# Patient Record
Sex: FEMALE | Race: WHITE | Employment: OTHER | URBAN - METROPOLITAN AREA
[De-identification: names, ages, dates, MRNs, and addresses within clinical notes are randomized per-mention and may not be internally consistent; named-entity substitution may affect disease eponyms.]

---

## 2017-01-05 ENCOUNTER — HOSPITAL ENCOUNTER (OUTPATIENT)
Dept: INFUSION THERAPY | Age: 47
Discharge: HOME | End: 2017-01-05
Payer: COMMERCIAL

## 2017-01-05 VITALS
BODY MASS INDEX: 30.22 KG/M2 | TEMPERATURE: 98.6 F | DIASTOLIC BLOOD PRESSURE: 79 MMHG | WEIGHT: 165.2 LBS | SYSTOLIC BLOOD PRESSURE: 129 MMHG | RESPIRATION RATE: 18 BRPM | HEART RATE: 112 BPM | OXYGEN SATURATION: 94 %

## 2017-01-05 LAB — MAGNESIUM SERPL-MCNC: 1.8 MG/DL (ref 1.8–2.4)

## 2017-01-05 PROCEDURE — 74011000250 HC RX REV CODE- 250: Performed by: INTERNAL MEDICINE

## 2017-01-05 PROCEDURE — 77030003560 HC NDL HUBR BARD -A

## 2017-01-05 PROCEDURE — 74011250636 HC RX REV CODE- 250/636: Performed by: INTERNAL MEDICINE

## 2017-01-05 PROCEDURE — 83735 ASSAY OF MAGNESIUM: CPT | Performed by: INTERNAL MEDICINE

## 2017-01-05 PROCEDURE — 96374 THER/PROPH/DIAG INJ IV PUSH: CPT

## 2017-01-05 RX ORDER — SODIUM CHLORIDE 0.9 % (FLUSH) 0.9 %
10-40 SYRINGE (ML) INJECTION AS NEEDED
Status: DISCONTINUED | OUTPATIENT
Start: 2017-01-05 | End: 2017-01-09 | Stop reason: HOSPADM

## 2017-01-05 RX ORDER — HEPARIN 100 UNIT/ML
300-500 SYRINGE INTRAVENOUS AS NEEDED
Status: DISPENSED | OUTPATIENT
Start: 2017-01-05 | End: 2017-01-05

## 2017-01-05 RX ADMIN — Medication 10 ML: at 08:21

## 2017-01-05 RX ADMIN — Medication 10 ML: at 08:25

## 2017-01-05 RX ADMIN — SODIUM CHLORIDE, PRESERVATIVE FREE 300 UNITS: 5 INJECTION INTRAVENOUS at 08:25

## 2017-01-05 RX ADMIN — SODIUM CHLORIDE 25 MG: 9 INJECTION INTRAMUSCULAR; INTRAVENOUS; SUBCUTANEOUS at 08:22

## 2017-01-05 NOTE — PROGRESS NOTES
Arrived to the [facility]. Phenergan IV completed. Patient tolerated well. Any issues or concerns during appointment: Magnesium level 1.8 today, no Mag replacements needed. Patient aware of next infusion appointment on 1/12/17 at [facility]. Discharged ambulatory.

## 2017-01-05 NOTE — PROGRESS NOTES
Patient arrived to the lab this morning to have her labs drawn and port needle changed. She states that she has just finished her antibiotic seymour the E.R. Had prescribed fo her port a little over ten days ago. She states that she went to the E.R. At this time due to her port site being red and tender. This morning, she states that the whole area \"looks so much better! \"  There is still some redness noted right at the port insertion site, with slight bruising noted there as well. She states that Dr. Linda Steven was the surgeon that had placed the port in 2013. Instructed her to call his office today to report what has been going on with the port. She verbalizes understanding. She states that she does have his phone number and that she will call. The port needle inserted with ease today and flushes easily. There is also excellent blood return from the port.

## 2017-01-12 ENCOUNTER — HOSPITAL ENCOUNTER (OUTPATIENT)
Dept: INFUSION THERAPY | Age: 47
Discharge: HOME | End: 2017-01-12
Payer: COMMERCIAL

## 2017-01-12 VITALS
DIASTOLIC BLOOD PRESSURE: 74 MMHG | OXYGEN SATURATION: 94 % | HEART RATE: 121 BPM | SYSTOLIC BLOOD PRESSURE: 124 MMHG | RESPIRATION RATE: 18 BRPM | TEMPERATURE: 98.7 F

## 2017-01-12 LAB — MAGNESIUM SERPL-MCNC: 2 MG/DL (ref 1.8–2.4)

## 2017-01-12 PROCEDURE — 74011250636 HC RX REV CODE- 250/636: Performed by: INTERNAL MEDICINE

## 2017-01-12 PROCEDURE — 77030003560 HC NDL HUBR BARD -A

## 2017-01-12 PROCEDURE — 83735 ASSAY OF MAGNESIUM: CPT | Performed by: INTERNAL MEDICINE

## 2017-01-12 PROCEDURE — 96374 THER/PROPH/DIAG INJ IV PUSH: CPT

## 2017-01-12 PROCEDURE — 74011000250 HC RX REV CODE- 250: Performed by: INTERNAL MEDICINE

## 2017-01-12 RX ORDER — HEPARIN 100 UNIT/ML
300-500 SYRINGE INTRAVENOUS AS NEEDED
Status: ACTIVE | OUTPATIENT
Start: 2017-01-12 | End: 2017-01-12

## 2017-01-12 RX ORDER — SODIUM CHLORIDE 0.9 % (FLUSH) 0.9 %
10-40 SYRINGE (ML) INJECTION AS NEEDED
Status: DISCONTINUED | OUTPATIENT
Start: 2017-01-12 | End: 2017-01-16 | Stop reason: HOSPADM

## 2017-01-12 RX ADMIN — Medication 10 ML: at 07:25

## 2017-01-12 RX ADMIN — Medication 10 ML: at 07:35

## 2017-01-12 RX ADMIN — SODIUM CHLORIDE 25 MG: 9 INJECTION INTRAMUSCULAR; INTRAVENOUS; SUBCUTANEOUS at 07:25

## 2017-01-12 NOTE — PROGRESS NOTES
Arrived to the [facility]. Phenergan completed. Patient tolerated well. Any issues or concerns during appointment: Magnesium resulted as 2.0 today; no mag replacement needed per orders. Patient aware of next infusion appointment on 1/19/17 at 7:15am.  Discharged ambulatory.

## 2017-01-19 ENCOUNTER — HOSPITAL ENCOUNTER (OUTPATIENT)
Dept: INFUSION THERAPY | Age: 47
Discharge: HOME | End: 2017-01-19
Payer: COMMERCIAL

## 2017-01-19 VITALS
RESPIRATION RATE: 18 BRPM | BODY MASS INDEX: 30.91 KG/M2 | WEIGHT: 169 LBS | DIASTOLIC BLOOD PRESSURE: 76 MMHG | HEART RATE: 116 BPM | SYSTOLIC BLOOD PRESSURE: 122 MMHG | TEMPERATURE: 98.2 F | OXYGEN SATURATION: 96 %

## 2017-01-19 LAB — MAGNESIUM SERPL-MCNC: 1.9 MG/DL (ref 1.8–2.4)

## 2017-01-19 PROCEDURE — 83735 ASSAY OF MAGNESIUM: CPT | Performed by: INTERNAL MEDICINE

## 2017-01-19 PROCEDURE — 74011000250 HC RX REV CODE- 250: Performed by: INTERNAL MEDICINE

## 2017-01-19 PROCEDURE — 77030003560 HC NDL HUBR BARD -A

## 2017-01-19 PROCEDURE — 96374 THER/PROPH/DIAG INJ IV PUSH: CPT

## 2017-01-19 PROCEDURE — 74011250636 HC RX REV CODE- 250/636: Performed by: INTERNAL MEDICINE

## 2017-01-19 RX ORDER — SODIUM CHLORIDE 0.9 % (FLUSH) 0.9 %
10 SYRINGE (ML) INJECTION AS NEEDED
Status: DISCONTINUED | OUTPATIENT
Start: 2017-01-19 | End: 2017-01-23 | Stop reason: HOSPADM

## 2017-01-19 RX ORDER — HEPARIN 100 UNIT/ML
500 SYRINGE INTRAVENOUS AS NEEDED
Status: DISPENSED | OUTPATIENT
Start: 2017-01-19 | End: 2017-01-19

## 2017-01-19 RX ADMIN — SODIUM CHLORIDE 25 MG: 9 INJECTION INTRAMUSCULAR; INTRAVENOUS; SUBCUTANEOUS at 07:55

## 2017-01-19 RX ADMIN — SODIUM CHLORIDE, PRESERVATIVE FREE 500 UNITS: 5 INJECTION INTRAVENOUS at 07:58

## 2017-01-19 RX ADMIN — Medication 10 ML: at 07:58

## 2017-01-19 NOTE — PROGRESS NOTES
Pt arrived ambulatory for labs and possible replacements. Pt's mag 1.9 today, she does not need replacement. Pt c/o nausea, phenergan given per orders. Pt dc'd stable to return to [facility] on 1/26 at 0715.

## 2017-01-26 ENCOUNTER — HOSPITAL ENCOUNTER (OUTPATIENT)
Dept: INFUSION THERAPY | Age: 47
Discharge: HOME | End: 2017-01-26
Payer: COMMERCIAL

## 2017-01-26 VITALS
WEIGHT: 167.2 LBS | DIASTOLIC BLOOD PRESSURE: 81 MMHG | RESPIRATION RATE: 18 BRPM | HEART RATE: 113 BPM | BODY MASS INDEX: 30.58 KG/M2 | OXYGEN SATURATION: 95 % | SYSTOLIC BLOOD PRESSURE: 125 MMHG | TEMPERATURE: 98.5 F

## 2017-01-26 LAB — MAGNESIUM SERPL-MCNC: 1.8 MG/DL (ref 1.8–2.4)

## 2017-01-26 PROCEDURE — 83735 ASSAY OF MAGNESIUM: CPT | Performed by: INTERNAL MEDICINE

## 2017-01-26 PROCEDURE — 77030003560 HC NDL HUBR BARD -A

## 2017-01-26 PROCEDURE — 96374 THER/PROPH/DIAG INJ IV PUSH: CPT

## 2017-01-26 PROCEDURE — 74011250636 HC RX REV CODE- 250/636: Performed by: INTERNAL MEDICINE

## 2017-01-26 PROCEDURE — 74011000250 HC RX REV CODE- 250: Performed by: INTERNAL MEDICINE

## 2017-01-26 RX ORDER — SODIUM CHLORIDE 0.9 % (FLUSH) 0.9 %
5-10 SYRINGE (ML) INJECTION AS NEEDED
Status: DISCONTINUED | OUTPATIENT
Start: 2017-01-26 | End: 2017-01-30 | Stop reason: HOSPADM

## 2017-01-26 RX ORDER — HEPARIN 100 UNIT/ML
500 SYRINGE INTRAVENOUS AS NEEDED
Status: DISPENSED | OUTPATIENT
Start: 2017-01-26 | End: 2017-01-26

## 2017-01-26 RX ADMIN — SODIUM CHLORIDE 25 MG: 9 INJECTION INTRAMUSCULAR; INTRAVENOUS; SUBCUTANEOUS at 07:54

## 2017-01-26 RX ADMIN — SODIUM CHLORIDE, PRESERVATIVE FREE 500 UNITS: 5 INJECTION INTRAVENOUS at 08:41

## 2017-01-26 RX ADMIN — SODIUM CHLORIDE 500 ML: 900 INJECTION, SOLUTION INTRAVENOUS at 07:46

## 2017-01-26 RX ADMIN — Medication 10 ML: at 07:45

## 2017-01-26 NOTE — PROGRESS NOTES
Pt arrived ambulatory to [facility] with port previously accessed. Mag 1.8. No replacements needed. NS infusing for phenergan. Phenergan given IV as ordered. Port packed with heparin. Pt aware of next appt on 2/2/17 at 0715. Pt discharged ambulatory.

## 2017-02-02 ENCOUNTER — HOSPITAL ENCOUNTER (OUTPATIENT)
Dept: INFUSION THERAPY | Age: 47
Discharge: HOME | End: 2017-02-02
Payer: COMMERCIAL

## 2017-02-02 VITALS
TEMPERATURE: 99.8 F | OXYGEN SATURATION: 96 % | HEART RATE: 124 BPM | SYSTOLIC BLOOD PRESSURE: 124 MMHG | WEIGHT: 165 LBS | DIASTOLIC BLOOD PRESSURE: 77 MMHG | BODY MASS INDEX: 30.18 KG/M2 | RESPIRATION RATE: 18 BRPM

## 2017-02-02 LAB — MAGNESIUM SERPL-MCNC: 2.2 MG/DL (ref 1.8–2.4)

## 2017-02-02 PROCEDURE — 74011250636 HC RX REV CODE- 250/636: Performed by: INTERNAL MEDICINE

## 2017-02-02 PROCEDURE — 77030003560 HC NDL HUBR BARD -A

## 2017-02-02 PROCEDURE — 83735 ASSAY OF MAGNESIUM: CPT | Performed by: INTERNAL MEDICINE

## 2017-02-02 PROCEDURE — 74011000250 HC RX REV CODE- 250: Performed by: INTERNAL MEDICINE

## 2017-02-02 PROCEDURE — 96374 THER/PROPH/DIAG INJ IV PUSH: CPT

## 2017-02-02 RX ORDER — HEPARIN 100 UNIT/ML
500 SYRINGE INTRAVENOUS AS NEEDED
Status: DISPENSED | OUTPATIENT
Start: 2017-02-02 | End: 2017-02-02

## 2017-02-02 RX ORDER — SODIUM CHLORIDE 0.9 % (FLUSH) 0.9 %
10 SYRINGE (ML) INJECTION AS NEEDED
Status: ACTIVE | OUTPATIENT
Start: 2017-02-02 | End: 2017-02-02

## 2017-02-02 RX ADMIN — Medication 10 ML: at 07:45

## 2017-02-02 RX ADMIN — SODIUM CHLORIDE, PRESERVATIVE FREE 500 UNITS: 5 INJECTION INTRAVENOUS at 07:45

## 2017-02-02 RX ADMIN — SODIUM CHLORIDE 25 MG: 9 INJECTION INTRAMUSCULAR; INTRAVENOUS; SUBCUTANEOUS at 07:41

## 2017-02-02 RX ADMIN — Medication 10 ML: at 07:40

## 2017-02-02 NOTE — PROGRESS NOTES
Problem: Knowledge Deficit  Goal: *Verbalizes understanding of procedures and medications  Outcome: Progressing Towards Goal  Pt verbalizes/demonstrates understanding of purpose/procedure/potential side effects of phenergan.

## 2017-02-02 NOTE — PROGRESS NOTES
Pt arrived ambulatory today at 0730, to receive phenergan for c/o nausea, and magnesium sulfate replacement not needed today. Pt tolerated without difficulty, reporting relief of nausea. Patient discharged via ambulatory accompanied by father. Instructed to notify physician of any problems, questions or concerns. Allowed opportunity for patient/family to ask questions. Verbalized understanding. Next appointment is Feb 9 at 3 [facility] with [address] [place] [address].

## 2017-02-09 ENCOUNTER — HOSPITAL ENCOUNTER (OUTPATIENT)
Dept: INFUSION THERAPY | Age: 47
Discharge: HOME | End: 2017-02-09
Payer: COMMERCIAL

## 2017-02-09 VITALS
SYSTOLIC BLOOD PRESSURE: 127 MMHG | HEART RATE: 110 BPM | OXYGEN SATURATION: 95 % | TEMPERATURE: 98.4 F | WEIGHT: 165 LBS | RESPIRATION RATE: 18 BRPM | BODY MASS INDEX: 30.18 KG/M2 | DIASTOLIC BLOOD PRESSURE: 66 MMHG

## 2017-02-09 LAB — MAGNESIUM SERPL-MCNC: 2 MG/DL (ref 1.8–2.4)

## 2017-02-09 PROCEDURE — 74011000250 HC RX REV CODE- 250: Performed by: INTERNAL MEDICINE

## 2017-02-09 PROCEDURE — 74011250636 HC RX REV CODE- 250/636: Performed by: INTERNAL MEDICINE

## 2017-02-09 PROCEDURE — 83735 ASSAY OF MAGNESIUM: CPT | Performed by: INTERNAL MEDICINE

## 2017-02-09 PROCEDURE — 96374 THER/PROPH/DIAG INJ IV PUSH: CPT

## 2017-02-09 PROCEDURE — 77030003560 HC NDL HUBR BARD -A

## 2017-02-09 RX ORDER — SODIUM CHLORIDE 0.9 % (FLUSH) 0.9 %
10-40 SYRINGE (ML) INJECTION AS NEEDED
Status: DISCONTINUED | OUTPATIENT
Start: 2017-02-09 | End: 2017-02-13 | Stop reason: HOSPADM

## 2017-02-09 RX ORDER — HEPARIN 100 UNIT/ML
300-500 SYRINGE INTRAVENOUS AS NEEDED
Status: DISPENSED | OUTPATIENT
Start: 2017-02-09 | End: 2017-02-09

## 2017-02-09 RX ADMIN — Medication 10 ML: at 07:49

## 2017-02-09 RX ADMIN — Medication 10 ML: at 07:53

## 2017-02-09 RX ADMIN — SODIUM CHLORIDE 25 MG: 9 INJECTION INTRAMUSCULAR; INTRAVENOUS; SUBCUTANEOUS at 07:50

## 2017-02-09 RX ADMIN — SODIUM CHLORIDE, PRESERVATIVE FREE 500 UNITS: 5 INJECTION INTRAVENOUS at 07:53

## 2017-02-09 NOTE — PROGRESS NOTES
Arrived to the [facility]. Patient's mag level 2.0 today, no mag replacements needed. Phenergan IV completed. Patient tolerated well. Any issues or concerns during appointment: none. Patient aware of next infusion appointment on 2/16/17 at [address] [place] [address]. Discharged ambulatory with father.

## 2017-02-16 ENCOUNTER — HOSPITAL ENCOUNTER (OUTPATIENT)
Dept: INFUSION THERAPY | Age: 47
Discharge: HOME | End: 2017-02-16
Payer: COMMERCIAL

## 2017-02-16 VITALS
TEMPERATURE: 98.2 F | WEIGHT: 165 LBS | DIASTOLIC BLOOD PRESSURE: 79 MMHG | HEART RATE: 119 BPM | SYSTOLIC BLOOD PRESSURE: 107 MMHG | BODY MASS INDEX: 30.18 KG/M2 | RESPIRATION RATE: 18 BRPM | OXYGEN SATURATION: 97 %

## 2017-02-16 LAB — MAGNESIUM SERPL-MCNC: 2.1 MG/DL (ref 1.8–2.4)

## 2017-02-16 PROCEDURE — 74011250636 HC RX REV CODE- 250/636: Performed by: INTERNAL MEDICINE

## 2017-02-16 PROCEDURE — 77030003560 HC NDL HUBR BARD -A

## 2017-02-16 PROCEDURE — 83735 ASSAY OF MAGNESIUM: CPT | Performed by: INTERNAL MEDICINE

## 2017-02-16 PROCEDURE — 96374 THER/PROPH/DIAG INJ IV PUSH: CPT

## 2017-02-16 PROCEDURE — 74011000250 HC RX REV CODE- 250: Performed by: INTERNAL MEDICINE

## 2017-02-16 RX ORDER — HEPARIN 100 UNIT/ML
300-500 SYRINGE INTRAVENOUS AS NEEDED
Status: DISPENSED | OUTPATIENT
Start: 2017-02-16 | End: 2017-02-16

## 2017-02-16 RX ORDER — SODIUM CHLORIDE 0.9 % (FLUSH) 0.9 %
10-40 SYRINGE (ML) INJECTION AS NEEDED
Status: DISCONTINUED | OUTPATIENT
Start: 2017-02-16 | End: 2017-02-20 | Stop reason: HOSPADM

## 2017-02-16 RX ADMIN — Medication 10 ML: at 07:45

## 2017-02-16 RX ADMIN — SODIUM CHLORIDE 25 MG: 9 INJECTION INTRAMUSCULAR; INTRAVENOUS; SUBCUTANEOUS at 07:46

## 2017-02-16 RX ADMIN — Medication 10 ML: at 07:49

## 2017-02-16 RX ADMIN — SODIUM CHLORIDE, PRESERVATIVE FREE 500 UNITS: 5 INJECTION INTRAVENOUS at 07:49

## 2017-02-16 NOTE — PROGRESS NOTES
Arrived to the [facility]. Labs reviewed, no mag replacements needed. IV phenergan completed. Patient tolerated well. Any issues or concerns during appointment: none. Patient aware of next infusion appointment on 2/23/17 at [facility]. Discharged ambulatory.

## 2017-02-23 ENCOUNTER — HOSPITAL ENCOUNTER (OUTPATIENT)
Dept: INFUSION THERAPY | Age: 47
Discharge: HOME | End: 2017-02-23
Payer: COMMERCIAL

## 2017-02-23 VITALS
TEMPERATURE: 98.9 F | HEART RATE: 122 BPM | SYSTOLIC BLOOD PRESSURE: 141 MMHG | WEIGHT: 168 LBS | OXYGEN SATURATION: 95 % | DIASTOLIC BLOOD PRESSURE: 71 MMHG | BODY MASS INDEX: 30.73 KG/M2 | RESPIRATION RATE: 18 BRPM

## 2017-02-23 LAB
EST. AVERAGE GLUCOSE BLD GHB EST-MCNC: 306 MG/DL
HBA1C MFR BLD: 12.3 % (ref 4.8–6)
MAGNESIUM SERPL-MCNC: 2 MG/DL (ref 1.8–2.4)

## 2017-02-23 PROCEDURE — 77030003560 HC NDL HUBR BARD -A

## 2017-02-23 PROCEDURE — 83735 ASSAY OF MAGNESIUM: CPT | Performed by: INTERNAL MEDICINE

## 2017-02-23 PROCEDURE — 96374 THER/PROPH/DIAG INJ IV PUSH: CPT

## 2017-02-23 PROCEDURE — 74011000250 HC RX REV CODE- 250: Performed by: INTERNAL MEDICINE

## 2017-02-23 PROCEDURE — 83036 HEMOGLOBIN GLYCOSYLATED A1C: CPT

## 2017-02-23 PROCEDURE — 74011250636 HC RX REV CODE- 250/636: Performed by: INTERNAL MEDICINE

## 2017-02-23 RX ORDER — SODIUM CHLORIDE 0.9 % (FLUSH) 0.9 %
10 SYRINGE (ML) INJECTION AS NEEDED
Status: COMPLETED | OUTPATIENT
Start: 2017-02-23 | End: 2017-02-23

## 2017-02-23 RX ORDER — HEPARIN 100 UNIT/ML
500 SYRINGE INTRAVENOUS AS NEEDED
Status: DISPENSED | OUTPATIENT
Start: 2017-02-23 | End: 2017-02-23

## 2017-02-23 RX ORDER — SODIUM CHLORIDE 9 MG/ML
25 INJECTION, SOLUTION INTRAVENOUS ONCE
Status: COMPLETED | OUTPATIENT
Start: 2017-02-23 | End: 2017-02-23

## 2017-02-23 RX ADMIN — Medication 10 ML: at 07:44

## 2017-02-23 RX ADMIN — Medication 10 ML: at 08:30

## 2017-02-23 RX ADMIN — SODIUM CHLORIDE 25 ML/HR: 900 INJECTION, SOLUTION INTRAVENOUS at 07:44

## 2017-02-23 RX ADMIN — SODIUM CHLORIDE 25 MG: 9 INJECTION INTRAMUSCULAR; INTRAVENOUS; SUBCUTANEOUS at 07:47

## 2017-02-23 RX ADMIN — SODIUM CHLORIDE, PRESERVATIVE FREE 500 UNITS: 5 INJECTION INTRAVENOUS at 08:30

## 2017-02-23 NOTE — PROGRESS NOTES
Arrived to the [facility]. IV phenergan completed. Patient tolerated well. Any issues or concerns during appointment: NO.  Patient aware of next infusion appointment on 03/02/17 (date) at [address] [place] [address] (time). Discharged ambulatory.

## 2017-02-28 ENCOUNTER — HOSPITAL ENCOUNTER (OUTPATIENT)
Dept: INFUSION THERAPY | Age: 47
Discharge: HOME | End: 2017-02-28
Payer: COMMERCIAL

## 2017-02-28 VITALS
RESPIRATION RATE: 16 BRPM | HEART RATE: 111 BPM | SYSTOLIC BLOOD PRESSURE: 103 MMHG | BODY MASS INDEX: 30.18 KG/M2 | OXYGEN SATURATION: 90 % | TEMPERATURE: 98.2 F | WEIGHT: 165 LBS | DIASTOLIC BLOOD PRESSURE: 56 MMHG

## 2017-02-28 LAB — MAGNESIUM SERPL-MCNC: 1.9 MG/DL (ref 1.8–2.4)

## 2017-02-28 PROCEDURE — 74011250636 HC RX REV CODE- 250/636: Performed by: INTERNAL MEDICINE

## 2017-02-28 PROCEDURE — 83735 ASSAY OF MAGNESIUM: CPT | Performed by: INTERNAL MEDICINE

## 2017-02-28 PROCEDURE — 77030003560 HC NDL HUBR BARD -A

## 2017-02-28 PROCEDURE — 74011000250 HC RX REV CODE- 250: Performed by: INTERNAL MEDICINE

## 2017-02-28 PROCEDURE — 96374 THER/PROPH/DIAG INJ IV PUSH: CPT

## 2017-02-28 RX ORDER — SODIUM CHLORIDE 0.9 % (FLUSH) 0.9 %
20 SYRINGE (ML) INJECTION EVERY 8 HOURS
Status: DISCONTINUED | OUTPATIENT
Start: 2017-02-28 | End: 2017-03-04 | Stop reason: HOSPADM

## 2017-02-28 RX ADMIN — SODIUM CHLORIDE 25 MG: 9 INJECTION INTRAMUSCULAR; INTRAVENOUS; SUBCUTANEOUS at 16:05

## 2017-02-28 RX ADMIN — Medication 20 ML: at 16:17

## 2017-03-09 ENCOUNTER — HOSPITAL ENCOUNTER (OUTPATIENT)
Dept: INFUSION THERAPY | Age: 47
End: 2017-03-09
Payer: COMMERCIAL

## 2017-03-09 ENCOUNTER — HOSPITAL ENCOUNTER (OUTPATIENT)
Dept: INFUSION THERAPY | Age: 47
Discharge: HOME | End: 2017-03-09
Payer: COMMERCIAL

## 2017-03-09 VITALS
OXYGEN SATURATION: 95 % | DIASTOLIC BLOOD PRESSURE: 65 MMHG | TEMPERATURE: 97.9 F | HEART RATE: 111 BPM | SYSTOLIC BLOOD PRESSURE: 122 MMHG | RESPIRATION RATE: 18 BRPM

## 2017-03-09 LAB
ALBUMIN SERPL BCP-MCNC: 3.5 G/DL (ref 3.5–5)
ALBUMIN/GLOB SERPL: 0.9 {RATIO} (ref 1.2–3.5)
ALP SERPL-CCNC: 127 U/L (ref 50–136)
ALT SERPL-CCNC: 24 U/L (ref 12–65)
ANION GAP BLD CALC-SCNC: 9 MMOL/L (ref 7–16)
AST SERPL W P-5'-P-CCNC: 18 U/L (ref 15–37)
BILIRUB DIRECT SERPL-MCNC: 0.1 MG/DL
BILIRUB SERPL-MCNC: 0.3 MG/DL (ref 0.2–1.1)
BUN SERPL-MCNC: 18 MG/DL (ref 6–23)
CALCIUM SERPL-MCNC: 8.5 MG/DL (ref 8.3–10.4)
CHLORIDE SERPL-SCNC: 98 MMOL/L (ref 98–107)
CHOLEST SERPL-MCNC: 163 MG/DL
CO2 SERPL-SCNC: 27 MMOL/L (ref 23–32)
CREAT SERPL-MCNC: 0.87 MG/DL (ref 0.6–1)
EST. AVERAGE GLUCOSE BLD GHB EST-MCNC: 309 MG/DL
GLOBULIN SER CALC-MCNC: 3.9 G/DL (ref 2.3–3.5)
GLUCOSE SERPL-MCNC: 423 MG/DL (ref 65–100)
HBA1C MFR BLD: 12.4 % (ref 4.8–6)
HDLC SERPL-MCNC: 42 MG/DL (ref 40–60)
HDLC SERPL: 3.9 {RATIO}
LDLC SERPL CALC-MCNC: 44.6 MG/DL
LIPID PROFILE,FLP: ABNORMAL
MAGNESIUM SERPL-MCNC: 1.9 MG/DL (ref 1.8–2.4)
POTASSIUM SERPL-SCNC: 4.3 MMOL/L (ref 3.5–5.1)
PROT SERPL-MCNC: 7.4 G/DL (ref 6.3–8.2)
SODIUM SERPL-SCNC: 134 MMOL/L (ref 136–145)
TRIGL SERPL-MCNC: 382 MG/DL (ref 35–150)
VLDLC SERPL CALC-MCNC: 76.4 MG/DL (ref 6–23)

## 2017-03-09 PROCEDURE — 83036 HEMOGLOBIN GLYCOSYLATED A1C: CPT | Performed by: INTERNAL MEDICINE

## 2017-03-09 PROCEDURE — 74011250636 HC RX REV CODE- 250/636: Performed by: INTERNAL MEDICINE

## 2017-03-09 PROCEDURE — 80048 BASIC METABOLIC PNL TOTAL CA: CPT | Performed by: INTERNAL MEDICINE

## 2017-03-09 PROCEDURE — 80061 LIPID PANEL: CPT | Performed by: INTERNAL MEDICINE

## 2017-03-09 PROCEDURE — 80076 HEPATIC FUNCTION PANEL: CPT | Performed by: INTERNAL MEDICINE

## 2017-03-09 PROCEDURE — 77030003560 HC NDL HUBR BARD -A

## 2017-03-09 PROCEDURE — 83735 ASSAY OF MAGNESIUM: CPT | Performed by: INTERNAL MEDICINE

## 2017-03-09 PROCEDURE — 74011000250 HC RX REV CODE- 250: Performed by: INTERNAL MEDICINE

## 2017-03-09 PROCEDURE — 96374 THER/PROPH/DIAG INJ IV PUSH: CPT

## 2017-03-09 RX ORDER — HEPARIN 100 UNIT/ML
300-500 SYRINGE INTRAVENOUS AS NEEDED
Status: DISPENSED | OUTPATIENT
Start: 2017-03-09 | End: 2017-03-09

## 2017-03-09 RX ORDER — SODIUM CHLORIDE 0.9 % (FLUSH) 0.9 %
10-40 SYRINGE (ML) INJECTION AS NEEDED
Status: DISCONTINUED | OUTPATIENT
Start: 2017-03-09 | End: 2017-03-13 | Stop reason: HOSPADM

## 2017-03-09 RX ADMIN — Medication 10 ML: at 07:56

## 2017-03-09 RX ADMIN — Medication 10 ML: at 07:58

## 2017-03-09 RX ADMIN — SODIUM CHLORIDE 25 MG: 9 INJECTION INTRAMUSCULAR; INTRAVENOUS; SUBCUTANEOUS at 07:56

## 2017-03-09 RX ADMIN — SODIUM CHLORIDE, PRESERVATIVE FREE 500 UNITS: 5 INJECTION INTRAVENOUS at 08:07

## 2017-03-09 NOTE — PROGRESS NOTES
Arrived to the [facility]. Mag level 1.9, no mag replacements needed. Phenergan IV given. Labs drawn from per Dr. Derrick Love orders. Patient's glucose level 423 this morning. She states she just had a gravy biscuit and chocolate milk and had not checked her blood sugar yet. She usually checks it at home and is able to control it with insulin. Instructed patient to make sure she takes her insulin as her doctor has instructed and to be monitoring her blood sugars regularly. Patient verbalized understanding. Any issues or concerns during appointment: none. Patient aware of next infusion appointment on 3/16/17 at [address] [place] [address]. Discharged ambulatory.

## 2017-03-16 ENCOUNTER — HOSPITAL ENCOUNTER (OUTPATIENT)
Dept: INFUSION THERAPY | Age: 47
Discharge: HOME | End: 2017-03-16
Payer: COMMERCIAL

## 2017-03-16 VITALS
HEART RATE: 96 BPM | RESPIRATION RATE: 18 BRPM | TEMPERATURE: 98.2 F | SYSTOLIC BLOOD PRESSURE: 117 MMHG | DIASTOLIC BLOOD PRESSURE: 68 MMHG | OXYGEN SATURATION: 95 %

## 2017-03-16 LAB — MAGNESIUM SERPL-MCNC: 1.9 MG/DL (ref 1.8–2.4)

## 2017-03-16 PROCEDURE — 74011250636 HC RX REV CODE- 250/636: Performed by: INTERNAL MEDICINE

## 2017-03-16 PROCEDURE — 96374 THER/PROPH/DIAG INJ IV PUSH: CPT

## 2017-03-16 PROCEDURE — 77030003560 HC NDL HUBR BARD -A

## 2017-03-16 PROCEDURE — 74011000250 HC RX REV CODE- 250: Performed by: INTERNAL MEDICINE

## 2017-03-16 PROCEDURE — 83735 ASSAY OF MAGNESIUM: CPT | Performed by: INTERNAL MEDICINE

## 2017-03-16 RX ORDER — SODIUM CHLORIDE 0.9 % (FLUSH) 0.9 %
5-10 SYRINGE (ML) INJECTION AS NEEDED
Status: DISCONTINUED | OUTPATIENT
Start: 2017-03-16 | End: 2017-03-20 | Stop reason: HOSPADM

## 2017-03-16 RX ORDER — HEPARIN 100 UNIT/ML
500 SYRINGE INTRAVENOUS AS NEEDED
Status: DISPENSED | OUTPATIENT
Start: 2017-03-16 | End: 2017-03-16

## 2017-03-16 RX ADMIN — SODIUM CHLORIDE 25 MG: 9 INJECTION INTRAMUSCULAR; INTRAVENOUS; SUBCUTANEOUS at 07:34

## 2017-03-16 RX ADMIN — Medication 10 ML: at 08:35

## 2017-03-16 RX ADMIN — SODIUM CHLORIDE, PRESERVATIVE FREE 500 UNITS: 5 INJECTION INTRAVENOUS at 08:35

## 2017-03-16 RX ADMIN — Medication 10 ML: at 07:20

## 2017-03-16 NOTE — PROGRESS NOTES
Pt arrived ambulatory to [facility]. Port accessed with good blood return. Blood drawn for Mag level and sent to lab. Phenergan given IV. Mag 1.9. No replacements needed at this time. Port flushed and packed with heparin and remains accessed for in home infusion. Pt aware of next appt on 3/23/17 at [address] [place] [address]. Pt discharged ambulatory.

## 2017-03-23 ENCOUNTER — HOSPITAL ENCOUNTER (OUTPATIENT)
Dept: INFUSION THERAPY | Age: 47
Discharge: HOME | End: 2017-03-23
Payer: COMMERCIAL

## 2017-03-23 VITALS
RESPIRATION RATE: 18 BRPM | OXYGEN SATURATION: 95 % | HEART RATE: 100 BPM | WEIGHT: 167.2 LBS | DIASTOLIC BLOOD PRESSURE: 81 MMHG | TEMPERATURE: 98.5 F | BODY MASS INDEX: 30.58 KG/M2 | SYSTOLIC BLOOD PRESSURE: 126 MMHG

## 2017-03-23 LAB — MAGNESIUM SERPL-MCNC: 1.9 MG/DL (ref 1.8–2.4)

## 2017-03-23 PROCEDURE — 83735 ASSAY OF MAGNESIUM: CPT | Performed by: INTERNAL MEDICINE

## 2017-03-23 PROCEDURE — 96374 THER/PROPH/DIAG INJ IV PUSH: CPT

## 2017-03-23 PROCEDURE — 74011250636 HC RX REV CODE- 250/636: Performed by: INTERNAL MEDICINE

## 2017-03-23 PROCEDURE — 77030003560 HC NDL HUBR BARD -A

## 2017-03-23 PROCEDURE — 74011000250 HC RX REV CODE- 250: Performed by: INTERNAL MEDICINE

## 2017-03-23 RX ORDER — HEPARIN 100 UNIT/ML
300-500 SYRINGE INTRAVENOUS AS NEEDED
Status: DISPENSED | OUTPATIENT
Start: 2017-03-23 | End: 2017-03-23

## 2017-03-23 RX ORDER — SODIUM CHLORIDE 0.9 % (FLUSH) 0.9 %
10-40 SYRINGE (ML) INJECTION AS NEEDED
Status: DISCONTINUED | OUTPATIENT
Start: 2017-03-23 | End: 2017-03-27 | Stop reason: HOSPADM

## 2017-03-23 RX ADMIN — SODIUM CHLORIDE 25 MG: 9 INJECTION INTRAMUSCULAR; INTRAVENOUS; SUBCUTANEOUS at 07:33

## 2017-03-23 RX ADMIN — SODIUM CHLORIDE, PRESERVATIVE FREE 500 UNITS: 5 INJECTION INTRAVENOUS at 08:36

## 2017-03-23 RX ADMIN — Medication 10 ML: at 07:33

## 2017-03-23 RX ADMIN — Medication 10 ML: at 08:36

## 2017-03-23 NOTE — PROGRESS NOTES
Arrived to the [facility]. Labs reviewed. Magnesium level 1.9 today, no replacements needed. IV phenergan completed. Patient tolerated well. Any issues or concerns during appointment: none. Patient aware of next infusion appointment on 3/30/17 at [address] [place] [address]. Discharged ambulatory.

## 2017-03-30 ENCOUNTER — HOSPITAL ENCOUNTER (OUTPATIENT)
Dept: INFUSION THERAPY | Age: 47
Discharge: HOME | End: 2017-03-30
Payer: COMMERCIAL

## 2017-03-30 VITALS
SYSTOLIC BLOOD PRESSURE: 101 MMHG | OXYGEN SATURATION: 95 % | HEART RATE: 91 BPM | TEMPERATURE: 99 F | RESPIRATION RATE: 18 BRPM | DIASTOLIC BLOOD PRESSURE: 50 MMHG

## 2017-03-30 LAB
ALBUMIN SERPL BCP-MCNC: 3.5 G/DL (ref 3.5–5)
ALBUMIN/GLOB SERPL: 0.9 {RATIO} (ref 1.2–3.5)
ALP SERPL-CCNC: 141 U/L (ref 50–136)
ALT SERPL-CCNC: 26 U/L (ref 12–65)
ANION GAP BLD CALC-SCNC: 9 MMOL/L (ref 7–16)
AST SERPL W P-5'-P-CCNC: 16 U/L (ref 15–37)
BILIRUB DIRECT SERPL-MCNC: <0.1 MG/DL
BILIRUB SERPL-MCNC: 0.3 MG/DL (ref 0.2–1.1)
BUN SERPL-MCNC: 13 MG/DL (ref 6–23)
CALCIUM SERPL-MCNC: 9.4 MG/DL (ref 8.3–10.4)
CHLORIDE SERPL-SCNC: 94 MMOL/L (ref 98–107)
CO2 SERPL-SCNC: 29 MMOL/L (ref 23–32)
CREAT SERPL-MCNC: 0.86 MG/DL (ref 0.6–1)
GLOBULIN SER CALC-MCNC: 3.9 G/DL (ref 2.3–3.5)
GLUCOSE SERPL-MCNC: 405 MG/DL (ref 65–100)
MAGNESIUM SERPL-MCNC: 1.9 MG/DL (ref 1.8–2.4)
POTASSIUM SERPL-SCNC: 4.4 MMOL/L (ref 3.5–5.1)
PROT SERPL-MCNC: 7.4 G/DL (ref 6.3–8.2)
SODIUM SERPL-SCNC: 132 MMOL/L (ref 136–145)

## 2017-03-30 PROCEDURE — 74011000250 HC RX REV CODE- 250: Performed by: INTERNAL MEDICINE

## 2017-03-30 PROCEDURE — 96374 THER/PROPH/DIAG INJ IV PUSH: CPT

## 2017-03-30 PROCEDURE — 83735 ASSAY OF MAGNESIUM: CPT | Performed by: INTERNAL MEDICINE

## 2017-03-30 PROCEDURE — 80048 BASIC METABOLIC PNL TOTAL CA: CPT | Performed by: INTERNAL MEDICINE

## 2017-03-30 PROCEDURE — 80076 HEPATIC FUNCTION PANEL: CPT | Performed by: INTERNAL MEDICINE

## 2017-03-30 PROCEDURE — 77030003560 HC NDL HUBR BARD -A

## 2017-03-30 PROCEDURE — 96361 HYDRATE IV INFUSION ADD-ON: CPT

## 2017-03-30 PROCEDURE — 74011250636 HC RX REV CODE- 250/636: Performed by: INTERNAL MEDICINE

## 2017-03-30 RX ORDER — SODIUM CHLORIDE 0.9 % (FLUSH) 0.9 %
10-40 SYRINGE (ML) INJECTION AS NEEDED
Status: DISCONTINUED | OUTPATIENT
Start: 2017-03-30 | End: 2017-04-03 | Stop reason: HOSPADM

## 2017-03-30 RX ORDER — HEPARIN 100 UNIT/ML
500 SYRINGE INTRAVENOUS AS NEEDED
Status: DISPENSED | OUTPATIENT
Start: 2017-03-30 | End: 2017-03-30

## 2017-03-30 RX ADMIN — SODIUM CHLORIDE 500 ML: 900 INJECTION, SOLUTION INTRAVENOUS at 07:41

## 2017-03-30 RX ADMIN — SODIUM CHLORIDE 25 MG: 9 INJECTION INTRAMUSCULAR; INTRAVENOUS; SUBCUTANEOUS at 07:30

## 2017-03-30 RX ADMIN — Medication 10 ML: at 07:29

## 2017-03-30 RX ADMIN — SODIUM CHLORIDE, PRESERVATIVE FREE 500 UNITS: 5 INJECTION INTRAVENOUS at 09:20

## 2017-03-30 RX ADMIN — Medication 10 ML: at 09:20

## 2017-03-30 NOTE — PROGRESS NOTES
Pt. Discharged ambulatory. Tolerated infusion well. No distress noted. To return to Infusions on 4/6/17.

## 2017-04-06 ENCOUNTER — HOSPITAL ENCOUNTER (OUTPATIENT)
Dept: INFUSION THERAPY | Age: 47
Discharge: HOME | End: 2017-04-06
Payer: COMMERCIAL

## 2017-04-06 VITALS
BODY MASS INDEX: 30.73 KG/M2 | SYSTOLIC BLOOD PRESSURE: 130 MMHG | HEART RATE: 113 BPM | OXYGEN SATURATION: 94 % | RESPIRATION RATE: 18 BRPM | TEMPERATURE: 98.4 F | WEIGHT: 168 LBS | DIASTOLIC BLOOD PRESSURE: 75 MMHG

## 2017-04-06 LAB — MAGNESIUM SERPL-MCNC: 1.8 MG/DL (ref 1.8–2.4)

## 2017-04-06 PROCEDURE — 74011000250 HC RX REV CODE- 250: Performed by: INTERNAL MEDICINE

## 2017-04-06 PROCEDURE — 96374 THER/PROPH/DIAG INJ IV PUSH: CPT

## 2017-04-06 PROCEDURE — 74011250636 HC RX REV CODE- 250/636: Performed by: INTERNAL MEDICINE

## 2017-04-06 PROCEDURE — 77030003560 HC NDL HUBR BARD -A

## 2017-04-06 PROCEDURE — 83735 ASSAY OF MAGNESIUM: CPT | Performed by: INTERNAL MEDICINE

## 2017-04-06 RX ORDER — OXYCODONE HYDROCHLORIDE 10 MG/1
10 TABLET ORAL
COMMUNITY
End: 2018-09-24

## 2017-04-06 RX ORDER — SODIUM CHLORIDE 0.9 % (FLUSH) 0.9 %
10-40 SYRINGE (ML) INJECTION AS NEEDED
Status: DISCONTINUED | OUTPATIENT
Start: 2017-04-06 | End: 2017-04-10 | Stop reason: HOSPADM

## 2017-04-06 RX ORDER — HEPARIN 100 UNIT/ML
500 SYRINGE INTRAVENOUS AS NEEDED
Status: DISPENSED | OUTPATIENT
Start: 2017-04-06 | End: 2017-04-06

## 2017-04-06 RX ADMIN — Medication 10 ML: at 07:48

## 2017-04-06 RX ADMIN — SODIUM CHLORIDE, PRESERVATIVE FREE 500 UNITS: 5 INJECTION INTRAVENOUS at 08:44

## 2017-04-06 RX ADMIN — SODIUM CHLORIDE 25 MG: 9 INJECTION INTRAMUSCULAR; INTRAVENOUS; SUBCUTANEOUS at 07:48

## 2017-04-06 RX ADMIN — Medication 10 ML: at 08:44

## 2017-04-06 NOTE — PROGRESS NOTES
Arrived to the [facility]. Labs reviewed. Magnesium level 1.8 today, no replacements needed. IV phenergan completed. Patient tolerated well. Any issues or concerns during appointment: none. Patient aware of next infusion appointment on 4/13/17 at [address] [place] [address]. Discharged ambulatory.

## 2017-04-13 ENCOUNTER — HOSPITAL ENCOUNTER (OUTPATIENT)
Dept: INFUSION THERAPY | Age: 47
Discharge: HOME | End: 2017-04-13
Payer: COMMERCIAL

## 2017-04-13 VITALS
BODY MASS INDEX: 30.18 KG/M2 | TEMPERATURE: 98.1 F | SYSTOLIC BLOOD PRESSURE: 97 MMHG | WEIGHT: 165 LBS | RESPIRATION RATE: 16 BRPM | HEART RATE: 89 BPM | OXYGEN SATURATION: 94 % | DIASTOLIC BLOOD PRESSURE: 69 MMHG

## 2017-04-13 LAB — MAGNESIUM SERPL-MCNC: 1.9 MG/DL (ref 1.8–2.4)

## 2017-04-13 PROCEDURE — 83735 ASSAY OF MAGNESIUM: CPT | Performed by: INTERNAL MEDICINE

## 2017-04-13 PROCEDURE — 77030003560 HC NDL HUBR BARD -A

## 2017-04-13 PROCEDURE — 74011250636 HC RX REV CODE- 250/636: Performed by: INTERNAL MEDICINE

## 2017-04-13 PROCEDURE — 74011000250 HC RX REV CODE- 250: Performed by: INTERNAL MEDICINE

## 2017-04-13 PROCEDURE — 96374 THER/PROPH/DIAG INJ IV PUSH: CPT

## 2017-04-13 RX ORDER — SODIUM CHLORIDE 0.9 % (FLUSH) 0.9 %
10-40 SYRINGE (ML) INJECTION AS NEEDED
Status: DISCONTINUED | OUTPATIENT
Start: 2017-04-13 | End: 2017-04-17 | Stop reason: HOSPADM

## 2017-04-13 RX ORDER — HEPARIN 100 UNIT/ML
500 SYRINGE INTRAVENOUS AS NEEDED
Status: DISPENSED | OUTPATIENT
Start: 2017-04-13 | End: 2017-04-13

## 2017-04-13 RX ADMIN — SODIUM CHLORIDE 25 MG: 9 INJECTION INTRAMUSCULAR; INTRAVENOUS; SUBCUTANEOUS at 07:54

## 2017-04-13 RX ADMIN — Medication 10 ML: at 07:27

## 2017-04-13 RX ADMIN — Medication 10 ML: at 07:58

## 2017-04-13 RX ADMIN — SODIUM CHLORIDE, PRESERVATIVE FREE 500 UNITS: 5 INJECTION INTRAVENOUS at 08:29

## 2017-04-13 NOTE — PROGRESS NOTES
Arrived to the Replaced by [facility]. Assessment completed. Patient received phenergan 25 mg IV today, as ordered. She had her port needle changed, using sterile technique. Magnesium level noted at 1.9 today. Any issues or concerns during appointment: none. Patient aware of next infusion appointment on 4/20'17 (date) at 21 [phone] (time) with lab and [id number] with IV infusion center. Discharged ambulatory, with father. Patient instructed to call her doctor's office immediately for any problems or concerns. She verbalizes understanding.

## 2017-04-20 ENCOUNTER — HOSPITAL ENCOUNTER (OUTPATIENT)
Dept: INFUSION THERAPY | Age: 47
Discharge: HOME | End: 2017-04-20
Payer: COMMERCIAL

## 2017-04-20 VITALS
OXYGEN SATURATION: 95 % | RESPIRATION RATE: 18 BRPM | DIASTOLIC BLOOD PRESSURE: 72 MMHG | TEMPERATURE: 98.1 F | HEART RATE: 90 BPM | SYSTOLIC BLOOD PRESSURE: 126 MMHG

## 2017-04-20 LAB — MAGNESIUM SERPL-MCNC: 1.8 MG/DL (ref 1.8–2.4)

## 2017-04-20 PROCEDURE — 77030003560 HC NDL HUBR BARD -A

## 2017-04-20 PROCEDURE — 74011250636 HC RX REV CODE- 250/636: Performed by: INTERNAL MEDICINE

## 2017-04-20 PROCEDURE — 83735 ASSAY OF MAGNESIUM: CPT | Performed by: INTERNAL MEDICINE

## 2017-04-20 PROCEDURE — 96374 THER/PROPH/DIAG INJ IV PUSH: CPT

## 2017-04-20 PROCEDURE — 74011000250 HC RX REV CODE- 250: Performed by: INTERNAL MEDICINE

## 2017-04-20 RX ORDER — HEPARIN 100 UNIT/ML
500 SYRINGE INTRAVENOUS AS NEEDED
Status: DISPENSED | OUTPATIENT
Start: 2017-04-20 | End: 2017-04-20

## 2017-04-20 RX ORDER — SODIUM CHLORIDE 0.9 % (FLUSH) 0.9 %
10 SYRINGE (ML) INJECTION AS NEEDED
Status: DISCONTINUED | OUTPATIENT
Start: 2017-04-20 | End: 2017-04-24 | Stop reason: HOSPADM

## 2017-04-20 RX ADMIN — SODIUM CHLORIDE 25 MG: 9 INJECTION INTRAMUSCULAR; INTRAVENOUS; SUBCUTANEOUS at 07:32

## 2017-04-20 RX ADMIN — Medication 10 ML: at 07:50

## 2017-04-20 RX ADMIN — SODIUM CHLORIDE, PRESERVATIVE FREE 500 UNITS: 5 INJECTION INTRAVENOUS at 07:50

## 2017-04-20 NOTE — PROGRESS NOTES
Arrived to the [facility]. Port care and labs  completed.  Patient tolerated without problems, did not require Magnesium  Any issues or concerns during appointment: no  Patient aware of next infusion appointment on 4/27/17 at [address] [place] [address]  Discharged ambulatory

## 2017-04-27 ENCOUNTER — HOSPITAL ENCOUNTER (OUTPATIENT)
Dept: INFUSION THERAPY | Age: 47
Discharge: HOME | End: 2017-04-27
Payer: COMMERCIAL

## 2017-04-27 VITALS
HEART RATE: 74 BPM | RESPIRATION RATE: 16 BRPM | OXYGEN SATURATION: 98 % | DIASTOLIC BLOOD PRESSURE: 55 MMHG | SYSTOLIC BLOOD PRESSURE: 101 MMHG | TEMPERATURE: 98.3 F

## 2017-04-27 LAB — MAGNESIUM SERPL-MCNC: 1.9 MG/DL (ref 1.8–2.4)

## 2017-04-27 PROCEDURE — 74011250636 HC RX REV CODE- 250/636: Performed by: INTERNAL MEDICINE

## 2017-04-27 PROCEDURE — 77030003560 HC NDL HUBR BARD -A

## 2017-04-27 PROCEDURE — 83735 ASSAY OF MAGNESIUM: CPT | Performed by: INTERNAL MEDICINE

## 2017-04-27 PROCEDURE — 96374 THER/PROPH/DIAG INJ IV PUSH: CPT

## 2017-04-27 PROCEDURE — 74011000250 HC RX REV CODE- 250: Performed by: INTERNAL MEDICINE

## 2017-04-27 RX ORDER — HEPARIN 100 UNIT/ML
500 SYRINGE INTRAVENOUS AS NEEDED
Status: DISPENSED | OUTPATIENT
Start: 2017-04-27 | End: 2017-04-27

## 2017-04-27 RX ORDER — SODIUM CHLORIDE 0.9 % (FLUSH) 0.9 %
10-40 SYRINGE (ML) INJECTION AS NEEDED
Status: DISCONTINUED | OUTPATIENT
Start: 2017-04-27 | End: 2017-05-01 | Stop reason: HOSPADM

## 2017-04-27 RX ORDER — MAGNESIUM SULFATE HEPTAHYDRATE 40 MG/ML
2 INJECTION, SOLUTION INTRAVENOUS ONCE
Status: DISCONTINUED | OUTPATIENT
Start: 2017-04-27 | End: 2017-04-27 | Stop reason: SDUPTHER

## 2017-04-27 RX ADMIN — Medication 10 ML: at 08:00

## 2017-04-27 RX ADMIN — SODIUM CHLORIDE, PRESERVATIVE FREE 500 UNITS: 5 INJECTION INTRAVENOUS at 08:01

## 2017-04-27 RX ADMIN — SODIUM CHLORIDE 25 MG: 9 INJECTION INTRAMUSCULAR; INTRAVENOUS; SUBCUTANEOUS at 07:56

## 2017-04-27 NOTE — PROGRESS NOTES
Arrived to the [facility]. Assessment completed. Labs drawn. Mag level normal  Port needle changed. Patient tolerated well. Any issues or concerns during appointment: none  Patient aware of next appointment on 5/4/17 (date) at 7:15 (time) with [facility]. Patient discharged ambulatory.

## 2017-05-04 ENCOUNTER — HOSPITAL ENCOUNTER (OUTPATIENT)
Dept: INFUSION THERAPY | Age: 47
Discharge: HOME | End: 2017-05-04
Payer: COMMERCIAL

## 2017-05-04 VITALS
HEART RATE: 111 BPM | OXYGEN SATURATION: 95 % | DIASTOLIC BLOOD PRESSURE: 71 MMHG | SYSTOLIC BLOOD PRESSURE: 137 MMHG | TEMPERATURE: 98.1 F | RESPIRATION RATE: 18 BRPM | WEIGHT: 168 LBS | BODY MASS INDEX: 30.73 KG/M2

## 2017-05-04 LAB — MAGNESIUM SERPL-MCNC: 1.9 MG/DL (ref 1.8–2.4)

## 2017-05-04 PROCEDURE — 36591 DRAW BLOOD OFF VENOUS DEVICE: CPT

## 2017-05-04 PROCEDURE — 77030003560 HC NDL HUBR BARD -A

## 2017-05-04 PROCEDURE — 83735 ASSAY OF MAGNESIUM: CPT | Performed by: INTERNAL MEDICINE

## 2017-05-04 PROCEDURE — 74011250636 HC RX REV CODE- 250/636: Performed by: INTERNAL MEDICINE

## 2017-05-04 RX ORDER — HEPARIN 100 UNIT/ML
500 SYRINGE INTRAVENOUS AS NEEDED
Status: DISPENSED | OUTPATIENT
Start: 2017-05-04 | End: 2017-05-04

## 2017-05-04 RX ORDER — SODIUM CHLORIDE 0.9 % (FLUSH) 0.9 %
10-40 SYRINGE (ML) INJECTION AS NEEDED
Status: DISCONTINUED | OUTPATIENT
Start: 2017-05-04 | End: 2017-05-08 | Stop reason: HOSPADM

## 2017-05-04 RX ADMIN — SODIUM CHLORIDE, PRESERVATIVE FREE 500 UNITS: 5 INJECTION INTRAVENOUS at 07:35

## 2017-05-04 NOTE — PROGRESS NOTES
Pt. Discharged ambulatory accompanied by self. Port flushed with Heparin. No distress noted. Next infusion appointment 5/11/17.

## 2017-05-11 ENCOUNTER — HOSPITAL ENCOUNTER (OUTPATIENT)
Dept: INFUSION THERAPY | Age: 47
End: 2017-05-11
Payer: COMMERCIAL

## 2017-05-18 ENCOUNTER — HOSPITAL ENCOUNTER (OUTPATIENT)
Dept: INFUSION THERAPY | Age: 47
Discharge: HOME | End: 2017-05-18
Payer: COMMERCIAL

## 2017-05-18 VITALS
RESPIRATION RATE: 18 BRPM | SYSTOLIC BLOOD PRESSURE: 138 MMHG | OXYGEN SATURATION: 96 % | HEART RATE: 116 BPM | DIASTOLIC BLOOD PRESSURE: 74 MMHG | TEMPERATURE: 99.3 F

## 2017-05-18 LAB — MAGNESIUM SERPL-MCNC: 1.8 MG/DL (ref 1.8–2.4)

## 2017-05-18 PROCEDURE — 74011250636 HC RX REV CODE- 250/636: Performed by: INTERNAL MEDICINE

## 2017-05-18 PROCEDURE — 83735 ASSAY OF MAGNESIUM: CPT | Performed by: INTERNAL MEDICINE

## 2017-05-18 PROCEDURE — 74011000250 HC RX REV CODE- 250: Performed by: INTERNAL MEDICINE

## 2017-05-18 PROCEDURE — 96374 THER/PROPH/DIAG INJ IV PUSH: CPT

## 2017-05-18 PROCEDURE — 77030003560 HC NDL HUBR BARD -A

## 2017-05-18 RX ORDER — HEPARIN 100 UNIT/ML
300-500 SYRINGE INTRAVENOUS AS NEEDED
Status: ACTIVE | OUTPATIENT
Start: 2017-05-18 | End: 2017-05-18

## 2017-05-18 RX ORDER — SODIUM CHLORIDE 0.9 % (FLUSH) 0.9 %
10-40 SYRINGE (ML) INJECTION AS NEEDED
Status: DISCONTINUED | OUTPATIENT
Start: 2017-05-18 | End: 2017-05-22 | Stop reason: HOSPADM

## 2017-05-18 RX ADMIN — Medication 10 ML: at 07:29

## 2017-05-18 RX ADMIN — SODIUM CHLORIDE 25 MG: 9 INJECTION INTRAMUSCULAR; INTRAVENOUS; SUBCUTANEOUS at 07:29

## 2017-05-18 RX ADMIN — Medication 10 ML: at 07:40

## 2017-05-18 NOTE — PROGRESS NOTES
Arrived to the [facility]. Phenergan completed. Patient tolerated well. Any issues or concerns during appointment: none. Patient aware of next infusion appointment on 5/25/17 at 7:15am.  Discharged ambulatory.

## 2017-05-25 ENCOUNTER — HOSPITAL ENCOUNTER (OUTPATIENT)
Dept: INFUSION THERAPY | Age: 47
Discharge: HOME | End: 2017-05-25
Payer: COMMERCIAL

## 2017-05-25 VITALS
HEART RATE: 107 BPM | TEMPERATURE: 98.5 F | SYSTOLIC BLOOD PRESSURE: 119 MMHG | DIASTOLIC BLOOD PRESSURE: 70 MMHG | RESPIRATION RATE: 18 BRPM | OXYGEN SATURATION: 95 %

## 2017-05-25 LAB — MAGNESIUM SERPL-MCNC: 2 MG/DL (ref 1.8–2.4)

## 2017-05-25 PROCEDURE — 83735 ASSAY OF MAGNESIUM: CPT | Performed by: INTERNAL MEDICINE

## 2017-05-25 PROCEDURE — 77030003560 HC NDL HUBR BARD -A

## 2017-05-25 PROCEDURE — 96374 THER/PROPH/DIAG INJ IV PUSH: CPT

## 2017-05-25 PROCEDURE — 74011000250 HC RX REV CODE- 250: Performed by: INTERNAL MEDICINE

## 2017-05-25 PROCEDURE — 74011250636 HC RX REV CODE- 250/636: Performed by: INTERNAL MEDICINE

## 2017-05-25 RX ORDER — HEPARIN 100 UNIT/ML
300 SYRINGE INTRAVENOUS AS NEEDED
Status: DISPENSED | OUTPATIENT
Start: 2017-05-25 | End: 2017-05-25

## 2017-05-25 RX ORDER — SODIUM CHLORIDE 0.9 % (FLUSH) 0.9 %
10 SYRINGE (ML) INJECTION AS NEEDED
Status: ACTIVE | OUTPATIENT
Start: 2017-05-25 | End: 2017-05-25

## 2017-05-25 RX ADMIN — SODIUM CHLORIDE 25 MG: 9 INJECTION INTRAMUSCULAR; INTRAVENOUS; SUBCUTANEOUS at 07:28

## 2017-05-25 RX ADMIN — Medication 10 ML: at 07:25

## 2017-05-25 RX ADMIN — SODIUM CHLORIDE, PRESERVATIVE FREE 300 UNITS: 5 INJECTION INTRAVENOUS at 09:10

## 2017-05-25 RX ADMIN — Medication 10 ML: at 09:10

## 2017-05-25 NOTE — PROGRESS NOTES
Arrived to [facility]. Blood drawn and sent to lab. No replacements needed. Tolerated IV Phenergan well. Issues or concerns: none. Aware of next appointment on 6/1 at 7:05 AM.  Discharged ambulatory.

## 2017-06-01 ENCOUNTER — HOSPITAL ENCOUNTER (OUTPATIENT)
Dept: INFUSION THERAPY | Age: 47
Discharge: HOME | End: 2017-06-01
Payer: COMMERCIAL

## 2017-06-01 VITALS
BODY MASS INDEX: 30.73 KG/M2 | RESPIRATION RATE: 16 BRPM | OXYGEN SATURATION: 95 % | HEART RATE: 100 BPM | DIASTOLIC BLOOD PRESSURE: 79 MMHG | WEIGHT: 168 LBS | TEMPERATURE: 99.1 F | SYSTOLIC BLOOD PRESSURE: 120 MMHG

## 2017-06-01 LAB — MAGNESIUM SERPL-MCNC: 1.8 MG/DL (ref 1.8–2.4)

## 2017-06-01 PROCEDURE — 74011250636 HC RX REV CODE- 250/636: Performed by: INTERNAL MEDICINE

## 2017-06-01 PROCEDURE — 74011000250 HC RX REV CODE- 250: Performed by: INTERNAL MEDICINE

## 2017-06-01 PROCEDURE — 77030003560 HC NDL HUBR BARD -A

## 2017-06-01 PROCEDURE — 96374 THER/PROPH/DIAG INJ IV PUSH: CPT

## 2017-06-01 PROCEDURE — 83735 ASSAY OF MAGNESIUM: CPT | Performed by: INTERNAL MEDICINE

## 2017-06-01 RX ORDER — HEPARIN 100 UNIT/ML
500 SYRINGE INTRAVENOUS AS NEEDED
Status: DISCONTINUED | OUTPATIENT
Start: 2017-06-01 | End: 2017-06-01 | Stop reason: SDUPTHER

## 2017-06-01 RX ORDER — SODIUM CHLORIDE 0.9 % (FLUSH) 0.9 %
5-10 SYRINGE (ML) INJECTION AS NEEDED
Status: DISCONTINUED | OUTPATIENT
Start: 2017-06-01 | End: 2017-06-05 | Stop reason: HOSPADM

## 2017-06-01 RX ORDER — HEPARIN 100 UNIT/ML
500 SYRINGE INTRAVENOUS AS NEEDED
Status: DISPENSED | OUTPATIENT
Start: 2017-06-01 | End: 2017-06-01

## 2017-06-01 RX ADMIN — SODIUM CHLORIDE, PRESERVATIVE FREE 500 UNITS: 5 INJECTION INTRAVENOUS at 08:54

## 2017-06-01 RX ADMIN — Medication 10 ML: at 07:41

## 2017-06-01 RX ADMIN — SODIUM CHLORIDE 25 MG: 9 INJECTION INTRAMUSCULAR; INTRAVENOUS; SUBCUTANEOUS at 07:55

## 2017-06-01 RX ADMIN — SODIUM CHLORIDE 500 ML: 900 INJECTION, SOLUTION INTRAVENOUS at 07:42

## 2017-06-01 NOTE — PROGRESS NOTES
Pt arrived ambulatory to C with port previously accessed with dressing dry and intact and with good blood return. NS infusing. Phenergan 25 mg given IV. Port packed with heparin and remains accessed for home infusions. Pt aware of next appt on 6/8/17 at [address] [place] [address]. Pt discharged ambulatory when fathers infusion is completed.

## 2017-06-08 ENCOUNTER — HOSPITAL ENCOUNTER (OUTPATIENT)
Dept: INFUSION THERAPY | Age: 47
Discharge: HOME | End: 2017-06-08
Payer: COMMERCIAL

## 2017-06-08 VITALS
HEART RATE: 92 BPM | OXYGEN SATURATION: 97 % | SYSTOLIC BLOOD PRESSURE: 129 MMHG | DIASTOLIC BLOOD PRESSURE: 79 MMHG | RESPIRATION RATE: 18 BRPM | TEMPERATURE: 98.5 F

## 2017-06-08 LAB — MAGNESIUM SERPL-MCNC: 1.9 MG/DL (ref 1.8–2.4)

## 2017-06-08 PROCEDURE — 74011250636 HC RX REV CODE- 250/636: Performed by: INTERNAL MEDICINE

## 2017-06-08 PROCEDURE — 83735 ASSAY OF MAGNESIUM: CPT | Performed by: INTERNAL MEDICINE

## 2017-06-08 PROCEDURE — 77030003560 HC NDL HUBR BARD -A

## 2017-06-08 PROCEDURE — 96374 THER/PROPH/DIAG INJ IV PUSH: CPT

## 2017-06-08 PROCEDURE — 74011000250 HC RX REV CODE- 250: Performed by: INTERNAL MEDICINE

## 2017-06-08 RX ORDER — SODIUM CHLORIDE 0.9 % (FLUSH) 0.9 %
10 SYRINGE (ML) INJECTION AS NEEDED
Status: ACTIVE | OUTPATIENT
Start: 2017-06-08 | End: 2017-06-08

## 2017-06-08 RX ORDER — HEPARIN 100 UNIT/ML
300 SYRINGE INTRAVENOUS AS NEEDED
Status: DISPENSED | OUTPATIENT
Start: 2017-06-08 | End: 2017-06-08

## 2017-06-08 RX ORDER — SODIUM CHLORIDE 9 MG/ML
250 INJECTION, SOLUTION INTRAVENOUS CONTINUOUS
Status: DISCONTINUED | OUTPATIENT
Start: 2017-06-08 | End: 2017-06-12 | Stop reason: HOSPADM

## 2017-06-08 RX ADMIN — SODIUM CHLORIDE, PRESERVATIVE FREE 300 UNITS: 5 INJECTION INTRAVENOUS at 08:13

## 2017-06-08 RX ADMIN — SODIUM CHLORIDE 250 ML: 900 INJECTION, SOLUTION INTRAVENOUS at 07:32

## 2017-06-08 RX ADMIN — SODIUM CHLORIDE 25 MG: 9 INJECTION INTRAMUSCULAR; INTRAVENOUS; SUBCUTANEOUS at 07:22

## 2017-06-08 RX ADMIN — Medication 10 ML: at 08:13

## 2017-06-08 RX ADMIN — Medication 10 ML: at 07:22

## 2017-06-08 NOTE — PROGRESS NOTES
Arrived to [facility] to receive Phenergan and possible replacements. Magnesium 1.9. No replacement needed. Tolerated Phenergan well. Issues or concerns during appointment: none. Aware of next appointment on 6/15 at 7:15 AM.  Discharged ambulatory.

## 2017-06-15 ENCOUNTER — HOSPITAL ENCOUNTER (OUTPATIENT)
Dept: INFUSION THERAPY | Age: 47
Discharge: HOME | End: 2017-06-15
Payer: COMMERCIAL

## 2017-06-15 VITALS
RESPIRATION RATE: 18 BRPM | BODY MASS INDEX: 31.64 KG/M2 | DIASTOLIC BLOOD PRESSURE: 83 MMHG | WEIGHT: 173 LBS | OXYGEN SATURATION: 98 % | TEMPERATURE: 99.6 F | HEART RATE: 105 BPM | SYSTOLIC BLOOD PRESSURE: 123 MMHG

## 2017-06-15 LAB — MAGNESIUM SERPL-MCNC: 1.9 MG/DL (ref 1.8–2.4)

## 2017-06-15 PROCEDURE — 96374 THER/PROPH/DIAG INJ IV PUSH: CPT

## 2017-06-15 PROCEDURE — 83735 ASSAY OF MAGNESIUM: CPT | Performed by: INTERNAL MEDICINE

## 2017-06-15 PROCEDURE — 74011250636 HC RX REV CODE- 250/636: Performed by: INTERNAL MEDICINE

## 2017-06-15 PROCEDURE — 77030003560 HC NDL HUBR BARD -A

## 2017-06-15 PROCEDURE — 74011000250 HC RX REV CODE- 250: Performed by: INTERNAL MEDICINE

## 2017-06-15 RX ORDER — SODIUM CHLORIDE 0.9 % (FLUSH) 0.9 %
10 SYRINGE (ML) INJECTION AS NEEDED
Status: DISCONTINUED | OUTPATIENT
Start: 2017-06-15 | End: 2017-06-19 | Stop reason: HOSPADM

## 2017-06-15 RX ORDER — HEPARIN 100 UNIT/ML
500 SYRINGE INTRAVENOUS AS NEEDED
Status: DISCONTINUED | OUTPATIENT
Start: 2017-06-15 | End: 2017-06-19 | Stop reason: HOSPADM

## 2017-06-15 RX ADMIN — SODIUM CHLORIDE, PRESERVATIVE FREE 500 UNITS: 5 INJECTION INTRAVENOUS at 08:00

## 2017-06-15 RX ADMIN — Medication 10 ML: at 07:54

## 2017-06-15 RX ADMIN — SODIUM CHLORIDE 25 MG: 9 INJECTION INTRAMUSCULAR; INTRAVENOUS; SUBCUTANEOUS at 07:33

## 2017-06-22 ENCOUNTER — HOSPITAL ENCOUNTER (OUTPATIENT)
Dept: INFUSION THERAPY | Age: 47
Discharge: HOME | End: 2017-06-22
Payer: COMMERCIAL

## 2017-06-22 VITALS
OXYGEN SATURATION: 96 % | SYSTOLIC BLOOD PRESSURE: 121 MMHG | WEIGHT: 172.84 LBS | BODY MASS INDEX: 31.61 KG/M2 | HEART RATE: 112 BPM | DIASTOLIC BLOOD PRESSURE: 72 MMHG | RESPIRATION RATE: 18 BRPM | TEMPERATURE: 98.9 F

## 2017-06-22 LAB — MAGNESIUM SERPL-MCNC: 2.1 MG/DL (ref 1.8–2.4)

## 2017-06-22 PROCEDURE — 74011250636 HC RX REV CODE- 250/636: Performed by: INTERNAL MEDICINE

## 2017-06-22 PROCEDURE — 96361 HYDRATE IV INFUSION ADD-ON: CPT

## 2017-06-22 PROCEDURE — 77030003560 HC NDL HUBR BARD -A

## 2017-06-22 PROCEDURE — 83735 ASSAY OF MAGNESIUM: CPT | Performed by: INTERNAL MEDICINE

## 2017-06-22 PROCEDURE — 74011000250 HC RX REV CODE- 250: Performed by: INTERNAL MEDICINE

## 2017-06-22 PROCEDURE — 96374 THER/PROPH/DIAG INJ IV PUSH: CPT

## 2017-06-22 RX ORDER — HEPARIN 100 UNIT/ML
500 SYRINGE INTRAVENOUS AS NEEDED
Status: COMPLETED | OUTPATIENT
Start: 2017-06-22 | End: 2017-06-22

## 2017-06-22 RX ORDER — SODIUM CHLORIDE 0.9 % (FLUSH) 0.9 %
10 SYRINGE (ML) INJECTION EVERY 8 HOURS
Status: DISCONTINUED | OUTPATIENT
Start: 2017-06-22 | End: 2017-06-26 | Stop reason: HOSPADM

## 2017-06-22 RX ADMIN — SODIUM CHLORIDE, PRESERVATIVE FREE 500 UNITS: 5 INJECTION INTRAVENOUS at 08:40

## 2017-06-22 RX ADMIN — SODIUM CHLORIDE 25 MG: 9 INJECTION INTRAMUSCULAR; INTRAVENOUS; SUBCUTANEOUS at 07:46

## 2017-06-22 RX ADMIN — SODIUM CHLORIDE 250 ML: 900 INJECTION, SOLUTION INTRAVENOUS at 07:36

## 2017-06-22 RX ADMIN — Medication 10 ML: at 08:40

## 2017-06-22 NOTE — PROGRESS NOTES
Arrived to the [facility]. IV phenergan completed. Patient tolerated well. Any issues or concerns during appointment: NO.  No magnesium replacement required today. Patient aware of next infusion appointment on 06/29/17 (date) at [address] [place] [address] (time). Discharged ambulatory.

## 2017-06-29 ENCOUNTER — HOSPITAL ENCOUNTER (OUTPATIENT)
Dept: INFUSION THERAPY | Age: 47
Discharge: HOME | End: 2017-06-29
Payer: COMMERCIAL

## 2017-06-29 VITALS
DIASTOLIC BLOOD PRESSURE: 80 MMHG | HEART RATE: 109 BPM | RESPIRATION RATE: 16 BRPM | TEMPERATURE: 98.6 F | SYSTOLIC BLOOD PRESSURE: 116 MMHG | OXYGEN SATURATION: 98 %

## 2017-06-29 LAB — MAGNESIUM SERPL-MCNC: 1.8 MG/DL (ref 1.8–2.4)

## 2017-06-29 PROCEDURE — 77030003560 HC NDL HUBR BARD -A

## 2017-06-29 PROCEDURE — 83735 ASSAY OF MAGNESIUM: CPT | Performed by: INTERNAL MEDICINE

## 2017-06-29 PROCEDURE — 74011250636 HC RX REV CODE- 250/636: Performed by: INTERNAL MEDICINE

## 2017-06-29 PROCEDURE — 74011000250 HC RX REV CODE- 250: Performed by: INTERNAL MEDICINE

## 2017-06-29 PROCEDURE — 36415 COLL VENOUS BLD VENIPUNCTURE: CPT | Performed by: INTERNAL MEDICINE

## 2017-06-29 PROCEDURE — 96374 THER/PROPH/DIAG INJ IV PUSH: CPT

## 2017-06-29 RX ORDER — HEPARIN 100 UNIT/ML
500 SYRINGE INTRAVENOUS AS NEEDED
Status: DISPENSED | OUTPATIENT
Start: 2017-06-29 | End: 2017-06-29

## 2017-06-29 RX ORDER — SODIUM CHLORIDE 0.9 % (FLUSH) 0.9 %
5-10 SYRINGE (ML) INJECTION AS NEEDED
Status: DISCONTINUED | OUTPATIENT
Start: 2017-06-29 | End: 2017-07-03 | Stop reason: HOSPADM

## 2017-06-29 RX ADMIN — SODIUM CHLORIDE 25 MG: 9 INJECTION INTRAMUSCULAR; INTRAVENOUS; SUBCUTANEOUS at 07:32

## 2017-06-29 RX ADMIN — Medication 500 UNITS: at 08:34

## 2017-06-29 RX ADMIN — SODIUM CHLORIDE 500 ML: 900 INJECTION, SOLUTION INTRAVENOUS at 07:25

## 2017-06-29 RX ADMIN — Medication 10 ML: at 07:25

## 2017-06-29 NOTE — PROGRESS NOTES
Pt arrived ambulatory to [facility] with port previously accessed with dressing dry and intact and with good blood return.  ml infusing. Phenergan 25 mg given IV. Port packed with heparin and remains accessed for home infusions. Pt aware of next appt on 7/13/17 at [address] [place] [address]. Pt discharged ambulatory.

## 2017-07-05 ENCOUNTER — ANESTHESIA EVENT (OUTPATIENT)
Dept: ENDOSCOPY | Age: 47
End: 2017-07-05
Payer: COMMERCIAL

## 2017-07-05 RX ORDER — SODIUM CHLORIDE, SODIUM LACTATE, POTASSIUM CHLORIDE, CALCIUM CHLORIDE 600; 310; 30; 20 MG/100ML; MG/100ML; MG/100ML; MG/100ML
100 INJECTION, SOLUTION INTRAVENOUS CONTINUOUS
Status: CANCELLED | OUTPATIENT
Start: 2017-07-05

## 2017-07-05 RX ORDER — SODIUM CHLORIDE 0.9 % (FLUSH) 0.9 %
5-10 SYRINGE (ML) INJECTION AS NEEDED
Status: CANCELLED | OUTPATIENT
Start: 2017-07-05

## 2017-07-06 ENCOUNTER — ANESTHESIA (OUTPATIENT)
Dept: ENDOSCOPY | Age: 47
End: 2017-07-06
Payer: COMMERCIAL

## 2017-07-06 ENCOUNTER — HOSPITAL ENCOUNTER (OUTPATIENT)
Age: 47
Setting detail: OUTPATIENT SURGERY
Discharge: HOME | End: 2017-07-06
Attending: INTERNAL MEDICINE | Admitting: INTERNAL MEDICINE
Payer: COMMERCIAL

## 2017-07-06 VITALS
DIASTOLIC BLOOD PRESSURE: 68 MMHG | WEIGHT: 173 LBS | TEMPERATURE: 98.6 F | OXYGEN SATURATION: 98 % | HEART RATE: 90 BPM | RESPIRATION RATE: 16 BRPM | HEIGHT: 62 IN | BODY MASS INDEX: 31.83 KG/M2 | SYSTOLIC BLOOD PRESSURE: 114 MMHG

## 2017-07-06 LAB — GLUCOSE BLD STRIP.AUTO-MCNC: 168 MG/DL (ref 65–100)

## 2017-07-06 PROCEDURE — 74011250636 HC RX REV CODE- 250/636

## 2017-07-06 PROCEDURE — 74011000250 HC RX REV CODE- 250

## 2017-07-06 PROCEDURE — 76040000025: Performed by: INTERNAL MEDICINE

## 2017-07-06 PROCEDURE — 76060000032 HC ANESTHESIA 0.5 TO 1 HR: Performed by: INTERNAL MEDICINE

## 2017-07-06 PROCEDURE — 82962 GLUCOSE BLOOD TEST: CPT

## 2017-07-06 PROCEDURE — 74011250636 HC RX REV CODE- 250/636: Performed by: ANESTHESIOLOGY

## 2017-07-06 PROCEDURE — C1726 CATH, BAL DIL, NON-VASCULAR: HCPCS | Performed by: INTERNAL MEDICINE

## 2017-07-06 RX ORDER — PROPOFOL 10 MG/ML
INJECTION, EMULSION INTRAVENOUS AS NEEDED
Status: DISCONTINUED | OUTPATIENT
Start: 2017-07-06 | End: 2017-07-06 | Stop reason: HOSPADM

## 2017-07-06 RX ORDER — PROPOFOL 10 MG/ML
INJECTION, EMULSION INTRAVENOUS
Status: DISCONTINUED | OUTPATIENT
Start: 2017-07-06 | End: 2017-07-06 | Stop reason: HOSPADM

## 2017-07-06 RX ORDER — SODIUM CHLORIDE, SODIUM LACTATE, POTASSIUM CHLORIDE, CALCIUM CHLORIDE 600; 310; 30; 20 MG/100ML; MG/100ML; MG/100ML; MG/100ML
100 INJECTION, SOLUTION INTRAVENOUS CONTINUOUS
Status: DISCONTINUED | OUTPATIENT
Start: 2017-07-06 | End: 2017-07-06 | Stop reason: HOSPADM

## 2017-07-06 RX ORDER — LIDOCAINE HYDROCHLORIDE 20 MG/ML
INJECTION, SOLUTION EPIDURAL; INFILTRATION; INTRACAUDAL; PERINEURAL AS NEEDED
Status: DISCONTINUED | OUTPATIENT
Start: 2017-07-06 | End: 2017-07-06 | Stop reason: HOSPADM

## 2017-07-06 RX ORDER — MIDAZOLAM HYDROCHLORIDE 1 MG/ML
INJECTION, SOLUTION INTRAMUSCULAR; INTRAVENOUS
Status: DISCONTINUED
Start: 2017-07-06 | End: 2017-07-06 | Stop reason: HOSPADM

## 2017-07-06 RX ORDER — HEPARIN SODIUM,PORCINE 10 UNIT/ML
30 VIAL (ML) INTRAVENOUS AS NEEDED
Status: DISCONTINUED | OUTPATIENT
Start: 2017-07-06 | End: 2017-07-06 | Stop reason: HOSPADM

## 2017-07-06 RX ORDER — SODIUM CHLORIDE, SODIUM LACTATE, POTASSIUM CHLORIDE, CALCIUM CHLORIDE 600; 310; 30; 20 MG/100ML; MG/100ML; MG/100ML; MG/100ML
1000 INJECTION, SOLUTION INTRAVENOUS CONTINUOUS
Status: DISCONTINUED | OUTPATIENT
Start: 2017-07-06 | End: 2017-07-06 | Stop reason: HOSPADM

## 2017-07-06 RX ORDER — MIDAZOLAM HYDROCHLORIDE 1 MG/ML
INJECTION, SOLUTION INTRAMUSCULAR; INTRAVENOUS AS NEEDED
Status: DISCONTINUED | OUTPATIENT
Start: 2017-07-06 | End: 2017-07-06 | Stop reason: HOSPADM

## 2017-07-06 RX ADMIN — PROPOFOL 50 MG: 10 INJECTION, EMULSION INTRAVENOUS at 08:13

## 2017-07-06 RX ADMIN — LIDOCAINE HYDROCHLORIDE 40 MG: 20 INJECTION, SOLUTION EPIDURAL; INFILTRATION; INTRACAUDAL; PERINEURAL at 08:13

## 2017-07-06 RX ADMIN — MIDAZOLAM HYDROCHLORIDE 1 MG: 1 INJECTION, SOLUTION INTRAMUSCULAR; INTRAVENOUS at 08:13

## 2017-07-06 RX ADMIN — PROPOFOL 180 MCG/KG/MIN: 10 INJECTION, EMULSION INTRAVENOUS at 08:13

## 2017-07-06 RX ADMIN — MIDAZOLAM HYDROCHLORIDE 2 MG: 1 INJECTION, SOLUTION INTRAMUSCULAR; INTRAVENOUS at 08:08

## 2017-07-06 RX ADMIN — SODIUM CHLORIDE, SODIUM LACTATE, POTASSIUM CHLORIDE, AND CALCIUM CHLORIDE 100 ML/HR: 600; 310; 30; 20 INJECTION, SOLUTION INTRAVENOUS at 07:30

## 2017-07-06 NOTE — ROUTINE PROCESS
Pt. Discharged to car by Tim Maloney with family . Vital signs stable. Able to tolerate PO fluids. Passing gas.  Seen by MD.

## 2017-07-06 NOTE — PROCEDURES
Esophagogastroduodenoscopy    DATE of PROCEDURE: 2017    INDICATION: known esophageal stricture    POSTPROCEDURE DIAGNOSIS: tight esophageal stricture    MEDICATIONS ADMINISTERED: MAC anesthesia (see anesthesia report)    INSTRUMENT:    PROCEDURE:  After obtaining informed consent, the patient was placed in the left lateral position and sedated. The endoscope was advanced under direct vision without difficulty. The esophagus, stomach (including retroflexed views) and duodenum were evaluated. The patient was taken to the recovery area in stable condition. FINDINGS:  ESOPHAGUS: the acute edema and ulceration at previous EGD have resolved but there is a tight benign stricture at GE junction that would not allow adult EGD scope through.  passed readily. Balloon dilation attempted with 8-9-10 mm but no tears seen. STOMACH: gastrojejunostomy changes. Estimated blood loss: 0-minimal   Specimens obtained during procedure: none    PLAN: Unclear whether stricture so fibrotic that a 10 mm balloon could not dilate. Therefore, plan on EGD under FLUROSCOPY, with intent of using larger balloon dilators (since GI lab didn't have  balloons) and injection of stricture with Kenalog-40.

## 2017-07-06 NOTE — IP AVS SNAPSHOT
[address] 
[phone] Patient: Alexandria Mccracken MRN: [id number] :1970 You are allergic to the following Allergen Reactions Tape (Adhesive) Rash Itching Contrast Dye (Iodine) Shortness of Breath Oral contrast-experienced flushing,shortness of breath, sweatiness; (patient has received oral contrast many times at [facility]) Flagyl (Metronidazole) Nausea and Vomiting Metformin Nausea Only Valtrex (Valacyclovir) Unknown (comments) PATIENT STATES \"NOT ALLERGIC\" Insulins Nausea and Vomiting Humalog only Recent Documentation Height Weight Breastfeeding? BMI OB Status Smoking Status 1.575 m 78.5 kg No 31.64 kg/m2 Hysterectomy Former Smoker Emergency Contacts Name Discharge Info Relation Home Work Mobile EbenJorge  Spouse [3] [phone] 702 [facility]  Parent [1] [phone]  Parent [1] [phone] About your hospitalization You were admitted on:  2017 You last received care in the:  [facility] You were discharged on:  2017 Unit phone number:  [phone] Why you were hospitalized Your primary diagnosis was:  Not on File Providers Seen During Your Hospitalizations Provider Role Specialty Primary office phone Rudy Stover MD Attending Provider Gastroenterology [phone] Your Primary Care Physician (PCP) Primary Care Physician Office Phone Office Fax Sara FRANK ** None ** ** None ** Follow-up Information None Your Appointments 2017  5:00 PM EDT Infusion with FLR5 INF2 [facility] ([address] [place] [address]) 5th Floor Infusion [address] Malachi Hinojosa [phone]  [place] [address]  
[phone] For [facility] AND SURGICAL [facility] Floor [phone] ext. [phone] [facility] Thursday July 13, 2017  7:10 AM EDT Infusion Lab with LAB CK  
[facility] [address] ) [address]   [facility] [phone] [facility] [address]  
[phone] [address] Thursday July 13, 2017  7:15 AM EDT Infusion with NUS10  
ST. [address] ([address] ) [address]   [facility] [phone] [facility] [address]  
[phone] [address] Monday July 17, 2017 10:15 AM EDT  
CARDIOLITE with GVLLE NUCLEAR STRESS [address] Cardiology ([facility]) [address]  
[address] [facility]  
[phone] 1. Please DO NOT take any medication the morning of your procedure unless instructed to do so by your physician. PLEASE BRING ALL OF YOUR MEDICINE WITH YOU IN THE ORIGINAL BOTTLES. 2. Please DO NOT EAT OR DRINK ANYTHING PAST MIDNIGHT the night before your test - NOT EVEN A SIP OF WATER!  3. AVOID ANY FOOD AND DRINKS WITH CAFFEINE FOR AT LEAST 24 HOURS BEFORE YOUR TEST, even if it states 'caffeine-free or decaffeinated' - THIS INCLUDES ALL CHOCOLATE! 4. Wear comfortable, warm, loose fitting clothes as well as comfortable shoes. No metal buttons or snaps on the chest area. Please bring a sweater as it is cool in the testing area. 5. You must be able to recline with your arms above your head for 15 minutes for two sets of pictures. If you are not able to do so, please inform our staff prior to your procedure. 6. If you need to cancel your appointment, please call our office at [phone] Robert) or  [phone] Jean-Claude Farnsworth) AT LEAST [address]. to your appointment. Should you fail to contact the office, you may be charged for material ordered for your procedure.    7. If you do not have insurance, you should immediately contact our patient  at [phone] in order to make arrangements for the payment of this service. If you are scheduled for a 2-Day Study, please be prepared to stay at least 2 1/2 hours each day. If your study is ordered for 1-Day, please be prepared to stay for up to 5 hours to complete the study. Bring reading material to help pass the time. Please do not bring any valuables. You must remain fasting until you are completely finished with your procedure. **PLEASE ARRIVE 15 MINUTES EARLY FOR YOUR APPOINTMENT.**  
  
    
 Thursday July 20, 2017  7:05 AM EDT Infusion Lab with LAB CK  
[facility]) [address] Abimael Kauffman [phone] [facility] [address]  
[phone] [address] Thursday July 20, 2017  7:15 AM EDT Infusion with NUS10  
ST. [address] ([facility]) [address] Abimael Kauffman [phone] [facility] [address]  
[phone] [address] Thursday July 27, 2017  7:05 AM EDT Infusion Lab with LAB CK  
. [place] [facility]) [address] Abimael Kauffman [phone] [facility] [address]  
[phone] [address] Thursday July 27, 2017  7:15 AM EDT Infusion with NUS10  
ST. [address] [facility] ([facility]) [address] Abimael Kauffman [phone] [facility] [address]  
[phone] [address] Current Discharge Medication List  
  
ASK your doctor about these medications Dose & Instructions Dispensing Information Comments Morning Noon Evening Bedtime  
 0.9% sodium chloride solution Your last dose was: Your next dose is:    
   
   
 by IntraVENous route daily. Gives to herself via port at home daily Refills:  0 AMITIZA 24 mcg capsule Generic drug:  lubiPROStone Your last dose was: Your next dose is:    
   
   
 Dose:  24 mcg Take 24 mcg by mouth daily. Refills:  0  
     
   
   
   
  
 citalopram 10 mg tablet Commonly known as:  Andrea Krishnan Your last dose was: Your next dose is: TAKE 1 TABLET BY MOUTH DAILY Quantity:  90 Tab Refills:  1 CREON PO Your last dose was: Your next dose is:    
   
   
 Dose:  17900 g Take 36,000 g by mouth Before breakfast, lunch, and dinner. Refills:  0  
     
   
   
   
  
 DILAUDID 2 mg tablet Generic drug:  HYDROmorphone Your last dose was: Your next dose is:    
   
   
 Dose:  1 mg  
1 mg by IntraVENous route as needed for Pain. Refills:  0  
     
   
   
   
  
 fentaNYL 100 mcg/hr PATCH Commonly known as:  Nikhil Langley Your last dose was: Your next dose is:    
   
   
 Dose:  1 Patch 1 Patch by TransDERmal route every seventy-two (72) hours. Refills:  0  
     
   
   
   
  
 hyoscyamine SL 0.125 mg SL tablet Commonly known as:  LEVSIN/SL Your last dose was: Your next dose is:    
   
   
 Dose:  0.125 mg  
0.125 mg by SubLINGual route as needed for Cramping. Refills:  0  
     
   
   
   
  
 insulin aspart 100 unit/mL injection Commonly known as:  Kirstie Madrigal Your last dose was: Your next dose is:    
   
   
 Use as directed Quantity:  10 mL Refills:  3 LORazepam 1 mg tablet Commonly known as:  ATIVAN Your last dose was: Your next dose is:    
   
   
 Dose:  1 mg Take 1 Tab by mouth three (3) times daily as needed for Anxiety. Quantity:  90 Tab Refills:  1 MIRALAX 17 gram packet Generic drug:  polyethylene glycol Your last dose was: Your next dose is:    
   
   
 Dose:  17 g Take 17 g by mouth as needed. Refills:  0 NexIUM 40 mg capsule Generic drug:  esomeprazole Your last dose was: Your next dose is:    
   
   
 Dose:  40 mg Take 40 mg by mouth two (2) times a day. Refills:  0  
     
   
   
   
  
 OTHER Your last dose was: Your next dose is:    
   
   
 as needed. Some type of inhaler, instructed pt to bring DOS for clarification Refills:  0  
     
   
   
   
  
 oxyCODONE IR 10 mg Tab immediate release tablet Commonly known as:  Gilmore Seats Your last dose was: Your next dose is:    
   
   
 Dose:  10 mg Take 10 mg by mouth as needed. Refills:  0  
     
   
   
   
  
 pantoprazole 40 mg tablet Commonly known as:  PROTONIX Your last dose was: Your next dose is:    
   
   
 Dose:  40 mg Take 1 Tab by mouth two (2) times a day. Quantity:  90 Tab Refills:  1  
     
   
   
   
  
 * promethazine 25 mg suppository Commonly known as:  PHENERGAN Your last dose was: Your next dose is:    
   
   
 Dose:  25 mg Insert 25 mg into rectum as needed for Nausea. Refills:  0  
     
   
   
   
  
 * promethazine 25 mg tablet Commonly known as:  PHENERGAN Your last dose was: Your next dose is:    
   
   
 Dose:  25 mg Take 1 Tab by mouth every six (6) hours as needed. Quantity:  90 Tab Refills:  1 TRESIBA FLEXTOUCH U-100 100 unit/mL (3 mL) Inpn Generic drug:  insulin degludec Your last dose was: Your next dose is:    
   
   
 Dose:  22 Units 22 Units by SubCUTAneous route nightly. Refills:  0 ZOFRAN (AS HYDROCHLORIDE) 8 mg tablet Generic drug:  ondansetron hcl Your last dose was: Your next dose is: Dose:  8 mg Take 8 mg by mouth every eight (8) hours as needed for Nausea. Refills:  0  
     
   
   
   
  
 zolpidem 10 mg tablet Commonly known as:  AMBIEN Your last dose was: Your next dose is: TAKE 1 TABLET BY MOUTH EVERY NIGHT AS NEEDED FOR SLEEP Quantity:  90 Tab Refills:  1 Not to exceed 3 additional fills before 09/28/2015 * Notice: This list has 2 medication(s) that are the same as other medications prescribed for you. Read the directions carefully, and ask your doctor or other care provider to review them with you. Discharge Instructions Gastrointestinal Esophagogastroduodenoscopy (EGD) - Upper Exam Discharge Instructions 1. Call Dr. Lucy Fisher at [phone] for any problems or questions. 2. Contact the doctor's office for follow up appointment as directed. 3. Medication may cause drowsiness for several hours, therefore, do not drive or                  operate machinery for remainder of the day. 4. No alcohol today. 5. Ordinarily, you may resume regular diet and activity after exam unless otherwise              specified by your physician. 6. For mild soreness in your throat you may use Cepacol throat lozenges or warm               salt-water gargles as needed. Any additional instructions: Follow up as needed Discharge Orders None Introducing [facility]! Deonte Gifford introduces [employer] patient portal. Now you can access parts of your medical record, email your doctor's office, and request medication refills online. 1. In your internet browser, go to [email or web address] 2. Click on the First Time User? Click Here link in the Sign In box. You will see the New Member Sign Up page. 3. Enter your [employer] Access Code exactly as it appears below. You will not need to use this code after youve completed the sign-up process.  If you do not sign up before the expiration date, you must request a new code. · [employer] Access Code: [email or web address] Expires: 7/31/2017  3:33 PM 
 
4. Enter the last four digits of your Social Security Number (xxxx) and Date of Birth (mm/dd/yyyy) as indicated and click Submit. You will be taken to the next sign-up page. 5. Create a [employer] ID. This will be your [employer] login ID and cannot be changed, so think of one that is secure and easy to remember. 6. Create a [employer] password. You can change your password at any time. 7. Enter your Password Reset Question and Answer. This can be used at a later time if you forget your password. 8. Enter your e-mail address. You will receive e-mail notification when new information is available in [address] 9. Click Sign Up. You can now view and download portions of your medical record. 10. Click the Download Summary menu link to download a portable copy of your medical information. If you have questions, please visit the Frequently Asked Questions section of the [employer] website. Remember, [employer] is NOT to be used for urgent needs. For medical emergencies, dial 911. Now available from your iPhone and Android! General Information Please provide this summary of care documentation to your next provider. Patient Signature:  ____________________________________________________________ Date:  ____________________________________________________________  
  
Susan Burks Provider Signature:  ____________________________________________________________ Date:  ____________________________________________________________

## 2017-07-06 NOTE — H&P
PreProcedure H&P Update    Today's Date:  7/6/2017    CC:  dysphagai    Subjective:   HPI: dysphagia    PMH:  Past Medical History:   Diagnosis Date    Abscess, abdomen ([facility] [address]     Anemia     Asthma     allergy induced, PRN inhaler    C. difficile diarrhea     in 3084 after complicated ERCP    Chronic insomnia     Chronic pancreatitis ([facility] [address]     Depression     Endometriosis     Fibromyalgia     SANA (generalized anxiety disorder)     GERD (gastroesophageal reflux disease)     daily meds    HLD (hyperlipidemia)     Hypertension     IBS (irritable bowel syndrome)     Migraine headache     Nonalcoholic fatty liver disease     Sphincter of Oddi dysfunction     Type 2 diabetes mellitus (HCC)     insulin: avg. 200: pt denies episodes of hypo: A1c 13.1       Medications:   Current Facility-Administered Medications   Medication Dose Route Frequency    lactated Ringers infusion  100 mL/hr IntraVENous CONTINUOUS    lactated Ringers infusion 1,000 mL  1,000 mL IntraVENous CONTINUOUS    heparin (porcine) 10 unit/mL injection 30 Units  30 Units InterCATHeter PRN    midazolam (VERSED) 1 mg/mL injection         Facility-Administered Medications Ordered in Other Encounters   Medication Dose Route Frequency    midazolam (VERSED) injection   IntraVENous PRN    lidocaine (PF) (XYLOCAINE) 20 mg/mL (2 %) injection   IntraVENous PRN    propofol (DIPRIVAN) 10 mg/mL injection    PRN    propofol (DIPRIVAN) 10 mg/mL injection    CONTINUOUS         Objective:   Vitals:  Visit Vitals    /69    Pulse 90    Temp 99.3 °F (37.4 °C)    Resp 18    Ht 5' 2\" (1.575 m)    Wt 78.5 kg (173 lb)    SpO2 96%    Breastfeeding No    BMI 31.64 kg/m2     Exam:  General appearance: alert, cooperative, no distress  Lungs: clear to auscultation bilaterally anteriorly  Heart: regular rate and rhythm  Abdomen: soft, non-tender.  Bowel sounds normal. No masses, no organomegaly      Data Review (Labs):    No results for input(s): WBC, HGB, HCT, PLT, MCV, NA, K, CL, CO2, BUN, CREA, CA, GLU, AP, SGOT, GPT, TBIL, CBIL, ALB, TP, AML, LPSE, PTP, INR, APTT, HGBEXT, HCTEXT, PLTEXT in the last 72 hours. No lab exists for component: DBIL, INREXT    Plan:     Dysphagia. EGD dilation. Risks of perforation explained.

## 2017-07-06 NOTE — DISCHARGE INSTRUCTIONS
Gastrointestinal Esophagogastroduodenoscopy (EGD) - Upper Exam Discharge Instructions    1. Call Dr. Katherine King at [phone] for any problems or questions. 2. Contact the doctor's office for follow up appointment as directed. 3. Medication may cause drowsiness for several hours, therefore, do not drive or                  operate machinery for remainder of the day. 4. No alcohol today. 5. Ordinarily, you may resume regular diet and activity after exam unless otherwise              specified by your physician. 6. For mild soreness in your throat you may use Cepacol throat lozenges or warm               salt-water gargles as needed. Any additional instructions:   Follow up as needed

## 2017-07-06 NOTE — ANESTHESIA POSTPROCEDURE EVALUATION
Post-Anesthesia Evaluation and Assessment    Patient: Andreia Gale MRN: [id number]  SSN: [id number]    YOB: 1970  Age: 48 y.o. Sex: female       Cardiovascular Function/Vital Signs  Visit Vitals    BP 96/52    Pulse 92    Temp 37 °C (98.6 °F)    Resp 16    Ht 5' 2\" (1.575 m)    Wt 78.5 kg (173 lb)    SpO2 90%    Breastfeeding No    BMI 31.64 kg/m2       Patient is status post total IV anesthesia anesthesia for Procedure(s):  ESOPHAGOGASTRODUODENOSCOPY (EGD) WITH SAVORY DILATION  BMI 31  ESOPHAGEAL DILATION. Nausea/Vomiting: None    Postoperative hydration reviewed and adequate. Pain:  Pain Scale 1: Numeric (0 - 10) (07/06/17 0920)  Pain Intensity 1: 0 (07/06/17 0920)   Managed    Neurological Status: At baseline    Mental Status and Level of Consciousness: Arousable    Pulmonary Status:   O2 Device: Room air (07/06/17 0916)   Adequate oxygenation and airway patent    Complications related to anesthesia: None    Post-anesthesia assessment completed.  No concerns    Signed By: Myrna Booker MD     July 6, 2017

## 2017-07-06 NOTE — ANESTHESIA PREPROCEDURE EVALUATION
Anesthetic History     PONV          Review of Systems / Medical History  Patient summary reviewed, nursing notes reviewed and pertinent labs reviewed    Pulmonary      Recent URI      Asthma : well controlled       Neuro/Psych         Psychiatric history (Severe anxiety; migraines)     Cardiovascular    Hypertension          Hyperlipidemia    Exercise tolerance: >4 METS     GI/Hepatic/Renal     GERD      Liver disease ([place])    Comments: IBS    Chronic pancreatitis.   Subclavian port in place X 4 yrs for hydration    Sphincter of Oddi dysfunction Endo/Other    Diabetes: type 2, using insulin    Obesity     Other Findings            Physical Exam    Airway  Mallampati: I  TM Distance: 4 - 6 cm  Neck ROM: normal range of motion   Mouth opening: Normal     Cardiovascular  Regular rate and rhythm,  S1 and S2 normal,  no murmur, click, rub, or gallop             Dental  No notable dental hx       Pulmonary  Breath sounds clear to auscultation               Abdominal  GI exam deferred       Other Findings            Anesthetic Plan    ASA: 3  Anesthesia type: total IV anesthesia            Anesthetic plan and risks discussed with: Patient

## 2017-07-06 NOTE — IP AVS SNAPSHOT
Current Discharge Medication List  
  
ASK your doctor about these medications Dose & Instructions Dispensing Information Comments Morning Noon Evening Bedtime  
 0.9% sodium chloride solution Your last dose was: Your next dose is:    
   
   
 by IntraVENous route daily. Gives to herself via port at home daily Refills:  0 AMITIZA 24 mcg capsule Generic drug:  lubiPROStone Your last dose was: Your next dose is:    
   
   
 Dose:  24 mcg Take 24 mcg by mouth daily. Refills:  0  
     
   
   
   
  
 citalopram 10 mg tablet Commonly known as:  Nick Mendieta Your last dose was: Your next dose is: TAKE 1 TABLET BY MOUTH DAILY Quantity:  90 Tab Refills:  1 CREON PO Your last dose was: Your next dose is:    
   
   
 Dose:  19262 g Take 36,000 g by mouth Before breakfast, lunch, and dinner. Refills:  0  
     
   
   
   
  
 DILAUDID 2 mg tablet Generic drug:  HYDROmorphone Your last dose was: Your next dose is:    
   
   
 Dose:  1 mg  
1 mg by IntraVENous route as needed for Pain. Refills:  0  
     
   
   
   
  
 fentaNYL 100 mcg/hr PATCH Commonly known as:  Pecolia Seton Your last dose was: Your next dose is:    
   
   
 Dose:  1 Patch 1 Patch by TransDERmal route every seventy-two (72) hours. Refills:  0  
     
   
   
   
  
 hyoscyamine SL 0.125 mg SL tablet Commonly known as:  LEVSIN/SL Your last dose was: Your next dose is:    
   
   
 Dose:  0.125 mg  
0.125 mg by SubLINGual route as needed for Cramping. Refills:  0  
     
   
   
   
  
 insulin aspart 100 unit/mL injection Commonly known as:  Shama Loyd Your last dose was: Your next dose is:    
   
   
 Use as directed Quantity:  10 mL Refills:  3 LORazepam 1 mg tablet Commonly known as:  ATIVAN  
   
 Your last dose was: Your next dose is:    
   
   
 Dose:  1 mg Take 1 Tab by mouth three (3) times daily as needed for Anxiety. Quantity:  90 Tab Refills:  1 MIRALAX 17 gram packet Generic drug:  polyethylene glycol Your last dose was: Your next dose is:    
   
   
 Dose:  17 g Take 17 g by mouth as needed. Refills:  0 NexIUM 40 mg capsule Generic drug:  esomeprazole Your last dose was: Your next dose is:    
   
   
 Dose:  40 mg Take 40 mg by mouth two (2) times a day. Refills:  0  
     
   
   
   
  
 OTHER Your last dose was: Your next dose is:    
   
   
 as needed. Some type of inhaler, instructed pt to bring DOS for clarification Refills:  0  
     
   
   
   
  
 oxyCODONE IR 10 mg Tab immediate release tablet Commonly known as:  Nghia Rameshkin Your last dose was: Your next dose is:    
   
   
 Dose:  10 mg Take 10 mg by mouth as needed. Refills:  0  
     
   
   
   
  
 pantoprazole 40 mg tablet Commonly known as:  PROTONIX Your last dose was: Your next dose is:    
   
   
 Dose:  40 mg Take 1 Tab by mouth two (2) times a day. Quantity:  90 Tab Refills:  1  
     
   
   
   
  
 * promethazine 25 mg suppository Commonly known as:  PHENERGAN Your last dose was: Your next dose is:    
   
   
 Dose:  25 mg Insert 25 mg into rectum as needed for Nausea. Refills:  0  
     
   
   
   
  
 * promethazine 25 mg tablet Commonly known as:  PHENERGAN Your last dose was: Your next dose is:    
   
   
 Dose:  25 mg Take 1 Tab by mouth every six (6) hours as needed. Quantity:  90 Tab Refills:  1 TRESIBA FLEXTOUCH U-100 100 unit/mL (3 mL) Inpn Generic drug:  insulin degludec Your last dose was: Your next dose is:    
   
   
 Dose:  22 Units 22 Units by SubCUTAneous route nightly. Refills:  0 ZOFRAN (AS HYDROCHLORIDE) 8 mg tablet Generic drug:  ondansetron hcl Your last dose was: Your next dose is:    
   
   
 Dose:  8 mg Take 8 mg by mouth every eight (8) hours as needed for Nausea. Refills:  0  
     
   
   
   
  
 zolpidem 10 mg tablet Commonly known as:  AMBIEN Your last dose was: Your next dose is: TAKE 1 TABLET BY MOUTH EVERY NIGHT AS NEEDED FOR SLEEP Quantity:  90 Tab Refills:  1 Not to exceed 3 additional fills before 09/28/2015 * Notice: This list has 2 medication(s) that are the same as other medications prescribed for you. Read the directions carefully, and ask your doctor or other care provider to review them with you.

## 2017-07-07 ENCOUNTER — HOSPITAL ENCOUNTER (OUTPATIENT)
Dept: INFUSION THERAPY | Age: 47
End: 2017-07-07

## 2017-07-07 ENCOUNTER — HOSPITAL ENCOUNTER (OUTPATIENT)
Dept: INFUSION THERAPY | Age: 47
Discharge: HOME | End: 2017-07-07
Payer: COMMERCIAL

## 2017-07-07 VITALS
RESPIRATION RATE: 16 BRPM | SYSTOLIC BLOOD PRESSURE: 123 MMHG | OXYGEN SATURATION: 94 % | DIASTOLIC BLOOD PRESSURE: 62 MMHG | HEART RATE: 113 BPM | TEMPERATURE: 99.5 F

## 2017-07-07 LAB — MAGNESIUM SERPL-MCNC: 1.8 MG/DL (ref 1.8–2.4)

## 2017-07-07 PROCEDURE — 36591 DRAW BLOOD OFF VENOUS DEVICE: CPT

## 2017-07-07 PROCEDURE — 77030003560 HC NDL HUBR BARD -A

## 2017-07-07 PROCEDURE — 83735 ASSAY OF MAGNESIUM: CPT | Performed by: INTERNAL MEDICINE

## 2017-07-07 RX ORDER — SODIUM CHLORIDE 0.9 % (FLUSH) 0.9 %
10-40 SYRINGE (ML) INJECTION AS NEEDED
Status: DISCONTINUED | OUTPATIENT
Start: 2017-07-07 | End: 2017-07-11 | Stop reason: HOSPADM

## 2017-07-07 RX ADMIN — Medication 10 ML: at 17:15

## 2017-07-07 NOTE — PROGRESS NOTES
Arrived to the [facility]. Accessed port. Patient tolerated well. Any issues or concerns during appointment: patient refused phenergan. Patient aware of next infusion appointment on 7/13/17 at 7:15am.  Discharged ambulatory.

## 2017-07-13 ENCOUNTER — HOSPITAL ENCOUNTER (OUTPATIENT)
Dept: LAB | Age: 47
Discharge: HOME | End: 2017-07-13
Payer: COMMERCIAL

## 2017-07-13 LAB
ALBUMIN SERPL BCP-MCNC: 3.5 G/DL (ref 3.5–5)
ALBUMIN/GLOB SERPL: 0.7 {RATIO} (ref 1.2–3.5)
ALP SERPL-CCNC: 108 U/L (ref 50–136)
ALT SERPL-CCNC: 23 U/L (ref 12–65)
AMYLASE SERPL-CCNC: 44 U/L (ref 25–115)
ANION GAP BLD CALC-SCNC: 6 MMOL/L (ref 7–16)
AST SERPL W P-5'-P-CCNC: 15 U/L (ref 15–37)
BASOPHILS # BLD AUTO: 0 K/UL (ref 0–0.2)
BASOPHILS # BLD: 0 % (ref 0–2)
BILIRUB SERPL-MCNC: 0.3 MG/DL (ref 0.2–1.1)
BUN SERPL-MCNC: 18 MG/DL (ref 6–23)
CALCIUM SERPL-MCNC: 9 MG/DL (ref 8.3–10.4)
CHLORIDE SERPL-SCNC: 100 MMOL/L (ref 98–107)
CO2 SERPL-SCNC: 30 MMOL/L (ref 21–32)
CREAT SERPL-MCNC: 0.83 MG/DL (ref 0.6–1)
DIFFERENTIAL METHOD BLD: ABNORMAL
EOSINOPHIL # BLD: 0.4 K/UL (ref 0–0.8)
EOSINOPHIL NFR BLD: 5 % (ref 0.5–7.8)
ERYTHROCYTE [DISTWIDTH] IN BLOOD BY AUTOMATED COUNT: 16.9 % (ref 11.9–14.6)
GLOBULIN SER CALC-MCNC: 4.7 G/DL (ref 2.3–3.5)
GLUCOSE SERPL-MCNC: 216 MG/DL (ref 65–100)
HCT VFR BLD AUTO: 33.7 % (ref 35.8–46.3)
HGB BLD-MCNC: 10.1 G/DL (ref 11.7–15.4)
IMM GRANULOCYTES # BLD: 0 K/UL (ref 0–0.5)
IMM GRANULOCYTES NFR BLD AUTO: 0 % (ref 0–5)
LIPASE SERPL-CCNC: 120 U/L (ref 73–393)
LYMPHOCYTES # BLD AUTO: 25 % (ref 13–44)
LYMPHOCYTES # BLD: 1.9 K/UL (ref 0.5–4.6)
MCH RBC QN AUTO: 19.5 PG (ref 26.1–32.9)
MCHC RBC AUTO-ENTMCNC: 30 G/DL (ref 31.4–35)
MCV RBC AUTO: 65.1 FL (ref 79.6–97.8)
MONOCYTES # BLD: 0.4 K/UL (ref 0.1–1.3)
MONOCYTES NFR BLD AUTO: 5 % (ref 4–12)
NEUTS SEG # BLD: 4.7 K/UL (ref 1.7–8.2)
NEUTS SEG NFR BLD AUTO: 65 % (ref 43–78)
PLATELET # BLD AUTO: 243 K/UL (ref 150–450)
PLATELET COMMENTS,PCOM: ADEQUATE
PMV BLD AUTO: 10.1 FL (ref 10.8–14.1)
POTASSIUM SERPL-SCNC: 4.5 MMOL/L (ref 3.5–5.1)
PROT SERPL-MCNC: 8.2 G/DL (ref 6.3–8.2)
RBC # BLD AUTO: 5.18 M/UL (ref 4.05–5.25)
RBC MORPH BLD: ABNORMAL
RBC MORPH BLD: ABNORMAL
SODIUM SERPL-SCNC: 136 MMOL/L (ref 136–145)
WBC # BLD AUTO: 7.4 K/UL (ref 4.3–11.1)
WBC MORPH BLD: ABNORMAL

## 2017-07-13 PROCEDURE — 82150 ASSAY OF AMYLASE: CPT | Performed by: INTERNAL MEDICINE

## 2017-07-13 PROCEDURE — 83690 ASSAY OF LIPASE: CPT | Performed by: INTERNAL MEDICINE

## 2017-07-13 PROCEDURE — 80053 COMPREHEN METABOLIC PANEL: CPT | Performed by: INTERNAL MEDICINE

## 2017-07-13 PROCEDURE — 85025 COMPLETE CBC W/AUTO DIFF WBC: CPT | Performed by: INTERNAL MEDICINE

## 2017-07-13 PROCEDURE — 36415 COLL VENOUS BLD VENIPUNCTURE: CPT | Performed by: INTERNAL MEDICINE

## 2017-07-14 ENCOUNTER — HOSPITAL ENCOUNTER (OUTPATIENT)
Dept: INFUSION THERAPY | Age: 47
Discharge: HOME | End: 2017-07-14
Payer: COMMERCIAL

## 2017-07-14 VITALS
WEIGHT: 170.8 LBS | BODY MASS INDEX: 31.24 KG/M2 | OXYGEN SATURATION: 95 % | RESPIRATION RATE: 18 BRPM | HEART RATE: 101 BPM | DIASTOLIC BLOOD PRESSURE: 70 MMHG | SYSTOLIC BLOOD PRESSURE: 112 MMHG | TEMPERATURE: 98.1 F

## 2017-07-14 LAB — MAGNESIUM SERPL-MCNC: 2 MG/DL (ref 1.8–2.4)

## 2017-07-14 PROCEDURE — 83735 ASSAY OF MAGNESIUM: CPT | Performed by: INTERNAL MEDICINE

## 2017-07-14 PROCEDURE — 74011250636 HC RX REV CODE- 250/636: Performed by: INTERNAL MEDICINE

## 2017-07-14 PROCEDURE — 96374 THER/PROPH/DIAG INJ IV PUSH: CPT

## 2017-07-14 PROCEDURE — 74011000250 HC RX REV CODE- 250: Performed by: INTERNAL MEDICINE

## 2017-07-14 RX ORDER — HEPARIN 100 UNIT/ML
500 SYRINGE INTRAVENOUS AS NEEDED
Status: DISPENSED | OUTPATIENT
Start: 2017-07-14 | End: 2017-07-14

## 2017-07-14 RX ORDER — SODIUM CHLORIDE 0.9 % (FLUSH) 0.9 %
10-40 SYRINGE (ML) INJECTION AS NEEDED
Status: DISCONTINUED | OUTPATIENT
Start: 2017-07-14 | End: 2017-07-18 | Stop reason: HOSPADM

## 2017-07-14 RX ADMIN — SODIUM CHLORIDE, PRESERVATIVE FREE 500 UNITS: 5 INJECTION INTRAVENOUS at 16:17

## 2017-07-14 RX ADMIN — SODIUM CHLORIDE 25 MG: 9 INJECTION INTRAMUSCULAR; INTRAVENOUS; SUBCUTANEOUS at 16:12

## 2017-07-14 RX ADMIN — Medication 10 ML: at 16:17

## 2017-07-20 ENCOUNTER — HOSPITAL ENCOUNTER (OUTPATIENT)
Dept: INFUSION THERAPY | Age: 47
Discharge: HOME | End: 2017-07-20
Payer: COMMERCIAL

## 2017-07-20 VITALS
OXYGEN SATURATION: 98 % | HEART RATE: 104 BPM | SYSTOLIC BLOOD PRESSURE: 119 MMHG | DIASTOLIC BLOOD PRESSURE: 72 MMHG | TEMPERATURE: 98.1 F | BODY MASS INDEX: 32.08 KG/M2 | WEIGHT: 175.4 LBS | RESPIRATION RATE: 18 BRPM

## 2017-07-20 LAB — MAGNESIUM SERPL-MCNC: 1.8 MG/DL (ref 1.8–2.4)

## 2017-07-20 PROCEDURE — 74011250636 HC RX REV CODE- 250/636: Performed by: INTERNAL MEDICINE

## 2017-07-20 PROCEDURE — 77030003560 HC NDL HUBR BARD -A

## 2017-07-20 PROCEDURE — 96374 THER/PROPH/DIAG INJ IV PUSH: CPT

## 2017-07-20 PROCEDURE — 74011000250 HC RX REV CODE- 250: Performed by: INTERNAL MEDICINE

## 2017-07-20 PROCEDURE — 83735 ASSAY OF MAGNESIUM: CPT | Performed by: INTERNAL MEDICINE

## 2017-07-20 RX ORDER — SODIUM CHLORIDE 0.9 % (FLUSH) 0.9 %
5-10 SYRINGE (ML) INJECTION AS NEEDED
Status: DISCONTINUED | OUTPATIENT
Start: 2017-07-20 | End: 2017-07-23 | Stop reason: HOSPADM

## 2017-07-20 RX ORDER — HEPARIN 100 UNIT/ML
500 SYRINGE INTRAVENOUS AS NEEDED
Status: DISPENSED | OUTPATIENT
Start: 2017-07-20 | End: 2017-07-20

## 2017-07-20 RX ADMIN — SODIUM CHLORIDE 500 ML: 900 INJECTION, SOLUTION INTRAVENOUS at 07:23

## 2017-07-20 RX ADMIN — SODIUM CHLORIDE 25 MG: 9 INJECTION INTRAMUSCULAR; INTRAVENOUS; SUBCUTANEOUS at 07:29

## 2017-07-20 RX ADMIN — Medication 10 ML: at 07:23

## 2017-07-20 NOTE — PROGRESS NOTES
Pt arrived ambulatory to C with port previously accessed with dressing dry and intact and with good blood return. NS infusing. Phenergan given IV as ordered. NS completed. Port not packed with heparin as pt will infuse today at home. Port remains accessed for home infusion. Pt aware of next appt on 7/27/17 at 0715. Pt awaiting father for discharge. Pt discharged ambulatory.

## 2017-07-27 ENCOUNTER — HOSPITAL ENCOUNTER (OUTPATIENT)
Dept: INFUSION THERAPY | Age: 47
Discharge: HOME | End: 2017-07-27
Payer: COMMERCIAL

## 2017-07-27 VITALS
SYSTOLIC BLOOD PRESSURE: 118 MMHG | HEART RATE: 96 BPM | TEMPERATURE: 99.4 F | OXYGEN SATURATION: 99 % | RESPIRATION RATE: 18 BRPM | DIASTOLIC BLOOD PRESSURE: 75 MMHG | BODY MASS INDEX: 31.24 KG/M2 | WEIGHT: 170.8 LBS

## 2017-07-27 LAB — MAGNESIUM SERPL-MCNC: 1.9 MG/DL (ref 1.8–2.4)

## 2017-07-27 PROCEDURE — 96374 THER/PROPH/DIAG INJ IV PUSH: CPT

## 2017-07-27 PROCEDURE — 77030003560 HC NDL HUBR BARD -A

## 2017-07-27 PROCEDURE — 83735 ASSAY OF MAGNESIUM: CPT | Performed by: INTERNAL MEDICINE

## 2017-07-27 PROCEDURE — 74011000250 HC RX REV CODE- 250: Performed by: INTERNAL MEDICINE

## 2017-07-27 PROCEDURE — 74011250636 HC RX REV CODE- 250/636: Performed by: INTERNAL MEDICINE

## 2017-07-27 RX ORDER — SODIUM CHLORIDE 0.9 % (FLUSH) 0.9 %
10-40 SYRINGE (ML) INJECTION AS NEEDED
Status: DISCONTINUED | OUTPATIENT
Start: 2017-07-27 | End: 2017-07-31 | Stop reason: HOSPADM

## 2017-07-27 RX ORDER — HEPARIN 100 UNIT/ML
500 SYRINGE INTRAVENOUS EVERY 6 HOURS
Status: DISCONTINUED | OUTPATIENT
Start: 2017-07-27 | End: 2017-07-31 | Stop reason: HOSPADM

## 2017-07-27 RX ADMIN — Medication 10 ML: at 07:46

## 2017-07-27 RX ADMIN — Medication 10 ML: at 07:50

## 2017-07-27 RX ADMIN — SODIUM CHLORIDE, PRESERVATIVE FREE 500 UNITS: 5 INJECTION INTRAVENOUS at 07:55

## 2017-07-27 RX ADMIN — SODIUM CHLORIDE 25 MG: 9 INJECTION INTRAMUSCULAR; INTRAVENOUS; SUBCUTANEOUS at 07:46

## 2017-07-27 NOTE — PROGRESS NOTES
Arrived to the [facility]. Assessment completed. Patient received phenergan per order, mag level 1.9 no treatment neccessary. Any issues or concerns during appointment: none. Patient aware of next infusion appointment on 8/3/17 (date) at [address] [place] [address] with iv [facility] (time). Discharged Ambulatory with father. Patient instructed to call with concerns.

## 2017-08-03 ENCOUNTER — HOSPITAL ENCOUNTER (OUTPATIENT)
Dept: INFUSION THERAPY | Age: 47
Discharge: HOME | End: 2017-08-03
Payer: COMMERCIAL

## 2017-08-03 VITALS
OXYGEN SATURATION: 97 % | TEMPERATURE: 98.6 F | HEART RATE: 102 BPM | WEIGHT: 177 LBS | SYSTOLIC BLOOD PRESSURE: 127 MMHG | RESPIRATION RATE: 18 BRPM | DIASTOLIC BLOOD PRESSURE: 72 MMHG | BODY MASS INDEX: 32.37 KG/M2

## 2017-08-03 LAB — MAGNESIUM SERPL-MCNC: 2 MG/DL (ref 1.8–2.4)

## 2017-08-03 PROCEDURE — 74011250636 HC RX REV CODE- 250/636: Performed by: INTERNAL MEDICINE

## 2017-08-03 PROCEDURE — 83735 ASSAY OF MAGNESIUM: CPT | Performed by: INTERNAL MEDICINE

## 2017-08-03 PROCEDURE — 74011000250 HC RX REV CODE- 250: Performed by: INTERNAL MEDICINE

## 2017-08-03 PROCEDURE — 96374 THER/PROPH/DIAG INJ IV PUSH: CPT

## 2017-08-03 PROCEDURE — 77030003560 HC NDL HUBR BARD -A

## 2017-08-03 RX ORDER — SODIUM CHLORIDE 9 MG/ML
25 INJECTION, SOLUTION INTRAVENOUS ONCE
Status: COMPLETED | OUTPATIENT
Start: 2017-08-03 | End: 2017-08-03

## 2017-08-03 RX ORDER — HEPARIN 100 UNIT/ML
500 SYRINGE INTRAVENOUS AS NEEDED
Status: DISPENSED | OUTPATIENT
Start: 2017-08-03 | End: 2017-08-03

## 2017-08-03 RX ORDER — SODIUM CHLORIDE 0.9 % (FLUSH) 0.9 %
10-40 SYRINGE (ML) INJECTION AS NEEDED
Status: DISCONTINUED | OUTPATIENT
Start: 2017-08-03 | End: 2017-08-07 | Stop reason: HOSPADM

## 2017-08-03 RX ADMIN — Medication 10 ML: at 08:15

## 2017-08-03 RX ADMIN — SODIUM CHLORIDE 25 ML/HR: 900 INJECTION, SOLUTION INTRAVENOUS at 07:30

## 2017-08-03 RX ADMIN — Medication 10 ML: at 07:30

## 2017-08-03 RX ADMIN — SODIUM CHLORIDE 25 MG: 9 INJECTION INTRAMUSCULAR; INTRAVENOUS; SUBCUTANEOUS at 07:42

## 2017-08-03 RX ADMIN — SODIUM CHLORIDE, PRESERVATIVE FREE 500 UNITS: 5 INJECTION INTRAVENOUS at 08:15

## 2017-08-03 NOTE — PROGRESS NOTES
Arrived to the [facility]. Phenergan and IVF completed. Patient tolerated well. Any issues or concerns during appointment: none. Patient aware of next infusion appointment on 08/10  at 0700 . Discharged ambulatory.

## 2017-08-10 ENCOUNTER — HOSPITAL ENCOUNTER (OUTPATIENT)
Dept: INFUSION THERAPY | Age: 47
Discharge: HOME | End: 2017-08-10
Payer: COMMERCIAL

## 2017-08-10 VITALS
TEMPERATURE: 98.6 F | WEIGHT: 170.2 LBS | HEART RATE: 116 BPM | RESPIRATION RATE: 18 BRPM | DIASTOLIC BLOOD PRESSURE: 74 MMHG | OXYGEN SATURATION: 97 % | SYSTOLIC BLOOD PRESSURE: 127 MMHG | BODY MASS INDEX: 31.13 KG/M2

## 2017-08-10 LAB — MAGNESIUM SERPL-MCNC: 1.8 MG/DL (ref 1.8–2.4)

## 2017-08-10 PROCEDURE — 96374 THER/PROPH/DIAG INJ IV PUSH: CPT

## 2017-08-10 PROCEDURE — 74011250636 HC RX REV CODE- 250/636: Performed by: INTERNAL MEDICINE

## 2017-08-10 PROCEDURE — 83735 ASSAY OF MAGNESIUM: CPT | Performed by: INTERNAL MEDICINE

## 2017-08-10 PROCEDURE — 77030003560 HC NDL HUBR BARD -A

## 2017-08-10 PROCEDURE — 74011000250 HC RX REV CODE- 250: Performed by: INTERNAL MEDICINE

## 2017-08-10 RX ORDER — HEPARIN 100 UNIT/ML
500 SYRINGE INTRAVENOUS AS NEEDED
Status: COMPLETED | OUTPATIENT
Start: 2017-08-10 | End: 2017-08-10

## 2017-08-10 RX ORDER — PANTOPRAZOLE SODIUM 40 MG/10ML
40 INJECTION, POWDER, FOR SOLUTION INTRAVENOUS
COMMUNITY
End: 2021-01-27 | Stop reason: CLARIF

## 2017-08-10 RX ORDER — SODIUM CHLORIDE 9 MG/ML
250 INJECTION, SOLUTION INTRAVENOUS ONCE
Status: COMPLETED | OUTPATIENT
Start: 2017-08-10 | End: 2017-08-10

## 2017-08-10 RX ORDER — SODIUM CHLORIDE 0.9 % (FLUSH) 0.9 %
10-40 SYRINGE (ML) INJECTION AS NEEDED
Status: DISCONTINUED | OUTPATIENT
Start: 2017-08-10 | End: 2017-08-14 | Stop reason: HOSPADM

## 2017-08-10 RX ADMIN — SODIUM CHLORIDE 25 MG: 9 INJECTION INTRAMUSCULAR; INTRAVENOUS; SUBCUTANEOUS at 07:45

## 2017-08-10 RX ADMIN — SODIUM CHLORIDE, PRESERVATIVE FREE 500 UNITS: 5 INJECTION INTRAVENOUS at 08:15

## 2017-08-10 RX ADMIN — SODIUM CHLORIDE 250 ML: 900 INJECTION, SOLUTION INTRAVENOUS at 07:41

## 2017-08-16 ENCOUNTER — ANESTHESIA EVENT (OUTPATIENT)
Dept: ENDOSCOPY | Age: 47
End: 2017-08-16
Payer: COMMERCIAL

## 2017-08-16 RX ORDER — ONDANSETRON 2 MG/ML
4 INJECTION INTRAMUSCULAR; INTRAVENOUS ONCE
Status: CANCELLED | OUTPATIENT
Start: 2017-08-16 | End: 2017-08-16

## 2017-08-16 RX ORDER — DIPHENHYDRAMINE HYDROCHLORIDE 50 MG/ML
12.5 INJECTION, SOLUTION INTRAMUSCULAR; INTRAVENOUS ONCE
Status: CANCELLED | OUTPATIENT
Start: 2017-08-16 | End: 2017-08-16

## 2017-08-16 RX ORDER — SODIUM CHLORIDE 0.9 % (FLUSH) 0.9 %
5-10 SYRINGE (ML) INJECTION AS NEEDED
Status: CANCELLED | OUTPATIENT
Start: 2017-08-16

## 2017-08-16 RX ORDER — OXYCODONE HYDROCHLORIDE 5 MG/1
10 TABLET ORAL
Status: CANCELLED | OUTPATIENT
Start: 2017-08-16

## 2017-08-16 RX ORDER — HYDROMORPHONE HYDROCHLORIDE 2 MG/ML
0.5 INJECTION, SOLUTION INTRAMUSCULAR; INTRAVENOUS; SUBCUTANEOUS
Status: CANCELLED | OUTPATIENT
Start: 2017-08-16

## 2017-08-16 RX ORDER — NALOXONE HYDROCHLORIDE 0.4 MG/ML
0.1 INJECTION, SOLUTION INTRAMUSCULAR; INTRAVENOUS; SUBCUTANEOUS AS NEEDED
Status: CANCELLED | OUTPATIENT
Start: 2017-08-16 | End: 2017-08-16

## 2017-08-16 RX ORDER — SODIUM CHLORIDE, SODIUM LACTATE, POTASSIUM CHLORIDE, CALCIUM CHLORIDE 600; 310; 30; 20 MG/100ML; MG/100ML; MG/100ML; MG/100ML
75 INJECTION, SOLUTION INTRAVENOUS CONTINUOUS
Status: CANCELLED | OUTPATIENT
Start: 2017-08-16

## 2017-08-16 RX ORDER — ACETAMINOPHEN 500 MG
500 TABLET ORAL ONCE
Status: CANCELLED | OUTPATIENT
Start: 2017-08-16 | End: 2017-08-16

## 2017-08-16 RX ORDER — OXYCODONE HYDROCHLORIDE 5 MG/1
5 TABLET ORAL
Status: CANCELLED | OUTPATIENT
Start: 2017-08-16

## 2017-08-16 NOTE — H&P
Patient:  Oscar Chaparro  YOB: 1970   Date:                     8/17/17      This 48year old  patient was referred by Oliver Moscoso MD.    Chief Complaint:  Patient seen at the request of Oliver Moscoso MD for evaluation of the following:  chest/epig pains. History of Present Illness:  1.  chest/epig pains. At her office visit 1-26-17 for chronic N/V/abd pains presumably from visceral hypersensitivity, she also reported chest pains for which she was on Protonix. However, she continued to have the upper abd gas/discomfort. She still requires IVF home therapy. She was  prescribed Aciphex 20 mg Qd to see how that compared to the Protonix. She is having more intense subxiphoid pains that is worse with eating. She is living on Smoothies. However, when she swallows solid food, she develops the subxiphoid pains. She's also having LUQP that radiate to her back. This particular flare started last week. She still has the same dry heaving. She has taken more meds she gets from Pain Management due to the pain. PROBLEM LIST:  Problem Description Onset Date Chronic Notes   GERD - Gastro-esophageal reflux disease 03/10/2011 Y GERD w/o esophagitis. Fundoplication 3389. Bile gastritis May 2014. IBS - Irritable bowel syndrome 03/10/2011 Y IBS constipation. Nonalcoholic fatty liver disease 05/27/2012 Y Fatty liver on CT scans [facility] May 2012). Dysfunction of sphincter of Oddi 03/10/2011 Y Dx [facility] (Dr Nilson Wright) Nov 2006 ERCP: papillary stenosis/spasm, manometry w/elevated basal sphincter press c/w biliary sphincter HTN, elev basal pancreatic sphincter pressures; Dual sphincterotomy. Interim pain free spell from 2006 to March 2009. ERCP June 2009: elev pancreatic/biliary pressure (residual sphincter rather than stenosis); both orifices cut. EGD/ERCP [facility]) June 2010 negative. Relapsed March 2011 (lipase >2000). Acute pancreatitis March 2011.   ERCP 4-29-11: Pancreatic stent ([facility]). Admission 5-26-11 for lipase >2200. Admit 2-16-12 for pancreatitis (lipase 5920). [facility] Dr Mishel Hampton consult 4-11-12: surgical open sphincteroplasty 5-3-12. Hospitalized 1-14 to 1-14-13. ERCP 3-5-13 w duodenal perforation. [facility] turned patient down for re-consult 9-3-13. Pain Management Aug 2013. On weekly to 2x weekly IVF hydration with iv Dilaudid 2 mg iv Phenergan 25 mg iv. Feb 18 to Feb 23, 2014: daily IVF + Dilaudid 4 mg + Phenergan. Recurrent acute pancreatitis 05/31/2011 Y Acute pancreatitis March 2011. ERCP 4-29-11: Pancreatic stent [facility]). Admission 5-26-11 for lipase >2200. Admit 2-16-12 for pancreatitis (lipase 5920). [facility] Dr Mishel Hampton consult 4-11-12: surgical open sphincteroplasty 5-3-12. Hospitalized 1-14 to 1-14-13. ERCP 3-5-13 w duodenal perforation. [facility] turned patient down for re-consult 9-3-13. Pain Management Aug 2013. On weekly to 2x weekly IVF hydration with iv Dilaudid 2 mg iv Phenergan 25 mg iv. Feb 18 to Feb 23, 2014: daily IVF + Dilaudid 4 mg + Phenergan. Chronic abdominal pain 06/28/2013 Y Nausea since early 2012. Zofran doesn't help with nausea any better than Phenergan. Lortab, Percocet and oxycodone causes nausea. Scopolamine patch didn't work (blistering rash). PAST MEDICAL/SURGICAL HISTORY  (Detailed)    Disease/disorder Onset Date Management Date Comments   Cholecystectomy laparoscopic (dyskinesia) 1997 1997 1997    Colonoscopy 12-4-00 (low pn, change BH): IH 2000 2000    Diabetes mellitus, type 1  PCP 2013 Patient doesn't know her HgbA1c. EGD 12-4-00 (CP, epig pn): normal 2000 2000    EGD 12-8-06: no pancreatic stent 12/08/2006 2006    EGD 2-5-13 (epig pain)  Dr Shawn Moreno 2013 Minimal gastritis. EGD 3-8-16 (odynophagia)  Dr Shawn Moreno  Normal esophagus; slight bleeding anastamosis of posterior wall (no ulcer seen); surgical changes distal stomach clear.    EGD 4-7-04 (epig pn): intact fundoplication 0102 1676    EGD 5-29-14 (N/V, epig pain)  Dr Girish Clarke  Ulcerative esophagitis, diffuse bile gastritis, wide anastamosis in posterior body of stomach with 2 limbs; bile extruding from ampulla (duodenal \"dead end\"). EGD 5-29-14 (nausea)  Dr Girish Clarke 2014 New surgical changes (gastrojejunostomy anastamosis on posterior body of stomach is widely open with 2 limbs?); diffuse severe bile gastritis; bile secreted from ampulla, duodenum \"dead end\". Ulcerative esophagitis. EGD 6-2-11 (to remove pancreatic stent)   2011    EGD 6-29-15 (severe DANE)  Dr Girish Clarke  Hemorrhagic gastritis with flecks of blood, actively bleeding anastamotic 6 mm HP polyp snared. Esophageal ulcers: Esophageal biopsies (Bx: inflamed squamous mucosa). ERCP ([facility]) 11-17-06: SOD biliary,pancreatic 11/17/2006 Dual sphincterotomy 2006    ERCP ([facility]) 6-26-09: residual biliary sphincter 2009 Dual sphincterotomy 2009    ERCP 3-5-13 (MRI 2-21-13: 2 mm CBD defect)  Dr Adeel Bray 2013 Balloon sweep of CBD sludge; surgical changes of surgical sphincteroplasty. ERP [facility]) 4-29-11   2011 SOD 11p; probable stenosis of major papilla orifice (elevated pressures but in sawtooth pattern not typical of sphincter HTN); dilation done and PD stent placed x 1 month. If not better, then surgical intervention. EUS 4-9-12 2012 Indeterminate (3 criteria) for chronic pancreatitis. No strictures. Minimal dilation of PD to 3 mm. EUS April 2016  Dr Maurizio Renee, [facility]  Indeterminate pancreatic parenchymal changes. EUS+EGD [facility]) 6-25-10: negative 2010 2010    Exp Lap 4-25-13  Dr Giovanna Hahn, Dr Pino Pagan 2013 Drainage of retroperitoneal abscess, peritonitis, drainage of intraabdominal abscess, duodenal resection, retrocolic gastrojejunostomy, abd washout. Exp Lap 5-13-13  Dr Pino Pagan 2013 Drainage and debridement of persistent R retroperitoneal abscess. (IR also had drainage tubes which they are monitoring along w Dr Pino Pagan).    Fibromyalgia worse w/stress + Ca supplements       Headache, migraine Hypotension, recurrent since April 2015    Probably from lopressor (was for tachycardia), muscle relaxant, lisinopril (for renal protection), dehydration (IVF help). Patient states not due to narcotics since she has had presyncope before taking any. Hysterectomy laparoscopic (intact ovaries) 1998 1998      MRCP 3-12-15    Normal pancreas. No CBC/PD dilation. MRCP 7-26-13 (N/V/pain)   2013 No acute findings. Nissen fundoplication 2273 for [facility]    Nutrition 5-18-15: mild malnutrtion: rec: protein meals       Nutrition Consult 5-12-16  Beverly Bear, RD, [address] [facility]   Rec: 4-6 sm meals; < 50 gram fat/day; liquid sources of protein (eg Glucerna)   Open transduodenal sphincteroplasty 5-3-12  Dr Allan Ventura [facility] surgery) 2012 Tight PD, firm pancreas w scar tissue c/w multiple bouts pancreatitis. Postop complication: Enterobacter arogenes of RUQP abscess, empyema (Rx 4 wks ertapenem). Oversew of duodenal perforation March 2013  Dr Luc Bronson 2013 Given location of oversew, monitor for potential outlet obstruction (verbal communication). R ureteral stent 4-25-13  Dr Jesu Peoples 2013 Obstruction of ureter due to pancreatic abscess. Stent REMOVED by cystoscopy 6-27-13. To be monitored by Dr Jesu Peoples. Tachycardia since duodenal surgeries  Dr Sharri Don of beta blockers due to recurrent hypotension. UGI series 6-2-14    Oral contrast emptied readily through patent gastrojejunostomy (no anastamotic stricture noted), limited small bowel exam.   UGI series 6-2-14 2014 Contrast passed readily through the gastrojejunomstomy anastomosis; no anastamosis stricture. Additional Medical History:  Dx [facility] Nov 2006 ERCP: papillary stenosis/spasm, manometry c/w biliary sphincter HTN, elev basal pancreatic sphincter pressures; Dual sphincterotomy. Interim pain free spell from 2006 to March 2009.   ERCP June 2009: elev pancreatic/biliary pressure (residual sphincter rather than stenosis); both orifices cut.  EGD/ERCP [facility]) June 2010 negative. Relapsed March 2011 (lipase >2000). Acute pancreatitis March 2011. ERCP 4-29-11: Pancreatic stent [facility]). Admission 5-26-11 for lipase >2200. Admit 2-16-12 for pancreatitis (lipase 5920). [facility] Dr Cheri Mcbride consult 4-11-12: surgical open sphincteroplasty 5-3-12. Hospitalized 1-14 to 1-14-13. ERCP 3-5-13 w duodenal perforation. [facility] turned patient down for re-consult 9-3-13. Pain Management Aug 2013. On weekly to 2x weekly IVF hydration with iv Dilaudid 2 mg iv Phenergan 25 mg iv. Recurrent hypotension (see PMH). [facility] consult Dr Reyes Daunt 4-6-16: visceral hypersensitivity. Also, patient with poorly controlled DM. Additional Surgical History:  Nausea since early 2012. Zofran not any better than Phenergan. Lortab, Percocet and oxycodone causes nausea. Scopolamine patch didn't work (blistering rash). Creon helps (causes constipation). Dr Cheri Mcbride visit 7-16-14: Blind Loop Syndrome. Open duodenojejunostomy 9-22-14. IBS constipation. Amitiza 24 mcg BID ? Veronia Gilbert Linzess 145 mcg x 1 diarrhea. GERD w/o esophagitis. Fundoplication 7658. Esophageal spasms Mar 2016: levsin helps. B12 deficiency Dec 2014. On injections. Microcytic Anemia 4-7-15 (bleeding gastric polyp). Labs 6-12-15: WBC 4.9, Hgb 9.8, MCV 70, plt 251, ferritin 4.7, iron sat 5%, B12 = 255. iv iron Sept 2015. Fatty liver on CT scans [facility] May 2012). Procedure History:  Test Date Results Interp   EGD 03/08/2016 Gastritis    EGD 06/29/2015 Gastric polyp, Gastritis, Pill esophagitis    EGD 05/29/2014 Bile reflux gastritis    EGD 02/05/2013 Gastritis        Duodenal perforation at 3-5-13 ERCP: Dr Deshawn Allred performed exploratory laparotomy with jejunal patch of perforation, drain placement, discharged 3-15-13.   Readmitted for fevers 3-24-13 w multiple abscesses on CT: IR placed drains in multiloculated abscesses: sent home on antibiotics 3-28-13 (also had C dif diarrhea). Readmitted 4-8-13 for reaccumulation of abscess and R hydroureter (in an area of abscess); IR placed drains, then transferred to [facility] rehab 4-10-13 for iv antibiotics and drainage. Exploratory surgery 4-25-13 (Dr Aylin Garcia) b/c abscesses did not resolve at [facility]: retroperitoneal and abdominal wall abscess drainage, duodenal resection (now a blind pouch), retrocolic gastrojejunostomy, cystoscopy ureteral stent R. Apparently, bile and acid reflux can result from this bypass surgery. Hospitalized July 2013 for acute pancreatitis (N/V/pain/lipase 1310), Dec 2013. Hospitalized 6/2014 for chronic pancreatitis with N&V, electrolyte dysfunction. Lipase normal.          PROVIDER INFORMATION AND PATIENT ADMISSION:    CURRENT MEDICATIONS  Medication Name Sig Desc   Anusol-HC 25 mg Suppository insert 1 suppository at bedtime as needed, may use twice a day if needed   Dilaudid 2 mg tablet take 1 tablet (2MG)  by oral route 2 times every day as needed   fentanyl 100 mcg/hr Transderm Patch apply 1 patch (100MCG/H)  by transdermal route  every 72 hours   Humulin N Pen 100 unit/mL (3 mL) subcutaneous as directed   Ambien 10 mg tablet take 1 tablet by oral route  every day prn   hydromorphone (PF) 2 mg/mL injection solution inject 1 milliliter by  IV  as needed no more than 5 times per week   Creon 36,000-114,000-180,000 unit capsule,delayed release take 1 capsule by oral route 3 times every day with meals and 1 capsule with each snack swallowing whole. Do not crush, chew and/or divide.    promethazine 25 mg rectal suppository insert 1 suppository (25MG)  by rectal route up to 4  times every day prn nausea   ondansetron 8 mg disintegrating tablet take 1 tablet (8MG)  by oral route  every 8 hours TID prn and place on top of the tongue where it will dissolve, then swallow   AMITIZA 24 MCG CAPSULES TAKE 1 CAPSULE BY ORAL ROUTE 2 TIMES EVERY DAY WITH FOOD AND WATER   PROTONIX DR 40 MG TABLET TAKE 1 TABLET BY MOUTH EVERY DAY BEFORE BREAKFAST - OK TO TAKE 2 TIMES DAILY IF HAVING A FLARE   promethazine 25 mg tablet take 1 tablet (25MG)  by oral route  every 4 - 6 hours as needed   Vitamin B-12 1,000 mcg/mL injection solution inject 1 (1000MCG)  by intramuscular weekly x 4 weeks then montly   Levsin/SL 0.125 mg sublingual tablet place 1 Tablet by Sublingual route every 6 hours as needed for abdominal pain/cramping   GI COCKTAIL take 10cc by mouth and swallow up to four times a day as needed for pain   Nitrostat 0.4 mg sublingual tablet place 1 tablet by SL route at the 1st sign of pain may repeat every 5 min until relief; do not take more than3 tablets in 15 min   Aciphex 20 mg tablet,delayed release take 1 tablet by oral route  every day   Ativan 1 mg tablet take 1 tablet (1MG)  by oral route 3 times every day as needed   citalopram 20 mg tablet take 1 tablet by oral route  every day   Medication Reconciliation  Medications reconciled today. Allergies:  Ingredient Reaction Medication Name Comment   METRONIDAZOLE nausea Flagyl flagyl   METRONIDAZOLE HCL nausea Flagyl flagyl   BARIUM SULFATE rapid heartbeat, sweating     ADHESIVE TAPE Hives / Skin Rash  PAPER TAPE   INSULIN LISPRO nausea Humalog humalog   Reviewed, no changes. Family History  (Detailed)  Relationship Family Member Name  Age at Death Condition Onset Age Cause of Death       Family history of Parents with DM  N   Father    MDS  N     FAMILY HISTORY COMMENTS  Father: Crohn's ileitis, MDS. 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 Quit smoking . Tobacco use reviewed. Preferred language is [place]. EDUCATION/EMPLOYMENT/OCCUPATION  Employment History Status Retired Restrictions            MARITAL STATUS/FAMILY/SOCIAL SUPPORT  Currently . Smoking status: Former smoker.     SMOKING STATUS  Use Status Type Smoking Status Usage Per Day Years Used Total Pack Years   yes  Former smoker      yes Former smoker      yes  Former smoker      yes  Former smoker      yes  Former smoker      yes  Former smoker      yes  Former smoker      yes  Former smoker      yes  Former smoker          VITAL SIGNS  BP mm/Hg Pulse/min Resp/min Temp F Height (Total in.) Weight (lbs.) Weight (oz.) BMI   128/88 100 20  62.00 167.40  30.62     PHYSICAL EXAM:  Abd soft with focal epig/LUQ tenderness. Pt is alert and oriented  Lungs are clear  Cor- rrr w/o m,g,r      Assessment/Plan  Refractory esophageal stricture      Plan- egd with further dilation and kenalog    PT SEEN AND EXAMINED AND PLAN DISCUSSED AND IMPLEMENTED.   Aryan Zavala MD

## 2017-08-17 ENCOUNTER — HOSPITAL ENCOUNTER (OUTPATIENT)
Dept: INFUSION THERAPY | Age: 47
Discharge: HOME | End: 2017-08-17
Payer: COMMERCIAL

## 2017-08-17 ENCOUNTER — ANESTHESIA (OUTPATIENT)
Dept: ENDOSCOPY | Age: 47
End: 2017-08-17
Payer: COMMERCIAL

## 2017-08-17 ENCOUNTER — APPOINTMENT (OUTPATIENT)
Dept: GENERAL RADIOLOGY | Age: 47
End: 2017-08-17
Attending: INTERNAL MEDICINE
Payer: COMMERCIAL

## 2017-08-17 ENCOUNTER — HOSPITAL ENCOUNTER (OUTPATIENT)
Age: 47
Setting detail: OUTPATIENT SURGERY
Discharge: HOME | End: 2017-08-17
Attending: INTERNAL MEDICINE | Admitting: INTERNAL MEDICINE
Payer: COMMERCIAL

## 2017-08-17 VITALS
WEIGHT: 175.4 LBS | DIASTOLIC BLOOD PRESSURE: 74 MMHG | SYSTOLIC BLOOD PRESSURE: 109 MMHG | BODY MASS INDEX: 32.08 KG/M2 | OXYGEN SATURATION: 98 % | TEMPERATURE: 98.7 F | RESPIRATION RATE: 18 BRPM | HEART RATE: 96 BPM

## 2017-08-17 VITALS
DIASTOLIC BLOOD PRESSURE: 93 MMHG | SYSTOLIC BLOOD PRESSURE: 142 MMHG | HEART RATE: 87 BPM | BODY MASS INDEX: 32.2 KG/M2 | OXYGEN SATURATION: 100 % | TEMPERATURE: 98.6 F | HEIGHT: 62 IN | WEIGHT: 175 LBS | RESPIRATION RATE: 16 BRPM

## 2017-08-17 LAB
GLUCOSE BLD STRIP.AUTO-MCNC: 145 MG/DL (ref 65–100)
MAGNESIUM SERPL-MCNC: 2 MG/DL (ref 1.8–2.4)

## 2017-08-17 PROCEDURE — 74011000250 HC RX REV CODE- 250: Performed by: ANESTHESIOLOGY

## 2017-08-17 PROCEDURE — 77030003560 HC NDL HUBR BARD -A

## 2017-08-17 PROCEDURE — 82962 GLUCOSE BLOOD TEST: CPT

## 2017-08-17 PROCEDURE — 74011000250 HC RX REV CODE- 250

## 2017-08-17 PROCEDURE — 83735 ASSAY OF MAGNESIUM: CPT | Performed by: INTERNAL MEDICINE

## 2017-08-17 PROCEDURE — 77030031722: Performed by: INTERNAL MEDICINE

## 2017-08-17 PROCEDURE — 76060000031 HC ANESTHESIA FIRST 0.5 HR: Performed by: INTERNAL MEDICINE

## 2017-08-17 PROCEDURE — 74011000250 HC RX REV CODE- 250: Performed by: INTERNAL MEDICINE

## 2017-08-17 PROCEDURE — 74011250636 HC RX REV CODE- 250/636

## 2017-08-17 PROCEDURE — 74011250636 HC RX REV CODE- 250/636: Performed by: INTERNAL MEDICINE

## 2017-08-17 PROCEDURE — 74011250636 HC RX REV CODE- 250/636: Performed by: ANESTHESIOLOGY

## 2017-08-17 PROCEDURE — 96374 THER/PROPH/DIAG INJ IV PUSH: CPT

## 2017-08-17 PROCEDURE — 76040000025: Performed by: INTERNAL MEDICINE

## 2017-08-17 RX ORDER — MIDAZOLAM HYDROCHLORIDE 1 MG/ML
2 INJECTION, SOLUTION INTRAMUSCULAR; INTRAVENOUS
Status: DISCONTINUED | OUTPATIENT
Start: 2017-08-17 | End: 2017-08-17 | Stop reason: HOSPADM

## 2017-08-17 RX ORDER — FENTANYL CITRATE 50 UG/ML
100 INJECTION, SOLUTION INTRAMUSCULAR; INTRAVENOUS AS NEEDED
Status: DISCONTINUED | OUTPATIENT
Start: 2017-08-17 | End: 2017-08-17 | Stop reason: HOSPADM

## 2017-08-17 RX ORDER — SODIUM CHLORIDE 0.9 % (FLUSH) 0.9 %
5-10 SYRINGE (ML) INJECTION AS NEEDED
Status: DISCONTINUED | OUTPATIENT
Start: 2017-08-17 | End: 2017-08-17 | Stop reason: HOSPADM

## 2017-08-17 RX ORDER — TRIAMCINOLONE ACETONIDE 40 MG/ML
40 INJECTION, SUSPENSION INTRA-ARTICULAR; INTRAMUSCULAR ONCE
Status: COMPLETED | OUTPATIENT
Start: 2017-08-17 | End: 2017-08-17

## 2017-08-17 RX ORDER — SODIUM CHLORIDE 0.9 % (FLUSH) 0.9 %
5-10 SYRINGE (ML) INJECTION EVERY 8 HOURS
Status: DISCONTINUED | OUTPATIENT
Start: 2017-08-17 | End: 2017-08-17 | Stop reason: HOSPADM

## 2017-08-17 RX ORDER — SODIUM CHLORIDE, SODIUM LACTATE, POTASSIUM CHLORIDE, CALCIUM CHLORIDE 600; 310; 30; 20 MG/100ML; MG/100ML; MG/100ML; MG/100ML
1000 INJECTION, SOLUTION INTRAVENOUS CONTINUOUS
Status: DISCONTINUED | OUTPATIENT
Start: 2017-08-17 | End: 2017-08-17 | Stop reason: HOSPADM

## 2017-08-17 RX ORDER — HEPARIN 100 UNIT/ML
300 SYRINGE INTRAVENOUS AS NEEDED
Status: ACTIVE | OUTPATIENT
Start: 2017-08-17 | End: 2017-08-17

## 2017-08-17 RX ORDER — HEPARIN 100 UNIT/ML
300 SYRINGE INTRAVENOUS ONCE
Status: COMPLETED | OUTPATIENT
Start: 2017-08-17 | End: 2017-08-17

## 2017-08-17 RX ORDER — LIDOCAINE HYDROCHLORIDE 10 MG/ML
0.1 INJECTION INFILTRATION; PERINEURAL AS NEEDED
Status: DISCONTINUED | OUTPATIENT
Start: 2017-08-17 | End: 2017-08-17 | Stop reason: HOSPADM

## 2017-08-17 RX ORDER — LIDOCAINE HYDROCHLORIDE 20 MG/ML
INJECTION, SOLUTION EPIDURAL; INFILTRATION; INTRACAUDAL; PERINEURAL AS NEEDED
Status: DISCONTINUED | OUTPATIENT
Start: 2017-08-17 | End: 2017-08-17 | Stop reason: HOSPADM

## 2017-08-17 RX ORDER — PROPOFOL 10 MG/ML
INJECTION, EMULSION INTRAVENOUS AS NEEDED
Status: DISCONTINUED | OUTPATIENT
Start: 2017-08-17 | End: 2017-08-17 | Stop reason: HOSPADM

## 2017-08-17 RX ORDER — PROPOFOL 10 MG/ML
INJECTION, EMULSION INTRAVENOUS
Status: DISCONTINUED | OUTPATIENT
Start: 2017-08-17 | End: 2017-08-17 | Stop reason: HOSPADM

## 2017-08-17 RX ORDER — SODIUM CHLORIDE 0.9 % (FLUSH) 0.9 %
10 SYRINGE (ML) INJECTION EVERY 8 HOURS
Status: DISCONTINUED | OUTPATIENT
Start: 2017-08-17 | End: 2017-08-21 | Stop reason: HOSPADM

## 2017-08-17 RX ADMIN — PROPOFOL 140 MCG/KG/MIN: 10 INJECTION, EMULSION INTRAVENOUS at 11:52

## 2017-08-17 RX ADMIN — SODIUM CHLORIDE, PRESERVATIVE FREE 300 UNITS: 5 INJECTION INTRAVENOUS at 12:56

## 2017-08-17 RX ADMIN — MIDAZOLAM 2 MG: 1 INJECTION INTRAMUSCULAR; INTRAVENOUS at 11:06

## 2017-08-17 RX ADMIN — LIDOCAINE HYDROCHLORIDE 40 MG: 20 INJECTION, SOLUTION EPIDURAL; INFILTRATION; INTRACAUDAL; PERINEURAL at 11:52

## 2017-08-17 RX ADMIN — SODIUM CHLORIDE 3.25 MG: 9 INJECTION INTRAMUSCULAR; INTRAVENOUS; SUBCUTANEOUS at 12:25

## 2017-08-17 RX ADMIN — PROPOFOL 80 MG: 10 INJECTION, EMULSION INTRAVENOUS at 11:52

## 2017-08-17 RX ADMIN — TRIAMCINOLONE ACETONIDE 40 MG: 40 INJECTION, SUSPENSION INTRA-ARTICULAR; INTRAMUSCULAR at 12:01

## 2017-08-17 RX ADMIN — SODIUM CHLORIDE, PRESERVATIVE FREE 300 UNITS: 5 INJECTION INTRAVENOUS at 09:46

## 2017-08-17 RX ADMIN — SODIUM CHLORIDE 25 MG: 9 INJECTION INTRAMUSCULAR; INTRAVENOUS; SUBCUTANEOUS at 07:30

## 2017-08-17 RX ADMIN — SODIUM CHLORIDE, SODIUM LACTATE, POTASSIUM CHLORIDE, AND CALCIUM CHLORIDE 1000 ML: 600; 310; 30; 20 INJECTION, SOLUTION INTRAVENOUS at 11:05

## 2017-08-17 RX ADMIN — Medication 10 ML: at 09:45

## 2017-08-17 NOTE — PROGRESS NOTES
Arrived to the [facility] ambulatory. Port needle changed, lab drawn and phenergan given completed. Patient tolerated well. Any issues or concerns during appointment: no.  Patient aware of next infusion appointment on  8/24 at [address] [place] [address]. Discharged to home ambulatory.

## 2017-08-17 NOTE — IP AVS SNAPSHOT
Kobi Monteiro 
 
 
 [address] 
[phone] Patient: Davie Laguna MRN: [id number] :1970 You are allergic to the following Allergen Reactions Insulin Lispro Not Reported This Time Tape (Adhesive) Rash Itching Barium Iodide Nausea and Vomiting Contrast Dye (Iodine) Shortness of Breath Oral contrast-experienced flushing,shortness of breath, sweatiness; (patient has received oral contrast many times at [address] [place] [address]) Flagyl (Metronidazole) Nausea and Vomiting Metformin Nausea Only Valtrex (Valacyclovir) Unknown (comments) PATIENT STATES \"NOT ALLERGIC\" Insulins Nausea and Vomiting Humalog only Recent Documentation Height Weight Breastfeeding? BMI OB Status Smoking Status 1.575 m 79.4 kg No 32.01 kg/m2 Hysterectomy Former Smoker Emergency Contacts Name Discharge Info Relation Home Work Mobile Jorge Treadwell DISCHARGE CAREGIVER [3] Spouse [3] [phone] Ivan Thomas  Father [15] [phone] Miller Christine  Mother [14] [phone] About your hospitalization You were admitted on:  2017 You last received care in the:  [facility] You were discharged on:  2017 Unit phone number:  [phone] Why you were hospitalized Your primary diagnosis was:  Not on File Providers Seen During Your Hospitalizations Provider Role Specialty Primary office phone Valerie Child MD Attending Provider Gastroenterology [phone] Your Primary Care Physician (PCP) Primary Care Physician Office Phone Office Fax 100 [facility], [address] [place] [address] 69 [phone] Follow-up Information None Your Appointments  ESOPHAGOGASTRODUODENOSCOPY (EGD), ESOPHAGEAL DILATION, INJECTION with C Karson Levy MD  
[facility] ([address] [place] [address]) [address] [facility]  
[phone] Thursday August 24, 2017  7:05 AM EDT Infusion Lab with LAB CK  
[facility]) [address] Radha Gomez [phone] [facility] [address]  
[phone] [address] Thursday August 24, 2017  7:15 AM EDT Infusion with SS ADD ON  
[facility]) [address] Radha Gomez [phone] [facility] [address]  
[phone] [address] Thursday August 31, 2017  7:00 AM EDT Infusion Lab with LAB CK  
[facility]) [address] Radha Gomez [phone] [facility] [address]  
[phone] [address] Thursday August 31, 2017  7:15 AM EDT Infusion with NUR2  
ST. [address] ([facility]) [address] Radha Gomez [phone] [facility] [address]  
[phone] [address] Tuesday September 05, 2017  2:45 PM EDT  
CONSULT with Anshul Rosen MD  
[facility] ([address] [place] [address]) [address] [place] [facility]  
[phone] Thursday September 07, 2017  7:05 AM EDT Infusion Lab with LAB CK  
[facility]) [address] Radha Gomez [phone] [facility] [address]  
[phone] [address] Thursday September 07, 2017  7:15 AM EDT Infusion with NUR2  
ST. [address] ([facility]) [address] Radha Gomez [phone] [facility] [address]  
[phone] [address] Thursday September 14, 2017  7:05 AM EDT Infusion Lab with LAB CK  
[facility] [address]) [address]   [facility] [phone] [facility] [address]  
[phone] [address] Thursday September 21, 2017  7:05 AM EDT Infusion Lab with LAB CK  
[facility] [address]) [address]   [facility] [phone] [facility] [address]  
[phone] [address] Thursday September 28, 2017  7:05 AM EDT Infusion Lab with LAB CK  
[facility] [address]) [address]   [facility] [phone] [facility] [address]  
[phone] [address] Current Discharge Medication List  
  
ASK your doctor about these medications Dose & Instructions Dispensing Information Comments Morning Noon Evening Bedtime  
 0.9% sodium chloride solution Your last dose was: Your next dose is:    
   
   
 by IntraVENous route daily. Gives to herself via port at home daily Refills:  0  
     
   
   
   
  
 BENADRYL 25 mg capsule Generic drug:  diphenhydrAMINE Your last dose was: Your next dose is:    
   
   
 Dose:  25 mg Take 25 mg by mouth every six (6) hours as needed. Refills:  0  
     
   
   
   
  
 citalopram 10 mg tablet Commonly known as:  Alla Bender Your last dose was: Your next dose is: TAKE 1 TABLET BY MOUTH DAILY Quantity:  90 Tab Refills:  1 CREON PO Your last dose was: Your next dose is:    
   
   
 Dose:  83363 g Take 36,000 g by mouth Before breakfast, lunch, and dinner. Refills:  0  
     
   
   
   
  
 DILAUDID 2 mg tablet Generic drug:  HYDROmorphone Your last dose was: Your next dose is:    
   
   
 Dose:  1 mg  
1 mg by IntraVENous route as needed for Pain. Take day of surgery per anesthesia protocol. Refills:  0  
     
   
   
   
  
 fentaNYL 100 mcg/hr PATCH Commonly known as:  Stana Jam Your last dose was: Your next dose is:    
   
   
 Dose:  1 Patch 1 Patch by TransDERmal route every seventy-two (72) hours. Refills:  0  
     
   
   
   
  
 hyoscyamine SL 0.125 mg SL tablet Commonly known as:  LEVSIN/SL Your last dose was: Your next dose is:    
   
   
 Dose:  0.125 mg  
0.125 mg by SubLINGual route as needed for Cramping. Refills:  0  
     
   
   
   
  
 insulin aspart 100 unit/mL injection Commonly known as:  Haley Howe Your last dose was: Your next dose is:    
   
   
 Use as directed Quantity:  10 mL Refills:  3 LORazepam 1 mg tablet Commonly known as:  ATIVAN Your last dose was: Your next dose is:    
   
   
 Dose:  1 mg Take 1 Tab by mouth three (3) times daily as needed for Anxiety. Quantity:  90 Tab Refills:  1 MIRALAX 17 gram packet Generic drug:  polyethylene glycol Your last dose was: Your next dose is:    
   
   
 Dose:  17 g Take 17 g by mouth daily. Refills:  0 NexIUM 40 mg capsule Generic drug:  esomeprazole Your last dose was: Your next dose is:    
   
   
 Dose:  40 mg Take 40 mg by mouth two (2) times a day. Take day of surgery per anesthesia protocol. Refills:  0  
     
   
   
   
  
 oxyCODONE IR 10 mg Tab immediate release tablet Commonly known as:  Edwin Acuna Your last dose was: Your next dose is:    
   
   
 Dose:  10 mg Take 10 mg by mouth as needed. Take day of surgery per anesthesia protocol. Refills:  0  
     
   
   
   
  
 * PROTONIX 40 mg injection Generic drug:  pantoprazole Your last dose was: Your next dose is:    
   
   
 Dose:  40 mg  
40 mg by IntraVENous route every morning. Take day of surgery per anesthesia protocol. Refills:  0  
     
   
   
   
  
 * pantoprazole 40 mg tablet Commonly known as:  PROTONIX Your last dose was: Your next dose is:    
   
   
 Dose:  40 mg Take 1 Tab by mouth two (2) times a day. Quantity:  90 Tab Refills:  1  
     
   
   
   
  
 * promethazine 25 mg suppository Commonly known as:  PHENERGAN Your last dose was: Your next dose is:    
   
   
 Dose:  25 mg Insert 25 mg into rectum as needed for Nausea. Refills:  0  
     
   
   
   
  
 * promethazine 25 mg tablet Commonly known as:  PHENERGAN Your last dose was: Your next dose is:    
   
   
 Dose:  25 mg Take 1 Tab by mouth every six (6) hours as needed. Quantity:  90 Tab Refills:  1 TRESIBA FLEXTOUCH U-100 100 unit/mL (3 mL) Inpn Generic drug:  insulin degludec Your last dose was: Your next dose is:    
   
   
 Dose:  22 Units 22 Units by SubCUTAneous route nightly. Refills:  0 ZOFRAN (AS HYDROCHLORIDE) 8 mg tablet Generic drug:  ondansetron hcl Your last dose was: Your next dose is:    
   
   
 Dose:  8 mg Take 8 mg by mouth every eight (8) hours as needed for Nausea. Refills:  0  
     
   
   
   
  
 zolpidem 10 mg tablet Commonly known as:  AMBIEN Your last dose was: Your next dose is: TAKE 1 TABLET BY MOUTH EVERY NIGHT AS NEEDED FOR SLEEP Quantity:  90 Tab Refills:  1 Not to exceed 3 additional fills before 09/28/2015 * Notice: This list has 4 medication(s) that are the same as other medications prescribed for you.  Read the directions carefully, and ask your doctor or other care provider to review them with you. Discharge Instructions Gastrointestinal Esophagogastroduodenoscopy (EGD) - Upper Exam Discharge Instructions 1. Call Dr. Chiquita Knox at [phone] for any problems or questions. 2. Contact the doctor's office for follow up appointment as directed. 3. Medication may cause drowsiness for several hours, therefore, do not drive or operate machinery for remainder of the day. 4. No alcohol today. 5. Ordinarily, you may resume regular diet and activity after exam unless otherwise specified by your physician. 6. For mild soreness in your throat you may use Cepacol throat lozenges or warm salt-water gargles as needed. Any additional instructions:  Repeat EGD with dilation in 6 days. Keep taking Carafate 4 times a day. Instructions given to Italian Pour and other family members. Instructions given by:  Alejandra Stone RN Discharge Orders None Introducing [address]! Juan Cole introduces [employer] patient portal. Now you can access parts of your medical record, email your doctor's office, and request medication refills online. 1. In your internet browser, go to [email or web address] 2. Click on the First Time User? Click Here link in the Sign In box. You will see the New Member Sign Up page. 3. Enter your [employer] Access Code exactly as it appears below. You will not need to use this code after youve completed the sign-up process. If you do not sign up before the expiration date, you must request a new code. · [employer] Access Code: [email or web address] Expires: 10/31/2017 10:45 AM 
 
4. Enter the last four digits of your Social Security Number (xxxx) and Date of Birth (mm/dd/yyyy) as indicated and click Submit. You will be taken to the next sign-up page. 5. Create a [employer] ID.  This will be your [employer] login ID and cannot be changed, so think of one that is secure and easy to remember. 6. Create a [employer] password. You can change your password at any time. 7. Enter your Password Reset Question and Answer. This can be used at a later time if you forget your password. 8. Enter your e-mail address. You will receive e-mail notification when new information is available in [address] 9. Click Sign Up. You can now view and download portions of your medical record. 10. Click the Download Summary menu link to download a portable copy of your medical information. If you have questions, please visit the Frequently Asked Questions section of the [employer] website. Remember, [employer] is NOT to be used for urgent needs. For medical emergencies, dial 911. Now available from your iPhone and Android! General Information Please provide this summary of care documentation to your next provider. Patient Signature:  ____________________________________________________________ Date:  ____________________________________________________________  
  
[facility] Provider Signature:  ____________________________________________________________ Date:  ____________________________________________________________

## 2017-08-17 NOTE — DISCHARGE INSTRUCTIONS
Gastrointestinal Esophagogastroduodenoscopy (EGD) - Upper Exam Discharge Instructions    1. Call Dr. Oriana Terry at [phone] for any problems or questions. 2. Contact the doctor's office for follow up appointment as directed. 3. Medication may cause drowsiness for several hours, therefore, do not drive or operate machinery for remainder of the day. 4. No alcohol today. 5. Ordinarily, you may resume regular diet and activity after exam unless otherwise specified by your physician. 6. For mild soreness in your throat you may use Cepacol throat lozenges or warm salt-water gargles as needed. Any additional instructions:  Repeat EGD with dilation in 6 days. Keep taking Carafate 4 times a day. Instructions given to Jose Mayer and other family members.   Instructions given by:  Phillip Grant RN

## 2017-08-17 NOTE — PROGRESS NOTES
Pt states that she came in this am with port accessed and it remains accessed for weekly outpatient infusions. Port flushed with 10 mls of normal saline and then 300 units of heparin after stopping IV fluids.

## 2017-08-17 NOTE — ROUTINE PROCESS
Patient discharged. Tolerating po fluids. No acute distress noted. Escorted to car, with family by Tato Sadler RN.

## 2017-08-17 NOTE — ANESTHESIA POSTPROCEDURE EVALUATION
Post-Anesthesia Evaluation and Assessment    Patient: Anuj Pulido MRN: [id number]  SSN: [id number]    YOB: 1970  Age: 48 y.o. Sex: female       Cardiovascular Function/Vital Signs  Visit Vitals    /72    Pulse 81    Temp 37 °C (98.6 °F)    Resp 16    Ht 5' 2\" (1.575 m)    Wt 79.4 kg (175 lb)    SpO2 95%    Breastfeeding No    BMI 32.01 kg/m2       Patient is status post total IV anesthesia anesthesia for Procedure(s):  ESOPHAGOGASTRODUODENOSCOPY (EGD) WITH BALLOON DILATION/KENALOG INJECTION  BMI 32  ESOPHAGEAL DILATION  INJECTION. Nausea/Vomiting: None    Postoperative hydration reviewed and adequate. Pain:  Pain Scale 1: Visual (08/17/17 1215)  Pain Intensity 1: 0 (08/17/17 1215)   Managed    Neurological Status: At baseline    Mental Status and Level of Consciousness: Arousable    Pulmonary Status:   O2 Device: Room air (08/17/17 1215)   Adequate oxygenation and airway patent    Complications related to anesthesia: None    Post-anesthesia assessment completed.  No concerns    Signed By: Viktoria Mora MD     August 17, 2017

## 2017-08-17 NOTE — PROGRESS NOTES
Pt c/o nausea. Notified Dr. Dilcia Alexis. New order received for Phenergan. Will continue to monitor.

## 2017-08-17 NOTE — PROCEDURES
ESOPHAGOGASTRODUODENOSCOPY    DATE of PROCEDURE: 8/17/2017    PT NAME: Shana Henderson     [id number]    MEDICATION:   MAC        INSTRUMENT: GIFH 190    SPECIAL PROCEDURE: 36 fr thomas; kenalog injection  BLOOD LOSS- 0 or min. SPEC- no  IMPLANT- none    PROCEDURE:  Standard EGD  With dilation and injection    ASSESSMENT:  1. Esophageal stricture pretreated with 4 cc kenalog then dilated- expected amt of trauma seen    PLAN:   1.  Add carafate  2. redilate in 1 week    Pino Shirley MD

## 2017-08-24 ENCOUNTER — HOSPITAL ENCOUNTER (OUTPATIENT)
Dept: INFUSION THERAPY | Age: 47
Discharge: HOME | End: 2017-08-24
Payer: COMMERCIAL

## 2017-08-24 VITALS
TEMPERATURE: 98.9 F | OXYGEN SATURATION: 96 % | DIASTOLIC BLOOD PRESSURE: 68 MMHG | HEART RATE: 101 BPM | SYSTOLIC BLOOD PRESSURE: 113 MMHG | RESPIRATION RATE: 18 BRPM

## 2017-08-24 LAB — MAGNESIUM SERPL-MCNC: 1.9 MG/DL (ref 1.8–2.4)

## 2017-08-24 PROCEDURE — 96374 THER/PROPH/DIAG INJ IV PUSH: CPT

## 2017-08-24 PROCEDURE — 74011000250 HC RX REV CODE- 250: Performed by: INTERNAL MEDICINE

## 2017-08-24 PROCEDURE — 77030003560 HC NDL HUBR BARD -A

## 2017-08-24 PROCEDURE — 83735 ASSAY OF MAGNESIUM: CPT | Performed by: INTERNAL MEDICINE

## 2017-08-24 PROCEDURE — 74011250636 HC RX REV CODE- 250/636: Performed by: INTERNAL MEDICINE

## 2017-08-24 RX ORDER — SODIUM CHLORIDE 0.9 % (FLUSH) 0.9 %
10 SYRINGE (ML) INJECTION AS NEEDED
Status: ACTIVE | OUTPATIENT
Start: 2017-08-24 | End: 2017-08-24

## 2017-08-24 RX ORDER — HEPARIN 100 UNIT/ML
500 SYRINGE INTRAVENOUS AS NEEDED
Status: DISPENSED | OUTPATIENT
Start: 2017-08-24 | End: 2017-08-24

## 2017-08-24 RX ADMIN — SODIUM CHLORIDE, PRESERVATIVE FREE 300 UNITS: 5 INJECTION INTRAVENOUS at 07:49

## 2017-08-24 RX ADMIN — Medication 10 ML: at 07:49

## 2017-08-24 RX ADMIN — SODIUM CHLORIDE 25 MG: 9 INJECTION INTRAMUSCULAR; INTRAVENOUS; SUBCUTANEOUS at 07:37

## 2017-08-24 RX ADMIN — Medication 10 ML: at 07:12

## 2017-08-24 NOTE — PROGRESS NOTES
Arrived to [facility]. Port needle changed. Labs drawn. Phenergan administered. Tolerated well. Issues or concerns during appointment: none. Aware of next appointment on 8/31 at 7:00 AM.   Discharged ambulatory.

## 2017-09-07 ENCOUNTER — HOSPITAL ENCOUNTER (OUTPATIENT)
Dept: INFUSION THERAPY | Age: 47
Discharge: HOME | End: 2017-09-07
Payer: COMMERCIAL

## 2017-09-07 VITALS
BODY MASS INDEX: 31.5 KG/M2 | HEART RATE: 120 BPM | RESPIRATION RATE: 18 BRPM | TEMPERATURE: 98.5 F | WEIGHT: 172.2 LBS | SYSTOLIC BLOOD PRESSURE: 127 MMHG | OXYGEN SATURATION: 99 % | DIASTOLIC BLOOD PRESSURE: 84 MMHG

## 2017-09-07 LAB — MAGNESIUM SERPL-MCNC: 1.6 MG/DL (ref 1.8–2.4)

## 2017-09-07 PROCEDURE — 96361 HYDRATE IV INFUSION ADD-ON: CPT

## 2017-09-07 PROCEDURE — 77030003560 HC NDL HUBR BARD -A

## 2017-09-07 PROCEDURE — 83735 ASSAY OF MAGNESIUM: CPT | Performed by: INTERNAL MEDICINE

## 2017-09-07 PROCEDURE — 74011250636 HC RX REV CODE- 250/636: Performed by: INTERNAL MEDICINE

## 2017-09-07 PROCEDURE — 96375 TX/PRO/DX INJ NEW DRUG ADDON: CPT

## 2017-09-07 PROCEDURE — 74011000250 HC RX REV CODE- 250: Performed by: INTERNAL MEDICINE

## 2017-09-07 PROCEDURE — 96365 THER/PROPH/DIAG IV INF INIT: CPT

## 2017-09-07 RX ORDER — SODIUM CHLORIDE 0.9 % (FLUSH) 0.9 %
10 SYRINGE (ML) INJECTION AS NEEDED
Status: ACTIVE | OUTPATIENT
Start: 2017-09-07 | End: 2017-09-07

## 2017-09-07 RX ORDER — MAGNESIUM SULFATE 1 G/100ML
1 INJECTION INTRAVENOUS ONCE
Status: COMPLETED | OUTPATIENT
Start: 2017-09-07 | End: 2017-09-07

## 2017-09-07 RX ORDER — HEPARIN 100 UNIT/ML
500 SYRINGE INTRAVENOUS AS NEEDED
Status: DISPENSED | OUTPATIENT
Start: 2017-09-07 | End: 2017-09-07

## 2017-09-07 RX ADMIN — SODIUM CHLORIDE 25 MG: 9 INJECTION INTRAMUSCULAR; INTRAVENOUS; SUBCUTANEOUS at 07:50

## 2017-09-07 RX ADMIN — SODIUM CHLORIDE, PRESERVATIVE FREE 500 UNITS: 5 INJECTION INTRAVENOUS at 09:15

## 2017-09-07 RX ADMIN — MAGNESIUM SULFATE HEPTAHYDRATE 1 G: 1 INJECTION, SOLUTION INTRAVENOUS at 08:30

## 2017-09-07 RX ADMIN — SODIUM CHLORIDE 500 ML: 900 INJECTION, SOLUTION INTRAVENOUS at 07:45

## 2017-09-07 RX ADMIN — Medication 10 ML: at 09:15

## 2017-09-07 NOTE — PROGRESS NOTES
Pt arrived ambulatory today at 0711, to receive phenergan for c/o nausea, and magnesium sulfate replacement. Pt tolerated without difficulty, reporting relief of nausea. Patient discharged via ambulatory accompanied by father. Instructed to notify physician of any problems, questions or concerns. Allowed opportunity for patient/family to ask questions. Verbalized understanding. Next appointment is Sept 14 at 3 [facility] with [address] [place] [address].

## 2017-09-14 ENCOUNTER — HOSPITAL ENCOUNTER (OUTPATIENT)
Dept: INFUSION THERAPY | Age: 47
Discharge: HOME | End: 2017-09-14
Payer: COMMERCIAL

## 2017-09-14 VITALS
BODY MASS INDEX: 31.86 KG/M2 | RESPIRATION RATE: 18 BRPM | HEART RATE: 99 BPM | TEMPERATURE: 98.2 F | WEIGHT: 174.2 LBS | OXYGEN SATURATION: 95 % | DIASTOLIC BLOOD PRESSURE: 67 MMHG | SYSTOLIC BLOOD PRESSURE: 113 MMHG

## 2017-09-14 LAB — MAGNESIUM SERPL-MCNC: 1.9 MG/DL (ref 1.8–2.4)

## 2017-09-14 PROCEDURE — 77030003560 HC NDL HUBR BARD -A

## 2017-09-14 PROCEDURE — 96361 HYDRATE IV INFUSION ADD-ON: CPT

## 2017-09-14 PROCEDURE — 96374 THER/PROPH/DIAG INJ IV PUSH: CPT

## 2017-09-14 PROCEDURE — 74011250636 HC RX REV CODE- 250/636: Performed by: INTERNAL MEDICINE

## 2017-09-14 PROCEDURE — 83735 ASSAY OF MAGNESIUM: CPT | Performed by: INTERNAL MEDICINE

## 2017-09-14 PROCEDURE — 74011000250 HC RX REV CODE- 250: Performed by: INTERNAL MEDICINE

## 2017-09-14 RX ORDER — HEPARIN 100 UNIT/ML
500 SYRINGE INTRAVENOUS AS NEEDED
Status: COMPLETED | OUTPATIENT
Start: 2017-09-14 | End: 2017-09-14

## 2017-09-14 RX ORDER — SODIUM CHLORIDE 0.9 % (FLUSH) 0.9 %
10 SYRINGE (ML) INJECTION EVERY 8 HOURS
Status: DISCONTINUED | OUTPATIENT
Start: 2017-09-14 | End: 2017-09-18 | Stop reason: HOSPADM

## 2017-09-14 RX ORDER — SODIUM CHLORIDE 9 MG/ML
500 INJECTION, SOLUTION INTRAVENOUS ONCE
Status: COMPLETED | OUTPATIENT
Start: 2017-09-14 | End: 2017-09-14

## 2017-09-14 RX ADMIN — SODIUM CHLORIDE, PRESERVATIVE FREE 500 UNITS: 5 INJECTION INTRAVENOUS at 09:14

## 2017-09-14 RX ADMIN — Medication 10 ML: at 07:45

## 2017-09-14 RX ADMIN — SODIUM CHLORIDE 25 MG: 9 INJECTION INTRAMUSCULAR; INTRAVENOUS; SUBCUTANEOUS at 07:47

## 2017-09-14 RX ADMIN — Medication 10 ML: at 09:14

## 2017-09-14 RX ADMIN — SODIUM CHLORIDE 500 ML: 900 INJECTION, SOLUTION INTRAVENOUS at 07:52

## 2017-09-14 NOTE — PROGRESS NOTES
Arrived to the [facility]. NS IV and phenergan completed. Magnesium replacement not needed for mg+1.9. Patient tolerated well. Any issues or concerns during appointment: NO.  Patient aware of next infusion appointment on 09/21/17 (date) at [facility] (time). Discharged ambulatory.

## 2017-09-21 ENCOUNTER — HOSPITAL ENCOUNTER (OUTPATIENT)
Dept: INFUSION THERAPY | Age: 47
Discharge: HOME | End: 2017-09-21
Payer: COMMERCIAL

## 2017-09-21 VITALS
WEIGHT: 176.4 LBS | RESPIRATION RATE: 18 BRPM | TEMPERATURE: 98.9 F | OXYGEN SATURATION: 95 % | DIASTOLIC BLOOD PRESSURE: 70 MMHG | BODY MASS INDEX: 32.26 KG/M2 | HEART RATE: 106 BPM | SYSTOLIC BLOOD PRESSURE: 130 MMHG

## 2017-09-21 LAB — MAGNESIUM SERPL-MCNC: 1.9 MG/DL (ref 1.8–2.4)

## 2017-09-21 PROCEDURE — 77030003560 HC NDL HUBR BARD -A

## 2017-09-21 PROCEDURE — 74011250636 HC RX REV CODE- 250/636: Performed by: INTERNAL MEDICINE

## 2017-09-21 PROCEDURE — 96374 THER/PROPH/DIAG INJ IV PUSH: CPT

## 2017-09-21 PROCEDURE — 83735 ASSAY OF MAGNESIUM: CPT | Performed by: INTERNAL MEDICINE

## 2017-09-21 PROCEDURE — 74011000250 HC RX REV CODE- 250: Performed by: INTERNAL MEDICINE

## 2017-09-21 RX ORDER — SODIUM CHLORIDE 9 MG/ML
500 INJECTION, SOLUTION INTRAVENOUS ONCE
Status: COMPLETED | OUTPATIENT
Start: 2017-09-21 | End: 2017-09-21

## 2017-09-21 RX ORDER — HEPARIN 100 UNIT/ML
500 SYRINGE INTRAVENOUS AS NEEDED
Status: COMPLETED | OUTPATIENT
Start: 2017-09-21 | End: 2017-09-21

## 2017-09-21 RX ORDER — SODIUM CHLORIDE 0.9 % (FLUSH) 0.9 %
10-40 SYRINGE (ML) INJECTION EVERY 8 HOURS
Status: COMPLETED | OUTPATIENT
Start: 2017-09-21 | End: 2017-09-21

## 2017-09-21 RX ADMIN — SODIUM CHLORIDE 500 ML: 900 INJECTION, SOLUTION INTRAVENOUS at 07:46

## 2017-09-21 RX ADMIN — SODIUM CHLORIDE, PRESERVATIVE FREE 500 UNITS: 5 INJECTION INTRAVENOUS at 08:57

## 2017-09-21 RX ADMIN — Medication 10 ML: at 07:46

## 2017-09-21 RX ADMIN — SODIUM CHLORIDE 25 MG: 9 INJECTION INTRAMUSCULAR; INTRAVENOUS; SUBCUTANEOUS at 07:47

## 2017-09-21 NOTE — PROGRESS NOTES
Arrived to the [facility]. Port needle changed and patient received IV phenergan for c/o nausea. Patient tolerated well. Magnesium 1.9 no replacement needed. Any issues or concerns during appointment: None. Patient aware of next infusion appointment on 7/28 (date) at [address] [place] [address] (time). Discharged ambulatory in stable condition.

## 2017-09-28 ENCOUNTER — HOSPITAL ENCOUNTER (OUTPATIENT)
Dept: INFUSION THERAPY | Age: 47
Discharge: HOME | End: 2017-09-28
Payer: COMMERCIAL

## 2017-09-28 VITALS
OXYGEN SATURATION: 94 % | RESPIRATION RATE: 18 BRPM | SYSTOLIC BLOOD PRESSURE: 115 MMHG | WEIGHT: 173.6 LBS | DIASTOLIC BLOOD PRESSURE: 63 MMHG | HEART RATE: 107 BPM | BODY MASS INDEX: 31.75 KG/M2 | TEMPERATURE: 98.4 F

## 2017-09-28 LAB — MAGNESIUM SERPL-MCNC: 1.9 MG/DL (ref 1.8–2.4)

## 2017-09-28 PROCEDURE — 83735 ASSAY OF MAGNESIUM: CPT | Performed by: INTERNAL MEDICINE

## 2017-09-28 PROCEDURE — 96361 HYDRATE IV INFUSION ADD-ON: CPT

## 2017-09-28 PROCEDURE — 74011000250 HC RX REV CODE- 250: Performed by: INTERNAL MEDICINE

## 2017-09-28 PROCEDURE — 96374 THER/PROPH/DIAG INJ IV PUSH: CPT

## 2017-09-28 PROCEDURE — 77030003560 HC NDL HUBR BARD -A

## 2017-09-28 PROCEDURE — 74011250636 HC RX REV CODE- 250/636: Performed by: INTERNAL MEDICINE

## 2017-09-28 RX ORDER — HEPARIN 100 UNIT/ML
500 SYRINGE INTRAVENOUS AS NEEDED
Status: COMPLETED | OUTPATIENT
Start: 2017-09-28 | End: 2017-09-28

## 2017-09-28 RX ORDER — SODIUM CHLORIDE 9 MG/ML
500 INJECTION, SOLUTION INTRAVENOUS ONCE
Status: COMPLETED | OUTPATIENT
Start: 2017-09-28 | End: 2017-09-28

## 2017-09-28 RX ORDER — SODIUM CHLORIDE 0.9 % (FLUSH) 0.9 %
10-40 SYRINGE (ML) INJECTION AS NEEDED
Status: ACTIVE | OUTPATIENT
Start: 2017-09-28 | End: 2017-09-28

## 2017-09-28 RX ADMIN — Medication 10 ML: at 09:10

## 2017-09-28 RX ADMIN — SODIUM CHLORIDE 500 ML: 900 INJECTION, SOLUTION INTRAVENOUS at 07:51

## 2017-09-28 RX ADMIN — Medication 10 ML: at 07:51

## 2017-09-28 RX ADMIN — SODIUM CHLORIDE 25 MG: 9 INJECTION INTRAMUSCULAR; INTRAVENOUS; SUBCUTANEOUS at 07:53

## 2017-09-28 RX ADMIN — SODIUM CHLORIDE, PRESERVATIVE FREE 500 UNITS: 5 INJECTION INTRAVENOUS at 09:10

## 2017-09-28 NOTE — PROGRESS NOTES
[facility] 500 ml normal saline and IV phergan completed. Patient tolerated well. Port flushed and left accessed for use at home. Any issues or concerns during appointment: None. Mg 1.9 no replacement needed. Patient aware of next infusion appointment on 10/5 (date) at [address] [place] [address] (time). Discharged ambulatory in stable condition.

## 2017-10-05 ENCOUNTER — HOSPITAL ENCOUNTER (OUTPATIENT)
Dept: INFUSION THERAPY | Age: 47
Discharge: HOME | End: 2017-10-05
Payer: COMMERCIAL

## 2017-10-05 VITALS
OXYGEN SATURATION: 98 % | WEIGHT: 173 LBS | SYSTOLIC BLOOD PRESSURE: 124 MMHG | HEART RATE: 109 BPM | TEMPERATURE: 98.1 F | RESPIRATION RATE: 18 BRPM | DIASTOLIC BLOOD PRESSURE: 83 MMHG | BODY MASS INDEX: 31.64 KG/M2

## 2017-10-05 LAB — MAGNESIUM SERPL-MCNC: 1.9 MG/DL (ref 1.8–2.4)

## 2017-10-05 PROCEDURE — 74011000250 HC RX REV CODE- 250: Performed by: INTERNAL MEDICINE

## 2017-10-05 PROCEDURE — 96374 THER/PROPH/DIAG INJ IV PUSH: CPT

## 2017-10-05 PROCEDURE — 74011250636 HC RX REV CODE- 250/636: Performed by: INTERNAL MEDICINE

## 2017-10-05 PROCEDURE — 83735 ASSAY OF MAGNESIUM: CPT | Performed by: INTERNAL MEDICINE

## 2017-10-05 PROCEDURE — 77030003560 HC NDL HUBR BARD -A

## 2017-10-05 RX ORDER — HEPARIN 100 UNIT/ML
500 SYRINGE INTRAVENOUS AS NEEDED
Status: DISPENSED | OUTPATIENT
Start: 2017-10-05 | End: 2017-10-05

## 2017-10-05 RX ORDER — SODIUM CHLORIDE 0.9 % (FLUSH) 0.9 %
10 SYRINGE (ML) INJECTION AS NEEDED
Status: DISCONTINUED | OUTPATIENT
Start: 2017-10-05 | End: 2017-10-09 | Stop reason: HOSPADM

## 2017-10-05 RX ADMIN — Medication 10 ML: at 08:20

## 2017-10-05 RX ADMIN — SODIUM CHLORIDE 25 MG: 9 INJECTION INTRAMUSCULAR; INTRAVENOUS; SUBCUTANEOUS at 07:29

## 2017-10-12 ENCOUNTER — HOSPITAL ENCOUNTER (OUTPATIENT)
Dept: INFUSION THERAPY | Age: 47
Discharge: HOME | End: 2017-10-12
Payer: COMMERCIAL

## 2017-10-12 VITALS
RESPIRATION RATE: 18 BRPM | SYSTOLIC BLOOD PRESSURE: 117 MMHG | OXYGEN SATURATION: 98 % | BODY MASS INDEX: 32.26 KG/M2 | DIASTOLIC BLOOD PRESSURE: 67 MMHG | WEIGHT: 176.4 LBS | HEART RATE: 104 BPM | TEMPERATURE: 98.1 F

## 2017-10-12 LAB — MAGNESIUM SERPL-MCNC: 1.9 MG/DL (ref 1.8–2.4)

## 2017-10-12 PROCEDURE — 74011000250 HC RX REV CODE- 250: Performed by: INTERNAL MEDICINE

## 2017-10-12 PROCEDURE — 83735 ASSAY OF MAGNESIUM: CPT | Performed by: INTERNAL MEDICINE

## 2017-10-12 PROCEDURE — 77030003560 HC NDL HUBR BARD -A

## 2017-10-12 PROCEDURE — 96374 THER/PROPH/DIAG INJ IV PUSH: CPT

## 2017-10-12 PROCEDURE — 74011250636 HC RX REV CODE- 250/636: Performed by: INTERNAL MEDICINE

## 2017-10-12 RX ORDER — SODIUM CHLORIDE 0.9 % (FLUSH) 0.9 %
10-40 SYRINGE (ML) INJECTION AS NEEDED
Status: DISCONTINUED | OUTPATIENT
Start: 2017-10-12 | End: 2017-10-16 | Stop reason: HOSPADM

## 2017-10-12 RX ORDER — HEPARIN 100 UNIT/ML
500 SYRINGE INTRAVENOUS AS NEEDED
Status: DISPENSED | OUTPATIENT
Start: 2017-10-12 | End: 2017-10-12

## 2017-10-12 RX ORDER — GABAPENTIN 250 MG/5ML
250 SOLUTION ORAL 3 TIMES DAILY
Status: ON HOLD | COMMUNITY
End: 2019-10-31

## 2017-10-12 RX ORDER — SODIUM CHLORIDE 9 MG/ML
999 INJECTION, SOLUTION INTRAVENOUS ONCE
Status: COMPLETED | OUTPATIENT
Start: 2017-10-12 | End: 2017-10-12

## 2017-10-12 RX ADMIN — Medication 10 ML: at 08:41

## 2017-10-12 RX ADMIN — SODIUM CHLORIDE 25 MG: 9 INJECTION INTRAMUSCULAR; INTRAVENOUS; SUBCUTANEOUS at 07:47

## 2017-10-12 RX ADMIN — SODIUM CHLORIDE, PRESERVATIVE FREE 500 UNITS: 5 INJECTION INTRAVENOUS at 08:41

## 2017-10-12 RX ADMIN — Medication 10 ML: at 07:44

## 2017-10-12 RX ADMIN — SODIUM CHLORIDE 999 ML/HR: 900 INJECTION, SOLUTION INTRAVENOUS at 07:44

## 2017-10-12 NOTE — PROGRESS NOTES
Arrived to the [facility]. Phenergan and fluids completed. Patient tolerated well. No Magnesium replacements needed  Any issues or concerns during appointment: none. Patient aware of next infusion appointment on 10/19  at 21 [phone] . Discharged ambulatory.

## 2017-10-19 ENCOUNTER — HOSPITAL ENCOUNTER (OUTPATIENT)
Dept: INFUSION THERAPY | Age: 47
Discharge: HOME | End: 2017-10-19
Payer: COMMERCIAL

## 2017-10-19 VITALS
OXYGEN SATURATION: 97 % | RESPIRATION RATE: 18 BRPM | HEART RATE: 121 BPM | SYSTOLIC BLOOD PRESSURE: 132 MMHG | DIASTOLIC BLOOD PRESSURE: 74 MMHG | TEMPERATURE: 98.1 F

## 2017-10-19 LAB — MAGNESIUM SERPL-MCNC: 1.8 MG/DL (ref 1.8–2.4)

## 2017-10-19 PROCEDURE — 96361 HYDRATE IV INFUSION ADD-ON: CPT

## 2017-10-19 PROCEDURE — 83735 ASSAY OF MAGNESIUM: CPT | Performed by: INTERNAL MEDICINE

## 2017-10-19 PROCEDURE — 74011000250 HC RX REV CODE- 250: Performed by: INTERNAL MEDICINE

## 2017-10-19 PROCEDURE — 77030003560 HC NDL HUBR BARD -A

## 2017-10-19 PROCEDURE — 74011250636 HC RX REV CODE- 250/636: Performed by: INTERNAL MEDICINE

## 2017-10-19 PROCEDURE — 96374 THER/PROPH/DIAG INJ IV PUSH: CPT

## 2017-10-19 RX ORDER — SODIUM CHLORIDE 0.9 % (FLUSH) 0.9 %
10 SYRINGE (ML) INJECTION EVERY 8 HOURS
Status: DISCONTINUED | OUTPATIENT
Start: 2017-10-19 | End: 2017-10-23 | Stop reason: HOSPADM

## 2017-10-19 RX ORDER — HEPARIN 100 UNIT/ML
300-500 SYRINGE INTRAVENOUS AS NEEDED
Status: DISPENSED | OUTPATIENT
Start: 2017-10-19 | End: 2017-10-19

## 2017-10-19 RX ORDER — SODIUM CHLORIDE 9 MG/ML
500 INJECTION, SOLUTION INTRAVENOUS ONCE
Status: COMPLETED | OUTPATIENT
Start: 2017-10-19 | End: 2017-10-19

## 2017-10-19 RX ADMIN — Medication 10 ML: at 08:41

## 2017-10-19 RX ADMIN — SODIUM CHLORIDE 25 MG: 9 INJECTION INTRAMUSCULAR; INTRAVENOUS; SUBCUTANEOUS at 07:38

## 2017-10-19 RX ADMIN — SODIUM CHLORIDE, PRESERVATIVE FREE 300 UNITS: 5 INJECTION INTRAVENOUS at 08:41

## 2017-10-19 RX ADMIN — SODIUM CHLORIDE 500 ML: 900 INJECTION, SOLUTION INTRAVENOUS at 07:42

## 2017-10-19 NOTE — PROGRESS NOTES
Mag bolus not indicated, pt given phenergan and 500ns, discharged home with port needle in place, next appt 10-26 at 0800

## 2017-10-24 ENCOUNTER — ANESTHESIA EVENT (OUTPATIENT)
Dept: ENDOSCOPY | Age: 47
End: 2017-10-24
Payer: COMMERCIAL

## 2017-10-24 RX ORDER — SODIUM CHLORIDE, SODIUM LACTATE, POTASSIUM CHLORIDE, CALCIUM CHLORIDE 600; 310; 30; 20 MG/100ML; MG/100ML; MG/100ML; MG/100ML
100 INJECTION, SOLUTION INTRAVENOUS CONTINUOUS
Status: CANCELLED | OUTPATIENT
Start: 2017-10-24

## 2017-10-24 RX ORDER — SODIUM CHLORIDE 0.9 % (FLUSH) 0.9 %
5-10 SYRINGE (ML) INJECTION AS NEEDED
Status: CANCELLED | OUTPATIENT
Start: 2017-10-24

## 2017-10-25 ENCOUNTER — ANESTHESIA (OUTPATIENT)
Dept: ENDOSCOPY | Age: 47
End: 2017-10-25
Payer: COMMERCIAL

## 2017-10-25 ENCOUNTER — HOSPITAL ENCOUNTER (OUTPATIENT)
Age: 47
Setting detail: OUTPATIENT SURGERY
Discharge: HOME | End: 2017-10-25
Attending: INTERNAL MEDICINE | Admitting: INTERNAL MEDICINE
Payer: COMMERCIAL

## 2017-10-25 VITALS
HEART RATE: 88 BPM | SYSTOLIC BLOOD PRESSURE: 132 MMHG | OXYGEN SATURATION: 94 % | WEIGHT: 173 LBS | HEIGHT: 62 IN | DIASTOLIC BLOOD PRESSURE: 80 MMHG | TEMPERATURE: 98.6 F | RESPIRATION RATE: 16 BRPM | BODY MASS INDEX: 31.83 KG/M2

## 2017-10-25 LAB — GLUCOSE BLD STRIP.AUTO-MCNC: 261 MG/DL (ref 65–100)

## 2017-10-25 PROCEDURE — 74011250636 HC RX REV CODE- 250/636

## 2017-10-25 PROCEDURE — 76040000025: Performed by: INTERNAL MEDICINE

## 2017-10-25 PROCEDURE — 82962 GLUCOSE BLOOD TEST: CPT

## 2017-10-25 PROCEDURE — 76060000032 HC ANESTHESIA 0.5 TO 1 HR: Performed by: INTERNAL MEDICINE

## 2017-10-25 PROCEDURE — 74011000250 HC RX REV CODE- 250

## 2017-10-25 PROCEDURE — C1726 CATH, BAL DIL, NON-VASCULAR: HCPCS | Performed by: INTERNAL MEDICINE

## 2017-10-25 PROCEDURE — 74011250636 HC RX REV CODE- 250/636: Performed by: ANESTHESIOLOGY

## 2017-10-25 RX ORDER — LIDOCAINE HYDROCHLORIDE 20 MG/ML
INJECTION, SOLUTION EPIDURAL; INFILTRATION; INTRACAUDAL; PERINEURAL AS NEEDED
Status: DISCONTINUED | OUTPATIENT
Start: 2017-10-25 | End: 2017-10-25 | Stop reason: HOSPADM

## 2017-10-25 RX ORDER — PROPOFOL 10 MG/ML
INJECTION, EMULSION INTRAVENOUS
Status: DISCONTINUED | OUTPATIENT
Start: 2017-10-25 | End: 2017-10-25 | Stop reason: HOSPADM

## 2017-10-25 RX ORDER — TRIAMCINOLONE ACETONIDE 40 MG/ML
40 INJECTION, SUSPENSION INTRA-ARTICULAR; INTRAMUSCULAR ONCE
Status: DISCONTINUED | OUTPATIENT
Start: 2017-10-25 | End: 2017-10-25 | Stop reason: HOSPADM

## 2017-10-25 RX ORDER — SODIUM CHLORIDE, SODIUM LACTATE, POTASSIUM CHLORIDE, CALCIUM CHLORIDE 600; 310; 30; 20 MG/100ML; MG/100ML; MG/100ML; MG/100ML
100 INJECTION, SOLUTION INTRAVENOUS CONTINUOUS
Status: DISCONTINUED | OUTPATIENT
Start: 2017-10-25 | End: 2017-10-25 | Stop reason: HOSPADM

## 2017-10-25 RX ORDER — PROPOFOL 10 MG/ML
INJECTION, EMULSION INTRAVENOUS AS NEEDED
Status: DISCONTINUED | OUTPATIENT
Start: 2017-10-25 | End: 2017-10-25 | Stop reason: HOSPADM

## 2017-10-25 RX ADMIN — PROPOFOL 50 MG: 10 INJECTION, EMULSION INTRAVENOUS at 08:42

## 2017-10-25 RX ADMIN — SODIUM CHLORIDE, SODIUM LACTATE, POTASSIUM CHLORIDE, AND CALCIUM CHLORIDE 100 ML/HR: 600; 310; 30; 20 INJECTION, SOLUTION INTRAVENOUS at 08:13

## 2017-10-25 RX ADMIN — PROPOFOL 160 MCG/KG/MIN: 10 INJECTION, EMULSION INTRAVENOUS at 08:42

## 2017-10-25 RX ADMIN — LIDOCAINE HYDROCHLORIDE 40 MG: 20 INJECTION, SOLUTION EPIDURAL; INFILTRATION; INTRACAUDAL; PERINEURAL at 08:42

## 2017-10-25 NOTE — ANESTHESIA POSTPROCEDURE EVALUATION
Post-Anesthesia Evaluation and Assessment    Patient: Natalie Seen MRN: [id number]  SSN: [id number]    YOB: 1970  Age: 48 y.o. Sex: female       Cardiovascular Function/Vital Signs  Visit Vitals    /64    Pulse 93    Temp 37 °C (98.6 °F)    Resp 14    Ht 5' 2\" (1.575 m)    Wt 78.5 kg (173 lb)    SpO2 95%    BMI 31.64 kg/m2       Patient is status post total IV anesthesia anesthesia for Procedure(s):  ESOPHAGOGASTRODUODENOSCOPY (EGD)  BMI 32  ESOPHAGEAL DILATION. Nausea/Vomiting: None    Postoperative hydration reviewed and adequate. Pain:  Pain Scale 1: Visual (10/25/17 0837)  Pain Intensity 1: 0 (10/25/17 0837)   Managed    Neurological Status: At baseline    Mental Status and Level of Consciousness: Arousable    Pulmonary Status:   Room air  Adequate oxygenation and airway patent    Complications related to anesthesia: None    Post-anesthesia assessment completed.  No concerns    Signed By: Mary Douglass MD     October 25, 2017

## 2017-10-25 NOTE — ROUTINE PROCESS
Pt. Discharged to car by Simone Sher with mother . Vital signs stable. Able to tolerate PO fluids. Passing gas.  Seen by MD.

## 2017-10-25 NOTE — DISCHARGE INSTRUCTIONS
Gastrointestinal Esophagogastroduodenoscopy (EGD) - Upper Exam Discharge Instructions    1. Call Dr. Ignacia Neal for any problems or questions. 2. Contact the doctor's office for follow up appointment as directed. 3. Medication may cause drowsiness for several hours, therefore, do not drive or operate machinery for remainder of the day. 4. No alcohol today. 5. Ordinarily, you may resume regular diet and activity after exam unless otherwise specified by your physician. 6. For mild soreness in your throat you may use Cepacol throat lozenges or warm salt-water gargles as needed. Any additional instructions: Follow up with Dr. Perea Justyna     Instructions given to Dorian Juarez and other family members.

## 2017-10-25 NOTE — H&P
Gastroenterology Outpatient History and Physical    Patient: Mat Spears    Physician: Marilee Kirby MD    Vital Signs: Blood pressure 118/70, pulse (!) 107, resp. rate 18, height 5' 2\" (1.575 m), weight 78.5 kg (173 lb), SpO2 97 %. Allergies:    Allergies   Allergen Reactions    Insulin Lispro Not Reported This Time     Pt states not allergic     Tape [Adhesive] Rash and Itching    Barium Iodide Nausea and Vomiting    Contrast Dye [Iodine] Shortness of Breath     Oral contrast-experienced flushing,shortness of breath, sweatiness; (patient has received oral contrast [facility] at [facility])    Flagyl [Metronidazole] Nausea and Vomiting    Metformin Nausea Only    Valtrex [Valacyclovir] Unknown (comments)     PATIENT STATES \"NOT ALLERGIC\"    Insulins Nausea and Vomiting     Humalog only       Chief Complaint: Esophageal stricture    History of Present Illness: Refractory peptic stricture    Justification for Procedure: Dilation    History:  Past Medical History:   Diagnosis Date    Abscess, abdomen (HCC)     Anemia     Asthma     allergy induced, PRN inhaler    C. difficile diarrhea     in 0525 after complicated ERCP    Cancer ([facility] [address]     uterus    Chronic insomnia     Chronic pain     all over     Chronic pancreatitis ([facility]     Depression     Endometriosis     Esophageal stricture 2017    Fibromyalgia     SANA (generalized anxiety disorder)     GERD (gastroesophageal reflux disease)     daily meds    HLD (hyperlipidemia)     Hypertension     IBS (irritable bowel syndrome)     Migraine headache     Nausea & vomiting     pt reports she does better with phenergan than zofran - causes HA    Nonalcoholic fatty liver disease     PUD (peptic ulcer disease)     Hx of     Sphincter of Oddi dysfunction     Type 2 diabetes mellitus (HCC)     insulin: avg. 200: pt denies episodes of hypo: A1c 13.1      Past Surgical History:   Procedure Laterality Date    ABDOMEN SURGERY PROC UNLISTED last one placed  2012    Hx of pancreatic stent, multiple    ABDOMEN SURGERY PROC UNLISTED  1997    lap nissen    ABDOMEN SURGERY PROC UNLISTED  4/13    explor lap    HX COLONOSCOPY      HX ENDOSCOPY  2017    36 fr thomas    HX ERCP  3/5/2013    resulted in perforated duodenum    HX HYSTERECTOMY  1998    HX LAP CHOLECYSTECTOMY  1997    HX LAPAROTOMY  3/5/2013    exploratory for duodenal perforation with ERCP    HX UROLOGICAL  4/25/13 at [facility]-- stent removed 6-27-13. Dr Quach Small      port in place      Social History     Social History    Marital status:      Spouse name: N/A    Number of children: N/A    Years of education: N/A     Social History Main Topics    Smoking status: Former Smoker     Packs/day: 1.00     Years: 3.00     Quit date: 4/24/1993    Smokeless tobacco: Never Used    Alcohol use No    Drug use: No    Sexual activity: Not Asked     Other Topics Concern    None     Social History Narrative      Family History   Problem Relation Age of Onset    Diabetes Mother     Heart Disease Mother     Hypertension Mother     Diabetes Father     Heart Disease Father     Hypertension Father        Medications:   Prior to Admission medications    Medication Sig Start Date End Date Taking? Authorizing Provider   glucagon (GLUCAGEN) 1 mg injection 1 mg by IntraMUSCular route. 5/8/17   Historical Provider   CARAFATE 100 mg/mL suspension TAKE 10 ML BY MOUTH 4 TIMES A DAY EVERY DAY ON A EMPTY STOMACH 1 HR BEFORE MEALS AND AT BEDTIME 9/7/17   Historical Provider   gabapentin (NEURONTIN) 250 mg/5 mL solution Take 6 mg by mouth two (2) times a day. Take day of surgery per anesthesia protocol. -since pt drinks pt stated she will hold until she gets home    Historical Provider   esomeprazole (NEXIUM) 40 mg capsule Take 40 mg by mouth two (2) times a day. Take day of surgery per anesthesia protocol.     Historical Provider   diphenhydrAMINE (BENADRYL) 25 mg capsule Take 25 mg by mouth every six (6) hours as needed. Historical Provider   pantoprazole (PROTONIX) 40 mg injection 40 mg by IntraVENous route every morning. Take day of surgery per anesthesia protocol. Historical Provider   insulin degludec (TRESIBA FLEXTOUCH U-100) 100 unit/mL (3 mL) inpn 30 Units by SubCUTAneous route nightly. Historical Provider   polyethylene glycol (MIRALAX) 17 gram packet Take 17 g by mouth daily. Historical Provider   hyoscyamine SL (LEVSIN/SL) 0.125 mg SL tablet 0.125 mg by SubLINGual route as needed for Cramping. Historical Provider   HYDROmorphone (DILAUDID) 2 mg tablet 1 mg by IntraVENous route as needed for Pain. Take day of surgery per anesthesia protocol. Historical Provider   0.9% sodium chloride solution by IntraVENous route daily. Gives to herself via port at home daily    Historical Provider   oxyCODONE IR (ROXICODONE) 10 mg tab immediate release tablet Take 10 mg by mouth as needed. Take day of surgery per anesthesia protocol. Historical Provider   insulin aspart (NOVOLOG) 100 unit/mL injection Use as directed  Patient taking differently: by SubCUTAneous route Before breakfast, lunch, and dinner. Use as directed: sliding scale 6/7/16   Cordell Leak, NP   zolpidem (AMBIEN) 10 mg tablet TAKE 1 TABLET BY MOUTH EVERY NIGHT AS NEEDED FOR SLEEP  Patient taking differently: Take 10 mg by mouth nightly. 6/7/16   Cordell Leak, NP   citalopram (CELEXA) 10 mg tablet TAKE 1 TABLET BY MOUTH DAILY  Patient taking differently: 20 mg take dos 5/24/16   Kaylene Montero MD   LIPASE/PROTEASE/AMYLASE (CREON PO) Take 36,000 g by mouth Before breakfast, lunch, and dinner. Historical Provider   LORazepam (ATIVAN) 1 mg tablet Take 1 Tab by mouth three (3) times daily as needed for Anxiety. Patient taking differently: Take 1 mg by mouth as needed for Anxiety. Take day of surgery per anesthesia protocol.  12/1/15   Kaylene Montero MD   fentaNYL ([address]) 100 mcg/hr PATCH 1 Patch by TransDERmal route every seventy-two (72) hours. Historical Provider   promethazine (PHENERGAN) 25 mg tablet Take 1 Tab by mouth every six (6) hours as needed. Patient taking differently: Take 25 mg by mouth as needed. 6/27/14   NIURKA Wood   promethazine (PHENERGAN) 25 mg suppository Insert 25 mg into rectum as needed for Nausea. Historical Provider   ondansetron hcl (ZOFRAN) 8 mg tablet Take 8 mg by mouth every eight (8) hours as needed for Nausea. Historical Provider       Physical Exam:   General: alert, cooperative, no distress   HEENT: Head: Normocephalic, no lesions, without obvious abnormality.    Heart: regular rate and rhythm, S1, S2 normal, no murmur, click, rub or gallop   Lungs: chest clear, no wheezing, rales, normal symmetric air entry   Abdominal: Bowel sounds are normal, liver is not enlarged, spleen is not enlarged   Neurological: Grossly normal   Extremities: extremities normal, atraumatic, no cyanosis or edema     Findings/Diagnosis: Refractory esophageal stricture    Plan of Care/Planned Procedure: EGD with dilation and Kenalog injection    Signed By: Kitty Hopper MD     October 25, 2017

## 2017-10-25 NOTE — ANESTHESIA PREPROCEDURE EVALUATION
Anesthetic History     PONV          Review of Systems / Medical History  Patient summary reviewed, nursing notes reviewed and pertinent labs reviewed    Pulmonary            Asthma : well controlled       Neuro/Psych         Psychiatric history (Severe anxiety; migraines)     Cardiovascular    Hypertension: well controlled          Hyperlipidemia    Exercise tolerance: >4 METS  Comments: Denies CP, SOB or changes in functional status  Nml EF on 10/2017 ECHO  Nml stress 7/2017   GI/Hepatic/Renal     GERD      Liver disease (Fatty)    Comments: IBS    Chronic pancreatitis.   Subclavian port in place X 4 yrs for hydration    Sphincter of Oddi dysfunction Endo/Other    Diabetes (A1C 13.1): poorly controlled, type 2, using insulin    Obesity     Other Findings              Physical Exam    Airway  Mallampati: III  TM Distance: 4 - 6 cm  Neck ROM: normal range of motion   Mouth opening: Normal     Cardiovascular  Regular rate and rhythm,  S1 and S2 normal,  no murmur, click, rub, or gallop  Rhythm: regular  Rate: normal         Dental    Dentition: Caps/crowns     Pulmonary  Breath sounds clear to auscultation               Abdominal  GI exam deferred       Other Findings            Anesthetic Plan    ASA: 3  Anesthesia type: total IV anesthesia          Induction: Intravenous  Anesthetic plan and risks discussed with: Patient

## 2017-10-25 NOTE — PROCEDURES
DATE OF PROCEDURE: 10/25/17      PROCEDURE: Esophagogastroduodenoscopy with balloon dilation    ENDOSCOPIST: Marilee Kirby M.D. PREOPERATIVE DIAGNOSIS: Esophageal stricture    POSTOPERATIVE DIAGNOSIS: same    INSTRUMENTS: [id number], CRE balloon    SEDATION: MAC    DESCRIPTION: After informed consent was obtained, the patient was taken to the endoscopy suite and placed in the left lateral decubitus position. Intravenous sedation was administered as above and posterior pharynx was anesthetized with local anesthetic spray. After adequate sedation had been achieved the endoscope was inserted over the tongue and through the posterior pharynx under direct visualization down the esophagus to the stomach and into the second portion of the duodenum. The endoscopic was withdrawn from that point, performing a careful survey of the mucosa. Retroflexion was performed in the gastric fundus. FINDINGS:  Esophagus: No mucosal abnormalities visible. Slight resistance to passage of scope through distal esophagus, but a discrete stricture could not be seen. Dilation performed with CRE balloon at 10 and 11 mm, held for 60 seconds each. No mucosal disruption after 10 mm. Mucosal disruption seen after 11 mm. Stomach: Post-surgical anatomy noted. The pylorus is intact. Through the pylorus, a small bowel anastomosis is visible, along with the major ampulla. The small bowel limbs were at severe angles and could not be deeply intubated. Additionally, along the great curve, a widely patent gastrojejunostomy is present with slight friability. The limbs could not be deeply intubated. A tight, intact fundoplication was seen on retroflexion in the gastric fundus. Small bowel: All visualized portions normal.    Estimated blood loss:  0 cc-minimal    IMPRESSION: Dilation of stricture of distal esophagus. However, no intrinsic stricture could be found. The stricture may be extrinsic due to the fundoplication.   Kenalog was not injected due to repeated malfunction of sclerotherapy needles. PLAN: F/U with Dr. Cira Cason.       Charlie Morales MD

## 2017-10-25 NOTE — IP AVS SNAPSHOT
[address] [place] [address] 
[phone] Patient: Taya Meehan MRN: [id number] :1970 About your hospitalization You were admitted on:  2017 You last received care in the:  [facility] ENDOSCOPY You were discharged on:  2017 Why you were hospitalized Your primary diagnosis was:  Not on File Things You Need To Do (next 8 weeks) Thursday Oct 26, 2017 Infusion Lab with LAB CK at  7:30 AM  
Where:  [address] ([address]) Infusion with NUR3 at  8:00 AM  
Where:  [address])  Infusion Lab with LAB CK at  7:05 AM  
Where:  [address]) Infusion with NUR8 at  7:15 AM  
Where:  [address])  Infusion with NUR2 at  7:15 AM  
Where:  [address]) Wednesday Nov 15, 2017 [facility] PHONE ASSESSMENT with [facility] PHONE APPOINTMENT at 11:30 AM  
Date/time displayed does not reflect when your phone assessment will be completed. Where:  [address] [facility] Pre Assessment [facility] OR PRE ASSESSMENT)  Infusion Lab with LAB CK at  7:05 AM  
Where:  [address]) Infusion with NUR2 at  7:15 AM  
Where:  [address])  Infusion Lab with LAB CK at  7:05 AM  
Where:  [address]) Infusion with NUR6 at  7:15 AM  
Where:  [address])  Infusion Lab with LAB CK at  7:05 AM  
Where:  [address]) Infusion with NUR6 at  7:15 AM  
Where:  [address] [facility]) Friday Dec 15, 2017 [facility] PHONE ASSESSMENT with [facility] PHONE APPOINTMENT at 10:30 AM  
Date/time displayed does not reflect when your phone assessment will be completed. Where:  [facility] Pre Assessment [facility] OR PRE ASSESSMENT) Discharge Orders None A check cindy indicates which time of day the medication should be taken. My Medications ASK your physician about these medications Instructions Each Dose to Equal  
 Morning Noon Evening Bedtime  
 0.9% sodium chloride solution Your last dose was: Your next dose is:    
   
   
 by IntraVENous route daily. Gives to herself via port at home daily BENADRYL 25 mg capsule Generic drug:  diphenhydrAMINE Your last dose was: Your next dose is: Take 25 mg by mouth every six (6) hours as needed. 25 mg  
    
   
   
   
  
 CARAFATE 100 mg/mL suspension Generic drug:  sucralfate Your last dose was: Your next dose is: TAKE 10 ML BY MOUTH 4 TIMES A DAY EVERY DAY ON A EMPTY STOMACH 1 HR BEFORE MEALS AND AT BEDTIME  
     
   
   
   
  
 citalopram 10 mg tablet Commonly known as:  Flavio Senior Your last dose was: Your next dose is: TAKE 1 TABLET BY MOUTH DAILY CREON PO Your last dose was: Your next dose is: Take 36,000 g by mouth Before breakfast, lunch, and dinner. [address] g DILAUDID 2 mg tablet Generic drug:  HYDROmorphone Your last dose was: Your next dose is:    
   
   
 1 mg by IntraVENous route as needed for Pain. Take day of surgery per anesthesia protocol. 1 mg  
    
   
   
   
  
 fentaNYL 100 mcg/hr PATCH Commonly known as:  Cristiano Osbornee Your last dose was: Your next dose is:    
   
   
 1 Patch by TransDERmal route every seventy-two (72) hours. 1 Patch  
    
   
   
   
  
 gabapentin 250 mg/5 mL solution Commonly known as:  NEURONTIN Your last dose was: Your next dose is: Take 6 mg by mouth two (2) times a day. Take day of surgery per anesthesia protocol. -since pt drinks pt stated she will hold until she gets home 6 mg  
    
   
   
   
  
 glucagon 1 mg injection Commonly known as:  Christine Jimenez Your last dose was: Your next dose is:    
   
   
 1 mg by IntraMUSCular route. 1 mg  
    
   
   
   
  
 hyoscyamine SL 0.125 mg SL tablet Commonly known as:  LEVSIN/SL Your last dose was: Your next dose is:    
   
   
 0.125 mg by SubLINGual route as needed for Cramping.  
 0.125 mg  
    
   
   
   
  
 insulin aspart 100 unit/mL injection Commonly known as:  Frederic Escoto Your last dose was: Your next dose is:    
   
   
 Use as directed LORazepam 1 mg tablet Commonly known as:  ATIVAN Your last dose was: Your next dose is: Take 1 Tab by mouth three (3) times daily as needed for Anxiety. 1 mg MIRALAX 17 gram packet Generic drug:  polyethylene glycol Your last dose was: Your next dose is: Take 17 g by mouth daily. 17 g NexIUM 40 mg capsule Generic drug:  esomeprazole Your last dose was: Your next dose is: Take 40 mg by mouth two (2) times a day. Take day of surgery per anesthesia protocol. 40 mg  
    
   
   
   
  
 oxyCODONE IR 10 mg Tab immediate release tablet Commonly known as:  Troy Marmolejo Your last dose was: Your next dose is: Take 10 mg by mouth as needed. Take day of surgery per anesthesia protocol. 10 mg  
    
   
   
   
  
 * promethazine 25 mg suppository Commonly known as:  PHENERGAN Your last dose was: Your next dose is: Insert 25 mg into rectum as needed for Nausea. 25 mg  
    
   
   
   
  
 * promethazine 25 mg tablet Commonly known as:  PHENERGAN Your last dose was: Your next dose is: Take 1 Tab by mouth every six (6) hours as needed. 25 mg PROTONIX 40 mg injection Generic drug:  pantoprazole Your last dose was: Your next dose is:    
   
   
 40 mg by IntraVENous route every morning. Take day of surgery per anesthesia protocol. 40 mg  
    
   
   
   
  
 TRESIBA FLEXTOUCH U-100 100 unit/mL (3 mL) Inpn Generic drug:  insulin degludec Your last dose was: Your next dose is:    
   
   
 30 Units by SubCUTAneous route nightly. 30 Units ZOFRAN (AS HYDROCHLORIDE) 8 mg tablet Generic drug:  ondansetron hcl Your last dose was: Your next dose is: Take 8 mg by mouth every eight (8) hours as needed for Nausea. 8 mg  
    
   
   
   
  
 zolpidem 10 mg tablet Commonly known as:  AMBIEN Your last dose was: Your next dose is: TAKE 1 TABLET BY MOUTH EVERY NIGHT AS NEEDED FOR SLEEP * Notice: This list has 2 medication(s) that are the same as other medications prescribed for you. Read the directions carefully, and ask your doctor or other care provider to review them with you. Discharge Instructions Gastrointestinal Esophagogastroduodenoscopy (EGD) - Upper Exam Discharge Instructions 1. Call Dr. Clarissa Iglesias for any problems or questions. 2. Contact the doctor's office for follow up appointment as directed. 3. Medication may cause drowsiness for several hours, therefore, do not drive or operate machinery for remainder of the day. 4. No alcohol today.  
5. Ordinarily, you may resume regular diet and activity after exam unless otherwise specified by your physician. 6. For mild soreness in your throat you may use Cepacol throat lozenges or warm salt-water gargles as needed. Any additional instructions: Follow up with Dr. Linda Sharma Instructions given to Miranda Rosario and other family members. Introducing [facility]! [facility] introduces [employer] patient portal. Now you can access parts of your medical record, email your doctor's office, and request medication refills online. 1. In your internet browser, go to [email or web address] 2. Click on the First Time User? Click Here link in the Sign In box. You will see the New Member Sign Up page. 3. Enter your [employer] Access Code exactly as it appears below. You will not need to use this code after youve completed the sign-up process. If you do not sign up before the expiration date, you must request a new code. · [employer] Access Code: [email or web address] Expires: 10/31/2017 10:45 AM 
 
4. Enter the last four digits of your Social Security Number (xxxx) and Date of Birth (mm/dd/yyyy) as indicated and click Submit. You will be taken to the next sign-up page. 5. Create a [employer] ID. This will be your [employer] login ID and cannot be changed, so think of one that is secure and easy to remember. 6. Create a [employer] password. You can change your password at any time. 7. Enter your Password Reset Question and Answer. This can be used at a later time if you forget your password. 8. Enter your e-mail address. You will receive e-mail notification when new information is available in [address] 9. Click Sign Up. You can now view and download portions of your medical record. 10. Click the Download Summary menu link to download a portable copy of your medical information. If you have questions, please visit the Frequently Asked Questions section of the [employer] website.  Remember, [employer] is NOT to be used for urgent needs. For medical emergencies, dial 911. Now available from your iPhone and Android! Providers Seen During Your Hospitalization Provider Specialty Primary office phone Maureen Levine MD Gastroenterology [phone] Your Primary Care Physician (PCP) Primary Care Physician Office Phone Office Fax [address], [address] [facility] [address] [phone] You are allergic to the following Allergen Reactions Insulin Lispro Not Reported This Time Pt states not allergic Tape (Adhesive) Rash Itching Barium Iodide Nausea and Vomiting Contrast Dye (Iodine) Shortness of Breath Oral contrast-experienced flushing,shortness of breath, sweatiness; (patient has received oral contrast many times at [facility]) Flagyl (Metronidazole) Nausea and Vomiting Metformin Nausea Only Valtrex (Valacyclovir) Unknown (comments) PATIENT STATES \"NOT ALLERGIC\" Insulins Nausea and Vomiting Humalog only Recent Documentation Height Weight BMI OB Status Smoking Status 1.575 m 78.5 kg 31.64 kg/m2 Hysterectomy Former Smoker Emergency Contacts Name Discharge Info Relation Home Work Mobile Jorge Treadwell DISCHARGE CAREGIVER [3] Spouse [3] [phone] [address]  Father [15] [phone] Gwendolyn [place]  Mother [14] [phone] Patient Belongings The following personal items are in your possession at time of discharge: 
  Dental Appliances: None Please provide this summary of care documentation to your next provider. Signatures-by signing, you are acknowledging that this After Visit Summary has been reviewed with you and you have received a copy. Patient Signature:  ____________________________________________________________ Date:  ____________________________________________________________ `    
    
 Provider Signature:  ____________________________________________________________ Date:  ____________________________________________________________

## 2017-10-26 ENCOUNTER — HOSPITAL ENCOUNTER (OUTPATIENT)
Dept: INFUSION THERAPY | Age: 47
Discharge: HOME | End: 2017-10-26
Payer: COMMERCIAL

## 2017-10-26 VITALS
DIASTOLIC BLOOD PRESSURE: 53 MMHG | SYSTOLIC BLOOD PRESSURE: 107 MMHG | HEART RATE: 113 BPM | WEIGHT: 174 LBS | OXYGEN SATURATION: 92 % | BODY MASS INDEX: 31.83 KG/M2 | RESPIRATION RATE: 18 BRPM | TEMPERATURE: 98.8 F

## 2017-10-26 LAB — MAGNESIUM SERPL-MCNC: 1.9 MG/DL (ref 1.8–2.4)

## 2017-10-26 PROCEDURE — 74011000250 HC RX REV CODE- 250: Performed by: INTERNAL MEDICINE

## 2017-10-26 PROCEDURE — 96361 HYDRATE IV INFUSION ADD-ON: CPT

## 2017-10-26 PROCEDURE — 74011250636 HC RX REV CODE- 250/636: Performed by: INTERNAL MEDICINE

## 2017-10-26 PROCEDURE — 77030003560 HC NDL HUBR BARD -A

## 2017-10-26 PROCEDURE — 83735 ASSAY OF MAGNESIUM: CPT | Performed by: INTERNAL MEDICINE

## 2017-10-26 PROCEDURE — 96374 THER/PROPH/DIAG INJ IV PUSH: CPT

## 2017-10-26 RX ORDER — SODIUM CHLORIDE 0.9 % (FLUSH) 0.9 %
10 SYRINGE (ML) INJECTION AS NEEDED
Status: ACTIVE | OUTPATIENT
Start: 2017-10-26 | End: 2017-10-26

## 2017-10-26 RX ORDER — SODIUM CHLORIDE 9 MG/ML
500 INJECTION, SOLUTION INTRAVENOUS ONCE
Status: COMPLETED | OUTPATIENT
Start: 2017-10-26 | End: 2017-10-26

## 2017-10-26 RX ORDER — HEPARIN 100 UNIT/ML
500 SYRINGE INTRAVENOUS AS NEEDED
Status: DISPENSED | OUTPATIENT
Start: 2017-10-26 | End: 2017-10-26

## 2017-10-26 RX ADMIN — SODIUM CHLORIDE, PRESERVATIVE FREE 500 UNITS: 5 INJECTION INTRAVENOUS at 08:35

## 2017-10-26 RX ADMIN — Medication 10 ML: at 08:35

## 2017-10-26 RX ADMIN — SODIUM CHLORIDE 25 MG: 9 INJECTION INTRAMUSCULAR; INTRAVENOUS; SUBCUTANEOUS at 08:01

## 2017-10-26 RX ADMIN — Medication 10 ML: at 08:00

## 2017-10-26 RX ADMIN — SODIUM CHLORIDE 500 ML: 900 INJECTION, SOLUTION INTRAVENOUS at 08:04

## 2017-10-26 NOTE — PROGRESS NOTES
Pt arrived ambulatory today at 21 [phone], to receive phenergan for c/o nausea, and magnesium not needed today. Pt tolerated without difficulty, reporting relief of nausea. Patient discharged via ambulatory accompanied by father. Instructed to notify physician of any problems, questions or concerns. Allowed opportunity for patient/family to ask questions. Verbalized understanding. Next appointment is Nov 2 at [facility] with [address] [place] [address].

## 2017-11-02 ENCOUNTER — HOSPITAL ENCOUNTER (OUTPATIENT)
Dept: INFUSION THERAPY | Age: 47
Discharge: HOME | End: 2017-11-02
Payer: COMMERCIAL

## 2017-11-02 VITALS
BODY MASS INDEX: 31.64 KG/M2 | HEART RATE: 105 BPM | DIASTOLIC BLOOD PRESSURE: 76 MMHG | WEIGHT: 173 LBS | SYSTOLIC BLOOD PRESSURE: 134 MMHG | RESPIRATION RATE: 18 BRPM | TEMPERATURE: 98.5 F | OXYGEN SATURATION: 96 %

## 2017-11-02 LAB — MAGNESIUM SERPL-MCNC: 1.7 MG/DL (ref 1.8–2.4)

## 2017-11-02 PROCEDURE — 74011000250 HC RX REV CODE- 250: Performed by: INTERNAL MEDICINE

## 2017-11-02 PROCEDURE — 77030003560 HC NDL HUBR BARD -A

## 2017-11-02 PROCEDURE — 96365 THER/PROPH/DIAG IV INF INIT: CPT

## 2017-11-02 PROCEDURE — 74011250636 HC RX REV CODE- 250/636: Performed by: INTERNAL MEDICINE

## 2017-11-02 PROCEDURE — 83735 ASSAY OF MAGNESIUM: CPT | Performed by: INTERNAL MEDICINE

## 2017-11-02 PROCEDURE — 96375 TX/PRO/DX INJ NEW DRUG ADDON: CPT

## 2017-11-02 RX ORDER — SODIUM CHLORIDE 9 MG/ML
999 INJECTION, SOLUTION INTRAVENOUS ONCE
Status: COMPLETED | OUTPATIENT
Start: 2017-11-02 | End: 2017-11-02

## 2017-11-02 RX ORDER — MAGNESIUM SULFATE 1 G/100ML
1 INJECTION INTRAVENOUS ONCE
Status: COMPLETED | OUTPATIENT
Start: 2017-11-02 | End: 2017-11-02

## 2017-11-02 RX ORDER — HEPARIN 100 UNIT/ML
500 SYRINGE INTRAVENOUS AS NEEDED
Status: DISPENSED | OUTPATIENT
Start: 2017-11-02 | End: 2017-11-02

## 2017-11-02 RX ORDER — SODIUM CHLORIDE 0.9 % (FLUSH) 0.9 %
10-40 SYRINGE (ML) INJECTION AS NEEDED
Status: DISCONTINUED | OUTPATIENT
Start: 2017-11-02 | End: 2017-11-06 | Stop reason: HOSPADM

## 2017-11-02 RX ADMIN — Medication 10 ML: at 09:45

## 2017-11-02 RX ADMIN — MAGNESIUM SULFATE HEPTAHYDRATE 1 G: 1 INJECTION, SOLUTION INTRAVENOUS at 08:42

## 2017-11-02 RX ADMIN — SODIUM CHLORIDE, PRESERVATIVE FREE 500 UNITS: 5 INJECTION INTRAVENOUS at 09:45

## 2017-11-02 RX ADMIN — Medication 10 ML: at 07:28

## 2017-11-02 RX ADMIN — SODIUM CHLORIDE 25 MG: 9 INJECTION INTRAMUSCULAR; INTRAVENOUS; SUBCUTANEOUS at 07:30

## 2017-11-02 RX ADMIN — SODIUM CHLORIDE 999 ML/HR: 900 INJECTION, SOLUTION INTRAVENOUS at 07:28

## 2017-11-02 NOTE — PROGRESS NOTES
Arrived to the [facility]. Fluids,phenergan and magnesium completed. Patient tolerated well. Any issues or concerns during appointment: none. Patient aware of next infusion appointment on 11/09  at [address] [place] [address] . Discharged ambulatory.

## 2017-11-09 ENCOUNTER — APPOINTMENT (OUTPATIENT)
Dept: INFUSION THERAPY | Age: 47
End: 2017-11-09
Payer: COMMERCIAL

## 2017-11-11 ENCOUNTER — HOSPITAL ENCOUNTER (OUTPATIENT)
Dept: INFUSION THERAPY | Age: 47
Discharge: HOME | End: 2017-11-11
Payer: COMMERCIAL

## 2017-11-11 VITALS
TEMPERATURE: 98.6 F | RESPIRATION RATE: 18 BRPM | SYSTOLIC BLOOD PRESSURE: 130 MMHG | OXYGEN SATURATION: 98 % | HEART RATE: 109 BPM | DIASTOLIC BLOOD PRESSURE: 78 MMHG

## 2017-11-11 LAB — MAGNESIUM SERPL-MCNC: 1.7 MG/DL (ref 1.8–2.4)

## 2017-11-11 PROCEDURE — 74011000250 HC RX REV CODE- 250: Performed by: INTERNAL MEDICINE

## 2017-11-11 PROCEDURE — 74011250636 HC RX REV CODE- 250/636: Performed by: INTERNAL MEDICINE

## 2017-11-11 PROCEDURE — 96374 THER/PROPH/DIAG INJ IV PUSH: CPT

## 2017-11-11 PROCEDURE — 83735 ASSAY OF MAGNESIUM: CPT | Performed by: INTERNAL MEDICINE

## 2017-11-11 PROCEDURE — 77030003560 HC NDL HUBR BARD -A

## 2017-11-11 RX ORDER — HEPARIN 100 UNIT/ML
300 SYRINGE INTRAVENOUS AS NEEDED
Status: DISCONTINUED | OUTPATIENT
Start: 2017-11-11 | End: 2017-11-15 | Stop reason: HOSPADM

## 2017-11-11 RX ORDER — SODIUM CHLORIDE 0.9 % (FLUSH) 0.9 %
10 SYRINGE (ML) INJECTION AS NEEDED
Status: DISCONTINUED | OUTPATIENT
Start: 2017-11-11 | End: 2017-11-15 | Stop reason: HOSPADM

## 2017-11-11 RX ADMIN — SODIUM CHLORIDE 25 MG: 9 INJECTION INTRAMUSCULAR; INTRAVENOUS; SUBCUTANEOUS at 07:31

## 2017-11-11 RX ADMIN — Medication 10 ML: at 07:31

## 2017-11-11 RX ADMIN — SODIUM CHLORIDE, PRESERVATIVE FREE 500 UNITS: 5 INJECTION INTRAVENOUS at 07:31

## 2017-11-11 NOTE — PROGRESS NOTES
Pt arrived ambulatory to [facility], requested IV phenergan and port needle change only, Mag level drawn, pt stated if she needed replacements she would come back but could not wait on results, discharged home ambulatory

## 2017-11-16 ENCOUNTER — HOSPITAL ENCOUNTER (OUTPATIENT)
Dept: INFUSION THERAPY | Age: 47
Discharge: HOME | End: 2017-11-16
Payer: COMMERCIAL

## 2017-11-16 VITALS
HEART RATE: 108 BPM | RESPIRATION RATE: 18 BRPM | WEIGHT: 170.2 LBS | SYSTOLIC BLOOD PRESSURE: 126 MMHG | BODY MASS INDEX: 31.13 KG/M2 | OXYGEN SATURATION: 98 % | TEMPERATURE: 98.7 F | DIASTOLIC BLOOD PRESSURE: 84 MMHG

## 2017-11-16 LAB — MAGNESIUM SERPL-MCNC: 1.7 MG/DL (ref 1.8–2.4)

## 2017-11-16 PROCEDURE — 77030003560 HC NDL HUBR BARD -A

## 2017-11-16 PROCEDURE — 74011000250 HC RX REV CODE- 250: Performed by: INTERNAL MEDICINE

## 2017-11-16 PROCEDURE — 83735 ASSAY OF MAGNESIUM: CPT | Performed by: INTERNAL MEDICINE

## 2017-11-16 PROCEDURE — 96365 THER/PROPH/DIAG IV INF INIT: CPT

## 2017-11-16 PROCEDURE — 74011250636 HC RX REV CODE- 250/636: Performed by: INTERNAL MEDICINE

## 2017-11-16 PROCEDURE — 96375 TX/PRO/DX INJ NEW DRUG ADDON: CPT

## 2017-11-16 RX ORDER — SODIUM CHLORIDE 0.9 % (FLUSH) 0.9 %
10-40 SYRINGE (ML) INJECTION AS NEEDED
Status: DISCONTINUED | OUTPATIENT
Start: 2017-11-16 | End: 2017-11-20 | Stop reason: HOSPADM

## 2017-11-16 RX ORDER — HEPARIN 100 UNIT/ML
500 SYRINGE INTRAVENOUS AS NEEDED
Status: DISPENSED | OUTPATIENT
Start: 2017-11-16 | End: 2017-11-16

## 2017-11-16 RX ORDER — MAGNESIUM SULFATE 1 G/100ML
1 INJECTION INTRAVENOUS ONCE
Status: COMPLETED | OUTPATIENT
Start: 2017-11-16 | End: 2017-11-16

## 2017-11-16 RX ADMIN — MAGNESIUM SULFATE HEPTAHYDRATE 1 G: 1 INJECTION, SOLUTION INTRAVENOUS at 08:25

## 2017-11-16 RX ADMIN — SODIUM CHLORIDE, PRESERVATIVE FREE 500 UNITS: 5 INJECTION INTRAVENOUS at 09:03

## 2017-11-16 RX ADMIN — Medication 10 ML: at 09:03

## 2017-11-16 RX ADMIN — Medication 10 ML: at 07:43

## 2017-11-16 RX ADMIN — SODIUM CHLORIDE 25 MG: 9 INJECTION INTRAMUSCULAR; INTRAVENOUS; SUBCUTANEOUS at 07:43

## 2017-11-16 NOTE — PROGRESS NOTES
Arrived to the [facility]. Magnesium and Phenergan completed. Patient tolerated well. Any issues or concerns during appointment: no.  Patient aware of next infusion appointment on 11/24 (date) at [facility] (time). Discharged ambulatory.

## 2017-11-22 ENCOUNTER — HOSPITAL ENCOUNTER (OUTPATIENT)
Age: 47
Setting detail: OUTPATIENT SURGERY
Discharge: HOME | End: 2017-11-22
Attending: INTERNAL MEDICINE | Admitting: INTERNAL MEDICINE
Payer: COMMERCIAL

## 2017-11-22 ENCOUNTER — ANESTHESIA EVENT (OUTPATIENT)
Dept: ENDOSCOPY | Age: 47
End: 2017-11-22
Payer: COMMERCIAL

## 2017-11-22 ENCOUNTER — ANESTHESIA (OUTPATIENT)
Dept: ENDOSCOPY | Age: 47
End: 2017-11-22
Payer: COMMERCIAL

## 2017-11-22 VITALS
RESPIRATION RATE: 16 BRPM | HEIGHT: 62 IN | DIASTOLIC BLOOD PRESSURE: 94 MMHG | TEMPERATURE: 96.8 F | SYSTOLIC BLOOD PRESSURE: 140 MMHG | BODY MASS INDEX: 31.28 KG/M2 | WEIGHT: 170 LBS | OXYGEN SATURATION: 98 % | HEART RATE: 86 BPM

## 2017-11-22 LAB — GLUCOSE BLD STRIP.AUTO-MCNC: 199 MG/DL (ref 65–100)

## 2017-11-22 PROCEDURE — 74011250636 HC RX REV CODE- 250/636

## 2017-11-22 PROCEDURE — 74011250636 HC RX REV CODE- 250/636: Performed by: INTERNAL MEDICINE

## 2017-11-22 PROCEDURE — 76040000025: Performed by: INTERNAL MEDICINE

## 2017-11-22 PROCEDURE — 76060000031 HC ANESTHESIA FIRST 0.5 HR: Performed by: INTERNAL MEDICINE

## 2017-11-22 PROCEDURE — 74011000250 HC RX REV CODE- 250

## 2017-11-22 PROCEDURE — 77030031722: Performed by: INTERNAL MEDICINE

## 2017-11-22 PROCEDURE — C1726 CATH, BAL DIL, NON-VASCULAR: HCPCS | Performed by: INTERNAL MEDICINE

## 2017-11-22 PROCEDURE — 82962 GLUCOSE BLOOD TEST: CPT

## 2017-11-22 RX ORDER — PROPOFOL 10 MG/ML
INJECTION, EMULSION INTRAVENOUS AS NEEDED
Status: DISCONTINUED | OUTPATIENT
Start: 2017-11-22 | End: 2017-11-22 | Stop reason: HOSPADM

## 2017-11-22 RX ORDER — TRIAMCINOLONE ACETONIDE 40 MG/ML
40 INJECTION, SUSPENSION INTRA-ARTICULAR; INTRAMUSCULAR ONCE
Status: COMPLETED | OUTPATIENT
Start: 2017-11-22 | End: 2017-11-22

## 2017-11-22 RX ORDER — PROPOFOL 10 MG/ML
INJECTION, EMULSION INTRAVENOUS
Status: DISCONTINUED | OUTPATIENT
Start: 2017-11-22 | End: 2017-11-22 | Stop reason: HOSPADM

## 2017-11-22 RX ORDER — HEPARIN 100 UNIT/ML
500 SYRINGE INTRAVENOUS AS NEEDED
Status: DISCONTINUED | OUTPATIENT
Start: 2017-11-22 | End: 2017-11-22 | Stop reason: HOSPADM

## 2017-11-22 RX ORDER — SODIUM CHLORIDE, SODIUM LACTATE, POTASSIUM CHLORIDE, CALCIUM CHLORIDE 600; 310; 30; 20 MG/100ML; MG/100ML; MG/100ML; MG/100ML
1000 INJECTION, SOLUTION INTRAVENOUS CONTINUOUS
Status: DISCONTINUED | OUTPATIENT
Start: 2017-11-22 | End: 2017-11-22 | Stop reason: HOSPADM

## 2017-11-22 RX ORDER — LIDOCAINE HYDROCHLORIDE 20 MG/ML
INJECTION, SOLUTION EPIDURAL; INFILTRATION; INTRACAUDAL; PERINEURAL AS NEEDED
Status: DISCONTINUED | OUTPATIENT
Start: 2017-11-22 | End: 2017-11-22 | Stop reason: HOSPADM

## 2017-11-22 RX ADMIN — Medication 500 UNITS: at 11:36

## 2017-11-22 RX ADMIN — TRIAMCINOLONE ACETONIDE 40 MG: 40 INJECTION, SUSPENSION INTRA-ARTICULAR; INTRAMUSCULAR at 10:54

## 2017-11-22 RX ADMIN — PROPOFOL 50 MG: 10 INJECTION, EMULSION INTRAVENOUS at 10:45

## 2017-11-22 RX ADMIN — LIDOCAINE HYDROCHLORIDE 60 MG: 20 INJECTION, SOLUTION EPIDURAL; INFILTRATION; INTRACAUDAL; PERINEURAL at 10:45

## 2017-11-22 RX ADMIN — SODIUM CHLORIDE, SODIUM LACTATE, POTASSIUM CHLORIDE, AND CALCIUM CHLORIDE 1000 ML: 600; 310; 30; 20 INJECTION, SOLUTION INTRAVENOUS at 10:07

## 2017-11-22 RX ADMIN — PROPOFOL 160 MCG/KG/MIN: 10 INJECTION, EMULSION INTRAVENOUS at 10:45

## 2017-11-22 NOTE — ANESTHESIA PREPROCEDURE EVALUATION
Anesthetic History     PONV          Review of Systems / Medical History  Patient summary reviewed    Pulmonary          Smoker (Former)  Asthma : well controlled       Neuro/Psych              Cardiovascular    Hypertension              Exercise tolerance: >4 METS     GI/Hepatic/Renal     GERD: well controlled          Comments: Esophageal stricture Endo/Other    Diabetes: type 1         Other Findings              Physical Exam    Airway  Mallampati: II  TM Distance: > 6 cm  Neck ROM: normal range of motion   Mouth opening: Normal     Cardiovascular  Regular rate and rhythm,  S1 and S2 normal,  no murmur, click, rub, or gallop             Dental  No notable dental hx       Pulmonary  Breath sounds clear to auscultation               Abdominal         Other Findings            Anesthetic Plan    ASA: 3  Anesthesia type: total IV anesthesia            Anesthetic plan and risks discussed with: Patient

## 2017-11-22 NOTE — PROGRESS NOTES
Pt had R subclav port accessed prior to admission to GI. Positive blood return and able to flush w/o issue. Chlor-disk present and transparent dressing intact. Blood sugar was 199 in pre-procedure.

## 2017-11-22 NOTE — H&P
[facility] Consult Note           Referring Physician:     Consult Date: 11/22/2017    Reason for Consult: Esophageal stricture    History of Present Illness:  Patient is a 50 y.o. female who is seen in consultation for Esophageal stricture. Past Medical History:   Diagnosis Date    Abscess, abdomen ([facility]     Anemia     denies hx of blood transfusions-- Fe infusions 2015    Anxiety     Asthma     allergy induced, denies any inhaler    C. difficile diarrhea     in 6346 after complicated ERCP    Cervical dysplasia 1990s    Chronic insomnia     Chronic pain     all over     Chronic pancreatitis ([facility]     Depression     Endometriosis     Esophageal stricture 2017    Fibromyalgia     SANA (generalized anxiety disorder)     GERD (gastroesophageal reflux disease)     daily meds--- nexium daily as needed- Protonix IV via port daily--     HLD (hyperlipidemia)     Hypertension     pt denies dx 11/20/17    IBS (irritable bowel syndrome)     Migraine headache     Nausea & vomiting     pt reports she does better with phenergan than zofran - causes HA    Nonalcoholic fatty liver disease     PUD (peptic ulcer disease) 06/2017    Hx of     Sphincter of Oddi dysfunction     Type 2 diabetes mellitus ([facility] [address] [facility]     insulin: fbs avg. 200: pt denies episodes of hypo: A1c 13.1 (7/2017)      Past Surgical History:   Procedure Laterality Date    ABDOMEN SURGERY PROC UNLISTED  last one placed  2012    Hx of pancreatic stent, multiple    ABDOMEN SURGERY PROC UNLISTED  1997    lap nissen    ABDOMEN SURGERY PROC UNLISTED  4/13    explor lap    HX COLONOSCOPY      HX ENDOSCOPY  2017    36 fr thomas    HX ERCP  3/5/2013    resulted in perforated duodenum    HX HYSTERECTOMY  1998    ovaries remain    HX LAP CHOLECYSTECTOMY  1997    HX LAPAROTOMY  3/5/2013    exploratory for duodenal perforation with ERCP    HX UROLOGICAL  4/25/13 at [facility]-- stent removed 6-27-13.   Dr Ariel Liao VASCULAR ACCESS      port in place      Family History   Problem Relation Age of Onset    Diabetes Mother     Heart Disease Mother     Hypertension Mother     Diabetes Father     Heart Disease Father     Hypertension Father      Social History     Occupational History    Not on file. Social History Main Topics    Smoking status: Former Smoker     Packs/day: 1.00     Years: 3.00     Quit date: 4/24/1993    Smokeless tobacco: Never Used    Alcohol use No    Drug use: No    Sexual activity: Not on file       Hospital Medications:  Current Facility-Administered Medications   Medication Dose Route Frequency    triamcinolone acetonide (KENALOG-40) 40 mg/mL injection 40 mg  40 mg Other ONCE    lactated Ringers infusion 1,000 mL  1,000 mL IntraVENous CONTINUOUS       Allergies: Allergies   Allergen Reactions    Insulin Lispro Not Reported This Time     Pt states not allergic     Tape [Adhesive] Rash and Itching    Barium Iodide Nausea and Vomiting    Contrast Dye [Iodine] Shortness of Breath     Oral contrast-experienced flushing,shortness of breath, sweatiness; (patient has received oral contrast [facility])    Flagyl [Metronidazole] Nausea and Vomiting    Metformin Nausea Only    Valtrex [Valacyclovir] Unknown (comments)     PATIENT STATES \"NOT ALLERGIC\"    Insulins Nausea and Vomiting     Humalog only       Review of Systems:  A comprehensive review of systems was negative except for that written in the History of Present Illness. Objective:     Physical Exam:  Vitals:  Visit Vitals    /66    Pulse (!) 108    Temp 99.2 °F (37.3 °C)    Resp 18    Ht 5' 2\" (1.575 m)    Wt 77.1 kg (170 lb)    SpO2 94%    BMI 31.09 kg/m2       General: No acute distress. Skin:  Extremities and face reveal no rashes. No espinosa erythema. No telangiectasias on the chest wall. HEENT: Sclerae anicteric. No oral ulcers. No abnormal pigmentation of the lips.   The neck is supple. Cardiovascular: Regular rate and rhythm. No murmurs, gallops, or rubs. Respiratory:  Comfortable breathing  With no accessory muscle use. Clear breath sounds with no wheezes, rales, or rhonchi. GI:  Abdomen nondistended, soft, and nontender. Normal active bowel sounds. No enlargement of the liver or spleen. No masses palpable. Musculoskeletal:  No pitting edema of the lower legs. Extremities have good range of motion. Neurological:  Gross memory appears intact. Patient is alert and oriented. Psychiatric:  Mood appears appropriate with judgement intact. Lymphatic:  No cervical or supraclavicular adenopathy. Laboratory:    No results for input(s): WBC, RBC, HGB, HCT, PLT, HGBEXT, HCTEXT, PLTEXT in the last 72 hours. No results for input(s): GLU, NA, K, CL, CO2, BUN, CREA, CA in the last 72 hours. No results for input(s): PTP, INR, APTT in the last 72 hours. No lab exists for component: INREXT  No results for input(s): TBIL, CBIL, SGOT, GPT, AP, ALB, TP, AML, LPSE in the last 72 hours.     Assessment:       A 50 y.o. female with Esophageal stricture      Plan:       EGD    Signed By: Brandie Hahn MD     November 22, 2017

## 2017-11-22 NOTE — PROGRESS NOTES
Pt to dc via wc. Pt stable at time of dc. Mother driving. Mother received dc instructions, verbalized understanding.  Mother agreed to correct dc instructions given

## 2017-11-22 NOTE — ANESTHESIA POSTPROCEDURE EVALUATION
Post-Anesthesia Evaluation and Assessment    Patient: Oumar Mata MRN: [id number]  SSN: [id number]    YOB: 1970  Age: 50 y.o. Sex: female       Cardiovascular Function/Vital Signs  Visit Vitals    BP (!) 140/94    Pulse 88    Temp 36 °C (96.8 °F)    Resp (!) 115    Ht 5' 2\" (1.575 m)    Wt 77.1 kg (170 lb)    SpO2 97%    BMI 31.09 kg/m2       Patient is status post total IV anesthesia anesthesia for Procedure(s):  ESOPHAGOGASTRODUODENOSCOPY (EGD)    BMI =32  ESOPHAGEAL DILATION  INJECTION. Nausea/Vomiting: None    Postoperative hydration reviewed and adequate. Pain:  Pain Scale 1: Visual (11/22/17 1101)  Pain Intensity 1: 0 (11/22/17 1101)   Managed    Neurological Status: At baseline    Mental Status and Level of Consciousness: Arousable    Pulmonary Status:   O2 Device: Nasal cannula (Simultaneous filing. User may not have seen previous data.) (11/22/17 1101)   Adequate oxygenation and airway patent    Complications related to anesthesia: None    Post-anesthesia assessment completed.  No concerns    Signed By: Bethany Muhammad MD     November 22, 2017

## 2017-11-22 NOTE — PROCEDURES
Endoscopic Gastroduodenoscopy Procedure Note    Indications: Esophageal stricture    Anesthesia/Sedation: MAC IV     Pre-Procedure Physical:    Current Facility-Administered Medications   Medication Dose Route Frequency    triamcinolone acetonide (KENALOG-40) 40 mg/mL injection 40 mg  40 mg Other ONCE    lactated Ringers infusion 1,000 mL  1,000 mL IntraVENous CONTINUOUS      Insulin lispro; Tape [adhesive]; Barium iodide; Contrast dye [iodine]; Flagyl [metronidazole]; Metformin; Valtrex [valacyclovir]; and Insulins    Patient Vitals for the past 8 hrs:   BP Temp Pulse Resp SpO2 Height Weight   11/22/17 0951 129/66 - (!) 108 18 94 % - -   11/22/17 0932 - 99.2 °F (37.3 °C) - - - 5' 2\" (1.575 m) 77.1 kg (170 lb)       Exam      Airway: clear   Heart: normal S1and S2    Lungs: clear bilateral  Abdomen: soft, nontender, bowel sounds present and normal in all quads   Mental Status: awake, alert and oriented to person, place and time          Procedure Details     Informed consent was obtained for the procedure, including conscious sedation. Risks of pancreatitis, infection, perforation, hemorrhage, adverse drug reaction and aspiration were discussed. The patient was placed in the left lateral decubitus position. Based on the pre-procedure assessment, including review of the patient's medical history, medications, allergies, and review of systems, she had been deemed to be an appropriate candidate for conscious sedation; she was therefore sedated with the medications listed below. She was monitored continuously with ECG tracing, pulse oximetry, blood pressure monitoring, and direct observation. The EGD gastroscope was inserted into the mouth and advanced under direct vision to the jejunum. A careful inspection was made as the gastroscope was withdrawn, including a retroflexed view of the proximal stomach; findings and interventions are described below. Appropriate photodocumentation was obtained.     Findings: Esophagus- Tight GEJ stricture. The scope could not traverse it. Dilated with 10-12 and then 12-15 mm balloon. Resulted in minimal tearing. Then scope could traverse. GEJ injected with 4 cc kenalog. Stomach- Prior gastrojejunostomy    Therapies: Dilation, injection    Specimens: None    Estimated Blood Loss: 0 cc           Complications:   None; patient tolerated the procedure well. Attending Attestation:  I performed the procedure. Impression:    Tight GEJ stricture. Dilated with 10-12 and then 12-15 mm balloon. Injected with 4 cc kenalog. Recommendations:  Repeat EGD for dilation in 1 month.

## 2017-11-22 NOTE — DISCHARGE INSTRUCTIONS
Gastrointestinal Esophagogastroduodenoscopy (EGD) - Upper Exam Discharge Instructions    1. Call Dr. Debora Roman at [phone] for any problems or questions. 2. Contact the doctor's office for follow up appointment as directed. 3. Medication may cause drowsiness for several hours, therefore, do not drive or  operate machinery for remainder of the day. 4. No alcohol today. 5. Ordinarily, you may resume regular diet and activity after exam unless otherwise specified by your physician. 6. For mild soreness in your throat you may use Cepacol throat lozenges or warm  salt-water gargles as needed. Any additional instructions:  egd with dilation in 1 month     Instructions given to Coreen Vann and other family members.   Instructions given by:

## 2017-11-24 ENCOUNTER — HOSPITAL ENCOUNTER (OUTPATIENT)
Dept: INFUSION THERAPY | Age: 47
Discharge: HOME | End: 2017-11-24
Payer: COMMERCIAL

## 2017-11-24 VITALS
SYSTOLIC BLOOD PRESSURE: 126 MMHG | WEIGHT: 175 LBS | BODY MASS INDEX: 32.01 KG/M2 | OXYGEN SATURATION: 99 % | HEART RATE: 96 BPM | TEMPERATURE: 98.1 F | DIASTOLIC BLOOD PRESSURE: 79 MMHG | RESPIRATION RATE: 18 BRPM

## 2017-11-24 LAB
ALBUMIN SERPL-MCNC: 3.5 G/DL (ref 3.5–5)
ALBUMIN/GLOB SERPL: 0.9 {RATIO} (ref 1.2–3.5)
ALP SERPL-CCNC: 67 U/L (ref 50–136)
ALT SERPL-CCNC: 24 U/L (ref 12–65)
ANION GAP SERPL CALC-SCNC: 8 MMOL/L (ref 7–16)
AST SERPL-CCNC: 28 U/L (ref 15–37)
BASOPHILS # BLD: 0 K/UL (ref 0–0.2)
BASOPHILS NFR BLD: 0 % (ref 0–2)
BILIRUB DIRECT SERPL-MCNC: 0.1 MG/DL
BILIRUB SERPL-MCNC: 0.4 MG/DL (ref 0.2–1.1)
BUN SERPL-MCNC: 13 MG/DL (ref 6–23)
CALCIUM SERPL-MCNC: 8.4 MG/DL (ref 8.3–10.4)
CHLORIDE SERPL-SCNC: 104 MMOL/L (ref 98–107)
CHOLEST SERPL-MCNC: 184 MG/DL
CO2 SERPL-SCNC: 27 MMOL/L (ref 21–32)
CREAT SERPL-MCNC: 0.73 MG/DL (ref 0.6–1)
DIFFERENTIAL METHOD BLD: ABNORMAL
EOSINOPHIL # BLD: 0.2 K/UL (ref 0–0.8)
EOSINOPHIL NFR BLD: 2 % (ref 0.5–7.8)
ERYTHROCYTE [DISTWIDTH] IN BLOOD BY AUTOMATED COUNT: 18.3 % (ref 11.9–14.6)
FERRITIN SERPL-MCNC: 3 NG/ML (ref 8–388)
GLOBULIN SER CALC-MCNC: 4.1 G/DL (ref 2.3–3.5)
GLUCOSE SERPL-MCNC: 105 MG/DL (ref 65–100)
HCT VFR BLD AUTO: 28.4 % (ref 35.8–46.3)
HDLC SERPL-MCNC: 53 MG/DL (ref 40–60)
HDLC SERPL: 3.5 {RATIO}
HGB BLD-MCNC: 8 G/DL (ref 11.7–15.4)
LDLC SERPL CALC-MCNC: 107.8 MG/DL
LIPID PROFILE,FLP: ABNORMAL
LYMPHOCYTES # BLD: 2.5 K/UL (ref 0.5–4.6)
LYMPHOCYTES NFR BLD: 36 % (ref 13–44)
MAGNESIUM SERPL-MCNC: 1.8 MG/DL (ref 1.8–2.4)
MCH RBC QN AUTO: 17.9 PG (ref 26.1–32.9)
MCHC RBC AUTO-ENTMCNC: 28.2 G/DL (ref 31.4–35)
MCV RBC AUTO: 63.7 FL (ref 79.6–97.8)
MONOCYTES # BLD: 0.6 K/UL (ref 0.1–1.3)
MONOCYTES NFR BLD: 9 % (ref 4–12)
NEUTS SEG # BLD: 3.8 K/UL (ref 1.7–8.2)
NEUTS SEG NFR BLD: 53 % (ref 43–78)
NRBC # BLD: 0 K/UL (ref 0–0.2)
PLATELET # BLD AUTO: 197 K/UL (ref 150–450)
PMV BLD AUTO: 9.7 FL (ref 10.8–14.1)
POTASSIUM SERPL-SCNC: 3.9 MMOL/L (ref 3.5–5.1)
PROT SERPL-MCNC: 7.6 G/DL (ref 6.3–8.2)
RBC # BLD AUTO: 4.46 M/UL (ref 4.05–5.25)
SODIUM SERPL-SCNC: 139 MMOL/L (ref 136–145)
TRIGL SERPL-MCNC: 116 MG/DL (ref 35–150)
VLDLC SERPL CALC-MCNC: 23.2 MG/DL (ref 6–23)
WBC # BLD AUTO: 7.1 K/UL (ref 4.3–11.1)

## 2017-11-24 PROCEDURE — 96374 THER/PROPH/DIAG INJ IV PUSH: CPT

## 2017-11-24 PROCEDURE — 82728 ASSAY OF FERRITIN: CPT | Performed by: INTERNAL MEDICINE

## 2017-11-24 PROCEDURE — 85025 COMPLETE CBC W/AUTO DIFF WBC: CPT

## 2017-11-24 PROCEDURE — 74011000250 HC RX REV CODE- 250: Performed by: INTERNAL MEDICINE

## 2017-11-24 PROCEDURE — 74011250636 HC RX REV CODE- 250/636: Performed by: INTERNAL MEDICINE

## 2017-11-24 PROCEDURE — 83735 ASSAY OF MAGNESIUM: CPT | Performed by: INTERNAL MEDICINE

## 2017-11-24 PROCEDURE — 80061 LIPID PANEL: CPT | Performed by: INTERNAL MEDICINE

## 2017-11-24 PROCEDURE — 80076 HEPATIC FUNCTION PANEL: CPT | Performed by: INTERNAL MEDICINE

## 2017-11-24 PROCEDURE — 80048 BASIC METABOLIC PNL TOTAL CA: CPT

## 2017-11-24 RX ORDER — SODIUM CHLORIDE 9 MG/ML
500 INJECTION, SOLUTION INTRAVENOUS ONCE
Status: COMPLETED | OUTPATIENT
Start: 2017-11-24 | End: 2017-11-24

## 2017-11-24 RX ORDER — HEPARIN 100 UNIT/ML
500 SYRINGE INTRAVENOUS AS NEEDED
Status: DISPENSED | OUTPATIENT
Start: 2017-11-24 | End: 2017-11-24

## 2017-11-24 RX ORDER — SODIUM CHLORIDE 0.9 % (FLUSH) 0.9 %
10-40 SYRINGE (ML) INJECTION AS NEEDED
Status: DISCONTINUED | OUTPATIENT
Start: 2017-11-24 | End: 2017-11-28 | Stop reason: HOSPADM

## 2017-11-24 RX ADMIN — SODIUM CHLORIDE 25 MG: 9 INJECTION INTRAMUSCULAR; INTRAVENOUS; SUBCUTANEOUS at 08:08

## 2017-11-24 RX ADMIN — Medication 10 ML: at 07:59

## 2017-11-24 RX ADMIN — SODIUM CHLORIDE, PRESERVATIVE FREE 500 UNITS: 5 INJECTION INTRAVENOUS at 08:46

## 2017-11-24 RX ADMIN — SODIUM CHLORIDE 500 ML: 900 INJECTION, SOLUTION INTRAVENOUS at 07:59

## 2017-11-24 NOTE — PROGRESS NOTES
Arrived to the [facility]. Patient received 500 ml NS and IV phenergan, no magnesium replacement needed. IV completed. Patient tolerated well. Port left accessed for use at home. Any issues or concerns during appointment: None. Patient aware of next infusion appointment on 12/1 (date) at [address] [place] [address] (time). Discharged ambulatory in stable condition.

## 2017-11-30 ENCOUNTER — HOSPITAL ENCOUNTER (OUTPATIENT)
Dept: INFUSION THERAPY | Age: 47
Discharge: HOME | End: 2017-11-30
Payer: COMMERCIAL

## 2017-11-30 VITALS
OXYGEN SATURATION: 97 % | SYSTOLIC BLOOD PRESSURE: 131 MMHG | RESPIRATION RATE: 18 BRPM | DIASTOLIC BLOOD PRESSURE: 74 MMHG | HEART RATE: 113 BPM | TEMPERATURE: 97.8 F

## 2017-11-30 LAB
BASOPHILS # BLD: 0 K/UL (ref 0–0.2)
BASOPHILS NFR BLD: 0 % (ref 0–2)
DIFFERENTIAL METHOD BLD: ABNORMAL
EOSINOPHIL # BLD: 0.1 K/UL (ref 0–0.8)
EOSINOPHIL NFR BLD: 1 % (ref 0.5–7.8)
ERYTHROCYTE [DISTWIDTH] IN BLOOD BY AUTOMATED COUNT: 18.1 % (ref 11.9–14.6)
FERRITIN SERPL-MCNC: 3 NG/ML (ref 8–388)
HCT VFR BLD AUTO: 31.1 % (ref 35.8–46.3)
HGB BLD-MCNC: 8.7 G/DL (ref 11.7–15.4)
LYMPHOCYTES # BLD: 1.5 K/UL (ref 0.5–4.6)
LYMPHOCYTES NFR BLD: 21 % (ref 13–44)
MAGNESIUM SERPL-MCNC: 1.9 MG/DL (ref 1.8–2.4)
MCH RBC QN AUTO: 17.8 PG (ref 26.1–32.9)
MCHC RBC AUTO-ENTMCNC: 28 G/DL (ref 31.4–35)
MCV RBC AUTO: 63.5 FL (ref 79.6–97.8)
MONOCYTES # BLD: 0.6 K/UL (ref 0.1–1.3)
MONOCYTES NFR BLD: 8 % (ref 4–12)
NEUTS SEG # BLD: 4.8 K/UL (ref 1.7–8.2)
NEUTS SEG NFR BLD: 70 % (ref 43–78)
NRBC # BLD: 0 K/UL (ref 0–0.2)
PLATELET # BLD AUTO: 216 K/UL (ref 150–450)
PLATELET COMMENTS,PCOM: ADEQUATE
RBC # BLD AUTO: 4.9 M/UL (ref 4.05–5.25)
RBC MORPH BLD: ABNORMAL
WBC # BLD AUTO: 7 K/UL (ref 4.3–11.1)
WBC MORPH BLD: ABNORMAL

## 2017-11-30 PROCEDURE — 83735 ASSAY OF MAGNESIUM: CPT | Performed by: OBSTETRICS & GYNECOLOGY

## 2017-11-30 PROCEDURE — 74011000250 HC RX REV CODE- 250: Performed by: INTERNAL MEDICINE

## 2017-11-30 PROCEDURE — 77030003560 HC NDL HUBR BARD -A

## 2017-11-30 PROCEDURE — 74011250636 HC RX REV CODE- 250/636: Performed by: INTERNAL MEDICINE

## 2017-11-30 PROCEDURE — 85025 COMPLETE CBC W/AUTO DIFF WBC: CPT | Performed by: OBSTETRICS & GYNECOLOGY

## 2017-11-30 PROCEDURE — 82728 ASSAY OF FERRITIN: CPT | Performed by: OBSTETRICS & GYNECOLOGY

## 2017-11-30 PROCEDURE — 96374 THER/PROPH/DIAG INJ IV PUSH: CPT

## 2017-11-30 RX ORDER — HEPARIN 100 UNIT/ML
300 SYRINGE INTRAVENOUS AS NEEDED
Status: DISPENSED | OUTPATIENT
Start: 2017-11-30 | End: 2017-11-30

## 2017-11-30 RX ORDER — SODIUM CHLORIDE 9 MG/ML
250 INJECTION, SOLUTION INTRAVENOUS CONTINUOUS
Status: DISCONTINUED | OUTPATIENT
Start: 2017-11-30 | End: 2017-12-04 | Stop reason: HOSPADM

## 2017-11-30 RX ADMIN — SODIUM CHLORIDE 250 ML: 900 INJECTION, SOLUTION INTRAVENOUS at 07:45

## 2017-11-30 RX ADMIN — SODIUM CHLORIDE 25 MG: 9 INJECTION INTRAMUSCULAR; INTRAVENOUS; SUBCUTANEOUS at 07:46

## 2017-11-30 RX ADMIN — SODIUM CHLORIDE, PRESERVATIVE FREE 300 UNITS: 5 INJECTION INTRAVENOUS at 08:31

## 2017-11-30 NOTE — PROGRESS NOTES
Arrived to the [facility] ambulatory. Labs and phenergan completed. Patient tolerated well. Any issues or concerns during appointment: no.  Patient aware of next infusion appointment on  12/7 at 12. Discharged to home ambulatory.

## 2017-12-07 ENCOUNTER — HOSPITAL ENCOUNTER (OUTPATIENT)
Dept: INFUSION THERAPY | Age: 47
Discharge: HOME | End: 2017-12-07
Payer: COMMERCIAL

## 2017-12-07 VITALS
BODY MASS INDEX: 32.56 KG/M2 | RESPIRATION RATE: 18 BRPM | OXYGEN SATURATION: 94 % | SYSTOLIC BLOOD PRESSURE: 116 MMHG | HEART RATE: 99 BPM | DIASTOLIC BLOOD PRESSURE: 65 MMHG | WEIGHT: 178 LBS | TEMPERATURE: 98.3 F

## 2017-12-07 LAB — MAGNESIUM SERPL-MCNC: 1.8 MG/DL (ref 1.8–2.4)

## 2017-12-07 PROCEDURE — 83735 ASSAY OF MAGNESIUM: CPT | Performed by: INTERNAL MEDICINE

## 2017-12-07 PROCEDURE — 74011000250 HC RX REV CODE- 250: Performed by: INTERNAL MEDICINE

## 2017-12-07 PROCEDURE — 96365 THER/PROPH/DIAG IV INF INIT: CPT

## 2017-12-07 PROCEDURE — 77030003560 HC NDL HUBR BARD -A

## 2017-12-07 PROCEDURE — 96375 TX/PRO/DX INJ NEW DRUG ADDON: CPT

## 2017-12-07 PROCEDURE — 74011250636 HC RX REV CODE- 250/636: Performed by: INTERNAL MEDICINE

## 2017-12-07 RX ORDER — SODIUM CHLORIDE 0.9 % (FLUSH) 0.9 %
10-40 SYRINGE (ML) INJECTION AS NEEDED
Status: ACTIVE | OUTPATIENT
Start: 2017-12-07 | End: 2017-12-07

## 2017-12-07 RX ORDER — HEPARIN 100 UNIT/ML
500 SYRINGE INTRAVENOUS AS NEEDED
Status: COMPLETED | OUTPATIENT
Start: 2017-12-07 | End: 2017-12-07

## 2017-12-07 RX ORDER — SODIUM CHLORIDE 9 MG/ML
500 INJECTION, SOLUTION INTRAVENOUS ONCE
Status: COMPLETED | OUTPATIENT
Start: 2017-12-07 | End: 2017-12-07

## 2017-12-07 RX ADMIN — SODIUM CHLORIDE 25 MG: 9 INJECTION INTRAMUSCULAR; INTRAVENOUS; SUBCUTANEOUS at 07:52

## 2017-12-07 RX ADMIN — SODIUM CHLORIDE 500 ML: 900 INJECTION, SOLUTION INTRAVENOUS at 07:49

## 2017-12-07 RX ADMIN — Medication 10 ML: at 07:49

## 2017-12-07 RX ADMIN — SODIUM CHLORIDE, PRESERVATIVE FREE 500 UNITS: 5 INJECTION INTRAVENOUS at 09:06

## 2017-12-07 RX ADMIN — FERRIC CARBOXYMALTOSE INJECTION 750 MG: 50 INJECTION, SOLUTION INTRAVENOUS at 08:06

## 2017-12-07 RX ADMIN — Medication 10 ML: at 09:06

## 2017-12-07 NOTE — PROGRESS NOTES
Arrived to the [facility]. 500 ml NS, IV Phenergan and IV iron completed. Patient tolerated well. Observed patient x 30 minutes, no reaction. Magnesium 1.8, no replacement needed. Port left accessed for use at home. Any issues or concerns during appointment: None. Patient aware of next infusion appointment on 12/14 (date) at [facility] (time). Discharged ambulatory in stable condition.

## 2017-12-07 NOTE — PROGRESS NOTES
[facility] 500 ml NS, IV Phenergan and IV iron completed. Patient tolerated well. Magnesium 1.8 no replacement needed. Observed patient x 30 minutes post iron infusion, no reaction. Any issues or concerns during appointment: None. Patient aware of next infusion appointment on 12/14 (date) at [address] [place] [address] (time). Discharged ambulatory in stable conditon.

## 2017-12-14 ENCOUNTER — HOSPITAL ENCOUNTER (OUTPATIENT)
Dept: INFUSION THERAPY | Age: 47
Discharge: HOME | End: 2017-12-14
Payer: COMMERCIAL

## 2017-12-14 VITALS
WEIGHT: 176.8 LBS | OXYGEN SATURATION: 96 % | BODY MASS INDEX: 32.34 KG/M2 | HEART RATE: 99 BPM | RESPIRATION RATE: 18 BRPM | DIASTOLIC BLOOD PRESSURE: 85 MMHG | TEMPERATURE: 99.9 F | SYSTOLIC BLOOD PRESSURE: 126 MMHG

## 2017-12-14 LAB — MAGNESIUM SERPL-MCNC: 1.9 MG/DL (ref 1.8–2.4)

## 2017-12-14 PROCEDURE — 77030003560 HC NDL HUBR BARD -A

## 2017-12-14 PROCEDURE — 74011250636 HC RX REV CODE- 250/636: Performed by: INTERNAL MEDICINE

## 2017-12-14 PROCEDURE — 74011000250 HC RX REV CODE- 250: Performed by: INTERNAL MEDICINE

## 2017-12-14 PROCEDURE — 96375 TX/PRO/DX INJ NEW DRUG ADDON: CPT

## 2017-12-14 PROCEDURE — 83735 ASSAY OF MAGNESIUM: CPT | Performed by: INTERNAL MEDICINE

## 2017-12-14 PROCEDURE — 96365 THER/PROPH/DIAG IV INF INIT: CPT

## 2017-12-14 RX ORDER — SODIUM CHLORIDE 0.9 % (FLUSH) 0.9 %
5-10 SYRINGE (ML) INJECTION AS NEEDED
Status: DISCONTINUED | OUTPATIENT
Start: 2017-12-14 | End: 2017-12-18 | Stop reason: HOSPADM

## 2017-12-14 RX ORDER — HEPARIN 100 UNIT/ML
500 SYRINGE INTRAVENOUS AS NEEDED
Status: DISPENSED | OUTPATIENT
Start: 2017-12-14 | End: 2017-12-14

## 2017-12-14 RX ADMIN — SODIUM CHLORIDE, PRESERVATIVE FREE 500 UNITS: 5 INJECTION INTRAVENOUS at 09:29

## 2017-12-14 RX ADMIN — FERRIC CARBOXYMALTOSE INJECTION 750 MG: 50 INJECTION, SOLUTION INTRAVENOUS at 07:42

## 2017-12-14 RX ADMIN — PROMETHAZINE HYDROCHLORIDE 25 MG: 25 INJECTION INTRAMUSCULAR; INTRAVENOUS at 08:14

## 2017-12-14 RX ADMIN — SODIUM CHLORIDE 500 ML: 900 INJECTION, SOLUTION INTRAVENOUS at 07:41

## 2017-12-14 RX ADMIN — Medication 10 ML: at 09:28

## 2017-12-14 NOTE — PROGRESS NOTES
Arrived to the [facility]. Injectafer completed. Patient tolerated well. Any issues or concerns during appointment: none. Patient aware of next infusion appointment on 12/21 (date) at [address] [place] [address] (time). Discharged home.

## 2017-12-21 ENCOUNTER — ANESTHESIA EVENT (OUTPATIENT)
Dept: ENDOSCOPY | Age: 47
End: 2017-12-21
Payer: COMMERCIAL

## 2017-12-21 ENCOUNTER — HOSPITAL ENCOUNTER (OUTPATIENT)
Dept: INFUSION THERAPY | Age: 47
Discharge: HOME | End: 2017-12-21
Payer: COMMERCIAL

## 2017-12-21 VITALS
SYSTOLIC BLOOD PRESSURE: 123 MMHG | BODY MASS INDEX: 32.63 KG/M2 | DIASTOLIC BLOOD PRESSURE: 69 MMHG | OXYGEN SATURATION: 98 % | TEMPERATURE: 98.6 F | WEIGHT: 178.4 LBS | RESPIRATION RATE: 18 BRPM | HEART RATE: 102 BPM

## 2017-12-21 LAB — MAGNESIUM SERPL-MCNC: 1.7 MG/DL (ref 1.8–2.4)

## 2017-12-21 PROCEDURE — 83735 ASSAY OF MAGNESIUM: CPT | Performed by: INTERNAL MEDICINE

## 2017-12-21 PROCEDURE — 74011250636 HC RX REV CODE- 250/636: Performed by: INTERNAL MEDICINE

## 2017-12-21 PROCEDURE — 77030003560 HC NDL HUBR BARD -A

## 2017-12-21 PROCEDURE — 96365 THER/PROPH/DIAG IV INF INIT: CPT

## 2017-12-21 PROCEDURE — 74011000250 HC RX REV CODE- 250: Performed by: INTERNAL MEDICINE

## 2017-12-21 PROCEDURE — 96375 TX/PRO/DX INJ NEW DRUG ADDON: CPT

## 2017-12-21 RX ORDER — SODIUM CHLORIDE 9 MG/ML
500 INJECTION, SOLUTION INTRAVENOUS ONCE
Status: COMPLETED | OUTPATIENT
Start: 2017-12-21 | End: 2017-12-21

## 2017-12-21 RX ORDER — SODIUM CHLORIDE 0.9 % (FLUSH) 0.9 %
10-40 SYRINGE (ML) INJECTION AS NEEDED
Status: DISCONTINUED | OUTPATIENT
Start: 2017-12-21 | End: 2017-12-25 | Stop reason: HOSPADM

## 2017-12-21 RX ORDER — SODIUM CHLORIDE, SODIUM LACTATE, POTASSIUM CHLORIDE, CALCIUM CHLORIDE 600; 310; 30; 20 MG/100ML; MG/100ML; MG/100ML; MG/100ML
100 INJECTION, SOLUTION INTRAVENOUS CONTINUOUS
Status: CANCELLED | OUTPATIENT
Start: 2017-12-21

## 2017-12-21 RX ORDER — HEPARIN 100 UNIT/ML
500 SYRINGE INTRAVENOUS AS NEEDED
Status: DISPENSED | OUTPATIENT
Start: 2017-12-21 | End: 2017-12-21

## 2017-12-21 RX ORDER — SODIUM CHLORIDE 0.9 % (FLUSH) 0.9 %
5-10 SYRINGE (ML) INJECTION AS NEEDED
Status: CANCELLED | OUTPATIENT
Start: 2017-12-21

## 2017-12-21 RX ORDER — FAMOTIDINE 20 MG/1
20 TABLET, FILM COATED ORAL AS NEEDED
Status: CANCELLED | OUTPATIENT
Start: 2017-12-21

## 2017-12-21 RX ORDER — MAGNESIUM SULFATE 1 G/100ML
1 INJECTION INTRAVENOUS ONCE
Status: COMPLETED | OUTPATIENT
Start: 2017-12-21 | End: 2017-12-21

## 2017-12-21 RX ADMIN — SODIUM CHLORIDE 25 MG: 9 INJECTION INTRAMUSCULAR; INTRAVENOUS; SUBCUTANEOUS at 07:37

## 2017-12-21 RX ADMIN — MAGNESIUM SULFATE HEPTAHYDRATE 1 G: 1 INJECTION, SOLUTION INTRAVENOUS at 08:07

## 2017-12-21 RX ADMIN — Medication 10 ML: at 09:16

## 2017-12-21 RX ADMIN — Medication 10 ML: at 07:30

## 2017-12-21 RX ADMIN — SODIUM CHLORIDE 500 ML: 900 INJECTION, SOLUTION INTRAVENOUS at 07:30

## 2017-12-21 RX ADMIN — SODIUM CHLORIDE, PRESERVATIVE FREE 500 UNITS: 5 INJECTION INTRAVENOUS at 09:16

## 2017-12-21 NOTE — PROGRESS NOTES
Arrived to the [facility]. IVF,magnesium replacements completed. Patient tolerated well. Any issues or concerns during appointment: none. Patient aware of next infusion appointment on 12/28  at 0705 . Dischargedambulatory.

## 2017-12-22 ENCOUNTER — HOSPITAL ENCOUNTER (OUTPATIENT)
Age: 47
Setting detail: OUTPATIENT SURGERY
Discharge: HOME | End: 2017-12-22
Attending: INTERNAL MEDICINE | Admitting: INTERNAL MEDICINE
Payer: COMMERCIAL

## 2017-12-22 ENCOUNTER — APPOINTMENT (OUTPATIENT)
Dept: GENERAL RADIOLOGY | Age: 47
End: 2017-12-22
Attending: INTERNAL MEDICINE
Payer: COMMERCIAL

## 2017-12-22 ENCOUNTER — ANESTHESIA (OUTPATIENT)
Dept: ENDOSCOPY | Age: 47
End: 2017-12-22
Payer: COMMERCIAL

## 2017-12-22 VITALS
HEIGHT: 62 IN | HEART RATE: 77 BPM | WEIGHT: 182 LBS | OXYGEN SATURATION: 97 % | TEMPERATURE: 98.6 F | BODY MASS INDEX: 33.49 KG/M2 | RESPIRATION RATE: 18 BRPM | SYSTOLIC BLOOD PRESSURE: 114 MMHG | DIASTOLIC BLOOD PRESSURE: 65 MMHG

## 2017-12-22 LAB
GLUCOSE BLD STRIP.AUTO-MCNC: 105 MG/DL (ref 65–100)
GLUCOSE BLD STRIP.AUTO-MCNC: 87 MG/DL (ref 65–100)

## 2017-12-22 PROCEDURE — C1726 CATH, BAL DIL, NON-VASCULAR: HCPCS | Performed by: INTERNAL MEDICINE

## 2017-12-22 PROCEDURE — 76060000031 HC ANESTHESIA FIRST 0.5 HR: Performed by: INTERNAL MEDICINE

## 2017-12-22 PROCEDURE — 74011250636 HC RX REV CODE- 250/636: Performed by: ANESTHESIOLOGY

## 2017-12-22 PROCEDURE — 74011000250 HC RX REV CODE- 250

## 2017-12-22 PROCEDURE — 76040000025: Performed by: INTERNAL MEDICINE

## 2017-12-22 PROCEDURE — 82962 GLUCOSE BLOOD TEST: CPT

## 2017-12-22 PROCEDURE — 77030031722: Performed by: INTERNAL MEDICINE

## 2017-12-22 PROCEDURE — 74011250636 HC RX REV CODE- 250/636: Performed by: INTERNAL MEDICINE

## 2017-12-22 PROCEDURE — 74011250636 HC RX REV CODE- 250/636

## 2017-12-22 RX ORDER — HEPARIN 100 UNIT/ML
500 SYRINGE INTRAVENOUS ONCE
Status: COMPLETED | OUTPATIENT
Start: 2017-12-22 | End: 2017-12-22

## 2017-12-22 RX ORDER — PROPOFOL 10 MG/ML
INJECTION, EMULSION INTRAVENOUS
Status: DISCONTINUED | OUTPATIENT
Start: 2017-12-22 | End: 2017-12-22 | Stop reason: HOSPADM

## 2017-12-22 RX ORDER — TRIAMCINOLONE ACETONIDE 40 MG/ML
40 INJECTION, SUSPENSION INTRA-ARTICULAR; INTRAMUSCULAR
Status: COMPLETED | OUTPATIENT
Start: 2017-12-22 | End: 2017-12-22

## 2017-12-22 RX ORDER — MIDAZOLAM HYDROCHLORIDE 1 MG/ML
1 INJECTION, SOLUTION INTRAMUSCULAR; INTRAVENOUS ONCE
Status: COMPLETED | OUTPATIENT
Start: 2017-12-22 | End: 2017-12-22

## 2017-12-22 RX ORDER — PROPOFOL 10 MG/ML
INJECTION, EMULSION INTRAVENOUS AS NEEDED
Status: DISCONTINUED | OUTPATIENT
Start: 2017-12-22 | End: 2017-12-22 | Stop reason: HOSPADM

## 2017-12-22 RX ORDER — LIDOCAINE HYDROCHLORIDE 20 MG/ML
INJECTION, SOLUTION EPIDURAL; INFILTRATION; INTRACAUDAL; PERINEURAL AS NEEDED
Status: DISCONTINUED | OUTPATIENT
Start: 2017-12-22 | End: 2017-12-22 | Stop reason: HOSPADM

## 2017-12-22 RX ORDER — SODIUM CHLORIDE, SODIUM LACTATE, POTASSIUM CHLORIDE, CALCIUM CHLORIDE 600; 310; 30; 20 MG/100ML; MG/100ML; MG/100ML; MG/100ML
INJECTION, SOLUTION INTRAVENOUS
Status: DISCONTINUED | OUTPATIENT
Start: 2017-12-22 | End: 2017-12-22 | Stop reason: HOSPADM

## 2017-12-22 RX ADMIN — SODIUM CHLORIDE, PRESERVATIVE FREE 500 UNITS: 5 INJECTION INTRAVENOUS at 11:15

## 2017-12-22 RX ADMIN — LIDOCAINE HYDROCHLORIDE 60 MG: 20 INJECTION, SOLUTION EPIDURAL; INFILTRATION; INTRACAUDAL; PERINEURAL at 09:29

## 2017-12-22 RX ADMIN — TRIAMCINOLONE ACETONIDE 40 MG: 40 INJECTION, SUSPENSION INTRA-ARTICULAR; INTRAMUSCULAR at 09:47

## 2017-12-22 RX ADMIN — SODIUM CHLORIDE, SODIUM LACTATE, POTASSIUM CHLORIDE, CALCIUM CHLORIDE: 600; 310; 30; 20 INJECTION, SOLUTION INTRAVENOUS at 09:25

## 2017-12-22 RX ADMIN — PROPOFOL 40 MG: 10 INJECTION, EMULSION INTRAVENOUS at 09:29

## 2017-12-22 RX ADMIN — MIDAZOLAM HYDROCHLORIDE 1 MG: 1 INJECTION, SOLUTION INTRAMUSCULAR; INTRAVENOUS at 08:49

## 2017-12-22 RX ADMIN — PROPOFOL 200 MCG/KG/MIN: 10 INJECTION, EMULSION INTRAVENOUS at 09:29

## 2017-12-22 NOTE — IP AVS SNAPSHOT
Daniela Hernandez 
 
 
 [address] [facility] [address] [facility] 
[phone] Patient: Dorian Juarez MRN: [id number] :1970 About your hospitalization You were admitted on:  2017 You last received care in the:  [facility] ENDOSCOPY You were discharged on:  2017 Why you were hospitalized Your primary diagnosis was:  Not on File Things You Need To Do (next 8 weeks) Thursday Dec 28, 2017 Infusion Lab with LAB CK at  7:05 AM  
Where:  [address] [facility]) Infusion with NUR2 at  7:15 AM  
Where:  [address] [facility])  Infusion Lab with LAB CK at  7:05 AM  
Where:  [address] [facility]) Infusion with SS at  7:15 AM  
Where:  [address] [facility])  Infusion Lab with LAB CK at  7:05 AM  
Where:  [address] [facility]) Infusion with SS at  7:15 AM  
Where:  [address] [facility]) Monday Aris 15, 2018 [facility] PHONE ASSESSMENT with [facility] PHONE APPOINTMENT at 11:00 AM  
Date/time displayed does not reflect when your phone assessment will be completed. Where:  [facility] Pre Assessment [facility] OR PRE ASSESSMENT)  Infusion Lab with LAB CK at  7:05 AM  
Where:  [address] [facility]) Infusion with SS at  7:15 AM  
Where:  [address] [facility])  Infusion Lab with LAB CK at  7:05 AM  
Where:  [address] [facility]) Infusion with SS at  7:15 AM  
Where:  [address] [facility]) Discharge Orders None A check cindy indicates which time of day the medication should be taken. My Medications ASK your physician about these medications Instructions Each Dose to Equal  
 Morning Noon Evening Bedtime  
 0.9% sodium chloride solution Your last dose was: Your next dose is:    
   
   
 by IntraVENous route daily. Gives to herself via port at home daily BENADRYL 25 mg capsule Generic drug:  diphenhydrAMINE Your last dose was: Your next dose is: Take 25 mg by mouth every six (6) hours as needed. 25 mg  
    
   
   
   
  
 CARAFATE 100 mg/mL suspension Generic drug:  sucralfate Your last dose was: Your next dose is: TAKE 10 ML BY MOUTH 4 TIMES A DAY EVERY DAY ON A EMPTY STOMACH 1 HR BEFORE MEALS AND AT BEDTIME  
     
   
   
   
  
 citalopram 10 mg tablet Commonly known as:  Paradise Hernandez Your last dose was: Your next dose is: TAKE 1 TABLET BY MOUTH DAILY CREON PO Your last dose was: Your next dose is: Take 36,000 g by mouth Before breakfast, lunch, and dinner. [address] g  
    
   
   
   
  
 fentaNYL 100 mcg/hr PATCH Commonly known as:  Aisha Gutierrez Your last dose was: Your next dose is:    
   
   
 1 Patch by TransDERmal route every seventy-two (72) hours. 1 Patch  
    
   
   
   
  
 gabapentin 250 mg/5 mL solution Commonly known as:  NEURONTIN Your last dose was: Your next dose is: Take 6 mg by mouth two (2) times a day. Take day of surgery per anesthesia protocol. -since pt drinks pt stated she will hold until she gets home 6 mg  
    
   
   
   
  
 glucagon 1 mg injection Commonly known as:  Lauren Locke Your last dose was: Your next dose is:    
   
   
 1 mg by IntraMUSCular route. 1 mg HYDROMORPHONE (BULK) Your last dose was: Your next dose is:    
   
   
 1 mg by IntraVENous route daily as needed. 1 mg  
    
   
   
   
  
 hyoscyamine SL 0.125 mg SL tablet Commonly known as:  LEVSIN/SL Your last dose was: Your next dose is:    
   
   
 0.125 mg by SubLINGual route every six (6) hours as needed for Cramping.  
 0.125 mg  
    
   
   
   
  
 insulin aspart 100 unit/mL injection Commonly known as:  Deitra Moose Your last dose was: Your next dose is:    
   
   
 Use as directed LORazepam 1 mg tablet Commonly known as:  ATIVAN Your last dose was: Your next dose is: Take 1 Tab by mouth three (3) times daily as needed for Anxiety. 1 mg MIRALAX 17 gram packet Generic drug:  polyethylene glycol Your last dose was: Your next dose is: Take 17 g by mouth daily. 17 g NexIUM 40 mg capsule Generic drug:  esomeprazole Your last dose was: Your next dose is: Take 40 mg by mouth two (2) times a day. Take day of surgery per anesthesia protocol. 40 mg  
    
   
   
   
  
 oxyCODONE IR 10 mg Tab immediate release tablet Commonly known as:  Alice Loredo Your last dose was: Your next dose is: Take 10 mg by mouth every eight (8) hours as needed. Take day of surgery per anesthesia protocol. 10 mg  
    
   
   
   
  
 * promethazine 25 mg suppository Commonly known as:  PHENERGAN Your last dose was: Your next dose is: Insert 25 mg into rectum as needed for Nausea. 25 mg  
    
   
   
   
  
 * promethazine 25 mg tablet Commonly known as:  PHENERGAN Your last dose was: Your next dose is: Take 1 Tab by mouth every six (6) hours as needed. 25 mg PROTONIX 40 mg injection Generic drug:  pantoprazole Your last dose was: Your next dose is:    
   
   
 40 mg by IntraVENous route every morning. Take day of surgery per anesthesia protocol. 40 mg  
    
   
   
   
  
 TRESIBA FLEXTOUCH U-100 100 unit/mL (3 mL) Inpn Generic drug:  insulin degludec Your last dose was: Your next dose is:    
   
   
 30 Units by SubCUTAneous route nightly. 30 Units ZOFRAN (AS HYDROCHLORIDE) 8 mg tablet Generic drug:  ondansetron hcl Your last dose was: Your next dose is: Take 8 mg by mouth every eight (8) hours as needed for Nausea. 8 mg  
    
   
   
   
  
 zolpidem 10 mg tablet Commonly known as:  AMBIEN Your last dose was: Your next dose is: TAKE 1 TABLET BY MOUTH EVERY NIGHT AS NEEDED FOR SLEEP * Notice: This list has 2 medication(s) that are the same as other medications prescribed for you. Read the directions carefully, and ask your doctor or other care provider to review them with you. Discharge Instructions Gastrointestinal Esophagogastroduodenoscopy (EGD) - Upper Exam Discharge Instructions 1. Call Dr. Amadeo Hall at [phone] for any problems or questions. 2. Contact the doctor's office for follow up appointment as directed. 3. Medication may cause drowsiness for several hours, therefore, do not drive or operate machinery for remainder of the day. 4. No alcohol today. 5. Ordinarily, you may resume regular diet and activity after exam unless otherwise specified by your physician. 6. For mild soreness in your throat you may use throat lozenges or warm salt-water gargles as needed. Any additional instructions: Follow up as needed-continue Carafate and Nexium as prescribed Introducing [facility]!    
 [facility] introduces RootsRated patient portal. Now you can access parts of your medical record, email your doctor's office, and request medication refills online. 1. In your internet browser, go to [email or web address] 2. Click on the First Time User? Click Here link in the Sign In box. You will see the New Member Sign Up page. 3. Enter your Freespee Access Code exactly as it appears below. You will not need to use this code after youve completed the sign-up process. If you do not sign up before the expiration date, you must request a new code. · Freespee Access Code: [email or web address] Expires: 1/30/2018  9:22 AM 
 
4. Enter the last four digits of your Social Security Number (xxxx) and Date of Birth (mm/dd/yyyy) as indicated and click Submit. You will be taken to the next sign-up page. 5. Create a Freespee ID. This will be your Freespee login ID and cannot be changed, so think of one that is secure and easy to remember. 6. Create a Freespee password. You can change your password at any time. 7. Enter your Password Reset Question and Answer. This can be used at a later time if you forget your password. 8. Enter your e-mail address. You will receive e-mail notification when new information is available in [address] 9. Click Sign Up. You can now view and download portions of your medical record. 10. Click the Download Summary menu link to download a portable copy of your medical information. If you have questions, please visit the Frequently Asked Questions section of the Freespee website. Remember, Freespee is NOT to be used for urgent needs. For medical emergencies, dial 911. Now available from your iPhone and Android! Providers Seen During Your Hospitalization Provider Specialty Primary office phone Danielle Cason MD Gastroenterology [phone] Your Primary Care Physician (PCP) Primary Care Physician Office Phone Office Fax [address] [facility], [address] [place] [address] [phone] You are allergic to the following Allergen Reactions Insulin Lispro Not Reported This Time Pt states not allergic Tape (Adhesive) Rash Itching Barium Iodide Nausea and Vomiting Contrast Dye (Iodine) Shortness of Breath Oral contrast-experienced flushing,shortness of breath, sweatiness; (patient has received oral contrast many times at [address] [place] [address]) Flagyl (Metronidazole) Nausea and Vomiting Metformin Nausea Only Valtrex (Valacyclovir) Unknown (comments) PATIENT STATES \"NOT ALLERGIC\" Insulins Nausea and Vomiting Humalog only Recent Documentation Height Weight BMI OB Status Smoking Status 1.575 m 82.6 kg 33.29 kg/m2 Hysterectomy Former Smoker Emergency Contacts Name Discharge Info Relation Home Work Mobile Jorge Treadwell DISCHARGE CAREGIVER [3] Spouse [3] [phone]  [address] [place] [address] CAREGIVER [3] Father [15] [phone] Sigmund Bis CAREGIVER [3] Mother [14] [phone] Patient Belongings The following personal items are in your possession at time of discharge: 
  Dental Appliances: None  Visual Aid: None Please provide this summary of care documentation to your next provider. Signatures-by signing, you are acknowledging that this After Visit Summary has been reviewed with you and you have received a copy. Patient Signature:  ____________________________________________________________ Date:  ____________________________________________________________  
  
[facility] Provider Signature:  ____________________________________________________________ Date:  ____________________________________________________________

## 2017-12-22 NOTE — H&P
Gastroenterology Outpatient History and Physical    Patient: Hiro Ariaso    Physician: Zahida Israel MD    Vital Signs: Blood pressure 125/75, pulse 84, temperature 98.9 °F (37.2 °C), resp. rate 18, height 5' 2\" (1.575 m), weight 82.6 kg (182 lb), SpO2 95 %. Allergies:    Allergies   Allergen Reactions    Insulin Lispro Not Reported This Time     Pt states not allergic     Tape [Adhesive] Rash and Itching    Barium Iodide Nausea and Vomiting    Contrast Dye [Iodine] Shortness of Breath     Oral contrast-experienced flushing,shortness of breath, sweatiness; (patient has received oral contrast many times at [address] [place] [address])    Flagyl [Metronidazole] Nausea and Vomiting    Metformin Nausea Only    Valtrex [Valacyclovir] Unknown (comments)     PATIENT STATES \"NOT ALLERGIC\"    Insulins Nausea and Vomiting     Humalog only       Chief Complaint: Esophageal stricture    History of Present Illness: As Above    Justification for Procedure: As Above    History:  Past Medical History:   Diagnosis Date    Abscess, abdomen ([address]     Anemia     denies hx of blood transfusions-- Fe infusions 2015    Anxiety     Asthma     allergy induced, denies any inhaler    C. difficile diarrhea     in 2856 after complicated ERCP    Cervical dysplasia 1990s    Chronic insomnia     Chronic pain     all over     Chronic pancreatitis ([address]     Depression     Endometriosis     Esophageal stricture 2017    Fibromyalgia     SANA (generalized anxiety disorder)     GERD (gastroesophageal reflux disease)     daily meds--- nexium daily as needed- Protonix IV via port daily--     HLD (hyperlipidemia)     Hypertension     pt denies dx 11/20/17    IBS (irritable bowel syndrome)     Migraine headache     Nausea & vomiting     pt reports she does better with phenergan than zofran - causes HA    Nonalcoholic fatty liver disease     PUD (peptic ulcer disease) 06/2017    Hx of     Sphincter of Oddi dysfunction     Type 2 diabetes mellitus ([facility]     insulin: fbs avg. 200: pt denies episodes of hypo: A1c 13.1 (7/2017)      Past Surgical History:   Procedure Laterality Date    ABDOMEN SURGERY PROC UNLISTED  last one placed  2012    Hx of pancreatic stent, multiple    ABDOMEN SURGERY PROC UNLISTED  1997    lap nissen    ABDOMEN SURGERY PROC UNLISTED  4/13    explor lap    HX COLONOSCOPY      HX ENDOSCOPY  2017    [address]    HX ERCP  3/5/2013    resulted in perforated duodenum    HX HYSTERECTOMY  1998    ovaries remain    HX LAP CHOLECYSTECTOMY  1997    HX LAPAROTOMY  3/5/2013    exploratory for duodenal perforation with ERCP    HX ORTHOPAEDIC      right finger surgery 11/2017    HX UROLOGICAL  4/25/13 at [facility]-- stent removed 6-27-13. Dr Mulugeta Bryant      port in place      Social History     Social History    Marital status:      Spouse name: N/A    Number of children: N/A    Years of education: N/A     Social History Main Topics    Smoking status: Former Smoker     Packs/day: 1.00     Years: 3.00     Quit date: 4/24/1993    Smokeless tobacco: Never Used    Alcohol use No    Drug use: No    Sexual activity: Not Asked     Other Topics Concern    None     Social History Narrative      Family History   Problem Relation Age of Onset    Diabetes Mother     Heart Disease Mother     Hypertension Mother     Diabetes Father     Heart Disease Father     Hypertension Father        Medications:   Prior to Admission medications    Medication Sig Start Date End Date Taking? Authorizing Provider   CARAFATE 100 mg/mL suspension TAKE 10 ML BY MOUTH 4 TIMES A DAY EVERY DAY ON A EMPTY STOMACH 1 HR BEFORE MEALS AND AT BEDTIME 9/7/17  Yes Historical Provider   gabapentin (NEURONTIN) 250 mg/5 mL solution Take 6 mg by mouth two (2) times a day.  Take day of surgery per anesthesia protocol. -since pt drinks pt stated she will hold until she gets home   Yes Historical Provider   esomeprazole (NEXIUM) 40 mg capsule Take 40 mg by mouth two (2) times a day. Take day of surgery per anesthesia protocol. Yes Historical Provider   diphenhydrAMINE (BENADRYL) 25 mg capsule Take 25 mg by mouth every six (6) hours as needed. Yes Historical Provider   pantoprazole (PROTONIX) 40 mg injection 40 mg by IntraVENous route every morning. Take day of surgery per anesthesia protocol. Yes Historical Provider   insulin degludec (TRESIBA FLEXTOUCH U-100) 100 unit/mL (3 mL) inpn 30 Units by SubCUTAneous route nightly. Yes Historical Provider   polyethylene glycol (MIRALAX) 17 gram packet Take 17 g by mouth daily. Yes Historical Provider   hyoscyamine SL (LEVSIN/SL) 0.125 mg SL tablet 0.125 mg by SubLINGual route every six (6) hours as needed for Cramping. Yes Historical Provider   oxyCODONE IR (ROXICODONE) 10 mg tab immediate release tablet Take 10 mg by mouth every eight (8) hours as needed. Take day of surgery per anesthesia protocol. Yes Historical Provider   zolpidem (AMBIEN) 10 mg tablet TAKE 1 TABLET BY MOUTH EVERY NIGHT AS NEEDED FOR SLEEP  Patient taking differently: Take 10 mg by mouth nightly. 6/7/16  Yes Usha Zavala NP   citalopram (CELEXA) 10 mg tablet TAKE 1 TABLET BY MOUTH DAILY  Patient taking differently: 20 mg 5/24/16  Yes Leslee Wise MD   LORazepam (ATIVAN) 1 mg tablet Take 1 Tab by mouth three (3) times daily as needed for Anxiety. Patient taking differently: Take 1 mg by mouth as needed for Anxiety. Take day of surgery per anesthesia protocol. 12/1/15  Yes Leslee Wise MD   fentaNYL (DURAGESIC) 100 mcg/hr PATCH 1 Patch by TransDERmal route every seventy-two (72) hours. Yes Historical Provider   promethazine (PHENERGAN) 25 mg tablet Take 1 Tab by mouth every six (6) hours as needed. Patient taking differently: Take 25 mg by mouth as needed. 6/27/14  Yes NIURKA Sal   promethazine (PHENERGAN) 25 mg suppository Insert 25 mg into rectum as needed for Nausea.    Yes Historical Provider   HYDROMORPHONE HCL (HYDROMORPHONE, BULK,) 1 mg by IntraVENous route daily as needed. Historical Provider   glucagon (GLUCAGEN) 1 mg injection 1 mg by IntraMUSCular route. 5/8/17   Historical Provider   0.9% sodium chloride solution by IntraVENous route daily. Gives to herself via port at home daily    Historical Provider   insulin aspart (NOVOLOG) 100 unit/mL injection Use as directed  Patient taking differently: by SubCUTAneous route Before breakfast, lunch, and dinner. Use as directed: sliding scale 6/7/16   Harley Kolb, ABIEL   LIPASE/PROTEASE/AMYLASE (CREON PO) Take 36,000 g by mouth Before breakfast, lunch, and dinner. Historical Provider   ondansetron hcl (ZOFRAN) 8 mg tablet Take 8 mg by mouth every eight (8) hours as needed for Nausea.     Historical Provider       Physical Exam:         Heart: regular rate and rhythm, S1, S2 normal, no murmur, click, rub or gallop   Lungs: chest clear, no wheezing, rales, normal symmetric air entry, Heart exam - S1, S2 normal, no murmur, no gallop, rate regular   Abdominal: Bowel sounds are normal, liver is not enlarged, spleen is not enlarged           Findings/Diagnosis: Esophageal stricture/dysphagia        Signed:  Claudean Prose, MD 12/22/2017

## 2017-12-22 NOTE — ROUTINE PROCESS
Pt. Discharged to car by Kortney Osorio with family . Vital signs stable. Able to tolerate PO fluids. Passing gas.  Seen by MD.

## 2017-12-22 NOTE — ANESTHESIA POSTPROCEDURE EVALUATION
Post-Anesthesia Evaluation and Assessment    Patient: Chica Mace MRN: [id number]  SSN: [id number]    YOB: 1970  Age: 50 y.o. Sex: female       Cardiovascular Function/Vital Signs  Visit Vitals    /65    Pulse 77    Temp 37 °C (98.6 °F)    Resp 18    Ht 5' 2\" (1.575 m)    Wt 82.6 kg (182 lb)    SpO2 97%    BMI 33.29 kg/m2       Patient is status post total IV anesthesia anesthesia for Procedure(s):  ESOPHAGOGASTRODUODENOSCOPY (EGD)  /   BMI =32  ESOPHAGEAL DILATION  INJECTION. Nausea/Vomiting: None    Postoperative hydration reviewed and adequate. Pain:  Pain Scale 1: Numeric (0 - 10) (12/22/17 1102)  Pain Intensity 1: 0 (12/22/17 0841)   Managed    Neurological Status: At baseline    Mental Status and Level of Consciousness: Arousable    Pulmonary Status:   O2 Device: Room air (12/22/17 1102)   Adequate oxygenation and airway patent    Complications related to anesthesia: None    Post-anesthesia assessment completed.  No concerns    Signed By: Thomas Brownlee MD     December 22, 2017

## 2017-12-22 NOTE — PROCEDURES
Endoscopic Gastroduodenoscopy Procedure Note    Indications:  Dysphagia/esophageal stricture    Anesthesia/Sedation: TIVA    Pre-Procedure Physical:    Current Facility-Administered Medications   Medication Dose Route Frequency    famotidine (PF) (PEPCID) 20 mg in sodium chloride 0.9 % 10 mL injection  20 mg IntraVENous ONCE PRN    triamcinolone acetonide (KENALOG-40) 40 mg/mL injection 40 mg  40 mg Other NOW     Facility-Administered Medications Ordered in Other Encounters   Medication Dose Route Frequency    sodium chloride (NS) flush 10-40 mL  10-40 mL IntraVENous PRN      Insulin lispro; Tape [adhesive]; Barium iodide; Contrast dye [iodine]; Flagyl [metronidazole]; Metformin; Valtrex [valacyclovir]; and Insulins    Patient Vitals for the past 8 hrs:   BP Temp Pulse Resp SpO2 Height Weight   12/22/17 0818 125/75 - - - - - -   12/22/17 0816 - - 84 18 95 % - -   12/22/17 0737 - 98.9 °F (37.2 °C) - - - 5' 2\" (1.575 m) 82.6 kg (182 lb)       Exam      Airway: clear   Heart: normal S1and S2    Lungs: clear bilateral  Abdomen: soft, nontender, bowel sounds present and normal in all quads   Mental Status: awake, alert and oriented to person, place and time          Procedure Details     Informed consent was obtained for the procedure, including conscious sedation. Risks of pancreatitis, infection, perforation, hemorrhage, adverse drug reaction and aspiration were discussed. The patient was placed in the left lateral decubitus position. Based on the pre-procedure assessment, including review of the patient's medical history, medications, allergies, and review of systems, she had been deemed to be an appropriate candidate for conscious sedation; she was therefore sedated with the medications listed below. She was monitored continuously with ECG tracing, pulse oximetry, blood pressure monitoring, and direct observation.       The EGD gastroscope was inserted into the mouth and advanced under direct vision to the second portion of the duodenum. A careful inspection was made as the gastroscope was withdrawn, including a retroflexed view of the proximal stomach; findings and interventions are described below. Appropriate photodocumentation was obtained. Findings:   Esophagus- Normal.  Stomach- Normal.  Duodenum- Normal.    Therapies: None    Specimens: None    Estimated Blood Loss: 0 cc           Complications:   None; patient tolerated the procedure well. Attending Attestation:  I performed the procedure. Impression:        Recommendations:  Endoscopic Gastroduodenoscopy Procedure Note    Indications:    Anesthesia/Sedation: MAC IV     Pre-Procedure Physical:    Current Facility-Administered Medications   Medication Dose Route Frequency    famotidine (PF) (PEPCID) 20 mg in sodium chloride 0.9 % 10 mL injection  20 mg IntraVENous ONCE PRN    triamcinolone acetonide (KENALOG-40) 40 mg/mL injection 40 mg  40 mg Other NOW     Facility-Administered Medications Ordered in Other Encounters   Medication Dose Route Frequency    sodium chloride (NS) flush 10-40 mL  10-40 mL IntraVENous PRN      Insulin lispro; Tape [adhesive]; Barium iodide; Contrast dye [iodine]; Flagyl [metronidazole]; Metformin; Valtrex [valacyclovir]; and Insulins    Patient Vitals for the past 8 hrs:   BP Temp Pulse Resp SpO2 Height Weight   12/22/17 0818 125/75 - - - - - -   12/22/17 0816 - - 84 18 95 % - -   12/22/17 0737 - 98.9 °F (37.2 °C) - - - 5' 2\" (1.575 m) 82.6 kg (182 lb)       Exam      Airway: clear   Heart: normal S1and S2    Lungs: clear bilateral  Abdomen: soft, nontender, bowel sounds present and normal in all quads   Mental Status: awake, alert and oriented to person, place and time          Procedure Details     Informed consent was obtained for the procedure, including conscious sedation. Risks of pancreatitis, infection, perforation, hemorrhage, adverse drug reaction and aspiration were discussed.  The patient was placed in the left lateral decubitus position. Based on the pre-procedure assessment, including review of the patient's medical history, medications, allergies, and review of systems, she had been deemed to be an appropriate candidate for conscious sedation; she was therefore sedated with the medications listed below. She was monitored continuously with ECG tracing, pulse oximetry, blood pressure monitoring, and direct observation. The EGD gastroscope was inserted into the mouth and advanced under direct vision to the second portion of the duodenum. A careful inspection was made as the gastroscope was withdrawn, including a retroflexed view of the proximal stomach; findings and interventions are described below. Appropriate photodocumentation was obtained. Findings:   Esophagus- EG junction stricture noted. Adult scope passed without difficulty. Dilation to 18 mm balloon without complication. Then 4 cc Kenalog injected in 4 quadrants. Patient tolerated procedure well. Stomach/SB--S/P gastojejunostomy without abnormalities    Therapies: Kenalog injection into esophageal stricture    Specimens: None    Estimated Blood Loss: 0 cc           Complications:   None; patient tolerated the procedure well. Attending Attestation:  I performed the procedure.      Impression:  Esophageal stricture      Recommendations:                                    Repeat EGD in 6 to 8 weeks

## 2017-12-22 NOTE — ANESTHESIA PREPROCEDURE EVALUATION
Anesthetic History     PONV          Review of Systems / Medical History  Patient summary reviewed, nursing notes reviewed and pertinent labs reviewed    Pulmonary          Smoker (Former)  Asthma : well controlled       Neuro/Psych         Psychiatric history     Cardiovascular    Hypertension: well controlled              Exercise tolerance: >4 METS     GI/Hepatic/Renal     GERD: well controlled          Comments: Esophageal stricture,  S/p multiple dilations, has had a duodenal rupture and multiple abcesses which have been drained in the past. Endo/Other    Diabetes: poorly controlled, type 1, using insulin    Obesity     Other Findings              Physical Exam    Airway  Mallampati: II  TM Distance: > 6 cm  Neck ROM: normal range of motion   Mouth opening: Normal     Cardiovascular  Regular rate and rhythm,  S1 and S2 normal,  no murmur, click, rub, or gallop  Rhythm: regular  Rate: normal         Dental  No notable dental hx       Pulmonary  Breath sounds clear to auscultation               Abdominal         Other Findings            Anesthetic Plan    ASA: 3  Anesthesia type: total IV anesthesia          Induction: Intravenous  Anesthetic plan and risks discussed with: Patient

## 2017-12-22 NOTE — DISCHARGE INSTRUCTIONS
Gastrointestinal Esophagogastroduodenoscopy (EGD) - Upper Exam Discharge Instructions    1. Call Dr. Kate Gamboa at [phone] for any problems or questions. 2. Contact the doctor's office for follow up appointment as directed. 3. Medication may cause drowsiness for several hours, therefore, do not drive or operate machinery for remainder of the day. 4. No alcohol today. 5. Ordinarily, you may resume regular diet and activity after exam unless otherwise specified by your physician. 6. For mild soreness in your throat you may use throat lozenges or warm salt-water gargles as needed. Any additional instructions:   Follow up as needed-continue Carafate and Nexium as prescribed

## 2017-12-28 ENCOUNTER — HOSPITAL ENCOUNTER (OUTPATIENT)
Dept: INFUSION THERAPY | Age: 47
Discharge: HOME | End: 2017-12-28
Payer: COMMERCIAL

## 2017-12-28 VITALS
OXYGEN SATURATION: 95 % | BODY MASS INDEX: 32.15 KG/M2 | TEMPERATURE: 98.5 F | WEIGHT: 175.8 LBS | SYSTOLIC BLOOD PRESSURE: 133 MMHG | DIASTOLIC BLOOD PRESSURE: 85 MMHG | HEART RATE: 101 BPM | RESPIRATION RATE: 18 BRPM

## 2017-12-28 LAB — MAGNESIUM SERPL-MCNC: 1.8 MG/DL (ref 1.8–2.4)

## 2017-12-28 PROCEDURE — 74011000250 HC RX REV CODE- 250: Performed by: INTERNAL MEDICINE

## 2017-12-28 PROCEDURE — 83735 ASSAY OF MAGNESIUM: CPT | Performed by: INTERNAL MEDICINE

## 2017-12-28 PROCEDURE — 74011250636 HC RX REV CODE- 250/636: Performed by: INTERNAL MEDICINE

## 2017-12-28 PROCEDURE — 96361 HYDRATE IV INFUSION ADD-ON: CPT

## 2017-12-28 PROCEDURE — 77030003560 HC NDL HUBR BARD -A

## 2017-12-28 PROCEDURE — 96374 THER/PROPH/DIAG INJ IV PUSH: CPT

## 2017-12-28 RX ORDER — HEPARIN 100 UNIT/ML
300-500 SYRINGE INTRAVENOUS AS NEEDED
Status: DISPENSED | OUTPATIENT
Start: 2017-12-28 | End: 2017-12-28

## 2017-12-28 RX ORDER — SODIUM CHLORIDE 0.9 % (FLUSH) 0.9 %
10-40 SYRINGE (ML) INJECTION AS NEEDED
Status: DISCONTINUED | OUTPATIENT
Start: 2017-12-28 | End: 2018-01-01 | Stop reason: HOSPADM

## 2017-12-28 RX ADMIN — SODIUM CHLORIDE, PRESERVATIVE FREE 500 UNITS: 5 INJECTION INTRAVENOUS at 08:32

## 2017-12-28 RX ADMIN — SODIUM CHLORIDE 25 MG: 9 INJECTION INTRAMUSCULAR; INTRAVENOUS; SUBCUTANEOUS at 07:40

## 2017-12-28 RX ADMIN — Medication 10 ML: at 08:31

## 2017-12-28 RX ADMIN — SODIUM CHLORIDE 500 ML: 900 INJECTION, SOLUTION INTRAVENOUS at 07:50

## 2017-12-28 NOTE — PROGRESS NOTES
Arrived to the [facility] ambulatory. Labs and phenergan completed. Patient tolerated well. Any issues or concerns during appointment: no.  Patient aware of next infusion appointment on  1/4 at [address] [place] [address]. Discharged to home ambulatory.

## 2018-01-04 ENCOUNTER — HOSPITAL ENCOUNTER (OUTPATIENT)
Dept: INFUSION THERAPY | Age: 48
Discharge: HOME | End: 2018-01-04
Payer: COMMERCIAL

## 2018-01-04 VITALS
RESPIRATION RATE: 18 BRPM | TEMPERATURE: 98.8 F | DIASTOLIC BLOOD PRESSURE: 74 MMHG | OXYGEN SATURATION: 95 % | SYSTOLIC BLOOD PRESSURE: 136 MMHG | HEART RATE: 110 BPM

## 2018-01-04 LAB — MAGNESIUM SERPL-MCNC: 1.9 MG/DL (ref 1.8–2.4)

## 2018-01-04 PROCEDURE — 83735 ASSAY OF MAGNESIUM: CPT | Performed by: INTERNAL MEDICINE

## 2018-01-04 PROCEDURE — 96374 THER/PROPH/DIAG INJ IV PUSH: CPT

## 2018-01-04 PROCEDURE — 74011000250 HC RX REV CODE- 250: Performed by: INTERNAL MEDICINE

## 2018-01-04 PROCEDURE — 77030003560 HC NDL HUBR BARD -A

## 2018-01-04 PROCEDURE — 74011250636 HC RX REV CODE- 250/636: Performed by: INTERNAL MEDICINE

## 2018-01-04 RX ORDER — HEPARIN 100 UNIT/ML
300 SYRINGE INTRAVENOUS AS NEEDED
Status: DISPENSED | OUTPATIENT
Start: 2018-01-04 | End: 2018-01-04

## 2018-01-04 RX ORDER — SODIUM CHLORIDE 0.9 % (FLUSH) 0.9 %
10 SYRINGE (ML) INJECTION AS NEEDED
Status: ACTIVE | OUTPATIENT
Start: 2018-01-04 | End: 2018-01-04

## 2018-01-04 RX ADMIN — SODIUM CHLORIDE, PRESERVATIVE FREE 300 UNITS: 5 INJECTION INTRAVENOUS at 08:11

## 2018-01-04 RX ADMIN — Medication 10 ML: at 07:37

## 2018-01-04 RX ADMIN — SODIUM CHLORIDE 25 MG: 9 INJECTION INTRAMUSCULAR; INTRAVENOUS; SUBCUTANEOUS at 07:27

## 2018-01-04 NOTE — PROGRESS NOTES
Arrived to the [facility]. Labs and IV Phenergan completed. Patient tolerated well. Any issues or concerns during appointment: none. Patient aware of next infusion appointment on 1/11 (date) at 7:05 AM (time). Discharged ambulatory.

## 2018-01-11 ENCOUNTER — HOSPITAL ENCOUNTER (OUTPATIENT)
Dept: INFUSION THERAPY | Age: 48
Discharge: HOME | End: 2018-01-11
Payer: COMMERCIAL

## 2018-01-11 VITALS
OXYGEN SATURATION: 96 % | TEMPERATURE: 97.8 F | DIASTOLIC BLOOD PRESSURE: 79 MMHG | RESPIRATION RATE: 18 BRPM | SYSTOLIC BLOOD PRESSURE: 123 MMHG | HEART RATE: 96 BPM

## 2018-01-11 LAB — MAGNESIUM SERPL-MCNC: 1.8 MG/DL (ref 1.8–2.4)

## 2018-01-11 PROCEDURE — 96374 THER/PROPH/DIAG INJ IV PUSH: CPT

## 2018-01-11 PROCEDURE — 77030003560 HC NDL HUBR BARD -A

## 2018-01-11 PROCEDURE — 74011250636 HC RX REV CODE- 250/636: Performed by: INTERNAL MEDICINE

## 2018-01-11 PROCEDURE — 83735 ASSAY OF MAGNESIUM: CPT | Performed by: INTERNAL MEDICINE

## 2018-01-11 PROCEDURE — 74011000250 HC RX REV CODE- 250: Performed by: INTERNAL MEDICINE

## 2018-01-11 RX ORDER — SODIUM CHLORIDE 0.9 % (FLUSH) 0.9 %
10-40 SYRINGE (ML) INJECTION AS NEEDED
Status: DISCONTINUED | OUTPATIENT
Start: 2018-01-11 | End: 2018-01-15 | Stop reason: HOSPADM

## 2018-01-11 RX ORDER — HEPARIN 100 UNIT/ML
500 SYRINGE INTRAVENOUS AS NEEDED
Status: DISPENSED | OUTPATIENT
Start: 2018-01-11 | End: 2018-01-11

## 2018-01-11 RX ORDER — SODIUM CHLORIDE 9 MG/ML
500 INJECTION, SOLUTION INTRAVENOUS ONCE
Status: COMPLETED | OUTPATIENT
Start: 2018-01-11 | End: 2018-01-11

## 2018-01-11 RX ADMIN — Medication 10 ML: at 07:36

## 2018-01-11 RX ADMIN — SODIUM CHLORIDE 500 ML/HR: 900 INJECTION, SOLUTION INTRAVENOUS at 07:36

## 2018-01-11 RX ADMIN — Medication 10 ML: at 08:43

## 2018-01-11 RX ADMIN — SODIUM CHLORIDE 25 MG: 9 INJECTION INTRAMUSCULAR; INTRAVENOUS; SUBCUTANEOUS at 07:39

## 2018-01-11 RX ADMIN — SODIUM CHLORIDE, PRESERVATIVE FREE 500 UNITS: 5 INJECTION INTRAVENOUS at 08:43

## 2018-01-18 ENCOUNTER — HOSPITAL ENCOUNTER (OUTPATIENT)
Dept: INFUSION THERAPY | Age: 48
End: 2018-01-18
Payer: COMMERCIAL

## 2018-01-18 ENCOUNTER — HOSPITAL ENCOUNTER (OUTPATIENT)
Dept: INFUSION THERAPY | Age: 48
Discharge: HOME | End: 2018-01-18

## 2018-01-18 RX ORDER — HEPARIN 100 UNIT/ML
500 SYRINGE INTRAVENOUS AS NEEDED
Status: CANCELLED | OUTPATIENT
Start: 2018-01-18 | End: 2018-01-18

## 2018-01-18 RX ORDER — SODIUM CHLORIDE 0.9 % (FLUSH) 0.9 %
10 SYRINGE (ML) INJECTION EVERY 8 HOURS
Status: CANCELLED | OUTPATIENT
Start: 2018-01-18

## 2018-01-21 ENCOUNTER — HOSPITAL ENCOUNTER (OUTPATIENT)
Dept: INFUSION THERAPY | Age: 48
Discharge: HOME | End: 2018-01-21
Payer: COMMERCIAL

## 2018-01-21 VITALS
HEART RATE: 88 BPM | RESPIRATION RATE: 18 BRPM | TEMPERATURE: 98 F | OXYGEN SATURATION: 98 % | DIASTOLIC BLOOD PRESSURE: 77 MMHG | SYSTOLIC BLOOD PRESSURE: 137 MMHG

## 2018-01-21 LAB — MAGNESIUM SERPL-MCNC: 1.8 MG/DL (ref 1.8–2.4)

## 2018-01-21 PROCEDURE — 83735 ASSAY OF MAGNESIUM: CPT | Performed by: INTERNAL MEDICINE

## 2018-01-21 PROCEDURE — 77030003560 HC NDL HUBR BARD -A

## 2018-01-21 PROCEDURE — 74011000250 HC RX REV CODE- 250: Performed by: INTERNAL MEDICINE

## 2018-01-21 PROCEDURE — 96374 THER/PROPH/DIAG INJ IV PUSH: CPT

## 2018-01-21 PROCEDURE — 74011250636 HC RX REV CODE- 250/636: Performed by: INTERNAL MEDICINE

## 2018-01-21 RX ORDER — HEPARIN 100 UNIT/ML
500 SYRINGE INTRAVENOUS AS NEEDED
Status: DISCONTINUED | OUTPATIENT
Start: 2018-01-21 | End: 2018-01-25 | Stop reason: HOSPADM

## 2018-01-21 RX ORDER — SODIUM CHLORIDE 0.9 % (FLUSH) 0.9 %
10 SYRINGE (ML) INJECTION AS NEEDED
Status: DISCONTINUED | OUTPATIENT
Start: 2018-01-21 | End: 2018-01-25 | Stop reason: HOSPADM

## 2018-01-21 RX ADMIN — PROMETHAZINE HYDROCHLORIDE 25 MG: 25 INJECTION INTRAMUSCULAR; INTRAVENOUS at 11:08

## 2018-01-21 NOTE — PROGRESS NOTES
Patient presents to OP infusion with port accessed. Needle and dressing changed. Labs drawn and reviewed; no replacements needed per order. IV Phenergan administered. Patient tolerated well. Any issues or concerns during appointment: none. Patient aware of next infusion appointment on 1/25/18 at [address] [place] [address]. Discharged ambulatory.     Azra Hernandez RN

## 2018-01-22 ENCOUNTER — ANESTHESIA EVENT (OUTPATIENT)
Dept: ENDOSCOPY | Age: 48
End: 2018-01-22
Payer: COMMERCIAL

## 2018-01-22 ENCOUNTER — APPOINTMENT (OUTPATIENT)
Dept: INFUSION THERAPY | Age: 48
End: 2018-01-22
Payer: COMMERCIAL

## 2018-01-22 ENCOUNTER — HOSPITAL ENCOUNTER (OUTPATIENT)
Age: 48
Setting detail: OUTPATIENT SURGERY
Discharge: HOME | End: 2018-01-22
Attending: INTERNAL MEDICINE | Admitting: INTERNAL MEDICINE
Payer: COMMERCIAL

## 2018-01-22 ENCOUNTER — ANESTHESIA (OUTPATIENT)
Dept: ENDOSCOPY | Age: 48
End: 2018-01-22
Payer: COMMERCIAL

## 2018-01-22 VITALS
HEART RATE: 80 BPM | DIASTOLIC BLOOD PRESSURE: 74 MMHG | BODY MASS INDEX: 33.49 KG/M2 | SYSTOLIC BLOOD PRESSURE: 128 MMHG | OXYGEN SATURATION: 94 % | RESPIRATION RATE: 18 BRPM | TEMPERATURE: 96.8 F | HEIGHT: 62 IN | WEIGHT: 182 LBS

## 2018-01-22 LAB — GLUCOSE BLD STRIP.AUTO-MCNC: 115 MG/DL (ref 65–100)

## 2018-01-22 PROCEDURE — 74011250636 HC RX REV CODE- 250/636: Performed by: ANESTHESIOLOGY

## 2018-01-22 PROCEDURE — 76040000025: Performed by: INTERNAL MEDICINE

## 2018-01-22 PROCEDURE — 74011250636 HC RX REV CODE- 250/636

## 2018-01-22 PROCEDURE — 74011250636 HC RX REV CODE- 250/636: Performed by: INTERNAL MEDICINE

## 2018-01-22 PROCEDURE — 74011000250 HC RX REV CODE- 250: Performed by: ANESTHESIOLOGY

## 2018-01-22 PROCEDURE — 74011000250 HC RX REV CODE- 250

## 2018-01-22 PROCEDURE — 76060000031 HC ANESTHESIA FIRST 0.5 HR: Performed by: INTERNAL MEDICINE

## 2018-01-22 PROCEDURE — 82962 GLUCOSE BLOOD TEST: CPT

## 2018-01-22 PROCEDURE — C1726 CATH, BAL DIL, NON-VASCULAR: HCPCS | Performed by: INTERNAL MEDICINE

## 2018-01-22 RX ORDER — PROPOFOL 10 MG/ML
INJECTION, EMULSION INTRAVENOUS
Status: DISCONTINUED | OUTPATIENT
Start: 2018-01-22 | End: 2018-01-22 | Stop reason: HOSPADM

## 2018-01-22 RX ORDER — TRIAMCINOLONE ACETONIDE 40 MG/ML
40 INJECTION, SUSPENSION INTRA-ARTICULAR; INTRAMUSCULAR
Status: DISCONTINUED | OUTPATIENT
Start: 2018-01-22 | End: 2018-01-22 | Stop reason: HOSPADM

## 2018-01-22 RX ORDER — HEPARIN 100 UNIT/ML
500 SYRINGE INTRAVENOUS AS NEEDED
Status: DISCONTINUED | OUTPATIENT
Start: 2018-01-22 | End: 2018-01-22 | Stop reason: HOSPADM

## 2018-01-22 RX ORDER — SODIUM CHLORIDE, SODIUM LACTATE, POTASSIUM CHLORIDE, CALCIUM CHLORIDE 600; 310; 30; 20 MG/100ML; MG/100ML; MG/100ML; MG/100ML
100 INJECTION, SOLUTION INTRAVENOUS CONTINUOUS
Status: DISCONTINUED | OUTPATIENT
Start: 2018-01-22 | End: 2018-01-22 | Stop reason: HOSPADM

## 2018-01-22 RX ORDER — LIDOCAINE HYDROCHLORIDE 20 MG/ML
INJECTION, SOLUTION EPIDURAL; INFILTRATION; INTRACAUDAL; PERINEURAL AS NEEDED
Status: DISCONTINUED | OUTPATIENT
Start: 2018-01-22 | End: 2018-01-22 | Stop reason: HOSPADM

## 2018-01-22 RX ORDER — SODIUM CHLORIDE 0.9 % (FLUSH) 0.9 %
5-10 SYRINGE (ML) INJECTION AS NEEDED
Status: DISCONTINUED | OUTPATIENT
Start: 2018-01-22 | End: 2018-01-22 | Stop reason: HOSPADM

## 2018-01-22 RX ADMIN — SODIUM CHLORIDE, SODIUM LACTATE, POTASSIUM CHLORIDE, AND CALCIUM CHLORIDE: 600; 310; 30; 20 INJECTION, SOLUTION INTRAVENOUS at 08:50

## 2018-01-22 RX ADMIN — PROMETHAZINE HYDROCHLORIDE 6.25 MG: 25 INJECTION INTRAMUSCULAR; INTRAVENOUS at 09:22

## 2018-01-22 RX ADMIN — LIDOCAINE HYDROCHLORIDE 100 MG: 20 INJECTION, SOLUTION EPIDURAL; INFILTRATION; INTRACAUDAL; PERINEURAL at 08:55

## 2018-01-22 RX ADMIN — PROPOFOL 200 MCG/KG/MIN: 10 INJECTION, EMULSION INTRAVENOUS at 08:55

## 2018-01-22 RX ADMIN — SODIUM CHLORIDE, SODIUM LACTATE, POTASSIUM CHLORIDE, AND CALCIUM CHLORIDE 100 ML/HR: 600; 310; 30; 20 INJECTION, SOLUTION INTRAVENOUS at 08:33

## 2018-01-22 RX ADMIN — SODIUM CHLORIDE, PRESERVATIVE FREE 500 UNITS: 5 INJECTION INTRAVENOUS at 09:49

## 2018-01-22 RX ADMIN — Medication 10 ML: at 09:49

## 2018-01-22 NOTE — OP NOTES
Gastroenterology Procedure Note           Procedure:  Esophagogastroduodenoscopy    Date of Procedure:  1/22/2018    Patient:  Jadon Sheikh     1970    Indication:  History of esophageal stricture    Sedation:  MAC    Pre-Procedure Physical Exam:    Mental status:  alert and oriented  Airway:  normal oropharyngeal airway and neck mobility  CV:  regular rate and rhythm  Respiratory:  clear to auscultation    Procedure:  A History and Physical has been performed, and patient medication allergies have been  reviewed. Risks of perforation, hemorrhage, adverse drug reaction, and aspiration were discussed. Informed consent was obtained for the procedure, including sedation. The patient was placed in the left lateral decubitus position. The heart rate, oxygen saturations, blood pressure, and response to care were monitored throughout the procedure. The gastroscope was passed through the mouth and advanced under direct vision to the second portion of the duodenum. A careful inspection was made as the gastroscope was withdrawn, including a retroflexed view of the proximal stomach. The patient tolerated the procedure well. Findings:     OROPHARYNX: Cords and pyriform recesses appear normal.   ESOPHAGUS: The mid esophagus is tortuous. A benign-appearing intrinsic stenosis was seen in the distal esophagus. Dilation was performed using a TTS balloon dilator to a lumen diameter of 15 mm. Successful dilation was confirmed endoscopically by the presence of mucosal disruption. STOMACH: The fundus on antegrade and retroflexed views is normal. The body, antrum, and pylorus are normal. The gastrojejunal anastomosis appears normal.  JEJUNUM:  The examined portion of the jejunum is normal.    Specimen:  No    Estimated Blood Loss:  3 ml    Implant:  None           Impression:    Tortuous esophagus. Distal esophageal stricture. Plan:  1. Soft diet today. 2. Continue current medications.   3. Avoid NSAIDs for 48 hours. 4. Repeat EGD in 4 weeks for further dilation.     Signed:  Beba Boucher MD  1/22/2018  9:07 AM

## 2018-01-22 NOTE — IP AVS SNAPSHOT
[address] 
[phone] Patient: Paula Jaramillo MRN: [id number] :1970 About your hospitalization You were admitted on:  2018 You last received care in the:  [facility] You were discharged on:  2018 Why you were hospitalized Your primary diagnosis was:  Not on File Follow-up Information None Your Scheduled Appointments   7:05 AM EST Infusion Lab with LAB CK  
[facility] INFUSION SERVICES [facility] [address] Abraham Pan [phone] [facility] [address]  
[phone] [address]   7:15 AM EST Infusion with SS  
[facility]) [address] Abraham Pan [phone] [facility] [address]  
[phone] [address]  Discharge Orders None A check cindy indicates which time of day the medication should be taken. My Medications ASK your doctor about these medications Instructions Each Dose to Equal  
 Morning Noon Evening Bedtime  
 0.9% sodium chloride solution Your last dose was: Your next dose is:    
   
   
 by IntraVENous route daily. Gives to herself via port at home daily BENADRYL 25 mg capsule Generic drug:  diphenhydrAMINE Your last dose was: Your next dose is: Take 25 mg by mouth every six (6) hours as needed. 25 mg  
    
   
   
   
  
 CARAFATE 100 mg/mL suspension Generic drug:  sucralfate Your last dose was: Your next dose is: TAKE 10 ML BY MOUTH 4 TIMES A DAY EVERY DAY ON A EMPTY STOMACH 1 HR BEFORE MEALS AND AT BEDTIME  
     
   
   
   
  
 citalopram 10 mg tablet Commonly known as:  [facility] Your last dose was: Your next dose is: TAKE 1 TABLET BY MOUTH DAILY  
     
   
   
   
  
 cyanocobalamin 1,000 mcg/mL injection Commonly known as:  VITAMIN B12 Your last dose was: Your next dose is: INJECT 1 VIAL INTRAMUSCULARLY FOR 4 WEEKS THEN MONTHLY  
     
   
   
   
  
 fentaNYL 100 mcg/hr PATCH Commonly known as:  Isela Horn Your last dose was: Your next dose is:    
   
   
 1 Patch by TransDERmal route every seventy-two (72) hours. 1 Patch  
    
   
   
   
  
 gabapentin 250 mg/5 mL solution Commonly known as:  NEURONTIN Your last dose was: Your next dose is: Take 6 mg by mouth two (2) times a day. Take day of surgery per anesthesia protocol. -since pt drinks pt stated she will hold until she gets home 6 mg  
    
   
   
   
  
 glucagon 1 mg injection Commonly known as:  Tyler Dimas Your last dose was: Your next dose is:    
   
   
 1 mg by IntraMUSCular route. 1 mg HYDROcodone-acetaminophen 5-325 mg per tablet Commonly known as:  [address] Your last dose was: Your next dose is: Take day of surgery per anesthesia protocol. HYDROMORPHONE (BULK) Your last dose was: Your next dose is:    
   
   
 1 mg by IntraVENous route daily as needed. Take day of surgery per anesthesia protocol. 1 mg  
    
   
   
   
  
 hyoscyamine SL 0.125 mg SL tablet Commonly known as:  LEVSIN/SL Your last dose was: Your next dose is:    
   
   
 0.125 mg by SubLINGual route every six (6) hours as needed for Cramping.  
 0.125 mg  
    
   
   
   
  
 insulin aspart 100 unit/mL injection Commonly known as:  Ashish Reamer Your last dose was: Your next dose is:    
   
   
 Use as directed LORazepam 1 mg tablet Commonly known as:  ATIVAN  
 Your last dose was: Your next dose is: Take 1 Tab by mouth three (3) times daily as needed for Anxiety. 1 mg MIRALAX 17 gram packet Generic drug:  polyethylene glycol Your last dose was: Your next dose is: Take 17 g by mouth daily. 17 g NexIUM 40 mg capsule Generic drug:  esomeprazole Your last dose was: Your next dose is: Take 40 mg by mouth two (2) times a day. Take day of surgery per anesthesia protocol. 40 mg  
    
   
   
   
  
 oxyCODONE IR 10 mg Tab immediate release tablet Commonly known as:  Yolanda Fajardo Your last dose was: Your next dose is: Take 10 mg by mouth every eight (8) hours as needed. Take day of surgery per anesthesia protocol. 10 mg  
    
   
   
   
  
 * promethazine 25 mg suppository Commonly known as:  PHENERGAN Your last dose was: Your next dose is: Insert 25 mg into rectum as needed for Nausea. 25 mg  
    
   
   
   
  
 * promethazine 25 mg tablet Commonly known as:  PHENERGAN Your last dose was: Your next dose is: Take 1 Tab by mouth every six (6) hours as needed. 25 mg PROTONIX 40 mg injection Generic drug:  pantoprazole Your last dose was: Your next dose is:    
   
   
 40 mg by IntraVENous route every morning. Take day of surgery per anesthesia protocol. 40 mg  
    
   
   
   
  
 TRESIBA FLEXTOUCH U-100 100 unit/mL (3 mL) Inpn Generic drug:  insulin degludec Your last dose was: Your next dose is:    
   
   
 35 Units by SubCUTAneous route nightly. 28 units night prior to procedure 35 Units ZOFRAN (AS HYDROCHLORIDE) 8 mg tablet Generic drug:  ondansetron hcl Your last dose was: Your next dose is: Take 8 mg by mouth every eight (8) hours as needed for Nausea. 8 mg  
    
   
   
   
  
 zolpidem 10 mg tablet Commonly known as:  AMBIEN Your last dose was: Your next dose is: TAKE 1 TABLET BY MOUTH EVERY NIGHT AS NEEDED FOR SLEEP * Notice: This list has 2 medication(s) that are the same as other medications prescribed for you. Read the directions carefully, and ask your doctor or other care provider to review them with you. Discharge Instructions Gastrointestinal Esophagogastroduodenoscopy (EGD)/ Endoscopic Ultrasound(EUS)- Upper Exam Discharge Instructions 1. Call Dr. Dilan Grant  for any problems or questions. 2. Contact the doctor's office for follow up appointment as directed. 3. Medication may cause drowsiness for several hours, therefore, do not drive or operate machinery for remainder of the day. 4. No alcohol today. 5. Ordinarily, you may resume soft diet today and activity after exam unless otherwise specified by your physician. 6. For mild soreness in your throat you may use Cepacol throat lozenges or warm  salt-water gargles as needed. Any additional instructions:  
 
Dr. Dilan Grant stated that if you are having difficultly swallowing your Nexium capsule, you can open the capsule and swallow medication. 1. Soft diet today. 2. Continue current medications. 3. Avoid NSAIDs such as aspirin, naproxen, advil, aleve, goodie powders due to increase risk of bleeding for 48 hours. 4. Repeat EGD in 4 weeks for further dilation. Instructions given to Roby Morgan and other family members. Introducing \[facility] [place] [facility]\"" & HEALTH SERVICES! Richa Mott introduces [facility] patient portal. Now you can access parts of your medical record, email your doctor's office, and request medication refills online. 1. In your internet browser, go to [email or web address]. [email or web address] Here 2. Click on the First Time User? Click Here link in the Sign In box. You will see the New Member Sign Up page. 3. Enter your [employer] Access Code exactly as it appears below. You will not need to use this code after youve completed the sign-up process. If you do not sign up before the expiration date, you must request a new code. · [employer] Access Code: [email or web address] Expires: 1/30/2018  9:22 AM 
 
4. Enter the last four digits of your Social Security Number (xxxx) and Date of Birth (mm/dd/yyyy) as indicated and click Submit. You will be taken to the next sign-up page. 5. Create a [employer] ID. This will be your [employer] login ID and cannot be changed, so think of one that is secure and easy to remember. 6. Create a [employer] password. You can change your password at any time. 7. Enter your Password Reset Question and Answer. This can be used at a later time if you forget your password. 8. Enter your e-mail address. You will receive e-mail notification when new information is available in [address] 9. Click Sign Up. You can now view and download portions of your medical record. 10. Click the Download Summary menu link to download a portable copy of your medical information. If you have questions, please visit the Frequently Asked Questions section of the [employer] website. Remember, [employer] is NOT to be used for urgent needs. For medical emergencies, dial 911. Now available from your iPhone and Android! Providers Seen During Your Hospitalization Provider Specialty Primary office phone Ronal Nelson MD Gastroenterology [phone] Your Primary Care Physician (PCP) Primary Care Physician Office Phone Office Fax 100 [facility], [address] [place] [address] 69 [phone] You are allergic to the following Allergen Reactions Insulin Lispro Not Reported This Time Pt states not allergic Tape (Adhesive) Rash Itching Barium Iodide Nausea and Vomiting Contrast Dye (Iodine) Shortness of Breath Oral contrast-experienced flushing,shortness of breath, sweatiness; (patient has received oral contrast many times at [facility]) Flagyl (Metronidazole) Nausea and Vomiting Metformin Nausea Only Nausea and Vomiting Valtrex (Valacyclovir) Unknown (comments) PATIENT STATES \"NOT ALLERGIC\" Insulins Nausea and Vomiting Humalog only Recent Documentation Height Weight Breastfeeding? BMI OB Status Smoking Status 1.575 m 82.6 kg No 33.29 kg/m2 Hysterectomy Former Smoker Emergency Contacts Name Discharge Info Relation Home Work Mobile Jorge Treadwell DISCHARGE CAREGIVER [3] Spouse [3] [phone]  [address] [place] [address] CAREGIVER [3] Father [15] [phone] [employer]  CAREGIVER [3] Mother [14] [phone] Patient Belongings The following personal items are in your possession at time of discharge: 
  Dental Appliances: None  Visual Aid: None Please provide this summary of care documentation to your next provider. Signatures-by signing, you are acknowledging that this After Visit Summary has been reviewed with you and you have received a copy. Patient Signature:  ____________________________________________________________ Date:  ____________________________________________________________  
  
Marlyse Leaks Provider Signature:  ____________________________________________________________ Date:  ____________________________________________________________

## 2018-01-22 NOTE — H&P
History and Physical for Outpatient Procedure             Date: 1/22/2018     History of Present Illness:  Patient has history of dysphagia and presents for EGD with possible esophageal dilation. Past Medical History:   Diagnosis Date    [facility]cess[facility] abdomen ([facility]     Anemia     denies hx of blood transfusions-- Fe infusions 2015    Anxiety     Asthma     allergy induced, denies any inhaler    C. difficile diarrhea     in [id number] after complicated ERCP    Cervical dysplasia 1990s    Chronic insomnia     Chronic pain     all over     Chronic pancreatitis ([facility]     Depression     Endometriosis     Esophageal stricture 2017    Fibromyalgia     SANA (generalized anxiety disorder)     GERD (gastroesophageal reflux disease)     daily meds--- nexium daily as needed- Protonix IV via port daily--     HLD (hyperlipidemia)     pt denies     Hypertension     pt denies dx 11/20/17    IBS (irritable bowel syndrome)     Migraine headache     Nausea & vomiting     pt reports she does better with phenergan than zofran - causes HA    Nonalcoholic fatty liver disease     PUD (peptic ulcer disease) 06/2017    Hx of     Sphincter of Oddi dysfunction     Type 2 diabetes mellitus ([facility]     insulin: fbs avg. 130: pt denies episodes of hypo: A1c 6.9     Past Surgical History:   Procedure Laterality Date    ABDOMEN SURGERY PROC UNLISTED  last one placed  2012    Hx of pancreatic stent, multiple    ABDOMEN SURGERY PROC UNLISTED  1997    lap nissen    ABDOMEN SURGERY PROC UNLISTED  4/13    explor lap    HX COLONOSCOPY      HX ENDOSCOPY  2017    36 fr thomas    HX ERCP  3/5/2013    resulted in perforated duodenum    HX HYSTERECTOMY  1998    ovaries remain    HX LAP CHOLECYSTECTOMY  1997    HX LAPAROTOMY  3/5/2013    exploratory for duodenal perforation with ERCP    HX ORTHOPAEDIC      right finger surgery 11/2017    HX UROLOGICAL  4/25/13 at [facility]-- stent removed 6-27-13.   Dr Andrea Delgadillo ACCESS      port in place     In and  Family History   Problem Relation Age of Onset    Diabetes Mother     Heart Disease Mother     Hypertension Mother     Diabetes Father     Heart Disease Father     Hypertension Father      Social History   Substance Use Topics    Smoking status: Former Smoker     Packs/day: 1.00     Years: 3.00     Quit date: 4/24/1993    Smokeless tobacco: Never Used    Alcohol use No        Allergies   Allergen Reactions    Insulin Lispro Not Reported This Time     Pt states not allergic     Tape [Adhesive] Rash and Itching    Barium Iodide Nausea and Vomiting    Contrast Dye [Iodine] Shortness of Breath     Oral contrast-experienced flushing,shortness of breath, sweatiness; (patient has received oral contrast [facility] times at [facility])    Flagyl [Metronidazole] Nausea and Vomiting    Metformin Nausea Only and Nausea and Vomiting    Valtrex [Valacyclovir] Unknown (comments)     PATIENT STATES \"NOT ALLERGIC\"    Insulins Nausea and Vomiting     Humalog only     No current facility-administered medications for this encounter. Current Outpatient Prescriptions   Medication Sig    cyanocobalamin (VITAMIN B12) 1,000 mcg/mL injection INJECT 1 VIAL INTRAMUSCULARLY FOR 4 WEEKS THEN MONTHLY    HYDROcodone-acetaminophen (NORCO) 5-325 mg per tablet Take day of surgery per anesthesia protocol.  HYDROMORPHONE HCL (HYDROMORPHONE, BULK,) 1 mg by IntraVENous route daily as needed. Take day of surgery per anesthesia protocol.  glucagon (GLUCAGEN) 1 mg injection 1 mg by IntraMUSCular route.  CARAFATE 100 mg/mL suspension TAKE 10 ML BY MOUTH 4 TIMES A DAY EVERY DAY ON A EMPTY STOMACH 1 HR BEFORE MEALS AND AT BEDTIME    gabapentin (NEURONTIN) 250 mg/5 mL solution Take 6 mg by mouth two (2) times a day.  Take day of surgery per anesthesia protocol. -since pt drinks pt stated she will hold until she gets home    esomeprazole (NEXIUM) 40 mg capsule Take 40 mg by mouth two (2) times a day. Take day of surgery per anesthesia protocol.  diphenhydrAMINE (BENADRYL) 25 mg capsule Take 25 mg by mouth every six (6) hours as needed.  pantoprazole (PROTONIX) 40 mg injection 40 mg by IntraVENous route every morning. Take day of surgery per anesthesia protocol.  insulin degludec (TRESIBA FLEXTOUCH U-100) 100 unit/mL (3 mL) inpn 35 Units by SubCUTAneous route nightly. 28 units night prior to procedure    polyethylene glycol (MIRALAX) 17 gram packet Take 17 g by mouth daily.  hyoscyamine SL (LEVSIN/SL) 0.125 mg SL tablet 0.125 mg by SubLINGual route every six (6) hours as needed for Cramping.  0.9% sodium chloride solution by IntraVENous route daily. Gives to herself via port at home daily    oxyCODONE IR (ROXICODONE) 10 mg tab immediate release tablet Take 10 mg by mouth every eight (8) hours as needed. Take day of surgery per anesthesia protocol.  insulin aspart (NOVOLOG) 100 unit/mL injection Use as directed (Patient taking differently: 7 Units by SubCUTAneous route Before breakfast, lunch, and dinner. Over 300 use sliding scale)    zolpidem (AMBIEN) 10 mg tablet TAKE 1 TABLET BY MOUTH EVERY NIGHT AS NEEDED FOR SLEEP (Patient taking differently: Take 10 mg by mouth nightly.)    citalopram (CELEXA) 10 mg tablet TAKE 1 TABLET BY MOUTH DAILY (Patient taking differently: 20 mg  take DOS)    LORazepam (ATIVAN) 1 mg tablet Take 1 Tab by mouth three (3) times daily as needed for Anxiety. (Patient taking differently: Take 1 mg by mouth as needed for Anxiety. Take day of surgery per anesthesia protocol.)    fentaNYL (DURAGESIC) 100 mcg/hr PATCH 1 Patch by TransDERmal route every seventy-two (72) hours.  promethazine (PHENERGAN) 25 mg tablet Take 1 Tab by mouth every six (6) hours as needed. (Patient taking differently: Take 25 mg by mouth as needed.)    promethazine (PHENERGAN) 25 mg suppository Insert 25 mg into rectum as needed for Nausea.     ondansetron hcl (ZOFRAN) 8 mg tablet Take 8 mg by mouth every eight (8) hours as needed for Nausea. Facility-Administered Medications Ordered in Other Encounters   Medication Dose Route Frequency    central line flush (saline) syringe 10 mL  10 mL InterCATHeter PRN    heparin (porcine) pf 500 Units  500 Units InterCATHeter PRN        Review of Systems:  A detailed 10 organ review of systems is obtained with pertinent positives as listed in the History of Present Illness. All others are negative. Objective:     Physical Exam:  There were no vitals taken for this visit. General:  Alert and oriented. Heart: Regular rate and rhythm  Lungs:  Clear to auscultation bilaterally  Abdomen: Soft, nontender, nondistended    Impression/Plan:     Proceed with EGD with possible dilation as planned. I have discussed with the patient the technique, benefits, alternatives, and risks of these procedures, including medication reaction, immediate or delayed bleeding, or perforation of the gastrointestinal tract.      Signed By: Christine Cutler MD     January 22, 2018

## 2018-01-22 NOTE — ROUTINE PROCESS
VSS. Discharge instructions reviewed with patient and mother and copy of instructions sent home with patient. Dr. Chinedu Fuentes and Dr. Brianda Son spoke with patient prior to discharge. Questions answered. Discharged via car, wheeled out by [employer]. IV discontinued prior to discharge. Personal items with patient at discharge: purse, clothing    6.25mg of Phenergan given once for nausea, pt stated relief. Pt also c/o abdominal pain 9/10 able to pass gas, abdomen soft, verbalized relief. 10 ml NS flush and Heparin 500 units flushed into R subclavian port. Did not deaccess port because patient using this at home.

## 2018-01-22 NOTE — DISCHARGE INSTRUCTIONS
Gastrointestinal Esophagogastroduodenoscopy (EGD)/ Endoscopic Ultrasound(EUS)- Upper Exam Discharge Instructions    1. Call Dr. Viet Orlando  for any problems or questions. 2. Contact the doctor's office for follow up appointment as directed. 3. Medication may cause drowsiness for several hours, therefore, do not drive or operate machinery for remainder of the day. 4. No alcohol today. 5. Ordinarily, you may resume soft diet today and activity after exam unless otherwise specified by your physician. 6. For mild soreness in your throat you may use Cepacol throat lozenges or warm  salt-water gargles as needed. Any additional instructions:     Dr. Viet Orlando stated that if you are having difficultly swallowing your Nexium capsule, you can open the capsule and swallow medication. 1. Soft diet today. 2. Continue current medications. 3. Avoid NSAIDs such as aspirin, naproxen, advil, aleve, goodie powders due to increase risk of bleeding for 48 hours. 4. Repeat EGD in 4 weeks for further dilation. Instructions given to Romana Yevgeniy and other family members.

## 2018-01-22 NOTE — ANESTHESIA POSTPROCEDURE EVALUATION
Post-Anesthesia Evaluation and Assessment    Patient: Dawson Lott MRN: [id number]  SSN: [id number]    YOB: 1970  Age: 50 y.o. Sex: female       Cardiovascular Function/Vital Signs  Visit Vitals    /74    Pulse 80    Temp 36 °C (96.8 °F)    Resp 18    Ht 5' 2\" (1.575 m)    Wt 82.6 kg (182 lb)    SpO2 94%    Breastfeeding No    BMI 33.29 kg/m2       Patient is status post total IV anesthesia anesthesia for Procedure(s):  ESOPHAGOGASTRODUODENOSCOPY (EGD)   BMI =32  ESOPHAGEAL DILATION. Nausea/Vomiting: None    Postoperative hydration reviewed and adequate. Pain:  Pain Scale 1: Numeric (0 - 10) (01/22/18 0949)  Pain Intensity 1: 4 (01/22/18 0949)   Managed    Neurological Status: At baseline    Mental Status and Level of Consciousness: Arousable    Pulmonary Status:   O2 Device: Room air (01/22/18 0949)   Adequate oxygenation and airway patent    Complications related to anesthesia: None    Post-anesthesia assessment completed.  No concerns    Signed By: Ramandeep Miranda MD     January 22, 2018

## 2018-01-22 NOTE — ANESTHESIA PREPROCEDURE EVALUATION
Anesthetic History     PONV          Review of Systems / Medical History  Patient summary reviewed, nursing notes reviewed and pertinent labs reviewed    Pulmonary            Asthma : well controlled       Neuro/Psych         Headaches (migraines) and psychiatric history (SANA)     Cardiovascular    Hypertension          Hyperlipidemia    Exercise tolerance: >4 METS     GI/Hepatic/Renal     GERD      PUD and liver disease (fatty)    Comments: 5 yrs s/p ERCP w complications    C difficile diahrrea    Esophageal stricture -- severe Endo/Other    Diabetes: well controlled, type 2, using insulin         Other Findings   Comments: Chronic pancreatitis -- requires fentanyl patch and daily hydromorphone IV for pain control    Fibromyalgia         Physical Exam    Airway  Mallampati: I  TM Distance: 4 - 6 cm  Neck ROM: normal range of motion   Mouth opening: Normal     Cardiovascular  Regular rate and rhythm,  S1 and S2 normal,  no murmur, click, rub, or gallop             Dental  No notable dental hx       Pulmonary  Breath sounds clear to auscultation               Abdominal  GI exam deferred       Other Findings            Anesthetic Plan    ASA: 3  Anesthesia type: total IV anesthesia            Anesthetic plan and risks discussed with: Patient

## 2018-01-25 ENCOUNTER — HOSPITAL ENCOUNTER (OUTPATIENT)
Dept: INFUSION THERAPY | Age: 48
Discharge: HOME | End: 2018-01-25
Payer: COMMERCIAL

## 2018-01-25 VITALS
BODY MASS INDEX: 33.38 KG/M2 | TEMPERATURE: 98.1 F | OXYGEN SATURATION: 96 % | RESPIRATION RATE: 18 BRPM | HEART RATE: 101 BPM | WEIGHT: 182.5 LBS | SYSTOLIC BLOOD PRESSURE: 108 MMHG | DIASTOLIC BLOOD PRESSURE: 76 MMHG

## 2018-01-25 LAB — MAGNESIUM SERPL-MCNC: 1.5 MG/DL (ref 1.8–2.4)

## 2018-01-25 PROCEDURE — 96375 TX/PRO/DX INJ NEW DRUG ADDON: CPT

## 2018-01-25 PROCEDURE — 77030003560 HC NDL HUBR BARD -A

## 2018-01-25 PROCEDURE — 74011250636 HC RX REV CODE- 250/636: Performed by: INTERNAL MEDICINE

## 2018-01-25 PROCEDURE — 83735 ASSAY OF MAGNESIUM: CPT | Performed by: INTERNAL MEDICINE

## 2018-01-25 PROCEDURE — 74011000250 HC RX REV CODE- 250: Performed by: INTERNAL MEDICINE

## 2018-01-25 PROCEDURE — 96365 THER/PROPH/DIAG IV INF INIT: CPT

## 2018-01-25 RX ORDER — MAGNESIUM SULFATE 1 G/100ML
1 INJECTION INTRAVENOUS ONCE
Status: COMPLETED | OUTPATIENT
Start: 2018-01-25 | End: 2018-01-25

## 2018-01-25 RX ORDER — HEPARIN 100 UNIT/ML
300 SYRINGE INTRAVENOUS AS NEEDED
Status: DISPENSED | OUTPATIENT
Start: 2018-01-25 | End: 2018-01-25

## 2018-01-25 RX ORDER — SODIUM CHLORIDE 0.9 % (FLUSH) 0.9 %
10 SYRINGE (ML) INJECTION EVERY 8 HOURS
Status: DISCONTINUED | OUTPATIENT
Start: 2018-01-25 | End: 2018-01-29 | Stop reason: HOSPADM

## 2018-01-25 RX ADMIN — SODIUM CHLORIDE, PRESERVATIVE FREE 300 UNITS: 5 INJECTION INTRAVENOUS at 09:16

## 2018-01-25 RX ADMIN — MAGNESIUM SULFATE HEPTAHYDRATE 1 G: 1 INJECTION, SOLUTION INTRAVENOUS at 08:10

## 2018-01-25 RX ADMIN — Medication 10 ML: at 09:15

## 2018-01-25 RX ADMIN — SODIUM CHLORIDE 25 MG: 9 INJECTION INTRAMUSCULAR; INTRAVENOUS; SUBCUTANEOUS at 07:34

## 2018-01-25 NOTE — PROGRESS NOTES
Arrived to the [facility] ambulatory. Promethazine and magnesium bolus completed. Patient tolerated well. Any issues or concerns during appointment: Magnesium today is 1.5. Patient aware of next infusion appointment on 2/1 at [address] [place] [address]. Discharged to home ambulatory.

## 2018-02-01 ENCOUNTER — HOSPITAL ENCOUNTER (OUTPATIENT)
Dept: INFUSION THERAPY | Age: 48
Discharge: HOME | End: 2018-02-01
Payer: COMMERCIAL

## 2018-02-01 VITALS
HEART RATE: 98 BPM | TEMPERATURE: 99 F | DIASTOLIC BLOOD PRESSURE: 81 MMHG | RESPIRATION RATE: 18 BRPM | OXYGEN SATURATION: 100 % | WEIGHT: 187.2 LBS | SYSTOLIC BLOOD PRESSURE: 117 MMHG | BODY MASS INDEX: 34.24 KG/M2

## 2018-02-01 LAB — MAGNESIUM SERPL-MCNC: 1.6 MG/DL (ref 1.8–2.4)

## 2018-02-01 PROCEDURE — 96361 HYDRATE IV INFUSION ADD-ON: CPT

## 2018-02-01 PROCEDURE — 74011000250 HC RX REV CODE- 250: Performed by: INTERNAL MEDICINE

## 2018-02-01 PROCEDURE — 96365 THER/PROPH/DIAG IV INF INIT: CPT

## 2018-02-01 PROCEDURE — 96375 TX/PRO/DX INJ NEW DRUG ADDON: CPT

## 2018-02-01 PROCEDURE — 77030003560 HC NDL HUBR BARD -A

## 2018-02-01 PROCEDURE — 74011250636 HC RX REV CODE- 250/636: Performed by: INTERNAL MEDICINE

## 2018-02-01 PROCEDURE — 83735 ASSAY OF MAGNESIUM: CPT | Performed by: INTERNAL MEDICINE

## 2018-02-01 RX ORDER — SODIUM CHLORIDE 0.9 % (FLUSH) 0.9 %
10-40 SYRINGE (ML) INJECTION AS NEEDED
Status: DISCONTINUED | OUTPATIENT
Start: 2018-02-01 | End: 2018-02-05 | Stop reason: HOSPADM

## 2018-02-01 RX ORDER — MAGNESIUM SULFATE 1 G/100ML
1 INJECTION INTRAVENOUS ONCE
Status: COMPLETED | OUTPATIENT
Start: 2018-02-01 | End: 2018-02-01

## 2018-02-01 RX ORDER — HEPARIN 100 UNIT/ML
500 SYRINGE INTRAVENOUS AS NEEDED
Status: COMPLETED | OUTPATIENT
Start: 2018-02-01 | End: 2018-02-01

## 2018-02-01 RX ORDER — SODIUM CHLORIDE 9 MG/ML
500 INJECTION, SOLUTION INTRAVENOUS ONCE
Status: COMPLETED | OUTPATIENT
Start: 2018-02-01 | End: 2018-02-01

## 2018-02-01 RX ADMIN — Medication 10 ML: at 07:48

## 2018-02-01 RX ADMIN — SODIUM CHLORIDE 500 ML: 900 INJECTION, SOLUTION INTRAVENOUS at 07:48

## 2018-02-01 RX ADMIN — MAGNESIUM SULFATE HEPTAHYDRATE 1 G: 1 INJECTION, SOLUTION INTRAVENOUS at 08:31

## 2018-02-01 RX ADMIN — Medication 10 ML: at 09:16

## 2018-02-01 RX ADMIN — SODIUM CHLORIDE, PRESERVATIVE FREE 500 UNITS: 5 INJECTION INTRAVENOUS at 09:16

## 2018-02-01 RX ADMIN — SODIUM CHLORIDE 25 MG: 9 INJECTION INTRAMUSCULAR; INTRAVENOUS; SUBCUTANEOUS at 07:49

## 2018-02-01 NOTE — PROGRESS NOTES
Arrived to the [facility]. Labs results reviewed. Mg 1.6 patient received 1 gram magnesium IV, and 25 mg Phenergan for c/o nausea. Port flushed and left accessed for use at home. Patient tolerated well. Any issues or concerns during appointment: None. Patient aware of next infusion appointment on 2/8 (date) at [address] [place] [address] (time). Discharged ambulatory in stable condition.

## 2018-02-08 ENCOUNTER — HOSPITAL ENCOUNTER (OUTPATIENT)
Dept: INFUSION THERAPY | Age: 48
Discharge: HOME | End: 2018-02-08
Payer: COMMERCIAL

## 2018-02-08 VITALS
OXYGEN SATURATION: 99 % | HEART RATE: 108 BPM | WEIGHT: 178.4 LBS | DIASTOLIC BLOOD PRESSURE: 68 MMHG | RESPIRATION RATE: 18 BRPM | TEMPERATURE: 97.8 F | BODY MASS INDEX: 32.63 KG/M2 | SYSTOLIC BLOOD PRESSURE: 111 MMHG

## 2018-02-08 LAB
BASOPHILS # BLD: 0.1 K/UL (ref 0–0.2)
BASOPHILS NFR BLD: 1 % (ref 0–2)
DIFFERENTIAL METHOD BLD: ABNORMAL
EOSINOPHIL # BLD: 0.1 K/UL (ref 0–0.8)
EOSINOPHIL NFR BLD: 2 % (ref 0.5–7.8)
FERRITIN SERPL-MCNC: 186 NG/ML (ref 8–388)
HCT VFR BLD AUTO: 43.4 % (ref 35.8–46.3)
HGB BLD-MCNC: 14.3 G/DL (ref 11.7–15.4)
IRON SATN MFR SERPL: 47 %
IRON SERPL-MCNC: 121 UG/DL (ref 35–150)
LYMPHOCYTES # BLD: 1.9 K/UL (ref 0.5–4.6)
LYMPHOCYTES NFR BLD: 32 % (ref 13–44)
MAGNESIUM SERPL-MCNC: 1.7 MG/DL (ref 1.8–2.4)
MCH RBC QN AUTO: 26.6 PG (ref 26.1–32.9)
MCHC RBC AUTO-ENTMCNC: 32.9 G/DL (ref 31.4–35)
MCV RBC AUTO: 80.7 FL (ref 79.6–97.8)
MONOCYTES # BLD: 0.6 K/UL (ref 0.1–1.3)
MONOCYTES NFR BLD: 10 % (ref 4–12)
NEUTS SEG # BLD: 3.1 K/UL (ref 1.7–8.2)
NEUTS SEG NFR BLD: 55 % (ref 43–78)
NRBC # BLD: 0.01 K/UL (ref 0–0.2)
PLATELET # BLD AUTO: 190 K/UL (ref 150–450)
PLATELET COMMENTS,PCOM: ADEQUATE
RBC # BLD AUTO: 5.38 M/UL (ref 4.05–5.25)
RBC MORPH BLD: ABNORMAL
TIBC SERPL-MCNC: 260 UG/DL (ref 250–450)
WBC # BLD AUTO: 5.8 K/UL (ref 4.3–11.1)
WBC MORPH BLD: ABNORMAL

## 2018-02-08 PROCEDURE — 74011000250 HC RX REV CODE- 250: Performed by: INTERNAL MEDICINE

## 2018-02-08 PROCEDURE — 83735 ASSAY OF MAGNESIUM: CPT

## 2018-02-08 PROCEDURE — 83540 ASSAY OF IRON: CPT

## 2018-02-08 PROCEDURE — 96365 THER/PROPH/DIAG IV INF INIT: CPT

## 2018-02-08 PROCEDURE — 82728 ASSAY OF FERRITIN: CPT

## 2018-02-08 PROCEDURE — 96375 TX/PRO/DX INJ NEW DRUG ADDON: CPT

## 2018-02-08 PROCEDURE — 74011250636 HC RX REV CODE- 250/636: Performed by: INTERNAL MEDICINE

## 2018-02-08 PROCEDURE — 77030003560 HC NDL HUBR BARD -A

## 2018-02-08 PROCEDURE — 85025 COMPLETE CBC W/AUTO DIFF WBC: CPT

## 2018-02-08 RX ORDER — DICLOFENAC SODIUM 10 MG/G
2 GEL TOPICAL 4 TIMES DAILY
COMMUNITY
End: 2018-02-20

## 2018-02-08 RX ORDER — MAGNESIUM SULFATE 1 G/100ML
1 INJECTION INTRAVENOUS ONCE
Status: COMPLETED | OUTPATIENT
Start: 2018-02-08 | End: 2018-02-08

## 2018-02-08 RX ORDER — HEPARIN 100 UNIT/ML
500 SYRINGE INTRAVENOUS AS NEEDED
Status: DISPENSED | OUTPATIENT
Start: 2018-02-08 | End: 2018-02-08

## 2018-02-08 RX ORDER — SODIUM CHLORIDE 0.9 % (FLUSH) 0.9 %
10-40 SYRINGE (ML) INJECTION AS NEEDED
Status: DISCONTINUED | OUTPATIENT
Start: 2018-02-08 | End: 2018-02-12 | Stop reason: HOSPADM

## 2018-02-08 RX ADMIN — Medication 10 ML: at 09:00

## 2018-02-08 RX ADMIN — MAGNESIUM SULFATE HEPTAHYDRATE 1 G: 1 INJECTION, SOLUTION INTRAVENOUS at 08:25

## 2018-02-08 RX ADMIN — SODIUM CHLORIDE, PRESERVATIVE FREE 500 UNITS: 5 INJECTION INTRAVENOUS at 09:00

## 2018-02-08 RX ADMIN — Medication 10 ML: at 07:42

## 2018-02-08 RX ADMIN — Medication 10 ML: at 07:52

## 2018-02-08 RX ADMIN — SODIUM CHLORIDE 25 MG: 9 INJECTION INTRAMUSCULAR; INTRAVENOUS; SUBCUTANEOUS at 07:43

## 2018-02-08 NOTE — PROGRESS NOTES
Arrived to the [facility]. Assessment completed. Patient tolerated magnesium infusion well today. She was also given phenergan 25 mg IV, as ordered. Lab results from today, were faxed to Dr. Eloisa Irvin office at fax # [phone]. Any issues or concerns during appointment: none. Patient aware of next infusion appointment on 2/15/18 (date) at [address] [place] [address] (time) with IV infusion center. Discharged ambulatory, per self. Patient instructed to call her doctor's office immediately for any problems or concerns. She verbalizes understanding.

## 2018-02-15 ENCOUNTER — HOSPITAL ENCOUNTER (OUTPATIENT)
Dept: INFUSION THERAPY | Age: 48
Discharge: HOME | End: 2018-02-15
Payer: COMMERCIAL

## 2018-02-15 VITALS
TEMPERATURE: 98.2 F | RESPIRATION RATE: 18 BRPM | OXYGEN SATURATION: 97 % | WEIGHT: 180.2 LBS | BODY MASS INDEX: 32.96 KG/M2 | SYSTOLIC BLOOD PRESSURE: 141 MMHG | DIASTOLIC BLOOD PRESSURE: 56 MMHG | HEART RATE: 120 BPM

## 2018-02-15 LAB — MAGNESIUM SERPL-MCNC: 1.9 MG/DL (ref 1.8–2.4)

## 2018-02-15 PROCEDURE — 77030003560 HC NDL HUBR BARD -A

## 2018-02-15 PROCEDURE — 96374 THER/PROPH/DIAG INJ IV PUSH: CPT

## 2018-02-15 PROCEDURE — 83735 ASSAY OF MAGNESIUM: CPT | Performed by: INTERNAL MEDICINE

## 2018-02-15 PROCEDURE — 74011000250 HC RX REV CODE- 250: Performed by: INTERNAL MEDICINE

## 2018-02-15 PROCEDURE — 74011250636 HC RX REV CODE- 250/636: Performed by: INTERNAL MEDICINE

## 2018-02-15 RX ORDER — HEPARIN 100 UNIT/ML
500 SYRINGE INTRAVENOUS AS NEEDED
Status: DISPENSED | OUTPATIENT
Start: 2018-02-15 | End: 2018-02-15

## 2018-02-15 RX ORDER — SODIUM CHLORIDE 0.9 % (FLUSH) 0.9 %
10-40 SYRINGE (ML) INJECTION AS NEEDED
Status: DISCONTINUED | OUTPATIENT
Start: 2018-02-15 | End: 2018-02-19 | Stop reason: HOSPADM

## 2018-02-15 RX ADMIN — SODIUM CHLORIDE 25 MG: 9 INJECTION INTRAMUSCULAR; INTRAVENOUS; SUBCUTANEOUS at 07:45

## 2018-02-15 RX ADMIN — SODIUM CHLORIDE, PRESERVATIVE FREE 500 UNITS: 5 INJECTION INTRAVENOUS at 08:22

## 2018-02-15 RX ADMIN — Medication 10 ML: at 07:54

## 2018-02-15 RX ADMIN — Medication 10 ML: at 07:44

## 2018-02-15 NOTE — PROGRESS NOTES
Arrived to the [facility]. Assessment completed. Patient received phenergan 25 mg IV, as ordered, today. .   Noted magnesium level of 1.9 today. Any issues or concerns during appointment: none. Patient aware of next infusion appointment on 2/22/18 (date) at [address] [place] [address] (time) with IV infusion center. Discharged ambulatory, with father. Patient instructed to call her doctor's office immediately for any problems or concerns. She verbalizes understanding.

## 2018-02-21 ENCOUNTER — HOSPITAL ENCOUNTER (OUTPATIENT)
Age: 48
Setting detail: OUTPATIENT SURGERY
Discharge: HOME | End: 2018-02-21
Attending: INTERNAL MEDICINE | Admitting: INTERNAL MEDICINE
Payer: COMMERCIAL

## 2018-02-21 ENCOUNTER — ANESTHESIA EVENT (OUTPATIENT)
Dept: ENDOSCOPY | Age: 48
End: 2018-02-21
Payer: COMMERCIAL

## 2018-02-21 ENCOUNTER — ANESTHESIA (OUTPATIENT)
Dept: ENDOSCOPY | Age: 48
End: 2018-02-21
Payer: COMMERCIAL

## 2018-02-21 VITALS
BODY MASS INDEX: 33.49 KG/M2 | RESPIRATION RATE: 16 BRPM | OXYGEN SATURATION: 95 % | WEIGHT: 182 LBS | HEART RATE: 83 BPM | TEMPERATURE: 98 F | SYSTOLIC BLOOD PRESSURE: 127 MMHG | HEIGHT: 62 IN | DIASTOLIC BLOOD PRESSURE: 65 MMHG

## 2018-02-21 LAB — GLUCOSE BLD STRIP.AUTO-MCNC: 168 MG/DL (ref 65–100)

## 2018-02-21 PROCEDURE — 74011000250 HC RX REV CODE- 250: Performed by: INTERNAL MEDICINE

## 2018-02-21 PROCEDURE — 76060000031 HC ANESTHESIA FIRST 0.5 HR: Performed by: INTERNAL MEDICINE

## 2018-02-21 PROCEDURE — 76040000025: Performed by: INTERNAL MEDICINE

## 2018-02-21 PROCEDURE — 74011250636 HC RX REV CODE- 250/636: Performed by: ANESTHESIOLOGY

## 2018-02-21 PROCEDURE — 77030031722: Performed by: INTERNAL MEDICINE

## 2018-02-21 PROCEDURE — 74011250636 HC RX REV CODE- 250/636: Performed by: INTERNAL MEDICINE

## 2018-02-21 PROCEDURE — 82962 GLUCOSE BLOOD TEST: CPT

## 2018-02-21 PROCEDURE — 74011250636 HC RX REV CODE- 250/636

## 2018-02-21 RX ORDER — SODIUM CHLORIDE, SODIUM LACTATE, POTASSIUM CHLORIDE, CALCIUM CHLORIDE 600; 310; 30; 20 MG/100ML; MG/100ML; MG/100ML; MG/100ML
75 INJECTION, SOLUTION INTRAVENOUS CONTINUOUS
Status: DISCONTINUED | OUTPATIENT
Start: 2018-02-21 | End: 2018-02-21 | Stop reason: HOSPADM

## 2018-02-21 RX ORDER — PROPOFOL 10 MG/ML
INJECTION, EMULSION INTRAVENOUS
Status: DISCONTINUED | OUTPATIENT
Start: 2018-02-21 | End: 2018-02-21 | Stop reason: HOSPADM

## 2018-02-21 RX ORDER — TRIAMCINOLONE ACETONIDE 40 MG/ML
40 INJECTION, SUSPENSION INTRA-ARTICULAR; INTRAMUSCULAR ONCE
Status: COMPLETED | OUTPATIENT
Start: 2018-02-21 | End: 2018-02-21

## 2018-02-21 RX ORDER — SODIUM CHLORIDE 9 MG/ML
25 INJECTION, SOLUTION INTRAVENOUS CONTINUOUS
Status: DISCONTINUED | OUTPATIENT
Start: 2018-02-21 | End: 2018-02-21 | Stop reason: HOSPADM

## 2018-02-21 RX ORDER — PROPOFOL 10 MG/ML
INJECTION, EMULSION INTRAVENOUS AS NEEDED
Status: DISCONTINUED | OUTPATIENT
Start: 2018-02-21 | End: 2018-02-21 | Stop reason: HOSPADM

## 2018-02-21 RX ADMIN — PROPOFOL 160 MCG/KG/MIN: 10 INJECTION, EMULSION INTRAVENOUS at 08:17

## 2018-02-21 RX ADMIN — TRIAMCINOLONE ACETONIDE 40 MG: 40 INJECTION, SUSPENSION INTRA-ARTICULAR; INTRAMUSCULAR at 08:31

## 2018-02-21 RX ADMIN — SODIUM CHLORIDE 25 MG: 9 INJECTION INTRAMUSCULAR; INTRAVENOUS; SUBCUTANEOUS at 08:44

## 2018-02-21 RX ADMIN — PROPOFOL 30 MG: 10 INJECTION, EMULSION INTRAVENOUS at 08:19

## 2018-02-21 RX ADMIN — PROPOFOL 40 MG: 10 INJECTION, EMULSION INTRAVENOUS at 08:17

## 2018-02-21 RX ADMIN — SODIUM CHLORIDE, SODIUM LACTATE, POTASSIUM CHLORIDE, AND CALCIUM CHLORIDE 75 ML/HR: 600; 310; 30; 20 INJECTION, SOLUTION INTRAVENOUS at 07:35

## 2018-02-21 NOTE — PROGRESS NOTES
Port to right upper chest accessed prior to arrival of hospital. Pt uses port daily at home. Port flushed with positive blood return in endo lab. Fluids connected to port at this time.

## 2018-02-21 NOTE — ANESTHESIA POSTPROCEDURE EVALUATION
Post-Anesthesia Evaluation and Assessment    Patient: Kim Watson MRN: [id number]  SSN: [id number]    YOB: 1970  Age: 50 y.o. Sex: female       Cardiovascular Function/Vital Signs  Visit Vitals    BP (!) 88/53    Pulse 85    Temp 36.7 °C (98 °F)    Resp 14    Ht 5' 2\" (1.575 m)    Wt 82.6 kg (182 lb)    SpO2 96%    Breastfeeding No    BMI 33.29 kg/m2       Patient is status post total IV anesthesia anesthesia for Procedure(s):  ESOPHAGOGASTRODUODENOSCOPY/KENALOG (EGD)/ 32  ESOPHAGEAL DILATION  INJECTION. Nausea/Vomiting: None    Postoperative hydration reviewed and adequate. Pain:  Pain Scale 1: Numeric (0 - 10) (02/21/18 0733)  Pain Intensity 1: 0 (02/21/18 0733)   Managed    Neurological Status: At baseline    Mental Status and Level of Consciousness: Arousable    Pulmonary Status:   O2 Device: Room air (02/21/18 0733)   Adequate oxygenation and airway patent    Complications related to anesthesia: None    Post-anesthesia assessment completed.  No concerns    Signed By: Maegan Rubio MD     February 21, 2018

## 2018-02-21 NOTE — ANESTHESIA PREPROCEDURE EVALUATION
Anesthetic History     PONV          Review of Systems / Medical History  Patient summary reviewed and pertinent labs reviewed    Pulmonary          Smoker (Former)  Asthma        Neuro/Psych              Cardiovascular    Hypertension              Exercise tolerance: >4 METS     GI/Hepatic/Renal     GERD          Comments: Esophageal stricture Endo/Other    Diabetes: using insulin         Other Findings              Physical Exam    Airway  Mallampati: II    Neck ROM: decreased range of motion, short neck   Mouth opening: Normal     Cardiovascular  Regular rate and rhythm,  S1 and S2 normal,  no murmur, click, rub, or gallop             Dental  No notable dental hx       Pulmonary  Breath sounds clear to auscultation               Abdominal         Other Findings            Anesthetic Plan    ASA: 3  Anesthesia type: total IV anesthesia            Anesthetic plan and risks discussed with: Patient

## 2018-02-21 NOTE — H&P
PreProcedure H&P Update    Today's Date:  2/21/2018    CC:  dysphagia    Subjective:   HPI: dysphagia    PMH:  Past Medical History:   Diagnosis Date    Abscess, abdomen ([address]     Anemia     denies hx of blood transfusions-- Fe infusions 2015    Anxiety     Asthma     allergy induced, denies any inhaler    C. difficile diarrhea     in 4042 after complicated ERCP    Cervical dysplasia 1990s    Chronic insomnia     Chronic pain     all over     Chronic pancreatitis ([address]     Depression     Endometriosis     Esophageal stricture 2017    Fibromyalgia     SANA (generalized anxiety disorder)     GERD (gastroesophageal reflux disease)     daily meds--- nexium daily as needed- Protonix IV via port daily--     HLD (hyperlipidemia)     pt denies     IBS (irritable bowel syndrome)     Migraine headache     Nausea & vomiting     pt reports she does better with phenergan than zofran - causes HA    Nonalcoholic fatty liver disease     PUD (peptic ulcer disease) 06/2017    Hx of     Sphincter of Oddi dysfunction     Type 2 diabetes mellitus (HCC)     insulin: fbs avg. 130: pt denies episodes of hypo: A1c 6.9       Medications:   Current Facility-Administered Medications   Medication Dose Route Frequency    lactated Ringers infusion  75 mL/hr IntraVENous CONTINUOUS    0.9% sodium chloride infusion  25 mL/hr IntraVENous CONTINUOUS    triamcinolone acetonide (KENALOG-40) 40 mg/mL injection 40 mg  40 mg IntraMUSCular ONCE         Objective:   Vitals:  Visit Vitals    /81    Pulse (!) 105    Temp 98.7 °F (37.1 °C)    Resp 16    Ht 5' 2\" (1.575 m)    Wt 82.6 kg (182 lb)    SpO2 97%    Breastfeeding No    BMI 33.29 kg/m2     Exam:  General appearance: alert, cooperative, no distress  Lungs: clear to auscultation bilaterally anteriorly  Heart: regular rate and rhythm  Abdomen: soft, non-tender.  Bowel sounds normal. No masses, no organomegaly      Data Review (Labs):    No results for input(s): WBC, HGB, HCT, PLT, MCV, NA, K, CL, CO2, BUN, CREA, CA, GLU, AP, SGOT, GPT, TBIL, CBIL, ALB, TP, AML, LPSE, PTP, INR, APTT, HGBEXT, HCTEXT, PLTEXT in the last 72 hours. No lab exists for component: DBIL, INREXT    Plan:     Known esophageal stricture here for EGD dilation Kenalog injection. Endoscopy and risks explained to the patient. Risks including reaction to sedation, cardiopulmonary events, aspiration, infection, bleeding, perforation, requirement for surgery if complications should occur, death were explained to patient who expressed understanding and agreed to proceed with endoscopy.

## 2018-02-21 NOTE — ANESTHESIA POSTPROCEDURE EVALUATION
Post-Anesthesia Evaluation and Assessment    Patient: Roby Morgan MRN: [id number]  SSN: [id number]    YOB: 1970  Age: 50 y.o. Sex: female       Cardiovascular Function/Vital Signs  Visit Vitals    BP (!) 88/53    Pulse 85    Temp 36.7 °C (98 °F)    Resp 14    Ht 5' 2\" (1.575 m)    Wt 82.6 kg (182 lb)    SpO2 96%    Breastfeeding No    BMI 33.29 kg/m2       Patient is status post total IV anesthesia anesthesia for Procedure(s):  ESOPHAGOGASTRODUODENOSCOPY/KENALOG (EGD)/ 32  ESOPHAGEAL DILATION  INJECTION. Nausea/Vomiting: None    Postoperative hydration reviewed and adequate. Pain:  Pain Scale 1: Numeric (0 - 10) (02/21/18 0733)  Pain Intensity 1: 0 (02/21/18 0733)   Managed    Neurological Status: At baseline    Mental Status and Level of Consciousness: Arousable    Pulmonary Status:   O2 Device: Room air (02/21/18 0733)   Adequate oxygenation and airway patent    Complications related to anesthesia: None    Post-anesthesia assessment completed.  No concerns    Signed By: Sapna Phelps MD     February 21, 2018

## 2018-02-21 NOTE — PROCEDURES
Esophagogastroduodenoscopy    DATE of PROCEDURE: 2/21/2018    INDICATION: esophageal stricture    POSTPROCEDURE DIAGNOSIS: esophageal stricture; gastrojejunostomy changes    MEDICATIONS ADMINISTERED: MAC anesthesia (see anesthesia report)    INSTRUMENT:    PROCEDURE:  After obtaining informed consent, the patient was placed in the left lateral position and sedated. The endoscope was advanced under direct vision without difficulty. The esophagus, stomach (including retroflexed views) and jejunum were evaluated. The patient was taken to the recovery area in stable condition. FINDINGS:  ESOPHAGUS: benign stenosis at GE junction, no ulcers and no Melanie. Dilation with Michael Bullion #44 passed with mild resistance with 1 cm moderate tear at GE junction. Injected Kenalog-40 in 0.5 cc aliquots in surrounding mucosa.   STOMACH: surgical anastomosis of gastrojejunostomy but the normal pylorus also seen  JEJUNUM:  normal    Estimated blood loss: 0-minimal   Specimens obtained during procedure: none    PLAN:  Monthly EGD dilations with Kenalog

## 2018-02-21 NOTE — DISCHARGE INSTRUCTIONS
Gastrointestinal Esophagogastroduodenoscopy (EGD) - Upper Exam Discharge Instructions    1. Call Dr. Fly Acevedo at [phone] for any problems or questions. 2. Contact the doctor's office for follow up appointment as directed. 3. Medication may cause drowsiness for several hours, therefore, do not drive or operate machinery for remainder of the day. 4. No alcohol today. 5. Ordinarily, you may resume regular diet and activity after exam unless otherwise specified by your physician. 6. For mild soreness in your throat you may use Cepacol throat lozenges or warm salt-water gargles as needed. Any additional instructions:  Continue monthly EGD/Dilations. Instructions given to Jaziel Garcia and other family members.

## 2018-02-21 NOTE — ROUTINE PROCESS
Pt. Discharged to car by Maggy Montano with mother. Port left accessed per patient who manages port at home. Vital signs stable. Able to tolerate PO fluids. Passing gas.  Seen by MD.

## 2018-02-22 ENCOUNTER — HOSPITAL ENCOUNTER (OUTPATIENT)
Dept: INFUSION THERAPY | Age: 48
Discharge: HOME | End: 2018-02-22
Payer: COMMERCIAL

## 2018-02-22 VITALS
RESPIRATION RATE: 18 BRPM | TEMPERATURE: 98.5 F | SYSTOLIC BLOOD PRESSURE: 150 MMHG | DIASTOLIC BLOOD PRESSURE: 88 MMHG | OXYGEN SATURATION: 94 % | HEART RATE: 103 BPM

## 2018-02-22 LAB
ALBUMIN SERPL-MCNC: 3.9 G/DL (ref 3.5–5)
ALBUMIN/GLOB SERPL: 0.9 {RATIO} (ref 1.2–3.5)
ALP SERPL-CCNC: 158 U/L (ref 50–136)
ALT SERPL-CCNC: 54 U/L (ref 12–65)
ANION GAP SERPL CALC-SCNC: 5 MMOL/L (ref 7–16)
AST SERPL-CCNC: 38 U/L (ref 15–37)
BILIRUB DIRECT SERPL-MCNC: 0.1 MG/DL
BILIRUB SERPL-MCNC: 0.3 MG/DL (ref 0.2–1.1)
BUN SERPL-MCNC: 8 MG/DL (ref 6–23)
CALCIUM SERPL-MCNC: 8.6 MG/DL (ref 8.3–10.4)
CHLORIDE SERPL-SCNC: 101 MMOL/L (ref 98–107)
CO2 SERPL-SCNC: 29 MMOL/L (ref 21–32)
CREAT SERPL-MCNC: 0.9 MG/DL (ref 0.6–1)
EST. AVERAGE GLUCOSE BLD GHB EST-MCNC: 186 MG/DL
GLOBULIN SER CALC-MCNC: 4.2 G/DL (ref 2.3–3.5)
GLUCOSE SERPL-MCNC: 368 MG/DL (ref 65–100)
HBA1C MFR BLD: 8.1 % (ref 4.8–6)
MAGNESIUM SERPL-MCNC: 2 MG/DL (ref 1.8–2.4)
POTASSIUM SERPL-SCNC: 4.2 MMOL/L (ref 3.5–5.1)
PROT SERPL-MCNC: 8.1 G/DL (ref 6.3–8.2)
SODIUM SERPL-SCNC: 135 MMOL/L (ref 136–145)

## 2018-02-22 PROCEDURE — 80048 BASIC METABOLIC PNL TOTAL CA: CPT | Performed by: INTERNAL MEDICINE

## 2018-02-22 PROCEDURE — 83735 ASSAY OF MAGNESIUM: CPT | Performed by: INTERNAL MEDICINE

## 2018-02-22 PROCEDURE — 74011000250 HC RX REV CODE- 250: Performed by: INTERNAL MEDICINE

## 2018-02-22 PROCEDURE — 74011250636 HC RX REV CODE- 250/636: Performed by: INTERNAL MEDICINE

## 2018-02-22 PROCEDURE — 77030003560 HC NDL HUBR BARD -A

## 2018-02-22 PROCEDURE — 80076 HEPATIC FUNCTION PANEL: CPT | Performed by: INTERNAL MEDICINE

## 2018-02-22 PROCEDURE — 83036 HEMOGLOBIN GLYCOSYLATED A1C: CPT

## 2018-02-22 PROCEDURE — 96374 THER/PROPH/DIAG INJ IV PUSH: CPT

## 2018-02-22 RX ORDER — SODIUM CHLORIDE 0.9 % (FLUSH) 0.9 %
10 SYRINGE (ML) INJECTION AS NEEDED
Status: ACTIVE | OUTPATIENT
Start: 2018-02-22 | End: 2018-02-22

## 2018-02-22 RX ORDER — HEPARIN 100 UNIT/ML
300-500 SYRINGE INTRAVENOUS ONCE
Status: COMPLETED | OUTPATIENT
Start: 2018-02-22 | End: 2018-02-22

## 2018-02-22 RX ADMIN — Medication 10 ML: at 07:30

## 2018-02-22 RX ADMIN — SODIUM CHLORIDE, PRESERVATIVE FREE 300 UNITS: 5 INJECTION INTRAVENOUS at 08:00

## 2018-02-22 RX ADMIN — SODIUM CHLORIDE 25 MG: 9 INJECTION INTRAMUSCULAR; INTRAVENOUS; SUBCUTANEOUS at 07:30

## 2018-02-22 NOTE — PROGRESS NOTES
Arrived to the [facility]. Labs reviewed/no replacements needed. Phenergan completed. Patient tolerated well. Any issues or concerns during appointment: Pt glucose level 368. Per pt, she just ate breakfast and did not get a chance to take her medication this morning. Pt instructed to take her medication when she gets home. Patient aware of next infusion appointment on March 1st at [address] [place] [address]. Discharged ambulatory.

## 2018-03-01 ENCOUNTER — HOSPITAL ENCOUNTER (OUTPATIENT)
Dept: INFUSION THERAPY | Age: 48
Discharge: HOME | End: 2018-03-01
Payer: COMMERCIAL

## 2018-03-01 VITALS
DIASTOLIC BLOOD PRESSURE: 88 MMHG | HEART RATE: 109 BPM | TEMPERATURE: 98.8 F | RESPIRATION RATE: 18 BRPM | OXYGEN SATURATION: 92 % | SYSTOLIC BLOOD PRESSURE: 119 MMHG

## 2018-03-01 LAB — MAGNESIUM SERPL-MCNC: 1.8 MG/DL (ref 1.8–2.4)

## 2018-03-01 PROCEDURE — 74011250636 HC RX REV CODE- 250/636: Performed by: INTERNAL MEDICINE

## 2018-03-01 PROCEDURE — 83735 ASSAY OF MAGNESIUM: CPT | Performed by: INTERNAL MEDICINE

## 2018-03-01 PROCEDURE — 96361 HYDRATE IV INFUSION ADD-ON: CPT

## 2018-03-01 PROCEDURE — 96374 THER/PROPH/DIAG INJ IV PUSH: CPT

## 2018-03-01 PROCEDURE — 74011000250 HC RX REV CODE- 250: Performed by: INTERNAL MEDICINE

## 2018-03-01 PROCEDURE — 77030003560 HC NDL HUBR BARD -A

## 2018-03-01 RX ORDER — SODIUM CHLORIDE 9 MG/ML
1000 INJECTION, SOLUTION INTRAVENOUS ONCE
Status: COMPLETED | OUTPATIENT
Start: 2018-03-01 | End: 2018-03-01

## 2018-03-01 RX ORDER — SODIUM CHLORIDE 0.9 % (FLUSH) 0.9 %
10 SYRINGE (ML) INJECTION AS NEEDED
Status: DISCONTINUED | OUTPATIENT
Start: 2018-03-01 | End: 2018-03-05 | Stop reason: HOSPADM

## 2018-03-01 RX ORDER — HEPARIN 100 UNIT/ML
300 SYRINGE INTRAVENOUS AS NEEDED
Status: DISPENSED | OUTPATIENT
Start: 2018-03-01 | End: 2018-03-01

## 2018-03-01 RX ADMIN — SODIUM CHLORIDE 1000 ML: 900 INJECTION, SOLUTION INTRAVENOUS at 07:42

## 2018-03-01 RX ADMIN — SODIUM CHLORIDE, PRESERVATIVE FREE 300 UNITS: 5 INJECTION INTRAVENOUS at 08:58

## 2018-03-01 RX ADMIN — Medication 10 ML: at 08:58

## 2018-03-01 RX ADMIN — SODIUM CHLORIDE 25 MG: 9 INJECTION INTRAMUSCULAR; INTRAVENOUS; SUBCUTANEOUS at 07:39

## 2018-03-01 NOTE — PROGRESS NOTES
Arrived to the [facility]. Phenergan, IVF, and lab completed. Patient tolerated well. Any issues or concerns during appointment: no, Mag 1.8, replacement not needed.   Discharged ambulatory

## 2018-03-08 ENCOUNTER — HOSPITAL ENCOUNTER (OUTPATIENT)
Dept: INFUSION THERAPY | Age: 48
Discharge: HOME | End: 2018-03-08
Payer: COMMERCIAL

## 2018-03-08 VITALS
TEMPERATURE: 98.2 F | DIASTOLIC BLOOD PRESSURE: 78 MMHG | OXYGEN SATURATION: 98 % | SYSTOLIC BLOOD PRESSURE: 122 MMHG | BODY MASS INDEX: 33.29 KG/M2 | WEIGHT: 182 LBS | RESPIRATION RATE: 18 BRPM | HEART RATE: 111 BPM

## 2018-03-08 LAB — MAGNESIUM SERPL-MCNC: 1.8 MG/DL (ref 1.8–2.4)

## 2018-03-08 PROCEDURE — 96374 THER/PROPH/DIAG INJ IV PUSH: CPT

## 2018-03-08 PROCEDURE — 74011000250 HC RX REV CODE- 250: Performed by: INTERNAL MEDICINE

## 2018-03-08 PROCEDURE — 74011250636 HC RX REV CODE- 250/636: Performed by: INTERNAL MEDICINE

## 2018-03-08 PROCEDURE — 83735 ASSAY OF MAGNESIUM: CPT | Performed by: INTERNAL MEDICINE

## 2018-03-08 PROCEDURE — 77030003560 HC NDL HUBR BARD -A

## 2018-03-08 RX ORDER — SODIUM CHLORIDE 0.9 % (FLUSH) 0.9 %
5-10 SYRINGE (ML) INJECTION AS NEEDED
Status: CANCELLED | OUTPATIENT
Start: 2018-03-08

## 2018-03-08 RX ORDER — HEPARIN 100 UNIT/ML
500 SYRINGE INTRAVENOUS AS NEEDED
Status: DISPENSED | OUTPATIENT
Start: 2018-03-08 | End: 2018-03-08

## 2018-03-08 RX ORDER — SODIUM CHLORIDE 0.9 % (FLUSH) 0.9 %
5-10 SYRINGE (ML) INJECTION AS NEEDED
Status: DISCONTINUED | OUTPATIENT
Start: 2018-03-08 | End: 2018-03-12 | Stop reason: HOSPADM

## 2018-03-08 RX ORDER — HEPARIN 100 UNIT/ML
500 SYRINGE INTRAVENOUS AS NEEDED
Status: CANCELLED | OUTPATIENT
Start: 2018-03-08

## 2018-03-08 RX ADMIN — SODIUM CHLORIDE 1000 ML: 900 INJECTION, SOLUTION INTRAVENOUS at 07:25

## 2018-03-08 RX ADMIN — SODIUM CHLORIDE 25 MG: 9 INJECTION INTRAMUSCULAR; INTRAVENOUS; SUBCUTANEOUS at 07:28

## 2018-03-08 RX ADMIN — Medication 10 ML: at 07:25

## 2018-03-08 RX ADMIN — SODIUM CHLORIDE, PRESERVATIVE FREE 500 UNITS: 5 INJECTION INTRAVENOUS at 08:46

## 2018-03-08 NOTE — PROGRESS NOTES
Pt arrived ambulatory to [facility] with port previously accessed with dressing dry and intact and with good blood return. NS 1 L infusing. Phenergan given IV over 10 minutes as ordered. Port packed with heparin and remains accessed for home use. Pt aware of next appt on 3/15/18 at [address] [place] [address]. Pt discharged ambulatory. ;

## 2018-03-12 ENCOUNTER — HOSPITAL ENCOUNTER (OUTPATIENT)
Dept: INFUSION THERAPY | Age: 48
Discharge: HOME | End: 2018-03-12
Payer: COMMERCIAL

## 2018-03-12 VITALS
HEART RATE: 107 BPM | OXYGEN SATURATION: 94 % | TEMPERATURE: 98.5 F | SYSTOLIC BLOOD PRESSURE: 110 MMHG | RESPIRATION RATE: 18 BRPM | WEIGHT: 180 LBS | BODY MASS INDEX: 32.92 KG/M2 | DIASTOLIC BLOOD PRESSURE: 66 MMHG

## 2018-03-12 PROCEDURE — 74011250636 HC RX REV CODE- 250/636: Performed by: INTERNAL MEDICINE

## 2018-03-12 PROCEDURE — 96523 IRRIG DRUG DELIVERY DEVICE: CPT

## 2018-03-12 PROCEDURE — 77030003560 HC NDL HUBR BARD -A

## 2018-03-12 RX ORDER — SODIUM CHLORIDE 0.9 % (FLUSH) 0.9 %
10-30 SYRINGE (ML) INJECTION AS NEEDED
Status: DISCONTINUED | OUTPATIENT
Start: 2018-03-12 | End: 2018-03-16 | Stop reason: HOSPADM

## 2018-03-12 RX ORDER — HEPARIN 100 UNIT/ML
300 SYRINGE INTRAVENOUS AS NEEDED
Status: DISPENSED | OUTPATIENT
Start: 2018-03-12 | End: 2018-03-13

## 2018-03-12 RX ADMIN — Medication 30 ML: at 15:50

## 2018-03-12 RX ADMIN — SODIUM CHLORIDE, PRESERVATIVE FREE 300 UNITS: 5 INJECTION INTRAVENOUS at 15:50

## 2018-03-15 ENCOUNTER — HOSPITAL ENCOUNTER (OUTPATIENT)
Dept: INFUSION THERAPY | Age: 48
Discharge: HOME | End: 2018-03-15
Payer: COMMERCIAL

## 2018-03-15 VITALS
TEMPERATURE: 98.2 F | BODY MASS INDEX: 33.6 KG/M2 | SYSTOLIC BLOOD PRESSURE: 125 MMHG | DIASTOLIC BLOOD PRESSURE: 79 MMHG | RESPIRATION RATE: 18 BRPM | WEIGHT: 183.7 LBS | HEART RATE: 109 BPM | OXYGEN SATURATION: 96 %

## 2018-03-15 LAB — MAGNESIUM SERPL-MCNC: 1.9 MG/DL (ref 1.8–2.4)

## 2018-03-15 PROCEDURE — 74011000250 HC RX REV CODE- 250: Performed by: INTERNAL MEDICINE

## 2018-03-15 PROCEDURE — 74011250636 HC RX REV CODE- 250/636: Performed by: INTERNAL MEDICINE

## 2018-03-15 PROCEDURE — 96374 THER/PROPH/DIAG INJ IV PUSH: CPT

## 2018-03-15 PROCEDURE — 83735 ASSAY OF MAGNESIUM: CPT | Performed by: INTERNAL MEDICINE

## 2018-03-15 PROCEDURE — 77030003560 HC NDL HUBR BARD -A

## 2018-03-15 RX ORDER — HEPARIN 100 UNIT/ML
300 SYRINGE INTRAVENOUS AS NEEDED
Status: DISPENSED | OUTPATIENT
Start: 2018-03-15 | End: 2018-03-15

## 2018-03-15 RX ORDER — SODIUM CHLORIDE 0.9 % (FLUSH) 0.9 %
5-10 SYRINGE (ML) INJECTION EVERY 8 HOURS
Status: DISCONTINUED | OUTPATIENT
Start: 2018-03-15 | End: 2018-03-19 | Stop reason: HOSPADM

## 2018-03-15 RX ADMIN — SODIUM CHLORIDE, PRESERVATIVE FREE 300 UNITS: 5 INJECTION INTRAVENOUS at 08:36

## 2018-03-15 RX ADMIN — SODIUM CHLORIDE 1000 ML: 900 INJECTION, SOLUTION INTRAVENOUS at 07:21

## 2018-03-15 RX ADMIN — SODIUM CHLORIDE 25 MG: 9 INJECTION INTRAMUSCULAR; INTRAVENOUS; SUBCUTANEOUS at 07:34

## 2018-03-15 RX ADMIN — Medication 10 ML: at 07:20

## 2018-03-15 NOTE — PROGRESS NOTES
Pt arrived ambulatory to [facility] with port previously accessed with dressing dry and intact and with good blood return. NS 1 L infusing. Phenergan given IV over 10 minutes as ordered. Port packed with heparin and remains accessed for home use. Pt aware of next appt on 3/122/18 at [address] [place] [address]. Pt discharged ambulatory. ;

## 2018-03-22 ENCOUNTER — HOSPITAL ENCOUNTER (OUTPATIENT)
Dept: INFUSION THERAPY | Age: 48
Discharge: HOME | End: 2018-03-22
Payer: COMMERCIAL

## 2018-03-22 VITALS
BODY MASS INDEX: 33.84 KG/M2 | WEIGHT: 185 LBS | TEMPERATURE: 98.4 F | RESPIRATION RATE: 18 BRPM | DIASTOLIC BLOOD PRESSURE: 75 MMHG | OXYGEN SATURATION: 95 % | HEART RATE: 109 BPM | SYSTOLIC BLOOD PRESSURE: 122 MMHG

## 2018-03-22 LAB — MAGNESIUM SERPL-MCNC: 1.9 MG/DL (ref 1.8–2.4)

## 2018-03-22 PROCEDURE — 77030003560 HC NDL HUBR BARD -A

## 2018-03-22 PROCEDURE — 74011000250 HC RX REV CODE- 250: Performed by: INTERNAL MEDICINE

## 2018-03-22 PROCEDURE — 74011250636 HC RX REV CODE- 250/636: Performed by: INTERNAL MEDICINE

## 2018-03-22 PROCEDURE — 96374 THER/PROPH/DIAG INJ IV PUSH: CPT

## 2018-03-22 PROCEDURE — 83735 ASSAY OF MAGNESIUM: CPT | Performed by: INTERNAL MEDICINE

## 2018-03-22 RX ORDER — SODIUM CHLORIDE 0.9 % (FLUSH) 0.9 %
10 SYRINGE (ML) INJECTION AS NEEDED
Status: DISCONTINUED | OUTPATIENT
Start: 2018-03-22 | End: 2018-03-26 | Stop reason: HOSPADM

## 2018-03-22 RX ORDER — HEPARIN 100 UNIT/ML
500 SYRINGE INTRAVENOUS AS NEEDED
Status: DISPENSED | OUTPATIENT
Start: 2018-03-22 | End: 2018-03-22

## 2018-03-22 RX ADMIN — Medication 500 UNITS: at 08:25

## 2018-03-22 RX ADMIN — SODIUM CHLORIDE 25 MG: 9 INJECTION INTRAMUSCULAR; INTRAVENOUS; SUBCUTANEOUS at 08:00

## 2018-03-22 RX ADMIN — Medication 10 ML: at 08:10

## 2018-03-22 RX ADMIN — Medication 10 ML: at 07:56

## 2018-03-29 ENCOUNTER — HOSPITAL ENCOUNTER (OUTPATIENT)
Dept: INFUSION THERAPY | Age: 48
Discharge: HOME | End: 2018-03-29
Payer: COMMERCIAL

## 2018-03-29 VITALS
SYSTOLIC BLOOD PRESSURE: 119 MMHG | HEART RATE: 106 BPM | BODY MASS INDEX: 33.69 KG/M2 | RESPIRATION RATE: 18 BRPM | DIASTOLIC BLOOD PRESSURE: 80 MMHG | WEIGHT: 184.2 LBS | OXYGEN SATURATION: 97 % | TEMPERATURE: 98.6 F

## 2018-03-29 LAB — MAGNESIUM SERPL-MCNC: 2.1 MG/DL (ref 1.8–2.4)

## 2018-03-29 PROCEDURE — 74011000250 HC RX REV CODE- 250: Performed by: INTERNAL MEDICINE

## 2018-03-29 PROCEDURE — 77030003560 HC NDL HUBR BARD -A

## 2018-03-29 PROCEDURE — 83735 ASSAY OF MAGNESIUM: CPT | Performed by: INTERNAL MEDICINE

## 2018-03-29 PROCEDURE — 96374 THER/PROPH/DIAG INJ IV PUSH: CPT

## 2018-03-29 PROCEDURE — 74011250636 HC RX REV CODE- 250/636: Performed by: INTERNAL MEDICINE

## 2018-03-29 RX ORDER — HEPARIN 100 UNIT/ML
300-500 SYRINGE INTRAVENOUS ONCE
Status: DISPENSED | OUTPATIENT
Start: 2018-03-29 | End: 2018-03-29

## 2018-03-29 RX ORDER — SODIUM CHLORIDE 0.9 % (FLUSH) 0.9 %
10-20 SYRINGE (ML) INJECTION AS NEEDED
Status: ACTIVE | OUTPATIENT
Start: 2018-03-29 | End: 2018-03-29

## 2018-03-29 RX ADMIN — Medication 10 ML: at 07:35

## 2018-03-29 RX ADMIN — SODIUM CHLORIDE 25 MG: 9 INJECTION INTRAMUSCULAR; INTRAVENOUS; SUBCUTANEOUS at 07:35

## 2018-03-29 RX ADMIN — Medication 10 ML: at 07:45

## 2018-04-04 ENCOUNTER — ANESTHESIA (OUTPATIENT)
Dept: ENDOSCOPY | Age: 48
End: 2018-04-04
Payer: COMMERCIAL

## 2018-04-04 ENCOUNTER — HOSPITAL ENCOUNTER (OUTPATIENT)
Age: 48
Setting detail: OUTPATIENT SURGERY
Discharge: HOME | End: 2018-04-04
Attending: INTERNAL MEDICINE | Admitting: INTERNAL MEDICINE
Payer: COMMERCIAL

## 2018-04-04 ENCOUNTER — ANESTHESIA EVENT (OUTPATIENT)
Dept: ENDOSCOPY | Age: 48
End: 2018-04-04
Payer: COMMERCIAL

## 2018-04-04 VITALS
DIASTOLIC BLOOD PRESSURE: 67 MMHG | OXYGEN SATURATION: 95 % | HEIGHT: 62 IN | SYSTOLIC BLOOD PRESSURE: 105 MMHG | BODY MASS INDEX: 34.04 KG/M2 | RESPIRATION RATE: 14 BRPM | TEMPERATURE: 98 F | HEART RATE: 65 BPM | WEIGHT: 185 LBS

## 2018-04-04 LAB — GLUCOSE BLD STRIP.AUTO-MCNC: 257 MG/DL (ref 65–100)

## 2018-04-04 PROCEDURE — 74011000250 HC RX REV CODE- 250

## 2018-04-04 PROCEDURE — 76040000026: Performed by: INTERNAL MEDICINE

## 2018-04-04 PROCEDURE — 88305 TISSUE EXAM BY PATHOLOGIST: CPT | Performed by: INTERNAL MEDICINE

## 2018-04-04 PROCEDURE — 76060000032 HC ANESTHESIA 0.5 TO 1 HR: Performed by: INTERNAL MEDICINE

## 2018-04-04 PROCEDURE — 74011250636 HC RX REV CODE- 250/636

## 2018-04-04 PROCEDURE — 82962 GLUCOSE BLOOD TEST: CPT

## 2018-04-04 PROCEDURE — 74011250636 HC RX REV CODE- 250/636: Performed by: INTERNAL MEDICINE

## 2018-04-04 PROCEDURE — 74011250636 HC RX REV CODE- 250/636: Performed by: ANESTHESIOLOGY

## 2018-04-04 PROCEDURE — 77030031722: Performed by: INTERNAL MEDICINE

## 2018-04-04 PROCEDURE — 77030013991 HC SNR POLYP ENDOSC BSC -A: Performed by: INTERNAL MEDICINE

## 2018-04-04 PROCEDURE — 77030011640 HC PAD GRND REM COVD -A: Performed by: INTERNAL MEDICINE

## 2018-04-04 RX ORDER — HEPARIN 100 UNIT/ML
500 SYRINGE INTRAVENOUS AS NEEDED
Status: DISCONTINUED | OUTPATIENT
Start: 2018-04-04 | End: 2018-04-04 | Stop reason: HOSPADM

## 2018-04-04 RX ORDER — SODIUM CHLORIDE 0.9 % (FLUSH) 0.9 %
5-10 SYRINGE (ML) INJECTION AS NEEDED
Status: CANCELLED | OUTPATIENT
Start: 2018-04-04

## 2018-04-04 RX ORDER — SODIUM CHLORIDE, SODIUM LACTATE, POTASSIUM CHLORIDE, CALCIUM CHLORIDE 600; 310; 30; 20 MG/100ML; MG/100ML; MG/100ML; MG/100ML
25 INJECTION, SOLUTION INTRAVENOUS CONTINUOUS
Status: CANCELLED | OUTPATIENT
Start: 2018-04-04 | End: 2018-04-05

## 2018-04-04 RX ORDER — PROPOFOL 10 MG/ML
INJECTION, EMULSION INTRAVENOUS AS NEEDED
Status: DISCONTINUED | OUTPATIENT
Start: 2018-04-04 | End: 2018-04-04 | Stop reason: HOSPADM

## 2018-04-04 RX ORDER — PROPOFOL 10 MG/ML
INJECTION, EMULSION INTRAVENOUS
Status: DISCONTINUED | OUTPATIENT
Start: 2018-04-04 | End: 2018-04-04 | Stop reason: HOSPADM

## 2018-04-04 RX ORDER — TRIAMCINOLONE ACETONIDE 40 MG/ML
40 INJECTION, SUSPENSION INTRA-ARTICULAR; INTRAMUSCULAR ONCE
Status: COMPLETED | OUTPATIENT
Start: 2018-04-04 | End: 2018-04-04

## 2018-04-04 RX ORDER — SODIUM CHLORIDE, SODIUM LACTATE, POTASSIUM CHLORIDE, CALCIUM CHLORIDE 600; 310; 30; 20 MG/100ML; MG/100ML; MG/100ML; MG/100ML
100 INJECTION, SOLUTION INTRAVENOUS CONTINUOUS
Status: DISCONTINUED | OUTPATIENT
Start: 2018-04-04 | End: 2018-04-04 | Stop reason: HOSPADM

## 2018-04-04 RX ADMIN — SODIUM CHLORIDE, PRESERVATIVE FREE 500 UNITS: 5 INJECTION INTRAVENOUS at 10:29

## 2018-04-04 RX ADMIN — PROPOFOL 50 MG: 10 INJECTION, EMULSION INTRAVENOUS at 08:54

## 2018-04-04 RX ADMIN — PROPOFOL 300 MCG/KG/MIN: 10 INJECTION, EMULSION INTRAVENOUS at 08:56

## 2018-04-04 RX ADMIN — PROMETHAZINE HYDROCHLORIDE 3.25 MG: 25 INJECTION INTRAMUSCULAR; INTRAVENOUS at 10:00

## 2018-04-04 RX ADMIN — PROPOFOL 50 MG: 10 INJECTION, EMULSION INTRAVENOUS at 08:58

## 2018-04-04 RX ADMIN — TRIAMCINOLONE ACETONIDE 10 MG: 40 INJECTION, SUSPENSION INTRA-ARTICULAR; INTRAMUSCULAR at 09:16

## 2018-04-04 RX ADMIN — PROPOFOL 50 MG: 10 INJECTION, EMULSION INTRAVENOUS at 08:56

## 2018-04-04 RX ADMIN — PROPOFOL 50 MG: 10 INJECTION, EMULSION INTRAVENOUS at 09:00

## 2018-04-04 RX ADMIN — SODIUM CHLORIDE, SODIUM LACTATE, POTASSIUM CHLORIDE, AND CALCIUM CHLORIDE 100 ML/HR: 600; 310; 30; 20 INJECTION, SOLUTION INTRAVENOUS at 08:25

## 2018-04-04 NOTE — ROUTINE PROCESS
VSS. Discharge instructions reviewed with patient and mother and copy of instructions sent home with patient. Dr. Donna Brandt and Dr. Camila Hinds spoke with patient and mother prior to discharge. Questions answered. Discharged via car, wheeled out by Mrs. Sandy Lema, volunteer. IV discontinued prior to discharge.      Personal items with patient at discharge: clothing

## 2018-04-04 NOTE — IP AVS SNAPSHOT
[address] 
[phone] Patient: Justine Ramírez MRN: [id number] :1970 About your hospitalization You were admitted on:  2018 You last received care in the:  [facility] You were discharged on:  2018 Why you were hospitalized Your primary diagnosis was:  Not on File Follow-up Information None Your Scheduled Appointments   7:05 AM EDT Infusion Lab with LAB CK  
[facility]) [address] Jocelynn Chavez [phone] [facility] [address]  
[phone] [address]   7:15 AM EDT Infusion with NUR7  
[address] ([facility]) [address] Jocelynn Chavez [phone] [facility] [address]  
[phone] [address]   7:05 AM EDT Infusion Lab with LAB CK  
[facility]) [address] Jocelynn Chavez [phone] [facility] [address]  
[phone] [address]   7:15 AM EDT Infusion with NUR7  
[facility] [address] ([facility]) [address] Jocelynn Chavez [phone] [facility] [address]  
[phone] [address]   7:05 AM EDT Infusion Lab with LAB CK  
[facility]) [address] Jocelynn Chavez [phone] [facility] [address]  
[phone] [address]   7:15 AM EDT Infusion with SFP9 [address] ([address]) [address]   [address] [phone] [facility] [address]  
[phone] [address] Thursday April 26, 2018  7:05 AM EDT Infusion Lab with LAB CK  
[facility] [address]) [address]   [address] [phone] [facility] [address]  
[phone] [address] Thursday April 26, 2018  7:15 AM EDT Infusion with NUR7  
[address] ([address]) [address] [place]   [address] [phone] [facility] [address]  
[phone] [address] Friday April 27, 2018 10:30 AM EDT  
[facility] PHONE ASSESSMENT with [facility] PHONE APPOINTMENT [facility] Pre Assessment [facility] OR PRE ASSESSMENT) [address] [place] [address]  
[phone] Date/time displayed does not reflect when your phone assessment will be completed. Thursday May 03, 2018 ESOPHAGOGASTRODUODENOSCOPY (EGD) with Leah Brooks MD  
[facility] ([address] [place] [address]) [address] [place] [address]  
[phone] Discharge Orders None A check cindy indicates which time of day the medication should be taken. My Medications ASK your doctor about these medications Instructions Each Dose to Equal  
 Morning Noon Evening Bedtime  
 0.9% sodium chloride solution Your last dose was: Your next dose is:    
   
   
 by IntraVENous route five (5) days a week. Gives to herself via port at home daily -1000cc ATIVAN 1 mg tablet Generic drug:  LORazepam  
   
Your last dose was: Your next dose is: Take 1 mg by mouth three (3) times daily as needed for Anxiety. 1 mg BENADRYL 25 mg capsule Generic drug:  diphenhydrAMINE Your last dose was: Your next dose is: Take 25 mg by mouth every six (6) hours as needed. 25 mg  
    
   
   
   
  
 CARAFATE 100 mg/mL suspension Generic drug:  sucralfate Your last dose was: Your next dose is: TAKE 10 ML BY MOUTH 4 TIMES A DAY EVERY DAY ON A EMPTY STOMACH 1 HR BEFORE MEALS AND AT BEDTIME CeleXA 20 mg tablet Generic drug:  citalopram  
   
Your last dose was: Your next dose is: Take 20 mg by mouth daily. 20 mg  
    
   
   
   
  
 cyanocobalamin 1,000 mcg/mL injection Commonly known as:  VITAMIN B12 Your last dose was: Your next dose is: INJECT 1 VIAL INTRAMUSCULARLY FOR 4 WEEKS THEN MONTHLY  
     
   
   
   
  
 fentaNYL 100 mcg/hr PATCH Commonly known as:  Fransisco Jerome Your last dose was: Your next dose is:    
   
   
 1 Patch by TransDERmal route every seventy-two (72) hours. 1 Patch  
    
   
   
   
  
 gabapentin 250 mg/5 mL solution Commonly known as:  NEURONTIN Your last dose was: Your next dose is: Take 6 mg by mouth two (2) times a day. Take day of surgery per anesthesia protocol. -since pt drinks pt stated she will hold until she gets home 6 mg  
    
   
   
   
  
 glucagon 1 mg injection Commonly known as:  Livingston Wheeler Barron Your last dose was: Your next dose is:    
   
   
 1 mg by IntraMUSCular route. 1 mg  
    
   
   
   
  
 hyoscyamine SL 0.125 mg SL tablet Commonly known as:  LEVSIN/SL Your last dose was: Your next dose is:    
   
   
 0.125 mg by SubLINGual route every six (6) hours as needed for Cramping.  
 0.125 mg  
    
   
   
   
  
 insulin aspart U-100 100 unit/mL injection Commonly known as:  NovoLOG U-100 Insulin aspart Your last dose was: Your next dose is: Use as directed MIRALAX 17 gram packet Generic drug:  polyethylene glycol Your last dose was: Your next dose is: Take 17 g by mouth daily. 17 g NexIUM 40 mg capsule Generic drug:  esomeprazole Your last dose was: Your next dose is: Take 40 mg by mouth two (2) times a day. Take day of surgery per anesthesia protocol. 40 mg  
    
   
   
   
  
 oxyCODONE IR 10 mg Tab immediate release tablet Commonly known as:  Claressa Dills Your last dose was: Your next dose is: Take 10 mg by mouth every eight (8) hours as needed. Take day of surgery per anesthesia protocol. 10 mg  
    
   
   
   
  
 * promethazine 25 mg suppository Commonly known as:  PHENERGAN Your last dose was: Your next dose is: Insert 25 mg into rectum as needed for Nausea. 25 mg  
    
   
   
   
  
 * promethazine 25 mg tablet Commonly known as:  PHENERGAN Your last dose was: Your next dose is: Take 1 Tab by mouth every six (6) hours as needed. 25 mg PROTONIX 40 mg injection Generic drug:  pantoprazole Your last dose was: Your next dose is:    
   
   
 40 mg by IntraVENous route every morning. Take day of surgery per anesthesia protocol. 40 mg  
    
   
   
   
  
 TRESIBA FLEXTOUCH U-100 100 unit/mL (3 mL) Inpn Generic drug:  insulin degludec Your last dose was: Your next dose is:    
   
   
 40 Units by SubCUTAneous route nightly. 40 Units TYLENOL 325 mg tablet Generic drug:  acetaminophen Your last dose was: Your next dose is: Take 325 mg by mouth every four (4) hours as needed for Pain. 325 mg  
    
   
   
   
  
 ZOFRAN (AS HYDROCHLORIDE) 8 mg tablet Generic drug:  ondansetron hcl Your last dose was: Your next dose is: Take 8 mg by mouth every eight (8) hours as needed for Nausea. 8 mg  
    
   
   
   
  
 zolpidem 10 mg tablet Commonly known as:  AMBIEN Your last dose was: Your next dose is: TAKE 1 TABLET BY MOUTH EVERY NIGHT AS NEEDED FOR SLEEP * Notice: This list has 2 medication(s) that are the same as other medications prescribed for you. Read the directions carefully, and ask your doctor or other care provider to review them with you. Opioid Education Prescription Opioids: What You Need to Know: 
 
Prescription opioids can be used to help relieve moderate-to-severe pain and are often prescribed following a surgery or injury, or for certain health conditions. These medications can be an important part of treatment but also come with serious risks. Opioids are strong pain medicines. Examples include hydrocodone, oxycodone, fentanyl, and morphine. Heroin is an example of an illegal opioid. It is important to work with your health care provider to make sure you are getting the safest, most effective care. WHAT ARE THE RISKS AND SIDE EFFECTS OF OPIOID USE? Prescription opioids carry serious risks of addiction and overdose, especially with prolonged use. An opioid overdose, often marked by slow breathing, can cause sudden death. The use of prescription opioids can have a number of side effects as well, even when taken as directed. · Tolerance-meaning you might need to take more of a medication for the same pain relief · Physical dependence-meaning you have symptoms of withdrawal when the medication is stopped. Withdrawal symptoms can include nausea, sweating, chills, diarrhea, stomach cramps, and muscle aches. Withdrawal can last up to several weeks, depending on which drug you took and how long you took it. · Increased sensitivity to pain · Constipation · Nausea, vomiting, and dry mouth · Sleepiness and dizziness · Confusion · Depression · Low levels of testosterone that can result in lower sex drive, energy, and strength · Itching and sweating RISKS ARE GREATER WITH:      
· History of drug misuse, substance use disorder, or overdose · Mental health conditions (such as depression or anxiety) · Sleep apnea · Older age (72 years or older) · Pregnancy Avoid alcohol while taking prescription opioids. Also, unless specifically advised by your health care provider, medications to avoid include: · Benzodiazepines (such as Xanax or Valium) · Muscle relaxants (such as Soma or Flexeril) · Hypnotics (such as Ambien or Lunesta) · Other prescription opioids KNOW YOUR OPTIONS Talk to your health care provider about ways to manage your pain that don't involve prescription opioids. Some of these options may actually work better and have fewer risks and side effects. Options may include: 
· Pain relievers such as acetaminophen, ibuprofen, and naproxen · Some medications that are also used for depression or seizures · Physical therapy and exercise · Counseling to help patients learn how to cope better with triggers of pain and stress. · Application of heat or cold compress · Massage therapy · Relaxation techniques Be Informed Make sure you know the name of your medication, how much and how often to take it, and its potential risks & side effects. IF YOU ARE PRESCRIBED OPIOIDS FOR PAIN: 
· Never take opioids in greater amounts or more often than prescribed. Remember the goal is not to be pain-free but to manage your pain at a tolerable level. · Follow up with your primary care provider to: · Work together to create a plan on how to manage your pain. · Talk about ways to help manage your pain that don't involve prescription opioids. · Talk about any and all concerns and side effects. · Help prevent misuse and abuse. · Never sell or share prescription opioids · Help prevent misuse and abuse. · Store prescription opioids in a secure place and out of reach of others (this may include visitors, children, friends, and family). · Safely dispose of unused/unwanted prescription opioids: Find your community drug take-back program or your pharmacy mail-back program, or flush them down the toilet, following guidance from the Food and Drug Administration (www.fda.gov/Drugs/ResourcesForYou). · Visit www.cdc.gov/drugoverdose to learn about the risks of opioid abuse and overdose. · If you believe you may be struggling with addiction, tell your health care provider and ask for guidance or call jaja.tv at 3-454-688-MFIQ. Discharge Instructions Gastrointestinal Esophagogastroduodenoscopy (EGD)/ Endoscopic Ultrasound(EUS)- Upper Exam Discharge Instructions 1. Call Dr. Jet Healy  for any problems or questions. 2. Contact the doctor's office for follow up appointment as directed. 3. Medication may cause drowsiness for several hours, therefore, do not drive or operate machinery for remainder of the day. 4. No alcohol today. 5. Ordinarily, you may resume soft diet today and activity after exam unless otherwise specified by your physician. 6. For mild soreness in your throat you may use Cepacol throat lozenges or warm  salt-water gargles as needed. Any additional instructions: EGD dilation with Kenalog in a month. Instructions given to Talat Okeefe and other family members. Gastrointestinal Colonoscopy/Flexible Sigmoidoscopy - Lower Exam Discharge Instructions 1. Call Dr. Jet Healy for any problems or questions. 2. Contact the doctors office for follow up appointment as directed 3. Medication may cause drowsiness for several hours, therefore, do not drive or operate machinery for remainder of the day. 4. No alcohol today. 5. Ordinarily, you may resume soft diet today and activity after exam unless otherwise specified by your physician. 6. Because of air put into your colon during exam, you may experience some abdominal distension, relieved by the passage of gas, for several hours. 7. Contact your physician if you have any of the following: 
a. Excessive amount of bleeding  large amount when having a bowel movement. Occasional specks of blood with bowel movement would not be unusual. 
b. Severe abdominal pain 
c. Fever or Chills 8. Polyp Removal  follow these additional instructions 
a. Take Metamucil  1 tablespoon in juice every morning for 3 days, if needed. b. No Aspirin, Advil, Aleve, Nuprin, Ibuprofen, or medications that contain these drugs for 2 weeks. Any additional instructions:  
Dr. Nicol Fortune office will call you with the results of the pathology within the week. If you do not hear from the office within a week, call the office and ask about your results. Instructions given to Fly Lynn and other family members. Introducing [facility]! Shruti Gonzalez introduces [employer] patient portal. Now you can access parts of your medical record, email your doctor's office, and request medication refills online. 1. In your internet browser, go to [email or web address] 2. Click on the First Time User? Click Here link in the Sign In box. You will see the New Member Sign Up page. 3. Enter your [employer] Access Code exactly as it appears below. You will not need to use this code after youve completed the sign-up process. If you do not sign up before the expiration date, you must request a new code. · [employer] Access Code: [email or web address] Expires: 5/1/2018 10:32 AM 
 
4. Enter the last four digits of your Social Security Number (xxxx) and Date of Birth (mm/dd/yyyy) as indicated and click Submit. You will be taken to the next sign-up page. 5. Create a Paktor ID. This will be your [employer] login ID and cannot be changed, so think of one that is secure and easy to remember. 6. Create a [employer] password. You can change your password at any time. 7. Enter your Password Reset Question and Answer. This can be used at a later time if you forget your password. 8. Enter your e-mail address. You will receive e-mail notification when new information is available in [address] 9. Click Sign Up. You can now view and download portions of your medical record. 10. Click the Download Summary menu link to download a portable copy of your medical information. If you have questions, please visit the Frequently Asked Questions section of the [employer] website. Remember, [employer] is NOT to be used for urgent needs. For medical emergencies, dial 911. Now available from your iPhone and Android! Introducing Eleazar Gunn As a [facility] patient, I wanted to make you aware of our electronic visit tool called Eleazar Afshinkimberly. [facility] 24/7 allows you to connect within minutes with a medical provider 24 hours a day, seven days a week via a mobile device or tablet or logging into a secure website from your computer. You can access Eleazar Gunn from anywhere in the [place]. A virtual visit might be right for you when you have a simple condition and feel like you just dont want to get out of bed, or cant get away from work for an appointment, when your regular [facility] provider is not available (evenings, weekends or holidays), or when youre out of town and need minor care. Electronic visits cost only $49 and if the [facility] 24/7 provider determines a prescription is needed to treat your condition, one can be electronically transmitted to a nearby pharmacy*. Please take a moment to enroll today if you have not already done so. The enrollment process is free and takes just a few minutes.   To enroll, please download the [employer] 24/7 chandler to your tablet or phone, or visit [email or web address] to enroll on your computer. And, as an [facility] patient with a [employer] account, the results of your visits will be scanned into your electronic medical record and your primary care provider will be able to view the scanned results. We urge you to continue to see your regular [employer] provider for your ongoing medical care. And while your primary care provider may not be the one available when you seek a [employer] virtual visit, the peace of mind you get from getting a real diagnosis real time can be priceless. For more information on [employer], view our Frequently Asked Questions (FAQs) at [email or web address]. Sincerely, 
 
Margaret Ingram MD 
Chief Medical Officer [address] *:  certain medications cannot be prescribed via [employer] Providers Seen During Your Hospitalization Provider Specialty Primary office phone Nikolas Soto MD Gastroenterology [phone] Your Primary Care Physician (PCP) Primary Care Physician Office Phone Office Fax 100 [facility], [address] [place] [address] [phone] You are allergic to the following Allergen Reactions Insulin Lispro Not Reported This Time Pt states not allergic Tape (Adhesive) Rash Itching Barium Iodide Nausea and Vomiting Contrast Dye (Iodine) Shortness of Breath Oral contrast-experienced flushing,shortness of breath, sweatiness; (patient has received oral contrast many times at [facility]) Flagyl (Metronidazole) Nausea and Vomiting Metformin Nausea and Vomiting Stomach pain Valtrex (Valacyclovir) Unknown (comments) PATIENT STATES \"NOT ALLERGIC\" Insulins Nausea and Vomiting Humalog only Recent Documentation Height Weight BMI OB Status Smoking Status 1.575 m 83.9 kg 33.84 kg/m2 Hysterectomy Former Smoker Emergency Contacts Name Discharge Info Relation Home Work Mobile Jorge Treadwell DISCHARGE CAREGIVER [3] Spouse [3] [phone] CAREGIVER [3] Father [15] [phone] Dolph Krista CAREGIVER [3] Mother [14] [phone] Patient Belongings The following personal items are in your possession at time of discharge: 
  Dental Appliances: None Please provide this summary of care documentation to your next provider. Signatures-by signing, you are acknowledging that this After Visit Summary has been reviewed with you and you have received a copy. Patient Signature:  ____________________________________________________________ Date:  ____________________________________________________________  
  
[facility] Provider Signature:  ____________________________________________________________ Date:  ____________________________________________________________

## 2018-04-04 NOTE — PROGRESS NOTES
Pt c/o nausea and abdomen pain. Pt passing gas, able to ambulate to restroom and states abdominal pain is becoming less painful. Abdomen semi-soft. Dr. Maureen Herrera informed of c/o nausea. Orders received to give IV Phenergan. Dr. Maureen Herrera in Recovery to assess pt.

## 2018-04-04 NOTE — PROCEDURES
COLONOSCOPY    DATE of PROCEDURE: 4/4/2018    INDICATION: Left abd pains, constipation. POSTPROCEDURE DIAGNOSIS: polyp    MEDICATION:   MAC anesthesia (see anesthesia report)    INSTRUMENT: CF    PROCEDURE: After obtaining informed consent, the patient was placed in the left lateral position and sedated. The endoscope was advanced to the cecum where the appendiceal orifice and ileocecal valve were identified. On withdrawal, the colon was carefully visualized in a 360 degree fashion, looking between folds and proximal and distal aspect of the folds. The patient was taken to the recovery area in stable condition. FINDINGS:  Rectum: normal  Sigmoid: normal  Descending Colon: 5 mm polyp removed with snare using cautery  Transverse Colon: normal  Ascending Colon: normal  Cecum: normal  Terminal ileum: entered    Bowel Prep: good  Estimated blood loss: 0-minimal   Specimens obtained during procedure: yes    ASSESSMENT/PLAN: Next colon pending path.

## 2018-04-04 NOTE — PROGRESS NOTES
R subclav port flushed with10 ml NS and 500 units of Heparin. Port not deaccessed because pt uses this for at home tx.

## 2018-04-04 NOTE — PROCEDURES
Esophagogastroduodenoscopy    DATE of PROCEDURE: 4/4/2018    INDICATION: dysphagia    POSTPROCEDURE DIAGNOSIS: esophageal stricture    MEDICATIONS ADMINISTERED: MAC anesthesia (see anesthesia report)    INSTRUMENT:    PROCEDURE:  After obtaining informed consent, the patient was placed in the left lateral position and sedated. The endoscope was advanced under direct vision without difficulty. The esophagus, stomach (including retroflexed views) and duodenum were evaluated. The patient was taken to the recovery area in stable condition. FINDINGS:  ESOPHAGUS: benign distal stenosis; Matthew Johns #42 dilation led to moderate 1 cm longitudinal distal tear. Injected 0.5 cc kenalog-40 but then the solution would not inject (sticky? Needle catheter malfucntion?). Due to her intermittent agitation, did not prolong the procedure which I'd have to wait for another kenalog bottle to be tubed. STOMACH:  Gastrojejunostomy anastomosis; the rest of the stomach appeared without ulcers.   DUODENUM: normal    Estimated blood loss: 0-minimal   Specimens obtained during procedure: none    PLAN: EGD dilation with Kenalog in a month

## 2018-04-04 NOTE — DISCHARGE INSTRUCTIONS
Gastrointestinal Esophagogastroduodenoscopy (EGD)/ Endoscopic Ultrasound(EUS)- Upper Exam Discharge Instructions    1. Call Dr. Clary Delgado  for any problems or questions. 2. Contact the doctor's office for follow up appointment as directed. 3. Medication may cause drowsiness for several hours, therefore, do not drive or operate machinery for remainder of the day. 4. No alcohol today. 5. Ordinarily, you may resume soft diet today and activity after exam unless otherwise specified by your physician. 6. For mild soreness in your throat you may use Cepacol throat lozenges or warm  salt-water gargles as needed. Any additional instructions:   EGD dilation with Kenalog in a month. Instructions given to Anuj Pulido and other family members. Gastrointestinal Colonoscopy/Flexible Sigmoidoscopy - Lower Exam Discharge Instructions  1. Call Dr. Clary Delgado for any problems or questions. 2. Contact the doctors office for follow up appointment as directed  3. Medication may cause drowsiness for several hours, therefore, do not drive or operate machinery for remainder of the day. 4. No alcohol today. 5. Ordinarily, you may resume soft diet today and activity after exam unless otherwise specified by your physician. 6. Because of air put into your colon during exam, you may experience some abdominal distension, relieved by the passage of gas, for several hours. 7. Contact your physician if you have any of the following:  a. Excessive amount of bleeding - large amount when having a bowel movement. Occasional specks of blood with bowel movement would not be unusual.  b. Severe abdominal pain  c. Fever or Chills  8. Polyp Removal - follow these additional instructions  a. Take Metamucil - 1 tablespoon in juice every morning for 3 days, if needed. b. No Aspirin, Advil, Aleve, Nuprin, Ibuprofen, or medications that contain these drugs for 2 weeks.   Any additional instructions:   Dr. Salas [place] office will call you with the results of the pathology within the week. If you do not hear from the office within a week, call the office and ask about your results. Instructions given to Andrew Mooney and other family members.

## 2018-04-04 NOTE — ANESTHESIA POSTPROCEDURE EVALUATION
Post-Anesthesia Evaluation and Assessment    Patient: Jessie Mojica MRN: [id number]  SSN: [id number]    YOB: 1970  Age: 50 y.o. Sex: female       Cardiovascular Function/Vital Signs  Visit Vitals    /53    Pulse 68    Temp 36.7 °C (98 °F)    Resp 16    Ht 5' 2\" (1.575 m)    Wt 83.9 kg (185 lb)    SpO2 96%    BMI 33.84 kg/m2       Patient is status post total IV anesthesia anesthesia for Procedure(s):  ESOPHAGOGASTRODUODENOSCOPY WITH DILATION/KENALOG (EGD)/ 32  COLONOSCOPY  ESOPHAGEAL DILATION  INJECTION  ENDOSCOPIC POLYPECTOMY. Nausea/Vomiting: None    Postoperative hydration reviewed and adequate. Pain:  Pain Scale 1: Visual (04/04/18 0945)  Pain Intensity 1: 0 (04/04/18 0945)   Managed    Neurological Status: At baseline    Mental Status and Level of Consciousness: Arousable    Pulmonary Status:   O2 Device: Room air (04/04/18 0945)   Adequate oxygenation and airway patent    Complications related to anesthesia: None    Post-anesthesia assessment completed.  No concerns    Signed By: Tony Erazo MD     April 4, 2018

## 2018-04-04 NOTE — H&P
PreProcedure H&P Update    Today's Date:  2018    CC: Dysphagia, abd pain, constipation    Subjective:   HPI:Dysphagia, abd pain, constipation    PMH:  Past Medical History:   Diagnosis Date    Abscess, abdomen     pt not aware of    Anemia     denies hx of blood transfusions-- Fe infusions 2017    Anxiety     Asthma     allergy induced, denies any inhaler    C. difficile diarrhea     in [id number] after complicated ERCP    Cervical dysplasia     Chronic insomnia     Chronic pain     all over     Chronic pancreatitis ([facility] [address]     Depression     Endometriosis     Esophageal stricture     Fibromyalgia     Former cigarette smoker     SANA (generalized anxiety disorder)     GERD (gastroesophageal reflux disease)     daily meds--- nexium daily as needed- Protonix IV via port daily--     HLD (hyperlipidemia)     pt denies     IBS (irritable bowel syndrome)     Migraine headache     Nausea & vomiting     pt reports she does better with phenergan than zofran - causes HA    Nonalcoholic fatty liver disease     PUD (peptic ulcer disease) 2017    Hx of     Sphincter of Oddi dysfunction     Stricture of esophagus     Type 2 diabetes mellitus (HCC)     insulin reliant: fbs av-200/ s.s of hypoglyemia @80-90/Last : A1c 6.9       Medications:   Current Facility-Administered Medications   Medication Dose Route Frequency    lactated Ringers infusion  100 mL/hr IntraVENous CONTINUOUS         Objective:   Vitals:  Visit Vitals    /71    Pulse 89    Temp 98.3 °F (36.8 °C)    Resp 18    Ht 5' 2\" (1.575 m)    Wt 83.9 kg (185 lb)    SpO2 96%    BMI 33.84 kg/m2     Exam:  General appearance: alert, cooperative, no distress  Lungs: clear to auscultation bilaterally anteriorly  Heart: regular rate and rhythm  Abdomen: soft, mild L sided abd tenderness.         Data Review (Labs):    No results for input(s): WBC, HGB, HCT, PLT, MCV, NA, K, CL, CO2, BUN, CREA, CA, GLU, AP, SGOT, GPT, TBIL, CBIL, ALB, TP, AML, LPSE, PTP, INR, APTT, HGBEXT, HCTEXT, PLTEXT in the last 72 hours.     No lab exists for component: DBIL, INREXT    Plan:     Dysphagia, abd pain, constipation  EGD dilation and colonoscopy

## 2018-04-05 ENCOUNTER — HOSPITAL ENCOUNTER (OUTPATIENT)
Dept: INFUSION THERAPY | Age: 48
Discharge: HOME | End: 2018-04-05
Payer: COMMERCIAL

## 2018-04-05 VITALS
OXYGEN SATURATION: 98 % | SYSTOLIC BLOOD PRESSURE: 135 MMHG | HEART RATE: 97 BPM | DIASTOLIC BLOOD PRESSURE: 66 MMHG | RESPIRATION RATE: 18 BRPM | BODY MASS INDEX: 33.76 KG/M2 | WEIGHT: 184.6 LBS | TEMPERATURE: 98.2 F

## 2018-04-05 LAB — MAGNESIUM SERPL-MCNC: 1.8 MG/DL (ref 1.8–2.4)

## 2018-04-05 PROCEDURE — 74011000250 HC RX REV CODE- 250: Performed by: INTERNAL MEDICINE

## 2018-04-05 PROCEDURE — 77030003560 HC NDL HUBR BARD -A

## 2018-04-05 PROCEDURE — 96374 THER/PROPH/DIAG INJ IV PUSH: CPT

## 2018-04-05 PROCEDURE — 74011250636 HC RX REV CODE- 250/636: Performed by: INTERNAL MEDICINE

## 2018-04-05 PROCEDURE — 83735 ASSAY OF MAGNESIUM: CPT | Performed by: INTERNAL MEDICINE

## 2018-04-05 RX ORDER — HEPARIN 100 UNIT/ML
500 SYRINGE INTRAVENOUS AS NEEDED
Status: DISPENSED | OUTPATIENT
Start: 2018-04-05 | End: 2018-04-05

## 2018-04-05 RX ORDER — SODIUM CHLORIDE 0.9 % (FLUSH) 0.9 %
5-10 SYRINGE (ML) INJECTION AS NEEDED
Status: DISCONTINUED | OUTPATIENT
Start: 2018-04-05 | End: 2018-04-09 | Stop reason: HOSPADM

## 2018-04-05 RX ADMIN — Medication 10 ML: at 07:30

## 2018-04-05 RX ADMIN — SODIUM CHLORIDE, PRESERVATIVE FREE 500 UNITS: 5 INJECTION INTRAVENOUS at 09:30

## 2018-04-05 RX ADMIN — SODIUM CHLORIDE 25 MG: 9 INJECTION INTRAMUSCULAR; INTRAVENOUS; SUBCUTANEOUS at 07:37

## 2018-04-05 RX ADMIN — SODIUM CHLORIDE 500 ML: 900 INJECTION, SOLUTION INTRAVENOUS at 07:30

## 2018-04-05 NOTE — PROGRESS NOTES
arrived ambulatory to [facility] with port previously accessed with dressing dry and intact and with good blood return.  ml infusing. Phenergan given IV over 10 minutes as ordered. Port packed with heparin and remains accessed for home use. Pt aware of next appt on 4/12/18 at [address] [place] [address]. Pt discharged ambulatory.

## 2018-04-12 ENCOUNTER — HOSPITAL ENCOUNTER (OUTPATIENT)
Dept: INFUSION THERAPY | Age: 48
Discharge: HOME | End: 2018-04-12
Payer: COMMERCIAL

## 2018-04-12 VITALS
RESPIRATION RATE: 18 BRPM | SYSTOLIC BLOOD PRESSURE: 131 MMHG | TEMPERATURE: 98.4 F | OXYGEN SATURATION: 95 % | DIASTOLIC BLOOD PRESSURE: 79 MMHG | HEART RATE: 79 BPM

## 2018-04-12 LAB — MAGNESIUM SERPL-MCNC: 1.9 MG/DL (ref 1.8–2.4)

## 2018-04-12 PROCEDURE — 74011250636 HC RX REV CODE- 250/636: Performed by: INTERNAL MEDICINE

## 2018-04-12 PROCEDURE — 74011250636 HC RX REV CODE- 250/636

## 2018-04-12 PROCEDURE — 74011000250 HC RX REV CODE- 250

## 2018-04-12 PROCEDURE — 77030003560 HC NDL HUBR BARD -A

## 2018-04-12 PROCEDURE — 83735 ASSAY OF MAGNESIUM: CPT

## 2018-04-12 PROCEDURE — 96374 THER/PROPH/DIAG INJ IV PUSH: CPT

## 2018-04-12 RX ORDER — HEPARIN 100 UNIT/ML
500 SYRINGE INTRAVENOUS AS NEEDED
Status: DISPENSED | OUTPATIENT
Start: 2018-04-12 | End: 2018-04-12

## 2018-04-12 RX ADMIN — Medication 500 UNITS: at 09:11

## 2018-04-12 RX ADMIN — SODIUM CHLORIDE 25 MG: 9 INJECTION INTRAMUSCULAR; INTRAVENOUS; SUBCUTANEOUS at 07:40

## 2018-04-12 NOTE — PROGRESS NOTES
Arrived to the [facility]. Hydration and needle change completed. Patient tolerated well. Any issues or concerns during appointment: no.  Patient aware of next infusion appointment on 4/19 (date) at 36 (time). Discharged home.

## 2018-04-19 ENCOUNTER — HOSPITAL ENCOUNTER (OUTPATIENT)
Dept: INFUSION THERAPY | Age: 48
Discharge: HOME | End: 2018-04-19
Payer: COMMERCIAL

## 2018-04-19 VITALS
HEART RATE: 113 BPM | TEMPERATURE: 98.4 F | SYSTOLIC BLOOD PRESSURE: 113 MMHG | OXYGEN SATURATION: 94 % | RESPIRATION RATE: 18 BRPM | DIASTOLIC BLOOD PRESSURE: 78 MMHG

## 2018-04-19 LAB — MAGNESIUM SERPL-MCNC: 1.7 MG/DL (ref 1.8–2.4)

## 2018-04-19 PROCEDURE — 74011250636 HC RX REV CODE- 250/636: Performed by: INTERNAL MEDICINE

## 2018-04-19 PROCEDURE — 96375 TX/PRO/DX INJ NEW DRUG ADDON: CPT

## 2018-04-19 PROCEDURE — 77030003560 HC NDL HUBR BARD -A

## 2018-04-19 PROCEDURE — 83735 ASSAY OF MAGNESIUM: CPT | Performed by: INTERNAL MEDICINE

## 2018-04-19 PROCEDURE — 96365 THER/PROPH/DIAG IV INF INIT: CPT

## 2018-04-19 PROCEDURE — 74011000250 HC RX REV CODE- 250: Performed by: INTERNAL MEDICINE

## 2018-04-19 RX ORDER — SODIUM CHLORIDE 9 MG/ML
1000 INJECTION, SOLUTION INTRAVENOUS ONCE
Status: COMPLETED | OUTPATIENT
Start: 2018-04-19 | End: 2018-04-19

## 2018-04-19 RX ORDER — HEPARIN 100 UNIT/ML
300-500 SYRINGE INTRAVENOUS AS NEEDED
Status: DISPENSED | OUTPATIENT
Start: 2018-04-19 | End: 2018-04-19

## 2018-04-19 RX ORDER — SODIUM CHLORIDE 0.9 % (FLUSH) 0.9 %
10 SYRINGE (ML) INJECTION AS NEEDED
Status: DISCONTINUED | OUTPATIENT
Start: 2018-04-19 | End: 2018-04-23 | Stop reason: HOSPADM

## 2018-04-19 RX ORDER — MAGNESIUM SULFATE 1 G/100ML
1 INJECTION INTRAVENOUS ONCE
Status: COMPLETED | OUTPATIENT
Start: 2018-04-19 | End: 2018-04-19

## 2018-04-19 RX ADMIN — Medication 10 ML: at 10:07

## 2018-04-19 RX ADMIN — SODIUM CHLORIDE, PRESERVATIVE FREE 300 UNITS: 5 INJECTION INTRAVENOUS at 10:07

## 2018-04-19 RX ADMIN — PROMETHAZINE HYDROCHLORIDE 25 MG: 25 INJECTION INTRAMUSCULAR; INTRAVENOUS at 07:48

## 2018-04-19 RX ADMIN — MAGNESIUM SULFATE HEPTAHYDRATE 1 G: 1 INJECTION, SOLUTION INTRAVENOUS at 09:02

## 2018-04-19 RX ADMIN — SODIUM CHLORIDE 1000 ML: 900 INJECTION, SOLUTION INTRAVENOUS at 07:46

## 2018-04-19 NOTE — PROGRESS NOTES
Arrived to the [facility]. Fluids, labs, phenergan and mag bolus completed. Patient tolerated well. Any issues or concerns during appointment: no.  Patient aware of next infusion appointment on 4/26/18 (date) at [facility] (time). Discharged ambulatory.

## 2018-04-23 ENCOUNTER — HOSPITAL ENCOUNTER (OUTPATIENT)
Dept: INTERVENTIONAL RADIOLOGY/VASCULAR | Age: 48
Discharge: HOME | End: 2018-04-23
Attending: INTERNAL MEDICINE
Payer: COMMERCIAL

## 2018-04-23 VITALS
DIASTOLIC BLOOD PRESSURE: 70 MMHG | OXYGEN SATURATION: 93 % | BODY MASS INDEX: 33.49 KG/M2 | WEIGHT: 182 LBS | HEIGHT: 62 IN | SYSTOLIC BLOOD PRESSURE: 129 MMHG | RESPIRATION RATE: 16 BRPM | HEART RATE: 87 BPM | TEMPERATURE: 98.7 F

## 2018-04-23 DIAGNOSIS — Z45.2 ADJUSTMENT AND MANAGEMENT OF VASCULAR ACCESS DEVICE: ICD-10-CM

## 2018-04-23 PROCEDURE — 74011636320 HC RX REV CODE- 636/320: Performed by: PHYSICIAN ASSISTANT

## 2018-04-23 PROCEDURE — 36593 DECLOT VASCULAR DEVICE: CPT

## 2018-04-23 PROCEDURE — 74011250636 HC RX REV CODE- 250/636: Performed by: PHYSICIAN ASSISTANT

## 2018-04-23 PROCEDURE — 74011000258 HC RX REV CODE- 258: Performed by: PHYSICIAN ASSISTANT

## 2018-04-23 RX ORDER — HEPARIN 100 UNIT/ML
500 SYRINGE INTRAVENOUS AS NEEDED
Status: DISCONTINUED | OUTPATIENT
Start: 2018-04-23 | End: 2018-04-27 | Stop reason: HOSPADM

## 2018-04-23 RX ADMIN — IOPAMIDOL 5 ML: 612 INJECTION, SOLUTION INTRAVENOUS at 08:15

## 2018-04-23 RX ADMIN — SODIUM CHLORIDE, PRESERVATIVE FREE 500 UNITS: 5 INJECTION INTRAVENOUS at 09:37

## 2018-04-23 RX ADMIN — ALTEPLASE: 2.2 INJECTION, POWDER, LYOPHILIZED, FOR SOLUTION INTRAVENOUS at 08:49

## 2018-04-25 ENCOUNTER — HOSPITAL ENCOUNTER (OUTPATIENT)
Dept: INFUSION THERAPY | Age: 48
Discharge: HOME | End: 2018-04-25
Payer: COMMERCIAL

## 2018-04-25 VITALS
OXYGEN SATURATION: 91 % | HEART RATE: 111 BPM | SYSTOLIC BLOOD PRESSURE: 133 MMHG | WEIGHT: 184.2 LBS | TEMPERATURE: 97.9 F | BODY MASS INDEX: 33.69 KG/M2 | RESPIRATION RATE: 20 BRPM | DIASTOLIC BLOOD PRESSURE: 76 MMHG

## 2018-04-25 LAB — MAGNESIUM SERPL-MCNC: 1.7 MG/DL (ref 1.8–2.4)

## 2018-04-25 PROCEDURE — 74011250636 HC RX REV CODE- 250/636: Performed by: INTERNAL MEDICINE

## 2018-04-25 PROCEDURE — 83735 ASSAY OF MAGNESIUM: CPT | Performed by: INTERNAL MEDICINE

## 2018-04-25 PROCEDURE — 96361 HYDRATE IV INFUSION ADD-ON: CPT

## 2018-04-25 PROCEDURE — 96374 THER/PROPH/DIAG INJ IV PUSH: CPT

## 2018-04-25 PROCEDURE — 77030003560 HC NDL HUBR BARD -A

## 2018-04-25 PROCEDURE — 74011000250 HC RX REV CODE- 250: Performed by: INTERNAL MEDICINE

## 2018-04-25 RX ORDER — HEPARIN 100 UNIT/ML
300-500 SYRINGE INTRAVENOUS AS NEEDED
Status: DISPENSED | OUTPATIENT
Start: 2018-04-25 | End: 2018-04-25

## 2018-04-25 RX ORDER — SODIUM CHLORIDE 9 MG/ML
1000 INJECTION, SOLUTION INTRAVENOUS ONCE
Status: COMPLETED | OUTPATIENT
Start: 2018-04-25 | End: 2018-04-25

## 2018-04-25 RX ORDER — SODIUM CHLORIDE 0.9 % (FLUSH) 0.9 %
10 SYRINGE (ML) INJECTION AS NEEDED
Status: DISCONTINUED | OUTPATIENT
Start: 2018-04-25 | End: 2018-04-29 | Stop reason: HOSPADM

## 2018-04-25 RX ADMIN — SODIUM CHLORIDE 1000 ML: 900 INJECTION, SOLUTION INTRAVENOUS at 07:50

## 2018-04-25 RX ADMIN — SODIUM CHLORIDE, PRESERVATIVE FREE 300 UNITS: 5 INJECTION INTRAVENOUS at 08:48

## 2018-04-25 RX ADMIN — Medication 10 ML: at 08:48

## 2018-04-25 RX ADMIN — PROMETHAZINE HYDROCHLORIDE 25 MG: 25 INJECTION INTRAMUSCULAR; INTRAVENOUS at 07:48

## 2018-04-26 ENCOUNTER — APPOINTMENT (OUTPATIENT)
Dept: INFUSION THERAPY | Age: 48
End: 2018-04-26
Payer: COMMERCIAL

## 2018-05-02 ENCOUNTER — ANESTHESIA EVENT (OUTPATIENT)
Dept: ENDOSCOPY | Age: 48
End: 2018-05-02
Payer: COMMERCIAL

## 2018-05-02 ENCOUNTER — APPOINTMENT (OUTPATIENT)
Dept: INFUSION THERAPY | Age: 48
End: 2018-05-02
Payer: COMMERCIAL

## 2018-05-02 RX ORDER — SODIUM CHLORIDE 0.9 % (FLUSH) 0.9 %
5-10 SYRINGE (ML) INJECTION AS NEEDED
Status: CANCELLED | OUTPATIENT
Start: 2018-05-02

## 2018-05-02 RX ORDER — SODIUM CHLORIDE, SODIUM LACTATE, POTASSIUM CHLORIDE, CALCIUM CHLORIDE 600; 310; 30; 20 MG/100ML; MG/100ML; MG/100ML; MG/100ML
100 INJECTION, SOLUTION INTRAVENOUS CONTINUOUS
Status: CANCELLED | OUTPATIENT
Start: 2018-05-02

## 2018-05-03 ENCOUNTER — HOSPITAL ENCOUNTER (OUTPATIENT)
Age: 48
Setting detail: OUTPATIENT SURGERY
Discharge: HOME | End: 2018-05-03
Attending: INTERNAL MEDICINE | Admitting: INTERNAL MEDICINE
Payer: COMMERCIAL

## 2018-05-03 ENCOUNTER — ANESTHESIA (OUTPATIENT)
Dept: ENDOSCOPY | Age: 48
End: 2018-05-03
Payer: COMMERCIAL

## 2018-05-03 VITALS
OXYGEN SATURATION: 92 % | HEIGHT: 62 IN | RESPIRATION RATE: 18 BRPM | SYSTOLIC BLOOD PRESSURE: 116 MMHG | HEART RATE: 74 BPM | TEMPERATURE: 98.6 F | WEIGHT: 185 LBS | BODY MASS INDEX: 34.04 KG/M2 | DIASTOLIC BLOOD PRESSURE: 76 MMHG

## 2018-05-03 LAB
GLUCOSE BLD STRIP.AUTO-MCNC: 171 MG/DL (ref 65–100)
GLUCOSE BLD STRIP.AUTO-MCNC: 231 MG/DL (ref 65–100)

## 2018-05-03 PROCEDURE — C1726 CATH, BAL DIL, NON-VASCULAR: HCPCS | Performed by: INTERNAL MEDICINE

## 2018-05-03 PROCEDURE — 74011250636 HC RX REV CODE- 250/636: Performed by: INTERNAL MEDICINE

## 2018-05-03 PROCEDURE — 74011636637 HC RX REV CODE- 636/637: Performed by: ANESTHESIOLOGY

## 2018-05-03 PROCEDURE — 74011000250 HC RX REV CODE- 250

## 2018-05-03 PROCEDURE — 76060000032 HC ANESTHESIA 0.5 TO 1 HR: Performed by: INTERNAL MEDICINE

## 2018-05-03 PROCEDURE — 82962 GLUCOSE BLOOD TEST: CPT

## 2018-05-03 PROCEDURE — 76040000025: Performed by: INTERNAL MEDICINE

## 2018-05-03 PROCEDURE — 74011250636 HC RX REV CODE- 250/636: Performed by: ANESTHESIOLOGY

## 2018-05-03 PROCEDURE — 74011250636 HC RX REV CODE- 250/636

## 2018-05-03 RX ORDER — HEPARIN 100 UNIT/ML
500 SYRINGE INTRAVENOUS
Status: COMPLETED | OUTPATIENT
Start: 2018-05-03 | End: 2018-05-03

## 2018-05-03 RX ORDER — PROPOFOL 10 MG/ML
INJECTION, EMULSION INTRAVENOUS AS NEEDED
Status: DISCONTINUED | OUTPATIENT
Start: 2018-05-03 | End: 2018-05-03 | Stop reason: HOSPADM

## 2018-05-03 RX ORDER — SODIUM CHLORIDE, SODIUM LACTATE, POTASSIUM CHLORIDE, CALCIUM CHLORIDE 600; 310; 30; 20 MG/100ML; MG/100ML; MG/100ML; MG/100ML
100 INJECTION, SOLUTION INTRAVENOUS CONTINUOUS
Status: DISCONTINUED | OUTPATIENT
Start: 2018-05-03 | End: 2018-05-03 | Stop reason: HOSPADM

## 2018-05-03 RX ORDER — LIDOCAINE HYDROCHLORIDE 20 MG/ML
INJECTION, SOLUTION EPIDURAL; INFILTRATION; INTRACAUDAL; PERINEURAL AS NEEDED
Status: DISCONTINUED | OUTPATIENT
Start: 2018-05-03 | End: 2018-05-03 | Stop reason: HOSPADM

## 2018-05-03 RX ORDER — SODIUM CHLORIDE 0.9 % (FLUSH) 0.9 %
10 SYRINGE (ML) INJECTION
Status: COMPLETED | OUTPATIENT
Start: 2018-05-03 | End: 2018-05-03

## 2018-05-03 RX ORDER — TRIAMCINOLONE ACETONIDE 40 MG/ML
40 INJECTION, SUSPENSION INTRA-ARTICULAR; INTRAMUSCULAR ONCE
Status: DISCONTINUED | OUTPATIENT
Start: 2018-05-03 | End: 2018-05-03 | Stop reason: HOSPADM

## 2018-05-03 RX ORDER — PROPOFOL 10 MG/ML
INJECTION, EMULSION INTRAVENOUS
Status: DISCONTINUED | OUTPATIENT
Start: 2018-05-03 | End: 2018-05-03 | Stop reason: HOSPADM

## 2018-05-03 RX ADMIN — Medication 10 ML: at 10:47

## 2018-05-03 RX ADMIN — PROPOFOL 50 MG: 10 INJECTION, EMULSION INTRAVENOUS at 09:34

## 2018-05-03 RX ADMIN — HEPARIN 500 UNITS: 100 SYRINGE at 10:47

## 2018-05-03 RX ADMIN — PROPOFOL 200 MCG/KG/MIN: 10 INJECTION, EMULSION INTRAVENOUS at 09:34

## 2018-05-03 RX ADMIN — INSULIN HUMAN 4 UNITS: 100 INJECTION, SOLUTION PARENTERAL at 09:13

## 2018-05-03 RX ADMIN — LIDOCAINE HYDROCHLORIDE 60 MG: 20 INJECTION, SOLUTION EPIDURAL; INFILTRATION; INTRACAUDAL; PERINEURAL at 09:34

## 2018-05-03 RX ADMIN — SODIUM CHLORIDE, SODIUM LACTATE, POTASSIUM CHLORIDE, AND CALCIUM CHLORIDE 100 ML/HR: 600; 310; 30; 20 INJECTION, SOLUTION INTRAVENOUS at 09:03

## 2018-05-03 NOTE — PROGRESS NOTES
Port flushed with sterile NS 10ml, Heparin 500units per protocol. Pt's arrived with port accessed, drsg remains c/d/i.

## 2018-05-03 NOTE — DISCHARGE INSTRUCTIONS
Gastrointestinal Esophagogastroduodenoscopy (EGD) - Upper Exam Discharge Instructions    1. Call Dr. Rodríguez Fly at [phone] for any problems or questions. 2. Contact the doctor's office for follow up appointment as directed. 3. Medication may cause drowsiness for several hours, therefore, do not drive or operate machinery for remainder of the day. 4. No alcohol today. 5. Ordinarily, you may resume regular diet and activity after exam unless otherwise specified by your physician. 6. For mild soreness in your throat you may use Cepacol throat lozenges or warm salt-water gargles as needed. Any additional instructions:      1. EGD with dilation in 2 weeks. If symptoms not relieved, suggest motility testing     Instructions given to Anuj Pulido and other family members.   Instructions given by:  Ana Maria Kirby RN

## 2018-05-03 NOTE — PROCEDURES
Esophagogastroduodenoscopy    DATE of PROCEDURE: 5/3/2018    MEDICATIONS ADMINISTERED: MAC    INSTRUMENT: GIF    PROCEDURE:  After obtaining informed consent, the patient was placed in the left lateral position and sedated. The endoscope was advanced under direct vision without difficulty. The esophagus, stomach (including retroflexed views) and duodenum were evaluated. The patient was taken to the recovery area in stable condition. FINDINGS:  ESOPHAGUS: No inflammation or easily visible stenosis at the GE junction. 12-15 mm TTS balloon inserted through the stricture under direct visualization with the endoscope. No resistance at 12 or 13.5 mm. No mucosal break after 13.5 mm inflation. Balloon inflated to 15 mm for one minute. Mild resistance at 15 mm. Very small mucosal break after one minute inflation at 15 mm.    15-18 mm balloon then inserted. Balloon inflated to 15 mm and left in the stricture for one minute. The balloon then was increased to 16.5 mm and left inflated in the stricture for one minute. The balloon was deflated. Moderate mucosal trauma at the stricture on inspection. STOMACH: Status post what appears to be Billroth tube surgery. Anastomosis healthy. Residual stomach normal.  DUODENUM & JEJUNUM: Both limbs of the small intestine inspected and appear normal.    Estimated blood loss: 0-minimal     PLAN:  1. EGD with dilation in 2 weeks.  If symptoms not relieved, suggest motility testing    Brandy Burt MD  [facility], [place]

## 2018-05-03 NOTE — ANESTHESIA POSTPROCEDURE EVALUATION
Post-Anesthesia Evaluation and Assessment    Patient: Missy Kelley MRN: [id number]  SSN: [id number]    YOB: 1970  Age: 50 y.o. Sex: female       Cardiovascular Function/Vital Signs  Visit Vitals    /59    Pulse 77    Temp 37 °C (98.6 °F)    Resp 18    Ht 5' 2\" (1.575 m)    Wt 83.9 kg (185 lb)    SpO2 91%    BMI 33.84 kg/m2       Patient is status post total IV anesthesia anesthesia for Procedure(s):  ESOPHAGEAL DILATION  ESOPHAGOGASTRODUODENOSCOPY (EGD). Nausea/Vomiting: None    Postoperative hydration reviewed and adequate. Pain:  Pain Scale 1: Numeric (0 - 10) (05/03/18 1024)  Pain Intensity 1: 0 (05/03/18 1024)   Managed    Neurological Status: At baseline    Mental Status and Level of Consciousness: Arousable    Pulmonary Status:   O2 Device: Room air (05/03/18 1024)   Adequate oxygenation and airway patent    Complications related to anesthesia: None    Post-anesthesia assessment completed.  No concerns    Signed By: Ana Ledesma MD     May 3, 2018

## 2018-05-03 NOTE — ANESTHESIA PREPROCEDURE EVALUATION
Anesthetic History     PONV          Review of Systems / Medical History  Patient summary reviewed and pertinent labs reviewed    Pulmonary          Smoker (Former)  Asthma        Neuro/Psych         Headaches     Cardiovascular    Hypertension          Hyperlipidemia    Exercise tolerance: >4 METS     GI/Hepatic/Renal     GERD      Liver disease (Nonalcoholic fatty liver disease)    Comments: Esophageal stricture Endo/Other    Diabetes: type 2, using insulin         Other Findings              Physical Exam    Airway  Mallampati: II    Neck ROM: decreased range of motion, short neck   Mouth opening: Normal     Cardiovascular  Regular rate and rhythm,  S1 and S2 normal,  no murmur, click, rub, or gallop             Dental  No notable dental hx       Pulmonary  Breath sounds clear to auscultation               Abdominal         Other Findings            Anesthetic Plan    ASA: 3  Anesthesia type: total IV anesthesia          Induction: Intravenous  Anesthetic plan and risks discussed with: Patient

## 2018-05-03 NOTE — H&P
[facility] H&P (Admission)        Date:  5/3/2018    Chief Complaint:  dysphagia    Subjective:     History of Present Illness:  Patient is a 50 y.o. being admitted for chronic stricture dilation.     PMH:  Past Medical History:   Diagnosis Date    Abscess, abdomen     pt not aware of    Anemia     denies hx of blood transfusions-- Fe infusions 2017    Anxiety     Asthma     allergy induced, denies any inhaler    C. difficile diarrhea     in 3972 after complicated ERCP    Cervical dysplasia     Chronic insomnia     Chronic pain     all over     Chronic pancreatitis ([facility] [address]     Depression     Endometriosis     Esophageal stricture     Fibromyalgia     Former cigarette smoker     SANA (generalized anxiety disorder)     GERD (gastroesophageal reflux disease)     daily meds--- nexium daily as needed- Protonix IV via port daily--     HLD (hyperlipidemia)     pt denies     IBS (irritable bowel syndrome)     Migraine headache     Nausea & vomiting     pt reports she does better with phenergan than zofran - causes HA    Nonalcoholic fatty liver disease     PUD (peptic ulcer disease) 2017    Hx of     Sphincter of Oddi dysfunction     Stricture of esophagus     Type 2 diabetes mellitus ([facility] [address]     insulin reliant: fbs av-200/ s.s of hypoglyemia @80-90/Last : A1c 6.9       PSH:  Past Surgical History:   Procedure Laterality Date    ABDOMEN SURGERY PROC UNLISTED  last one placed      Hx of pancreatic stent, multiple    ABDOMEN SURGERY PROC UNLISTED      lap nissen    ABDOMEN SURGERY PROC UNLISTED      explor lap    COLONOSCOPY N/A 2018    COLON[facility] ENDOSCOPY    HX COLONOSCOPY      HX ENDOSCOPY      36 fr thomas    HX ERCP  3/5/2013    resulted in perforated duodenum    HX HYSTERECTOMY      ovaries remain    HX LAP CHOLECYSTECTOMY      HX LAPAROTOMY  3/5/2013    exploratory for duodenal perforation with ERCP    HX ORTHOPAEDIC      right finger surgery 11/2017    HX UROLOGICAL  4/25/13 at [facility]-- stent removed 6-27-13. Dr Christian Lombard      port in place       Allergies: Allergies   Allergen Reactions    Tape [Adhesive] Rash and Itching    Barium Iodide Nausea and Vomiting    Contrast Dye [Iodine] Shortness of Breath     Oral contrast-experienced flushing,shortness of breath, sweatiness; (patient has received oral contrast [facility])    Flagyl [Metronidazole] Nausea and Vomiting    Metformin Nausea and Vomiting     Stomach pain    Valtrex [Valacyclovir] Unknown (comments)     PATIENT STATES \"NOT ALLERGIC\"    Insulins Nausea and Vomiting     Humalog only       Home Medications:  Prior to Admission medications    Medication Sig Start Date End Date Taking? Authorizing Provider   fentaNYL (DURAGESIC) 75 mcg/hr 1 Patch by TransDERmal route every seventy-two (72) hours. Yes Historical Provider   citalopram (CELEXA) 20 mg tablet Take 20 mg by mouth daily. Yes Historical Provider   acetaminophen (TYLENOL) 325 mg tablet Take 325 mg by mouth every four (4) hours as needed for Pain. Yes Historical Provider   LORazepam (ATIVAN) 1 mg tablet Take 1 mg by mouth three (3) times daily as needed for Anxiety. Yes Historical Provider   cyanocobalamin (VITAMIN B12) 1,000 mcg/mL injection INJECT 1 VIAL INTRAMUSCULARLY FOR 4 WEEKS THEN MONTHLY 11/25/17  Yes Historical Provider   CARAFATE 100 mg/mL suspension TAKE 10 ML BY MOUTH 4 TIMES A DAY EVERY DAY ON A EMPTY STOMACH 1 HR BEFORE MEALS AND AT BEDTIME 9/7/17  Yes Historical Provider   gabapentin (NEURONTIN) 250 mg/5 mL solution Take 300 mg by mouth three (3) times daily. Take day of surgery per anesthesia protocol. -since pt drinks pt stated she will hold until she gets home   Yes Historical Provider   diphenhydrAMINE (BENADRYL) 25 mg capsule Take 25 mg by mouth every six (6) hours as needed.    Yes Historical Provider pantoprazole (PROTONIX) 40 mg injection 40 mg by IntraVENous route every morning. Take day of surgery per anesthesia protocol. Yes Historical Provider   insulin degludec (TRESIBA FLEXTOUCH U-100) 100 unit/mL (3 mL) inpn 40 Units by SubCUTAneous route nightly. Yes Historical Provider   polyethylene glycol (MIRALAX) 17 gram packet Take 17 g by mouth daily. Yes Historical Provider   hyoscyamine SL (LEVSIN/SL) 0.125 mg SL tablet 0.125 mg by SubLINGual route every six (6) hours as needed for Cramping. Yes Historical Provider   0.9% sodium chloride solution by IntraVENous route five (5) days a week. Gives to herself via port at home daily -1000cc   Yes Historical Provider   oxyCODONE IR (ROXICODONE) 10 mg tab immediate release tablet Take 10 mg by mouth every eight (8) hours as needed. Take day of surgery per anesthesia protocol. Yes Historical Provider   insulin aspart (NOVOLOG) 100 unit/mL injection Use as directed  Patient taking differently: 7 Units by SubCUTAneous route Before breakfast, lunch, and dinner. Over 300 use sliding scale 6/7/16  Yes Tiburcio Saldivar NP   zolpidem (AMBIEN) 10 mg tablet TAKE 1 TABLET BY MOUTH EVERY NIGHT AS NEEDED FOR SLEEP  Patient taking differently: Take 10 mg by mouth nightly. 6/7/16  Yes Tiburcio Saldivar NP   promethazine (PHENERGAN) 25 mg tablet Take 1 Tab by mouth every six (6) hours as needed. Patient taking differently: Take 25 mg by mouth as needed. 6/27/14  Yes Charolet Claude Hunt, PA   promethazine (PHENERGAN) 25 mg suppository Insert 25 mg into rectum as needed for Nausea. Yes Historical Provider   ondansetron hcl (ZOFRAN) 8 mg tablet Take 8 mg by mouth every eight (8) hours as needed for Nausea. Yes Historical Provider   glucagon (GLUCAGEN) 1 mg injection 1 mg by IntraMUSCular route.  5/8/17   Historical Provider       Hospital Medications:  Current Facility-Administered Medications   Medication Dose Route Frequency    lactated Ringers infusion  100 mL/hr IntraVENous CONTINUOUS       Social History:  Social History   Substance Use Topics    Smoking status: Former Smoker     Packs/day: 1.00     Years: 3.00     Quit date: 4/24/1993    Smokeless tobacco: Never Used    Alcohol use No         Family History:  Family History   Problem Relation Age of Onset    Diabetes Mother     Heart Disease Mother     Hypertension Mother     Diabetes Father     Heart Disease Father     Hypertension Father     Other Father      Hypomagnesium    No Known Problems Sister        Review of Systems:  A detailed 10 system ROS is obtained, with pertinent positives as listed above. All others are negative. Objective:     Physical Exam:  Vitals:  Visit Vitals    /78    Pulse 87    Temp 98.6 °F (37 °C)    Resp 16    Ht 5' 2\" (1.575 m)    Wt 83.9 kg (185 lb)    SpO2 93%    BMI 33.84 kg/m2     Gen:  Pt is alert, cooperative, no acute distress  Skin: no large lesions  HEENT: MMM  Cardiovascular: Regular rate and rhythm. No murmurs, gallops, or rubs. Respiratory:  Comfortable breathing with no accessory muscle use. Clear breath sounds with no wheezes, rales, or rhonchi. GI:  Abdomen nondistended, soft, and nontender. Normal active bowel sounds. Neurological:  Gross memory appears intact. Patient is alert and oriented. Psychiatric:  Mood appears appropriate with judgement intact. Laboratory:    No results for input(s): WBC, HGB, HCT, PLT, MCV, NA, K, CL, CO2, BUN, CREA, CA, MG, GLU, AP, SGOT, GPT, TBIL, CBIL, ALB, TP, AML, LPSE, PTP, INR, APTT, HGBEXT, HCTEXT, PLTEXT in the last 72 hours. No lab exists for component: DBIL, INREXT    Assessment:       Active Problems:    * No active hospital problems. *      Plan:     Endoscopy and risks explained to the patient.   Risks including reaction to sedation, cardiopulmonary events, infection, bleeding, perforation, requirement for surgery if complications should occur, death were explained to patient who expressed understanding and agreed to proceed with endoscopy.         Mimi Diallo MD  [facility], [address]

## 2018-05-03 NOTE — IP AVS SNAPSHOT
[facility] 
 
 
 [address] 
[phone] Patient: Benoit Ya MRN: [id number] :1970 About your hospitalization You were admitted on:  May 3, 2018 You last received care in the:  [facility] You were discharged on:  May 3, 2018 Why you were hospitalized Your primary diagnosis was:  Not on File Follow-up Information None Your Scheduled Appointments Friday May 04, 2018  3:30 PM EDT Infusion Lab with LAB CK  
[facility]) [address] Kasey Rivera [phone] [facility] [address]  
[phone] [address] Friday May 04, 2018  4:00 PM EDT Infusion with NUR8  
[address] ([facility]) [address] Kasey Rivera [phone] [facility] [address]  
[phone] [address] Thursday May 10, 2018  7:05 AM EDT Infusion Lab with LAB CK  
[facility]) [address] Kasey Rivera [phone] [facility] [address]  
[phone] [address] Thursday May 10, 2018  7:15 AM EDT Infusion with NUR4  
ST. [address] ([facility]) [address] Kasey Rivera [phone] [facility] [address]  
[phone] [address] Thursday May 17, 2018  7:05 AM EDT Infusion Lab with LAB CK  
[facility]) [address] Kasey Rivera [phone] [facility] [address]  
[phone] [address] Thursday May 17, 2018  7:15 AM EDT Infusion with [email or web address] [address]  ([facility]) [address] Kendra Son [phone] Romario Saldivar [place] [address]  
[phone] [address] Thursday May 24, 2018  7:05 AM EDT Infusion Lab with LAB CK  
[facility]) [address] Kendra Son [phone] Romario Saldivar [place] [address]  
[phone] [address] Thursday May 24, 2018  7:15 AM EDT Infusion with NUR8  
ST. [address] ([facility]) [address] Kendra Son [phone] Romario Saldivar [place] [address]  
[phone] [address] Wednesday May 30, 2018 11:30 AM EDT  
[facility] PHONE ASSESSMENT with [facility] PHONE APPOINTMENT [facility] Pre Assessment [facility] OR PRE ASSESSMENT) [address] [place] [address]  
[phone] Date/time displayed does not reflect when your phone assessment will be completed. Thursday May 31, 2018  7:05 AM EDT Infusion Lab with LAB CK  
[facility]) [address] Kendra Son [phone] Romario Saldivar [place] [address]  
[phone] [address] Thursday May 31, 2018  7:15 AM EDT Infusion with NUR7  
ST. [address] [facility] ([facility]) [address] Kendra Son [phone] Romario Saldivar [place] [address]  
[phone] [address] Tuesday June 05, 2018 ESOPHAGOGASTRODUODENOSCOPY (EGD) with Joaquim Jackson MD  
[facility] ([address] [place] [address]) [address] [place] [address]  
[phone] Thursday June 07, 2018  7:05 AM EDT Infusion Lab with LAB CK  
[facility]) [address] Rani Quinn [phone] [facility] [address]  
[phone] [address] Thursday June 07, 2018  7:15 AM EDT Infusion with NUR7  
ST. [address] ([facility] [address] Rani Quinn [phone] [facility] [address]  
[phone] [address] Discharge Orders None A check cindy indicates which time of day the medication should be taken. My Medications ASK your doctor about these medications Instructions Each Dose to Equal  
 Morning Noon Evening Bedtime  
 0.9% sodium chloride solution Your last dose was: Your next dose is:    
   
   
 by IntraVENous route five (5) days a week. Gives to herself via port at home daily -1000cc ATIVAN 1 mg tablet Generic drug:  LORazepam  
   
Your last dose was: Your next dose is: Take 1 mg by mouth three (3) times daily as needed for Anxiety. 1 mg BENADRYL 25 mg capsule Generic drug:  diphenhydrAMINE Your last dose was: Your next dose is: Take 25 mg by mouth every six (6) hours as needed. 25 mg  
    
   
   
   
  
 CARAFATE 100 mg/mL suspension Generic drug:  sucralfate Your last dose was: Your next dose is: TAKE 10 ML BY MOUTH 4 TIMES A DAY EVERY DAY ON A EMPTY STOMACH 1 HR BEFORE MEALS AND AT BEDTIME CeleXA 20 mg tablet Generic drug:  citalopram  
   
Your last dose was: Your next dose is: Take 20 mg by mouth daily. 20 mg  
    
   
   
   
  
 cyanocobalamin 1,000 mcg/mL injection Commonly known as:  VITAMIN B12 Your last dose was: Your next dose is: INJECT 1 VIAL INTRAMUSCULARLY FOR 4 WEEKS THEN MONTHLY fentaNYL 75 mcg/hr Commonly known as:  Yamileth March Your last dose was: Your next dose is:    
   
   
 1 Patch by TransDERmal route every seventy-two (72) hours. 1 Patch  
    
   
   
   
  
 gabapentin 250 mg/5 mL solution Commonly known as:  NEURONTIN Your last dose was: Your next dose is: Take 300 mg by mouth three (3) times daily. Take day of surgery per anesthesia protocol. -since pt drinks pt stated she will hold until she gets home 300 mg  
    
   
   
   
  
 glucagon 1 mg injection Commonly known as:  Aaron Jose Your last dose was: Your next dose is:    
   
   
 1 mg by IntraMUSCular route. 1 mg  
    
   
   
   
  
 hyoscyamine SL 0.125 mg SL tablet Commonly known as:  LEVSIN/SL Your last dose was: Your next dose is:    
   
   
 0.125 mg by SubLINGual route every six (6) hours as needed for Cramping.  
 0.125 mg  
    
   
   
   
  
 insulin aspart U-100 100 unit/mL injection Commonly known as:  NovoLOG U-100 Insulin aspart Your last dose was: Your next dose is:    
   
   
 Use as directed MIRALAX 17 gram packet Generic drug:  polyethylene glycol Your last dose was: Your next dose is: Take 17 g by mouth daily. 17 g  
    
   
   
   
  
 oxyCODONE IR 10 mg Tab immediate release tablet Commonly known as:  Jignesh Radha Your last dose was: Your next dose is: Take 10 mg by mouth every eight (8) hours as needed. Take day of surgery per anesthesia protocol. 10 mg  
    
   
   
   
  
 * promethazine 25 mg suppository Commonly known as:  PHENERGAN Your last dose was: Your next dose is: Insert 25 mg into rectum as needed for Nausea. 25 mg  
    
   
   
   
  
 * promethazine 25 mg tablet Commonly known as:  PHENERGAN Your last dose was: Your next dose is: Take 1 Tab by mouth every six (6) hours as needed. 25 mg PROTONIX 40 mg injection Generic drug:  pantoprazole Your last dose was: Your next dose is:    
   
   
 40 mg by IntraVENous route every morning. Take day of surgery per anesthesia protocol. 40 mg  
    
   
   
   
  
 TRESIBA FLEXTOUCH U-100 100 unit/mL (3 mL) Inpn Generic drug:  insulin degludec Your last dose was: Your next dose is:    
   
   
 40 Units by SubCUTAneous route nightly. 40 Units TYLENOL 325 mg tablet Generic drug:  acetaminophen Your last dose was: Your next dose is: Take 325 mg by mouth every four (4) hours as needed for Pain. 325 mg  
    
   
   
   
  
 ZOFRAN 8 mg tablet Generic drug:  ondansetron hcl Your last dose was: Your next dose is: Take 8 mg by mouth every eight (8) hours as needed for Nausea. 8 mg  
    
   
   
   
  
 zolpidem 10 mg tablet Commonly known as:  AMBIEN Your last dose was: Your next dose is: TAKE 1 TABLET BY MOUTH EVERY NIGHT AS NEEDED FOR SLEEP * Notice: This list has 2 medication(s) that are the same as other medications prescribed for you. Read the directions carefully, and ask your doctor or other care provider to review them with you. Opioid Education Prescription Opioids: What You Need to Know: 
 
Prescription opioids can be used to help relieve moderate-to-severe pain and are often prescribed following a surgery or injury, or for certain health conditions. These medications can be an important part of treatment but also come with serious risks. Opioids are strong pain medicines. Examples include hydrocodone, oxycodone, fentanyl, and morphine. Heroin is an example of an illegal opioid.   It is important to work with your health care provider to make sure you are getting the safest, most effective care. WHAT ARE THE RISKS AND SIDE EFFECTS OF OPIOID USE? Prescription opioids carry serious risks of addiction and overdose, especially with prolonged use. An opioid overdose, often marked by slow breathing, can cause sudden death. The use of prescription opioids can have a number of side effects as well, even when taken as directed. · Tolerance-meaning you might need to take more of a medication for the same pain relief · Physical dependence-meaning you have symptoms of withdrawal when the medication is stopped. Withdrawal symptoms can include nausea, sweating, chills, diarrhea, stomach cramps, and muscle aches. Withdrawal can last up to several weeks, depending on which drug you took and how long you took it. · Increased sensitivity to pain · Constipation · Nausea, vomiting, and dry mouth · Sleepiness and dizziness · Confusion · Depression · Low levels of testosterone that can result in lower sex drive, energy, and strength · Itching and sweating RISKS ARE GREATER WITH:      
· History of drug misuse, substance use disorder, or overdose · Mental health conditions (such as depression or anxiety) · Sleep apnea · Older age (72 years or older) · Pregnancy Avoid alcohol while taking prescription opioids. Also, unless specifically advised by your health care provider, medications to avoid include: · Benzodiazepines (such as Xanax or Valium) · Muscle relaxants (such as Soma or Flexeril) · Hypnotics (such as Ambien or Lunesta) · Other prescription opioids KNOW YOUR OPTIONS Talk to your health care provider about ways to manage your pain that don't involve prescription opioids. Some of these options may actually work better and have fewer risks and side effects. Options may include: 
· Pain relievers such as acetaminophen, ibuprofen, and naproxen · Some medications that are also used for depression or seizures · Physical therapy and exercise · Counseling to help patients learn how to cope better with triggers of pain and stress. · Application of heat or cold compress · Massage therapy · Relaxation techniques Be Informed Make sure you know the name of your medication, how much and how often to take it, and its potential risks & side effects. IF YOU ARE PRESCRIBED OPIOIDS FOR PAIN: 
· Never take opioids in greater amounts or more often than prescribed. Remember the goal is not to be pain-free but to manage your pain at a tolerable level. · Follow up with your primary care provider to: · Work together to create a plan on how to manage your pain. · Talk about ways to help manage your pain that don't involve prescription opioids. · Talk about any and all concerns and side effects. · Help prevent misuse and abuse. · Never sell or share prescription opioids · Help prevent misuse and abuse. · Store prescription opioids in a secure place and out of reach of others (this may include visitors, children, friends, and family). · Safely dispose of unused/unwanted prescription opioids: Find your community drug take-back program or your pharmacy mail-back program, or flush them down the toilet, following guidance from the Food and Drug Administration (www.fda.gov/Drugs/ResourcesForYou). · Visit www.cdc.gov/drugoverdose to learn about the risks of opioid abuse and overdose. · If you believe you may be struggling with addiction, tell your health care provider and ask for guidance or call [facility] at 1-018-886-HELP. Discharge Instructions Gastrointestinal Esophagogastroduodenoscopy (EGD) - Upper Exam Discharge Instructions 1. Call Dr. Marion Ng at [phone] for any problems or questions. 2. Contact the doctor's office for follow up appointment as directed.  
3. Medication may cause drowsiness for several hours, therefore, do not drive or operate machinery for remainder of the day. 4. No alcohol today. 5. Ordinarily, you may resume regular diet and activity after exam unless otherwise specified by your physician. 6. For mild soreness in your throat you may use Cepacol throat lozenges or warm salt-water gargles as needed. Any additional instructions: 1. EGD with dilation in 2 weeks. If symptoms not relieved, suggest motility testing Instructions given to Salvador Means and other family members. Instructions given by:  Raina Canales RN Introducing [facility]! Cari Laguna introduces [employer] patient portal. Now you can access parts of your medical record, email your doctor's office, and request medication refills online. 1. In your internet browser, go to [email or web address] 2. Click on the First Time User? Click Here link in the Sign In box. You will see the New Member Sign Up page. 3. Enter your Frontleaf Access Code exactly as it appears below. You will not need to use this code after youve completed the sign-up process. If you do not sign up before the expiration date, you must request a new code. · Frontleaf Access Code: [email or web address] Expires: 8/1/2018  7:57 AM 
 
4. Enter the last four digits of your Social Security Number (xxxx) and Date of Birth (mm/dd/yyyy) as indicated and click Submit. You will be taken to the next sign-up page. 5. Create a Frontleaf ID. This will be your Frontleaf login ID and cannot be changed, so think of one that is secure and easy to remember. 6. Create a Frontleaf password. You can change your password at any time. 7. Enter your Password Reset Question and Answer. This can be used at a later time if you forget your password. 8. Enter your e-mail address. You will receive e-mail notification when new information is available in [address] 9. Click Sign Up. You can now view and download portions of your medical record. 10. Click the Download Summary menu link to download a portable copy of your medical information. If you have questions, please visit the Frequently Asked Questions section of the [employer] website. Remember, [employer] is NOT to be used for urgent needs. For medical emergencies, dial 911. Now available from your iPhone and Android! Introducing Eleazar Gunn As a [employer] patient, I wanted to make you aware of our electronic visit tool called Eleazar Gunn. [employer] 24/7 allows you to connect within minutes with a medical provider 24 hours a day, seven days a week via a mobile device or tablet or logging into a secure website from your computer. You can access Eleazar Gunn from anywhere in the [place]. A virtual visit might be right for you when you have a simple condition and feel like you just dont want to get out of bed, or cant get away from work for an appointment, when your regular [employer] provider is not available (evenings, weekends or holidays), or when youre out of town and need minor care. Electronic visits cost only $49 and if the [employer] 24/7 provider determines a prescription is needed to treat your condition, one can be electronically transmitted to a nearby pharmacy*. Please take a moment to enroll today if you have not already done so. The enrollment process is free and takes just a few minutes. To enroll, please download the [employer] 24/7 chandler to your tablet or phone, or visit [email or web address] to enroll on your computer. And, as an [address] [place] [address] patient with a [employer] account, the results of your visits will be scanned into your electronic medical record and your primary care provider will be able to view the scanned results. We urge you to continue to see your regular [employer] provider for your ongoing medical care.   And while your primary care provider may not be the one available when you seek a Eleazar Afshin[employer]aily[employer] virtual visit, the peace of mind you get from getting a real diagnosis real time can be priceless. For more information on [employer], view our Frequently Asked Questions (FAQs) at [email or web address]. Sincerely, 
 
Esmer Chen MD 
Chief Medical Officer 50Steve Ramirez *:  certain medications cannot be prescribed via [employer] Providers Seen During Your Hospitalization Provider Specialty Primary office phone Moira Greene MD Gastroenterology [phone] Your Primary Care Physician (PCP) Primary Care Physician Office Phone Office Fax 100 [facility], [address] [place] [address] 69 [phone] You are allergic to the following Allergen Reactions Tape (Adhesive) Rash Itching Barium Iodide Nausea and Vomiting Contrast Dye (Iodine) Shortness of Breath Oral contrast-experienced flushing,shortness of breath, sweatiness; (patient has received oral contrast many times at [facility]) Flagyl (Metronidazole) Nausea and Vomiting Metformin Nausea and Vomiting Stomach pain Valtrex (Valacyclovir) Unknown (comments) PATIENT STATES \"NOT ALLERGIC\" Insulins Nausea and Vomiting Humalog only Recent Documentation Height Weight BMI OB Status Smoking Status 1.575 m 83.9 kg 33.84 kg/m2 Hysterectomy Former Smoker Emergency Contacts Name Discharge Info Relation Home Work Mobile Jorge Treadwell DISCHARGE CAREGIVER [3] Spouse [3] [phone]  [address] [place] [address] CAREGIVER [3] Father [15] [phone] Daphne Ramos CAREGIVER [3] Mother [14] [phone] Patient Belongings The following personal items are in your possession at time of discharge: 
  Dental Appliances: None  Visual Aid: None Please provide this summary of care documentation to your next provider. Signatures-by signing, you are acknowledging that this After Visit Summary has been reviewed with you and you have received a copy. Patient Signature:  ____________________________________________________________ Date:  ____________________________________________________________  
  
[facility] Provider Signature:  ____________________________________________________________ Date:  ____________________________________________________________

## 2018-05-04 ENCOUNTER — HOSPITAL ENCOUNTER (OUTPATIENT)
Dept: INFUSION THERAPY | Age: 48
Discharge: HOME | End: 2018-05-04
Payer: COMMERCIAL

## 2018-05-04 VITALS
SYSTOLIC BLOOD PRESSURE: 128 MMHG | RESPIRATION RATE: 16 BRPM | TEMPERATURE: 98.2 F | HEART RATE: 107 BPM | BODY MASS INDEX: 33.32 KG/M2 | DIASTOLIC BLOOD PRESSURE: 79 MMHG | WEIGHT: 182.2 LBS | OXYGEN SATURATION: 93 %

## 2018-05-04 LAB — MAGNESIUM SERPL-MCNC: 1.9 MG/DL (ref 1.8–2.4)

## 2018-05-04 PROCEDURE — 96374 THER/PROPH/DIAG INJ IV PUSH: CPT

## 2018-05-04 PROCEDURE — 83735 ASSAY OF MAGNESIUM: CPT | Performed by: INTERNAL MEDICINE

## 2018-05-04 PROCEDURE — 74011250636 HC RX REV CODE- 250/636: Performed by: INTERNAL MEDICINE

## 2018-05-04 PROCEDURE — 77030003560 HC NDL HUBR BARD -A

## 2018-05-04 PROCEDURE — 74011000250 HC RX REV CODE- 250: Performed by: INTERNAL MEDICINE

## 2018-05-04 RX ORDER — HEPARIN 100 UNIT/ML
500 SYRINGE INTRAVENOUS AS NEEDED
Status: DISPENSED | OUTPATIENT
Start: 2018-05-04 | End: 2018-05-05

## 2018-05-04 RX ORDER — SODIUM CHLORIDE 0.9 % (FLUSH) 0.9 %
10 SYRINGE (ML) INJECTION AS NEEDED
Status: DISCONTINUED | OUTPATIENT
Start: 2018-05-04 | End: 2018-05-08 | Stop reason: HOSPADM

## 2018-05-04 RX ADMIN — Medication 10 ML: at 16:50

## 2018-05-04 RX ADMIN — PROMETHAZINE HYDROCHLORIDE 25 MG: 25 INJECTION INTRAMUSCULAR; INTRAVENOUS at 16:26

## 2018-05-04 RX ADMIN — Medication 500 UNITS: at 16:51

## 2018-05-04 RX ADMIN — Medication 10 ML: at 16:10

## 2018-05-04 NOTE — PROGRESS NOTES
Arrived to the [facility]. Phenergan injection completed. Pt did not require magnesium infusion, magnesium level noted to be 1.9. Patient tolerated without problems. Any issues or concerns during appointment: None. Patient aware of next infusion appointment on 5- (date) at [facility] (time). Discharged ambulatory.

## 2018-05-10 ENCOUNTER — HOSPITAL ENCOUNTER (OUTPATIENT)
Dept: INFUSION THERAPY | Age: 48
Discharge: HOME | End: 2018-05-10
Payer: COMMERCIAL

## 2018-05-10 VITALS
OXYGEN SATURATION: 96 % | TEMPERATURE: 98.2 F | HEART RATE: 96 BPM | SYSTOLIC BLOOD PRESSURE: 132 MMHG | RESPIRATION RATE: 16 BRPM | BODY MASS INDEX: 33.32 KG/M2 | DIASTOLIC BLOOD PRESSURE: 82 MMHG | WEIGHT: 182.2 LBS

## 2018-05-10 LAB — MAGNESIUM SERPL-MCNC: 1.8 MG/DL (ref 1.8–2.4)

## 2018-05-10 PROCEDURE — 77030003560 HC NDL HUBR BARD -A

## 2018-05-10 PROCEDURE — 96374 THER/PROPH/DIAG INJ IV PUSH: CPT

## 2018-05-10 PROCEDURE — 83735 ASSAY OF MAGNESIUM: CPT | Performed by: INTERNAL MEDICINE

## 2018-05-10 PROCEDURE — 74011250636 HC RX REV CODE- 250/636: Performed by: INTERNAL MEDICINE

## 2018-05-10 PROCEDURE — 74011000250 HC RX REV CODE- 250: Performed by: INTERNAL MEDICINE

## 2018-05-10 RX ORDER — HEPARIN 100 UNIT/ML
500 SYRINGE INTRAVENOUS AS NEEDED
Status: DISPENSED | OUTPATIENT
Start: 2018-05-10 | End: 2018-05-10

## 2018-05-10 RX ORDER — SODIUM CHLORIDE 0.9 % (FLUSH) 0.9 %
10-40 SYRINGE (ML) INJECTION AS NEEDED
Status: DISCONTINUED | OUTPATIENT
Start: 2018-05-10 | End: 2018-05-14 | Stop reason: HOSPADM

## 2018-05-10 RX ADMIN — PROMETHAZINE HYDROCHLORIDE 25 MG: 25 INJECTION INTRAMUSCULAR; INTRAVENOUS at 07:39

## 2018-05-10 RX ADMIN — Medication 10 ML: at 07:37

## 2018-05-10 RX ADMIN — Medication 10 ML: at 07:49

## 2018-05-10 RX ADMIN — SODIUM CHLORIDE, PRESERVATIVE FREE 500 UNITS: 5 INJECTION INTRAVENOUS at 08:30

## 2018-05-10 NOTE — PROGRESS NOTES
Arrived to the [facility]. Assessment completed. Patient received phenergan 25 mg IV today, as ordered. She required no further replacements today, as her magnesium was noted at 1.8. Any issues or concerns during appointment:  Complaining of mid, upper abdominal pain that has \"gotten worse\" over the course of the week. She states that she did call her doctor's office yesterday and made then aware. She states that she spoke to [place], Dr. Samanta Tsang nurse. Call placed to [facility]. Malathi's office and spoke to Deisy Lynch. .  Informed her of the pain that patient is having today. I gave Deisy Lynch the patient's cell phone number and Deisy Lynch states that she will have Manohar Simeon, Dr. Samanta Tsang nurse, call the patient. Patient aware of next infusion appointment on 5/24/18 (date) at [address] [place] [address] (time) with IV infusion center. Discharged ambulatory, with father. Patient instructed to call her doctor;s office immediately for any problems or concerns. She verbalizes understanding.

## 2018-05-17 ENCOUNTER — APPOINTMENT (OUTPATIENT)
Dept: INFUSION THERAPY | Age: 48
End: 2018-05-17
Payer: COMMERCIAL

## 2018-05-24 ENCOUNTER — HOSPITAL ENCOUNTER (OUTPATIENT)
Dept: INFUSION THERAPY | Age: 48
Discharge: HOME | End: 2018-05-24
Payer: COMMERCIAL

## 2018-05-24 VITALS
RESPIRATION RATE: 16 BRPM | DIASTOLIC BLOOD PRESSURE: 74 MMHG | WEIGHT: 181.6 LBS | SYSTOLIC BLOOD PRESSURE: 120 MMHG | HEART RATE: 105 BPM | BODY MASS INDEX: 33.22 KG/M2 | TEMPERATURE: 98.2 F | OXYGEN SATURATION: 98 %

## 2018-05-24 LAB — MAGNESIUM SERPL-MCNC: 1.9 MG/DL (ref 1.8–2.4)

## 2018-05-24 PROCEDURE — 77030003560 HC NDL HUBR BARD -A

## 2018-05-24 PROCEDURE — 83735 ASSAY OF MAGNESIUM: CPT | Performed by: INTERNAL MEDICINE

## 2018-05-24 PROCEDURE — 74011000250 HC RX REV CODE- 250: Performed by: INTERNAL MEDICINE

## 2018-05-24 PROCEDURE — 74011250636 HC RX REV CODE- 250/636: Performed by: INTERNAL MEDICINE

## 2018-05-24 PROCEDURE — 96374 THER/PROPH/DIAG INJ IV PUSH: CPT

## 2018-05-24 RX ORDER — SODIUM CHLORIDE 0.9 % (FLUSH) 0.9 %
10 SYRINGE (ML) INJECTION AS NEEDED
Status: DISCONTINUED | OUTPATIENT
Start: 2018-05-24 | End: 2018-05-28 | Stop reason: HOSPADM

## 2018-05-24 RX ORDER — HEPARIN 100 UNIT/ML
300 SYRINGE INTRAVENOUS AS NEEDED
Status: DISPENSED | OUTPATIENT
Start: 2018-05-24 | End: 2018-05-24

## 2018-05-24 RX ADMIN — SODIUM CHLORIDE, PRESERVATIVE FREE 300 UNITS: 5 INJECTION INTRAVENOUS at 09:07

## 2018-05-24 RX ADMIN — PROMETHAZINE HYDROCHLORIDE 25 MG: 25 INJECTION INTRAMUSCULAR; INTRAVENOUS at 07:20

## 2018-05-24 RX ADMIN — Medication 10 ML: at 07:26

## 2018-05-24 RX ADMIN — Medication 10 ML: at 09:07

## 2018-05-24 NOTE — PROGRESS NOTES
Phenergan given as ordered, pt tolerated well, Mag replacement not needed, pt discharged home ambulatory

## 2018-05-31 ENCOUNTER — HOSPITAL ENCOUNTER (OUTPATIENT)
Dept: INFUSION THERAPY | Age: 48
Discharge: HOME | End: 2018-05-31
Payer: COMMERCIAL

## 2018-05-31 VITALS
HEART RATE: 116 BPM | DIASTOLIC BLOOD PRESSURE: 73 MMHG | OXYGEN SATURATION: 96 % | RESPIRATION RATE: 16 BRPM | BODY MASS INDEX: 33.31 KG/M2 | TEMPERATURE: 98.3 F | SYSTOLIC BLOOD PRESSURE: 109 MMHG | WEIGHT: 182.1 LBS

## 2018-05-31 LAB — MAGNESIUM SERPL-MCNC: 1.8 MG/DL (ref 1.8–2.4)

## 2018-05-31 PROCEDURE — 83735 ASSAY OF MAGNESIUM: CPT | Performed by: INTERNAL MEDICINE

## 2018-05-31 PROCEDURE — 96374 THER/PROPH/DIAG INJ IV PUSH: CPT

## 2018-05-31 PROCEDURE — 74011250636 HC RX REV CODE- 250/636: Performed by: INTERNAL MEDICINE

## 2018-05-31 PROCEDURE — 74011000250 HC RX REV CODE- 250: Performed by: INTERNAL MEDICINE

## 2018-05-31 RX ORDER — SODIUM CHLORIDE 0.9 % (FLUSH) 0.9 %
10 SYRINGE (ML) INJECTION AS NEEDED
Status: DISCONTINUED | OUTPATIENT
Start: 2018-05-31 | End: 2018-06-04 | Stop reason: HOSPADM

## 2018-05-31 RX ORDER — HEPARIN 100 UNIT/ML
500 SYRINGE INTRAVENOUS AS NEEDED
Status: DISPENSED | OUTPATIENT
Start: 2018-05-31 | End: 2018-05-31

## 2018-05-31 RX ADMIN — SODIUM CHLORIDE, PRESERVATIVE FREE 500 UNITS: 5 INJECTION INTRAVENOUS at 08:29

## 2018-05-31 RX ADMIN — PROMETHAZINE HYDROCHLORIDE 25 MG: 25 INJECTION INTRAMUSCULAR; INTRAVENOUS at 08:05

## 2018-05-31 RX ADMIN — Medication 10 ML: at 07:51

## 2018-05-31 RX ADMIN — Medication 10 ML: at 08:29

## 2018-05-31 NOTE — PROGRESS NOTES
Arrived to the [facility]. Assessment completed. Patient received phenergan 25 mg IV today, as ordered. She required no further replacements today, as her magnesium was noted at 1.8. Any issues or concerns during appointment:  NonePatient aware of next infusion appointment on 06/7/2018 (date) at [facility] (time) with IV infusion center. Patient awaiting her fathers arrival for ride home. Addendum 0358: Patient discharged ambulatory, with father. Patient instructed to call her doctors office immediately for any problems or concerns.   She verbalizes understanding

## 2018-06-04 ENCOUNTER — ANESTHESIA EVENT (OUTPATIENT)
Dept: ENDOSCOPY | Age: 48
End: 2018-06-04
Payer: COMMERCIAL

## 2018-06-05 ENCOUNTER — HOSPITAL ENCOUNTER (OUTPATIENT)
Age: 48
Setting detail: OUTPATIENT SURGERY
Discharge: HOME | End: 2018-06-05
Attending: INTERNAL MEDICINE | Admitting: INTERNAL MEDICINE
Payer: COMMERCIAL

## 2018-06-05 ENCOUNTER — ANESTHESIA (OUTPATIENT)
Dept: ENDOSCOPY | Age: 48
End: 2018-06-05
Payer: COMMERCIAL

## 2018-06-05 VITALS
OXYGEN SATURATION: 94 % | DIASTOLIC BLOOD PRESSURE: 86 MMHG | RESPIRATION RATE: 18 BRPM | SYSTOLIC BLOOD PRESSURE: 144 MMHG | HEIGHT: 62 IN | BODY MASS INDEX: 33.49 KG/M2 | WEIGHT: 182 LBS | HEART RATE: 77 BPM | TEMPERATURE: 98.6 F

## 2018-06-05 LAB — GLUCOSE BLD STRIP.AUTO-MCNC: 151 MG/DL (ref 65–100)

## 2018-06-05 PROCEDURE — 82962 GLUCOSE BLOOD TEST: CPT

## 2018-06-05 PROCEDURE — 74011000250 HC RX REV CODE- 250

## 2018-06-05 PROCEDURE — 74011250636 HC RX REV CODE- 250/636: Performed by: INTERNAL MEDICINE

## 2018-06-05 PROCEDURE — C1726 CATH, BAL DIL, NON-VASCULAR: HCPCS | Performed by: INTERNAL MEDICINE

## 2018-06-05 PROCEDURE — 76060000032 HC ANESTHESIA 0.5 TO 1 HR: Performed by: INTERNAL MEDICINE

## 2018-06-05 PROCEDURE — 76040000025: Performed by: INTERNAL MEDICINE

## 2018-06-05 PROCEDURE — 77030031722: Performed by: INTERNAL MEDICINE

## 2018-06-05 PROCEDURE — 74011250636 HC RX REV CODE- 250/636

## 2018-06-05 PROCEDURE — 74011250636 HC RX REV CODE- 250/636: Performed by: ANESTHESIOLOGY

## 2018-06-05 RX ORDER — HEPARIN 100 UNIT/ML
500 SYRINGE INTRAVENOUS
Status: COMPLETED | OUTPATIENT
Start: 2018-06-05 | End: 2018-06-05

## 2018-06-05 RX ORDER — SODIUM CHLORIDE 0.9 % (FLUSH) 0.9 %
5-10 SYRINGE (ML) INJECTION AS NEEDED
Status: DISCONTINUED | OUTPATIENT
Start: 2018-06-05 | End: 2018-06-05 | Stop reason: HOSPADM

## 2018-06-05 RX ORDER — TRIAMCINOLONE ACETONIDE 40 MG/ML
40 INJECTION, SUSPENSION INTRA-ARTICULAR; INTRAMUSCULAR ONCE
Status: COMPLETED | OUTPATIENT
Start: 2018-06-05 | End: 2018-06-05

## 2018-06-05 RX ORDER — PROPOFOL 10 MG/ML
INJECTION, EMULSION INTRAVENOUS AS NEEDED
Status: DISCONTINUED | OUTPATIENT
Start: 2018-06-05 | End: 2018-06-05 | Stop reason: HOSPADM

## 2018-06-05 RX ORDER — SODIUM CHLORIDE, SODIUM LACTATE, POTASSIUM CHLORIDE, CALCIUM CHLORIDE 600; 310; 30; 20 MG/100ML; MG/100ML; MG/100ML; MG/100ML
25 INJECTION, SOLUTION INTRAVENOUS CONTINUOUS
Status: DISCONTINUED | OUTPATIENT
Start: 2018-06-05 | End: 2018-06-05 | Stop reason: HOSPADM

## 2018-06-05 RX ORDER — PROPOFOL 10 MG/ML
INJECTION, EMULSION INTRAVENOUS
Status: DISCONTINUED | OUTPATIENT
Start: 2018-06-05 | End: 2018-06-05 | Stop reason: HOSPADM

## 2018-06-05 RX ORDER — HEPARIN 100 UNIT/ML
500 SYRINGE INTRAVENOUS
Status: DISCONTINUED | OUTPATIENT
Start: 2018-06-05 | End: 2018-06-05 | Stop reason: SDUPTHER

## 2018-06-05 RX ORDER — SODIUM CHLORIDE 0.9 % (FLUSH) 0.9 %
10 SYRINGE (ML) INJECTION
Status: DISCONTINUED | OUTPATIENT
Start: 2018-06-05 | End: 2018-06-05 | Stop reason: HOSPADM

## 2018-06-05 RX ORDER — LIDOCAINE HYDROCHLORIDE 20 MG/ML
INJECTION, SOLUTION EPIDURAL; INFILTRATION; INTRACAUDAL; PERINEURAL AS NEEDED
Status: DISCONTINUED | OUTPATIENT
Start: 2018-06-05 | End: 2018-06-05 | Stop reason: HOSPADM

## 2018-06-05 RX ORDER — SODIUM CHLORIDE, SODIUM LACTATE, POTASSIUM CHLORIDE, CALCIUM CHLORIDE 600; 310; 30; 20 MG/100ML; MG/100ML; MG/100ML; MG/100ML
100 INJECTION, SOLUTION INTRAVENOUS CONTINUOUS
Status: DISCONTINUED | OUTPATIENT
Start: 2018-06-05 | End: 2018-06-05 | Stop reason: HOSPADM

## 2018-06-05 RX ADMIN — PROPOFOL 250 MCG/KG/MIN: 10 INJECTION, EMULSION INTRAVENOUS at 13:05

## 2018-06-05 RX ADMIN — SODIUM CHLORIDE, SODIUM LACTATE, POTASSIUM CHLORIDE, AND CALCIUM CHLORIDE 100 ML/HR: 600; 310; 30; 20 INJECTION, SOLUTION INTRAVENOUS at 11:09

## 2018-06-05 RX ADMIN — PROPOFOL 50 MG: 10 INJECTION, EMULSION INTRAVENOUS at 13:04

## 2018-06-05 RX ADMIN — TRIAMCINOLONE ACETONIDE 40 MG: 40 INJECTION, SUSPENSION INTRA-ARTICULAR; INTRAMUSCULAR at 13:29

## 2018-06-05 RX ADMIN — Medication 500 UNITS: at 14:29

## 2018-06-05 RX ADMIN — LIDOCAINE HYDROCHLORIDE 80 MG: 20 INJECTION, SOLUTION EPIDURAL; INFILTRATION; INTRACAUDAL; PERINEURAL at 13:04

## 2018-06-05 RX ADMIN — PROPOFOL 50 MG: 10 INJECTION, EMULSION INTRAVENOUS at 13:05

## 2018-06-05 NOTE — H&P
PreProcedure H&P Update    Today's Date:  2018    CC:  Esophageal stricture; here for EGD/dilation with kenalog    Subjective:   HPI: Esophageal stricture; here for EGD/dilation with kenalog      PMH:  Past Medical History:   Diagnosis Date    Abscess, abdomen     pt not aware of    Anemia     denies hx of blood transfusions-- Fe infusions 2017    Anxiety     Asthma     allergy induced, denies any inhaler    C. difficile diarrhea     in 1206 after complicated ERCP    Cervical dysplasia     Chronic insomnia     Chronic pain     all over     Chronic pancreatitis ([facility]     Depression     Endometriosis     Esophageal stricture     Fibromyalgia     Former cigarette smoker     SANA (generalized anxiety disorder)     GERD (gastroesophageal reflux disease)     daily meds--- nexium daily as needed- Protonix IV via port daily--     HLD (hyperlipidemia)     pt denies     IBS (irritable bowel syndrome)     Migraine headache     Nausea & vomiting     pt reports she does better with phenergan than zofran - causes HA    Nonalcoholic fatty liver disease     PUD (peptic ulcer disease) 2017    Hx of     Sphincter of Oddi dysfunction     Stricture of esophagus     Type 2 diabetes mellitus ([facility]     insulin reliant: fbs av-200/ s.s of hypoglyemia @80-90/Last : A1c 6.9       Medications:   No current facility-administered medications for this encounter.           Objective:   Vitals:  Visit Vitals    /71    Pulse 81    Temp 99.2 °F (37.3 °C)    Resp 18    Ht 5' 2\" (1.575 m)    Wt 82.6 kg (182 lb)    SpO2 93%    BMI 33.29 kg/m2     Exam:  General appearance: alert, cooperative, no distress  Lungs: clear to auscultation bilaterally anteriorly  Heart: regular rate and rhythm      Data Review (Labs):    No results for input(s): WBC, HGB, HCT, PLT, MCV, NA, K, CL, CO2, BUN, CREA, CA, GLU, AP, SGOT, GPT, TBIL, CBIL, ALB, TP, AML, LPSE, PTP, INR, APTT, HGBEXT, HCTEXT, PLTEXT in the last 72 hours.     No lab exists for component: DBIL, INREXT    Plan:      Esophageal stricture; here for EGD/dilation with kenalog

## 2018-06-05 NOTE — ANESTHESIA POSTPROCEDURE EVALUATION
Post-Anesthesia Evaluation and Assessment    Patient: Jessie Mojica MRN: [id number]  SSN: [id number]    YOB: 1970  Age: 50 y.o. Sex: female       Cardiovascular Function/Vital Signs  Visit Vitals    /74    Pulse 78    Temp 37 °C (98.6 °F)    Resp 16    Ht 5' 2\" (1.575 m)    Wt 82.6 kg (182 lb)    SpO2 97%    BMI 33.29 kg/m2       Patient is status post total IV anesthesia anesthesia for Procedure(s):  ESOPHAGOGASTRODUODENOSCOPY WITH DILATION/KENALOG (EGD)/ 32  INJECTION  ESOPHAGEAL DILATION. Nausea/Vomiting: None    Postoperative hydration reviewed and adequate. Pain:  Pain Scale 1: Numeric (0 - 10) (06/05/18 1052)  Pain Intensity 1: 4 (06/05/18 1052)   Managed    Neurological Status: At baseline    Mental Status and Level of Consciousness: Arousable    Pulmonary Status:   O2 Device: Nasal cannula (06/05/18 1336)   Adequate oxygenation and airway patent    Complications related to anesthesia: None    Post-anesthesia assessment completed.  No concerns    Signed By: Tony Erazo MD     June 5, 2018

## 2018-06-05 NOTE — ROUTINE PROCESS
Discharge instructions given to mother. Port was flushed with heparin 500 units and sent home with port per pt. Pt ready for discharge.

## 2018-06-05 NOTE — PROCEDURES
Esophagogastroduodenoscopy    DATE of PROCEDURE: 6/5/2018    INDICATION: dysphagia    POSTPROCEDURE DIAGNOSIS: esophageal stricture    MEDICATIONS ADMINISTERED: MAC anesthesia (see anesthesia report)    INSTRUMENT:    PROCEDURE:  After obtaining informed consent, the patient was placed in the left lateral position and sedated. The endoscope was advanced under direct vision without difficulty. The esophagus, stomach (including retroflexed views) and duodenum were evaluated. The patient was taken to the recovery area in stable condition. FINDINGS:  ESOPHAGUS: Tight narrowing of distal esophagus. Dilated with CRE balloon 8-10 mm but no tears. Then dilated to 10-12 mm with resultant 1 cm distal esophageal tear and injected with total of 1 1/2 cc kenalog. However, there was still the appearance of significant esophageal spasms distally that persisted even after the dilations.   STOMACH:  Surgical changes of gastrojejunostomy bypass; intact antrum    Estimated blood loss: 0-minimal   Specimens obtained during procedure: none    PLAN:  Continue with serial EGD/dilations

## 2018-06-05 NOTE — IP AVS SNAPSHOT
[facility] 
 
 
 [address] [place] [address] 
[phone] Patient: Bianca Maloney MRN: [id number] :1970 About your hospitalization You were admitted on:  2018 You last received care in the:  [facility] You were discharged on:  2018 Why you were hospitalized Your primary diagnosis was:  Not on File Follow-up Information None Your Scheduled Appointments   7:15 AM EDT Infusion with NUR7  
ST. [address] ([address]) [address] Robert Hernández [phone] [facility] [address]  
[phone] [address]   7:15 AM EDT Infusion with NUR4  
ST. [address] ([address]) [address] Robert Hernández [phone] [facility] [address]  
[phone] [address]   7:15 AM EDT Infusion with NUR4  
ST. [address] ([address]) [address] Robert Hernández [phone] [facility] [address]  
[phone] [address] 14  7:15 AM EDT Infusion with NUR8  
ST. [address] ([address]) [address] Robert Hernández [phone] [facility] [address]  
[phone] [address] 14  7:15 AM EDT Infusion with [place] [address]) [address] Robert Hernández [phone] [facility] [address]  
[phone] [address]  11:30 AM EDT  
[facility] PHONE ASSESSMENT with [facility] PHONE APPOINTMENT [facility] Pre Assessment [facility] OR PRE ASSESSMENT) [address] [facility]  
[phone] Date/time displayed does not reflect when your phone assessment will be completed. Thursday July 12, 2018  7:15 AM EDT Infusion with [address] [facility]) [address] Josesito Jacobo [phone] [facility] [address]  
[phone] [address] Tuesday July 17, 2018 ESOPHAGOGASTRODUODENOSCOPY (EGD) with Lisa Dickerson MD  
[facility] ([address] [place] [address]) [address] [place] [address]  
[phone] Thursday July 19, 2018  7:15 AM EDT Infusion with [address] [facility]) [address] Josesito Jacobo [phone] [facility] [address]  
[phone] [address] Thursday July 26, 2018  7:15 AM EDT Infusion with [address] [facility]) [address] Josesito Jacobo [phone] [facility] [address]  
[phone] [address] Discharge Orders None A check cindy indicates which time of day the medication should be taken. My Medications ASK your doctor about these medications Instructions Each Dose to Equal  
 Morning Noon Evening Bedtime  
 0.9% sodium chloride solution Your last dose was: Your next dose is:    
   
   
 by IntraVENous route five (5) days a week. Gives to herself via port at home daily -1000cc ATIVAN 1 mg tablet Generic drug:  LORazepam  
   
Your last dose was: Your next dose is: Take 1 mg by mouth three (3) times daily as needed for Anxiety. 1 mg BENADRYL 25 mg capsule Generic drug:  diphenhydrAMINE Your last dose was: Your next dose is: Take 25 mg by mouth every six (6) hours as needed. 25 mg  
    
   
   
   
  
 CARAFATE 100 mg/mL suspension Generic drug:  sucralfate Your last dose was: Your next dose is: TAKE 10 ML BY MOUTH 4 TIMES A DAY EVERY DAY ON A EMPTY STOMACH 1 HR BEFORE MEALS AND AT BEDTIME CeleXA 20 mg tablet Generic drug:  citalopram  
   
Your last dose was: Your next dose is: Take 20 mg by mouth daily. 20 mg  
    
   
   
   
  
 cyanocobalamin 1,000 mcg/mL injection Commonly known as:  VITAMIN B12 Your last dose was: Your next dose is: INJECT 1 VIAL INTRAMUSCULARLY FOR 4 WEEKS THEN MONTHLY  
     
   
   
   
  
 fentaNYL 75 mcg/hr Commonly known as:  Abimbola Cornelius Your last dose was: Your next dose is:    
   
   
 1 Patch by TransDERmal route every seventy-two (72) hours. 1 Patch  
    
   
   
   
  
 gabapentin 250 mg/5 mL solution Commonly known as:  NEURONTIN Your last dose was: Your next dose is: Take 300 mg by mouth three (3) times daily. Take day of surgery per anesthesia protocol. -since pt drinks pt stated she will hold until she gets home 300 mg  
    
   
   
   
  
 glucagon 1 mg injection Commonly known as:  Daniel Doyle Your last dose was: Your next dose is:    
   
   
 1 mg by IntraMUSCular route. 1 mg  
    
   
   
   
  
 hyoscyamine SL 0.125 mg SL tablet Commonly known as:  LEVSIN/SL Your last dose was: Your next dose is:    
   
   
 0.125 mg by SubLINGual route every six (6) hours as needed for Cramping.  
 0.125 mg  
    
   
   
   
  
 insulin aspart U-100 100 unit/mL injection Commonly known as:  NovoLOG U-100 Insulin aspart Your last dose was: Your next dose is:    
   
   
 Use as directed MIRALAX 17 gram packet Generic drug:  polyethylene glycol Your last dose was: Your next dose is: Take 17 g by mouth daily. 17 g  
    
   
   
   
  
 oxyCODONE IR 10 mg Tab immediate release tablet Commonly known as:  Gisela Gallegos Your last dose was: Your next dose is: Take 10 mg by mouth every eight (8) hours as needed. Take day of surgery per anesthesia protocol. 10 mg  
    
   
   
   
  
 * promethazine 25 mg suppository Commonly known as:  PHENERGAN Your last dose was: Your next dose is: Insert 25 mg into rectum as needed for Nausea. 25 mg  
    
   
   
   
  
 * promethazine 25 mg tablet Commonly known as:  PHENERGAN Your last dose was: Your next dose is: Take 1 Tab by mouth every six (6) hours as needed. 25 mg  
    
   
   
   
  
 * PROTONIX 40 mg injection Generic drug:  pantoprazole Your last dose was: Your next dose is:    
   
   
 40 mg by IntraVENous route every morning. Take day of surgery per anesthesia protocol. 40 mg  
    
   
   
   
  
 * PROTONIX 40 mg tablet Generic drug:  pantoprazole Your last dose was: Your next dose is: Take 40 mg by mouth nightly. 40 mg  
    
   
   
   
  
 TRESIBA FLEXTOUCH U-100 100 unit/mL (3 mL) Inpn Generic drug:  insulin degludec Your last dose was: Your next dose is:    
   
   
 40 Units by SubCUTAneous route nightly. 40 Units TYLENOL 325 mg tablet Generic drug:  acetaminophen Your last dose was: Your next dose is: Take 325 mg by mouth every four (4) hours as needed for Pain. 325 mg  
    
   
   
   
  
 ZOFRAN 8 mg tablet Generic drug:  ondansetron hcl Your last dose was: Your next dose is: Take 8 mg by mouth every eight (8) hours as needed for Nausea. 8 mg zolpidem 10 mg tablet Commonly known as:  AMBIEN Your last dose was: Your next dose is: TAKE 1 TABLET BY MOUTH EVERY NIGHT AS NEEDED FOR SLEEP * Notice: This list has 4 medication(s) that are the same as other medications prescribed for you. Read the directions carefully, and ask your doctor or other care provider to review them with you. Opioid Education Prescription Opioids: What You Need to Know: 
 
Prescription opioids can be used to help relieve moderate-to-severe pain and are often prescribed following a surgery or injury, or for certain health conditions. These medications can be an important part of treatment but also come with serious risks. Opioids are strong pain medicines. Examples include hydrocodone, oxycodone, fentanyl, and morphine. Heroin is an example of an illegal opioid. It is important to work with your health care provider to make sure you are getting the safest, most effective care. WHAT ARE THE RISKS AND SIDE EFFECTS OF OPIOID USE? Prescription opioids carry serious risks of addiction and overdose, especially with prolonged use. An opioid overdose, often marked by slow breathing, can cause sudden death. The use of prescription opioids can have a number of side effects as well, even when taken as directed. · Tolerance-meaning you might need to take more of a medication for the same pain relief · Physical dependence-meaning you have symptoms of withdrawal when the medication is stopped. Withdrawal symptoms can include nausea, sweating, chills, diarrhea, stomach cramps, and muscle aches. Withdrawal can last up to several weeks, depending on which drug you took and how long you took it. · Increased sensitivity to pain · Constipation · Nausea, vomiting, and dry mouth · Sleepiness and dizziness · Confusion · Depression · Low levels of testosterone that can result in lower sex drive, energy, and strength · Itching and sweating RISKS ARE GREATER WITH:      
· History of drug misuse, substance use disorder, or overdose · Mental health conditions (such as depression or anxiety) · Sleep apnea · Older age (72 years or older) · Pregnancy Avoid alcohol while taking prescription opioids. Also, unless specifically advised by your health care provider, medications to avoid include: · Benzodiazepines (such as Xanax or Valium) · Muscle relaxants (such as Soma or Flexeril) · Hypnotics (such as Ambien or Lunesta) · Other prescription opioids KNOW YOUR OPTIONS Talk to your health care provider about ways to manage your pain that don't involve prescription opioids. Some of these options may actually work better and have fewer risks and side effects. Options may include: 
· Pain relievers such as acetaminophen, ibuprofen, and naproxen · Some medications that are also used for depression or seizures · Physical therapy and exercise · Counseling to help patients learn how to cope better with triggers of pain and stress. · Application of heat or cold compress · Massage therapy · Relaxation techniques Be Informed Make sure you know the name of your medication, how much and how often to take it, and its potential risks & side effects. IF YOU ARE PRESCRIBED OPIOIDS FOR PAIN: 
· Never take opioids in greater amounts or more often than prescribed. Remember the goal is not to be pain-free but to manage your pain at a tolerable level. · Follow up with your primary care provider to: · Work together to create a plan on how to manage your pain. · Talk about ways to help manage your pain that don't involve prescription opioids. · Talk about any and all concerns and side effects. · Help prevent misuse and abuse. · Never sell or share prescription opioids · Help prevent misuse and abuse.  
· Store prescription opioids in a secure place and out of reach of others (this may include visitors, children, friends, and family). · Safely dispose of unused/unwanted prescription opioids: Find your community drug take-back program or your pharmacy mail-back program, or flush them down the toilet, following guidance from the Food and Drug Administration (www.fda.gov/Drugs/ResourcesForYou). · Visit www.cdc.gov/drugoverdose to learn about the risks of opioid abuse and overdose. · If you believe you may be struggling with addiction, tell your health care provider and ask for guidance or call [employer] at 9-610-472-GJRA. Discharge Instructions Gastrointestinal Esophagogastroduodenoscopy (EGD) - Upper Exam Discharge Instructions 1. Call Dr. Bella Brody at [phone] for any problems or questions. 2. Contact the doctor's office for follow up appointment as directed. 3. Medication may cause drowsiness for several hours, therefore, do not drive or operate machinery for remainder of the day. 4. No alcohol today. 5. Ordinarily, you may resume regular diet and activity after exam unless otherwise specified by your physician. 6. For mild soreness in your throat you may use Cepacol throat lozenges or warm salt-water gargles as needed. Any additional instructions:  Continue EGD with dilation as scheduled. Instructions given to Leonardo Saravia and other family members. Instructions given by:  Dr. Bella Brody Introducing [facility]! Catrachita Maharaj introduces [employer] patient portal. Now you can access parts of your medical record, email your doctor's office, and request medication refills online. 1. In your internet browser, go to [email or web address] 2. Click on the First Time User? Click Here link in the Sign In box. You will see the New Member Sign Up page. 3. Enter your [employer] Access Code exactly as it appears below.  You will not need to use this code after youve completed the sign-up process. If you do not sign up before the expiration date, you must request a new code. · [employer] Access Code: [email or web address] Expires: 8/1/2018  7:57 AM 
 
4. Enter the last four digits of your Social Security Number (xxxx) and Date of Birth (mm/dd/yyyy) as indicated and click Submit. You will be taken to the next sign-up page. 5. Create a [employer] ID. This will be your [employer] login ID and cannot be changed, so think of one that is secure and easy to remember. 6. Create a [employer] password. You can change your password at any time. 7. Enter your Password Reset Question and Answer. This can be used at a later time if you forget your password. 8. Enter your e-mail address. You will receive e-mail notification when new information is available in [address] 9. Click Sign Up. You can now view and download portions of your medical record. 10. Click the Download Summary menu link to download a portable copy of your medical information. If you have questions, please visit the Frequently Asked Questions section of the [employer] website. Remember, [employer] is NOT to be used for urgent needs. For medical emergencies, dial 911. Now available from your iPhone and Android! Introducing Eleazar Gunn As a [facility] patient, I wanted to make you aware of our electronic visit tool called Eleazar Gunn. [facility] 24/7 allows you to connect within minutes with a medical provider 24 hours a day, seven days a week via a mobile device or tablet or logging into a secure website from your computer. You can access Eleazar Gunn from anywhere in the [place].  
 
A virtual visit might be right for you when you have a simple condition and feel like you just dont want to get out of bed, or cant get away from work for an appointment, when your regular [facility] provider is not available (evenings, weekends or holidays), or when youre out of town and need minor care. Electronic visits cost only $49 and if the [facility] 24/7 provider determines a prescription is needed to treat your condition, one can be electronically transmitted to a nearby pharmacy*. Please take a moment to enroll today if you have not already done so. The enrollment process is free and takes just a few minutes. To enroll, please download the [facility] 24/7 chandler to your tablet or phone, or visit [email or web address] to enroll on your computer. And, as an [address] [place] [address] patient with a [employer] account, the results of your visits will be scanned into your electronic medical record and your primary care provider will be able to view the scanned results. We urge you to continue to see your regular Natasha Zarate provider for your ongoing medical care. And while your primary care provider may not be the one available when you seek a [facility] [employer] virtual visit, the peace of mind you get from getting a real diagnosis real time can be priceless. For more information on [employer], view our Frequently Asked Questions (FAQs) at [email or web address]. Sincerely, 
 
Anjali Russell MD 
Chief Medical Officer [facility] Erica Ramirez *:  certain medications cannot be prescribed via [employer] Providers Seen During Your Hospitalization Provider Specialty Primary office phone Kali Moura MD Gastroenterology [phone] Your Primary Care Physician (PCP) Primary Care Physician Office Phone Office Fax 100 [facility], [address] [place] [address] [phone] You are allergic to the following Allergen Reactions Tape (Adhesive) Rash Itching Barium Iodide Nausea and Vomiting Contrast Dye (Iodine) Shortness of Breath  Oral contrast-experienced flushing,shortness of breath, sweatiness; (patient has received oral contrast many times at [facility]) Flagyl (Metronidazole) Nausea and Vomiting Metformin Nausea and Vomiting Stomach pain Valtrex (Valacyclovir) Unknown (comments) PATIENT STATES \"NOT ALLERGIC\" Insulins Nausea and Vomiting Humalog only Recent Documentation Height Weight BMI OB Status Smoking Status 1.575 m 82.6 kg 33.29 kg/m2 Hysterectomy Former Smoker Emergency Contacts Name Discharge Info Relation Home Work Mobile Jorge Treadwell DISCHARGE CAREGIVER [3] Spouse [3] [phone]  [address] [place] [address] CAREGIVER [3] Father [15] [phone] Spencer De Jesus CAREGIVER [3] Mother [14] [phone] Patient Belongings The following personal items are in your possession at time of discharge: 
  Dental Appliances: None  Visual Aid: None Please provide this summary of care documentation to your next provider. Signatures-by signing, you are acknowledging that this After Visit Summary has been reviewed with you and you have received a copy. Patient Signature:  ____________________________________________________________ Date:  ____________________________________________________________  
  
Gwendolyn Foxer Provider Signature:  ____________________________________________________________ Date:  ____________________________________________________________

## 2018-06-05 NOTE — DISCHARGE INSTRUCTIONS
Gastrointestinal Esophagogastroduodenoscopy (EGD) - Upper Exam Discharge Instructions    1. Call Dr. Ty Cornelius at [phone] for any problems or questions. 2. Contact the doctor's office for follow up appointment as directed. 3. Medication may cause drowsiness for several hours, therefore, do not drive or operate machinery for remainder of the day. 4. No alcohol today. 5. Ordinarily, you may resume regular diet and activity after exam unless otherwise specified by your physician. 6. For mild soreness in your throat you may use Cepacol throat lozenges or warm salt-water gargles as needed. Any additional instructions:  Continue EGD with dilation as scheduled. Instructions given to Nelson Cornell and other family members.   Instructions given by:  Dr. Ty Cornelius

## 2018-06-07 ENCOUNTER — HOSPITAL ENCOUNTER (OUTPATIENT)
Dept: INFUSION THERAPY | Age: 48
Discharge: HOME | End: 2018-06-07
Payer: COMMERCIAL

## 2018-06-07 VITALS
OXYGEN SATURATION: 97 % | TEMPERATURE: 98.5 F | SYSTOLIC BLOOD PRESSURE: 143 MMHG | RESPIRATION RATE: 16 BRPM | DIASTOLIC BLOOD PRESSURE: 98 MMHG | HEART RATE: 103 BPM

## 2018-06-07 LAB — MAGNESIUM SERPL-MCNC: 1.9 MG/DL (ref 1.8–2.4)

## 2018-06-07 PROCEDURE — 96374 THER/PROPH/DIAG INJ IV PUSH: CPT

## 2018-06-07 PROCEDURE — 77030003560 HC NDL HUBR BARD -A

## 2018-06-07 PROCEDURE — 74011000250 HC RX REV CODE- 250: Performed by: INTERNAL MEDICINE

## 2018-06-07 PROCEDURE — 74011250636 HC RX REV CODE- 250/636: Performed by: INTERNAL MEDICINE

## 2018-06-07 PROCEDURE — 83735 ASSAY OF MAGNESIUM: CPT | Performed by: INTERNAL MEDICINE

## 2018-06-07 RX ORDER — HEPARIN 100 UNIT/ML
300-500 SYRINGE INTRAVENOUS AS NEEDED
Status: DISCONTINUED | OUTPATIENT
Start: 2018-06-07 | End: 2018-06-11 | Stop reason: HOSPADM

## 2018-06-07 RX ORDER — SODIUM CHLORIDE 0.9 % (FLUSH) 0.9 %
10 SYRINGE (ML) INJECTION AS NEEDED
Status: DISCONTINUED | OUTPATIENT
Start: 2018-06-07 | End: 2018-06-11 | Stop reason: HOSPADM

## 2018-06-07 RX ADMIN — SODIUM CHLORIDE, PRESERVATIVE FREE 300 UNITS: 5 INJECTION INTRAVENOUS at 07:45

## 2018-06-07 RX ADMIN — PROMETHAZINE HYDROCHLORIDE 25 MG: 25 INJECTION INTRAMUSCULAR; INTRAVENOUS at 07:38

## 2018-06-07 RX ADMIN — Medication 10 ML: at 07:45

## 2018-06-14 ENCOUNTER — HOSPITAL ENCOUNTER (OUTPATIENT)
Dept: INFUSION THERAPY | Age: 48
Discharge: HOME | End: 2018-06-14
Payer: COMMERCIAL

## 2018-06-14 VITALS
DIASTOLIC BLOOD PRESSURE: 78 MMHG | RESPIRATION RATE: 16 BRPM | SYSTOLIC BLOOD PRESSURE: 143 MMHG | TEMPERATURE: 97.6 F | BODY MASS INDEX: 33.29 KG/M2 | OXYGEN SATURATION: 98 % | HEART RATE: 113 BPM | WEIGHT: 182 LBS

## 2018-06-14 LAB — MAGNESIUM SERPL-MCNC: 1.9 MG/DL (ref 1.8–2.4)

## 2018-06-14 PROCEDURE — 74011250636 HC RX REV CODE- 250/636: Performed by: INTERNAL MEDICINE

## 2018-06-14 PROCEDURE — 83735 ASSAY OF MAGNESIUM: CPT | Performed by: INTERNAL MEDICINE

## 2018-06-14 PROCEDURE — 74011000250 HC RX REV CODE- 250: Performed by: INTERNAL MEDICINE

## 2018-06-14 PROCEDURE — 77030003560 HC NDL HUBR BARD -A

## 2018-06-14 PROCEDURE — 96374 THER/PROPH/DIAG INJ IV PUSH: CPT

## 2018-06-14 RX ORDER — SODIUM CHLORIDE 0.9 % (FLUSH) 0.9 %
10-40 SYRINGE (ML) INJECTION AS NEEDED
Status: DISCONTINUED | OUTPATIENT
Start: 2018-06-14 | End: 2018-06-18 | Stop reason: HOSPADM

## 2018-06-14 RX ORDER — HEPARIN 100 UNIT/ML
500 SYRINGE INTRAVENOUS AS NEEDED
Status: DISCONTINUED | OUTPATIENT
Start: 2018-06-14 | End: 2018-06-18 | Stop reason: HOSPADM

## 2018-06-14 RX ADMIN — Medication 10 ML: at 07:25

## 2018-06-14 RX ADMIN — PROMETHAZINE HYDROCHLORIDE 25 MG: 25 INJECTION INTRAMUSCULAR; INTRAVENOUS at 07:26

## 2018-06-14 NOTE — PROGRESS NOTES
Tolerated IV Phenergan without difficulty. No c/o n/v presently. Magnesium level 1.9 today. No replacement required. Patient discharged via ambulation accompanied by dad. Instructed to notify physician of any problems, questions or concerns. Allowed opportunity for patient/family to ask questions. Verbalized understanding. Next appointment is 06/21/18 at [address] [place] [address] with Jayson. Patient requests that port be left without heparin flush as she will flush it with her supplies at home today.

## 2018-06-14 NOTE — PROGRESS NOTES
Problem: Patient Education:  Go to Education Activity  Goal: Patient/Family Education  Outcome: Progressing Towards Goal  Reviewed Phenergan administration for nausea and possible Magnesium replacement. Verbalizes understanding.

## 2018-06-21 ENCOUNTER — HOSPITAL ENCOUNTER (OUTPATIENT)
Dept: INFUSION THERAPY | Age: 48
Discharge: HOME | End: 2018-06-21
Payer: COMMERCIAL

## 2018-06-21 VITALS
DIASTOLIC BLOOD PRESSURE: 94 MMHG | RESPIRATION RATE: 18 BRPM | SYSTOLIC BLOOD PRESSURE: 140 MMHG | TEMPERATURE: 98.4 F | HEART RATE: 112 BPM | OXYGEN SATURATION: 96 %

## 2018-06-21 LAB — MAGNESIUM SERPL-MCNC: 1.8 MG/DL (ref 1.8–2.4)

## 2018-06-21 PROCEDURE — 96374 THER/PROPH/DIAG INJ IV PUSH: CPT

## 2018-06-21 PROCEDURE — 83735 ASSAY OF MAGNESIUM: CPT | Performed by: INTERNAL MEDICINE

## 2018-06-21 PROCEDURE — 74011250636 HC RX REV CODE- 250/636: Performed by: INTERNAL MEDICINE

## 2018-06-21 PROCEDURE — 77030003560 HC NDL HUBR BARD -A

## 2018-06-21 PROCEDURE — 74011000250 HC RX REV CODE- 250: Performed by: INTERNAL MEDICINE

## 2018-06-21 RX ORDER — SODIUM CHLORIDE 0.9 % (FLUSH) 0.9 %
10 SYRINGE (ML) INJECTION AS NEEDED
Status: DISCONTINUED | OUTPATIENT
Start: 2018-06-21 | End: 2018-06-25 | Stop reason: HOSPADM

## 2018-06-21 RX ORDER — HEPARIN 100 UNIT/ML
500 SYRINGE INTRAVENOUS AS NEEDED
Status: DISCONTINUED | OUTPATIENT
Start: 2018-06-21 | End: 2018-06-21

## 2018-06-21 RX ADMIN — Medication 10 ML: at 07:35

## 2018-06-21 RX ADMIN — PROMETHAZINE HYDROCHLORIDE 25 MG: 25 INJECTION INTRAMUSCULAR; INTRAVENOUS at 07:35

## 2018-06-21 NOTE — PROGRESS NOTES
Pt ambulatory to area c/o nausea. No replacements needed, pt received phenergan per order, tolerated well. Aware of next appt on [email or web address]. Advised to call dr with any issues/concerns. Discharged home without complaints.

## 2018-06-28 ENCOUNTER — HOSPITAL ENCOUNTER (OUTPATIENT)
Dept: INFUSION THERAPY | Age: 48
Discharge: HOME | End: 2018-06-28
Payer: COMMERCIAL

## 2018-06-28 VITALS
RESPIRATION RATE: 18 BRPM | SYSTOLIC BLOOD PRESSURE: 134 MMHG | WEIGHT: 182 LBS | OXYGEN SATURATION: 96 % | BODY MASS INDEX: 33.29 KG/M2 | DIASTOLIC BLOOD PRESSURE: 80 MMHG | HEART RATE: 112 BPM | TEMPERATURE: 98 F

## 2018-06-28 LAB — MAGNESIUM SERPL-MCNC: 1.5 MG/DL (ref 1.8–2.4)

## 2018-06-28 PROCEDURE — 77030003560 HC NDL HUBR BARD -A

## 2018-06-28 PROCEDURE — 96374 THER/PROPH/DIAG INJ IV PUSH: CPT

## 2018-06-28 PROCEDURE — 74011000250 HC RX REV CODE- 250: Performed by: INTERNAL MEDICINE

## 2018-06-28 PROCEDURE — 74011250636 HC RX REV CODE- 250/636: Performed by: INTERNAL MEDICINE

## 2018-06-28 PROCEDURE — 83735 ASSAY OF MAGNESIUM: CPT | Performed by: INTERNAL MEDICINE

## 2018-06-28 RX ORDER — HEPARIN 100 UNIT/ML
500 SYRINGE INTRAVENOUS AS NEEDED
Status: DISPENSED | OUTPATIENT
Start: 2018-06-28 | End: 2018-06-28

## 2018-06-28 RX ORDER — SODIUM CHLORIDE 9 MG/ML
250 INJECTION, SOLUTION INTRAVENOUS ONCE
Status: COMPLETED | OUTPATIENT
Start: 2018-06-28 | End: 2018-06-28

## 2018-06-28 RX ORDER — SODIUM CHLORIDE 0.9 % (FLUSH) 0.9 %
10-40 SYRINGE (ML) INJECTION AS NEEDED
Status: DISCONTINUED | OUTPATIENT
Start: 2018-06-28 | End: 2018-07-02 | Stop reason: HOSPADM

## 2018-06-28 RX ADMIN — Medication 10 ML: at 09:40

## 2018-06-28 RX ADMIN — PROMETHAZINE HYDROCHLORIDE 25 MG: 25 INJECTION INTRAMUSCULAR; INTRAVENOUS at 07:46

## 2018-06-28 RX ADMIN — SODIUM CHLORIDE 250 ML: 900 INJECTION, SOLUTION INTRAVENOUS at 07:46

## 2018-06-28 RX ADMIN — Medication 500 UNITS: at 09:40

## 2018-06-28 NOTE — PROGRESS NOTES
Arrived to the [facility]. Phenergan completed. Patient tolerated well. Any issues or concerns during appointment: Vernon Velez specimen had to be sent to the [facility], pt declined to stay for magnesium replacement will get next time. .  Patient aware of next infusion appointment on 7/5 (date) at [address] [place] [address] (time). Discharged ambulatory.

## 2018-06-29 ENCOUNTER — HOSPITAL ENCOUNTER (OUTPATIENT)
Dept: INFUSION THERAPY | Age: 48
Discharge: HOME | End: 2018-06-29
Payer: COMMERCIAL

## 2018-06-29 VITALS
DIASTOLIC BLOOD PRESSURE: 63 MMHG | TEMPERATURE: 99.1 F | HEART RATE: 94 BPM | RESPIRATION RATE: 18 BRPM | BODY MASS INDEX: 32.92 KG/M2 | OXYGEN SATURATION: 95 % | SYSTOLIC BLOOD PRESSURE: 131 MMHG | WEIGHT: 180 LBS

## 2018-06-29 PROCEDURE — 96365 THER/PROPH/DIAG IV INF INIT: CPT

## 2018-06-29 PROCEDURE — 96375 TX/PRO/DX INJ NEW DRUG ADDON: CPT

## 2018-06-29 PROCEDURE — 74011000250 HC RX REV CODE- 250: Performed by: INTERNAL MEDICINE

## 2018-06-29 PROCEDURE — 74011250636 HC RX REV CODE- 250/636: Performed by: INTERNAL MEDICINE

## 2018-06-29 RX ORDER — SODIUM CHLORIDE 0.9 % (FLUSH) 0.9 %
10 SYRINGE (ML) INJECTION AS NEEDED
Status: ACTIVE | OUTPATIENT
Start: 2018-06-29 | End: 2018-06-30

## 2018-06-29 RX ORDER — HEPARIN 100 UNIT/ML
300-500 SYRINGE INTRAVENOUS AS NEEDED
Status: ACTIVE | OUTPATIENT
Start: 2018-06-29 | End: 2018-06-30

## 2018-06-29 RX ORDER — MAGNESIUM SULFATE 1 G/100ML
1 INJECTION INTRAVENOUS ONCE
Status: COMPLETED | OUTPATIENT
Start: 2018-06-29 | End: 2018-06-29

## 2018-06-29 RX ADMIN — Medication 10 ML: at 17:04

## 2018-06-29 RX ADMIN — SODIUM CHLORIDE, PRESERVATIVE FREE 300 UNITS: 5 INJECTION INTRAVENOUS at 17:05

## 2018-06-29 RX ADMIN — Medication 10 ML: at 16:32

## 2018-06-29 RX ADMIN — MAGNESIUM SULFATE HEPTAHYDRATE 1 G: 1 INJECTION, SOLUTION INTRAVENOUS at 16:33

## 2018-06-29 RX ADMIN — SODIUM CHLORIDE 25 MG: 9 INJECTION INTRAMUSCULAR; INTRAVENOUS; SUBCUTANEOUS at 16:33

## 2018-06-29 NOTE — PROGRESS NOTES
Arrived to the [facility]. Mag 1g IV and IV Phenergan completed. Patient tolerated well. Any issues or concerns during appointment: none. Patient to call scheduling for next appointment. Discharged ambulatory.

## 2018-07-04 ENCOUNTER — HOSPITAL ENCOUNTER (OUTPATIENT)
Dept: INFUSION THERAPY | Age: 48
Discharge: HOME | End: 2018-07-04
Payer: COMMERCIAL

## 2018-07-04 ENCOUNTER — ANESTHESIA EVENT (OUTPATIENT)
Dept: ENDOSCOPY | Age: 48
End: 2018-07-04
Payer: COMMERCIAL

## 2018-07-04 VITALS
RESPIRATION RATE: 18 BRPM | HEART RATE: 119 BPM | SYSTOLIC BLOOD PRESSURE: 146 MMHG | TEMPERATURE: 97.9 F | OXYGEN SATURATION: 96 % | DIASTOLIC BLOOD PRESSURE: 54 MMHG

## 2018-07-04 LAB — MAGNESIUM SERPL-MCNC: 1.9 MG/DL (ref 1.8–2.4)

## 2018-07-04 PROCEDURE — 74011250636 HC RX REV CODE- 250/636: Performed by: INTERNAL MEDICINE

## 2018-07-04 PROCEDURE — 83735 ASSAY OF MAGNESIUM: CPT | Performed by: INTERNAL MEDICINE

## 2018-07-04 PROCEDURE — 77030003560 HC NDL HUBR BARD -A

## 2018-07-04 PROCEDURE — 96374 THER/PROPH/DIAG INJ IV PUSH: CPT

## 2018-07-04 PROCEDURE — 74011000250 HC RX REV CODE- 250: Performed by: INTERNAL MEDICINE

## 2018-07-04 RX ORDER — SODIUM CHLORIDE 0.9 % (FLUSH) 0.9 %
5-10 SYRINGE (ML) INJECTION AS NEEDED
Status: CANCELLED | OUTPATIENT
Start: 2018-07-04

## 2018-07-04 RX ORDER — HEPARIN 100 UNIT/ML
500 SYRINGE INTRAVENOUS AS NEEDED
Status: DISPENSED | OUTPATIENT
Start: 2018-07-04 | End: 2018-07-04

## 2018-07-04 RX ORDER — SODIUM CHLORIDE, SODIUM LACTATE, POTASSIUM CHLORIDE, CALCIUM CHLORIDE 600; 310; 30; 20 MG/100ML; MG/100ML; MG/100ML; MG/100ML
100 INJECTION, SOLUTION INTRAVENOUS CONTINUOUS
Status: CANCELLED | OUTPATIENT
Start: 2018-07-04

## 2018-07-04 RX ORDER — SODIUM CHLORIDE 0.9 % (FLUSH) 0.9 %
10 SYRINGE (ML) INJECTION AS NEEDED
Status: DISCONTINUED | OUTPATIENT
Start: 2018-07-04 | End: 2018-07-08 | Stop reason: HOSPADM

## 2018-07-04 RX ADMIN — Medication 10 ML: at 10:49

## 2018-07-04 RX ADMIN — SODIUM CHLORIDE 25 MG: 9 INJECTION INTRAMUSCULAR; INTRAVENOUS; SUBCUTANEOUS at 10:32

## 2018-07-04 RX ADMIN — Medication 10 ML: at 10:29

## 2018-07-04 RX ADMIN — SODIUM CHLORIDE, PRESERVATIVE FREE 300 UNITS: 5 INJECTION INTRAVENOUS at 10:50

## 2018-07-04 NOTE — PROGRESS NOTES
Arrived to the [facility]. Phenergan completed. Patient tolerated well. Mg 1.9 - no replacement needed. Any issues or concerns during appointment: none  Patient aware of next infusion appointment on 7/12/18 at 7:15am. Discharged ambulatory.

## 2018-07-05 ENCOUNTER — APPOINTMENT (OUTPATIENT)
Dept: INFUSION THERAPY | Age: 48
End: 2018-07-05
Payer: COMMERCIAL

## 2018-07-05 ENCOUNTER — HOSPITAL ENCOUNTER (OUTPATIENT)
Age: 48
Setting detail: OUTPATIENT SURGERY
Discharge: HOME | End: 2018-07-05
Attending: INTERNAL MEDICINE | Admitting: INTERNAL MEDICINE
Payer: COMMERCIAL

## 2018-07-05 ENCOUNTER — ANESTHESIA (OUTPATIENT)
Dept: ENDOSCOPY | Age: 48
End: 2018-07-05
Payer: COMMERCIAL

## 2018-07-05 VITALS
RESPIRATION RATE: 18 BRPM | OXYGEN SATURATION: 94 % | TEMPERATURE: 96.8 F | BODY MASS INDEX: 34.23 KG/M2 | HEIGHT: 62 IN | HEART RATE: 75 BPM | DIASTOLIC BLOOD PRESSURE: 77 MMHG | WEIGHT: 186 LBS | SYSTOLIC BLOOD PRESSURE: 118 MMHG

## 2018-07-05 LAB — GLUCOSE BLD STRIP.AUTO-MCNC: 156 MG/DL (ref 65–100)

## 2018-07-05 PROCEDURE — 76040000025: Performed by: INTERNAL MEDICINE

## 2018-07-05 PROCEDURE — 74011250636 HC RX REV CODE- 250/636

## 2018-07-05 PROCEDURE — 74011000250 HC RX REV CODE- 250

## 2018-07-05 PROCEDURE — 74011250636 HC RX REV CODE- 250/636: Performed by: INTERNAL MEDICINE

## 2018-07-05 PROCEDURE — 76060000032 HC ANESTHESIA 0.5 TO 1 HR: Performed by: INTERNAL MEDICINE

## 2018-07-05 PROCEDURE — 82962 GLUCOSE BLOOD TEST: CPT

## 2018-07-05 PROCEDURE — 74011250636 HC RX REV CODE- 250/636: Performed by: ANESTHESIOLOGY

## 2018-07-05 RX ORDER — HEPARIN 100 UNIT/ML
500 SYRINGE INTRAVENOUS
Status: COMPLETED | OUTPATIENT
Start: 2018-07-05 | End: 2018-07-05

## 2018-07-05 RX ORDER — TRIAMCINOLONE ACETONIDE 40 MG/ML
40 INJECTION, SUSPENSION INTRA-ARTICULAR; INTRAMUSCULAR ONCE
Status: DISCONTINUED | OUTPATIENT
Start: 2018-07-05 | End: 2018-07-05 | Stop reason: HOSPADM

## 2018-07-05 RX ORDER — SODIUM CHLORIDE 0.9 % (FLUSH) 0.9 %
10 SYRINGE (ML) INJECTION
Status: DISCONTINUED | OUTPATIENT
Start: 2018-07-05 | End: 2018-07-05 | Stop reason: HOSPADM

## 2018-07-05 RX ORDER — PROPOFOL 10 MG/ML
INJECTION, EMULSION INTRAVENOUS AS NEEDED
Status: DISCONTINUED | OUTPATIENT
Start: 2018-07-05 | End: 2018-07-05 | Stop reason: HOSPADM

## 2018-07-05 RX ORDER — SODIUM CHLORIDE, SODIUM LACTATE, POTASSIUM CHLORIDE, CALCIUM CHLORIDE 600; 310; 30; 20 MG/100ML; MG/100ML; MG/100ML; MG/100ML
100 INJECTION, SOLUTION INTRAVENOUS CONTINUOUS
Status: DISCONTINUED | OUTPATIENT
Start: 2018-07-05 | End: 2018-07-05 | Stop reason: HOSPADM

## 2018-07-05 RX ORDER — DEXTROMETHORPHAN/PSEUDOEPHED 2.5-7.5/.8
40 DROPS ORAL
Status: DISCONTINUED | OUTPATIENT
Start: 2018-07-05 | End: 2018-07-05 | Stop reason: HOSPADM

## 2018-07-05 RX ORDER — PROPOFOL 10 MG/ML
INJECTION, EMULSION INTRAVENOUS
Status: DISCONTINUED | OUTPATIENT
Start: 2018-07-05 | End: 2018-07-05 | Stop reason: HOSPADM

## 2018-07-05 RX ORDER — LIDOCAINE HYDROCHLORIDE 20 MG/ML
INJECTION, SOLUTION EPIDURAL; INFILTRATION; INTRACAUDAL; PERINEURAL AS NEEDED
Status: DISCONTINUED | OUTPATIENT
Start: 2018-07-05 | End: 2018-07-05 | Stop reason: HOSPADM

## 2018-07-05 RX ADMIN — LIDOCAINE HYDROCHLORIDE 40 MG: 20 INJECTION, SOLUTION EPIDURAL; INFILTRATION; INTRACAUDAL; PERINEURAL at 08:08

## 2018-07-05 RX ADMIN — SODIUM CHLORIDE, PRESERVATIVE FREE 500 UNITS: 5 INJECTION INTRAVENOUS at 09:00

## 2018-07-05 RX ADMIN — PROPOFOL 10 MG: 10 INJECTION, EMULSION INTRAVENOUS at 08:29

## 2018-07-05 RX ADMIN — PROPOFOL 60 MG: 10 INJECTION, EMULSION INTRAVENOUS at 08:08

## 2018-07-05 RX ADMIN — SODIUM CHLORIDE, SODIUM LACTATE, POTASSIUM CHLORIDE, AND CALCIUM CHLORIDE: 600; 310; 30; 20 INJECTION, SOLUTION INTRAVENOUS at 08:00

## 2018-07-05 RX ADMIN — PROPOFOL 250 MCG/KG/MIN: 10 INJECTION, EMULSION INTRAVENOUS at 08:08

## 2018-07-05 NOTE — H&P
[facility] Pre Op H and P          Chief Complaint:  dysphagia    Subjective:     History of Present Illness:  Patient is a 50 y.o. Woman with hx esophageal stricture presents for outpatient EGD dilation.      PMH:  Past Medical History:   Diagnosis Date    Abscess, abdomen     pt not aware of    Anemia     denies hx of blood transfusions-- Fe infusions 2017    Anxiety     Anxiety and depression     Asthma     allergy induced, denies any inhaler    C. difficile diarrhea     in 6885 after complicated ERCP    Cervical dysplasia     Chronic insomnia     Chronic pain     all over     Chronic pancreatitis ([facility] [address]     Depression     Endometriosis     Esophageal stricture     Fibromyalgia     Former cigarette smoker     SANA (generalized anxiety disorder)     GERD (gastroesophageal reflux disease)     daily meds--- nexium daily as needed- Protonix IV via port daily--     HLD (hyperlipidemia)     pt denies     IBS (irritable bowel syndrome)     Migraine headache     Morbid obesity (HCC)     BMI 34    Nausea & vomiting     pt reports she does better with phenergan than zofran - causes HA    Nonalcoholic fatty liver disease     PUD (peptic ulcer disease) 2017    Hx of     Sphincter of Oddi dysfunction     Stricture of esophagus     Type 2 diabetes mellitus ([facility] [address]     insulin reliant: fbs av-200/ s.s of hypoglyemia @80-90/Last : A1c 6.9       PSH:  Past Surgical History:   Procedure Laterality Date    ABDOMEN SURGERY PROC UNLISTED  last one placed      Hx of pancreatic stent, multiple    ABDOMEN SURGERY PROC UNLISTED      lap nissen    ABDOMEN SURGERY PROC UNLISTED      explor lap    COLONOSCOPY N/A 2018    COLONOSCOPY performed by Conchis Quezada [facility] at [facility] ENDOSCOPY    HX COLONOSCOPY      HX ENDOSCOPY  2017    36 fr thomas    HX ERCP  3/5/2013    resulted in perforated duodenum    HX HYSTERECTOMY      ovaries remain    HX LAP CHOLECYSTECTOMY 1997    HX LAPAROTOMY  3/5/2013    exploratory for duodenal perforation with ERCP    HX ORTHOPAEDIC      right finger surgery 11/2017    HX UROLOGICAL  4/25/13 at [facility]-- stent removed 6-27-13. Dr Radha Yuan  2014    port insertion       Allergies: Allergies   Allergen Reactions    Tape [Adhesive] Rash and Itching    Barium Iodide Nausea and Vomiting    Contrast Dye [Iodine] Shortness of Breath     Oral contrast-experienced flushing,shortness of breath, sweatiness; (patient has received oral contrast [facility] at [facility])    Flagyl [Metronidazole] Nausea and Vomiting    Metformin Nausea and Vomiting     Stomach pain    Valtrex [Valacyclovir] Unknown (comments)     PATIENT STATES \"NOT ALLERGIC\"    Insulins Nausea and Vomiting     Humalog only       Home Medications:  Prior to Admission medications    Medication Sig Start Date End Date Taking? Authorizing Provider   pantoprazole (PROTONIX) 40 mg tablet Take 40 mg by mouth nightly. Yes Historical Provider   fentaNYL (DURAGESIC) 75 mcg/hr 1 Patch by TransDERmal route every seventy-two (72) hours. Yes Historical Provider   citalopram (CELEXA) 20 mg tablet Take 20 mg by mouth daily. Indications: am   Yes Historical Provider   LORazepam (ATIVAN) 1 mg tablet Take 1 mg by mouth three (3) times daily as needed for Anxiety. Yes Historical Provider   cyanocobalamin (VITAMIN B12) 1,000 mcg/mL injection INJECT 1 VIAL INTRAMUSCULARLY FOR 4 WEEKS THEN MONTHLY 11/25/17  Yes Historical Provider   glucagon (GLUCAGEN) 1 mg injection 1 mg by IntraMUSCular route. 5/8/17  Yes Historical Provider   CARAFATE 100 mg/mL suspension TAKE 10 ML BY MOUTH 4 TIMES A DAY EVERY DAY ON A EMPTY STOMACH 1 HR BEFORE MEALS AND AT BEDTIME 9/7/17  Yes Historical Provider   gabapentin (NEURONTIN) 250 mg/5 mL solution Take 300 mg by mouth three (3) times daily.  Take day of surgery per anesthesia protocol. -since pt drinks pt stated she will hold until she gets home   Yes Historical Provider   diphenhydrAMINE (BENADRYL) 25 mg capsule Take 25 mg by mouth every six (6) hours as needed. Yes Historical Provider   pantoprazole (PROTONIX) 40 mg injection 40 mg by IntraVENous route every morning. Take day of surgery per anesthesia protocol. Yes Historical Provider   insulin degludec (TRESIBA FLEXTOUCH U-100) 100 unit/mL (3 mL) inpn 40 Units by SubCUTAneous route nightly. Yes Historical Provider   polyethylene glycol (MIRALAX) 17 gram packet Take 17 g by mouth daily. Yes Historical Provider   hyoscyamine SL (LEVSIN/SL) 0.125 mg SL tablet 0.125 mg by SubLINGual route every six (6) hours as needed for Cramping. Yes Historical Provider   oxyCODONE IR (ROXICODONE) 10 mg tab immediate release tablet Take 10 mg by mouth every eight (8) hours as needed. Take day of surgery per anesthesia protocol. Yes Historical Provider   insulin aspart (NOVOLOG) 100 unit/mL injection Use as directed  Patient taking differently: 7 Units by SubCUTAneous route Before breakfast, lunch, and dinner. Over 300 use sliding scale 6/7/16  Yes Kenny Pepe NP   zolpidem (AMBIEN) 10 mg tablet TAKE 1 TABLET BY MOUTH EVERY NIGHT AS NEEDED FOR SLEEP  Patient taking differently: Take 10 mg by mouth nightly. 6/7/16  Yes Kenny Pepe NP   promethazine (PHENERGAN) 25 mg tablet Take 1 Tab by mouth every six (6) hours as needed. Patient taking differently: Take 25 mg by mouth as needed. 6/27/14  Yes NIURKA Maher   promethazine (PHENERGAN) 25 mg suppository Insert 25 mg into rectum as needed for Nausea. Yes Historical Provider   ondansetron hcl (ZOFRAN) 8 mg tablet Take 8 mg by mouth every eight (8) hours as needed for Nausea. Yes Historical Provider   acetaminophen (TYLENOL) 325 mg tablet Take 325 mg by mouth every four (4) hours as needed for Pain. Historical Provider   0.9% sodium chloride solution by IntraVENous route five (5) days a week.  Gives to herself via port at home daily -1000cc    Historical Provider       Hospital Medications:  Current Facility-Administered Medications   Medication Dose Route Frequency    lactated Ringers infusion  100 mL/hr IntraVENous CONTINUOUS    triamcinolone acetonide (KENALOG-40) 40 mg/mL injection 40 mg  40 mg IntraMUSCular ONCE    heparin (porcine) pf 500 Units  500 Units InterCATHeter ENDO ONCE    saline peripheral flush soln 10 mL  10 mL InterCATHeter ENDO ONCE    simethicone (MYLICON) 27ZD/5.0MG oral drops 40 mg  40 mg Oral QID PRN     Facility-Administered Medications Ordered in Other Encounters   Medication Dose Route Frequency    central line flush (saline) syringe 10 mL  10 mL InterCATHeter PRN       Social History:  Social History   Substance Use Topics    Smoking status: Former Smoker     Packs/day: 1.00     Years: 3.00     Quit date: 4/24/1993    Smokeless tobacco: Never Used    Alcohol use No     Pt denies any history of IV drug use, blood transfusions, tattoos     Family History:  Family History   Problem Relation Age of Onset    Diabetes Mother     Hypertension Mother     Heart Disease Mother      cabg    Diabetes Father     Heart Disease Father     Hypertension Father     Other Father      Hypomagnesium    No Known Problems Sister        Review of Systems:  A detailed 10 system ROS is obtained, with pertinent positives as listed above. All others are negative. Diet:      Objective:     Physical Exam:  Vitals:  Visit Vitals    /75    Pulse 88    Temp 98.5 °F (36.9 °C)    Resp 16    Ht 5' 2\" (1.575 m)    Wt 84.4 kg (186 lb)    SpO2 94%    BMI 34.02 kg/m2     Gen:  Pt is alert, cooperative, no acute distress  Skin:  Extremities and face reveal no rashes. HEENT: Sclerae anicteric. Extra-occular muscles are intact. No oral ulcers. The neck is supple. Cardiovascular: Regular rate and rhythm. No murmurs, gallops, or rubs. Respiratory:  Comfortable breathing with no accessory muscle use.  Clear breath sounds anteriorly with no wheezes, rales, or rhonchi. GI:  Abdomen nondistended, soft, and nontender. Normal active bowel sounds. No masses palpable. Musculoskeletal:  Extremities have good range of motion. No costovertebral tenderness. Neurological:  Gross memory appears intact. Patient is alert and oriented. Psychiatric:  Mood appears appropriate with judgement intact. Lymphatic:  No cervical or supraclavicular adenopathy. Assessment:       Active Problems:    * No active hospital problems. *      Plan:       EGD as planned.  ASA III

## 2018-07-05 NOTE — ANESTHESIA PREPROCEDURE EVALUATION
Anesthetic History     PONV          Review of Systems / Medical History  Patient summary reviewed and pertinent labs reviewed    Pulmonary          Smoker (Former)  Asthma        Neuro/Psych              Cardiovascular    Hypertension              Exercise tolerance: >4 METS     GI/Hepatic/Renal     GERD          Comments: Esophageal stricture Endo/Other    Diabetes: poorly controlled, using insulin         Other Findings              Physical Exam    Airway  Mallampati: II    Neck ROM: decreased range of motion, short neck   Mouth opening: Normal     Cardiovascular  Regular rate and rhythm,  S1 and S2 normal,  no murmur, click, rub, or gallop             Dental         Pulmonary  Breath sounds clear to auscultation               Abdominal         Other Findings            Anesthetic Plan    ASA: 3  Anesthesia type: total IV anesthesia          Induction: Intravenous  Anesthetic plan and risks discussed with: Patient and Family

## 2018-07-05 NOTE — ANESTHESIA POSTPROCEDURE EVALUATION
Post-Anesthesia Evaluation and Assessment    Patient: Hiro Baptiste MRN: [id number]  SSN: [id number]    YOB: 1970  Age: 50 y.o. Sex: female       Cardiovascular Function/Vital Signs  Visit Vitals    /71    Pulse 75    Temp 36 °C (96.8 °F)    Resp 16    Ht 5' 2\" (1.575 m)    Wt 84.4 kg (186 lb)    SpO2 95%    BMI 34.02 kg/m2       Patient is status post total IV anesthesia anesthesia for Procedure(s):  ESOPHAGOGASTRODUODENOSCOPY (EGD)/ 32  ESOPHAGEAL DILATION. Nausea/Vomiting: None    Postoperative hydration reviewed and adequate. Pain:  Pain Scale 1: Numeric (0 - 10) (07/05/18 0847)  Pain Intensity 1: 0 (07/05/18 0847)   Managed    Neurological Status: At baseline    Mental Status and Level of Consciousness: Alert and oriented     Pulmonary Status:   O2 Device: Room air (07/05/18 0847)   Adequate oxygenation and airway patent    Complications related to anesthesia: None    Post-anesthesia assessment completed.  No concerns    Signed By: Weldon Lundborg, MD     July 5, 2018

## 2018-07-05 NOTE — DISCHARGE INSTRUCTIONS
Gastrointestinal Esophagogastroduodenoscopy (EGD) - Upper Exam Discharge Instructions    1. Call Dr. Mahesh Randall at [phone] for any problems or questions. 2. Contact the doctor's office for follow up appointment as directed. 3. Medication may cause drowsiness for several hours, therefore, do not drive or operate machinery for remainder of the day. 4. No alcohol today. 5. Ordinarily, you may resume regular diet and activity after exam unless otherwise specified by your physician. 6. For mild soreness in your throat you may use Cepacol throat lozenges or warm salt-water gargles as needed. Any additional instructions: Follow up with Dr. Merline Heredia. Instructions given to Luciana Tyson and other family members.

## 2018-07-05 NOTE — PROGRESS NOTES
Pt arrived with R port accessed pt states changed yesterday. Port flushed w/o issue and positive blood return.

## 2018-07-05 NOTE — IP AVS SNAPSHOT
[address] [facility] 
 
 
 [address] [address] [facility] 
[phone] Patient: Mireille Hinds MRN: [id number] :1970 About your hospitalization You were admitted on:  2018 You last received care in the:  [facility] ENDOSCOPY You were discharged on:  2018 Why you were hospitalized Your primary diagnosis was:  Not on File Follow-up Information None Your Scheduled Appointments 2018 11:30 AM EDT  
[facility] PHONE ASSESSMENT with [facility] PHONE APPOINTMENT [address] [facility] Pre Assessment [facility] OR PRE ASSESSMENT) [address] [facility]  
[phone] Date/time displayed does not reflect when your phone assessment will be completed.   7:05 AM EDT Infusion Lab with LAB CK  
[facility] INFUSION SERVICES [facility]) [address] Robson Guzman [phone] [facility] [address]  
[phone] [address]   7:15 AM EDT Infusion with [address] [facility]) [address] Robson Guzman [phone] [facility] [address]  
[phone] [address]  ESOPHAGOGASTRODUODENOSCOPY (EGD) with Wild Mena MD  
D ENDOSCOPY ([address] [place] [address]  
[phone]   7:05 AM EDT Infusion Lab with LAB CK  
[facility] INFUSION [facility]) [address] Robson Guzman [phone] [facility] [address]  
[phone] [address]   7:15 AM EDT Infusion with [address] [facility]) [address] [phone] [facility] [address]  
[phone] [address] Thursday July 26, 2018  7:05 AM EDT Infusion Lab with LAB CK  
[facility] INFUSION SERVICES [facility]) [address] [phone] [facility] [address]  
[phone] [address] Thursday July 26, 2018  7:15 AM EDT Infusion with [address] [facility] [address] [facility]) [address] [phone] [facility] [address]  
[phone] [address] Thursday July 26, 2018 11:00 AM EDT  
[facility] PHONE ASSESSMENT with [facility] PHONE APPOINTMENT [facility] Pre Assessment [facility] OR PRE ASSESSMENT) [address]  
[phone] Date/time displayed does not reflect when your phone assessment will be completed. Thursday August 02, 2018 ESOPHAGOGASTRODUODENOSCOPY (EGD) with Evens Hdz MD  
[facility] ENDOSCOPY ([address] [place] [address]) 304 [facility]  
[phone] Tuesday August 07, 2018  1:00 PM EDT  
PAT UPDATE (PHONE) with [facility] PHONE APPOINTMENT [facility] Pre Assessment [facility] OR PRE ASSESSMENT) [address]  
[phone] Tuesday August 14, 2018 ESOPHAGOGASTRODUODENOSCOPY (EGD) with Cynthia Carmichael MD  
[facility] ENDOSCOPY ([address] [place] [address]) 304 [facility]  
[phone] Friday August 24, 2018  9:30 AM EDT  
PAT UPDATE (PHONE) with [facility] PHONE APPOINTMENT [facility] Pre Assessment [facility] OR PRE ASSESSMENT) [facility]  
[phone] Discharge Orders None A check cindy indicates which time of day the medication should be taken. My Medications ASK your doctor about these medications Instructions Each Dose to Equal  
 Morning Noon Evening Bedtime  
 0.9% sodium chloride solution Your last dose was: Your next dose is:    
   
   
 by IntraVENous route five (5) days a week. Gives to herself via port at home daily -1000cc ATIVAN 1 mg tablet Generic drug:  LORazepam  
   
Your last dose was: Your next dose is: Take 1 mg by mouth three (3) times daily as needed for Anxiety. 1 mg BENADRYL 25 mg capsule Generic drug:  diphenhydrAMINE Your last dose was: Your next dose is: Take 25 mg by mouth every six (6) hours as needed. 25 mg  
    
   
   
   
  
 CARAFATE 100 mg/mL suspension Generic drug:  sucralfate Your last dose was: Your next dose is: TAKE 10 ML BY MOUTH 4 TIMES A DAY EVERY DAY ON A EMPTY STOMACH 1 HR BEFORE MEALS AND AT BEDTIME CeleXA 20 mg tablet Generic drug:  citalopram  
   
Your last dose was: Your next dose is: Take 20 mg by mouth daily. Indications: am  
 20 mg  
    
   
   
   
  
 cyanocobalamin 1,000 mcg/mL injection Commonly known as:  VITAMIN B12 Your last dose was: Your next dose is: INJECT 1 VIAL INTRAMUSCULARLY FOR 4 WEEKS THEN MONTHLY  
     
   
   
   
  
 fentaNYL 75 mcg/hr Commonly known as:  Aisha [place] Your last dose was: Your next dose is:    
   
   
 1 Patch by TransDERmal route every seventy-two (72) hours. 1 Patch  
    
   
   
   
  
 gabapentin 250 mg/5 mL solution Commonly known as:  NEURONTIN Your last dose was: Your next dose is: Take 300 mg by mouth three (3) times daily.  Take day of surgery per anesthesia protocol. -since pt drinks pt stated she will hold until she gets home 300 mg  
    
   
   
   
  
 glucagon 1 mg injection Commonly known as:  Augusto Coulter Your last dose was: Your next dose is:    
   
   
 1 mg by IntraMUSCular route. 1 mg  
    
   
   
   
  
 hyoscyamine SL 0.125 mg SL tablet Commonly known as:  LEVSIN/SL Your last dose was: Your next dose is:    
   
   
 0.125 mg by SubLINGual route every six (6) hours as needed for Cramping.  
 0.125 mg  
    
   
   
   
  
 insulin aspart U-100 100 unit/mL injection Commonly known as:  NovoLOG U-100 Insulin aspart Your last dose was: Your next dose is:    
   
   
 Use as directed MIRALAX 17 gram packet Generic drug:  polyethylene glycol Your last dose was: Your next dose is: Take 17 g by mouth daily. 17 g  
    
   
   
   
  
 oxyCODONE IR 10 mg Tab immediate release tablet Commonly known as:  Shelia Ardon Your last dose was: Your next dose is: Take 10 mg by mouth every eight (8) hours as needed. Take day of surgery per anesthesia protocol. 10 mg  
    
   
   
   
  
 * promethazine 25 mg suppository Commonly known as:  PHENERGAN Your last dose was: Your next dose is: Insert 25 mg into rectum as needed for Nausea. 25 mg  
    
   
   
   
  
 * promethazine 25 mg tablet Commonly known as:  PHENERGAN Your last dose was: Your next dose is: Take 1 Tab by mouth every six (6) hours as needed. 25 mg  
    
   
   
   
  
 * PROTONIX 40 mg injection Generic drug:  pantoprazole Your last dose was: Your next dose is:    
   
   
 40 mg by IntraVENous route every morning. Take day of surgery per anesthesia protocol. 40 mg  
    
   
   
   
  
 * PROTONIX 40 mg tablet Generic drug:  pantoprazole Your last dose was: Your next dose is: Take 40 mg by mouth nightly. 40 mg  
    
   
   
   
  
 TRESIBA FLEXTOUCH U-100 100 unit/mL (3 mL) Inpn Generic drug:  insulin degludec Your last dose was: Your next dose is:    
   
   
 40 Units by SubCUTAneous route nightly. 40 Units TYLENOL 325 mg tablet Generic drug:  acetaminophen Your last dose was: Your next dose is: Take 325 mg by mouth every four (4) hours as needed for Pain. 325 mg  
    
   
   
   
  
 ZOFRAN 8 mg tablet Generic drug:  ondansetron hcl Your last dose was: Your next dose is: Take 8 mg by mouth every eight (8) hours as needed for Nausea. 8 mg  
    
   
   
   
  
 zolpidem 10 mg tablet Commonly known as:  AMBIEN Your last dose was: Your next dose is: TAKE 1 TABLET BY MOUTH EVERY NIGHT AS NEEDED FOR SLEEP * Notice: This list has 4 medication(s) that are the same as other medications prescribed for you. Read the directions carefully, and ask your doctor or other care provider to review them with you. Opioid Education Prescription Opioids: What You Need to Know: 
 
Prescription opioids can be used to help relieve moderate-to-severe pain and are often prescribed following a surgery or injury, or for certain health conditions. These medications can be an important part of treatment but also come with serious risks. Opioids are strong pain medicines. Examples include hydrocodone, oxycodone, fentanyl, and morphine. Heroin is an example of an illegal opioid. It is important to work with your health care provider to make sure you are getting the safest, most effective care. WHAT ARE THE RISKS AND SIDE EFFECTS OF OPIOID USE? Prescription opioids carry serious risks of addiction and overdose, especially with prolonged use.   An opioid overdose, often marked by slow breathing, can cause sudden death. The use of prescription opioids can have a number of side effects as well, even when taken as directed. · Tolerance-meaning you might need to take more of a medication for the same pain relief · Physical dependence-meaning you have symptoms of withdrawal when the medication is stopped. Withdrawal symptoms can include nausea, sweating, chills, diarrhea, stomach cramps, and muscle aches. Withdrawal can last up to several weeks, depending on which drug you took and how long you took it. · Increased sensitivity to pain · Constipation · Nausea, vomiting, and dry mouth · Sleepiness and dizziness · Confusion · Depression · Low levels of testosterone that can result in lower sex drive, energy, and strength · Itching and sweating RISKS ARE GREATER WITH:      
· History of drug misuse, substance use disorder, or overdose · Mental health conditions (such as depression or anxiety) · Sleep apnea · Older age (72 years or older) · Pregnancy Avoid alcohol while taking prescription opioids. Also, unless specifically advised by your health care provider, medications to avoid include: · Benzodiazepines (such as Xanax or Valium) · Muscle relaxants (such as Soma or Flexeril) · Hypnotics (such as Ambien or Lunesta) · Other prescription opioids KNOW YOUR OPTIONS Talk to your health care provider about ways to manage your pain that don't involve prescription opioids. Some of these options may actually work better and have fewer risks and side effects. Options may include: 
· Pain relievers such as acetaminophen, ibuprofen, and naproxen · Some medications that are also used for depression or seizures · Physical therapy and exercise · Counseling to help patients learn how to cope better with triggers of pain and stress. · Application of heat or cold compress · Massage therapy · Relaxation techniques Be Informed Make sure you know the name of your medication, how much and how often to take it, and its potential risks & side effects. IF YOU ARE PRESCRIBED OPIOIDS FOR PAIN: 
· Never take opioids in greater amounts or more often than prescribed. Remember the goal is not to be pain-free but to manage your pain at a tolerable level. · Follow up with your primary care provider to: · Work together to create a plan on how to manage your pain. · Talk about ways to help manage your pain that don't involve prescription opioids. · Talk about any and all concerns and side effects. · Help prevent misuse and abuse. · Never sell or share prescription opioids · Help prevent misuse and abuse. · Store prescription opioids in a secure place and out of reach of others (this may include visitors, children, friends, and family). · Safely dispose of unused/unwanted prescription opioids: Find your community drug take-back program or your pharmacy mail-back program, or flush them down the toilet, following guidance from the Food and Drug Administration (www.fda.gov/Drugs/ResourcesForYou). · Visit www.cdc.gov/drugoverdose to learn about the risks of opioid abuse and overdose. · If you believe you may be struggling with addiction, tell your health care provider and ask for guidance or call [employer] at 3-349-448-APTH. Discharge Instructions Gastrointestinal Esophagogastroduodenoscopy (EGD) - Upper Exam Discharge Instructions 1. Call Dr. Mitch Estrada at [phone] for any problems or questions. 2. Contact the doctor's office for follow up appointment as directed. 3. Medication may cause drowsiness for several hours, therefore, do not drive or operate machinery for remainder of the day. 4. No alcohol today. 5. Ordinarily, you may resume regular diet and activity after exam unless otherwise specified by your physician. 6. For mild soreness in your throat you may use Cepacol throat lozenges or warm salt-water gargles as needed. Any additional instructions: Follow up with Dr. Sera Mares. Instructions given to Josefina Sandoval and other family members. Introducing [facility]! [employer] introduces [employer] patient portal. Now you can access parts of your medical record, email your doctor's office, and request medication refills online. 1. In your internet browser, go to [email or web address] 2. Click on the First Time User? Click Here link in the Sign In box. You will see the New Member Sign Up page. 3. Enter your [employer] Access Code exactly as it appears below. You will not need to use this code after youve completed the sign-up process. If you do not sign up before the expiration date, you must request a new code. · [employer] Access Code: [email or web address] Expires: 8/1/2018  7:57 AM 
 
4. Enter the last four digits of your Social Security Number (xxxx) and Date of Birth (mm/dd/yyyy) as indicated and click Submit. You will be taken to the next sign-up page. 5. Create a [employer] ID. This will be your [employer] login ID and cannot be changed, so think of one that is secure and easy to remember. 6. Create a [employer] password. You can change your password at any time. 7. Enter your Password Reset Question and Answer. This can be used at a later time if you forget your password. 8. Enter your e-mail address. You will receive e-mail notification when new information is available in [address] 9. Click Sign Up. You can now view and download portions of your medical record. 10. Click the Download Summary menu link to download a portable copy of your medical information. If you have questions, please visit the Frequently Asked Questions section of the [employer] website. Remember, [employer] is NOT to be used for urgent needs. For medical emergencies, dial 911. Now available from your iPhone and Android! Introducing Eleazar Gunn As a [employer] patient, I wanted to make you aware of our electronic visit tool called Eleazar Gunn. Haley In[employer]alator/7 allows you to connect within minutes with a medical provider 24 hours a day, seven days a week via a mobile device or tablet or logging into a secure website from your computer. You can access Eleazar Gunn from anywhere in the [place]. A virtual visit might be right for you when you have a simple condition and feel like you just dont want to get out of bed, or cant get away from work for an appointment, when your regular Haley Bolster provider is not available (evenings, weekends or holidays), or when youre out of town and need minor care. Electronic visits cost only $49 and if the [employer] provider determines a prescription is needed to treat your condition, one can be electronically transmitted to a nearby pharmacy*. Please take a moment to enroll today if you have not already done so. The enrollment process is free and takes just a few minutes. To enroll, please download the [employer] chandler to your tablet or phone, or visit [email or web address] to enroll on your computer. And, as an [address] [place] [address] patient with a [employer] account, the results of your visits will be scanned into your electronic medical record and your primary care provider will be able to view the scanned results. We urge you to continue to see your regular [facility] provider for your ongoing medical care. And while your primary care provider may not be the one available when you seek a Eleazar Gunn virtual visit, the peace of mind you get from getting a real diagnosis real time can be priceless. For more information on Eleazar Gunn, view our Frequently Asked Questions (FAQs) at [email or web address]. Sincerely, 
 
Valentino Milks, MD 
Chief Medical Officer [employer] *:  certain medications cannot be prescribed via Eleazar Gunn Providers Seen During Your Hospitalization Provider Specialty Primary office phone Aury Decker MD Gastroenterology [phone] Your Primary Care Physician (PCP) Primary Care Physician Office Phone Office Fax 100 [facility], [address] [place] [address] [phone] You are allergic to the following Allergen Reactions Tape (Adhesive) Rash Itching Barium Iodide Nausea and Vomiting Contrast Dye (Iodine) Shortness of Breath Oral contrast-experienced flushing,shortness of breath, sweatiness; (patient has received oral contrast many [facility] at [facility]) Flagyl (Metronidazole) Nausea and Vomiting Metformin Nausea and Vomiting Stomach pain Valtrex (Valacyclovir) Unknown (comments) PATIENT STATES \"NOT ALLERGIC\" Insulins Nausea and Vomiting Humalog only Recent Documentation Height Weight BMI OB Status Smoking Status 1.575 m 84.4 kg 34.02 kg/m2 Hysterectomy Former Smoker Emergency Contacts Name Discharge Info Relation Home Work Mobile Jorge Treadwell DISCHARGE CAREGIVER [3] Spouse [3] [phone]  [address] [place] [address] CAREGIVER [3] Father [15] [phone] Elige Cooks CAREGIVER [3] Mother [14] [phone] Patient Belongings The following personal items are in your possession at time of discharge: 
  Dental Appliances: None  Visual Aid: None Please provide this summary of care documentation to your next provider. Signatures-by signing, you are acknowledging that this After Visit Summary has been reviewed with you and you have received a copy. Patient Signature:  ____________________________________________________________ Date:  ____________________________________________________________  
  
Rowena Hero Provider Signature:  ____________________________________________________________ Date:  ____________________________________________________________

## 2018-07-05 NOTE — OP NOTES
Endoscopic Gastroduodenoscopy Procedure Note    Indications: dysphagia, patient of Dr. Saverio Mcburney on monthly dilation program for esophageal stricture, last dilated to 12 mm on June 5 2018    Anesthesia/Sedation: MAC IV          Procedure Details     Informed consent was obtained for the procedure, including conscious sedation. Risks of infection, perforation, hemorrhage, adverse drug reaction and aspiration were discussed. The patient was placed in the left lateral decubitus position. She was monitored continuously with ECG tracing, pulse oximetry, blood pressure monitoring, and direct observation. The ITKK160 gastroscope was inserted into the mouth and advanced under direct vision to the jejunum. A careful inspection was made as the gastroscope was withdrawn, including a retroflexed view of the proximal stomach; findings and interventions are described below. Appropriate photodocumentation was obtained. Findings:     Esophagus: There was a stenosis present in the distal esophagus. It appeared benign. A white/yellow substance was present under the epithelial layer. No rings present. After initial EGD, the esophagus was dilated using a Savary guidewire starting at 11 mm sequentially at 1 mm increments up to 15 mm. Re-inspection revealed a small mucosal tear after the 15 mm dilator. Stomach: evidence of fundoplication. Evidence of gastrojejunostomy. Anastomosis appeared normal. Copious bile present in stomach. Jejunum: appeared normal.        Specimens: none    Estimated Blood Loss: Less than 01PE           Complications:   None; patient tolerated the procedure well. Attending Attestation:  I performed the procedure. Impression:    1. Esophageal stenosis, dilated to 15 mm    Recommendations:  1. Follow up with Dr. Jamee Li  2.  Liquid diet, advance as tolerated

## 2018-07-05 NOTE — ROUTINE PROCESS
Discharge instructions given to mother. Port was flushed with heparin and pt tolerated well. Pt's port stays accessed for infusions. Pt's port was patent and c/d/i. Pt tolerated liquids well. Pt ready for discharge.

## 2018-07-12 ENCOUNTER — HOSPITAL ENCOUNTER (OUTPATIENT)
Dept: INFUSION THERAPY | Age: 48
Discharge: HOME | End: 2018-07-12
Payer: COMMERCIAL

## 2018-07-12 VITALS
RESPIRATION RATE: 18 BRPM | WEIGHT: 179 LBS | BODY MASS INDEX: 32.74 KG/M2 | TEMPERATURE: 98.2 F | OXYGEN SATURATION: 95 % | DIASTOLIC BLOOD PRESSURE: 89 MMHG | HEART RATE: 125 BPM | SYSTOLIC BLOOD PRESSURE: 132 MMHG

## 2018-07-12 LAB — MAGNESIUM SERPL-MCNC: 1.9 MG/DL (ref 1.8–2.4)

## 2018-07-12 PROCEDURE — 77030003560 HC NDL HUBR BARD -A

## 2018-07-12 PROCEDURE — 74011000250 HC RX REV CODE- 250: Performed by: INTERNAL MEDICINE

## 2018-07-12 PROCEDURE — 96374 THER/PROPH/DIAG INJ IV PUSH: CPT

## 2018-07-12 PROCEDURE — 96361 HYDRATE IV INFUSION ADD-ON: CPT

## 2018-07-12 PROCEDURE — 83735 ASSAY OF MAGNESIUM: CPT | Performed by: INTERNAL MEDICINE

## 2018-07-12 PROCEDURE — 74011250636 HC RX REV CODE- 250/636: Performed by: INTERNAL MEDICINE

## 2018-07-12 RX ORDER — SODIUM CHLORIDE 0.9 % (FLUSH) 0.9 %
10-40 SYRINGE (ML) INJECTION AS NEEDED
Status: ACTIVE | OUTPATIENT
Start: 2018-07-12 | End: 2018-07-12

## 2018-07-12 RX ORDER — HEPARIN 100 UNIT/ML
300 SYRINGE INTRAVENOUS AS NEEDED
Status: DISPENSED | OUTPATIENT
Start: 2018-07-12 | End: 2018-07-12

## 2018-07-12 RX ADMIN — Medication 10 ML: at 07:28

## 2018-07-12 RX ADMIN — SODIUM CHLORIDE, PRESERVATIVE FREE 300 UNITS: 5 INJECTION INTRAVENOUS at 08:52

## 2018-07-12 RX ADMIN — SODIUM CHLORIDE 25 MG: 9 INJECTION INTRAMUSCULAR; INTRAVENOUS; SUBCUTANEOUS at 07:31

## 2018-07-12 RX ADMIN — SODIUM CHLORIDE 500 ML: 900 INJECTION, SOLUTION INTRAVENOUS at 07:28

## 2018-07-12 NOTE — PROGRESS NOTES
Arrived to the [facility]. IV Phenergan completed. No replacement needed magnesium 1.9 Patient tolerated well. Port flushed and left accessed for use at home. Any issues or concerns during appointment: None. Patient aware of next infusion appointment on 7/19 (date) at [address] [place] [address] (time). Discharged ambulatory in stable condition. Sree Ryder

## 2018-07-16 ENCOUNTER — ANESTHESIA EVENT (OUTPATIENT)
Dept: ENDOSCOPY | Age: 48
End: 2018-07-16
Payer: COMMERCIAL

## 2018-07-17 ENCOUNTER — ANESTHESIA (OUTPATIENT)
Dept: ENDOSCOPY | Age: 48
End: 2018-07-17
Payer: COMMERCIAL

## 2018-07-17 ENCOUNTER — HOSPITAL ENCOUNTER (OUTPATIENT)
Age: 48
Setting detail: OUTPATIENT SURGERY
Discharge: HOME | End: 2018-07-17
Attending: INTERNAL MEDICINE | Admitting: INTERNAL MEDICINE
Payer: COMMERCIAL

## 2018-07-17 VITALS
BODY MASS INDEX: 33.49 KG/M2 | DIASTOLIC BLOOD PRESSURE: 60 MMHG | TEMPERATURE: 98.1 F | OXYGEN SATURATION: 92 % | HEART RATE: 81 BPM | HEIGHT: 62 IN | WEIGHT: 182 LBS | SYSTOLIC BLOOD PRESSURE: 121 MMHG | RESPIRATION RATE: 14 BRPM

## 2018-07-17 LAB
GLUCOSE BLD STRIP.AUTO-MCNC: 237 MG/DL (ref 65–100)
GLUCOSE BLD STRIP.AUTO-MCNC: 262 MG/DL (ref 65–100)

## 2018-07-17 PROCEDURE — 76040000025: Performed by: INTERNAL MEDICINE

## 2018-07-17 PROCEDURE — 74011250636 HC RX REV CODE- 250/636: Performed by: ANESTHESIOLOGY

## 2018-07-17 PROCEDURE — 74011250636 HC RX REV CODE- 250/636

## 2018-07-17 PROCEDURE — 74011636637 HC RX REV CODE- 636/637: Performed by: ANESTHESIOLOGY

## 2018-07-17 PROCEDURE — 74011000250 HC RX REV CODE- 250

## 2018-07-17 PROCEDURE — 82962 GLUCOSE BLOOD TEST: CPT

## 2018-07-17 PROCEDURE — 74011250636 HC RX REV CODE- 250/636: Performed by: INTERNAL MEDICINE

## 2018-07-17 PROCEDURE — C1726 CATH, BAL DIL, NON-VASCULAR: HCPCS | Performed by: INTERNAL MEDICINE

## 2018-07-17 PROCEDURE — 77030031722: Performed by: INTERNAL MEDICINE

## 2018-07-17 PROCEDURE — 76060000032 HC ANESTHESIA 0.5 TO 1 HR: Performed by: INTERNAL MEDICINE

## 2018-07-17 RX ORDER — TRIAMCINOLONE ACETONIDE 40 MG/ML
40 INJECTION, SUSPENSION INTRA-ARTICULAR; INTRAMUSCULAR ONCE
Status: COMPLETED | OUTPATIENT
Start: 2018-07-17 | End: 2018-07-17

## 2018-07-17 RX ORDER — LIDOCAINE HYDROCHLORIDE 20 MG/ML
INJECTION, SOLUTION EPIDURAL; INFILTRATION; INTRACAUDAL; PERINEURAL AS NEEDED
Status: DISCONTINUED | OUTPATIENT
Start: 2018-07-17 | End: 2018-07-17 | Stop reason: HOSPADM

## 2018-07-17 RX ORDER — PROPOFOL 10 MG/ML
INJECTION, EMULSION INTRAVENOUS AS NEEDED
Status: DISCONTINUED | OUTPATIENT
Start: 2018-07-17 | End: 2018-07-17 | Stop reason: HOSPADM

## 2018-07-17 RX ORDER — ONDANSETRON 2 MG/ML
4 INJECTION INTRAMUSCULAR; INTRAVENOUS
Status: COMPLETED | OUTPATIENT
Start: 2018-07-17 | End: 2018-07-17

## 2018-07-17 RX ORDER — SODIUM CHLORIDE, SODIUM LACTATE, POTASSIUM CHLORIDE, CALCIUM CHLORIDE 600; 310; 30; 20 MG/100ML; MG/100ML; MG/100ML; MG/100ML
100 INJECTION, SOLUTION INTRAVENOUS CONTINUOUS
Status: DISCONTINUED | OUTPATIENT
Start: 2018-07-17 | End: 2018-07-17 | Stop reason: HOSPADM

## 2018-07-17 RX ORDER — PROPOFOL 10 MG/ML
INJECTION, EMULSION INTRAVENOUS
Status: DISCONTINUED | OUTPATIENT
Start: 2018-07-17 | End: 2018-07-17 | Stop reason: HOSPADM

## 2018-07-17 RX ORDER — HEPARIN 100 UNIT/ML
500 SYRINGE INTRAVENOUS AS NEEDED
Status: DISCONTINUED | OUTPATIENT
Start: 2018-07-17 | End: 2018-07-17 | Stop reason: HOSPADM

## 2018-07-17 RX ORDER — ONDANSETRON 2 MG/ML
INJECTION INTRAMUSCULAR; INTRAVENOUS
Status: COMPLETED
Start: 2018-07-17 | End: 2018-07-17

## 2018-07-17 RX ADMIN — INSULIN HUMAN 7 UNITS: 100 INJECTION, SOLUTION PARENTERAL at 08:09

## 2018-07-17 RX ADMIN — LIDOCAINE HYDROCHLORIDE 100 MG: 20 INJECTION, SOLUTION EPIDURAL; INFILTRATION; INTRACAUDAL; PERINEURAL at 08:52

## 2018-07-17 RX ADMIN — PROPOFOL 50 MG: 10 INJECTION, EMULSION INTRAVENOUS at 08:59

## 2018-07-17 RX ADMIN — PROPOFOL 250 MCG/KG/MIN: 10 INJECTION, EMULSION INTRAVENOUS at 08:47

## 2018-07-17 RX ADMIN — ONDANSETRON 4 MG: 2 INJECTION INTRAMUSCULAR; INTRAVENOUS at 08:07

## 2018-07-17 RX ADMIN — PROPOFOL 50 MG: 10 INJECTION, EMULSION INTRAVENOUS at 09:05

## 2018-07-17 RX ADMIN — PROPOFOL 50 MG: 10 INJECTION, EMULSION INTRAVENOUS at 09:01

## 2018-07-17 RX ADMIN — PROPOFOL 20 MG: 10 INJECTION, EMULSION INTRAVENOUS at 09:02

## 2018-07-17 RX ADMIN — SODIUM CHLORIDE, SODIUM LACTATE, POTASSIUM CHLORIDE, AND CALCIUM CHLORIDE 100 ML/HR: 600; 310; 30; 20 INJECTION, SOLUTION INTRAVENOUS at 08:07

## 2018-07-17 RX ADMIN — PROPOFOL 20 MG: 10 INJECTION, EMULSION INTRAVENOUS at 08:56

## 2018-07-17 RX ADMIN — PROPOFOL 50 MG: 10 INJECTION, EMULSION INTRAVENOUS at 08:52

## 2018-07-17 RX ADMIN — TRIAMCINOLONE ACETONIDE 20 MG: 40 INJECTION, SUSPENSION INTRA-ARTICULAR; INTRAMUSCULAR at 09:08

## 2018-07-17 NOTE — PROCEDURES
PROCEDURE: Esophagogastroduodenoscopy with submucosal injection and esophageal stricture dilation with balloon    ENDOSCOPIST: Buffy Vega M.D. PREOPERATIVE DIAGNOSIS: Esophageal stricture    POSTOPERATIVE DIAGNOSIS: Esophageal stricture    INSTRUMENTS: [id number]    SEDATION: MAC    DESCRIPTION: After informed consent was obtained, the patient was taken to the endoscopy suite and placed in the left lateral decubitus position. Intravenous sedation was administered as above and posterior pharynx was anesthetized with local anesthetic spray. After adequate sedation had been achieved the endoscope was inserted over the tongue and through the posterior pharynx under direct visualization down the esophagus to the stomach and into the second portion of the duodenum. The endoscopic was withdrawn from that point, performing a careful survey of the mucosa. Retroflexion was performed in the gastric fundus. FINDINGS:  Esophagus:  Mild stricture at GEJ. Injected with total 20 mg Kenalog in 4 quadrants. Dilation with 12-15 mm CRE balloon held for 60 seconds at each increment. No new mucosal trauma after dilation. Corkscrew contractions noted. Stomach:  A healthy gastrojejunostomy is present. The jejunum is normal.  The antrum and pylorus are intact. Duodenum:  Small bowel anastomosis noted in bulb. The duodenum is normal.    Estimated blood loss:  0 cc-minimal    IMPRESSION: Dilation of refractory peptic stricture after Kenalog injection    PLAN:  F/U as planned by Dr. Umesh Hernandez. Rosa Chance MD

## 2018-07-17 NOTE — PROGRESS NOTES
Dr. Rivers Postin ordered 7 units of Regular insulin. 7 units of Regular insulin given back of right arm in prep.

## 2018-07-17 NOTE — ANESTHESIA POSTPROCEDURE EVALUATION
Post-Anesthesia Evaluation and Assessment    Patient: Kevin Sanchez MRN: [id number]  SSN: [id number]    YOB: 1970  Age: 50 y.o. Sex: female       Cardiovascular Function/Vital Signs  Visit Vitals    /85    Pulse 81    Temp 36.7 °C (98.1 °F)    Resp 11    Ht 5' 2\" (1.575 m)    Wt 82.6 kg (182 lb)    SpO2 95%    Breastfeeding No    BMI 33.29 kg/m2       Patient is status post total IV anesthesia anesthesia for Procedure(s):  ESOPHAGOGASTRODUODENOSCOPY (EGD)/ 32  INJECTION  ESOPHAGEAL DILATION. Nausea/Vomiting: None    Postoperative hydration reviewed and adequate. Pain:  Pain Scale 1: Visual (07/17/18 0919)  Pain Intensity 1: 0 (07/17/18 0919)   Managed    Neurological Status: At baseline    Mental Status and Level of Consciousness: Arousable    Pulmonary Status:   O2 Device: Nasal cannula (07/17/18 0919)   Adequate oxygenation and airway patent    Complications related to anesthesia: None    Post-anesthesia assessment completed.  No concerns    Signed By: Radha Jensen MD     July 17, 2018

## 2018-07-17 NOTE — PROGRESS NOTES
Port to right upper chest accessed prior to pt arriving in GI lab today. Port flushed with 10 ml sterile normal saline syringe.

## 2018-07-17 NOTE — DISCHARGE INSTRUCTIONS
Gastrointestinal Esophagogastroduodenoscopy (EGD) - Upper Exam Discharge Instructions    1. Call Dr. Torie Gray at [phone] for any problems or questions. 2. Contact the doctor's office for follow up appointment as directed. 3. Medication may cause drowsiness for several hours, therefore, do not drive or operate machinery for remainder of the day. 4. No alcohol today. 5. Ordinarily, you may resume regular diet and activity after exam unless otherwise specified by your physician. 6. For mild soreness in your throat you may use Cepacol throat lozenges or warm salt-water gargles as needed. Any additional instructions: Follow up as planned by Dr. Cira Cason     Instructions given to Harshil Lee and other family members.

## 2018-07-17 NOTE — H&P
Gastroenterology Outpatient History and Physical    Patient: Miranda Rosario    Physician: Jessi Weaver MD    Vital Signs: Blood pressure 121/82, pulse (!) 102, temperature 98.2 °F (36.8 °C), resp. rate 18, height 5' 2\" (1.575 m), weight 82.6 kg (182 lb), SpO2 95 %, not currently breastfeeding. Allergies:    Allergies   Allergen Reactions    Tape [Adhesive] Rash and Itching    Barium Iodide Nausea and Vomiting    Contrast Dye [Iodine] Shortness of Breath     Oral contrast-experienced flushing,shortness of breath, sweatiness; (patient has received oral contrast many times at [facility])    Flagyl [Metronidazole] Nausea and Vomiting    Metformin Nausea and Vomiting     Stomach pain    Valtrex [Valacyclovir] Unknown (comments)     PATIENT STATES \"NOT ALLERGIC\"    Insulins Nausea and Vomiting     Humalog only       Chief Complaint: dysphagia    History of Present Illness: refractory peptic stricture    Justification for Procedure: Dilation    History:  Past Medical History:   Diagnosis Date    Anemia     denies hx of blood transfusions-- Fe infusions 12/2017    Anxiety and depression     Asthma     allergy induced, denies any inhaler    C. difficile diarrhea     in 6996 after complicated ERCP    Cervical dysplasia 1990s    Chronic insomnia     Chronic pain     all over     Chronic pancreatitis ([facility] [address]     Endometriosis     Esophageal stricture 2017    Fibromyalgia     Former cigarette smoker     GERD (gastroesophageal reflux disease)     daily meds---po and IV protonix- uses per port- alternates    HLD (hyperlipidemia)     pt denies     IBS (irritable bowel syndrome)     Migraine headache     Morbid obesity (HCC)     BMI 34    Nausea & vomiting     pt reports she does better with phenergan than zofran - causes HA    Nonalcoholic fatty liver disease     PUD (peptic ulcer disease) 06/2017    Hx of     Sphincter of Oddi dysfunction     Type 2 diabetes mellitus ([facility] [address]     insulin reliant: fbs av-200/ s.s of hypoglyemia @80-90/Last : A1c 6.9      Past Surgical History:   Procedure Laterality Date    ABDOMEN SURGERY PROC UNLISTED  last one placed      Hx of pancreatic stent, multiple    ABDOMEN SURGERY PROC UNLISTED      lap nissen    ABDOMEN SURGERY PROC UNLISTED      explor lap    COLONOSCOPY N/A 2018    COLONOSCOPY performed by Niall [facility]ila [facility] at [facility] ENDOSCOPY    HX COLONOSCOPY      HX ENDOSCOPY      36 fr thomas    HX ERCP  3/5/2013    resulted in perforated duodenum    HX HYSTERECTOMY      ovaries remain    HX LAP CHOLECYSTECTOMY      HX LAPAROTOMY  3/5/2013    exploratory for duodenal perforation with ERCP    HX ORTHOPAEDIC      right finger surgery 2017    HX UROLOGICAL  [facility] at [facility]-- stent removed 13. Dr Jessie Kat  2014    port insertion      Social History     Social History    Marital status:      Spouse name: N/A    Number of children: N/A    Years of education: N/A     Social History Main Topics    Smoking status: Former Smoker     Packs/day: 1.00     Years: 3.00     Quit date: 1993    Smokeless tobacco: Never Used    Alcohol use No    Drug use: No    Sexual activity: Not Asked     Other Topics Concern    None     Social History Narrative      Family History   Problem Relation Age of Onset    Diabetes Mother     Hypertension Mother     Heart Disease Mother      cabg    Diabetes Father     Heart Disease Father     Hypertension Father     Other Father      Hypomagnesium    No Known Problems Sister        Medications:   Prior to Admission medications    Medication Sig Start Date End Date Taking? Authorizing Provider   pantoprazole (PROTONIX) 40 mg tablet Take 40 mg by mouth nightly. Yes Historical Provider   citalopram (CELEXA) 20 mg tablet Take 20 mg by mouth daily.  Indications: am   Yes Historical Provider   acetaminophen (TYLENOL) 325 mg tablet Take 325 mg by mouth every four (4) hours as needed for Pain. Yes Historical Provider   LORazepam (ATIVAN) 1 mg tablet Take 1 mg by mouth three (3) times daily as needed for Anxiety. Yes Historical Provider   cyanocobalamin (VITAMIN B12) 1,000 mcg/mL injection INJECT 1 VIAL INTRAMUSCULARLY FOR 4 WEEKS THEN MONTHLY 11/25/17  Yes Historical Provider   CARAFATE 100 mg/mL suspension TAKE 10 ML BY MOUTH 4 TIMES A DAY EVERY DAY ON A EMPTY STOMACH 1 HR BEFORE MEALS AND AT BEDTIME 9/7/17  Yes Historical Provider   gabapentin (NEURONTIN) 250 mg/5 mL solution Take 300 mg by mouth three (3) times daily. Take day of surgery per anesthesia protocol. -since pt drinks pt stated she will hold until she gets home   Yes Historical Provider   diphenhydrAMINE (BENADRYL) 25 mg capsule Take 25 mg by mouth every six (6) hours as needed. Yes Historical Provider   pantoprazole (PROTONIX) 40 mg injection 40 mg by IntraVENous route every morning. Take day of surgery per anesthesia protocol. Yes Historical Provider   insulin degludec (TRESIBA FLEXTOUCH U-100) 100 unit/mL (3 mL) inpn 40 Units by SubCUTAneous route nightly. Yes Historical Provider   polyethylene glycol (MIRALAX) 17 gram packet Take 17 g by mouth daily. Yes Historical Provider   hyoscyamine SL (LEVSIN/SL) 0.125 mg SL tablet 0.125 mg by SubLINGual route every six (6) hours as needed for Cramping. Yes Historical Provider   oxyCODONE IR (ROXICODONE) 10 mg tab immediate release tablet Take 10 mg by mouth every eight (8) hours as needed. Take day of surgery per anesthesia protocol. Yes Historical Provider   insulin aspart (NOVOLOG) 100 unit/mL injection Use as directed  Patient taking differently: 7 Units by SubCUTAneous route Before breakfast, lunch, and dinner.  Over 300 use sliding scale-  Always gets 7 units then if >300 gets extra - does ot know amount- has never taken more than 10 units regular  Indications: type 2 diabetes mellitus 6/7/16  Yes Steve Boyd NP   zolpidem (AMBIEN) 10 mg tablet TAKE 1 TABLET BY MOUTH EVERY NIGHT AS NEEDED FOR SLEEP  Patient taking differently: Take 10 mg by mouth nightly. 6/7/16  Yes Lollarmando Grandchild, NP   promethazine (PHENERGAN) 25 mg tablet Take 1 Tab by mouth every six (6) hours as needed. Patient taking differently: Take 25 mg by mouth as needed. 6/27/14  Yes NIURKA Brown   ondansetron hcl (ZOFRAN) 8 mg tablet Take 8 mg by mouth every eight (8) hours as needed for Nausea. Yes Historical Provider   fentaNYL (DURAGESIC) 75 mcg/hr 1 Patch by TransDERmal route every seventy-two (72) hours. Historical Provider   glucagon (GLUCAGEN) 1 mg injection 1 mg by IntraMUSCular route. 5/8/17   Historical Provider   0.9% sodium chloride solution by IntraVENous route five (5) days a week. Gives to herself via port at home daily -1000cc    Historical Provider   promethazine (PHENERGAN) 25 mg suppository Insert 25 mg into rectum as needed for Nausea. Historical Provider       Physical Exam:   General: alert, cooperative, no distress   HEENT: Head: Normocephalic, no lesions, without obvious abnormality.    Heart: regular rate and rhythm, S1, S2 normal, no murmur, click, rub or gallop   Lungs: chest clear, no wheezing, rales, normal symmetric air entry   Abdominal: Bowel sounds are normal, liver is not enlarged, spleen is not enlarged   Neurological: Grossly normal   Extremities: extremities normal, atraumatic, no cyanosis or edema     Findings/Diagnosis: Esophageal stricture    Plan of Care/Planned Procedure: EGD with Kenalog injection and dilation    Signed By: Kitty Hopper MD     July 17, 2018

## 2018-07-17 NOTE — ANESTHESIA PREPROCEDURE EVALUATION
Anesthetic History     PONV          Review of Systems / Medical History  Patient summary reviewed and pertinent labs reviewed    Pulmonary          Smoker (Former)  Asthma : well controlled       Neuro/Psych              Cardiovascular    Hypertension: well controlled              Exercise tolerance: >4 METS     GI/Hepatic/Renal     GERD: well controlled          Comments: Esophageal stricture Endo/Other    Diabetes: well controlled, type 2, using insulin    Obesity     Other Findings              Physical Exam    Airway  Mallampati: II    Neck ROM: decreased range of motion, short neck   Mouth opening: Normal     Cardiovascular  Regular rate and rhythm,  S1 and S2 normal,  no murmur, click, rub, or gallop             Dental         Pulmonary  Breath sounds clear to auscultation               Abdominal         Other Findings            Anesthetic Plan    ASA: 3  Anesthesia type: total IV anesthesia          Induction: Intravenous  Anesthetic plan and risks discussed with: Patient and Family

## 2018-07-17 NOTE — ROUTINE PROCESS
VSS. No complaints noted. Education given and reviewed with mother who voiced understanding. Pt wheeled out via wheelchair by Domnick Means.

## 2018-07-19 ENCOUNTER — HOSPITAL ENCOUNTER (OUTPATIENT)
Dept: INFUSION THERAPY | Age: 48
Discharge: HOME | End: 2018-07-19
Payer: COMMERCIAL

## 2018-07-19 VITALS
DIASTOLIC BLOOD PRESSURE: 76 MMHG | HEART RATE: 100 BPM | RESPIRATION RATE: 18 BRPM | OXYGEN SATURATION: 97 % | BODY MASS INDEX: 32.04 KG/M2 | WEIGHT: 175.2 LBS | TEMPERATURE: 98.2 F | SYSTOLIC BLOOD PRESSURE: 128 MMHG

## 2018-07-19 LAB — MAGNESIUM SERPL-MCNC: 1.8 MG/DL (ref 1.8–2.4)

## 2018-07-19 PROCEDURE — 74011000250 HC RX REV CODE- 250: Performed by: INTERNAL MEDICINE

## 2018-07-19 PROCEDURE — 83735 ASSAY OF MAGNESIUM: CPT | Performed by: INTERNAL MEDICINE

## 2018-07-19 PROCEDURE — 74011250636 HC RX REV CODE- 250/636: Performed by: INTERNAL MEDICINE

## 2018-07-19 PROCEDURE — 96374 THER/PROPH/DIAG INJ IV PUSH: CPT

## 2018-07-19 PROCEDURE — 77030003560 HC NDL HUBR BARD -A

## 2018-07-19 RX ORDER — SODIUM CHLORIDE 9 MG/ML
500 INJECTION, SOLUTION INTRAVENOUS ONCE
Status: COMPLETED | OUTPATIENT
Start: 2018-07-19 | End: 2018-07-19

## 2018-07-19 RX ORDER — HEPARIN 100 UNIT/ML
300 SYRINGE INTRAVENOUS AS NEEDED
Status: COMPLETED | OUTPATIENT
Start: 2018-07-19 | End: 2018-07-19

## 2018-07-19 RX ORDER — SODIUM CHLORIDE 0.9 % (FLUSH) 0.9 %
10-40 SYRINGE (ML) INJECTION AS NEEDED
Status: ACTIVE | OUTPATIENT
Start: 2018-07-19 | End: 2018-07-19

## 2018-07-19 RX ADMIN — SODIUM CHLORIDE 500 ML: 900 INJECTION, SOLUTION INTRAVENOUS at 07:32

## 2018-07-19 RX ADMIN — Medication 10 ML: at 07:32

## 2018-07-19 RX ADMIN — SODIUM CHLORIDE 25 MG: 9 INJECTION INTRAMUSCULAR; INTRAVENOUS; SUBCUTANEOUS at 07:38

## 2018-07-19 RX ADMIN — SODIUM CHLORIDE, PRESERVATIVE FREE 300 UNITS: 5 INJECTION INTRAVENOUS at 09:30

## 2018-07-19 NOTE — PROGRESS NOTES
Arrived to the [facility]. Patient c/o nausea on arrival.  IVF and IV phenergan completed. Patient tolerated well. No magnesium needed magnesium level 1.8 Port flushed and left accessed for use at home. Any issues or concerns during appointment: None. Patient aware of next infusion appointment on 7/26 (date) at [address] [place] [address] (time). Discharged ambulatory in stable condition.

## 2018-07-26 ENCOUNTER — HOSPITAL ENCOUNTER (OUTPATIENT)
Dept: INFUSION THERAPY | Age: 48
Discharge: HOME | End: 2018-07-26
Payer: COMMERCIAL

## 2018-07-26 VITALS
OXYGEN SATURATION: 96 % | RESPIRATION RATE: 18 BRPM | TEMPERATURE: 98.2 F | WEIGHT: 178 LBS | BODY MASS INDEX: 32.56 KG/M2 | SYSTOLIC BLOOD PRESSURE: 127 MMHG | HEART RATE: 105 BPM | DIASTOLIC BLOOD PRESSURE: 75 MMHG

## 2018-07-26 LAB — MAGNESIUM SERPL-MCNC: 2 MG/DL (ref 1.8–2.4)

## 2018-07-26 PROCEDURE — 96361 HYDRATE IV INFUSION ADD-ON: CPT

## 2018-07-26 PROCEDURE — 96374 THER/PROPH/DIAG INJ IV PUSH: CPT

## 2018-07-26 PROCEDURE — 74011250636 HC RX REV CODE- 250/636: Performed by: INTERNAL MEDICINE

## 2018-07-26 PROCEDURE — 77030003560 HC NDL HUBR BARD -A

## 2018-07-26 PROCEDURE — 74011000250 HC RX REV CODE- 250: Performed by: INTERNAL MEDICINE

## 2018-07-26 PROCEDURE — 83735 ASSAY OF MAGNESIUM: CPT | Performed by: INTERNAL MEDICINE

## 2018-07-26 RX ORDER — SODIUM CHLORIDE 0.9 % (FLUSH) 0.9 %
10 SYRINGE (ML) INJECTION EVERY 8 HOURS
Status: DISCONTINUED | OUTPATIENT
Start: 2018-07-26 | End: 2018-07-30 | Stop reason: HOSPADM

## 2018-07-26 RX ORDER — HEPARIN 100 UNIT/ML
500 SYRINGE INTRAVENOUS AS NEEDED
Status: DISPENSED | OUTPATIENT
Start: 2018-07-26 | End: 2018-07-26

## 2018-07-26 RX ADMIN — Medication 10 ML: at 07:25

## 2018-07-26 RX ADMIN — Medication 10 ML: at 08:33

## 2018-07-26 RX ADMIN — SODIUM CHLORIDE 500 ML: 900 INJECTION, SOLUTION INTRAVENOUS at 07:38

## 2018-07-26 RX ADMIN — SODIUM CHLORIDE 25 MG: 9 INJECTION INTRAMUSCULAR; INTRAVENOUS; SUBCUTANEOUS at 07:27

## 2018-07-26 RX ADMIN — SODIUM CHLORIDE, PRESERVATIVE FREE 500 UNITS: 5 INJECTION INTRAVENOUS at 08:33

## 2018-07-26 NOTE — PROGRESS NOTES
[facility] IV Phenergan completed. No replacement needed Magnesium 2.0. Patient tolerated well. Port flushed and left accessed for use at home. Any issues or concerns during appointment: None. Patient aware of next infusion appointment on 8/9/2018 (date) at [address] [place] [address] (time). Discharged ambulatory in stable condition. Sree Ryder

## 2018-08-01 ENCOUNTER — ANESTHESIA EVENT (OUTPATIENT)
Dept: ENDOSCOPY | Age: 48
End: 2018-08-01
Payer: COMMERCIAL

## 2018-08-01 ENCOUNTER — HOSPITAL ENCOUNTER (OUTPATIENT)
Dept: INFUSION THERAPY | Age: 48
Discharge: HOME | End: 2018-08-01
Payer: COMMERCIAL

## 2018-08-01 VITALS
OXYGEN SATURATION: 97 % | SYSTOLIC BLOOD PRESSURE: 143 MMHG | TEMPERATURE: 97.9 F | BODY MASS INDEX: 32.48 KG/M2 | RESPIRATION RATE: 18 BRPM | DIASTOLIC BLOOD PRESSURE: 78 MMHG | HEART RATE: 122 BPM | WEIGHT: 177.6 LBS

## 2018-08-01 LAB — MAGNESIUM SERPL-MCNC: 1.9 MG/DL (ref 1.8–2.4)

## 2018-08-01 PROCEDURE — 77030003560 HC NDL HUBR BARD -A

## 2018-08-01 PROCEDURE — 74011250636 HC RX REV CODE- 250/636: Performed by: INTERNAL MEDICINE

## 2018-08-01 PROCEDURE — 74011000250 HC RX REV CODE- 250: Performed by: INTERNAL MEDICINE

## 2018-08-01 PROCEDURE — 83735 ASSAY OF MAGNESIUM: CPT | Performed by: INTERNAL MEDICINE

## 2018-08-01 PROCEDURE — 96374 THER/PROPH/DIAG INJ IV PUSH: CPT

## 2018-08-01 RX ORDER — SODIUM CHLORIDE, SODIUM LACTATE, POTASSIUM CHLORIDE, CALCIUM CHLORIDE 600; 310; 30; 20 MG/100ML; MG/100ML; MG/100ML; MG/100ML
100 INJECTION, SOLUTION INTRAVENOUS CONTINUOUS
Status: CANCELLED | OUTPATIENT
Start: 2018-08-01

## 2018-08-01 RX ORDER — SODIUM CHLORIDE 0.9 % (FLUSH) 0.9 %
5-10 SYRINGE (ML) INJECTION AS NEEDED
Status: CANCELLED | OUTPATIENT
Start: 2018-08-01

## 2018-08-01 RX ORDER — SODIUM CHLORIDE 0.9 % (FLUSH) 0.9 %
5-10 SYRINGE (ML) INJECTION AS NEEDED
Status: DISCONTINUED | OUTPATIENT
Start: 2018-08-01 | End: 2018-08-05 | Stop reason: HOSPADM

## 2018-08-01 RX ORDER — HEPARIN 100 UNIT/ML
500 SYRINGE INTRAVENOUS AS NEEDED
Status: DISPENSED | OUTPATIENT
Start: 2018-08-01 | End: 2018-08-02

## 2018-08-01 RX ADMIN — Medication 500 UNITS: at 17:25

## 2018-08-01 RX ADMIN — SODIUM CHLORIDE 500 ML: 900 INJECTION, SOLUTION INTRAVENOUS at 16:33

## 2018-08-01 RX ADMIN — Medication 10 ML: at 16:32

## 2018-08-01 RX ADMIN — PROMETHAZINE HYDROCHLORIDE 25 MG: 25 INJECTION INTRAMUSCULAR; INTRAVENOUS at 16:43

## 2018-08-01 NOTE — PROGRESS NOTES
Pt arrived ambulatory to [facility] with port previously accessed and with dressing dry and intact and with good blood return. NS infusing. Phenergan given IV. Hydration completed. Port packed with heparin and remains accessed. Pt aware of next appt on 8/9/18 0715. Pt discharged ambulatory.

## 2018-08-02 ENCOUNTER — ANESTHESIA (OUTPATIENT)
Dept: ENDOSCOPY | Age: 48
End: 2018-08-02
Payer: COMMERCIAL

## 2018-08-02 ENCOUNTER — HOSPITAL ENCOUNTER (OUTPATIENT)
Age: 48
Setting detail: OUTPATIENT SURGERY
Discharge: HOME | End: 2018-08-02
Attending: INTERNAL MEDICINE | Admitting: INTERNAL MEDICINE
Payer: COMMERCIAL

## 2018-08-02 VITALS
WEIGHT: 177 LBS | HEIGHT: 62 IN | HEART RATE: 85 BPM | TEMPERATURE: 98.4 F | SYSTOLIC BLOOD PRESSURE: 119 MMHG | RESPIRATION RATE: 18 BRPM | OXYGEN SATURATION: 93 % | BODY MASS INDEX: 32.57 KG/M2 | DIASTOLIC BLOOD PRESSURE: 72 MMHG

## 2018-08-02 LAB — GLUCOSE BLD STRIP.AUTO-MCNC: 197 MG/DL (ref 65–100)

## 2018-08-02 PROCEDURE — 82962 GLUCOSE BLOOD TEST: CPT

## 2018-08-02 PROCEDURE — 74011000250 HC RX REV CODE- 250: Performed by: ANESTHESIOLOGY

## 2018-08-02 PROCEDURE — 76060000031 HC ANESTHESIA FIRST 0.5 HR: Performed by: INTERNAL MEDICINE

## 2018-08-02 PROCEDURE — 76040000025: Performed by: INTERNAL MEDICINE

## 2018-08-02 PROCEDURE — 74011250636 HC RX REV CODE- 250/636: Performed by: INTERNAL MEDICINE

## 2018-08-02 PROCEDURE — 74011250636 HC RX REV CODE- 250/636

## 2018-08-02 PROCEDURE — 74011250636 HC RX REV CODE- 250/636: Performed by: ANESTHESIOLOGY

## 2018-08-02 PROCEDURE — C1726 CATH, BAL DIL, NON-VASCULAR: HCPCS | Performed by: INTERNAL MEDICINE

## 2018-08-02 RX ORDER — SODIUM CHLORIDE, SODIUM LACTATE, POTASSIUM CHLORIDE, CALCIUM CHLORIDE 600; 310; 30; 20 MG/100ML; MG/100ML; MG/100ML; MG/100ML
100 INJECTION, SOLUTION INTRAVENOUS CONTINUOUS
Status: DISCONTINUED | OUTPATIENT
Start: 2018-08-02 | End: 2018-08-02 | Stop reason: HOSPADM

## 2018-08-02 RX ORDER — SODIUM CHLORIDE 0.9 % (FLUSH) 0.9 %
10 SYRINGE (ML) INJECTION
Status: DISCONTINUED | OUTPATIENT
Start: 2018-08-02 | End: 2018-08-02 | Stop reason: HOSPADM

## 2018-08-02 RX ORDER — TRIAMCINOLONE ACETONIDE 40 MG/ML
40 INJECTION, SUSPENSION INTRA-ARTICULAR; INTRAMUSCULAR
Status: DISCONTINUED | OUTPATIENT
Start: 2018-08-02 | End: 2018-08-02 | Stop reason: HOSPADM

## 2018-08-02 RX ORDER — HEPARIN 100 UNIT/ML
500 SYRINGE INTRAVENOUS AS NEEDED
Status: DISCONTINUED | OUTPATIENT
Start: 2018-08-02 | End: 2018-08-02 | Stop reason: HOSPADM

## 2018-08-02 RX ORDER — LIDOCAINE HYDROCHLORIDE 20 MG/ML
INJECTION, SOLUTION EPIDURAL; INFILTRATION; INTRACAUDAL; PERINEURAL AS NEEDED
Status: DISCONTINUED | OUTPATIENT
Start: 2018-08-02 | End: 2018-08-02 | Stop reason: HOSPADM

## 2018-08-02 RX ADMIN — SODIUM CHLORIDE, PRESERVATIVE FREE 500 UNITS: 5 INJECTION INTRAVENOUS at 12:21

## 2018-08-02 RX ADMIN — LIDOCAINE HYDROCHLORIDE 60 MG: 20 INJECTION, SOLUTION EPIDURAL; INFILTRATION; INTRACAUDAL; PERINEURAL at 11:13

## 2018-08-02 RX ADMIN — LIDOCAINE HYDROCHLORIDE 20 MG: 20 INJECTION, SOLUTION EPIDURAL; INFILTRATION; INTRACAUDAL; PERINEURAL at 11:16

## 2018-08-02 RX ADMIN — LIDOCAINE HYDROCHLORIDE 20 MG: 20 INJECTION, SOLUTION EPIDURAL; INFILTRATION; INTRACAUDAL; PERINEURAL at 11:18

## 2018-08-02 RX ADMIN — LIDOCAINE HYDROCHLORIDE 20 MG: 20 INJECTION, SOLUTION EPIDURAL; INFILTRATION; INTRACAUDAL; PERINEURAL at 11:22

## 2018-08-02 RX ADMIN — FAMOTIDINE 20 MG: 10 INJECTION, SOLUTION INTRAVENOUS at 09:59

## 2018-08-02 RX ADMIN — LIDOCAINE HYDROCHLORIDE 20 MG: 20 INJECTION, SOLUTION EPIDURAL; INFILTRATION; INTRACAUDAL; PERINEURAL at 11:25

## 2018-08-02 RX ADMIN — LIDOCAINE HYDROCHLORIDE 20 MG: 20 INJECTION, SOLUTION EPIDURAL; INFILTRATION; INTRACAUDAL; PERINEURAL at 11:20

## 2018-08-02 RX ADMIN — SODIUM CHLORIDE, SODIUM LACTATE, POTASSIUM CHLORIDE, AND CALCIUM CHLORIDE 100 ML/HR: 600; 310; 30; 20 INJECTION, SOLUTION INTRAVENOUS at 10:01

## 2018-08-02 NOTE — IP AVS SNAPSHOT
[facility] 
 
 
 [address] 
[phone] Patient: Irma Sierra MRN: [id number] :1970 About your hospitalization You were admitted on:  2018 You last received care in the:  [facility] You were discharged on:  2018 Why you were hospitalized Your primary diagnosis was:  Not on File Follow-up Information Follow up With Details Comments Contact Info Niecy Wilson MD   [address] [facility] [address] 
[phone] Your Scheduled Appointments 2018  1:00 PM EDT  
PAT UPDATE (PHONE) with [place] [facility] PHONE APPOINTMENT [facility] Pre Assessment [facility] OR PRE ASSESSMENT) [address]  
[phone]   7:05 AM EDT Infusion Lab with LAB CK  
[facility]) [address]   [facility] [phone] [facility] [address]  
[phone] [address]   7:15 AM EDT Infusion with [address] [facility]) [address]   [facility] [phone] [facility] [address] [phone] [address]  ESOPHAGOGASTRODUODENOSCOPY (EGD) with Jacque Bumpers, MD  
[facility] ([address] [place] [address]  
[phone]   7:05 AM EDT Infusion Lab with LAB CK  
[facility]) [address]   [facility] [phone] [facility] [address]  
[phone] [address]   7:15 AM EDT Infusion with NUR6 [address] ([facility]) [address] Nina Ho [phone] [facility] [address]  
[phone] [address] Thursday August 23, 2018  7:05 AM EDT Infusion Lab with LAB CK  
[facility] INFUSION SERVICES [facility]) [address] Nina Ho [phone] [facility] [address]  
[phone] [address] Thursday August 23, 2018  7:15 AM EDT Infusion with [address] [facility]) [address] Nina Ho [phone] [facility] [address]  
[phone] [address] Friday August 24, 2018  9:30 AM EDT  
PAT UPDATE (PHONE) with [facility] PHONE APPOINTMENT [facility] Pre Assessment [facility] OR PRE ASSESSMENT) [address] [place] [address]  
[phone] Thursday August 30, 2018  7:05 AM EDT Infusion Lab with LAB CK  
[facility] INFUSION SERVICES [facility]) [address] Nina Ho [phone] [facility] [address]  
[phone] [address] Thursday August 30, 2018  7:15 AM EDT Infusion with [address] [facility]) [address] Nnia Ho [phone] [facility] [address]  
[phone] [address] Friday August 31, 2018 ESOPHAGOGASTRODUODENOSCOPY (EGD) with Andre Zayas MD  
[facility] ([address] [place] [address]) [address] [place] [address]  
[phone] Discharge Orders None A check cindy indicates which time of day the medication should be taken. My Medications CHANGE how you take these medications Instructions Each Dose to Equal  
 Morning Noon Evening Bedtime  
 insulin aspart U-100 100 unit/mL injection Commonly known as:  NovoLOG U-100 Insulin aspart What changed:   
- how much to take 
- how to take this - when to take this 
- additional instructions Your last dose was: Your next dose is:    
   
   
 Use as directed * promethazine 25 mg suppository Commonly known as:  PHENERGAN What changed:  Another medication with the same name was changed. Make sure you understand how and when to take each. Your last dose was: Your next dose is: Insert 25 mg into rectum as needed for Nausea. 25 mg  
    
   
   
   
  
 * promethazine 25 mg tablet Commonly known as:  PHENERGAN What changed:  when to take this Your last dose was: Your next dose is: Take 1 Tab by mouth every six (6) hours as needed. 25 mg  
    
   
   
   
  
 zolpidem 10 mg tablet Commonly known as:  AMBIEN What changed:   
- how much to take 
- how to take this - when to take this 
- additional instructions Your last dose was: Your next dose is: TAKE 1 TABLET BY MOUTH EVERY NIGHT AS NEEDED FOR SLEEP * Notice: This list has 2 medication(s) that are the same as other medications prescribed for you. Read the directions carefully, and ask your doctor or other care provider to review them with you. CONTINUE taking these medications Instructions Each Dose to Equal  
 Morning Noon Evening Bedtime  
 0.9% sodium chloride solution Your last dose was: Your next dose is:    
   
   
 by IntraVENous route five (5) days a week. Gives to herself via port at home daily -1000cc ATIVAN 1 mg tablet Generic drug:  LORazepam  
   
Your last dose was: Your next dose is: Take 1 mg by mouth three (3) times daily as needed for Anxiety. 1 mg BENADRYL 25 mg capsule Generic drug:  diphenhydrAMINE Your last dose was: Your next dose is: Take 25 mg by mouth every six (6) hours as needed. 25 mg  
    
   
   
   
  
 CARAFATE 100 mg/mL suspension Generic drug:  sucralfate Your last dose was: Your next dose is: TAKE 10 ML BY MOUTH 4 TIMES A DAY EVERY DAY ON A EMPTY STOMACH 1 HR BEFORE MEALS AND AT BEDTIME CeleXA 20 mg tablet Generic drug:  citalopram  
   
Your last dose was: Your next dose is: Take 20 mg by mouth daily. Indications: am  
 20 mg  
    
   
   
   
  
 cyanocobalamin 1,000 mcg/mL injection Commonly known as:  VITAMIN B12 Your last dose was: Your next dose is: INJECT 1 VIAL INTRAMUSCULARLY FOR 4 WEEKS THEN MONTHLY  
     
   
   
   
  
 fentaNYL 75 mcg/hr Commonly known as:  Stewart Gals Your last dose was: Your next dose is:    
   
   
 1 Patch by TransDERmal route every seventy-two (72) hours. 1 Patch  
    
   
   
   
  
 gabapentin 250 mg/5 mL solution Commonly known as:  NEURONTIN Your last dose was: Your next dose is: Take 300 mg by mouth three (3) times daily. 300 mg  
    
   
   
   
  
 glucagon 1 mg injection Commonly known as:  Maria Tricia Your last dose was: Your next dose is:    
   
   
 1 mg by IntraMUSCular route. 1 mg  
    
   
   
   
  
 hyoscyamine SL 0.125 mg SL tablet Commonly known as:  LEVSIN/SL Your last dose was: Your next dose is:    
   
   
 0.125 mg by SubLINGual route every six (6) hours as needed for Cramping.  
 0.125 mg MIRALAX 17 gram packet Generic drug:  polyethylene glycol Your last dose was: Your next dose is: Take 17 g by mouth daily. 17 g  
    
   
   
   
  
 oxyCODONE IR 10 mg Tab immediate release tablet Commonly known as:  Alice Loredo Your last dose was: Your next dose is: Take 10 mg by mouth every eight (8) hours as needed. Take day of surgery per anesthesia protocol. 10 mg  
    
   
   
   
  
 * PROTONIX 40 mg injection Generic drug:  pantoprazole Your last dose was: Your next dose is:    
   
   
 40 mg by IntraVENous route every morning. Take day of surgery per anesthesia protocol. 40 mg  
    
   
   
   
  
 * PROTONIX 40 mg tablet Generic drug:  pantoprazole Your last dose was: Your next dose is:    
   
   
 40 mg by IntraVENous route nightly. 40 mg  
    
   
   
   
  
 TRESIBA FLEXTOUCH U-100 100 unit/mL (3 mL) Inpn Generic drug:  insulin degludec Your last dose was: Your next dose is:    
   
   
 40 Units by SubCUTAneous route nightly. 40 Units TYLENOL 325 mg tablet Generic drug:  acetaminophen Your last dose was: Your next dose is: Take 325 mg by mouth every four (4) hours as needed for Pain. 325 mg  
    
   
   
   
  
 ZOFRAN 8 mg tablet Generic drug:  ondansetron hcl Your last dose was: Your next dose is: Take 8 mg by mouth every eight (8) hours as needed for Nausea. 8 mg * Notice: This list has 2 medication(s) that are the same as other medications prescribed for you. Read the directions carefully, and ask your doctor or other care provider to review them with you. Opioid Education Prescription Opioids: What You Need to Know: 
 
Prescription opioids can be used to help relieve moderate-to-severe pain and are often prescribed following a surgery or injury, or for certain health conditions. These medications can be an important part of treatment but also come with serious risks.   Opioids are strong pain medicines. Examples include hydrocodone, oxycodone, fentanyl, and morphine. Heroin is an example of an illegal opioid. It is important to work with your health care provider to make sure you are getting the safest, most effective care. WHAT ARE THE RISKS AND SIDE EFFECTS OF OPIOID USE? Prescription opioids carry serious risks of addiction and overdose, especially with prolonged use. An opioid overdose, often marked by slow breathing, can cause sudden death. The use of prescription opioids can have a number of side effects as well, even when taken as directed. · Tolerance-meaning you might need to take more of a medication for the same pain relief · Physical dependence-meaning you have symptoms of withdrawal when the medication is stopped. Withdrawal symptoms can include nausea, sweating, chills, diarrhea, stomach cramps, and muscle aches. Withdrawal can last up to several weeks, depending on which drug you took and how long you took it. · Increased sensitivity to pain · Constipation · Nausea, vomiting, and dry mouth · Sleepiness and dizziness · Confusion · Depression · Low levels of testosterone that can result in lower sex drive, energy, and strength · Itching and sweating RISKS ARE GREATER WITH:      
· History of drug misuse, substance use disorder, or overdose · Mental health conditions (such as depression or anxiety) · Sleep apnea · Older age (72 years or older) · Pregnancy Avoid alcohol while taking prescription opioids. Also, unless specifically advised by your health care provider, medications to avoid include: · Benzodiazepines (such as Xanax or Valium) · Muscle relaxants (such as Soma or Flexeril) · Hypnotics (such as Ambien or Lunesta) · Other prescription opioids KNOW YOUR OPTIONS Talk to your health care provider about ways to manage your pain that don't involve prescription opioids.   Some of these options may actually work better and have fewer risks and side effects. Options may include: 
· Pain relievers such as acetaminophen, ibuprofen, and naproxen · Some medications that are also used for depression or seizures · Physical therapy and exercise · Counseling to help patients learn how to cope better with triggers of pain and stress. · Application of heat or cold compress · Massage therapy · Relaxation techniques Be Informed Make sure you know the name of your medication, how much and how often to take it, and its potential risks & side effects. IF YOU ARE PRESCRIBED OPIOIDS FOR PAIN: 
· Never take opioids in greater amounts or more often than prescribed. Remember the goal is not to be pain-free but to manage your pain at a tolerable level. · Follow up with your primary care provider to: · Work together to create a plan on how to manage your pain. · Talk about ways to help manage your pain that don't involve prescription opioids. · Talk about any and all concerns and side effects. · Help prevent misuse and abuse. · Never sell or share prescription opioids · Help prevent misuse and abuse. · Store prescription opioids in a secure place and out of reach of others (this may include visitors, children, friends, and family). · Safely dispose of unused/unwanted prescription opioids: Find your community drug take-back program or your pharmacy mail-back program, or flush them down the toilet, following guidance from the Food and Drug Administration (www.fda.gov/Drugs/ResourcesForYou). · Visit www.cdc.gov/drugoverdose to learn about the risks of opioid abuse and overdose. · If you believe you may be struggling with addiction, tell your health care provider and ask for guidance or call [facility] "[employer] at 9-677-086-HELP. Discharge Instructions Gastrointestinal Esophagogastroduodenoscopy (EGD) - Upper Exam Discharge Instructions 1. Call Dr. Marian Bernard at [phone] for any problems or questions. 2. Contact the doctor's office for follow up appointment as directed. 3. Medication may cause drowsiness for several hours, therefore, do not drive or operate machinery for remainder of the day. 4. No alcohol today. 5. Ordinarily, you may resume regular diet and activity after exam unless otherwise specified by your physician. 6. For mild soreness in your throat you may use throat lozenges or warm  salt-water gargles as needed. Any additional instructions: Follow up as needed Introducing [facility]! Ginna Murray introduces [employer] patient portal. Now you can access parts of your medical record, email your doctor's office, and request medication refills online. 1. In your internet browser, go to [email or web address] 2. Click on the First Time User? Click Here link in the Sign In box. You will see the New Member Sign Up page. 3. Enter your [employer] Access Code exactly as it appears below. You will not need to use this code after youve completed the sign-up process. If you do not sign up before the expiration date, you must request a new code. · [employer] Access Code: [email or web address] Expires: 10/30/2018  4:09 PM 
 
4. Enter the last four digits of your Social Security Number (xxxx) and Date of Birth (mm/dd/yyyy) as indicated and click Submit. You will be taken to the next sign-up page. 5. Create a [employer] ID. This will be your [employer] login ID and cannot be changed, so think of one that is secure and easy to remember. 6. Create a [employer] password. You can change your password at any time. 7. Enter your Password Reset Question and Answer. This can be used at a later time if you forget your password. 8. Enter your e-mail address. You will receive e-mail notification when new information is available in [address] 9. Click Sign Up. You can now view and download portions of your medical record. 10. Click the Download Summary menu link to download a portable copy of your medical information. If you have questions, please visit the Frequently Asked Questions section of the [email or web address] website. Remember, [employer] is NOT to be used for urgent needs. For medical emergencies, dial 911. Now available from your iPhone and Android! Introducing Eleazar Gunn As a [facility] patient, I wanted to make you aware of our electronic visit tool called Eleazar Gunn. [employer] 24/7 allows you to connect within minutes with a medical provider 24 hours a day, seven days a week via a mobile device or tablet or logging into a secure website from your computer. You can access Eleazar Gunn from anywhere in the [place]. A virtual visit might be right for you when you have a simple condition and feel like you just dont want to get out of bed, or cant get away from work for an appointment, when your regular [facility] provider is not available (evenings, weekends or holidays), or when youre out of town and need minor care. Electronic visits cost only $49 and if the [employer] [facility] 24/7 provider determines a prescription is needed to treat your condition, one can be electronically transmitted to a nearby pharmacy*. Please take a moment to enroll today if you have not already done so. The enrollment process is free and takes just a few minutes. To enroll, please download the [employer] 24/7 chandler to your tablet or phone, or visit [email or web address] to enroll on your computer. And, as an [address] [place] [address] patient with a [employer] account, the results of your visits will be scanned into your electronic medical record and your primary care provider will be able to view the scanned results.    
We urge you to continue to see your regular [facility] provider for your ongoing medical care. And while your primary care provider may not be the one available when you seek a [employer] virtual visit, the peace of mind you get from getting a real diagnosis real time can be priceless. For more information on Eleazar [employer], view our Frequently Asked Questions (FAQs) at [email or web address]. Sincerely, 
 
Vincent Espinosa MD 
Chief Medical Officer Brandon Ramirez *:  certain medications cannot be prescribed via [employer] Providers Seen During Your Hospitalization Provider Specialty Primary office phone Dilip Jaimes MD Gastroenterology [phone] Your Primary Care Physician (PCP) Primary Care Physician Office Phone Office Fax 100 [facility], [address] [place] [address] 69 [phone] You are allergic to the following Allergen Reactions Tape (Adhesive) Rash Itching Barium Iodide Nausea and Vomiting Contrast Dye (Iodine) Shortness of Breath Oral contrast-experienced flushing,shortness of breath, sweatiness; (patient has received oral contrast many times at [facility]) Flagyl (Metronidazole) Nausea and Vomiting Metformin Nausea and Vomiting Stomach pain Insulins Nausea and Vomiting Humalog only Recent Documentation Height Weight BMI OB Status Smoking Status 1.575 m 80.3 kg 32.37 kg/m2 Hysterectomy Former Smoker Emergency Contacts Name Discharge Info Relation Home Work Mobile Jorge Treadwell DISCHARGE CAREGIVER [3] Spouse [3] [phone]  [address] [place] [address] CAREGIVER [3] Father [15] [phone] Electa Beverage CAREGIVER [3] Mother [14] [phone] Patient Belongings The following personal items are in your possession at time of discharge: 
  Dental Appliances: None  Visual Aid: None Please provide this summary of care documentation to your next provider. Signatures-by signing, you are acknowledging that this After Visit Summary has been reviewed with you and you have received a copy. Patient Signature:  ____________________________________________________________ Date:  ____________________________________________________________  
  
[facility] Provider Signature:  ____________________________________________________________ Date:  ____________________________________________________________

## 2018-08-02 NOTE — DISCHARGE INSTRUCTIONS
Gastrointestinal Esophagogastroduodenoscopy (EGD) - Upper Exam Discharge Instructions    1. Call Dr. Tami Valerio at [phone] for any problems or questions. 2. Contact the doctor's office for follow up appointment as directed. 3. Medication may cause drowsiness for several hours, therefore, do not drive or operate machinery for remainder of the day. 4. No alcohol today. 5. Ordinarily, you may resume regular diet and activity after exam unless otherwise specified by your physician. 6. For mild soreness in your throat you may use throat lozenges or warm  salt-water gargles as needed. Any additional instructions:   Follow up as needed

## 2018-08-02 NOTE — PROCEDURES
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 The patient has had numerous TTS balloon dilatations in the past.  Upper endoscopy was done to document presence or absence of any stricture and proceed with balloon dilatation. Risks (bleeding, perforation, infection, untoward cardiovascular risk, mortality), benefits and alternatives discussed in detail with the patient who agrees to proceed. ANESTHESIA:  As per MAC anesthesia. INSTRUMENT: GIF Q190 videoendoscope as well as CRE balloon dilators. The videoscope was passed through hypopharynx and esophagus with minimal difficulty. Proximal and mid esophagus were normal except for subtle stricture at the EG junction through which the endoscope passed with minimal difficulty. Once inside the stomach, the postoperative changes were noted with the patient's previously noted gastrojejunostomy to be present. The patient apparently appeared to have 2 limbs from the gastric pouch, both of which were entered shortly and revealed no abnormalities. A retroflexion view of the EG junction  and the cardia revealed no other abnormalities. Endoscope back into stomach, back in the esophagus, where we proceeded with a TTS CRE balloon dilatation. A #10, 11 and 12 dilator was inserted in the gastric lumen, the endoscope was pulled back into the esophagus and all sizes were inflated to max pressure, holding approximately 30 seconds and deflating. Then, a 12, 13.5 and 15 dilator was inserted into the gastric lumen after the  13.5 dilator was inflated to max pressure, deflated, the dilator was removed from the patient. There was minimal heme at the area. After documentation of hemostasis the endoscope was removed from the patient.   The vocal cords appeared normal.  The patient tolerated the procedure well and went to the recovery area in stable condition. IMPRESSION:  1. Stricture at esophagogastric junction dilated described above. 2.  Postoperative changes. PLAN THERAPEUTIC:  Follow up dilation in 2-4 weeks per Dr. Jay Barnett.       Flynn Au MD       DAKOTA / VN  D: 08/02/2018 11:40     T: 08/02/2018 15:11  JOB #: [id number]  CC: Rosana Barnard MD  CC: Viki Orellana MD  CC: Nahun Srivastava MD

## 2018-08-02 NOTE — H&P
Gastrointestinal Progress Note 8/2/2018 Admit Date: 8/2/2018 Subjective:  
 
Patient is NPO for an EGD/dilatation Pain: Patient complains of no abdominal pain. Bowel Movements: None Bleeding:  None Current Facility-Administered Medications Medication Dose Route Frequency  lactated Ringers infusion  100 mL/hr IntraVENous CONTINUOUS  
 triamcinolone acetonide (KENALOG-40) 40 mg/mL injection 40 mg  40 mg IntraMUSCular ENDO ONCE  
 heparin (porcine) pf 500 Units  500 Units InterCATHeter PRN  
 saline peripheral flush soln 10 mL  10 mL InterCATHeter ENDO ONCE Facility-Administered Medications Ordered in Other Encounters Medication Dose Route Frequency  sodium chloride (NS) flush 5-10 mL  5-10 mL IntraVENous PRN Objective:  
 
Blood pressure 124/71, pulse 94, temperature 98.4 °F (36.9 °C), resp. rate 16, height 5' 2\" (1.575 m), weight 80.3 kg (177 lb), SpO2 93 %. EXAM:  HEART: regular rate and rhythm, S1, S2 normal, no murmur, click, rub or gallop, LUNGS: chest clear, no wheezing, rales, normal symmetric air entry, Heart exam - S1, S2 normal, no murmur, no gallop, rate regular and ABDOMEN:  Bowel sounds are normal, liver is not enlarged, spleen is not enlarged Data Review Recent Results (from the past 24 hour(s)) MAGNESIUM Collection Time: 08/01/18  4:28 PM  
Result Value Ref Range Magnesium 1.9 1.8 - 2.4 mg/dL GLUCOSE, POC Collection Time: 08/02/18  9:55 AM  
Result Value Ref Range Glucose (POC) 197 (H) 65 - 100 mg/dL Assessment:  
 
Active Problems: Dysphagia Esophageal stricture DM 
S/P gastrojejunostomy Plan:  
 
EGD/dilatation--risks (bleed, perforation, infection, untoward CV or resp. Event, mortality, etc.), benefits and alternatives were discussed with the patient who agrees to proceed.   Yordan Quintero MD

## 2018-08-02 NOTE — PROGRESS NOTES
Dc instructions reviewed with mother, verbalized understanding. Mother agreed to correct dc instructions given. appt in 2 weeks

## 2018-08-02 NOTE — ANESTHESIA POSTPROCEDURE EVALUATION
Post-Anesthesia Evaluation and Assessment Patient: Velasquez Betancur MRN: [id number]  SSN: [id number] YOB: 1970  Age: 50 y.o. Sex: female Cardiovascular Function/Vital Signs Visit Vitals  /76  Pulse 78  Temp 36.9 °C (98.4 °F)  Resp 16  
 Ht 5' 2\" (1.575 m)  Wt 80.3 kg (177 lb)  SpO2 96%  BMI 32.37 kg/m2 Patient is status post total IV anesthesia anesthesia for Procedure(s): ESOPHAGOGASTRODUODENOSCOPY WITH DILATION/KENALOG (EGD)/ 32 ESOPHAGEAL DILATION. Nausea/Vomiting: None Postoperative hydration reviewed and adequate. Pain: 
Pain Scale 1: Numeric (0 - 10) (08/02/18 1136) Pain Intensity 1: 0 (08/02/18 1136) Managed Neurological Status: At baseline Mental Status and Level of Consciousness: Arousable Pulmonary Status:  
O2 Device: Nasal cannula (08/02/18 1136) Adequate oxygenation and airway patent Complications related to anesthesia: None Post-anesthesia assessment completed. No concerns Signed By: Tori Jennings MD   
 August 2, 2018

## 2018-08-09 ENCOUNTER — HOSPITAL ENCOUNTER (OUTPATIENT)
Dept: INFUSION THERAPY | Age: 48
Discharge: HOME | End: 2018-08-09
Payer: COMMERCIAL

## 2018-08-09 VITALS
OXYGEN SATURATION: 96 % | RESPIRATION RATE: 18 BRPM | HEART RATE: 96 BPM | DIASTOLIC BLOOD PRESSURE: 72 MMHG | SYSTOLIC BLOOD PRESSURE: 123 MMHG | TEMPERATURE: 98.5 F

## 2018-08-09 LAB — MAGNESIUM SERPL-MCNC: 2 MG/DL (ref 1.8–2.4)

## 2018-08-09 PROCEDURE — 74011000250 HC RX REV CODE- 250: Performed by: INTERNAL MEDICINE

## 2018-08-09 PROCEDURE — 74011250636 HC RX REV CODE- 250/636: Performed by: INTERNAL MEDICINE

## 2018-08-09 PROCEDURE — 96374 THER/PROPH/DIAG INJ IV PUSH: CPT

## 2018-08-09 PROCEDURE — 83735 ASSAY OF MAGNESIUM: CPT

## 2018-08-09 PROCEDURE — 77030003560 HC NDL HUBR BARD -A

## 2018-08-09 RX ORDER — HEPARIN 100 UNIT/ML
300 SYRINGE INTRAVENOUS AS NEEDED
Status: DISPENSED | OUTPATIENT
Start: 2018-08-09 | End: 2018-08-09

## 2018-08-09 RX ORDER — SODIUM CHLORIDE 0.9 % (FLUSH) 0.9 %
10 SYRINGE (ML) INJECTION AS NEEDED
Status: ACTIVE | OUTPATIENT
Start: 2018-08-09 | End: 2018-08-09

## 2018-08-09 RX ADMIN — Medication 10 ML: at 07:45

## 2018-08-09 RX ADMIN — Medication 300 UNITS: at 09:01

## 2018-08-09 RX ADMIN — Medication 10 ML: at 09:01

## 2018-08-09 RX ADMIN — SODIUM CHLORIDE 25 MG: 9 INJECTION INTRAMUSCULAR; INTRAVENOUS; SUBCUTANEOUS at 07:47

## 2018-08-09 NOTE — PROGRESS NOTES
Arrived to the [facility]. Port needle change, lab draw, and IV Phenergan completed. Patient tolerated well. Any issues or concerns during appointment: none. Patient aware of next infusion appointment on 8/27 (date) at 8:20 AM (time). Discharged ambulatory.

## 2018-08-13 ENCOUNTER — ANESTHESIA EVENT (OUTPATIENT)
Dept: ENDOSCOPY | Age: 48
End: 2018-08-13
Payer: COMMERCIAL

## 2018-08-13 RX ORDER — SODIUM CHLORIDE 9 MG/ML
25 INJECTION, SOLUTION INTRAVENOUS CONTINUOUS
Status: CANCELLED | OUTPATIENT
Start: 2018-08-13

## 2018-08-13 RX ORDER — SODIUM CHLORIDE, SODIUM LACTATE, POTASSIUM CHLORIDE, CALCIUM CHLORIDE 600; 310; 30; 20 MG/100ML; MG/100ML; MG/100ML; MG/100ML
75 INJECTION, SOLUTION INTRAVENOUS CONTINUOUS
Status: CANCELLED | OUTPATIENT
Start: 2018-08-13

## 2018-08-14 ENCOUNTER — HOSPITAL ENCOUNTER (OUTPATIENT)
Age: 48
Setting detail: OUTPATIENT SURGERY
Discharge: HOME | End: 2018-08-14
Attending: INTERNAL MEDICINE | Admitting: INTERNAL MEDICINE
Payer: COMMERCIAL

## 2018-08-14 ENCOUNTER — ANESTHESIA (OUTPATIENT)
Dept: ENDOSCOPY | Age: 48
End: 2018-08-14
Payer: COMMERCIAL

## 2018-08-14 VITALS
HEART RATE: 82 BPM | OXYGEN SATURATION: 97 % | WEIGHT: 177 LBS | RESPIRATION RATE: 14 BRPM | TEMPERATURE: 97.8 F | SYSTOLIC BLOOD PRESSURE: 131 MMHG | HEIGHT: 62 IN | DIASTOLIC BLOOD PRESSURE: 69 MMHG | BODY MASS INDEX: 32.57 KG/M2

## 2018-08-14 LAB — GLUCOSE BLD STRIP.AUTO-MCNC: 146 MG/DL (ref 65–100)

## 2018-08-14 PROCEDURE — 82962 GLUCOSE BLOOD TEST: CPT

## 2018-08-14 PROCEDURE — 74011250636 HC RX REV CODE- 250/636: Performed by: INTERNAL MEDICINE

## 2018-08-14 PROCEDURE — 74011250636 HC RX REV CODE- 250/636

## 2018-08-14 PROCEDURE — 76040000025: Performed by: INTERNAL MEDICINE

## 2018-08-14 PROCEDURE — 76060000031 HC ANESTHESIA FIRST 0.5 HR: Performed by: INTERNAL MEDICINE

## 2018-08-14 RX ORDER — SODIUM CHLORIDE, SODIUM LACTATE, POTASSIUM CHLORIDE, CALCIUM CHLORIDE 600; 310; 30; 20 MG/100ML; MG/100ML; MG/100ML; MG/100ML
1000 INJECTION, SOLUTION INTRAVENOUS CONTINUOUS
Status: DISCONTINUED | OUTPATIENT
Start: 2018-08-14 | End: 2018-08-14 | Stop reason: HOSPADM

## 2018-08-14 RX ORDER — LIDOCAINE HYDROCHLORIDE 20 MG/ML
INJECTION, SOLUTION EPIDURAL; INFILTRATION; INTRACAUDAL; PERINEURAL AS NEEDED
Status: DISCONTINUED | OUTPATIENT
Start: 2018-08-14 | End: 2018-08-14 | Stop reason: HOSPADM

## 2018-08-14 RX ORDER — TRIAMCINOLONE ACETONIDE 40 MG/ML
40 INJECTION, SUSPENSION INTRA-ARTICULAR; INTRAMUSCULAR
Status: DISCONTINUED | OUTPATIENT
Start: 2018-08-14 | End: 2018-08-14 | Stop reason: HOSPADM

## 2018-08-14 RX ORDER — PROPOFOL 10 MG/ML
INJECTION, EMULSION INTRAVENOUS AS NEEDED
Status: DISCONTINUED | OUTPATIENT
Start: 2018-08-14 | End: 2018-08-14 | Stop reason: HOSPADM

## 2018-08-14 RX ORDER — PROPOFOL 10 MG/ML
INJECTION, EMULSION INTRAVENOUS
Status: DISCONTINUED | OUTPATIENT
Start: 2018-08-14 | End: 2018-08-14 | Stop reason: HOSPADM

## 2018-08-14 RX ADMIN — PROPOFOL 60 MG: 10 INJECTION, EMULSION INTRAVENOUS at 08:12

## 2018-08-14 RX ADMIN — PROPOFOL 75 MCG/KG/MIN: 10 INJECTION, EMULSION INTRAVENOUS at 08:12

## 2018-08-14 RX ADMIN — LIDOCAINE HYDROCHLORIDE 80 MG: 20 INJECTION, SOLUTION EPIDURAL; INFILTRATION; INTRACAUDAL; PERINEURAL at 08:12

## 2018-08-14 RX ADMIN — PROPOFOL 50 MG: 10 INJECTION, EMULSION INTRAVENOUS at 08:14

## 2018-08-14 RX ADMIN — PROPOFOL 50 MG: 10 INJECTION, EMULSION INTRAVENOUS at 08:16

## 2018-08-14 RX ADMIN — PROPOFOL 40 MG: 10 INJECTION, EMULSION INTRAVENOUS at 08:17

## 2018-08-14 RX ADMIN — SODIUM CHLORIDE, SODIUM LACTATE, POTASSIUM CHLORIDE, AND CALCIUM CHLORIDE 1000 ML: 600; 310; 30; 20 INJECTION, SOLUTION INTRAVENOUS at 07:43

## 2018-08-14 NOTE — IP AVS SNAPSHOT
[facility] 
 
 
 [address] [place] [address] 
[phone] Patient: Natalie Seen MRN: [id number] :1970 About your hospitalization You were admitted on:  2018 You last received care in the:  [facility] You were discharged on:  2018 Why you were hospitalized Your primary diagnosis was:  Not on File Follow-up Information None Your Scheduled Appointments   7:05 AM EDT Infusion Lab with LAB CK  
[facility] SHEA) [address] Barrera Moore [phone] [facility] [address]  
[phone] [address]   7:15 AM EDT Infusion with [address] [facility]A) [address] Barrera Moore [phone] [facility] [address]  
[phone] [address]   7:05 AM EDT Infusion Lab with LAB CK  
[facility] SHEA) [address] Barrera Moore [phone] [facility] [address]  
[phone] [address]   7:15 AM EDT Infusion with [address] [facility]A) [address] Barrera Moore [phone] [facility] [address]  
[phone] [address]   9:30 AM EDT  
PAT UPDATE (PHONE) with [facility] PHONE APPOINTMENT [facility] Pre Assessment [facility] OR PRE ASSESSMENT) [address] [place] [address]  
[phone]   7:05 AM EDT Infusion Lab with LAB CK  
[facility] [address] Abraham Flores [phone] [facility] [address]  
[phone] [address] Thursday August 30, 2018  7:15 AM EDT Infusion with [facility]) [address] Abraham Flores [phone] [facility] [address]  
[phone] [address] Friday August 31, 2018 ESOPHAGOGASTRODUODENOSCOPY (EGD) with Kostas Samano MD  
[facility] ([address] [place] [address]) [address] [place] [address]  
[phone] Discharge Orders None A check cindy indicates which time of day the medication should be taken. My Medications ASK your doctor about these medications Instructions Each Dose to Equal  
 Morning Noon Evening Bedtime  
 0.9% sodium chloride solution Your last dose was: Your next dose is:    
   
   
 by IntraVENous route five (5) days a week. Gives to herself via port at home daily -1000cc ATIVAN 1 mg tablet Generic drug:  LORazepam  
   
Your last dose was: Your next dose is: Take 1 mg by mouth three (3) times daily as needed for Anxiety. 1 mg BENADRYL 25 mg capsule Generic drug:  diphenhydrAMINE Your last dose was: Your next dose is: Take 25 mg by mouth every six (6) hours as needed. 25 mg  
    
   
   
   
  
 CARAFATE 100 mg/mL suspension Generic drug:  sucralfate Your last dose was: Your next dose is: TAKE 10 ML BY MOUTH 4 TIMES A DAY EVERY DAY ON A EMPTY STOMACH 1 HR BEFORE MEALS AND AT BEDTIME CeleXA 20 mg tablet Generic drug:  citalopram  
   
Your last dose was: Your next dose is: Take 20 mg by mouth daily.  Indications: am  
 20 mg  
    
   
   
   
  
 cyanocobalamin 1,000 mcg/mL injection Commonly known as:  VITAMIN B12 Your last dose was: Your next dose is: INJECT 1 VIAL INTRAMUSCULARLY FOR 4 WEEKS THEN MONTHLY  
     
   
   
   
  
 fentaNYL 75 mcg/hr Commonly known as:  Ulises Carroll Your last dose was: Your next dose is:    
   
   
 1 Patch by TransDERmal route every seventy-two (72) hours. 1 Patch  
    
   
   
   
  
 gabapentin 250 mg/5 mL solution Commonly known as:  NEURONTIN Your last dose was: Your next dose is: Take 300 mg by mouth three (3) times daily. 300 mg  
    
   
   
   
  
 glucagon 1 mg injection Commonly known as:  Aidee Closs Your last dose was: Your next dose is:    
   
   
 1 mg by IntraMUSCular route. 1 mg  
    
   
   
   
  
 hyoscyamine SL 0.125 mg SL tablet Commonly known as:  LEVSIN/SL Your last dose was: Your next dose is:    
   
   
 0.125 mg by SubLINGual route every six (6) hours as needed for Cramping.  
 0.125 mg  
    
   
   
   
  
 insulin aspart U-100 100 unit/mL injection Commonly known as:  NovoLOG U-100 Insulin aspart Your last dose was: Your next dose is:    
   
   
 Use as directed MIRALAX 17 gram packet Generic drug:  polyethylene glycol Your last dose was: Your next dose is: Take 17 g by mouth daily. 17 g  
    
   
   
   
  
 oxyCODONE IR 10 mg Tab immediate release tablet Commonly known as:  Ever Ivans Your last dose was: Your next dose is: Take 10 mg by mouth every eight (8) hours as needed. Take day of surgery per anesthesia protocol. 10 mg  
    
   
   
   
  
 * promethazine 25 mg suppository Commonly known as:  PHENERGAN Your last dose was: Your next dose is: Insert 25 mg into rectum as needed for Nausea.   
 25 mg  
    
   
   
   
  
 * promethazine 25 mg tablet Commonly known as:  PHENERGAN Your last dose was: Your next dose is: Take 1 Tab by mouth every six (6) hours as needed. 25 mg  
    
   
   
   
  
 * PROTONIX 40 mg injection Generic drug:  pantoprazole Your last dose was: Your next dose is:    
   
   
 40 mg by IntraVENous route every morning. Take day of surgery per anesthesia protocol. 40 mg  
    
   
   
   
  
 * PROTONIX 40 mg tablet Generic drug:  pantoprazole Your last dose was: Your next dose is:    
   
   
 40 mg by IntraVENous route nightly. 40 mg  
    
   
   
   
  
 TRESIBA FLEXTOUCH U-100 100 unit/mL (3 mL) Inpn Generic drug:  insulin degludec Your last dose was: Your next dose is:    
   
   
 40 Units by SubCUTAneous route nightly. 40 Units TYLENOL 325 mg tablet Generic drug:  acetaminophen Your last dose was: Your next dose is: Take 325 mg by mouth every four (4) hours as needed for Pain. 325 mg  
    
   
   
   
  
 ZOFRAN 8 mg tablet Generic drug:  ondansetron hcl Your last dose was: Your next dose is: Take 8 mg by mouth every eight (8) hours as needed for Nausea. 8 mg  
    
   
   
   
  
 zolpidem 10 mg tablet Commonly known as:  AMBIEN Your last dose was: Your next dose is: TAKE 1 TABLET BY MOUTH EVERY NIGHT AS NEEDED FOR SLEEP * Notice: This list has 4 medication(s) that are the same as other medications prescribed for you. Read the directions carefully, and ask your doctor or other care provider to review them with you. Opioid Education  Prescription Opioids: What You Need to Know: 
 
Prescription opioids can be used to help relieve moderate-to-severe pain and are often prescribed following a surgery or injury, or for certain health conditions. These medications can be an important part of treatment but also come with serious risks. Opioids are strong pain medicines. Examples include hydrocodone, oxycodone, fentanyl, and morphine. Heroin is an example of an illegal opioid. It is important to work with your health care provider to make sure you are getting the safest, most effective care. WHAT ARE THE RISKS AND SIDE EFFECTS OF OPIOID USE? Prescription opioids carry serious risks of addiction and overdose, especially with prolonged use. An opioid overdose, often marked by slow breathing, can cause sudden death. The use of prescription opioids can have a number of side effects as well, even when taken as directed. · Tolerance-meaning you might need to take more of a medication for the same pain relief · Physical dependence-meaning you have symptoms of withdrawal when the medication is stopped. Withdrawal symptoms can include nausea, sweating, chills, diarrhea, stomach cramps, and muscle aches. Withdrawal can last up to several weeks, depending on which drug you took and how long you took it. · Increased sensitivity to pain · Constipation · Nausea, vomiting, and dry mouth · Sleepiness and dizziness · Confusion · Depression · Low levels of testosterone that can result in lower sex drive, energy, and strength · Itching and sweating RISKS ARE GREATER WITH:      
· History of drug misuse, substance use disorder, or overdose · Mental health conditions (such as depression or anxiety) · Sleep apnea · Older age (72 years or older) · Pregnancy Avoid alcohol while taking prescription opioids. Also, unless specifically advised by your health care provider, medications to avoid include: · Benzodiazepines (such as Xanax or Valium) · Muscle relaxants (such as Soma or Flexeril) · Hypnotics (such as Ambien or Lunesta) · Other prescription opioids KNOW YOUR OPTIONS Talk to your health care provider about ways to manage your pain that don't involve prescription opioids. Some of these options may actually work better and have fewer risks and side effects. Options may include: 
· Pain relievers such as acetaminophen, ibuprofen, and naproxen · Some medications that are also used for depression or seizures · Physical therapy and exercise · Counseling to help patients learn how to cope better with triggers of pain and stress. · Application of heat or cold compress · Massage therapy · Relaxation techniques Be Informed Make sure you know the name of your medication, how much and how often to take it, and its potential risks & side effects. IF YOU ARE PRESCRIBED OPIOIDS FOR PAIN: 
· Never take opioids in greater amounts or more often than prescribed. Remember the goal is not to be pain-free but to manage your pain at a tolerable level. · Follow up with your primary care provider to: · Work together to create a plan on how to manage your pain. · Talk about ways to help manage your pain that don't involve prescription opioids. · Talk about any and all concerns and side effects. · Help prevent misuse and abuse. · Never sell or share prescription opioids · Help prevent misuse and abuse. · Store prescription opioids in a secure place and out of reach of others (this may include visitors, children, friends, and family). · Safely dispose of unused/unwanted prescription opioids: Find your community drug take-back program or your pharmacy mail-back program, or flush them down the toilet, following guidance from the Food and Drug Administration (www.fda.gov/Drugs/ResourcesForYou). · Visit www.cdc.gov/drugoverdose to learn about the risks of opioid abuse and overdose. · If you believe you may be struggling with addiction, tell your health care provider and ask for guidance or call [facility] [employer] at 1-308-943-LBNL. Discharge Instructions Gastrointestinal Esophagogastroduodenoscopy (EGD) - Upper Exam Discharge Instructions 1. Call Dr. Kassie Lugo at [phone] for any problems or questions. 2. Contact the doctor's office for follow up appointment as directed. 3. Medication may cause drowsiness for several hours, therefore, do not drive or operate machinery for remainder of the day. 4. No alcohol today. 5. Ordinarily, you may resume regular diet and activity after exam unless otherwise specified by your physician. 6. For mild soreness in your throat you may use Cepacol throat lozenges or warm salt-water gargles as needed. Any additional instructions:  Repeat EGD in 2 weeks. Instructions given to Taya Meehan and other family members. Introducing [facility]! [employer] introduces [employer] patient portal. Now you can access parts of your medical record, email your doctor's office, and request medication refills online. 1. In your internet browser, go to [email or web address] 2. Click on the First Time User? Click Here link in the Sign In box. You will see the New Member Sign Up page. 3. Enter your [employer] Access Code exactly as it appears below. You will not need to use this code after youve completed the sign-up process. If you do not sign up before the expiration date, you must request a new code. · [employer] Access Code: [email or web address] Expires: 10/30/2018  4:09 PM 
 
4. Enter the last four digits of your Social Security Number (xxxx) and Date of Birth (mm/dd/yyyy) as indicated and click Submit. You will be taken to the next sign-up page. 5. Create a StepOnet ID. This will be your [employer] login ID and cannot be changed, so think of one that is secure and easy to remember. 6. Create a StepOnet password. You can change your password at any time. 7. Enter your Password Reset Question and Answer. This can be used at a later time if you forget your password. 8. Enter your e-mail address. You will receive e-mail notification when new information is available in [address] 9. Click Sign Up. You can now view and download portions of your medical record. 10. Click the Download Summary menu link to download a portable copy of your medical information. If you have questions, please visit the Frequently Asked Questions section of the [employer] website. Remember, [employer] is NOT to be used for urgent needs. For medical emergencies, dial 911. Now available from your iPhone and Android! Introducing Eleazar Gunn As a [facility] patient, I wanted to make you aware of our electronic visit tool called Eleazar Afshinhailymehreen. [employer] [facility]il 24/7 allows you to connect within minutes with a medical provider 24 hours a day, seven days a week via a mobile device or tablet or logging into a secure website from your computer. You can access Eleazar Gunn from anywhere in the [place]. A virtual visit might be right for you when you have a simple condition and feel like you just dont want to get out of bed, or cant get away from work for an appointment, when your regular [facility] provider is not available (evenings, weekends or holidays), or when youre out of town and need minor care. Electronic visits cost only $49 and if the [facility] 24/7 provider determines a prescription is needed to treat your condition, one can be electronically transmitted to a nearby pharmacy*. Please take a moment to enroll today if you have not already done so. The enrollment process is free and takes just a few minutes. To enroll, please download the [email or web address] 24/7 chandler to your tablet or phone, or visit [email or web address] to enroll on your computer.    
And, as an [address] [place] [address] patient with a [employer] account, the results of your visits will be scanned into your electronic medical record and your primary care provider will be able to view the scanned results. We urge you to continue to see your regular [facility] provider for your ongoing medical care. And while your primary care provider may not be the one available when you seek a Eleazar Gunn virtual visit, the peace of mind you get from getting a real diagnosis real time can be priceless. For more information on Eleazar Joaquinfin, view our Frequently Asked Questions (FAQs) at [email or web address]. Sincerely, 
 
Diann Dillard MD 
Chief Medical Officer [employer] *:  certain medications cannot be prescribed via [employer] Providers Seen During Your Hospitalization Provider Specialty Primary office phone Janny Edmonds MD Gastroenterology [phone] Your Primary Care Physician (PCP) Primary Care Physician Office Phone Office Fax 100 [facility], [address] [place] [address] [phone] You are allergic to the following Allergen Reactions Tape (Adhesive) Rash Itching Barium Iodide Nausea and Vomiting Contrast Dye (Iodine) Shortness of Breath Oral contrast-experienced flushing,shortness of breath, sweatiness; (patient has received oral contrast many times at [address] [place] [address]) Flagyl (Metronidazole) Nausea and Vomiting Metformin Nausea and Vomiting Stomach pain Insulins Nausea and Vomiting Humalog only Recent Documentation Height Weight BMI OB Status Smoking Status 1.575 m 80.3 kg 32.37 kg/m2 Hysterectomy Former Smoker Emergency Contacts Name Discharge Info Relation Home Work Mobile Jorge Treadwell DISCHARGE CAREGIVER [3] Spouse [3] [phone]  [address] [place] [address] CAREGIVER [3] Father [15] [phone] Gabriele Mason CAREGIVER [3] Mother [14] [phone] Patient Belongings The following personal items are in your possession at time of discharge: 
  Dental Appliances: None  Visual Aid: None Please provide this summary of care documentation to your next provider. Signatures-by signing, you are acknowledging that this After Visit Summary has been reviewed with you and you have received a copy. Patient Signature:  ____________________________________________________________ Date:  ____________________________________________________________  
  
Jaycee Ree Provider Signature:  ____________________________________________________________ Date:  ____________________________________________________________

## 2018-08-14 NOTE — DISCHARGE INSTRUCTIONS
Gastrointestinal Esophagogastroduodenoscopy (EGD) - Upper Exam Discharge Instructions    1. Call Dr. Yuliet Davis at [phone] for any problems or questions. 2. Contact the doctor's office for follow up appointment as directed. 3. Medication may cause drowsiness for several hours, therefore, do not drive or operate machinery for remainder of the day. 4. No alcohol today. 5. Ordinarily, you may resume regular diet and activity after exam unless otherwise specified by your physician. 6. For mild soreness in your throat you may use Cepacol throat lozenges or warm salt-water gargles as needed. Any additional instructions:  Repeat EGD in 2 weeks. Instructions given to Mendel Palmer and other family members.

## 2018-08-14 NOTE — H&P
[facility] H&P (Admission)        Date:  2018    Chief Complaint:  dysphagia    Subjective:     History of Present Illness:  Patient is a 50 y.o. being admitted for EGD with dilation. States cannot tolerate manometry.     PMH:  Past Medical History:   Diagnosis Date    Anemia     denies hx of blood transfusions-- Fe infusions 2017    Anxiety and depression     Asthma     allergy induced, denies any inhaler    C. difficile diarrhea     in 7250 after complicated ERCP    Cervical dysplasia     Chronic insomnia     Chronic pain     all over     Chronic pancreatitis ([facility] [address]     Endometriosis     Esophageal stricture     Fibromyalgia     Fibromyalgia     Former cigarette smoker     GERD (gastroesophageal reflux disease)     daily meds---po and IV protonix- uses per port- alternates    HLD (hyperlipidemia)     pt denies     IBS (irritable bowel syndrome)     Migraine headache     Morbid obesity (HCC)     BMI 33.3    Nausea & vomiting     pt reports she does better with phenergan than zofran - causes HA    Nonalcoholic fatty liver disease     Pancreatitis     Psychiatric disorder     PUD (peptic ulcer disease) 2017    Hx of     Sphincter of Oddi dysfunction     Type 2 diabetes mellitus ([facility] [address] [facility]     insulin reliant: fbs av-200/ s.s of hypoglyemia @80-90/Last : A1c 6.9       PSH:  Past Surgical History:   Procedure Laterality Date    ABDOMEN SURGERY PROC UNLISTED  last one placed      Hx of pancreatic stent, multiple    ABDOMEN SURGERY PROC UNLISTED      lap nissen    ABDOMEN SURGERY PROC UNLISTED      explor lap    COLONOSCOPY N/A 2018    COLONOSCOPY performed [facility] Ирина Rivero [facility] ENDOSCOPY    HX COLONOSCOPY      HX ENDOSCOPY      36 fr thomas    HX ERCP  3/5/2013    resulted in perforated duodenum    HX HYSTERECTOMY      ovaries remain    HX LAP CHOLECYSTECTOMY      HX LAPAROTOMY  3/5/2013    exploratory for duodenal perforation with ERCP    HX ORTHOPAEDIC      right finger surgery 11/2017    HX UROLOGICAL  4/25/13 at [facility]-- stent removed 6-27-13. Dr Ash Pulliam  2014    port insertion       Allergies: Allergies   Allergen Reactions    Tape [Adhesive] Rash and Itching    Barium Iodide Nausea and Vomiting    Contrast Dye [Iodine] Shortness of Breath     Oral contrast-experienced flushing,shortness of breath, sweatiness; (patient has received oral contrast [facility] at [facility])    Flagyl [Metronidazole] Nausea and Vomiting    Metformin Nausea and Vomiting     Stomach pain    Insulins Nausea and Vomiting     Humalog only       Home Medications:  Prior to Admission medications    Medication Sig Start Date End Date Taking? Authorizing Provider   pantoprazole (PROTONIX) 40 mg tablet 40 mg by IntraVENous route nightly. Yes Historical Provider   fentaNYL (DURAGESIC) 75 mcg/hr 1 Patch by TransDERmal route every seventy-two (72) hours. Yes Historical Provider   citalopram (CELEXA) 20 mg tablet Take 20 mg by mouth daily. Indications: am   Yes Historical Provider   acetaminophen (TYLENOL) 325 mg tablet Take 325 mg by mouth every four (4) hours as needed for Pain. Yes Historical Provider   LORazepam (ATIVAN) 1 mg tablet Take 1 mg by mouth three (3) times daily as needed for Anxiety. Yes Historical Provider   CARAFATE 100 mg/mL suspension TAKE 10 ML BY MOUTH 4 TIMES A DAY EVERY DAY ON A EMPTY STOMACH 1 HR BEFORE MEALS AND AT BEDTIME 9/7/17  Yes Historical Provider   gabapentin (NEURONTIN) 250 mg/5 mL solution Take 300 mg by mouth three (3) times daily. Yes Historical Provider   pantoprazole (PROTONIX) 40 mg injection 40 mg by IntraVENous route every morning. Take day of surgery per anesthesia protocol. Yes Historical Provider   insulin degludec (TRESIBA FLEXTOUCH U-100) 100 unit/mL (3 mL) inpn 40 Units by SubCUTAneous route nightly.    Yes Historical Provider   polyethylene glycol (MIRALAX) 17 gram packet Take 17 g by mouth daily. Yes Historical Provider   hyoscyamine SL (LEVSIN/SL) 0.125 mg SL tablet 0.125 mg by SubLINGual route every six (6) hours as needed for Cramping. Yes Historical Provider   oxyCODONE IR (ROXICODONE) 10 mg tab immediate release tablet Take 10 mg by mouth every eight (8) hours as needed. Take day of surgery per anesthesia protocol. Yes Historical Provider   insulin aspart (NOVOLOG) 100 unit/mL injection Use as directed  Patient taking differently: 7 Units by SubCUTAneous route Before breakfast, lunch, and dinner. Over 300 use sliding scale-  Always gets 7 units then if >300 gets extra - does ot know amount- has never taken more than 10 units regular  Indications: type 2 diabetes mellitus 6/7/16  Yes Jakob Francis NP   zolpidem (AMBIEN) 10 mg tablet TAKE 1 TABLET BY MOUTH EVERY NIGHT AS NEEDED FOR SLEEP  Patient taking differently: Take 10 mg by mouth nightly. 6/7/16  Yes Jakob Francis NP   promethazine (PHENERGAN) 25 mg tablet Take 1 Tab by mouth every six (6) hours as needed. Patient taking differently: Take 25 mg by mouth as needed. 6/27/14  Yes NIURKA Brown   ondansetron hcl (ZOFRAN) 8 mg tablet Take 8 mg by mouth every eight (8) hours as needed for Nausea. Yes Historical Provider   cyanocobalamin (VITAMIN B12) 1,000 mcg/mL injection INJECT 1 VIAL INTRAMUSCULARLY FOR 4 WEEKS THEN MONTHLY 11/25/17   Historical Provider   glucagon (GLUCAGEN) 1 mg injection 1 mg by IntraMUSCular route. 5/8/17   Historical Provider   diphenhydrAMINE (BENADRYL) 25 mg capsule Take 25 mg by mouth every six (6) hours as needed. Historical Provider   0.9% sodium chloride solution by IntraVENous route five (5) days a week. Gives to herself via port at home daily -1000cc    Historical Provider   promethazine (PHENERGAN) 25 mg suppository Insert 25 mg into rectum as needed for Nausea.     Historical Provider       Hospital Medications:  Current Facility-Administered Medications   Medication Dose Route Frequency    triamcinolone acetonide (KENALOG-40) 40 mg/mL injection 40 mg  40 mg IntraMUSCular ENDO ONCE    lactated Ringers infusion 1,000 mL  1,000 mL IntraVENous CONTINUOUS       Social History:  Social History   Substance Use Topics    Smoking status: Former Smoker     Packs/day: 1.00     Years: 3.00     Quit date: 4/24/1993    Smokeless tobacco: Never Used    Alcohol use No         Family History:  Family History   Problem Relation Age of Onset    Diabetes Mother     Hypertension Mother     Heart Disease Mother      cabg    Diabetes Father     Heart Disease Father     Hypertension Father     Other Father     No Known Problems Sister        Review of Systems:  A detailed 10 system ROS is obtained, with pertinent positives as listed above. All others are negative. Objective:     Physical Exam:  Vitals:  Visit Vitals    /87    Pulse 99    Temp 98.9 °F (37.2 °C)    Resp 18    Ht 5' 2\" (1.575 m)    Wt 80.3 kg (177 lb)    SpO2 94%    BMI 32.37 kg/m2     Gen:  Pt is alert, cooperative, no acute distress  Skin: no large lesions  HEENT: MMM  Cardiovascular: Regular rate and rhythm. No murmurs, gallops, or rubs. Respiratory:  Comfortable breathing with no accessory muscle use. Clear breath sounds with no wheezes, rales, or rhonchi. GI:  Abdomen nondistended, soft, and nontender. Normal active bowel sounds. Neurological:  Gross memory appears intact. Patient is alert and oriented. Psychiatric:  Mood appears appropriate with judgement intact. Laboratory:    No results for input(s): WBC, HGB, HCT, PLT, MCV, NA, K, CL, CO2, BUN, CREA, CA, MG, GLU, AP, SGOT, GPT, TBIL, CBIL, ALB, TP, AML, LPSE, PTP, INR, APTT, HGBEXT, HCTEXT, PLTEXT in the last 72 hours. No lab exists for component: DBIL, INREXT    Assessment:       Active Problems:    * No active hospital problems.  *      Plan:     Endoscopy and risks explained to the patient. Risks including reaction to sedation, cardiopulmonary events, infection, bleeding, perforation, requirement for surgery if complications should occur, death were explained to patient who expressed understanding and agreed to proceed with endoscopy.       Stefania Hebert MD  [facility], [place]

## 2018-08-14 NOTE — ROUTINE PROCESS
Pt. Discharged to car by Jenaro Doherty with mother. Vital signs stable. Able to tolerate PO fluids.  Seen by MD.

## 2018-08-14 NOTE — PROGRESS NOTES
Port flushed with NS. Patient verbalized understanding and agreed to pack with heparin and continue routine port management at home.

## 2018-08-14 NOTE — ROUTINE PROCESS
Pt has right portacath that was accessed at home. Blood return poasitive, flushed and infusing. Blood sugar 146.

## 2018-08-14 NOTE — ANESTHESIA POSTPROCEDURE EVALUATION
Post-Anesthesia Evaluation and Assessment    Patient: Vera Shah MRN: [id number]  SSN: [id number]    YOB: 1970  Age: 50 y.o. Sex: female       Cardiovascular Function/Vital Signs  Visit Vitals    /69    Pulse 82    Temp 36.6 °C (97.8 °F)    Resp 14    Ht 5' 2\" (1.575 m)    Wt 80.3 kg (177 lb)    SpO2 97%    BMI 32.37 kg/m2       Patient is status post total IV anesthesia anesthesia for Procedure(s):  ESOPHAGOGASTRODUODENOSCOPY WITH DILATION/KENALOG (EGD)/ 32  ESOPHAGEAL DILATION. Nausea/Vomiting: None    Postoperative hydration reviewed and adequate. Pain:  Pain Scale 1: Numeric (0 - 10) (08/14/18 0844)  Pain Intensity 1: 0 (08/14/18 0844)   Managed    Neurological Status: At baseline    Mental Status and Level of Consciousness: Arousable    Pulmonary Status:   O2 Device: Room air (08/14/18 0844)   Adequate oxygenation and airway patent    Complications related to anesthesia: None    Post-anesthesia assessment completed.  No concerns    Signed By: Skyler Figueredo MD     August 14, 2018

## 2018-08-14 NOTE — PROCEDURES
Esophagogastroduodenoscopy    DATE of PROCEDURE: 8/14/2018    MEDICATIONS ADMINISTERED: MAC    INSTRUMENT: GIF    PROCEDURE:  After obtaining informed consent, the patient was placed in the left lateral position and sedated. The endoscope was advanced under direct vision without difficulty. The esophagus, stomach (including retroflexed views) and duodenum were evaluated. The patient was taken to the recovery area in stable condition. FINDINGS:  ESOPHAGUS: mild stricture at the GE junction. Moderate mucosal break after [address] dilation. STOMACH: status post Billroth II and Nissen. Nissen appears intact. GJ anastomosis healthy. SMALL BOWEL: afferent and efferent limbs appear normal    Estimated blood loss: 0-minimal     PLAN:  1.  Repeat EGD 2 weeks    Betzy Avila MD  [facility], [place]

## 2018-08-16 ENCOUNTER — HOSPITAL ENCOUNTER (OUTPATIENT)
Dept: INFUSION THERAPY | Age: 48
Discharge: HOME | End: 2018-08-16
Payer: COMMERCIAL

## 2018-08-16 VITALS
BODY MASS INDEX: 32.92 KG/M2 | WEIGHT: 180 LBS | HEART RATE: 102 BPM | TEMPERATURE: 98.6 F | OXYGEN SATURATION: 93 % | RESPIRATION RATE: 18 BRPM | DIASTOLIC BLOOD PRESSURE: 76 MMHG | SYSTOLIC BLOOD PRESSURE: 117 MMHG

## 2018-08-16 LAB — MAGNESIUM SERPL-MCNC: 1.8 MG/DL (ref 1.8–2.4)

## 2018-08-16 PROCEDURE — 77030003560 HC NDL HUBR BARD -A

## 2018-08-16 PROCEDURE — 96374 THER/PROPH/DIAG INJ IV PUSH: CPT

## 2018-08-16 PROCEDURE — 74011250636 HC RX REV CODE- 250/636: Performed by: INTERNAL MEDICINE

## 2018-08-16 PROCEDURE — 83735 ASSAY OF MAGNESIUM: CPT

## 2018-08-16 PROCEDURE — 74011000250 HC RX REV CODE- 250: Performed by: INTERNAL MEDICINE

## 2018-08-16 RX ORDER — SODIUM CHLORIDE 0.9 % (FLUSH) 0.9 %
10-40 SYRINGE (ML) INJECTION AS NEEDED
Status: DISCONTINUED | OUTPATIENT
Start: 2018-08-16 | End: 2018-08-20 | Stop reason: HOSPADM

## 2018-08-16 RX ORDER — HEPARIN 100 UNIT/ML
500 SYRINGE INTRAVENOUS AS NEEDED
Status: DISPENSED | OUTPATIENT
Start: 2018-08-16 | End: 2018-08-16

## 2018-08-16 RX ADMIN — Medication 10 ML: at 07:44

## 2018-08-16 RX ADMIN — SODIUM CHLORIDE 25 MG: 9 INJECTION INTRAMUSCULAR; INTRAVENOUS; SUBCUTANEOUS at 07:36

## 2018-08-16 RX ADMIN — SODIUM CHLORIDE, PRESERVATIVE FREE 500 UNITS: 5 INJECTION INTRAVENOUS at 07:44

## 2018-08-16 RX ADMIN — Medication 10 ML: at 07:35

## 2018-08-16 NOTE — PROGRESS NOTES
Arrived to the [facility]. Assessment completed. Patient received phenergan 25 mg IV, as ordered. The magnesium level was 1.8. No further replacements needed today. Any issues or concerns during appointment: none. Patient aware of next infusion appointment on 8/231/8 (date) at [address] [place] [address] (time) with IV infusion center. Discharged ambulatory, with father. Patient instructed to call her doctor's office immediately for any problems or concerns. She verbalizes understanding.

## 2018-08-23 ENCOUNTER — HOSPITAL ENCOUNTER (OUTPATIENT)
Dept: INFUSION THERAPY | Age: 48
Discharge: HOME | End: 2018-08-23
Payer: COMMERCIAL

## 2018-08-23 VITALS
OXYGEN SATURATION: 95 % | WEIGHT: 180.6 LBS | BODY MASS INDEX: 33.03 KG/M2 | TEMPERATURE: 98.3 F | SYSTOLIC BLOOD PRESSURE: 134 MMHG | RESPIRATION RATE: 18 BRPM | HEART RATE: 101 BPM | DIASTOLIC BLOOD PRESSURE: 71 MMHG

## 2018-08-23 LAB — MAGNESIUM SERPL-MCNC: 1.8 MG/DL (ref 1.8–2.4)

## 2018-08-23 PROCEDURE — 77030003560 HC NDL HUBR BARD -A

## 2018-08-23 PROCEDURE — 74011250636 HC RX REV CODE- 250/636: Performed by: INTERNAL MEDICINE

## 2018-08-23 PROCEDURE — 83735 ASSAY OF MAGNESIUM: CPT

## 2018-08-23 PROCEDURE — 74011000250 HC RX REV CODE- 250: Performed by: INTERNAL MEDICINE

## 2018-08-23 PROCEDURE — 96374 THER/PROPH/DIAG INJ IV PUSH: CPT

## 2018-08-23 RX ORDER — SODIUM CHLORIDE 0.9 % (FLUSH) 0.9 %
10-40 SYRINGE (ML) INJECTION AS NEEDED
Status: DISCONTINUED | OUTPATIENT
Start: 2018-08-23 | End: 2018-08-27 | Stop reason: HOSPADM

## 2018-08-23 RX ORDER — HEPARIN 100 UNIT/ML
500 SYRINGE INTRAVENOUS AS NEEDED
Status: DISPENSED | OUTPATIENT
Start: 2018-08-23 | End: 2018-08-23

## 2018-08-23 RX ADMIN — SODIUM CHLORIDE 25 MG: 9 INJECTION INTRAMUSCULAR; INTRAVENOUS; SUBCUTANEOUS at 07:46

## 2018-08-23 RX ADMIN — Medication 10 ML: at 07:45

## 2018-08-23 RX ADMIN — Medication 10 ML: at 08:57

## 2018-08-23 NOTE — PROGRESS NOTES
Tolerated Phenergan IV for c/o nausea today. No c/o nausea at time of discharge. Magnesium level 1.8 today. No replacement required. Patient discharged via ambulation accompanied by father. Instructed to notify physician of any problems, questions or concerns. Allowed opportunity for patient/family to ask questions. Verbalized understanding. Next appointment is 08/30/18 at [facility] with Jyason.

## 2018-08-23 NOTE — PROGRESS NOTES
Problem: Patient Education:  Go to Education Activity  Goal: Patient/Family Education  Outcome: Progressing Towards Goal  Reviewed POC with patient. Verbalizes understanding.

## 2018-08-30 ENCOUNTER — ANESTHESIA EVENT (OUTPATIENT)
Dept: ENDOSCOPY | Age: 48
End: 2018-08-30
Payer: COMMERCIAL

## 2018-08-30 ENCOUNTER — HOSPITAL ENCOUNTER (OUTPATIENT)
Dept: INFUSION THERAPY | Age: 48
Discharge: HOME | End: 2018-08-30
Payer: COMMERCIAL

## 2018-08-30 VITALS
HEART RATE: 121 BPM | BODY MASS INDEX: 31.97 KG/M2 | RESPIRATION RATE: 18 BRPM | OXYGEN SATURATION: 97 % | SYSTOLIC BLOOD PRESSURE: 111 MMHG | DIASTOLIC BLOOD PRESSURE: 72 MMHG | TEMPERATURE: 98.3 F | WEIGHT: 174.8 LBS

## 2018-08-30 LAB — MAGNESIUM SERPL-MCNC: 1.9 MG/DL (ref 1.8–2.4)

## 2018-08-30 PROCEDURE — 74011000250 HC RX REV CODE- 250: Performed by: INTERNAL MEDICINE

## 2018-08-30 PROCEDURE — 96374 THER/PROPH/DIAG INJ IV PUSH: CPT

## 2018-08-30 PROCEDURE — 74011250636 HC RX REV CODE- 250/636: Performed by: INTERNAL MEDICINE

## 2018-08-30 PROCEDURE — 77030003560 HC NDL HUBR BARD -A

## 2018-08-30 PROCEDURE — 83735 ASSAY OF MAGNESIUM: CPT

## 2018-08-30 RX ORDER — FAMOTIDINE 20 MG/1
20 TABLET, FILM COATED ORAL AS NEEDED
Status: CANCELLED | OUTPATIENT
Start: 2018-08-30

## 2018-08-30 RX ORDER — SODIUM CHLORIDE, SODIUM LACTATE, POTASSIUM CHLORIDE, CALCIUM CHLORIDE 600; 310; 30; 20 MG/100ML; MG/100ML; MG/100ML; MG/100ML
100 INJECTION, SOLUTION INTRAVENOUS CONTINUOUS
Status: CANCELLED | OUTPATIENT
Start: 2018-08-30

## 2018-08-30 RX ORDER — SODIUM CHLORIDE 9 MG/ML
500 INJECTION, SOLUTION INTRAVENOUS ONCE
Status: COMPLETED | OUTPATIENT
Start: 2018-08-30 | End: 2018-08-30

## 2018-08-30 RX ORDER — SODIUM CHLORIDE 0.9 % (FLUSH) 0.9 %
10-40 SYRINGE (ML) INJECTION AS NEEDED
Status: ACTIVE | OUTPATIENT
Start: 2018-08-30 | End: 2018-08-30

## 2018-08-30 RX ORDER — SODIUM CHLORIDE 0.9 % (FLUSH) 0.9 %
5-10 SYRINGE (ML) INJECTION AS NEEDED
Status: CANCELLED | OUTPATIENT
Start: 2018-08-30

## 2018-08-30 RX ORDER — HEPARIN 100 UNIT/ML
500 SYRINGE INTRAVENOUS AS NEEDED
Status: COMPLETED | OUTPATIENT
Start: 2018-08-30 | End: 2018-08-30

## 2018-08-30 RX ADMIN — Medication 10 ML: at 07:31

## 2018-08-30 RX ADMIN — SODIUM CHLORIDE 25 MG: 9 INJECTION INTRAMUSCULAR; INTRAVENOUS; SUBCUTANEOUS at 07:38

## 2018-08-30 RX ADMIN — SODIUM CHLORIDE, PRESERVATIVE FREE 500 UNITS: 5 INJECTION INTRAVENOUS at 08:49

## 2018-08-30 RX ADMIN — SODIUM CHLORIDE 500 ML: 900 INJECTION, SOLUTION INTRAVENOUS at 07:32

## 2018-08-31 ENCOUNTER — ANESTHESIA (OUTPATIENT)
Dept: ENDOSCOPY | Age: 48
End: 2018-08-31
Payer: COMMERCIAL

## 2018-08-31 ENCOUNTER — HOSPITAL ENCOUNTER (OUTPATIENT)
Age: 48
Setting detail: OUTPATIENT SURGERY
Discharge: HOME | End: 2018-08-31
Attending: INTERNAL MEDICINE | Admitting: INTERNAL MEDICINE
Payer: COMMERCIAL

## 2018-08-31 VITALS
RESPIRATION RATE: 18 BRPM | WEIGHT: 180 LBS | SYSTOLIC BLOOD PRESSURE: 128 MMHG | BODY MASS INDEX: 33.13 KG/M2 | HEART RATE: 80 BPM | TEMPERATURE: 99 F | DIASTOLIC BLOOD PRESSURE: 83 MMHG | HEIGHT: 62 IN | OXYGEN SATURATION: 96 %

## 2018-08-31 LAB
GLUCOSE BLD STRIP.AUTO-MCNC: 135 MG/DL (ref 65–100)
GLUCOSE BLD STRIP.AUTO-MCNC: 205 MG/DL (ref 65–100)

## 2018-08-31 PROCEDURE — 76040000025: Performed by: INTERNAL MEDICINE

## 2018-08-31 PROCEDURE — 74011250636 HC RX REV CODE- 250/636: Performed by: ANESTHESIOLOGY

## 2018-08-31 PROCEDURE — 82962 GLUCOSE BLOOD TEST: CPT

## 2018-08-31 PROCEDURE — 74011000250 HC RX REV CODE- 250: Performed by: ANESTHESIOLOGY

## 2018-08-31 PROCEDURE — 74011636637 HC RX REV CODE- 636/637: Performed by: ANESTHESIOLOGY

## 2018-08-31 PROCEDURE — 77030031722: Performed by: INTERNAL MEDICINE

## 2018-08-31 PROCEDURE — 74011250636 HC RX REV CODE- 250/636: Performed by: INTERNAL MEDICINE

## 2018-08-31 PROCEDURE — 74011250636 HC RX REV CODE- 250/636

## 2018-08-31 PROCEDURE — 76060000031 HC ANESTHESIA FIRST 0.5 HR: Performed by: INTERNAL MEDICINE

## 2018-08-31 RX ORDER — PROPOFOL 10 MG/ML
INJECTION, EMULSION INTRAVENOUS AS NEEDED
Status: DISCONTINUED | OUTPATIENT
Start: 2018-08-31 | End: 2018-08-31 | Stop reason: HOSPADM

## 2018-08-31 RX ORDER — PROPOFOL 10 MG/ML
INJECTION, EMULSION INTRAVENOUS
Status: DISCONTINUED | OUTPATIENT
Start: 2018-08-31 | End: 2018-08-31 | Stop reason: HOSPADM

## 2018-08-31 RX ORDER — SODIUM CHLORIDE, SODIUM LACTATE, POTASSIUM CHLORIDE, CALCIUM CHLORIDE 600; 310; 30; 20 MG/100ML; MG/100ML; MG/100ML; MG/100ML
100 INJECTION, SOLUTION INTRAVENOUS CONTINUOUS
Status: DISCONTINUED | OUTPATIENT
Start: 2018-08-31 | End: 2018-09-04 | Stop reason: HOSPADM

## 2018-08-31 RX ORDER — TRIAMCINOLONE ACETONIDE 40 MG/ML
40 INJECTION, SUSPENSION INTRA-ARTICULAR; INTRAMUSCULAR
Status: COMPLETED | OUTPATIENT
Start: 2018-08-31 | End: 2018-08-31

## 2018-08-31 RX ADMIN — PROPOFOL 50 MG: 10 INJECTION, EMULSION INTRAVENOUS at 09:35

## 2018-08-31 RX ADMIN — PROPOFOL 30 MG: 10 INJECTION, EMULSION INTRAVENOUS at 09:37

## 2018-08-31 RX ADMIN — INSULIN HUMAN 4 UNITS: 100 INJECTION, SOLUTION PARENTERAL at 09:24

## 2018-08-31 RX ADMIN — TRIAMCINOLONE ACETONIDE 30 MG: 40 INJECTION, SUSPENSION INTRA-ARTICULAR; INTRAMUSCULAR at 09:45

## 2018-08-31 RX ADMIN — FAMOTIDINE 20 MG: 10 INJECTION INTRAVENOUS at 09:21

## 2018-08-31 RX ADMIN — SODIUM CHLORIDE, SODIUM LACTATE, POTASSIUM CHLORIDE, AND CALCIUM CHLORIDE 100 ML/HR: 600; 310; 30; 20 INJECTION, SOLUTION INTRAVENOUS at 09:21

## 2018-08-31 RX ADMIN — PROPOFOL 140 MCG/KG/MIN: 10 INJECTION, EMULSION INTRAVENOUS at 09:35

## 2018-08-31 NOTE — IP AVS SNAPSHOT
[address] [facility] 
 
 
 [address] 
[phone] Patient: Singh Triplett MRN: [id number] :1970 About your hospitalization You were admitted on:  2018 You last received care in the:  [facility] You were discharged on:  2018 Why you were hospitalized Your primary diagnosis was:  Not on File Follow-up Information None Your Scheduled Appointments   7:15 AM EDT Infusion with SS  
[facility]) [address] Nina Ho [phone] [facility] [address]  
[phone] [address] Monday September 10, 2018  9:00 AM EDT  
[place] [facility] [facility] 3DMGAME PHONE ASSESSMENT with [facility] PHONE APPOINTMENT [facility] Pre Assessment [facility] OR PRE ASSESSMENT) [address]  
[phone] Date/time displayed does not reflect when your phone assessment will be completed.   7:15 AM EDT Infusion with NUR8  
[facility]. [address] ([facility]) [address] Nina Ho [phone] [facility] [address]  
[phone] [address]  ESOPHAGOGASTRODUODENOSCOPY (EGD) with Trent Kauffman MD  
[facility] ([address] [place] [address]) [address] [place] [address]  
[phone]   7:15 AM EDT Infusion with SS  
ST. [address] [place] [address]) [address] Nina Ho [phone] [facility] [address]  
[phone] [address]   2:30 PM EDT  
[place] [facility] [facility] PHONE ASSESSMENT with [facility] PHONE APPOINTMENT [facility] Pre Assessment [facility] OR PRE ASSESSMENT) [address] [facility]  
[phone] Date/time displayed does not reflect when your phone assessment will be completed. Wednesday September 26, 2018  7:15 AM EDT Infusion with NUR7  
ST. [address] ([address]) [address] Syed Johnson [phone] [facility] [address]  
[phone] [address] Thursday September 27, 2018 ESOPHAGOGASTRODUODENOSCOPY (EGD) with Rosaline Tello MD  
[facility] ([address] [place] [address]) 304 [facility]  
[phone] Thursday October 04, 2018 10:30 AM EDT  
[facility] PHONE ASSESSMENT with [facility] PHONE APPOINTMENT [facility] Pre Assessment [facility] OR PRE ASSESSMENT) [address] [facility]  
[phone] Date/time displayed does not reflect when your phone assessment will be completed. Wednesday October 10, 2018  4:00 PM EDT Infusion with SS  
ST. [address] ) [address] Syed Johnson [phone] [facility] [address]  
[phone] [address] Thursday October 11, 2018 ESOPHAGOGASTRODUODENOSCOPY (EGD) with Kayla Bronw MD  
[facility] ([address] [place] [address]) 304 [facility]  
[phone] Thursday October 18, 2018  7:15 AM EDT Infusion with SS  
ST. [address]) [address] Syed Johnson [phone] [facility] [address]  
[phone] [address] Friday October 19, 2018  1:30 PM EDT  
[facility] PHONE ASSESSMENT with [facility] PHONE APPOINTMENT [facility] Pre Assessment [facility] OR PRE ASSESSMENT)  [address] 
 [facility] [address]  
[phone] Date/time displayed does not reflect when your phone assessment will be completed. Thursday October 25, 2018  7:15 AM EDT Infusion with SS  
ST. [address] [facility] [address] Healthcare ) [address] [facility] [phone] [facility] [address]  
[phone] [address] Friday October 26, 2018 ESOPHAGOGASTRODUODENOSCOPY (EGD) with Mayuri Paz MD  
[facility] ([address] [place] [address]) [address] [place] [address]  
[phone] Discharge Orders None A check cindy indicates which time of day the medication should be taken. My Medications ASK your doctor about these medications Instructions Each Dose to Equal  
 Morning Noon Evening Bedtime  
 0.9% sodium chloride solution Your last dose was: Your next dose is:    
   
   
 by IntraVENous route five (5) days a week. Gives to herself via port at home daily -1000cc ATIVAN 1 mg tablet Generic drug:  LORazepam  
   
Your last dose was: Your next dose is: Take 1 mg by mouth three (3) times daily as needed for Anxiety. 1 mg BENADRYL 25 mg capsule Generic drug:  diphenhydrAMINE Your last dose was: Your next dose is: Take 25 mg by mouth every six (6) hours as needed. 25 mg  
    
   
   
   
  
 CARAFATE 100 mg/mL suspension Generic drug:  sucralfate Your last dose was: Your next dose is: TAKE 10 ML BY MOUTH 4 TIMES A DAY EVERY DAY ON A EMPTY STOMACH 1 HR BEFORE MEALS AND AT BEDTIME CeleXA 20 mg tablet Generic drug:  citalopram  
   
Your last dose was: Your next dose is: Take 20 mg by mouth daily.  Indications: am  
 20 mg  
    
   
   
   
  
 cyanocobalamin 1,000 mcg/mL injection Commonly known as:  VITAMIN B12 Your last dose was: Your next dose is: INJECT 1 VIAL INTRAMUSCULARLY FOR 4 WEEKS THEN MONTHLY  
     
   
   
   
  
 fentaNYL 75 mcg/hr Commonly known as:  Ulises Carroll Your last dose was: Your next dose is:    
   
   
 1 Patch by TransDERmal route every seventy-two (72) hours. 1 Patch  
    
   
   
   
  
 gabapentin 250 mg/5 mL solution Commonly known as:  NEURONTIN Your last dose was: Your next dose is: Take 300 mg by mouth three (3) times daily. 300 mg  
    
   
   
   
  
 glucagon 1 mg injection Commonly known as:  Aidee Closs Your last dose was: Your next dose is:    
   
   
 1 mg by IntraMUSCular route. 1 mg  
    
   
   
   
  
 hyoscyamine SL 0.125 mg SL tablet Commonly known as:  LEVSIN/SL Your last dose was: Your next dose is:    
   
   
 0.125 mg by SubLINGual route every six (6) hours as needed for Cramping.  
 0.125 mg  
    
   
   
   
  
 insulin aspart U-100 100 unit/mL injection Commonly known as:  NovoLOG U-100 Insulin aspart Your last dose was: Your next dose is:    
   
   
 Use as directed LIPITOR 10 mg tablet Generic drug:  atorvastatin Your last dose was: Your next dose is: Take 10 mg by mouth nightly. 10 mg MIRALAX 17 gram packet Generic drug:  polyethylene glycol Your last dose was: Your next dose is: Take 17 g by mouth daily. 17 g  
    
   
   
   
  
 oxyCODONE IR 10 mg Tab immediate release tablet Commonly known as:  Ever Ivans Your last dose was: Your next dose is: Take 10 mg by mouth every eight (8) hours as needed. Take day of surgery per anesthesia protocol. 10 mg  
    
   
   
   
  
 * promethazine 25 mg suppository Commonly known as:  PHENERGAN Your last dose was: Your next dose is: Insert 25 mg into rectum as needed for Nausea. 25 mg  
    
   
   
   
  
 * promethazine 25 mg tablet Commonly known as:  PHENERGAN Your last dose was: Your next dose is: Take 1 Tab by mouth every six (6) hours as needed. 25 mg  
    
   
   
   
  
 * PROTONIX 40 mg injection Generic drug:  pantoprazole Your last dose was: Your next dose is:    
   
   
 40 mg by IntraVENous route every morning. Take day of surgery per anesthesia protocol. 40 mg  
    
   
   
   
  
 * PROTONIX 40 mg tablet Generic drug:  pantoprazole Your last dose was: Your next dose is:    
   
   
 40 mg by IntraVENous route nightly. 40 mg  
    
   
   
   
  
 TRESIBA FLEXTOUCH U-100 100 unit/mL (3 mL) Inpn Generic drug:  insulin degludec Your last dose was: Your next dose is:    
   
   
 40 Units by SubCUTAneous route nightly. 40 Units TYLENOL 325 mg tablet Generic drug:  acetaminophen Your last dose was: Your next dose is: Take 325 mg by mouth every four (4) hours as needed for Pain. 325 mg  
    
   
   
   
  
 ZOFRAN 8 mg tablet Generic drug:  ondansetron hcl Your last dose was: Your next dose is: Take 8 mg by mouth every eight (8) hours as needed for Nausea. 8 mg  
    
   
   
   
  
 zolpidem 10 mg tablet Commonly known as:  AMBIEN Your last dose was: Your next dose is: TAKE 1 TABLET BY MOUTH EVERY NIGHT AS NEEDED FOR SLEEP * Notice: This list has 4 medication(s) that are the same as other medications prescribed for you. Read the directions carefully, and ask your doctor or other care provider to review them with you. Opioid Education Prescription Opioids: What You Need to Know: Prescription opioids can be used to help relieve moderate-to-severe pain and are often prescribed following a surgery or injury, or for certain health conditions. These medications can be an important part of treatment but also come with serious risks. Opioids are strong pain medicines. Examples include hydrocodone, oxycodone, fentanyl, and morphine. Heroin is an example of an illegal opioid. It is important to work with your health care provider to make sure you are getting the safest, most effective care. WHAT ARE THE RISKS AND SIDE EFFECTS OF OPIOID USE? Prescription opioids carry serious risks of addiction and overdose, especially with prolonged use. An opioid overdose, often marked by slow breathing, can cause sudden death. The use of prescription opioids can have a number of side effects as well, even when taken as directed. · Tolerance-meaning you might need to take more of a medication for the same pain relief · Physical dependence-meaning you have symptoms of withdrawal when the medication is stopped. Withdrawal symptoms can include nausea, sweating, chills, diarrhea, stomach cramps, and muscle aches. Withdrawal can last up to several weeks, depending on which drug you took and how long you took it. · Increased sensitivity to pain · Constipation · Nausea, vomiting, and dry mouth · Sleepiness and dizziness · Confusion · Depression · Low levels of testosterone that can result in lower sex drive, energy, and strength · Itching and sweating RISKS ARE GREATER WITH:      
· History of drug misuse, substance use disorder, or overdose · Mental health conditions (such as depression or anxiety) · Sleep apnea · Older age (72 years or older) · Pregnancy Avoid alcohol while taking prescription opioids. Also, unless specifically advised by your health care provider, medications to avoid include: · Benzodiazepines (such as Xanax or Valium) · Muscle relaxants (such as Soma or Flexeril) · Hypnotics (such as Ambien or Lunesta) · Other prescription opioids KNOW YOUR OPTIONS Talk to your health care provider about ways to manage your pain that don't involve prescription opioids. Some of these options may actually work better and have fewer risks and side effects. Options may include: 
· Pain relievers such as acetaminophen, ibuprofen, and naproxen · Some medications that are also used for depression or seizures · Physical therapy and exercise · Counseling to help patients learn how to cope better with triggers of pain and stress. · Application of heat or cold compress · Massage therapy · Relaxation techniques Be Informed Make sure you know the name of your medication, how much and how often to take it, and its potential risks & side effects. IF YOU ARE PRESCRIBED OPIOIDS FOR PAIN: 
· Never take opioids in greater amounts or more often than prescribed. Remember the goal is not to be pain-free but to manage your pain at a tolerable level. · Follow up with your primary care provider to: · Work together to create a plan on how to manage your pain. · Talk about ways to help manage your pain that don't involve prescription opioids. · Talk about any and all concerns and side effects. · Help prevent misuse and abuse. · Never sell or share prescription opioids · Help prevent misuse and abuse. · Store prescription opioids in a secure place and out of reach of others (this may include visitors, children, friends, and family). · Safely dispose of unused/unwanted prescription opioids: Find your community drug take-back program or your pharmacy mail-back program, or flush them down the toilet, following guidance from the Food and Drug Administration (www.fda.gov/Drugs/ResourcesForYou). · Visit www.cdc.gov/drugoverdose to learn about the risks of opioid abuse and overdose.  
· If you believe you may be struggling with addiction, tell your health care provider and ask for guidance or call [employer] at [phone]-FGYX. Discharge Instructions Gastrointestinal Esophagogastroduodenoscopy (EGD) - Upper Exam Discharge Instructions 1. Call Dr. Janna Hinojosa for any problems or questions. 2. Contact the doctor's office for follow up appointment as directed. 3. Medication may cause drowsiness for several hours, therefore, do not drive or operate machinery for remainder of the day. 4. No alcohol today. 5. Ordinarily, you may resume regular diet and activity after exam unless otherwise specified by your physician. 6. For mild soreness in your throat you may use Cepacol throat lozenges or warm salt-water gargles as needed. Any additional instructions:  Continue with dilation treatments Instructions given to Meet Anand and other family members. Introducing [facility] & HEALTH SERVICES! [facility] introduces [employer] patient portal. Now you can access parts of your medical record, email your doctor's office, and request medication refills online. 1. In your internet browser, go to [email or web address] 2. Click on the First Time User? Click Here link in the Sign In box. You will see the New Member Sign Up page. 3. Enter your [employer] Access Code exactly as it appears below. You will not need to use this code after youve completed the sign-up process. If you do not sign up before the expiration date, you must request a new code. · [employer] Access Code: [email or web address] Expires: 10/30/2018  4:09 PM 
 
4. Enter the last four digits of your Social Security Number (xxxx) and Date of Birth (mm/dd/yyyy) as indicated and click Submit. You will be taken to the next sign-up page. 5. Create a [employer] ID. This will be your [employer] login ID and cannot be changed, so think of one that is secure and easy to remember. 6. Create a [employer] password. You can change your password at any time. 7. Enter your Password Reset Question and Answer. This can be used at a later time if you forget your password. 8. Enter your e-mail address. You will receive e-mail notification when new information is available in [address] 9. Click Sign Up. You can now view and download portions of your medical record. 10. Click the Download Summary menu link to download a portable copy of your medical information. If you have questions, please visit the Frequently Asked Questions section of the [employer] website. Remember, [employer] is NOT to be used for urgent needs. For medical emergencies, dial 911. Now available from your iPhone and Android! Introducing Eleazar Gunn As a [facility] patient, I wanted to make you aware of our electronic visit tool called Eleazar Gunn. [employer] allows you to connect within minutes with a medical provider 24 hours a day, seven days a week via a mobile device or tablet or logging into a secure website from your computer. You can access Eleazar Gunn from anywhere in the [place]. A virtual visit might be right for you when you have a simple condition and feel like you just dont want to get out of bed, or cant get away from work for an appointment, when your regular [facility] provider is not available (evenings, weekends or holidays), or when youre out of town and need minor care. Electronic visits cost only $49 and if the [employer] provider determines a prescription is needed to treat your condition, one can be electronically transmitted to a nearby pharmacy*. Please take a moment to enroll today if you have not already done so. The enrollment process is free and takes just a few minutes. To enroll, please download the Thinglink chandler to your tablet or phone, or visit [email or web address] to enroll on your computer. And, as an [address] [place] [address] patient with a [employer] account, the results of your visits will be scanned into your electronic medical record and your primary care provider will be able to view the scanned results. We urge you to continue to see your regular [employer] provider for your ongoing medical care. And while your primary care provider may not be the one available when you seek a Eleazar Joaquinfin virtual visit, the peace of mind you get from getting a real diagnosis real time can be priceless. For more information on Eleazar [employer], view our Frequently Asked Questions (FAQs) at [email or web address]. Sincerely, 
 
Alesha Lawrence MD 
Chief Medical Officer [employer] *:  certain medications cannot be prescribed via [employer] Providers Seen During Your Hospitalization Provider Specialty Primary office phone Patel Mcfarlane MD Gastroenterology [phone] Your Primary Care Physician (PCP) Primary Care Physician Office Phone Office Fax 100 [facility] [employer], [address] [place] [address] [phone] You are allergic to the following Allergen Reactions Tape (Adhesive) Rash Itching Barium Iodide Nausea and Vomiting Contrast Dye (Iodine) Shortness of Breath Oral contrast-experienced flushing,shortness of breath, sweatiness; (patient has received oral contrast many times at [facility]) Flagyl (Metronidazole) Nausea and Vomiting Metformin Nausea and Vomiting Stomach pain Insulins Nausea and Vomiting Humalog only Recent Documentation Height Weight BMI OB Status Smoking Status 1.575 m 81.6 kg 32.92 kg/m2 Hysterectomy Former Smoker Emergency Contacts Name Discharge Info Relation Home Work Mobile Jorge Treadwell DISCHARGE CAREGIVER [3] Spouse [3] [phone]  [address] [place] [address] CAREGIVER [3] Father [15] [phone] Jony Engel CAREGIVER [3] Mother [14] [phone] Patient Belongings The following personal items are in your possession at time of discharge: 
  Dental Appliances: None  Visual Aid: None Please provide this summary of care documentation to your next provider. Signatures-by signing, you are acknowledging that this After Visit Summary has been reviewed with you and you have received a copy. Patient Signature:  ____________________________________________________________ Date:  ____________________________________________________________  
  
Jaycee Ree Provider Signature:  ____________________________________________________________ Date:  ____________________________________________________________

## 2018-08-31 NOTE — PROCEDURES
Esophagogastroduodenoscopy    DATE of PROCEDURE: 8/31/2018    INDICATION: dysphagia    POSTPROCEDURE DIAGNOSIS:  Esophageal stricture; gastritis, surgical changes    MEDICATIONS ADMINISTERED: MAC anesthesia (see anesthesia report)    INSTRUMENT:    PROCEDURE:  After obtaining informed consent, the patient was placed in the left lateral position and sedated. The endoscope was advanced under direct vision without difficulty. The esophagus, stomach (including retroflexed views) and small bowel were evaluated. The patient was taken to the recovery area in stable condition. FINDINGS:  ESOPHAGUS: benign distal stricture; dilated with Guido #44 (minimal tear), #46 (still minimal tear), #48 (two moderate 1 cm superficial distal tears). I then injected in 1 cc aliquots of the Kenalog-40 into three sites of the tears (the tears shared a common end).   STOMACH: gastrojejunostomy anastomosis without ulcers; stomach friable consistent with gastritis  SMALL BOWEL: normal    Estimated blood loss: 0-minimal   Specimens obtained during procedure: none    PLAN: EGD dilation monthly

## 2018-08-31 NOTE — ANESTHESIA POSTPROCEDURE EVALUATION
Post-Anesthesia Evaluation and Assessment Patient: Taya Meehan MRN: [id number]  SSN: [id number] YOB: 1970  Age: 50 y.o. Sex: female Cardiovascular Function/Vital Signs Visit Vitals  /78  Pulse 80  Temp 37.2 °C (99 °F)  Resp 16  
 Ht 5' 2\" (1.575 m)  Wt 81.6 kg (180 lb)  SpO2 96%  BMI 32.92 kg/m2 Patient is status post total IV anesthesia anesthesia for Procedure(s): ESOPHAGOGASTRODUODENOSCOPY WITH DILATION/KENALOG (EGD)/ 32 INJECTION 
ESOPHAGEAL DILATION. Nausea/Vomiting: None Postoperative hydration reviewed and adequate. Pain: 
Pain Scale 1: Numeric (0 - 10) (08/31/18 0908) Pain Intensity 1: 4 (08/31/18 0908) Managed Neurological Status: At baseline Mental Status and Level of Consciousness: Arousable Pulmonary Status:  
O2 Device: Nasal cannula (08/31/18 0953) Adequate oxygenation and airway patent Complications related to anesthesia: None Post-anesthesia assessment completed. No concerns. Some increased bs perioperatively treated with iv and sq insulin. She was instructed to check her bs more often today and in the future. Signed By: Richard Nicholson MD   
 August 31, 2018

## 2018-08-31 NOTE — DISCHARGE INSTRUCTIONS
Gastrointestinal Esophagogastroduodenoscopy (EGD) - Upper Exam Discharge Instructions    1. Call Dr. Chin Post for any problems or questions. 2. Contact the doctor's office for follow up appointment as directed. 3. Medication may cause drowsiness for several hours, therefore, do not drive or operate machinery for remainder of the day. 4. No alcohol today. 5. Ordinarily, you may resume regular diet and activity after exam unless otherwise specified by your physician. 6. For mild soreness in your throat you may use Cepacol throat lozenges or warm salt-water gargles as needed. Any additional instructions:  Continue with dilation treatments     Instructions given to Petty Victoria and other family members.

## 2018-08-31 NOTE — ANESTHESIA PREPROCEDURE EVALUATION
Anesthetic History PONV Review of Systems / Medical History Patient summary reviewed and pertinent labs reviewed Pulmonary Smoker (Former, quit 25 years) Asthma : well controlled Neuro/Psych Cardiovascular Hypertension: well controlled Exercise tolerance: >4 METS 
  
GI/Hepatic/Renal 
  
GERD: well controlled Liver disease (fatty liver) Comments: Esophageal stricture Endo/Other Diabetes: well controlled, type 2, using insulin Obesity Other Findings Comments: Fibromyalgia, on a fent. patch Physical Exam 
 
Airway Mallampati: II 
 
Neck ROM: decreased range of motion, short neck Mouth opening: Normal 
 
 Cardiovascular Regular rate and rhythm,  S1 and S2 normal,  no murmur, click, rub, or gallop Rhythm: regular Rate: normal 
 
 
 
 Dental 
No notable dental hx Pulmonary Breath sounds clear to auscultation Abdominal 
 
 
 
 Other Findings Anesthetic Plan ASA: 3 Anesthesia type: total IV anesthesia Induction: Intravenous Anesthetic plan and risks discussed with: Patient

## 2018-08-31 NOTE — H&P
PreProcedure H&P Update Today's Date:  8/31/2018 CC:  Dysphagia from known esophageal stricture Subjective: HPI: Dysphagia from known esophageal stricture PMH: 
Past Medical History:  
Diagnosis Date  Anemia   
 denies hx of blood transfusions-- Fe infusions 12/2017  Anxiety and depression  Asthma   
 allergy induced, denies any inhaler, patient denies  C. difficile diarrhea   
 in 8989 after complicated ERCP  
 Cervical dysplasia 1990s  Chronic insomnia  Chronic pain   
 all over  Chronic pancreatitis ([facility] [address]  Endometriosis  Esophageal stricture 2017  Fibromyalgia  Fibromyalgia  Former cigarette smoker  GERD (gastroesophageal reflux disease) daily meds---po and IV protonix- uses per port- alternates  HLD (hyperlipidemia)   
 pt denies  IBS (irritable bowel syndrome)  Migraine headache  Morbid obesity ([facility] [address] BMI 33.3  Nausea & vomiting   
 pt reports she does better with phenergan than zofran - causes HA  
 Nonalcoholic fatty liver disease  Pancreatitis  Psychiatric disorder  PUD (peptic ulcer disease) 06/2017 Hx of  Sphincter of Oddi dysfunction  Type 2 diabetes mellitus ([facility] [address] , Lows at 90's, Last A1C 10 on 8/2018, Takes insulin Medications:  
Current Facility-Administered Medications Medication Dose Route Frequency  lactated Ringers infusion  100 mL/hr IntraVENous CONTINUOUS  
 famotidine (PF) (PEPCID) 20 mg in sodium chloride 0.9% 10 mL injection  20 mg IntraVENous ONCE PRN  
 triamcinolone acetonide (KENALOG-40) 40 mg/mL injection 40 mg  40 mg IntraMUSCular ENDO ONCE  
 insulin regular (NOVOLIN R, HUMULIN R) injection 4 Units  4 Units SubCUTAneous ENDO ONCE  
 insulin regular (NOVOLIN R, HUMULIN R) injection 4 Units  4 Units IntraVENous ENDO ONCE  
 
 
 
Objective:  
Vitals: 
Visit Vitals  BP (!) 153/98  Pulse 91  Temp 99 °F (37.2 °C)  Resp 18  Ht 5' 2\" (1.575 m)  Wt 81.6 kg (180 lb)  SpO2 91%  BMI 32.92 kg/m2 Exam: 
General appearance: alert, cooperative, no distress Lungs: clear to auscultation bilaterally anteriorly Heart: regular rate and rhythm Data Review (Labs): No results for input(s): WBC, HGB, HCT, PLT, MCV, NA, K, CL, CO2, BUN, CREA, CA, GLU, AP, SGOT, GPT, TBIL, CBIL, ALB, TP, AML, LPSE, PTP, INR, APTT, HGBEXT, HCTEXT, PLTEXT in the last 72 hours. No lab exists for component: DBIL, INREXT Plan: Dysphagia from known esophageal stricture  Proceed with EGD dilation,.patient aware of perforation risk

## 2018-09-06 ENCOUNTER — APPOINTMENT (OUTPATIENT)
Dept: INFUSION THERAPY | Age: 48
End: 2018-09-06
Payer: COMMERCIAL

## 2018-09-07 ENCOUNTER — HOSPITAL ENCOUNTER (OUTPATIENT)
Dept: INFUSION THERAPY | Age: 48
Discharge: HOME | End: 2018-09-07
Payer: COMMERCIAL

## 2018-09-07 VITALS
RESPIRATION RATE: 18 BRPM | WEIGHT: 179 LBS | HEART RATE: 99 BPM | DIASTOLIC BLOOD PRESSURE: 87 MMHG | BODY MASS INDEX: 32.74 KG/M2 | SYSTOLIC BLOOD PRESSURE: 136 MMHG | OXYGEN SATURATION: 95 % | TEMPERATURE: 98.6 F

## 2018-09-07 LAB — MAGNESIUM SERPL-MCNC: 1.9 MG/DL (ref 1.8–2.4)

## 2018-09-07 PROCEDURE — 74011000250 HC RX REV CODE- 250: Performed by: INTERNAL MEDICINE

## 2018-09-07 PROCEDURE — 96374 THER/PROPH/DIAG INJ IV PUSH: CPT

## 2018-09-07 PROCEDURE — 83735 ASSAY OF MAGNESIUM: CPT

## 2018-09-07 PROCEDURE — 74011250636 HC RX REV CODE- 250/636: Performed by: INTERNAL MEDICINE

## 2018-09-07 PROCEDURE — 77030003560 HC NDL HUBR BARD -A

## 2018-09-07 RX ORDER — SODIUM CHLORIDE 9 MG/ML
250 INJECTION, SOLUTION INTRAVENOUS ONCE
Status: COMPLETED | OUTPATIENT
Start: 2018-09-07 | End: 2018-09-07

## 2018-09-07 RX ORDER — HEPARIN 100 UNIT/ML
500 SYRINGE INTRAVENOUS AS NEEDED
Status: COMPLETED | OUTPATIENT
Start: 2018-09-07 | End: 2018-09-07

## 2018-09-07 RX ORDER — SODIUM CHLORIDE 0.9 % (FLUSH) 0.9 %
10-40 SYRINGE (ML) INJECTION AS NEEDED
Status: ACTIVE | OUTPATIENT
Start: 2018-09-07 | End: 2018-09-07

## 2018-09-07 RX ADMIN — SODIUM CHLORIDE 250 ML: 900 INJECTION, SOLUTION INTRAVENOUS at 07:39

## 2018-09-07 RX ADMIN — SODIUM CHLORIDE 25 MG: 9 INJECTION INTRAMUSCULAR; INTRAVENOUS; SUBCUTANEOUS at 07:52

## 2018-09-07 RX ADMIN — HEPARIN 500 UNITS: 100 SYRINGE at 09:26

## 2018-09-07 RX ADMIN — Medication 10 ML: at 07:39

## 2018-09-07 NOTE — PROGRESS NOTES
Arrived to the [facility]. IV Phenergan and NS completed. Patient tolerated well. Mg 1.9 no replacement needed. Port flushed. Any issues or concerns during appointment: None. Patient aware of next infusion appointment on 9/13 (date) at [address] [place] [address] (time). Discharged ambulatory in stable condition.

## 2018-09-13 ENCOUNTER — ANESTHESIA EVENT (OUTPATIENT)
Dept: ENDOSCOPY | Age: 48
End: 2018-09-13
Payer: COMMERCIAL

## 2018-09-13 ENCOUNTER — HOSPITAL ENCOUNTER (OUTPATIENT)
Dept: INFUSION THERAPY | Age: 48
Discharge: HOME | End: 2018-09-13
Payer: COMMERCIAL

## 2018-09-13 VITALS
OXYGEN SATURATION: 94 % | BODY MASS INDEX: 32.56 KG/M2 | DIASTOLIC BLOOD PRESSURE: 62 MMHG | TEMPERATURE: 98.2 F | RESPIRATION RATE: 18 BRPM | HEART RATE: 102 BPM | SYSTOLIC BLOOD PRESSURE: 118 MMHG | WEIGHT: 178 LBS

## 2018-09-13 LAB — MAGNESIUM SERPL-MCNC: 1.7 MG/DL (ref 1.8–2.4)

## 2018-09-13 PROCEDURE — 74011250636 HC RX REV CODE- 250/636: Performed by: INTERNAL MEDICINE

## 2018-09-13 PROCEDURE — 83735 ASSAY OF MAGNESIUM: CPT

## 2018-09-13 PROCEDURE — 74011000250 HC RX REV CODE- 250: Performed by: ORTHOPAEDIC SURGERY

## 2018-09-13 PROCEDURE — 96375 TX/PRO/DX INJ NEW DRUG ADDON: CPT

## 2018-09-13 PROCEDURE — 96365 THER/PROPH/DIAG IV INF INIT: CPT

## 2018-09-13 PROCEDURE — 74011250636 HC RX REV CODE- 250/636: Performed by: ORTHOPAEDIC SURGERY

## 2018-09-13 PROCEDURE — 77030003560 HC NDL HUBR BARD -A

## 2018-09-13 RX ORDER — NALOXONE HYDROCHLORIDE 0.4 MG/ML
0.1 INJECTION, SOLUTION INTRAMUSCULAR; INTRAVENOUS; SUBCUTANEOUS AS NEEDED
Status: CANCELLED | OUTPATIENT
Start: 2018-09-13 | End: 2018-09-13

## 2018-09-13 RX ORDER — SODIUM CHLORIDE 9 MG/ML
250 INJECTION, SOLUTION INTRAVENOUS ONCE
Status: COMPLETED | OUTPATIENT
Start: 2018-09-13 | End: 2018-09-13

## 2018-09-13 RX ORDER — SODIUM CHLORIDE 0.9 % (FLUSH) 0.9 %
10 SYRINGE (ML) INJECTION AS NEEDED
Status: DISCONTINUED | OUTPATIENT
Start: 2018-09-13 | End: 2018-09-17 | Stop reason: HOSPADM

## 2018-09-13 RX ORDER — DIPHENHYDRAMINE HYDROCHLORIDE 50 MG/ML
12.5 INJECTION, SOLUTION INTRAMUSCULAR; INTRAVENOUS ONCE
Status: CANCELLED | OUTPATIENT
Start: 2018-09-13 | End: 2018-09-13

## 2018-09-13 RX ORDER — MIDAZOLAM HYDROCHLORIDE 1 MG/ML
2 INJECTION, SOLUTION INTRAMUSCULAR; INTRAVENOUS
Status: CANCELLED | OUTPATIENT
Start: 2018-09-13 | End: 2018-09-14

## 2018-09-13 RX ORDER — MAGNESIUM SULFATE 1 G/100ML
1 INJECTION INTRAVENOUS ONCE
Status: COMPLETED | OUTPATIENT
Start: 2018-09-13 | End: 2018-09-13

## 2018-09-13 RX ORDER — SODIUM CHLORIDE, SODIUM LACTATE, POTASSIUM CHLORIDE, CALCIUM CHLORIDE 600; 310; 30; 20 MG/100ML; MG/100ML; MG/100ML; MG/100ML
75 INJECTION, SOLUTION INTRAVENOUS CONTINUOUS
Status: CANCELLED | OUTPATIENT
Start: 2018-09-13

## 2018-09-13 RX ORDER — ONDANSETRON 2 MG/ML
4 INJECTION INTRAMUSCULAR; INTRAVENOUS ONCE
Status: CANCELLED | OUTPATIENT
Start: 2018-09-13 | End: 2018-09-13

## 2018-09-13 RX ORDER — SODIUM CHLORIDE 0.9 % (FLUSH) 0.9 %
5-10 SYRINGE (ML) INJECTION AS NEEDED
Status: CANCELLED | OUTPATIENT
Start: 2018-09-13

## 2018-09-13 RX ORDER — SODIUM CHLORIDE, SODIUM LACTATE, POTASSIUM CHLORIDE, CALCIUM CHLORIDE 600; 310; 30; 20 MG/100ML; MG/100ML; MG/100ML; MG/100ML
1000 INJECTION, SOLUTION INTRAVENOUS CONTINUOUS
Status: CANCELLED | OUTPATIENT
Start: 2018-09-13 | End: 2018-09-14

## 2018-09-13 RX ORDER — SODIUM CHLORIDE 0.9 % (FLUSH) 0.9 %
5-10 SYRINGE (ML) INJECTION EVERY 8 HOURS
Status: CANCELLED | OUTPATIENT
Start: 2018-09-13

## 2018-09-13 RX ORDER — HEPARIN 100 UNIT/ML
500 SYRINGE INTRAVENOUS AS NEEDED
Status: DISPENSED | OUTPATIENT
Start: 2018-09-13 | End: 2018-09-13

## 2018-09-13 RX ORDER — FENTANYL CITRATE 50 UG/ML
100 INJECTION, SOLUTION INTRAMUSCULAR; INTRAVENOUS AS NEEDED
Status: CANCELLED | OUTPATIENT
Start: 2018-09-13

## 2018-09-13 RX ORDER — HYDROMORPHONE HYDROCHLORIDE 2 MG/ML
0.5 INJECTION, SOLUTION INTRAMUSCULAR; INTRAVENOUS; SUBCUTANEOUS
Status: CANCELLED | OUTPATIENT
Start: 2018-09-13

## 2018-09-13 RX ORDER — ACETAMINOPHEN 500 MG
500 TABLET ORAL ONCE
Status: CANCELLED | OUTPATIENT
Start: 2018-09-13 | End: 2018-09-13

## 2018-09-13 RX ORDER — OXYCODONE HYDROCHLORIDE 5 MG/1
5 TABLET ORAL
Status: CANCELLED | OUTPATIENT
Start: 2018-09-13 | End: 2018-09-14

## 2018-09-13 RX ORDER — OXYCODONE HYDROCHLORIDE 5 MG/1
10 TABLET ORAL
Status: CANCELLED | OUTPATIENT
Start: 2018-09-13 | End: 2018-09-14

## 2018-09-13 RX ADMIN — MAGNESIUM SULFATE HEPTAHYDRATE 1 G: 1 INJECTION, SOLUTION INTRAVENOUS at 09:21

## 2018-09-13 RX ADMIN — Medication 500 UNITS: at 10:00

## 2018-09-13 RX ADMIN — SODIUM CHLORIDE 250 ML: 900 INJECTION, SOLUTION INTRAVENOUS at 07:55

## 2018-09-13 RX ADMIN — SODIUM CHLORIDE 25 MG: 9 INJECTION INTRAMUSCULAR; INTRAVENOUS; SUBCUTANEOUS at 07:59

## 2018-09-13 RX ADMIN — Medication 10 ML: at 10:00

## 2018-09-13 RX ADMIN — Medication 10 ML: at 07:55

## 2018-09-13 NOTE — PROGRESS NOTES
[facility] IV magnesium and phenergan completed. Patient tolerated well. Any issues or concerns during appointment: NO. 
Patient aware of next infusion appointment on 09/20/18 (date) at [phone] (time). Discharged ambulatory.

## 2018-09-14 ENCOUNTER — ANESTHESIA (OUTPATIENT)
Dept: ENDOSCOPY | Age: 48
End: 2018-09-14
Payer: COMMERCIAL

## 2018-09-14 ENCOUNTER — HOSPITAL ENCOUNTER (OUTPATIENT)
Age: 48
Setting detail: OUTPATIENT SURGERY
Discharge: HOME | End: 2018-09-14
Attending: INTERNAL MEDICINE | Admitting: INTERNAL MEDICINE
Payer: COMMERCIAL

## 2018-09-14 VITALS
HEART RATE: 74 BPM | RESPIRATION RATE: 16 BRPM | SYSTOLIC BLOOD PRESSURE: 106 MMHG | OXYGEN SATURATION: 97 % | HEIGHT: 62 IN | TEMPERATURE: 98.6 F | WEIGHT: 174 LBS | BODY MASS INDEX: 32.02 KG/M2 | DIASTOLIC BLOOD PRESSURE: 73 MMHG

## 2018-09-14 LAB — GLUCOSE BLD STRIP.AUTO-MCNC: 136 MG/DL (ref 65–100)

## 2018-09-14 PROCEDURE — 77030009426 HC FCPS BIOP ENDOSC BSC -B: Performed by: INTERNAL MEDICINE

## 2018-09-14 PROCEDURE — 82962 GLUCOSE BLOOD TEST: CPT

## 2018-09-14 PROCEDURE — 76060000031 HC ANESTHESIA FIRST 0.5 HR: Performed by: INTERNAL MEDICINE

## 2018-09-14 PROCEDURE — 88305 TISSUE EXAM BY PATHOLOGIST: CPT

## 2018-09-14 PROCEDURE — 74011250636 HC RX REV CODE- 250/636

## 2018-09-14 PROCEDURE — C1726 CATH, BAL DIL, NON-VASCULAR: HCPCS | Performed by: INTERNAL MEDICINE

## 2018-09-14 PROCEDURE — 76040000025: Performed by: INTERNAL MEDICINE

## 2018-09-14 RX ORDER — HEPARIN 100 UNIT/ML
500 SYRINGE INTRAVENOUS
Status: DISCONTINUED | OUTPATIENT
Start: 2018-09-14 | End: 2018-09-14 | Stop reason: HOSPADM

## 2018-09-14 RX ORDER — PROMETHAZINE HYDROCHLORIDE 25 MG/ML
INJECTION, SOLUTION INTRAMUSCULAR; INTRAVENOUS
Status: COMPLETED
Start: 2018-09-14 | End: 2018-09-14

## 2018-09-14 RX ORDER — LIDOCAINE HYDROCHLORIDE 10 MG/ML
0.1 INJECTION INFILTRATION; PERINEURAL AS NEEDED
Status: DISCONTINUED | OUTPATIENT
Start: 2018-09-14 | End: 2018-09-14 | Stop reason: HOSPADM

## 2018-09-14 RX ORDER — TRIAMCINOLONE ACETONIDE 40 MG/ML
40 INJECTION, SUSPENSION INTRA-ARTICULAR; INTRAMUSCULAR ONCE
Status: DISCONTINUED | OUTPATIENT
Start: 2018-09-14 | End: 2018-09-14 | Stop reason: HOSPADM

## 2018-09-14 RX ORDER — LIDOCAINE HYDROCHLORIDE 20 MG/ML
INJECTION, SOLUTION EPIDURAL; INFILTRATION; INTRACAUDAL; PERINEURAL AS NEEDED
Status: DISCONTINUED | OUTPATIENT
Start: 2018-09-14 | End: 2018-09-14 | Stop reason: HOSPADM

## 2018-09-14 RX ORDER — ACETAMINOPHEN 10 MG/ML
INJECTION, SOLUTION INTRAVENOUS AS NEEDED
Status: DISCONTINUED | OUTPATIENT
Start: 2018-09-14 | End: 2018-09-14

## 2018-09-14 RX ORDER — PROPOFOL 10 MG/ML
INJECTION, EMULSION INTRAVENOUS AS NEEDED
Status: DISCONTINUED | OUTPATIENT
Start: 2018-09-14 | End: 2018-09-14 | Stop reason: HOSPADM

## 2018-09-14 RX ORDER — SODIUM CHLORIDE, SODIUM LACTATE, POTASSIUM CHLORIDE, CALCIUM CHLORIDE 600; 310; 30; 20 MG/100ML; MG/100ML; MG/100ML; MG/100ML
INJECTION, SOLUTION INTRAVENOUS
Status: DISCONTINUED | OUTPATIENT
Start: 2018-09-14 | End: 2018-09-14 | Stop reason: HOSPADM

## 2018-09-14 RX ADMIN — PROPOFOL 20 MG: 10 INJECTION, EMULSION INTRAVENOUS at 12:10

## 2018-09-14 RX ADMIN — PROPOFOL 20 MG: 10 INJECTION, EMULSION INTRAVENOUS at 12:14

## 2018-09-14 RX ADMIN — LIDOCAINE HYDROCHLORIDE 40 MG: 20 INJECTION, SOLUTION EPIDURAL; INFILTRATION; INTRACAUDAL; PERINEURAL at 12:06

## 2018-09-14 RX ADMIN — PROPOFOL 20 MG: 10 INJECTION, EMULSION INTRAVENOUS at 12:13

## 2018-09-14 RX ADMIN — PROPOFOL 20 MG: 10 INJECTION, EMULSION INTRAVENOUS at 12:15

## 2018-09-14 RX ADMIN — PROPOFOL 20 MG: 10 INJECTION, EMULSION INTRAVENOUS at 12:19

## 2018-09-14 RX ADMIN — PROMETHAZINE HYDROCHLORIDE 25 MG: 25 INJECTION INTRAMUSCULAR; INTRAVENOUS at 11:24

## 2018-09-14 RX ADMIN — PROPOFOL 20 MG: 10 INJECTION, EMULSION INTRAVENOUS at 12:11

## 2018-09-14 RX ADMIN — PROPOFOL 10 MG: 10 INJECTION, EMULSION INTRAVENOUS at 12:17

## 2018-09-14 RX ADMIN — PROPOFOL 20 MG: 10 INJECTION, EMULSION INTRAVENOUS at 12:16

## 2018-09-14 RX ADMIN — SODIUM CHLORIDE, SODIUM LACTATE, POTASSIUM CHLORIDE, CALCIUM CHLORIDE: 600; 310; 30; 20 INJECTION, SOLUTION INTRAVENOUS at 12:02

## 2018-09-14 RX ADMIN — PROPOFOL 10 MG: 10 INJECTION, EMULSION INTRAVENOUS at 12:18

## 2018-09-14 RX ADMIN — PROPOFOL 50 MG: 10 INJECTION, EMULSION INTRAVENOUS at 12:06

## 2018-09-14 RX ADMIN — PROPOFOL 20 MG: 10 INJECTION, EMULSION INTRAVENOUS at 12:08

## 2018-09-14 NOTE — PROGRESS NOTES
Port to right upper chest was accessed before arrival to GI lab. Port was flushed with NS. Good blood return noted. LR infusing at [facility].

## 2018-09-14 NOTE — ANESTHESIA POSTPROCEDURE EVALUATION
Post-Anesthesia Evaluation and Assessment Patient: Vidhya Vitale MRN: [id number]  SSN: [id number] YOB: 1970  Age: 50 y.o. Sex: female Cardiovascular Function/Vital Signs Visit Vitals  /70  Pulse 87  Temp 37.1 °C (98.7 °F)  Resp 16  
 Ht 5' 2\" (1.575 m)  Wt 78.9 kg (174 lb)  SpO2 93%  BMI 31.83 kg/m2 Patient is status post total IV anesthesia anesthesia for Procedure(s): ESOPHAGOGASTRODUODENOSCOPY WITH DILATION/KENALOG (EGD)/ 32 ESOPHAGEAL DILATION 
ESOPHAGOGASTRODUODENAL (EGD) BIOPSY. Nausea/Vomiting: None Postoperative hydration reviewed and adequate. Pain: 
Pain Scale 1: Numeric (0 - 10) (09/14/18 1059) Pain Intensity 1: 0 (09/14/18 1059) Managed Neurological Status: At baseline Mental Status and Level of Consciousness: Arousable Pulmonary Status:  
O2 Device: Room air (09/14/18 1059) Adequate oxygenation and airway patent Complications related to anesthesia: None Post-anesthesia assessment completed. No concerns Signed By: Camilo Silver MD   
 September 14, 2018

## 2018-09-14 NOTE — IP AVS SNAPSHOT
[facility] 
 
 
 [address] [place] [address] 
[phone] Patient: Prasanth Mehta MRN: [id number] :1970 About your hospitalization You were admitted on:  2018 You last received care in the:  [facility] You were discharged on:  2018 Why you were hospitalized Your primary diagnosis was:  Not on File Follow-up Information None Your Scheduled Appointments   7:15 AM EDT Infusion with SS  
[address] [facility] [address]   [facility] [phone] [facility] [address]  
[phone] [address]   2:30 PM EDT  
[facility] PHONE ASSESSMENT with [facility] PHONE APPOINTMENT [facility] Pre Assessment [facility] OR PRE ASSESSMENT) [address]  
[phone] Date/time displayed does not reflect when your phone assessment will be completed. 2018  7:15 AM EDT Infusion with NUR7  
ST[address] [facility] [address]   [facility] [phone] [facility] [address]  
[phone] [address]  ESOPHAGOGASTRODUODENOSCOPY (EGD) with Radha Marina MD  
[facility] ([address] [place] [address]) [address] [place] [address]  
[phone]  10:30 AM EDT  
[facility] PHONE ASSESSMENT with [facility] PHONE APPOINTMENT [facility] Pre Assessment [facility] OR PRE ASSESSMENT) [address]  
[phone] Date/time displayed does not reflect when your phone assessment will be completed. Wednesday October 10, 2018  4:00 PM EDT Infusion with SS  
 [address] Anderson Acosta [phone] [facility] [address]  
[phone] [address] Thursday October 11, 2018 ESOPHAGOGASTRODUODENOSCOPY (EGD) with Mona Ceballos MD  
[facility] ([address] [place] [address]) [address] [facility]  
[phone] Thursday October 18, 2018  7:15 AM EDT Infusion with SS  
[address] Anderson Acosta [phone] [facility] [address]  
[phone] [address] Friday October 19, 2018  1:30 PM EDT  
[facility] PHONE ASSESSMENT with [facility] PHONE APPOINTMENT [facility] Pre Assessment [facility] OR PRE ASSESSMENT) [address]  
[phone] Date/time displayed does not reflect when your phone assessment will be completed. Thursday October 25, 2018  7:15 AM EDT Infusion with SS  
[address]) [address] Anderson Acosta [phone] [facility] [address]  
[phone] [address] Friday October 26, 2018 ESOPHAGOGASTRODUODENOSCOPY (EGD) with Beata Dutton MD  
[facility] ([address] [place] [address]) [address] [facility]  
[phone] Thursday November 01, 2018  7:15 AM EDT Infusion with NUR4  
[address] ([address]) [address] Anderson Acosta [phone] [facility] [address]  
[phone] [address] Thursday November 08, 2018  7:15 AM EST Infusion with SS  
[address]  [address] ) [address] Anderson Acosta [phone] [facility] [address] [phone] [address] Discharge Orders None A check cindy indicates which time of day the medication should be taken. My Medications ASK your doctor about these medications Instructions Each Dose to Equal  
 Morning Noon Evening Bedtime  
 0.9% sodium chloride solution Your last dose was: Your next dose is:    
   
   
 by IntraVENous route five (5) days a week. Gives to herself via port at home daily -1000cc ATIVAN 1 mg tablet Generic drug:  LORazepam  
   
Your last dose was: Your next dose is: Take 1 mg by mouth three (3) times daily as needed for Anxiety. 1 mg BENADRYL 25 mg capsule Generic drug:  diphenhydrAMINE Your last dose was: Your next dose is: Take 25 mg by mouth every six (6) hours as needed. 25 mg  
    
   
   
   
  
 CARAFATE 100 mg/mL suspension Generic drug:  sucralfate Your last dose was: Your next dose is: TAKE 10 ML BY MOUTH 4 TIMES A DAY EVERY DAY ON A EMPTY STOMACH 1 HR BEFORE MEALS AND AT BEDTIME CeleXA 20 mg tablet Generic drug:  citalopram  
   
Your last dose was: Your next dose is: Take 20 mg by mouth daily. Indications: am  
 20 mg  
    
   
   
   
  
 cyanocobalamin 1,000 mcg/mL injection Commonly known as:  VITAMIN B12 Your last dose was: Your next dose is: INJECT 1 VIAL INTRAMUSCULARLY FOR 4 WEEKS THEN MONTHLY  
     
   
   
   
  
 fentaNYL 75 mcg/hr Commonly known as:  Valaria Lower Your last dose was: Your next dose is:    
   
   
 1 Patch by TransDERmal route every seventy-two (72) hours. Fibromyalgia/ abd pain 1 Patch  
    
   
   
   
  
 gabapentin 250 mg/5 mL solution Commonly known as:  NEURONTIN Your last dose was: Your next dose is: Take 300 mg by mouth three (3) times daily. 300 mg  
    
   
   
   
  
 glucagon 1 mg injection Commonly known as:  Christine Jimenez Your last dose was: Your next dose is:    
   
   
 1 mg by IntraMUSCular route. 1 mg  
    
   
   
   
  
 hyoscyamine SL 0.125 mg SL tablet Commonly known as:  LEVSIN/SL Your last dose was: Your next dose is:    
   
   
 0.125 mg by SubLINGual route every six (6) hours as needed for Cramping.  
 0.125 mg  
    
   
   
   
  
 insulin aspart U-100 100 unit/mL injection Commonly known as:  NovoLOG U-100 Insulin aspart Your last dose was: Your next dose is:    
   
   
 Use as directed LIPITOR 10 mg tablet Generic drug:  atorvastatin Your last dose was: Your next dose is: Take 10 mg by mouth nightly. 10 mg MIRALAX 17 gram packet Generic drug:  polyethylene glycol Your last dose was: Your next dose is: Take 17 g by mouth daily. 17 g  
    
   
   
   
  
 oxyCODONE IR 10 mg Tab immediate release tablet Commonly known as:  Troy Marmolejo Your last dose was: Your next dose is: Take 10 mg by mouth every eight (8) hours as needed. Take day of surgery per anesthesia protocol. 10 mg  
    
   
   
   
  
 * promethazine 25 mg suppository Commonly known as:  PHENERGAN Your last dose was: Your next dose is: Insert 25 mg into rectum as needed for Nausea. 25 mg  
    
   
   
   
  
 * promethazine 25 mg tablet Commonly known as:  PHENERGAN Your last dose was: Your next dose is: Take 1 Tab by mouth every six (6) hours as needed. 25 mg  
    
   
   
   
  
 * PROTONIX 40 mg injection Generic drug:  pantoprazole Your last dose was: Your next dose is: 40 mg by IntraVENous route every morning. Take day of surgery per anesthesia protocol. 40 mg  
    
   
   
   
  
 * PROTONIX 40 mg tablet Generic drug:  pantoprazole Your last dose was: Your next dose is:    
   
   
 40 mg by IntraVENous route nightly. 40 mg  
    
   
   
   
  
 TRESIBA FLEXTOUCH U-100 100 unit/mL (3 mL) Inpn Generic drug:  insulin degludec Your last dose was: Your next dose is:    
   
   
 40 Units by SubCUTAneous route nightly. 40 Units TYLENOL 325 mg tablet Generic drug:  acetaminophen Your last dose was: Your next dose is: Take 325 mg by mouth every four (4) hours as needed for Pain. 325 mg  
    
   
   
   
  
 ZOFRAN 8 mg tablet Generic drug:  ondansetron hcl Your last dose was: Your next dose is: Take 8 mg by mouth every eight (8) hours as needed for Nausea. 8 mg  
    
   
   
   
  
 zolpidem 10 mg tablet Commonly known as:  AMBIEN Your last dose was: Your next dose is: TAKE 1 TABLET BY MOUTH EVERY NIGHT AS NEEDED FOR SLEEP * Notice: This list has 4 medication(s) that are the same as other medications prescribed for you. Read the directions carefully, and ask your doctor or other care provider to review them with you. Opioid Education Prescription Opioids: What You Need to Know: 
 
Prescription opioids can be used to help relieve moderate-to-severe pain and are often prescribed following a surgery or injury, or for certain health conditions. These medications can be an important part of treatment but also come with serious risks. Opioids are strong pain medicines. Examples include hydrocodone, oxycodone, fentanyl, and morphine. Heroin is an example of an illegal opioid. It is important to work with your health care provider to make sure you are getting the safest, most effective care. WHAT ARE THE RISKS AND SIDE EFFECTS OF OPIOID USE? Prescription opioids carry serious risks of addiction and overdose, especially with prolonged use. An opioid overdose, often marked by slow breathing, can cause sudden death. The use of prescription opioids can have a number of side effects as well, even when taken as directed. · Tolerance-meaning you might need to take more of a medication for the same pain relief · Physical dependence-meaning you have symptoms of withdrawal when the medication is stopped. Withdrawal symptoms can include nausea, sweating, chills, diarrhea, stomach cramps, and muscle aches. Withdrawal can last up to several weeks, depending on which drug you took and how long you took it. · Increased sensitivity to pain · Constipation · Nausea, vomiting, and dry mouth · Sleepiness and dizziness · Confusion · Depression · Low levels of testosterone that can result in lower sex drive, energy, and strength · Itching and sweating RISKS ARE GREATER WITH:      
· History of drug misuse, substance use disorder, or overdose · Mental health conditions (such as depression or anxiety) · Sleep apnea · Older age (72 years or older) · Pregnancy Avoid alcohol while taking prescription opioids. Also, unless specifically advised by your health care provider, medications to avoid include: · Benzodiazepines (such as Xanax or Valium) · Muscle relaxants (such as Soma or Flexeril) · Hypnotics (such as Ambien or Lunesta) · Other prescription opioids KNOW YOUR OPTIONS Talk to your health care provider about ways to manage your pain that don't involve prescription opioids. Some of these options may actually work better and have fewer risks and side effects. Options may include: 
· Pain relievers such as acetaminophen, ibuprofen, and naproxen · Some medications that are also used for depression or seizures · Physical therapy and exercise · Counseling to help patients learn how to cope better with triggers of pain and stress. · Application of heat or cold compress · Massage therapy · Relaxation techniques Be Informed Make sure you know the name of your medication, how much and how often to take it, and its potential risks & side effects. IF YOU ARE PRESCRIBED OPIOIDS FOR PAIN: 
· Never take opioids in greater amounts or more often than prescribed. Remember the goal is not to be pain-free but to manage your pain at a tolerable level. · Follow up with your primary care provider to: · Work together to create a plan on how to manage your pain. · Talk about ways to help manage your pain that don't involve prescription opioids. · Talk about any and all concerns and side effects. · Help prevent misuse and abuse. · Never sell or share prescription opioids · Help prevent misuse and abuse. · Store prescription opioids in a secure place and out of reach of others (this may include visitors, children, friends, and family). · Safely dispose of unused/unwanted prescription opioids: Find your community drug take-back program or your pharmacy mail-back program, or flush them down the toilet, following guidance from the Food and Drug Administration (www.fda.gov/Drugs/ResourcesForYou). · Visit www.cdc.gov/drugoverdose to learn about the risks of opioid abuse and overdose. · If you believe you may be struggling with addiction, tell your health care provider and ask for guidance or call [facility] MedicAnimal.com at 1-151-396-IVBH. Discharge Instructions Gastrointestinal Esophagogastroduodenoscopy (EGD) - Upper Exam Discharge Instructions 1. Call Dr. Tami Valerio at [phone] for any problems or questions. 2. Contact the doctor's office for follow up appointment as directed.  
3. Medication may cause drowsiness for several hours, therefore, do not drive or operate machinery for remainder of the day. 4. No alcohol today. 5. Ordinarily, you may resume regular diet and activity after exam unless otherwise  specified by your physician. 6. For mild soreness in your throat you may use Cepacol throat lozenges or warm  salt-water gargles as needed. Introducing [facility] HE[facility]! Mariana Soares introduces [employer] patient portal. Now you can access parts of your medical record, email your doctor's office, and request medication refills online. 1. In your internet browser, go to [email or web address] 2. Click on the First Time User? Click Here link in the Sign In box. You will see the New Member Sign Up page. 3. Enter your [employer] Access Code exactly as it appears below. You will not need to use this code after youve completed the sign-up process. If you do not sign up before the expiration date, you must request a new code. · [employer] Access Code: [email or web address] Expires: 10/30/2018  4:09 PM 
 
4. Enter the last four digits of your Social Security Number (xxxx) and Date of Birth (mm/dd/yyyy) as indicated and click Submit. You will be taken to the next sign-up page. 5. Create a [employer] ID. This will be your [employer] login ID and cannot be changed, so think of one that is secure and easy to remember. 6. Create a [employer] password. You can change your password at any time. 7. Enter your Password Reset Question and Answer. This can be used at a later time if you forget your password. 8. Enter your e-mail address. You will receive e-mail notification when new information is available in [address] 9. Click Sign Up. You can now view and download portions of your medical record. 10. Click the Download Summary menu link to download a portable copy of your medical information. If you have questions, please visit the Frequently Asked Questions section of the [employer] website.  Remember, [employer] is NOT to be used for urgent needs. For medical emergencies, dial 911. Now available from your iPhone and Android! Introducing Eleazar Gunn As a [facility] patient, I wanted to make you aware of our electronic visit tool called Eleazar Gunn. [facility] 24/OneMore[employer] allows you to connect within minutes with a medical provider 24 hours a day, seven days a week via a mobile device or tablet or logging into a secure website from your computer. You can access Eleazar Gunn from anywhere in the [place]. A virtual visit might be right for you when you have a simple condition and feel like you just dont want to get out of bed, or cant get away from work for an appointment, when your regular [facility] provider is not available (evenings, weekends or holidays), or when youre out of town and need minor care. Electronic visits cost only $49 and if the [facility] 24/OneMorePal[employer] provider determines a prescription is needed to treat your condition, one can be electronically transmitted to a nearby pharmacy*. Please take a moment to enroll today if you have not already done so. The enrollment process is free and takes just a few minutes. To enroll, please download the [employer]/OneMorePallet chandler to your tablet or phone, or visit [email or web address] to enroll on your computer. And, as an [address] [place] [address] patient with a [employer] account, the results of your visits will be scanned into your electronic medical record and your primary care provider will be able to view the scanned results. We urge you to continue to see your regular [facility] provider for your ongoing medical care. And while your primary care provider may not be the one available when you seek a Eleazar Gunn virtual visit, the peace of mind you get from getting a real diagnosis real time can be priceless. For more information on Eleazar Gunn, view our Frequently Asked Questions (FAQs) at [email or web address].  
 
Sincerely, 
 
 Michi Forrester MD 
Chief Medical Officer [address] *:  certain medications cannot be prescribed via Eleazar Gunn Providers Seen During Your Hospitalization Provider Specialty Primary office phone Aruna David MD Gastroenterology [phone] Your Primary Care Physician (PCP) Primary Care Physician Office Phone Office Fax [address], [address] [place] [address] [phone] You are allergic to the following Allergen Reactions Tape (Adhesive) Rash Itching Barium Iodide Nausea and Vomiting Contrast Dye (Iodine) Shortness of Breath Oral contrast-experienced flushing,shortness of breath, sweatiness; (patient has received oral contrast many times at [facility]) Flagyl (Metronidazole) Nausea and Vomiting Metformin Nausea and Vomiting Stomach pain Insulins Nausea and Vomiting Humalog only Recent Documentation Height Weight BMI OB Status Smoking Status 1.575 m 78.9 kg 31.83 kg/m2 Hysterectomy Former Smoker Emergency Contacts Name Discharge Info Relation Home Work Mobile Jorge Treadwell DISCHARGE CAREGIVER [3] Spouse [3] [phone]  [address] [place] [address] CAREGIVER [3] Father [15] [phone] Vahe Rojas CAREGIVER [3] Mother [14] [phone] Patient Belongings The following personal items are in your possession at time of discharge: 
  Dental Appliances: None  Visual Aid: None Please provide this summary of care documentation to your next provider. Signatures-by signing, you are acknowledging that this After Visit Summary has been reviewed with you and you have received a copy. Patient Signature:  ____________________________________________________________  Date:  ____________________________________________________________  
  
Dustin Gonzalez    
    
 Provider Signature:  ____________________________________________________________ Date:  ____________________________________________________________

## 2018-09-14 NOTE — H&P
Gastrointestinal Progress Note 9/14/2018 Admit Date: 9/14/2018 Subjective:  
 
Patient is NPO for an EGD/dilatation (dysphagia with recurrent strictures). Pain: Patient complains of no abdominal pain. Bowel Movements: Normal 
 
Bleeding:  None Current Facility-Administered Medications Medication Dose Route Frequency  lidocaine (XYLOCAINE) 10 mg/mL (1 %) injection 0.1 mL  0.1 mL SubCUTAneous PRN  promethazine (PHENERGAN) with saline injection 25 mg  25 mg IntraVENous ENDO ONCE  
 triamcinolone acetonide (KENALOG-40) 40 mg/mL injection 40 mg  40 mg Other ONCE Facility-Administered Medications Ordered in Other Encounters Medication Dose Route Frequency  central line flush (saline) syringe 10 mL  10 mL InterCATHeter PRN Objective:  
 
Blood pressure 108/70, pulse 87, temperature 98.7 °F (37.1 °C), resp. rate 16, height 5' 2\" (1.575 m), weight 78.9 kg (174 lb), SpO2 93 %. EXAM:  HEART: regular rate and rhythm, S1, S2 normal, no murmur, click, rub or gallop, LUNGS: chest clear, no wheezing, rales, normal symmetric air entry, Heart exam - S1, S2 normal, no murmur, no gallop, rate regular and ABDOMEN:  Bowel sounds are normal, liver is not enlarged, spleen is not enlarged Data Review Recent Results (from the past 24 hour(s)) GLUCOSE, POC Collection Time: 09/14/18 11:10 AM  
Result Value Ref Range Glucose (POC) 136 (H) 65 - 100 mg/dL Assessment:  
 
Active Problems: Dysphagia Recurrent esophageal strictures DM 
S/P BII anastomosis HTN Plan:  
 
EGD/dilatation --risks (bleeding, perforation, infection, untoward CV or resp. Event, mortality, etc.), benefits and alternatives were discussed with the patient who agrees to proceed.  Shy Angelo MD

## 2018-09-14 NOTE — DISCHARGE INSTRUCTIONS
Gastrointestinal Esophagogastroduodenoscopy (EGD) - Upper Exam Discharge Instructions    1. Call Dr. Megan Matt at [phone] for any problems or questions. 2. Contact the doctor's office for follow up appointment as directed. 3. Medication may cause drowsiness for several hours, therefore, do not drive or operate machinery for remainder of the day. 4. No alcohol today. 5. Ordinarily, you may resume regular diet and activity after exam unless otherwise  specified by your physician. 6. For mild soreness in your throat you may use Cepacol throat lozenges or warm  salt-water gargles as needed.

## 2018-09-14 NOTE — PROCEDURES
[facility]  PROCEDURE NOTE    Nakul Molina  MR#: [id number]  : 1970  ACCOUNT #: [de-identified]   DATE OF SERVICE: 2018    PROCEDURE:  Esophagogastroduodenoscopy. SUBJECTIVE:  A 49-year-old female, is undergoing upper endoscopy because of dysphagia and history of recurrent distal esophageal strictures. The patient is status post Nissen fundoplication and post-B2 anastomosis. Upper endoscopy done today to dilate the distal stricture. Hence, risks (bleeding, perforation, infection, untoward cardiovascular risk, mortality), benefits and alternatives were discussed in detail with the patient, who agrees to proceed. ANESTHESIA:  As per MAC anesthesia. INSTRUMENT:  GIF-H180 videoscope. PROCEDURE:  The patient was anesthetized. The videoendoscope was passed through the hypopharynx to the esophagus with minimal difficulty. Proximal, mid and distal esophagus were unremarkable except for a distal esophageal stricture. Once in the stomach, the gastric pouch was visualized and was unremarkable. Post-B2 tags were noted. Both AP and efferent limbs were visualized and were unremarkable. Retroflexed view via the endoscope backed into the stomach where a retroflexed view of EG junction and cardia revealed post-wrap changes. We decided to proceed with the dilatation of the stricture, dilatation of the distal esophagus. A CRE 12, 13.5, 15 balloon was inserted into the gastric lumen and endoscope was pulled back, this was pulled back into the esophagus. Once the balloon crossed the area of the stricture, inflations were begun. We inflated each size to max pressure, holding to 30 seconds then deflating. The 15 mm balloon had been deflated, the balloon was removed from the patient.   The endoscope was then brought back in the stomach where air was decompressed, and the endoscope was brought back to the esophagus where esophageal biopsy was done to exclude eosinophilic esophagitis for completeness. After documentation of hemostasis, the endoscope was passed from the stomach where air was decompressed and endoscope back esophagus of the patient. The vocal cords appeared normal.   The endoscope was removed from the patient. The patient was taken to the recovery area in stable condition. IMPRESSION:  1. Postoperative changes (she is status post Nissen repair as well as B2 anastomosis) with distal esophageal stricture. 2.  Stricture dilated described above. PLAN:  1. Follow up biopsies. 2.  Followup dilatation in approximately 2 weeks as scheduled. 3.  Continue other medications as is.       Scott Johnson MD       DAKOTA / LN  D: 09/14/2018 12:49     T: 09/14/2018 14:19  JOB #: [id number]  CC: Priscilla Knowles MD  [address] Huan Hoskins  [facility] [address]  [address] [place] [address] [id number], [id number] [facility]   CC: Gil Weston MD  CC: Phyllis Newman MD

## 2018-09-18 NOTE — PROGRESS NOTES
Dr. Anh Sherman office aware patient needs recent labs if port is to be replaced. States they will attempt to have patient get labs drawn.

## 2018-09-20 ENCOUNTER — HOSPITAL ENCOUNTER (OUTPATIENT)
Dept: INFUSION THERAPY | Age: 48
Discharge: HOME | End: 2018-09-20
Payer: COMMERCIAL

## 2018-09-20 LAB — MAGNESIUM SERPL-MCNC: 1.9 MG/DL (ref 1.8–2.4)

## 2018-09-20 PROCEDURE — 74011250636 HC RX REV CODE- 250/636: Performed by: INTERNAL MEDICINE

## 2018-09-20 PROCEDURE — 77030003560 HC NDL HUBR BARD -A

## 2018-09-20 PROCEDURE — 83735 ASSAY OF MAGNESIUM: CPT

## 2018-09-20 PROCEDURE — 96374 THER/PROPH/DIAG INJ IV PUSH: CPT

## 2018-09-20 PROCEDURE — 74011000250 HC RX REV CODE- 250: Performed by: INTERNAL MEDICINE

## 2018-09-20 RX ORDER — SODIUM CHLORIDE 0.9 % (FLUSH) 0.9 %
10 SYRINGE (ML) INJECTION EVERY 8 HOURS
Status: DISCONTINUED | OUTPATIENT
Start: 2018-09-20 | End: 2018-09-24 | Stop reason: HOSPADM

## 2018-09-20 RX ORDER — HEPARIN 100 UNIT/ML
500 SYRINGE INTRAVENOUS AS NEEDED
Status: DISPENSED | OUTPATIENT
Start: 2018-09-20 | End: 2018-09-20

## 2018-09-20 RX ADMIN — SODIUM CHLORIDE 25 MG: 9 INJECTION INTRAMUSCULAR; INTRAVENOUS; SUBCUTANEOUS at 08:28

## 2018-09-20 RX ADMIN — HEPARIN 500 UNITS: 100 SYRINGE at 08:40

## 2018-09-20 RX ADMIN — Medication 10 ML: at 08:39

## 2018-09-20 NOTE — PROGRESS NOTES
Arrived to the [facility]. Magnesium not called for per order completed. Patient tolerated well. Any issues or concerns during appointment: None. Patient aware of next infusion appointment on 09.26.2018 (date) at [address] [place] [address] (time). Discharged ambulatory with father.

## 2018-09-21 ENCOUNTER — HOSPITAL ENCOUNTER (OUTPATIENT)
Dept: INTERVENTIONAL RADIOLOGY/VASCULAR | Age: 48
Discharge: HOME | End: 2018-09-21
Attending: INTERNAL MEDICINE

## 2018-09-21 ENCOUNTER — ANESTHESIA (OUTPATIENT)
Dept: SURGERY | Age: 48
End: 2018-09-21
Payer: COMMERCIAL

## 2018-09-21 ENCOUNTER — HOSPITAL ENCOUNTER (OUTPATIENT)
Dept: INTERVENTIONAL RADIOLOGY/VASCULAR | Age: 48
Discharge: HOME | End: 2018-09-21
Attending: INTERNAL MEDICINE | Admitting: RADIOLOGY

## 2018-09-21 ENCOUNTER — ANESTHESIA EVENT (OUTPATIENT)
Dept: SURGERY | Age: 48
End: 2018-09-21
Payer: COMMERCIAL

## 2018-09-24 RX ORDER — SODIUM CHLORIDE, SODIUM LACTATE, POTASSIUM CHLORIDE, CALCIUM CHLORIDE 600; 310; 30; 20 MG/100ML; MG/100ML; MG/100ML; MG/100ML
75 INJECTION, SOLUTION INTRAVENOUS CONTINUOUS
Status: CANCELLED | OUTPATIENT
Start: 2018-09-24 | End: 2018-09-25

## 2018-09-24 RX ORDER — OXYCODONE HYDROCHLORIDE 5 MG/1
5 TABLET ORAL
Status: CANCELLED | OUTPATIENT
Start: 2018-09-24 | End: 2018-09-25

## 2018-09-24 RX ORDER — MIDAZOLAM HYDROCHLORIDE 1 MG/ML
2 INJECTION, SOLUTION INTRAMUSCULAR; INTRAVENOUS ONCE
Status: CANCELLED | OUTPATIENT
Start: 2018-09-24 | End: 2018-09-24

## 2018-09-24 RX ORDER — FENTANYL CITRATE 50 UG/ML
100 INJECTION, SOLUTION INTRAMUSCULAR; INTRAVENOUS ONCE
Status: CANCELLED | OUTPATIENT
Start: 2018-09-24 | End: 2018-09-24

## 2018-09-24 RX ORDER — SODIUM CHLORIDE 0.9 % (FLUSH) 0.9 %
5-10 SYRINGE (ML) INJECTION AS NEEDED
Status: CANCELLED | OUTPATIENT
Start: 2018-09-24

## 2018-09-24 RX ORDER — CELECOXIB 200 MG/1
200 CAPSULE ORAL
Status: CANCELLED | OUTPATIENT
Start: 2018-09-24 | End: 2018-09-25

## 2018-09-24 RX ORDER — LIDOCAINE HYDROCHLORIDE 10 MG/ML
0.1 INJECTION INFILTRATION; PERINEURAL AS NEEDED
Status: CANCELLED | OUTPATIENT
Start: 2018-09-24

## 2018-09-24 RX ORDER — MIDAZOLAM HYDROCHLORIDE 1 MG/ML
2 INJECTION, SOLUTION INTRAMUSCULAR; INTRAVENOUS
Status: CANCELLED | OUTPATIENT
Start: 2018-09-24 | End: 2018-09-25

## 2018-09-24 RX ORDER — SODIUM CHLORIDE, SODIUM LACTATE, POTASSIUM CHLORIDE, CALCIUM CHLORIDE 600; 310; 30; 20 MG/100ML; MG/100ML; MG/100ML; MG/100ML
50 INJECTION, SOLUTION INTRAVENOUS CONTINUOUS
Status: CANCELLED | OUTPATIENT
Start: 2018-09-24

## 2018-09-24 RX ORDER — SODIUM CHLORIDE 0.9 % (FLUSH) 0.9 %
5-10 SYRINGE (ML) INJECTION EVERY 8 HOURS
Status: CANCELLED | OUTPATIENT
Start: 2018-09-24

## 2018-09-24 RX ORDER — HYDROMORPHONE HYDROCHLORIDE 2 MG/ML
0.5 INJECTION, SOLUTION INTRAMUSCULAR; INTRAVENOUS; SUBCUTANEOUS
Status: CANCELLED | OUTPATIENT
Start: 2018-09-24

## 2018-09-24 RX ORDER — ALBUTEROL SULFATE 0.83 MG/ML
2.5 SOLUTION RESPIRATORY (INHALATION) AS NEEDED
Status: CANCELLED | OUTPATIENT
Start: 2018-09-24

## 2018-09-24 NOTE — PROGRESS NOTES
Pre-assessment call completed. Arrival time verified. NPO after midnight tonight. Patient is on no blood thinners. States she takes celexa, protonix, gabapentin, and ativan in the morning; instructed she can take these with a sip of water.

## 2018-09-25 ENCOUNTER — HOSPITAL ENCOUNTER (OUTPATIENT)
Dept: INTERVENTIONAL RADIOLOGY/VASCULAR | Age: 48
Discharge: HOME | End: 2018-09-25
Attending: INTERNAL MEDICINE | Admitting: RADIOLOGY
Payer: COMMERCIAL

## 2018-09-25 ENCOUNTER — HOSPITAL ENCOUNTER (OUTPATIENT)
Dept: INTERVENTIONAL RADIOLOGY/VASCULAR | Age: 48
Discharge: HOME | End: 2018-09-25
Attending: INTERNAL MEDICINE
Payer: COMMERCIAL

## 2018-09-25 VITALS
OXYGEN SATURATION: 93 % | BODY MASS INDEX: 32.02 KG/M2 | TEMPERATURE: 97.5 F | RESPIRATION RATE: 14 BRPM | HEART RATE: 90 BPM | HEIGHT: 62 IN | WEIGHT: 174 LBS | DIASTOLIC BLOOD PRESSURE: 75 MMHG | SYSTOLIC BLOOD PRESSURE: 111 MMHG

## 2018-09-25 DIAGNOSIS — T82.598A PORTOCAVAL SHUNT MALFUNCTION (HCC): ICD-10-CM

## 2018-09-25 LAB
BUN BLD-MCNC: 12 MG/DL (ref 8–26)
CA-I BLD-MCNC: 1.16 MMOL/L (ref 1.12–1.32)
CHLORIDE BLD-SCNC: 97 MMOL/L (ref 98–107)
CO2 BLD-SCNC: 26 MMOL/L (ref 21–32)
CREAT BLD-MCNC: 0.6 MG/DL (ref 0.8–1.5)
ERYTHROCYTE [DISTWIDTH] IN BLOOD BY AUTOMATED COUNT: 12.2 %
GLUCOSE BLD STRIP.AUTO-MCNC: 179 MG/DL (ref 65–100)
GLUCOSE BLD STRIP.AUTO-MCNC: 181 MG/DL (ref 65–100)
GLUCOSE BLD-MCNC: 306 MG/DL (ref 65–100)
HCT VFR BLD AUTO: 45.9 % (ref 35.8–46.3)
HGB BLD-MCNC: 14.8 G/DL (ref 11.7–15.4)
MCH RBC QN AUTO: 27.4 PG (ref 26.1–32.9)
MCHC RBC AUTO-ENTMCNC: 32.2 G/DL (ref 31.4–35)
MCV RBC AUTO: 84.8 FL (ref 79.6–97.8)
NRBC # BLD: 0 K/UL (ref 0–0.2)
PLATELET # BLD AUTO: 170 K/UL (ref 150–450)
PMV BLD AUTO: 12.6 FL (ref 9.4–12.3)
POTASSIUM BLD-SCNC: 4.7 MMOL/L (ref 3.5–5.1)
RBC # BLD AUTO: 5.41 M/UL (ref 4.05–5.2)
SODIUM BLD-SCNC: 138 MMOL/L (ref 136–145)
WBC # BLD AUTO: 5.4 K/UL (ref 4.3–11.1)

## 2018-09-25 PROCEDURE — 74011000250 HC RX REV CODE- 250: Performed by: PHYSICIAN ASSISTANT

## 2018-09-25 PROCEDURE — 74011250636 HC RX REV CODE- 250/636: Performed by: PHYSICIAN ASSISTANT

## 2018-09-25 PROCEDURE — 74011250636 HC RX REV CODE- 250/636

## 2018-09-25 PROCEDURE — 36415 COLL VENOUS BLD VENIPUNCTURE: CPT

## 2018-09-25 PROCEDURE — 82962 GLUCOSE BLOOD TEST: CPT

## 2018-09-25 PROCEDURE — 74011000250 HC RX REV CODE- 250

## 2018-09-25 PROCEDURE — 85027 COMPLETE CBC AUTOMATED: CPT

## 2018-09-25 PROCEDURE — C1894 INTRO/SHEATH, NON-LASER: HCPCS

## 2018-09-25 PROCEDURE — 80047 BASIC METABLC PNL IONIZED CA: CPT

## 2018-09-25 PROCEDURE — 74011636637 HC RX REV CODE- 636/637: Performed by: ANESTHESIOLOGY

## 2018-09-25 PROCEDURE — 77030031131 HC SUT MXN P COVD -B

## 2018-09-25 PROCEDURE — 77030031139 HC SUT VCRL2 J&J -A

## 2018-09-25 PROCEDURE — 77030003560 HC NDL HUBR BARD -A

## 2018-09-25 PROCEDURE — C1788 PORT, INDWELLING, IMP: HCPCS

## 2018-09-25 PROCEDURE — 76060000033 HC ANESTHESIA 1 TO 1.5 HR: Performed by: RADIOLOGY

## 2018-09-25 PROCEDURE — 77001 FLUOROGUIDE FOR VEIN DEVICE: CPT

## 2018-09-25 PROCEDURE — 77030037400 HC ADH TISS HI VISC EXOFIN CHMP -B

## 2018-09-25 RX ORDER — FENTANYL CITRATE 50 UG/ML
100 INJECTION, SOLUTION INTRAMUSCULAR; INTRAVENOUS ONCE
Status: DISCONTINUED | OUTPATIENT
Start: 2018-09-25 | End: 2018-09-25 | Stop reason: HOSPADM

## 2018-09-25 RX ORDER — LIDOCAINE HYDROCHLORIDE 20 MG/ML
INJECTION, SOLUTION EPIDURAL; INFILTRATION; INTRACAUDAL; PERINEURAL AS NEEDED
Status: DISCONTINUED | OUTPATIENT
Start: 2018-09-25 | End: 2018-09-25 | Stop reason: HOSPADM

## 2018-09-25 RX ORDER — HEPARIN SODIUM 200 [USP'U]/100ML
1000 INJECTION, SOLUTION INTRAVENOUS AS NEEDED
Status: DISCONTINUED | OUTPATIENT
Start: 2018-09-25 | End: 2018-09-25 | Stop reason: HOSPADM

## 2018-09-25 RX ORDER — LIDOCAINE HYDROCHLORIDE 10 MG/ML
20-200 INJECTION INFILTRATION; PERINEURAL ONCE
Status: DISCONTINUED | OUTPATIENT
Start: 2018-09-25 | End: 2018-09-25 | Stop reason: HOSPADM

## 2018-09-25 RX ORDER — HEPARIN SODIUM (PORCINE) LOCK FLUSH IV SOLN 100 UNIT/ML 100 UNIT/ML
500 SOLUTION INTRAVENOUS ONCE
Status: COMPLETED | OUTPATIENT
Start: 2018-09-25 | End: 2018-09-25

## 2018-09-25 RX ORDER — SODIUM CHLORIDE, SODIUM LACTATE, POTASSIUM CHLORIDE, CALCIUM CHLORIDE 600; 310; 30; 20 MG/100ML; MG/100ML; MG/100ML; MG/100ML
75 INJECTION, SOLUTION INTRAVENOUS CONTINUOUS
Status: DISCONTINUED | OUTPATIENT
Start: 2018-09-25 | End: 2018-09-25 | Stop reason: HOSPADM

## 2018-09-25 RX ORDER — SODIUM CHLORIDE 0.9 % (FLUSH) 0.9 %
5-10 SYRINGE (ML) INJECTION EVERY 8 HOURS
Status: DISCONTINUED | OUTPATIENT
Start: 2018-09-25 | End: 2018-09-25 | Stop reason: HOSPADM

## 2018-09-25 RX ORDER — MIDAZOLAM HYDROCHLORIDE 1 MG/ML
2 INJECTION, SOLUTION INTRAMUSCULAR; INTRAVENOUS
Status: DISCONTINUED | OUTPATIENT
Start: 2018-09-25 | End: 2018-09-25 | Stop reason: HOSPADM

## 2018-09-25 RX ORDER — CEFAZOLIN SODIUM/WATER 2 G/20 ML
2 SYRINGE (ML) INTRAVENOUS ONCE
Status: COMPLETED | OUTPATIENT
Start: 2018-09-25 | End: 2018-09-25

## 2018-09-25 RX ORDER — LIDOCAINE HYDROCHLORIDE 10 MG/ML
0.1 INJECTION INFILTRATION; PERINEURAL AS NEEDED
Status: DISCONTINUED | OUTPATIENT
Start: 2018-09-25 | End: 2018-09-25 | Stop reason: HOSPADM

## 2018-09-25 RX ORDER — PROPOFOL 10 MG/ML
INJECTION, EMULSION INTRAVENOUS
Status: DISCONTINUED | OUTPATIENT
Start: 2018-09-25 | End: 2018-09-25 | Stop reason: HOSPADM

## 2018-09-25 RX ORDER — SODIUM CHLORIDE 9 MG/ML
INJECTION, SOLUTION INTRAVENOUS
Status: DISCONTINUED | OUTPATIENT
Start: 2018-09-25 | End: 2018-09-25 | Stop reason: HOSPADM

## 2018-09-25 RX ORDER — PROPOFOL 10 MG/ML
INJECTION, EMULSION INTRAVENOUS AS NEEDED
Status: DISCONTINUED | OUTPATIENT
Start: 2018-09-25 | End: 2018-09-25 | Stop reason: HOSPADM

## 2018-09-25 RX ORDER — MIDAZOLAM HYDROCHLORIDE 1 MG/ML
INJECTION, SOLUTION INTRAMUSCULAR; INTRAVENOUS AS NEEDED
Status: DISCONTINUED | OUTPATIENT
Start: 2018-09-25 | End: 2018-09-25 | Stop reason: HOSPADM

## 2018-09-25 RX ORDER — CELECOXIB 200 MG/1
200 CAPSULE ORAL
Status: DISCONTINUED | OUTPATIENT
Start: 2018-09-25 | End: 2018-09-25

## 2018-09-25 RX ORDER — LIDOCAINE HYDROCHLORIDE AND EPINEPHRINE 20; 5 MG/ML; UG/ML
2-20 INJECTION, SOLUTION EPIDURAL; INFILTRATION; INTRACAUDAL; PERINEURAL ONCE
Status: COMPLETED | OUTPATIENT
Start: 2018-09-25 | End: 2018-09-25

## 2018-09-25 RX ORDER — FENTANYL CITRATE 50 UG/ML
INJECTION, SOLUTION INTRAMUSCULAR; INTRAVENOUS AS NEEDED
Status: DISCONTINUED | OUTPATIENT
Start: 2018-09-25 | End: 2018-09-25 | Stop reason: HOSPADM

## 2018-09-25 RX ORDER — INSULIN LISPRO 100 [IU]/ML
10 INJECTION, SOLUTION INTRAVENOUS; SUBCUTANEOUS ONCE
Status: DISCONTINUED | OUTPATIENT
Start: 2018-09-25 | End: 2018-09-25

## 2018-09-25 RX ORDER — MIDAZOLAM HYDROCHLORIDE 1 MG/ML
2 INJECTION, SOLUTION INTRAMUSCULAR; INTRAVENOUS ONCE
Status: DISCONTINUED | OUTPATIENT
Start: 2018-09-25 | End: 2018-09-25 | Stop reason: HOSPADM

## 2018-09-25 RX ORDER — SODIUM CHLORIDE 0.9 % (FLUSH) 0.9 %
5-10 SYRINGE (ML) INJECTION AS NEEDED
Status: DISCONTINUED | OUTPATIENT
Start: 2018-09-25 | End: 2018-09-25 | Stop reason: HOSPADM

## 2018-09-25 RX ADMIN — FENTANYL CITRATE 25 MCG: 50 INJECTION, SOLUTION INTRAMUSCULAR; INTRAVENOUS at 14:45

## 2018-09-25 RX ADMIN — PROPOFOL 50 MG: 10 INJECTION, EMULSION INTRAVENOUS at 14:23

## 2018-09-25 RX ADMIN — SODIUM CHLORIDE: 9 INJECTION, SOLUTION INTRAVENOUS at 14:11

## 2018-09-25 RX ADMIN — HEPARIN SODIUM (PORCINE) LOCK FLUSH IV SOLN 100 UNIT/ML 500 UNITS: 100 SOLUTION at 15:02

## 2018-09-25 RX ADMIN — HEPARIN SODIUM 2000 UNITS: 5000 INJECTION, SOLUTION INTRAVENOUS; SUBCUTANEOUS at 14:45

## 2018-09-25 RX ADMIN — Medication 2 G: at 14:36

## 2018-09-25 RX ADMIN — LIDOCAINE HYDROCHLORIDE 60 MG: 20 INJECTION, SOLUTION EPIDURAL; INFILTRATION; INTRACAUDAL; PERINEURAL at 14:23

## 2018-09-25 RX ADMIN — LIDOCAINE HYDROCHLORIDE,EPINEPHRINE BITARTRATE 320 MG: 20; .005 INJECTION, SOLUTION EPIDURAL; INFILTRATION; INTRACAUDAL; PERINEURAL at 15:18

## 2018-09-25 RX ADMIN — FENTANYL CITRATE 50 MCG: 50 INJECTION, SOLUTION INTRAMUSCULAR; INTRAVENOUS at 14:14

## 2018-09-25 RX ADMIN — INSULIN HUMAN 8 UNITS: 100 INJECTION, SOLUTION PARENTERAL at 12:14

## 2018-09-25 RX ADMIN — PROPOFOL 100 MCG/KG/MIN: 10 INJECTION, EMULSION INTRAVENOUS at 14:23

## 2018-09-25 RX ADMIN — MIDAZOLAM HYDROCHLORIDE 1 MG: 1 INJECTION, SOLUTION INTRAMUSCULAR; INTRAVENOUS at 14:14

## 2018-09-25 NOTE — PROGRESS NOTES
TRANSFER - OUT REPORT:    Verbal report given to Buster Barreto RN (name) on Mat Spears  in [employer] recovery 5 (unit) for routine progression of care   After MAC recovery after port insertion/removal.  Pt signed off by Dr. Smita Devlin. Sites look good, pt on room air, no c/o pain, eating ice chips no c/o N/V. Report consisted of patients Situation, Background, Assessment and   Recommendations(SBAR). Information from the following report(s) Procedure Summary, Recent Results and Cardiac Rhythm SR was reviewed with the receiving nurse. Opportunity for questions and clarification was provided.

## 2018-09-25 NOTE — ANESTHESIA POSTPROCEDURE EVALUATION
Post-Anesthesia Evaluation and Assessment Patient: Meet Anand MRN: [id number]  SSN: [id number] YOB: 1970  Age: 50 y.o. Sex: female Cardiovascular Function/Vital Signs Visit Vitals  /68  Pulse 87  Temp 36.4 °C (97.5 °F)  Resp 14  
 Ht 5' 2\" (1.575 m)  Wt 78.9 kg (174 lb)  SpO2 92%  Breastfeeding No  
 BMI 31.83 kg/m2 Patient is status post total IV anesthesia anesthesia for Procedure(s): 
IR ANESTHESIA/CHECK PORT POSS EXCHANGE. Nausea/Vomiting: None Postoperative hydration reviewed and adequate. Pain: 
Pain Scale 1: Numeric (0 - 10) (09/25/18 1558) Pain Intensity 1: 0 (09/25/18 1548) Managed Neurological Status:  
Neuro (WDL): Within Defined Limits (09/25/18 1534) At baseline Mental Status and Level of Consciousness: Arousable Pulmonary Status:  
O2 Device: Room air (09/25/18 1558) Adequate oxygenation and airway patent Complications related to anesthesia: None Post-anesthesia assessment completed. No concerns Signed By: Leisa Araiza MD   
 September 25, 2018

## 2018-09-25 NOTE — PROGRESS NOTES
Spoke with Bianka Mays PA-C. Okay to d/c patient home if vs are stable, site looks good and anesthesia signs off on her.

## 2018-09-25 NOTE — IP AVS SNAPSHOT
[address] 
[phone] Patient: Meet Anand MRN: [id number] :[id number] About your hospitalization You were admitted on:  2018 You last received care in the:  [facility] Radiology Specials You were discharged on:  2018 Why you were hospitalized Your primary diagnosis was:  Not on File Follow-up Information None Your Scheduled Appointments 2018  7:15 AM EDT Infusion with NUR7  
ST. [address] ([address] ) [address] Abraham Flores [phone] [facility] [address]  
[phone] [address]  ESOPHAGOGASTRODUODENOSCOPY (EGD) with Charleen Potter MD  
[facility] ENDOSCOPY ([address] [place] [address]) 304 [facility]  
[phone]  10:30 AM EDT  
[facility] PHONE ASSESSMENT with [facility] PHONE APPOINTMENT DE [facility] Pre Assessment [facility] OR PRE ASSESSMENT) [address] [place] [address]  
[phone] Date/time displayed does not reflect when your phone assessment will be completed. Wednesday October 10, 2018  4:00 PM EDT Infusion with SS  
ST. [address] [facility] ) [address] Abraham Flores [phone] [facility] [address]  
[phone] [address]  ESOPHAGOGASTRODUODENOSCOPY (EGD) with Soy Barrera MD  
[facility] ([address] [place] [address]) 304 [facility]  
[phone]   7:15 AM EDT Infusion with SS  
ST. [address] [facility] ) [address] Abraham Flores [phone] [facility] [address]  
[phone] [address] Friday October 19, 2018  1:30 PM EDT  
[facility] PHONE ASSESSMENT with [facility] PHONE APPOINTMENT [facility] Pre Assessment [facility] OR PRE ASSESSMENT) [address]  
[phone] Date/time displayed does not reflect when your phone assessment will be completed. Thursday October 25, 2018  7:15 AM EDT Infusion with SS  
ST. [address] [place] [address]) [address] Cathay Opitz [phone] [facility] [address]  
[phone] [address] Friday October 26, 2018 ESOPHAGOGASTRODUODENOSCOPY (EGD) with William Evans MD  
[facility] ([address] [place] [address]) [address] [place] [address]  
[phone] Thursday November 01, 2018  7:15 AM EDT Infusion with NUR4  
ST. [address] [place] [address]) [address] Cathay Opitz [phone] [facility] [address]  
[phone] [address] Thursday November 01, 2018 11:30 AM EDT  
[facility] PHONE ASSESSMENT with [facility] PHONE APPOINTMENT [facility] Pre Assessment [facility] OR PRE ASSESSMENT) [address]  
[phone] Date/time displayed does not reflect when your phone assessment will be completed. Thursday November 08, 2018  7:15 AM EST Infusion with SS  
ST. [address] [place] [address]) [address] Cathay Opitz [phone] [facility] [address]  
[phone] [address] Thursday November 08, 2018 ESOPHAGOGASTRODUODENOSCOPY (EGD) with Azul Neri MD  
[facility] ([address] [place] [address]) [address] [place] [address]  
[phone] Wednesday November 14, 2018 11:00 AM EST  
[facility] PHONE ASSESSMENT with [facility] PHONE APPOINTMENT [facility] Pre Assessment [facility] OR PRE ASSESSMENT) [address] [facility]  
[phone] Date/time displayed does not reflect when your phone assessment will be completed. Thursday November 15, 2018  7:15 AM EST Infusion with NUR7  
ST[address] [place]   [facility] [phone] [facility] [address]  
[phone] [address] Discharge Orders None A check cindy indicates which time of day the medication should be taken. My Medications ASK your doctor about these medications Instructions Each Dose to Equal  
 Morning Noon Evening Bedtime  
 0.9% sodium chloride solution Your last dose was: Your next dose is:    
   
   
 by IntraVENous route five (5) days a week. Gives to herself via port at home daily -1000cc ATIVAN 1 mg tablet Generic drug:  LORazepam  
   
Your last dose was: Your next dose is: Take 1 mg by mouth three (3) times daily as needed for Anxiety. 1 mg BENADRYL 25 mg capsule Generic drug:  diphenhydrAMINE Your last dose was: Your next dose is: Take 25 mg by mouth every six (6) hours as needed. 25 mg  
    
   
   
   
  
 CARAFATE 100 mg/mL suspension Generic drug:  sucralfate Your last dose was: Your next dose is: TAKE 10 ML BY MOUTH 4 TIMES A DAY EVERY DAY ON A EMPTY STOMACH 1 HR BEFORE MEALS AND AT BEDTIME CeleXA 20 mg tablet Generic drug:  citalopram  
   
Your last dose was: Your next dose is: Take 20 mg by mouth daily. Indications: am  
 20 mg  
    
   
   
   
  
 cyanocobalamin 1,000 mcg/mL injection Commonly known as:  VITAMIN B12 Your last dose was: Your next dose is: INJECT 1 VIAL INTRAMUSCULARLY FOR 4 WEEKS THEN MONTHLY  
     
   
   
   
  
 fentaNYL 75 mcg/hr Commonly known as:  Stewart Samano Your last dose was: Your next dose is:    
   
   
 1 Patch by TransDERmal route every seventy-two (72) hours. Fibromyalgia/ abd pain 1 Patch  
    
   
   
   
  
 gabapentin 250 mg/5 mL solution Commonly known as:  NEURONTIN Your last dose was: Your next dose is: Take 300 mg by mouth three (3) times daily. 300 mg  
    
   
   
   
  
 glucagon 1 mg injection Commonly known as:  Candace Tricia Your last dose was: Your next dose is:    
   
   
 1 mg by IntraMUSCular route. 1 mg  
    
   
   
   
  
 hyoscyamine SL 0.125 mg SL tablet Commonly known as:  LEVSIN/SL Your last dose was: Your next dose is:    
   
   
 0.125 mg by SubLINGual route every six (6) hours as needed for Cramping.  
 0.125 mg  
    
   
   
   
  
 insulin aspart U-100 100 unit/mL injection Commonly known as:  NovoLOG U-100 Insulin aspart Your last dose was: Your next dose is:    
   
   
 Use as directed LIPITOR 10 mg tablet Generic drug:  atorvastatin Your last dose was: Your next dose is: Take 10 mg by mouth nightly. 10 mg MIRALAX 17 gram packet Generic drug:  polyethylene glycol Your last dose was: Your next dose is: Take 17 g by mouth daily. 17 g * promethazine 25 mg suppository Commonly known as:  PHENERGAN Your last dose was: Your next dose is: Insert 25 mg into rectum as needed for Nausea. 25 mg  
    
   
   
   
  
 * promethazine 25 mg tablet Commonly known as:  PHENERGAN Your last dose was: Your next dose is: Take 1 Tab by mouth every six (6) hours as needed. 25 mg  
    
   
   
   
  
 * PROTONIX 40 mg injection Generic drug:  pantoprazole Your last dose was: Your next dose is:    
   
   
 40 mg by IntraVENous route every morning. Take day of surgery per anesthesia protocol. 40 mg  
    
   
   
   
  
 * PROTONIX 40 mg tablet Generic drug:  pantoprazole Your last dose was: Your next dose is:    
   
   
 40 mg by IntraVENous route nightly. 40 mg  
    
   
   
   
  
 TRESIBA FLEXTOUCH U-100 100 unit/mL (3 mL) Inpn Generic drug:  insulin degludec Your last dose was: Your next dose is:    
   
   
 40 Units by SubCUTAneous route nightly. 40 Units TYLENOL 325 mg tablet Generic drug:  acetaminophen Your last dose was: Your next dose is: Take 325 mg by mouth every four (4) hours as needed for Pain. 325 mg  
    
   
   
   
  
 ZOFRAN 8 mg tablet Generic drug:  ondansetron hcl Your last dose was: Your next dose is: Take 8 mg by mouth every eight (8) hours as needed for Nausea. 8 mg  
    
   
   
   
  
 zolpidem 10 mg tablet Commonly known as:  AMBIEN Your last dose was: Your next dose is: TAKE 1 TABLET BY MOUTH EVERY NIGHT AS NEEDED FOR SLEEP * Notice: This list has 4 medication(s) that are the same as other medications prescribed for you. Read the directions carefully, and ask your doctor or other care provider to review them with you. Opioid Education Prescription Opioids: What You Need to Know: 
 
Prescription opioids can be used to help relieve moderate-to-severe pain and are often prescribed following a surgery or injury, or for certain health conditions. These medications can be an important part of treatment but also come with serious risks.   Opioids are strong pain medicines. Examples include hydrocodone, oxycodone, fentanyl, and morphine. Heroin is an example of an illegal opioid. It is important to work with your health care provider to make sure you are getting the safest, most effective care. WHAT ARE THE RISKS AND SIDE EFFECTS OF OPIOID USE? Prescription opioids carry serious risks of addiction and overdose, especially with prolonged use. An opioid overdose, often marked by slow breathing, can cause sudden death. The use of prescription opioids can have a number of side effects as well, even when taken as directed. · Tolerance-meaning you might need to take more of a medication for the same pain relief · Physical dependence-meaning you have symptoms of withdrawal when the medication is stopped. Withdrawal symptoms can include nausea, sweating, chills, diarrhea, stomach cramps, and muscle aches. Withdrawal can last up to several weeks, depending on which drug you took and how long you took it. · Increased sensitivity to pain · Constipation · Nausea, vomiting, and dry mouth · Sleepiness and dizziness · Confusion · Depression · Low levels of testosterone that can result in lower sex drive, energy, and strength · Itching and sweating RISKS ARE GREATER WITH:      
· History of drug misuse, substance use disorder, or overdose · Mental health conditions (such as depression or anxiety) · Sleep apnea · Older age (72 years or older) · Pregnancy Avoid alcohol while taking prescription opioids. Also, unless specifically advised by your health care provider, medications to avoid include: · Benzodiazepines (such as Xanax or Valium) · Muscle relaxants (such as Soma or Flexeril) · Hypnotics (such as Ambien or Lunesta) · Other prescription opioids KNOW YOUR OPTIONS Talk to your health care provider about ways to manage your pain that don't involve prescription opioids.   Some of these options may actually work better and have fewer risks and side effects. Consult your physician before adding or stopping any medications, treatments, or physical activity. Options may include: 
· Pain relievers such as acetaminophen, ibuprofen, and naproxen · Some medications that are also used for depression or seizures · Physical therapy and exercise · Counseling to help patients learn how to cope better with triggers of pain and stress. · Application of heat or cold compress · Massage therapy · Relaxation techniques Be Informed Make sure you know the name of your medication, how much and how often to take it, and its potential risks & side effects. IF YOU ARE PRESCRIBED OPIOIDS FOR PAIN: 
· Never take opioids in greater amounts or more often than prescribed. Remember the goal is not to be pain-free but to manage your pain at a tolerable level. · Follow up with your primary care provider to: · Work together to create a plan on how to manage your pain. · Talk about ways to help manage your pain that don't involve prescription opioids. · Talk about any and all concerns and side effects. · Help prevent misuse and abuse. · Never sell or share prescription opioids · Help prevent misuse and abuse. · Store prescription opioids in a secure place and out of reach of others (this may include visitors, children, friends, and family). · Safely dispose of unused/unwanted prescription opioids: Find your community drug take-back program or your pharmacy mail-back program, or flush them down the toilet, following guidance from the Food and Drug Administration (www.fda.gov/Drugs/ResourcesForYou). · Visit www.cdc.gov/drugoverdose to learn about the risks of opioid abuse and overdose. · If you believe you may be struggling with addiction, tell your health care provider and ask for guidance or call [address] [place], [place] [address] at 6-702-756-PWRH. Discharge Instructions [place] [address] Department of Interventional Radiology [facility] 
[phone] Office 
[phone] Fax Implanted [facility] Discharge Instructions General Instructions: 
 A port is like an implanted IV. They are usually ordered for patients who will be getting chemotherapy, but can also be used as an IV for long term antibiotics, large amounts of fluids, and/or blood products. Your blood can be drawn from your port for labs also. Those patients who do not have good veins find the ports convenient as they can get the IV they need with one stick. The port can be used long term, and the care is easy. The device is under the skin, and once the skin heals, care is minimal. All that is required is the nurse who accesses the port will need to flush it with heparinized saline after each use. Ports are usually placed in the chest wall, usually on the right side. But they can be place in the arms and in the abdomen. Home Care Instructions: If your port is in your arm, do not allow blood pressure or other IVs to be place in that arm. Do not allow bra straps or any clothing to rub the skin over the port. Do not bathe or swim until the skin has healed and if the port is accessed. Once it is healed, and when the port is not accessed, it is okay to bathe and swim. Restrict yourself to light activity for the first 5 days after getting the port put in, after that, resume normal activity slowly. You may resume your normal diet and medications. Follow-Up Instructions: Please see your oncologist, or whatever physician ordered the port as he/she has requested of you. Call If: You should call your Physician and/or the Radiology Nurse if you notice redness, pus, swelling, or pain from the area of your incision. Call if you should develop a fever.  The nurses who access your port will know to call your doctor if the port does not seem to be working properly. You need to tell the nurses who use the port if you should have any pain or swelling at the site during an infusion. To Reach Us: If you have any questions about your procedure, please call the Interventional Radiology department at [phone]. After business hours (5pm) and weekends, call the answering service at [phone] and ask for the Radiologist on call to be paged. Interventional Radiology General Nurse Discharge After general anesthesia or intravenous sedation, for 24 hours or while taking prescription Narcotics: · Limit your activities · Do not drive and operate hazardous machinery · Do not make important personal or business decisions · Do  not drink alcoholic beverages · If you have not urinated within 8 hours after discharge, please contact your surgeon on call. * Please give a list of your current medications to your Primary Care Provider. * Please update this list whenever your medications are discontinued, doses are 
   changed, or new medications (including over-the-counter products) are added. * Please carry medication information at all times in case of emergency situations. These are general instructions for a healthy lifestyle: No smoking/ No tobacco products/ Avoid exposure to second hand smoke Surgeon General's Warning:  Quitting smoking now greatly reduces serious risk to your health. Obesity, smoking, and sedentary lifestyle greatly increases your risk for illness A healthy diet, regular physical exercise & weight monitoring are important for maintaining a healthy lifestyle You may be retaining fluid if you have a history of heart failure or if you experience any of the following symptoms:  Weight gain of 3 pounds or more overnight or 5 pounds in a week, increased swelling in our hands or feet or shortness of breath while lying flat in bed.   Please call your doctor as soon as you notice any of these symptoms; do not wait until your next office visit. Recognize signs and symptoms of STROKE: 
F-face looks uneven A-arms unable to move or move unevenly S-speech slurred or non-existent T-time-call 911 as soon as signs and symptoms begin-DO NOT go Back to bed or wait to see if you get better-TIME IS BRAIN. Patient Signature: 
Date: 9/25/2018 Discharging Nurse: Juan Sandoval Introducing [facility]! Nakul Letitiajaguar introduces [employer] patient portal. Now you can access parts of your medical record, email your doctor's office, and request medication refills online. 1. In your internet browser, go to [email or web address] 2. Click on the First Time User? Click Here link in the Sign In box. You will see the New Member Sign Up page. 3. Enter your [employer] Access Code exactly as it appears below. You will not need to use this code after youve completed the sign-up process. If you do not sign up before the expiration date, you must request a new code. · [employer] Access Code: [email or web address] Expires: 10/30/2018  4:09 PM 
 
4. Enter the last four digits of your Social Security Number (xxxx) and Date of Birth (mm/dd/yyyy) as indicated and click Submit. You will be taken to the next sign-up page. 5. Create a [employer] ID. This will be your [employer] login ID and cannot be changed, so think of one that is secure and easy to remember. 6. Create a [employer] password. You can change your password at any time. 7. Enter your Password Reset Question and Answer. This can be used at a later time if you forget your password. 8. Enter your e-mail address. You will receive e-mail notification when new information is available in [address] 9. Click Sign Up. You can now view and download portions of your medical record. 10. Click the Download Summary menu link to download a portable copy of your medical information. If you have questions, please visit the Frequently Asked Questions section of the MyChart website. Remember, [employer] is NOT to be used for urgent needs. For medical emergencies, dial 911. Now available from your iPhone and Android! Introducing Eleazar Gunn As a [employer] patient, I wanted to make you aware of our electronic visit tool called Eleazar Gunn. [employer] 24/7 allows you to connect within minutes with a medical provider 24 hours a day, seven days a week via a mobile device or tablet or logging into a secure website from your computer. You can access Eleazar Gunn from anywhere in the [place]. A virtual visit might be right for you when you have a simple condition and feel like you just dont want to get out of bed, or cant get away from work for an appointment, when your regular [employer] provider is not available (evenings, weekends or holidays), or when youre out of town and need minor care. Electronic visits cost only $49 and if the [employer] 24/7 provider determines a prescription is needed to treat your condition, one can be electronically transmitted to a nearby pharmacy*. Please take a moment to enroll today if you have not already done so. The enrollment process is free and takes just a few minutes. To enroll, please download the [employer] 24/7 chandler to your tablet or phone, or visit [email or web address] to enroll on your computer. And, as an [address] [place] [address] patient with a [employer] account, the results of your visits will be scanned into your electronic medical record and your primary care provider will be able to view the scanned results. We urge you to continue to see your regular [employer] provider for your ongoing medical care.   And while your primary care provider may not be the one available when you seek a Eleazar Gunn virtual visit, the peace of mind you get from getting a real diagnosis real time can be priceless. For more information on Eleazar Gunn, view our Frequently Asked Questions (FAQs) at [email or web address]. Sincerely, 
 
Chuck Cosme MD 
Chief Medical Officer [employer] *:  certain medications cannot be prescribed via Eleazar Pepekimberly Unresulted Labs-Please follow up with your PCP about these lab tests Order Current Status IR REPLACE TUNL CVAD W PORT In process Providers Seen During Your Hospitalization Provider Specialty Primary office phone Gibson Issa MD Gastroenterology [phone] Your Primary Care Physician (PCP) Primary Care Physician Office Phone Office Fax 100 [facility], [address] [place] [address] 69 [phone] You are allergic to the following Allergen Reactions Tape (Adhesive) Rash Itching Barium Iodide Nausea and Vomiting Contrast Dye (Iodine) Shortness of Breath Oral contrast-experienced flushing,shortness of breath, sweatiness; (patient has received oral contrast many times at [address]) Flagyl (Metronidazole) Nausea and Vomiting Metformin Nausea and Vomiting Stomach pain Insulins Nausea and Vomiting Humalog only Recent Documentation Height Weight Breastfeeding? BMI OB Status Smoking Status 1.575 m 78.9 kg No 31.83 kg/m2 Hysterectomy Former Smoker Emergency Contacts Name Discharge Info Relation Home Work Mobile Jorge Treadwell DISCHARGE CAREGIVER [3] Spouse [3] [phone]  [address] [place] [address] CAREGIVER [3] Father [15] [phone] Gurjit Izaguirre CAREGIVER [3] Mother [14] [phone] Patient Belongings The following personal items are in your possession at time of discharge: 
     Visual Aid: None Please provide this summary of care documentation to your next provider. Signatures-by signing, you are acknowledging that this After Visit Summary has been reviewed with you and you have received a copy. Patient Signature:  ____________________________________________________________ Date:  ____________________________________________________________  
  
Althia Kras Provider Signature:  ____________________________________________________________ Date:  ____________________________________________________________

## 2018-09-25 NOTE — PROGRESS NOTES
IR Nurse Pre-Procedure Checklist Part 2          Consent signed: Yes    H&P complete:  Yes    Antibiotics: Yes    Airway/Mallampati Done: Yes    Shaved:  No    Pregnancy Form:Yes    Patient Position: Yes    MD Side: Yes     Biopsy Worksheet: No    Specimen Medium: No

## 2018-09-25 NOTE — DISCHARGE INSTRUCTIONS
[address]  Department of Interventional Radiology  [facility]  [phone] Office  [phone] Fax  Implanted Port Discharge Instructions      General Instructions:   A port is like an implanted IV. They are usually ordered for patients who will be getting chemotherapy, but can also be used as an IV for long term antibiotics, large amounts of fluids, and/or blood products. Your blood can be drawn from your port for labs also. Those patients who do not have good veins find the ports convenient as they can get the IV they need with one stick. The port can be used long term, and the care is easy. The device is under the skin, and once the skin heals, care is minimal. All that is required is the nurse who accesses the port will need to flush it with heparinized saline after each use. Ports are usually placed in the chest wall, usually on the right side. But they can be place in the arms and in the abdomen. Home Care Instructions: If your port is in your arm, do not allow blood pressure or other IVs to be place in that arm. Do not allow bra straps or any clothing to rub the skin over the port. Do not bathe or swim until the skin has healed and if the port is accessed. Once it is healed, and when the port is not accessed, it is okay to bathe and swim. Restrict yourself to light activity for the first 5 days after getting the port put in, after that, resume normal activity slowly. You may resume your normal diet and medications. Follow-Up Instructions: Please see your oncologist, or whatever physician ordered the port as he/she has requested of you. Call If: You should call your Physician and/or the Radiology Nurse if you notice redness, pus, swelling, or pain from the area of your incision. Call if you should develop a fever. The nurses who access your port will know to call your doctor if the port does not seem to be working properly. You need to tell the nurses who use the port if you should have any pain or swelling at the site during an infusion. To Reach Us: If you have any questions about your procedure, please call the Interventional Radiology department at [phone]. After business hours (5pm) and weekends, call the answering service at [phone] and ask for the Radiologist on call to be paged. Interventional Radiology General Nurse Discharge    After general anesthesia or intravenous sedation, for 24 hours or while taking prescription Narcotics:  · Limit your activities  · Do not drive and operate hazardous machinery  · Do not make important personal or business decisions  · Do  not drink alcoholic beverages  · If you have not urinated within 8 hours after discharge, please contact your surgeon on call. * Please give a list of your current medications to your Primary Care Provider. * Please update this list whenever your medications are discontinued, doses are     changed, or new medications (including over-the-counter products) are added. * Please carry medication information at all times in case of emergency situations. These are general instructions for a healthy lifestyle:    No smoking/ No tobacco products/ Avoid exposure to second hand smoke  Surgeon General's Warning:  Quitting smoking now greatly reduces serious risk to your health. Obesity, smoking, and sedentary lifestyle greatly increases your risk for illness  A healthy diet, regular physical exercise & weight monitoring are important for maintaining a healthy lifestyle    You may be retaining fluid if you have a history of heart failure or if you experience any of the following symptoms:  Weight gain of 3 pounds or more overnight or 5 pounds in a week, increased swelling in our hands or feet or shortness of breath while lying flat in bed.   Please call your doctor as soon as you notice any of these symptoms; do not wait until your next office visit. Recognize signs and symptoms of STROKE:  F-face looks uneven    A-arms unable to move or move unevenly    S-speech slurred or non-existent    T-time-call 911 as soon as signs and symptoms begin-DO NOT go       Back to bed or wait to see if you get better-TIME IS BRAIN.            Patient Signature:  Date: 9/25/2018  Discharging Nurse: Tori Severs

## 2018-09-25 NOTE — H&P
Department of Interventional Radiology 
[phone] History and Physical 
 
Patient:  Hiro Baptiste MRN:  [id number]  SSN:  [id number] YOB: 1970  Age:  50 y.o. Sex:  female Primary Care Provider:  Victor Hugo Dey MD 
Referring Physician:  Carlo Smith MD 
 
Subjective: Chief Complaint: port replacement History of the Present Illness: The patient is a 50 y.o. female who presents for venous chest port replacement. Pt requires continuous venous access for magnesium infusions and IV hydration. Hx of multiple abdominal surgeries, nausea, recurrent esophageal stricture. Right IJ chest port was placed 2013 and has become painful and imbedded deeper in the subcutaneous tissue. NPO. Past Medical History:  
Diagnosis Date  Anemia   
 denies hx of blood transfusions-- Fe infusions 12/2017  Anxiety and depression  Asthma   
 allergy induced, denies any inhaler, patient denies  C. difficile diarrhea   
 in 4434 after complicated ERCP  
 Cervical dysplasia 1990s  Chronic insomnia  Chronic pain   
 all over  Chronic pancreatitis ([facility]  Endometriosis  Esophageal stricture 2017  Fibromyalgia  Fibromyalgia  Former cigarette smoker  GERD (gastroesophageal reflux disease) daily meds---po and IV protonix- uses per port- alternates  HLD (hyperlipidemia)   
 pt denies  IBS (irritable bowel syndrome)  Migraine headache  Morbid obesity ([facility] BMI 33.3  Nausea & vomiting   
 pt reports she does better with phenergan than zofran - causes HA  
 Nonalcoholic fatty liver disease  Pancreatitis  Psychiatric disorder  PUD (peptic ulcer disease) 06/2017 Hx of  Sphincter of Oddi dysfunction  Type 2 diabetes mellitus ([facility] -180, Lows at 90's, Last A1C 10 on 8/2018, Takes insulin-  
 
Past Surgical History:  
Procedure Laterality Date  ABDOMEN SURGERY PROC UNLISTED  last one placed  2012 Hx of pancreatic stent, multiple  ABDOMEN SURGERY PROC UNLISTED  1997  
 lap nissen  ABDOMEN SURGERY PROC UNLISTED  4/13  
 explor lap  COLONOSCOPY N/A 4/4/2018 COLONOSCOPY performed by Kostas Samano MD at [facility] ENDOSCOPY  
 HX COLONOSCOPY    
 HX ENDOSCOPY  2017  
 36 fr Fauzia Gonsales  HX ERCP  3/5/2013  
 resulted in perforated duodenum  HX HYSTERECTOMY  1998  
 ovaries remain [facility]  HX LAPAROTOMY  3/5/2013  
 exploratory for duodenal perforation with ERCP  
 HX ORTHOPAEDIC    
 right finger surgery 11/2017  HX UROLOGICAL  4/25/13 at [facility]-- stent removed 6-27-13. Dr Kristin Rolle  HX VASCULAR ACCESS  2014  
 port insertion Review of Systems:   
Pertinent items are noted in the History of Present Illness. Current Outpatient Prescriptions Medication Sig  
 atorvastatin (LIPITOR) 10 mg tablet Take 10 mg by mouth nightly.  pantoprazole (PROTONIX) 40 mg tablet 40 mg by IntraVENous route nightly.  fentaNYL (DURAGESIC) 75 mcg/hr 1 Patch by TransDERmal route every seventy-two (72) hours. Fibromyalgia/ abd pain  citalopram (CELEXA) 20 mg tablet Take 20 mg by mouth daily. Indications: am  
 acetaminophen (TYLENOL) 325 mg tablet Take 325 mg by mouth every four (4) hours as needed for Pain.  LORazepam (ATIVAN) 1 mg tablet Take 1 mg by mouth three (3) times daily as needed for Anxiety.  cyanocobalamin (VITAMIN B12) 1,000 mcg/mL injection INJECT 1 VIAL INTRAMUSCULARLY FOR 4 WEEKS THEN MONTHLY  glucagon (GLUCAGEN) 1 mg injection 1 mg by IntraMUSCular route.  CARAFATE 100 mg/mL suspension TAKE 10 ML BY MOUTH 4 TIMES A DAY EVERY DAY ON A EMPTY STOMACH 1 HR BEFORE MEALS AND AT BEDTIME  gabapentin (NEURONTIN) 250 mg/5 mL solution Take 300 mg by mouth three (3) times daily.  diphenhydrAMINE (BENADRYL) 25 mg capsule Take 25 mg by mouth every six (6) hours as needed.  pantoprazole (PROTONIX) 40 mg injection 40 mg by IntraVENous route every morning. Take day of surgery per anesthesia protocol.  insulin degludec (TRESIBA FLEXTOUCH U-100) 100 unit/mL (3 mL) inpn 40 Units by SubCUTAneous route nightly.  polyethylene glycol (MIRALAX) 17 gram packet Take 17 g by mouth daily.  hyoscyamine SL (LEVSIN/SL) 0.125 mg SL tablet 0.125 mg by SubLINGual route every six (6) hours as needed for Cramping.  0.9% sodium chloride solution by IntraVENous route five (5) days a week. Gives to herself via port at home daily -1000cc  insulin aspart (NOVOLOG) 100 unit/mL injection Use as directed (Patient taking differently: 7 Units by SubCUTAneous route Before breakfast, lunch, and dinner. Over 300 use sliding scale- Always gets 7 units then if >300 gets extra - does ot know amount- has never taken more than 10 units regular  Indications: type 2 diabetes mellitus)  zolpidem (AMBIEN) 10 mg tablet TAKE 1 TABLET BY MOUTH EVERY NIGHT AS NEEDED FOR SLEEP (Patient taking differently: Take 10 mg by mouth nightly.)  promethazine (PHENERGAN) 25 mg tablet Take 1 Tab by mouth every six (6) hours as needed. (Patient taking differently: Take 25 mg by mouth as needed.)  promethazine (PHENERGAN) 25 mg suppository Insert 25 mg into rectum as needed for Nausea.  ondansetron hcl (ZOFRAN) 8 mg tablet Take 8 mg by mouth every eight (8) hours as needed for Nausea. No current facility-administered medications for this encounter. Facility-Administered Medications Ordered in Other Encounters Medication Dose Route Frequency  fentaNYL citrate (PF) injection 100 mcg  100 mcg IntraVENous ONCE  
 lactated Ringers infusion  75 mL/hr IntraVENous CONTINUOUS  
 midazolam (VERSED) injection 2 mg  2 mg IntraVENous ONCE PRN  
 midazolam (VERSED) injection 2 mg  2 mg IntraVENous ONCE  
 sodium chloride (NS) flush 5-10 mL  5-10 mL IntraVENous Q8H  
  sodium chloride (NS) flush 5-10 mL  5-10 mL IntraVENous PRN  
 lidocaine (XYLOCAINE) 10 mg/mL (1 %) injection 0.1 mL  0.1 mL SubCUTAneous PRN Allergies Allergen Reactions  Tape [Adhesive] Rash and Itching  Barium Iodide Nausea and Vomiting  Contrast Dye [Iodine] Shortness of Breath Oral contrast-experienced flushing,shortness of breath, sweatiness; (patient has received oral contrast many times at [facility])  Flagyl [Metronidazole] Nausea and Vomiting  Metformin Nausea and Vomiting Stomach pain  Insulins Nausea and Vomiting Humalog only Family History Problem Relation Age of Onset  Diabetes Mother  Hypertension Mother  Heart Disease Mother   
  cabg  Diabetes Father  Heart Disease Father  Hypertension Father  Other Father  No Known Problems Sister Social History Substance Use Topics  Smoking status: Former Smoker Packs/day: 1.00 Years: 3.00 Quit date: 4/24/1993  Smokeless tobacco: Never Used  Alcohol use No  
  
 
Objective:    
 
Physical Examination:   
There were no vitals filed for this visit. There were no vitals taken for this visit. HEART: regular rate and rhythm LUNG: clear to auscultation bilaterally ABDOMEN:  soft, nontender EXTREMITIES: warm, no edema Laboratory:    
Lab Results Component Value Date/Time  Sodium 135 (L) 02/22/2018 07:17 AM  
 Sodium 139 11/24/2017 07:32 AM  
 Potassium 4.2 02/22/2018 07:17 AM  
 Potassium 3.9 11/24/2017 07:32 AM  
 Chloride 101 02/22/2018 07:17 AM  
 Chloride 104 11/24/2017 07:32 AM  
 CO2 29 02/22/2018 07:17 AM  
 CO2 27 11/24/2017 07:32 AM  
 Anion gap 5 (L) 02/22/2018 07:17 AM  
 Anion gap 8 11/24/2017 07:32 AM  
 Glucose 368 (H) 02/22/2018 07:17 AM  
 Glucose 105 (H) 11/24/2017 07:32 AM  
 BUN 8 02/22/2018 07:17 AM  
 BUN 13 11/24/2017 07:32 AM  
 Creatinine 0.90 02/22/2018 07:17 AM  
 Creatinine 0.73 11/24/2017 07:32 AM  
 GFR est AA >60 02/22/2018 07:17 AM  
 GFR est AA >60 11/24/2017 07:32 AM  
 GFR est non-AA >60 02/22/2018 07:17 AM  
 GFR est non-AA >60 11/24/2017 07:32 AM  
 Calcium 8.6 02/22/2018 07:17 AM  
 Calcium 8.4 11/24/2017 07:32 AM  
 Magnesium 1.9 09/20/2018 07:43 AM  
 Magnesium 1.7 (L) 09/13/2018 07:21 AM  
 Phosphorus 3.4 05/29/2013 03:55 AM  
 Phosphorus 2.9 05/27/2013 03:30 AM  
 Albumin 3.9 02/22/2018 07:17 AM  
 Albumin 3.5 11/24/2017 07:32 AM  
 Protein, total 8.1 02/22/2018 07:17 AM  
 Protein, total 7.6 11/24/2017 07:32 AM  
 Globulin 4.2 (H) 02/22/2018 07:17 AM  
 Globulin 4.1 (H) 11/24/2017 07:32 AM  
 A-G Ratio 0.9 (L) 02/22/2018 07:17 AM  
 A-G Ratio 0.9 (L) 11/24/2017 07:32 AM  
 AST (SGOT) 38 (H) 02/22/2018 07:17 AM  
 AST (SGOT) 28 11/24/2017 07:32 AM  
 ALT (SGPT) 54 02/22/2018 07:17 AM  
 ALT (SGPT) 24 11/24/2017 07:32 AM  
 
Lab Results Component Value Date/Time WBC 5.4 09/25/2018 11:30 AM  
 WBC 5.8 02/08/2018 07:16 AM  
 HGB 14.8 09/25/2018 11:30 AM  
 HGB 14.3 02/08/2018 07:16 AM  
 HCT 45.9 09/25/2018 11:30 AM  
 HCT 43.4 02/08/2018 07:16 AM  
 PLATELET 409 80/93/0720 11:30 AM  
 PLATELET 632 14/72/8546 07:16 AM  
 
Lab Results Component Value Date/Time  
 aPTT 32.5 (H) 03/24/2013 09:40 AM  
 aPTT 25.7 02/16/2012 11:19 AM  
 Prothrombin time 12.9 (H) 03/24/2013 09:40 AM  
 Prothrombin time 10.4 02/16/2012 11:19 AM  
 INR 1.2 03/24/2013 09:40 AM  
 INR 1.0 02/16/2012 11:19 AM  
 
 
Assessment:  
 
Port malfunction, hypomagnesemia, malabsorption Plan:  
 
Planned Procedure:  Port replacement Risks, benefits, and alternatives reviewed with patient and she agrees to proceed with the procedure. Signed By: Jackquelyn Snellen, PA-C September 25, 2018

## 2018-09-25 NOTE — PROGRESS NOTES
Recovery period without difficulty. Pt alert and oriented and denies pain. Dressing is clean, dry, and intact. Reviewed discharge instructions with patient and mother, both verbalized understanding. [facility] Vital signs and Jonah score completed.

## 2018-09-26 ENCOUNTER — ANESTHESIA EVENT (OUTPATIENT)
Dept: ENDOSCOPY | Age: 48
End: 2018-09-26
Payer: COMMERCIAL

## 2018-09-26 ENCOUNTER — HOSPITAL ENCOUNTER (OUTPATIENT)
Dept: INFUSION THERAPY | Age: 48
Discharge: HOME | End: 2018-09-26
Payer: COMMERCIAL

## 2018-09-26 VITALS
BODY MASS INDEX: 32.7 KG/M2 | DIASTOLIC BLOOD PRESSURE: 91 MMHG | SYSTOLIC BLOOD PRESSURE: 145 MMHG | RESPIRATION RATE: 18 BRPM | TEMPERATURE: 98.2 F | WEIGHT: 178.8 LBS | HEART RATE: 116 BPM | OXYGEN SATURATION: 98 %

## 2018-09-26 LAB — MAGNESIUM SERPL-MCNC: 1.8 MG/DL (ref 1.8–2.4)

## 2018-09-26 PROCEDURE — 83735 ASSAY OF MAGNESIUM: CPT

## 2018-09-26 PROCEDURE — 74011250636 HC RX REV CODE- 250/636: Performed by: INTERNAL MEDICINE

## 2018-09-26 PROCEDURE — 74011000250 HC RX REV CODE- 250: Performed by: INTERNAL MEDICINE

## 2018-09-26 PROCEDURE — 96374 THER/PROPH/DIAG INJ IV PUSH: CPT

## 2018-09-26 RX ORDER — OXYCODONE HYDROCHLORIDE 5 MG/1
5 TABLET ORAL
Status: CANCELLED | OUTPATIENT
Start: 2018-09-26 | End: 2018-09-27

## 2018-09-26 RX ORDER — HEPARIN 100 UNIT/ML
500 SYRINGE INTRAVENOUS AS NEEDED
Status: DISPENSED | OUTPATIENT
Start: 2018-09-26 | End: 2018-09-26

## 2018-09-26 RX ORDER — HYDROMORPHONE HYDROCHLORIDE 2 MG/ML
0.5 INJECTION, SOLUTION INTRAMUSCULAR; INTRAVENOUS; SUBCUTANEOUS
Status: CANCELLED | OUTPATIENT
Start: 2018-09-26

## 2018-09-26 RX ORDER — OXYCODONE AND ACETAMINOPHEN 10; 325 MG/1; MG/1
1 TABLET ORAL AS NEEDED
Status: CANCELLED | OUTPATIENT
Start: 2018-09-26

## 2018-09-26 RX ORDER — SODIUM CHLORIDE 0.9 % (FLUSH) 0.9 %
10 SYRINGE (ML) INJECTION AS NEEDED
Status: DISCONTINUED | OUTPATIENT
Start: 2018-09-26 | End: 2018-09-30 | Stop reason: HOSPADM

## 2018-09-26 RX ORDER — SODIUM CHLORIDE 0.9 % (FLUSH) 0.9 %
5-10 SYRINGE (ML) INJECTION AS NEEDED
Status: CANCELLED | OUTPATIENT
Start: 2018-09-26

## 2018-09-26 RX ADMIN — Medication 10 ML: at 07:20

## 2018-09-26 RX ADMIN — HEPARIN 500 UNITS: 100 SYRINGE at 07:55

## 2018-09-26 RX ADMIN — SODIUM CHLORIDE 25 MG: 9 INJECTION INTRAMUSCULAR; INTRAVENOUS; SUBCUTANEOUS at 07:24

## 2018-09-26 NOTE — PROGRESS NOTES
Pt ambulatory to area c/o pain upper right chest where port was removed yesterday, no swelling/redness noted. Pt received phenergan per order, tolerated well. No replacements needed. Aware of next appt on [employer]. Advised to call dr with any issues/concerns. Discharged home without complaints.

## 2018-09-27 ENCOUNTER — HOSPITAL ENCOUNTER (OUTPATIENT)
Age: 48
Setting detail: OUTPATIENT SURGERY
Discharge: HOME | End: 2018-09-27
Attending: INTERNAL MEDICINE | Admitting: INTERNAL MEDICINE
Payer: COMMERCIAL

## 2018-09-27 ENCOUNTER — ANESTHESIA (OUTPATIENT)
Dept: ENDOSCOPY | Age: 48
End: 2018-09-27
Payer: COMMERCIAL

## 2018-09-27 VITALS
BODY MASS INDEX: 32.76 KG/M2 | RESPIRATION RATE: 16 BRPM | HEIGHT: 62 IN | HEART RATE: 92 BPM | WEIGHT: 178 LBS | SYSTOLIC BLOOD PRESSURE: 125 MMHG | TEMPERATURE: 98.6 F | DIASTOLIC BLOOD PRESSURE: 77 MMHG | OXYGEN SATURATION: 97 %

## 2018-09-27 LAB
GLUCOSE BLD STRIP.AUTO-MCNC: 267 MG/DL (ref 65–100)
GLUCOSE BLD STRIP.AUTO-MCNC: 302 MG/DL (ref 65–100)

## 2018-09-27 PROCEDURE — 74011250636 HC RX REV CODE- 250/636

## 2018-09-27 PROCEDURE — 82962 GLUCOSE BLOOD TEST: CPT

## 2018-09-27 PROCEDURE — 74011250636 HC RX REV CODE- 250/636: Performed by: ANESTHESIOLOGY

## 2018-09-27 PROCEDURE — 76060000031 HC ANESTHESIA FIRST 0.5 HR: Performed by: INTERNAL MEDICINE

## 2018-09-27 PROCEDURE — 76040000025: Performed by: INTERNAL MEDICINE

## 2018-09-27 PROCEDURE — 74011250636 HC RX REV CODE- 250/636: Performed by: INTERNAL MEDICINE

## 2018-09-27 RX ORDER — SODIUM CHLORIDE, SODIUM LACTATE, POTASSIUM CHLORIDE, CALCIUM CHLORIDE 600; 310; 30; 20 MG/100ML; MG/100ML; MG/100ML; MG/100ML
75 INJECTION, SOLUTION INTRAVENOUS CONTINUOUS
Status: DISCONTINUED | OUTPATIENT
Start: 2018-09-27 | End: 2018-09-27 | Stop reason: HOSPADM

## 2018-09-27 RX ORDER — PROPOFOL 10 MG/ML
INJECTION, EMULSION INTRAVENOUS AS NEEDED
Status: DISCONTINUED | OUTPATIENT
Start: 2018-09-27 | End: 2018-09-27 | Stop reason: HOSPADM

## 2018-09-27 RX ORDER — TRIAMCINOLONE ACETONIDE 40 MG/ML
40 INJECTION, SUSPENSION INTRA-ARTICULAR; INTRAMUSCULAR ONCE
Status: DISCONTINUED | OUTPATIENT
Start: 2018-09-27 | End: 2018-09-27 | Stop reason: HOSPADM

## 2018-09-27 RX ORDER — HEPARIN 100 UNIT/ML
500 SYRINGE INTRAVENOUS AS NEEDED
Status: DISCONTINUED | OUTPATIENT
Start: 2018-09-27 | End: 2018-09-27 | Stop reason: HOSPADM

## 2018-09-27 RX ORDER — LIDOCAINE HYDROCHLORIDE 20 MG/ML
INJECTION, SOLUTION EPIDURAL; INFILTRATION; INTRACAUDAL; PERINEURAL AS NEEDED
Status: DISCONTINUED | OUTPATIENT
Start: 2018-09-27 | End: 2018-09-27 | Stop reason: HOSPADM

## 2018-09-27 RX ADMIN — SODIUM CHLORIDE, PRESERVATIVE FREE 500 UNITS: 5 INJECTION INTRAVENOUS at 10:14

## 2018-09-27 RX ADMIN — PROPOFOL 20 MG: 10 INJECTION, EMULSION INTRAVENOUS at 09:28

## 2018-09-27 RX ADMIN — PROPOFOL 20 MG: 10 INJECTION, EMULSION INTRAVENOUS at 09:27

## 2018-09-27 RX ADMIN — PROPOFOL 10 MG: 10 INJECTION, EMULSION INTRAVENOUS at 09:29

## 2018-09-27 RX ADMIN — SODIUM CHLORIDE, SODIUM LACTATE, POTASSIUM CHLORIDE, AND CALCIUM CHLORIDE: 600; 310; 30; 20 INJECTION, SOLUTION INTRAVENOUS at 09:13

## 2018-09-27 RX ADMIN — LIDOCAINE HYDROCHLORIDE 100 MG: 20 INJECTION, SOLUTION EPIDURAL; INFILTRATION; INTRACAUDAL; PERINEURAL at 09:18

## 2018-09-27 RX ADMIN — SODIUM CHLORIDE, SODIUM LACTATE, POTASSIUM CHLORIDE, AND CALCIUM CHLORIDE 75 ML/HR: 600; 310; 30; 20 INJECTION, SOLUTION INTRAVENOUS at 09:01

## 2018-09-27 RX ADMIN — PROPOFOL 30 MG: 10 INJECTION, EMULSION INTRAVENOUS at 09:23

## 2018-09-27 RX ADMIN — PROPOFOL 20 MG: 10 INJECTION, EMULSION INTRAVENOUS at 09:25

## 2018-09-27 RX ADMIN — PROPOFOL 30 MG: 10 INJECTION, EMULSION INTRAVENOUS at 09:31

## 2018-09-27 RX ADMIN — PROPOFOL 70 MG: 10 INJECTION, EMULSION INTRAVENOUS at 09:19

## 2018-09-27 RX ADMIN — PROPOFOL 30 MG: 10 INJECTION, EMULSION INTRAVENOUS at 09:22

## 2018-09-27 NOTE — PROGRESS NOTES
Port to left upper chest accessed prior to pt arrival to GI lab. Port flushed with 10mL NS. Has positive blood return.

## 2018-09-27 NOTE — PROGRESS NOTES
Blood sugar 302 in pre-op. Dr Leandro Mesa notified. Per Dr. Leandro Mesa, okay for patient to take 4 units Novolog of home medications.

## 2018-09-27 NOTE — DISCHARGE INSTRUCTIONS
Gastrointestinal Esophagogastroduodenoscopy (EGD) - Upper Exam Discharge Instructions    1. Call Dr. aDyanna Terry at [phone] for any problems or questions. 2. Contact the doctor's office for follow up appointment as directed. 3. Medication may cause drowsiness for several hours, therefore, do not drive or operate machinery for remainder of the day. 4. No alcohol today. 5. Ordinarily, you may resume regular activity after exam unless otherwise specified by your physician. 6. For mild soreness in your throat you may use Cepacol throat lozenges or warm salt-water gargles as needed. Any additional instructions:    -Follow up with Dr. Edwin Hensley  -Soft Diet    Esophageal Dilation: What to Expect at [address] [place] [address]  After you have esophageal dilation, you will stay at the hospital or surgery center for 1 to 2 hours. This will allow the medicine to wear off. You will be able to go home after your doctor or nurse checks to make sure you are not having any problems. This care sheet gives you a general idea about how long it will take for you to recover. But each person recovers at a different pace. Follow the steps below to get better as quickly as possible. How can you care for yourself at home? Activity    · Rest as much as you need to after you go home.     · You should be able to go back to your usual activities the day after the procedure. Diet    · Follow your doctor's directions for eating after the procedure.     · Drink plenty of fluids (unless your doctor has told you not to). Medicines    · Your doctor will tell you if and when you can restart your medicines. He or she will also give you instructions about taking any new medicines.     · If you take blood thinners, such as warfarin (Coumadin), clopidogrel (Plavix), or aspirin, be sure to talk to your doctor. He or she will tell you if and when to start taking those medicines again.  Make sure that you understand exactly what your doctor wants you to do.     · If you have a sore throat the day after the procedure, use an over-the-counter spray to numb your throat. Sucking on throat lozenges and gargling with warm salt water may also help relieve your symptoms. Follow-up care is a key part of your treatment and safety. Be sure to make and go to all appointments, and call your doctor if you are having problems. It's also a good idea to know your test results and keep a list of the medicines you take. When should you call for help? Call 911 anytime you think you may need emergency care. For example, call if:    · You passed out (lost consciousness).     · You have trouble breathing.     · Your stools are maroon or very bloody    Call your doctor now or seek immediate medical care if:    · You have new or worse belly pain.     · You have a fever.     · You have new or more blood in your stools.     · You are sick to your stomach and cannot drink fluids.     · You cannot pass stools or gas.     · You have pain that does not get better after you take pain medicine.    Watch closely for changes in your health, and be sure to contact your doctor if:    · Your throat still hurts after a day or two.     · You do not get better as expected. Where can you learn more? Go to http://johanny-papito.info/. Enter M948 in the search box to learn more about \"Esophageal Dilation: What to Expect at Home. \"  Current as of: May 12, 2017  Content Version: 11.7  © 0530-6866 [employer]. Care instructions adapted under license by [employer] (which disclaims liability or warranty for this information). If you have questions about a medical condition or this instruction, always ask your healthcare professional. [place] [address] any warranty or liability for your use of this information. Instructions given to Antonella Gonsalez and other family members.   Instructions given by:  Isha Ho RN

## 2018-09-27 NOTE — H&P
Gastroenterology Outpatient History and Physical 
 
Patient: Nohelia Herrera Physician: Amalia Romano MD 
 
Chief Complaint: Dysphasia History of Present Illness: Esophageal stricture Justification for Procedure: As above History: 
Past Medical History:  
Diagnosis Date  Anemia   
 denies hx of blood transfusions-- Fe infusions 12/2017  Anxiety and depression  Asthma   
 allergy induced, denies any inhaler, patient denies  C. difficile diarrhea   
 in 6260 after complicated ERCP  
 Cervical dysplasia 1990s  Chronic insomnia  Chronic pain   
 all over  Chronic pancreatitis ([facility] [address]  Endometriosis  Esophageal stricture 2017  Fibromyalgia  Fibromyalgia  Former cigarette smoker  GERD (gastroesophageal reflux disease) daily meds---po and IV protonix- uses per port- alternates  HLD (hyperlipidemia)   
 pt denies  IBS (irritable bowel syndrome)  Migraine headache  Morbid obesity ([facility] [address] BMI 33.3  Nausea & vomiting   
 pt reports she does better with phenergan than zofran - causes HA  
 Nonalcoholic fatty liver disease  Pancreatitis  Psychiatric disorder  PUD (peptic ulcer disease) 06/2017 Hx of  Sphincter of Oddi dysfunction  Type 2 diabetes mellitus ([facility] [address] -180, Lows at 90's, Last A1C 10 on 8/2018, Takes insulin-  
  
Past Surgical History:  
Procedure Laterality Date  ABDOMEN SURGERY PROC UNLISTED  last one placed  2012 Hx of pancreatic stent, multiple  ABDOMEN SURGERY PROC UNLISTED  1997  
 lap nissen  ABDOMEN SURGERY PROC UNLISTED  4/13  
 explor lap  COLONOSCOPY N/A 4/4/2018 COLONOSCOPY performed by Gibson Issa MD at [facility] ENDOSCOPY  
 HX COLONOSCOPY    
 HX ENDOSCOPY  2017  
 [address]  HX ERCP  3/5/2013  
 resulted in perforated duodenum  HX HYSTERECTOMY  1998  
 ovaries remain [facility]  HX LAPAROTOMY  3/5/2013 exploratory for duodenal perforation with ERCP  
 HX ORTHOPAEDIC    
 right finger surgery 11/2017  HX UROLOGICAL  4/25/13 at [facility]-- stent removed 6-27-13. Dr Maya Howe  HX VASCULAR ACCESS  2014  
 port insertion Social History Social History  Marital status:  Spouse name: N/A  
 Number of children: N/A  
 Years of education: N/A Social History Main Topics  Smoking status: Former Smoker Packs/day: 1.00 Years: 3.00 Quit date: 4/24/1993  Smokeless tobacco: Never Used  Alcohol use No  
 Drug use: No  
 Sexual activity: Not Asked Other Topics Concern  None Social History Narrative Family History Problem Relation Age of Onset  Diabetes Mother  Hypertension Mother  Heart Disease Mother   
  cabg  Diabetes Father  Heart Disease Father  Hypertension Father  Other Father  No Known Problems Sister Allergies: Allergies Allergen Reactions  Tape [Adhesive] Rash and Itching  Barium Iodide Nausea and Vomiting  Contrast Dye [Iodine] Shortness of Breath Oral contrast-experienced flushing,shortness of breath, sweatiness; (patient has received oral contrast many times at [address] [place] [address])  Flagyl [Metronidazole] Nausea and Vomiting  Metformin Nausea and Vomiting Stomach pain  Insulins Nausea and Vomiting Humalog only Medications:  
Prior to Admission medications Medication Sig Start Date End Date Taking? Authorizing Provider  
atorvastatin (LIPITOR) 10 mg tablet Take 10 mg by mouth nightly. Yes Historical Provider  
citalopram (CELEXA) 20 mg tablet Take 20 mg by mouth daily. Indications: am   Yes Historical Provider  
acetaminophen (TYLENOL) 325 mg tablet Take 325 mg by mouth every four (4) hours as needed for Pain. Yes Historical Provider LORazepam (ATIVAN) 1 mg tablet Take 1 mg by mouth three (3) times daily as needed for Anxiety. Yes Historical Provider cyanocobalamin (VITAMIN B12) 1,000 mcg/mL injection INJECT 1 VIAL INTRAMUSCULARLY FOR 4 WEEKS THEN MONTHLY 11/25/17  Yes Historical Provider  
gabapentin (NEURONTIN) 250 mg/5 mL solution Take 300 mg by mouth three (3) times daily. Yes Historical Provider  
diphenhydrAMINE (BENADRYL) 25 mg capsule Take 25 mg by mouth every six (6) hours as needed. Yes Historical Provider  
pantoprazole (PROTONIX) 40 mg injection 40 mg by IntraVENous route every morning. Take day of surgery per anesthesia protocol. Yes Historical Provider  
insulin degludec (TRESIBA FLEXTOUCH U-100) 100 unit/mL (3 mL) inpn 40 Units by SubCUTAneous route nightly. Yes Historical Provider  
polyethylene glycol (MIRALAX) 17 gram packet Take 17 g by mouth daily. Yes Historical Provider  
0.9% sodium chloride solution by IntraVENous route five (5) days a week. Gives to herself via port at home daily -1000cc   Yes Historical Provider  
insulin aspart (NOVOLOG) 100 unit/mL injection Use as directed Patient taking differently: 7 Units by SubCUTAneous route Before breakfast, lunch, and dinner. Over 300 use sliding scale- Always gets 7 units then if >300 gets extra - does ot know amount- has never taken more than 10 units regular  Indications: type 2 diabetes mellitus 6/7/16  Yes Karissa Anne NP  
zolpidem (AMBIEN) 10 mg tablet TAKE 1 TABLET BY MOUTH EVERY NIGHT AS NEEDED FOR SLEEP Patient taking differently: Take 10 mg by mouth nightly. 6/7/16  Yes Karissa Anne NP  
promethazine (PHENERGAN) 25 mg tablet Take 1 Tab by mouth every six (6) hours as needed. Patient taking differently: Take 25 mg by mouth as needed. 6/27/14  Yes NIURKA Brown  
ondansetron hcl (ZOFRAN) 8 mg tablet Take 8 mg by mouth every eight (8) hours as needed for Nausea. Yes Historical Provider  
fentaNYL (DURAGESIC) 75 mcg/hr 1 Patch by TransDERmal route every seventy-two (72) hours. Fibromyalgia/ abd pain    Historical Provider glucagon (GLUCAGEN) 1 mg injection 1 mg by IntraMUSCular route. 5/8/17   Historical Provider CARAFATE 100 mg/mL suspension TAKE 10 ML BY MOUTH 4 TIMES A DAY EVERY DAY ON A EMPTY STOMACH 1 HR BEFORE MEALS AND AT BEDTIME 9/7/17   Historical Provider  
hyoscyamine SL (LEVSIN/SL) 0.125 mg SL tablet 0.125 mg by SubLINGual route every six (6) hours as needed for Cramping. Historical Provider  
promethazine (PHENERGAN) 25 mg suppository Insert 25 mg into rectum as needed for Nausea. Historical Provider Vital Signs: Blood pressure 144/65, pulse 95, temperature 98.5 °F (36.9 °C), resp. rate 14, height 5' 2\" (1.575 m), weight 80.7 kg (178 lb), SpO2 94 %, not currently breastfeeding. Physical Exam:  
General: alert Heart: regular rate and rhythm Lungs: no tachypnea, retractions or cyanosis, Heart exam - S1, S2 normal, no murmur, no gallop, rate regular Abdominal: Bowel sounds are normal, soft, non distended Plan of Care/Planned Procedure: EGD Signed: 
Joyce Velez MD 9/27/2018

## 2018-09-27 NOTE — PROGRESS NOTES
SQBS = 267 in recovery room. Notified Dr. Leandro Mesa. No new orders received. He states that she can resume her home medications for glucose control today, upon discharge. Instructed patient and  on his recommendations.

## 2018-09-27 NOTE — IP AVS SNAPSHOT
[address] [facility] 
 
 
 [address] [facility] 
[phone] Patient: Mary Anne Flannery MRN: [id number] :1970 About your hospitalization You were admitted on:  2018 You last received care in the:  [facility] You were discharged on:  2018 Why you were hospitalized Your primary diagnosis was:  Not on File Follow-up Information None Your Scheduled Appointments  10:30 AM EDT  
[facility] PHONE ASSESSMENT with [facility] PHONE APPOINTMENT 614 [facility] Pre Assessment [facility] OR PRE ASSESSMENT) [address] [facility]  
[phone] Date/time displayed does not reflect when your phone assessment will be completed. Wednesday October 10, 2018  4:00 PM EDT Infusion with SS  
ST. [address] [facility]) [address] Gerardo Saavedra [phone] [facility] [address]  
[phone] [address]  ESOPHAGOGASTRODUODENOSCOPY (EGD) with Olimpia Brown MD  
[facility] ([address] [place] [address]) [address] [place] [address]  
[phone]   7:15 AM EDT Infusion with SS  
[address] [facility]) [address] Gerardo Saavedra [phone] [facility] [address]  
[phone] [address] 2018  1:30 PM EDT  
[facility] PHONE ASSESSMENT with [facility] PHONE APPOINTMENT 614 [facility] Pre Assessment [facility] OR PRE ASSESSMENT) [address] 322 [facility]  
[phone] Date/time displayed does not reflect when your phone assessment will be completed.   7:15 AM EDT Infusion with SS  
 [address] ([address]) [address] Abraham Flores [phone] [facility] [address]  
[phone] [address] Friday October 26, 2018 ESOPHAGOGASTRODUODENOSCOPY (EGD) with Dany Mendes MD  
[facility] ([address] [place] [address]) 304 [facility]  
[phone] Thursday November 01, 2018  7:15 AM EDT Infusion with NUR4  
[address] ([address]) [address] Abraham Flores [phone] [facility] [address]  
[phone] [address] Thursday November 01, 2018 11:30 AM EDT  
[facility] PHONE ASSESSMENT with [facility] PHONE APPOINTMENT [facility] Pre Assessment [facility] OR PRE ASSESSMENT) [address] [place] [address]  
[phone] Date/time displayed does not reflect when your phone assessment will be completed. Thursday November 08, 2018  7:15 AM EST Infusion with SS  
[address]) [address] Abraham Flores [phone] [facility] [address]  
[phone] [address] Thursday November 08, 2018 ESOPHAGOGASTRODUODENOSCOPY (EGD) with Sinan Cancino MD  
[facility] ([address] [place] [address]) 304 [facility]  
[phone] Wednesday November 14, 2018 11:00 AM EST  
[facility] PHONE ASSESSMENT with [facility] PHONE APPOINTMENT [facility] Pre Assessment [facility] OR PRE ASSESSMENT) [address] [place] [address]  
[phone] Date/time displayed does not reflect when your phone assessment will be completed. Thursday November 15, 2018  7:15 AM EST Infusion with NUR7  
[address] ([address] Daniel Oviedo [phone] [facility] [address]  
[phone] [address] Wednesday November 21, 2018  7:15 AM EST Infusion with SS  
ST. [address] [facility]) [address] Daniel Oviedo [phone] [facility] [address]  
[phone] [address] Wednesday November 21, 2018 ESOPHAGOGASTRODUODENOSCOPY (EGD) with Christiana Jaime MD  
[facility] ([address] [place] [address]) [address] [place] [address]  
[phone] Discharge Orders None A check cindy indicates which time of day the medication should be taken. My Medications ASK your doctor about these medications Instructions Each Dose to Equal  
 Morning Noon Evening Bedtime  
 0.9% sodium chloride solution Your last dose was: Your next dose is:    
   
   
 by IntraVENous route five (5) days a week. Gives to herself via port at home daily -1000cc ATIVAN 1 mg tablet Generic drug:  LORazepam  
   
Your last dose was: Your next dose is: Take 1 mg by mouth three (3) times daily as needed for Anxiety. 1 mg BENADRYL 25 mg capsule Generic drug:  diphenhydrAMINE Your last dose was: Your next dose is: Take 25 mg by mouth every six (6) hours as needed. 25 mg  
    
   
   
   
  
 CARAFATE 100 mg/mL suspension Generic drug:  sucralfate Your last dose was: Your next dose is: TAKE 10 ML BY MOUTH 4 TIMES A DAY EVERY DAY ON A EMPTY STOMACH 1 HR BEFORE MEALS AND AT BEDTIME CeleXA 20 mg tablet Generic drug:  citalopram  
   
Your last dose was: Your next dose is: Take 20 mg by mouth daily.  Indications: am  
 20 mg  
    
   
   
   
  
 cyanocobalamin 1,000 mcg/mL injection Commonly known as:  VITAMIN B12 Your last dose was: Your next dose is: INJECT 1 VIAL INTRAMUSCULARLY FOR 4 WEEKS THEN MONTHLY  
     
   
   
   
  
 fentaNYL 75 mcg/hr Commonly known as:  Ilene Mai Your last dose was: Your next dose is:    
   
   
 1 Patch by TransDERmal route every seventy-two (72) hours. Fibromyalgia/ abd pain 1 Patch  
    
   
   
   
  
 gabapentin 250 mg/5 mL solution Commonly known as:  NEURONTIN Your last dose was: Your next dose is: Take 300 mg by mouth three (3) times daily. 300 mg  
    
   
   
   
  
 glucagon 1 mg injection Commonly known as:  [address] Your last dose was: Your next dose is:    
   
   
 1 mg by IntraMUSCular route. 1 mg  
    
   
   
   
  
 hyoscyamine SL 0.125 mg SL tablet Commonly known as:  LEVSIN/SL Your last dose was: Your next dose is:    
   
   
 0.125 mg by SubLINGual route every six (6) hours as needed for Cramping.  
 0.125 mg  
    
   
   
   
  
 insulin aspart U-100 100 unit/mL injection Commonly known as:  NovoLOG U-100 Insulin aspart Your last dose was: Your next dose is:    
   
   
 Use as directed LIPITOR 10 mg tablet Generic drug:  atorvastatin Your last dose was: Your next dose is: Take 10 mg by mouth nightly. 10 mg MIRALAX 17 gram packet Generic drug:  polyethylene glycol Your last dose was: Your next dose is: Take 17 g by mouth daily. 17 g * promethazine 25 mg suppository Commonly known as:  PHENERGAN Your last dose was: Your next dose is: Insert 25 mg into rectum as needed for Nausea. 25 mg  
    
   
   
   
  
 * promethazine 25 mg tablet Commonly known as:  PHENERGAN Your last dose was: Your next dose is: Take 1 Tab by mouth every six (6) hours as needed. 25 mg PROTONIX 40 mg injection Generic drug:  pantoprazole Your last dose was: Your next dose is:    
   
   
 40 mg by IntraVENous route every morning. Take day of surgery per anesthesia protocol. 40 mg  
    
   
   
   
  
 TRESIBA FLEXTOUCH U-100 100 unit/mL (3 mL) Inpn Generic drug:  insulin degludec Your last dose was: Your next dose is:    
   
   
 40 Units by SubCUTAneous route nightly. 40 Units TYLENOL 325 mg tablet Generic drug:  acetaminophen Your last dose was: Your next dose is: Take 325 mg by mouth every four (4) hours as needed for Pain. 325 mg  
    
   
   
   
  
 ZOFRAN 8 mg tablet Generic drug:  ondansetron hcl Your last dose was: Your next dose is: Take 8 mg by mouth every eight (8) hours as needed for Nausea. 8 mg  
    
   
   
   
  
 zolpidem 10 mg tablet Commonly known as:  AMBIEN Your last dose was: Your next dose is: TAKE 1 TABLET BY MOUTH EVERY NIGHT AS NEEDED FOR SLEEP * Notice: This list has 2 medication(s) that are the same as other medications prescribed for you. Read the directions carefully, and ask your doctor or other care provider to review them with you. Opioid Education Prescription Opioids: What You Need to Know: 
 
Prescription opioids can be used to help relieve moderate-to-severe pain and are often prescribed following a surgery or injury, or for certain health conditions. These medications can be an important part of treatment but also come with serious risks. Opioids are strong pain medicines. Examples include hydrocodone, oxycodone, fentanyl, and morphine. Heroin is an example of an illegal opioid.   It is important to work with your health care provider to make sure you are getting the safest, most effective care. WHAT ARE THE RISKS AND SIDE EFFECTS OF OPIOID USE? Prescription opioids carry serious risks of addiction and overdose, especially with prolonged use. An opioid overdose, often marked by slow breathing, can cause sudden death. The use of prescription opioids can have a number of side effects as well, even when taken as directed. · Tolerance-meaning you might need to take more of a medication for the same pain relief · Physical dependence-meaning you have symptoms of withdrawal when the medication is stopped. Withdrawal symptoms can include nausea, sweating, chills, diarrhea, stomach cramps, and muscle aches. Withdrawal can last up to several weeks, depending on which drug you took and how long you took it. · Increased sensitivity to pain · Constipation · Nausea, vomiting, and dry mouth · Sleepiness and dizziness · Confusion · Depression · Low levels of testosterone that can result in lower sex drive, energy, and strength · Itching and sweating RISKS ARE GREATER WITH:      
· History of drug misuse, substance use disorder, or overdose · Mental health conditions (such as depression or anxiety) · Sleep apnea · Older age (72 years or older) · Pregnancy Avoid alcohol while taking prescription opioids. Also, unless specifically advised by your health care provider, medications to avoid include: · Benzodiazepines (such as Xanax or Valium) · Muscle relaxants (such as Soma or Flexeril) · Hypnotics (such as Ambien or Lunesta) · Other prescription opioids KNOW YOUR OPTIONS Talk to your health care provider about ways to manage your pain that don't involve prescription opioids. Some of these options may actually work better and have fewer risks and side effects. Consult your physician before adding or stopping any medications, treatments, or physical activity. Options may include: · Pain relievers such as acetaminophen, ibuprofen, and naproxen · Some medications that are also used for depression or seizures · Physical therapy and exercise · Counseling to help patients learn how to cope better with triggers of pain and stress. · Application of heat or cold compress · Massage therapy · Relaxation techniques Be Informed Make sure you know the name of your medication, how much and how often to take it, and its potential risks & side effects. IF YOU ARE PRESCRIBED OPIOIDS FOR PAIN: 
· Never take opioids in greater amounts or more often than prescribed. Remember the goal is not to be pain-free but to manage your pain at a tolerable level. · Follow up with your primary care provider to: · Work together to create a plan on how to manage your pain. · Talk about ways to help manage your pain that don't involve prescription opioids. · Talk about any and all concerns and side effects. · Help prevent misuse and abuse. · Never sell or share prescription opioids · Help prevent misuse and abuse. · Store prescription opioids in a secure place and out of reach of others (this may include visitors, children, friends, and family). · Safely dispose of unused/unwanted prescription opioids: Find your community drug take-back program or your pharmacy mail-back program, or flush them down the toilet, following guidance from the Food and Drug Administration (www.fda.gov/Drugs/ResourcesForYou). · Visit www.cdc.gov/drugoverdose to learn about the risks of opioid abuse and overdose. · If you believe you may be struggling with addiction, tell your health care provider and ask for guidance or call [employer] at 4-976-404-HELP. Discharge Instructions Gastrointestinal Esophagogastroduodenoscopy (EGD) - Upper Exam Discharge Instructions 1. Call Dr. Debbi Newton at [phone] for any problems or questions. 2. Contact the doctor's office for follow up appointment as directed. 3. Medication may cause drowsiness for several hours, therefore, do not drive or operate machinery for remainder of the day. 4. No alcohol today. 5. Ordinarily, you may resume regular activity after exam unless otherwise specified by your physician. 6. For mild soreness in your throat you may use Cepacol throat lozenges or warm salt-water gargles as needed. Any additional instructions:   
-Follow up with Dr. Ophelia Davenport Esophageal Dilation: What to Expect at [facility] Your Recovery After you have esophageal dilation, you will stay at the hospital or surgery center for 1 to 2 hours. This will allow the medicine to wear off. You will be able to go home after your doctor or nurse checks to make sure you are not having any problems. This care sheet gives you a general idea about how long it will take for you to recover. But each person recovers at a different pace. Follow the steps below to get better as quickly as possible. How can you care for yourself at home? Activity 
  · Rest as much as you need to after you go home.  
  · You should be able to go back to your usual activities the day after the procedure. Diet 
  · Follow your doctor's directions for eating after the procedure.  
  · Drink plenty of fluids (unless your doctor has told you not to). Medicines 
  · Your doctor will tell you if and when you can restart your medicines. He or she will also give you instructions about taking any new medicines.  
  · If you take blood thinners, such as warfarin (Coumadin), clopidogrel (Plavix), or aspirin, be sure to talk to your doctor. He or she will tell you if and when to start taking those medicines again.  Make sure that you understand exactly what your doctor wants you to do.  
  · If you have a sore throat the day after the procedure, use an over-the-counter spray to numb your throat. Sucking on throat lozenges and gargling with warm salt water may also help relieve your symptoms. Follow-up care is a key part of your treatment and safety. Be sure to make and go to all appointments, and call your doctor if you are having problems. It's also a good idea to know your test results and keep a list of the medicines you take. When should you call for help? Call 911 anytime you think you may need emergency care. For example, call if: 
  · You passed out (lost consciousness).  
  · You have trouble breathing.  
  · Your stools are maroon or very bloody  
 Call your doctor now or seek immediate medical care if: 
  · You have new or worse belly pain.  
  · You have a fever.  
  · You have new or more blood in your stools.  
  · You are sick to your stomach and cannot drink fluids.  
  · You cannot pass stools or gas.  
  · You have pain that does not get better after you take pain medicine.  
 Watch closely for changes in your health, and be sure to contact your doctor if: 
  · Your throat still hurts after a day or two.  
  · You do not get better as expected. Where can you learn more? Go to http://johanny-papito.info/. Enter X881 in the search box to learn more about \"Esophageal Dilation: What to Expect at Home. \" Current as of: May 12, 2017 Content Version: 11.7 © 9243-0632 [employer]. Care instructions adapted under license by [employer] (which disclaims liability or warranty for this information). If you have questions about a medical condition or this instruction, always ask your healthcare professional. [place] [address] any warranty or liability for your use of this information. Instructions given to Luciana Tyson and other family members. Instructions given by:  Ana Carroll RN Introducing [facility]! [facility] introduces kissnofrogt patient portal. Now you can access parts of your medical record, email your doctor's office, and request medication refills online. 1. In your internet browser, go to [email or web address] 2. Click on the First Time User? Click Here link in the Sign In box. You will see the New Member Sign Up page. 3. Enter your Authentic Response Access Code exactly as it appears below. You will not need to use this code after youve completed the sign-up process. If you do not sign up before the expiration date, you must request a new code. · Authentic Response Access Code: [email or web address] Expires: 10/30/2018  4:09 PM 
 
4. Enter the last four digits of your Social Security Number (xxxx) and Date of Birth (mm/dd/yyyy) as indicated and click Submit. You will be taken to the next sign-up page. 5. Create a Authentic Response ID. This will be your Authentic Response login ID and cannot be changed, so think of one that is secure and easy to remember. 6. Create a Authentic Response password. You can change your password at any time. 7. Enter your Password Reset Question and Answer. This can be used at a later time if you forget your password. 8. Enter your e-mail address. You will receive e-mail notification when new information is available in [address] 9. Click Sign Up. You can now view and download portions of your medical record. 10. Click the Download Summary menu link to download a portable copy of your medical information. If you have questions, please visit the Frequently Asked Questions section of the Authentic Response website. Remember, Authentic Response is NOT to be used for urgent needs. For medical emergencies, dial 911. Now available from your iPhone and Android! Introducing Eleazar Gunn As a [facility] patient, I wanted to make you aware of our electronic visit tool called Eleazar Gunn. [facility] 24/7 allows you to connect within minutes with a medical provider 24 hours a day, seven days a week via a mobile device or tablet or logging into a secure website from your computer. You can access [employer] from anywhere in the [place]. A virtual visit might be right for you when you have a simple condition and feel like you just dont want to get out of bed, or cant get away from work for an appointment, when your regular [facility] provider is not available (evenings, weekends or holidays), or when youre out of town and need minor care. Electronic visits cost only $49 and if the [employer] 24/Webtrekk provider determines a prescription is needed to treat your condition, one can be electronically transmitted to a nearby pharmacy*. Please take a moment to enroll today if you have not already done so. The enrollment process is free and takes just a few minutes. To enroll, please download the Measurabl chandler to your tablet or phone, or visit [email or web address] to enroll on your computer. And, as an [address] [place] [address] patient with a [employer] account, the results of your visits will be scanned into your electronic medical record and your primary care provider will be able to view the scanned results. We urge you to continue to see your regular [facility] provider for your ongoing medical care. And while your primary care provider may not be the one available when you seek a Eleazar Gunn virtual visit, the peace of mind you get from getting a real diagnosis real time can be priceless. For more information on VisuMotionhailyfin, view our Frequently Asked Questions (FAQs) at [email or web address]. Sincerely, 
 
Isabella Chance MD 
Chief Medical Officer [employer] *:  certain medications cannot be prescribed via VisuMotionnifin Providers Seen During Your Hospitalization Provider Specialty Primary office phone Joan Ann MD Gastroenterology [phone] Your Primary Care Physician (PCP) Primary Care Physician Office Phone Office Fax [address], [address] [facility] [address] [phone] You are allergic to the following Allergen Reactions Tape (Adhesive) Rash Itching Barium Iodide Nausea and Vomiting Contrast Dye (Iodine) Shortness of Breath Oral contrast-experienced flushing,shortness of breath, sweatiness; (patient has received oral contrast many times at [address]) Flagyl (Metronidazole) Nausea and Vomiting Metformin Nausea and Vomiting Stomach pain Insulins Nausea and Vomiting Humalog only Recent Documentation Height Weight Breastfeeding? BMI OB Status Smoking Status 1.575 m 80.7 kg No 32.56 kg/m2 Hysterectomy Former Smoker Emergency Contacts Name Discharge Info Relation Home Work Mobile Jorge Treadwell DISCHARGE CAREGIVER [3] Spouse [3] [phone]  [address] [place] [address] CAREGIVER [3] Father [15] [phone] Stephanie Zhao CAREGIVER [3] Mother [14] [phone] Patient Belongings The following personal items are in your possession at time of discharge: 
  Dental Appliances: None  Visual Aid: None Please provide this summary of care documentation to your next provider. Signatures-by signing, you are acknowledging that this After Visit Summary has been reviewed with you and you have received a copy. Patient Signature:  ____________________________________________________________ Date:  ____________________________________________________________  
  
Anthony Sport Provider Signature:  ____________________________________________________________ Date:  ____________________________________________________________

## 2018-09-27 NOTE — ROUTINE PROCESS
Patient discharged. Tolerating po fluids. No acute distress noted. Escorted to car, with family by Kitty Jewell.

## 2018-09-27 NOTE — PROCEDURES
EGD Procedure Note    PreOp Diagnosis:   Dysphagia  Esophageal stricture    PostOp Diagnosis:  Esophageal stricture- mild  Complex Postoperative changes from multiple prior abdominal surgeries    Medications:  Monitored Anesthesia    Procedure:  EGD   Instrument:   After informed consent was obtained, the patient was sedated and the endoscope was inserted  in the mouth and advanced into the duodenum without difficulty. The scope was slowly withdrawn while the mucosa was carefully inspected, including a retroflexed view of the cardia and fundus. Findings:   OROPHARYNX: The piriform sinuses, arytenoid cartilage and vocal cords were briefly evaluated on entry and withdrawal of the endoscope through the oropharynx. These were found to be unremarkable. ESOPHAGUS: The esophageal mucosa was unremarkable. The squamocolumnar junction was intact without erosions, ulcerations, rings, webs. There was no evidence of Langford's type intestinal metaplasia. There was mild resistance to scope passage through the lower esophageal sphincter. STOMACH: There was evidence of a prior Nissen fundoplication. This appears intact. There was a prior gastroenteric anastomosis in the body. This is widely patent, without stricture or ulcer. The gastric mucosa was unremarkable. There were no erosions, ulcerations, polyps, mass lesions, vascular ectasias or other abnormalities noted. The pylorus was patent and easily intubated. There was no hiatal hernia noted by direct view and retroflexion within the stomach. DUODENUM: There was a normal duodenal bulb. In the second portion of the duodenum was a second small bowel anastomosis. There was a short blind loop and a widely patent limb. Normal appearing mucosa. There was a polypoid lesion at the anastomosis with an unusual appearance. A biopsy was attempted, but this revealed some suture material with adherent food, so no biopsy was obtained.       Recommendations:  Soft diet  Consider decreasing dilation interval if symptoms improved  Follow up with Dr. Gayla Prince MD

## 2018-09-27 NOTE — ANESTHESIA POSTPROCEDURE EVALUATION
Post-Anesthesia Evaluation and Assessment Patient: Taya Meehan MRN: [id number]  SSN: [id number] YOB: 1970  Age: 50 y.o. Sex: female Cardiovascular Function/Vital Signs Visit Vitals  BP (P) 110/72  Pulse 85  Temp 37 °C (98.6 °F)  Resp (P) 12  
 Ht 5' 2\" (1.575 m)  Wt 80.7 kg (178 lb)  SpO2 96%  Breastfeeding No  
 BMI 32.56 kg/m2 Patient is status post total IV anesthesia anesthesia for Procedure(s): ESOPHAGOGASTRODUODENOSCOPY(EGD)/ 32 ESOPHAGEAL DILATION. Nausea/Vomiting: None Postoperative hydration reviewed and adequate. Pain: 
Pain Scale 1: Numeric (0 - 10) (09/27/18 0057) Pain Intensity 1: 3 (09/27/18 9634) Managed Neurological Status: At baseline Mental Status and Level of Consciousness: Arousable Pulmonary Status:  
O2 Device: (P) CO2 nasal cannula (Simultaneous filing. User may not have seen previous data.) (09/27/18 7058) Adequate oxygenation and airway patent Complications related to anesthesia: None Post-anesthesia assessment completed. No concerns Signed By: Scott Morocho MD   
 September 27, 2018

## 2018-10-04 ENCOUNTER — HOSPITAL ENCOUNTER (OUTPATIENT)
Dept: INFUSION THERAPY | Age: 48
Discharge: HOME | End: 2018-10-04
Payer: COMMERCIAL

## 2018-10-04 VITALS
TEMPERATURE: 98.1 F | SYSTOLIC BLOOD PRESSURE: 114 MMHG | WEIGHT: 178.8 LBS | RESPIRATION RATE: 18 BRPM | DIASTOLIC BLOOD PRESSURE: 84 MMHG | BODY MASS INDEX: 32.7 KG/M2 | HEART RATE: 108 BPM | OXYGEN SATURATION: 97 %

## 2018-10-04 LAB — MAGNESIUM SERPL-MCNC: 1.7 MG/DL (ref 1.8–2.4)

## 2018-10-04 PROCEDURE — 83735 ASSAY OF MAGNESIUM: CPT

## 2018-10-04 PROCEDURE — 96375 TX/PRO/DX INJ NEW DRUG ADDON: CPT

## 2018-10-04 PROCEDURE — 74011000250 HC RX REV CODE- 250: Performed by: INTERNAL MEDICINE

## 2018-10-04 PROCEDURE — 96365 THER/PROPH/DIAG IV INF INIT: CPT

## 2018-10-04 PROCEDURE — 74011250636 HC RX REV CODE- 250/636: Performed by: INTERNAL MEDICINE

## 2018-10-04 RX ORDER — SODIUM CHLORIDE 9 MG/ML
500 INJECTION, SOLUTION INTRAVENOUS ONCE
Status: COMPLETED | OUTPATIENT
Start: 2018-10-04 | End: 2018-10-04

## 2018-10-04 RX ORDER — MAGNESIUM SULFATE 1 G/100ML
1 INJECTION INTRAVENOUS ONCE
Status: COMPLETED | OUTPATIENT
Start: 2018-10-04 | End: 2018-10-04

## 2018-10-04 RX ORDER — HEPARIN 100 UNIT/ML
500 SYRINGE INTRAVENOUS AS NEEDED
Status: COMPLETED | OUTPATIENT
Start: 2018-10-04 | End: 2018-10-04

## 2018-10-04 RX ORDER — SODIUM CHLORIDE 0.9 % (FLUSH) 0.9 %
10-40 SYRINGE (ML) INJECTION AS NEEDED
Status: ACTIVE | OUTPATIENT
Start: 2018-10-04 | End: 2018-10-04

## 2018-10-04 RX ADMIN — Medication 10 ML: at 09:46

## 2018-10-04 RX ADMIN — SODIUM CHLORIDE 25 MG: 9 INJECTION INTRAMUSCULAR; INTRAVENOUS; SUBCUTANEOUS at 07:51

## 2018-10-04 RX ADMIN — SODIUM CHLORIDE 500 ML: 900 INJECTION, SOLUTION INTRAVENOUS at 07:48

## 2018-10-04 RX ADMIN — Medication 10 ML: at 07:48

## 2018-10-04 RX ADMIN — MAGNESIUM SULFATE HEPTAHYDRATE 1 G: 1 INJECTION, SOLUTION INTRAVENOUS at 08:59

## 2018-10-04 RX ADMIN — HEPARIN 500 UNITS: 100 SYRINGE at 09:46

## 2018-10-04 NOTE — PROGRESS NOTES
Arrived to the [facility]. IV phenergan and 1 gram Magnesium completed. Patient tolerated well. Port flushed and left accessed for use at home. Any issues or concerns during appointment: None. Patient aware of next infusion appointment on 10/10 (date) at 1600 (time). Discharged ambulatory in stable condition.

## 2018-10-11 ENCOUNTER — HOSPITAL ENCOUNTER (OUTPATIENT)
Dept: INFUSION THERAPY | Age: 48
Discharge: HOME | End: 2018-10-11
Payer: COMMERCIAL

## 2018-10-11 VITALS
DIASTOLIC BLOOD PRESSURE: 87 MMHG | HEART RATE: 103 BPM | BODY MASS INDEX: 32.67 KG/M2 | RESPIRATION RATE: 18 BRPM | OXYGEN SATURATION: 98 % | TEMPERATURE: 98.2 F | SYSTOLIC BLOOD PRESSURE: 125 MMHG | WEIGHT: 178.6 LBS

## 2018-10-11 LAB — MAGNESIUM SERPL-MCNC: 1.8 MG/DL (ref 1.8–2.4)

## 2018-10-11 PROCEDURE — 83735 ASSAY OF MAGNESIUM: CPT

## 2018-10-11 PROCEDURE — 74011250636 HC RX REV CODE- 250/636: Performed by: INTERNAL MEDICINE

## 2018-10-11 PROCEDURE — 77030003560 HC NDL HUBR BARD -A

## 2018-10-11 PROCEDURE — 74011000250 HC RX REV CODE- 250: Performed by: INTERNAL MEDICINE

## 2018-10-11 PROCEDURE — 96374 THER/PROPH/DIAG INJ IV PUSH: CPT

## 2018-10-11 RX ORDER — HEPARIN 100 UNIT/ML
500 SYRINGE INTRAVENOUS AS NEEDED
Status: DISCONTINUED | OUTPATIENT
Start: 2018-10-11 | End: 2018-10-15 | Stop reason: HOSPADM

## 2018-10-11 RX ADMIN — HEPARIN 500 UNITS: 100 SYRINGE at 09:17

## 2018-10-11 RX ADMIN — SODIUM CHLORIDE 25 MG: 9 INJECTION INTRAMUSCULAR; INTRAVENOUS; SUBCUTANEOUS at 07:32

## 2018-10-11 NOTE — PROGRESS NOTES
Arrived to the [facility]. Mag level completed. Patient tolerated well. Any issues or concerns during appointment: no. 
Patient aware of next infusion appointment on 10/18 (date) at 36 (time). Discharged home.

## 2018-10-18 ENCOUNTER — HOSPITAL ENCOUNTER (OUTPATIENT)
Dept: INFUSION THERAPY | Age: 48
End: 2018-10-18
Payer: COMMERCIAL

## 2018-10-25 ENCOUNTER — ANESTHESIA EVENT (OUTPATIENT)
Dept: ENDOSCOPY | Age: 48
End: 2018-10-25
Payer: COMMERCIAL

## 2018-10-25 ENCOUNTER — HOSPITAL ENCOUNTER (EMERGENCY)
Age: 48
Discharge: HOME | End: 2018-10-25
Attending: EMERGENCY MEDICINE
Payer: COMMERCIAL

## 2018-10-25 ENCOUNTER — APPOINTMENT (OUTPATIENT)
Dept: CT IMAGING | Age: 48
End: 2018-10-25
Attending: EMERGENCY MEDICINE
Payer: COMMERCIAL

## 2018-10-25 ENCOUNTER — HOSPITAL ENCOUNTER (OUTPATIENT)
Dept: INFUSION THERAPY | Age: 48
Discharge: HOME | End: 2018-10-25
Payer: COMMERCIAL

## 2018-10-25 VITALS
OXYGEN SATURATION: 96 % | RESPIRATION RATE: 18 BRPM | SYSTOLIC BLOOD PRESSURE: 147 MMHG | DIASTOLIC BLOOD PRESSURE: 93 MMHG | HEART RATE: 108 BPM | TEMPERATURE: 99 F

## 2018-10-25 VITALS
HEART RATE: 110 BPM | HEIGHT: 62 IN | OXYGEN SATURATION: 99 % | DIASTOLIC BLOOD PRESSURE: 78 MMHG | SYSTOLIC BLOOD PRESSURE: 122 MMHG | TEMPERATURE: 98.6 F | WEIGHT: 172 LBS | RESPIRATION RATE: 18 BRPM | BODY MASS INDEX: 31.65 KG/M2

## 2018-10-25 DIAGNOSIS — R10.13 ABDOMINAL PAIN, EPIGASTRIC: Primary | ICD-10-CM

## 2018-10-25 LAB
ALBUMIN SERPL-MCNC: 4.2 G/DL (ref 3.5–5)
ALBUMIN/GLOB SERPL: 1.1 {RATIO} (ref 1.2–3.5)
ALP SERPL-CCNC: 115 U/L (ref 50–136)
ALT SERPL-CCNC: 45 U/L (ref 12–65)
ANION GAP SERPL CALC-SCNC: 12 MMOL/L (ref 7–16)
AST SERPL-CCNC: 30 U/L (ref 15–37)
ATRIAL RATE: 102 BPM
BASOPHILS # BLD: 0 K/UL (ref 0–0.2)
BASOPHILS NFR BLD: 0 % (ref 0–2)
BILIRUB SERPL-MCNC: 0.8 MG/DL (ref 0.2–1.1)
BUN SERPL-MCNC: 8 MG/DL (ref 6–23)
CALCIUM SERPL-MCNC: 9.2 MG/DL (ref 8.3–10.4)
CALCULATED P AXIS, ECG09: 63 DEGREES
CALCULATED R AXIS, ECG10: 49 DEGREES
CALCULATED T AXIS, ECG11: 74 DEGREES
CHLORIDE SERPL-SCNC: 104 MMOL/L (ref 98–107)
CO2 SERPL-SCNC: 21 MMOL/L (ref 21–32)
CREAT SERPL-MCNC: 0.72 MG/DL (ref 0.6–1)
DIAGNOSIS, 93000: NORMAL
DIFFERENTIAL METHOD BLD: ABNORMAL
EOSINOPHIL # BLD: 0 K/UL (ref 0–0.8)
EOSINOPHIL NFR BLD: 0 % (ref 0.5–7.8)
ERYTHROCYTE [DISTWIDTH] IN BLOOD BY AUTOMATED COUNT: 12.8 %
GLOBULIN SER CALC-MCNC: 4 G/DL (ref 2.3–3.5)
GLUCOSE BLD STRIP.AUTO-MCNC: 279 MG/DL (ref 65–100)
GLUCOSE SERPL-MCNC: 334 MG/DL (ref 65–100)
HCT VFR BLD AUTO: 44.3 % (ref 35.8–46.3)
HGB BLD-MCNC: 14.7 G/DL (ref 11.7–15.4)
IMM GRANULOCYTES # BLD: 0 K/UL (ref 0–0.5)
IMM GRANULOCYTES NFR BLD AUTO: 0 % (ref 0–5)
LIPASE SERPL-CCNC: 99 U/L (ref 73–393)
LYMPHOCYTES # BLD: 1 K/UL (ref 0.5–4.6)
LYMPHOCYTES NFR BLD: 18 % (ref 13–44)
MAGNESIUM SERPL-MCNC: 1.7 MG/DL (ref 1.8–2.4)
MAGNESIUM SERPL-MCNC: 2.3 MG/DL (ref 1.8–2.4)
MCH RBC QN AUTO: 28.5 PG (ref 26.1–32.9)
MCHC RBC AUTO-ENTMCNC: 33.2 G/DL (ref 31.4–35)
MCV RBC AUTO: 85.9 FL (ref 79.6–97.8)
MONOCYTES # BLD: 0.3 K/UL (ref 0.1–1.3)
MONOCYTES NFR BLD: 5 % (ref 4–12)
NEUTS SEG # BLD: 4.1 K/UL (ref 1.7–8.2)
NEUTS SEG NFR BLD: 76 % (ref 43–78)
NRBC # BLD: 0 K/UL (ref 0–0.2)
P-R INTERVAL, ECG05: 136 MS
PLATELET # BLD AUTO: 176 K/UL (ref 150–450)
PMV BLD AUTO: 11.8 FL (ref 9.4–12.3)
POTASSIUM SERPL-SCNC: 4.1 MMOL/L (ref 3.5–5.1)
PROT SERPL-MCNC: 8.2 G/DL (ref 6.3–8.2)
Q-T INTERVAL, ECG07: 360 MS
QRS DURATION, ECG06: 72 MS
QTC CALCULATION (BEZET), ECG08: 469 MS
RBC # BLD AUTO: 5.16 M/UL (ref 4.05–5.2)
SODIUM SERPL-SCNC: 137 MMOL/L (ref 136–145)
VENTRICULAR RATE, ECG03: 102 BPM
WBC # BLD AUTO: 5.4 K/UL (ref 4.3–11.1)

## 2018-10-25 PROCEDURE — 96361 HYDRATE IV INFUSION ADD-ON: CPT | Performed by: EMERGENCY MEDICINE

## 2018-10-25 PROCEDURE — 80053 COMPREHEN METABOLIC PANEL: CPT

## 2018-10-25 PROCEDURE — 77030003560 HC NDL HUBR BARD -A

## 2018-10-25 PROCEDURE — 81003 URINALYSIS AUTO W/O SCOPE: CPT | Performed by: EMERGENCY MEDICINE

## 2018-10-25 PROCEDURE — 96376 TX/PRO/DX INJ SAME DRUG ADON: CPT | Performed by: EMERGENCY MEDICINE

## 2018-10-25 PROCEDURE — 83735 ASSAY OF MAGNESIUM: CPT

## 2018-10-25 PROCEDURE — 99284 EMERGENCY DEPT VISIT MOD MDM: CPT | Performed by: EMERGENCY MEDICINE

## 2018-10-25 PROCEDURE — 83690 ASSAY OF LIPASE: CPT

## 2018-10-25 PROCEDURE — 96375 TX/PRO/DX INJ NEW DRUG ADDON: CPT

## 2018-10-25 PROCEDURE — 82962 GLUCOSE BLOOD TEST: CPT

## 2018-10-25 PROCEDURE — 74011250636 HC RX REV CODE- 250/636: Performed by: INTERNAL MEDICINE

## 2018-10-25 PROCEDURE — 74011000258 HC RX REV CODE- 258: Performed by: EMERGENCY MEDICINE

## 2018-10-25 PROCEDURE — 96375 TX/PRO/DX INJ NEW DRUG ADDON: CPT | Performed by: EMERGENCY MEDICINE

## 2018-10-25 PROCEDURE — 96374 THER/PROPH/DIAG INJ IV PUSH: CPT | Performed by: EMERGENCY MEDICINE

## 2018-10-25 PROCEDURE — 74011250636 HC RX REV CODE- 250/636: Performed by: EMERGENCY MEDICINE

## 2018-10-25 PROCEDURE — 85025 COMPLETE CBC W/AUTO DIFF WBC: CPT

## 2018-10-25 PROCEDURE — 93005 ELECTROCARDIOGRAM TRACING: CPT | Performed by: EMERGENCY MEDICINE

## 2018-10-25 PROCEDURE — 96365 THER/PROPH/DIAG IV INF INIT: CPT

## 2018-10-25 PROCEDURE — 74177 CT ABD & PELVIS W/CONTRAST: CPT

## 2018-10-25 PROCEDURE — 74011636320 HC RX REV CODE- 636/320: Performed by: EMERGENCY MEDICINE

## 2018-10-25 PROCEDURE — 74011000250 HC RX REV CODE- 250: Performed by: INTERNAL MEDICINE

## 2018-10-25 RX ORDER — SODIUM CHLORIDE 0.9 % (FLUSH) 0.9 %
10 SYRINGE (ML) INJECTION AS NEEDED
Status: ACTIVE | OUTPATIENT
Start: 2018-10-25 | End: 2018-10-25

## 2018-10-25 RX ORDER — SODIUM CHLORIDE, SODIUM LACTATE, POTASSIUM CHLORIDE, CALCIUM CHLORIDE 600; 310; 30; 20 MG/100ML; MG/100ML; MG/100ML; MG/100ML
75 INJECTION, SOLUTION INTRAVENOUS CONTINUOUS
Status: CANCELLED | OUTPATIENT
Start: 2018-10-25

## 2018-10-25 RX ORDER — NALOXONE HYDROCHLORIDE 0.4 MG/ML
0.1 INJECTION, SOLUTION INTRAMUSCULAR; INTRAVENOUS; SUBCUTANEOUS AS NEEDED
Status: CANCELLED | OUTPATIENT
Start: 2018-10-25 | End: 2018-10-25

## 2018-10-25 RX ORDER — HYDROMORPHONE HYDROCHLORIDE 1 MG/ML
0.5 INJECTION, SOLUTION INTRAMUSCULAR; INTRAVENOUS; SUBCUTANEOUS ONCE
Status: COMPLETED | OUTPATIENT
Start: 2018-10-25 | End: 2018-10-25

## 2018-10-25 RX ORDER — SODIUM CHLORIDE 0.9 % (FLUSH) 0.9 %
5-10 SYRINGE (ML) INJECTION AS NEEDED
Status: CANCELLED | OUTPATIENT
Start: 2018-10-25

## 2018-10-25 RX ORDER — OXYCODONE HYDROCHLORIDE 5 MG/1
5 TABLET ORAL
Status: CANCELLED | OUTPATIENT
Start: 2018-10-25 | End: 2018-10-26

## 2018-10-25 RX ORDER — HYDROMORPHONE HYDROCHLORIDE 2 MG/ML
0.5 INJECTION, SOLUTION INTRAMUSCULAR; INTRAVENOUS; SUBCUTANEOUS
Status: CANCELLED | OUTPATIENT
Start: 2018-10-25

## 2018-10-25 RX ORDER — SODIUM CHLORIDE 0.9 % (FLUSH) 0.9 %
10 SYRINGE (ML) INJECTION
Status: COMPLETED | OUTPATIENT
Start: 2018-10-25 | End: 2018-10-25

## 2018-10-25 RX ORDER — ONDANSETRON 2 MG/ML
4 INJECTION INTRAMUSCULAR; INTRAVENOUS
Status: COMPLETED | OUTPATIENT
Start: 2018-10-25 | End: 2018-10-25

## 2018-10-25 RX ORDER — ACETAMINOPHEN 500 MG
500 TABLET ORAL ONCE
Status: CANCELLED | OUTPATIENT
Start: 2018-10-25 | End: 2018-10-25

## 2018-10-25 RX ORDER — OXYCODONE HYDROCHLORIDE 5 MG/1
10 TABLET ORAL
Status: CANCELLED | OUTPATIENT
Start: 2018-10-25 | End: 2018-10-26

## 2018-10-25 RX ORDER — HEPARIN 100 UNIT/ML
500 SYRINGE INTRAVENOUS ONCE
Status: COMPLETED | OUTPATIENT
Start: 2018-10-25 | End: 2018-10-25

## 2018-10-25 RX ORDER — ONDANSETRON 2 MG/ML
4 INJECTION INTRAMUSCULAR; INTRAVENOUS ONCE
Status: CANCELLED | OUTPATIENT
Start: 2018-10-25 | End: 2018-10-25

## 2018-10-25 RX ORDER — MAGNESIUM SULFATE 1 G/100ML
1 INJECTION INTRAVENOUS ONCE
Status: COMPLETED | OUTPATIENT
Start: 2018-10-25 | End: 2018-10-25

## 2018-10-25 RX ORDER — DIPHENHYDRAMINE HYDROCHLORIDE 50 MG/ML
12.5 INJECTION, SOLUTION INTRAMUSCULAR; INTRAVENOUS ONCE
Status: CANCELLED | OUTPATIENT
Start: 2018-10-25 | End: 2018-10-25

## 2018-10-25 RX ADMIN — ONDANSETRON 4 MG: 2 INJECTION INTRAMUSCULAR; INTRAVENOUS at 11:38

## 2018-10-25 RX ADMIN — Medication 10 ML: at 09:45

## 2018-10-25 RX ADMIN — SODIUM CHLORIDE, PRESERVATIVE FREE 500 UNITS: 5 INJECTION INTRAVENOUS at 09:45

## 2018-10-25 RX ADMIN — IOPAMIDOL 100 ML: 755 INJECTION, SOLUTION INTRAVENOUS at 13:10

## 2018-10-25 RX ADMIN — Medication 10 ML: at 13:11

## 2018-10-25 RX ADMIN — ONDANSETRON 4 MG: 2 INJECTION INTRAMUSCULAR; INTRAVENOUS at 13:35

## 2018-10-25 RX ADMIN — SODIUM CHLORIDE 25 MG: 9 INJECTION INTRAMUSCULAR; INTRAVENOUS; SUBCUTANEOUS at 07:42

## 2018-10-25 RX ADMIN — Medication 10 ML: at 07:42

## 2018-10-25 RX ADMIN — MAGNESIUM SULFATE HEPTAHYDRATE 1 G: 1 INJECTION, SOLUTION INTRAVENOUS at 08:35

## 2018-10-25 RX ADMIN — HYDROMORPHONE HYDROCHLORIDE 0.5 MG: 1 INJECTION, SOLUTION INTRAMUSCULAR; INTRAVENOUS; SUBCUTANEOUS at 13:35

## 2018-10-25 RX ADMIN — HYDROMORPHONE HYDROCHLORIDE 0.5 MG: 1 INJECTION, SOLUTION INTRAMUSCULAR; INTRAVENOUS; SUBCUTANEOUS at 11:38

## 2018-10-25 RX ADMIN — SODIUM CHLORIDE 100 ML: 900 INJECTION, SOLUTION INTRAVENOUS at 13:11

## 2018-10-25 RX ADMIN — SODIUM CHLORIDE 1000 ML: 900 INJECTION, SOLUTION INTRAVENOUS at 11:38

## 2018-10-25 NOTE — ED TRIAGE NOTES
Pt came in with family and decided to check in to see how her pancreatitis is. Pt states she has acute chronic pancreatitis. Pt reports nausea and vomiting, denies diarrhea

## 2018-10-25 NOTE — PROGRESS NOTES
Arrived to the [facility]. Mg infusion/phenergan IV completed. Patient requests pain medication. Per Belinda RN at [facility], no pain medication to be ordered and if pt pain level gets worse patient to go to ER. Prior to discharge, pt states her nausea is worse and requests more nausea meds. Call placed to [facility]. While awaiting call back, pt states she did not want to wait and that she had nausea medicine at home and that they would probably just order her zofran anyways. Patient aware of next infusion appointment on November 1st at [address] [place] [address]. Discharged ambulatory.

## 2018-10-25 NOTE — ED PROVIDER NOTES
68-year-old female with history of myalgia, chronic pancreatitis presents with complaint of sharp/cramping epigastric pain with associated nausea and vomiting since yesterday. Denies dysuria, hematuria, flank pain, diarrhea, constipation, chest pain, shortness of breath, dizziness, weakness, vaginal bleeding or vaginal discharge. States his symptoms are consistent with previous episodes of pancreatitis. Denies any alleviating factors. The history is provided by the patient. No  was used. Abdominal Pain This is a new problem. The current episode started yesterday. The problem occurs constantly. The problem has not changed since onset. The pain is associated with vomiting. The pain is located in the epigastric region. The quality of the pain is cramping and sharp. The pain is at a severity of 4/10. The pain is moderate. Associated symptoms include nausea and vomiting. Pertinent negatives include no anorexia, no fever, no belching, no diarrhea, no flatus, no hematochezia, no melena, no constipation, no dysuria, no frequency, no hematuria, no headaches, no arthralgias, no myalgias, no trauma, no chest pain and no testicular pain. Nothing worsens the pain. The pain is relieved by nothing. Her past medical history is significant for pancreatitis. Past Medical History:  
Diagnosis Date  Anemia   
 denies hx of blood transfusions-- Fe infusions 12/2017  Anxiety and depression  Asthma   
 allergy induced, denies any inhaler, patient denies  C. difficile diarrhea   
 in 3737 after complicated ERCP  
 Cervical dysplasia 1990s  Chronic insomnia  Chronic pain   
 all over  Chronic pancreatitis ([facility] [address]  Endometriosis  Esophageal stricture 2017  Fibromyalgia  Fibromyalgia  Former cigarette smoker  GERD (gastroesophageal reflux disease) daily meds---po and IV protonix- uses per port- alternates  HLD (hyperlipidemia)   
 pt denies  IBS (irritable bowel syndrome)  Migraine headache  Morbid obesity ([facility] [address] BMI 33.3  Nausea & vomiting   
 pt reports she does better with phenergan than zofran - causes HA  
 Nonalcoholic fatty liver disease  Pancreatitis  Psychiatric disorder  PUD (peptic ulcer disease) 06/2017 Hx of  Sphincter of Oddi dysfunction  Type 2 diabetes mellitus ([facility] [address] -180, Lows at 90's, Last A1C 10 on 8/2018, Takes insulin-  
 
 
Past Surgical History:  
Procedure Laterality Date  ABDOMEN SURGERY PROC UNLISTED  last one placed  2012 Hx of pancreatic stent, multiple  ABDOMEN SURGERY PROC UNLISTED  1997  
 lap nissen  ABDOMEN SURGERY PROC UNLISTED  4/13  
 explor lap  HX COLONOSCOPY    
 HX ENDOSCOPY  2017  
 36 fr thomas  HX ERCP  3/5/2013  
 resulted in perforated duodenum  HX HYSTERECTOMY  1998  
 ovaries remain [facility]  HX LAPAROTOMY  3/5/2013  
 exploratory for duodenal perforation with ERCP  
 HX ORTHOPAEDIC    
 right finger surgery 11/2017  HX UROLOGICAL  4/25/13 at [facility]-- stent removed 6-27-13. Dr Anne Clifton  HX VASCULAR ACCESS  2014  
 port insertion Family History:  
Problem Relation Age of Onset  Diabetes Mother  Hypertension Mother  Heart Disease Mother   
     cabg  Diabetes Father  Heart Disease Father  Hypertension Father  Other Father  No Known Problems Sister Social History Socioeconomic History  Marital status:  Spouse name: Not on file  Number of children: Not on file  Years of education: Not on file  Highest education level: Not on file Social Needs  Financial resource strain: Not on file  Food insecurity - worry: Not on file  Food insecurity - inability: Not on file  Transportation needs - medical: Not on file  Transportation needs - non-medical: Not on file Occupational History  Not on file Tobacco Use  
  Smoking status: Former Smoker Packs/day: 1.00 Years: 3.00 Pack years: 3.00 Last attempt to quit: 1993 Years since quittin.5  Smokeless tobacco: Never Used Substance and Sexual Activity  Alcohol use: No  
 Drug use: No  
 Sexual activity: Not on file Other Topics Concern  Not on file Social History Narrative  Not on file ALLERGIES: Tape [adhesive]; Barium iodide; Contrast dye [iodine]; Flagyl [metronidazole]; Metformin; and Insulins Review of Systems Constitutional: Negative for fever. Cardiovascular: Negative for chest pain. Gastrointestinal: Positive for abdominal pain, nausea and vomiting. Negative for anorexia, constipation, diarrhea, flatus, hematochezia and melena. Genitourinary: Negative for dysuria, frequency, hematuria and testicular pain. Musculoskeletal: Negative for arthralgias and myalgias. Neurological: Negative for headaches. Vitals:  
 10/25/18 1104 BP: 119/75 Pulse: (!) 122 Resp: 18 Temp: 98.4 °F (36.9 °C) SpO2: 95% Weight: 78 kg (172 lb) Height: 5' 2\" (1.575 m) Physical Exam  
Constitutional: She is oriented to person, place, and time. She appears well-developed and well-nourished. Well appearing and in NAD. HENT:  
Head: Normocephalic. MMM. Eyes: Conjunctivae and EOM are normal. Pupils are equal, round, and reactive to light. Neck: No JVD present. No tracheal deviation present. Cardiovascular: Regular rhythm and normal heart sounds. Tachycardic. Pulses 2+ and equal bilaterally. Pulmonary/Chest: Effort normal and breath sounds normal.  
CTAB. No wheezes, rhonchi, or rales. Abdominal: Soft. Bowel sounds are normal.  
Obese. Mild epigastric TTP. Soft, ND. No rebound or guarding. No CVAT. Musculoskeletal: Normal range of motion. Neurological: She is alert and oriented to person, place, and time. No cranial nerve deficit. Strength 5/5 throughout. Normal sensory. Skin: Skin is warm and dry. No rash. Nursing note and vitals reviewed. MDM Number of Diagnoses or Management Options Abdominal pain, epigastric: new and requires workup Diagnosis management comments: Labs all within normal limits. UA negative for UTI. CT abdomen with no acute or concerning findings. Vital signs improved. Patient tolerating po. Repeat abdominal exam soft, nontender. No rebound or guarding Will discharge him with close follow-up with PCP and GI. Amount and/or Complexity of Data Reviewed Clinical lab tests: ordered and reviewed Tests in the radiology section of CPT®: ordered and reviewed Tests in the medicine section of CPT®: ordered and reviewed Review and summarize past medical records: yes Independent visualization of images, tracings, or specimens: yes Risk of Complications, Morbidity, and/or Mortality Presenting problems: moderate Diagnostic procedures: moderate Management options: moderate Patient Progress Patient progress: stable ED Course as of Oct 25 1409 Thu Oct 25, 2018  
1408 CT abd/pelvis IMPRESSION:   
1.  No acute inflammatory changes or abnormal fluid collections of the pancreas. No acute changes are otherwise seen to suggest an etiology for the patient's 
acute symptoms. [DF] ED Course User Index 
[DF] Scarlett Omer MD  
 
 
EKG Date/Time: 10/25/2018 2:10 PM 
Performed by: Scarlett Omer MD 
Authorized by: Scarlett Omer MD  
 
ECG reviewed by ED Physician in the absence of a cardiologist: yes Rate:  
  ECG rate:  102 ECG rate assessment: tachycardic Rhythm:  
  Rhythm: sinus tachycardia Ectopy:  
  Ectopy: none QRS:  
  QRS axis:  Normal 
  QRS intervals:  Normal 
Conduction:  
  Conduction: normal   
ST segments: ST segments:  Normal 
T waves:  
  T waves: normal   
 
 
 
 
Results Include: 
 
Recent Results (from the past 24 hour(s)) MAGNESIUM  
 Collection Time: 10/25/18  7:33 AM  
Result Value Ref Range Magnesium 1.7 (L) 1.8 - 2.4 mg/dL CBC WITH AUTOMATED DIFF Collection Time: 10/25/18 11:07 AM  
Result Value Ref Range WBC 5.4 4.3 - 11.1 K/uL  
 RBC 5.16 4.05 - 5.2 M/uL  
 HGB 14.7 11.7 - 15.4 g/dL HCT 44.3 35.8 - 46.3 % MCV 85.9 79.6 - 97.8 FL  
 MCH 28.5 26.1 - 32.9 PG  
 MCHC 33.2 31.4 - 35.0 g/dL  
 RDW 12.8 % PLATELET 209 594 - 747 K/uL MPV 11.8 9.4 - 12.3 FL ABSOLUTE NRBC 0.00 0.0 - 0.2 K/uL  
 DF AUTOMATED NEUTROPHILS 76 43 - 78 % LYMPHOCYTES 18 13 - 44 % MONOCYTES 5 4.0 - 12.0 % EOSINOPHILS 0 (L) 0.5 - 7.8 % BASOPHILS 0 0.0 - 2.0 % IMMATURE GRANULOCYTES 0 0.0 - 5.0 %  
 ABS. NEUTROPHILS 4.1 1.7 - 8.2 K/UL  
 ABS. LYMPHOCYTES 1.0 0.5 - 4.6 K/UL  
 ABS. MONOCYTES 0.3 0.1 - 1.3 K/UL  
 ABS. EOSINOPHILS 0.0 0.0 - 0.8 K/UL  
 ABS. BASOPHILS 0.0 0.0 - 0.2 K/UL  
 ABS. IMM. GRANS. 0.0 0.0 - 0.5 K/UL METABOLIC PANEL, COMPREHENSIVE Collection Time: 10/25/18 11:07 AM  
Result Value Ref Range Sodium 137 136 - 145 mmol/L Potassium 4.1 3.5 - 5.1 mmol/L Chloride 104 98 - 107 mmol/L  
 CO2 21 21 - 32 mmol/L Anion gap 12 7 - 16 mmol/L Glucose 334 (H) 65 - 100 mg/dL BUN 8 6 - 23 MG/DL Creatinine 0.72 0.6 - 1.0 MG/DL  
 GFR est AA >60 >60 ml/min/1.73m2 GFR est non-AA >60 >60 ml/min/1.73m2 Calcium 9.2 8.3 - 10.4 MG/DL Bilirubin, total 0.8 0.2 - 1.1 MG/DL  
 ALT (SGPT) 45 12 - 65 U/L  
 AST (SGOT) 30 15 - 37 U/L Alk. phosphatase 115 50 - 136 U/L Protein, total 8.2 6.3 - 8.2 g/dL Albumin 4.2 3.5 - 5.0 g/dL Globulin 4.0 (H) 2.3 - 3.5 g/dL A-G Ratio 1.1 (L) 1.2 - 3.5 LIPASE Collection Time: 10/25/18 11:07 AM  
Result Value Ref Range Lipase 99 73 - 393 U/L MAGNESIUM Collection Time: 10/25/18 11:07 AM  
Result Value Ref Range Magnesium 2.3 1.8 - 2.4 mg/dL EKG, 12 LEAD, INITIAL Collection Time: 10/25/18 11:44 AM  
Result Value Ref Range Ventricular Rate 102 BPM  
 Atrial Rate 102 BPM  
 P-R Interval 136 ms QRS Duration 72 ms Q-T Interval 360 ms QTC Calculation (Bezet) 469 ms Calculated P Axis 63 degrees Calculated R Axis 49 degrees Calculated T Axis 74 degrees Diagnosis Sinus tachycardia Otherwise normal ECG When compared with ECG of 09-APR-2015 12:03, No significant change was found GLUCOSE, POC Collection Time: 10/25/18  1:38 PM  
Result Value Ref Range Glucose (POC) 279 (H) 65 - 100 mg/dL Gallo Ruelas MD; 10/25/2018 @11:23 AM Voice dictation software was used during the making of this note. This software is not perfect and grammatical and other typographical errors may be present.   This note has not been proofread for errors. 
===================================================================

## 2018-10-25 NOTE — DISCHARGE INSTRUCTIONS

## 2018-10-25 NOTE — ED NOTES
I have reviewed discharge instructions with the patient. The patient verbalized understanding. Patient left ED via Discharge Method: ambulatory to Home with mother. Opportunity for questions and clarification provided. Patient given 0 scripts. To continue your aftercare when you leave the hospital, you may receive an automated call from our care team to check in on how you are doing. This is a free service and part of our promise to provide the best care and service to meet your aftercare needs.  If you have questions, or wish to unsubscribe from this service please call [phone]. Thank you for Choosing our [facility] Emergency Department.

## 2018-10-26 ENCOUNTER — ANESTHESIA (OUTPATIENT)
Dept: ENDOSCOPY | Age: 48
End: 2018-10-26
Payer: COMMERCIAL

## 2018-10-26 ENCOUNTER — HOSPITAL ENCOUNTER (OUTPATIENT)
Age: 48
Setting detail: OUTPATIENT SURGERY
Discharge: HOME | End: 2018-10-26
Attending: INTERNAL MEDICINE | Admitting: INTERNAL MEDICINE
Payer: COMMERCIAL

## 2018-10-26 VITALS
HEART RATE: 79 BPM | DIASTOLIC BLOOD PRESSURE: 69 MMHG | OXYGEN SATURATION: 92 % | WEIGHT: 175 LBS | TEMPERATURE: 99.3 F | RESPIRATION RATE: 16 BRPM | BODY MASS INDEX: 32.2 KG/M2 | HEIGHT: 62 IN | SYSTOLIC BLOOD PRESSURE: 118 MMHG

## 2018-10-26 LAB — GLUCOSE BLD STRIP.AUTO-MCNC: 207 MG/DL (ref 65–100)

## 2018-10-26 PROCEDURE — 74011250636 HC RX REV CODE- 250/636: Performed by: ANESTHESIOLOGY

## 2018-10-26 PROCEDURE — 76040000025: Performed by: INTERNAL MEDICINE

## 2018-10-26 PROCEDURE — 76060000031 HC ANESTHESIA FIRST 0.5 HR: Performed by: INTERNAL MEDICINE

## 2018-10-26 PROCEDURE — 82962 GLUCOSE BLOOD TEST: CPT

## 2018-10-26 PROCEDURE — 74011250636 HC RX REV CODE- 250/636

## 2018-10-26 RX ORDER — SODIUM CHLORIDE, SODIUM LACTATE, POTASSIUM CHLORIDE, CALCIUM CHLORIDE 600; 310; 30; 20 MG/100ML; MG/100ML; MG/100ML; MG/100ML
1000 INJECTION, SOLUTION INTRAVENOUS CONTINUOUS
Status: DISCONTINUED | OUTPATIENT
Start: 2018-10-26 | End: 2018-10-26 | Stop reason: HOSPADM

## 2018-10-26 RX ORDER — SODIUM CHLORIDE 0.9 % (FLUSH) 0.9 %
5-10 SYRINGE (ML) INJECTION AS NEEDED
Status: DISCONTINUED | OUTPATIENT
Start: 2018-10-26 | End: 2018-10-26 | Stop reason: HOSPADM

## 2018-10-26 RX ORDER — LIDOCAINE HYDROCHLORIDE 10 MG/ML
0.1 INJECTION INFILTRATION; PERINEURAL AS NEEDED
Status: DISCONTINUED | OUTPATIENT
Start: 2018-10-26 | End: 2018-10-26 | Stop reason: HOSPADM

## 2018-10-26 RX ORDER — FENTANYL CITRATE 50 UG/ML
100 INJECTION, SOLUTION INTRAMUSCULAR; INTRAVENOUS AS NEEDED
Status: DISCONTINUED | OUTPATIENT
Start: 2018-10-26 | End: 2018-10-26 | Stop reason: HOSPADM

## 2018-10-26 RX ORDER — HEPARIN 100 UNIT/ML
500 SYRINGE INTRAVENOUS AS NEEDED
Status: DISCONTINUED | OUTPATIENT
Start: 2018-10-26 | End: 2018-10-26 | Stop reason: HOSPADM

## 2018-10-26 RX ORDER — PROMETHAZINE HYDROCHLORIDE 25 MG/1
25 TABLET ORAL
Status: DISCONTINUED | OUTPATIENT
Start: 2018-10-26 | End: 2018-10-26 | Stop reason: HOSPADM

## 2018-10-26 RX ORDER — SODIUM CHLORIDE 0.9 % (FLUSH) 0.9 %
5-10 SYRINGE (ML) INJECTION EVERY 8 HOURS
Status: DISCONTINUED | OUTPATIENT
Start: 2018-10-26 | End: 2018-10-26 | Stop reason: HOSPADM

## 2018-10-26 RX ORDER — PROMETHAZINE HYDROCHLORIDE 25 MG/ML
INJECTION, SOLUTION INTRAMUSCULAR; INTRAVENOUS
Status: COMPLETED
Start: 2018-10-26 | End: 2018-10-26

## 2018-10-26 RX ORDER — LIDOCAINE HYDROCHLORIDE 20 MG/ML
INJECTION, SOLUTION EPIDURAL; INFILTRATION; INTRACAUDAL; PERINEURAL AS NEEDED
Status: DISCONTINUED | OUTPATIENT
Start: 2018-10-26 | End: 2018-10-26 | Stop reason: HOSPADM

## 2018-10-26 RX ORDER — MIDAZOLAM HYDROCHLORIDE 1 MG/ML
2 INJECTION, SOLUTION INTRAMUSCULAR; INTRAVENOUS
Status: DISCONTINUED | OUTPATIENT
Start: 2018-10-26 | End: 2018-10-26 | Stop reason: HOSPADM

## 2018-10-26 RX ORDER — TRIAMCINOLONE ACETONIDE 40 MG/ML
40 INJECTION, SUSPENSION INTRA-ARTICULAR; INTRAMUSCULAR ONCE
Status: DISCONTINUED | OUTPATIENT
Start: 2018-10-26 | End: 2018-10-26 | Stop reason: HOSPADM

## 2018-10-26 RX ADMIN — LIDOCAINE HYDROCHLORIDE 40 MG: 20 INJECTION, SOLUTION EPIDURAL; INFILTRATION; INTRACAUDAL; PERINEURAL at 08:27

## 2018-10-26 RX ADMIN — PROMETHAZINE HYDROCHLORIDE 25 MG: 25 INJECTION INTRAMUSCULAR; INTRAVENOUS at 08:54

## 2018-10-26 RX ADMIN — LIDOCAINE HYDROCHLORIDE 40 MG: 20 INJECTION, SOLUTION EPIDURAL; INFILTRATION; INTRACAUDAL; PERINEURAL at 08:30

## 2018-10-26 RX ADMIN — LIDOCAINE HYDROCHLORIDE 20 MG: 20 INJECTION, SOLUTION EPIDURAL; INFILTRATION; INTRACAUDAL; PERINEURAL at 08:29

## 2018-10-26 RX ADMIN — SODIUM CHLORIDE, SODIUM LACTATE, POTASSIUM CHLORIDE, AND CALCIUM CHLORIDE 1000 ML: 600; 310; 30; 20 INJECTION, SOLUTION INTRAVENOUS at 07:23

## 2018-10-26 RX ADMIN — LIDOCAINE HYDROCHLORIDE 40 MG: 20 INJECTION, SOLUTION EPIDURAL; INFILTRATION; INTRACAUDAL; PERINEURAL at 08:33

## 2018-10-26 NOTE — ANESTHESIA POSTPROCEDURE EVALUATION
Procedure(s): ESOPHAGOGASTRODUODENOSCOPY (EGD)/ 32 ESOPHAGEAL DILATION. Anesthesia Post Evaluation Patient location during evaluation: bedside Patient participation: complete - patient participated Level of consciousness: responsive to verbal stimuli Pain management: satisfactory to patient Airway patency: patent Anesthetic complications: no 
Cardiovascular status: hemodynamically stable Respiratory status: spontaneous ventilation Hydration status: stable Visit Vitals /84 Pulse 79 Temp 37.4 °C (99.3 °F) Resp 14 Ht 5' 2\" (1.575 m) Wt 79.4 kg (175 lb) SpO2 94% BMI 32.01 kg/m²

## 2018-10-26 NOTE — PROCEDURES
Esophagogastroduodenoscopy (EGD) Procedure Note    Procedure: EGD    Pre-operative Diagnosis: Esophageal stricture      Post-operative Diagnosis: Dilation of esophageal stricture-48, 48, [place] 9938. Good disruption and patency. Postoperative changes with Nissen fundoplication, gastric jejunostomy and entero-enteral anastomosis in the duodenum. Recommendations:  -soft diet for 24 hours  -No acidic drinks or foods, nothing spicy or hot  -PPI twice daily for one week then as directed thereafter.  -Report any significant chest pain, painful swallowing after dilation.  -Avoid NSAIDs for 1 week     Follow up:   As scheduled     Anesthesia/Sedation: MAC, (see separate report). Procedure Details:  Informed consent was obtained for the procedure, including sedation. Risks of perforation, hemorrhage, adverse drug reaction and aspiration were discussed. The patient was placed in the left lateral decubitus position. Based on the pre-procedure assessment, including review of the patient's medical history, medications, allergies, and review of systems, she had been deemed to be an appropriate candidate for anesthesia. The patient was monitored continuously with ECG tracing, pulse oximetry, blood pressure monitoring, and direct observations. Please refer to the anesthesia record for details and dosages of medications. The esophagus was intubated with direct visualization. The esophagus, stomach and duodenum were traversed to the full extent of the endoscope. Retroflexed views of the cardia and fundus were performed. The stomach was fully insufflated and deflated. Findings:   OROPHARYNX: The oropharynx was briefly viewed on entry and withdrawal with very brief evaluation of the cords, arytenoids and pyriform sinuses. No abnormalities found. ESOPHAGUS:  There is an esophageal stricture at 39 cm from the incisors. It was easily traversed with the endoscope.  Past 48 and 50-Stateless dilators with minimal resistance and on repeat endoscopy after each there is minimal trauma. With 46 Marshallese dilator there is significant, circumferential shallow tears, no active bleeding and no evidence of perforation. STOMACH: By retroflexed view there was evidence of a Nissen fundoplication. Not overly tight. There was a gastrojejunostomy and a native opening. There was no evidence of an anastomotic ulceration or narrowing. Small Bowel: There is native opening and the proximal duodenum was normal. The jejunum was traverse through a gastrojejunostomy and appeared normal as well. EBL: 0 cc's. Pathology speciment:  None. Complications: No apparent complications and the patient tolerated sedation and the procedure well. Attending Attestation: I performed the procedure.     Anusha Mora MD

## 2018-10-26 NOTE — H&P (VIEW-ONLY)
Chief complaint/HPI and PMH:  Please refer to outpatient note below or attached to the chart. Today's exam: LUNGS:  Clear and equal. 
COR:  RRR without murmurs heard. NEURO:  A and Oriented fully. I have thoroughly explained the preparation, procedure and sedation process to the patient as well as the risks and alternatives. They will sign informed consent prior to the procedure.    
 
 
Dee Diaz MD

## 2018-10-26 NOTE — PROGRESS NOTES
Patient complaining of nausea in GI recovery room. Dr. Leah Rosales notified and 25mg Phenergan ordered and administered.

## 2018-10-26 NOTE — DISCHARGE INSTRUCTIONS
Gastrointestinal Esophagogastroduodenoscopy (EGD) - Upper Exam Discharge Instructions    1. Call Dr. Flavio Aguilar at [phone] for any problems or questions. 2. Contact the doctor's office for follow up appointment as directed. 3. Medication may cause drowsiness for several hours, therefore, do not drive or                  operate machinery for remainder of the day. 4. No alcohol today. 5. Ordinarily, you may resume regular diet and activity after exam unless otherwise              specified by your physician. 6. For mild soreness in your throat you may use Cepacol throat lozenges or warm               salt-water gargles as needed. Any additional instructions:  Soft diet for 24 hours, No acidic drinks or nothing spicy or hot, Avoid NSAIDS for 1 week, Office will follow up with next visit,      Instructions given to Zabrina Fu and other family members.   Instructions given by:

## 2018-10-26 NOTE — H&P
Chief complaint/HPI and PMH:  Please refer to outpatient note below or attached to the chart. Today's exam: LUNGS:  Clear and equal. 
COR:  RRR without murmurs heard. NEURO:  A and Oriented fully. I have thoroughly explained the preparation, procedure and sedation process to the patient as well as the risks and alternatives. They will sign informed consent prior to the procedure.    
 
 
Neil Rivera MD

## 2018-11-01 ENCOUNTER — HOSPITAL ENCOUNTER (OUTPATIENT)
Dept: INFUSION THERAPY | Age: 48
Discharge: HOME | End: 2018-11-01
Payer: COMMERCIAL

## 2018-11-01 VITALS
HEART RATE: 110 BPM | TEMPERATURE: 98.3 F | OXYGEN SATURATION: 94 % | DIASTOLIC BLOOD PRESSURE: 92 MMHG | SYSTOLIC BLOOD PRESSURE: 131 MMHG | RESPIRATION RATE: 20 BRPM

## 2018-11-01 LAB — MAGNESIUM SERPL-MCNC: 1.9 MG/DL (ref 1.8–2.4)

## 2018-11-01 PROCEDURE — 74011000250 HC RX REV CODE- 250: Performed by: INTERNAL MEDICINE

## 2018-11-01 PROCEDURE — 74011250636 HC RX REV CODE- 250/636: Performed by: INTERNAL MEDICINE

## 2018-11-01 PROCEDURE — 83735 ASSAY OF MAGNESIUM: CPT

## 2018-11-01 PROCEDURE — 96374 THER/PROPH/DIAG INJ IV PUSH: CPT

## 2018-11-01 PROCEDURE — 77030003560 HC NDL HUBR BARD -A

## 2018-11-01 RX ORDER — HEPARIN 100 UNIT/ML
500 SYRINGE INTRAVENOUS AS NEEDED
Status: DISPENSED | OUTPATIENT
Start: 2018-11-01 | End: 2018-11-01

## 2018-11-01 RX ORDER — SODIUM CHLORIDE 0.9 % (FLUSH) 0.9 %
10 SYRINGE (ML) INJECTION AS NEEDED
Status: DISCONTINUED | OUTPATIENT
Start: 2018-11-01 | End: 2018-11-05 | Stop reason: HOSPADM

## 2018-11-01 RX ADMIN — Medication 10 ML: at 07:40

## 2018-11-01 RX ADMIN — SODIUM CHLORIDE 25 MG: 9 INJECTION INTRAMUSCULAR; INTRAVENOUS; SUBCUTANEOUS at 07:35

## 2018-11-01 RX ADMIN — Medication 500 UNITS: at 07:41

## 2018-11-01 NOTE — PROGRESS NOTES
Pt arrived ambulatory, lab drawn, port needle and dressing changed, phenergan given IVP, mag WNL, pt discharged ambulatory

## 2018-11-08 ENCOUNTER — HOSPITAL ENCOUNTER (OUTPATIENT)
Dept: INFUSION THERAPY | Age: 48
Discharge: HOME | End: 2018-11-08
Payer: COMMERCIAL

## 2018-11-08 VITALS
WEIGHT: 177.4 LBS | OXYGEN SATURATION: 95 % | BODY MASS INDEX: 32.45 KG/M2 | HEART RATE: 105 BPM | SYSTOLIC BLOOD PRESSURE: 128 MMHG | RESPIRATION RATE: 18 BRPM | TEMPERATURE: 99.1 F | DIASTOLIC BLOOD PRESSURE: 89 MMHG

## 2018-11-08 LAB — MAGNESIUM SERPL-MCNC: 1.7 MG/DL (ref 1.8–2.4)

## 2018-11-08 PROCEDURE — 74011250636 HC RX REV CODE- 250/636: Performed by: INTERNAL MEDICINE

## 2018-11-08 PROCEDURE — 74011000250 HC RX REV CODE- 250: Performed by: INTERNAL MEDICINE

## 2018-11-08 PROCEDURE — 96365 THER/PROPH/DIAG IV INF INIT: CPT

## 2018-11-08 PROCEDURE — 83735 ASSAY OF MAGNESIUM: CPT

## 2018-11-08 PROCEDURE — 96375 TX/PRO/DX INJ NEW DRUG ADDON: CPT

## 2018-11-08 PROCEDURE — 77030003560 HC NDL HUBR BARD -A

## 2018-11-08 RX ORDER — SODIUM CHLORIDE 0.9 % (FLUSH) 0.9 %
10-30 SYRINGE (ML) INJECTION AS NEEDED
Status: DISCONTINUED | OUTPATIENT
Start: 2018-11-08 | End: 2018-11-12 | Stop reason: HOSPADM

## 2018-11-08 RX ORDER — MAGNESIUM SULFATE HEPTAHYDRATE 40 MG/ML
2 INJECTION, SOLUTION INTRAVENOUS ONCE
Status: COMPLETED | OUTPATIENT
Start: 2018-11-08 | End: 2018-11-08

## 2018-11-08 RX ORDER — HEPARIN 100 UNIT/ML
500 SYRINGE INTRAVENOUS AS NEEDED
Status: DISPENSED | OUTPATIENT
Start: 2018-11-08 | End: 2018-11-08

## 2018-11-08 RX ADMIN — Medication 10 ML: at 09:45

## 2018-11-08 RX ADMIN — SODIUM CHLORIDE 25 MG: 9 INJECTION INTRAMUSCULAR; INTRAVENOUS; SUBCUTANEOUS at 07:45

## 2018-11-08 RX ADMIN — MAGNESIUM SULFATE HEPTAHYDRATE 2 G: 40 INJECTION, SOLUTION INTRAVENOUS at 08:30

## 2018-11-08 RX ADMIN — Medication 30 ML: at 07:20

## 2018-11-08 RX ADMIN — SODIUM CHLORIDE, PRESERVATIVE FREE 500 UNITS: 5 INJECTION INTRAVENOUS at 09:45

## 2018-11-08 NOTE — PROGRESS NOTES
Massage THERAPY: Daily Note Referring Physician: Elaina Dumont MD 
Medical/Referring Diagnosis: Pancreatitis [K85.90] Precautions/Allergies: Tape [adhesive]; Barium iodide; Flagyl [metronidazole]; Metformin; and Insulins SUBJECTIVE: 
Present Symptoms: hands ache Pre-Treatment Pain: 5/10 Neuropathy Scale:  1/10 Past Medical History: Ms. Tiff Garzon  has a past medical history of Anemia, Anxiety and depression, Asthma, C. difficile diarrhea, Cervical dysplasia, Chronic insomnia, Chronic pain, Chronic pancreatitis ([address] Endometriosis, Esophageal stricture, Fibromyalgia, Fibromyalgia, Former cigarette smoker, GERD (gastroesophageal reflux disease), HLD (hyperlipidemia), IBS (irritable bowel syndrome), Migraine headache, Morbid obesity ([address] Nausea & vomiting, Nonalcoholic fatty liver disease, Pancreatitis, Psychiatric disorder, PUD (peptic ulcer disease), Sphincter of Oddi dysfunction, and Type 2 diabetes mellitus ([address] Ms. Tiff Garzon  has a past surgical history that includes hx ercp (3/5/2013); hx laparotomy (3/5/2013); hx colonoscopy; hx endoscopy (2017); hx hysterectomy (1998); pr abdomen surgery proc unlisted (last one placed  2012); hx lap cholecystectomy (1997); pr abdomen surgery proc unlisted (1997); pr abdomen surgery proc unlisted (4/13); hx urological (4/25/13 at [facility]); hx orthopaedic; hx vascular access (2014); ESOPHAGOGASTRODUODENOSCOPY (EGD)/ 32 (N/A, 10/26/2018); ESOPHAGEAL DILATION (N/A, 10/26/2018); ESOPHAGOGASTRODUODENOSCOPY(EGD)/ 32 (N/A, 9/27/2018); ESOPHAGEAL DILATION (N/A, 9/27/2018); IR ANESTHESIA/CHECK PORT POSS EXCHANGE (N/A, 9/25/2018); ESOPHAGOGASTRODUODENOSCOPY (N/A, 9/14/2018); ESOPHAGEAL DILATION (N/A, 9/14/2018); ESOPHAGOGASTRODUODENAL (EGD) BIOPSY (N/A, 9/14/2018); ESOPHAGOGASTRODUODENOSCOPY WITH DILATION/KENALOG (EGD)/ 32 (N/A, 8/31/2018);  INJECTION (N/A, 8/31/2018); ESOPHAGEAL DILATION (N/A, 8/31/2018); ESOPHAGOGASTRODUODENOSCOPY WITH DILATION/KENALOG (EGD)/ 32 (N/A, 8/14/2018); ESOPHAGEAL DILATION (N/A, 8/14/2018); ESOPHAGOGASTRODUODENOSCOPY WITH DILATION/KENALOG (EGD)/ 32 (N/A, 8/2/2018); ESOPHAGEAL DILATION (N/A, 8/2/2018); ESOPHAGOGASTRODUODENOSCOPY (EGD)/ 32 (N/A, 7/17/2018); INJECTION (N/A, 7/17/2018); ESOPHAGEAL DILATION (N/A, 7/17/2018); ESOPHAGOGASTRODUODENOSCOPY (EGD)/ 32 (N/A, 7/5/2018); ESOPHAGEAL DILATION (N/A, 7/5/2018); ESOPHAGOGASTRODUODENOSCOPY WITH DILATION/KENALOG (EGD)/ 32 (N/A, 6/5/2018); INJECTION (N/A, 6/5/2018); ESOPHAGEAL DILATION (N/A, 6/5/2018); ESOPHAGEAL DILATION (N/A, 5/3/2018); ESOPHAGOGASTRODUODENOSCOPY (EGD) (N/A, 5/3/2018); ESOPHAGOGASTRODUODENOSCOPY WITH DILATION/KENALOG (EGD)/ 32 (N/A, 4/4/2018); COLONOSCOPY (N/A, 4/4/2018); ESOPHAGEAL DILATION (N/A, 4/4/2018); INJECTION (N/A, 4/4/2018); ENDOSCOPIC POLYPECTOMY (N/A, 4/4/2018); [id number]/HIPGFEE (EGD)/ 32 (N/A, 2/21/2018); ESOPHAGEAL DILATION (N/A, 2/21/2018); INJECTION (N/A, 2/21/2018); ESOPHAGOGASTRODUODENOSCOPY (EGD)   BMI =32 (N/A, 1/22/2018); ESOPHAGEAL DILATION (N/A, 1/22/2018); ESOPHAGOGASTRODUODENOSCOPY (EGD)  /   BMI =32 (N/A, 12/22/2017); ESOPHAGEAL DILATION (N/A, 12/22/2017); INJECTION (N/A, 12/22/2017); ESOPHAGOGASTRODUODENOSCOPY (EGD)    BMI =32 (N/A, 11/22/2017); ESOPHAGEAL DILATION (N/A, 11/22/2017); INJECTION (N/A, 11/22/2017); ESOPHAGOGASTRODUODENOSCOPY (EGD)  BMI 32 (N/A, 10/25/2017); ESOPHAGEAL DILATION (N/A, 10/25/2017); ESOPHAGOGASTRODUODENOSCOPY (EGD)   BMI 32 (N/A, 8/17/2017); ESOPHAGEAL DILATION (N/A, 8/17/2017); INJECTION (N/A, 8/17/2017); ESOPHAGOGASTRODUODENOSCOPY (EGD) WITH SAVORY DILATION  BMI 31 (N/A, 7/6/2017); ESOPHAGEAL DILATION (N/A, 7/6/2017); INFUSAPORT INSERTION/ SINGLE LUMEN AND REMOVAL OF CENTRAL LINE (Right, 11/11/2013); CYSTOSCOPY URETERAL STENT INSERTION OR REMOVAL (Right, 6/27/2013);  RETROPERITONEAL DEBULKING (N/A, 5/13/2013); LAPAROTOMY EXPLORATORY (N/A, 4/25/2013); CYSTOSCOPY URETERAL STENT INSERTION OR REMOVAL (Right, 4/25/2013); and LAPAROTOMY EXPLORATORY (N/A, 3/5/2013). Current Medications:   
  
Current Outpatient Medications:  
  atorvastatin (LIPITOR) 10 mg tablet, Take 10 mg by mouth nightly., Disp: , Rfl:  
  fentaNYL (DURAGESIC) 75 mcg/hr, 1 Patch by TransDERmal route every seventy-two (72) hours. Fibromyalgia/ abd pain, Disp: , Rfl:  
  citalopram (CELEXA) 20 mg tablet, Take 20 mg by mouth daily. Indications: am, Disp: , Rfl:  
  acetaminophen (TYLENOL) 325 mg tablet, Take 325 mg by mouth every four (4) hours as needed for Pain., Disp: , Rfl:  
  LORazepam (ATIVAN) 1 mg tablet, Take 1 mg by mouth three (3) times daily as needed for Anxiety. , Disp: , Rfl:  
  cyanocobalamin (VITAMIN B12) 1,000 mcg/mL injection, INJECT 1 VIAL INTRAMUSCULARLY FOR 4 WEEKS THEN MONTHLY, Disp: , Rfl: 12 
  glucagon (GLUCAGEN) 1 mg injection, 1 mg by IntraMUSCular route., Disp: , Rfl:  
  CARAFATE 100 mg/mL suspension, TAKE 10 ML BY MOUTH 4 TIMES A DAY EVERY DAY ON A EMPTY STOMACH 1 HR BEFORE MEALS AND AT BEDTIME, Disp: , Rfl: 4 
  gabapentin (NEURONTIN) 250 mg/5 mL solution, Take 300 mg by mouth three (3) times daily. , Disp: , Rfl:  
  diphenhydrAMINE (BENADRYL) 25 mg capsule, Take 25 mg by mouth every six (6) hours as needed. , Disp: , Rfl:  
  pantoprazole (PROTONIX) 40 mg injection, 40 mg by IntraVENous route every morning. Take day of surgery per anesthesia protocol., Disp: , Rfl:  
  insulin degludec (TRESIBA FLEXTOUCH U-100) 100 unit/mL (3 mL) inpn, 40 Units by SubCUTAneous route nightly., Disp: , Rfl:  
  polyethylene glycol (MIRALAX) 17 gram packet, Take 17 g by mouth daily. , Disp: , Rfl:  
  hyoscyamine SL (LEVSIN/SL) 0.125 mg SL tablet, 0.125 mg by SubLINGual route every six (6) hours as needed for Cramping., Disp: , Rfl:  
  0.9% sodium chloride solution, by IntraVENous route five (5) days a week.  Gives to herself via port at home daily -1000cc, Disp: , Rfl:  
   insulin aspart (NOVOLOG) 100 unit/mL injection, Use as directed (Patient taking differently: 7 Units by SubCUTAneous route Before breakfast, lunch, and dinner. Over 300 use sliding scale- Always gets 7 units then if >300 gets extra - does ot know amount- has never taken more than 10 units regular  Indications: type 2 diabetes mellitus), Disp: 10 mL, Rfl: 3 
  zolpidem (AMBIEN) 10 mg tablet, TAKE 1 TABLET BY MOUTH EVERY NIGHT AS NEEDED FOR SLEEP (Patient taking differently: Take 10 mg by mouth nightly.), Disp: 90 Tab, Rfl: 1   promethazine (PHENERGAN) 25 mg tablet, Take 1 Tab by mouth every six (6) hours as needed. (Patient taking differently: Take 25 mg by mouth as needed.), Disp: 90 Tab, Rfl: 1   promethazine (PHENERGAN) 25 mg suppository, Insert 25 mg into rectum as needed for Nausea., Disp: , Rfl:  
  ondansetron hcl (ZOFRAN) 8 mg tablet, Take 8 mg by mouth every eight (8) hours as needed for Nausea., Disp: , Rfl:  
 
Current Facility-Administered Medications:  
  central line flush (saline) syringe 10-30 mL, 10-30 mL, InterCATHeter, PRN, Tessa Ospina MD, 30 mL at 11/08/18 0720   heparin (porcine) pf 500 Units, 500 Units, InterCATHeter, PRN, Tessa Ospina MD  
 
 
OBJECTIVE/ASSESSMENT: 
Observations of Patient:  No issues related to massage Response To Treatment: Pain, \"achyness\" decreased Post-Treatment Pain: 1/10 Neuropathy Scale:  1/10 TREATMENT:  
 (In addition to Assessment/Re-Assessment sessions the following treatments were rendered) Treatment Provided: 
[x]  Soft tissue massage 
[]  Healing Touch Location: forearm(s) bilaterally and hand(s) bilaterally Patient Position: Seated Time: 15 minutes PLAN OF CARE: 
 
[]  I will follow up with this patient as needed. [x]  No follow up visit necessary. Thank you for this referral. 
Celine Yanez LMT

## 2018-11-08 NOTE — PROGRESS NOTES
Arrived to the [facility]. Magnesium 2 Grams given IV,also Phenergan 25 mg IV completed. Patient tolerated well Any issues or concerns during appointment: No 
Patient aware of next infusion appointment on Thursday,November 15th @ 3 [facility] Discharged home ambulatory

## 2018-11-14 PROCEDURE — 77030003560 HC NDL HUBR BARD -A

## 2018-11-15 ENCOUNTER — HOSPITAL ENCOUNTER (OUTPATIENT)
Dept: INFUSION THERAPY | Age: 48
Discharge: HOME | End: 2018-11-15
Payer: COMMERCIAL

## 2018-11-15 VITALS
HEART RATE: 107 BPM | RESPIRATION RATE: 18 BRPM | OXYGEN SATURATION: 96 % | WEIGHT: 177.8 LBS | DIASTOLIC BLOOD PRESSURE: 93 MMHG | SYSTOLIC BLOOD PRESSURE: 120 MMHG | BODY MASS INDEX: 32.52 KG/M2 | TEMPERATURE: 98.3 F

## 2018-11-15 LAB — MAGNESIUM SERPL-MCNC: 1.7 MG/DL (ref 1.8–2.4)

## 2018-11-15 PROCEDURE — 96365 THER/PROPH/DIAG IV INF INIT: CPT

## 2018-11-15 PROCEDURE — 77030003560 HC NDL HUBR BARD -A

## 2018-11-15 PROCEDURE — 74011250636 HC RX REV CODE- 250/636: Performed by: INTERNAL MEDICINE

## 2018-11-15 PROCEDURE — 74011000250 HC RX REV CODE- 250: Performed by: INTERNAL MEDICINE

## 2018-11-15 PROCEDURE — 83735 ASSAY OF MAGNESIUM: CPT

## 2018-11-15 PROCEDURE — 96375 TX/PRO/DX INJ NEW DRUG ADDON: CPT

## 2018-11-15 RX ORDER — MAGNESIUM SULFATE HEPTAHYDRATE 40 MG/ML
2 INJECTION, SOLUTION INTRAVENOUS ONCE
Status: COMPLETED | OUTPATIENT
Start: 2018-11-15 | End: 2018-11-15

## 2018-11-15 RX ORDER — SODIUM CHLORIDE 9 MG/ML
250 INJECTION, SOLUTION INTRAVENOUS ONCE
Status: COMPLETED | OUTPATIENT
Start: 2018-11-15 | End: 2018-11-15

## 2018-11-15 RX ORDER — SODIUM CHLORIDE 0.9 % (FLUSH) 0.9 %
10-50 SYRINGE (ML) INJECTION AS NEEDED
Status: DISCONTINUED | OUTPATIENT
Start: 2018-11-15 | End: 2018-11-19 | Stop reason: HOSPADM

## 2018-11-15 RX ORDER — HEPARIN 100 UNIT/ML
500 SYRINGE INTRAVENOUS AS NEEDED
Status: DISPENSED | OUTPATIENT
Start: 2018-11-15 | End: 2018-11-15

## 2018-11-15 RX ADMIN — SODIUM CHLORIDE 25 MG: 9 INJECTION INTRAMUSCULAR; INTRAVENOUS; SUBCUTANEOUS at 07:35

## 2018-11-15 RX ADMIN — Medication 20 ML: at 07:25

## 2018-11-15 RX ADMIN — SODIUM CHLORIDE 250 ML/HR: 900 INJECTION, SOLUTION INTRAVENOUS at 07:30

## 2018-11-15 RX ADMIN — HEPARIN 500 UNITS: 100 SYRINGE at 09:25

## 2018-11-15 RX ADMIN — MAGNESIUM SULFATE HEPTAHYDRATE 2 G: 40 INJECTION, SOLUTION INTRAVENOUS at 08:20

## 2018-11-15 NOTE — PROGRESS NOTES
Arrived to the [facility]. Magnesium 2 Grams and Phenergan 25 mg IV completed. Patient tolerated well. Any issues or concerns during appointment: No 
Patient aware of next infusion appointment on Friday,November 23rd @ 0715 Discharged home ambulatory

## 2018-11-20 ENCOUNTER — ANESTHESIA EVENT (OUTPATIENT)
Dept: ENDOSCOPY | Age: 48
End: 2018-11-20
Payer: COMMERCIAL

## 2018-11-20 RX ORDER — SODIUM CHLORIDE, SODIUM LACTATE, POTASSIUM CHLORIDE, CALCIUM CHLORIDE 600; 310; 30; 20 MG/100ML; MG/100ML; MG/100ML; MG/100ML
100 INJECTION, SOLUTION INTRAVENOUS CONTINUOUS
Status: CANCELLED | OUTPATIENT
Start: 2018-11-20

## 2018-11-20 RX ORDER — SODIUM CHLORIDE 0.9 % (FLUSH) 0.9 %
5-10 SYRINGE (ML) INJECTION AS NEEDED
Status: CANCELLED | OUTPATIENT
Start: 2018-11-20

## 2018-11-21 ENCOUNTER — HOSPITAL ENCOUNTER (OUTPATIENT)
Age: 48
Setting detail: OUTPATIENT SURGERY
Discharge: HOME | End: 2018-11-21
Attending: INTERNAL MEDICINE | Admitting: INTERNAL MEDICINE
Payer: COMMERCIAL

## 2018-11-21 ENCOUNTER — APPOINTMENT (OUTPATIENT)
Dept: INFUSION THERAPY | Age: 48
End: 2018-11-21
Payer: COMMERCIAL

## 2018-11-21 ENCOUNTER — ANESTHESIA (OUTPATIENT)
Dept: ENDOSCOPY | Age: 48
End: 2018-11-21
Payer: COMMERCIAL

## 2018-11-21 VITALS
BODY MASS INDEX: 32.76 KG/M2 | OXYGEN SATURATION: 93 % | DIASTOLIC BLOOD PRESSURE: 78 MMHG | SYSTOLIC BLOOD PRESSURE: 125 MMHG | WEIGHT: 178 LBS | HEART RATE: 92 BPM | TEMPERATURE: 98 F | RESPIRATION RATE: 14 BRPM | HEIGHT: 62 IN

## 2018-11-21 LAB — GLUCOSE BLD STRIP.AUTO-MCNC: 228 MG/DL (ref 65–100)

## 2018-11-21 PROCEDURE — 74011250636 HC RX REV CODE- 250/636: Performed by: ANESTHESIOLOGY

## 2018-11-21 PROCEDURE — 77030031722: Performed by: INTERNAL MEDICINE

## 2018-11-21 PROCEDURE — 74011000250 HC RX REV CODE- 250: Performed by: ANESTHESIOLOGY

## 2018-11-21 PROCEDURE — 74011250636 HC RX REV CODE- 250/636: Performed by: INTERNAL MEDICINE

## 2018-11-21 PROCEDURE — 76060000031 HC ANESTHESIA FIRST 0.5 HR: Performed by: INTERNAL MEDICINE

## 2018-11-21 PROCEDURE — 82962 GLUCOSE BLOOD TEST: CPT

## 2018-11-21 PROCEDURE — 74011250636 HC RX REV CODE- 250/636

## 2018-11-21 PROCEDURE — 76040000025: Performed by: INTERNAL MEDICINE

## 2018-11-21 RX ORDER — SODIUM CHLORIDE, SODIUM LACTATE, POTASSIUM CHLORIDE, CALCIUM CHLORIDE 600; 310; 30; 20 MG/100ML; MG/100ML; MG/100ML; MG/100ML
100 INJECTION, SOLUTION INTRAVENOUS CONTINUOUS
Status: DISCONTINUED | OUTPATIENT
Start: 2018-11-21 | End: 2018-11-21 | Stop reason: HOSPADM

## 2018-11-21 RX ORDER — LIDOCAINE HYDROCHLORIDE 20 MG/ML
15 SOLUTION OROPHARYNGEAL ONCE
Status: COMPLETED | OUTPATIENT
Start: 2018-11-21 | End: 2018-11-21

## 2018-11-21 RX ORDER — PROPOFOL 10 MG/ML
INJECTION, EMULSION INTRAVENOUS
Status: DISCONTINUED | OUTPATIENT
Start: 2018-11-21 | End: 2018-11-21 | Stop reason: HOSPADM

## 2018-11-21 RX ORDER — PROPOFOL 10 MG/ML
INJECTION, EMULSION INTRAVENOUS AS NEEDED
Status: DISCONTINUED | OUTPATIENT
Start: 2018-11-21 | End: 2018-11-21 | Stop reason: HOSPADM

## 2018-11-21 RX ORDER — HEPARIN 100 UNIT/ML
500 SYRINGE INTRAVENOUS AS NEEDED
Status: DISCONTINUED | OUTPATIENT
Start: 2018-11-21 | End: 2018-11-21 | Stop reason: HOSPADM

## 2018-11-21 RX ORDER — TRIAMCINOLONE ACETONIDE 40 MG/ML
40 INJECTION, SUSPENSION INTRA-ARTICULAR; INTRAMUSCULAR
Status: COMPLETED | OUTPATIENT
Start: 2018-11-21 | End: 2018-11-21

## 2018-11-21 RX ORDER — LIDOCAINE HYDROCHLORIDE 20 MG/ML
INJECTION, SOLUTION EPIDURAL; INFILTRATION; INTRACAUDAL; PERINEURAL AS NEEDED
Status: DISCONTINUED | OUTPATIENT
Start: 2018-11-21 | End: 2018-11-21 | Stop reason: HOSPADM

## 2018-11-21 RX ADMIN — PROPOFOL 180 MCG/KG/MIN: 10 INJECTION, EMULSION INTRAVENOUS at 09:47

## 2018-11-21 RX ADMIN — PROPOFOL 125 MG: 10 INJECTION, EMULSION INTRAVENOUS at 09:47

## 2018-11-21 RX ADMIN — SODIUM CHLORIDE, PRESERVATIVE FREE 500 UNITS: 5 INJECTION INTRAVENOUS at 10:32

## 2018-11-21 RX ADMIN — TRIAMCINOLONE ACETONIDE 20 MG: 40 INJECTION, SUSPENSION INTRA-ARTICULAR; INTRAMUSCULAR at 09:59

## 2018-11-21 RX ADMIN — LIDOCAINE HYDROCHLORIDE 80 MG: 20 INJECTION, SOLUTION EPIDURAL; INFILTRATION; INTRACAUDAL; PERINEURAL at 09:47

## 2018-11-21 RX ADMIN — SODIUM CHLORIDE, SODIUM LACTATE, POTASSIUM CHLORIDE, AND CALCIUM CHLORIDE 100 ML/HR: 600; 310; 30; 20 INJECTION, SOLUTION INTRAVENOUS at 09:28

## 2018-11-21 RX ADMIN — LIDOCAINE HYDROCHLORIDE 15 ML: 20 SOLUTION ORAL; TOPICAL at 09:39

## 2018-11-21 NOTE — PROCEDURES
Esophagogastroduodenoscopy    DATE of PROCEDURE: 11/21/2018    INDICATION: esophageal stricture    POSTPROCEDURE DIAGNOSIS: esophageal stricture    MEDICATIONS ADMINISTERED: MAC anesthesia (see anesthesia report)    INSTRUMENT:    PROCEDURE:  After obtaining informed consent, the patient was placed in the left lateral position and sedated. The endoscope was advanced under direct vision without difficulty. The esophagus, stomach (including retroflexed views) and duodenum were evaluated. The patient was taken to the recovery area in stable condition. FINDINGS:  ESOPHAGUS: benign narrowing distally; Guido #48 led to no tears; #50 lead to superficial 1 cm longitudinal tear at GE junction. Injected 1 cc Kenalog into two sites of the tear. STOMACH: negative for ulcers  DUODENUM: normal  Gastrojejunal anastomosis: clear with no inflammation    Estimated blood loss: 0-minimal   Specimens obtained during procedure: none    PLAN: Esophageal stricture. For now, will continue monthly EGD dilations.

## 2018-11-21 NOTE — INTERVAL H&P NOTE
H&P Update: 
Debbie Espinoza was seen and examined. History and physical has been reviewed. The patient has been examined.  There have been no significant clinical changes since the completion of the originally dated History and Physical. 
 
Signed By: Zaida Argueta MD   
 November 21, 2018 9:39 AM

## 2018-11-21 NOTE — DISCHARGE INSTRUCTIONS
Gastrointestinal Esophagogastroduodenoscopy (EGD) - Upper Exam Discharge Instructions    1. Call Dr. Delmy Barkley at [phone] for any problems or questions. 2. Contact the doctor's office for follow up appointment as directed. 3. Medication may cause drowsiness for several hours, therefore, do not drive or operate machinery for remainder of the day. 4. No alcohol today. 5. Ordinarily, you may resume regular diet and activity after exam unless otherwise specified by your physician. 6. For mild soreness in your throat you may use Cepacol throat lozenges or warm salt-water gargles as needed. Any additional instructions:   Follow up with office in 4-6 weeks

## 2018-11-21 NOTE — ANESTHESIA POSTPROCEDURE EVALUATION
Procedure(s): ESOPHAGOGASTRODUODENOSCOPY WITH DILATION/KENALOG (EGD)/ 32 ESOPHAGEAL DILATION INJECTION. Anesthesia Post Evaluation Multimodal analgesia: multimodal analgesia used between 6 hours prior to anesthesia start to PACU discharge Patient location during evaluation: bedside Patient participation: complete - patient participated Level of consciousness: awake and responsive to light touch Pain management: adequate Airway patency: patent Anesthetic complications: no 
Cardiovascular status: acceptable, hemodynamically stable, blood pressure returned to baseline and stable Respiratory status: acceptable, unassisted, spontaneous ventilation and nonlabored ventilation Hydration status: acceptable Post anesthesia nausea and vomiting:  controlled Visit Vitals /87 Pulse 98 Temp 37.2 °C (99 °F) Resp 18 Ht 5' 2\" (1.575 m) Wt 80.7 kg (178 lb) SpO2 98% BMI 32.56 kg/m²

## 2018-11-21 NOTE — ROUTINE PROCESS
Pt FSBG 228, pt states she is allergic to humalog and requested to use home insulin. Anesthesiologist notified, states to give 7 units home insulin. Administered by pt.

## 2018-11-21 NOTE — ANESTHESIA PREPROCEDURE EVALUATION
Anesthetic History PONV Review of Systems / Medical History Patient summary reviewed and pertinent labs reviewed Pulmonary Smoker (Former, quit 25 years) Asthma : well controlled Neuro/Psych Psychiatric history Cardiovascular Hypertension: well controlled Exercise tolerance: >4 METS 
  
GI/Hepatic/Renal 
  
GERD: well controlled Liver disease (fatty liver) Comments: Esophageal stricture Endo/Other Diabetes: poorly controlled, type 2, using insulin Obesity Other Findings Comments: Fibromyalgia, on a fent. patch Physical Exam 
 
Airway Mallampati: II 
 
Neck ROM: decreased range of motion, short neck Mouth opening: Normal 
 
 Cardiovascular Regular rate and rhythm,  S1 and S2 normal,  no murmur, click, rub, or gallop Rhythm: regular Rate: normal 
 
 
Pertinent negatives: No murmur Dental 
No notable dental hx Pulmonary Breath sounds clear to auscultation Abdominal 
 
 
 
 Other Findings Anesthetic Plan ASA: 3 Anesthesia type: total IV anesthesia Induction: Intravenous Anesthetic plan and risks discussed with: Patient

## 2018-11-23 ENCOUNTER — HOSPITAL ENCOUNTER (OUTPATIENT)
Dept: INFUSION THERAPY | Age: 48
Discharge: HOME | End: 2018-11-23
Payer: COMMERCIAL

## 2018-11-23 VITALS
RESPIRATION RATE: 18 BRPM | SYSTOLIC BLOOD PRESSURE: 142 MMHG | HEART RATE: 104 BPM | DIASTOLIC BLOOD PRESSURE: 88 MMHG | TEMPERATURE: 98.5 F | OXYGEN SATURATION: 93 %

## 2018-11-23 LAB — MAGNESIUM SERPL-MCNC: 1.7 MG/DL (ref 1.8–2.4)

## 2018-11-23 PROCEDURE — 96365 THER/PROPH/DIAG IV INF INIT: CPT

## 2018-11-23 PROCEDURE — 74011250636 HC RX REV CODE- 250/636: Performed by: INTERNAL MEDICINE

## 2018-11-23 PROCEDURE — 83735 ASSAY OF MAGNESIUM: CPT

## 2018-11-23 PROCEDURE — 77030003560 HC NDL HUBR BARD -A

## 2018-11-23 PROCEDURE — 96375 TX/PRO/DX INJ NEW DRUG ADDON: CPT

## 2018-11-23 PROCEDURE — 74011000250 HC RX REV CODE- 250: Performed by: INTERNAL MEDICINE

## 2018-11-23 RX ORDER — PROMETHAZINE HYDROCHLORIDE 25 MG/1
25 TABLET ORAL ONCE
Status: DISCONTINUED | OUTPATIENT
Start: 2018-11-23 | End: 2018-11-23

## 2018-11-23 RX ORDER — SODIUM CHLORIDE 0.9 % (FLUSH) 0.9 %
10 SYRINGE (ML) INJECTION AS NEEDED
Status: DISCONTINUED | OUTPATIENT
Start: 2018-11-23 | End: 2018-11-27 | Stop reason: HOSPADM

## 2018-11-23 RX ORDER — MAGNESIUM SULFATE 1 G/100ML
1 INJECTION INTRAVENOUS ONCE
Status: COMPLETED | OUTPATIENT
Start: 2018-11-23 | End: 2018-11-23

## 2018-11-23 RX ORDER — HEPARIN 100 UNIT/ML
500 SYRINGE INTRAVENOUS AS NEEDED
Status: DISPENSED | OUTPATIENT
Start: 2018-11-23 | End: 2018-11-23

## 2018-11-23 RX ADMIN — SODIUM CHLORIDE 25 MG: 9 INJECTION INTRAMUSCULAR; INTRAVENOUS; SUBCUTANEOUS at 07:41

## 2018-11-23 RX ADMIN — MAGNESIUM SULFATE HEPTAHYDRATE 1 G: 1 INJECTION, SOLUTION INTRAVENOUS at 08:19

## 2018-11-23 RX ADMIN — Medication 10 ML: at 09:19

## 2018-11-23 NOTE — PROGRESS NOTES
Arrived to the [facility]. Magnesium completed. Patient tolerated without problems Any issues or concerns during appointment: no 
Patient aware of next infusion appointment on 11/29/18 at [facility] Discharged ambulatory

## 2018-11-29 ENCOUNTER — HOSPITAL ENCOUNTER (OUTPATIENT)
Dept: INFUSION THERAPY | Age: 48
Discharge: HOME | End: 2018-11-29
Payer: COMMERCIAL

## 2018-11-29 VITALS
OXYGEN SATURATION: 96 % | HEART RATE: 115 BPM | TEMPERATURE: 98.5 F | DIASTOLIC BLOOD PRESSURE: 80 MMHG | SYSTOLIC BLOOD PRESSURE: 126 MMHG

## 2018-11-29 LAB — MAGNESIUM SERPL-MCNC: 1.8 MG/DL (ref 1.8–2.4)

## 2018-11-29 PROCEDURE — 74011250636 HC RX REV CODE- 250/636: Performed by: INTERNAL MEDICINE

## 2018-11-29 PROCEDURE — 96374 THER/PROPH/DIAG INJ IV PUSH: CPT

## 2018-11-29 PROCEDURE — 77030003560 HC NDL HUBR BARD -A

## 2018-11-29 PROCEDURE — 83735 ASSAY OF MAGNESIUM: CPT

## 2018-11-29 PROCEDURE — 74011000250 HC RX REV CODE- 250: Performed by: INTERNAL MEDICINE

## 2018-11-29 RX ORDER — SODIUM CHLORIDE 0.9 % (FLUSH) 0.9 %
10 SYRINGE (ML) INJECTION AS NEEDED
Status: DISCONTINUED | OUTPATIENT
Start: 2018-11-29 | End: 2018-12-03 | Stop reason: HOSPADM

## 2018-11-29 RX ORDER — HEPARIN 100 UNIT/ML
500 SYRINGE INTRAVENOUS AS NEEDED
Status: DISPENSED | OUTPATIENT
Start: 2018-11-29 | End: 2018-11-29

## 2018-11-29 RX ADMIN — Medication 10 ML: at 09:32

## 2018-11-29 RX ADMIN — HEPARIN 300 UNITS: 100 SYRINGE at 09:33

## 2018-11-29 RX ADMIN — Medication 10 ML: at 08:21

## 2018-11-29 RX ADMIN — SODIUM CHLORIDE 25 MG: 9 INJECTION INTRAMUSCULAR; INTRAVENOUS; SUBCUTANEOUS at 08:22

## 2018-11-29 NOTE — PROGRESS NOTES
Arrived to infusion. Magnesium WNL. Phenergan 25mg IVP given. Port remains accessed. Next appointment 12/13/18 at 7:15am. 
Discharged ambulatory.

## 2018-12-06 ENCOUNTER — HOSPITAL ENCOUNTER (OUTPATIENT)
Dept: INFUSION THERAPY | Age: 48
Discharge: HOME | End: 2018-12-06
Payer: COMMERCIAL

## 2018-12-06 VITALS
RESPIRATION RATE: 18 BRPM | SYSTOLIC BLOOD PRESSURE: 114 MMHG | TEMPERATURE: 98.5 F | BODY MASS INDEX: 31.9 KG/M2 | OXYGEN SATURATION: 95 % | HEART RATE: 113 BPM | WEIGHT: 174.4 LBS | DIASTOLIC BLOOD PRESSURE: 60 MMHG

## 2018-12-06 LAB — MAGNESIUM SERPL-MCNC: 1.8 MG/DL (ref 1.8–2.4)

## 2018-12-06 PROCEDURE — 96374 THER/PROPH/DIAG INJ IV PUSH: CPT

## 2018-12-06 PROCEDURE — 83735 ASSAY OF MAGNESIUM: CPT

## 2018-12-06 PROCEDURE — 74011000250 HC RX REV CODE- 250: Performed by: INTERNAL MEDICINE

## 2018-12-06 PROCEDURE — 74011250636 HC RX REV CODE- 250/636: Performed by: INTERNAL MEDICINE

## 2018-12-06 RX ORDER — SODIUM CHLORIDE 0.9 % (FLUSH) 0.9 %
10 SYRINGE (ML) INJECTION AS NEEDED
Status: DISCONTINUED | OUTPATIENT
Start: 2018-12-06 | End: 2018-12-10 | Stop reason: HOSPADM

## 2018-12-06 RX ADMIN — Medication 10 ML: at 07:33

## 2018-12-06 RX ADMIN — PROMETHAZINE HYDROCHLORIDE 25 MG: 25 INJECTION, SOLUTION INTRAMUSCULAR; INTRAVENOUS at 07:32

## 2018-12-06 NOTE — PROGRESS NOTES
Arrived to the [facility]. Labs completed. Patient given Phenergan for c/o nausea, Magnesium replacement not required Any issues or concerns during appointment: no 
Patient aware of next infusion appointment on 12/13/18 at [facility] Discharged ambulatory

## 2018-12-13 ENCOUNTER — APPOINTMENT (OUTPATIENT)
Dept: INFUSION THERAPY | Age: 48
End: 2018-12-13
Payer: COMMERCIAL

## 2018-12-14 ENCOUNTER — HOSPITAL ENCOUNTER (OUTPATIENT)
Dept: INFUSION THERAPY | Age: 48
Discharge: HOME | End: 2018-12-14
Payer: COMMERCIAL

## 2018-12-14 VITALS
TEMPERATURE: 98.6 F | OXYGEN SATURATION: 94 % | HEART RATE: 100 BPM | DIASTOLIC BLOOD PRESSURE: 89 MMHG | SYSTOLIC BLOOD PRESSURE: 142 MMHG | WEIGHT: 176.4 LBS | BODY MASS INDEX: 32.26 KG/M2 | RESPIRATION RATE: 18 BRPM

## 2018-12-14 LAB — MAGNESIUM SERPL-MCNC: 1.9 MG/DL (ref 1.8–2.4)

## 2018-12-14 PROCEDURE — 74011000250 HC RX REV CODE- 250: Performed by: INTERNAL MEDICINE

## 2018-12-14 PROCEDURE — 96374 THER/PROPH/DIAG INJ IV PUSH: CPT

## 2018-12-14 PROCEDURE — 83735 ASSAY OF MAGNESIUM: CPT

## 2018-12-14 PROCEDURE — 77030003560 HC NDL HUBR BARD -A

## 2018-12-14 PROCEDURE — 96361 HYDRATE IV INFUSION ADD-ON: CPT

## 2018-12-14 PROCEDURE — 74011250636 HC RX REV CODE- 250/636: Performed by: INTERNAL MEDICINE

## 2018-12-14 RX ORDER — SODIUM CHLORIDE 0.9 % (FLUSH) 0.9 %
5-10 SYRINGE (ML) INJECTION AS NEEDED
Status: DISCONTINUED | OUTPATIENT
Start: 2018-12-14 | End: 2018-12-18 | Stop reason: HOSPADM

## 2018-12-14 RX ORDER — HEPARIN 100 UNIT/ML
500 SYRINGE INTRAVENOUS AS NEEDED
Status: DISPENSED | OUTPATIENT
Start: 2018-12-14 | End: 2018-12-14

## 2018-12-14 RX ADMIN — SODIUM CHLORIDE 500 ML: 900 INJECTION, SOLUTION INTRAVENOUS at 07:25

## 2018-12-14 RX ADMIN — Medication 10 ML: at 07:25

## 2018-12-14 RX ADMIN — HEPARIN 500 UNITS: 100 SYRINGE at 08:55

## 2018-12-14 RX ADMIN — PROMETHAZINE HYDROCHLORIDE 25 MG: 25 INJECTION, SOLUTION INTRAMUSCULAR; INTRAVENOUS at 07:36

## 2018-12-16 ENCOUNTER — HOSPITAL ENCOUNTER (EMERGENCY)
Age: 48
Discharge: HOME | End: 2018-12-16
Attending: EMERGENCY MEDICINE
Payer: COMMERCIAL

## 2018-12-16 VITALS
BODY MASS INDEX: 32.39 KG/M2 | OXYGEN SATURATION: 96 % | RESPIRATION RATE: 16 BRPM | HEART RATE: 97 BPM | SYSTOLIC BLOOD PRESSURE: 122 MMHG | HEIGHT: 62 IN | WEIGHT: 176 LBS | TEMPERATURE: 98.2 F | DIASTOLIC BLOOD PRESSURE: 65 MMHG

## 2018-12-16 DIAGNOSIS — K85.90 ACUTE PANCREATITIS, UNSPECIFIED COMPLICATION STATUS, UNSPECIFIED PANCREATITIS TYPE: Primary | ICD-10-CM

## 2018-12-16 LAB
ALBUMIN SERPL-MCNC: 3.7 G/DL (ref 3.5–5)
ALBUMIN/GLOB SERPL: 0.9 {RATIO} (ref 1.2–3.5)
ALP SERPL-CCNC: 130 U/L (ref 50–136)
ALT SERPL-CCNC: 51 U/L (ref 12–65)
ANION GAP SERPL CALC-SCNC: 7 MMOL/L (ref 7–16)
AST SERPL-CCNC: 39 U/L (ref 15–37)
BASOPHILS # BLD: 0 K/UL (ref 0–0.2)
BASOPHILS NFR BLD: 1 % (ref 0–2)
BILIRUB SERPL-MCNC: 0.4 MG/DL (ref 0.2–1.1)
BUN SERPL-MCNC: 7 MG/DL (ref 6–23)
CALCIUM SERPL-MCNC: 9.3 MG/DL (ref 8.3–10.4)
CHLORIDE SERPL-SCNC: 106 MMOL/L (ref 98–107)
CO2 SERPL-SCNC: 25 MMOL/L (ref 21–32)
CREAT SERPL-MCNC: 0.66 MG/DL (ref 0.6–1)
DIFFERENTIAL METHOD BLD: NORMAL
EOSINOPHIL # BLD: 0.1 K/UL (ref 0–0.8)
EOSINOPHIL NFR BLD: 2 % (ref 0.5–7.8)
ERYTHROCYTE [DISTWIDTH] IN BLOOD BY AUTOMATED COUNT: 12.2 % (ref 11.9–14.6)
GLOBULIN SER CALC-MCNC: 3.9 G/DL (ref 2.3–3.5)
GLUCOSE SERPL-MCNC: 225 MG/DL (ref 65–100)
HCT VFR BLD AUTO: 43 % (ref 35.8–46.3)
HGB BLD-MCNC: 14 G/DL (ref 11.7–15.4)
IMM GRANULOCYTES # BLD: 0 K/UL (ref 0–0.5)
IMM GRANULOCYTES NFR BLD AUTO: 0 % (ref 0–5)
LIPASE SERPL-CCNC: 97 U/L (ref 73–393)
LYMPHOCYTES # BLD: 1.4 K/UL (ref 0.5–4.6)
LYMPHOCYTES NFR BLD: 32 % (ref 13–44)
MCH RBC QN AUTO: 28.4 PG (ref 26.1–32.9)
MCHC RBC AUTO-ENTMCNC: 32.6 G/DL (ref 31.4–35)
MCV RBC AUTO: 87.2 FL (ref 79.6–97.8)
MONOCYTES # BLD: 0.2 K/UL (ref 0.1–1.3)
MONOCYTES NFR BLD: 5 % (ref 4–12)
NEUTS SEG # BLD: 2.8 K/UL (ref 1.7–8.2)
NEUTS SEG NFR BLD: 60 % (ref 43–78)
NRBC # BLD: 0 K/UL (ref 0–0.2)
PLATELET # BLD AUTO: 160 K/UL (ref 150–450)
PMV BLD AUTO: 11.6 FL (ref 9.4–12.3)
POTASSIUM SERPL-SCNC: 4.2 MMOL/L (ref 3.5–5.1)
PROT SERPL-MCNC: 7.6 G/DL (ref 6.3–8.2)
RBC # BLD AUTO: 4.93 M/UL (ref 4.05–5.2)
SODIUM SERPL-SCNC: 138 MMOL/L (ref 136–145)
WBC # BLD AUTO: 4.6 K/UL (ref 4.3–11.1)

## 2018-12-16 PROCEDURE — 96376 TX/PRO/DX INJ SAME DRUG ADON: CPT | Performed by: EMERGENCY MEDICINE

## 2018-12-16 PROCEDURE — 83690 ASSAY OF LIPASE: CPT

## 2018-12-16 PROCEDURE — 96375 TX/PRO/DX INJ NEW DRUG ADDON: CPT | Performed by: EMERGENCY MEDICINE

## 2018-12-16 PROCEDURE — 85025 COMPLETE CBC W/AUTO DIFF WBC: CPT

## 2018-12-16 PROCEDURE — 99283 EMERGENCY DEPT VISIT LOW MDM: CPT | Performed by: EMERGENCY MEDICINE

## 2018-12-16 PROCEDURE — 74011250636 HC RX REV CODE- 250/636: Performed by: EMERGENCY MEDICINE

## 2018-12-16 PROCEDURE — 96374 THER/PROPH/DIAG INJ IV PUSH: CPT | Performed by: EMERGENCY MEDICINE

## 2018-12-16 PROCEDURE — 80053 COMPREHEN METABOLIC PANEL: CPT

## 2018-12-16 PROCEDURE — 74011000250 HC RX REV CODE- 250: Performed by: EMERGENCY MEDICINE

## 2018-12-16 RX ORDER — HYDROMORPHONE HYDROCHLORIDE 2 MG/1
2 TABLET ORAL
Qty: 25 TAB | Refills: 0 | Status: SHIPPED | OUTPATIENT
Start: 2018-12-16 | End: 2019-02-01

## 2018-12-16 RX ORDER — HYDROMORPHONE HYDROCHLORIDE 1 MG/ML
1 INJECTION, SOLUTION INTRAMUSCULAR; INTRAVENOUS; SUBCUTANEOUS ONCE
Status: COMPLETED | OUTPATIENT
Start: 2018-12-16 | End: 2018-12-16

## 2018-12-16 RX ADMIN — HYDROMORPHONE HYDROCHLORIDE 1 MG: 1 INJECTION, SOLUTION INTRAMUSCULAR; INTRAVENOUS; SUBCUTANEOUS at 13:49

## 2018-12-16 RX ADMIN — PROMETHAZINE HYDROCHLORIDE 12.5 MG: 25 INJECTION INTRAMUSCULAR; INTRAVENOUS at 13:49

## 2018-12-16 RX ADMIN — PROMETHAZINE HYDROCHLORIDE 25 MG: 25 INJECTION INTRAMUSCULAR; INTRAVENOUS at 10:23

## 2018-12-16 RX ADMIN — SODIUM CHLORIDE 1000 ML: 900 INJECTION, SOLUTION INTRAVENOUS at 10:23

## 2018-12-16 RX ADMIN — HYDROMORPHONE HYDROCHLORIDE 1 MG: 1 INJECTION, SOLUTION INTRAMUSCULAR; INTRAVENOUS; SUBCUTANEOUS at 10:23

## 2018-12-16 NOTE — ED PROVIDER NOTES
Patient states she has a history of chronic pancreatitis secondary to sphincter of [place] dysfunction. She is currently on a fentanyl patch which has been slowly being weaned over time. She states she started havingepigastric and left upper quadrant pain approximately 3 days ago, consistent with her previous bouts of pancreatitis. The pain occasionally radiates to her back. She denies any vomiting, states she has been eating and drinking well without difficulty. She has a port in place and uses it to give herself daily infusions of IV fluids. She has not taken any medication for her symptoms, denies any aggravating or alleviating factors. Her pain gets severe at times. She describes it as achy pain that radiates to her back. Elements of this note were created using speech recognition software. As such, errors of speech recognition may be present.              Past Medical History:   Diagnosis Date    Anemia     denies hx of blood transfusions-- Fe infusions 12/2017    Anxiety and depression     Asthma     allergy induced, denies any inhaler, patient denies    C. difficile diarrhea     in 1432 after complicated ERCP    Cervical dysplasia 1990s    Chronic insomnia     Chronic pain     all over     Chronic pancreatitis ([facility]     Endometriosis     Esophageal stricture 2017    Fibromyalgia     Fibromyalgia     Former cigarette smoker     GERD (gastroesophageal reflux disease)     daily meds---po and IV protonix- uses per port- alternates    HLD (hyperlipidemia)     pt denies     IBS (irritable bowel syndrome)     Migraine headache     Morbid obesity (HCC)     BMI 33.3    Nausea & vomiting     pt reports she does better with phenergan than zofran - causes HA    Nonalcoholic fatty liver disease     Pancreatitis     Psychiatric disorder     PUD (peptic ulcer disease) 06/2017    Hx of     Sphincter of Oddi dysfunction     Type 2 diabetes mellitus (HCC)     -200, Lows at 90's, Last A1C 10 on 2018, Takes insulin-       Past Surgical History:   Procedure Laterality Date    ABDOMEN SURGERY PROC UNLISTED  last one placed      Hx of pancreatic stent, multiple    ABDOMEN SURGERY PROC UNLISTED      lap nissen    ABDOMEN SURGERY PROC UNLISTED      explor lap    COLONOSCOPY N/A 2018    COLON[facility] ENDOSCOPY    HX COLONOSCOPY      HX ENDOSCOPY      36 fr thomas    HX ERCP  3/5/2013    resulted in perforated duodenum    HX HYSTERECTOMY      ovaries remain    HX LAP CHOLECYSTECTOMY      HX LAPAROTOMY  3/5/2013    exploratory for duodenal perforation with ERCP    HX ORTHOPAEDIC      right finger surgery 2017    HX UROLOGICAL  13 at [facility]-- stent removed 13.   Dr Fara Hernández      port insertion, left chest wall         Family History:   Problem Relation Age of Onset    Diabetes Mother     Hypertension Mother     Heart Disease Mother         cabg    Diabetes Father     Heart Disease Father     Hypertension Father     Other Father     No Known Problems Sister        Social History     Socioeconomic History    Marital status:      Spouse name: Not on file    Number of children: Not on file    Years of education: Not on file    Highest education level: Not on file   Social Needs    Financial resource strain: Not on file    Food insecurity - worry: Not on file    Food insecurity - inability: Not on file   Cognio needs - medical: Not on file   Cognio needs - non-medical: Not on file   Occupational History    Not on file   Tobacco Use    Smoking status: Former Smoker     Packs/day: 1.00     Years: 3.00     Pack years: 3.00     Last attempt to quit: 1993     Years since quittin.6    Smokeless tobacco: Never Used   Substance and Sexual Activity    Alcohol use: No    Drug use: No    Sexual activity: Not on file   Other Topics Concern    Not on file   Social History Narrative    Not on file         ALLERGIES: Tape [adhesive]; Barium iodide; Flagyl [metronidazole]; Metformin; and Insulins    Review of Systems   Constitutional: Negative for chills and fever. Gastrointestinal: Positive for abdominal pain. Negative for vomiting. All other systems reviewed and are negative. Vitals:    12/16/18 0917 12/16/18 0951   BP: 128/87 122/65   Pulse: 98 97   Resp: 16    Temp: 98.2 °F (36.8 °C)    SpO2: 95% 96%   Weight: 79.8 kg (176 lb)    Height: 5' 2\" (1.575 m)             Physical Exam   Constitutional: She is oriented to person, place, and time. She appears well-developed and well-nourished. HENT:   Head: Normocephalic and atraumatic. Eyes: Conjunctivae are normal. Pupils are equal, round, and reactive to light. Neck: Normal range of motion. Neck supple. Cardiovascular: Normal rate, regular rhythm and normal heart sounds. Pulmonary/Chest: Effort normal and breath sounds normal.   Abdominal: Soft. Bowel sounds are normal. There is tenderness. Moderate tenderness to palpation left upper abdomen as indicated   Musculoskeletal: She exhibits no edema or tenderness. Neurological: She is alert and oriented to person, place, and time. Skin: Skin is warm and dry. Psychiatric: She has a normal mood and affect. Her behavior is normal.   Nursing note and vitals reviewed. MDM  Number of Diagnoses or Management Options  Acute pancreatitis, unspecified complication status, unspecified pancreatitis type: established and worsening  Diagnosis management comments: 1:16 PM patient had significant improvement after pain medication, states the pain is coming back. Clinically, she does not appear ill. She'll be given another dose of medication and sent home on oral medication.   She has a GI doctor she can follow-up with       Amount and/or Complexity of Data Reviewed  Clinical lab tests: ordered and reviewed    Risk of Complications, Morbidity, and/or Mortality  Presenting problems: moderate  Diagnostic procedures: moderate  Management options: moderate    Patient Progress  Patient progress: improved         Procedures

## 2018-12-16 NOTE — ED TRIAGE NOTES
Patient reports chronic pancreatitis. This episode has lasted for 3 days. Dry heaving and nausea. Port in place due to pancreatitis. Gave herself 1 L NACL last evening. EGD scheduled for Thursday.  Reports wearing Fentanyl patch

## 2018-12-16 NOTE — ED NOTES
I have reviewed discharge instructions with the patient. The patient verbalized understanding. Patient left ED via Discharge Method: ambulatory to Home with family     Opportunity for questions and clarification provided. Patient given 1 scripts. To continue your aftercare when you leave the hospital, you may receive an automated call from our care team to check in on how you are doing. This is a free service and part of our promise to provide the best care and service to meet your aftercare needs.  If you have questions, or wish to unsubscribe from this service please call [phone]. Thank you for Choosing our [address] [place] [address] Emergency Department.

## 2018-12-16 NOTE — DISCHARGE INSTRUCTIONS
Follow-up with your doctor at [facility]. Return to the emergency department if your symptoms worsen despite the pain medication.

## 2018-12-19 ENCOUNTER — ANESTHESIA EVENT (OUTPATIENT)
Dept: ENDOSCOPY | Age: 48
End: 2018-12-19
Payer: COMMERCIAL

## 2018-12-19 RX ORDER — SODIUM CHLORIDE, SODIUM LACTATE, POTASSIUM CHLORIDE, CALCIUM CHLORIDE 600; 310; 30; 20 MG/100ML; MG/100ML; MG/100ML; MG/100ML
100 INJECTION, SOLUTION INTRAVENOUS CONTINUOUS
Status: CANCELLED | OUTPATIENT
Start: 2018-12-19

## 2018-12-19 RX ORDER — SODIUM CHLORIDE 0.9 % (FLUSH) 0.9 %
5-10 SYRINGE (ML) INJECTION AS NEEDED
Status: CANCELLED | OUTPATIENT
Start: 2018-12-19

## 2018-12-19 RX ORDER — SODIUM CHLORIDE 0.9 % (FLUSH) 0.9 %
5-10 SYRINGE (ML) INJECTION EVERY 8 HOURS
Status: CANCELLED | OUTPATIENT
Start: 2018-12-19

## 2018-12-20 ENCOUNTER — HOSPITAL ENCOUNTER (OUTPATIENT)
Age: 48
Setting detail: OUTPATIENT SURGERY
Discharge: HOME | End: 2018-12-20
Attending: INTERNAL MEDICINE | Admitting: INTERNAL MEDICINE
Payer: COMMERCIAL

## 2018-12-20 ENCOUNTER — ANESTHESIA (OUTPATIENT)
Dept: ENDOSCOPY | Age: 48
End: 2018-12-20
Payer: COMMERCIAL

## 2018-12-20 VITALS
BODY MASS INDEX: 32.39 KG/M2 | RESPIRATION RATE: 18 BRPM | SYSTOLIC BLOOD PRESSURE: 96 MMHG | TEMPERATURE: 97.9 F | HEART RATE: 75 BPM | OXYGEN SATURATION: 94 % | HEIGHT: 62 IN | DIASTOLIC BLOOD PRESSURE: 56 MMHG | WEIGHT: 176 LBS

## 2018-12-20 LAB — GLUCOSE BLD STRIP.AUTO-MCNC: 154 MG/DL (ref 65–100)

## 2018-12-20 PROCEDURE — 74011250636 HC RX REV CODE- 250/636: Performed by: ANESTHESIOLOGY

## 2018-12-20 PROCEDURE — 76060000031 HC ANESTHESIA FIRST 0.5 HR: Performed by: INTERNAL MEDICINE

## 2018-12-20 PROCEDURE — 74011000250 HC RX REV CODE- 250: Performed by: ANESTHESIOLOGY

## 2018-12-20 PROCEDURE — 76040000025: Performed by: INTERNAL MEDICINE

## 2018-12-20 PROCEDURE — 74011250636 HC RX REV CODE- 250/636

## 2018-12-20 PROCEDURE — 82962 GLUCOSE BLOOD TEST: CPT

## 2018-12-20 RX ORDER — TRIAMCINOLONE ACETONIDE 40 MG/ML
40 INJECTION, SUSPENSION INTRA-ARTICULAR; INTRAMUSCULAR
Status: DISCONTINUED | OUTPATIENT
Start: 2018-12-20 | End: 2018-12-20 | Stop reason: HOSPADM

## 2018-12-20 RX ORDER — PROPOFOL 10 MG/ML
INJECTION, EMULSION INTRAVENOUS AS NEEDED
Status: DISCONTINUED | OUTPATIENT
Start: 2018-12-20 | End: 2018-12-20 | Stop reason: HOSPADM

## 2018-12-20 RX ORDER — LIDOCAINE HYDROCHLORIDE 20 MG/ML
INJECTION, SOLUTION EPIDURAL; INFILTRATION; INTRACAUDAL; PERINEURAL AS NEEDED
Status: DISCONTINUED | OUTPATIENT
Start: 2018-12-20 | End: 2018-12-20 | Stop reason: HOSPADM

## 2018-12-20 RX ORDER — PROPOFOL 10 MG/ML
INJECTION, EMULSION INTRAVENOUS
Status: DISCONTINUED | OUTPATIENT
Start: 2018-12-20 | End: 2018-12-20 | Stop reason: HOSPADM

## 2018-12-20 RX ORDER — SODIUM CHLORIDE, SODIUM LACTATE, POTASSIUM CHLORIDE, CALCIUM CHLORIDE 600; 310; 30; 20 MG/100ML; MG/100ML; MG/100ML; MG/100ML
100 INJECTION, SOLUTION INTRAVENOUS CONTINUOUS
Status: DISCONTINUED | OUTPATIENT
Start: 2018-12-20 | End: 2018-12-20 | Stop reason: HOSPADM

## 2018-12-20 RX ADMIN — LIDOCAINE HYDROCHLORIDE 100 MG: 20 INJECTION, SOLUTION EPIDURAL; INFILTRATION; INTRACAUDAL; PERINEURAL at 08:35

## 2018-12-20 RX ADMIN — PROPOFOL 60 MG: 10 INJECTION, EMULSION INTRAVENOUS at 08:35

## 2018-12-20 RX ADMIN — PROMETHAZINE HYDROCHLORIDE 12.5 MG: 25 INJECTION INTRAMUSCULAR; INTRAVENOUS at 07:34

## 2018-12-20 RX ADMIN — SODIUM CHLORIDE, SODIUM LACTATE, POTASSIUM CHLORIDE, AND CALCIUM CHLORIDE 100 ML/HR: 600; 310; 30; 20 INJECTION, SOLUTION INTRAVENOUS at 07:34

## 2018-12-20 RX ADMIN — PROPOFOL 300 MCG/KG/MIN: 10 INJECTION, EMULSION INTRAVENOUS at 08:35

## 2018-12-20 RX ADMIN — PROPOFOL 30 MG: 10 INJECTION, EMULSION INTRAVENOUS at 08:36

## 2018-12-20 NOTE — ANESTHESIA POSTPROCEDURE EVALUATION
Procedure(s):  ESOPHAGOGASTRODUODENOSCOPY WITH DILATION/KENALOG (EGD)/ 32  ESOPHAGEAL DILATION.     Anesthesia Post Evaluation      Multimodal analgesia: multimodal analgesia used between 6 hours prior to anesthesia start to PACU discharge  Patient location during evaluation: PACU  Patient participation: complete - patient participated  Level of consciousness: awake and alert  Pain management: adequate  Airway patency: patent  Anesthetic complications: no  Cardiovascular status: acceptable and hemodynamically stable  Respiratory status: acceptable  Hydration status: acceptable        Visit Vitals  /67   Pulse 71   Temp 36.6 °C (97.9 °F)   Resp 15   Ht 5' 2\" (1.575 m)   Wt 79.8 kg (176 lb)   SpO2 97%   BMI 32.19 kg/m²

## 2018-12-20 NOTE — PROCEDURES
Esophagogastroduodenoscopy    DATE of PROCEDURE: 12/20/2018    INDICATION: dysphagia, GERD    POSTPROCEDURE DIAGNOSIS: esophageal stricture    MEDICATIONS ADMINISTERED: MAC anesthesia (see anesthesia report)    INSTRUMENT:    PROCEDURE:  After obtaining informed consent, the patient was placed in the left lateral position and sedated. The endoscope was advanced under direct vision without difficulty. The esophagus, stomach (including retroflexed views) and duodenum were evaluated. The patient was taken to the recovery area in stable condition. FINDINGS:  ESOPHAGUS: benign distal stenosis, minimal mucosal disruption with Guido #48 and 50, mild disruption with #52 so did not use Kenalog  STOMACH: surgical changes of gastrojejunostomy  DUODENUM: surgical changes    Estimated blood loss: 0-minimal   Specimens obtained during procedure: none    PLAN: EGD dilation in 2 months.   Add Welchol BID for worsening GERD

## 2018-12-20 NOTE — ROUTINE PROCESS
Blood sugar 154. Pt had already accessed her portacath at home. Positive blood return, flushed, patent, and infusing.  Given Phenergan 12.5mg IV for nausea per Dr Twila Lucas.

## 2018-12-20 NOTE — H&P
PreProcedure H&P Update    Today's Date:  12/20/2018    CC:  GERD and dysphagia    Subjective:   HPI:  GERD and dysphagia      PMH:  Past Medical History:   Diagnosis Date    Anemia     denies hx of blood transfusions-- Fe infusions 12/2017    Anxiety and depression     Asthma     allergy induced, denies any inhaler, patient denies    C. difficile diarrhea     in 9325 after complicated ERCP    Cervical dysplasia 1990s    Chronic insomnia     Chronic pain     all over     Chronic pancreatitis ([facility] [address]     Endometriosis     Esophageal stricture 2017    Fibromyalgia     Fibromyalgia     Former cigarette smoker     GERD (gastroesophageal reflux disease)     daily meds---po and IV protonix- uses per port- alternates    HLD (hyperlipidemia)     pt denies     IBS (irritable bowel syndrome)     Migraine headache     Morbid obesity (HCC)     BMI 33.3    Nausea & vomiting     pt reports she does better with phenergan than zofran - causes HA    Nonalcoholic fatty liver disease     Pancreatitis     Psychiatric disorder     PUD (peptic ulcer disease) 06/2017    Hx of     Sphincter of Oddi dysfunction     Type 2 diabetes mellitus (HCC)     -200, Lows at 90's, Last A1C 10 on 8/2018, Takes insulin-       Medications:   Current Facility-Administered Medications   Medication Dose Route Frequency    lactated Ringers infusion  100 mL/hr IntraVENous CONTINUOUS    triamcinolone acetonide (KENALOG-40) 40 mg/mL injection 40 mg  40 mg IntraMUSCular ENDO ONCE         Objective:   Vitals:  Visit Vitals  /84   Pulse 98   Temp 99.1 °F (37.3 °C)   Resp 18   Ht 5' 2\" (1.575 m)   Wt 79.8 kg (176 lb)   SpO2 94%   BMI 32.19 kg/m²     Exam:  General appearance: alert, cooperative, no distress  Lungs: clear to auscultation bilaterally anteriorly  Heart: regular rate and rhythm  Abdomen: soft, non-tender.  Bowel sounds normal. No masses, no organomegaly      Data Review (Labs):    No results for input(s): WBC, HGB, HCT, PLT, MCV, NA, K, CL, CO2, BUN, CREA, CA, GLU, AP, SGOT, GPT, TBIL, CBIL, ALB, TP, AML, LPSE, PTP, INR, APTT, HGBEXT, HCTEXT, PLTEXT in the last 72 hours. No lab exists for component: DBIL, INREXT    Plan:       GERD and dysphagia. Proceed with EGD dilation. Perforation risk explained and patient understood.

## 2018-12-20 NOTE — DISCHARGE INSTRUCTIONS
Gastrointestinal Esophagogastroduodenoscopy (EGD) - Upper Exam Discharge Instructions    1. Call Dr. Brian Anna at [phone] for any problems or questions. 2. Contact the doctor's office for follow up appointment as directed. 3. Medication may cause drowsiness for several hours, therefore, do not drive or  operate machinery for remainder of the day. 4. No alcohol today. 5. Ordinarily, you may resume regular diet and activity after exam unless otherwise  specified by your physician. 6. For mild soreness in your throat you may use Cepacol throat lozenges or warm salt-water gargles as needed. Any additional instructions:  Office will follow up with next visit     Instructions given to Vahe Fernandez and other family members.

## 2018-12-21 ENCOUNTER — HOSPITAL ENCOUNTER (OUTPATIENT)
Dept: INFUSION THERAPY | Age: 48
Discharge: HOME | End: 2018-12-21
Payer: COMMERCIAL

## 2018-12-21 VITALS
HEART RATE: 90 BPM | DIASTOLIC BLOOD PRESSURE: 84 MMHG | SYSTOLIC BLOOD PRESSURE: 125 MMHG | TEMPERATURE: 98.5 F | OXYGEN SATURATION: 98 % | RESPIRATION RATE: 18 BRPM

## 2018-12-21 LAB — MAGNESIUM SERPL-MCNC: 2.2 MG/DL (ref 1.8–2.4)

## 2018-12-21 PROCEDURE — 96374 THER/PROPH/DIAG INJ IV PUSH: CPT

## 2018-12-21 PROCEDURE — 74011000250 HC RX REV CODE- 250: Performed by: INTERNAL MEDICINE

## 2018-12-21 PROCEDURE — 83735 ASSAY OF MAGNESIUM: CPT

## 2018-12-21 PROCEDURE — 77030003560 HC NDL HUBR BARD -A

## 2018-12-21 PROCEDURE — 74011250636 HC RX REV CODE- 250/636: Performed by: INTERNAL MEDICINE

## 2018-12-21 RX ORDER — HEPARIN 100 UNIT/ML
500 SYRINGE INTRAVENOUS AS NEEDED
Status: DISPENSED | OUTPATIENT
Start: 2018-12-21 | End: 2018-12-21

## 2018-12-21 RX ORDER — SODIUM CHLORIDE 0.9 % (FLUSH) 0.9 %
10 SYRINGE (ML) INJECTION AS NEEDED
Status: ACTIVE | OUTPATIENT
Start: 2018-12-21 | End: 2018-12-21

## 2018-12-21 RX ADMIN — Medication 10 ML: at 14:50

## 2018-12-21 RX ADMIN — PROMETHAZINE HYDROCHLORIDE 25 MG: 25 INJECTION, SOLUTION INTRAMUSCULAR; INTRAVENOUS at 14:58

## 2018-12-21 NOTE — PROGRESS NOTES
Arrived to the [facility]. Assessment and port care completed. Patient tolerated well. Any issues or concerns during appointment: Needle left in place due to patient accessing at home. Magnesium level 2.2. Patient aware of next infusion appointment on 12/27/2018 (date) at [facility] (time) with infusion center. Discharged ambulatory, with self. Patient advised to call Dr. Anahy Sharma office if she has any problems or concerns. Patient verbalized understanding.

## 2018-12-27 ENCOUNTER — HOSPITAL ENCOUNTER (OUTPATIENT)
Dept: INFUSION THERAPY | Age: 48
Discharge: HOME | End: 2018-12-27
Payer: COMMERCIAL

## 2018-12-27 VITALS
RESPIRATION RATE: 18 BRPM | TEMPERATURE: 98.4 F | DIASTOLIC BLOOD PRESSURE: 101 MMHG | SYSTOLIC BLOOD PRESSURE: 154 MMHG | HEART RATE: 121 BPM | OXYGEN SATURATION: 93 %

## 2018-12-27 LAB — MAGNESIUM SERPL-MCNC: 2 MG/DL (ref 1.8–2.4)

## 2018-12-27 PROCEDURE — 96361 HYDRATE IV INFUSION ADD-ON: CPT

## 2018-12-27 PROCEDURE — 77030003560 HC NDL HUBR BARD -A

## 2018-12-27 PROCEDURE — 96374 THER/PROPH/DIAG INJ IV PUSH: CPT

## 2018-12-27 PROCEDURE — 74011250636 HC RX REV CODE- 250/636: Performed by: INTERNAL MEDICINE

## 2018-12-27 PROCEDURE — 83735 ASSAY OF MAGNESIUM: CPT

## 2018-12-27 PROCEDURE — 74011000250 HC RX REV CODE- 250: Performed by: INTERNAL MEDICINE

## 2018-12-27 RX ORDER — HEPARIN 100 UNIT/ML
500 SYRINGE INTRAVENOUS AS NEEDED
Status: DISPENSED | OUTPATIENT
Start: 2018-12-27 | End: 2018-12-27

## 2018-12-27 RX ORDER — SODIUM CHLORIDE 9 MG/ML
1000 INJECTION, SOLUTION INTRAVENOUS ONCE
Status: COMPLETED | OUTPATIENT
Start: 2018-12-27 | End: 2018-12-27

## 2018-12-27 RX ORDER — SODIUM CHLORIDE 0.9 % (FLUSH) 0.9 %
10-30 SYRINGE (ML) INJECTION AS NEEDED
Status: DISCONTINUED | OUTPATIENT
Start: 2018-12-27 | End: 2018-12-31 | Stop reason: HOSPADM

## 2018-12-27 RX ADMIN — Medication 20 ML: at 07:15

## 2018-12-27 RX ADMIN — PROMETHAZINE HYDROCHLORIDE 25 MG: 25 INJECTION, SOLUTION INTRAMUSCULAR; INTRAVENOUS at 07:45

## 2018-12-27 RX ADMIN — SODIUM CHLORIDE 1000 ML: 900 INJECTION, SOLUTION INTRAVENOUS at 08:55

## 2018-12-27 RX ADMIN — SODIUM CHLORIDE, PRESERVATIVE FREE 500 UNITS: 5 INJECTION INTRAVENOUS at 08:45

## 2019-01-03 ENCOUNTER — APPOINTMENT (OUTPATIENT)
Dept: INFUSION THERAPY | Age: 49
End: 2019-01-03
Payer: COMMERCIAL

## 2019-01-03 ENCOUNTER — HOSPITAL ENCOUNTER (OUTPATIENT)
Dept: INFUSION THERAPY | Age: 49
Discharge: HOME | End: 2019-01-03
Payer: COMMERCIAL

## 2019-01-03 VITALS
HEART RATE: 109 BPM | BODY MASS INDEX: 31.35 KG/M2 | SYSTOLIC BLOOD PRESSURE: 131 MMHG | RESPIRATION RATE: 16 BRPM | OXYGEN SATURATION: 92 % | TEMPERATURE: 98.4 F | WEIGHT: 171.4 LBS | DIASTOLIC BLOOD PRESSURE: 87 MMHG

## 2019-01-03 LAB — MAGNESIUM SERPL-MCNC: 2 MG/DL (ref 1.8–2.4)

## 2019-01-03 PROCEDURE — 96374 THER/PROPH/DIAG INJ IV PUSH: CPT

## 2019-01-03 PROCEDURE — 74011000250 HC RX REV CODE- 250: Performed by: INTERNAL MEDICINE

## 2019-01-03 PROCEDURE — 77030003560 HC NDL HUBR BARD -A

## 2019-01-03 PROCEDURE — 74011250636 HC RX REV CODE- 250/636: Performed by: INTERNAL MEDICINE

## 2019-01-03 PROCEDURE — 83735 ASSAY OF MAGNESIUM: CPT

## 2019-01-03 RX ADMIN — SODIUM CHLORIDE 500 ML: 900 INJECTION, SOLUTION INTRAVENOUS at 16:00

## 2019-01-03 RX ADMIN — PROMETHAZINE HYDROCHLORIDE 25 MG: 25 INJECTION INTRAMUSCULAR; INTRAVENOUS at 16:20

## 2019-01-03 NOTE — PROGRESS NOTES
Pt. Arrived to [facility] for: port flush, hydration and antiemetics which she tolerated well Any issues or concerns during this appointment: none Patient aware of next appointment on: 1-10-19 @ [address] [place] [address] Pt. Discharged: ambulatory home

## 2019-01-10 ENCOUNTER — HOSPITAL ENCOUNTER (OUTPATIENT)
Dept: INFUSION THERAPY | Age: 49
Discharge: HOME | End: 2019-01-10
Payer: COMMERCIAL

## 2019-01-10 VITALS
DIASTOLIC BLOOD PRESSURE: 105 MMHG | TEMPERATURE: 98.5 F | HEART RATE: 115 BPM | SYSTOLIC BLOOD PRESSURE: 137 MMHG | BODY MASS INDEX: 31.35 KG/M2 | WEIGHT: 171.4 LBS | RESPIRATION RATE: 18 BRPM | OXYGEN SATURATION: 97 %

## 2019-01-10 LAB — MAGNESIUM SERPL-MCNC: 1.9 MG/DL (ref 1.8–2.4)

## 2019-01-10 PROCEDURE — 74011250636 HC RX REV CODE- 250/636: Performed by: INTERNAL MEDICINE

## 2019-01-10 PROCEDURE — 83735 ASSAY OF MAGNESIUM: CPT

## 2019-01-10 PROCEDURE — 74011000250 HC RX REV CODE- 250: Performed by: INTERNAL MEDICINE

## 2019-01-10 PROCEDURE — 96374 THER/PROPH/DIAG INJ IV PUSH: CPT

## 2019-01-10 RX ORDER — HEPARIN 100 UNIT/ML
500 SYRINGE INTRAVENOUS ONCE
Status: DISPENSED | OUTPATIENT
Start: 2019-01-10 | End: 2019-01-10

## 2019-01-10 RX ORDER — SODIUM CHLORIDE 0.9 % (FLUSH) 0.9 %
10-40 SYRINGE (ML) INJECTION AS NEEDED
Status: DISCONTINUED | OUTPATIENT
Start: 2019-01-10 | End: 2019-01-14 | Stop reason: HOSPADM

## 2019-01-10 RX ADMIN — Medication 10 ML: at 08:00

## 2019-01-10 RX ADMIN — Medication 10 ML: at 07:44

## 2019-01-10 RX ADMIN — PROMETHAZINE HYDROCHLORIDE 25 MG: 25 INJECTION, SOLUTION INTRAMUSCULAR; INTRAVENOUS at 07:45

## 2019-01-11 RX ORDER — SODIUM CHLORIDE 0.9 % (FLUSH) 0.9 %
10-40 SYRINGE (ML) INJECTION AS NEEDED
Status: DISCONTINUED | OUTPATIENT
Start: 2019-01-11 | End: 2019-01-14 | Stop reason: HOSPADM

## 2019-01-17 ENCOUNTER — HOSPITAL ENCOUNTER (OUTPATIENT)
Dept: INFUSION THERAPY | Age: 49
Discharge: HOME | End: 2019-01-17
Payer: COMMERCIAL

## 2019-01-17 VITALS
RESPIRATION RATE: 18 BRPM | BODY MASS INDEX: 31.46 KG/M2 | WEIGHT: 172 LBS | SYSTOLIC BLOOD PRESSURE: 123 MMHG | OXYGEN SATURATION: 94 % | DIASTOLIC BLOOD PRESSURE: 84 MMHG | TEMPERATURE: 97.2 F | HEART RATE: 107 BPM

## 2019-01-17 LAB
ALBUMIN SERPL-MCNC: 3.9 G/DL (ref 3.5–5)
ALBUMIN/GLOB SERPL: 1 {RATIO} (ref 1.2–3.5)
ALP SERPL-CCNC: 138 U/L (ref 50–136)
ALT SERPL-CCNC: 42 U/L (ref 12–65)
ANION GAP SERPL CALC-SCNC: 7 MMOL/L (ref 7–16)
APPEARANCE UR: CLEAR
AST SERPL-CCNC: 29 U/L (ref 15–37)
BACTERIA URNS QL MICRO: 0 /HPF
BASOPHILS # BLD: 0 K/UL (ref 0–0.2)
BASOPHILS NFR BLD: 1 % (ref 0–2)
BILIRUB DIRECT SERPL-MCNC: 0.2 MG/DL
BILIRUB SERPL-MCNC: 0.6 MG/DL (ref 0.2–1.1)
BILIRUB UR QL: NEGATIVE
BUN SERPL-MCNC: 12 MG/DL (ref 6–23)
CALCIUM SERPL-MCNC: 8.7 MG/DL (ref 8.3–10.4)
CASTS URNS QL MICRO: 0 /LPF
CHLORIDE SERPL-SCNC: 107 MMOL/L (ref 98–107)
CHOLEST SERPL-MCNC: 157 MG/DL
CO2 SERPL-SCNC: 27 MMOL/L (ref 21–32)
COLOR UR: YELLOW
CREAT SERPL-MCNC: 0.9 MG/DL (ref 0.6–1)
CRYSTALS URNS QL MICRO: 0 /LPF
DIFFERENTIAL METHOD BLD: ABNORMAL
EOSINOPHIL # BLD: 0.1 K/UL (ref 0–0.8)
EOSINOPHIL NFR BLD: 2 % (ref 0.5–7.8)
EPI CELLS #/AREA URNS HPF: NORMAL /HPF
ERYTHROCYTE [DISTWIDTH] IN BLOOD BY AUTOMATED COUNT: 12 % (ref 11.9–14.6)
EST. AVERAGE GLUCOSE BLD GHB EST-MCNC: 232 MG/DL
GLOBULIN SER CALC-MCNC: 3.8 G/DL (ref 2.3–3.5)
GLUCOSE SERPL-MCNC: 330 MG/DL (ref 65–100)
GLUCOSE UR STRIP.AUTO-MCNC: 500 MG/DL
HBA1C MFR BLD: 9.7 % (ref 4.8–6)
HCT VFR BLD AUTO: 42.3 % (ref 35.8–46.3)
HDLC SERPL-MCNC: 45 MG/DL (ref 40–60)
HDLC SERPL: 3.5 {RATIO}
HGB BLD-MCNC: 13.8 G/DL (ref 11.7–15.4)
HGB UR QL STRIP: ABNORMAL
IMM GRANULOCYTES # BLD AUTO: 0 K/UL (ref 0–0.5)
IMM GRANULOCYTES NFR BLD AUTO: 0 % (ref 0–5)
KETONES UR QL STRIP.AUTO: NEGATIVE MG/DL
LDLC SERPL CALC-MCNC: 64.2 MG/DL
LEUKOCYTE ESTERASE UR QL STRIP.AUTO: NEGATIVE
LIPID PROFILE,FLP: ABNORMAL
LYMPHOCYTES # BLD: 1.3 K/UL (ref 0.5–4.6)
LYMPHOCYTES NFR BLD: 29 % (ref 13–44)
MAGNESIUM SERPL-MCNC: 1.8 MG/DL (ref 1.8–2.4)
MCH RBC QN AUTO: 27.9 PG (ref 26.1–32.9)
MCHC RBC AUTO-ENTMCNC: 32.6 G/DL (ref 31.4–35)
MCV RBC AUTO: 85.6 FL (ref 79.6–97.8)
MONOCYTES # BLD: 0.3 K/UL (ref 0.1–1.3)
MONOCYTES NFR BLD: 8 % (ref 4–12)
MUCOUS THREADS URNS QL MICRO: 0 /LPF
NEUTS SEG # BLD: 2.6 K/UL (ref 1.7–8.2)
NEUTS SEG NFR BLD: 61 % (ref 43–78)
NITRITE UR QL STRIP.AUTO: NEGATIVE
NRBC # BLD: 0 K/UL (ref 0–0.2)
PH UR STRIP: 6 [PH] (ref 5–9)
PLATELET # BLD AUTO: 147 K/UL (ref 150–450)
PMV BLD AUTO: 12.4 FL (ref 9.4–12.3)
POTASSIUM SERPL-SCNC: 3.8 MMOL/L (ref 3.5–5.1)
PROT SERPL-MCNC: 7.7 G/DL (ref 6.3–8.2)
PROT UR STRIP-MCNC: NEGATIVE MG/DL
RBC # BLD AUTO: 4.94 M/UL (ref 4.05–5.25)
RBC #/AREA URNS HPF: NORMAL /HPF
SODIUM SERPL-SCNC: 141 MMOL/L (ref 136–145)
SP GR UR REFRACTOMETRY: 1.02 (ref 1–1.02)
TRIGL SERPL-MCNC: 239 MG/DL (ref 35–150)
TSH SERPL DL<=0.005 MIU/L-ACNC: 1.12 UIU/ML (ref 0.36–3.74)
UROBILINOGEN UR QL STRIP.AUTO: 0.2 EU/DL (ref 0.2–1)
VLDLC SERPL CALC-MCNC: 47.8 MG/DL (ref 6–23)
WBC # BLD AUTO: 4.3 K/UL (ref 4.3–11.1)
WBC URNS QL MICRO: NORMAL /HPF

## 2019-01-17 PROCEDURE — 85025 COMPLETE CBC W/AUTO DIFF WBC: CPT

## 2019-01-17 PROCEDURE — 80076 HEPATIC FUNCTION PANEL: CPT

## 2019-01-17 PROCEDURE — 84443 ASSAY THYROID STIM HORMONE: CPT

## 2019-01-17 PROCEDURE — 74011250636 HC RX REV CODE- 250/636: Performed by: INTERNAL MEDICINE

## 2019-01-17 PROCEDURE — 80048 BASIC METABOLIC PNL TOTAL CA: CPT

## 2019-01-17 PROCEDURE — 80061 LIPID PANEL: CPT

## 2019-01-17 PROCEDURE — 83735 ASSAY OF MAGNESIUM: CPT

## 2019-01-17 PROCEDURE — 81015 MICROSCOPIC EXAM OF URINE: CPT

## 2019-01-17 PROCEDURE — 77030003560 HC NDL HUBR BARD -A

## 2019-01-17 PROCEDURE — 81003 URINALYSIS AUTO W/O SCOPE: CPT

## 2019-01-17 PROCEDURE — 96374 THER/PROPH/DIAG INJ IV PUSH: CPT

## 2019-01-17 PROCEDURE — 74011000250 HC RX REV CODE- 250: Performed by: INTERNAL MEDICINE

## 2019-01-17 PROCEDURE — 83036 HEMOGLOBIN GLYCOSYLATED A1C: CPT

## 2019-01-17 RX ORDER — SODIUM CHLORIDE 0.9 % (FLUSH) 0.9 %
10-30 SYRINGE (ML) INJECTION AS NEEDED
Status: DISCONTINUED | OUTPATIENT
Start: 2019-01-17 | End: 2019-01-21 | Stop reason: HOSPADM

## 2019-01-17 RX ORDER — HEPARIN 100 UNIT/ML
300-900 SYRINGE INTRAVENOUS ONCE
Status: COMPLETED | OUTPATIENT
Start: 2019-01-17 | End: 2019-01-17

## 2019-01-17 RX ADMIN — Medication 10 ML: at 07:38

## 2019-01-17 RX ADMIN — Medication 10 ML: at 08:20

## 2019-01-17 RX ADMIN — Medication 500 UNITS: at 08:20

## 2019-01-17 RX ADMIN — PROMETHAZINE HYDROCHLORIDE 25 MG: 25 INJECTION INTRAMUSCULAR; INTRAVENOUS at 07:41

## 2019-01-17 NOTE — PROGRESS NOTES
Arrived to the [address] [place], [place] [address]an completed. No replacement needed Magnesium 1.8. Patient tolerated well. Port flushed and left accessed for use at home. Any issues or concerns during appointment: None. Patient aware of next infusion appointment on 1/24/2019(date) at 0715 (time). Discharged ambulatory in stable condition. Christi Segovia

## 2019-01-24 ENCOUNTER — HOSPITAL ENCOUNTER (OUTPATIENT)
Dept: INFUSION THERAPY | Age: 49
Discharge: HOME | End: 2019-01-24
Payer: COMMERCIAL

## 2019-01-24 VITALS
BODY MASS INDEX: 31.9 KG/M2 | WEIGHT: 174.4 LBS | SYSTOLIC BLOOD PRESSURE: 133 MMHG | HEART RATE: 110 BPM | DIASTOLIC BLOOD PRESSURE: 79 MMHG | RESPIRATION RATE: 18 BRPM | TEMPERATURE: 98.2 F | OXYGEN SATURATION: 96 %

## 2019-01-24 LAB — MAGNESIUM SERPL-MCNC: 1.9 MG/DL (ref 1.8–2.4)

## 2019-01-24 PROCEDURE — 77030003560 HC NDL HUBR BARD -A

## 2019-01-24 PROCEDURE — 74011000250 HC RX REV CODE- 250: Performed by: INTERNAL MEDICINE

## 2019-01-24 PROCEDURE — 83735 ASSAY OF MAGNESIUM: CPT

## 2019-01-24 PROCEDURE — 74011250636 HC RX REV CODE- 250/636: Performed by: INTERNAL MEDICINE

## 2019-01-24 PROCEDURE — 96374 THER/PROPH/DIAG INJ IV PUSH: CPT

## 2019-01-24 RX ORDER — SODIUM CHLORIDE 9 MG/ML
250 INJECTION, SOLUTION INTRAVENOUS ONCE
Status: COMPLETED | OUTPATIENT
Start: 2019-01-24 | End: 2019-01-24

## 2019-01-24 RX ORDER — HEPARIN 100 UNIT/ML
500 SYRINGE INTRAVENOUS AS NEEDED
Status: DISPENSED | OUTPATIENT
Start: 2019-01-24 | End: 2019-01-24

## 2019-01-24 RX ORDER — SODIUM CHLORIDE 0.9 % (FLUSH) 0.9 %
10 SYRINGE (ML) INJECTION AS NEEDED
Status: DISCONTINUED | OUTPATIENT
Start: 2019-01-24 | End: 2019-01-28 | Stop reason: HOSPADM

## 2019-01-24 RX ADMIN — PROMETHAZINE HYDROCHLORIDE 25 MG: 25 INJECTION INTRAMUSCULAR; INTRAVENOUS at 07:45

## 2019-01-24 RX ADMIN — SODIUM CHLORIDE, PRESERVATIVE FREE 500 UNITS: 5 INJECTION INTRAVENOUS at 08:19

## 2019-01-24 RX ADMIN — Medication 10 ML: at 08:19

## 2019-01-24 RX ADMIN — SODIUM CHLORIDE 250 ML: 900 INJECTION, SOLUTION INTRAVENOUS at 07:40

## 2019-01-24 NOTE — PROGRESS NOTES
Arrived to the [facility]. Phenergan and NS completed. Patient tolerated well. Magnesium today 1.9. Replacement not indicated. Any issues or concerns during appointment: NO. 
Patient aware of next infusion appointment on 01/31/19 (date) at 12 (time). Discharged ambulatory.

## 2019-01-31 ENCOUNTER — HOSPITAL ENCOUNTER (OUTPATIENT)
Dept: INFUSION THERAPY | Age: 49
Discharge: HOME | End: 2019-01-31
Payer: COMMERCIAL

## 2019-01-31 VITALS
HEART RATE: 110 BPM | SYSTOLIC BLOOD PRESSURE: 110 MMHG | RESPIRATION RATE: 18 BRPM | TEMPERATURE: 98.2 F | OXYGEN SATURATION: 99 % | DIASTOLIC BLOOD PRESSURE: 61 MMHG

## 2019-01-31 LAB — MAGNESIUM SERPL-MCNC: 1.8 MG/DL (ref 1.8–2.4)

## 2019-01-31 PROCEDURE — 74011250636 HC RX REV CODE- 250/636: Performed by: INTERNAL MEDICINE

## 2019-01-31 PROCEDURE — 74011000250 HC RX REV CODE- 250: Performed by: INTERNAL MEDICINE

## 2019-01-31 PROCEDURE — 83735 ASSAY OF MAGNESIUM: CPT

## 2019-01-31 PROCEDURE — 96374 THER/PROPH/DIAG INJ IV PUSH: CPT

## 2019-01-31 PROCEDURE — 77030003560 HC NDL HUBR BARD -A

## 2019-01-31 RX ORDER — HEPARIN 100 UNIT/ML
500 SYRINGE INTRAVENOUS AS NEEDED
Status: ACTIVE | OUTPATIENT
Start: 2019-01-31 | End: 2019-01-31

## 2019-01-31 RX ADMIN — PROMETHAZINE HYDROCHLORIDE 25 MG: 25 INJECTION INTRAMUSCULAR; INTRAVENOUS at 07:24

## 2019-01-31 NOTE — PROGRESS NOTES
Arrived to the [facility]. bld draw and phenergan completed. Patient tolerated well. Any issues or concerns during appointment: no. 
No future appts at this time. Sent pt to . Discharged home.

## 2019-02-03 ENCOUNTER — ANESTHESIA EVENT (OUTPATIENT)
Dept: ENDOSCOPY | Age: 49
End: 2019-02-03
Payer: COMMERCIAL

## 2019-02-04 ENCOUNTER — HOSPITAL ENCOUNTER (OUTPATIENT)
Age: 49
Setting detail: OUTPATIENT SURGERY
Discharge: HOME | End: 2019-02-04
Attending: INTERNAL MEDICINE | Admitting: INTERNAL MEDICINE
Payer: COMMERCIAL

## 2019-02-04 ENCOUNTER — APPOINTMENT (OUTPATIENT)
Dept: GENERAL RADIOLOGY | Age: 49
End: 2019-02-04
Attending: INTERNAL MEDICINE
Payer: COMMERCIAL

## 2019-02-04 ENCOUNTER — ANESTHESIA (OUTPATIENT)
Dept: ENDOSCOPY | Age: 49
End: 2019-02-04
Payer: COMMERCIAL

## 2019-02-04 VITALS
BODY MASS INDEX: 32.2 KG/M2 | TEMPERATURE: 98.6 F | OXYGEN SATURATION: 92 % | WEIGHT: 175 LBS | RESPIRATION RATE: 14 BRPM | HEIGHT: 62 IN | DIASTOLIC BLOOD PRESSURE: 79 MMHG | HEART RATE: 73 BPM | SYSTOLIC BLOOD PRESSURE: 133 MMHG

## 2019-02-04 LAB
GLUCOSE BLD STRIP.AUTO-MCNC: 139 MG/DL (ref 65–100)
GLUCOSE BLD STRIP.AUTO-MCNC: 162 MG/DL (ref 65–100)
GLUCOSE BLD STRIP.AUTO-MCNC: 87 MG/DL (ref 65–100)

## 2019-02-04 PROCEDURE — 74011000258 HC RX REV CODE- 258: Performed by: ANESTHESIOLOGY

## 2019-02-04 PROCEDURE — 74011250636 HC RX REV CODE- 250/636

## 2019-02-04 PROCEDURE — 82962 GLUCOSE BLOOD TEST: CPT

## 2019-02-04 PROCEDURE — 74011250636 HC RX REV CODE- 250/636: Performed by: ANESTHESIOLOGY

## 2019-02-04 PROCEDURE — 74011000250 HC RX REV CODE- 250: Performed by: ANESTHESIOLOGY

## 2019-02-04 PROCEDURE — 76060000031 HC ANESTHESIA FIRST 0.5 HR: Performed by: INTERNAL MEDICINE

## 2019-02-04 PROCEDURE — 74011250636 HC RX REV CODE- 250/636: Performed by: INTERNAL MEDICINE

## 2019-02-04 PROCEDURE — 76040000025: Performed by: INTERNAL MEDICINE

## 2019-02-04 PROCEDURE — 74011636320 HC RX REV CODE- 636/320: Performed by: INTERNAL MEDICINE

## 2019-02-04 PROCEDURE — 74220 X-RAY XM ESOPHAGUS 1CNTRST: CPT

## 2019-02-04 RX ORDER — DEXTROSE MONOHYDRATE AND SODIUM CHLORIDE 5; .45 G/100ML; G/100ML
75 INJECTION, SOLUTION INTRAVENOUS CONTINUOUS
Status: DISCONTINUED | OUTPATIENT
Start: 2019-02-04 | End: 2019-02-04 | Stop reason: HOSPADM

## 2019-02-04 RX ORDER — SODIUM CHLORIDE, SODIUM LACTATE, POTASSIUM CHLORIDE, CALCIUM CHLORIDE 600; 310; 30; 20 MG/100ML; MG/100ML; MG/100ML; MG/100ML
100 INJECTION, SOLUTION INTRAVENOUS CONTINUOUS
Status: DISCONTINUED | OUTPATIENT
Start: 2019-02-04 | End: 2019-02-04 | Stop reason: HOSPADM

## 2019-02-04 RX ORDER — DEXTROSE 50 % IN WATER (D50W) INTRAVENOUS SYRINGE
Status: DISCONTINUED
Start: 2019-02-04 | End: 2019-02-04 | Stop reason: HOSPADM

## 2019-02-04 RX ORDER — LIDOCAINE HYDROCHLORIDE 20 MG/ML
INJECTION, SOLUTION EPIDURAL; INFILTRATION; INTRACAUDAL; PERINEURAL AS NEEDED
Status: DISCONTINUED | OUTPATIENT
Start: 2019-02-04 | End: 2019-02-04 | Stop reason: HOSPADM

## 2019-02-04 RX ORDER — DEXTROSE 50 % IN WATER (D50W) INTRAVENOUS SYRINGE
25
Status: COMPLETED | OUTPATIENT
Start: 2019-02-04 | End: 2019-02-04

## 2019-02-04 RX ORDER — TRIAMCINOLONE ACETONIDE 40 MG/ML
40 INJECTION, SUSPENSION INTRA-ARTICULAR; INTRAMUSCULAR ONCE
Status: DISCONTINUED | OUTPATIENT
Start: 2019-02-04 | End: 2019-02-04 | Stop reason: HOSPADM

## 2019-02-04 RX ORDER — PROPOFOL 10 MG/ML
INJECTION, EMULSION INTRAVENOUS
Status: DISCONTINUED | OUTPATIENT
Start: 2019-02-04 | End: 2019-02-04 | Stop reason: HOSPADM

## 2019-02-04 RX ORDER — PROPOFOL 10 MG/ML
INJECTION, EMULSION INTRAVENOUS AS NEEDED
Status: DISCONTINUED | OUTPATIENT
Start: 2019-02-04 | End: 2019-02-04 | Stop reason: HOSPADM

## 2019-02-04 RX ADMIN — PROPOFOL 160 MCG/KG/MIN: 10 INJECTION, EMULSION INTRAVENOUS at 09:29

## 2019-02-04 RX ADMIN — SODIUM CHLORIDE, SODIUM LACTATE, POTASSIUM CHLORIDE, AND CALCIUM CHLORIDE 100 ML/HR: 600; 310; 30; 20 INJECTION, SOLUTION INTRAVENOUS at 08:55

## 2019-02-04 RX ADMIN — PROMETHAZINE HYDROCHLORIDE 12.5 MG: 25 INJECTION INTRAMUSCULAR; INTRAVENOUS at 12:26

## 2019-02-04 RX ADMIN — LIDOCAINE HYDROCHLORIDE 60 MG: 20 INJECTION, SOLUTION EPIDURAL; INFILTRATION; INTRACAUDAL; PERINEURAL at 09:29

## 2019-02-04 RX ADMIN — PROMETHAZINE HYDROCHLORIDE 3.25 MG: 25 INJECTION INTRAMUSCULAR; INTRAVENOUS at 08:58

## 2019-02-04 RX ADMIN — DEXTROSE AND SODIUM CHLORIDE 75 ML/HR: 5; 450 INJECTION, SOLUTION INTRAVENOUS at 12:51

## 2019-02-04 RX ADMIN — PROPOFOL 90 MG: 10 INJECTION, EMULSION INTRAVENOUS at 09:29

## 2019-02-04 RX ADMIN — DEXTROSE MONOHYDRATE 12.5 G: 25 INJECTION, SOLUTION INTRAVENOUS at 12:12

## 2019-02-04 RX ADMIN — DIATRIZOATE MEGLUMINE AND DIATRIZOATE SODIUM 60 ML: 660; 100 LIQUID ORAL; RECTAL at 11:53

## 2019-02-04 RX ADMIN — PROMETHAZINE HYDROCHLORIDE 3.12 MG: 25 INJECTION INTRAMUSCULAR; INTRAVENOUS at 10:36

## 2019-02-04 NOTE — DISCHARGE INSTRUCTIONS
Gastrointestinal Esophagogastroduodenoscopy (EGD) - Upper Exam Discharge Instructions    1. Call Dr. [facility] at [phone] for any problems or questions. 2. Contact the doctor's office for follow up appointment as directed. 3. Medication may cause drowsiness for several hours, therefore, do not drive or operate machinery for remainder of the day. 4. No alcohol today. 5. Ordinarily, you may resume regular diet and activity after exam unless otherwise specified by your physician. 6. For mild soreness in your throat you may use Cepacol throat lozenges or warm salt-water gargles as needed. Any additional instructions: Belinda with Dr. Cindy Warner office will go over medications and changes with you herself per Dr. [facility]. May have full liquid diet only with oatmeal consistantcy. Instructions given to Jose Moreno and other family members.   Instructions given by:  Dr. [facility] FAUZIA

## 2019-02-04 NOTE — ANESTHESIA PREPROCEDURE EVALUATION
Anesthetic History PONV Review of Systems / Medical History Patient summary reviewed and pertinent labs reviewed Pulmonary Smoker (Former, quit 25 years) Asthma : well controlled Neuro/Psych Psychiatric history Cardiovascular Hypertension: well controlled Exercise tolerance: >4 METS 
  
GI/Hepatic/Renal 
  
GERD: well controlled Liver disease (fatty liver) Comments: Esophageal stricture. Chronic Pancreatitis. Endo/Other Diabetes: poorly controlled, type 2, using insulin Obesity Other Findings Comments: Fibromyalgia Chronic Pain On Fentanyl Patch Physical Exam 
 
Airway Mallampati: II 
TM Distance: 4 - 6 cm Neck ROM: decreased range of motion, short neck Mouth opening: Normal 
 
 Cardiovascular Regular rate and rhythm,  S1 and S2 normal,  no murmur, click, rub, or gallop Rhythm: regular Rate: normal 
 
 
Pertinent negatives: No murmur Dental 
No notable dental hx Pulmonary Breath sounds clear to auscultation Abdominal 
 
 
 
 Other Findings Anesthetic Plan ASA: 3 Anesthesia type: total IV anesthesia Induction: Intravenous Anesthetic plan and risks discussed with: Patient

## 2019-02-04 NOTE — ANESTHESIA POSTPROCEDURE EVALUATION
Procedure(s): ESOPHAGOGASTRODUODENOSCOPY (EGD). Anesthesia Post Evaluation Patient location during evaluation: PACU Patient participation: complete - patient participated Level of consciousness: awake Pain management: adequate Airway patency: patent Anesthetic complications: no 
Cardiovascular status: acceptable Respiratory status: spontaneous ventilation and acceptable Hydration status: acceptable Post anesthesia nausea and vomiting:  controlled (Pt has chronic nausea and is at baseline.) Visit Vitals /62 Pulse 69 Temp 37 °C (98.6 °F) Resp 16 Ht 5' 2\" (1.575 m) Wt 79.4 kg (175 lb) SpO2 98% Breastfeeding? No  
BMI 32.01 kg/m²

## 2019-02-04 NOTE — PROGRESS NOTES
Patient received back from radiology and vitals resumed at every 15 minute intervals. Awaiting radiology reports and plans from Dr. Jet Healy. continuing to monitor

## 2019-02-04 NOTE — ROUTINE PROCESS
Discharge instructions given to patient and family. Patient states she flushes her port at home when she has procedures done. Pt discharged via wheelchair with family. VSS at discharge

## 2019-02-04 NOTE — PROGRESS NOTES
Called Dr. Kiet Wallace regarding discharge instruction specifics for patient since her radiology exam came back acceptable for discharge. She stated that her personal office nurse Belinda would give them instructions.

## 2019-02-04 NOTE — H&P
PreProcedure H&P Update Today's Date:  2/4/2019 CC:  Dysphagia, esophageal spasms Subjective: HPI: Dysphagia, esophageal spasms PMH: 
Past Medical History:  
Diagnosis Date  Anemia   
 denies hx of blood transfusions-- Fe infusions 12/2017  Anxiety and depression  Asthma   
 allergy induced, denies any inhaler, patient denies  C. difficile diarrhea   
 in 4739 after complicated ERCP  
 Cervical dysplasia 1990s  Chronic insomnia  Chronic pain   
 all over  Chronic pancreatitis ([facility] [address]  Endometriosis  Esophageal stricture 2017  Fibromyalgia  Fibromyalgia  Former cigarette smoker  GERD (gastroesophageal reflux disease) daily meds---po and IV protonix- uses per port- alternates  HLD (hyperlipidemia)   
 pt denies  IBS (irritable bowel syndrome)  Migraine headache  Morbid obesity ([facility] [address] BMI 33.3  Nausea & vomiting   
 pt reports she does better with phenergan than zofran - causes HA  
 Nonalcoholic fatty liver disease  Pancreatitis  Psychiatric disorder  PUD (peptic ulcer disease) 06/2017 Hx of  Sphincter of Oddi dysfunction  Type 2 diabetes mellitus ([facility] [address] -200, Lows at 90's, Last A1C 10 on 8/2018, Takes insulin-  
 
 
Medications:  
Current Facility-Administered Medications Medication Dose Route Frequency  lactated Ringers infusion  100 mL/hr IntraVENous CONTINUOUS  
 lactated Ringers infusion  100 mL/hr IntraVENous CONTINUOUS  
 triamcinolone acetonide (KENALOG-40) 40 mg/mL injection 40 mg  40 mg IntraMUSCular ONCE  
 
 
 
Objective:  
Vitals: 
Visit Vitals /80 Pulse 85 Temp 98.3 °F (36.8 °C) Resp 14 Ht 5' 2\" (1.575 m) Wt 79.4 kg (175 lb) SpO2 93% Breastfeeding? No  
BMI 32.01 kg/m² Exam: 
General appearance: alert, cooperative, no distress Lungs: clear to auscultation bilaterally anteriorly Heart: regular rate and rhythm Abdomen: soft, non-tender. Bowel sounds normal. No masses, no organomegaly Data Review (Labs): No results for input(s): WBC, HGB, HCT, PLT, MCV, NA, K, CL, CO2, BUN, CREA, CA, GLU, AP, SGOT, GPT, TBIL, CBIL, ALB, TP, AML, LPSE, PTP, INR, APTT, HGBEXT, HCTEXT, PLTEXT in the last 72 hours. No lab exists for component: DBIL, INREXT Plan: Dysphagia, esophageal spasms Proceed with EGD dilation.

## 2019-02-04 NOTE — PROGRESS NOTES
Called Dr. Ema John regarding patients nausea and orders received. When walking by to the [facility] to obtain meds patient stated that she felt her blood sugar is dropping so I checked blood sugar and it was down to 87 from 139 this am on arrival to . Pt is strict NPO so I called Dr. Ema John back and received orders from Dr. Ema John for dextrose injection and IVF. Orders placed.

## 2019-02-04 NOTE — PROGRESS NOTES
Port to left upper chest accessed prior to arriving at hospital. [facility] flushed with 10 ml normal saline with positive blood return. LR infusing @ KVO at this time.

## 2019-02-07 ENCOUNTER — HOSPITAL ENCOUNTER (OUTPATIENT)
Dept: INFUSION THERAPY | Age: 49
Discharge: HOME | End: 2019-02-07
Payer: MEDICARE

## 2019-02-07 VITALS
DIASTOLIC BLOOD PRESSURE: 78 MMHG | BODY MASS INDEX: 31.86 KG/M2 | WEIGHT: 174.2 LBS | SYSTOLIC BLOOD PRESSURE: 127 MMHG | OXYGEN SATURATION: 97 % | TEMPERATURE: 98 F | HEART RATE: 102 BPM | RESPIRATION RATE: 18 BRPM

## 2019-02-07 LAB — MAGNESIUM SERPL-MCNC: 1.9 MG/DL (ref 1.8–2.4)

## 2019-02-07 PROCEDURE — 74011000250 HC RX REV CODE- 250: Performed by: INTERNAL MEDICINE

## 2019-02-07 PROCEDURE — 96374 THER/PROPH/DIAG INJ IV PUSH: CPT

## 2019-02-07 PROCEDURE — 83735 ASSAY OF MAGNESIUM: CPT

## 2019-02-07 PROCEDURE — 74011250636 HC RX REV CODE- 250/636: Performed by: INTERNAL MEDICINE

## 2019-02-07 RX ORDER — SODIUM CHLORIDE 0.9 % (FLUSH) 0.9 %
10 SYRINGE (ML) INJECTION AS NEEDED
Status: ACTIVE | OUTPATIENT
Start: 2019-02-07 | End: 2019-02-07

## 2019-02-07 RX ORDER — HEPARIN 100 UNIT/ML
300-500 SYRINGE INTRAVENOUS AS NEEDED
Status: DISPENSED | OUTPATIENT
Start: 2019-02-07 | End: 2019-02-07

## 2019-02-07 RX ADMIN — Medication 10 ML: at 08:47

## 2019-02-07 RX ADMIN — HEPARIN 300 UNITS: 100 SYRINGE at 08:48

## 2019-02-07 RX ADMIN — PROMETHAZINE HYDROCHLORIDE 25 MG: 25 INJECTION INTRAMUSCULAR; INTRAVENOUS at 08:37

## 2019-02-07 NOTE — PROGRESS NOTES
Arrived to the [facility]. IV Phenergan completed. No replacements needed. Patient tolerated well. Any issues or concerns during appointment: none. Patient aware of next infusion appointment on 2/14 (date) at 7:15 AM (time). Discharged ambulatory.

## 2019-02-14 ENCOUNTER — HOSPITAL ENCOUNTER (OUTPATIENT)
Dept: INFUSION THERAPY | Age: 49
Discharge: HOME | End: 2019-02-14
Payer: MEDICARE

## 2019-02-14 VITALS
TEMPERATURE: 98.3 F | SYSTOLIC BLOOD PRESSURE: 130 MMHG | BODY MASS INDEX: 31.64 KG/M2 | RESPIRATION RATE: 18 BRPM | WEIGHT: 173 LBS | HEART RATE: 107 BPM | DIASTOLIC BLOOD PRESSURE: 59 MMHG | OXYGEN SATURATION: 94 %

## 2019-02-14 LAB — MAGNESIUM SERPL-MCNC: 1.8 MG/DL (ref 1.8–2.4)

## 2019-02-14 PROCEDURE — 74011000250 HC RX REV CODE- 250: Performed by: INTERNAL MEDICINE

## 2019-02-14 PROCEDURE — 96375 TX/PRO/DX INJ NEW DRUG ADDON: CPT

## 2019-02-14 PROCEDURE — 74011250636 HC RX REV CODE- 250/636: Performed by: INTERNAL MEDICINE

## 2019-02-14 PROCEDURE — 96365 THER/PROPH/DIAG IV INF INIT: CPT

## 2019-02-14 PROCEDURE — 83735 ASSAY OF MAGNESIUM: CPT

## 2019-02-14 RX ORDER — SODIUM CHLORIDE 0.9 % (FLUSH) 0.9 %
10-20 SYRINGE (ML) INJECTION EVERY 8 HOURS
Status: DISCONTINUED | OUTPATIENT
Start: 2019-02-14 | End: 2019-02-18 | Stop reason: HOSPADM

## 2019-02-14 RX ORDER — MAGNESIUM SULFATE 1 G/100ML
1 INJECTION INTRAVENOUS ONCE
Status: COMPLETED | OUTPATIENT
Start: 2019-02-14 | End: 2019-02-14

## 2019-02-14 RX ORDER — HEPARIN 100 UNIT/ML
500 SYRINGE INTRAVENOUS AS NEEDED
Status: DISPENSED | OUTPATIENT
Start: 2019-02-14 | End: 2019-02-14

## 2019-02-14 RX ADMIN — PROMETHAZINE HYDROCHLORIDE 25 MG: 25 INJECTION INTRAMUSCULAR; INTRAVENOUS at 07:38

## 2019-02-14 RX ADMIN — Medication 10 ML: at 09:10

## 2019-02-14 RX ADMIN — MAGNESIUM SULFATE HEPTAHYDRATE 1 G: 1 INJECTION, SOLUTION INTRAVENOUS at 08:08

## 2019-02-14 RX ADMIN — Medication 10 ML: at 07:45

## 2019-02-14 RX ADMIN — SODIUM CHLORIDE, PRESERVATIVE FREE 500 UNITS: 5 INJECTION INTRAVENOUS at 09:11

## 2019-02-14 NOTE — PROGRESS NOTES
Arrived to the [facility] ambulatory. Phenergan and magnesium completed. Patient tolerated well. Any issues or concerns during appointment: no. 
Patient aware of next infusion appointment on 2/21 at [address] [place] [address]. Discharged to home ambulatory.

## 2019-02-21 ENCOUNTER — HOSPITAL ENCOUNTER (OUTPATIENT)
Dept: INFUSION THERAPY | Age: 49
Discharge: HOME | End: 2019-02-21
Payer: MEDICARE

## 2019-02-21 VITALS
BODY MASS INDEX: 31.9 KG/M2 | TEMPERATURE: 98.2 F | SYSTOLIC BLOOD PRESSURE: 121 MMHG | WEIGHT: 174.4 LBS | RESPIRATION RATE: 18 BRPM | HEART RATE: 106 BPM | OXYGEN SATURATION: 95 % | DIASTOLIC BLOOD PRESSURE: 85 MMHG

## 2019-02-21 LAB — MAGNESIUM SERPL-MCNC: 1.9 MG/DL (ref 1.8–2.4)

## 2019-02-21 PROCEDURE — 74011250636 HC RX REV CODE- 250/636: Performed by: INTERNAL MEDICINE

## 2019-02-21 PROCEDURE — 83735 ASSAY OF MAGNESIUM: CPT

## 2019-02-21 PROCEDURE — 74011000250 HC RX REV CODE- 250: Performed by: INTERNAL MEDICINE

## 2019-02-21 PROCEDURE — 96374 THER/PROPH/DIAG INJ IV PUSH: CPT

## 2019-02-21 RX ORDER — SODIUM CHLORIDE 0.9 % (FLUSH) 0.9 %
10-40 SYRINGE (ML) INJECTION AS NEEDED
Status: DISCONTINUED | OUTPATIENT
Start: 2019-02-21 | End: 2019-02-25 | Stop reason: HOSPADM

## 2019-02-21 RX ORDER — HEPARIN 100 UNIT/ML
300 SYRINGE INTRAVENOUS AS NEEDED
Status: ACTIVE | OUTPATIENT
Start: 2019-02-21 | End: 2019-02-21

## 2019-02-21 RX ADMIN — Medication 10 ML: at 07:35

## 2019-02-21 RX ADMIN — PROMETHAZINE HYDROCHLORIDE 25 MG: 25 INJECTION INTRAMUSCULAR; INTRAVENOUS at 07:35

## 2019-02-21 RX ADMIN — Medication 10 ML: at 07:48

## 2019-02-21 NOTE — PROGRESS NOTES
Arrived to the [facility]. IV phenergan completed. Patient tolerated well. Any issues or concerns during appointment: mag level resulted as 1.9. No replacements needed per orders. Patient aware of next infusion appointment on 2/28/19 at 7:15am. 
Discharged ambulatory.

## 2019-02-28 ENCOUNTER — HOSPITAL ENCOUNTER (OUTPATIENT)
Dept: INFUSION THERAPY | Age: 49
Discharge: HOME | End: 2019-02-28
Payer: MEDICARE

## 2019-02-28 VITALS
HEART RATE: 109 BPM | SYSTOLIC BLOOD PRESSURE: 137 MMHG | OXYGEN SATURATION: 97 % | DIASTOLIC BLOOD PRESSURE: 85 MMHG | TEMPERATURE: 97.8 F | WEIGHT: 174.2 LBS | BODY MASS INDEX: 31.86 KG/M2 | RESPIRATION RATE: 18 BRPM

## 2019-02-28 LAB — MAGNESIUM SERPL-MCNC: 2 MG/DL (ref 1.8–2.4)

## 2019-02-28 PROCEDURE — 83735 ASSAY OF MAGNESIUM: CPT

## 2019-02-28 PROCEDURE — 96374 THER/PROPH/DIAG INJ IV PUSH: CPT

## 2019-02-28 PROCEDURE — 74011000250 HC RX REV CODE- 250: Performed by: INTERNAL MEDICINE

## 2019-02-28 PROCEDURE — 74011250636 HC RX REV CODE- 250/636: Performed by: INTERNAL MEDICINE

## 2019-02-28 RX ORDER — HEPARIN 100 UNIT/ML
500 SYRINGE INTRAVENOUS AS NEEDED
Status: DISPENSED | OUTPATIENT
Start: 2019-02-28 | End: 2019-02-28

## 2019-02-28 RX ORDER — SODIUM CHLORIDE 0.9 % (FLUSH) 0.9 %
10 SYRINGE (ML) INJECTION AS NEEDED
Status: DISCONTINUED | OUTPATIENT
Start: 2019-02-28 | End: 2019-03-04 | Stop reason: HOSPADM

## 2019-02-28 RX ADMIN — Medication 10 ML: at 07:31

## 2019-02-28 RX ADMIN — PROMETHAZINE HYDROCHLORIDE 25 MG: 25 INJECTION INTRAMUSCULAR; INTRAVENOUS at 07:31

## 2019-02-28 NOTE — PROGRESS NOTES
Arrived to the [facility]. Port flush and labs completed, replacement not required. Any issues or concerns during appointment:  
Patient aware of next infusion appointment on 3/7/19 at [address] [place] [address] Discharged ambulatory

## 2019-03-02 ENCOUNTER — HOSPITAL ENCOUNTER (EMERGENCY)
Age: 49
Discharge: HOME | End: 2019-03-02
Attending: EMERGENCY MEDICINE
Payer: MEDICARE

## 2019-03-02 VITALS
RESPIRATION RATE: 18 BRPM | SYSTOLIC BLOOD PRESSURE: 118 MMHG | HEIGHT: 62 IN | WEIGHT: 174 LBS | HEART RATE: 112 BPM | OXYGEN SATURATION: 94 % | TEMPERATURE: 98.6 F | DIASTOLIC BLOOD PRESSURE: 62 MMHG | BODY MASS INDEX: 32.02 KG/M2

## 2019-03-02 DIAGNOSIS — K86.1 CHRONIC PANCREATITIS, UNSPECIFIED PANCREATITIS TYPE (HCC): Primary | ICD-10-CM

## 2019-03-02 LAB
ALBUMIN SERPL-MCNC: 3.9 G/DL (ref 3.5–5)
ALBUMIN/GLOB SERPL: 1 {RATIO} (ref 1.2–3.5)
ALP SERPL-CCNC: 174 U/L (ref 50–136)
ALT SERPL-CCNC: 57 U/L (ref 12–65)
ANION GAP SERPL CALC-SCNC: 7 MMOL/L (ref 7–16)
AST SERPL-CCNC: 40 U/L (ref 15–37)
BASOPHILS # BLD: 0 K/UL (ref 0–0.2)
BASOPHILS NFR BLD: 1 % (ref 0–2)
BILIRUB SERPL-MCNC: 0.5 MG/DL (ref 0.2–1.1)
BUN SERPL-MCNC: 9 MG/DL (ref 6–23)
CALCIUM SERPL-MCNC: 9 MG/DL (ref 8.3–10.4)
CHLORIDE SERPL-SCNC: 107 MMOL/L (ref 98–107)
CO2 SERPL-SCNC: 24 MMOL/L (ref 21–32)
CREAT SERPL-MCNC: 0.79 MG/DL (ref 0.6–1)
DIFFERENTIAL METHOD BLD: NORMAL
EOSINOPHIL # BLD: 0.1 K/UL (ref 0–0.8)
EOSINOPHIL NFR BLD: 3 % (ref 0.5–7.8)
ERYTHROCYTE [DISTWIDTH] IN BLOOD BY AUTOMATED COUNT: 12.3 % (ref 11.9–14.6)
GLOBULIN SER CALC-MCNC: 4 G/DL (ref 2.3–3.5)
GLUCOSE SERPL-MCNC: 373 MG/DL (ref 65–100)
HCT VFR BLD AUTO: 43.3 % (ref 35.8–46.3)
HGB BLD-MCNC: 14.1 G/DL (ref 11.7–15.4)
IMM GRANULOCYTES # BLD AUTO: 0 K/UL (ref 0–0.5)
IMM GRANULOCYTES NFR BLD AUTO: 0 % (ref 0–5)
LIPASE SERPL-CCNC: 151 U/L (ref 73–393)
LYMPHOCYTES # BLD: 1.6 K/UL (ref 0.5–4.6)
LYMPHOCYTES NFR BLD: 35 % (ref 13–44)
MAGNESIUM SERPL-MCNC: 1.8 MG/DL (ref 1.8–2.4)
MCH RBC QN AUTO: 28.5 PG (ref 26.1–32.9)
MCHC RBC AUTO-ENTMCNC: 32.6 G/DL (ref 31.4–35)
MCV RBC AUTO: 87.5 FL (ref 79.6–97.8)
MONOCYTES # BLD: 0.3 K/UL (ref 0.1–1.3)
MONOCYTES NFR BLD: 6 % (ref 4–12)
NEUTS SEG # BLD: 2.6 K/UL (ref 1.7–8.2)
NEUTS SEG NFR BLD: 56 % (ref 43–78)
NRBC # BLD: 0 K/UL (ref 0–0.2)
PLATELET # BLD AUTO: 177 K/UL (ref 150–450)
PMV BLD AUTO: 12.1 FL (ref 9.4–12.3)
POTASSIUM SERPL-SCNC: 4.2 MMOL/L (ref 3.5–5.1)
PROT SERPL-MCNC: 7.9 G/DL (ref 6.3–8.2)
RBC # BLD AUTO: 4.95 M/UL (ref 4.05–5.2)
SODIUM SERPL-SCNC: 138 MMOL/L (ref 136–145)
WBC # BLD AUTO: 4.7 K/UL (ref 4.3–11.1)

## 2019-03-02 PROCEDURE — 80053 COMPREHEN METABOLIC PANEL: CPT

## 2019-03-02 PROCEDURE — 83690 ASSAY OF LIPASE: CPT

## 2019-03-02 PROCEDURE — 74011250636 HC RX REV CODE- 250/636: Performed by: EMERGENCY MEDICINE

## 2019-03-02 PROCEDURE — 83735 ASSAY OF MAGNESIUM: CPT

## 2019-03-02 PROCEDURE — 85025 COMPLETE CBC W/AUTO DIFF WBC: CPT

## 2019-03-02 PROCEDURE — 99284 EMERGENCY DEPT VISIT MOD MDM: CPT

## 2019-03-02 PROCEDURE — 96374 THER/PROPH/DIAG INJ IV PUSH: CPT

## 2019-03-02 PROCEDURE — 96375 TX/PRO/DX INJ NEW DRUG ADDON: CPT

## 2019-03-02 PROCEDURE — 74011250637 HC RX REV CODE- 250/637: Performed by: EMERGENCY MEDICINE

## 2019-03-02 PROCEDURE — 74011000250 HC RX REV CODE- 250: Performed by: EMERGENCY MEDICINE

## 2019-03-02 PROCEDURE — 96376 TX/PRO/DX INJ SAME DRUG ADON: CPT

## 2019-03-02 RX ORDER — PROCHLORPERAZINE MALEATE 10 MG
10 TABLET ORAL
Qty: 12 TAB | Refills: 0 | Status: SHIPPED | OUTPATIENT
Start: 2019-03-02 | End: 2019-03-09

## 2019-03-02 RX ORDER — OXYCODONE HYDROCHLORIDE 10 MG/1
10 TABLET ORAL
Qty: 17 TAB | Refills: 0 | Status: SHIPPED | OUTPATIENT
Start: 2019-03-02 | End: 2019-03-09

## 2019-03-02 RX ORDER — HYDROMORPHONE HYDROCHLORIDE 1 MG/ML
1 INJECTION, SOLUTION INTRAMUSCULAR; INTRAVENOUS; SUBCUTANEOUS ONCE
Status: COMPLETED | OUTPATIENT
Start: 2019-03-02 | End: 2019-03-02

## 2019-03-02 RX ORDER — HYDROMORPHONE HYDROCHLORIDE 1 MG/ML
0.5 INJECTION, SOLUTION INTRAMUSCULAR; INTRAVENOUS; SUBCUTANEOUS ONCE
Status: COMPLETED | OUTPATIENT
Start: 2019-03-02 | End: 2019-03-02

## 2019-03-02 RX ORDER — DIPHENHYDRAMINE HCL 50 MG
50 CAPSULE ORAL
Status: COMPLETED | OUTPATIENT
Start: 2019-03-02 | End: 2019-03-02

## 2019-03-02 RX ADMIN — DIPHENHYDRAMINE HYDROCHLORIDE 50 MG: 50 CAPSULE ORAL at 09:53

## 2019-03-02 RX ADMIN — HYDROMORPHONE HYDROCHLORIDE 1 MG: 2 INJECTION, SOLUTION INTRAMUSCULAR; INTRAVENOUS; SUBCUTANEOUS at 09:43

## 2019-03-02 RX ADMIN — PROMETHAZINE HYDROCHLORIDE 12.5 MG: 25 INJECTION INTRAMUSCULAR; INTRAVENOUS at 09:42

## 2019-03-02 RX ADMIN — SODIUM CHLORIDE 1000 ML: 900 INJECTION, SOLUTION INTRAVENOUS at 09:43

## 2019-03-02 RX ADMIN — HYDROMORPHONE HYDROCHLORIDE 0.5 MG: 1 INJECTION, SOLUTION INTRAMUSCULAR; INTRAVENOUS; SUBCUTANEOUS at 12:03

## 2019-03-02 NOTE — DISCHARGE INSTRUCTIONS
Take your medications as prescribed  Follow-up with your primary care physician  Follow-up with gastroenterology  Return to ER for any new or worsening symptoms    Diet for Chronic Pancreatitis: Care Instructions  Your Care Instructions    The pancreas is an organ behind the stomach that makes hormones and enzymes to help your body digest food. Sometimes the enzymes attack another part of the pancreas, which can cause pain and swelling. This is called pancreatitis. Chronic pancreatitis may cause you to be in pain much of the time. You may be able to help the pain by avoiding alcohol and eating a low-fat diet. Your doctor and dietitian can help you make an eating plan that does not irritate your digestive system. Always talk with your doctor or dietitian before you make changes in your diet. Follow-up care is a key part of your treatment and safety. Be sure to make and go to all appointments, and call your doctor if you are having problems. It's also a good idea to know your test results and keep a list of the medicines you take. How can you care for yourself at home? · Do not drink alcohol. It may make your pain worse and cause other problems. Tell your doctor if you need help to quit. Counseling, support groups, and sometimes medicines can help you stay sober. · Ask your doctor if you need to take pancreatic enzyme pills to help your body digest fat and protein. · Drink plenty of fluids, enough so that your urine is light yellow or clear like water. If you have kidney, heart, or liver disease and have to limit fluids, talk with your doctor before you increase the amount of fluids you drink. Eat a low-fat diet  · Eat many small meals and snacks each day instead of three large meals. · Choose lean meats. ? Eat no more than 5 to 6½ ounces of meat a day. ? Cut off all fat you can see. ? Eat chicken and turkey without the skin.   ? Many types of fish, such as salmon, lake trout, tuna, and herring, provide healthy omega-3 fat. But avoid fish canned in oil, such as sardines in olive oil. ? Bake, broil, or grill meats, poultry, or fish instead of frying them in butter or fat. · Drink or eat nonfat or low-fat milk, yogurt, cheese, or other milk products each day. ? Read the labels on cheeses, and choose those with less than 5 grams of fat an ounce. ? Try fat-free sour cream, cream cheese, or yogurt. ? Avoid cream soups and cream sauces on pasta. ? Eat low-fat ice cream, frozen yogurt, or sorbet. Avoid regular ice cream.  · Eat whole-grain cereals, breads, crackers, rice, or pasta. Avoid high-fat foods such as croissants, scones, biscuits, waffles, doughnuts, muffins, granola, and high-fat breads. · Flavor your foods with herbs and spices (such as basil, tarragon, or mint), fat-free sauces, or lemon juice instead of butter. You can also use butter substitutes, fat-free mayonnaise, or fat-free dressing. · Try applesauce, prune puree, or mashed bananas to replace some or all of the fat when you bake. · Limit fats and oils, such as butter, margarine, mayonnaise, and salad dressing, to no more than 1 tablespoon a meal.  · Avoid high-fat foods, such as:  ? Chocolate, whole milk, ice cream, processed cheese, and egg yolks. ? Fried or buttered foods. ? Sausage, salami, and skinner. ? Cinnamon rolls, cakes, pies, cookies, and other pastries. ? Prepared snack foods, such as potato chips, nut and granola bars, and mixed nuts. ? Coconut and avocado. · Learn how to read food labels for serving sizes and ingredients. Fast-food and convenience-food meals often have lots of fat. Where can you learn more? Go to http://johanny-papito.info/. Enter N283 in the search box to learn more about \"Diet for Chronic Pancreatitis: Care Instructions. \"  Current as of: March 28, 2018  Content Version: 11.9  © 9110-9866 [employer].  Care instructions adapted under license by [employer] (which disclaims liability or warranty for this information). If you have questions about a medical condition or this instruction, always ask your healthcare professional. [address] any warranty or liability for your use of this information. Patient Education        Pancreatitis: Care Instructions  Your Care Instructions    The pancreas is an organ behind the stomach. It makes hormones and enzymes to help your body digest food. But if these enzymes attack the pancreas, it can get inflamed. This is called pancreatitis. Most cases are caused by gallstones or by heavy alcohol use. If you take care of yourself at home, it will help you get better. It will also help you avoid more problems with your pancreas. Follow-up care is a key part of your treatment and safety. Be sure to make and go to all appointments, and call your doctor if you are having problems. It's also a good idea to know your test results and keep a list of the medicines you take. How can you care for yourself at home? · Drink clear liquids and eat bland foods until you feel better. [place] foods include rice, dry toast, and crackers. They also include bananas and applesauce. · Eat a low-fat diet until your doctor says your pancreas is healed. · Do not drink alcohol. Tell your doctor if you need help to quit. Counseling, support groups, and sometimes medicines can help you stay sober. · Be safe with medicines. Read and follow all instructions on the label. ? If the doctor gave you a prescription medicine for pain, take it as prescribed. ? If you are not taking a prescription pain medicine, ask your doctor if you can take an over-the-counter medicine. · If your doctor prescribed antibiotics, take them as directed. Do not stop taking them just because you feel better. You need to take the full course of antibiotics. · Get extra rest until you feel better.   To prevent future problems with your pancreas  · Do not drink alcohol. · Tell your doctors and pharmacist that you've had pancreatitis. They can help you avoid medicines that may cause this problem again. When should you call for help? Call 911 anytime you think you may need emergency care. For example, call if:    · You vomit blood or what looks like coffee grounds.     · Your stools are maroon or very bloody.    Call your doctor now or seek immediate medical care if:    · You have new or worse belly pain.     · Your stools are black and look like tar, or they have streaks of blood.     · You are vomiting.    Watch closely for changes in your health, and be sure to contact your doctor if:    · You do not get better as expected. Where can you learn more? Go to http://johanny-papito.info/. Enter F453 in the search box to learn more about \"Pancreatitis: Care Instructions. \"  Current as of: March 27, 2018  Content Version: 11.9  © 3243-2947 [employer]. Care instructions adapted under license by [employer] (which disclaims liability or warranty for this information). If you have questions about a medical condition or this instruction, always ask your healthcare professional. [place] [address] any warranty or liability for your use of this information.

## 2019-03-02 NOTE — ED TRIAGE NOTES
Patient reports nausea. States that she has esophogeal wrap and EGD in FEB. Told to not dry heave and to come to the ER for nausea and pain that can not be controled at home.

## 2019-03-02 NOTE — ED PROVIDER NOTES
Patient presents to the ER complaining of abdominal pain and nausea. Reports a history of chronic pancreatitis. Reports worsening upper abdominal pain, radiating towards her back for the past 3 days. Reports nausea is intense. Reports she cannot vomit secondary to a previous Nissen. Denies any melena or hematochezia. Denies any fevers or chills. The history is provided by the patient. Nausea This is a new problem. The current episode started more than 2 days ago. The problem occurs 2 to 4 times per day. The problem has not changed since onset. The emesis has an appearance of stomach contents. There has been no fever. Associated symptoms include abdominal pain. Pertinent negatives include no fever. Her past medical history is significant for bowel resection. Past Medical History:  
Diagnosis Date  Anemia   
 denies hx of blood transfusions-- Fe infusions 12/2017  Anxiety and depression  Asthma   
 allergy induced, denies any inhaler, patient denies  C. difficile diarrhea   
 in 3487 after complicated ERCP  
 Cervical dysplasia 1990s  Chronic insomnia  Chronic pain   
 all over  Chronic pancreatitis ([address]  Endometriosis  Esophageal stricture 2017  Fibromyalgia  Fibromyalgia  Former cigarette smoker  GERD (gastroesophageal reflux disease) daily meds---po and IV protonix- uses per port- alternates  HLD (hyperlipidemia)   
 pt denies  IBS (irritable bowel syndrome)  Migraine headache  Morbid obesity ([address] BMI 33.3  Nausea & vomiting   
 pt reports she does better with phenergan than zofran - causes HA  
 Nonalcoholic fatty liver disease  Pancreatitis  Psychiatric disorder  PUD (peptic ulcer disease) 06/2017 Hx of  Sphincter of Oddi dysfunction  Type 2 diabetes mellitus ([address] -200, Lows at 90's, Last A1C 10 on 8/2018, Takes insulin-  
 
 
Past Surgical History:  
Procedure Laterality Date  ABDOMEN SURGERY PROC UNLISTED  last one placed   Hx of pancreatic stent, multiple  ABDOMEN SURGERY PROC UNLISTED    
 lap nissen  ABDOMEN SURGERY PROC UNLISTED    
 explor lap  COLONOSCOPY N/A 2018 COL[facility] performed by Paul [facility] at [facility] ENDOSCOPY  
 HX COLONOSCOPY    
 HX ENDOSCOPY    
 [address]  HX ERCP  3/5/2013  
 resulted in perforated duodenum  HX HYSTERECTOMY    
 ovaries remain [facility]  HX LAPAROTOMY  3/5/2013  
 exploratory for duodenal perforation with ERCP  
 HX ORTHOPAEDIC    
 right finger surgery 2017  HX UROLOGICAL  13 at [facility]-- stent removed 13. Dr Soundra Crigler  HX VASCULAR ACCESS    
 port insertion, left chest wall Family History:  
Problem Relation Age of Onset  Diabetes Mother  Hypertension Mother  Heart Disease Mother   
     cabg  Diabetes Father  Heart Disease Father  Hypertension Father  Other Father  No Known Problems Sister Social History Socioeconomic History  Marital status:  Spouse name: Not on file  Number of children: Not on file  Years of education: Not on file  Highest education level: Not on file Social Needs  Financial resource strain: Not on file  Food insecurity - worry: Not on file  Food insecurity - inability: Not on file  Transportation needs - medical: Not on file  Transportation needs - non-medical: Not on file Occupational History  Not on file Tobacco Use  Smoking status: Former Smoker Packs/day: 1.00 Years: 3.00 Pack years: 3.00 Last attempt to quit: 1993 Years since quittin.8  Smokeless tobacco: Never Used Substance and Sexual Activity  Alcohol use: No  
 Drug use: No  
 Sexual activity: Not on file Other Topics Concern  Not on file Social History Narrative  Not on file ALLERGIES: Tape [adhesive]; Barium iodide; Flagyl [metronidazole]; Metformin; and Insulins Review of Systems Constitutional: Negative for fatigue, fever and unexpected weight change. HENT: Negative for congestion and dental problem. Eyes: Negative for photophobia and redness. Respiratory: Negative for chest tightness and shortness of breath. Cardiovascular: Negative for leg swelling. Gastrointestinal: Positive for abdominal pain and nausea. Endocrine: Negative for polyphagia and polyuria. Genitourinary: Negative for decreased urine volume, flank pain, frequency and vaginal bleeding. Musculoskeletal: Negative for back pain and gait problem. Skin: Negative for pallor and rash. Neurological: Negative for seizures, light-headedness and numbness. Hematological: Negative for adenopathy. Does not bruise/bleed easily. Psychiatric/Behavioral: Negative for behavioral problems and confusion. All other systems reviewed and are negative. Vitals:  
 03/02/19 0913 BP: 184/71 Pulse: (!) 120 Resp: 18 Temp: 98.6 °F (37 °C) SpO2: 96% Weight: 78.9 kg (174 lb) Height: 5' 2\" (1.575 m) Physical Exam  
Constitutional: She is oriented to person, place, and time. She appears well-developed and well-nourished. HENT:  
Head: Normocephalic. Eyes: Pupils are equal, round, and reactive to light. Neck: Normal range of motion. Neck supple. Cardiovascular: Normal rate and regular rhythm. Pulmonary/Chest: Effort normal and breath sounds normal.  
Abdominal: Soft. Bowel sounds are normal. There is tenderness in the epigastric area. Musculoskeletal: Normal range of motion. She exhibits no edema or deformity. Neurological: She is alert and oriented to person, place, and time. No cranial nerve deficit. Nursing note and vitals reviewed. MDM Number of Diagnoses or Management Options Chronic pancreatitis, unspecified pancreatitis type [facility]):  
 Diagnosis management comments: We'll check labs including lipase. Treat symptomatically 10:50 AM 
Labs fairly stable. Normal white count. Chemistry panel is only significant for glucose of 373, no signs of DKA. Symptomatically patient is improved. We'll continue to monitor symptoms 12:30 PM 
Symptomatically patient appears improved. She reports her pain. She would like to be admitted to the hospital.  Objectively, no signs of requiring admission. Case was discussed with on-call gastroenterology, Dr. Gracie Suarez, he agrees. Patient appears stable for outpatient management. We'll discharge home. Plan for close follow-up Amount and/or Complexity of Data Reviewed Clinical lab tests: ordered and reviewed Discuss the patient with other providers: yes (Dr. Gracie Suarez of Gastroenterology) Risk of Complications, Morbidity, and/or Mortality Presenting problems: moderate Diagnostic procedures: low Management options: low Patient Progress Patient progress: stable Procedures Results Include: 
 
Recent Results (from the past 24 hour(s)) CBC WITH AUTOMATED DIFF Collection Time: 03/02/19  9:11 AM  
Result Value Ref Range WBC 4.7 4.3 - 11.1 K/uL  
 RBC 4.95 4.05 - 5.2 M/uL  
 HGB 14.1 11.7 - 15.4 g/dL HCT 43.3 35.8 - 46.3 % MCV 87.5 79.6 - 97.8 FL  
 MCH 28.5 26.1 - 32.9 PG  
 MCHC 32.6 31.4 - 35.0 g/dL  
 RDW 12.3 11.9 - 14.6 % PLATELET 438 323 - 717 K/uL MPV 12.1 9.4 - 12.3 FL ABSOLUTE NRBC 0.00 0.0 - 0.2 K/uL  
 DF AUTOMATED NEUTROPHILS 56 43 - 78 % LYMPHOCYTES 35 13 - 44 % MONOCYTES 6 4.0 - 12.0 % EOSINOPHILS 3 0.5 - 7.8 % BASOPHILS 1 0.0 - 2.0 % IMMATURE GRANULOCYTES 0 0.0 - 5.0 %  
 ABS. NEUTROPHILS 2.6 1.7 - 8.2 K/UL  
 ABS. LYMPHOCYTES 1.6 0.5 - 4.6 K/UL  
 ABS. MONOCYTES 0.3 0.1 - 1.3 K/UL  
 ABS. EOSINOPHILS 0.1 0.0 - 0.8 K/UL  
 ABS. BASOPHILS 0.0 0.0 - 0.2 K/UL  
 ABS. IMM. GRANS. 0.0 0.0 - 0.5 K/UL METABOLIC PANEL, COMPREHENSIVE  
 Collection Time: 03/02/19  9:11 AM  
Result Value Ref Range Sodium 138 136 - 145 mmol/L Potassium 4.2 3.5 - 5.1 mmol/L Chloride 107 98 - 107 mmol/L  
 CO2 24 21 - 32 mmol/L Anion gap 7 7 - 16 mmol/L Glucose 373 (H) 65 - 100 mg/dL BUN 9 6 - 23 MG/DL Creatinine 0.79 0.6 - 1.0 MG/DL  
 GFR est AA >60 >60 ml/min/1.73m2 GFR est non-AA >60 >60 ml/min/1.73m2 Calcium 9.0 8.3 - 10.4 MG/DL Bilirubin, total 0.5 0.2 - 1.1 MG/DL  
 ALT (SGPT) 57 12 - 65 U/L  
 AST (SGOT) 40 (H) 15 - 37 U/L Alk. phosphatase 174 (H) 50 - 136 U/L Protein, total 7.9 6.3 - 8.2 g/dL Albumin 3.9 3.5 - 5.0 g/dL Globulin 4.0 (H) 2.3 - 3.5 g/dL A-G Ratio 1.0 (L) 1.2 - 3.5 LIPASE Collection Time: 03/02/19  9:11 AM  
Result Value Ref Range Lipase 151 73 - 393 U/L MAGNESIUM Collection Time: 03/02/19  9:11 AM  
Result Value Ref Range Magnesium 1.8 1.8 - 2.4 mg/dL Voice dictation software was used during the making of this note. This software is not perfect and grammatical and other typographical errors may be present. This note has been proofread, but may still contain errors.  
Patrick Mooney MD; 3/2/2019 @10:51 AM  
===================================================================

## 2019-03-07 ENCOUNTER — HOSPITAL ENCOUNTER (OUTPATIENT)
Dept: INFUSION THERAPY | Age: 49
Discharge: HOME | End: 2019-03-07
Payer: MEDICARE

## 2019-03-07 VITALS
BODY MASS INDEX: 32.37 KG/M2 | DIASTOLIC BLOOD PRESSURE: 97 MMHG | HEART RATE: 104 BPM | OXYGEN SATURATION: 99 % | TEMPERATURE: 98 F | SYSTOLIC BLOOD PRESSURE: 123 MMHG | WEIGHT: 177 LBS | RESPIRATION RATE: 18 BRPM

## 2019-03-07 LAB — MAGNESIUM SERPL-MCNC: 1.9 MG/DL (ref 1.8–2.4)

## 2019-03-07 PROCEDURE — 74011250636 HC RX REV CODE- 250/636

## 2019-03-07 PROCEDURE — 83735 ASSAY OF MAGNESIUM: CPT

## 2019-03-07 PROCEDURE — 96374 THER/PROPH/DIAG INJ IV PUSH: CPT

## 2019-03-07 PROCEDURE — 74011000250 HC RX REV CODE- 250

## 2019-03-07 RX ORDER — HEPARIN 100 UNIT/ML
500 SYRINGE INTRAVENOUS AS NEEDED
Status: DISPENSED | OUTPATIENT
Start: 2019-03-07 | End: 2019-03-07

## 2019-03-07 RX ADMIN — PROMETHAZINE HYDROCHLORIDE 25 MG: 25 INJECTION INTRAMUSCULAR; INTRAVENOUS at 07:36

## 2019-03-07 RX ADMIN — Medication 500 UNITS: at 08:17

## 2019-03-07 NOTE — PROGRESS NOTES
Arrived to the [facility]. phenergan completed. Patient tolerated well. Any issues or concerns during appointment: no.  Patient aware of next infusion appointment on 3/14 (date) at 36 (time). Discharged home.

## 2019-03-14 ENCOUNTER — HOSPITAL ENCOUNTER (OUTPATIENT)
Dept: INFUSION THERAPY | Age: 49
Discharge: HOME | End: 2019-03-14
Payer: MEDICARE

## 2019-03-14 VITALS
OXYGEN SATURATION: 95 % | BODY MASS INDEX: 32.56 KG/M2 | DIASTOLIC BLOOD PRESSURE: 92 MMHG | WEIGHT: 178 LBS | TEMPERATURE: 98.3 F | RESPIRATION RATE: 18 BRPM | SYSTOLIC BLOOD PRESSURE: 133 MMHG | HEART RATE: 94 BPM

## 2019-03-14 LAB — MAGNESIUM SERPL-MCNC: 1.8 MG/DL (ref 1.8–2.4)

## 2019-03-14 PROCEDURE — 96374 THER/PROPH/DIAG INJ IV PUSH: CPT

## 2019-03-14 PROCEDURE — 74011000250 HC RX REV CODE- 250: Performed by: INTERNAL MEDICINE

## 2019-03-14 PROCEDURE — 74011250636 HC RX REV CODE- 250/636: Performed by: INTERNAL MEDICINE

## 2019-03-14 PROCEDURE — 83735 ASSAY OF MAGNESIUM: CPT

## 2019-03-14 RX ORDER — SODIUM CHLORIDE 0.9 % (FLUSH) 0.9 %
10-40 SYRINGE (ML) INJECTION AS NEEDED
Status: DISCONTINUED | OUTPATIENT
Start: 2019-03-14 | End: 2019-03-17 | Stop reason: HOSPADM

## 2019-03-14 RX ORDER — HEPARIN 100 UNIT/ML
300 SYRINGE INTRAVENOUS AS NEEDED
Status: ACTIVE | OUTPATIENT
Start: 2019-03-14 | End: 2019-03-14

## 2019-03-14 RX ADMIN — Medication 10 ML: at 07:40

## 2019-03-14 RX ADMIN — Medication 10 ML: at 07:28

## 2019-03-14 RX ADMIN — PROMETHAZINE HYDROCHLORIDE 25 MG: 25 INJECTION INTRAMUSCULAR; INTRAVENOUS at 07:28

## 2019-03-14 NOTE — PROGRESS NOTES
Arrived to the [facility]. IV phenergan completed. Patient tolerated well. Any issues or concerns during appointment: Magnesium resulted as 1.8. No replacements required per orders. Patient aware of next infusion appointment on 3/21/19 at 7:15am.  Discharged ambulatory.

## 2019-03-19 ENCOUNTER — ANESTHESIA EVENT (OUTPATIENT)
Dept: ENDOSCOPY | Age: 49
End: 2019-03-19
Payer: MEDICARE

## 2019-03-19 RX ORDER — MIDAZOLAM HYDROCHLORIDE 1 MG/ML
2 INJECTION, SOLUTION INTRAMUSCULAR; INTRAVENOUS
Status: CANCELLED | OUTPATIENT
Start: 2019-03-19 | End: 2019-03-20

## 2019-03-19 RX ORDER — HYDROMORPHONE HYDROCHLORIDE 2 MG/ML
0.5 INJECTION, SOLUTION INTRAMUSCULAR; INTRAVENOUS; SUBCUTANEOUS
Status: CANCELLED | OUTPATIENT
Start: 2019-03-19

## 2019-03-19 RX ORDER — MIDAZOLAM HYDROCHLORIDE 1 MG/ML
2 INJECTION, SOLUTION INTRAMUSCULAR; INTRAVENOUS ONCE
Status: CANCELLED | OUTPATIENT
Start: 2019-03-19 | End: 2019-03-19

## 2019-03-19 RX ORDER — LIDOCAINE HYDROCHLORIDE 10 MG/ML
0.1 INJECTION INFILTRATION; PERINEURAL AS NEEDED
Status: CANCELLED | OUTPATIENT
Start: 2019-03-19

## 2019-03-19 RX ORDER — OXYCODONE HYDROCHLORIDE 5 MG/1
10 TABLET ORAL
Status: CANCELLED | OUTPATIENT
Start: 2019-03-19 | End: 2019-03-20

## 2019-03-19 RX ORDER — DIPHENHYDRAMINE HYDROCHLORIDE 50 MG/ML
12.5 INJECTION, SOLUTION INTRAMUSCULAR; INTRAVENOUS
Status: CANCELLED | OUTPATIENT
Start: 2019-03-19 | End: 2019-03-20

## 2019-03-19 RX ORDER — SODIUM CHLORIDE, SODIUM LACTATE, POTASSIUM CHLORIDE, CALCIUM CHLORIDE 600; 310; 30; 20 MG/100ML; MG/100ML; MG/100ML; MG/100ML
100 INJECTION, SOLUTION INTRAVENOUS CONTINUOUS
Status: CANCELLED | OUTPATIENT
Start: 2019-03-19

## 2019-03-19 RX ORDER — OXYCODONE HYDROCHLORIDE 5 MG/1
5 TABLET ORAL
Status: CANCELLED | OUTPATIENT
Start: 2019-03-19 | End: 2019-03-20

## 2019-03-19 RX ORDER — NALOXONE HYDROCHLORIDE 0.4 MG/ML
0.1 INJECTION, SOLUTION INTRAMUSCULAR; INTRAVENOUS; SUBCUTANEOUS AS NEEDED
Status: CANCELLED | OUTPATIENT
Start: 2019-03-19

## 2019-03-19 RX ORDER — ONDANSETRON 2 MG/ML
4 INJECTION INTRAMUSCULAR; INTRAVENOUS ONCE
Status: CANCELLED | OUTPATIENT
Start: 2019-03-19 | End: 2019-03-19

## 2019-03-19 RX ORDER — FENTANYL CITRATE 50 UG/ML
100 INJECTION, SOLUTION INTRAMUSCULAR; INTRAVENOUS ONCE
Status: CANCELLED | OUTPATIENT
Start: 2019-03-19 | End: 2019-03-19

## 2019-03-19 RX ORDER — ALBUTEROL SULFATE 0.83 MG/ML
2.5 SOLUTION RESPIRATORY (INHALATION) AS NEEDED
Status: CANCELLED | OUTPATIENT
Start: 2019-03-19

## 2019-03-20 ENCOUNTER — ANESTHESIA (OUTPATIENT)
Dept: ENDOSCOPY | Age: 49
End: 2019-03-20
Payer: MEDICARE

## 2019-03-20 ENCOUNTER — HOSPITAL ENCOUNTER (OUTPATIENT)
Age: 49
Setting detail: OUTPATIENT SURGERY
Discharge: HOME | End: 2019-03-20
Attending: INTERNAL MEDICINE | Admitting: INTERNAL MEDICINE
Payer: MEDICARE

## 2019-03-20 VITALS
TEMPERATURE: 96.8 F | SYSTOLIC BLOOD PRESSURE: 134 MMHG | BODY MASS INDEX: 32.39 KG/M2 | OXYGEN SATURATION: 98 % | HEART RATE: 93 BPM | DIASTOLIC BLOOD PRESSURE: 71 MMHG | HEIGHT: 62 IN | RESPIRATION RATE: 15 BRPM | WEIGHT: 176 LBS

## 2019-03-20 LAB
GLUCOSE BLD STRIP.AUTO-MCNC: 231 MG/DL (ref 65–100)
GLUCOSE BLD STRIP.AUTO-MCNC: 289 MG/DL (ref 65–100)
GLUCOSE BLD STRIP.AUTO-MCNC: 315 MG/DL (ref 65–100)

## 2019-03-20 PROCEDURE — 74011250636 HC RX REV CODE- 250/636: Performed by: ANESTHESIOLOGY

## 2019-03-20 PROCEDURE — 76060000031 HC ANESTHESIA FIRST 0.5 HR: Performed by: INTERNAL MEDICINE

## 2019-03-20 PROCEDURE — 74011000250 HC RX REV CODE- 250: Performed by: INTERNAL MEDICINE

## 2019-03-20 PROCEDURE — 74011250636 HC RX REV CODE- 250/636

## 2019-03-20 PROCEDURE — 74011250636 HC RX REV CODE- 250/636: Performed by: INTERNAL MEDICINE

## 2019-03-20 PROCEDURE — 77030031722: Performed by: INTERNAL MEDICINE

## 2019-03-20 PROCEDURE — 82962 GLUCOSE BLOOD TEST: CPT

## 2019-03-20 PROCEDURE — 76040000025: Performed by: INTERNAL MEDICINE

## 2019-03-20 RX ORDER — NITROGLYCERIN 0.3 MG/1
0.4 TABLET SUBLINGUAL AS NEEDED
Status: ON HOLD | COMMUNITY
End: 2022-02-14

## 2019-03-20 RX ORDER — TRIAMCINOLONE ACETONIDE 40 MG/ML
40 INJECTION, SUSPENSION INTRA-ARTICULAR; INTRAMUSCULAR ONCE
Status: DISCONTINUED | OUTPATIENT
Start: 2019-03-20 | End: 2019-03-20 | Stop reason: HOSPADM

## 2019-03-20 RX ORDER — LIDOCAINE HYDROCHLORIDE 20 MG/ML
INJECTION, SOLUTION EPIDURAL; INFILTRATION; INTRACAUDAL; PERINEURAL AS NEEDED
Status: DISCONTINUED | OUTPATIENT
Start: 2019-03-20 | End: 2019-03-20 | Stop reason: HOSPADM

## 2019-03-20 RX ORDER — PROPOFOL 10 MG/ML
INJECTION, EMULSION INTRAVENOUS AS NEEDED
Status: DISCONTINUED | OUTPATIENT
Start: 2019-03-20 | End: 2019-03-20 | Stop reason: HOSPADM

## 2019-03-20 RX ORDER — HEPARIN 100 UNIT/ML
300 SYRINGE INTRAVENOUS AS NEEDED
Status: DISCONTINUED | OUTPATIENT
Start: 2019-03-20 | End: 2019-03-20 | Stop reason: HOSPADM

## 2019-03-20 RX ORDER — SODIUM CHLORIDE, SODIUM LACTATE, POTASSIUM CHLORIDE, CALCIUM CHLORIDE 600; 310; 30; 20 MG/100ML; MG/100ML; MG/100ML; MG/100ML
100 INJECTION, SOLUTION INTRAVENOUS CONTINUOUS
Status: DISCONTINUED | OUTPATIENT
Start: 2019-03-20 | End: 2019-03-20 | Stop reason: HOSPADM

## 2019-03-20 RX ADMIN — SODIUM CHLORIDE, PRESERVATIVE FREE 300 UNITS: 5 INJECTION INTRAVENOUS at 12:16

## 2019-03-20 RX ADMIN — PROPOFOL 10 MG: 10 INJECTION, EMULSION INTRAVENOUS at 10:45

## 2019-03-20 RX ADMIN — PROPOFOL 20 MG: 10 INJECTION, EMULSION INTRAVENOUS at 10:42

## 2019-03-20 RX ADMIN — LIDOCAINE HYDROCHLORIDE 100 MG: 20 INJECTION, SOLUTION EPIDURAL; INFILTRATION; INTRACAUDAL; PERINEURAL at 10:39

## 2019-03-20 RX ADMIN — PROPOFOL 20 MG: 10 INJECTION, EMULSION INTRAVENOUS at 10:49

## 2019-03-20 RX ADMIN — SODIUM CHLORIDE, SODIUM LACTATE, POTASSIUM CHLORIDE, AND CALCIUM CHLORIDE 100 ML/HR: 600; 310; 30; 20 INJECTION, SOLUTION INTRAVENOUS at 09:40

## 2019-03-20 RX ADMIN — PROPOFOL 20 MG: 10 INJECTION, EMULSION INTRAVENOUS at 10:51

## 2019-03-20 RX ADMIN — PROPOFOL 20 MG: 10 INJECTION, EMULSION INTRAVENOUS at 10:44

## 2019-03-20 RX ADMIN — PROPOFOL 10 MG: 10 INJECTION, EMULSION INTRAVENOUS at 10:40

## 2019-03-20 RX ADMIN — PROPOFOL 20 MG: 10 INJECTION, EMULSION INTRAVENOUS at 10:47

## 2019-03-20 RX ADMIN — PROPOFOL 20 MG: 10 INJECTION, EMULSION INTRAVENOUS at 10:53

## 2019-03-20 RX ADMIN — PROPOFOL 60 MG: 10 INJECTION, EMULSION INTRAVENOUS at 10:39

## 2019-03-20 RX ADMIN — PROMETHAZINE HYDROCHLORIDE 25 MG: 25 INJECTION INTRAMUSCULAR; INTRAVENOUS at 11:44

## 2019-03-20 NOTE — H&P
PreProcedure H&P Update    Today's Date:  3/20/2019    CC:  dysphagia    Subjective:   HPI: dysphagia    PMH:  Past Medical History:   Diagnosis Date    Anemia     denies hx of blood transfusions-- Fe infusions 12/2017    Anxiety and depression     Asthma     allergy induced, denies any inhaler, patient denies    C. difficile diarrhea     in 5545 after complicated ERCP    Cervical dysplasia 1990s    Chronic insomnia     Chronic pain     all over     Chronic pancreatitis ([address]     Endometriosis     Esophageal stricture 2017    Fibromyalgia     Fibromyalgia     Former cigarette smoker     GERD (gastroesophageal reflux disease)     daily meds---po and IV protonix- uses per port- alternates    HLD (hyperlipidemia)     pt denies     IBS (irritable bowel syndrome)     Migraine headache     Morbid obesity (HCC)     BMI 33.3    Nausea & vomiting     pt reports she does better with phenergan than zofran - causes HA    Nonalcoholic fatty liver disease     Pancreatitis     Psychiatric disorder     PUD (peptic ulcer disease) 06/2017    Hx of     Sphincter of Oddi dysfunction     Type 2 diabetes mellitus (HCC)     , Lows at 90's, Last A1C 9.7 on 01/2019, Takes insulin-       Medications:   Current Facility-Administered Medications   Medication Dose Route Frequency    lactated Ringers infusion  100 mL/hr IntraVENous CONTINUOUS    triamcinolone acetonide (KENALOG-40) 40 mg/mL injection 40 mg  40 mg Other ONCE         Objective:   Vitals:  Visit Vitals  /86   Pulse (!) 114   Temp 98.5 °F (36.9 °C)   Resp 18   Ht 5' 2\" (1.575 m)   Wt 79.8 kg (176 lb)   SpO2 95%   BMI 32.19 kg/m²     Exam:  General appearance: alert, cooperative, no distress  Lungs: clear to auscultation bilaterally anteriorly  Heart: regular rate and rhythm  Abdomen: soft, non-tender.  Bowel sounds normal. No masses, no organomegaly      Data Review (Labs):    No results for input(s): WBC, HGB, HCT, PLT, MCV, NA, K, CL, CO2, BUN, CREA, CA, GLU, AP, SGOT, GPT, TBIL, CBIL, ALB, TP, AML, LPSE, PTP, INR, APTT, HGBEXT, HCTEXT, PLTEXT in the last 72 hours. No lab exists for component: DBIL, INREXT    Plan:     Dysphagia. Proceed with EGD dilation. Patient understands perforation risk.

## 2019-03-20 NOTE — ANESTHESIA POSTPROCEDURE EVALUATION
Procedure(s): ESOPHAGOGASTRODUODENOSCOPY WITH DILATION/KENALOG (EGD)/ 32 ESOPHAGEAL DILATION. total IV anesthesia Anesthesia Post Evaluation Multimodal analgesia: multimodal analgesia used between 6 hours prior to anesthesia start to PACU discharge Patient location during evaluation: PACU Patient participation: complete - patient participated Level of consciousness: awake and awake and alert Pain management: adequate Airway patency: patent Anesthetic complications: no 
Cardiovascular status: acceptable Respiratory status: acceptable Hydration status: acceptable Post anesthesia nausea and vomiting:  controlled Vitals Value Taken Time /88 3/20/2019 11:19 AM  
Temp 98 3/20/2019 11:20 AM  
Pulse 93 3/20/2019 11:20 AM  
Resp 16 3/20/2019 11:17 AM  
SpO2 94 % 3/20/2019 11:20 AM  
Vitals shown include unvalidated device data.

## 2019-03-20 NOTE — PROGRESS NOTES
Blood sugar 289 in prep. Notified Dr. Evan Aguirre, anesthesia. No orders received at this time. Re-check blood sugar in recovery and treat if needed at that time.

## 2019-03-20 NOTE — ROUTINE PROCESS
VSS. Discharge instructions reviewed with patient and spouse and copy of instructions sent home with patient. Dr. Girish Clarke spoke with patient and spouse prior to discharge. Questions answered. Discharged via private car, wheeled out by [facility] and [address]      Personal items with patient at discharge: clothing

## 2019-03-20 NOTE — PROCEDURES
Esophagogastroduodenoscopy    DATE of PROCEDURE: 3/20/2019    INDICATION: dysphagia    POSTPROCEDURE DIAGNOSIS: esophageal stricture    MEDICATIONS ADMINISTERED: MAC anesthesia (see anesthesia report)    INSTRUMENT:    PROCEDURE:  After obtaining informed consent, the patient was placed in the left lateral position and sedated. The endoscope was advanced under direct vision without difficulty. The esophagus, stomach (including retroflexed views) and duodenum were evaluated. The patient was taken to the recovery area in stable condition. FINDINGS:  ESOPHAGUS: no obvious stricture or ulcers; and no tears with Guido #46, 48, 50 dilators. [place] #52 dilator led to 1 cm longitudinal superficial mucosal disruption distally. STOMACH: diffuse gastritis with surgical anastomosis of gastrojejunostomy  DUODENUM: normal    Estimated blood loss: 0-minimal   Specimens obtained during procedure: none    PLAN: EGD dilation in 2 months. Continue Protonix BID, carafate, NTG, levsin.

## 2019-03-20 NOTE — DISCHARGE INSTRUCTIONS
Gastrointestinal Esophagogastroduodenoscopy (EGD) - Upper Exam Discharge Instructions    1. Call Dr. Delmy Moncada for any problems or questions. 2. Contact the doctor's office for follow up appointment as directed. 3. Medication may cause drowsiness for several hours, therefore, do not drive or operate machinery for remainder of the day. 4. No alcohol today. 5. Ordinarily, you may resume regular diet and activity after exam unless otherwise specified by your physician. 6. For mild soreness in your throat you may use Cepacol throat lozenges or warm salt-water gargles as needed. Any additional instructions:       Repeat EGD dilation in 2 months. Instructions given to Je Yujaguar and other family members.

## 2019-03-21 ENCOUNTER — HOSPITAL ENCOUNTER (OUTPATIENT)
Dept: INFUSION THERAPY | Age: 49
Discharge: HOME | End: 2019-03-21
Payer: MEDICARE

## 2019-03-21 VITALS
DIASTOLIC BLOOD PRESSURE: 79 MMHG | TEMPERATURE: 98 F | OXYGEN SATURATION: 96 % | HEART RATE: 105 BPM | RESPIRATION RATE: 18 BRPM | SYSTOLIC BLOOD PRESSURE: 133 MMHG

## 2019-03-21 LAB — MAGNESIUM SERPL-MCNC: 1.7 MG/DL (ref 1.8–2.4)

## 2019-03-21 PROCEDURE — 74011000250 HC RX REV CODE- 250: Performed by: INTERNAL MEDICINE

## 2019-03-21 PROCEDURE — 74011250636 HC RX REV CODE- 250/636: Performed by: INTERNAL MEDICINE

## 2019-03-21 PROCEDURE — 96365 THER/PROPH/DIAG IV INF INIT: CPT

## 2019-03-21 PROCEDURE — 96375 TX/PRO/DX INJ NEW DRUG ADDON: CPT

## 2019-03-21 PROCEDURE — 83735 ASSAY OF MAGNESIUM: CPT

## 2019-03-21 RX ORDER — HEPARIN 100 UNIT/ML
500 SYRINGE INTRAVENOUS AS NEEDED
Status: ACTIVE | OUTPATIENT
Start: 2019-03-21 | End: 2019-03-21

## 2019-03-21 RX ORDER — SODIUM CHLORIDE 0.9 % (FLUSH) 0.9 %
10-40 SYRINGE (ML) INJECTION AS NEEDED
Status: DISCONTINUED | OUTPATIENT
Start: 2019-03-21 | End: 2019-03-24 | Stop reason: HOSPADM

## 2019-03-21 RX ORDER — MAGNESIUM SULFATE 1 G/100ML
1 INJECTION INTRAVENOUS ONCE
Status: COMPLETED | OUTPATIENT
Start: 2019-03-21 | End: 2019-03-21

## 2019-03-21 RX ADMIN — Medication 10 ML: at 09:07

## 2019-03-21 RX ADMIN — PROMETHAZINE HYDROCHLORIDE 25 MG: 25 INJECTION INTRAMUSCULAR; INTRAVENOUS at 07:38

## 2019-03-21 RX ADMIN — MAGNESIUM SULFATE HEPTAHYDRATE 1 G: 1 INJECTION, SOLUTION INTRAVENOUS at 08:06

## 2019-03-21 RX ADMIN — Medication 10 ML: at 07:42

## 2019-03-21 RX ADMIN — Medication 10 ML: at 07:44

## 2019-03-21 NOTE — PROGRESS NOTES
Arrived to the [facility]. Assessment completed. Patient tolerated phenergan well today. Any issues or concerns during appointment: noted magnesium level of 1.7. Magnesium 1 gram given IV. Patient aware of next infusion appointment on 3/28/19 (date) at [address] [place] [address] (time) with IV infusion center. Discharged ambulatory, with father. Patient instructed to call her doctor's office immediately for any problems or concerns. She verbalizes understanding.

## 2019-03-28 ENCOUNTER — HOSPITAL ENCOUNTER (OUTPATIENT)
Dept: INFUSION THERAPY | Age: 49
Discharge: HOME | End: 2019-03-28
Payer: MEDICARE

## 2019-03-28 VITALS
DIASTOLIC BLOOD PRESSURE: 72 MMHG | HEART RATE: 104 BPM | SYSTOLIC BLOOD PRESSURE: 131 MMHG | OXYGEN SATURATION: 98 % | WEIGHT: 178.6 LBS | TEMPERATURE: 98.8 F | BODY MASS INDEX: 32.67 KG/M2 | RESPIRATION RATE: 18 BRPM

## 2019-03-28 LAB — MAGNESIUM SERPL-MCNC: 1.9 MG/DL (ref 1.8–2.4)

## 2019-03-28 PROCEDURE — 96374 THER/PROPH/DIAG INJ IV PUSH: CPT

## 2019-03-28 PROCEDURE — 74011250636 HC RX REV CODE- 250/636: Performed by: INTERNAL MEDICINE

## 2019-03-28 PROCEDURE — 83735 ASSAY OF MAGNESIUM: CPT

## 2019-03-28 PROCEDURE — 74011000250 HC RX REV CODE- 250: Performed by: INTERNAL MEDICINE

## 2019-03-28 RX ORDER — HEPARIN 100 UNIT/ML
500 SYRINGE INTRAVENOUS AS NEEDED
Status: DISCONTINUED | OUTPATIENT
Start: 2019-03-28 | End: 2019-03-28

## 2019-03-28 RX ORDER — SODIUM CHLORIDE 0.9 % (FLUSH) 0.9 %
5-10 SYRINGE (ML) INJECTION AS NEEDED
Status: DISCONTINUED | OUTPATIENT
Start: 2019-03-28 | End: 2019-04-01 | Stop reason: HOSPADM

## 2019-03-28 RX ORDER — SODIUM CHLORIDE 9 MG/ML
500 INJECTION, SOLUTION INTRAVENOUS ONCE
Status: COMPLETED | OUTPATIENT
Start: 2019-03-28 | End: 2019-03-28

## 2019-03-28 RX ORDER — SODIUM CHLORIDE 0.9 % (FLUSH) 0.9 %
5-10 SYRINGE (ML) INJECTION AS NEEDED
Status: DISCONTINUED | OUTPATIENT
Start: 2019-03-28 | End: 2019-03-28

## 2019-03-28 RX ORDER — HEPARIN 100 UNIT/ML
500 SYRINGE INTRAVENOUS AS NEEDED
Status: DISCONTINUED | OUTPATIENT
Start: 2019-03-28 | End: 2019-04-01 | Stop reason: HOSPADM

## 2019-03-28 RX ORDER — SODIUM CHLORIDE 9 MG/ML
500 INJECTION, SOLUTION INTRAVENOUS ONCE
Status: DISCONTINUED | OUTPATIENT
Start: 2019-03-28 | End: 2019-03-28

## 2019-03-28 RX ADMIN — SODIUM CHLORIDE 25 MG: 9 INJECTION INTRAMUSCULAR; INTRAVENOUS; SUBCUTANEOUS at 07:35

## 2019-03-28 RX ADMIN — Medication 500 UNITS: at 08:15

## 2019-03-28 RX ADMIN — SODIUM CHLORIDE 500 ML: 900 INJECTION, SOLUTION INTRAVENOUS at 07:30

## 2019-03-28 RX ADMIN — Medication 10 ML: at 07:30

## 2019-03-28 NOTE — PROGRESS NOTES
Pt arrived ambulatory to C with port previously accessed with dressing dry and intact and with good blood return. NS infusing. Phenergan given IV. Mag 1.9. No replacements. Port packed with heparin and remains accessed for home care. Pt aware of next appt on 4/4/19 at 0715. Pt discharged ambulatory.

## 2019-03-28 NOTE — PROGRESS NOTES
Problem: Knowledge Deficit Goal: *Verbalizes understanding of procedures and medications Outcome: Progressing Towards Goal 
  
Problem: Patient Education:  Go to Education Activity Goal: Patient/Family Education Outcome: Progressing Towards Goal 
  
Problem: Knowledge Deficit Goal: *Verbalizes understanding of procedures and medications Outcome: Progressing Towards Goal 
  
Problem: Patient Education:  Go to Education Activity Goal: Patient/Family Education Outcome: Progressing Towards Goal

## 2019-04-04 ENCOUNTER — HOSPITAL ENCOUNTER (OUTPATIENT)
Dept: INFUSION THERAPY | Age: 49
Discharge: HOME | End: 2019-04-04
Payer: MEDICARE

## 2019-04-04 LAB — MAGNESIUM SERPL-MCNC: 1.8 MG/DL (ref 1.8–2.4)

## 2019-04-04 PROCEDURE — 74011000250 HC RX REV CODE- 250: Performed by: INTERNAL MEDICINE

## 2019-04-04 PROCEDURE — 74011250636 HC RX REV CODE- 250/636: Performed by: INTERNAL MEDICINE

## 2019-04-04 PROCEDURE — 83735 ASSAY OF MAGNESIUM: CPT

## 2019-04-04 PROCEDURE — 96374 THER/PROPH/DIAG INJ IV PUSH: CPT

## 2019-04-04 RX ORDER — SODIUM CHLORIDE 0.9 % (FLUSH) 0.9 %
10 SYRINGE (ML) INJECTION EVERY 8 HOURS
Status: DISCONTINUED | OUTPATIENT
Start: 2019-04-04 | End: 2019-04-07 | Stop reason: HOSPADM

## 2019-04-04 RX ADMIN — Medication 10 ML: at 07:50

## 2019-04-04 RX ADMIN — SODIUM CHLORIDE 25 MG: 9 INJECTION INTRAMUSCULAR; INTRAVENOUS; SUBCUTANEOUS at 07:48

## 2019-04-04 RX ADMIN — Medication 10 ML: at 08:06

## 2019-04-04 NOTE — PROGRESS NOTES
Pt. Arrived to [facility] for: labs which were drawn from her port and phenergan which she tolerated well Any issues or concerns during this appointment: none Magnesium level at 1.8 with no replacement required. Patient aware of next appointment on: 4-11-19  @ [phone] Pt. Discharged: ambulatory home

## 2019-04-11 ENCOUNTER — HOSPITAL ENCOUNTER (OUTPATIENT)
Dept: INFUSION THERAPY | Age: 49
Discharge: HOME | End: 2019-04-11
Payer: MEDICARE

## 2019-04-11 VITALS
HEART RATE: 94 BPM | TEMPERATURE: 98.4 F | OXYGEN SATURATION: 96 % | SYSTOLIC BLOOD PRESSURE: 134 MMHG | RESPIRATION RATE: 18 BRPM | DIASTOLIC BLOOD PRESSURE: 93 MMHG

## 2019-04-11 LAB — MAGNESIUM SERPL-MCNC: 1.7 MG/DL (ref 1.8–2.4)

## 2019-04-11 PROCEDURE — 96365 THER/PROPH/DIAG IV INF INIT: CPT

## 2019-04-11 PROCEDURE — 74011000250 HC RX REV CODE- 250: Performed by: INTERNAL MEDICINE

## 2019-04-11 PROCEDURE — 83735 ASSAY OF MAGNESIUM: CPT

## 2019-04-11 PROCEDURE — 74011250636 HC RX REV CODE- 250/636: Performed by: INTERNAL MEDICINE

## 2019-04-11 PROCEDURE — 96375 TX/PRO/DX INJ NEW DRUG ADDON: CPT

## 2019-04-11 RX ORDER — MAGNESIUM SULFATE 1 G/100ML
1 INJECTION INTRAVENOUS ONCE
Status: COMPLETED | OUTPATIENT
Start: 2019-04-11 | End: 2019-04-11

## 2019-04-11 RX ORDER — HEPARIN 100 UNIT/ML
500 SYRINGE INTRAVENOUS AS NEEDED
Status: DISPENSED | OUTPATIENT
Start: 2019-04-11 | End: 2019-04-11

## 2019-04-11 RX ORDER — SODIUM CHLORIDE 0.9 % (FLUSH) 0.9 %
20 SYRINGE (ML) INJECTION EVERY 8 HOURS
Status: DISCONTINUED | OUTPATIENT
Start: 2019-04-11 | End: 2019-04-15 | Stop reason: HOSPADM

## 2019-04-11 RX ADMIN — PROMETHAZINE HYDROCHLORIDE 25 MG: 25 INJECTION INTRAMUSCULAR; INTRAVENOUS at 07:40

## 2019-04-11 RX ADMIN — Medication 20 ML: at 07:44

## 2019-04-11 RX ADMIN — HEPARIN 500 UNITS: 100 SYRINGE at 09:20

## 2019-04-11 RX ADMIN — MAGNESIUM SULFATE HEPTAHYDRATE 1 G: 1 INJECTION, SOLUTION INTRAVENOUS at 08:16

## 2019-04-11 NOTE — PROGRESS NOTES
[facility] Labs, promethazine and 1gram magnesium completed. Patient tolerated well. Any issues or concerns during appointment: no. Mag=1.7 Patient aware of next infusion appointment on 4/25 at [address] [place] [address]. Discharged to home ambulatory.

## 2019-04-18 ENCOUNTER — APPOINTMENT (OUTPATIENT)
Dept: INFUSION THERAPY | Age: 49
End: 2019-04-18
Payer: MEDICARE

## 2019-04-25 ENCOUNTER — HOSPITAL ENCOUNTER (OUTPATIENT)
Dept: INFUSION THERAPY | Age: 49
Discharge: HOME | End: 2019-04-25
Payer: MEDICARE

## 2019-04-25 ENCOUNTER — APPOINTMENT (OUTPATIENT)
Dept: INFUSION THERAPY | Age: 49
End: 2019-04-25
Payer: MEDICARE

## 2019-04-25 VITALS
HEART RATE: 110 BPM | RESPIRATION RATE: 18 BRPM | OXYGEN SATURATION: 95 % | TEMPERATURE: 98.9 F | DIASTOLIC BLOOD PRESSURE: 79 MMHG | SYSTOLIC BLOOD PRESSURE: 132 MMHG

## 2019-04-25 LAB — MAGNESIUM SERPL-MCNC: 1.7 MG/DL (ref 1.8–2.4)

## 2019-04-25 PROCEDURE — 74011250636 HC RX REV CODE- 250/636: Performed by: INTERNAL MEDICINE

## 2019-04-25 PROCEDURE — 96365 THER/PROPH/DIAG IV INF INIT: CPT

## 2019-04-25 PROCEDURE — 96375 TX/PRO/DX INJ NEW DRUG ADDON: CPT

## 2019-04-25 PROCEDURE — 74011000250 HC RX REV CODE- 250: Performed by: INTERNAL MEDICINE

## 2019-04-25 PROCEDURE — 83735 ASSAY OF MAGNESIUM: CPT

## 2019-04-25 RX ORDER — HEPARIN 100 UNIT/ML
500 SYRINGE INTRAVENOUS AS NEEDED
Status: DISCONTINUED | OUTPATIENT
Start: 2019-04-25 | End: 2019-04-28 | Stop reason: HOSPADM

## 2019-04-25 RX ORDER — MAGNESIUM SULFATE 1 G/100ML
1 INJECTION INTRAVENOUS ONCE
Status: COMPLETED | OUTPATIENT
Start: 2019-04-25 | End: 2019-04-25

## 2019-04-25 RX ORDER — SODIUM CHLORIDE 0.9 % (FLUSH) 0.9 %
10 SYRINGE (ML) INJECTION AS NEEDED
Status: DISCONTINUED | OUTPATIENT
Start: 2019-04-25 | End: 2019-04-28 | Stop reason: HOSPADM

## 2019-04-25 RX ADMIN — Medication 10 ML: at 07:28

## 2019-04-25 RX ADMIN — Medication 10 ML: at 09:05

## 2019-04-25 RX ADMIN — MAGNESIUM SULFATE HEPTAHYDRATE 1 G: 1 INJECTION, SOLUTION INTRAVENOUS at 08:04

## 2019-04-25 RX ADMIN — SODIUM CHLORIDE 25 MG: 9 INJECTION INTRAMUSCULAR; INTRAVENOUS; SUBCUTANEOUS at 07:27

## 2019-04-25 NOTE — PROGRESS NOTES
Arrived to the [facility]. Magnesium and anti emetics completed. Patient tolerated without problems Any issues or concerns during appointment: no 
Patient aware of next infusion appointment on 5/2/19 at [address] [place] [address] Discharged ambulatory

## 2019-05-02 ENCOUNTER — HOSPITAL ENCOUNTER (OUTPATIENT)
Dept: INFUSION THERAPY | Age: 49
Discharge: HOME | End: 2019-05-02
Payer: MEDICARE

## 2019-05-02 VITALS
HEART RATE: 106 BPM | TEMPERATURE: 98.2 F | DIASTOLIC BLOOD PRESSURE: 89 MMHG | OXYGEN SATURATION: 95 % | SYSTOLIC BLOOD PRESSURE: 138 MMHG | RESPIRATION RATE: 18 BRPM

## 2019-05-02 PROCEDURE — 74011250636 HC RX REV CODE- 250/636: Performed by: INTERNAL MEDICINE

## 2019-05-02 PROCEDURE — 96523 IRRIG DRUG DELIVERY DEVICE: CPT

## 2019-05-02 RX ORDER — SODIUM CHLORIDE 0.9 % (FLUSH) 0.9 %
10-40 SYRINGE (ML) INJECTION AS NEEDED
Status: DISCONTINUED | OUTPATIENT
Start: 2019-05-02 | End: 2019-05-06 | Stop reason: HOSPADM

## 2019-05-02 RX ORDER — HEPARIN 100 UNIT/ML
500 SYRINGE INTRAVENOUS AS NEEDED
Status: DISPENSED | OUTPATIENT
Start: 2019-05-02 | End: 2019-05-02

## 2019-05-02 RX ADMIN — Medication 500 UNITS: at 17:05

## 2019-05-02 RX ADMIN — Medication 10 ML: at 17:05

## 2019-05-02 NOTE — PROGRESS NOTES
Pt arrived ambulatory to [facility]. Port needle and dressing changed flushed and packed with heparin. Pt refused labs or phenergan. Pt aware of next appt on 5/9/19 at 0715. Pt discharged ambulatory.

## 2019-05-09 ENCOUNTER — HOSPITAL ENCOUNTER (OUTPATIENT)
Dept: INFUSION THERAPY | Age: 49
Discharge: HOME | End: 2019-05-09
Payer: MEDICARE

## 2019-05-09 VITALS
TEMPERATURE: 98.3 F | DIASTOLIC BLOOD PRESSURE: 80 MMHG | RESPIRATION RATE: 18 BRPM | HEART RATE: 104 BPM | OXYGEN SATURATION: 95 % | SYSTOLIC BLOOD PRESSURE: 127 MMHG

## 2019-05-09 LAB — MAGNESIUM SERPL-MCNC: 1.8 MG/DL (ref 1.8–2.4)

## 2019-05-09 PROCEDURE — 96374 THER/PROPH/DIAG INJ IV PUSH: CPT

## 2019-05-09 PROCEDURE — 74011250636 HC RX REV CODE- 250/636: Performed by: INTERNAL MEDICINE

## 2019-05-09 PROCEDURE — 74011000250 HC RX REV CODE- 250: Performed by: INTERNAL MEDICINE

## 2019-05-09 PROCEDURE — 83735 ASSAY OF MAGNESIUM: CPT

## 2019-05-09 RX ORDER — HEPARIN 100 UNIT/ML
300 SYRINGE INTRAVENOUS AS NEEDED
Status: DISPENSED | OUTPATIENT
Start: 2019-05-09 | End: 2019-05-09

## 2019-05-09 RX ORDER — SODIUM CHLORIDE 0.9 % (FLUSH) 0.9 %
10 SYRINGE (ML) INJECTION AS NEEDED
Status: ACTIVE | OUTPATIENT
Start: 2019-05-09 | End: 2019-05-09

## 2019-05-09 RX ADMIN — Medication 10 ML: at 07:34

## 2019-05-09 RX ADMIN — Medication 300 UNITS: at 07:44

## 2019-05-09 RX ADMIN — PROMETHAZINE HYDROCHLORIDE 25 MG: 25 INJECTION INTRAMUSCULAR; INTRAVENOUS at 07:34

## 2019-05-09 RX ADMIN — Medication 10 ML: at 07:44

## 2019-05-09 NOTE — PROGRESS NOTES
Arrived to the [facility]. Port needle change and IV Phenergan completed. Patient tolerated well. No IV replacements necessary. Any issues or concerns during appointment: none. Patient aware of next infusion appointment on 5/16 (date) at [address] AM (time). Discharged ambulatory.

## 2019-05-15 ENCOUNTER — HOSPITAL ENCOUNTER (OUTPATIENT)
Age: 49
Setting detail: OUTPATIENT SURGERY
Discharge: HOME | End: 2019-05-15
Attending: INTERNAL MEDICINE | Admitting: INTERNAL MEDICINE
Payer: MEDICARE

## 2019-05-15 ENCOUNTER — ANESTHESIA (OUTPATIENT)
Dept: ENDOSCOPY | Age: 49
End: 2019-05-15
Payer: MEDICARE

## 2019-05-15 ENCOUNTER — ANESTHESIA EVENT (OUTPATIENT)
Dept: ENDOSCOPY | Age: 49
End: 2019-05-15
Payer: MEDICARE

## 2019-05-15 VITALS
BODY MASS INDEX: 32.39 KG/M2 | TEMPERATURE: 98.6 F | WEIGHT: 176 LBS | DIASTOLIC BLOOD PRESSURE: 76 MMHG | OXYGEN SATURATION: 97 % | HEART RATE: 69 BPM | RESPIRATION RATE: 16 BRPM | SYSTOLIC BLOOD PRESSURE: 125 MMHG | HEIGHT: 62 IN

## 2019-05-15 LAB — GLUCOSE BLD STRIP.AUTO-MCNC: 149 MG/DL (ref 65–100)

## 2019-05-15 PROCEDURE — 74011250636 HC RX REV CODE- 250/636

## 2019-05-15 PROCEDURE — 82962 GLUCOSE BLOOD TEST: CPT

## 2019-05-15 PROCEDURE — 74011250636 HC RX REV CODE- 250/636: Performed by: ANESTHESIOLOGY

## 2019-05-15 PROCEDURE — 76060000031 HC ANESTHESIA FIRST 0.5 HR: Performed by: INTERNAL MEDICINE

## 2019-05-15 PROCEDURE — 76040000025: Performed by: INTERNAL MEDICINE

## 2019-05-15 PROCEDURE — 74011000250 HC RX REV CODE- 250

## 2019-05-15 RX ORDER — SODIUM CHLORIDE 0.9 % (FLUSH) 0.9 %
5-40 SYRINGE (ML) INJECTION AS NEEDED
Status: DISCONTINUED | OUTPATIENT
Start: 2019-05-15 | End: 2019-05-15 | Stop reason: HOSPADM

## 2019-05-15 RX ORDER — PROPOFOL 10 MG/ML
INJECTION, EMULSION INTRAVENOUS AS NEEDED
Status: DISCONTINUED | OUTPATIENT
Start: 2019-05-15 | End: 2019-05-15 | Stop reason: HOSPADM

## 2019-05-15 RX ORDER — LIDOCAINE HYDROCHLORIDE 20 MG/ML
INJECTION, SOLUTION EPIDURAL; INFILTRATION; INTRACAUDAL; PERINEURAL AS NEEDED
Status: DISCONTINUED | OUTPATIENT
Start: 2019-05-15 | End: 2019-05-15 | Stop reason: HOSPADM

## 2019-05-15 RX ORDER — SODIUM CHLORIDE 0.9 % (FLUSH) 0.9 %
5-40 SYRINGE (ML) INJECTION EVERY 8 HOURS
Status: DISCONTINUED | OUTPATIENT
Start: 2019-05-15 | End: 2019-05-15 | Stop reason: HOSPADM

## 2019-05-15 RX ORDER — SODIUM CHLORIDE, SODIUM LACTATE, POTASSIUM CHLORIDE, CALCIUM CHLORIDE 600; 310; 30; 20 MG/100ML; MG/100ML; MG/100ML; MG/100ML
100 INJECTION, SOLUTION INTRAVENOUS CONTINUOUS
Status: DISCONTINUED | OUTPATIENT
Start: 2019-05-15 | End: 2019-05-15 | Stop reason: HOSPADM

## 2019-05-15 RX ORDER — PROPOFOL 10 MG/ML
INJECTION, EMULSION INTRAVENOUS
Status: DISCONTINUED | OUTPATIENT
Start: 2019-05-15 | End: 2019-05-15 | Stop reason: HOSPADM

## 2019-05-15 RX ORDER — TRIAMCINOLONE ACETONIDE 40 MG/ML
40 INJECTION, SUSPENSION INTRA-ARTICULAR; INTRAMUSCULAR ONCE
Status: DISCONTINUED | OUTPATIENT
Start: 2019-05-15 | End: 2019-05-15 | Stop reason: HOSPADM

## 2019-05-15 RX ADMIN — LIDOCAINE HYDROCHLORIDE 100 MG: 20 INJECTION, SOLUTION EPIDURAL; INFILTRATION; INTRACAUDAL; PERINEURAL at 08:16

## 2019-05-15 RX ADMIN — SODIUM CHLORIDE, SODIUM LACTATE, POTASSIUM CHLORIDE, AND CALCIUM CHLORIDE 100 ML/HR: 600; 310; 30; 20 INJECTION, SOLUTION INTRAVENOUS at 07:47

## 2019-05-15 RX ADMIN — PROPOFOL 160 MCG/KG/MIN: 10 INJECTION, EMULSION INTRAVENOUS at 08:16

## 2019-05-15 RX ADMIN — PROPOFOL 60 MG: 10 INJECTION, EMULSION INTRAVENOUS at 08:16

## 2019-05-15 NOTE — DISCHARGE INSTRUCTIONS
Gastrointestinal Esophagogastroduodenoscopy (EGD) - Upper Exam Discharge Instructions    1. Call Dr. Jony Marcus at [phone] for any problems or questions. 2. Contact the doctor's office for follow up appointment as directed. 3. Medication may cause drowsiness for several hours, therefore:  · Do not drive or operate machinery for remainder of the day. · No alcohol today. · Do not make any important or legal decisions for 24 hours. · Do not sign any legal documents for 24 hours. 5. Ordinarily, you may resume regular diet and activity after exam unless otherwise specified by your physician. 6. For mild soreness in your throat you may use Cepacol throat lozenges or warm salt-water gargles as needed. Any additional instructions:     Repeat EGD with dilation in 2 weeks. Instructions given to Zulema Powell and other family members.

## 2019-05-15 NOTE — PROCEDURES
Esophagogastroduodenoscopy    DATE of PROCEDURE: 5/15/2019    INDICATIONS:  1. Dysphagia   2. Epigastric pain     MEDICATIONS ADMINISTERED: MAC    INSTRUMENT: GIF    PROCEDURE:  After obtaining informed consent, the patient was placed in the left lateral position and sedated. The endoscope was advanced under direct vision without difficulty. The esophagus, stomach (including retroflexed views) and duodenum were evaluated. The patient was taken to the recovery area in stable condition. FINDINGS:  ESOPHAGUS: Normal appearance. Mild resistance to 40 4-Guinean Guido with no mucosal break after dilation. Mild resistance to 46-Guinean Guido with moderate mucosal break hip the GE junction after dilation. STOMACH: Status post distal gastrectomy. Mild retained food. Otherwise the mucosa appears normal.   SMALL BOWEL: Anastomosis appears normal. Proximal tissue appears normal. Retained food distally, so scope not advanced further. Estimated blood loss: 0-minimal     PLAN:  1. She has clearly had regression in her stricture with an every 2 month schedule. Perhaps aggressive dilation up to 60-Guinean will help her in the future. Will plan to repeat EGD 2 weeks and continue progressive dilation.      Lizbeth Butt MD  [facility] [place]

## 2019-05-15 NOTE — ANESTHESIA PREPROCEDURE EVALUATION
Anesthetic History PONV Review of Systems / Medical History Patient summary reviewed and pertinent labs reviewed Pulmonary Asthma : well controlled Pertinent negatives: No smoker (Former, quit 25 years) Neuro/Psych Psychiatric history Cardiovascular Hypertension: well controlled Exercise tolerance: >4 METS 
  
GI/Hepatic/Renal 
  
GERD: well controlled Liver disease (fatty liver) Comments: Esophageal stricture. Chronic Pancreatitis. Endo/Other Diabetes (recently a bit better control, but still feels hypoglycemic in around 100): poorly controlled, type 2, using insulin Obesity Other Findings Comments: Fibromyalgia Chronic Pain On Fentanyl Patch Physical Exam 
 
Airway Mallampati: II 
TM Distance: 4 - 6 cm Neck ROM: decreased range of motion, short neck Mouth opening: Normal 
 
 Cardiovascular Regular rate and rhythm,  S1 and S2 normal,  no murmur, click, rub, or gallop Rhythm: regular Rate: normal 
 
 
 
 Dental 
No notable dental hx Pulmonary Breath sounds clear to auscultation Abdominal 
 
 
 
 Other Findings Anesthetic Plan ASA: 3 Anesthesia type: total IV anesthesia Induction: Intravenous Anesthetic plan and risks discussed with: Patient She has this procedure every 4-8 weeks and most recent done 3/20/19. She denies interval change to her medical condition except for the esophageal stricture. She does well with propofol sedations.

## 2019-05-15 NOTE — H&P
[facility] H&P (Admission) Date:  5/15/2019 Chief Complaint:  Esophageal dysphagia and epigastric pain Subjective:  
 
History of Present Illness:  Patient is a 46 y.o. being admitted for EGD with dilation. Symptoms have been responsive to serial dilation. Unfortunately, scheduling her at 2 month intervals has led to about 2 weeks of pain, as pain and dysphagia have been returning at 6 weeks. PMH: 
Past Medical History:  
Diagnosis Date  Anemia   
 denies hx of blood transfusions-- Fe infusions 12/2017  Anxiety and depression  Asthma   
 allergy induced, denies any inhaler, patient denies  BMI 32.0-32.9,adult BMI 32.2  
 C. difficile diarrhea 2013  
 in 0152 after complicated ERCP  
 Cervical dysplasia 1990s  Chronic insomnia  Chronic pain   
 all over  Chronic pancreatitis ([facility] [address]  Encounter for insertion of venous access port Left chest port  Endometriosis  Esophageal stricture 2017  Fibromyalgia  Former cigarette smoker  GERD (gastroesophageal reflux disease) daily meds---po and IV protonix- uses per port- alternates  HLD (hyperlipidemia)   
 pt denies  IBS (irritable bowel syndrome)  Migraine headache  Nausea & vomiting   
 pt reports she does better with phenergan than zofran - causes HA  
 Nonalcoholic fatty liver disease  PUD (peptic ulcer disease) 06/2017 Hx of  Sphincter of Oddi dysfunction  Type 2 diabetes mellitus (HCC)   
 insulin reliant/-160/ s.s of hypoglycemia at 90's, Last A1C 9.7 on 01/2019 PSH: 
Past Surgical History:  
Procedure Laterality Date  ABDOMEN SURGERY PROC UNLISTED  last one placed  2012 Hx of pancreatic stent, multiple  ABDOMEN SURGERY PROC UNLISTED  1997  
 lap nissen  ABDOMEN SURGERY PROC UNLISTED  4/13  
 explor lap  COLONOSCOPY N/A 4/4/2018 COLONOSCOPY performed by Harvey [facility]farland [facility] at [facility] END[facility]COPY  HX CARPAL TUNNEL RELEASE Right   
 along with trigger finger  HX COLONOSCOPY    
 HX ENDOSCOPY  2017  
 36 fr thomas  HX ERCP  3/5/2013  
 resulted in perforated duodenum  HX HYSTERECTOMY  1998  
 ovaries remain [address]  HX LAPAROTOMY  3/5/2013  
 exploratory for duodenal perforation with ERCP  
 HX ORTHOPAEDIC    
 right finger surgery 11/2017  HX UROLOGICAL  4/25/13 at [facility]-- stent removed 6-27-13. Dr Quin Arguello  HX VASCULAR ACCESS  2014  
 port insertion, left chest wall Allergies: Allergies Allergen Reactions  Tape [Adhesive] Rash and Itching  Barium Iodide Nausea and Vomiting  Flagyl [Metronidazole] Nausea and Vomiting  Metformin Nausea and Vomiting Stomach pain  Insulins Nausea and Vomiting Humalog only Home Medications: 
Prior to Admission medications Medication Sig Start Date End Date Taking? Authorizing Provider  
zolpidem (AMBIEN) 10 mg tablet Take 10 mg by mouth nightly. Yes Provider, Historical  
promethazine (PHENERGAN) 25 mg tablet Take 25 mg by mouth every six (6) hours as needed for Nausea. Yes Provider, Historical  
morphine IR (MS IR) 30 mg tablet Take 30 mg by mouth every eight (8) hours as needed for Pain. Yes Provider, Historical  
nitroglycerin (NITROSTAT) 0.3 mg SL tablet Take 0.4 mg by mouth as needed (\"pt states taking for cramping\"). Yes Provider, Historical  
atorvastatin (LIPITOR) 10 mg tablet Take 10 mg by mouth nightly. Yes Provider, Historical  
citalopram (CELEXA) 20 mg tablet Take 20 mg by mouth daily. Indications: am   Yes Provider, Historical  
acetaminophen (TYLENOL) 325 mg tablet Take 325 mg by mouth every four (4) hours as needed for Pain. Yes Provider, Historical  
LORazepam (ATIVAN) 1 mg tablet Take 1 mg by mouth as needed for Anxiety.    Yes Provider, Historical  
CARAFATE 100 mg/mL suspension TAKE 10 ML BY MOUTH 4 TIMES A DAY EVERY DAY ON A EMPTY STOMACH 1 HR BEFORE MEALS AND AT BEDTIME 9/7/17  Yes Provider, Historical  
gabapentin (NEURONTIN) 250 mg/5 mL solution Take 250 mg by mouth three (3) times daily. Yes Provider, Historical  
diphenhydrAMINE (BENADRYL) 25 mg capsule Take 25 mg by mouth every six (6) hours as needed. Yes Provider, Historical  
pantoprazole (PROTONIX) 40 mg injection 40 mg by IntraVENous route every morning. Take day of surgery per anesthesia protocol. Yes Provider, Historical  
insulin degludec (TRESIBA FLEXTOUCH U-100) 100 unit/mL (3 mL) inpn 44 Units by SubCUTAneous route nightly. Yes Provider, Historical  
polyethylene glycol (MIRALAX) 17 gram packet Take 17 g by mouth as needed. Yes Provider, Historical  
hyoscyamine SL (LEVSIN/SL) 0.125 mg SL tablet 0.125 mg by SubLINGual route every six (6) hours as needed for Cramping. Yes Provider, Historical  
0.9% sodium chloride solution by IntraVENous route as needed. Gives to herself via port at home daily -1000cc   Yes Provider, Historical  
insulin aspart (NOVOLOG) 100 unit/mL injection Use as directed Patient taking differently: 7 Units by SubCUTAneous route Before breakfast, lunch, and dinner. Over 200 use sliding scale- Always gets 7 units then if >200 gets extra - does ot know amount- has never taken more than 10 units regular 6/7/16  Yes Nannette Kevin NP  
ondansetron hcl (ZOFRAN) 8 mg tablet Take 8 mg by mouth every eight (8) hours as needed for Nausea. Yes Provider, Historical  
cyanocobalamin (VITAMIN B12) 1,000 mcg/mL injection INJECT 1 VIAL INTRAMUSCULARLY FOR 4 WEEKS THEN MONTHLY 11/25/17   Provider, Historical  
glucagon (GLUCAGEN) 1 mg injection 1 mg by IntraMUSCular route as needed. 5/8/17   Provider, Historical  
promethazine (PHENERGAN) 25 mg suppository Insert 25 mg into rectum as needed for Nausea. Provider, Historical  
 
 
Hospital Medications: 
Current Facility-Administered Medications Medication Dose Route Frequency  lactated Ringers infusion  100 mL/hr IntraVENous CONTINUOUS  
 lactated Ringers infusion  100 mL/hr IntraVENous CONTINUOUS  
 sodium chloride (NS) flush 5-40 mL  5-40 mL IntraVENous Q8H  
 sodium chloride (NS) flush 5-40 mL  5-40 mL IntraVENous PRN  
 triamcinolone acetonide (KENALOG-40) 40 mg/mL injection 40 mg  40 mg IntraMUSCular ONCE Social History: 
Social History Tobacco Use  Smoking status: Former Smoker Packs/day: 1.00 Years: 3.00 Pack years: 3.00 Last attempt to quit: 1993 Years since quittin.0  Smokeless tobacco: Never Used Substance Use Topics  Alcohol use: No  
 
 
 
Family History: 
Family History Problem Relation Age of Onset  Diabetes Mother  Hypertension Mother  Heart Disease Mother   
     cabg  Diabetes Father  Heart Disease Father  Hypertension Father  Other Father  No Known Problems Sister Review of Systems: A detailed 10 system ROS is obtained, with pertinent positives as listed above. All others are negative. Objective:  
 
Physical Exam: 
Vitals: 
Visit Vitals /79 Pulse 95 Temp 98.5 °F (36.9 °C) Resp 16 Ht 5' 2\" (1.575 m) Wt 79.8 kg (176 lb) SpO2 94% Breastfeeding? No  
BMI 32.19 kg/m² Gen:  Pt is alert, cooperative, no acute distress Skin: no large lesions HEENT: MMM Cardiovascular: Regular rate and rhythm. No murmurs, gallops, or rubs. Respiratory:  Comfortable breathing with no accessory muscle use. Clear breath sounds with no wheezes, rales, or rhonchi. GI:  Abdomen nondistended, soft, and nontender. Normal active bowel sounds. Neurological:  Gross memory appears intact. Patient is alert and oriented. Psychiatric:  Mood appears appropriate with judgement intact. Laboratory: No results for input(s): WBC, HGB, HCT, PLT, MCV, NA, K, CL, CO2, BUN, CREA, CA, MG, GLU, AP, SGOT, GPT, TBIL, CBIL, ALB, TP, AML, LPSE, PTP, INR, APTT, HGBEXT, HCTEXT, PLTEXT in the last 72 hours. No lab exists for component: DBIL, INREXT Assessment: 
  
 
Active Problems: * No active hospital problems. * 
 
 
Plan: 
  
Endoscopy and risks explained to the patient. Risks including reaction to sedation, cardiopulmonary events, infection, bleeding, perforation, requirement for surgery if complications should occur, death were explained to patient who expressed understanding and agreed to proceed with endoscopy. Will plan to schedule her for approximately 6 week intervals between dilation. Alternatively, could schedule her for Q 2 week dilation to dilate heard a 60-Slovak and see if duration of affect could last longer.   
 
Lora Easton MD 
[facility], [place]

## 2019-05-15 NOTE — ANESTHESIA POSTPROCEDURE EVALUATION
Procedure(s): ESOPHAGOGASTRODUODENOSCOPY (EGD) / BMI 35 / DILATATION 
ESOPHAGEAL DILATION. total IV anesthesia Anesthesia Post Evaluation Multimodal analgesia: multimodal analgesia not used between 6 hours prior to anesthesia start to PACU discharge Patient location during evaluation: PACU Patient participation: complete - patient participated Level of consciousness: awake and alert Pain management: adequate Airway patency: patent Anesthetic complications: no 
Cardiovascular status: acceptable Respiratory status: acceptable Hydration status: acceptable Post anesthesia nausea and vomiting:  none Vitals Value Taken Time /70 5/15/2019  8:52 AM  
Temp 37 °C (98.6 °F) 5/15/2019  8:32 AM  
Pulse 61 5/15/2019  8:54 AM  
Resp 16 5/15/2019  8:52 AM  
SpO2 97 % 5/15/2019  8:54 AM  
Vitals shown include unvalidated device data.

## 2019-05-16 ENCOUNTER — HOSPITAL ENCOUNTER (OUTPATIENT)
Dept: INFUSION THERAPY | Age: 49
Discharge: HOME | End: 2019-05-16
Payer: MEDICARE

## 2019-05-16 VITALS
OXYGEN SATURATION: 96 % | RESPIRATION RATE: 18 BRPM | HEART RATE: 103 BPM | DIASTOLIC BLOOD PRESSURE: 61 MMHG | SYSTOLIC BLOOD PRESSURE: 129 MMHG | TEMPERATURE: 98.5 F

## 2019-05-16 LAB — MAGNESIUM SERPL-MCNC: 1.9 MG/DL (ref 1.8–2.4)

## 2019-05-16 PROCEDURE — 96374 THER/PROPH/DIAG INJ IV PUSH: CPT

## 2019-05-16 PROCEDURE — 74011000250 HC RX REV CODE- 250: Performed by: INTERNAL MEDICINE

## 2019-05-16 PROCEDURE — 83735 ASSAY OF MAGNESIUM: CPT

## 2019-05-16 PROCEDURE — 74011250636 HC RX REV CODE- 250/636: Performed by: INTERNAL MEDICINE

## 2019-05-16 RX ORDER — SODIUM CHLORIDE 0.9 % (FLUSH) 0.9 %
10 SYRINGE (ML) INJECTION EVERY 8 HOURS
Status: DISCONTINUED | OUTPATIENT
Start: 2019-05-16 | End: 2019-05-20 | Stop reason: HOSPADM

## 2019-05-16 RX ADMIN — PROMETHAZINE HYDROCHLORIDE 25 MG: 25 INJECTION INTRAMUSCULAR; INTRAVENOUS at 07:41

## 2019-05-16 RX ADMIN — Medication 10 ML: at 07:46

## 2019-05-16 NOTE — PROGRESS NOTES
Arrived to the [facility] ambulatory. Lab draw, port accessed, promethazine given for C/O nausea completed. Patient tolerated well. Any issues or concerns during appointment: no. 
Patient aware of next infusion appointment on 5/23 at 0715. Discharged to home ambulatory.

## 2019-05-23 ENCOUNTER — HOSPITAL ENCOUNTER (OUTPATIENT)
Dept: INFUSION THERAPY | Age: 49
Discharge: HOME | End: 2019-05-23
Payer: MEDICARE

## 2019-05-23 VITALS
RESPIRATION RATE: 18 BRPM | DIASTOLIC BLOOD PRESSURE: 79 MMHG | TEMPERATURE: 98.2 F | SYSTOLIC BLOOD PRESSURE: 122 MMHG | HEART RATE: 112 BPM | OXYGEN SATURATION: 91 %

## 2019-05-23 LAB — MAGNESIUM SERPL-MCNC: 1.7 MG/DL (ref 1.8–2.4)

## 2019-05-23 PROCEDURE — 96375 TX/PRO/DX INJ NEW DRUG ADDON: CPT

## 2019-05-23 PROCEDURE — 96365 THER/PROPH/DIAG IV INF INIT: CPT

## 2019-05-23 PROCEDURE — 74011000250 HC RX REV CODE- 250: Performed by: INTERNAL MEDICINE

## 2019-05-23 PROCEDURE — 74011250636 HC RX REV CODE- 250/636: Performed by: INTERNAL MEDICINE

## 2019-05-23 PROCEDURE — 83735 ASSAY OF MAGNESIUM: CPT

## 2019-05-23 RX ORDER — SODIUM CHLORIDE 0.9 % (FLUSH) 0.9 %
10 SYRINGE (ML) INJECTION EVERY 8 HOURS
Status: DISCONTINUED | OUTPATIENT
Start: 2019-05-23 | End: 2019-05-27 | Stop reason: HOSPADM

## 2019-05-23 RX ORDER — MAGNESIUM SULFATE 1 G/100ML
1 INJECTION INTRAVENOUS ONCE
Status: COMPLETED | OUTPATIENT
Start: 2019-05-23 | End: 2019-05-23

## 2019-05-23 RX ADMIN — MAGNESIUM SULFATE HEPTAHYDRATE 1 G: 1 INJECTION, SOLUTION INTRAVENOUS at 08:02

## 2019-05-23 RX ADMIN — Medication 10 ML: at 09:03

## 2019-05-23 RX ADMIN — PROMETHAZINE HYDROCHLORIDE 25 MG: 25 INJECTION INTRAMUSCULAR; INTRAVENOUS at 07:25

## 2019-05-23 NOTE — PROGRESS NOTES
Arrived to the [facility]. One gram Magnesium infusion per order completed. Patient tolerated well. Any issues or concerns during appointment: none. Patient aware of next infusion appointment on 05.30.2019 (date) at 0900 (time). Discharged ambulatory to home.

## 2019-05-30 ENCOUNTER — ANESTHESIA EVENT (OUTPATIENT)
Dept: ENDOSCOPY | Age: 49
End: 2019-05-30
Payer: MEDICARE

## 2019-05-30 ENCOUNTER — HOSPITAL ENCOUNTER (OUTPATIENT)
Dept: INFUSION THERAPY | Age: 49
Discharge: HOME | End: 2019-05-30
Payer: MEDICARE

## 2019-05-30 VITALS
OXYGEN SATURATION: 97 % | RESPIRATION RATE: 18 BRPM | SYSTOLIC BLOOD PRESSURE: 112 MMHG | HEART RATE: 91 BPM | TEMPERATURE: 97.9 F | DIASTOLIC BLOOD PRESSURE: 68 MMHG

## 2019-05-30 LAB — MAGNESIUM SERPL-MCNC: 2.1 MG/DL (ref 1.8–2.4)

## 2019-05-30 PROCEDURE — 74011250636 HC RX REV CODE- 250/636: Performed by: INTERNAL MEDICINE

## 2019-05-30 PROCEDURE — 96374 THER/PROPH/DIAG INJ IV PUSH: CPT

## 2019-05-30 PROCEDURE — 83735 ASSAY OF MAGNESIUM: CPT

## 2019-05-30 PROCEDURE — 74011000250 HC RX REV CODE- 250: Performed by: INTERNAL MEDICINE

## 2019-05-30 RX ORDER — PROMETHAZINE HYDROCHLORIDE 25 MG/ML
INJECTION, SOLUTION INTRAMUSCULAR; INTRAVENOUS
Status: ACTIVE
Start: 2019-05-30 | End: 2019-05-30

## 2019-05-30 RX ORDER — SODIUM CHLORIDE 0.9 % (FLUSH) 0.9 %
10 SYRINGE (ML) INJECTION AS NEEDED
Status: ACTIVE | OUTPATIENT
Start: 2019-05-30 | End: 2019-05-30

## 2019-05-30 RX ADMIN — Medication 10 ML: at 09:49

## 2019-05-30 RX ADMIN — PROMETHAZINE HYDROCHLORIDE 25 MG: 25 INJECTION INTRAMUSCULAR; INTRAVENOUS at 09:49

## 2019-05-30 RX ADMIN — Medication 10 ML: at 10:00

## 2019-05-30 NOTE — H&P
History and Physical for Outpatient Procedure             Date: 5/30/2019     History of Present Illness:  Patient has history of dysphagia and presents for EGD with possible esophageal dilation.       Past Medical History:   Diagnosis Date    Anemia     denies hx of blood transfusions-- Fe infusions 12/2017    Anxiety and depression     Asthma     allergy induced, denies any inhaler, patient denies    BMI 32.0-32.9,adult     BMI 32.2    C. difficile diarrhea 2013    in 3854 after complicated ERCP    Cervical dysplasia 1990s    Chronic insomnia     Chronic pain     all over     Chronic pancreatitis ([facility] [address]     Encounter for insertion of venous access port     Left chest port    Endometriosis     Esophageal stricture 2017    Fibromyalgia     Former cigarette smoker     GERD (gastroesophageal reflux disease)     daily meds---po and IV protonix- uses per port- alternates    HLD (hyperlipidemia)     pt denies     IBS (irritable bowel syndrome)     Migraine headache     Nausea & vomiting     pt reports she does better with phenergan than zofran - causes HA    Nonalcoholic fatty liver disease     PUD (peptic ulcer disease) 06/2017    Hx of     Sphincter of Oddi dysfunction     Type 2 diabetes mellitus ([facility] [address]     insulin reliant/-160/ s.s of hypoglycemia at 90's, Last A1C 9.7 on 01/2019     Past Surgical History:   Procedure Laterality Date    ABDOMEN SURGERY PROC UNLISTED  last one placed  2012    Hx of pancreatic stent, multiple    ABDOMEN SURGERY PROC UNLISTED  1997    lap nissen    ABDOMEN SURGERY [address] UNLISTED  4/13    explor lap    COLONOSCOPY N/A 4/4/2018    COLONOSCOPY performed by Destiny Vazquez MD at [facility] ENDOSCOPY    HX [address] Right     along with trigger finger    HX COLONOSCOPY      HX ENDOSCOPY  2017    36 fr thomas    HX ERCP  3/5/2013    resulted in perforated duodenum    HX HYSTERECTOMY  1998    ovaries remain    HX LAP CHOLECYSTECTOMY  1997    HX LAPAROTOMY 3/5/2013    exploratory for duodenal perforation with ERCP    HX ORTHOPAEDIC      right finger surgery 2017    [facility]X [facility]OLOGICAL  13 at [facility]-- stent removed 13. Dr Jamshid Riggs      port insertion, left chest wall     In and  Family History   Problem Relation Age of Onset    Diabetes Mother     Hypertension Mother     Heart Disease Mother         cabg    Diabetes Father     Heart Disease Father     Hypertension Father     Other Father     No Known Problems Sister      Social History     Tobacco Use    Smoking status: Former Smoker     Packs/day: 1.00     Years: 3.00     Pack years: 3.00     Last attempt to quit: 1993     Years since quittin.1    Smokeless tobacco: Never Used   Substance Use Topics    Alcohol use: No        Allergies   Allergen Reactions    Tape [Adhesive] Rash and Itching    Barium Iodide Nausea and Vomiting    Flagyl [Metronidazole] Nausea and Vomiting    Metformin Nausea and Vomiting     Stomach pain    Insulins Nausea and Vomiting     Humalog only     No current facility-administered medications for this encounter. Current Outpatient Medications   Medication Sig    zolpidem (AMBIEN) 10 mg tablet Take 10 mg by mouth nightly.  promethazine (PHENERGAN) 25 mg tablet Take 25 mg by mouth every six (6) hours as needed for Nausea.  morphine IR (MS IR) 30 mg tablet Take 30 mg by mouth every eight (8) hours as needed for Pain.  nitroglycerin (NITROSTAT) 0.3 mg SL tablet Take 0.4 mg by mouth as needed (\"pt states taking for cramping\").  atorvastatin (LIPITOR) 10 mg tablet Take 10 mg by mouth nightly.  citalopram (CELEXA) 20 mg tablet Take 20 mg by mouth daily. Indications: am    acetaminophen (TYLENOL) 325 mg tablet Take 325 mg by mouth every four (4) hours as needed for Pain.  LORazepam (ATIVAN) 1 mg tablet Take 1 mg by mouth as needed for Anxiety.     cyanocobalamin (VITAMIN B12) 1,000 mcg/mL injection INJECT 1 VIAL INTRAMUSCULARLY FOR 4 WEEKS THEN MONTHLY    glucagon (GLUCAGEN) 1 mg injection 1 mg by IntraMUSCular route as needed.  CARAFATE 100 mg/mL suspension TAKE 10 ML BY MOUTH 4 TIMES A DAY EVERY DAY ON A EMPTY STOMACH 1 HR BEFORE MEALS AND AT BEDTIME    gabapentin (NEURONTIN) 250 mg/5 mL solution Take 250 mg by mouth three (3) times daily.  diphenhydrAMINE (BENADRYL) 25 mg capsule Take 25 mg by mouth every six (6) hours as needed.  pantoprazole (PROTONIX) 40 mg injection 40 mg by IntraVENous route every morning. Take day of surgery per anesthesia protocol.  insulin degludec (TRESIBA FLEXTOUCH U-100) 100 unit/mL (3 mL) inpn 44 Units by SubCUTAneous route nightly.  polyethylene glycol (MIRALAX) 17 gram packet Take 17 g by mouth as needed.  hyoscyamine SL (LEVSIN/SL) 0.125 mg SL tablet 0.125 mg by SubLINGual route every six (6) hours as needed for Cramping.  0.9% sodium chloride solution by IntraVENous route as needed. Gives to herself via port at home daily -1000cc    insulin aspart (NOVOLOG) 100 unit/mL injection Use as directed (Patient taking differently: 7 Units by SubCUTAneous route Before breakfast, lunch, and dinner. Over 200 use sliding scale-  Always gets 7 units then if >200 gets extra - does ot know amount- has never taken more than 10 units regular)    promethazine (PHENERGAN) 25 mg suppository Insert 25 mg into rectum as needed for Nausea.  ondansetron hcl (ZOFRAN) 8 mg tablet Take 8 mg by mouth every eight (8) hours as needed for Nausea. Facility-Administered Medications Ordered in Other Encounters   Medication Dose Route Frequency    saline peripheral flush soln 10 mL  10 mL InterCATHeter PRN    promethazine (PHENERGAN) 25 mg/mL injection            Review of Systems:  A detailed 10 organ review of systems is obtained with pertinent positives as listed in the History of Present Illness. All others are negative.     Objective:     Physical Exam:  There were no vitals taken for this visit. General:  Alert and oriented. Heart: Regular rate and rhythm  Lungs:  Clear to auscultation bilaterally  Abdomen: Soft, nontender, nondistended    Impression/Plan:     Proceed with EGD with possible dilation as planned. I have discussed with the patient the technique, benefits, alternatives, and risks of these procedures, including medication reaction, immediate or delayed bleeding, or perforation of the gastrointestinal tract.      Signed By: Mariangel Courtney MD     May 30, 2019

## 2019-05-30 NOTE — PROGRESS NOTES
Arrived to the [facility]. Labs, IV Phenergan, and port needle change completed. Patient tolerated well. Any issues or concerns during appointment: none. Patient aware of next infusion appointment on 6/6 (date) at 7:15 AM (time). Discharged ambulatory.

## 2019-05-31 ENCOUNTER — HOSPITAL ENCOUNTER (OUTPATIENT)
Age: 49
Setting detail: OUTPATIENT SURGERY
Discharge: HOME | End: 2019-05-31
Attending: INTERNAL MEDICINE | Admitting: INTERNAL MEDICINE
Payer: MEDICARE

## 2019-05-31 ENCOUNTER — ANESTHESIA (OUTPATIENT)
Dept: ENDOSCOPY | Age: 49
End: 2019-05-31
Payer: MEDICARE

## 2019-05-31 VITALS
HEIGHT: 62 IN | RESPIRATION RATE: 14 BRPM | BODY MASS INDEX: 32.2 KG/M2 | TEMPERATURE: 98.6 F | WEIGHT: 175 LBS | SYSTOLIC BLOOD PRESSURE: 111 MMHG | OXYGEN SATURATION: 93 % | HEART RATE: 84 BPM | DIASTOLIC BLOOD PRESSURE: 73 MMHG

## 2019-05-31 LAB — GLUCOSE BLD STRIP.AUTO-MCNC: 183 MG/DL (ref 65–100)

## 2019-05-31 PROCEDURE — 74011250636 HC RX REV CODE- 250/636: Performed by: ANESTHESIOLOGY

## 2019-05-31 PROCEDURE — 76040000025: Performed by: INTERNAL MEDICINE

## 2019-05-31 PROCEDURE — 76060000031 HC ANESTHESIA FIRST 0.5 HR: Performed by: INTERNAL MEDICINE

## 2019-05-31 PROCEDURE — 74011250636 HC RX REV CODE- 250/636

## 2019-05-31 PROCEDURE — 74011000250 HC RX REV CODE- 250: Performed by: ANESTHESIOLOGY

## 2019-05-31 PROCEDURE — 82962 GLUCOSE BLOOD TEST: CPT

## 2019-05-31 RX ORDER — HALOPERIDOL 5 MG/ML
1 INJECTION INTRAMUSCULAR
Status: COMPLETED | OUTPATIENT
Start: 2019-05-31 | End: 2019-05-31

## 2019-05-31 RX ORDER — PROPOFOL 10 MG/ML
INJECTION, EMULSION INTRAVENOUS
Status: DISCONTINUED | OUTPATIENT
Start: 2019-05-31 | End: 2019-05-31 | Stop reason: HOSPADM

## 2019-05-31 RX ORDER — HALOPERIDOL 5 MG/ML
1 INJECTION INTRAMUSCULAR
Status: DISCONTINUED | OUTPATIENT
Start: 2019-05-31 | End: 2019-05-31

## 2019-05-31 RX ORDER — HYDROMORPHONE HYDROCHLORIDE 2 MG/ML
0.5 INJECTION, SOLUTION INTRAMUSCULAR; INTRAVENOUS; SUBCUTANEOUS ONCE
Status: COMPLETED | OUTPATIENT
Start: 2019-05-31 | End: 2019-05-31

## 2019-05-31 RX ORDER — PROPOFOL 10 MG/ML
INJECTION, EMULSION INTRAVENOUS AS NEEDED
Status: DISCONTINUED | OUTPATIENT
Start: 2019-05-31 | End: 2019-05-31 | Stop reason: HOSPADM

## 2019-05-31 RX ORDER — SODIUM CHLORIDE, SODIUM LACTATE, POTASSIUM CHLORIDE, CALCIUM CHLORIDE 600; 310; 30; 20 MG/100ML; MG/100ML; MG/100ML; MG/100ML
100 INJECTION, SOLUTION INTRAVENOUS CONTINUOUS
Status: DISCONTINUED | OUTPATIENT
Start: 2019-05-31 | End: 2019-05-31 | Stop reason: HOSPADM

## 2019-05-31 RX ORDER — HYDROMORPHONE HYDROCHLORIDE 2 MG/ML
INJECTION, SOLUTION INTRAMUSCULAR; INTRAVENOUS; SUBCUTANEOUS
Status: COMPLETED
Start: 2019-05-31 | End: 2019-05-31

## 2019-05-31 RX ADMIN — PROPOFOL 40 MG: 10 INJECTION, EMULSION INTRAVENOUS at 10:09

## 2019-05-31 RX ADMIN — PROPOFOL 60 MG: 10 INJECTION, EMULSION INTRAVENOUS at 10:04

## 2019-05-31 RX ADMIN — PROMETHAZINE HYDROCHLORIDE 6.25 MG: 25 INJECTION INTRAMUSCULAR; INTRAVENOUS at 09:47

## 2019-05-31 RX ADMIN — HYDROMORPHONE HYDROCHLORIDE 0.5 MG: 2 INJECTION, SOLUTION INTRAMUSCULAR; INTRAVENOUS; SUBCUTANEOUS at 11:00

## 2019-05-31 RX ADMIN — SODIUM CHLORIDE, SODIUM LACTATE, POTASSIUM CHLORIDE, AND CALCIUM CHLORIDE 100 ML/HR: 600; 310; 30; 20 INJECTION, SOLUTION INTRAVENOUS at 09:58

## 2019-05-31 RX ADMIN — HALOPERIDOL LACTATE 1 MG: 5 INJECTION, SOLUTION INTRAMUSCULAR at 10:42

## 2019-05-31 RX ADMIN — HYDROMORPHONE HYDROCHLORIDE 0.5 MG: 2 INJECTION INTRAMUSCULAR; INTRAVENOUS; SUBCUTANEOUS at 11:00

## 2019-05-31 RX ADMIN — PROPOFOL 120 MCG/KG/MIN: 10 INJECTION, EMULSION INTRAVENOUS at 10:04

## 2019-05-31 NOTE — ANESTHESIA POSTPROCEDURE EVALUATION
Procedure(s):  ESOPHAGOGASTRODUODENOSCOPY (EGD)  ESOPHAGEAL DILATION. total IV anesthesia    Anesthesia Post Evaluation      Multimodal analgesia: multimodal analgesia not used between 6 hours prior to anesthesia start to PACU discharge  Patient location during evaluation: bedside  Patient participation: complete - patient participated  Level of consciousness: awake  Pain score: 1  Pain management: adequate  Airway patency: patent  Anesthetic complications: no  Cardiovascular status: acceptable  Respiratory status: acceptable  Hydration status: acceptable  Comments: Pt doing well. Post anesthesia nausea and vomiting:  none      Vitals Value Taken Time   /73 5/31/2019 11:13 AM   Temp 37 °C (98.6 °F) 5/31/2019 10:18 AM   Pulse 82 5/31/2019 11:21 AM   Resp 14 5/31/2019 11:13 AM   SpO2 92 % 5/31/2019 11:21 AM   Vitals shown include unvalidated device data.

## 2019-05-31 NOTE — ANESTHESIA PREPROCEDURE EVALUATION
Anesthetic History     PONV          Review of Systems / Medical History  Patient summary reviewed and pertinent labs reviewed    Pulmonary            Asthma : well controlled  Pertinent negatives: No smoker (Former, quit 25 years)     Neuro/Psych         Psychiatric history     Cardiovascular    Hypertension: well controlled              Exercise tolerance: >4 METS  Comments: Denies CV history   GI/Hepatic/Renal     GERD: well controlled      Liver disease (fatty liver)    Comments: Esophageal stricture. Chronic Pancreatitis. Endo/Other    Diabetes (recently a bit better control, but still feels hypoglycemic in around 100): poorly controlled, type 2, using insulin    Obesity     Other Findings   Comments: Fibromyalgia  Chronic Pain  On Fentanyl Patch           Physical Exam    Airway  Mallampati: II  TM Distance: 4 - 6 cm  Neck ROM: decreased range of motion, short neck   Mouth opening: Normal     Cardiovascular  Regular rate and rhythm,  S1 and S2 normal,  no murmur, click, rub, or gallop  Rhythm: regular  Rate: normal         Dental  No notable dental hx       Pulmonary  Breath sounds clear to auscultation               Abdominal         Other Findings            Anesthetic Plan    ASA: 3  Anesthesia type: total IV anesthesia          Induction: Intravenous  Anesthetic plan and risks discussed with: Patient      She has this procedure every 4-8 weeks. She denies interval change to her medical condition except for the esophageal stricture. She does well with propofol sedations.

## 2019-05-31 NOTE — PROGRESS NOTES
Pt dry heaving and c/o severe nausea. Haldol 1mg IV ordered by Dr. Kristyn Hutton. Administered at this time.

## 2019-05-31 NOTE — OP NOTES
Gastroenterology Procedure Note           Procedure:  Esophagogastroduodenoscopy    Date of Procedure:  5/31/2019    Patient:  Dorian Juarez     1970    Indication:  Esophageal stricture, dysphagia    Sedation:  MAC    Pre-Procedure Physical Exam:    Mental status:  alert and oriented  Airway:  normal oropharyngeal airway and neck mobility  CV:  regular rate and rhythm  Respiratory:  clear to auscultation    Procedure:  A History and Physical has been performed, and patient medication allergies have been reviewed. Risks of perforation, hemorrhage, adverse drug reaction, and aspiration were discussed. Informed consent was obtained for the procedure, including sedation. The patient was placed in the left lateral decubitus position. The heart rate, oxygen saturations, blood pressure, and response to care were monitored throughout the procedure. The gastroscope was passed through the mouth and advanced under direct vision to the second portion of the duodenum. A careful inspection was made as the gastroscope was withdrawn, including a retroflexed view of the proximal stomach. The patient tolerated the procedure well. Findings:     OROPHARYNX: Cords and pyriform recesses appear normal.   ESOPHAGUS: A benign-appearing intrinsic stenosis is seen in the distal esophagus. Dilation was performed using 46 and 48 Fr Guido dilators. Successful dilation was confirmed by the presence of mucosal disruption. STOMACH: A large amount of retained food and vegetable debris was seen in the gastric body along the greater curvature. Evidence of prior distal gastrectomy. DUODENUM: The bulb and second portions are normal.    Specimen:  No    Estimated Blood Loss:  Minimal    Implant:  None           Impression:    Esophageal stricture. Dilated. Gastric bezoar. Plan:  1. Soft, low-residue diet. 2. Avoid NSAIDs for 2 days. 3. Continue Protonix 40 mg twice daily.    4. Repeat EGD in 2 weeks at [facility] for further dilation.      Signed:  Murtaza Dash MD  5/31/2019  9:38 AM

## 2019-05-31 NOTE — PROGRESS NOTES
Pt complains of abdominal pain, 7 out of 10. Dr. Pedro Pablo Mancia notified. Dilaudid 0.5 mg ordered at this time.

## 2019-05-31 NOTE — ROUTINE PROCESS
VSS. No complaints noted. Education given and reviewed with  who voiced understanding. Pt wheeled out via wheelchair by aubrey Crump.

## 2019-05-31 NOTE — DISCHARGE INSTRUCTIONS
Gastrointestinal Esophagogastroduodenoscopy (EGD) - Upper Exam Discharge Instructions    1. Call Dr. Georgina De at [phone] for any problems or questions. 2. Contact the doctor's office for follow up appointment as directed. 3. Medication may cause drowsiness for several hours, therefore:  · Do not drive or operate machinery for remainder of the day. · No alcohol today. · Do not make any important or legal decisions for 24 hours. · Do not sign any legal documents for 24 hours. 5. Ordinarily, you may resume regular diet and activity after exam unless otherwise specified by your physician. 6. For mild soreness in your throat you may use Cepacol throat lozenges or warm salt-water gargles as needed. Any additional instructions: soft diet today; avoid NSAIDs( advil, aleve, ibuprofen, etc.) for 2 days; repeat EGD in 2 weeks; continue acid reducing medication. Instructions given to Taya Zoran and other family members.

## 2019-05-31 NOTE — PROGRESS NOTES
Pt came to hospital today with port accessed. Pt states her port does not need to flushed or packed with heparin.

## 2019-06-06 ENCOUNTER — HOSPITAL ENCOUNTER (OUTPATIENT)
Dept: INFUSION THERAPY | Age: 49
End: 2019-06-06
Payer: MEDICARE

## 2019-06-11 ENCOUNTER — HOSPITAL ENCOUNTER (OUTPATIENT)
Age: 49
Setting detail: OUTPATIENT SURGERY
Discharge: HOME | End: 2019-06-11
Attending: INTERNAL MEDICINE | Admitting: INTERNAL MEDICINE
Payer: MEDICARE

## 2019-06-11 ENCOUNTER — ANESTHESIA (OUTPATIENT)
Dept: ENDOSCOPY | Age: 49
End: 2019-06-11
Payer: MEDICARE

## 2019-06-11 ENCOUNTER — ANESTHESIA EVENT (OUTPATIENT)
Dept: ENDOSCOPY | Age: 49
End: 2019-06-11
Payer: MEDICARE

## 2019-06-11 VITALS
WEIGHT: 175 LBS | OXYGEN SATURATION: 95 % | HEIGHT: 62 IN | DIASTOLIC BLOOD PRESSURE: 60 MMHG | BODY MASS INDEX: 32.2 KG/M2 | SYSTOLIC BLOOD PRESSURE: 94 MMHG | HEART RATE: 84 BPM | RESPIRATION RATE: 16 BRPM | TEMPERATURE: 97.9 F

## 2019-06-11 LAB — GLUCOSE BLD STRIP.AUTO-MCNC: 132 MG/DL (ref 65–100)

## 2019-06-11 PROCEDURE — 76060000031 HC ANESTHESIA FIRST 0.5 HR: Performed by: INTERNAL MEDICINE

## 2019-06-11 PROCEDURE — 74011250636 HC RX REV CODE- 250/636

## 2019-06-11 PROCEDURE — 76040000025: Performed by: INTERNAL MEDICINE

## 2019-06-11 PROCEDURE — 74011000250 HC RX REV CODE- 250

## 2019-06-11 PROCEDURE — 82962 GLUCOSE BLOOD TEST: CPT

## 2019-06-11 PROCEDURE — 74011250636 HC RX REV CODE- 250/636: Performed by: INTERNAL MEDICINE

## 2019-06-11 RX ORDER — LIDOCAINE HYDROCHLORIDE 20 MG/ML
INJECTION, SOLUTION EPIDURAL; INFILTRATION; INTRACAUDAL; PERINEURAL AS NEEDED
Status: DISCONTINUED | OUTPATIENT
Start: 2019-06-11 | End: 2019-06-11 | Stop reason: HOSPADM

## 2019-06-11 RX ORDER — PROPOFOL 10 MG/ML
INJECTION, EMULSION INTRAVENOUS
Status: DISCONTINUED | OUTPATIENT
Start: 2019-06-11 | End: 2019-06-11 | Stop reason: HOSPADM

## 2019-06-11 RX ORDER — SODIUM CHLORIDE, SODIUM LACTATE, POTASSIUM CHLORIDE, CALCIUM CHLORIDE 600; 310; 30; 20 MG/100ML; MG/100ML; MG/100ML; MG/100ML
1000 INJECTION, SOLUTION INTRAVENOUS CONTINUOUS
Status: DISCONTINUED | OUTPATIENT
Start: 2019-06-11 | End: 2019-06-11 | Stop reason: HOSPADM

## 2019-06-11 RX ORDER — PROPOFOL 10 MG/ML
INJECTION, EMULSION INTRAVENOUS AS NEEDED
Status: DISCONTINUED | OUTPATIENT
Start: 2019-06-11 | End: 2019-06-11 | Stop reason: HOSPADM

## 2019-06-11 RX ADMIN — PROPOFOL 160 MCG/KG/MIN: 10 INJECTION, EMULSION INTRAVENOUS at 10:56

## 2019-06-11 RX ADMIN — LIDOCAINE HYDROCHLORIDE 40 MG: 20 INJECTION, SOLUTION EPIDURAL; INFILTRATION; INTRACAUDAL; PERINEURAL at 10:56

## 2019-06-11 RX ADMIN — PROPOFOL 50 MG: 10 INJECTION, EMULSION INTRAVENOUS at 10:56

## 2019-06-11 RX ADMIN — SODIUM CHLORIDE, SODIUM LACTATE, POTASSIUM CHLORIDE, AND CALCIUM CHLORIDE 1000 ML: 600; 310; 30; 20 INJECTION, SOLUTION INTRAVENOUS at 10:20

## 2019-06-11 NOTE — ANESTHESIA POSTPROCEDURE EVALUATION
Procedure(s):  ESOPHAGOGASTRODUODENOSCOPY (EGD) / BMI 35  ESOPHAGEAL DILATION. total IV anesthesia    Anesthesia Post Evaluation      Multimodal analgesia: multimodal analgesia used between 6 hours prior to anesthesia start to PACU discharge  Patient location during evaluation: PACU  Patient participation: complete - patient participated  Level of consciousness: awake and awake and alert  Pain management: adequate  Airway patency: patent  Anesthetic complications: no  Cardiovascular status: acceptable  Respiratory status: acceptable  Hydration status: acceptable  Post anesthesia nausea and vomiting:  controlled      Vitals Value Taken Time   BP 94/60 6/11/2019 11:42 AM   Temp     Pulse 79 6/11/2019 11:43 AM   Resp 16 6/11/2019 11:42 AM   SpO2 96 % 6/11/2019 11:43 AM   Vitals shown include unvalidated device data.

## 2019-06-11 NOTE — DISCHARGE INSTRUCTIONS
Gastrointestinal Esophagogastroduodenoscopy (EGD) - Upper Exam Discharge Instructions    1. Call Dr. Mikhail Donaldson at [phone] for any problems or questions. 2. Contact the doctor's office for follow up appointment as directed. 3. Medication may cause drowsiness for several hours, therefore:  · Do not drive or operate machinery for remainder of the day. · No alcohol today. · Do not make any important or legal decisions for 24 hours. · Do not sign any legal documents for 24 hours. 5. Ordinarily, you may resume regular diet and activity after exam unless otherwise specified by your physician. 6. For mild soreness in your throat you may use Cepacol throat lozenges or warm salt-water gargles as needed. Any additional instructions: follow up with Dr. Meryle Bolder    Instructions given to Antonella Gonsalez and other family members.

## 2019-06-11 NOTE — PROGRESS NOTES
Port to left upper chest accessed prior to coming to GI lab today. Pt states port does not need to have hep lock flush prior to discharge. She will infuse IV fluids when she gets home.

## 2019-06-11 NOTE — PROCEDURES
EGD Procedure Note    PreOp Diagnosis:   Dysphagia   Nausea vomiting     PostOp Diagnosis:  Esophageal dyskinesia   Post-op changes in the stomach   Retained food-possible gastroparesis     Medications:  Monitored Anesthesia    Procedure:  EGD   Instrument:   After informed consent was obtained, the patient was sedated and the endoscope was inserted  in the mouth and advanced into the Small bowel without difficulty. The scope was slowly withdrawn while the mucosa was carefully inspected, including a retroflexed view of the cardia and fundus. Findings:   Esophagus: The proximal and mid esophagus appeared normal.  In the distal esophagus, The Z line was normal. No discrete stricture. Empiric Savory esophageal dilation was performed over a guidewire in standard fashion with a 45, 48, 51 Arabic dilator. There was no evidence of complications. No resistance. Reinspection: No mucosal disruption. Stomach: Mucosa normal, without ulcers, erosions, or polyps. Gastroenteric anastomosis widely patent. Large amount of retained food in the fundus and at the anastomosis. Small bowel :   Normal Mucosa. Retained food. No definite stricture identified. Recommendations:   Follow with Dr. Dolly Sullivan as planned   Consider esophageal manometry gastric emptying study For other causes nausea and vomiting   Patient instructed to minimize narcotics     Treasure Trivedi MD

## 2019-06-11 NOTE — H&P
Gastroenterology Outpatient History and Physical    Patient: Hiro Baptiste    Physician: Ketan Lugo MD    Chief Complaint: Dysphagia    History of Present Illness: Esoph stricture    Justification for Procedure: As above    History:  Past Medical History:   Diagnosis Date    Anemia     denies hx of blood transfusions-- Fe infusions 12/2017    Anxiety and depression     Asthma     allergy induced, denies any inhaler, patient denies    BMI 32.0-32.9,adult     BMI 32.2    C. difficile diarrhea 2013    in 4196 after complicated ERCP    Cervical dysplasia 1990s    Chronic insomnia     Chronic pain     all over     Chronic pancreatitis ([facility]     Encounter for insertion of venous access port     Left chest port    Endometriosis     Esophageal stricture 2017    Fibromyalgia     Former cigarette smoker     GERD (gastroesophageal reflux disease)     daily meds---po and IV protonix- uses per port- alternates    HLD (hyperlipidemia)     pt denies     IBS (irritable bowel syndrome)     Migraine headache     Nausea & vomiting     pt reports she does better with phenergan than zofran - causes HA    Nonalcoholic fatty liver disease     PUD (peptic ulcer disease) 06/2017    Hx of     Sphincter of Oddi dysfunction     Type 2 diabetes mellitus ([facility]     insulin reliant/-160/ s.s of hypoglycemia at 80's, Last A1C 9.7 on 01/2019      Past Surgical History:   Procedure Laterality Date    ABDOMEN SURGERY PROC UNLISTED  last one placed  2012    Hx of pancreatic stent, multiple    ABDOMEN SURGERY PROC UNLISTED  1997    lap nissen    ABDOMEN SURGERY PROC UNLISTED  4/13    explor lap    COLONOSCOPY N/A 4/4/2018    COLONOSCOPY performed by [facility]a Jaime [facility] at [facility] ENDOSCOPY    HX [address] Right     along with trigger finger    HX COLONOSCOPY      HX ENDOSCOPY  2017    [address]    HX ERCP  3/5/2013    resulted in perforated duodenum    HX HYSTERECTOMY  1998    ovaries remain  HX LAP CHOLECYSTECTOMY      HX LAPAROTOMY  3/5/2013    exploratory for duodenal perforation with ERCP    HX ORTHOPAEDIC      right finger surgery 2017    HX UROLOGICAL  13 at [facility]-- stent removed 13. Dr Genesis Mancuso      port insertion, left chest wall      Social History     Socioeconomic History    Marital status:      Spouse name: Not on file    Number of children: Not on file    Years of education: Not on file    Highest education level: Not on file   Tobacco Use    Smoking status: Former Smoker     Packs/day: 1.00     Years: 3.00     Pack years: 3.00     Last attempt to quit: 1993     Years since quittin.1    Smokeless tobacco: Never Used   Substance and Sexual Activity    Alcohol use: No    Drug use: No      Family History   Problem Relation Age of Onset    Diabetes Mother     Hypertension Mother     Heart Disease Mother         cabg    Diabetes Father     Heart Disease Father     Hypertension Father     Other Father     No Known Problems Sister        Allergies: Allergies   Allergen Reactions    Tape [Adhesive] Rash and Itching    Barium Iodide Nausea and Vomiting    Flagyl [Metronidazole] Nausea and Vomiting    Metformin Nausea and Vomiting     Stomach pain    Insulins Nausea and Vomiting     Humalog only       Medications:   Prior to Admission medications    Medication Sig Start Date End Date Taking? Authorizing Provider   zolpidem (AMBIEN) 10 mg tablet Take 10 mg by mouth nightly. Yes Provider, Historical   promethazine (PHENERGAN) 25 mg tablet Take 25 mg by mouth every six (6) hours as needed for Nausea. Yes Provider, Historical   morphine IR (MS IR) 30 mg tablet Take 30 mg by mouth every eight (8) hours as needed for Pain. Yes Provider, Historical   nitroglycerin (NITROSTAT) 0.3 mg SL tablet Take 0.4 mg by mouth as needed (\"pt states taking for cramping\").    Yes Provider, Historical   atorvastatin (LIPITOR) 10 mg tablet Take 10 mg by mouth nightly. Yes Provider, Historical   citalopram (CELEXA) 20 mg tablet Take 20 mg by mouth daily. Indications: am   Yes Provider, Historical   acetaminophen (TYLENOL) 325 mg tablet Take 325 mg by mouth every four (4) hours as needed for Pain. Yes Provider, Historical   LORazepam (ATIVAN) 1 mg tablet Take 1 mg by mouth as needed for Anxiety. Yes Provider, Historical   cyanocobalamin (VITAMIN B12) 1,000 mcg/mL injection INJECT 1 VIAL INTRAMUSCULARLY FOR 4 WEEKS THEN MONTHLY 11/25/17  Yes Provider, Historical   CARAFATE 100 mg/mL suspension TAKE 10 ML BY MOUTH 4 TIMES A DAY EVERY DAY ON A EMPTY STOMACH 1 HR BEFORE MEALS AND AT BEDTIME 9/7/17  Yes Provider, Historical   diphenhydrAMINE (BENADRYL) 25 mg capsule Take 25 mg by mouth every six (6) hours as needed. Yes Provider, Historical   pantoprazole (PROTONIX) 40 mg injection 40 mg by IntraVENous route every morning. Take day of surgery per anesthesia protocol. Yes Provider, Historical   insulin degludec (TRESIBA FLEXTOUCH U-100) 100 unit/mL (3 mL) inpn 44 Units by SubCUTAneous route nightly. Yes Provider, Historical   polyethylene glycol (MIRALAX) 17 gram packet Take 17 g by mouth as needed. Yes Provider, Historical   hyoscyamine SL (LEVSIN/SL) 0.125 mg SL tablet 0.125 mg by SubLINGual route every six (6) hours as needed for Cramping. Yes Provider, Historical   0.9% sodium chloride solution by IntraVENous route as needed. Gives to herself via port at home daily -1000cc   Yes Provider, Historical   insulin aspart (NOVOLOG) 100 unit/mL injection Use as directed  Patient taking differently: 7 Units by SubCUTAneous route Before breakfast, lunch, and dinner.  Over 200 use sliding scale-  Always gets 7 units then if >200 gets extra - does ot know amount- has never taken more than 10 units regular 6/7/16  Yes James Stark NP   promethazine (PHENERGAN) 25 mg suppository Insert 25 mg into rectum as needed for Nausea. Yes Provider, Historical   ondansetron hcl (ZOFRAN) 8 mg tablet Take 8 mg by mouth every eight (8) hours as needed for Nausea. Yes Provider, Historical   glucagon (GLUCAGEN) 1 mg injection 1 mg by IntraMUSCular route as needed. 5/8/17   Provider, Historical   gabapentin (NEURONTIN) 250 mg/5 mL solution Take 250 mg by mouth three (3) times daily. Provider, Historical       Vital Signs: Blood pressure 125/79, pulse 92, temperature 98.8 °F (37.1 °C), resp. rate 16, height 5' 2\" (1.575 m), weight 79.4 kg (175 lb), SpO2 96 %, not currently breastfeeding.     Physical Exam:   General: alert      Heart: regular rate and rhythm   Lungs: no tachypnea, retractions or cyanosis, Heart exam - S1, S2 normal, no murmur, no gallop, rate regular   Abdominal: Bowel sounds are normal, soft, non distended             Plan of Care/Planned Procedure: EGD    Signed:  Sheila Hayes MD 6/11/2019

## 2019-06-11 NOTE — ROUTINE PROCESS
VSS. No complaints noted. Education given and reviewed with  who voiced understanding. Pt wheeled out via wheelchair by April, svenub tech.

## 2019-06-13 ENCOUNTER — HOSPITAL ENCOUNTER (OUTPATIENT)
Dept: INFUSION THERAPY | Age: 49
Discharge: HOME | End: 2019-06-13
Payer: MEDICARE

## 2019-06-13 VITALS
RESPIRATION RATE: 18 BRPM | TEMPERATURE: 98.4 F | HEART RATE: 110 BPM | DIASTOLIC BLOOD PRESSURE: 88 MMHG | OXYGEN SATURATION: 97 % | SYSTOLIC BLOOD PRESSURE: 133 MMHG

## 2019-06-13 LAB — MAGNESIUM SERPL-MCNC: 1.6 MG/DL (ref 1.8–2.4)

## 2019-06-13 PROCEDURE — 96365 THER/PROPH/DIAG IV INF INIT: CPT

## 2019-06-13 PROCEDURE — 74011000250 HC RX REV CODE- 250: Performed by: INTERNAL MEDICINE

## 2019-06-13 PROCEDURE — 96375 TX/PRO/DX INJ NEW DRUG ADDON: CPT

## 2019-06-13 PROCEDURE — 74011250636 HC RX REV CODE- 250/636: Performed by: INTERNAL MEDICINE

## 2019-06-13 PROCEDURE — 83735 ASSAY OF MAGNESIUM: CPT

## 2019-06-13 RX ORDER — MAGNESIUM SULFATE HEPTAHYDRATE 40 MG/ML
2 INJECTION, SOLUTION INTRAVENOUS ONCE
Status: DISCONTINUED | OUTPATIENT
Start: 2019-06-13 | End: 2019-06-13

## 2019-06-13 RX ORDER — MAGNESIUM SULFATE 1 G/100ML
1 INJECTION INTRAVENOUS ONCE
Status: COMPLETED | OUTPATIENT
Start: 2019-06-13 | End: 2019-06-13

## 2019-06-13 RX ORDER — HEPARIN 100 UNIT/ML
300-500 SYRINGE INTRAVENOUS ONCE
Status: COMPLETED | OUTPATIENT
Start: 2019-06-13 | End: 2019-06-13

## 2019-06-13 RX ORDER — SODIUM CHLORIDE 0.9 % (FLUSH) 0.9 %
10 SYRINGE (ML) INJECTION AS NEEDED
Status: ACTIVE | OUTPATIENT
Start: 2019-06-13 | End: 2019-06-13

## 2019-06-13 RX ADMIN — SODIUM CHLORIDE, PRESERVATIVE FREE 300 UNITS: 5 INJECTION INTRAVENOUS at 10:05

## 2019-06-13 RX ADMIN — MAGNESIUM SULFATE HEPTAHYDRATE 1 G: 1 INJECTION, SOLUTION INTRAVENOUS at 09:05

## 2019-06-13 RX ADMIN — Medication 10 ML: at 07:30

## 2019-06-13 RX ADMIN — Medication 10 ML: at 10:05

## 2019-06-13 RX ADMIN — PROMETHAZINE HYDROCHLORIDE 25 MG: 25 INJECTION INTRAMUSCULAR; INTRAVENOUS at 07:34

## 2019-06-13 NOTE — PROGRESS NOTES
Arrived to the [facility]. Phenergan/1g Mg IV completed. Patient tolerated well. Any issues or concerns during appointment: No blood return from port, Labs drawn peripherally. Mg 1.6, replaced per orders. Port packed with heparin prior to discharge. Patient aware of next infusion appointment on June 20th at [address] [place] [address]. Discharged ambulatory.

## 2019-06-20 ENCOUNTER — HOSPITAL ENCOUNTER (OUTPATIENT)
Dept: INFUSION THERAPY | Age: 49
Discharge: HOME | End: 2019-06-20
Payer: MEDICARE

## 2019-06-20 VITALS
DIASTOLIC BLOOD PRESSURE: 86 MMHG | SYSTOLIC BLOOD PRESSURE: 120 MMHG | TEMPERATURE: 99.3 F | RESPIRATION RATE: 18 BRPM | HEART RATE: 103 BPM | OXYGEN SATURATION: 96 %

## 2019-06-20 LAB — MAGNESIUM SERPL-MCNC: 1.8 MG/DL (ref 1.8–2.4)

## 2019-06-20 PROCEDURE — 96374 THER/PROPH/DIAG INJ IV PUSH: CPT

## 2019-06-20 PROCEDURE — 83735 ASSAY OF MAGNESIUM: CPT

## 2019-06-20 PROCEDURE — 74011250636 HC RX REV CODE- 250/636: Performed by: INTERNAL MEDICINE

## 2019-06-20 PROCEDURE — 74011000250 HC RX REV CODE- 250: Performed by: INTERNAL MEDICINE

## 2019-06-20 RX ORDER — SODIUM CHLORIDE 0.9 % (FLUSH) 0.9 %
10-40 SYRINGE (ML) INJECTION AS NEEDED
Status: DISCONTINUED | OUTPATIENT
Start: 2019-06-20 | End: 2019-06-24 | Stop reason: HOSPADM

## 2019-06-20 RX ADMIN — Medication 10 ML: at 07:27

## 2019-06-20 RX ADMIN — Medication 10 ML: at 07:38

## 2019-06-20 RX ADMIN — PROMETHAZINE HYDROCHLORIDE 25 MG: 25 INJECTION INTRAMUSCULAR; INTRAVENOUS at 07:27

## 2019-06-20 NOTE — PROGRESS NOTES
Arrived to the [facility]. Port de-accessed and then re-accessed per protocol. Phenergan 25mg IV given. Magnesium level resulted as 1.8 today. No replacements required. Patient aware of next scheduled appointment in infusion center on 6/27/19 at 7:15am. Discharged ambulatory.

## 2019-06-27 ENCOUNTER — HOSPITAL ENCOUNTER (OUTPATIENT)
Dept: INFUSION THERAPY | Age: 49
Discharge: HOME | End: 2019-06-27
Payer: MEDICARE

## 2019-06-27 VITALS
SYSTOLIC BLOOD PRESSURE: 114 MMHG | TEMPERATURE: 98.2 F | RESPIRATION RATE: 18 BRPM | BODY MASS INDEX: 31.02 KG/M2 | OXYGEN SATURATION: 96 % | HEART RATE: 91 BPM | WEIGHT: 169.6 LBS | DIASTOLIC BLOOD PRESSURE: 75 MMHG

## 2019-06-27 LAB — MAGNESIUM SERPL-MCNC: 1.8 MG/DL (ref 1.8–2.4)

## 2019-06-27 PROCEDURE — 83735 ASSAY OF MAGNESIUM: CPT

## 2019-06-27 PROCEDURE — 96374 THER/PROPH/DIAG INJ IV PUSH: CPT

## 2019-06-27 PROCEDURE — 96361 HYDRATE IV INFUSION ADD-ON: CPT

## 2019-06-27 PROCEDURE — 74011000250 HC RX REV CODE- 250: Performed by: INTERNAL MEDICINE

## 2019-06-27 PROCEDURE — 74011250636 HC RX REV CODE- 250/636: Performed by: INTERNAL MEDICINE

## 2019-06-27 RX ORDER — SODIUM CHLORIDE 0.9 % (FLUSH) 0.9 %
5-10 SYRINGE (ML) INJECTION AS NEEDED
Status: DISCONTINUED | OUTPATIENT
Start: 2019-06-27 | End: 2019-07-01 | Stop reason: HOSPADM

## 2019-06-27 RX ADMIN — SODIUM CHLORIDE 500 ML: 900 INJECTION, SOLUTION INTRAVENOUS at 07:51

## 2019-06-27 RX ADMIN — Medication 10 ML: at 07:35

## 2019-06-27 RX ADMIN — PROMETHAZINE HYDROCHLORIDE 25 MG: 25 INJECTION INTRAMUSCULAR; INTRAVENOUS at 07:41

## 2019-06-27 NOTE — PROGRESS NOTES
Pt arrived ambulatory to [facility] with port previously accessed with good blood return. NS infusing. Phenergan given IV as ordered. Pt aware of next appt on 7/5/19 at [facility]. Mag 1.8. No replacements needed. Port remains accessed. Pt discharged ambulatory.

## 2019-07-05 ENCOUNTER — HOSPITAL ENCOUNTER (OUTPATIENT)
Dept: INFUSION THERAPY | Age: 49
Discharge: HOME | End: 2019-07-05
Payer: MEDICARE

## 2019-07-05 VITALS
OXYGEN SATURATION: 97 % | HEART RATE: 102 BPM | BODY MASS INDEX: 30.69 KG/M2 | DIASTOLIC BLOOD PRESSURE: 75 MMHG | TEMPERATURE: 97.9 F | RESPIRATION RATE: 18 BRPM | SYSTOLIC BLOOD PRESSURE: 110 MMHG | WEIGHT: 167.8 LBS

## 2019-07-05 LAB — MAGNESIUM SERPL-MCNC: 2.1 MG/DL (ref 1.8–2.4)

## 2019-07-05 PROCEDURE — 74011000250 HC RX REV CODE- 250: Performed by: INTERNAL MEDICINE

## 2019-07-05 PROCEDURE — 96374 THER/PROPH/DIAG INJ IV PUSH: CPT

## 2019-07-05 PROCEDURE — 74011250636 HC RX REV CODE- 250/636: Performed by: INTERNAL MEDICINE

## 2019-07-05 PROCEDURE — 83735 ASSAY OF MAGNESIUM: CPT

## 2019-07-05 RX ORDER — SODIUM CHLORIDE 0.9 % (FLUSH) 0.9 %
10-40 SYRINGE (ML) INJECTION AS NEEDED
Status: DISCONTINUED | OUTPATIENT
Start: 2019-07-05 | End: 2019-07-09 | Stop reason: HOSPADM

## 2019-07-05 RX ADMIN — Medication 10 ML: at 07:53

## 2019-07-05 RX ADMIN — Medication 10 ML: at 08:02

## 2019-07-05 RX ADMIN — PROMETHAZINE HYDROCHLORIDE 25 MG: 25 INJECTION INTRAMUSCULAR; INTRAVENOUS at 07:54

## 2019-07-05 NOTE — PROGRESS NOTES
Arrived to the [facility]. Assessment completed. Patient tolerated port needle change well. She also received phenergan 25 mg IV, as ordered. The magnesium level was noted at 2.1. Any issues or concerns during appointment: none. Patient aware of next infusion appointment on 7/11/19 (date) at [address] [place] [address] (time) with IV infusion center. Discharged ambulatory, with father. Patient instructed to call her doctor's office immediately for any problems or concerns. She verbalizes understanding.

## 2019-07-11 ENCOUNTER — HOSPITAL ENCOUNTER (OUTPATIENT)
Dept: INFUSION THERAPY | Age: 49
Discharge: HOME | End: 2019-07-11
Payer: MEDICARE

## 2019-07-11 VITALS
SYSTOLIC BLOOD PRESSURE: 147 MMHG | DIASTOLIC BLOOD PRESSURE: 84 MMHG | TEMPERATURE: 98.3 F | OXYGEN SATURATION: 95 % | RESPIRATION RATE: 18 BRPM | HEART RATE: 84 BPM

## 2019-07-11 LAB
ALBUMIN SERPL-MCNC: 3.3 G/DL (ref 3.5–5)
ALBUMIN/GLOB SERPL: 0.9 {RATIO} (ref 1.2–3.5)
ALP SERPL-CCNC: 128 U/L (ref 50–136)
ALT SERPL-CCNC: 26 U/L (ref 12–65)
ANION GAP SERPL CALC-SCNC: 6 MMOL/L (ref 7–16)
APPEARANCE UR: CLEAR
AST SERPL-CCNC: 26 U/L (ref 15–37)
BASOPHILS # BLD: 0 K/UL (ref 0–0.2)
BASOPHILS NFR BLD: 0 % (ref 0–2)
BILIRUB DIRECT SERPL-MCNC: <0.1 MG/DL
BILIRUB SERPL-MCNC: 0.3 MG/DL (ref 0.2–1.1)
BILIRUB UR QL: NEGATIVE
BUN SERPL-MCNC: 8 MG/DL (ref 6–23)
CALCIUM SERPL-MCNC: 8.6 MG/DL (ref 8.3–10.4)
CHLORIDE SERPL-SCNC: 106 MMOL/L (ref 98–107)
CHOLEST SERPL-MCNC: 113 MG/DL
CO2 SERPL-SCNC: 29 MMOL/L (ref 21–32)
COLOR UR: YELLOW
CREAT SERPL-MCNC: 0.63 MG/DL (ref 0.6–1)
DIFFERENTIAL METHOD BLD: ABNORMAL
EOSINOPHIL # BLD: 0.1 K/UL (ref 0–0.8)
EOSINOPHIL NFR BLD: 1 % (ref 0.5–7.8)
ERYTHROCYTE [DISTWIDTH] IN BLOOD BY AUTOMATED COUNT: 12.2 % (ref 11.9–14.6)
EST. AVERAGE GLUCOSE BLD GHB EST-MCNC: 189 MG/DL
GLOBULIN SER CALC-MCNC: 3.6 G/DL (ref 2.3–3.5)
GLUCOSE SERPL-MCNC: 85 MG/DL (ref 65–100)
GLUCOSE UR STRIP.AUTO-MCNC: NEGATIVE MG/DL
HBA1C MFR BLD: 8.2 % (ref 4.8–6)
HCT VFR BLD AUTO: 40.1 % (ref 35.8–46.3)
HDLC SERPL-MCNC: 39 MG/DL (ref 40–60)
HDLC SERPL: 2.9 {RATIO}
HGB BLD-MCNC: 12.9 G/DL (ref 11.7–15.4)
HGB UR QL STRIP: NEGATIVE
IMM GRANULOCYTES # BLD AUTO: 0 K/UL (ref 0–0.5)
IMM GRANULOCYTES NFR BLD AUTO: 0 % (ref 0–5)
KETONES UR QL STRIP.AUTO: NEGATIVE MG/DL
LDLC SERPL CALC-MCNC: 45.2 MG/DL
LEUKOCYTE ESTERASE UR QL STRIP.AUTO: NEGATIVE
LIPID PROFILE,FLP: ABNORMAL
LYMPHOCYTES # BLD: 1.4 K/UL (ref 0.5–4.6)
LYMPHOCYTES NFR BLD: 23 % (ref 13–44)
MAGNESIUM SERPL-MCNC: 1.9 MG/DL (ref 1.8–2.4)
MCH RBC QN AUTO: 27 PG (ref 26.1–32.9)
MCHC RBC AUTO-ENTMCNC: 32.2 G/DL (ref 31.4–35)
MCV RBC AUTO: 83.9 FL (ref 79.6–97.8)
MONOCYTES # BLD: 0.4 K/UL (ref 0.1–1.3)
MONOCYTES NFR BLD: 7 % (ref 4–12)
NEUTS SEG # BLD: 4.1 K/UL (ref 1.7–8.2)
NEUTS SEG NFR BLD: 69 % (ref 43–78)
NITRITE UR QL STRIP.AUTO: NEGATIVE
NRBC # BLD: 0 K/UL (ref 0–0.2)
PH UR STRIP: 6.5 [PH] (ref 5–9)
PLATELET # BLD AUTO: 156 K/UL (ref 150–450)
PMV BLD AUTO: 12.6 FL (ref 9.4–12.3)
POTASSIUM SERPL-SCNC: 3.6 MMOL/L (ref 3.5–5.1)
PROT SERPL-MCNC: 6.9 G/DL (ref 6.3–8.2)
PROT UR STRIP-MCNC: NEGATIVE MG/DL
RBC # BLD AUTO: 4.78 M/UL (ref 4.05–5.25)
SODIUM SERPL-SCNC: 141 MMOL/L (ref 136–145)
SP GR UR REFRACTOMETRY: 1.01 (ref 1–1.02)
TRIGL SERPL-MCNC: 144 MG/DL (ref 35–150)
TSH SERPL DL<=0.005 MIU/L-ACNC: 1.43 UIU/ML (ref 0.36–3.74)
UROBILINOGEN UR QL STRIP.AUTO: 0.2 EU/DL (ref 0.2–1)
VLDLC SERPL CALC-MCNC: 28.8 MG/DL (ref 6–23)
WBC # BLD AUTO: 5.9 K/UL (ref 4.3–11.1)

## 2019-07-11 PROCEDURE — 74011000250 HC RX REV CODE- 250: Performed by: INTERNAL MEDICINE

## 2019-07-11 PROCEDURE — 83735 ASSAY OF MAGNESIUM: CPT

## 2019-07-11 PROCEDURE — 85025 COMPLETE CBC W/AUTO DIFF WBC: CPT

## 2019-07-11 PROCEDURE — 84443 ASSAY THYROID STIM HORMONE: CPT

## 2019-07-11 PROCEDURE — 74011250636 HC RX REV CODE- 250/636: Performed by: INTERNAL MEDICINE

## 2019-07-11 PROCEDURE — 81003 URINALYSIS AUTO W/O SCOPE: CPT

## 2019-07-11 PROCEDURE — 80076 HEPATIC FUNCTION PANEL: CPT

## 2019-07-11 PROCEDURE — 96374 THER/PROPH/DIAG INJ IV PUSH: CPT

## 2019-07-11 PROCEDURE — 83036 HEMOGLOBIN GLYCOSYLATED A1C: CPT

## 2019-07-11 PROCEDURE — 80048 BASIC METABOLIC PNL TOTAL CA: CPT

## 2019-07-11 PROCEDURE — 80061 LIPID PANEL: CPT

## 2019-07-11 RX ORDER — SODIUM CHLORIDE 0.9 % (FLUSH) 0.9 %
10 SYRINGE (ML) INJECTION AS NEEDED
Status: ACTIVE | OUTPATIENT
Start: 2019-07-11 | End: 2019-07-11

## 2019-07-11 RX ADMIN — Medication 10 ML: at 07:46

## 2019-07-11 RX ADMIN — Medication 10 ML: at 07:35

## 2019-07-11 RX ADMIN — PROMETHAZINE HYDROCHLORIDE 25 MG: 25 INJECTION INTRAMUSCULAR; INTRAVENOUS at 07:36

## 2019-07-11 NOTE — PROGRESS NOTES
Arrived to the [facility]. Labs drawn, no replacements needed. Phenergan administered. Patient tolerated well. Any issues or concerns during appointment: None. Patient aware of next infusion appointment on July 18th at [address] [place] [address]. Discharged ambulatory.

## 2019-07-18 ENCOUNTER — HOSPITAL ENCOUNTER (OUTPATIENT)
Dept: INFUSION THERAPY | Age: 49
Discharge: HOME | End: 2019-07-18
Payer: MEDICARE

## 2019-07-18 VITALS
SYSTOLIC BLOOD PRESSURE: 122 MMHG | OXYGEN SATURATION: 98 % | DIASTOLIC BLOOD PRESSURE: 66 MMHG | RESPIRATION RATE: 18 BRPM | HEART RATE: 114 BPM | TEMPERATURE: 98.5 F

## 2019-07-18 LAB — MAGNESIUM SERPL-MCNC: 2 MG/DL (ref 1.8–2.4)

## 2019-07-18 PROCEDURE — 74011000250 HC RX REV CODE- 250: Performed by: INTERNAL MEDICINE

## 2019-07-18 PROCEDURE — 96374 THER/PROPH/DIAG INJ IV PUSH: CPT

## 2019-07-18 PROCEDURE — 74011250636 HC RX REV CODE- 250/636: Performed by: INTERNAL MEDICINE

## 2019-07-18 PROCEDURE — 83735 ASSAY OF MAGNESIUM: CPT

## 2019-07-18 RX ORDER — SODIUM CHLORIDE 0.9 % (FLUSH) 0.9 %
10-40 SYRINGE (ML) INJECTION AS NEEDED
Status: DISCONTINUED | OUTPATIENT
Start: 2019-07-18 | End: 2019-07-22 | Stop reason: HOSPADM

## 2019-07-18 RX ADMIN — PROMETHAZINE HYDROCHLORIDE 25 MG: 25 INJECTION INTRAMUSCULAR; INTRAVENOUS at 07:43

## 2019-07-18 RX ADMIN — Medication 10 ML: at 07:54

## 2019-07-18 RX ADMIN — Medication 10 ML: at 07:42

## 2019-07-18 NOTE — PROGRESS NOTES
Arrived to the [facility]. Phenergan completed. Patient tolerated well. Any issues or concerns during appointment: no magnesium replacements required. Patient aware of next infusion appointment on 7/25/19 at 7:15am.  Discharged ambulatory.

## 2019-07-25 ENCOUNTER — HOSPITAL ENCOUNTER (OUTPATIENT)
Dept: INFUSION THERAPY | Age: 49
Discharge: HOME | End: 2019-07-25
Payer: MEDICARE

## 2019-07-25 VITALS
HEART RATE: 104 BPM | OXYGEN SATURATION: 97 % | TEMPERATURE: 98.4 F | SYSTOLIC BLOOD PRESSURE: 104 MMHG | DIASTOLIC BLOOD PRESSURE: 78 MMHG | RESPIRATION RATE: 18 BRPM

## 2019-07-25 LAB — MAGNESIUM SERPL-MCNC: 1.9 MG/DL (ref 1.8–2.4)

## 2019-07-25 PROCEDURE — 83735 ASSAY OF MAGNESIUM: CPT

## 2019-07-25 PROCEDURE — 74011250636 HC RX REV CODE- 250/636: Performed by: INTERNAL MEDICINE

## 2019-07-25 PROCEDURE — 96374 THER/PROPH/DIAG INJ IV PUSH: CPT

## 2019-07-25 PROCEDURE — 74011000250 HC RX REV CODE- 250: Performed by: INTERNAL MEDICINE

## 2019-07-25 RX ADMIN — PROMETHAZINE HYDROCHLORIDE 25 MG: 25 INJECTION INTRAMUSCULAR; INTRAVENOUS at 08:02

## 2019-07-25 RX ADMIN — SODIUM CHLORIDE 500 ML: 900 INJECTION, SOLUTION INTRAVENOUS at 07:50

## 2019-07-31 ENCOUNTER — HOSPITAL ENCOUNTER (OUTPATIENT)
Dept: GENERAL RADIOLOGY | Age: 49
Discharge: HOME | End: 2019-07-31
Attending: INTERNAL MEDICINE
Payer: MEDICARE

## 2019-07-31 DIAGNOSIS — K22.2 ESOPHAGEAL STRICTURE: ICD-10-CM

## 2019-07-31 DIAGNOSIS — R11.15 CYCLICAL VOMITING: ICD-10-CM

## 2019-07-31 PROCEDURE — 74011000250 HC RX REV CODE- 250: Performed by: INTERNAL MEDICINE

## 2019-07-31 PROCEDURE — 74220 X-RAY XM ESOPHAGUS 1CNTRST: CPT

## 2019-07-31 PROCEDURE — 74011000255 HC RX REV CODE- 255: Performed by: INTERNAL MEDICINE

## 2019-07-31 RX ADMIN — BARIUM SULFATE 355 ML: 0.6 SUSPENSION ORAL at 10:21

## 2019-07-31 RX ADMIN — BARIUM SULFATE 700 MG: 700 TABLET ORAL at 10:21

## 2019-07-31 RX ADMIN — ANTACID/ANTIFLATULENT 4 G: 380; 550; 10; 10 GRANULE, EFFERVESCENT ORAL at 10:21

## 2019-07-31 RX ADMIN — BARIUM SULFATE 135 ML: 980 POWDER, FOR SUSPENSION ORAL at 10:21

## 2019-08-01 ENCOUNTER — HOSPITAL ENCOUNTER (OUTPATIENT)
Dept: INFUSION THERAPY | Age: 49
Discharge: HOME | End: 2019-08-01
Payer: MEDICARE

## 2019-08-01 LAB — MAGNESIUM SERPL-MCNC: 2 MG/DL (ref 1.8–2.4)

## 2019-08-01 PROCEDURE — 83735 ASSAY OF MAGNESIUM: CPT

## 2019-08-01 PROCEDURE — 74011250636 HC RX REV CODE- 250/636: Performed by: INTERNAL MEDICINE

## 2019-08-01 PROCEDURE — 74011000250 HC RX REV CODE- 250: Performed by: INTERNAL MEDICINE

## 2019-08-01 PROCEDURE — 96374 THER/PROPH/DIAG INJ IV PUSH: CPT

## 2019-08-01 RX ORDER — SODIUM CHLORIDE 9 MG/ML
25 INJECTION, SOLUTION INTRAVENOUS ONCE
Status: COMPLETED | OUTPATIENT
Start: 2019-08-01 | End: 2019-08-01

## 2019-08-01 RX ADMIN — PROMETHAZINE HYDROCHLORIDE 25 MG: 25 INJECTION INTRAMUSCULAR; INTRAVENOUS at 07:47

## 2019-08-01 RX ADMIN — SODIUM CHLORIDE 25 ML/HR: 900 INJECTION, SOLUTION INTRAVENOUS at 07:42

## 2019-08-01 NOTE — PROGRESS NOTES
Arrived to the [facility]. No replacements called for per order completed. Patient tolerated well. Any issues or concerns during appointment: none. Patient aware of next infusion appointment on 08.08.2019 (date) at [facility] (time). Discharged ambulatory to see cris.

## 2019-08-08 ENCOUNTER — HOSPITAL ENCOUNTER (OUTPATIENT)
Dept: INFUSION THERAPY | Age: 49
Discharge: HOME | End: 2019-08-08
Payer: MEDICARE

## 2019-08-08 VITALS
WEIGHT: 170 LBS | OXYGEN SATURATION: 93 % | HEART RATE: 110 BPM | BODY MASS INDEX: 31.09 KG/M2 | TEMPERATURE: 98.4 F | RESPIRATION RATE: 18 BRPM | SYSTOLIC BLOOD PRESSURE: 125 MMHG | DIASTOLIC BLOOD PRESSURE: 78 MMHG

## 2019-08-08 LAB — MAGNESIUM SERPL-MCNC: 1.7 MG/DL (ref 1.8–2.4)

## 2019-08-08 PROCEDURE — 83735 ASSAY OF MAGNESIUM: CPT

## 2019-08-08 PROCEDURE — 74011000250 HC RX REV CODE- 250: Performed by: INTERNAL MEDICINE

## 2019-08-08 PROCEDURE — 74011250636 HC RX REV CODE- 250/636: Performed by: INTERNAL MEDICINE

## 2019-08-08 PROCEDURE — 96365 THER/PROPH/DIAG IV INF INIT: CPT

## 2019-08-08 PROCEDURE — 96375 TX/PRO/DX INJ NEW DRUG ADDON: CPT

## 2019-08-08 RX ORDER — MAGNESIUM SULFATE 1 G/100ML
1 INJECTION INTRAVENOUS ONCE
Status: COMPLETED | OUTPATIENT
Start: 2019-08-08 | End: 2019-08-08

## 2019-08-08 RX ORDER — SODIUM CHLORIDE 0.9 % (FLUSH) 0.9 %
20 SYRINGE (ML) INJECTION EVERY 8 HOURS
Status: DISCONTINUED | OUTPATIENT
Start: 2019-08-08 | End: 2019-08-12 | Stop reason: HOSPADM

## 2019-08-08 RX ADMIN — PROMETHAZINE HYDROCHLORIDE 25 MG: 25 INJECTION INTRAMUSCULAR; INTRAVENOUS at 07:41

## 2019-08-08 RX ADMIN — Medication 10 ML: at 07:40

## 2019-08-08 RX ADMIN — MAGNESIUM SULFATE HEPTAHYDRATE 1 G: 1 INJECTION, SOLUTION INTRAVENOUS at 09:08

## 2019-08-08 NOTE — PROGRESS NOTES
Arrived to the [facility] ambulatory. Port dressing, labs draw, promethazine and 1g magnesium completed. Patient tolerated well. Any issues or concerns during appointment: no.  Patient aware of next infusion appointment on  8/15 at [address] [place] [address]. Discharged to home ambulatory.

## 2019-08-15 ENCOUNTER — HOSPITAL ENCOUNTER (OUTPATIENT)
Dept: INFUSION THERAPY | Age: 49
Discharge: HOME | End: 2019-08-15
Payer: MEDICARE

## 2019-08-15 VITALS
SYSTOLIC BLOOD PRESSURE: 122 MMHG | HEART RATE: 114 BPM | TEMPERATURE: 98.8 F | RESPIRATION RATE: 16 BRPM | OXYGEN SATURATION: 99 % | DIASTOLIC BLOOD PRESSURE: 79 MMHG

## 2019-08-15 LAB — MAGNESIUM SERPL-MCNC: 2 MG/DL (ref 1.8–2.4)

## 2019-08-15 PROCEDURE — 74011250636 HC RX REV CODE- 250/636: Performed by: INTERNAL MEDICINE

## 2019-08-15 PROCEDURE — 74011000250 HC RX REV CODE- 250: Performed by: INTERNAL MEDICINE

## 2019-08-15 PROCEDURE — 83735 ASSAY OF MAGNESIUM: CPT

## 2019-08-15 PROCEDURE — 96374 THER/PROPH/DIAG INJ IV PUSH: CPT

## 2019-08-15 RX ORDER — SODIUM CHLORIDE 0.9 % (FLUSH) 0.9 %
10 SYRINGE (ML) INJECTION AS NEEDED
Status: DISCONTINUED | OUTPATIENT
Start: 2019-08-15 | End: 2019-08-19 | Stop reason: HOSPADM

## 2019-08-15 RX ADMIN — Medication 10 ML: at 07:10

## 2019-08-15 RX ADMIN — PROMETHAZINE HYDROCHLORIDE 25 MG: 25 INJECTION INTRAMUSCULAR; INTRAVENOUS at 07:30

## 2019-08-15 RX ADMIN — Medication 10 ML: at 07:35

## 2019-08-15 NOTE — PROGRESS NOTES
Pt ambulatory to area c/o nausea. Labs drawn and port needle/dressing changed per order, tolerated well. Received phenergan per order, no replacements needed. Aware of next appt on [email or web address]. Advised to call dr with any issues/concerns. Discharged home without complaints.

## 2019-08-22 ENCOUNTER — HOSPITAL ENCOUNTER (OUTPATIENT)
Dept: INFUSION THERAPY | Age: 49
Discharge: HOME | End: 2019-08-22
Payer: MEDICARE

## 2019-08-22 VITALS
RESPIRATION RATE: 18 BRPM | DIASTOLIC BLOOD PRESSURE: 84 MMHG | TEMPERATURE: 98.4 F | OXYGEN SATURATION: 96 % | SYSTOLIC BLOOD PRESSURE: 131 MMHG | WEIGHT: 161.4 LBS | HEART RATE: 110 BPM | BODY MASS INDEX: 29.52 KG/M2

## 2019-08-22 LAB — MAGNESIUM SERPL-MCNC: 2 MG/DL (ref 1.8–2.4)

## 2019-08-22 PROCEDURE — 83735 ASSAY OF MAGNESIUM: CPT

## 2019-08-22 PROCEDURE — 74011250636 HC RX REV CODE- 250/636: Performed by: INTERNAL MEDICINE

## 2019-08-22 PROCEDURE — 96374 THER/PROPH/DIAG INJ IV PUSH: CPT

## 2019-08-22 PROCEDURE — 74011000250 HC RX REV CODE- 250: Performed by: INTERNAL MEDICINE

## 2019-08-22 RX ORDER — SODIUM CHLORIDE 0.9 % (FLUSH) 0.9 %
10-30 SYRINGE (ML) INJECTION AS NEEDED
Status: DISCONTINUED | OUTPATIENT
Start: 2019-08-22 | End: 2019-08-26 | Stop reason: HOSPADM

## 2019-08-22 RX ADMIN — PROMETHAZINE HYDROCHLORIDE 25 MG: 25 INJECTION INTRAMUSCULAR; INTRAVENOUS at 07:36

## 2019-08-22 RX ADMIN — Medication 10 ML: at 07:50

## 2019-08-22 RX ADMIN — Medication 10 ML: at 07:17

## 2019-08-22 NOTE — PROGRESS NOTES
Arrived to the [address] Phenergan completed. No replacement needed Magnesium 2.0. Patient tolerated well. Port needle changed, flushed and left accessed for use at home. Any issues or concerns during appointment: None. Patient aware of next infusion appointment on 8/29/2019 (date) at 0715 (time). Discharged ambulatory in stable condition. Dorota Mondragon

## 2019-08-29 ENCOUNTER — HOSPITAL ENCOUNTER (OUTPATIENT)
Dept: INFUSION THERAPY | Age: 49
Discharge: HOME | End: 2019-08-29
Payer: MEDICARE

## 2019-08-29 VITALS
OXYGEN SATURATION: 96 % | RESPIRATION RATE: 18 BRPM | TEMPERATURE: 98.5 F | SYSTOLIC BLOOD PRESSURE: 107 MMHG | HEART RATE: 104 BPM | DIASTOLIC BLOOD PRESSURE: 61 MMHG

## 2019-08-29 LAB — MAGNESIUM SERPL-MCNC: 2 MG/DL (ref 1.8–2.4)

## 2019-08-29 PROCEDURE — 83735 ASSAY OF MAGNESIUM: CPT

## 2019-08-29 PROCEDURE — 96374 THER/PROPH/DIAG INJ IV PUSH: CPT

## 2019-08-29 PROCEDURE — 74011000250 HC RX REV CODE- 250: Performed by: INTERNAL MEDICINE

## 2019-08-29 PROCEDURE — 96361 HYDRATE IV INFUSION ADD-ON: CPT

## 2019-08-29 PROCEDURE — 74011250636 HC RX REV CODE- 250/636: Performed by: INTERNAL MEDICINE

## 2019-08-29 RX ORDER — SODIUM CHLORIDE 0.9 % (FLUSH) 0.9 %
5-10 SYRINGE (ML) INJECTION AS NEEDED
Status: DISCONTINUED | OUTPATIENT
Start: 2019-08-29 | End: 2019-09-02 | Stop reason: HOSPADM

## 2019-08-29 RX ADMIN — PROMETHAZINE HYDROCHLORIDE 25 MG: 25 INJECTION INTRAMUSCULAR; INTRAVENOUS at 07:36

## 2019-08-29 RX ADMIN — SODIUM CHLORIDE 500 ML: 900 INJECTION, SOLUTION INTRAVENOUS at 07:29

## 2019-08-29 RX ADMIN — Medication 10 ML: at 07:28

## 2019-08-29 NOTE — PROGRESS NOTES
Arrived ambulatory to C with port previously accessed with good blood return. NS infusing. Phenergan given IV as ordered. Mag 2. 0. Pt aware of next appt on 9/5/19 at [address] [place] [address]. Port remains accessed for home infusions. Pt discharged ambulatory.

## 2019-09-02 ENCOUNTER — HOSPITAL ENCOUNTER (EMERGENCY)
Age: 49
Discharge: HOME | End: 2019-09-02
Attending: EMERGENCY MEDICINE
Payer: MEDICARE

## 2019-09-02 ENCOUNTER — APPOINTMENT (OUTPATIENT)
Dept: GENERAL RADIOLOGY | Age: 49
End: 2019-09-02
Attending: EMERGENCY MEDICINE
Payer: MEDICARE

## 2019-09-02 VITALS
SYSTOLIC BLOOD PRESSURE: 120 MMHG | DIASTOLIC BLOOD PRESSURE: 81 MMHG | BODY MASS INDEX: 29.44 KG/M2 | WEIGHT: 160 LBS | HEART RATE: 88 BPM | TEMPERATURE: 98.9 F | RESPIRATION RATE: 18 BRPM | HEIGHT: 62 IN | OXYGEN SATURATION: 94 %

## 2019-09-02 DIAGNOSIS — R10.9 CHRONIC ABDOMINAL PAIN: Primary | ICD-10-CM

## 2019-09-02 DIAGNOSIS — G89.29 CHRONIC ABDOMINAL PAIN: Primary | ICD-10-CM

## 2019-09-02 LAB
ALBUMIN SERPL-MCNC: 3.4 G/DL (ref 3.5–5)
ALBUMIN/GLOB SERPL: 1 {RATIO} (ref 1.2–3.5)
ALP SERPL-CCNC: 122 U/L (ref 50–136)
ALT SERPL-CCNC: 34 U/L (ref 12–65)
ANION GAP SERPL CALC-SCNC: 7 MMOL/L (ref 7–16)
AST SERPL-CCNC: 23 U/L (ref 15–37)
BASOPHILS # BLD: 0 K/UL (ref 0–0.2)
BASOPHILS NFR BLD: 1 % (ref 0–2)
BILIRUB SERPL-MCNC: 0.4 MG/DL (ref 0.2–1.1)
BUN SERPL-MCNC: 11 MG/DL (ref 6–23)
CALCIUM SERPL-MCNC: 8.6 MG/DL (ref 8.3–10.4)
CHLORIDE SERPL-SCNC: 111 MMOL/L (ref 98–107)
CO2 SERPL-SCNC: 25 MMOL/L (ref 21–32)
CREAT SERPL-MCNC: 0.82 MG/DL (ref 0.6–1)
DIFFERENTIAL METHOD BLD: ABNORMAL
EOSINOPHIL # BLD: 0.1 K/UL (ref 0–0.8)
EOSINOPHIL NFR BLD: 2 % (ref 0.5–7.8)
ERYTHROCYTE [DISTWIDTH] IN BLOOD BY AUTOMATED COUNT: 12.6 % (ref 11.9–14.6)
GLOBULIN SER CALC-MCNC: 3.5 G/DL (ref 2.3–3.5)
GLUCOSE SERPL-MCNC: 269 MG/DL (ref 65–100)
HCT VFR BLD AUTO: 37.8 % (ref 35.8–46.3)
HGB BLD-MCNC: 12.2 G/DL (ref 11.7–15.4)
IMM GRANULOCYTES # BLD AUTO: 0 K/UL (ref 0–0.5)
IMM GRANULOCYTES NFR BLD AUTO: 0 % (ref 0–5)
LIPASE SERPL-CCNC: 237 U/L (ref 73–393)
LYMPHOCYTES # BLD: 1.6 K/UL (ref 0.5–4.6)
LYMPHOCYTES NFR BLD: 32 % (ref 13–44)
MCH RBC QN AUTO: 27.4 PG (ref 26.1–32.9)
MCHC RBC AUTO-ENTMCNC: 32.3 G/DL (ref 31.4–35)
MCV RBC AUTO: 84.9 FL (ref 79.6–97.8)
MONOCYTES # BLD: 0.4 K/UL (ref 0.1–1.3)
MONOCYTES NFR BLD: 8 % (ref 4–12)
NEUTS SEG # BLD: 2.8 K/UL (ref 1.7–8.2)
NEUTS SEG NFR BLD: 58 % (ref 43–78)
NRBC # BLD: 0 K/UL (ref 0–0.2)
PLATELET # BLD AUTO: 142 K/UL (ref 150–450)
PMV BLD AUTO: 13.3 FL (ref 9.4–12.3)
POTASSIUM SERPL-SCNC: 3.8 MMOL/L (ref 3.5–5.1)
PROT SERPL-MCNC: 6.9 G/DL (ref 6.3–8.2)
RBC # BLD AUTO: 4.45 M/UL (ref 4.05–5.2)
SODIUM SERPL-SCNC: 143 MMOL/L (ref 136–145)
WBC # BLD AUTO: 4.9 K/UL (ref 4.3–11.1)

## 2019-09-02 PROCEDURE — 83690 ASSAY OF LIPASE: CPT

## 2019-09-02 PROCEDURE — 74011000250 HC RX REV CODE- 250: Performed by: EMERGENCY MEDICINE

## 2019-09-02 PROCEDURE — 99283 EMERGENCY DEPT VISIT LOW MDM: CPT | Performed by: EMERGENCY MEDICINE

## 2019-09-02 PROCEDURE — 81003 URINALYSIS AUTO W/O SCOPE: CPT | Performed by: EMERGENCY MEDICINE

## 2019-09-02 PROCEDURE — 80053 COMPREHEN METABOLIC PANEL: CPT

## 2019-09-02 PROCEDURE — 96374 THER/PROPH/DIAG INJ IV PUSH: CPT | Performed by: EMERGENCY MEDICINE

## 2019-09-02 PROCEDURE — 96375 TX/PRO/DX INJ NEW DRUG ADDON: CPT | Performed by: EMERGENCY MEDICINE

## 2019-09-02 PROCEDURE — 74011250636 HC RX REV CODE- 250/636: Performed by: EMERGENCY MEDICINE

## 2019-09-02 PROCEDURE — 96361 HYDRATE IV INFUSION ADD-ON: CPT | Performed by: EMERGENCY MEDICINE

## 2019-09-02 PROCEDURE — 74011250637 HC RX REV CODE- 250/637: Performed by: EMERGENCY MEDICINE

## 2019-09-02 PROCEDURE — 85025 COMPLETE CBC W/AUTO DIFF WBC: CPT

## 2019-09-02 RX ORDER — HYOSCYAMINE SULFATE 0.12 MG/1
0.12 TABLET SUBLINGUAL
Status: DISCONTINUED | OUTPATIENT
Start: 2019-09-02 | End: 2019-09-02 | Stop reason: HOSPADM

## 2019-09-02 RX ORDER — HEPARIN 100 UNIT/ML
500 SYRINGE INTRAVENOUS AS NEEDED
Status: DISCONTINUED | OUTPATIENT
Start: 2019-09-02 | End: 2019-09-02 | Stop reason: HOSPADM

## 2019-09-02 RX ORDER — HALOPERIDOL 5 MG/ML
5 INJECTION INTRAMUSCULAR
Status: COMPLETED | OUTPATIENT
Start: 2019-09-02 | End: 2019-09-02

## 2019-09-02 RX ADMIN — HEPARIN SODIUM (PORCINE) LOCK FLUSH IV SOLN 100 UNIT/ML 500 UNITS: 100 SOLUTION at 20:22

## 2019-09-02 RX ADMIN — SODIUM CHLORIDE 1000 ML: 900 INJECTION, SOLUTION INTRAVENOUS at 17:20

## 2019-09-02 RX ADMIN — PROMETHAZINE HYDROCHLORIDE 25 MG: 25 INJECTION INTRAMUSCULAR; INTRAVENOUS at 18:00

## 2019-09-02 RX ADMIN — HALOPERIDOL LACTATE 5 MG: 5 INJECTION, SOLUTION INTRAMUSCULAR at 19:18

## 2019-09-02 NOTE — ED TRIAGE NOTES
Patient complains of left upper abdominal pain with nausea and vomiting for 2 weeks and has port that she uses at home for hydration and nausea. Patient denies any fever, chills, urinary symptoms, or constipation.  Patient states history of chronic pancreatitis with same symptoms in the past.

## 2019-09-02 NOTE — ED NOTES
Patient refused hyoscyamine. Patient states that she already took it about three times today and it did not do any good for her pain. Patient states she normally receives Dilaudid for pain.

## 2019-09-02 NOTE — ED PROVIDER NOTES
66-year-old female presenting for reports of abdominal pain. She states that is been going on for 2 weeks. She describes as left upper quadrant abdominal pain that radiates to the back, sharp and stabbing. She reports a history of chronic pancreatitis\" this is exactly like prior episodes\". She reports that she takes oral Phenergan, Zofran, and 90 mg of oral morphine daily for her pain control but this is not been working. She reached out to her gastroenterology team and they recommended she come to the ER for pain control. Patient denies any other symptoms such as fevers, chills, vomiting or diarrhea. She has had some nausea but this is a chronic issue for her daily. She does report that she has had an interruption of her oral pain medications because of closure from her pain clinic so she has been rationing her morphine tablets. The history is provided by the patient. Abdominal Pain    This is a chronic problem. The current episode started more than 1 week ago. The problem occurs constantly. The problem has not changed since onset. The pain is associated with an unknown factor. The pain is located in the LUQ. The quality of the pain is sharp and shooting. The pain is at a severity of 10/10. The pain is severe. Associated symptoms include anorexia and nausea. Pertinent negatives include no fever, no belching, no diarrhea, no flatus, no hematochezia, no melena, no vomiting, no constipation, no dysuria, no frequency, no hematuria, no headaches, no arthralgias, no myalgias, no trauma, no chest pain and no back pain. The pain is worsened by eating. The pain is relieved by nothing. Past workup includes CT scan, ultrasound, esophagogastroduodenoscopy, UGI, barium enema. Her past medical history is significant for pancreatitis. The patient's surgical history non-contributory.        Past Medical History:   Diagnosis Date    Anemia     denies hx of blood transfusions-- Fe infusions 12/2017    Anxiety and depression     Asthma     allergy induced, denies any inhaler, patient denies    BMI 32.0-32.9,adult     BMI 32.2    C. difficile diarrhea 2013    in 0032 after complicated ERCP    Cervical dysplasia 1990s    Chronic insomnia     Chronic pain     all over     Chronic pancreatitis ([facility] [address]     Encounter for insertion of venous access port     Left chest port    Endometriosis     Esophageal stricture 2017    Fibromyalgia     Former cigarette smoker     GERD (gastroesophageal reflux disease)     daily meds---po and IV protonix- uses per port- alternates    HLD (hyperlipidemia)     pt denies     IBS (irritable bowel syndrome)     Migraine headache     Nausea & vomiting     pt reports she does better with phenergan than zofran - causes HA    Nonalcoholic fatty liver disease     PUD (peptic ulcer disease) 06/2017    Hx of     Sphincter of Oddi dysfunction     Type 2 diabetes mellitus ([facility] [address]     insulin reliant/-160/ s.s of hypoglycemia at 90's, Last A1C 9.7 on 01/2019       Past Surgical History:   Procedure Laterality Date    ABDOMEN SURGERY PROC UNLISTED  last one placed  2012    Hx of pancreatic stent, multiple    ABDOMEN SURGERY PROC UNLISTED  1997    lap nissen    ABDOMEN SURGERY [address] UNLISTED  4/13    explor lap    COLONOSCOPY N/A 4/4/2018    COLONOSCOPY performed by Brit Jimenez [facility] at [facility] ENDOSCOPY    HX [address] Right     along with trigger finger    HX COLONOSCOPY      HX ENDOSCOPY  2017    [address]    HX ERCP  3/5/2013    resulted in perforated duodenum    HX HYSTERECTOMY  1998    ovaries remain    HX LAP CHOLECYSTECTOMY  1997    HX LAPAROTOMY  3/5/2013    exploratory for duodenal perforation with ERCP    HX ORTHOPAEDIC      right finger surgery 11/2017    HX [facility]OLOGICAL  [facility]-- stent removed 6-27-13.   Dr Jaye Bryant  2014    port insertion, left chest wall         Family History:   Problem Relation Age of Onset    Diabetes Mother     Hypertension Mother     Heart Disease Mother         cabg    Diabetes Father     Heart Disease Father     Hypertension Father     Other Father     No Known Problems Sister        Social History     Socioeconomic History    Marital status:      Spouse name: Not on file    Number of children: Not on file    Years of education: Not on file    Highest education level: Not on file   Occupational History    Not on file   Social Needs    Financial resource strain: Not on file    Food insecurity:     Worry: Not on file     Inability: Not on file    Transportation needs:     Medical: Not on file     Non-medical: Not on file   Tobacco Use    Smoking status: Former Smoker     Packs/day: 1.00     Years: 3.00     Pack years: 3.00     Last attempt to quit: 1993     Years since quittin.3    Smokeless tobacco: Never Used   Substance and Sexual Activity    Alcohol use: No    Drug use: No    Sexual activity: Not on file   Lifestyle    Physical activity:     Days per week: Not on file     Minutes per session: Not on file    Stress: Not on file   Relationships    Social connections:     Talks on phone: Not on file     Gets together: Not on file     Attends Qu[employer] service: Not on file     Active member of club or organization: Not on file     Attends meetings of clubs or organizations: Not on file     Relationship status: Not on file    Intimate partner violence:     Fear of current or ex partner: Not on file     Emotionally abused: Not on file     Physically abused: Not on file     Forced sexual activity: Not on file   Other Topics Concern    Not on file   Social History Narrative    Not on file         ALLERGIES: Tape [adhesive]; Barium iodide; Flagyl [metronidazole]; Metformin; and Insulins    Review of Systems   Constitutional: Negative for fever. Cardiovascular: Negative for chest pain. Gastrointestinal: Positive for abdominal pain, anorexia and nausea.  Negative for constipation, diarrhea, flatus, hematochezia, melena and vomiting. Genitourinary: Negative for dysuria, frequency and hematuria. Musculoskeletal: Negative for arthralgias, back pain and myalgias. Neurological: Negative for headaches. All other systems reviewed and are negative. Vitals:    09/02/19 1652   BP: 126/79   Pulse: 98   Resp: 18   Temp: 98.5 °F (36.9 °C)   SpO2: 96%   Weight: 72.6 kg (160 lb)   Height: 5' 2\" (1.575 m)            Physical Exam   Constitutional: She is oriented to person, place, and time. She appears well-developed and well-nourished. HENT:   Head: Normocephalic and atraumatic. Eyes: Pupils are equal, round, and reactive to light. Conjunctivae are normal.   Neck: Neck supple. Cardiovascular: Normal rate and regular rhythm. Pulmonary/Chest: Effort normal and breath sounds normal.   Abdominal: Soft. Bowel sounds are normal. There is tenderness in the left upper quadrant. Discomfort when palpating the left upper quadrant but no guarding, no peritoneal signs, no masses, bowel sounds are slightly hyperactive   Musculoskeletal: Normal range of motion. Neurological: She is alert and oriented to person, place, and time. Skin: Skin is warm and dry. Nursing note and vitals reviewed. MDM  Number of Diagnoses or Management Options  Chronic abdominal pain:   Diagnosis management comments: 25-year-old female presenting for acute on chronic abdominal pain. By her own admission this is a worsened episode of her normal abdominal pain and she denies any other systemic symptoms such as fevers or chills. She is had no bloody diarrhea or vomiting. She reports also rationing her pain pills due to loss of access to her pain clinic. Normal vital signs, a benign abdominal exam.  We have access the patient's port and drawn labs, I will give fluids and Phenergan. I am to give a dose of Levsin.   I am disinclined to treat this patient with narcotics here in the emergency department as this is a chronic pain syndrome and an inappropriate use of the emergency department. Amount and/or Complexity of Data Reviewed  Clinical lab tests: ordered and reviewed (Results for orders placed or performed during the hospital encounter of 09/02/19  -CBC WITH AUTOMATED DIFF       Result                      Value             Ref Range           WBC                         4.9               4.3 - 11.1 K*       RBC                         4.45              4.05 - 5.2 M*       HGB                         12.2              11.7 - 15.4 *       HCT                         37.8              35.8 - 46.3 %       MCV                         84.9              79.6 - 97.8 *       MCH                         27.4              26.1 - 32.9 *       MCHC                        32.3              31.4 - 35.0 *       RDW                         12.6              11.9 - 14.6 %       PLATELET                    142 (L)           150 - 450 K/*       MPV                         13.3 (H)          9.4 - 12.3 FL       ABSOLUTE NRBC               0.00              0.0 - 0.2 K/*       DF                          AUTOMATED                             NEUTROPHILS                 58                43 - 78 %           LYMPHOCYTES                 32                13 - 44 %           MONOCYTES                   8                 4.0 - 12.0 %        EOSINOPHILS                 2                 0.5 - 7.8 %         BASOPHILS                   1                 0.0 - 2.0 %         IMMATURE GRANULOCYTES       0                 0.0 - 5.0 %         ABS. NEUTROPHILS            2.8               1.7 - 8.2 K/*       ABS. LYMPHOCYTES            1.6               0.5 - 4.6 K/*       ABS. MONOCYTES              0.4               0.1 - 1.3 K/*       ABS. EOSINOPHILS            0.1               0.0 - 0.8 K/*       ABS. BASOPHILS              0.0               0.0 - 0.2 K/*       ABS. IMM.  GRANS.            0.0               0.0 - 0.5 K/*  -METABOLIC PANEL, COMPREHENSIVE       Result                      Value             Ref Range           Sodium                      143               136 - 145 mm*       Potassium                   3.8               3.5 - 5.1 mm*       Chloride                    111 (H)           98 - 107 mmo*       CO2                         25                21 - 32 mmol*       Anion gap                   7                 7 - 16 mmol/L       Glucose                     269 (H)           65 - 100 mg/*       BUN                         11                6 - 23 MG/DL        Creatinine                  0.82              0.6 - 1.0 MG*       GFR est AA                  >60               >60 ml/min/1*       GFR est non-AA              >60               >60 ml/min/1*       Calcium                     8.6               8.3 - 10.4 M*       Bilirubin, total            0.4               0.2 - 1.1 MG*       ALT (SGPT)                  34                12 - 65 U/L         AST (SGOT)                  23                15 - 37 U/L         Alk. phosphatase            122               50 - 136 U/L        Protein, total              6.9               6.3 - 8.2 g/*       Albumin                     3.4 (L)           3.5 - 5.0 g/*       Globulin                    3.5               2.3 - 3.5 g/*       A-G Ratio                   1.0 (L)           1.2 - 3.5      -LIPASE       Result                      Value             Ref Range           Lipase                      237               73 - 393 U/L     *Note: Due to a large number of results and/or encounters for the requested time period, some results have not been displayed.  A complete set of results can be found in Results Review.  )  Tests in the radiology section of CPT®: ordered and reviewed  Decide to obtain previous medical records or to obtain history from someone other than the patient: yes    Risk of Complications, Morbidity, and/or Mortality  Presenting problems: moderate  Diagnostic procedures: moderate  Management options: moderate  General comments:       I personally reviewed the patient's vital signs, laboratory tests, and/or radiological findings. I discussed these findings with the patient and their significance. I answered all questions and gave the patient clear return precautions. The patient was discharged from the emergency department in stable condition        Patient Progress  Patient progress: stable    ED Course as of Sep 02 1908   Mon Sep 02, 2019   1814 Patient's labs are normal, vital signs are, ordering an abdominal series x-ray treating with Phenergan, fluids, Levsin    [JS]   1845 Patient has refused the Levsin. I feel comfortable offering some Haldol for the patient's pain but I refused to give narcotics when the patient has normal labs, normal vital signs, normal exam    [JS]   1908 She did accept the Haldol. She now wants to be discharged.   X-ray was not performed to be given her benign abdominal exam I am comfortable with skipping this exam.    [JS]      ED Course User Index  [JS] Aimee Hernandes MD       Procedures

## 2019-09-03 NOTE — ED NOTES
I have reviewed discharge instructions with the patient. The patient verbalized understanding. Patient left ED via Discharge Method: ambulatory to Home with spouse. Opportunity for questions and clarification provided. Patient given 0 scripts. Port packed with 500 units of heparin. Dressing clean, dry and intact. Patient presented to ED on this day with the port already accessed and requested to leave access in place. To continue your aftercare when you leave the hospital, you may receive an automated call from our care team to check in on how you are doing. This is a free service and part of our promise to provide the best care and service to meet your aftercare needs.  If you have questions, or wish to unsubscribe from this service please call [phone]. Thank you for Choosing our [facility] Emergency Department.

## 2019-09-05 ENCOUNTER — HOSPITAL ENCOUNTER (OUTPATIENT)
Dept: INFUSION THERAPY | Age: 49
Discharge: HOME | End: 2019-09-05
Payer: MEDICARE

## 2019-09-05 VITALS
OXYGEN SATURATION: 94 % | HEART RATE: 98 BPM | DIASTOLIC BLOOD PRESSURE: 79 MMHG | RESPIRATION RATE: 18 BRPM | SYSTOLIC BLOOD PRESSURE: 128 MMHG | TEMPERATURE: 98.3 F

## 2019-09-05 LAB
BASOPHILS # BLD: 0 K/UL (ref 0–0.2)
BASOPHILS NFR BLD: 0 % (ref 0–2)
CRP SERPL-MCNC: <0.3 MG/DL (ref 0–0.9)
DIFFERENTIAL METHOD BLD: ABNORMAL
EOSINOPHIL # BLD: 0.1 K/UL (ref 0–0.8)
EOSINOPHIL NFR BLD: 2 % (ref 0.5–7.8)
ERYTHROCYTE [DISTWIDTH] IN BLOOD BY AUTOMATED COUNT: 13 % (ref 11.9–14.6)
ERYTHROCYTE [SEDIMENTATION RATE] IN BLOOD: 21 MM/HR (ref 0–30)
HCT VFR BLD AUTO: 39.4 % (ref 35.8–46.3)
HGB BLD-MCNC: 12.7 G/DL (ref 11.7–15.4)
IMM GRANULOCYTES # BLD AUTO: 0 K/UL (ref 0–0.5)
IMM GRANULOCYTES NFR BLD AUTO: 0 % (ref 0–5)
LYMPHOCYTES # BLD: 1.3 K/UL (ref 0.5–4.6)
LYMPHOCYTES NFR BLD: 25 % (ref 13–44)
MAGNESIUM SERPL-MCNC: 1.9 MG/DL (ref 1.8–2.4)
MCH RBC QN AUTO: 26.8 PG (ref 26.1–32.9)
MCHC RBC AUTO-ENTMCNC: 32.2 G/DL (ref 31.4–35)
MCV RBC AUTO: 83.1 FL (ref 79.6–97.8)
MONOCYTES # BLD: 0.4 K/UL (ref 0.1–1.3)
MONOCYTES NFR BLD: 7 % (ref 4–12)
NEUTS SEG # BLD: 3.5 K/UL (ref 1.7–8.2)
NEUTS SEG NFR BLD: 66 % (ref 43–78)
NRBC # BLD: 0 K/UL (ref 0–0.2)
PLATELET # BLD AUTO: 143 K/UL (ref 150–450)
PMV BLD AUTO: 13 FL (ref 9.4–12.3)
RBC # BLD AUTO: 4.74 M/UL (ref 4.05–5.25)
WBC # BLD AUTO: 5.2 K/UL (ref 4.3–11.1)

## 2019-09-05 PROCEDURE — 85025 COMPLETE CBC W/AUTO DIFF WBC: CPT

## 2019-09-05 PROCEDURE — 83735 ASSAY OF MAGNESIUM: CPT

## 2019-09-05 PROCEDURE — 74011000250 HC RX REV CODE- 250: Performed by: INTERNAL MEDICINE

## 2019-09-05 PROCEDURE — 86430 RHEUMATOID FACTOR TEST QUAL: CPT

## 2019-09-05 PROCEDURE — 96361 HYDRATE IV INFUSION ADD-ON: CPT

## 2019-09-05 PROCEDURE — 86431 RHEUMATOID FACTOR QUANT: CPT

## 2019-09-05 PROCEDURE — 74011250636 HC RX REV CODE- 250/636: Performed by: INTERNAL MEDICINE

## 2019-09-05 PROCEDURE — 96374 THER/PROPH/DIAG INJ IV PUSH: CPT

## 2019-09-05 PROCEDURE — 85652 RBC SED RATE AUTOMATED: CPT

## 2019-09-05 PROCEDURE — 86038 ANTINUCLEAR ANTIBODIES: CPT

## 2019-09-05 PROCEDURE — 86140 C-REACTIVE PROTEIN: CPT

## 2019-09-05 RX ORDER — SODIUM CHLORIDE 0.9 % (FLUSH) 0.9 %
10 SYRINGE (ML) INJECTION AS NEEDED
Status: DISCONTINUED | OUTPATIENT
Start: 2019-09-05 | End: 2019-09-09 | Stop reason: HOSPADM

## 2019-09-05 RX ORDER — SODIUM CHLORIDE 9 MG/ML
500 INJECTION, SOLUTION INTRAVENOUS ONCE
Status: COMPLETED | OUTPATIENT
Start: 2019-09-05 | End: 2019-09-05

## 2019-09-05 RX ADMIN — Medication 10 ML: at 07:50

## 2019-09-05 RX ADMIN — PROMETHAZINE HYDROCHLORIDE 25 MG: 25 INJECTION INTRAMUSCULAR; INTRAVENOUS at 07:51

## 2019-09-05 RX ADMIN — SODIUM CHLORIDE 500 ML: 900 INJECTION, SOLUTION INTRAVENOUS at 08:01

## 2019-09-05 NOTE — PROGRESS NOTES
Arrived to the [facility]. IV fluids and IV phenergan completed. Patient tolerated well. Any issues or concerns during appointment: NO.  Patient aware of next infusion appointment on 09/12/19 (date) at [address] [place] [address] (time). Discharged ambulatory.

## 2019-09-06 LAB
ANA TITR SER IF: NEGATIVE {TITER}
RHEUMATOID FACT SER QL LA: NEGATIVE

## 2019-09-12 ENCOUNTER — HOSPITAL ENCOUNTER (OUTPATIENT)
Dept: INFUSION THERAPY | Age: 49
Discharge: HOME | End: 2019-09-12
Payer: MEDICARE

## 2019-09-12 VITALS
OXYGEN SATURATION: 92 % | TEMPERATURE: 98.9 F | DIASTOLIC BLOOD PRESSURE: 71 MMHG | SYSTOLIC BLOOD PRESSURE: 104 MMHG | HEART RATE: 109 BPM | RESPIRATION RATE: 18 BRPM

## 2019-09-12 LAB — MAGNESIUM SERPL-MCNC: 1.7 MG/DL (ref 1.8–2.4)

## 2019-09-12 PROCEDURE — 96365 THER/PROPH/DIAG IV INF INIT: CPT

## 2019-09-12 PROCEDURE — 83735 ASSAY OF MAGNESIUM: CPT

## 2019-09-12 PROCEDURE — 74011250636 HC RX REV CODE- 250/636: Performed by: INTERNAL MEDICINE

## 2019-09-12 PROCEDURE — 74011000250 HC RX REV CODE- 250: Performed by: INTERNAL MEDICINE

## 2019-09-12 PROCEDURE — 96375 TX/PRO/DX INJ NEW DRUG ADDON: CPT

## 2019-09-12 RX ORDER — SODIUM CHLORIDE 9 MG/ML
25 INJECTION, SOLUTION INTRAVENOUS ONCE
Status: COMPLETED | OUTPATIENT
Start: 2019-09-12 | End: 2019-09-12

## 2019-09-12 RX ORDER — MAGNESIUM SULFATE 1 G/100ML
1 INJECTION INTRAVENOUS ONCE
Status: COMPLETED | OUTPATIENT
Start: 2019-09-12 | End: 2019-09-12

## 2019-09-12 RX ORDER — SODIUM CHLORIDE 0.9 % (FLUSH) 0.9 %
10-40 SYRINGE (ML) INJECTION AS NEEDED
Status: DISCONTINUED | OUTPATIENT
Start: 2019-09-12 | End: 2019-09-16 | Stop reason: HOSPADM

## 2019-09-12 RX ADMIN — MAGNESIUM SULFATE HEPTAHYDRATE 1 G: 1 INJECTION, SOLUTION INTRAVENOUS at 08:27

## 2019-09-12 RX ADMIN — SODIUM CHLORIDE 25 ML/HR: 900 INJECTION, SOLUTION INTRAVENOUS at 07:36

## 2019-09-12 RX ADMIN — Medication 10 ML: at 07:36

## 2019-09-12 RX ADMIN — Medication 10 ML: at 09:15

## 2019-09-12 RX ADMIN — PROMETHAZINE HYDROCHLORIDE 25 MG: 25 INJECTION INTRAMUSCULAR; INTRAVENOUS at 07:40

## 2019-09-12 NOTE — PROGRESS NOTES
Arrived to the [facility]. Mg 1.7, patient received 1 gram magnesium replacement IV. Patient c/o nausea - IV Phenergan completed. Patient tolerated well. Port flushed. Any issues or concerns during appointment: None. Patient aware of next infusion appointment on 9/19 (date) at [address] [place] [address] (time). Discharged ambulatory in stable condition.

## 2019-09-19 ENCOUNTER — HOSPITAL ENCOUNTER (OUTPATIENT)
Dept: INFUSION THERAPY | Age: 49
Discharge: HOME | End: 2019-09-19
Payer: MEDICARE

## 2019-09-19 VITALS
TEMPERATURE: 98.1 F | HEART RATE: 100 BPM | OXYGEN SATURATION: 98 % | DIASTOLIC BLOOD PRESSURE: 68 MMHG | RESPIRATION RATE: 18 BRPM | SYSTOLIC BLOOD PRESSURE: 138 MMHG

## 2019-09-19 LAB — MAGNESIUM SERPL-MCNC: 1.8 MG/DL (ref 1.8–2.4)

## 2019-09-19 PROCEDURE — 74011250636 HC RX REV CODE- 250/636: Performed by: INTERNAL MEDICINE

## 2019-09-19 PROCEDURE — 83735 ASSAY OF MAGNESIUM: CPT

## 2019-09-19 PROCEDURE — 96374 THER/PROPH/DIAG INJ IV PUSH: CPT

## 2019-09-19 PROCEDURE — 74011000250 HC RX REV CODE- 250: Performed by: INTERNAL MEDICINE

## 2019-09-19 RX ORDER — SODIUM CHLORIDE 0.9 % (FLUSH) 0.9 %
5-10 SYRINGE (ML) INJECTION AS NEEDED
Status: DISCONTINUED | OUTPATIENT
Start: 2019-09-19 | End: 2019-09-23 | Stop reason: HOSPADM

## 2019-09-19 RX ADMIN — PROMETHAZINE HYDROCHLORIDE 25 MG: 25 INJECTION INTRAMUSCULAR; INTRAVENOUS at 07:35

## 2019-09-19 RX ADMIN — Medication 10 ML: at 07:25

## 2019-09-19 RX ADMIN — SODIUM CHLORIDE 500 ML: 900 INJECTION, SOLUTION INTRAVENOUS at 07:45

## 2019-09-19 NOTE — PROGRESS NOTES
Pt arrived ambulatory to C. Port dressing changed. Blood drawn and sent to lab. NS infusing. Phenergan given IV as ordered. Pt aware of next appt on 9/26/19 at [facility]. Port remains accessed for home use. Pt waiting for her dads infusion to be complete for ride home. Discharged ambulatory.

## 2019-09-26 ENCOUNTER — HOSPITAL ENCOUNTER (OUTPATIENT)
Dept: INFUSION THERAPY | Age: 49
Discharge: HOME | End: 2019-09-26
Payer: MEDICARE

## 2019-09-26 VITALS
DIASTOLIC BLOOD PRESSURE: 74 MMHG | RESPIRATION RATE: 16 BRPM | SYSTOLIC BLOOD PRESSURE: 138 MMHG | HEART RATE: 105 BPM | OXYGEN SATURATION: 95 % | TEMPERATURE: 98.4 F

## 2019-09-26 LAB — MAGNESIUM SERPL-MCNC: 1.7 MG/DL (ref 1.8–2.4)

## 2019-09-26 PROCEDURE — 96375 TX/PRO/DX INJ NEW DRUG ADDON: CPT

## 2019-09-26 PROCEDURE — 83735 ASSAY OF MAGNESIUM: CPT

## 2019-09-26 PROCEDURE — 74011250636 HC RX REV CODE- 250/636: Performed by: INTERNAL MEDICINE

## 2019-09-26 PROCEDURE — 96365 THER/PROPH/DIAG IV INF INIT: CPT

## 2019-09-26 PROCEDURE — 74011000250 HC RX REV CODE- 250: Performed by: INTERNAL MEDICINE

## 2019-09-26 RX ORDER — MAGNESIUM SULFATE 1 G/100ML
1 INJECTION INTRAVENOUS ONCE
Status: COMPLETED | OUTPATIENT
Start: 2019-09-26 | End: 2019-09-26

## 2019-09-26 RX ORDER — SODIUM CHLORIDE 0.9 % (FLUSH) 0.9 %
10-40 SYRINGE (ML) INJECTION AS NEEDED
Status: DISCONTINUED | OUTPATIENT
Start: 2019-09-26 | End: 2019-09-30 | Stop reason: HOSPADM

## 2019-09-26 RX ADMIN — Medication 10 ML: at 07:49

## 2019-09-26 RX ADMIN — Medication 10 ML: at 08:55

## 2019-09-26 RX ADMIN — PROMETHAZINE HYDROCHLORIDE 25 MG: 25 INJECTION INTRAMUSCULAR; INTRAVENOUS at 07:39

## 2019-09-26 RX ADMIN — MAGNESIUM SULFATE HEPTAHYDRATE 1 G: 1 INJECTION, SOLUTION INTRAVENOUS at 08:21

## 2019-09-26 RX ADMIN — Medication 10 ML: at 07:39

## 2019-10-03 ENCOUNTER — HOSPITAL ENCOUNTER (OUTPATIENT)
Dept: INFUSION THERAPY | Age: 49
Discharge: HOME | End: 2019-10-03
Payer: MEDICARE

## 2019-10-03 VITALS
HEART RATE: 103 BPM | OXYGEN SATURATION: 97 % | SYSTOLIC BLOOD PRESSURE: 118 MMHG | TEMPERATURE: 99.2 F | DIASTOLIC BLOOD PRESSURE: 80 MMHG | RESPIRATION RATE: 18 BRPM

## 2019-10-03 LAB — MAGNESIUM SERPL-MCNC: 2.1 MG/DL (ref 1.8–2.4)

## 2019-10-03 PROCEDURE — 83735 ASSAY OF MAGNESIUM: CPT

## 2019-10-03 PROCEDURE — 96374 THER/PROPH/DIAG INJ IV PUSH: CPT

## 2019-10-03 PROCEDURE — 74011000250 HC RX REV CODE- 250: Performed by: INTERNAL MEDICINE

## 2019-10-03 PROCEDURE — 96361 HYDRATE IV INFUSION ADD-ON: CPT

## 2019-10-03 PROCEDURE — 74011250636 HC RX REV CODE- 250/636: Performed by: INTERNAL MEDICINE

## 2019-10-03 RX ORDER — SODIUM CHLORIDE 9 MG/ML
500 INJECTION, SOLUTION INTRAVENOUS ONCE
Status: COMPLETED | OUTPATIENT
Start: 2019-10-03 | End: 2019-10-03

## 2019-10-03 RX ORDER — SODIUM CHLORIDE 0.9 % (FLUSH) 0.9 %
10-40 SYRINGE (ML) INJECTION AS NEEDED
Status: DISCONTINUED | OUTPATIENT
Start: 2019-10-03 | End: 2019-10-07 | Stop reason: HOSPADM

## 2019-10-03 RX ADMIN — PROMETHAZINE HYDROCHLORIDE 25 MG: 25 INJECTION INTRAMUSCULAR; INTRAVENOUS at 07:42

## 2019-10-03 RX ADMIN — SODIUM CHLORIDE 500 ML: 900 INJECTION, SOLUTION INTRAVENOUS at 08:30

## 2019-10-03 RX ADMIN — Medication 10 ML: at 07:53

## 2019-10-03 RX ADMIN — Medication 10 ML: at 07:41

## 2019-10-03 NOTE — PROGRESS NOTES
Arrived to the [facility]. Phenergan IVP and 500cc NS bolus completed. Patient tolerated well. Any issues or concerns during appointment: none. Patient aware of next infusion appointment on 10/10/19 at 7:15am.  Discharged ambulatory.

## 2019-10-17 ENCOUNTER — HOSPITAL ENCOUNTER (OUTPATIENT)
Dept: INFUSION THERAPY | Age: 49
Discharge: HOME | End: 2019-10-17
Payer: MEDICARE

## 2019-10-17 VITALS
HEART RATE: 112 BPM | BODY MASS INDEX: 31.28 KG/M2 | DIASTOLIC BLOOD PRESSURE: 77 MMHG | RESPIRATION RATE: 18 BRPM | SYSTOLIC BLOOD PRESSURE: 138 MMHG | OXYGEN SATURATION: 96 % | TEMPERATURE: 99.3 F | WEIGHT: 171 LBS

## 2019-10-17 LAB — MAGNESIUM SERPL-MCNC: 1.9 MG/DL (ref 1.8–2.4)

## 2019-10-17 PROCEDURE — 83735 ASSAY OF MAGNESIUM: CPT

## 2019-10-17 PROCEDURE — 96374 THER/PROPH/DIAG INJ IV PUSH: CPT

## 2019-10-17 PROCEDURE — 74011000250 HC RX REV CODE- 250: Performed by: INTERNAL MEDICINE

## 2019-10-17 PROCEDURE — 74011250636 HC RX REV CODE- 250/636: Performed by: INTERNAL MEDICINE

## 2019-10-17 RX ORDER — SODIUM CHLORIDE 0.9 % (FLUSH) 0.9 %
10-40 SYRINGE (ML) INJECTION AS NEEDED
Status: DISCONTINUED | OUTPATIENT
Start: 2019-10-17 | End: 2019-10-21 | Stop reason: HOSPADM

## 2019-10-17 RX ADMIN — Medication 20 ML: at 08:00

## 2019-10-17 RX ADMIN — PROMETHAZINE HYDROCHLORIDE 25 MG: 25 INJECTION INTRAMUSCULAR; INTRAVENOUS at 07:49

## 2019-10-17 RX ADMIN — Medication 10 ML: at 07:30

## 2019-10-17 NOTE — PROGRESS NOTES
Magnesium level 1.9 today. No replacement required. Patient received Phenergan 25 mg IV for c/o nausea. No further c/o at time of discharge. Patient discharged via ambulation accompanied by dad. Instructed to notify physician of any problems, questions or concerns after discharge. Next appointment is 10/24/19 at [address] [place] [address] with Jayson.

## 2019-10-24 ENCOUNTER — HOSPITAL ENCOUNTER (OUTPATIENT)
Dept: INFUSION THERAPY | Age: 49
Discharge: HOME | End: 2019-10-24
Payer: MEDICARE

## 2019-10-24 VITALS
DIASTOLIC BLOOD PRESSURE: 68 MMHG | TEMPERATURE: 98 F | BODY MASS INDEX: 31.83 KG/M2 | HEART RATE: 116 BPM | SYSTOLIC BLOOD PRESSURE: 110 MMHG | RESPIRATION RATE: 18 BRPM | OXYGEN SATURATION: 95 % | WEIGHT: 174 LBS

## 2019-10-24 LAB — MAGNESIUM SERPL-MCNC: 1.9 MG/DL (ref 1.8–2.4)

## 2019-10-24 PROCEDURE — 74011250636 HC RX REV CODE- 250/636: Performed by: INTERNAL MEDICINE

## 2019-10-24 PROCEDURE — 96374 THER/PROPH/DIAG INJ IV PUSH: CPT

## 2019-10-24 PROCEDURE — 83735 ASSAY OF MAGNESIUM: CPT

## 2019-10-24 PROCEDURE — 74011000250 HC RX REV CODE- 250: Performed by: INTERNAL MEDICINE

## 2019-10-24 RX ORDER — MAGNESIUM SULFATE HEPTAHYDRATE 40 MG/ML
2 INJECTION, SOLUTION INTRAVENOUS ONCE
Status: CANCELLED | OUTPATIENT
Start: 2019-10-24 | End: 2019-10-24

## 2019-10-24 RX ORDER — SODIUM CHLORIDE 0.9 % (FLUSH) 0.9 %
10-30 SYRINGE (ML) INJECTION AS NEEDED
Status: DISCONTINUED | OUTPATIENT
Start: 2019-10-24 | End: 2019-10-28 | Stop reason: HOSPADM

## 2019-10-24 RX ADMIN — PROMETHAZINE HYDROCHLORIDE 25 MG: 25 INJECTION INTRAMUSCULAR; INTRAVENOUS at 07:55

## 2019-10-24 RX ADMIN — Medication 20 ML: at 08:05

## 2019-10-24 RX ADMIN — Medication 20 ML: at 07:15

## 2019-10-24 NOTE — PROGRESS NOTES
Arrived to the [facility]. Phenergan 25mg IV completed. Mg level=1.9,no replacement needed   Patient instructed to report any side affects to ordering provider-Dr.Suzy Servin  Patient tolerated well   Any issues or concerns during appointment: No  Patient aware of next infusion appointment on Thursday,October 31st @ [email or web address]  Discharged home ambulatory

## 2019-10-31 ENCOUNTER — HOSPITAL ENCOUNTER (OUTPATIENT)
Age: 49
Setting detail: OUTPATIENT SURGERY
Discharge: HOME | End: 2019-10-31
Attending: INTERNAL MEDICINE | Admitting: INTERNAL MEDICINE
Payer: MEDICARE

## 2019-10-31 ENCOUNTER — ANESTHESIA (OUTPATIENT)
Dept: ENDOSCOPY | Age: 49
End: 2019-10-31
Payer: MEDICARE

## 2019-10-31 ENCOUNTER — APPOINTMENT (OUTPATIENT)
Dept: INFUSION THERAPY | Age: 49
End: 2019-10-31
Payer: MEDICARE

## 2019-10-31 ENCOUNTER — ANESTHESIA EVENT (OUTPATIENT)
Dept: ENDOSCOPY | Age: 49
End: 2019-10-31
Payer: MEDICARE

## 2019-10-31 VITALS
RESPIRATION RATE: 16 BRPM | WEIGHT: 175 LBS | TEMPERATURE: 97 F | HEART RATE: 76 BPM | DIASTOLIC BLOOD PRESSURE: 68 MMHG | HEIGHT: 62 IN | SYSTOLIC BLOOD PRESSURE: 108 MMHG | OXYGEN SATURATION: 98 % | BODY MASS INDEX: 32.2 KG/M2

## 2019-10-31 LAB — GLUCOSE BLD STRIP.AUTO-MCNC: 79 MG/DL (ref 65–100)

## 2019-10-31 PROCEDURE — 74011250636 HC RX REV CODE- 250/636: Performed by: ANESTHESIOLOGY

## 2019-10-31 PROCEDURE — 74011250636 HC RX REV CODE- 250/636: Performed by: NURSE ANESTHETIST, CERTIFIED REGISTERED

## 2019-10-31 PROCEDURE — 76060000031 HC ANESTHESIA FIRST 0.5 HR: Performed by: INTERNAL MEDICINE

## 2019-10-31 PROCEDURE — 74011000250 HC RX REV CODE- 250: Performed by: NURSE ANESTHETIST, CERTIFIED REGISTERED

## 2019-10-31 PROCEDURE — 74011000250 HC RX REV CODE- 250: Performed by: ANESTHESIOLOGY

## 2019-10-31 PROCEDURE — 76040000025: Performed by: INTERNAL MEDICINE

## 2019-10-31 PROCEDURE — 82962 GLUCOSE BLOOD TEST: CPT

## 2019-10-31 RX ORDER — LIDOCAINE HYDROCHLORIDE 20 MG/ML
INJECTION, SOLUTION EPIDURAL; INFILTRATION; INTRACAUDAL; PERINEURAL AS NEEDED
Status: DISCONTINUED | OUTPATIENT
Start: 2019-10-31 | End: 2019-10-31 | Stop reason: HOSPADM

## 2019-10-31 RX ORDER — DIPHENHYDRAMINE HYDROCHLORIDE 50 MG/ML
12.5 INJECTION, SOLUTION INTRAMUSCULAR; INTRAVENOUS ONCE
Status: DISCONTINUED | OUTPATIENT
Start: 2019-10-31 | End: 2019-10-31 | Stop reason: HOSPADM

## 2019-10-31 RX ORDER — PROPOFOL 10 MG/ML
INJECTION, EMULSION INTRAVENOUS AS NEEDED
Status: DISCONTINUED | OUTPATIENT
Start: 2019-10-31 | End: 2019-10-31 | Stop reason: HOSPADM

## 2019-10-31 RX ORDER — FENTANYL CITRATE 50 UG/ML
25 INJECTION, SOLUTION INTRAMUSCULAR; INTRAVENOUS ONCE
Status: DISCONTINUED | OUTPATIENT
Start: 2019-10-31 | End: 2019-10-31 | Stop reason: HOSPADM

## 2019-10-31 RX ORDER — TRIAMCINOLONE ACETONIDE 40 MG/ML
40 INJECTION, SUSPENSION INTRA-ARTICULAR; INTRAMUSCULAR ONCE
Status: DISCONTINUED | OUTPATIENT
Start: 2019-10-31 | End: 2019-10-31 | Stop reason: HOSPADM

## 2019-10-31 RX ORDER — SODIUM CHLORIDE, SODIUM LACTATE, POTASSIUM CHLORIDE, CALCIUM CHLORIDE 600; 310; 30; 20 MG/100ML; MG/100ML; MG/100ML; MG/100ML
100 INJECTION, SOLUTION INTRAVENOUS CONTINUOUS
Status: DISCONTINUED | OUTPATIENT
Start: 2019-10-31 | End: 2019-10-31 | Stop reason: HOSPADM

## 2019-10-31 RX ORDER — MIDAZOLAM HYDROCHLORIDE 1 MG/ML
2 INJECTION, SOLUTION INTRAMUSCULAR; INTRAVENOUS
Status: DISCONTINUED | OUTPATIENT
Start: 2019-10-31 | End: 2019-10-31 | Stop reason: HOSPADM

## 2019-10-31 RX ORDER — SODIUM CHLORIDE 0.9 % (FLUSH) 0.9 %
5-40 SYRINGE (ML) INJECTION EVERY 8 HOURS
Status: DISCONTINUED | OUTPATIENT
Start: 2019-10-31 | End: 2019-10-31 | Stop reason: HOSPADM

## 2019-10-31 RX ORDER — HYDROMORPHONE HYDROCHLORIDE 2 MG/ML
0.5 INJECTION, SOLUTION INTRAMUSCULAR; INTRAVENOUS; SUBCUTANEOUS
Status: DISCONTINUED | OUTPATIENT
Start: 2019-10-31 | End: 2019-10-31 | Stop reason: HOSPADM

## 2019-10-31 RX ORDER — OXYCODONE AND ACETAMINOPHEN 5; 325 MG/1; MG/1
1 TABLET ORAL AS NEEDED
Status: DISCONTINUED | OUTPATIENT
Start: 2019-10-31 | End: 2019-10-31 | Stop reason: HOSPADM

## 2019-10-31 RX ORDER — SODIUM CHLORIDE 0.9 % (FLUSH) 0.9 %
5-40 SYRINGE (ML) INJECTION AS NEEDED
Status: DISCONTINUED | OUTPATIENT
Start: 2019-10-31 | End: 2019-10-31 | Stop reason: HOSPADM

## 2019-10-31 RX ORDER — LIDOCAINE HYDROCHLORIDE 10 MG/ML
0.1 INJECTION, SOLUTION EPIDURAL; INFILTRATION; INTRACAUDAL; PERINEURAL AS NEEDED
Status: DISCONTINUED | OUTPATIENT
Start: 2019-10-31 | End: 2019-10-31 | Stop reason: HOSPADM

## 2019-10-31 RX ORDER — ONDANSETRON 2 MG/ML
INJECTION INTRAMUSCULAR; INTRAVENOUS AS NEEDED
Status: DISCONTINUED | OUTPATIENT
Start: 2019-10-31 | End: 2019-10-31 | Stop reason: HOSPADM

## 2019-10-31 RX ORDER — OXYCODONE HYDROCHLORIDE 5 MG/1
5 TABLET ORAL
Status: DISCONTINUED | OUTPATIENT
Start: 2019-10-31 | End: 2019-10-31 | Stop reason: HOSPADM

## 2019-10-31 RX ORDER — SODIUM CHLORIDE 9 MG/ML
50 INJECTION, SOLUTION INTRAVENOUS CONTINUOUS
Status: DISCONTINUED | OUTPATIENT
Start: 2019-10-31 | End: 2019-10-31 | Stop reason: HOSPADM

## 2019-10-31 RX ORDER — SODIUM CHLORIDE, SODIUM LACTATE, POTASSIUM CHLORIDE, CALCIUM CHLORIDE 600; 310; 30; 20 MG/100ML; MG/100ML; MG/100ML; MG/100ML
75 INJECTION, SOLUTION INTRAVENOUS CONTINUOUS
Status: DISCONTINUED | OUTPATIENT
Start: 2019-10-31 | End: 2019-10-31 | Stop reason: HOSPADM

## 2019-10-31 RX ORDER — FAMOTIDINE 20 MG/1
20 TABLET, FILM COATED ORAL ONCE
Status: DISCONTINUED | OUTPATIENT
Start: 2019-10-31 | End: 2019-10-31 | Stop reason: HOSPADM

## 2019-10-31 RX ORDER — MIDAZOLAM HYDROCHLORIDE 1 MG/ML
2 INJECTION, SOLUTION INTRAMUSCULAR; INTRAVENOUS ONCE
Status: DISCONTINUED | OUTPATIENT
Start: 2019-10-31 | End: 2019-10-31 | Stop reason: HOSPADM

## 2019-10-31 RX ORDER — SODIUM CHLORIDE 9 MG/ML
10 INJECTION, SOLUTION INTRAVENOUS CONTINUOUS
Status: DISCONTINUED | OUTPATIENT
Start: 2019-10-31 | End: 2019-10-31 | Stop reason: HOSPADM

## 2019-10-31 RX ORDER — PREGABALIN 50 MG/1
100 CAPSULE ORAL 2 TIMES DAILY
COMMUNITY

## 2019-10-31 RX ADMIN — SODIUM CHLORIDE, SODIUM LACTATE, POTASSIUM CHLORIDE, AND CALCIUM CHLORIDE: 600; 310; 30; 20 INJECTION, SOLUTION INTRAVENOUS at 08:34

## 2019-10-31 RX ADMIN — FAMOTIDINE 20 MG: 10 INJECTION, SOLUTION INTRAVENOUS at 07:59

## 2019-10-31 RX ADMIN — SODIUM CHLORIDE, SODIUM LACTATE, POTASSIUM CHLORIDE, AND CALCIUM CHLORIDE 75 ML/HR: 600; 310; 30; 20 INJECTION, SOLUTION INTRAVENOUS at 07:48

## 2019-10-31 RX ADMIN — PROPOFOL 25 MG: 10 INJECTION, EMULSION INTRAVENOUS at 08:42

## 2019-10-31 RX ADMIN — ONDANSETRON 4 MG: 2 INJECTION INTRAMUSCULAR; INTRAVENOUS at 08:45

## 2019-10-31 RX ADMIN — PROPOFOL 25 MG: 10 INJECTION, EMULSION INTRAVENOUS at 08:40

## 2019-10-31 RX ADMIN — PROPOFOL 50 MG: 10 INJECTION, EMULSION INTRAVENOUS at 08:37

## 2019-10-31 RX ADMIN — LIDOCAINE HYDROCHLORIDE 100 MG: 20 INJECTION, SOLUTION EPIDURAL; INFILTRATION; INTRACAUDAL; PERINEURAL at 08:37

## 2019-10-31 NOTE — H&P
PreProcedure H&P Update    Today's Date:  10/31/2019    CC:  dysphagia    Subjective:   HPI: dysphagia    PMH:  Past Medical History:   Diagnosis Date    Anemia     denies hx of blood transfusions-- Fe infusions 12/2017    Anxiety and depression     Asthma     allergy induced, denies any inhaler, patient denies    BMI 32.0-32.9,adult     BMI 32.2    C. difficile diarrhea 2013    in 1693 after complicated ERCP    Cervical dysplasia 1990s    Chronic insomnia     Chronic pain     all over     Chronic pancreatitis ([address]     Encounter for insertion of venous access port     Left chest port    Endometriosis     Esophageal stricture 2017    Fibromyalgia     Former cigarette smoker     GERD (gastroesophageal reflux disease)     daily meds---po and IV protonix- uses per port- alternates    HLD (hyperlipidemia)     pt denies     IBS (irritable bowel syndrome)     Migraine headache     Nausea & vomiting     pt reports she does better with phenergan than zofran - causes HA    Nonalcoholic fatty liver disease     PUD (peptic ulcer disease) 06/2017    Hx of     Sphincter of Oddi dysfunction     Type 2 diabetes mellitus ([facility])     insulin reliant/-160/ s.s of hypoglycemia at 90's, Last A1C 9.7 on 01/2019       Medications:   Current Facility-Administered Medications   Medication Dose Route Frequency    triamcinolone acetonide (KENALOG-40) 40 mg/mL injection 40 mg  40 mg IntraMUSCular ONCE    lidocaine (PF) (XYLOCAINE) 10 mg/mL (1 %) injection 0.1 mL  0.1 mL SubCUTAneous PRN    lactated Ringers infusion  75 mL/hr IntraVENous CONTINUOUS    lactated ringers bolus infusion 1,000 mL  1,000 mL IntraVENous ONCE    0.9% sodium chloride infusion  50 mL/hr IntraVENous CONTINUOUS    sodium chloride (NS) flush 5-40 mL  5-40 mL IntraVENous Q8H    sodium chloride (NS) flush 5-40 mL  5-40 mL IntraVENous PRN    famotidine (PEPCID) tablet 20 mg  20 mg Oral ONCE    fentaNYL citrate (PF) injection 25 mcg  25 mcg IntraVENous ONCE    midazolam (VERSED) injection 2 mg  2 mg IntraVENous ONCE PRN    midazolam (VERSED) injection 2 mg  2 mg IntraVENous ONCE    lactated Ringers infusion  100 mL/hr IntraVENous CONTINUOUS    0.9% sodium chloride infusion  10 mL/hr IntraVENous CONTINUOUS         Objective:   Vitals:  Visit Vitals  /76   Pulse 81   Temp 99.4 °F (37.4 °C)   Resp 14   Ht 5' 2\" (1.575 m)   Wt 79.4 kg (175 lb)   SpO2 96%   Breastfeeding? No   BMI 32.01 kg/m²     Exam:  General appearance: alert, cooperative, no distress  Lungs: clear to auscultation bilaterally anteriorly  Heart: regular rate and rhythm  Abdomen: soft, non-tender. Bowel sounds normal. No masses, no organomegaly      Data Review (Labs):    No results for input(s): WBC, HGB, HCT, PLT, MCV, NA, K, CL, CO2, BUN, CREA, CA, GLU, AP, SGOT, GPT, TBIL, CBIL, ALB, TP, AML, LPSE, PTP, INR, APTT, HGBEXT, HCTEXT, PLTEXT, INREXT in the last 72 hours. No lab exists for component: DBIL    Plan:     Dysphagia. Proceed with EGD dilation.

## 2019-10-31 NOTE — ANESTHESIA PREPROCEDURE EVALUATION
Relevant Problems   No relevant active problems       Anesthetic History     PONV          Review of Systems / Medical History  Patient summary reviewed and pertinent labs reviewed    Pulmonary            Asthma        Neuro/Psych         Headaches     Cardiovascular    Hypertension: well controlled          Hyperlipidemia  Pertinent negatives: No CAD  Exercise tolerance: >4 METS     GI/Hepatic/Renal     GERD      PUD and liver disease (fatty liver)    Comments: IBS  Sphincter of Oddi dysfunction  Chronic pancreatitis  Esophageal stricture  dysphagia Endo/Other    Diabetes: well controlled, type 2         Other Findings   Comments: Hx c dif  Anxiety/depression  fibromyalgia           Physical Exam    Airway  Mallampati: II  TM Distance: 4 - 6 cm  Neck ROM: normal range of motion   Mouth opening: Normal    Comments: Small mouth Cardiovascular  Regular rate and rhythm,  S1 and S2 normal,  no murmur, click, rub, or gallop  Rhythm: regular  Rate: normal         Dental  No notable dental hx       Pulmonary  Breath sounds clear to auscultation               Abdominal  GI exam deferred       Other Findings            Anesthetic Plan    ASA: 3  Anesthesia type: total IV anesthesia          Induction: Intravenous  Anesthetic plan and risks discussed with: Patient

## 2019-10-31 NOTE — DISCHARGE INSTRUCTIONS
Gastrointestinal Esophagogastroduodenoscopy (EGD) - Upper Exam Discharge Instructions    1. Call Dr. Ed Cason at [phone] for any problems or questions. 2. Contact the doctor's office for follow up appointment as directed. 3. Medication may cause drowsiness for several hours, therefore:  · Do not drive or operate machinery for remainder of the day. · No alcohol today. · Do not make any important or legal decisions for 24 hours. · Do not sign any legal documents for 24 hours. 5. Ordinarily, you may resume regular diet and activity after exam unless otherwise specified by your physician. 6. For mild soreness in your throat you may use Cepacol throat lozenges or warm salt-water gargles as needed. Any additional instructions:  Repeat dilation in 3 months. Instructions given to Swathi Sheridan and other family members.

## 2019-10-31 NOTE — PROGRESS NOTES
Port to left upper chest accessed when pt came to GI lab today. Flushed with 10 ml normal saline with positive blood return.

## 2019-10-31 NOTE — ROUTINE PROCESS
Vital signs stable. No complaints noted. Education given and reviewed with . Pt discharged via wheelchair by [facility].

## 2019-10-31 NOTE — PROCEDURES
Esophagogastroduodenoscopy    DATE of PROCEDURE: 10/31/2019    INDICATION: dysphagia, abnormal barium swallow    POSTPROCEDURE DIAGNOSIS: esophageal stricture; probable gastroparesis    MEDICATIONS ADMINISTERED: MAC anesthesia (see anesthesia report)    INSTRUMENT:    PROCEDURE:  After obtaining informed consent, the patient was placed in the left lateral position and sedated. The endoscope was advanced under direct vision without difficulty. The esophagus, stomach (including retroflexed views) and duodenum were evaluated. The patient was taken to the recovery area in stable condition. FINDINGS:  ESOPHAGUS: spastic but mucosa normal.  No obvious stricture and no tears with Guido #48 or 50 dilator. Mild distal linear tear with #52 dilator. Kenalog was drawn by RN but would not pull through the needle so did not do injection. STOMACH: gastrojejunostomy anastomosis clear. Some retained food. DUODENUM: normal    Estimated blood loss: 0-minimal   Specimens obtained during procedure: none    PLAN: Improvement of esophageal stricture with repeated dilations; still with esophageal spasms. EGD dilation in 3 months.

## 2019-10-31 NOTE — ANESTHESIA POSTPROCEDURE EVALUATION
Procedure(s):  ESOPHAGOGASTRODUODENOSCOPY (EGD)  ESOPHAGEAL DILATION. total IV anesthesia    Anesthesia Post Evaluation      Multimodal analgesia: multimodal analgesia used between 6 hours prior to anesthesia start to PACU discharge  Patient location during evaluation: bedside  Patient participation: complete - patient participated  Level of consciousness: awake  Pain management: adequate  Airway patency: patent  Anesthetic complications: no  Cardiovascular status: acceptable and stable  Respiratory status: acceptable and room air  Hydration status: acceptable  Post anesthesia nausea and vomiting:  none      Vitals Value Taken Time   /109 10/31/2019  8:55 AM   Temp 36.1 °C (97 °F) 10/31/2019  8:55 AM   Pulse 75 10/31/2019  9:04 AM   Resp 16 10/31/2019  8:55 AM   SpO2 98 % 10/31/2019  9:04 AM   Vitals shown include unvalidated device data.

## 2019-11-07 ENCOUNTER — HOSPITAL ENCOUNTER (OUTPATIENT)
Dept: INFUSION THERAPY | Age: 49
Discharge: HOME | End: 2019-11-07
Payer: MEDICARE

## 2019-11-07 VITALS
OXYGEN SATURATION: 95 % | SYSTOLIC BLOOD PRESSURE: 124 MMHG | RESPIRATION RATE: 18 BRPM | TEMPERATURE: 98.3 F | DIASTOLIC BLOOD PRESSURE: 75 MMHG | HEART RATE: 104 BPM

## 2019-11-07 LAB — MAGNESIUM SERPL-MCNC: 2 MG/DL (ref 1.8–2.4)

## 2019-11-07 PROCEDURE — 74011250636 HC RX REV CODE- 250/636: Performed by: INTERNAL MEDICINE

## 2019-11-07 PROCEDURE — 96374 THER/PROPH/DIAG INJ IV PUSH: CPT

## 2019-11-07 PROCEDURE — 74011000250 HC RX REV CODE- 250: Performed by: INTERNAL MEDICINE

## 2019-11-07 PROCEDURE — 83735 ASSAY OF MAGNESIUM: CPT

## 2019-11-07 RX ORDER — SODIUM CHLORIDE 0.9 % (FLUSH) 0.9 %
10 SYRINGE (ML) INJECTION EVERY 8 HOURS
Status: DISCONTINUED | OUTPATIENT
Start: 2019-11-07 | End: 2019-11-11 | Stop reason: HOSPADM

## 2019-11-07 RX ORDER — SODIUM CHLORIDE 0.9 % (FLUSH) 0.9 %
10-40 SYRINGE (ML) INJECTION AS NEEDED
Status: DISCONTINUED | OUTPATIENT
Start: 2019-11-07 | End: 2019-11-11 | Stop reason: HOSPADM

## 2019-11-07 RX ADMIN — Medication 10 ML: at 09:00

## 2019-11-07 RX ADMIN — Medication 20 ML: at 07:20

## 2019-11-07 RX ADMIN — PROMETHAZINE HYDROCHLORIDE 25 MG: 25 INJECTION INTRAMUSCULAR; INTRAVENOUS at 07:28

## 2019-11-07 NOTE — PROGRESS NOTES
Arrived to the [facility] ambulatory. promethazine completed. Patient tolerated well. Any issues or concerns during appointment: no, mag=2 today. Patient aware of next infusion appointment on 11/14 at 0715. Discharged to home ambulatory.

## 2019-11-14 ENCOUNTER — HOSPITAL ENCOUNTER (OUTPATIENT)
Dept: INFUSION THERAPY | Age: 49
Discharge: HOME | End: 2019-11-14
Payer: MEDICARE

## 2019-11-14 VITALS
HEART RATE: 100 BPM | OXYGEN SATURATION: 98 % | DIASTOLIC BLOOD PRESSURE: 76 MMHG | SYSTOLIC BLOOD PRESSURE: 110 MMHG | RESPIRATION RATE: 18 BRPM | TEMPERATURE: 98.6 F

## 2019-11-14 LAB — MAGNESIUM SERPL-MCNC: 1.9 MG/DL (ref 1.8–2.4)

## 2019-11-14 PROCEDURE — 83735 ASSAY OF MAGNESIUM: CPT

## 2019-11-14 PROCEDURE — 74011250636 HC RX REV CODE- 250/636: Performed by: INTERNAL MEDICINE

## 2019-11-14 PROCEDURE — 96374 THER/PROPH/DIAG INJ IV PUSH: CPT

## 2019-11-14 PROCEDURE — 74011000250 HC RX REV CODE- 250: Performed by: INTERNAL MEDICINE

## 2019-11-14 RX ORDER — SODIUM CHLORIDE 0.9 % (FLUSH) 0.9 %
10-40 SYRINGE (ML) INJECTION AS NEEDED
Status: DISCONTINUED | OUTPATIENT
Start: 2019-11-14 | End: 2019-11-18 | Stop reason: HOSPADM

## 2019-11-14 RX ADMIN — Medication 10 ML: at 08:05

## 2019-11-14 RX ADMIN — PROMETHAZINE HYDROCHLORIDE 25 MG: 25 INJECTION INTRAMUSCULAR; INTRAVENOUS at 07:55

## 2019-11-14 RX ADMIN — Medication 10 ML: at 07:38

## 2019-11-14 NOTE — PROGRESS NOTES
Arrived to the [facility]. Assessment completed. Lab reviewed. Patient received phenergan, as ordered. There were no further replacements needed today. Magnesium level was 1.9 today. Any issues or concerns during appointment: none. Patient aware of next infusion appointment on 11/21/19 (date) at [address] [place] [address] (time) with IV infusion center. Discharged ambulatory, per self. Patient instructed to call her doctor's office immediately for any problems or concerns. She verbalizes understanding.

## 2019-11-21 ENCOUNTER — HOSPITAL ENCOUNTER (OUTPATIENT)
Dept: INFUSION THERAPY | Age: 49
Discharge: HOME | End: 2019-11-21
Payer: MEDICARE

## 2019-11-21 LAB — MAGNESIUM SERPL-MCNC: 2 MG/DL (ref 1.8–2.4)

## 2019-11-21 PROCEDURE — 83735 ASSAY OF MAGNESIUM: CPT

## 2019-11-21 PROCEDURE — 74011000250 HC RX REV CODE- 250: Performed by: INTERNAL MEDICINE

## 2019-11-21 PROCEDURE — 74011250636 HC RX REV CODE- 250/636: Performed by: INTERNAL MEDICINE

## 2019-11-21 PROCEDURE — 96374 THER/PROPH/DIAG INJ IV PUSH: CPT

## 2019-11-21 RX ORDER — SODIUM CHLORIDE 0.9 % (FLUSH) 0.9 %
10 SYRINGE (ML) INJECTION AS NEEDED
Status: DISCONTINUED | OUTPATIENT
Start: 2019-11-21 | End: 2019-11-25 | Stop reason: HOSPADM

## 2019-11-21 RX ADMIN — Medication 10 ML: at 07:45

## 2019-11-21 RX ADMIN — PROMETHAZINE HYDROCHLORIDE 25 MG: 25 INJECTION INTRAMUSCULAR; INTRAVENOUS at 07:32

## 2019-11-21 NOTE — PROGRESS NOTES
Pt arrived ambulatory, port needle and dressing changed, labs drawn, mag 2.0, no replacement needed, phenergan given as ordered, pt tolerated well, discharged home ambulatory

## 2019-11-27 ENCOUNTER — HOSPITAL ENCOUNTER (OUTPATIENT)
Dept: INFUSION THERAPY | Age: 49
Discharge: HOME | End: 2019-11-27
Payer: MEDICARE

## 2019-11-27 VITALS
SYSTOLIC BLOOD PRESSURE: 122 MMHG | HEART RATE: 113 BPM | OXYGEN SATURATION: 91 % | DIASTOLIC BLOOD PRESSURE: 74 MMHG | TEMPERATURE: 98.3 F | RESPIRATION RATE: 20 BRPM

## 2019-11-27 LAB — MAGNESIUM SERPL-MCNC: 1.8 MG/DL (ref 1.8–2.4)

## 2019-11-27 PROCEDURE — 74011000250 HC RX REV CODE- 250: Performed by: INTERNAL MEDICINE

## 2019-11-27 PROCEDURE — 96374 THER/PROPH/DIAG INJ IV PUSH: CPT

## 2019-11-27 PROCEDURE — 83735 ASSAY OF MAGNESIUM: CPT

## 2019-11-27 PROCEDURE — 74011250636 HC RX REV CODE- 250/636: Performed by: INTERNAL MEDICINE

## 2019-11-27 RX ORDER — SODIUM CHLORIDE 0.9 % (FLUSH) 0.9 %
10 SYRINGE (ML) INJECTION AS NEEDED
Status: DISCONTINUED | OUTPATIENT
Start: 2019-11-27 | End: 2019-12-01 | Stop reason: HOSPADM

## 2019-11-27 RX ADMIN — PROMETHAZINE HYDROCHLORIDE 25 MG: 25 INJECTION INTRAMUSCULAR; INTRAVENOUS at 07:47

## 2019-11-27 RX ADMIN — Medication 10 ML: at 07:46

## 2019-11-27 NOTE — PROGRESS NOTES
Pt arrived ambulatory, Mag 1.8 replacement not needed, phenergan IV given per order, pt tolerated well, port needle and dressing changed, pt tolerated well, discharged home ambulatory

## 2019-12-05 ENCOUNTER — HOSPITAL ENCOUNTER (OUTPATIENT)
Dept: INFUSION THERAPY | Age: 49
Discharge: HOME | End: 2019-12-05
Payer: MEDICARE

## 2019-12-05 VITALS
HEART RATE: 106 BPM | DIASTOLIC BLOOD PRESSURE: 77 MMHG | OXYGEN SATURATION: 95 % | TEMPERATURE: 98.6 F | RESPIRATION RATE: 20 BRPM | SYSTOLIC BLOOD PRESSURE: 128 MMHG

## 2019-12-05 LAB — MAGNESIUM SERPL-MCNC: 2 MG/DL (ref 1.8–2.4)

## 2019-12-05 PROCEDURE — 74011250636 HC RX REV CODE- 250/636: Performed by: INTERNAL MEDICINE

## 2019-12-05 PROCEDURE — 96374 THER/PROPH/DIAG INJ IV PUSH: CPT

## 2019-12-05 PROCEDURE — 83735 ASSAY OF MAGNESIUM: CPT

## 2019-12-05 PROCEDURE — 74011000250 HC RX REV CODE- 250: Performed by: INTERNAL MEDICINE

## 2019-12-05 RX ADMIN — Medication 10 ML: at 08:20

## 2019-12-05 RX ADMIN — PROMETHAZINE HYDROCHLORIDE 25 MG: 25 INJECTION INTRAMUSCULAR; INTRAVENOUS at 08:25

## 2019-12-05 RX ADMIN — Medication 10 ML: at 08:35

## 2019-12-05 NOTE — PROGRESS NOTES
Arrived to infusion  No new concerns  Lab drawn, Mg 2.0  Port needle changed,tolerated well  Phenergan iv given per request/ order  Tolerated well. Next appt.  12/12/19

## 2019-12-07 RX ORDER — SODIUM CHLORIDE 0.9 % (FLUSH) 0.9 %
10 SYRINGE (ML) INJECTION EVERY 8 HOURS
Status: DISCONTINUED | OUTPATIENT
Start: 2019-12-05 | End: 2019-12-09 | Stop reason: HOSPADM

## 2019-12-12 ENCOUNTER — HOSPITAL ENCOUNTER (OUTPATIENT)
Dept: INFUSION THERAPY | Age: 49
Discharge: HOME | End: 2019-12-12
Payer: MEDICARE

## 2019-12-12 VITALS
RESPIRATION RATE: 18 BRPM | SYSTOLIC BLOOD PRESSURE: 133 MMHG | OXYGEN SATURATION: 96 % | HEART RATE: 96 BPM | DIASTOLIC BLOOD PRESSURE: 86 MMHG | TEMPERATURE: 98.6 F

## 2019-12-12 LAB — MAGNESIUM SERPL-MCNC: 1.9 MG/DL (ref 1.8–2.4)

## 2019-12-12 PROCEDURE — 74011000250 HC RX REV CODE- 250: Performed by: INTERNAL MEDICINE

## 2019-12-12 PROCEDURE — 96374 THER/PROPH/DIAG INJ IV PUSH: CPT

## 2019-12-12 PROCEDURE — 74011250636 HC RX REV CODE- 250/636: Performed by: INTERNAL MEDICINE

## 2019-12-12 PROCEDURE — 83735 ASSAY OF MAGNESIUM: CPT

## 2019-12-12 RX ORDER — SODIUM CHLORIDE 0.9 % (FLUSH) 0.9 %
10-30 SYRINGE (ML) INJECTION AS NEEDED
Status: DISCONTINUED | OUTPATIENT
Start: 2019-12-12 | End: 2019-12-16 | Stop reason: HOSPADM

## 2019-12-12 RX ADMIN — PROMETHAZINE HYDROCHLORIDE 25 MG: 25 INJECTION INTRAMUSCULAR; INTRAVENOUS at 07:37

## 2019-12-12 RX ADMIN — Medication 10 ML: at 07:18

## 2019-12-12 RX ADMIN — Medication 10 ML: at 08:15

## 2019-12-12 NOTE — PROGRESS NOTES
Arrived to the [address] Phenergan completed. No replacement needed Magnesium 1.9. Patient tolerated well. Port needle changed, flushed and left accessed for use at home. Any issues or concerns during appointment: None. Patient aware of next infusion appointment on 12/19/2019 (date) at 0715 (time). Discharged ambulatory in stable condition. Joe Beck

## 2019-12-17 ENCOUNTER — ANESTHESIA EVENT (OUTPATIENT)
Dept: ENDOSCOPY | Age: 49
End: 2019-12-17
Payer: MEDICARE

## 2019-12-17 RX ORDER — OXYCODONE AND ACETAMINOPHEN 10; 325 MG/1; MG/1
1 TABLET ORAL AS NEEDED
Status: CANCELLED | OUTPATIENT
Start: 2019-12-17

## 2019-12-17 RX ORDER — SODIUM CHLORIDE 0.9 % (FLUSH) 0.9 %
5-40 SYRINGE (ML) INJECTION EVERY 8 HOURS
Status: CANCELLED | OUTPATIENT
Start: 2019-12-17

## 2019-12-17 RX ORDER — OXYCODONE HYDROCHLORIDE 5 MG/1
5 TABLET ORAL
Status: CANCELLED | OUTPATIENT
Start: 2019-12-17 | End: 2019-12-18

## 2019-12-17 RX ORDER — HYDROMORPHONE HYDROCHLORIDE 2 MG/ML
0.5 INJECTION, SOLUTION INTRAMUSCULAR; INTRAVENOUS; SUBCUTANEOUS
Status: CANCELLED | OUTPATIENT
Start: 2019-12-17

## 2019-12-17 RX ORDER — SODIUM CHLORIDE 0.9 % (FLUSH) 0.9 %
5-40 SYRINGE (ML) INJECTION AS NEEDED
Status: CANCELLED | OUTPATIENT
Start: 2019-12-17

## 2019-12-17 NOTE — H&P
>      Patient:   Ashley Stearns  YOB: 1970   Date:                       12/18/19  This 46year old female presents for dysphagia. [place] History of Present Illness:  1.  dysphagia. The following are list of endoscopies over the past year. EGD 4-4-18: Saintclair Donna #42 with distal 1 cm tear. Colonoscopy 4-4-18: small TA polyp. EGD 5-3-18:  TTS balloon 15 mm with small tear. EGD 6-5-18: balloon 12 mm with tear and intense spasms. EGD 7-17-18: CRE balloon 15 mm with no tears; corkscrew contractions. EGD 8-1-18: CRE 13.5 mm  EGD 8-14-18: Saintclair Donna #48 with tear  EGD 8-31-18: Saintclair Donna #48 with moderate tears  EGD 9-14-18: CRE 15 mm balloon. Bx negative for EoE. EGD 9-27-18: Savary #51  EGD 10-26-18: Saintclair Donna #52 with significant tears. EGD 11-21-18: Saintclair Donna #50 with tear. EGD 12-29-18: Saintclair Donna #52 with minimal tear. EGD 2-4-19:  One cm deep defect distal esophagus (contained perforation?). Gastrograffin swallow: no free perforation. EGD 3-20-19: Saintclair Donna #52 with tear. EGD 5-15-19: Saintclair Donna #46 with tear. EGD 5-31-19: Saintclair Donna #48 with tear, retained food in stomach. EGD 6-11-19: Peggy Keepers #51 with no tears. EGD 10/31/19- 50 fr william- no tear- Malathi      Patient called 7-1-19 reporting worsening esophageal pains and nausea. She placed herself on CL diet. She states that she's dry heaving, gagging, and she's taking (oral/swallowed) NTG before breakfast and before dinner. She takes levsin at bedside also. The episodes are very unpredictable. She still feels as though food gets hung up. Her DM is under better control. She still gets IV 5 days a week. PROBLEM LIST:     Problem Description Onset Date Chronic Clinical Status Notes   GERD - Gastro-esophageal reflux disease 03/10/2011 Y  GERD w/o esophagitis. Fundoplication 7983. Bile gastritis May 2014. IBS - Irritable bowel syndrome 03/10/2011 Y  IBS constipation.    Nonalcoholic fatty liver disease 05/27/2012 Y  Fatty liver on CT scans [facility] May 2012). Dysfunction of sphincter of Oddi 03/10/2011 Y  [place] [facility] ([place] Oliva Paz) Nov 2006 ERCP: papillary stenosis/spasm, manometry w/elevated basal sphincter press c/w biliary sphincter HTN, elev basal pancreatic sphincter pressures; Dual sphincterotomy. Interim pain free spell from 2006 to March 2009. ERCP June 2009: elev pancreatic/biliary pressure (residual sphincter rather than stenosis); both orifices cut. EGD/ERCP [facility]) June 2010 negative. Relapsed March 2011 (lipase >2000). Acute pancreatitis March 2011. ERCP 4-29-11: Pancreatic stent [facility]). Admission 5-26-11 for lipase >2200. Admit 2-16-12 for pancreatitis (lipase 5920). [facility] [place] Ashley Lopez consult 4-11-12: surgical open sphincteroplasty 5-3-12. Hospitalized 1-14 to 1-14-13. ERCP 3-5-13 w duodenal perforation. [facility] turned patient down for re-consult 9-3-13. Pain Management Aug 2013. On weekly to 2x weekly IVF hydration with iv Dilaudid 2 mg iv Phenergan 25 mg iv. Feb 18 to Feb 23, 2014: daily IVF + Dilaudid 4 mg + Phenergan. Recurrent acute pancreatitis 05/31/2011 Y  Acute pancreatitis March 2011. ERCP 4-29-11: Pancreatic stent [facility]). Admission 5-26-11 for lipase >2200. Admit 2-16-12 for pancreatitis (lipase 5920). [facility] Dr Ashley Lopez consult 4-11-12: surgical open sphincteroplasty 5-3-12. Hospitalized 1-14 to 1-14-13. ERCP 3-5-13 w duodenal perforation. [facility] turned patient down for re-consult 9-3-13. Pain Management Aug 2013. On weekly to 2x weekly IVF hydration with iv Dilaudid 2 mg iv Phenergan 25 mg iv. Feb 18 to Feb 23, 2014: daily IVF + Dilaudid 4 mg + Phenergan. Chronic abdominal pain 06/28/2013 Y  Nausea since early 2012. Zofran doesn't help with nausea any better than Phenergan. Lortab, Percocet and oxycodone causes nausea. Scopolamine patch didn't work (blistering rash).      Past Medical/Surgical History:   (Detailed)  Disease/disorder Onset Date Management Date Comments EUS+EGD ([facility]) 6-25-10: negative 2010 2010    ERCP ([facility]) 6-26-09: residual biliary sphincter 2009 Dual sphincterotomy 2009    EGD 12-8-06: no pancreatic stent 12/08/2006 2006    ERCP ([facility]) 11-17-06: SOD biliary,pancreatic 11/17/2006 Dual sphincterotomy 2006    EGD 4-7-04 (epig pn): intact fundoplication 0350  2264    Colonoscopy 12-4-00 (low pn, change BH): IH 2000 2000    EGD 12-4-00 (CP, epig pn): normal 2000 2000    Hysterectomy laparoscopic (intact ovaries) [id number] [facility] fundoplication 2603 for GERD 1997 1997    Cholecystectomy laparoscopic (dyskinesia) 1997 1997 1997    esophageal stricture- 39 fr thomas  EGD 08/17/2017    UGI series 6-2-14 2014 Contrast passed readily through the gastrojejunomstomy anastomosis; no anastamosis stricture. EGD 5-29-14 (nausea)  Dr Marcelino Neff 2014 New surgical changes (gastrojejunostomy anastamosis on posterior body of stomach is widely open with 2 limbs?); diffuse severe bile gastritis; bile secreted from ampulla, duodenum \"dead end\". Ulcerative esophagitis. ERCP 3-5-13 (MRI 2-21-13: 2 mm CBD defect)  Dr Rhonda Garcia 2013 Balloon sweep of CBD sludge; surgical changes of surgical sphincteroplasty. Diabetes mellitus, type 1  PCP 2013 Patient doesn't know her HgbA1c. EGD 2-5-13 (epig pain)  Dr Marcelino Neff 2013 Minimal gastritis. Oversew of duodenal perforation March 2013  Dr Jorgito Chew 2013 Given location of oversew, monitor for potential outlet obstruction (verbal communication). 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 Stent REMOVED by cystoscopy 6-27-13. To be monitored by Dr Gabriela Gamez. MRCP 7-26-13 (N/V/pain)   2013 No acute findings. Exp Lap 4-25-13  Dr Gilberto Avilez, Dr Karyle Baumgarten 2013 Drainage of retroperitoneal abscess, peritonitis, drainage of intraabdominal abscess, duodenal resection, retrocolic gastrojejunostomy, abd washout.    Exp Lap 5-13-13  Dr Karyle Baumgarten 2013 Drainage and debridement of persistent R retroperitoneal abscess. (IR also had drainage tubes which they are monitoring along w Dr Jonelle Denny). EUS 12 Indeterminate (3 criteria) for chronic pancreatitis. No strictures. Minimal dilation of PD to 3 mm. Open transduodenal sphincteroplasty 5-3-12  Dr Raines Camp [facility] surgery)  Tight PD, firm pancreas w scar tissue c/w multiple bouts pancreatitis. Postop complication: Enterobacter arogenes of RUQP abscess, empyema (Rx 4 wks ertapenem). ERP [facility]) 11 SOD 11p; probable stenosis of major papilla orifice (elevated pressures but in sawtooth pattern not typical of sphincter HTN); dilation done and PD stent placed x 1 month. If not better, then surgical intervention. EGD 11 (to remove pancreatic stent)       EGD 6-29-15 (severe DANE)  Dr Valery Alvarenga  Hemorrhagic gastritis with flecks of blood, actively bleeding anastamotic 6 mm HP polyp snared. Esophageal ulcers: Esophageal biopsies (Bx: inflamed squamous mucosa). EGD 17 (CP)  Dr Valery Alvarenga  Resolution of the edema, ulcers previously seen but a residual tight GEJ stricture;  scope; dilated to 10 mm without tears. EGD 17  Dr Solo Chavez #36 with Kenalog injection. EGD 17  Dr Solo Chavez #40 of tight esophageal stricture. EGD Oct 2017 to 2018 for esophageal dilations  Various GI  [address] [facility] 2018 - [address] [facility] [facility] 2018 -10-25-17: 11 mm balloon; 17: 15 mm with Kenalog; 17: 18 mm with Kenalog; 18: #44F (moderate tear) with Kenalog   Endoscopy summary from 2018 to 2019    [address] [facility] [facility] 2019 -EGD 18: Lemmie [place] #42 with distal 1 cm tear. Colonoscopy 18: small TA polyp. EGD 5-3-18:  TTS balloon 15 mm with small tear. EGD 18: balloon 12 mm with tear and intense spasms. EGD 18: CRE balloon 15 mm with no tears; corkscrew contractions. EGD 18: CRE 13.5 mm  EGD 18: Lemmie [place] #48 with tear  EGD 18: Lemmie Deonte #48 with moderate tears  EGD 18: CRE 15 mm balloon.   Bx negative for EoE. EGD 18: Savary #51  EGD 10-26-18: Anthony Grenville #52 with significant tears. EGD 18: Anthony Grenville #50 with tear. EGD 18: Anthony Grenville #52 with minimal tear. EGD 19:  One cm deep defect distal esophagus (contained perforation?). Gastrograffin swallow: no free perforation. EGD 3-20-19: Anthony Grenville #52 with tear. EGD 5-15-19: Anthony Grenville #46 with tear. EGD 19: Anthony Grenville #48 with tear, retained food in stomach. EGD 19: Jennett Bump #51 with no tears. EGD 17 (dysphagia, pc epig pain)  Dr Siva Keene  Severe distal esophageal stricture w edema, narrowing ( scope). Gastrojejunostomy anastomosis patent, no bleeding. Guido #18, 21 and 24 dilators, no tears. EGD 3-8-16 (odynophagia)  Dr Siva Keene  Normal esophagus; slight bleeding anastamosis of posterior wall (no ulcer seen); surgical changes distal stomach clear. EGD 14 (N/V, epig pain)  Dr Siva Keene  Ulcerative esophagitis, diffuse bile gastritis, wide anastamosis in posterior body of stomach with 2 limbs; bile extruding from ampulla (duodenal \"dead end\"). EUS 2016  Dr Rhea Holt, [facility]  Indeterminate pancreatic parenchymal changes. MRCP 3-12-15    Normal pancreas. No CBC/PD dilation. Fibromyalgia worse w/stress + Ca supplements       Headache, migraine       Hypotension, recurrent since 2015    Probably from lopressor (was for tachycardia), muscle relaxant, lisinopril (for renal protection), dehydration (IVF help). Patient states not due to narcotics since she has had presyncope before taking any. Nutrition 5-18-15: mild malnutrtion: rec: protein meals       Nutrition Consult 16  Anderson Mora, RD, [facility]   Rec: 4-6 sm meals; < 50 gram fat/day; liquid sources of protein (eg Glucerna)   Tachycardia since duodenal surgeries  Dr Susan Brown of beta blockers due to recurrent hypotension.    UGI series 6-2-14    Oral contrast emptied readily through patent gastrojejunostomy (no anastamotic stricture noted), limited small bowel exam.     Procedure History  Test Date Results Interp   EGD 2017 Esophageal Stricture    EGD 2017 Esophageal Stricture    EGD 2017 History of bypass gastrojejunostomy, Benign esophageal stricture    EGD 2016 Gastritis    EGD 2015 Gastric polyp, Gastritis, Pill esophagitis    EGD 2014 Bile reflux gastritis    EGD 2013 Gastritis      Family History:  (Detailed)  Relationship Family Member Name  Age at Death Condition Onset Age Cause of Death       Family history of Parents with DM  N   Father    MDS  N       Social History:  (Detailed)  Preferred language is English. Education/Employment/Occupation:  Employment History Status Retired Restrictions            MARITAL STATUS/FAMILY/SOCIAL SUPPORT  Currently . Smoking status: Former smoker. ALCOHOL  There is no history of alcohol use. CAFFEINE  The patient uses caffeine.     Current Medications:  Medication Name Sig Desc   Carafate 100 mg/mL oral suspension take 10 milliliter by oral route 4 times every day on an empty stomach 1 hour before meals and at bedtime   atorvastatin 10 mg tablet take 1 tablet by oral route  every day   morphine takes 10mg 3 times a day   Lyrica take 1 capsule by oral route 2 times every day   Nitrostat 0.4 mg sublingual tablet PLACE 1 TAB SUBLINGUALL AT SIGN OF PAIN REPEAT EVERY 5 MIN UNTIL RELIEF NO MORE THN 3 TABS IN 15 MIN   HYOSCYAMINE 0.125 MG TAB SL PLACE 1 TABLET BY SUBLINGUAL ROUTE EVERY 6 HOURS AS NEEDED FOR ABDOMINAL PAIN/CRAMPING   Ativan 1 mg tablet take 1 tablet by oral route 3 times every day as needed   promethazine 25 mg tablet TAKE 1 TABLET EVERY 4 TO 6 HOURS AS NEEDED   promethazine 25 mg rectal suppository insert 1 suppository (25MG)  by rectal route up to 4  times every day prn nausea   ondansetron 8 mg disintegrating tablet take 1 tablet (8MG)  by oral route  every 8 hours TID prn and place on top of the tongue where it will dissolve, then swallow hydrocortisone acetate 25 mg rectal suppository insert 1 suppository by rectal route 2 times every day for 7 days then as needed   cyanocobalamin (vit B-12) 1,000 mcg/mL injection solution inject 1 (1000MCG)  by intramuscular route  every week   Protonix 40 mg tablet,delayed release take 1 tablet by oral route 2 times every day   Tresiba FlexTouch U-100 insulin 100 unit/mL (3 mL) subcutaneous pen inject 40 units qhs by subcutaneous route as per insulin protocol   Miralax 17 gram/dose oral powder take (17G)  by oral route  every day mixed with 8 oz. water, juice, soda, coffee or tea   Novolog Mix 70-30 FlexPen 100 unit/mL subcutaneous pen inject by subcutaneous route as per insulin protocol   citalopram 20 mg tablet take 1 tablet by oral route  every day   Ambien 10 mg tablet take 1 tablet by oral route  every day prn     Allergies:  Ingredient Reaction (Severity) Medication Name Comment   ADHESIVE TAPE Hives / Skin Rash  PAPER TAPE   BARIUM SULFATE rapid heartbeat, sweating     INSULIN LISPRO nausea Humalog humalog   METRONIDAZOLE nausea Flagyl flagyl   METRONIDAZOLE HCL nausea Flagyl flagyl   Reviewed, no changes. Exam- NEURO-alert and oriented x 3    HEENT-normal, no icterus, nl conjunctiva    LUNGS-clear to auscultation and percussion       COR-rrr w/o murmur,gallop,or rub        ABD-soft, non distended, good bowel sounds,non tender        EXT-w/o edema      Vital Signs:  BP mm/Hg Pulse Resp Pulse Ox Temp F Ht (Total in.) Weight (lbs.) Weight (oz.) BMI   130/88 104 16   62.00 168.00  30.73   Assessment/Plan:  # Detail Type Description    1. Assessment Non-intractable cyclical vomiting with nausea (G43.A0). Provider Plan Patient with a constellation of nausea, vomiting, pains, regurgitation which I cannot tell how much of it is esophageal spasms, chronic pancreatitis, esophageal stricture.  I still think it is less likely to be a stricture because she just had a dilation where a [phone] Valeria Rea went down without any tears. I reviewed her endoscopies from over a year ago and I think this is the first time a good size dilator was able to be passed without a tear. It will be highly highly unusual for her to re-form a stricture in such a short period of time. Therefore, to avoid unnecessary endoscopies and risks associated with it, I am going to order a barium swallow with tablets. If the tablet passes, then she does not need an immediate dilation. Then the challenges what to do with her esophageal spasms. I may have to increase the nitroglycerin/Levsin. Next step pending barium swallow. PT SEEN AND EXAMINED AND PLAN DISCUSSED AND IMPLEMENTED.   Rashi Santiago MD

## 2019-12-18 ENCOUNTER — ANESTHESIA (OUTPATIENT)
Dept: ENDOSCOPY | Age: 49
End: 2019-12-18
Payer: MEDICARE

## 2019-12-18 ENCOUNTER — HOSPITAL ENCOUNTER (OUTPATIENT)
Age: 49
Setting detail: OUTPATIENT SURGERY
Discharge: HOME | End: 2019-12-18
Attending: INTERNAL MEDICINE | Admitting: INTERNAL MEDICINE
Payer: MEDICARE

## 2019-12-18 VITALS
RESPIRATION RATE: 18 BRPM | BODY MASS INDEX: 32.2 KG/M2 | HEART RATE: 82 BPM | HEIGHT: 62 IN | SYSTOLIC BLOOD PRESSURE: 135 MMHG | OXYGEN SATURATION: 93 % | DIASTOLIC BLOOD PRESSURE: 80 MMHG | WEIGHT: 175 LBS | TEMPERATURE: 97 F

## 2019-12-18 LAB — GLUCOSE BLD STRIP.AUTO-MCNC: 168 MG/DL (ref 65–100)

## 2019-12-18 PROCEDURE — 74011000250 HC RX REV CODE- 250: Performed by: NURSE ANESTHETIST, CERTIFIED REGISTERED

## 2019-12-18 PROCEDURE — 74011250636 HC RX REV CODE- 250/636: Performed by: NURSE ANESTHETIST, CERTIFIED REGISTERED

## 2019-12-18 PROCEDURE — 76040000025: Performed by: INTERNAL MEDICINE

## 2019-12-18 PROCEDURE — 76060000031 HC ANESTHESIA FIRST 0.5 HR: Performed by: INTERNAL MEDICINE

## 2019-12-18 PROCEDURE — 74011250636 HC RX REV CODE- 250/636: Performed by: ANESTHESIOLOGY

## 2019-12-18 PROCEDURE — 74011000250 HC RX REV CODE- 250: Performed by: ANESTHESIOLOGY

## 2019-12-18 PROCEDURE — 74011250636 HC RX REV CODE- 250/636

## 2019-12-18 PROCEDURE — 82962 GLUCOSE BLOOD TEST: CPT

## 2019-12-18 RX ORDER — ONDANSETRON 2 MG/ML
4 INJECTION INTRAMUSCULAR; INTRAVENOUS
Status: CANCELLED | OUTPATIENT
Start: 2019-12-18

## 2019-12-18 RX ORDER — LIDOCAINE HYDROCHLORIDE 20 MG/ML
INJECTION, SOLUTION EPIDURAL; INFILTRATION; INTRACAUDAL; PERINEURAL AS NEEDED
Status: DISCONTINUED | OUTPATIENT
Start: 2019-12-18 | End: 2019-12-18 | Stop reason: HOSPADM

## 2019-12-18 RX ORDER — TRIAMCINOLONE ACETONIDE 40 MG/ML
40 INJECTION, SUSPENSION INTRA-ARTICULAR; INTRAMUSCULAR ONCE
Status: DISCONTINUED | OUTPATIENT
Start: 2019-12-18 | End: 2019-12-18 | Stop reason: HOSPADM

## 2019-12-18 RX ORDER — SODIUM CHLORIDE, SODIUM LACTATE, POTASSIUM CHLORIDE, CALCIUM CHLORIDE 600; 310; 30; 20 MG/100ML; MG/100ML; MG/100ML; MG/100ML
75 INJECTION, SOLUTION INTRAVENOUS CONTINUOUS
Status: DISCONTINUED | OUTPATIENT
Start: 2019-12-18 | End: 2019-12-18 | Stop reason: HOSPADM

## 2019-12-18 RX ORDER — HEPARIN 100 UNIT/ML
500 SYRINGE INTRAVENOUS AS NEEDED
Status: DISCONTINUED | OUTPATIENT
Start: 2019-12-18 | End: 2019-12-18

## 2019-12-18 RX ORDER — PROMETHAZINE HYDROCHLORIDE 25 MG/ML
INJECTION, SOLUTION INTRAMUSCULAR; INTRAVENOUS
Status: DISCONTINUED
Start: 2019-12-18 | End: 2019-12-18 | Stop reason: HOSPADM

## 2019-12-18 RX ORDER — ONDANSETRON 2 MG/ML
INJECTION INTRAMUSCULAR; INTRAVENOUS
Status: COMPLETED
Start: 2019-12-18 | End: 2019-12-18

## 2019-12-18 RX ORDER — PROPOFOL 10 MG/ML
INJECTION, EMULSION INTRAVENOUS AS NEEDED
Status: DISCONTINUED | OUTPATIENT
Start: 2019-12-18 | End: 2019-12-18 | Stop reason: HOSPADM

## 2019-12-18 RX ORDER — GLYCOPYRROLATE 0.2 MG/ML
INJECTION INTRAMUSCULAR; INTRAVENOUS AS NEEDED
Status: DISCONTINUED | OUTPATIENT
Start: 2019-12-18 | End: 2019-12-18 | Stop reason: HOSPADM

## 2019-12-18 RX ORDER — PROPOFOL 10 MG/ML
INJECTION, EMULSION INTRAVENOUS
Status: DISCONTINUED | OUTPATIENT
Start: 2019-12-18 | End: 2019-12-18 | Stop reason: HOSPADM

## 2019-12-18 RX ADMIN — LIDOCAINE HYDROCHLORIDE 80 MG: 20 INJECTION, SOLUTION EPIDURAL; INFILTRATION; INTRACAUDAL; PERINEURAL at 10:07

## 2019-12-18 RX ADMIN — PROPOFOL 80 MG: 10 INJECTION, EMULSION INTRAVENOUS at 10:08

## 2019-12-18 RX ADMIN — GLYCOPYRROLATE 0.1 MG: 0.2 INJECTION INTRAMUSCULAR; INTRAVENOUS at 10:07

## 2019-12-18 RX ADMIN — PROPOFOL 40 MG: 10 INJECTION, EMULSION INTRAVENOUS at 10:18

## 2019-12-18 RX ADMIN — PROPOFOL 50 MG: 10 INJECTION, EMULSION INTRAVENOUS at 10:15

## 2019-12-18 RX ADMIN — PROPOFOL 40 MG: 10 INJECTION, EMULSION INTRAVENOUS at 10:11

## 2019-12-18 RX ADMIN — PROPOFOL 30 MG: 10 INJECTION, EMULSION INTRAVENOUS at 10:10

## 2019-12-18 RX ADMIN — PROMETHAZINE HYDROCHLORIDE 3 MG: 25 INJECTION INTRAMUSCULAR; INTRAVENOUS at 10:40

## 2019-12-18 RX ADMIN — ONDANSETRON 4 MG: 2 INJECTION INTRAMUSCULAR; INTRAVENOUS at 11:06

## 2019-12-18 RX ADMIN — PROPOFOL 160 MCG/KG/MIN: 10 INJECTION, EMULSION INTRAVENOUS at 10:07

## 2019-12-18 RX ADMIN — SODIUM CHLORIDE, SODIUM LACTATE, POTASSIUM CHLORIDE, AND CALCIUM CHLORIDE 75 ML/HR: 600; 310; 30; 20 INJECTION, SOLUTION INTRAVENOUS at 10:00

## 2019-12-18 NOTE — ANESTHESIA POSTPROCEDURE EVALUATION
Procedure(s):  ESOPHAGOGASTRODUODENOSCOPY (EGD)/ POSS INJECTION / BMI 31  ESOPHAGEAL DILATION. total IV anesthesia    Anesthesia Post Evaluation      Multimodal analgesia: multimodal analgesia used between 6 hours prior to anesthesia start to PACU discharge  Patient location during evaluation: PACU  Patient participation: complete - patient participated  Level of consciousness: awake  Pain management: adequate  Airway patency: patent  Anesthetic complications: no  Respiratory status: acceptable  Post anesthesia nausea and vomiting:  controlled      Vitals Value Taken Time   /74 12/18/2019 10:28 AM   Temp 36.1 °C (97 °F) 12/18/2019 10:28 AM   Pulse 80 12/18/2019 10:33 AM   Resp 17 12/18/2019 10:28 AM   SpO2 98 % 12/18/2019 10:32 AM   Vitals shown include unvalidated device data.

## 2019-12-18 NOTE — ANESTHESIA PREPROCEDURE EVALUATION
Relevant Problems   No relevant active problems       Anesthetic History     PONV          Review of Systems / Medical History  Patient summary reviewed and pertinent labs reviewed    Pulmonary            Asthma        Neuro/Psych         Headaches     Cardiovascular    Hypertension: well controlled          Hyperlipidemia  Pertinent negatives: No CAD  Exercise tolerance: >4 METS     GI/Hepatic/Renal     GERD      PUD and liver disease (fatty liver)    Comments: IBS  Sphincter of Oddi dysfunction  Chronic pancreatitis  Esophageal stricture  Dysphagia  LEIVA Endo/Other    Diabetes: well controlled, type 2    Morbid obesity     Other Findings   Comments: Hx c dif  Anxiety/depression  fibromyalgia           Physical Exam    Airway  Mallampati: II  TM Distance: 4 - 6 cm  Neck ROM: normal range of motion   Mouth opening: Normal    Comments: Small mouth Cardiovascular  Regular rate and rhythm,  S1 and S2 normal,  no murmur, click, rub, or gallop  Rhythm: regular  Rate: normal         Dental  No notable dental hx       Pulmonary  Breath sounds clear to auscultation               Abdominal  GI exam deferred       Other Findings            Anesthetic Plan    ASA: 3  Anesthesia type: total IV anesthesia          Induction: Intravenous  Anesthetic plan and risks discussed with: Patient

## 2019-12-18 NOTE — DISCHARGE INSTRUCTIONS
Gastrointestinal Esophagogastroduodenoscopy (EGD) - Upper Exam Discharge Instructions    1. Call Dr. Griselda Jerez at [phone] for any problems or questions. 2. Contact the doctor's office for follow up appointment as directed. 3. Medication may cause drowsiness for several hours, therefore:  · Do not drive or operate machinery for remainder of the day. · No alcohol today. · Do not make any important or legal decisions for 24 hours. · Do not sign any legal documents for 24 hours. 5. Ordinarily, you may resume regular diet and activity after exam unless otherwise specified by your physician. 6. For mild soreness in your throat you may use Cepacol throat lozenges or warm salt-water gargles as needed. Any additional instructions: Follow up with MD Antionette Pabon    Instructions given to Osbaldo Fitzgerald and other family members.

## 2019-12-18 NOTE — ROUTINE PROCESS
VSS, Pt no longer c/o nausea. Discharge instructions given to patient and family. Pt wheeled down to car via wheelchair with staff.

## 2019-12-18 NOTE — PROCEDURES
ESOPHAGOGASTRODUODENOSCOPY    DATE of PROCEDURE: 12/18/2019    PT NAME: Gracie Rosales     [id number]    MEDICATION:   MAC     INSTRUMENT: GIFH 190    SPECIAL PROCEDURE: thomas 05,95,19,94,46 fr william  BLOOD LOSS- 0 or min. SPEC- no  IMPLANT- none    PROCEDURE:  Standard EGD with dilation      ASSESSMENT:  1.  Esophageal stricture- UES and LES- slight trauma with the larger dilators and slight resistance, particularly at the UES    PLAN:   1. F/U Dr. Jake Bruce MD

## 2019-12-19 ENCOUNTER — HOSPITAL ENCOUNTER (OUTPATIENT)
Dept: INFUSION THERAPY | Age: 49
Discharge: HOME | End: 2019-12-19
Payer: MEDICARE

## 2019-12-19 VITALS
OXYGEN SATURATION: 99 % | DIASTOLIC BLOOD PRESSURE: 84 MMHG | TEMPERATURE: 98.2 F | HEART RATE: 94 BPM | RESPIRATION RATE: 18 BRPM | SYSTOLIC BLOOD PRESSURE: 154 MMHG

## 2019-12-19 LAB — MAGNESIUM SERPL-MCNC: 2 MG/DL (ref 1.8–2.4)

## 2019-12-19 PROCEDURE — 74011250636 HC RX REV CODE- 250/636: Performed by: INTERNAL MEDICINE

## 2019-12-19 PROCEDURE — 96374 THER/PROPH/DIAG INJ IV PUSH: CPT

## 2019-12-19 PROCEDURE — 74011000250 HC RX REV CODE- 250: Performed by: INTERNAL MEDICINE

## 2019-12-19 PROCEDURE — 83735 ASSAY OF MAGNESIUM: CPT

## 2019-12-19 RX ORDER — SODIUM CHLORIDE 0.9 % (FLUSH) 0.9 %
10-40 SYRINGE (ML) INJECTION ONCE
Status: COMPLETED | OUTPATIENT
Start: 2019-12-19 | End: 2019-12-19

## 2019-12-19 RX ADMIN — Medication 10 ML: at 08:01

## 2019-12-19 RX ADMIN — PROMETHAZINE HYDROCHLORIDE 25 MG: 25 INJECTION INTRAMUSCULAR; INTRAVENOUS at 07:51

## 2019-12-19 NOTE — PROGRESS NOTES
Arrived to the [facility]. Port change, blood draw & IV phenergan completed. Patient tolerated well. No Mg+ replacement needed. Any issues or concerns during appointment: None. Patient aware of next infusion appointment on 12/26 (date) at [address] [place] [address] (time). Discharged ambulatory in stable condition.

## 2019-12-26 ENCOUNTER — HOSPITAL ENCOUNTER (OUTPATIENT)
Dept: INFUSION THERAPY | Age: 49
Discharge: HOME | End: 2019-12-26
Payer: MEDICARE

## 2019-12-26 VITALS
HEART RATE: 107 BPM | DIASTOLIC BLOOD PRESSURE: 87 MMHG | OXYGEN SATURATION: 97 % | SYSTOLIC BLOOD PRESSURE: 134 MMHG | WEIGHT: 168.4 LBS | TEMPERATURE: 98.1 F | BODY MASS INDEX: 30.8 KG/M2 | RESPIRATION RATE: 18 BRPM

## 2019-12-26 LAB — MAGNESIUM SERPL-MCNC: 2 MG/DL (ref 1.8–2.4)

## 2019-12-26 PROCEDURE — 74011000250 HC RX REV CODE- 250: Performed by: INTERNAL MEDICINE

## 2019-12-26 PROCEDURE — 74011250636 HC RX REV CODE- 250/636: Performed by: INTERNAL MEDICINE

## 2019-12-26 PROCEDURE — 83735 ASSAY OF MAGNESIUM: CPT

## 2019-12-26 PROCEDURE — 96374 THER/PROPH/DIAG INJ IV PUSH: CPT

## 2019-12-26 RX ORDER — SODIUM CHLORIDE 0.9 % (FLUSH) 0.9 %
10 SYRINGE (ML) INJECTION EVERY 8 HOURS
Status: DISCONTINUED | OUTPATIENT
Start: 2019-12-26 | End: 2019-12-30 | Stop reason: HOSPADM

## 2019-12-26 RX ADMIN — PROMETHAZINE HYDROCHLORIDE 25 MG: 25 INJECTION INTRAMUSCULAR; INTRAVENOUS at 07:40

## 2019-12-26 RX ADMIN — Medication 10 ML: at 08:30

## 2019-12-26 NOTE — PROGRESS NOTES
Arrived to the [facility] ambulatory. Port lab draw and promethazine completed. Patient tolerated well. Any issues or concerns during appointment: no.  Patient aware of next infusion appointment on 1/2/2020 at [facility] . Discharged to home ambulatory.

## 2020-01-02 ENCOUNTER — HOSPITAL ENCOUNTER (OUTPATIENT)
Dept: INFUSION THERAPY | Age: 50
Discharge: HOME | End: 2020-01-02
Payer: MEDICARE

## 2020-01-02 VITALS
TEMPERATURE: 98.5 F | DIASTOLIC BLOOD PRESSURE: 81 MMHG | OXYGEN SATURATION: 95 % | RESPIRATION RATE: 18 BRPM | SYSTOLIC BLOOD PRESSURE: 124 MMHG | HEART RATE: 103 BPM

## 2020-01-02 LAB — MAGNESIUM SERPL-MCNC: 1.8 MG/DL (ref 1.8–2.4)

## 2020-01-02 PROCEDURE — 83735 ASSAY OF MAGNESIUM: CPT

## 2020-01-02 PROCEDURE — 74011250636 HC RX REV CODE- 250/636: Performed by: INTERNAL MEDICINE

## 2020-01-02 PROCEDURE — 74011000250 HC RX REV CODE- 250: Performed by: INTERNAL MEDICINE

## 2020-01-02 PROCEDURE — 96374 THER/PROPH/DIAG INJ IV PUSH: CPT

## 2020-01-02 RX ORDER — SODIUM CHLORIDE 0.9 % (FLUSH) 0.9 %
10 SYRINGE (ML) INJECTION AS NEEDED
Status: CANCELLED | OUTPATIENT
Start: 2020-01-02 | End: 2020-01-02

## 2020-01-02 RX ORDER — SODIUM CHLORIDE 0.9 % (FLUSH) 0.9 %
10-30 SYRINGE (ML) INJECTION AS NEEDED
Status: DISCONTINUED | OUTPATIENT
Start: 2020-01-02 | End: 2020-01-06 | Stop reason: HOSPADM

## 2020-01-02 RX ADMIN — Medication 10 ML: at 07:53

## 2020-01-02 RX ADMIN — PROMETHAZINE HYDROCHLORIDE 25 MG: 25 INJECTION INTRAMUSCULAR; INTRAVENOUS at 07:48

## 2020-01-02 RX ADMIN — Medication 10 ML: at 07:28

## 2020-01-02 NOTE — PROGRESS NOTES
Arrived to the [facility]. Port needle change and IV Phenergan completed. Patient tolerated well. Any issues or concerns during appointment: none. Patient aware of next infusion appointment on 1/9 (date) at 7:15 AM (time). Discharged ambulatory.

## 2020-01-09 ENCOUNTER — HOSPITAL ENCOUNTER (OUTPATIENT)
Dept: INFUSION THERAPY | Age: 50
Discharge: HOME | End: 2020-01-09
Payer: MEDICARE

## 2020-01-09 VITALS
OXYGEN SATURATION: 98 % | SYSTOLIC BLOOD PRESSURE: 127 MMHG | TEMPERATURE: 98 F | RESPIRATION RATE: 18 BRPM | HEART RATE: 98 BPM | DIASTOLIC BLOOD PRESSURE: 96 MMHG

## 2020-01-09 LAB — MAGNESIUM SERPL-MCNC: 1.8 MG/DL (ref 1.8–2.4)

## 2020-01-09 PROCEDURE — 83735 ASSAY OF MAGNESIUM: CPT

## 2020-01-09 PROCEDURE — 74011000250 HC RX REV CODE- 250: Performed by: INTERNAL MEDICINE

## 2020-01-09 PROCEDURE — 74011250636 HC RX REV CODE- 250/636: Performed by: INTERNAL MEDICINE

## 2020-01-09 PROCEDURE — 96374 THER/PROPH/DIAG INJ IV PUSH: CPT

## 2020-01-09 RX ORDER — SODIUM CHLORIDE 0.9 % (FLUSH) 0.9 %
10 SYRINGE (ML) INJECTION AS NEEDED
Status: ACTIVE | OUTPATIENT
Start: 2020-01-09 | End: 2020-01-09

## 2020-01-09 RX ADMIN — Medication 10 ML: at 08:07

## 2020-01-09 RX ADMIN — PROMETHAZINE HYDROCHLORIDE 25 MG: 25 INJECTION INTRAMUSCULAR; INTRAVENOUS at 08:07

## 2020-01-09 RX ADMIN — Medication 10 ML: at 08:13

## 2020-01-09 NOTE — PROGRESS NOTES
Arrived to the [facility]. IV Phenergan, labs, and PORT dressing change completed. Patient tolerated well. Any issues or concerns during appointment: none. Patient aware of next infusion appointment on 1/16 (date) at 7:15 AM (time). Discharged ambulatory.

## 2020-01-16 ENCOUNTER — HOSPITAL ENCOUNTER (OUTPATIENT)
Dept: INFUSION THERAPY | Age: 50
Discharge: HOME | End: 2020-01-16
Payer: MEDICARE

## 2020-01-16 VITALS
TEMPERATURE: 98.1 F | SYSTOLIC BLOOD PRESSURE: 134 MMHG | HEART RATE: 100 BPM | RESPIRATION RATE: 18 BRPM | DIASTOLIC BLOOD PRESSURE: 93 MMHG | OXYGEN SATURATION: 98 %

## 2020-01-16 LAB — MAGNESIUM SERPL-MCNC: 1.7 MG/DL (ref 1.8–2.4)

## 2020-01-16 PROCEDURE — 74011250636 HC RX REV CODE- 250/636: Performed by: INTERNAL MEDICINE

## 2020-01-16 PROCEDURE — 96375 TX/PRO/DX INJ NEW DRUG ADDON: CPT

## 2020-01-16 PROCEDURE — 83735 ASSAY OF MAGNESIUM: CPT

## 2020-01-16 PROCEDURE — 74011000250 HC RX REV CODE- 250: Performed by: INTERNAL MEDICINE

## 2020-01-16 PROCEDURE — 96365 THER/PROPH/DIAG IV INF INIT: CPT

## 2020-01-16 RX ORDER — SODIUM CHLORIDE 0.9 % (FLUSH) 0.9 %
10-30 SYRINGE (ML) INJECTION AS NEEDED
Status: DISCONTINUED | OUTPATIENT
Start: 2020-01-16 | End: 2020-01-20 | Stop reason: HOSPADM

## 2020-01-16 RX ORDER — MAGNESIUM SULFATE 1 G/100ML
1 INJECTION INTRAVENOUS ONCE
Status: COMPLETED | OUTPATIENT
Start: 2020-01-16 | End: 2020-01-16

## 2020-01-16 RX ADMIN — Medication 10 ML: at 08:10

## 2020-01-16 RX ADMIN — Medication 10 ML: at 07:36

## 2020-01-16 RX ADMIN — MAGNESIUM SULFATE HEPTAHYDRATE 1 G: 1 INJECTION, SOLUTION INTRAVENOUS at 09:00

## 2020-01-16 RX ADMIN — PROMETHAZINE HYDROCHLORIDE 25 MG: 25 INJECTION INTRAMUSCULAR; INTRAVENOUS at 07:59

## 2020-01-16 RX ADMIN — Medication 10 ML: at 09:45

## 2020-01-16 NOTE — PROGRESS NOTES
Arrived to the [address] [place] [address] Street level noted to be 1.7. IV Phenergan and one gram of magnesium completed. Patient tolerated well. Port needle changed, flushed and left accessed for use at home. Any issues or concerns during appointment: None. Patient aware of next infusion appointment on 1/23/2020 (date) at 0715 (time). Discharged ambulatory in stable condition. Johnathon Parson

## 2020-01-23 ENCOUNTER — HOSPITAL ENCOUNTER (OUTPATIENT)
Dept: INFUSION THERAPY | Age: 50
Discharge: HOME | End: 2020-01-23
Payer: MEDICARE

## 2020-01-23 VITALS
DIASTOLIC BLOOD PRESSURE: 87 MMHG | TEMPERATURE: 98.3 F | RESPIRATION RATE: 18 BRPM | SYSTOLIC BLOOD PRESSURE: 139 MMHG | HEART RATE: 96 BPM | OXYGEN SATURATION: 96 %

## 2020-01-23 LAB — MAGNESIUM SERPL-MCNC: 1.7 MG/DL (ref 1.8–2.4)

## 2020-01-23 PROCEDURE — 74011250636 HC RX REV CODE- 250/636: Performed by: INTERNAL MEDICINE

## 2020-01-23 PROCEDURE — 96365 THER/PROPH/DIAG IV INF INIT: CPT

## 2020-01-23 PROCEDURE — 96375 TX/PRO/DX INJ NEW DRUG ADDON: CPT

## 2020-01-23 PROCEDURE — 74011000250 HC RX REV CODE- 250: Performed by: INTERNAL MEDICINE

## 2020-01-23 PROCEDURE — 83735 ASSAY OF MAGNESIUM: CPT

## 2020-01-23 RX ORDER — SODIUM CHLORIDE 0.9 % (FLUSH) 0.9 %
5-10 SYRINGE (ML) INJECTION AS NEEDED
Status: DISCONTINUED | OUTPATIENT
Start: 2020-01-23 | End: 2020-01-27 | Stop reason: HOSPADM

## 2020-01-23 RX ORDER — MAGNESIUM SULFATE 1 G/100ML
1 INJECTION INTRAVENOUS ONCE
Status: COMPLETED | OUTPATIENT
Start: 2020-01-23 | End: 2020-01-23

## 2020-01-23 RX ADMIN — MAGNESIUM SULFATE HEPTAHYDRATE 1 G: 1 INJECTION, SOLUTION INTRAVENOUS at 08:48

## 2020-01-23 RX ADMIN — Medication 10 ML: at 07:45

## 2020-01-23 RX ADMIN — SODIUM CHLORIDE 500 ML: 900 INJECTION, SOLUTION INTRAVENOUS at 07:49

## 2020-01-23 RX ADMIN — Medication 10 ML: at 09:20

## 2020-01-23 RX ADMIN — PROMETHAZINE HYDROCHLORIDE 25 MG: 25 INJECTION INTRAMUSCULAR; INTRAVENOUS at 07:58

## 2020-01-23 NOTE — PROGRESS NOTES
Arrived ambulatory to [facility]. Port accessed and blood drawn and sent to lab. Phenergan iv as ordered. Mag 1.7. 1 gm of mag given iv. Port flushed and remains accessed for home use. Aware of next appt on 1/20/2020 at [address] [place] [address]. Discharged ambulatory.

## 2020-01-30 ENCOUNTER — HOSPITAL ENCOUNTER (OUTPATIENT)
Dept: INFUSION THERAPY | Age: 50
Discharge: HOME | End: 2020-01-30
Payer: MEDICARE

## 2020-01-30 VITALS
TEMPERATURE: 98.6 F | SYSTOLIC BLOOD PRESSURE: 129 MMHG | OXYGEN SATURATION: 94 % | HEART RATE: 109 BPM | RESPIRATION RATE: 20 BRPM | DIASTOLIC BLOOD PRESSURE: 79 MMHG

## 2020-01-30 LAB — MAGNESIUM SERPL-MCNC: 2.1 MG/DL (ref 1.8–2.4)

## 2020-01-30 PROCEDURE — 74011250636 HC RX REV CODE- 250/636: Performed by: INTERNAL MEDICINE

## 2020-01-30 PROCEDURE — 96374 THER/PROPH/DIAG INJ IV PUSH: CPT

## 2020-01-30 PROCEDURE — 83735 ASSAY OF MAGNESIUM: CPT

## 2020-01-30 PROCEDURE — 74011000250 HC RX REV CODE- 250: Performed by: INTERNAL MEDICINE

## 2020-01-30 PROCEDURE — 36593 DECLOT VASCULAR DEVICE: CPT

## 2020-01-30 RX ORDER — SODIUM CHLORIDE 0.9 % (FLUSH) 0.9 %
10 SYRINGE (ML) INJECTION AS NEEDED
Status: DISCONTINUED | OUTPATIENT
Start: 2020-01-30 | End: 2020-02-03 | Stop reason: HOSPADM

## 2020-01-30 RX ADMIN — Medication 10 ML: at 07:38

## 2020-01-30 RX ADMIN — ALTEPLASE 2 MG: 2.2 INJECTION, POWDER, LYOPHILIZED, FOR SOLUTION INTRAVENOUS at 07:49

## 2020-01-30 RX ADMIN — PROMETHAZINE HYDROCHLORIDE 25 MG: 25 INJECTION INTRAMUSCULAR; INTRAVENOUS at 07:39

## 2020-01-30 NOTE — PROGRESS NOTES
Pt arrived ambulatory, no blood return from port, labs drawn peripherally, cathflo in port for 1 hr, with positive BR, port changed per protocol, no mag bolus needed, mag 2.1, phenergan given IVP, pt discharged home ambulatory

## 2020-02-06 ENCOUNTER — HOSPITAL ENCOUNTER (OUTPATIENT)
Dept: INFUSION THERAPY | Age: 50
Discharge: HOME | End: 2020-02-06
Payer: MEDICARE

## 2020-02-06 VITALS
HEART RATE: 116 BPM | SYSTOLIC BLOOD PRESSURE: 132 MMHG | RESPIRATION RATE: 18 BRPM | OXYGEN SATURATION: 97 % | TEMPERATURE: 99.2 F | DIASTOLIC BLOOD PRESSURE: 68 MMHG

## 2020-02-06 LAB — MAGNESIUM SERPL-MCNC: 2 MG/DL (ref 1.8–2.4)

## 2020-02-06 PROCEDURE — 74011250636 HC RX REV CODE- 250/636: Performed by: INTERNAL MEDICINE

## 2020-02-06 PROCEDURE — 96374 THER/PROPH/DIAG INJ IV PUSH: CPT

## 2020-02-06 PROCEDURE — 74011000250 HC RX REV CODE- 250: Performed by: INTERNAL MEDICINE

## 2020-02-06 PROCEDURE — 83735 ASSAY OF MAGNESIUM: CPT

## 2020-02-06 RX ORDER — SODIUM CHLORIDE 0.9 % (FLUSH) 0.9 %
10 SYRINGE (ML) INJECTION AS NEEDED
Status: ACTIVE | OUTPATIENT
Start: 2020-02-06 | End: 2020-02-06

## 2020-02-06 RX ADMIN — Medication 10 ML: at 07:46

## 2020-02-06 RX ADMIN — Medication 10 ML: at 07:25

## 2020-02-06 RX ADMIN — PROMETHAZINE HYDROCHLORIDE 25 MG: 25 INJECTION INTRAMUSCULAR; INTRAVENOUS at 07:39

## 2020-02-06 NOTE — PROGRESS NOTES
Arrived to the [facility]. PORT dressing change, labs, and IV phenergan completed. Patient tolerated well. Any issues or concerns during appointment: none. Patient aware of next infusion appointment on 2/13 (date) at 7:15 AM (time). Discharged ambulatory.

## 2020-02-13 ENCOUNTER — HOSPITAL ENCOUNTER (OUTPATIENT)
Dept: INFUSION THERAPY | Age: 50
Discharge: HOME | End: 2020-02-13
Payer: MEDICARE

## 2020-02-13 VITALS
DIASTOLIC BLOOD PRESSURE: 73 MMHG | RESPIRATION RATE: 18 BRPM | TEMPERATURE: 97.2 F | HEART RATE: 96 BPM | SYSTOLIC BLOOD PRESSURE: 102 MMHG | OXYGEN SATURATION: 97 %

## 2020-02-13 LAB
ALBUMIN SERPL-MCNC: 3.8 G/DL (ref 3.5–5)
ALBUMIN/GLOB SERPL: 1 {RATIO} (ref 1.2–3.5)
ALP SERPL-CCNC: 142 U/L (ref 50–136)
ALT SERPL-CCNC: 32 U/L (ref 12–65)
ANION GAP SERPL CALC-SCNC: 6 MMOL/L (ref 7–16)
APPEARANCE UR: CLEAR
AST SERPL-CCNC: 21 U/L (ref 15–37)
BASOPHILS # BLD: 0 K/UL (ref 0–0.2)
BASOPHILS NFR BLD: 1 % (ref 0–2)
BILIRUB DIRECT SERPL-MCNC: 0.1 MG/DL
BILIRUB SERPL-MCNC: 0.4 MG/DL (ref 0.2–1.1)
BILIRUB UR QL: NEGATIVE
BUN SERPL-MCNC: 16 MG/DL (ref 6–23)
CALCIUM SERPL-MCNC: 9.2 MG/DL (ref 8.3–10.4)
CHLORIDE SERPL-SCNC: 104 MMOL/L (ref 98–107)
CHOLEST SERPL-MCNC: 189 MG/DL
CO2 SERPL-SCNC: 26 MMOL/L (ref 21–32)
COLOR UR: YELLOW
CREAT SERPL-MCNC: 0.69 MG/DL (ref 0.6–1)
DIFFERENTIAL METHOD BLD: ABNORMAL
EOSINOPHIL # BLD: 0.1 K/UL (ref 0–0.8)
EOSINOPHIL NFR BLD: 1 % (ref 0.5–7.8)
ERYTHROCYTE [DISTWIDTH] IN BLOOD BY AUTOMATED COUNT: 12.2 % (ref 11.9–14.6)
EST. AVERAGE GLUCOSE BLD GHB EST-MCNC: 189 MG/DL
GLOBULIN SER CALC-MCNC: 4 G/DL (ref 2.3–3.5)
GLUCOSE SERPL-MCNC: 184 MG/DL (ref 65–100)
GLUCOSE UR STRIP.AUTO-MCNC: 250 MG/DL
HBA1C MFR BLD: 8.2 % (ref 4.8–6)
HCT VFR BLD AUTO: 41.9 % (ref 35.8–46.3)
HDLC SERPL-MCNC: 53 MG/DL (ref 40–60)
HDLC SERPL: 3.6 {RATIO}
HGB BLD-MCNC: 13.6 G/DL (ref 11.7–15.4)
HGB UR QL STRIP: NEGATIVE
IMM GRANULOCYTES # BLD AUTO: 0 K/UL (ref 0–0.5)
IMM GRANULOCYTES NFR BLD AUTO: 1 % (ref 0–5)
KETONES UR QL STRIP.AUTO: NEGATIVE MG/DL
LDLC SERPL CALC-MCNC: 102.6 MG/DL
LEUKOCYTE ESTERASE UR QL STRIP.AUTO: NEGATIVE
LIPID PROFILE,FLP: ABNORMAL
LYMPHOCYTES # BLD: 1.4 K/UL (ref 0.5–4.6)
LYMPHOCYTES NFR BLD: 21 % (ref 13–44)
MAGNESIUM SERPL-MCNC: 2.2 MG/DL (ref 1.8–2.4)
MCH RBC QN AUTO: 26.5 PG (ref 26.1–32.9)
MCHC RBC AUTO-ENTMCNC: 32.5 G/DL (ref 31.4–35)
MCV RBC AUTO: 81.5 FL (ref 79.6–97.8)
MONOCYTES # BLD: 0.4 K/UL (ref 0.1–1.3)
MONOCYTES NFR BLD: 6 % (ref 4–12)
NEUTS SEG # BLD: 4.7 K/UL (ref 1.7–8.2)
NEUTS SEG NFR BLD: 70 % (ref 43–78)
NITRITE UR QL STRIP.AUTO: NEGATIVE
NRBC # BLD: 0 K/UL (ref 0–0.2)
PH UR STRIP: 7 [PH] (ref 5–9)
PLATELET # BLD AUTO: 155 K/UL (ref 150–450)
PMV BLD AUTO: 12.7 FL (ref 9.4–12.3)
POTASSIUM SERPL-SCNC: 4 MMOL/L (ref 3.5–5.1)
PROT SERPL-MCNC: 7.8 G/DL (ref 6.3–8.2)
PROT UR STRIP-MCNC: NEGATIVE MG/DL
RBC # BLD AUTO: 5.14 M/UL (ref 4.05–5.25)
SODIUM SERPL-SCNC: 136 MMOL/L (ref 136–145)
SP GR UR REFRACTOMETRY: 1.01 (ref 1–1.02)
TRIGL SERPL-MCNC: 167 MG/DL (ref 35–150)
TSH SERPL DL<=0.005 MIU/L-ACNC: 0.7 UIU/ML (ref 0.36–3.74)
UROBILINOGEN UR QL STRIP.AUTO: 0.2 EU/DL (ref 0.2–1)
VLDLC SERPL CALC-MCNC: 33.4 MG/DL (ref 6–23)
WBC # BLD AUTO: 6.7 K/UL (ref 4.3–11.1)

## 2020-02-13 PROCEDURE — 74011250636 HC RX REV CODE- 250/636: Performed by: INTERNAL MEDICINE

## 2020-02-13 PROCEDURE — 80061 LIPID PANEL: CPT

## 2020-02-13 PROCEDURE — 80076 HEPATIC FUNCTION PANEL: CPT

## 2020-02-13 PROCEDURE — 83036 HEMOGLOBIN GLYCOSYLATED A1C: CPT

## 2020-02-13 PROCEDURE — 85025 COMPLETE CBC W/AUTO DIFF WBC: CPT

## 2020-02-13 PROCEDURE — 80048 BASIC METABOLIC PNL TOTAL CA: CPT

## 2020-02-13 PROCEDURE — 84443 ASSAY THYROID STIM HORMONE: CPT

## 2020-02-13 PROCEDURE — 74011000250 HC RX REV CODE- 250: Performed by: INTERNAL MEDICINE

## 2020-02-13 PROCEDURE — 83735 ASSAY OF MAGNESIUM: CPT

## 2020-02-13 PROCEDURE — 81003 URINALYSIS AUTO W/O SCOPE: CPT

## 2020-02-13 PROCEDURE — 96374 THER/PROPH/DIAG INJ IV PUSH: CPT

## 2020-02-13 RX ORDER — SODIUM CHLORIDE 0.9 % (FLUSH) 0.9 %
5-10 SYRINGE (ML) INJECTION EVERY 8 HOURS
Status: COMPLETED | OUTPATIENT
Start: 2020-02-13 | End: 2020-02-13

## 2020-02-13 RX ADMIN — PROMETHAZINE HYDROCHLORIDE 25 MG: 25 INJECTION INTRAMUSCULAR; INTRAVENOUS at 08:48

## 2020-02-13 RX ADMIN — Medication 10 ML: at 09:20

## 2020-02-13 RX ADMIN — SODIUM CHLORIDE 500 ML: 900 INJECTION, SOLUTION INTRAVENOUS at 08:48

## 2020-02-13 NOTE — PROGRESS NOTES
Arrived to the [facility]. Port needle changed and labs draw completed. Patient tolerated well. Patient given IV phenergan for c/co nausea. No replacements needed. Port flushed and left accessed for use at home. Any issues or concerns during appointment: None. Patient aware of next infusion appointment on 2/20 (date) at [address] [place] [address] (time). Discharged ambulatory in stable condition.

## 2020-02-20 ENCOUNTER — HOSPITAL ENCOUNTER (OUTPATIENT)
Dept: INFUSION THERAPY | Age: 50
Discharge: HOME | End: 2020-02-20
Payer: MEDICARE

## 2020-02-20 VITALS
SYSTOLIC BLOOD PRESSURE: 134 MMHG | OXYGEN SATURATION: 98 % | RESPIRATION RATE: 20 BRPM | HEART RATE: 117 BPM | TEMPERATURE: 98.2 F | DIASTOLIC BLOOD PRESSURE: 87 MMHG

## 2020-02-20 LAB — MAGNESIUM SERPL-MCNC: 1.8 MG/DL (ref 1.8–2.4)

## 2020-02-20 PROCEDURE — 74011000250 HC RX REV CODE- 250: Performed by: INTERNAL MEDICINE

## 2020-02-20 PROCEDURE — 74011250636 HC RX REV CODE- 250/636: Performed by: INTERNAL MEDICINE

## 2020-02-20 PROCEDURE — 96374 THER/PROPH/DIAG INJ IV PUSH: CPT

## 2020-02-20 PROCEDURE — 83735 ASSAY OF MAGNESIUM: CPT

## 2020-02-20 RX ORDER — SODIUM CHLORIDE 0.9 % (FLUSH) 0.9 %
10 SYRINGE (ML) INJECTION EVERY 8 HOURS
Status: DISCONTINUED | OUTPATIENT
Start: 2020-02-20 | End: 2020-02-24 | Stop reason: HOSPADM

## 2020-02-20 RX ADMIN — Medication 10 ML: at 09:10

## 2020-02-20 RX ADMIN — PROMETHAZINE HYDROCHLORIDE 25 MG: 25 INJECTION INTRAMUSCULAR; INTRAVENOUS at 07:42

## 2020-02-20 NOTE — PROGRESS NOTES
Pt arrived ambulatory, labs drawn, phenergan given IV, pt tolerated well, discharged home ambulatory

## 2020-02-27 ENCOUNTER — HOSPITAL ENCOUNTER (OUTPATIENT)
Dept: INFUSION THERAPY | Age: 50
Discharge: HOME | End: 2020-02-27
Payer: MEDICARE

## 2020-02-27 VITALS
RESPIRATION RATE: 18 BRPM | DIASTOLIC BLOOD PRESSURE: 78 MMHG | OXYGEN SATURATION: 96 % | HEART RATE: 107 BPM | TEMPERATURE: 98.9 F | SYSTOLIC BLOOD PRESSURE: 123 MMHG

## 2020-02-27 LAB — MAGNESIUM SERPL-MCNC: 1.8 MG/DL (ref 1.8–2.4)

## 2020-02-27 PROCEDURE — 74011000250 HC RX REV CODE- 250: Performed by: INTERNAL MEDICINE

## 2020-02-27 PROCEDURE — 96374 THER/PROPH/DIAG INJ IV PUSH: CPT

## 2020-02-27 PROCEDURE — 83735 ASSAY OF MAGNESIUM: CPT

## 2020-02-27 PROCEDURE — 74011250636 HC RX REV CODE- 250/636: Performed by: INTERNAL MEDICINE

## 2020-02-27 RX ORDER — SODIUM CHLORIDE 0.9 % (FLUSH) 0.9 %
10-20 SYRINGE (ML) INJECTION EVERY 8 HOURS
Status: DISCONTINUED | OUTPATIENT
Start: 2020-02-27 | End: 2020-03-02 | Stop reason: HOSPADM

## 2020-02-27 RX ADMIN — Medication 10 ML: at 07:50

## 2020-02-27 RX ADMIN — PROMETHAZINE HYDROCHLORIDE 25 MG: 25 INJECTION INTRAMUSCULAR; INTRAVENOUS at 07:44

## 2020-02-27 NOTE — PROGRESS NOTES
Arrived to the [facility] ambulatory. Labs and phenergan completed. Patient tolerated well. Any issues or concerns during appointment: no.  Patient aware of next infusion appointment on 3/12 at [address] [place] [address]. Discharged to home ambulatory.

## 2020-03-12 ENCOUNTER — HOSPITAL ENCOUNTER (OUTPATIENT)
Dept: INFUSION THERAPY | Age: 50
Discharge: HOME | End: 2020-03-12
Payer: MEDICARE

## 2020-03-12 VITALS
DIASTOLIC BLOOD PRESSURE: 77 MMHG | HEART RATE: 99 BPM | SYSTOLIC BLOOD PRESSURE: 135 MMHG | OXYGEN SATURATION: 96 % | TEMPERATURE: 98.9 F | RESPIRATION RATE: 16 BRPM

## 2020-03-12 LAB — MAGNESIUM SERPL-MCNC: 2.1 MG/DL (ref 1.8–2.4)

## 2020-03-12 PROCEDURE — 74011250636 HC RX REV CODE- 250/636: Performed by: INTERNAL MEDICINE

## 2020-03-12 PROCEDURE — 83735 ASSAY OF MAGNESIUM: CPT

## 2020-03-12 PROCEDURE — 74011000250 HC RX REV CODE- 250: Performed by: INTERNAL MEDICINE

## 2020-03-12 PROCEDURE — 96374 THER/PROPH/DIAG INJ IV PUSH: CPT

## 2020-03-12 RX ORDER — SODIUM CHLORIDE 0.9 % (FLUSH) 0.9 %
10 SYRINGE (ML) INJECTION AS NEEDED
Status: DISCONTINUED | OUTPATIENT
Start: 2020-03-12 | End: 2020-03-16 | Stop reason: HOSPADM

## 2020-03-12 RX ADMIN — Medication 10 ML: at 07:50

## 2020-03-12 RX ADMIN — PROMETHAZINE HYDROCHLORIDE 25 MG: 25 INJECTION INTRAMUSCULAR; INTRAVENOUS at 07:58

## 2020-03-12 NOTE — PROGRESS NOTES
3/12/20- Arrived to the [facility]. Phenergan and port needle change completed. Mag level resulted at 1.8 so no replacements needed today. Patient tolerated without difficlty. Any issues or concerns during appointment: none. Patient aware of next infusion appointment on 3/19 (date) at [facility] (time). Discharged to home.

## 2020-03-26 ENCOUNTER — HOSPITAL ENCOUNTER (OUTPATIENT)
Dept: INFUSION THERAPY | Age: 50
Discharge: HOME | End: 2020-03-26
Payer: MEDICARE

## 2020-03-26 VITALS
SYSTOLIC BLOOD PRESSURE: 141 MMHG | RESPIRATION RATE: 16 BRPM | DIASTOLIC BLOOD PRESSURE: 81 MMHG | TEMPERATURE: 97.8 F | HEART RATE: 110 BPM | OXYGEN SATURATION: 98 %

## 2020-03-26 LAB — MAGNESIUM SERPL-MCNC: 2 MG/DL (ref 1.8–2.4)

## 2020-03-26 PROCEDURE — 74011250636 HC RX REV CODE- 250/636: Performed by: INTERNAL MEDICINE

## 2020-03-26 PROCEDURE — 96374 THER/PROPH/DIAG INJ IV PUSH: CPT

## 2020-03-26 PROCEDURE — 83735 ASSAY OF MAGNESIUM: CPT

## 2020-03-26 PROCEDURE — 74011000250 HC RX REV CODE- 250: Performed by: INTERNAL MEDICINE

## 2020-03-26 RX ORDER — SODIUM CHLORIDE 0.9 % (FLUSH) 0.9 %
10-40 SYRINGE (ML) INJECTION AS NEEDED
Status: DISCONTINUED | OUTPATIENT
Start: 2020-03-26 | End: 2020-03-30 | Stop reason: HOSPADM

## 2020-03-26 RX ADMIN — Medication 10 ML: at 07:41

## 2020-03-26 RX ADMIN — PROMETHAZINE HYDROCHLORIDE 25 MG: 25 INJECTION INTRAMUSCULAR; INTRAVENOUS at 07:31

## 2020-03-26 RX ADMIN — Medication 10 ML: at 07:31

## 2020-03-26 NOTE — PROGRESS NOTES
Arrived to the [facility]. Phenergan IVP and port needle change completed. Patient tolerated well. Any issues or concerns during appointment: magnesium resulted as 2.0 today, no replacements needed. Patient aware of next infusion appointment on 4/2/2020 at 7:15am.  Discharged ambulatory.

## 2020-04-02 ENCOUNTER — HOSPITAL ENCOUNTER (OUTPATIENT)
Dept: INFUSION THERAPY | Age: 50
Discharge: HOME | End: 2020-04-02
Payer: MEDICARE

## 2020-04-02 VITALS
DIASTOLIC BLOOD PRESSURE: 81 MMHG | TEMPERATURE: 97.6 F | RESPIRATION RATE: 18 BRPM | OXYGEN SATURATION: 94 % | HEART RATE: 94 BPM | SYSTOLIC BLOOD PRESSURE: 141 MMHG

## 2020-04-02 LAB — MAGNESIUM SERPL-MCNC: 2 MG/DL (ref 1.8–2.4)

## 2020-04-02 PROCEDURE — 83735 ASSAY OF MAGNESIUM: CPT

## 2020-04-02 PROCEDURE — 96374 THER/PROPH/DIAG INJ IV PUSH: CPT

## 2020-04-02 PROCEDURE — 74011000250 HC RX REV CODE- 250: Performed by: INTERNAL MEDICINE

## 2020-04-02 PROCEDURE — 74011250636 HC RX REV CODE- 250/636: Performed by: INTERNAL MEDICINE

## 2020-04-02 RX ORDER — SODIUM CHLORIDE 0.9 % (FLUSH) 0.9 %
10-30 SYRINGE (ML) INJECTION AS NEEDED
Status: DISCONTINUED | OUTPATIENT
Start: 2020-04-02 | End: 2020-04-06 | Stop reason: HOSPADM

## 2020-04-02 RX ADMIN — PROMETHAZINE HYDROCHLORIDE 25 MG: 25 INJECTION INTRAMUSCULAR; INTRAVENOUS at 07:40

## 2020-04-02 RX ADMIN — Medication 20 ML: at 07:20

## 2020-04-02 NOTE — PROGRESS NOTES
Arrived to the [facility]. Magnesium drawn and reviewed. Phenergan IV given x 1 as ordered completed.  Patient tolerated well  Any issues or concerns during appointment: No  Patient aware of next infusion appointment on Monday,April 9th @ 3 [facility]  Discharged home ambulatory

## 2020-04-09 ENCOUNTER — HOSPITAL ENCOUNTER (OUTPATIENT)
Dept: INFUSION THERAPY | Age: 50
Discharge: HOME | End: 2020-04-09
Payer: MEDICARE

## 2020-04-09 VITALS
DIASTOLIC BLOOD PRESSURE: 78 MMHG | RESPIRATION RATE: 18 BRPM | HEART RATE: 107 BPM | SYSTOLIC BLOOD PRESSURE: 113 MMHG | OXYGEN SATURATION: 98 % | TEMPERATURE: 97.9 F

## 2020-04-09 LAB — MAGNESIUM SERPL-MCNC: 2 MG/DL (ref 1.8–2.4)

## 2020-04-09 PROCEDURE — 74011250636 HC RX REV CODE- 250/636: Performed by: INTERNAL MEDICINE

## 2020-04-09 PROCEDURE — 74011000250 HC RX REV CODE- 250: Performed by: INTERNAL MEDICINE

## 2020-04-09 PROCEDURE — 83735 ASSAY OF MAGNESIUM: CPT

## 2020-04-09 PROCEDURE — 96374 THER/PROPH/DIAG INJ IV PUSH: CPT

## 2020-04-09 RX ORDER — SODIUM CHLORIDE 0.9 % (FLUSH) 0.9 %
10-30 SYRINGE (ML) INJECTION AS NEEDED
Status: DISCONTINUED | OUTPATIENT
Start: 2020-04-09 | End: 2020-04-13 | Stop reason: HOSPADM

## 2020-04-09 RX ADMIN — PROMETHAZINE HYDROCHLORIDE 25 MG: 25 INJECTION INTRAMUSCULAR; INTRAVENOUS at 07:32

## 2020-04-09 RX ADMIN — Medication 10 ML: at 08:27

## 2020-04-09 RX ADMIN — Medication 10 ML: at 07:20

## 2020-04-09 NOTE — PROGRESS NOTES
Arrived to the [address] [place] [address] level noted to be 2.0. No magnesium ordered per written protocol. IV Phenergan completed. Patient tolerated well. Port needle changed, flushed and left accessed for use at home. Any issues or concerns during appointment: None. Patient aware of next infusion appointment on 4/16/2020 (date) at 0715 (time). Discharged ambulatory.

## 2020-04-16 ENCOUNTER — HOSPITAL ENCOUNTER (OUTPATIENT)
Dept: INFUSION THERAPY | Age: 50
Discharge: HOME | End: 2020-04-16
Payer: MEDICARE

## 2020-04-16 VITALS
OXYGEN SATURATION: 94 % | TEMPERATURE: 98.6 F | SYSTOLIC BLOOD PRESSURE: 134 MMHG | HEART RATE: 102 BPM | RESPIRATION RATE: 18 BRPM | DIASTOLIC BLOOD PRESSURE: 85 MMHG

## 2020-04-16 LAB
ALBUMIN SERPL-MCNC: 3.4 G/DL (ref 3.5–5)
ALBUMIN/GLOB SERPL: 0.9 {RATIO} (ref 1.2–3.5)
ALP SERPL-CCNC: 133 U/L (ref 50–136)
ALT SERPL-CCNC: 32 U/L (ref 12–65)
AMYLASE SERPL-CCNC: 30 U/L (ref 25–115)
ANION GAP SERPL CALC-SCNC: 7 MMOL/L (ref 7–16)
AST SERPL-CCNC: 24 U/L (ref 15–37)
BILIRUB SERPL-MCNC: 0.3 MG/DL (ref 0.2–1.1)
BUN SERPL-MCNC: 16 MG/DL (ref 6–23)
CALCIUM SERPL-MCNC: 8.6 MG/DL (ref 8.3–10.4)
CHLORIDE SERPL-SCNC: 101 MMOL/L (ref 98–107)
CO2 SERPL-SCNC: 27 MMOL/L (ref 21–32)
CREAT SERPL-MCNC: 0.73 MG/DL (ref 0.6–1)
GLOBULIN SER CALC-MCNC: 4 G/DL (ref 2.3–3.5)
GLUCOSE SERPL-MCNC: 255 MG/DL (ref 65–100)
LIPASE SERPL-CCNC: 75 U/L (ref 73–393)
MAGNESIUM SERPL-MCNC: 1.7 MG/DL (ref 1.8–2.4)
POTASSIUM SERPL-SCNC: 4.1 MMOL/L (ref 3.5–5.1)
PROT SERPL-MCNC: 7.4 G/DL (ref 6.3–8.2)
SODIUM SERPL-SCNC: 135 MMOL/L (ref 136–145)

## 2020-04-16 PROCEDURE — 96365 THER/PROPH/DIAG IV INF INIT: CPT

## 2020-04-16 PROCEDURE — 36591 DRAW BLOOD OFF VENOUS DEVICE: CPT

## 2020-04-16 PROCEDURE — 96375 TX/PRO/DX INJ NEW DRUG ADDON: CPT

## 2020-04-16 PROCEDURE — 77030033515

## 2020-04-16 PROCEDURE — 83690 ASSAY OF LIPASE: CPT

## 2020-04-16 PROCEDURE — 80053 COMPREHEN METABOLIC PANEL: CPT

## 2020-04-16 PROCEDURE — 74011000250 HC RX REV CODE- 250: Performed by: INTERNAL MEDICINE

## 2020-04-16 PROCEDURE — 82150 ASSAY OF AMYLASE: CPT

## 2020-04-16 PROCEDURE — 83735 ASSAY OF MAGNESIUM: CPT

## 2020-04-16 PROCEDURE — 74011250636 HC RX REV CODE- 250/636: Performed by: INTERNAL MEDICINE

## 2020-04-16 RX ORDER — SODIUM CHLORIDE 0.9 % (FLUSH) 0.9 %
10 SYRINGE (ML) INJECTION AS NEEDED
Status: ACTIVE | OUTPATIENT
Start: 2020-04-16 | End: 2020-04-16

## 2020-04-16 RX ORDER — MAGNESIUM SULFATE 1 G/100ML
1 INJECTION INTRAVENOUS ONCE
Status: COMPLETED | OUTPATIENT
Start: 2020-04-16 | End: 2020-04-16

## 2020-04-16 RX ADMIN — Medication 10 ML: at 07:25

## 2020-04-16 RX ADMIN — PROMETHAZINE HYDROCHLORIDE 25 MG: 25 INJECTION INTRAMUSCULAR; INTRAVENOUS at 07:26

## 2020-04-16 RX ADMIN — MAGNESIUM SULFATE HEPTAHYDRATE 1 G: 1 INJECTION, SOLUTION INTRAVENOUS at 08:05

## 2020-04-16 RX ADMIN — Medication 10 ML: at 07:36

## 2020-04-23 ENCOUNTER — HOSPITAL ENCOUNTER (OUTPATIENT)
Dept: INFUSION THERAPY | Age: 50
Discharge: HOME | End: 2020-04-23
Payer: MEDICARE

## 2020-04-23 VITALS
OXYGEN SATURATION: 94 % | RESPIRATION RATE: 18 BRPM | TEMPERATURE: 99.4 F | HEART RATE: 110 BPM | DIASTOLIC BLOOD PRESSURE: 86 MMHG | SYSTOLIC BLOOD PRESSURE: 123 MMHG

## 2020-04-23 LAB — MAGNESIUM SERPL-MCNC: 2.1 MG/DL (ref 1.8–2.4)

## 2020-04-23 PROCEDURE — 96374 THER/PROPH/DIAG INJ IV PUSH: CPT

## 2020-04-23 PROCEDURE — 74011000250 HC RX REV CODE- 250: Performed by: INTERNAL MEDICINE

## 2020-04-23 PROCEDURE — 74011250636 HC RX REV CODE- 250/636: Performed by: INTERNAL MEDICINE

## 2020-04-23 PROCEDURE — 83735 ASSAY OF MAGNESIUM: CPT

## 2020-04-23 RX ORDER — SODIUM CHLORIDE 0.9 % (FLUSH) 0.9 %
10-40 SYRINGE (ML) INJECTION AS NEEDED
Status: DISCONTINUED | OUTPATIENT
Start: 2020-04-23 | End: 2020-04-27 | Stop reason: HOSPADM

## 2020-04-23 RX ADMIN — PROMETHAZINE HYDROCHLORIDE 25 MG: 25 INJECTION INTRAMUSCULAR; INTRAVENOUS at 07:36

## 2020-04-23 RX ADMIN — Medication 10 ML: at 07:36

## 2020-04-23 RX ADMIN — Medication 10 ML: at 07:46

## 2020-04-23 NOTE — PROGRESS NOTES
Arrived to the [facility]. Port needle change and IVP phenergan completed. Patient tolerated well. Any issues or concerns during appointment: none. Magnesium resulted as 2.1. No replacements required. Patient aware of next infusion appointment on 4/30/2020 at 7:15am.  Discharged ambulatory.

## 2020-04-30 ENCOUNTER — HOSPITAL ENCOUNTER (OUTPATIENT)
Dept: INFUSION THERAPY | Age: 50
Discharge: HOME | End: 2020-04-30
Payer: MEDICARE

## 2020-04-30 VITALS
OXYGEN SATURATION: 96 % | HEART RATE: 111 BPM | TEMPERATURE: 97.9 F | RESPIRATION RATE: 18 BRPM | SYSTOLIC BLOOD PRESSURE: 132 MMHG | DIASTOLIC BLOOD PRESSURE: 76 MMHG

## 2020-04-30 LAB — MAGNESIUM SERPL-MCNC: 2 MG/DL (ref 1.8–2.4)

## 2020-04-30 PROCEDURE — 96374 THER/PROPH/DIAG INJ IV PUSH: CPT

## 2020-04-30 PROCEDURE — 83735 ASSAY OF MAGNESIUM: CPT

## 2020-04-30 PROCEDURE — 74011000250 HC RX REV CODE- 250: Performed by: INTERNAL MEDICINE

## 2020-04-30 PROCEDURE — 74011250636 HC RX REV CODE- 250/636: Performed by: INTERNAL MEDICINE

## 2020-04-30 RX ORDER — SODIUM CHLORIDE 0.9 % (FLUSH) 0.9 %
10 SYRINGE (ML) INJECTION AS NEEDED
Status: DISCONTINUED | OUTPATIENT
Start: 2020-04-30 | End: 2020-05-04 | Stop reason: HOSPADM

## 2020-04-30 RX ADMIN — Medication 10 ML: at 07:15

## 2020-04-30 RX ADMIN — PROMETHAZINE HYDROCHLORIDE 25 MG: 25 INJECTION INTRAMUSCULAR; INTRAVENOUS at 07:30

## 2020-04-30 NOTE — PROGRESS NOTES
Arrived to the [facility]. Lab completed. Patient did not require replacement. Phenergan given per order  Any issues or concerns during appointment: no.  Patient aware of next infusion appointment on 5/7/20 (date) at [address] [place] [address] (time). Discharged ambulatory with family.

## 2020-05-07 ENCOUNTER — HOSPITAL ENCOUNTER (OUTPATIENT)
Dept: INFUSION THERAPY | Age: 50
Discharge: HOME | End: 2020-05-07
Payer: MEDICARE

## 2020-05-07 VITALS
HEART RATE: 119 BPM | SYSTOLIC BLOOD PRESSURE: 158 MMHG | OXYGEN SATURATION: 95 % | DIASTOLIC BLOOD PRESSURE: 80 MMHG | TEMPERATURE: 97.8 F | RESPIRATION RATE: 18 BRPM

## 2020-05-07 LAB — MAGNESIUM SERPL-MCNC: 1.9 MG/DL (ref 1.8–2.4)

## 2020-05-07 PROCEDURE — 74011250636 HC RX REV CODE- 250/636: Performed by: INTERNAL MEDICINE

## 2020-05-07 PROCEDURE — 83735 ASSAY OF MAGNESIUM: CPT

## 2020-05-07 PROCEDURE — 74011000250 HC RX REV CODE- 250: Performed by: INTERNAL MEDICINE

## 2020-05-07 PROCEDURE — 96374 THER/PROPH/DIAG INJ IV PUSH: CPT

## 2020-05-07 RX ORDER — SODIUM CHLORIDE 0.9 % (FLUSH) 0.9 %
10 SYRINGE (ML) INJECTION AS NEEDED
Status: ACTIVE | OUTPATIENT
Start: 2020-05-07 | End: 2020-05-07

## 2020-05-07 RX ADMIN — PROMETHAZINE HYDROCHLORIDE 25 MG: 25 INJECTION INTRAMUSCULAR; INTRAVENOUS at 07:45

## 2020-05-07 RX ADMIN — Medication 10 ML: at 07:55

## 2020-05-07 RX ADMIN — Medication 10 ML: at 07:35

## 2020-05-07 NOTE — PROGRESS NOTES
Arrived to the [facility]. Labs drawn, no replacements needed. Phenergan administered per orders. Port needle changed. Patient tolerated well. Any issues or concerns during appointment: None. Patient aware of next infusion appointment on May 14th at [address] [place] [address]. Discharged ambulatory.

## 2020-05-14 ENCOUNTER — HOSPITAL ENCOUNTER (OUTPATIENT)
Dept: INFUSION THERAPY | Age: 50
Discharge: HOME | End: 2020-05-14
Payer: MEDICARE

## 2020-05-14 VITALS
DIASTOLIC BLOOD PRESSURE: 76 MMHG | OXYGEN SATURATION: 94 % | HEART RATE: 114 BPM | SYSTOLIC BLOOD PRESSURE: 126 MMHG | TEMPERATURE: 98.2 F | RESPIRATION RATE: 18 BRPM

## 2020-05-14 LAB — MAGNESIUM SERPL-MCNC: 2.1 MG/DL (ref 1.8–2.4)

## 2020-05-14 PROCEDURE — 96374 THER/PROPH/DIAG INJ IV PUSH: CPT

## 2020-05-14 PROCEDURE — 74011000250 HC RX REV CODE- 250: Performed by: INTERNAL MEDICINE

## 2020-05-14 PROCEDURE — 74011250636 HC RX REV CODE- 250/636: Performed by: INTERNAL MEDICINE

## 2020-05-14 PROCEDURE — 83735 ASSAY OF MAGNESIUM: CPT

## 2020-05-14 RX ORDER — SODIUM CHLORIDE 0.9 % (FLUSH) 0.9 %
10 SYRINGE (ML) INJECTION AS NEEDED
Status: ACTIVE | OUTPATIENT
Start: 2020-05-14 | End: 2020-05-14

## 2020-05-14 RX ADMIN — Medication 10 ML: at 07:50

## 2020-05-14 RX ADMIN — PROMETHAZINE HYDROCHLORIDE 25 MG: 25 INJECTION INTRAMUSCULAR; INTRAVENOUS at 07:40

## 2020-05-14 RX ADMIN — Medication 10 ML: at 07:35

## 2020-05-14 NOTE — PROGRESS NOTES
Arrived to the [facility]. Mag level drawn, no replacement needed. Phenergan administered and port needle changed. Patient tolerated well. Any issues or concerns during appointment: None. Patient aware of next infusion appointment on May 21st at [address] [place] [address]. Discharged ambulatory.

## 2020-05-21 ENCOUNTER — HOSPITAL ENCOUNTER (OUTPATIENT)
Dept: INFUSION THERAPY | Age: 50
Discharge: HOME | End: 2020-05-21
Payer: MEDICARE

## 2020-05-21 VITALS
TEMPERATURE: 98.1 F | SYSTOLIC BLOOD PRESSURE: 140 MMHG | OXYGEN SATURATION: 92 % | HEART RATE: 112 BPM | DIASTOLIC BLOOD PRESSURE: 89 MMHG | RESPIRATION RATE: 20 BRPM

## 2020-05-21 LAB — MAGNESIUM SERPL-MCNC: 1.9 MG/DL (ref 1.8–2.4)

## 2020-05-21 PROCEDURE — 74011250636 HC RX REV CODE- 250/636: Performed by: INTERNAL MEDICINE

## 2020-05-21 PROCEDURE — 96374 THER/PROPH/DIAG INJ IV PUSH: CPT

## 2020-05-21 PROCEDURE — 74011000250 HC RX REV CODE- 250: Performed by: INTERNAL MEDICINE

## 2020-05-21 PROCEDURE — 83735 ASSAY OF MAGNESIUM: CPT

## 2020-05-21 RX ORDER — SODIUM CHLORIDE 0.9 % (FLUSH) 0.9 %
10 SYRINGE (ML) INJECTION AS NEEDED
Status: DISCONTINUED | OUTPATIENT
Start: 2020-05-21 | End: 2020-05-25 | Stop reason: HOSPADM

## 2020-05-21 RX ADMIN — PROMETHAZINE HYDROCHLORIDE 25 MG: 25 INJECTION INTRAMUSCULAR; INTRAVENOUS at 07:47

## 2020-05-21 NOTE — PROGRESS NOTES
Pt arrived ambulatory, port changed per protocol, labs drawn, mag 1.9, phenergan given IVP, pt discharged ambulatory

## 2020-05-28 ENCOUNTER — HOSPITAL ENCOUNTER (OUTPATIENT)
Dept: INFUSION THERAPY | Age: 50
Discharge: HOME | End: 2020-05-28
Payer: MEDICARE

## 2020-05-28 VITALS
RESPIRATION RATE: 18 BRPM | OXYGEN SATURATION: 94 % | TEMPERATURE: 98.4 F | HEART RATE: 101 BPM | DIASTOLIC BLOOD PRESSURE: 86 MMHG | SYSTOLIC BLOOD PRESSURE: 134 MMHG

## 2020-05-28 LAB — MAGNESIUM SERPL-MCNC: 2 MG/DL (ref 1.8–2.4)

## 2020-05-28 PROCEDURE — 74011250636 HC RX REV CODE- 250/636: Performed by: INTERNAL MEDICINE

## 2020-05-28 PROCEDURE — 96374 THER/PROPH/DIAG INJ IV PUSH: CPT

## 2020-05-28 PROCEDURE — 74011000250 HC RX REV CODE- 250: Performed by: INTERNAL MEDICINE

## 2020-05-28 PROCEDURE — 83735 ASSAY OF MAGNESIUM: CPT

## 2020-05-28 RX ORDER — SODIUM CHLORIDE 0.9 % (FLUSH) 0.9 %
10-40 SYRINGE (ML) INJECTION AS NEEDED
Status: DISCONTINUED | OUTPATIENT
Start: 2020-05-28 | End: 2020-06-01 | Stop reason: HOSPADM

## 2020-05-28 RX ADMIN — Medication 10 ML: at 07:40

## 2020-05-28 RX ADMIN — Medication 10 ML: at 07:30

## 2020-05-28 RX ADMIN — PROMETHAZINE HYDROCHLORIDE 25 MG: 25 INJECTION INTRAMUSCULAR; INTRAVENOUS at 07:30

## 2020-05-28 NOTE — PROGRESS NOTES
Arrived to the [facility]. IV phenergan completed. Patient tolerated well. Any issues or concerns during appointment: magnesium level drawn per orders. Resulted as 2.0 and no replacements needed per protocol. Patient aware of next infusion appointment on 6/4/2020 at 7:15am.  Discharged ambulatory.

## 2020-06-04 ENCOUNTER — HOSPITAL ENCOUNTER (OUTPATIENT)
Dept: INFUSION THERAPY | Age: 50
Discharge: HOME | End: 2020-06-04
Payer: MEDICARE

## 2020-06-04 VITALS
TEMPERATURE: 97.9 F | OXYGEN SATURATION: 93 % | SYSTOLIC BLOOD PRESSURE: 127 MMHG | DIASTOLIC BLOOD PRESSURE: 81 MMHG | HEART RATE: 112 BPM | RESPIRATION RATE: 18 BRPM

## 2020-06-04 LAB — MAGNESIUM SERPL-MCNC: 1.9 MG/DL (ref 1.8–2.4)

## 2020-06-04 PROCEDURE — 83735 ASSAY OF MAGNESIUM: CPT

## 2020-06-04 PROCEDURE — 96374 THER/PROPH/DIAG INJ IV PUSH: CPT

## 2020-06-04 PROCEDURE — 74011000250 HC RX REV CODE- 250: Performed by: INTERNAL MEDICINE

## 2020-06-04 PROCEDURE — 74011250636 HC RX REV CODE- 250/636: Performed by: INTERNAL MEDICINE

## 2020-06-04 RX ORDER — SODIUM CHLORIDE 0.9 % (FLUSH) 0.9 %
10-30 SYRINGE (ML) INJECTION EVERY 8 HOURS
Status: DISCONTINUED | OUTPATIENT
Start: 2020-06-04 | End: 2020-06-08 | Stop reason: HOSPADM

## 2020-06-04 RX ADMIN — PROMETHAZINE HYDROCHLORIDE 25 MG: 25 INJECTION INTRAMUSCULAR; INTRAVENOUS at 07:37

## 2020-06-04 RX ADMIN — Medication 10 ML: at 07:41

## 2020-06-04 NOTE — PROGRESS NOTES
Arrived to the [facility] ambulatory. Port accessed lab drawn promethazine given completed. Patient tolerated well. Any issues or concerns during appointment: no, mag today=1.9. Patient aware of next infusion appointment on 6/11 at [address] [place] [address]. Discharged to home ambulatory.

## 2020-06-11 ENCOUNTER — HOSPITAL ENCOUNTER (OUTPATIENT)
Dept: INFUSION THERAPY | Age: 50
Discharge: HOME | End: 2020-06-11
Payer: MEDICARE

## 2020-06-11 VITALS
TEMPERATURE: 98.1 F | RESPIRATION RATE: 18 BRPM | HEART RATE: 111 BPM | SYSTOLIC BLOOD PRESSURE: 126 MMHG | DIASTOLIC BLOOD PRESSURE: 75 MMHG | OXYGEN SATURATION: 96 %

## 2020-06-11 LAB — MAGNESIUM SERPL-MCNC: 2 MG/DL (ref 1.8–2.4)

## 2020-06-11 PROCEDURE — 96374 THER/PROPH/DIAG INJ IV PUSH: CPT

## 2020-06-11 PROCEDURE — 83735 ASSAY OF MAGNESIUM: CPT

## 2020-06-11 PROCEDURE — 74011250636 HC RX REV CODE- 250/636: Performed by: INTERNAL MEDICINE

## 2020-06-11 PROCEDURE — 74011000250 HC RX REV CODE- 250: Performed by: INTERNAL MEDICINE

## 2020-06-11 RX ORDER — SODIUM CHLORIDE 0.9 % (FLUSH) 0.9 %
10 SYRINGE (ML) INJECTION EVERY 8 HOURS
Status: DISCONTINUED | OUTPATIENT
Start: 2020-06-11 | End: 2020-06-15 | Stop reason: HOSPADM

## 2020-06-11 RX ADMIN — PROMETHAZINE HYDROCHLORIDE 25 MG: 25 INJECTION INTRAMUSCULAR; INTRAVENOUS at 07:31

## 2020-06-11 RX ADMIN — Medication 10 ML: at 07:36

## 2020-06-11 NOTE — PROGRESS NOTES
Arrived to the [facility] ambulatory. Port accessed, lab draw, and promethazine completed. Patient tolerated well. Any issues or concerns during appointment: no.  Patient aware of next infusion appointment on  6/18 at [address] [place] [address]. Discharged to home ambulatory.

## 2020-06-18 ENCOUNTER — HOSPITAL ENCOUNTER (OUTPATIENT)
Dept: INFUSION THERAPY | Age: 50
Discharge: HOME | End: 2020-06-18
Payer: MEDICARE

## 2020-06-18 VITALS
RESPIRATION RATE: 18 BRPM | DIASTOLIC BLOOD PRESSURE: 81 MMHG | TEMPERATURE: 98.2 F | SYSTOLIC BLOOD PRESSURE: 124 MMHG | HEART RATE: 117 BPM | OXYGEN SATURATION: 94 %

## 2020-06-18 LAB — MAGNESIUM SERPL-MCNC: 2 MG/DL (ref 1.8–2.4)

## 2020-06-18 PROCEDURE — 74011250636 HC RX REV CODE- 250/636: Performed by: INTERNAL MEDICINE

## 2020-06-18 PROCEDURE — 74011000250 HC RX REV CODE- 250: Performed by: INTERNAL MEDICINE

## 2020-06-18 PROCEDURE — 83735 ASSAY OF MAGNESIUM: CPT

## 2020-06-18 PROCEDURE — 96374 THER/PROPH/DIAG INJ IV PUSH: CPT

## 2020-06-18 RX ORDER — SODIUM CHLORIDE 0.9 % (FLUSH) 0.9 %
5-10 SYRINGE (ML) INJECTION AS NEEDED
Status: DISCONTINUED | OUTPATIENT
Start: 2020-06-18 | End: 2020-06-22 | Stop reason: HOSPADM

## 2020-06-18 RX ADMIN — SODIUM CHLORIDE 500 ML: 900 INJECTION, SOLUTION INTRAVENOUS at 07:30

## 2020-06-18 RX ADMIN — Medication 10 ML: at 07:30

## 2020-06-18 RX ADMIN — PROMETHAZINE HYDROCHLORIDE 25 MG: 25 INJECTION INTRAMUSCULAR; INTRAVENOUS at 07:51

## 2020-06-18 NOTE — PROGRESS NOTES
Pt arrived ambulatory to [facility]. Port accessed with good blood return. Blood drawn and sent to lab. NS infusing. Phenergan Iv as ordered. Mag 2.0  No replacements at this time. Pt aware of next appt on 6/25/20 at 0715. Pt discharged ambulatory.

## 2020-06-25 ENCOUNTER — HOSPITAL ENCOUNTER (OUTPATIENT)
Dept: INFUSION THERAPY | Age: 50
Discharge: HOME | End: 2020-06-25
Payer: MEDICARE

## 2020-06-25 VITALS
HEART RATE: 106 BPM | TEMPERATURE: 98.2 F | RESPIRATION RATE: 18 BRPM | DIASTOLIC BLOOD PRESSURE: 82 MMHG | SYSTOLIC BLOOD PRESSURE: 124 MMHG | OXYGEN SATURATION: 98 %

## 2020-06-25 LAB — MAGNESIUM SERPL-MCNC: 2.1 MG/DL (ref 1.8–2.4)

## 2020-06-25 PROCEDURE — 83735 ASSAY OF MAGNESIUM: CPT

## 2020-06-25 PROCEDURE — 96374 THER/PROPH/DIAG INJ IV PUSH: CPT

## 2020-06-25 PROCEDURE — 74011000250 HC RX REV CODE- 250: Performed by: INTERNAL MEDICINE

## 2020-06-25 PROCEDURE — 74011250636 HC RX REV CODE- 250/636: Performed by: INTERNAL MEDICINE

## 2020-06-25 RX ORDER — SODIUM CHLORIDE 0.9 % (FLUSH) 0.9 %
10-30 SYRINGE (ML) INJECTION AS NEEDED
Status: DISCONTINUED | OUTPATIENT
Start: 2020-06-25 | End: 2020-06-29 | Stop reason: HOSPADM

## 2020-06-25 RX ADMIN — Medication 10 ML: at 08:00

## 2020-06-25 RX ADMIN — Medication 10 ML: at 07:15

## 2020-06-25 RX ADMIN — PROMETHAZINE HYDROCHLORIDE 25 MG: 25 INJECTION INTRAMUSCULAR; INTRAVENOUS at 07:25

## 2020-06-25 NOTE — PROGRESS NOTES
Arrived to the [facility]. Left port reaccessed per protocol-magnesium level drawn and reviewed-2.1 today.  Patient tolerated well  Any issues or concerns during appointment: No  Patient aware of next infusion appointment on Thursday,July 2nd @ [facility]  Discharged home ambulatory

## 2020-07-01 RX ORDER — SODIUM CHLORIDE 0.9 % (FLUSH) 0.9 %
10 SYRINGE (ML) INJECTION EVERY 8 HOURS
Status: CANCELLED | OUTPATIENT
Start: 2020-07-01

## 2020-07-02 ENCOUNTER — HOSPITAL ENCOUNTER (OUTPATIENT)
Dept: INFUSION THERAPY | Age: 50
Discharge: HOME | End: 2020-07-02
Payer: MEDICARE

## 2020-07-02 VITALS
TEMPERATURE: 97.5 F | OXYGEN SATURATION: 94 % | HEART RATE: 105 BPM | DIASTOLIC BLOOD PRESSURE: 51 MMHG | SYSTOLIC BLOOD PRESSURE: 112 MMHG

## 2020-07-02 LAB — MAGNESIUM SERPL-MCNC: 1.8 MG/DL (ref 1.8–2.4)

## 2020-07-02 PROCEDURE — 74011250636 HC RX REV CODE- 250/636: Performed by: INTERNAL MEDICINE

## 2020-07-02 PROCEDURE — 96374 THER/PROPH/DIAG INJ IV PUSH: CPT

## 2020-07-02 PROCEDURE — 83735 ASSAY OF MAGNESIUM: CPT

## 2020-07-02 PROCEDURE — 74011000250 HC RX REV CODE- 250: Performed by: INTERNAL MEDICINE

## 2020-07-02 RX ORDER — SODIUM CHLORIDE 0.9 % (FLUSH) 0.9 %
5-10 SYRINGE (ML) INJECTION AS NEEDED
Status: DISCONTINUED | OUTPATIENT
Start: 2020-07-02 | End: 2020-07-06 | Stop reason: HOSPADM

## 2020-07-02 RX ADMIN — PROMETHAZINE HYDROCHLORIDE 25 MG: 25 INJECTION INTRAMUSCULAR; INTRAVENOUS at 07:45

## 2020-07-02 RX ADMIN — SODIUM CHLORIDE 500 ML: 900 INJECTION, SOLUTION INTRAVENOUS at 07:30

## 2020-07-02 RX ADMIN — Medication 10 ML: at 07:30

## 2020-07-02 NOTE — PROGRESS NOTES
Pt arrived ambulatory to [facility]. Port de accessed and re accessed. Blood drawn and sent to lab. NS and phenergan iv. Pt aware of next appt on 7/9/2020 at 3 [facility]. Mag 1.8. no replacements needed at this time. Pt discharged ambulatory.

## 2020-07-09 ENCOUNTER — HOSPITAL ENCOUNTER (OUTPATIENT)
Dept: INFUSION THERAPY | Age: 50
Discharge: HOME | End: 2020-07-09
Payer: MEDICARE

## 2020-07-09 VITALS
TEMPERATURE: 97.5 F | SYSTOLIC BLOOD PRESSURE: 129 MMHG | RESPIRATION RATE: 18 BRPM | OXYGEN SATURATION: 95 % | DIASTOLIC BLOOD PRESSURE: 75 MMHG | HEART RATE: 95 BPM

## 2020-07-09 LAB — MAGNESIUM SERPL-MCNC: 2 MG/DL (ref 1.8–2.4)

## 2020-07-09 PROCEDURE — 74011250636 HC RX REV CODE- 250/636: Performed by: INTERNAL MEDICINE

## 2020-07-09 PROCEDURE — 83735 ASSAY OF MAGNESIUM: CPT

## 2020-07-09 PROCEDURE — 74011000250 HC RX REV CODE- 250: Performed by: INTERNAL MEDICINE

## 2020-07-09 PROCEDURE — 96374 THER/PROPH/DIAG INJ IV PUSH: CPT

## 2020-07-09 RX ORDER — SODIUM CHLORIDE 0.9 % (FLUSH) 0.9 %
10-40 SYRINGE (ML) INJECTION AS NEEDED
Status: DISCONTINUED | OUTPATIENT
Start: 2020-07-09 | End: 2020-07-13 | Stop reason: HOSPADM

## 2020-07-09 RX ORDER — SODIUM CHLORIDE 0.9 % (FLUSH) 0.9 %
10-30 SYRINGE (ML) INJECTION AS NEEDED
Status: DISCONTINUED | OUTPATIENT
Start: 2020-07-09 | End: 2020-07-13 | Stop reason: HOSPADM

## 2020-07-09 RX ADMIN — PROMETHAZINE HYDROCHLORIDE 25 MG: 25 INJECTION INTRAMUSCULAR; INTRAVENOUS at 07:26

## 2020-07-09 RX ADMIN — Medication 20 ML: at 07:15

## 2020-07-09 RX ADMIN — Medication 10 ML: at 07:37

## 2020-07-09 RX ADMIN — Medication 10 ML: at 07:25

## 2020-07-09 NOTE — PROGRESS NOTES
Reviewed the plan and procedure for phenergan administration and possible magnesium replacement. Patient verbalizes understanding.

## 2020-07-09 NOTE — PROGRESS NOTES
[facility] Assessment completed. Patient tolerated phenergan injection well. No further replacements needed. Any issues or concerns during appointment: none. Patient aware of next infusion appointment on 7/16/20 (date) at [address] [place] [address] (time) with IV infusion center. Discharged ambulatory, per self. Patient instructed to call her doctor's office immediately for any problems or concerns. She verbalizes understanding.

## 2020-07-16 ENCOUNTER — HOSPITAL ENCOUNTER (OUTPATIENT)
Dept: INFUSION THERAPY | Age: 50
Discharge: HOME | End: 2020-07-16
Payer: MEDICARE

## 2020-07-16 VITALS
RESPIRATION RATE: 18 BRPM | TEMPERATURE: 98.5 F | HEART RATE: 85 BPM | OXYGEN SATURATION: 93 % | DIASTOLIC BLOOD PRESSURE: 80 MMHG | SYSTOLIC BLOOD PRESSURE: 115 MMHG

## 2020-07-16 LAB — MAGNESIUM SERPL-MCNC: 2 MG/DL (ref 1.8–2.4)

## 2020-07-16 PROCEDURE — 74011000250 HC RX REV CODE- 250: Performed by: INTERNAL MEDICINE

## 2020-07-16 PROCEDURE — 96374 THER/PROPH/DIAG INJ IV PUSH: CPT

## 2020-07-16 PROCEDURE — 74011250636 HC RX REV CODE- 250/636: Performed by: INTERNAL MEDICINE

## 2020-07-16 PROCEDURE — 83735 ASSAY OF MAGNESIUM: CPT

## 2020-07-16 RX ORDER — SODIUM CHLORIDE 0.9 % (FLUSH) 0.9 %
10 SYRINGE (ML) INJECTION AS NEEDED
Status: ACTIVE | OUTPATIENT
Start: 2020-07-16 | End: 2020-07-16

## 2020-07-16 RX ADMIN — PROMETHAZINE HYDROCHLORIDE 25 MG: 25 INJECTION INTRAMUSCULAR; INTRAVENOUS at 07:40

## 2020-07-16 RX ADMIN — Medication 10 ML: at 07:25

## 2020-07-16 RX ADMIN — Medication 10 ML: at 07:50

## 2020-07-16 NOTE — PROGRESS NOTES
Arrived to the [facility]. Labs drawn, no replacements needed. Port needle changed. Phenergan administered. Patient tolerated well. Any issues or concerns during appointment: None. Patient aware of next infusion appointment on July 23rd at [address] [place] [address]. Discharged ambulatory.

## 2020-07-23 ENCOUNTER — HOSPITAL ENCOUNTER (OUTPATIENT)
Dept: INFUSION THERAPY | Age: 50
Discharge: HOME | End: 2020-07-23
Payer: MEDICARE

## 2020-07-23 VITALS
SYSTOLIC BLOOD PRESSURE: 122 MMHG | DIASTOLIC BLOOD PRESSURE: 79 MMHG | OXYGEN SATURATION: 99 % | RESPIRATION RATE: 18 BRPM | HEART RATE: 86 BPM | TEMPERATURE: 98.3 F

## 2020-07-23 LAB — MAGNESIUM SERPL-MCNC: 2 MG/DL (ref 1.8–2.4)

## 2020-07-23 PROCEDURE — 74011000250 HC RX REV CODE- 250: Performed by: INTERNAL MEDICINE

## 2020-07-23 PROCEDURE — 83735 ASSAY OF MAGNESIUM: CPT

## 2020-07-23 PROCEDURE — 96374 THER/PROPH/DIAG INJ IV PUSH: CPT

## 2020-07-23 PROCEDURE — 74011250636 HC RX REV CODE- 250/636: Performed by: INTERNAL MEDICINE

## 2020-07-23 RX ORDER — SODIUM CHLORIDE 0.9 % (FLUSH) 0.9 %
10 SYRINGE (ML) INJECTION AS NEEDED
Status: DISCONTINUED | OUTPATIENT
Start: 2020-07-23 | End: 2020-07-25 | Stop reason: HOSPADM

## 2020-07-23 RX ADMIN — PROMETHAZINE HYDROCHLORIDE 25 MG: 25 INJECTION INTRAMUSCULAR; INTRAVENOUS at 07:41

## 2020-07-23 NOTE — PROGRESS NOTES
Arrived to the [facility]. Labs and port care completed. Patient tolerated without problems. Any issues or concerns during appointment: no.  Patient aware of next infusion appointment on 7/30/20 (date) at [address] [place] [address] (time). Discharged ambulatory.

## 2020-07-30 ENCOUNTER — HOSPITAL ENCOUNTER (OUTPATIENT)
Dept: INFUSION THERAPY | Age: 50
Discharge: HOME | End: 2020-07-30
Payer: MEDICARE

## 2020-07-30 VITALS
OXYGEN SATURATION: 92 % | DIASTOLIC BLOOD PRESSURE: 83 MMHG | TEMPERATURE: 98.3 F | RESPIRATION RATE: 20 BRPM | SYSTOLIC BLOOD PRESSURE: 127 MMHG | HEART RATE: 116 BPM

## 2020-07-30 LAB — MAGNESIUM SERPL-MCNC: 2.2 MG/DL (ref 1.8–2.4)

## 2020-07-30 PROCEDURE — 96374 THER/PROPH/DIAG INJ IV PUSH: CPT

## 2020-07-30 PROCEDURE — 83735 ASSAY OF MAGNESIUM: CPT

## 2020-07-30 PROCEDURE — 74011000250 HC RX REV CODE- 250: Performed by: INTERNAL MEDICINE

## 2020-07-30 PROCEDURE — 74011250636 HC RX REV CODE- 250/636: Performed by: INTERNAL MEDICINE

## 2020-07-30 RX ORDER — SODIUM CHLORIDE 0.9 % (FLUSH) 0.9 %
10 SYRINGE (ML) INJECTION AS NEEDED
Status: DISCONTINUED | OUTPATIENT
Start: 2020-07-30 | End: 2020-08-01 | Stop reason: HOSPADM

## 2020-07-30 RX ADMIN — Medication 10 ML: at 07:39

## 2020-07-30 RX ADMIN — Medication 10 ML: at 07:50

## 2020-07-30 RX ADMIN — PROMETHAZINE HYDROCHLORIDE 25 MG: 25 INJECTION INTRAMUSCULAR; INTRAVENOUS at 07:39

## 2020-08-06 ENCOUNTER — HOSPITAL ENCOUNTER (OUTPATIENT)
Dept: INFUSION THERAPY | Age: 50
Discharge: HOME | End: 2020-08-06
Payer: MEDICARE

## 2020-08-06 VITALS
DIASTOLIC BLOOD PRESSURE: 82 MMHG | OXYGEN SATURATION: 96 % | SYSTOLIC BLOOD PRESSURE: 128 MMHG | RESPIRATION RATE: 20 BRPM | TEMPERATURE: 98.1 F | HEART RATE: 112 BPM

## 2020-08-06 LAB — MAGNESIUM SERPL-MCNC: 1.9 MG/DL (ref 1.8–2.4)

## 2020-08-06 PROCEDURE — 74011000250 HC RX REV CODE- 250: Performed by: INTERNAL MEDICINE

## 2020-08-06 PROCEDURE — 96374 THER/PROPH/DIAG INJ IV PUSH: CPT

## 2020-08-06 PROCEDURE — 74011250636 HC RX REV CODE- 250/636: Performed by: INTERNAL MEDICINE

## 2020-08-06 PROCEDURE — 83735 ASSAY OF MAGNESIUM: CPT

## 2020-08-06 RX ORDER — SODIUM CHLORIDE 0.9 % (FLUSH) 0.9 %
10 SYRINGE (ML) INJECTION AS NEEDED
Status: ACTIVE | OUTPATIENT
Start: 2020-08-06 | End: 2020-08-06

## 2020-08-06 RX ADMIN — PROMETHAZINE HYDROCHLORIDE 25 MG: 25 INJECTION INTRAMUSCULAR; INTRAVENOUS at 07:22

## 2020-08-06 RX ADMIN — Medication 10 ML: at 07:22

## 2020-08-13 ENCOUNTER — HOSPITAL ENCOUNTER (OUTPATIENT)
Dept: INFUSION THERAPY | Age: 50
Discharge: HOME | End: 2020-08-13
Payer: MEDICARE

## 2020-08-13 VITALS
RESPIRATION RATE: 20 BRPM | TEMPERATURE: 98.3 F | OXYGEN SATURATION: 94 % | DIASTOLIC BLOOD PRESSURE: 74 MMHG | HEART RATE: 114 BPM | SYSTOLIC BLOOD PRESSURE: 114 MMHG

## 2020-08-13 LAB — MAGNESIUM SERPL-MCNC: 2.1 MG/DL (ref 1.8–2.4)

## 2020-08-13 PROCEDURE — 83735 ASSAY OF MAGNESIUM: CPT

## 2020-08-13 PROCEDURE — 74011250636 HC RX REV CODE- 250/636: Performed by: FAMILY MEDICINE

## 2020-08-13 PROCEDURE — 96374 THER/PROPH/DIAG INJ IV PUSH: CPT

## 2020-08-13 PROCEDURE — 74011000250 HC RX REV CODE- 250: Performed by: FAMILY MEDICINE

## 2020-08-13 RX ORDER — SODIUM CHLORIDE 0.9 % (FLUSH) 0.9 %
10 SYRINGE (ML) INJECTION EVERY 8 HOURS
Status: DISCONTINUED | OUTPATIENT
Start: 2020-08-13 | End: 2020-08-15 | Stop reason: HOSPADM

## 2020-08-13 RX ADMIN — Medication 10 ML: at 07:30

## 2020-08-13 RX ADMIN — PROMETHAZINE HYDROCHLORIDE 25 MG: 25 INJECTION INTRAMUSCULAR; INTRAVENOUS at 07:33

## 2020-08-13 NOTE — PROGRESS NOTES
Arrived to the [facility]. Blood drawn. No Magnesium required. Phenergan IV completed. Patient tolerated well. Any issues or concerns during appointment: none. Patient aware of next infusion appointment on 8/20/20 at [address] [place] [address]. Discharged amb.

## 2020-08-20 ENCOUNTER — HOSPITAL ENCOUNTER (OUTPATIENT)
Dept: INFUSION THERAPY | Age: 50
Discharge: HOME | End: 2020-08-20
Payer: MEDICARE

## 2020-08-20 VITALS
HEART RATE: 115 BPM | DIASTOLIC BLOOD PRESSURE: 86 MMHG | SYSTOLIC BLOOD PRESSURE: 114 MMHG | OXYGEN SATURATION: 95 % | TEMPERATURE: 97.5 F | RESPIRATION RATE: 20 BRPM

## 2020-08-20 LAB — MAGNESIUM SERPL-MCNC: 2.1 MG/DL (ref 1.8–2.4)

## 2020-08-20 PROCEDURE — 74011000250 HC RX REV CODE- 250: Performed by: INTERNAL MEDICINE

## 2020-08-20 PROCEDURE — 74011250636 HC RX REV CODE- 250/636: Performed by: INTERNAL MEDICINE

## 2020-08-20 PROCEDURE — 83735 ASSAY OF MAGNESIUM: CPT

## 2020-08-20 PROCEDURE — 96374 THER/PROPH/DIAG INJ IV PUSH: CPT

## 2020-08-20 RX ORDER — SODIUM CHLORIDE 0.9 % (FLUSH) 0.9 %
10 SYRINGE (ML) INJECTION AS NEEDED
Status: ACTIVE | OUTPATIENT
Start: 2020-08-20 | End: 2020-08-20

## 2020-08-20 RX ADMIN — PROMETHAZINE HYDROCHLORIDE 25 MG: 25 INJECTION INTRAMUSCULAR; INTRAVENOUS at 07:37

## 2020-08-20 RX ADMIN — Medication 10 ML: at 07:35

## 2020-08-20 RX ADMIN — Medication 10 ML: at 09:15

## 2020-08-20 NOTE — PROGRESS NOTES
Arrived to the [facility]. Mg level completed and WNL. Phenergan IV given over 10 min. Patient tolerated well. Any issues or concerns during appointment: none. Patient aware of next infusion appointment on 8/27/20 at [address] [place] [address]. Discharged amb.

## 2020-08-27 ENCOUNTER — HOSPITAL ENCOUNTER (OUTPATIENT)
Dept: INFUSION THERAPY | Age: 50
Discharge: HOME | End: 2020-08-27
Payer: MEDICARE

## 2020-08-27 VITALS
OXYGEN SATURATION: 96 % | DIASTOLIC BLOOD PRESSURE: 76 MMHG | RESPIRATION RATE: 18 BRPM | HEART RATE: 111 BPM | SYSTOLIC BLOOD PRESSURE: 112 MMHG | TEMPERATURE: 96.2 F

## 2020-08-27 LAB — MAGNESIUM SERPL-MCNC: 2 MG/DL (ref 1.8–2.4)

## 2020-08-27 PROCEDURE — 96374 THER/PROPH/DIAG INJ IV PUSH: CPT

## 2020-08-27 PROCEDURE — 74011000250 HC RX REV CODE- 250: Performed by: INTERNAL MEDICINE

## 2020-08-27 PROCEDURE — 83735 ASSAY OF MAGNESIUM: CPT

## 2020-08-27 PROCEDURE — 74011250636 HC RX REV CODE- 250/636: Performed by: INTERNAL MEDICINE

## 2020-08-27 RX ORDER — SODIUM CHLORIDE 0.9 % (FLUSH) 0.9 %
10-40 SYRINGE (ML) INJECTION AS NEEDED
Status: DISCONTINUED | OUTPATIENT
Start: 2020-08-27 | End: 2020-08-29 | Stop reason: HOSPADM

## 2020-08-27 RX ADMIN — PROMETHAZINE HYDROCHLORIDE 25 MG: 25 INJECTION INTRAMUSCULAR; INTRAVENOUS at 07:50

## 2020-08-27 RX ADMIN — Medication 20 ML: at 07:25

## 2020-08-27 NOTE — PROGRESS NOTES
Arrived to the [facility]. Phenergan 25 mg IV completed. Magnesium=2.0,no replacement needed.  Patient tolerated well  Any issues or concerns during appointment: No  Patient aware of next infusion appointment on Thursday,September 3rd @ [address] [facility]  Discharged home ambulatory

## 2020-09-02 ENCOUNTER — HOSPITAL ENCOUNTER (OUTPATIENT)
Dept: INFUSION THERAPY | Age: 50
Discharge: HOME | End: 2020-09-02
Payer: MEDICARE

## 2020-09-02 VITALS
DIASTOLIC BLOOD PRESSURE: 75 MMHG | SYSTOLIC BLOOD PRESSURE: 134 MMHG | TEMPERATURE: 97.9 F | HEART RATE: 117 BPM | RESPIRATION RATE: 18 BRPM | OXYGEN SATURATION: 96 %

## 2020-09-02 LAB — MAGNESIUM SERPL-MCNC: 2 MG/DL (ref 1.8–2.4)

## 2020-09-02 PROCEDURE — 74011250636 HC RX REV CODE- 250/636: Performed by: INTERNAL MEDICINE

## 2020-09-02 PROCEDURE — 36591 DRAW BLOOD OFF VENOUS DEVICE: CPT

## 2020-09-02 PROCEDURE — 83735 ASSAY OF MAGNESIUM: CPT

## 2020-09-02 PROCEDURE — 96374 THER/PROPH/DIAG INJ IV PUSH: CPT

## 2020-09-02 PROCEDURE — 74011000250 HC RX REV CODE- 250: Performed by: INTERNAL MEDICINE

## 2020-09-02 RX ORDER — SODIUM CHLORIDE 0.9 % (FLUSH) 0.9 %
10 SYRINGE (ML) INJECTION AS NEEDED
Status: DISCONTINUED | OUTPATIENT
Start: 2020-09-02 | End: 2020-09-04 | Stop reason: HOSPADM

## 2020-09-02 RX ADMIN — PROMETHAZINE HYDROCHLORIDE 25 MG: 25 INJECTION INTRAMUSCULAR; INTRAVENOUS at 08:16

## 2020-09-02 RX ADMIN — Medication 10 ML: at 08:05

## 2020-09-02 RX ADMIN — Medication 10 ML: at 08:26

## 2020-09-02 NOTE — PROGRESS NOTES
Arrived to the [facility]. Labs drawn and reviewd; no magnesium replacement needed per written order. IV Phenergan administered and port needle changed. Patient tolerated well. Any issues or concerns during appointment: none. Discharged ambulatory.     Allen Reyes RN

## 2020-09-03 ENCOUNTER — APPOINTMENT (OUTPATIENT)
Dept: INFUSION THERAPY | Age: 50
End: 2020-09-03

## 2020-09-04 NOTE — PROGRESS NOTES
Confirmed arrival time with patient (2 hours prior). No concerns noted at this time.  will be present. Patient aware of COVID precautions.

## 2020-09-08 ENCOUNTER — ANESTHESIA (OUTPATIENT)
Dept: ENDOSCOPY | Age: 50
End: 2020-09-08
Payer: MEDICARE

## 2020-09-08 ENCOUNTER — ANESTHESIA EVENT (OUTPATIENT)
Dept: ENDOSCOPY | Age: 50
End: 2020-09-08
Payer: MEDICARE

## 2020-09-08 ENCOUNTER — HOSPITAL ENCOUNTER (OUTPATIENT)
Age: 50
Setting detail: OUTPATIENT SURGERY
Discharge: HOME | End: 2020-09-08
Attending: INTERNAL MEDICINE | Admitting: INTERNAL MEDICINE
Payer: MEDICARE

## 2020-09-08 VITALS
BODY MASS INDEX: 33.13 KG/M2 | WEIGHT: 180 LBS | TEMPERATURE: 96.8 F | SYSTOLIC BLOOD PRESSURE: 107 MMHG | OXYGEN SATURATION: 94 % | DIASTOLIC BLOOD PRESSURE: 74 MMHG | RESPIRATION RATE: 14 BRPM | HEIGHT: 62 IN | HEART RATE: 82 BPM

## 2020-09-08 LAB — GLUCOSE BLD STRIP.AUTO-MCNC: 198 MG/DL (ref 65–100)

## 2020-09-08 PROCEDURE — 76060000031 HC ANESTHESIA FIRST 0.5 HR: Performed by: INTERNAL MEDICINE

## 2020-09-08 PROCEDURE — 82962 GLUCOSE BLOOD TEST: CPT

## 2020-09-08 PROCEDURE — 76040000025: Performed by: INTERNAL MEDICINE

## 2020-09-08 PROCEDURE — 74011250636 HC RX REV CODE- 250/636: Performed by: NURSE ANESTHETIST, CERTIFIED REGISTERED

## 2020-09-08 PROCEDURE — 74011000250 HC RX REV CODE- 250: Performed by: NURSE ANESTHETIST, CERTIFIED REGISTERED

## 2020-09-08 RX ORDER — SODIUM CHLORIDE, SODIUM LACTATE, POTASSIUM CHLORIDE, CALCIUM CHLORIDE 600; 310; 30; 20 MG/100ML; MG/100ML; MG/100ML; MG/100ML
INJECTION, SOLUTION INTRAVENOUS
Status: DISCONTINUED | OUTPATIENT
Start: 2020-09-08 | End: 2020-09-08 | Stop reason: HOSPADM

## 2020-09-08 RX ORDER — LIDOCAINE HYDROCHLORIDE 20 MG/ML
INJECTION, SOLUTION EPIDURAL; INFILTRATION; INTRACAUDAL; PERINEURAL AS NEEDED
Status: DISCONTINUED | OUTPATIENT
Start: 2020-09-08 | End: 2020-09-08 | Stop reason: HOSPADM

## 2020-09-08 RX ORDER — PROPOFOL 10 MG/ML
INJECTION, EMULSION INTRAVENOUS
Status: DISCONTINUED | OUTPATIENT
Start: 2020-09-08 | End: 2020-09-08 | Stop reason: HOSPADM

## 2020-09-08 RX ORDER — PROPOFOL 10 MG/ML
INJECTION, EMULSION INTRAVENOUS AS NEEDED
Status: DISCONTINUED | OUTPATIENT
Start: 2020-09-08 | End: 2020-09-08 | Stop reason: HOSPADM

## 2020-09-08 RX ADMIN — PROPOFOL 180 MCG/KG/MIN: 10 INJECTION, EMULSION INTRAVENOUS at 09:29

## 2020-09-08 RX ADMIN — SODIUM CHLORIDE, SODIUM LACTATE, POTASSIUM CHLORIDE, AND CALCIUM CHLORIDE: 600; 310; 30; 20 INJECTION, SOLUTION INTRAVENOUS at 09:25

## 2020-09-08 RX ADMIN — PROPOFOL 80 MG: 10 INJECTION, EMULSION INTRAVENOUS at 09:29

## 2020-09-08 RX ADMIN — LIDOCAINE HYDROCHLORIDE 100 MG: 20 INJECTION, SOLUTION EPIDURAL; INFILTRATION; INTRACAUDAL; PERINEURAL at 09:29

## 2020-09-08 NOTE — ANESTHESIA POSTPROCEDURE EVALUATION
Procedure(s):  ESOPHAGOGASTRODUODENOSCOPY (EGD)  ESOPHAGEAL DILATION. total IV anesthesia    Anesthesia Post Evaluation      Multimodal analgesia: multimodal analgesia used between 6 hours prior to anesthesia start to PACU discharge  Patient location during evaluation: PACU  Patient participation: complete - patient participated  Level of consciousness: awake and alert  Pain management: adequate  Airway patency: patent  Anesthetic complications: no  Cardiovascular status: acceptable  Respiratory status: acceptable  Hydration status: acceptable  Post anesthesia nausea and vomiting:  none  Final Post Anesthesia Temperature Assessment:  Normothermia (36.0-37.5 degrees C)      INITIAL Post-op Vital signs:   Vitals Value Taken Time   /74 9/8/2020 10:09 AM   Temp 36 °C (96.8 °F) 9/8/2020  9:40 AM   Pulse 81 9/8/2020 10:12 AM   Resp 14 9/8/2020 10:09 AM   SpO2 95 % 9/8/2020 10:12 AM   Vitals shown include unvalidated device data.

## 2020-09-08 NOTE — H&P
PreProcedure H&P Update    Today's Date:  9/8/2020    CC:  dysphagia    Subjective:   HPI: patient states she still feels significant mid chest dysphagia as though food \"gets stuck right here\" (pointing to mid chest)    PMH:  Past Medical History:   Diagnosis Date    Anemia     blood transfusion 2013 X1 d/t \"perforated bowel\"-- Fe infusions 12/2017--- denies any current iron infusions 12/17/2019    Anxiety and depression     Asthma     allergy induced, denies any inhaler, patient denies    BMI 32.0-32.9,adult     BMI 32.2    C. difficile diarrhea 2013    in 1016 after complicated ERCP    Cervical dysplasia 1990s    Chronic insomnia     Chronic pain     all over     Chronic pancreatitis ([facility] [address]     Encounter for insertion of venous access port     Left chest port    Endometriosis     Esophageal stricture 2017    Fibromyalgia     Former cigarette smoker     GERD (gastroesophageal reflux disease)     daily meds---po and IV protonix- uses per port- alternates    HLD (hyperlipidemia)     pt denies     IBS (irritable bowel syndrome)     Migraine headache     Nausea & vomiting     pt reports she does better with phenergan than zofran - causes HA    Nonalcoholic fatty liver disease     PUD (peptic ulcer disease) 06/2017    Sinus tachycardia     Sphincter of Oddi dysfunction     Type 2 diabetes mellitus (HCC)     insulin reliant/-160 s.s of hypoglycemia at 100       Medications:   No current facility-administered medications for this encounter. Objective:   Vitals:  Visit Vitals  /62   Pulse 96   Temp 98.2 °F (36.8 °C)   Resp 16   Ht 5' 2\" (1.575 m)   Wt 81.6 kg (180 lb)   SpO2 95%   Breastfeeding No   BMI 32.92 kg/m²     Exam:  General appearance: alert, cooperative, no distress  Lungs: clear to auscultation bilaterally anteriorly  Heart: regular rate and rhythm  Abdomen: soft, non-tender.  Bowel sounds normal. No masses, no organomegaly      Data Review (Labs):    No results for input(s): WBC, HGB, HCT, PLT, MCV, NA, K, CL, CO2, BUN, CREA, CA, GLU, AP, TBIL, CBIL, ALB, TP, AML, LPSE, PTP, INR, APTT, HGBEXT, HCTEXT, PLTEXT, INREXT in the last 72 hours. No lab exists for component: SGOT, GPT, DBIL    Plan:     Dysphagia. EGD dilation. Patient aware of risks.

## 2020-09-08 NOTE — PROCEDURES
Esophagogastroduodenoscopy    DATE of PROCEDURE: 9/8/2020    INDICATION: dysphagia    POSTPROCEDURE DIAGNOSIS: esophageal stricture    MEDICATIONS ADMINISTERED: MAC anesthesia (see anesthesia report)    INSTRUMENT:    PROCEDURE:  After obtaining informed consent, the patient was placed in the left lateral position and sedated. The endoscope was advanced under direct vision without difficulty. The esophagus, stomach (including retroflexed views) and duodenum were evaluated. The patient was taken to the recovery area in stable condition. FINDINGS:  ESOPHAGUS: mild distal narrowing, benign. Sherlie Samoan #50 passed slowly with mild resistance; re endoscopy afterwards showed a moderate depth 2 cm long distal mucosal disruption.   STOMACH: gastrojejunostomy  DUODENUM: normal    Estimated blood loss: 0-minimal   Specimens obtained during procedure: none    PLAN: EGD dilation in 1 month

## 2020-09-08 NOTE — ANESTHESIA PREPROCEDURE EVALUATION
Relevant Problems   No relevant active problems       Anesthetic History     PONV          Review of Systems / Medical History  Patient summary reviewed and pertinent labs reviewed    Pulmonary            Asthma        Neuro/Psych         Psychiatric history     Cardiovascular    Hypertension          Hyperlipidemia    Exercise tolerance: >4 METS     GI/Hepatic/Renal           Liver disease (LEIVA)    Comments: Esophageal Stricture Endo/Other    Diabetes: type 2    Anemia     Other Findings              Physical Exam    Airway  Mallampati: II  TM Distance: > 6 cm  Neck ROM: normal range of motion   Mouth opening: Normal     Cardiovascular  Regular rate and rhythm,  S1 and S2 normal,  no murmur, click, rub, or gallop             Dental  No notable dental hx       Pulmonary  Breath sounds clear to auscultation               Abdominal         Other Findings            Anesthetic Plan    ASA: 2  Anesthesia type: total IV anesthesia            Anesthetic plan and risks discussed with: Patient

## 2020-09-08 NOTE — DISCHARGE INSTRUCTIONS
Gastrointestinal Esophagogastroduodenoscopy (EGD) - Upper Exam Discharge Instructions    1. Call Dr. Rohit Landry at [phone] for any problems or questions. 2. Contact the doctor's office for follow up appointment as directed. 3. Medication may cause drowsiness for several hours, therefore:  · Do not drive or operate machinery for remainder of the day. · No alcohol today. · Do not make any important or legal decisions for 24 hours. · Do not sign any legal documents for 24 hours. 5. Ordinarily, you may resume regular diet and activity after exam unless otherwise              specified by your physician. 6. For mild soreness in your throat you may use Cepacol throat lozenges or warm               salt-water gargles as needed. Any additional instructions:  Repeat egd with dilation in 1 month     Instructions given to Jacki Artis and other family members.   Instructions given by:

## 2020-09-17 ENCOUNTER — HOSPITAL ENCOUNTER (OUTPATIENT)
Dept: INFUSION THERAPY | Age: 50
Discharge: HOME | End: 2020-09-17
Payer: MEDICARE

## 2020-09-17 VITALS
HEART RATE: 105 BPM | TEMPERATURE: 98.2 F | SYSTOLIC BLOOD PRESSURE: 115 MMHG | DIASTOLIC BLOOD PRESSURE: 78 MMHG | RESPIRATION RATE: 20 BRPM | OXYGEN SATURATION: 94 %

## 2020-09-17 LAB — MAGNESIUM SERPL-MCNC: 1.9 MG/DL (ref 1.8–2.4)

## 2020-09-17 PROCEDURE — 74011000250 HC RX REV CODE- 250: Performed by: INTERNAL MEDICINE

## 2020-09-17 PROCEDURE — 83735 ASSAY OF MAGNESIUM: CPT

## 2020-09-17 PROCEDURE — 74011250636 HC RX REV CODE- 250/636: Performed by: INTERNAL MEDICINE

## 2020-09-17 PROCEDURE — 96374 THER/PROPH/DIAG INJ IV PUSH: CPT

## 2020-09-17 RX ORDER — SODIUM CHLORIDE 0.9 % (FLUSH) 0.9 %
10 SYRINGE (ML) INJECTION EVERY 8 HOURS
Status: DISCONTINUED | OUTPATIENT
Start: 2020-09-17 | End: 2020-09-19 | Stop reason: HOSPADM

## 2020-09-17 RX ADMIN — Medication 10 ML: at 07:32

## 2020-09-17 RX ADMIN — PROMETHAZINE HYDROCHLORIDE 25 MG: 25 INJECTION INTRAMUSCULAR; INTRAVENOUS at 07:32

## 2020-09-17 NOTE — PROGRESS NOTES
Pt arrived ambulatory, labs drawn, no replacement needed, phenergan given IV, port needle changed, pt discharged home ambulatory

## 2020-09-24 ENCOUNTER — HOSPITAL ENCOUNTER (OUTPATIENT)
Dept: INFUSION THERAPY | Age: 50
Discharge: HOME | End: 2020-09-24
Payer: MEDICARE

## 2020-09-24 VITALS
SYSTOLIC BLOOD PRESSURE: 126 MMHG | RESPIRATION RATE: 18 BRPM | OXYGEN SATURATION: 97 % | HEART RATE: 108 BPM | DIASTOLIC BLOOD PRESSURE: 83 MMHG | TEMPERATURE: 97.3 F

## 2020-09-24 LAB — MAGNESIUM SERPL-MCNC: 2 MG/DL (ref 1.8–2.4)

## 2020-09-24 PROCEDURE — 74011250636 HC RX REV CODE- 250/636: Performed by: INTERNAL MEDICINE

## 2020-09-24 PROCEDURE — 96374 THER/PROPH/DIAG INJ IV PUSH: CPT

## 2020-09-24 PROCEDURE — 74011000250 HC RX REV CODE- 250: Performed by: INTERNAL MEDICINE

## 2020-09-24 PROCEDURE — 83735 ASSAY OF MAGNESIUM: CPT

## 2020-09-24 RX ORDER — SODIUM CHLORIDE 0.9 % (FLUSH) 0.9 %
10-40 SYRINGE (ML) INJECTION AS NEEDED
Status: DISCONTINUED | OUTPATIENT
Start: 2020-09-24 | End: 2020-09-25 | Stop reason: HOSPADM

## 2020-09-24 RX ADMIN — SODIUM CHLORIDE 500 ML: 900 INJECTION, SOLUTION INTRAVENOUS at 08:09

## 2020-09-24 RX ADMIN — PROMETHAZINE HYDROCHLORIDE 25 MG: 25 INJECTION INTRAMUSCULAR; INTRAVENOUS at 08:10

## 2020-09-24 RX ADMIN — Medication 10 ML: at 08:30

## 2020-09-24 NOTE — PROGRESS NOTES
Arrived to the [facility]. Port needle changed and IV Phenergan completed. Patient tolerated well. No Mg+ replacement needed. Any issues or concerns during appointment: None. Patient aware no future infusion appointments at this time.  will make appointments and call patient. Discharged ambulatory in stable condition.

## 2020-10-01 ENCOUNTER — HOSPITAL ENCOUNTER (OUTPATIENT)
Dept: INFUSION THERAPY | Age: 50
Discharge: HOME | End: 2020-10-01
Payer: MEDICARE

## 2020-10-01 VITALS
HEART RATE: 107 BPM | TEMPERATURE: 99.4 F | DIASTOLIC BLOOD PRESSURE: 69 MMHG | RESPIRATION RATE: 18 BRPM | SYSTOLIC BLOOD PRESSURE: 122 MMHG | OXYGEN SATURATION: 93 %

## 2020-10-01 LAB — MAGNESIUM SERPL-MCNC: 2 MG/DL (ref 1.8–2.4)

## 2020-10-01 PROCEDURE — 96374 THER/PROPH/DIAG INJ IV PUSH: CPT

## 2020-10-01 PROCEDURE — 74011250636 HC RX REV CODE- 250/636: Performed by: INTERNAL MEDICINE

## 2020-10-01 PROCEDURE — 83735 ASSAY OF MAGNESIUM: CPT

## 2020-10-01 PROCEDURE — 74011000250 HC RX REV CODE- 250: Performed by: INTERNAL MEDICINE

## 2020-10-01 RX ORDER — SODIUM CHLORIDE 0.9 % (FLUSH) 0.9 %
10 SYRINGE (ML) INJECTION AS NEEDED
Status: DISCONTINUED | OUTPATIENT
Start: 2020-10-01 | End: 2020-10-03 | Stop reason: HOSPADM

## 2020-10-01 RX ADMIN — PROMETHAZINE HYDROCHLORIDE 25 MG: 25 INJECTION INTRAMUSCULAR; INTRAVENOUS at 07:47

## 2020-10-01 RX ADMIN — Medication 10 ML: at 07:45

## 2020-10-01 NOTE — PROGRESS NOTES
Arrived to the [facility]. Port flush, needle and dressing change completed. Patient tolerated well. Magnesium WNL. Phenergan given slow IVP. Any issues or concerns during appointment: No.  Patient aware of next infusion appointment on 10/8/20  Discharged home in stable condition.

## 2020-10-08 ENCOUNTER — HOSPITAL ENCOUNTER (OUTPATIENT)
Dept: INFUSION THERAPY | Age: 50
Discharge: HOME | End: 2020-10-08
Payer: MEDICARE

## 2020-10-08 VITALS
RESPIRATION RATE: 18 BRPM | OXYGEN SATURATION: 94 % | SYSTOLIC BLOOD PRESSURE: 135 MMHG | HEART RATE: 108 BPM | DIASTOLIC BLOOD PRESSURE: 88 MMHG | TEMPERATURE: 97 F

## 2020-10-08 LAB — MAGNESIUM SERPL-MCNC: 1.9 MG/DL (ref 1.8–2.4)

## 2020-10-08 PROCEDURE — 96374 THER/PROPH/DIAG INJ IV PUSH: CPT

## 2020-10-08 PROCEDURE — 83735 ASSAY OF MAGNESIUM: CPT

## 2020-10-08 PROCEDURE — 74011250636 HC RX REV CODE- 250/636: Performed by: INTERNAL MEDICINE

## 2020-10-08 PROCEDURE — 74011000250 HC RX REV CODE- 250: Performed by: INTERNAL MEDICINE

## 2020-10-08 RX ORDER — SODIUM CHLORIDE 0.9 % (FLUSH) 0.9 %
10 SYRINGE (ML) INJECTION AS NEEDED
Status: DISCONTINUED | OUTPATIENT
Start: 2020-10-08 | End: 2020-10-10 | Stop reason: HOSPADM

## 2020-10-08 RX ADMIN — Medication 10 ML: at 07:30

## 2020-10-08 RX ADMIN — PROMETHAZINE HYDROCHLORIDE 25 MG: 25 INJECTION INTRAMUSCULAR; INTRAVENOUS at 07:42

## 2020-10-08 RX ADMIN — SODIUM CHLORIDE 500 ML: 900 INJECTION, SOLUTION INTRAVENOUS at 07:30

## 2020-10-08 NOTE — PROGRESS NOTES
Pt arrived ambulatory to [facility]. Port re accessed and blood drawn and sent to lab. NS infusing. Phenergan IV as ordered. Mag 1.9. no replacements needed. Pt aware of next appt on 10/15/2020 at [address] [place] [address]. Port remains accessed for home use. Discharged ambulatory.

## 2020-10-14 ENCOUNTER — ANESTHESIA (OUTPATIENT)
Dept: ENDOSCOPY | Age: 50
End: 2020-10-14
Payer: MEDICARE

## 2020-10-14 ENCOUNTER — HOSPITAL ENCOUNTER (OUTPATIENT)
Age: 50
Setting detail: OUTPATIENT SURGERY
Discharge: HOME | End: 2020-10-14
Attending: INTERNAL MEDICINE | Admitting: INTERNAL MEDICINE
Payer: MEDICARE

## 2020-10-14 ENCOUNTER — ANESTHESIA EVENT (OUTPATIENT)
Dept: ENDOSCOPY | Age: 50
End: 2020-10-14
Payer: MEDICARE

## 2020-10-14 VITALS
SYSTOLIC BLOOD PRESSURE: 96 MMHG | TEMPERATURE: 97.7 F | OXYGEN SATURATION: 97 % | BODY MASS INDEX: 34.78 KG/M2 | HEIGHT: 62 IN | RESPIRATION RATE: 16 BRPM | WEIGHT: 189 LBS | DIASTOLIC BLOOD PRESSURE: 54 MMHG | HEART RATE: 76 BPM

## 2020-10-14 LAB — GLUCOSE BLD STRIP.AUTO-MCNC: 209 MG/DL (ref 65–100)

## 2020-10-14 PROCEDURE — 76060000031 HC ANESTHESIA FIRST 0.5 HR: Performed by: INTERNAL MEDICINE

## 2020-10-14 PROCEDURE — 82962 GLUCOSE BLOOD TEST: CPT

## 2020-10-14 PROCEDURE — 2709999900 HC NON-CHARGEABLE SUPPLY: Performed by: INTERNAL MEDICINE

## 2020-10-14 PROCEDURE — 74011000250 HC RX REV CODE- 250: Performed by: ANESTHESIOLOGY

## 2020-10-14 PROCEDURE — 76040000025: Performed by: INTERNAL MEDICINE

## 2020-10-14 PROCEDURE — 74011250636 HC RX REV CODE- 250/636: Performed by: INTERNAL MEDICINE

## 2020-10-14 PROCEDURE — 77030021593 HC FCPS BIOP ENDOSC BSC -A: Performed by: INTERNAL MEDICINE

## 2020-10-14 PROCEDURE — 74011250636 HC RX REV CODE- 250/636: Performed by: NURSE ANESTHETIST, CERTIFIED REGISTERED

## 2020-10-14 PROCEDURE — 74011250636 HC RX REV CODE- 250/636: Performed by: ANESTHESIOLOGY

## 2020-10-14 PROCEDURE — 88305 TISSUE EXAM BY PATHOLOGIST: CPT

## 2020-10-14 PROCEDURE — 74011000250 HC RX REV CODE- 250: Performed by: NURSE ANESTHETIST, CERTIFIED REGISTERED

## 2020-10-14 RX ORDER — LIDOCAINE HYDROCHLORIDE 20 MG/ML
INJECTION, SOLUTION EPIDURAL; INFILTRATION; INTRACAUDAL; PERINEURAL AS NEEDED
Status: DISCONTINUED | OUTPATIENT
Start: 2020-10-14 | End: 2020-10-14 | Stop reason: HOSPADM

## 2020-10-14 RX ORDER — PROPOFOL 10 MG/ML
INJECTION, EMULSION INTRAVENOUS AS NEEDED
Status: DISCONTINUED | OUTPATIENT
Start: 2020-10-14 | End: 2020-10-14 | Stop reason: HOSPADM

## 2020-10-14 RX ORDER — PROPOFOL 10 MG/ML
INJECTION, EMULSION INTRAVENOUS
Status: DISCONTINUED | OUTPATIENT
Start: 2020-10-14 | End: 2020-10-14 | Stop reason: HOSPADM

## 2020-10-14 RX ORDER — SODIUM CHLORIDE, SODIUM LACTATE, POTASSIUM CHLORIDE, CALCIUM CHLORIDE 600; 310; 30; 20 MG/100ML; MG/100ML; MG/100ML; MG/100ML
1000 INJECTION, SOLUTION INTRAVENOUS CONTINUOUS
Status: DISCONTINUED | OUTPATIENT
Start: 2020-10-14 | End: 2020-10-14 | Stop reason: HOSPADM

## 2020-10-14 RX ORDER — PROMETHAZINE HYDROCHLORIDE 25 MG/ML
INJECTION, SOLUTION INTRAMUSCULAR; INTRAVENOUS AS NEEDED
Status: DISCONTINUED | OUTPATIENT
Start: 2020-10-14 | End: 2020-10-14 | Stop reason: HOSPADM

## 2020-10-14 RX ADMIN — SODIUM CHLORIDE, SODIUM LACTATE, POTASSIUM CHLORIDE, AND CALCIUM CHLORIDE 1000 ML: 600; 310; 30; 20 INJECTION, SOLUTION INTRAVENOUS at 10:13

## 2020-10-14 RX ADMIN — LIDOCAINE HYDROCHLORIDE 100 MG: 20 INJECTION, SOLUTION EPIDURAL; INFILTRATION; INTRACAUDAL; PERINEURAL at 11:18

## 2020-10-14 RX ADMIN — PROPOFOL 180 MCG/KG/MIN: 10 INJECTION, EMULSION INTRAVENOUS at 11:20

## 2020-10-14 RX ADMIN — PROPOFOL 50 MG: 10 INJECTION, EMULSION INTRAVENOUS at 11:20

## 2020-10-14 RX ADMIN — PROMETHAZINE HYDROCHLORIDE 6.25 MG: 25 INJECTION INTRAMUSCULAR; INTRAVENOUS at 11:03

## 2020-10-14 RX ADMIN — PROPOFOL 20 MG: 10 INJECTION, EMULSION INTRAVENOUS at 11:25

## 2020-10-14 RX ADMIN — SODIUM CHLORIDE, SODIUM LACTATE, POTASSIUM CHLORIDE, AND CALCIUM CHLORIDE: 600; 310; 30; 20 INJECTION, SOLUTION INTRAVENOUS at 11:14

## 2020-10-14 RX ADMIN — SODIUM CHLORIDE 60 MCG: 900 INJECTION, SOLUTION INTRAVENOUS at 11:29

## 2020-10-14 RX ADMIN — PROMETHAZINE HYDROCHLORIDE 6.25 MG: 25 INJECTION INTRAMUSCULAR; INTRAVENOUS at 11:18

## 2020-10-14 RX ADMIN — PROPOFOL 30 MG: 10 INJECTION, EMULSION INTRAVENOUS at 11:22

## 2020-10-14 NOTE — ANESTHESIA POSTPROCEDURE EVALUATION
Procedure(s):  ESOPHAGOGASTRODUODENOSCOPY (EGD) / BMI 33  ESOPHAGOGASTRODUODENAL (EGD) BIOPSY  ESOPHAGEAL DILATION. total IV anesthesia    Anesthesia Post Evaluation      Multimodal analgesia: multimodal analgesia used between 6 hours prior to anesthesia start to PACU discharge  Patient location during evaluation: PACU  Patient participation: complete - patient participated  Level of consciousness: awake and alert  Pain management: adequate  Airway patency: patent  Anesthetic complications: no  Cardiovascular status: acceptable  Respiratory status: acceptable  Hydration status: acceptable  Post anesthesia nausea and vomiting:  none  Final Post Anesthesia Temperature Assessment:  Normothermia (36.0-37.5 degrees C)      INITIAL Post-op Vital signs:   Vitals Value Taken Time   BP 96/54 10/14/2020 12:14 PM   Temp     Pulse 85 10/14/2020 12:16 PM   Resp 16 10/14/2020 12:14 PM   SpO2 93 % 10/14/2020 12:16 PM   Vitals shown include unvalidated device data.

## 2020-10-14 NOTE — ROUTINE PROCESS
VSS. Discharge instructions reviewed with patient and Agustin Dillon,  and copy of instructions sent home with patient. Dr. Reji Paniagua spoke with patient and  prior to discharge. Questions answered. Discharged via wheelchair, wheeled out by Tesfaye Christianson [facility]. IV discontinued prior to discharge. Personal items with patient at discharge: clothes, mask

## 2020-10-14 NOTE — DISCHARGE INSTRUCTIONS
Gastrointestinal Esophagogastroduodenoscopy (EGD) - Upper Exam Discharge Instructions    1. Call Dr. Blanquita Funes for any problems or questions. 2. Contact the doctor's office for follow up appointment as directed. 3. Medication may cause drowsiness for several hours, therefore:  · Do not drive or operate machinery for remainder of the day. · No alcohol today. · Do not make any important or legal decisions for 24 hours. · Do not sign any legal documents for 24 hours. 5. Ordinarily, you may resume regular diet and activity after exam unless otherwise              specified by your physician. 6. For mild soreness in your throat you may use Cepacol throat lozenges or warm               salt-water gargles as needed. Any additional instructions: repeat EGD with dilation one month. Call office to schedule     Instructions given to Payton Huang and other family members.

## 2020-10-14 NOTE — ANESTHESIA PREPROCEDURE EVALUATION
Relevant Problems   No relevant active problems       Anesthetic History     PONV          Review of Systems / Medical History  Patient summary reviewed and pertinent labs reviewed    Pulmonary            Asthma : well controlled       Neuro/Psych         Psychiatric history     Cardiovascular    Hypertension          Hyperlipidemia    Exercise tolerance: >4 METS     GI/Hepatic/Renal           Liver disease (LEIVA)    Comments: Esophageal Stricture Endo/Other    Diabetes: type 2    Anemia     Other Findings              Physical Exam    Airway  Mallampati: II  TM Distance: > 6 cm  Neck ROM: normal range of motion   Mouth opening: Normal     Cardiovascular  Regular rate and rhythm,  S1 and S2 normal,  no murmur, click, rub, or gallop             Dental  No notable dental hx       Pulmonary  Breath sounds clear to auscultation               Abdominal         Other Findings            Anesthetic Plan    ASA: 2  Anesthesia type: total IV anesthesia            Anesthetic plan and risks discussed with: Patient

## 2020-10-14 NOTE — H&P
PreProcedure H&P Update    Today's Date:  10/14/2020    CC:  dysphagia    Subjective:   HPI: dysphagia    PMH:  Past Medical History:   Diagnosis Date    Anemia     blood transfusion 2013 X1 d/t \"perforated bowel\"-- Fe infusions 12/2017--- denies any current iron infusions 12/17/2019    Anxiety and depression     managed with meds    Asthma     allergy induced, denies any inhaler, patient denies    BMI 32.0-32.9,adult     BMI 32.2    C. difficile diarrhea 2013    in 4094 after complicated ERCP    Cervical dysplasia 1990s    Chronic insomnia     Chronic pain     all over     Chronic pancreatitis ([facility] [address]     Encounter for insertion of venous access port     Left chest port    Endometriosis     Esophageal stricture 2017    Fibromyalgia     managed with meds    Former cigarette smoker     GERD (gastroesophageal reflux disease)     daily meds---po and IV protonix- uses per port- alternates    HLD (hyperlipidemia)     pt denies     IBS (irritable bowel syndrome)     Migraine headache     Nausea & vomiting     pt reports she does better with phenergan than zofran - causes HA    Nonalcoholic fatty liver disease     PUD (peptic ulcer disease) 06/2017    Sinus tachycardia     Sphincter of Oddi dysfunction     Type 2 diabetes mellitus (HCC)     insulin reliant/-160 s.s of hypoglycemia at 100       Medications:   Current Facility-Administered Medications   Medication Dose Route Frequency    lactated Ringers infusion 1,000 mL  1,000 mL IntraVENous CONTINUOUS         Objective:   Vitals:  Visit Vitals  BP (!) 180/111   Pulse 87   Temp 98.4 °F (36.9 °C)   Resp 18   Ht 5' 2\" (1.575 m)   Wt 85.7 kg (189 lb)   SpO2 95%   BMI 34.57 kg/m²     Exam:  General appearance: alert, cooperative, no distress  Lungs: clear to auscultation bilaterally anteriorly  Heart: regular rate and rhythm  Abdomen: soft, non-tender.  Bowel sounds normal. No masses, no organomegaly      Data Review (Labs):    No results for input(s): WBC, HGB, HCT, PLT, MCV, NA, K, CL, CO2, BUN, CREA, CA, GLU, AP, TBIL, CBIL, ALB, TP, AML, LPSE, PTP, INR, APTT, HGBEXT, HCTEXT, PLTEXT, INREXT in the last 72 hours. No lab exists for component: SGOT, GPT, DBIL    Plan:     Dysphagia. EGD dilation. Perforation risks explained to patient, she understood and wanted to proceed.

## 2020-10-14 NOTE — PROGRESS NOTES
Patient stated left subclavian port stays accessed r/t infusions. Fluids disconnected and patient ready for discharge.

## 2020-10-15 ENCOUNTER — HOSPITAL ENCOUNTER (OUTPATIENT)
Dept: INFUSION THERAPY | Age: 50
Discharge: HOME | End: 2020-10-15
Payer: MEDICARE

## 2020-10-15 VITALS
RESPIRATION RATE: 18 BRPM | TEMPERATURE: 97.5 F | OXYGEN SATURATION: 98 % | HEART RATE: 94 BPM | SYSTOLIC BLOOD PRESSURE: 138 MMHG | DIASTOLIC BLOOD PRESSURE: 90 MMHG

## 2020-10-15 LAB
ALBUMIN SERPL-MCNC: 3.5 G/DL (ref 3.5–5)
ALBUMIN/GLOB SERPL: 0.9 {RATIO} (ref 1.2–3.5)
ALP SERPL-CCNC: 135 U/L (ref 50–136)
ALT SERPL-CCNC: 29 U/L (ref 12–65)
ANION GAP SERPL CALC-SCNC: 7 MMOL/L (ref 7–16)
AST SERPL-CCNC: 17 U/L (ref 15–37)
BILIRUB DIRECT SERPL-MCNC: 0.1 MG/DL
BILIRUB SERPL-MCNC: 0.4 MG/DL (ref 0.2–1.1)
BUN SERPL-MCNC: 10 MG/DL (ref 6–23)
CALCIUM SERPL-MCNC: 8.7 MG/DL (ref 8.3–10.4)
CHLORIDE SERPL-SCNC: 105 MMOL/L (ref 98–107)
CO2 SERPL-SCNC: 25 MMOL/L (ref 21–32)
CREAT SERPL-MCNC: 0.7 MG/DL (ref 0.6–1)
EST. AVERAGE GLUCOSE BLD GHB EST-MCNC: 235 MG/DL
GLOBULIN SER CALC-MCNC: 4 G/DL (ref 2.3–3.5)
GLUCOSE SERPL-MCNC: 198 MG/DL (ref 65–100)
HBA1C MFR BLD: 9.8 % (ref 4.8–6)
MAGNESIUM SERPL-MCNC: 2.2 MG/DL (ref 1.8–2.4)
POTASSIUM SERPL-SCNC: 4.1 MMOL/L (ref 3.5–5.1)
PROT SERPL-MCNC: 7.5 G/DL (ref 6.3–8.2)
SODIUM SERPL-SCNC: 137 MMOL/L (ref 136–145)

## 2020-10-15 PROCEDURE — 83036 HEMOGLOBIN GLYCOSYLATED A1C: CPT

## 2020-10-15 PROCEDURE — 80048 BASIC METABOLIC PNL TOTAL CA: CPT

## 2020-10-15 PROCEDURE — 80076 HEPATIC FUNCTION PANEL: CPT

## 2020-10-15 PROCEDURE — 74011000250 HC RX REV CODE- 250: Performed by: INTERNAL MEDICINE

## 2020-10-15 PROCEDURE — 96374 THER/PROPH/DIAG INJ IV PUSH: CPT

## 2020-10-15 PROCEDURE — 83735 ASSAY OF MAGNESIUM: CPT

## 2020-10-15 PROCEDURE — 74011250636 HC RX REV CODE- 250/636: Performed by: INTERNAL MEDICINE

## 2020-10-15 RX ORDER — SODIUM CHLORIDE 0.9 % (FLUSH) 0.9 %
10 SYRINGE (ML) INJECTION AS NEEDED
Status: DISCONTINUED | OUTPATIENT
Start: 2020-10-15 | End: 2020-10-17 | Stop reason: HOSPADM

## 2020-10-15 RX ADMIN — Medication 10 ML: at 07:33

## 2020-10-15 RX ADMIN — PROMETHAZINE HYDROCHLORIDE 25 MG: 25 INJECTION INTRAMUSCULAR; INTRAVENOUS at 07:42

## 2020-10-15 RX ADMIN — Medication 10 ML: at 07:43

## 2020-10-15 NOTE — PROGRESS NOTES
Arrived to the [facility]. Central line care and labs completed. Patient tolerated without problems. Any issues or concerns during appointment: no.  Patient aware of next infusion appointment on 10/22/20 (date) at [address] [place] [address] (time). Discharged ambulatory.

## 2020-10-22 ENCOUNTER — HOSPITAL ENCOUNTER (OUTPATIENT)
Dept: INFUSION THERAPY | Age: 50
Discharge: HOME | End: 2020-10-22
Payer: MEDICARE

## 2020-10-22 VITALS
HEART RATE: 114 BPM | OXYGEN SATURATION: 99 % | TEMPERATURE: 97.1 F | DIASTOLIC BLOOD PRESSURE: 78 MMHG | RESPIRATION RATE: 20 BRPM | SYSTOLIC BLOOD PRESSURE: 126 MMHG

## 2020-10-22 LAB — MAGNESIUM SERPL-MCNC: 1.9 MG/DL (ref 1.8–2.4)

## 2020-10-22 PROCEDURE — 83735 ASSAY OF MAGNESIUM: CPT

## 2020-10-22 PROCEDURE — 74011250636 HC RX REV CODE- 250/636: Performed by: INTERNAL MEDICINE

## 2020-10-22 PROCEDURE — 96374 THER/PROPH/DIAG INJ IV PUSH: CPT

## 2020-10-22 PROCEDURE — 74011000250 HC RX REV CODE- 250: Performed by: INTERNAL MEDICINE

## 2020-10-22 RX ORDER — SODIUM CHLORIDE 0.9 % (FLUSH) 0.9 %
10 SYRINGE (ML) INJECTION AS NEEDED
Status: DISCONTINUED | OUTPATIENT
Start: 2020-10-22 | End: 2020-10-24 | Stop reason: HOSPADM

## 2020-10-22 RX ADMIN — Medication 10 ML: at 07:31

## 2020-10-22 RX ADMIN — Medication 10 ML: at 07:41

## 2020-10-22 RX ADMIN — PROMETHAZINE HYDROCHLORIDE 25 MG: 25 INJECTION INTRAMUSCULAR; INTRAVENOUS at 07:32

## 2020-10-22 NOTE — PROGRESS NOTES
Pt arrived ambulatory, port dressing changed, labs drawn, phenergan given iv per order, pt discharged home ambulatory

## 2020-10-29 ENCOUNTER — HOSPITAL ENCOUNTER (OUTPATIENT)
Dept: INFUSION THERAPY | Age: 50
Discharge: HOME | End: 2020-10-29
Payer: MEDICARE

## 2020-10-29 VITALS
TEMPERATURE: 97.9 F | OXYGEN SATURATION: 93 % | RESPIRATION RATE: 16 BRPM | DIASTOLIC BLOOD PRESSURE: 80 MMHG | SYSTOLIC BLOOD PRESSURE: 126 MMHG | HEART RATE: 113 BPM

## 2020-10-29 LAB — MAGNESIUM SERPL-MCNC: 2.2 MG/DL (ref 1.8–2.4)

## 2020-10-29 PROCEDURE — 74011250636 HC RX REV CODE- 250/636: Performed by: INTERNAL MEDICINE

## 2020-10-29 PROCEDURE — 74011000250 HC RX REV CODE- 250: Performed by: INTERNAL MEDICINE

## 2020-10-29 PROCEDURE — 96374 THER/PROPH/DIAG INJ IV PUSH: CPT

## 2020-10-29 PROCEDURE — 83735 ASSAY OF MAGNESIUM: CPT

## 2020-10-29 RX ORDER — SODIUM CHLORIDE 0.9 % (FLUSH) 0.9 %
10 SYRINGE (ML) INJECTION AS NEEDED
Status: DISCONTINUED | OUTPATIENT
Start: 2020-10-29 | End: 2020-10-31 | Stop reason: HOSPADM

## 2020-10-29 RX ADMIN — PROMETHAZINE HYDROCHLORIDE 25 MG: 25 INJECTION INTRAMUSCULAR; INTRAVENOUS at 07:42

## 2020-10-29 RX ADMIN — Medication 10 ML: at 07:30

## 2020-10-29 NOTE — PROGRESS NOTES
Clinical Social Work Note  Name: Ayana Renae  : 1970  MRN: [id number]  Date of Service: 10/29/2020  Location of Service: [facility]  Date of Service: 10/29/2020    Type of Service: Case Management     Length of Service: 3 minutes    Patient Diagnosis: No diagnosis found. Referral Source: RN    Reason for Visit: FU    Subject: Seen patient at Infusion, patient was sleeping. Protective Factors: Current care for physical and mental illness. Next Steps: SW intends to follow up by phone and/or face-to-face as needed. No flowsheet data found.       Signed By: LAXMI Jacobs     2020

## 2020-10-29 NOTE — PROGRESS NOTES
Pt arrived ambulatory to [facility]. Port dressing change. Blood drawn and sent to lab. Phenergan IV. Pt aware of next appt on 11/5/20 at [address] [place] [address]. Pt discharged ambulatory.

## 2020-11-05 ENCOUNTER — HOSPITAL ENCOUNTER (OUTPATIENT)
Dept: INFUSION THERAPY | Age: 50
Discharge: HOME | End: 2020-11-05
Payer: MEDICARE

## 2020-11-05 VITALS
TEMPERATURE: 97.6 F | SYSTOLIC BLOOD PRESSURE: 129 MMHG | OXYGEN SATURATION: 95 % | HEART RATE: 92 BPM | RESPIRATION RATE: 18 BRPM | DIASTOLIC BLOOD PRESSURE: 90 MMHG

## 2020-11-05 LAB — MAGNESIUM SERPL-MCNC: 1.6 MG/DL (ref 1.8–2.4)

## 2020-11-05 PROCEDURE — 74011250636 HC RX REV CODE- 250/636: Performed by: INTERNAL MEDICINE

## 2020-11-05 PROCEDURE — 96365 THER/PROPH/DIAG IV INF INIT: CPT

## 2020-11-05 PROCEDURE — 74011000250 HC RX REV CODE- 250: Performed by: INTERNAL MEDICINE

## 2020-11-05 PROCEDURE — 96375 TX/PRO/DX INJ NEW DRUG ADDON: CPT

## 2020-11-05 PROCEDURE — 83735 ASSAY OF MAGNESIUM: CPT

## 2020-11-05 RX ORDER — SODIUM CHLORIDE 0.9 % (FLUSH) 0.9 %
10 SYRINGE (ML) INJECTION AS NEEDED
Status: DISCONTINUED | OUTPATIENT
Start: 2020-11-05 | End: 2020-11-07 | Stop reason: HOSPADM

## 2020-11-05 RX ORDER — MAGNESIUM SULFATE 1 G/100ML
1 INJECTION INTRAVENOUS ONCE
Status: COMPLETED | OUTPATIENT
Start: 2020-11-05 | End: 2020-11-05

## 2020-11-05 RX ADMIN — PROMETHAZINE HYDROCHLORIDE 25 MG: 25 INJECTION INTRAMUSCULAR; INTRAVENOUS at 07:44

## 2020-11-05 RX ADMIN — MAGNESIUM SULFATE HEPTAHYDRATE 1 G: 1 INJECTION, SOLUTION INTRAVENOUS at 08:09

## 2020-11-05 RX ADMIN — Medication 10 ML: at 09:12

## 2020-11-12 ENCOUNTER — HOSPITAL ENCOUNTER (OUTPATIENT)
Dept: INFUSION THERAPY | Age: 50
Discharge: HOME | End: 2020-11-12
Payer: MEDICARE

## 2020-11-12 VITALS
HEART RATE: 110 BPM | OXYGEN SATURATION: 94 % | RESPIRATION RATE: 18 BRPM | SYSTOLIC BLOOD PRESSURE: 123 MMHG | DIASTOLIC BLOOD PRESSURE: 71 MMHG | TEMPERATURE: 97.1 F

## 2020-11-12 LAB — MAGNESIUM SERPL-MCNC: 1.8 MG/DL (ref 1.8–2.4)

## 2020-11-12 PROCEDURE — 74011250636 HC RX REV CODE- 250/636: Performed by: INTERNAL MEDICINE

## 2020-11-12 PROCEDURE — 83735 ASSAY OF MAGNESIUM: CPT

## 2020-11-12 PROCEDURE — 74011000250 HC RX REV CODE- 250: Performed by: INTERNAL MEDICINE

## 2020-11-12 PROCEDURE — 96374 THER/PROPH/DIAG INJ IV PUSH: CPT

## 2020-11-12 RX ORDER — SODIUM CHLORIDE 9 MG/ML
10 INJECTION INTRAMUSCULAR; INTRAVENOUS; SUBCUTANEOUS AS NEEDED
Status: DISCONTINUED | OUTPATIENT
Start: 2020-11-12 | End: 2020-11-14 | Stop reason: HOSPADM

## 2020-11-12 RX ORDER — MAGNESIUM SULFATE HEPTAHYDRATE 40 MG/ML
2 INJECTION, SOLUTION INTRAVENOUS AS NEEDED
Status: DISCONTINUED | OUTPATIENT
Start: 2020-11-12 | End: 2020-11-14 | Stop reason: HOSPADM

## 2020-11-12 RX ADMIN — SODIUM CHLORIDE 10 ML: 9 INJECTION INTRAMUSCULAR; INTRAVENOUS; SUBCUTANEOUS at 07:30

## 2020-11-12 RX ADMIN — PROMETHAZINE HYDROCHLORIDE 25 MG: 25 INJECTION INTRAMUSCULAR; INTRAVENOUS at 07:51

## 2020-11-12 NOTE — PROGRESS NOTES
Arrived to the [facility]. Port flush/acess/blood draw completed. Patient tolerated well. Pt c/o right groin pain that is new and a 4/10. Pt encouraged to go see PCP for evaluation and rule out anything that might be going on. Any issues or concerns during appointment: No.  Discharged home in stable condition.

## 2020-11-18 ENCOUNTER — ANESTHESIA EVENT (OUTPATIENT)
Dept: ENDOSCOPY | Age: 50
End: 2020-11-18
Payer: MEDICARE

## 2020-11-19 ENCOUNTER — ANESTHESIA (OUTPATIENT)
Dept: ENDOSCOPY | Age: 50
End: 2020-11-19
Payer: MEDICARE

## 2020-11-19 ENCOUNTER — HOSPITAL ENCOUNTER (OUTPATIENT)
Dept: INFUSION THERAPY | Age: 50
Discharge: HOME | End: 2020-11-19
Payer: MEDICARE

## 2020-11-19 ENCOUNTER — HOSPITAL ENCOUNTER (OUTPATIENT)
Age: 50
Setting detail: OUTPATIENT SURGERY
Discharge: HOME | End: 2020-11-19
Attending: INTERNAL MEDICINE | Admitting: INTERNAL MEDICINE
Payer: MEDICARE

## 2020-11-19 VITALS
RESPIRATION RATE: 16 BRPM | TEMPERATURE: 97.6 F | SYSTOLIC BLOOD PRESSURE: 140 MMHG | OXYGEN SATURATION: 97 % | DIASTOLIC BLOOD PRESSURE: 65 MMHG | HEIGHT: 62 IN | BODY MASS INDEX: 33.68 KG/M2 | HEART RATE: 78 BPM | WEIGHT: 183 LBS

## 2020-11-19 VITALS
RESPIRATION RATE: 18 BRPM | DIASTOLIC BLOOD PRESSURE: 62 MMHG | TEMPERATURE: 98 F | OXYGEN SATURATION: 98 % | HEART RATE: 98 BPM | SYSTOLIC BLOOD PRESSURE: 120 MMHG

## 2020-11-19 LAB
GLUCOSE BLD STRIP.AUTO-MCNC: 146 MG/DL (ref 65–100)
MAGNESIUM SERPL-MCNC: 2.2 MG/DL (ref 1.8–2.4)

## 2020-11-19 PROCEDURE — 76040000025: Performed by: INTERNAL MEDICINE

## 2020-11-19 PROCEDURE — 74011250636 HC RX REV CODE- 250/636: Performed by: INTERNAL MEDICINE

## 2020-11-19 PROCEDURE — 82962 GLUCOSE BLOOD TEST: CPT

## 2020-11-19 PROCEDURE — 74011000250 HC RX REV CODE- 250: Performed by: INTERNAL MEDICINE

## 2020-11-19 PROCEDURE — 83735 ASSAY OF MAGNESIUM: CPT

## 2020-11-19 PROCEDURE — 2709999900 HC NON-CHARGEABLE SUPPLY: Performed by: INTERNAL MEDICINE

## 2020-11-19 PROCEDURE — 74011000250 HC RX REV CODE- 250: Performed by: REGISTERED NURSE

## 2020-11-19 PROCEDURE — 74011250636 HC RX REV CODE- 250/636: Performed by: ANESTHESIOLOGY

## 2020-11-19 PROCEDURE — 76060000031 HC ANESTHESIA FIRST 0.5 HR: Performed by: INTERNAL MEDICINE

## 2020-11-19 PROCEDURE — 96374 THER/PROPH/DIAG INJ IV PUSH: CPT

## 2020-11-19 PROCEDURE — 74011250636 HC RX REV CODE- 250/636: Performed by: REGISTERED NURSE

## 2020-11-19 RX ORDER — SODIUM CHLORIDE, SODIUM LACTATE, POTASSIUM CHLORIDE, CALCIUM CHLORIDE 600; 310; 30; 20 MG/100ML; MG/100ML; MG/100ML; MG/100ML
25 INJECTION, SOLUTION INTRAVENOUS CONTINUOUS
Status: DISCONTINUED | OUTPATIENT
Start: 2020-11-19 | End: 2020-11-19 | Stop reason: HOSPADM

## 2020-11-19 RX ORDER — PROPOFOL 10 MG/ML
INJECTION, EMULSION INTRAVENOUS
Status: DISCONTINUED | OUTPATIENT
Start: 2020-11-19 | End: 2020-11-19 | Stop reason: HOSPADM

## 2020-11-19 RX ORDER — PROPOFOL 10 MG/ML
INJECTION, EMULSION INTRAVENOUS AS NEEDED
Status: DISCONTINUED | OUTPATIENT
Start: 2020-11-19 | End: 2020-11-19 | Stop reason: HOSPADM

## 2020-11-19 RX ORDER — ONDANSETRON 2 MG/ML
4 INJECTION INTRAMUSCULAR; INTRAVENOUS
Status: DISCONTINUED | OUTPATIENT
Start: 2020-11-19 | End: 2020-11-19 | Stop reason: HOSPADM

## 2020-11-19 RX ORDER — SODIUM CHLORIDE, SODIUM LACTATE, POTASSIUM CHLORIDE, CALCIUM CHLORIDE 600; 310; 30; 20 MG/100ML; MG/100ML; MG/100ML; MG/100ML
100 INJECTION, SOLUTION INTRAVENOUS CONTINUOUS
Status: DISCONTINUED | OUTPATIENT
Start: 2020-11-19 | End: 2020-11-19 | Stop reason: HOSPADM

## 2020-11-19 RX ORDER — SODIUM CHLORIDE 0.9 % (FLUSH) 0.9 %
10 SYRINGE (ML) INJECTION AS NEEDED
Status: DISCONTINUED | OUTPATIENT
Start: 2020-11-19 | End: 2020-11-21 | Stop reason: HOSPADM

## 2020-11-19 RX ORDER — LIDOCAINE HYDROCHLORIDE 20 MG/ML
INJECTION, SOLUTION EPIDURAL; INFILTRATION; INTRACAUDAL; PERINEURAL AS NEEDED
Status: DISCONTINUED | OUTPATIENT
Start: 2020-11-19 | End: 2020-11-19 | Stop reason: HOSPADM

## 2020-11-19 RX ORDER — HEPARIN 100 UNIT/ML
500 SYRINGE INTRAVENOUS
Status: DISCONTINUED | OUTPATIENT
Start: 2020-11-19 | End: 2020-11-19

## 2020-11-19 RX ADMIN — Medication 10 ML: at 07:28

## 2020-11-19 RX ADMIN — PROMETHAZINE HYDROCHLORIDE 25 MG: 25 INJECTION INTRAMUSCULAR; INTRAVENOUS at 07:43

## 2020-11-19 RX ADMIN — PROPOFOL 50 MG: 10 INJECTION, EMULSION INTRAVENOUS at 12:19

## 2020-11-19 RX ADMIN — LIDOCAINE HYDROCHLORIDE 50 MG: 20 INJECTION, SOLUTION EPIDURAL; INFILTRATION; INTRACAUDAL; PERINEURAL at 12:19

## 2020-11-19 RX ADMIN — SODIUM CHLORIDE, SODIUM LACTATE, POTASSIUM CHLORIDE, AND CALCIUM CHLORIDE 100 ML/HR: 600; 310; 30; 20 INJECTION, SOLUTION INTRAVENOUS at 11:02

## 2020-11-19 RX ADMIN — PROPOFOL 180 MCG/KG/MIN: 10 INJECTION, EMULSION INTRAVENOUS at 12:19

## 2020-11-19 RX ADMIN — ONDANSETRON 4 MG: 2 INJECTION INTRAMUSCULAR; INTRAVENOUS at 12:07

## 2020-11-19 NOTE — PROGRESS NOTES
Pt arrived to infusion, labs drawn and reviewed. Pt given phenergan IV and port needle changed. Pt aware of next appt on 11/26 at 0715.

## 2020-11-19 NOTE — DISCHARGE INSTRUCTIONS
Gastrointestinal Esophagogastroduodenoscopy (EGD) - Upper Exam Discharge Instructions    1. Call Dr. Quinton Elkins at [phone] for any problems or questions. 2. Contact the doctor's office for follow up appointment as directed. 3. Medication may cause drowsiness for several hours, therefore:  · Do not drive or operate machinery for remainder of the day. · No alcohol today. · Do not make any important or legal decisions for 24 hours. · Do not sign any legal documents for 24 hours. 5. Ordinarily, you may resume regular diet and activity after exam unless otherwise              specified by your physician. 6. For mild soreness in your throat you may use Cepacol throat lozenges or warm               salt-water gargles as needed. Any additional instructions:  1. Continue clear liquids for today, advance to full liquid diet tomorrow (11/20/20), and soft diet on Saturday (11/21/20). Instructions given to Saul Lacywale and other family members.

## 2020-11-19 NOTE — ROUTINE PROCESS
Discharge instructions provided to and gone over with patient and family member. Opportunity for questions provided, all answered. Patient voices no complaints or concerns at this time. Patient wheeled to car by staff and discharged home with family member.

## 2020-11-19 NOTE — ANESTHESIA POSTPROCEDURE EVALUATION
Procedure(s):  ESOPHAGOGASTRODUODENOSCOPY (EGD) / 35  ESOPHAGEAL DILATION. total IV anesthesia    Anesthesia Post Evaluation      Multimodal analgesia: multimodal analgesia not used between 6 hours prior to anesthesia start to PACU discharge  Patient location during evaluation: PACU  Patient participation: complete - patient participated  Level of consciousness: awake and alert  Pain management: adequate  Airway patency: patent  Anesthetic complications: no  Cardiovascular status: hemodynamically stable  Respiratory status: acceptable  Hydration status: acceptable        INITIAL Post-op Vital signs:   Vitals Value Taken Time   /81 11/19/2020 12:42 PM   Temp 36.4 °C (97.6 °F) 11/19/2020 12:42 PM   Pulse 108 11/19/2020 12:45 PM   Resp 16 11/19/2020 12:42 PM   SpO2 99 % 11/19/2020 12:45 PM   Vitals shown include unvalidated device data.

## 2020-11-19 NOTE — H&P
Gastrointestinal Progress Note    11/19/2020    Admit Date: 11/19/2020    Subjective:     Patient is NPO for an EGD with dilatation (dysphagia)    Pain: Patient complains of no abdominal pain. Bowel Movements: Normal    Bleeding:  None    Current Facility-Administered Medications   Medication Dose Route Frequency    lactated Ringers infusion  100 mL/hr IntraVENous CONTINUOUS    lactated Ringers infusion  25 mL/hr IntraVENous CONTINUOUS    ondansetron (ZOFRAN) injection 4 mg  4 mg IntraVENous Multiple     Facility-Administered Medications Ordered in Other Encounters   Medication Dose Route Frequency    central line flush (saline) syringe 10 mL  10 mL InterCATHeter PRN        Objective:     Blood pressure 134/83, pulse 91, temperature 98 °F (36.7 °C), resp. rate 16, height 5' 2\" (1.575 m), weight 83 kg (183 lb), SpO2 97 %. No intake/output data recorded. No intake/output data recorded. EXAM:  HEART: regular rate and rhythm, S1, S2 normal, no murmur, click, rub or gallop, LUNGS: chest clear, no wheezing, rales, normal symmetric air entry, Heart exam - S1, S2 normal, no murmur, no gallop, rate regular and ABDOMEN:  Bowel sounds are normal, liver is not enlarged, spleen is not enlarged    Data Review    Recent Results (from the past 24 hour(s))   MAGNESIUM    Collection Time: 11/19/20  7:29 AM   Result Value Ref Range    Magnesium 2.2 1.8 - 2.4 mg/dL   GLUCOSE, POC    Collection Time: 11/19/20 11:01 AM   Result Value Ref Range    Glucose (POC) 146 (H) 65 - 100 mg/dL       Assessment:     Active Problems:  Dysphagia  DM  HTN  S/P Fundoplication  LEIVA      Plan:     EGD with dilatation--risks (bleed, perforation, infection, untoward CV or resp. Event, mortality, etc.), benefits, and alternatives were discussed with the patient who is agreable.   Maki Herbert MD

## 2020-11-20 NOTE — OP NOTES
[address] [place] [address]  OPERATIVE REPORT    Name:  Gifty Pastrana  MR#:  [id number]  :  1970  ACCOUNT #:  [de-identified]  DATE OF SERVICE:  2020    PREOPERATIVE DIAGNOSIS:  Dysphagia. POSTOPERATIVE DIAGNOSES:  1. Distal esophageal stricture. 2.  Postop changes. 3.  Retained gastric debris. PROCEDURE PERFORMED:  Esophagogastroduodenoscopy with Jumpstarter dilatation. PRIMARY GASTROENTEROLOGIST:  Erica Campos MD    SURGEON:  None. ASSISTANT:  None. ANESTHESIA:  As per MAC anesthesia. COMPLICATIONS:  None. SPECIMENS REMOVED:  To laboratory - none. IMPLANTS:  None. ESTIMATED BLOOD LOSS:  0 to 1 mL. INSTRUMENT:  GIF-190 and Guido dilators. FINDINGS:  1. Distal esophageal stricture - Guido dilatation performed. 2.  Retained gastric debris. 3.  Postop changes (status post gastrojejunostomy). PROCEDURE:  The patient was anesthetized and positioned. The videoscope was passed through the hypopharynx into the esophagus with minimal difficulty. Proximal, mid, and distal esophagus were unremarkable except for very subtle distal esophageal stricture. Once past the EG junction, the body, gastric pouch was visualized and was remarkable for moderate amount of green liquid debris with some semi-solid component. A retroflexed view of the EG junction was difficult because of retained debris. Attention was then turned to the small bowel. Gastrojejunostomy was visualized. Both ileal orifices were patent. The endoscope was backed into the esophagus and out of the patient. We proceeded with Jumpstarter dilatations. A #36, #40 and #44 [place] were serially passed. Endoscopy after the #40-[place] passed revealed no esophageal trauma. We then proceeded with #46-[place] dilator. After the #46-[place] passed, there was some heme in the area of the distal esophagus/EG junction area. There was a superficial tear.   Because of this, no further dilatations were performed. After documenting hemostasis, the endoscope was backed out of the esophagus and out of the patient. The vocal cords appeared normal.  The patient tolerated the procedure well and taken to the recovery area in stable condition. IMPRESSION:  1. Postop changes. 2.  Distal esophageal stricture. 3.  Retained primarily liquid, gastric debris. 4.  Guido dilatation performed as described above up to #46-[place] where there was heme and a superficial tear noted although mucosa appeared intact. PLAN:  Therapeutics:  1. Advance diet slowly. 2.  Review medication list and consider prokinetic agents. 3.  Follow up dilatation on p.r.n basis.         Jayesh Mejia MD      [id number]  D:  11/19/2020 13:09  T:  11/19/2020 23:45  JOB #:  [id number]  CC:  Nate Maria MD       [facility]       MD Camelia Peraza MD

## 2020-11-25 ENCOUNTER — HOSPITAL ENCOUNTER (OUTPATIENT)
Dept: INFUSION THERAPY | Age: 50
Discharge: HOME | End: 2020-11-25
Payer: MEDICARE

## 2020-11-25 VITALS
TEMPERATURE: 97.3 F | HEART RATE: 119 BPM | SYSTOLIC BLOOD PRESSURE: 131 MMHG | RESPIRATION RATE: 18 BRPM | DIASTOLIC BLOOD PRESSURE: 90 MMHG | OXYGEN SATURATION: 100 %

## 2020-11-25 LAB — MAGNESIUM SERPL-MCNC: 2 MG/DL (ref 1.8–2.4)

## 2020-11-25 PROCEDURE — 96374 THER/PROPH/DIAG INJ IV PUSH: CPT

## 2020-11-25 PROCEDURE — 74011250636 HC RX REV CODE- 250/636: Performed by: INTERNAL MEDICINE

## 2020-11-25 PROCEDURE — 83735 ASSAY OF MAGNESIUM: CPT

## 2020-11-25 PROCEDURE — 74011000250 HC RX REV CODE- 250: Performed by: INTERNAL MEDICINE

## 2020-11-25 RX ORDER — SODIUM CHLORIDE 0.9 % (FLUSH) 0.9 %
10-40 SYRINGE (ML) INJECTION AS NEEDED
Status: DISCONTINUED | OUTPATIENT
Start: 2020-11-25 | End: 2020-11-27 | Stop reason: HOSPADM

## 2020-11-25 RX ADMIN — PROMETHAZINE HYDROCHLORIDE 25 MG: 25 INJECTION INTRAMUSCULAR; INTRAVENOUS at 07:45

## 2020-11-25 RX ADMIN — Medication 10 ML: at 07:55

## 2020-11-26 ENCOUNTER — APPOINTMENT (OUTPATIENT)
Dept: INFUSION THERAPY | Age: 50
End: 2020-11-26

## 2020-12-03 ENCOUNTER — HOSPITAL ENCOUNTER (OUTPATIENT)
Dept: INFUSION THERAPY | Age: 50
Discharge: HOME | End: 2020-12-03
Payer: MEDICARE

## 2020-12-03 VITALS
HEART RATE: 97 BPM | RESPIRATION RATE: 18 BRPM | OXYGEN SATURATION: 95 % | DIASTOLIC BLOOD PRESSURE: 77 MMHG | TEMPERATURE: 97.1 F | SYSTOLIC BLOOD PRESSURE: 116 MMHG

## 2020-12-03 LAB — MAGNESIUM SERPL-MCNC: 1.9 MG/DL (ref 1.8–2.4)

## 2020-12-03 PROCEDURE — 74011000250 HC RX REV CODE- 250: Performed by: INTERNAL MEDICINE

## 2020-12-03 PROCEDURE — 83735 ASSAY OF MAGNESIUM: CPT

## 2020-12-03 PROCEDURE — 96374 THER/PROPH/DIAG INJ IV PUSH: CPT

## 2020-12-03 PROCEDURE — 74011250636 HC RX REV CODE- 250/636: Performed by: INTERNAL MEDICINE

## 2020-12-03 RX ORDER — SODIUM CHLORIDE 0.9 % (FLUSH) 0.9 %
10 SYRINGE (ML) INJECTION AS NEEDED
Status: DISCONTINUED | OUTPATIENT
Start: 2020-12-03 | End: 2020-12-05 | Stop reason: HOSPADM

## 2020-12-03 RX ADMIN — Medication 10 ML: at 07:34

## 2020-12-03 RX ADMIN — PROMETHAZINE HYDROCHLORIDE 25 MG: 25 INJECTION INTRAMUSCULAR; INTRAVENOUS at 07:34

## 2020-12-03 NOTE — PROGRESS NOTES
Arrived to the [facility]. Labs and port care completed. Patient tolerated without problems. Any issues or concerns during appointment: no.  Patient aware of next infusion appointment on 12/10/20 (date) at [address] [place] [address] (time). Discharged ambulatory.

## 2020-12-07 ENCOUNTER — ANESTHESIA EVENT (OUTPATIENT)
Dept: ENDOSCOPY | Age: 50
End: 2020-12-07
Payer: MEDICARE

## 2020-12-07 NOTE — PROGRESS NOTES
Called pt at this time to remind her for her scheduled procedure for tomorrow 12/8/20. Pt verbalized understanding of  and COVID policies as well as the need for a  to remain present.

## 2020-12-08 ENCOUNTER — HOSPITAL ENCOUNTER (OUTPATIENT)
Age: 50
Setting detail: OUTPATIENT SURGERY
Discharge: HOME | End: 2020-12-08
Attending: INTERNAL MEDICINE | Admitting: INTERNAL MEDICINE
Payer: MEDICARE

## 2020-12-08 ENCOUNTER — ANESTHESIA (OUTPATIENT)
Dept: ENDOSCOPY | Age: 50
End: 2020-12-08
Payer: MEDICARE

## 2020-12-08 VITALS
WEIGHT: 189 LBS | BODY MASS INDEX: 34.78 KG/M2 | HEIGHT: 62 IN | TEMPERATURE: 98 F | RESPIRATION RATE: 16 BRPM | SYSTOLIC BLOOD PRESSURE: 97 MMHG | HEART RATE: 77 BPM | OXYGEN SATURATION: 92 % | DIASTOLIC BLOOD PRESSURE: 56 MMHG

## 2020-12-08 LAB — GLUCOSE BLD STRIP.AUTO-MCNC: 179 MG/DL (ref 65–100)

## 2020-12-08 PROCEDURE — 74011250636 HC RX REV CODE- 250/636: Performed by: NURSE ANESTHETIST, CERTIFIED REGISTERED

## 2020-12-08 PROCEDURE — 76040000025: Performed by: INTERNAL MEDICINE

## 2020-12-08 PROCEDURE — 74011250636 HC RX REV CODE- 250/636: Performed by: ANESTHESIOLOGY

## 2020-12-08 PROCEDURE — 76060000031 HC ANESTHESIA FIRST 0.5 HR: Performed by: INTERNAL MEDICINE

## 2020-12-08 PROCEDURE — 2709999900 HC NON-CHARGEABLE SUPPLY: Performed by: INTERNAL MEDICINE

## 2020-12-08 PROCEDURE — C1726 CATH, BAL DIL, NON-VASCULAR: HCPCS | Performed by: INTERNAL MEDICINE

## 2020-12-08 PROCEDURE — 82962 GLUCOSE BLOOD TEST: CPT

## 2020-12-08 RX ORDER — TRIAMCINOLONE ACETONIDE 40 MG/ML
40 INJECTION, SUSPENSION INTRA-ARTICULAR; INTRAMUSCULAR
Status: DISCONTINUED | OUTPATIENT
Start: 2020-12-08 | End: 2020-12-08 | Stop reason: HOSPADM

## 2020-12-08 RX ORDER — FAMOTIDINE 20 MG/1
20 TABLET, FILM COATED ORAL AS NEEDED
Status: DISCONTINUED | OUTPATIENT
Start: 2020-12-08 | End: 2020-12-08 | Stop reason: HOSPADM

## 2020-12-08 RX ORDER — SODIUM CHLORIDE, SODIUM LACTATE, POTASSIUM CHLORIDE, CALCIUM CHLORIDE 600; 310; 30; 20 MG/100ML; MG/100ML; MG/100ML; MG/100ML
100 INJECTION, SOLUTION INTRAVENOUS CONTINUOUS
Status: DISCONTINUED | OUTPATIENT
Start: 2020-12-08 | End: 2020-12-08 | Stop reason: HOSPADM

## 2020-12-08 RX ORDER — PROPOFOL 10 MG/ML
INJECTION, EMULSION INTRAVENOUS
Status: DISCONTINUED | OUTPATIENT
Start: 2020-12-08 | End: 2020-12-08 | Stop reason: HOSPADM

## 2020-12-08 RX ORDER — ONDANSETRON 2 MG/ML
4-8 INJECTION INTRAMUSCULAR; INTRAVENOUS
Status: DISCONTINUED | OUTPATIENT
Start: 2020-12-08 | End: 2020-12-08 | Stop reason: HOSPADM

## 2020-12-08 RX ORDER — PROPOFOL 10 MG/ML
INJECTION, EMULSION INTRAVENOUS AS NEEDED
Status: DISCONTINUED | OUTPATIENT
Start: 2020-12-08 | End: 2020-12-08 | Stop reason: HOSPADM

## 2020-12-08 RX ORDER — SODIUM CHLORIDE, SODIUM LACTATE, POTASSIUM CHLORIDE, CALCIUM CHLORIDE 600; 310; 30; 20 MG/100ML; MG/100ML; MG/100ML; MG/100ML
INJECTION, SOLUTION INTRAVENOUS
Status: DISCONTINUED | OUTPATIENT
Start: 2020-12-08 | End: 2020-12-08 | Stop reason: HOSPADM

## 2020-12-08 RX ADMIN — PROPOFOL 50 MG: 10 INJECTION, EMULSION INTRAVENOUS at 09:15

## 2020-12-08 RX ADMIN — ONDANSETRON 4 MG: 2 INJECTION INTRAMUSCULAR; INTRAVENOUS at 08:50

## 2020-12-08 RX ADMIN — PROPOFOL 40 MG: 10 INJECTION, EMULSION INTRAVENOUS at 09:16

## 2020-12-08 RX ADMIN — SODIUM CHLORIDE, SODIUM LACTATE, POTASSIUM CHLORIDE, AND CALCIUM CHLORIDE 100 ML/HR: 600; 310; 30; 20 INJECTION, SOLUTION INTRAVENOUS at 08:50

## 2020-12-08 RX ADMIN — PROPOFOL 140 MCG/KG/MIN: 10 INJECTION, EMULSION INTRAVENOUS at 09:15

## 2020-12-08 RX ADMIN — SODIUM CHLORIDE, SODIUM LACTATE, POTASSIUM CHLORIDE, AND CALCIUM CHLORIDE: 600; 310; 30; 20 INJECTION, SOLUTION INTRAVENOUS at 09:09

## 2020-12-08 NOTE — PROCEDURES
Endoscopic Gastroduodenoscopy Procedure Note    Indications: Esophageal stricture    Anesthesia/Sedation: MAC IV     Pre-Procedure Physical:    Current Facility-Administered Medications   Medication Dose Route Frequency    lactated Ringers infusion  100 mL/hr IntraVENous CONTINUOUS    famotidine (PF) (PEPCID) 20 mg in 0.9% sodium chloride 10 mL injection  20 mg IntraVENous ONCE PRN    famotidine (PEPCID) tablet 20 mg  20 mg Oral PRN    lactated Ringers infusion  100 mL/hr IntraVENous CONTINUOUS    triamcinolone acetonide (KENALOG-40) 40 mg/mL injection 40 mg  40 mg IntraMUSCular ENDO ONCE    ondansetron (ZOFRAN) injection 4-8 mg  4-8 mg IntraVENous Multiple     Facility-Administered Medications Ordered in Other Encounters   Medication Dose Route Frequency    propofoL (DIPRIVAN) 10 mg/mL injection    PRN    propofoL (DIPRIVAN) 10 mg/mL injection    CONTINUOUS      Tape [adhesive]; Barium iodide; Flagyl [metronidazole]; Metformin; and Insulins    Patient Vitals for the past 8 hrs:   BP Temp Pulse Resp SpO2 Height Weight   12/08/20 0910 103/62  95 18 97 %     12/08/20 0838 111/64 98.5 °F (36.9 °C) 94 18 95 % 5' 2\" (1.575 m) 85.7 kg (189 lb)       Exam      Airway: clear   Heart: normal S1and S2    Lungs: clear bilateral  Abdomen: soft, nontender, bowel sounds present and normal in all quads   Mental Status: awake, alert and oriented to person, place and time          Procedure Details     Informed consent was obtained for the procedure, including conscious sedation. Risks of pancreatitis, infection, perforation, hemorrhage, adverse drug reaction and aspiration were discussed. The patient was placed in the left lateral decubitus position. Based on the pre-procedure assessment, including review of the patient's medical history, medications, allergies, and review of systems, she had been deemed to be an appropriate candidate for conscious sedation; she was therefore sedated with the medications listed below. She was monitored continuously with ECG tracing, pulse oximetry, blood pressure monitoring, and direct observation. The EGD gastroscope was inserted into the mouth and advanced under direct vision to the second portion of the duodenum. A careful inspection was made as the gastroscope was withdrawn, including a retroflexed view of the proximal stomach; findings and interventions are described below. Appropriate photodocumentation was obtained. Findings:   Esophagus- Mild distal esophageal stricture. Dilated with 15-18 mm balloon. Mild tearing resulted. Stomach- Prior gastrojejunostomy. Jejunum- normal.    Therapies: Dilation    Specimens: None    Estimated Blood Loss: 0 cc           Complications:   None; patient tolerated the procedure well. Attending Attestation:  I performed the procedure. Impression:    Mild distal esophageal stricture. Dilated with 15-18 mm balloon. Mild tearing resulted. Prior gastrojejunostomy.     Recommendations:  Repeat dilation prn  Back to office in 1 mo

## 2020-12-08 NOTE — ANESTHESIA POSTPROCEDURE EVALUATION
Procedure(s):  ESOPHAGOGASTRODUODENOSCOPY (EGD) WITH DILATION , KENALOG INJECTION BMI 35  ESOPHAGEAL DILATION. total IV anesthesia    Anesthesia Post Evaluation      Multimodal analgesia: multimodal analgesia not used between 6 hours prior to anesthesia start to PACU discharge  Patient location during evaluation: bedside  Patient participation: complete - patient participated  Level of consciousness: awake and alert  Pain management: adequate  Airway patency: patent  Anesthetic complications: no  Cardiovascular status: hemodynamically stable  Respiratory status: spontaneous ventilation  Hydration status: euvolemic  Comments: Patient stable and may discharge at this time. Post anesthesia nausea and vomiting:  none  Final Post Anesthesia Temperature Assessment:  Normothermia (36.0-37.5 degrees C)      INITIAL Post-op Vital signs:   Vitals Value Taken Time   /57 12/8/2020  9:39 AM   Temp 36.7 °C (98 °F) 12/8/2020  9:30 AM   Pulse 80 12/8/2020  9:46 AM   Resp 14 12/8/2020  9:39 AM   SpO2 90 % 12/8/2020  9:46 AM   Vitals shown include unvalidated device data.

## 2020-12-08 NOTE — ANESTHESIA PREPROCEDURE EVALUATION
Relevant Problems   No relevant active problems       Anesthetic History     PONV          Review of Systems / Medical History  Patient summary reviewed and pertinent labs reviewed    Pulmonary            Asthma : well controlled       Neuro/Psych         Psychiatric history     Cardiovascular    Hypertension          Hyperlipidemia    Exercise tolerance: >4 METS     GI/Hepatic/Renal           Liver disease (LEIVA)    Comments: Esophageal Stricture Endo/Other    Diabetes: type 2    Anemia     Other Findings   Comments: Chronic pancreatitis           Physical Exam    Airway  Mallampati: II  TM Distance: > 6 cm  Neck ROM: normal range of motion   Mouth opening: Normal     Cardiovascular  Regular rate and rhythm,  S1 and S2 normal,  no murmur, click, rub, or gallop             Dental  No notable dental hx       Pulmonary  Breath sounds clear to auscultation               Abdominal         Other Findings            Anesthetic Plan    ASA: 2  Anesthesia type: total IV anesthesia            Anesthetic plan and risks discussed with: Patient

## 2020-12-08 NOTE — H&P
[facility] Consult Note           Referring Physician:     Consult Date: 12/8/2020    Reason for Consult: Esophageal stricture    History of Present Illness:  Patient is a 44 y.o. female who is seen in consultation for Esophageal stricture.      Past Medical History:   Diagnosis Date    Anemia     blood transfusion 2013 X1 d/t \"perforated bowel\"-- Fe infusions 12/2017--- denies any current iron infusions 12/17/2019    Anxiety and depression     managed with meds    Asthma     allergy induced, denies any inhaler, patient denies    BMI 32.0-32.9,adult     BMI 32.2    C. difficile diarrhea 2013    in 9339 after complicated ERCP    Cervical dysplasia 1990s    Chronic insomnia     Chronic pain     all over     Chronic pancreatitis ([facility] [address]     Encounter for insertion of venous access port     Left chest port    Endometriosis     Esophageal stricture 2017    Fibromyalgia     managed with meds    Former cigarette smoker     GERD (gastroesophageal reflux disease)     daily meds---po and IV protonix- uses per port- alternates    HLD (hyperlipidemia)     pt denies     IBS (irritable bowel syndrome)     Migraine headache     Nausea & vomiting     pt reports she does better with phenergan than zofran - causes HA    Nonalcoholic fatty liver disease     PUD (peptic ulcer disease) 06/2017    Sinus tachycardia     Sphincter of Oddi dysfunction     Type 2 diabetes mellitus (HCC)     insulin reliant/-160 s.s of hypoglycemia at 100      Past Surgical History:   Procedure Laterality Date    ABDOMEN SURGERY PROC UNLISTED  last one placed  2012    Hx of pancreatic stent, multiple    ABDOMEN SURGERY PROC UNLISTED  1997    lap nissen    ABDOMEN SURGERY PROC UNLISTED  4/13    explor lap    COLONOSCOPY N/A 4/4/2018    COL[facility]OSCOPY performed by [facility] at [facility] ENDOSCOPY    EGD  12/18/2019         HX CARPAL TUNNEL RELEASE Right     along with trigger finger    HX COLECTOMY      HX COLONOSCOPY      HX ENDOSCOPY      36 fr thomas    HX ERCP  3/5/2013    resulted in perforated duodenum    HX HYSTERECTOMY      ovaries remain    HX LAP CHOLECYSTECTOMY      HX LAPAROTOMY  3/5/2013    exploratory for duodenal perforation with ERCP    HX ORTHOPAEDIC      right finger surgery 2017    HX OTHER SURGICAL Bilateral     thumb    HX UROLOGICAL  13 at [facility]-- stent removed 13. Dr Emma Green      port insertion, left chest wall      Family History   Problem Relation Age of Onset    Diabetes Mother     Hypertension Mother     Heart Disease Mother         cabg    Diabetes Father     Heart Disease Father     Hypertension Father     Other Father     No Known Problems Sister      Social History     Occupational History    Not on file   Tobacco Use    Smoking status: Former Smoker     Packs/day: 1.00     Years: 3.00     Pack years: 3.00     Last attempt to quit: 1993     Years since quittin.6    Smokeless tobacco: Never Used   Substance and Sexual Activity    Alcohol use: No    Drug use: No    Sexual activity: Not on file       Hospital Medications:  Current Facility-Administered Medications   Medication Dose Route Frequency    lactated Ringers infusion  100 mL/hr IntraVENous CONTINUOUS    famotidine (PF) (PEPCID) 20 mg in 0.9% sodium chloride 10 mL injection  20 mg IntraVENous ONCE PRN    famotidine (PEPCID) tablet 20 mg  20 mg Oral PRN    lactated Ringers infusion  100 mL/hr IntraVENous CONTINUOUS    triamcinolone acetonide (KENALOG-40) 40 mg/mL injection 40 mg  40 mg IntraMUSCular ENDO ONCE    ondansetron (ZOFRAN) injection 4-8 mg  4-8 mg IntraVENous Multiple       Allergies:   Allergies   Allergen Reactions    Tape [Adhesive] Rash and Itching    Barium Iodide Nausea and Vomiting    Flagyl [Metronidazole] Nausea and Vomiting    Metformin Nausea and Vomiting     Stomach pain    Insulins Nausea and Vomiting Humalog only       Review of Systems:  A comprehensive review of systems was negative except for that written in the History of Present Illness. Objective:     Physical Exam:  Vitals:  Visit Vitals  /64   Pulse 94   Temp 98.5 °F (36.9 °C)   Resp 18   Ht 5' 2\" (1.575 m)   Wt 85.7 kg (189 lb)   SpO2 95%   Breastfeeding No   BMI 34.57 kg/m²       General: No acute distress. Skin:  Extremities and face reveal no rashes. No espinosa erythema. No telangiectasias on the chest wall. HEENT: Sclerae anicteric. No oral ulcers. No abnormal pigmentation of the lips. The neck is supple. Cardiovascular: Regular rate and rhythm. No murmurs, gallops, or rubs. Respiratory:  Comfortable breathing  With no accessory muscle use. Clear breath sounds with no wheezes, rales, or rhonchi. GI:  Abdomen nondistended, soft, and nontender. Normal active bowel sounds. No enlargement of the liver or spleen. No masses palpable. Musculoskeletal:  No pitting edema of the lower legs. Extremities have good range of motion. Neurological:  Gross memory appears intact. Patient is alert and oriented. Psychiatric:  Mood appears appropriate with judgement intact. Lymphatic:  No cervical or supraclavicular adenopathy. Laboratory:    No results for input(s): WBC, RBC, HGB, HCT, PLT, HGBEXT, HCTEXT, PLTEXT in the last 72 hours. No results for input(s): GLU, NA, K, CL, CO2, BUN, CREA, CA in the last 72 hours. No results for input(s): PTP, INR, APTT, INREXT in the last 72 hours. No results for input(s): TBIL, CBIL, AP, ALB, TP, AML, LPSE in the last 72 hours.     No lab exists for component: SGOT, GPT    Assessment:       A 44 y.o. female with Esophageal stricture      Plan:       EGD with dilation    Signed By: Ortega Hurst MD     December 8, 2020

## 2020-12-08 NOTE — DISCHARGE INSTRUCTIONS
Gastrointestinal Esophagogastroduodenoscopy (EGD) - Upper Exam Discharge Instructions    1. Call Dr. Bernis Kussmaul at [phone] for any problems or questions. 2. Contact the doctor's office for follow up appointment as directed. 3. Medication may cause drowsiness for several hours, therefore:  · Do not drive or operate machinery for remainder of the day. · No alcohol today. · Do not make any important or legal decisions for 24 hours. · Do not sign any legal documents for 24 hours. 5. Ordinarily, you may resume soft regular diet and activity after exam unless otherwise specified by your physician. 6. For mild soreness in your throat you may use Cepacol throat lozenges or warm salt-water gargles as needed. Any additional instructions:  ***     Instructions given to Charo Tracey and other family members.

## 2020-12-10 ENCOUNTER — HOSPITAL ENCOUNTER (OUTPATIENT)
Dept: INFUSION THERAPY | Age: 50
Discharge: HOME | End: 2020-12-10
Payer: MEDICARE

## 2020-12-10 VITALS
SYSTOLIC BLOOD PRESSURE: 126 MMHG | OXYGEN SATURATION: 95 % | RESPIRATION RATE: 18 BRPM | TEMPERATURE: 97.3 F | HEART RATE: 104 BPM | DIASTOLIC BLOOD PRESSURE: 80 MMHG

## 2020-12-10 LAB — MAGNESIUM SERPL-MCNC: 1.9 MG/DL (ref 1.8–2.4)

## 2020-12-10 PROCEDURE — 74011000250 HC RX REV CODE- 250: Performed by: INTERNAL MEDICINE

## 2020-12-10 PROCEDURE — 74011250636 HC RX REV CODE- 250/636: Performed by: INTERNAL MEDICINE

## 2020-12-10 PROCEDURE — 83735 ASSAY OF MAGNESIUM: CPT

## 2020-12-10 PROCEDURE — 96374 THER/PROPH/DIAG INJ IV PUSH: CPT

## 2020-12-10 RX ORDER — SODIUM CHLORIDE 0.9 % (FLUSH) 0.9 %
10-40 SYRINGE (ML) INJECTION AS NEEDED
Status: DISCONTINUED | OUTPATIENT
Start: 2020-12-10 | End: 2020-12-12 | Stop reason: HOSPADM

## 2020-12-10 RX ADMIN — PROMETHAZINE HYDROCHLORIDE 25 MG: 25 INJECTION INTRAMUSCULAR; INTRAVENOUS at 07:48

## 2020-12-10 RX ADMIN — Medication 10 ML: at 07:35

## 2020-12-10 RX ADMIN — Medication 10 ML: at 07:59

## 2020-12-10 RX ADMIN — Medication 10 ML: at 07:49

## 2020-12-10 NOTE — PROGRESS NOTES
Arrived to the [facility]. Assessment completed. Patient received phenergan 25 mg IV, as ordered. The port needle and dressing was also changed. Magnesium level was 1.9 today. Any issues or concerns during appointment: none. Patient aware of next infusion appointment on 12/17/20 (date) at [address] [place] [address] (time) with IV infusion center. Discharged ambulatory, with father. Patient instructed to lópez her doctor's office immediately for any problems or concerns. She verbalizes understanding.

## 2020-12-10 NOTE — PROGRESS NOTES
Patient here for port dressing change and phenergan IVP. Reviewed the procedure and process with patient and she verbalizes understanding.

## 2020-12-17 ENCOUNTER — HOSPITAL ENCOUNTER (OUTPATIENT)
Dept: INFUSION THERAPY | Age: 50
Discharge: HOME | End: 2020-12-17
Payer: MEDICARE

## 2020-12-17 VITALS
HEART RATE: 94 BPM | TEMPERATURE: 96.8 F | SYSTOLIC BLOOD PRESSURE: 113 MMHG | OXYGEN SATURATION: 95 % | DIASTOLIC BLOOD PRESSURE: 67 MMHG | RESPIRATION RATE: 18 BRPM

## 2020-12-17 LAB — MAGNESIUM SERPL-MCNC: 2.1 MG/DL (ref 1.8–2.4)

## 2020-12-17 PROCEDURE — 74011250636 HC RX REV CODE- 250/636: Performed by: INTERNAL MEDICINE

## 2020-12-17 PROCEDURE — 74011000250 HC RX REV CODE- 250: Performed by: INTERNAL MEDICINE

## 2020-12-17 PROCEDURE — 83735 ASSAY OF MAGNESIUM: CPT

## 2020-12-17 PROCEDURE — 96374 THER/PROPH/DIAG INJ IV PUSH: CPT

## 2020-12-17 RX ORDER — SODIUM CHLORIDE 0.9 % (FLUSH) 0.9 %
10 SYRINGE (ML) INJECTION AS NEEDED
Status: DISCONTINUED | OUTPATIENT
Start: 2020-12-17 | End: 2020-12-19 | Stop reason: HOSPADM

## 2020-12-17 RX ORDER — SODIUM CHLORIDE 9 MG/ML
25 INJECTION, SOLUTION INTRAVENOUS ONCE
Status: COMPLETED | OUTPATIENT
Start: 2020-12-17 | End: 2020-12-17

## 2020-12-17 RX ADMIN — SODIUM CHLORIDE 25 ML/HR: 900 INJECTION, SOLUTION INTRAVENOUS at 07:39

## 2020-12-17 RX ADMIN — Medication 10 ML: at 08:10

## 2020-12-17 RX ADMIN — Medication 10 ML: at 07:39

## 2020-12-17 RX ADMIN — PROMETHAZINE HYDROCHLORIDE 25 MG: 25 INJECTION INTRAMUSCULAR; INTRAVENOUS at 07:57

## 2020-12-17 RX ADMIN — Medication 10 ML: at 07:40

## 2020-12-17 NOTE — PROGRESS NOTES
Arrived to the [facility]. Phenergan given and Labs completed. Patient tolerated well. No magnesium replacement required for Mg=2.1. Any issues or concerns during appointment: NO.  Patient aware of next infusion appointment on 12/24/2020  at [address] [place] [address]. Discharged ambulatory.

## 2020-12-24 ENCOUNTER — HOSPITAL ENCOUNTER (OUTPATIENT)
Dept: INFUSION THERAPY | Age: 50
Discharge: HOME | End: 2020-12-24
Payer: MEDICARE

## 2020-12-24 VITALS
DIASTOLIC BLOOD PRESSURE: 82 MMHG | OXYGEN SATURATION: 96 % | WEIGHT: 185 LBS | TEMPERATURE: 96.9 F | BODY MASS INDEX: 33.84 KG/M2 | RESPIRATION RATE: 18 BRPM | HEART RATE: 98 BPM | SYSTOLIC BLOOD PRESSURE: 124 MMHG

## 2020-12-24 LAB — MAGNESIUM SERPL-MCNC: 1.9 MG/DL (ref 1.8–2.4)

## 2020-12-24 PROCEDURE — 83735 ASSAY OF MAGNESIUM: CPT

## 2020-12-24 PROCEDURE — 74011250636 HC RX REV CODE- 250/636: Performed by: INTERNAL MEDICINE

## 2020-12-24 PROCEDURE — 74011000250 HC RX REV CODE- 250: Performed by: INTERNAL MEDICINE

## 2020-12-24 PROCEDURE — 96374 THER/PROPH/DIAG INJ IV PUSH: CPT

## 2020-12-24 RX ORDER — SODIUM CHLORIDE 0.9 % (FLUSH) 0.9 %
10-40 SYRINGE (ML) INJECTION AS NEEDED
Status: DISCONTINUED | OUTPATIENT
Start: 2020-12-24 | End: 2020-12-25 | Stop reason: HOSPADM

## 2020-12-24 RX ADMIN — PROMETHAZINE HYDROCHLORIDE 25 MG: 25 INJECTION INTRAMUSCULAR; INTRAVENOUS at 07:59

## 2020-12-24 RX ADMIN — SODIUM CHLORIDE 500 ML: 900 INJECTION, SOLUTION INTRAVENOUS at 07:54

## 2020-12-24 RX ADMIN — Medication 10 ML: at 07:30

## 2020-12-24 RX ADMIN — Medication 10 ML: at 09:30

## 2020-12-24 NOTE — PROGRESS NOTES
Arrived to the [facility]. Port needle changed and labs drawn & reviewed. Phenergan given for c/o nausea completed. Patient tolerated well. No replacements needed. Any issues or concerns during appointment: None. Patient aware of next infusion appointment on 12/31 (date) at [address] [place] [address] (time). Discharged ambulatory in stable condition.

## 2020-12-31 ENCOUNTER — HOSPITAL ENCOUNTER (OUTPATIENT)
Dept: INFUSION THERAPY | Age: 50
Discharge: HOME | End: 2020-12-31
Payer: MEDICARE

## 2020-12-31 VITALS
HEART RATE: 105 BPM | TEMPERATURE: 98.5 F | RESPIRATION RATE: 16 BRPM | DIASTOLIC BLOOD PRESSURE: 75 MMHG | OXYGEN SATURATION: 96 % | SYSTOLIC BLOOD PRESSURE: 123 MMHG

## 2020-12-31 LAB — MAGNESIUM SERPL-MCNC: 1.9 MG/DL (ref 1.8–2.4)

## 2020-12-31 PROCEDURE — 74011000250 HC RX REV CODE- 250: Performed by: INTERNAL MEDICINE

## 2020-12-31 PROCEDURE — 83735 ASSAY OF MAGNESIUM: CPT

## 2020-12-31 PROCEDURE — 96374 THER/PROPH/DIAG INJ IV PUSH: CPT

## 2020-12-31 PROCEDURE — 74011250636 HC RX REV CODE- 250/636: Performed by: INTERNAL MEDICINE

## 2020-12-31 RX ORDER — SODIUM CHLORIDE 0.9 % (FLUSH) 0.9 %
10-30 SYRINGE (ML) INJECTION AS NEEDED
Status: DISCONTINUED | OUTPATIENT
Start: 2020-12-31 | End: 2021-01-02 | Stop reason: HOSPADM

## 2020-12-31 RX ADMIN — Medication 30 ML: at 07:40

## 2020-12-31 RX ADMIN — PROMETHAZINE HYDROCHLORIDE 25 MG: 25 INJECTION INTRAMUSCULAR; INTRAVENOUS at 08:00

## 2020-12-31 RX ADMIN — Medication 10 ML: at 08:11

## 2020-12-31 NOTE — PROGRESS NOTES
Arrived to the [facility]. Assessment completed. Labs drawn and reviewed. Patient received phenergan 25 mg IV, as ordered. Magnesium level 1.9 today. Any issues or concerns during appointment: none. Patient aware of next infusion appointment on 1/7/21 (date) at [address] [place] [address] (time) with IV infusion center. Discharged ambulatory, per self. Patient instructed to call her doctor's office immediately for any problems or concerns. She verbalizes understanding.

## 2020-12-31 NOTE — PROGRESS NOTES
Patient here for possible magnesium replacement and phenergan. Reviewed the procedure and process with patient and she verbalizes understanding.

## 2021-01-07 ENCOUNTER — HOSPITAL ENCOUNTER (OUTPATIENT)
Dept: INFUSION THERAPY | Age: 51
Discharge: HOME | End: 2021-01-07
Payer: MEDICARE

## 2021-01-07 VITALS
HEART RATE: 102 BPM | OXYGEN SATURATION: 94 % | DIASTOLIC BLOOD PRESSURE: 84 MMHG | SYSTOLIC BLOOD PRESSURE: 156 MMHG | RESPIRATION RATE: 18 BRPM | TEMPERATURE: 97 F

## 2021-01-07 LAB — MAGNESIUM SERPL-MCNC: 1.9 MG/DL (ref 1.8–2.4)

## 2021-01-07 PROCEDURE — 74011250636 HC RX REV CODE- 250/636: Performed by: INTERNAL MEDICINE

## 2021-01-07 PROCEDURE — 83735 ASSAY OF MAGNESIUM: CPT

## 2021-01-07 PROCEDURE — 96374 THER/PROPH/DIAG INJ IV PUSH: CPT

## 2021-01-07 PROCEDURE — 74011000250 HC RX REV CODE- 250: Performed by: INTERNAL MEDICINE

## 2021-01-07 RX ORDER — SODIUM CHLORIDE 0.9 % (FLUSH) 0.9 %
10 SYRINGE (ML) INJECTION EVERY 8 HOURS
Status: DISCONTINUED | OUTPATIENT
Start: 2021-01-07 | End: 2021-01-09 | Stop reason: HOSPADM

## 2021-01-07 RX ADMIN — Medication 10 ML: at 07:37

## 2021-01-07 RX ADMIN — PROMETHAZINE HYDROCHLORIDE 25 MG: 25 INJECTION INTRAMUSCULAR; INTRAVENOUS at 07:32

## 2021-01-07 NOTE — PROGRESS NOTES
Arrived to the [facility] ambulatory. Lab draw from port and promethazine completed. Patient tolerated well. Any issues or concerns during appointment: no.  Patient aware of next infusion appointment on 1/14 at [address] [place] [address]. Discharged to home ambulatory.

## 2021-01-14 ENCOUNTER — HOSPITAL ENCOUNTER (OUTPATIENT)
Dept: INFUSION THERAPY | Age: 51
Discharge: HOME | End: 2021-01-14
Payer: MEDICARE

## 2021-01-14 VITALS
HEART RATE: 115 BPM | OXYGEN SATURATION: 94 % | DIASTOLIC BLOOD PRESSURE: 72 MMHG | TEMPERATURE: 97.1 F | RESPIRATION RATE: 18 BRPM | SYSTOLIC BLOOD PRESSURE: 118 MMHG

## 2021-01-14 LAB
ANION GAP SERPL CALC-SCNC: 6 MMOL/L (ref 7–16)
BUN SERPL-MCNC: 12 MG/DL (ref 6–23)
CALCIUM SERPL-MCNC: 9 MG/DL (ref 8.3–10.4)
CHLORIDE SERPL-SCNC: 103 MMOL/L (ref 98–107)
CO2 SERPL-SCNC: 26 MMOL/L (ref 21–32)
CREAT SERPL-MCNC: 0.6 MG/DL (ref 0.6–1)
GLUCOSE SERPL-MCNC: 199 MG/DL (ref 65–100)
MAGNESIUM SERPL-MCNC: 2 MG/DL (ref 1.8–2.4)
POTASSIUM SERPL-SCNC: 3.9 MMOL/L (ref 3.5–5.1)
SODIUM SERPL-SCNC: 135 MMOL/L (ref 136–145)

## 2021-01-14 PROCEDURE — 74011000250 HC RX REV CODE- 250: Performed by: INTERNAL MEDICINE

## 2021-01-14 PROCEDURE — 83735 ASSAY OF MAGNESIUM: CPT

## 2021-01-14 PROCEDURE — 80048 BASIC METABOLIC PNL TOTAL CA: CPT

## 2021-01-14 PROCEDURE — 96374 THER/PROPH/DIAG INJ IV PUSH: CPT

## 2021-01-14 PROCEDURE — 74011250636 HC RX REV CODE- 250/636: Performed by: INTERNAL MEDICINE

## 2021-01-14 RX ADMIN — PROMETHAZINE HYDROCHLORIDE 25 MG: 25 INJECTION INTRAMUSCULAR; INTRAVENOUS at 09:42

## 2021-01-14 NOTE — PROGRESS NOTES
Arrived to the infusion center for labs, needle change  and phenergan given. Magnesium 2.0. No new issues or concerns voiced durin the visit. Aware of next appointment on 1/21 at [address] [place] [address]. Discharged to home in satisfactory condition.

## 2021-01-21 ENCOUNTER — HOSPITAL ENCOUNTER (OUTPATIENT)
Dept: INFUSION THERAPY | Age: 51
Discharge: HOME | End: 2021-01-21
Payer: MEDICARE

## 2021-01-21 VITALS
DIASTOLIC BLOOD PRESSURE: 79 MMHG | SYSTOLIC BLOOD PRESSURE: 134 MMHG | HEART RATE: 117 BPM | TEMPERATURE: 96.9 F | OXYGEN SATURATION: 95 % | WEIGHT: 186 LBS | BODY MASS INDEX: 34.02 KG/M2

## 2021-01-21 LAB — MAGNESIUM SERPL-MCNC: 2.1 MG/DL (ref 1.8–2.4)

## 2021-01-21 PROCEDURE — 83735 ASSAY OF MAGNESIUM: CPT

## 2021-01-21 PROCEDURE — 74011250636 HC RX REV CODE- 250/636: Performed by: INTERNAL MEDICINE

## 2021-01-21 PROCEDURE — 96374 THER/PROPH/DIAG INJ IV PUSH: CPT

## 2021-01-21 PROCEDURE — 74011000250 HC RX REV CODE- 250: Performed by: INTERNAL MEDICINE

## 2021-01-21 RX ORDER — SODIUM CHLORIDE 0.9 % (FLUSH) 0.9 %
10 SYRINGE (ML) INJECTION AS NEEDED
Status: ACTIVE | OUTPATIENT
Start: 2021-01-21 | End: 2021-01-21

## 2021-01-21 RX ORDER — SODIUM CHLORIDE 9 MG/ML
25 INJECTION, SOLUTION INTRAVENOUS ONCE
Status: COMPLETED | OUTPATIENT
Start: 2021-01-21 | End: 2021-01-21

## 2021-01-21 RX ADMIN — PROMETHAZINE HYDROCHLORIDE 25 MG: 25 INJECTION INTRAMUSCULAR; INTRAVENOUS at 07:47

## 2021-01-21 RX ADMIN — SODIUM CHLORIDE 25 ML/HR: 900 INJECTION, SOLUTION INTRAVENOUS at 07:45

## 2021-01-21 RX ADMIN — Medication 10 ML: at 07:33

## 2021-01-21 RX ADMIN — Medication 10 ML: at 07:34

## 2021-01-21 NOTE — PROGRESS NOTES
Clinical Social Work Note  Name: Robb Latham  : 1970  MRN: [id number]  Date of Service: 2021  Location of Service: [facility]  Date of Service: 2021    Type of Service: Case Management     Length of Service: 15 minutes    Patient Diagnosis: No diagnosis found. Referral Source: rn    Reason for Visit: f/u    Subject: Seen patient with her father. Both were in good spirit. Patient is having a procedure next week. LAXMI-ASHLEY overviewed with pt relaxation and daily mindfulness and gave a handout. At  this moment, pt denies any psychosocial and economic needs. Mental Status Exam: Intellectual functioning appears to be intact. Mood is \"good\" and affect is good. Insight is adequate. Judgment is adequate. Patient did not report suicidal ideations, intent or plans. Patient did not report homicidal ideations, intent or plans. Patient is oriented to self, place, time and situation. Protective Factors: Current care for physical and mental illness, adequate insight and judgment, family support, cultural and Mandaeism beliefs and values that support self-care. Next Steps: SW gave contact information and encouraged pt to call should any needs arise. Pt verbalized understanding. SW intends to follow up by phone and/or face-to-face as needed. No flowsheet data found.       Signed By: LAXMI Painter     2021

## 2021-01-21 NOTE — PROGRESS NOTES
Arrived to the [facility]. Lab draw and IV phenergan completed. No magnesium replacement needed per lab parameters. Patient tolerated well. Any issues or concerns during appointment: NO.  Patient aware of next infusion appointment on 02/04/21 at [address] [place] [address]. Discharged ambulatory.

## 2021-01-27 ENCOUNTER — HOSPITAL ENCOUNTER (OUTPATIENT)
Dept: INFUSION THERAPY | Age: 51
Discharge: HOME | End: 2021-01-27
Payer: MEDICARE

## 2021-01-27 ENCOUNTER — ANESTHESIA EVENT (OUTPATIENT)
Dept: ENDOSCOPY | Age: 51
End: 2021-01-27
Payer: MEDICARE

## 2021-01-27 VITALS
HEART RATE: 101 BPM | TEMPERATURE: 97.4 F | OXYGEN SATURATION: 96 % | RESPIRATION RATE: 16 BRPM | SYSTOLIC BLOOD PRESSURE: 150 MMHG | DIASTOLIC BLOOD PRESSURE: 92 MMHG

## 2021-01-27 LAB — MAGNESIUM SERPL-MCNC: 1.9 MG/DL (ref 1.8–2.4)

## 2021-01-27 PROCEDURE — 74011000250 HC RX REV CODE- 250: Performed by: INTERNAL MEDICINE

## 2021-01-27 PROCEDURE — 74011250636 HC RX REV CODE- 250/636: Performed by: INTERNAL MEDICINE

## 2021-01-27 PROCEDURE — 96374 THER/PROPH/DIAG INJ IV PUSH: CPT

## 2021-01-27 PROCEDURE — 83735 ASSAY OF MAGNESIUM: CPT

## 2021-01-27 RX ORDER — SODIUM CHLORIDE 0.9 % (FLUSH) 0.9 %
10-30 SYRINGE (ML) INJECTION AS NEEDED
Status: ACTIVE | OUTPATIENT
Start: 2021-01-27 | End: 2021-01-27

## 2021-01-27 RX ORDER — SODIUM CHLORIDE 9 MG/ML
25 INJECTION, SOLUTION INTRAVENOUS ONCE
Status: COMPLETED | OUTPATIENT
Start: 2021-01-27 | End: 2021-01-27

## 2021-01-27 RX ADMIN — PROMETHAZINE HYDROCHLORIDE 25 MG: 25 INJECTION INTRAMUSCULAR; INTRAVENOUS at 12:25

## 2021-01-27 RX ADMIN — Medication 10 ML: at 12:55

## 2021-01-27 RX ADMIN — SODIUM CHLORIDE 25 ML/HR: 900 INJECTION, SOLUTION INTRAVENOUS at 12:00

## 2021-01-27 RX ADMIN — Medication 30 ML: at 11:54

## 2021-01-27 NOTE — PROGRESS NOTES
Arrived to the [facility]. Lab draw and port needle changed. No magnesium replacement needed per lab parameters. IV phenergan  completed. Patient tolerated well. Any issues or concerns during appointment: NO.  Patient aware of next infusion appointment on 02/04/21 at [address] [place] [address] . Discharged ambulatory.

## 2021-01-27 NOTE — PROGRESS NOTES
Reviewed plan of care for lab draw  with possible magnesium replacement and port needle change. Pt verbalized understanding.

## 2021-01-28 ENCOUNTER — ANESTHESIA (OUTPATIENT)
Dept: ENDOSCOPY | Age: 51
End: 2021-01-28
Payer: MEDICARE

## 2021-01-28 ENCOUNTER — HOSPITAL ENCOUNTER (OUTPATIENT)
Age: 51
Setting detail: OUTPATIENT SURGERY
Discharge: HOME | End: 2021-01-28
Attending: INTERNAL MEDICINE | Admitting: INTERNAL MEDICINE
Payer: MEDICARE

## 2021-01-28 VITALS
HEART RATE: 79 BPM | OXYGEN SATURATION: 94 % | SYSTOLIC BLOOD PRESSURE: 146 MMHG | BODY MASS INDEX: 34.78 KG/M2 | HEIGHT: 62 IN | TEMPERATURE: 96.8 F | DIASTOLIC BLOOD PRESSURE: 81 MMHG | RESPIRATION RATE: 23 BRPM | WEIGHT: 189 LBS

## 2021-01-28 PROCEDURE — 74011250636 HC RX REV CODE- 250/636: Performed by: INTERNAL MEDICINE

## 2021-01-28 PROCEDURE — 76040000025: Performed by: INTERNAL MEDICINE

## 2021-01-28 PROCEDURE — 76060000032 HC ANESTHESIA 0.5 TO 1 HR: Performed by: INTERNAL MEDICINE

## 2021-01-28 PROCEDURE — 74011250636 HC RX REV CODE- 250/636: Performed by: NURSE ANESTHETIST, CERTIFIED REGISTERED

## 2021-01-28 PROCEDURE — 77030031722: Performed by: INTERNAL MEDICINE

## 2021-01-28 PROCEDURE — 74011000250 HC RX REV CODE- 250: Performed by: NURSE ANESTHETIST, CERTIFIED REGISTERED

## 2021-01-28 PROCEDURE — 2709999900 HC NON-CHARGEABLE SUPPLY: Performed by: INTERNAL MEDICINE

## 2021-01-28 RX ORDER — OXYCODONE HYDROCHLORIDE 5 MG/1
5 TABLET ORAL
Status: DISCONTINUED | OUTPATIENT
Start: 2021-01-28 | End: 2021-01-28 | Stop reason: HOSPADM

## 2021-01-28 RX ORDER — FENTANYL CITRATE 50 UG/ML
INJECTION, SOLUTION INTRAMUSCULAR; INTRAVENOUS AS NEEDED
Status: DISCONTINUED | OUTPATIENT
Start: 2021-01-28 | End: 2021-01-28 | Stop reason: HOSPADM

## 2021-01-28 RX ORDER — DIPHENHYDRAMINE HYDROCHLORIDE 50 MG/ML
12.5 INJECTION, SOLUTION INTRAMUSCULAR; INTRAVENOUS ONCE
Status: DISCONTINUED | OUTPATIENT
Start: 2021-01-28 | End: 2021-01-28 | Stop reason: HOSPADM

## 2021-01-28 RX ORDER — SODIUM CHLORIDE, SODIUM LACTATE, POTASSIUM CHLORIDE, CALCIUM CHLORIDE 600; 310; 30; 20 MG/100ML; MG/100ML; MG/100ML; MG/100ML
1000 INJECTION, SOLUTION INTRAVENOUS CONTINUOUS
Status: DISCONTINUED | OUTPATIENT
Start: 2021-01-28 | End: 2021-01-28 | Stop reason: HOSPADM

## 2021-01-28 RX ORDER — TRIAMCINOLONE ACETONIDE 40 MG/ML
40 INJECTION, SUSPENSION INTRA-ARTICULAR; INTRAMUSCULAR ONCE
Status: COMPLETED | OUTPATIENT
Start: 2021-01-28 | End: 2021-01-28

## 2021-01-28 RX ORDER — SODIUM CHLORIDE, SODIUM LACTATE, POTASSIUM CHLORIDE, CALCIUM CHLORIDE 600; 310; 30; 20 MG/100ML; MG/100ML; MG/100ML; MG/100ML
INJECTION, SOLUTION INTRAVENOUS
Status: DISCONTINUED | OUTPATIENT
Start: 2021-01-28 | End: 2021-01-28 | Stop reason: HOSPADM

## 2021-01-28 RX ORDER — LIDOCAINE HYDROCHLORIDE 10 MG/ML
0.1 INJECTION INFILTRATION; PERINEURAL AS NEEDED
Status: DISCONTINUED | OUTPATIENT
Start: 2021-01-28 | End: 2021-01-28 | Stop reason: HOSPADM

## 2021-01-28 RX ORDER — HYDROMORPHONE HYDROCHLORIDE 1 MG/ML
0.5 INJECTION, SOLUTION INTRAMUSCULAR; INTRAVENOUS; SUBCUTANEOUS
Status: DISCONTINUED | OUTPATIENT
Start: 2021-01-28 | End: 2021-01-28 | Stop reason: HOSPADM

## 2021-01-28 RX ORDER — SODIUM CHLORIDE, SODIUM LACTATE, POTASSIUM CHLORIDE, CALCIUM CHLORIDE 600; 310; 30; 20 MG/100ML; MG/100ML; MG/100ML; MG/100ML
75 INJECTION, SOLUTION INTRAVENOUS CONTINUOUS
Status: DISCONTINUED | OUTPATIENT
Start: 2021-01-28 | End: 2021-01-28 | Stop reason: HOSPADM

## 2021-01-28 RX ORDER — NALOXONE HYDROCHLORIDE 0.4 MG/ML
0.1 INJECTION, SOLUTION INTRAMUSCULAR; INTRAVENOUS; SUBCUTANEOUS AS NEEDED
Status: DISCONTINUED | OUTPATIENT
Start: 2021-01-28 | End: 2021-01-28 | Stop reason: HOSPADM

## 2021-01-28 RX ORDER — ONDANSETRON 2 MG/ML
INJECTION INTRAMUSCULAR; INTRAVENOUS AS NEEDED
Status: DISCONTINUED | OUTPATIENT
Start: 2021-01-28 | End: 2021-01-28 | Stop reason: HOSPADM

## 2021-01-28 RX ORDER — MIDAZOLAM HYDROCHLORIDE 1 MG/ML
2 INJECTION, SOLUTION INTRAMUSCULAR; INTRAVENOUS
Status: DISCONTINUED | OUTPATIENT
Start: 2021-01-28 | End: 2021-01-28 | Stop reason: HOSPADM

## 2021-01-28 RX ORDER — FENTANYL CITRATE 50 UG/ML
100 INJECTION, SOLUTION INTRAMUSCULAR; INTRAVENOUS AS NEEDED
Status: DISCONTINUED | OUTPATIENT
Start: 2021-01-28 | End: 2021-01-28 | Stop reason: HOSPADM

## 2021-01-28 RX ORDER — OXYCODONE HYDROCHLORIDE 5 MG/1
10 TABLET ORAL
Status: DISCONTINUED | OUTPATIENT
Start: 2021-01-28 | End: 2021-01-28 | Stop reason: HOSPADM

## 2021-01-28 RX ORDER — PROPOFOL 10 MG/ML
INJECTION, EMULSION INTRAVENOUS
Status: DISCONTINUED | OUTPATIENT
Start: 2021-01-28 | End: 2021-01-28 | Stop reason: HOSPADM

## 2021-01-28 RX ORDER — LIDOCAINE HYDROCHLORIDE 20 MG/ML
INJECTION, SOLUTION EPIDURAL; INFILTRATION; INTRACAUDAL; PERINEURAL AS NEEDED
Status: DISCONTINUED | OUTPATIENT
Start: 2021-01-28 | End: 2021-01-28 | Stop reason: HOSPADM

## 2021-01-28 RX ORDER — ONDANSETRON 2 MG/ML
4 INJECTION INTRAMUSCULAR; INTRAVENOUS ONCE
Status: DISCONTINUED | OUTPATIENT
Start: 2021-01-28 | End: 2021-01-28 | Stop reason: HOSPADM

## 2021-01-28 RX ORDER — PROPOFOL 10 MG/ML
INJECTION, EMULSION INTRAVENOUS AS NEEDED
Status: DISCONTINUED | OUTPATIENT
Start: 2021-01-28 | End: 2021-01-28 | Stop reason: HOSPADM

## 2021-01-28 RX ADMIN — PROPOFOL 160 MCG/KG/MIN: 10 INJECTION, EMULSION INTRAVENOUS at 09:54

## 2021-01-28 RX ADMIN — LIDOCAINE HYDROCHLORIDE 80 MG: 20 INJECTION, SOLUTION EPIDURAL; INFILTRATION; INTRACAUDAL; PERINEURAL at 09:54

## 2021-01-28 RX ADMIN — SODIUM CHLORIDE, SODIUM LACTATE, POTASSIUM CHLORIDE, AND CALCIUM CHLORIDE: 600; 310; 30; 20 INJECTION, SOLUTION INTRAVENOUS at 09:46

## 2021-01-28 RX ADMIN — PROPOFOL 20 MG: 10 INJECTION, EMULSION INTRAVENOUS at 09:55

## 2021-01-28 RX ADMIN — ONDANSETRON 4 MG: 2 INJECTION INTRAMUSCULAR; INTRAVENOUS at 09:50

## 2021-01-28 RX ADMIN — TRIAMCINOLONE ACETONIDE 40 MG: 40 INJECTION, SUSPENSION INTRA-ARTICULAR; INTRAMUSCULAR at 10:09

## 2021-01-28 RX ADMIN — PROPOFOL 60 MG: 10 INJECTION, EMULSION INTRAVENOUS at 09:54

## 2021-01-28 RX ADMIN — FENTANYL CITRATE 25 MCG: 50 INJECTION INTRAMUSCULAR; INTRAVENOUS at 10:00

## 2021-01-28 RX ADMIN — PROPOFOL 20 MG: 10 INJECTION, EMULSION INTRAVENOUS at 09:57

## 2021-01-28 NOTE — PROCEDURES
Esophagogastroduodenoscopy    DATE of PROCEDURE: 1/28/2021    INDICATION: dysphagia    POSTPROCEDURE DIAGNOSIS: esophageal stricture    MEDICATIONS ADMINISTERED: MAC anesthesia (see anesthesia report)    INSTRUMENT:    PROCEDURE:  After obtaining informed consent, the patient was placed in the left lateral position and sedated. The endoscope was advanced under direct vision without difficulty. The esophagus, stomach (including retroflexed views) and duodenum were evaluated. The patient was taken to the recovery area in stable condition. FINDINGS:  ESOPHAGUS: mild smooth benign distal narrowing. Samaria Helder #46 and 48 led to minimal distal mucosa disruption. Samaria Helder #50 led to moderate < 1 cm shallow distal tear. Then injected Kenalog 0.5 cc aliquots in three locations near the tear. STOMACH: diffuse gastropathy atrophic type; gastrojejunostomy anastomosis. No ulcers or erosion or blood.     DUODENUM: normal    Estimated blood loss: 0-minimal   Specimens obtained during procedure: none    PLAN: EGD dilation per her own protocol

## 2021-01-28 NOTE — DISCHARGE INSTRUCTIONS
Gastrointestinal Esophagogastroduodenoscopy (EGD) - Upper Exam Discharge Instructions    1. Call Dr. Jake Blandon office  for any problems or questions. 2. Contact the doctor's office for follow up appointment as directed. 3. Medication may cause drowsiness for several hours, therefore:  · Do not drive or operate machinery for remainder of the day. · No alcohol today. · Do not make any important or legal decisions for 24 hours. · Do not sign any legal documents for 24 hours. 5. Ordinarily, you may resume regular diet and activity after exam unless otherwise              specified by your physician. 6. For mild soreness in your throat you may use Cepacol throat lozenges or warm               salt-water gargles as needed. Any additional instructions:    1. Follow up in office and schedule procedure as needed. Instructions given to Robb Yeison and other family members.

## 2021-01-28 NOTE — ANESTHESIA POSTPROCEDURE EVALUATION
Procedure(s):  ESOPHAGOGASTRODUODENOSCOPY (EGD) WITH DILATION AND KENOLOG/ 35  ESOPHAGEAL DILATION  INJECTION  PROC ROOM 4 PREP/RECOVER.    total IV anesthesia    Anesthesia Post Evaluation      Multimodal analgesia: multimodal analgesia used between 6 hours prior to anesthesia start to PACU discharge  Patient location during evaluation: bedside  Patient participation: complete - patient participated  Level of consciousness: awake  Pain management: adequate  Airway patency: patent  Anesthetic complications: no  Cardiovascular status: acceptable and stable  Respiratory status: acceptable and room air  Hydration status: acceptable  Post anesthesia nausea and vomiting:  none  Final Post Anesthesia Temperature Assessment:  Normothermia (36.0-37.5 degrees C)      INITIAL Post-op Vital signs:   Vitals Value Taken Time   /81 01/28/21 1058   Temp 36 °C (96.8 °F) 01/28/21 1028   Pulse 81 01/28/21 1059   Resp 45 01/28/21 1059   SpO2 98 % 01/28/21 1059   Vitals shown include unvalidated device data.

## 2021-01-28 NOTE — H&P
PreProcedure H&P Update    Today's Date:  1/28/2021    CC:  dysphagia    Subjective:   HPI: Known esophageal stricture    PMH:  Past Medical History:   Diagnosis Date    Anemia     blood transfusion 2013 X1 d/t \"perforated bowel\"-- Fe infusions 12/2017--- denies any current iron infusions 12/17/2019    Anxiety and depression     managed with meds    Asthma     allergy induced, denies any inhaler, patient denies    BMI 32.0-32.9,adult     BMI 32.2    C. difficile diarrhea 2013    in 2312 after complicated ERCP    Cervical dysplasia 1990s    Chronic insomnia     Chronic pain     all over     Chronic pancreatitis ([facility] [address]     Encounter for insertion of venous access port     Left chest port    Endometriosis     Esophageal stricture 2017    Fibromyalgia     managed with meds    Former cigarette smoker     GERD (gastroesophageal reflux disease)     daily meds---po and IV protonix- uses per port- alternates    HLD (hyperlipidemia)     pt denies     IBS (irritable bowel syndrome)     Migraine headache     Nausea & vomiting     pt reports she does better with phenergan than zofran - causes HA    Nonalcoholic fatty liver disease     PUD (peptic ulcer disease) 06/2017    Sinus tachycardia     Sphincter of Oddi dysfunction     Type 2 diabetes mellitus (HCC)     insulin reliant/ s.s of hypoglycemia at 78       Medications:   Current Facility-Administered Medications   Medication Dose Route Frequency    lactated Ringers infusion 75 mL  75 mL IntraVENous CONTINUOUS    oxyCODONE IR (ROXICODONE) tablet 5 mg  5 mg Oral ONCE PRN    oxyCODONE IR (ROXICODONE) tablet 10 mg  10 mg Oral ONCE PRN    HYDROmorphone (PF) (DILAUDID) injection 0.5 mg  0.5 mg IntraVENous Multiple    naloxone (NARCAN) injection 0.1 mg  0.1 mg IntraVENous PRN    ondansetron (ZOFRAN) injection 4 mg  4 mg IntraVENous ONCE    diphenhydrAMINE (BENADRYL) injection 12.5 mg  12.5 mg IntraVENous ONCE    lidocaine (XYLOCAINE) 10 mg/mL (1 %) injection 0.1 mL  0.1 mL SubCUTAneous PRN    lactated Ringers infusion 1,000 mL  1,000 mL IntraVENous CONTINUOUS    lactated ringers bolus infusion 500 mL  500 mL IntraVENous ONCE    fentaNYL citrate (PF) injection 100 mcg  100 mcg IntraVENous PRN    midazolam (VERSED) injection 2 mg  2 mg IntraVENous ONCE PRN    triamcinolone acetonide (KENALOG-40) 40 mg/mL injection 40 mg  40 mg Other ONCE         Objective:   Vitals:  Visit Vitals  /85   Pulse 88   Temp 98.7 °F (37.1 °C)   Resp 16   Ht 5' 2\" (1.575 m)   Wt 85.7 kg (189 lb)   SpO2 99%   Breastfeeding No   BMI 34.57 kg/m²     Exam:  General appearance: alert, cooperative, no distress  Lungs: clear to auscultation bilaterally anteriorly  Heart: regular rate and rhythm  Abdomen: soft, non-tender. Bowel sounds normal. No masses, no organomegaly      Data Review (Labs):    No results for input(s): WBC, HGB, HCT, PLT, MCV, NA, K, CL, CO2, BUN, CREA, CA, GLU, AP, TBIL, CBIL, ALB, TP, AML, LPSE, PTP, INR, APTT, HGBEXT, HCTEXT, PLTEXT, INREXT in the last 72 hours. No lab exists for component: SGOT, GPT, DBIL    Plan:     Esophageal stricture. Proceed with EGD dilation. She understands perforation risk.

## 2021-01-28 NOTE — ANESTHESIA PREPROCEDURE EVALUATION
Relevant Problems   CARDIOVASCULAR   (+) Hypertension      GASTROINTESTINAL   (+) GERD (gastroesophageal reflux disease)      ENDOCRINE   (+) DM type 2 (diabetes mellitus, type 2) (HCC)      HEMATOLOGY   (+) Iron deficiency anemia       Anesthetic History     PONV          Review of Systems / Medical History  Patient summary reviewed and pertinent labs reviewed    Pulmonary                   Neuro/Psych         Headaches and psychiatric history     Cardiovascular                  Exercise tolerance: >4 METS     GI/Hepatic/Renal     GERD: well controlled      PUD and liver disease (fatty)     Endo/Other    Diabetes: type 2, using insulin    Obesity and anemia     Other Findings   Comments: Chronic nausea  Sphincter of Oddi dysfunction  IBS  C. Diff  Chronic pancreatitis  Esophageal stricture         Physical Exam    Airway  Mallampati: II  TM Distance: 4 - 6 cm  Neck ROM: normal range of motion   Mouth opening: Normal     Cardiovascular  Regular rate and rhythm,  S1 and S2 normal,  no murmur, click, rub, or gallop  Rhythm: regular  Rate: normal         Dental  No notable dental hx    Comments: Front teeth fragile   Pulmonary  Breath sounds clear to auscultation               Abdominal  GI exam deferred       Other Findings            Anesthetic Plan    ASA: 3  Anesthesia type: total IV anesthesia          Induction: Intravenous  Anesthetic plan and risks discussed with: Patient

## 2021-01-28 NOTE — ROUTINE PROCESS
VSS. Patient denies any complaints at this time. Fluids disconnected and port remained accessed as it was on arrival.  Discharge education provided to patient and . Patient discharged out via wheelchair with RN.

## 2021-02-04 ENCOUNTER — HOSPITAL ENCOUNTER (OUTPATIENT)
Dept: INFUSION THERAPY | Age: 51
Discharge: HOME | End: 2021-02-04
Payer: MEDICARE

## 2021-02-04 VITALS
DIASTOLIC BLOOD PRESSURE: 78 MMHG | TEMPERATURE: 98.3 F | HEART RATE: 104 BPM | RESPIRATION RATE: 16 BRPM | OXYGEN SATURATION: 96 % | SYSTOLIC BLOOD PRESSURE: 138 MMHG

## 2021-02-04 LAB — MAGNESIUM SERPL-MCNC: 1.9 MG/DL (ref 1.8–2.4)

## 2021-02-04 PROCEDURE — 74011250636 HC RX REV CODE- 250/636: Performed by: INTERNAL MEDICINE

## 2021-02-04 PROCEDURE — 96374 THER/PROPH/DIAG INJ IV PUSH: CPT

## 2021-02-04 PROCEDURE — 74011000250 HC RX REV CODE- 250: Performed by: INTERNAL MEDICINE

## 2021-02-04 PROCEDURE — 83735 ASSAY OF MAGNESIUM: CPT

## 2021-02-04 RX ORDER — SODIUM CHLORIDE 0.9 % (FLUSH) 0.9 %
10 SYRINGE (ML) INJECTION AS NEEDED
Status: DISCONTINUED | OUTPATIENT
Start: 2021-02-04 | End: 2021-02-06 | Stop reason: HOSPADM

## 2021-02-04 RX ADMIN — Medication 10 ML: at 08:07

## 2021-02-04 RX ADMIN — PROMETHAZINE HYDROCHLORIDE 25 MG: 25 INJECTION INTRAMUSCULAR; INTRAVENOUS at 08:05

## 2021-02-04 RX ADMIN — Medication 10 ML: at 07:41

## 2021-02-04 RX ADMIN — Medication 10 ML: at 07:42

## 2021-02-04 RX ADMIN — Medication 10 ML: at 07:40

## 2021-02-04 NOTE — PROGRESS NOTES
Arrived to the [facility]. Magnesium level drawn, Mag 1.9, no replacement needed. Port needle changed and port care completed. Patient tolerated well. Any issues or concerns during appointment: none. Patient aware of next infusion appointment on 2/11 at 7:15am.  Discharged ambulatory to home.

## 2021-02-04 NOTE — PROGRESS NOTES
Social Work Progress Note  Name: Lucila Ruelas  : 1970  MRN: [id number]  Date of Service: 2021    Length of Service: 15 minutes    Patient Diagnosis: No diagnosis found. Reason for Visit: F/U    Subjective: Seen patient at Pod 3. LISW-CP provided validation and sustainment as patient vent about emotional and physical distress. Pt denies any other psychosocial needs at this time. Protective Factors: Current care for physical and mental illness, adequate insight and judgment, family support, cultural and Druze beliefs and values that support self-care. Patient did not report suicidal ideations, intent or plans. Patient did not report homicidal ideations, intent or plans. Patient is oriented to self, place, time and situation. Next Steps: LISW-CP gave contact information and encouraged pt to call should any needs arise. Pt verbalized understanding. LISW-CP intends to follow up as needed. No flowsheet data found.       LAXMI Clifford

## 2021-02-11 ENCOUNTER — HOSPITAL ENCOUNTER (OUTPATIENT)
Dept: INFUSION THERAPY | Age: 51
Discharge: HOME | End: 2021-02-11
Payer: MEDICARE

## 2021-02-11 VITALS
OXYGEN SATURATION: 97 % | HEART RATE: 92 BPM | SYSTOLIC BLOOD PRESSURE: 129 MMHG | RESPIRATION RATE: 16 BRPM | TEMPERATURE: 98 F | DIASTOLIC BLOOD PRESSURE: 88 MMHG

## 2021-02-11 LAB — MAGNESIUM SERPL-MCNC: 1.8 MG/DL (ref 1.8–2.4)

## 2021-02-11 PROCEDURE — 74011000250 HC RX REV CODE- 250: Performed by: INTERNAL MEDICINE

## 2021-02-11 PROCEDURE — 74011250636 HC RX REV CODE- 250/636: Performed by: INTERNAL MEDICINE

## 2021-02-11 PROCEDURE — 96361 HYDRATE IV INFUSION ADD-ON: CPT

## 2021-02-11 PROCEDURE — 83735 ASSAY OF MAGNESIUM: CPT

## 2021-02-11 PROCEDURE — 96374 THER/PROPH/DIAG INJ IV PUSH: CPT

## 2021-02-11 RX ORDER — SODIUM CHLORIDE 0.9 % (FLUSH) 0.9 %
10 SYRINGE (ML) INJECTION AS NEEDED
Status: DISCONTINUED | OUTPATIENT
Start: 2021-02-11 | End: 2021-02-13 | Stop reason: HOSPADM

## 2021-02-11 RX ADMIN — Medication 10 ML: at 07:30

## 2021-02-11 RX ADMIN — PROMETHAZINE HYDROCHLORIDE 25 MG: 25 INJECTION INTRAMUSCULAR; INTRAVENOUS at 07:47

## 2021-02-11 RX ADMIN — SODIUM CHLORIDE 500 ML: 900 INJECTION, SOLUTION INTRAVENOUS at 08:00

## 2021-02-11 NOTE — PROGRESS NOTES
Arrived to the [facility]. Labs drawn and reviewed with no replacements needed. Pt given NS and phenergan, port needle changed. Patient tolerated without adverse reaction. Any issues or concerns during appointment: none. Patient aware of next infusion appointment on 2/18 (date) at [address] [place] [address] (time). Discharged to home.

## 2021-02-17 ENCOUNTER — HOSPITAL ENCOUNTER (OUTPATIENT)
Dept: INFUSION THERAPY | Age: 51
Discharge: HOME | End: 2021-02-17
Payer: MEDICARE

## 2021-02-17 VITALS
RESPIRATION RATE: 16 BRPM | HEART RATE: 95 BPM | TEMPERATURE: 98.2 F | DIASTOLIC BLOOD PRESSURE: 78 MMHG | OXYGEN SATURATION: 95 % | SYSTOLIC BLOOD PRESSURE: 140 MMHG

## 2021-02-17 LAB — MAGNESIUM SERPL-MCNC: 1.8 MG/DL (ref 1.8–2.4)

## 2021-02-17 PROCEDURE — 74011250636 HC RX REV CODE- 250/636: Performed by: INTERNAL MEDICINE

## 2021-02-17 PROCEDURE — 96374 THER/PROPH/DIAG INJ IV PUSH: CPT

## 2021-02-17 PROCEDURE — 74011000250 HC RX REV CODE- 250: Performed by: INTERNAL MEDICINE

## 2021-02-17 PROCEDURE — 83735 ASSAY OF MAGNESIUM: CPT

## 2021-02-17 RX ORDER — SODIUM CHLORIDE 0.9 % (FLUSH) 0.9 %
10 SYRINGE (ML) INJECTION AS NEEDED
Status: DISCONTINUED | OUTPATIENT
Start: 2021-02-17 | End: 2021-02-19 | Stop reason: HOSPADM

## 2021-02-17 RX ADMIN — Medication 10 ML: at 08:40

## 2021-02-17 RX ADMIN — Medication 10 ML: at 08:06

## 2021-02-17 RX ADMIN — Medication 10 ML: at 08:05

## 2021-02-17 RX ADMIN — PROMETHAZINE HYDROCHLORIDE 25 MG: 25 INJECTION INTRAMUSCULAR; INTRAVENOUS at 08:35

## 2021-02-17 NOTE — PROGRESS NOTES
Arrived to the [facility]. Port needle changed, labs drawn, Mag 1.8. 25 mg IV phenergan given. No replacements needed. completed. Patient tolerated well. Any issues or concerns during appointment: none. Patient aware of next infusion appointment on 2/25 at 7:15am.  Discharged ambulatory to home.

## 2021-02-25 ENCOUNTER — HOSPITAL ENCOUNTER (OUTPATIENT)
Dept: INFUSION THERAPY | Age: 51
Discharge: HOME | End: 2021-02-25
Payer: MEDICARE

## 2021-02-25 VITALS
SYSTOLIC BLOOD PRESSURE: 118 MMHG | BODY MASS INDEX: 34.02 KG/M2 | HEART RATE: 99 BPM | TEMPERATURE: 97.1 F | DIASTOLIC BLOOD PRESSURE: 76 MMHG | RESPIRATION RATE: 18 BRPM | OXYGEN SATURATION: 98 % | WEIGHT: 186 LBS

## 2021-02-25 LAB — MAGNESIUM SERPL-MCNC: 1.7 MG/DL (ref 1.8–2.4)

## 2021-02-25 PROCEDURE — 96365 THER/PROPH/DIAG IV INF INIT: CPT

## 2021-02-25 PROCEDURE — 74011000250 HC RX REV CODE- 250: Performed by: INTERNAL MEDICINE

## 2021-02-25 PROCEDURE — 83735 ASSAY OF MAGNESIUM: CPT

## 2021-02-25 PROCEDURE — 74011250636 HC RX REV CODE- 250/636: Performed by: INTERNAL MEDICINE

## 2021-02-25 PROCEDURE — 96375 TX/PRO/DX INJ NEW DRUG ADDON: CPT

## 2021-02-25 RX ORDER — SODIUM CHLORIDE 9 MG/ML
10 INJECTION INTRAMUSCULAR; INTRAVENOUS; SUBCUTANEOUS AS NEEDED
Status: DISCONTINUED | OUTPATIENT
Start: 2021-02-25 | End: 2021-02-27 | Stop reason: HOSPADM

## 2021-02-25 RX ORDER — MAGNESIUM SULFATE HEPTAHYDRATE 40 MG/ML
2 INJECTION, SOLUTION INTRAVENOUS AS NEEDED
Status: DISCONTINUED | OUTPATIENT
Start: 2021-02-25 | End: 2021-02-27 | Stop reason: HOSPADM

## 2021-02-25 RX ADMIN — SODIUM CHLORIDE 10 ML: 9 INJECTION INTRAMUSCULAR; INTRAVENOUS; SUBCUTANEOUS at 07:50

## 2021-02-25 RX ADMIN — MAGNESIUM SULFATE HEPTAHYDRATE 2 G: 40 INJECTION, SOLUTION INTRAVENOUS at 08:49

## 2021-02-25 RX ADMIN — PROMETHAZINE HYDROCHLORIDE 25 MG: 25 INJECTION INTRAMUSCULAR; INTRAVENOUS at 08:10

## 2021-02-25 NOTE — PROGRESS NOTES
Arrived to the [facility]. Magnesium and phenergan completed. Patient tolerated well. Any issues or concerns during appointment: No  Port needle changed and new dressing applied with [id number]. Discharged home in stable condition.

## 2021-03-04 ENCOUNTER — HOSPITAL ENCOUNTER (OUTPATIENT)
Dept: INFUSION THERAPY | Age: 51
Discharge: HOME | End: 2021-03-04
Payer: MEDICARE

## 2021-03-04 VITALS
RESPIRATION RATE: 18 BRPM | DIASTOLIC BLOOD PRESSURE: 88 MMHG | HEART RATE: 94 BPM | TEMPERATURE: 98.6 F | SYSTOLIC BLOOD PRESSURE: 138 MMHG | OXYGEN SATURATION: 92 %

## 2021-03-04 LAB — MAGNESIUM SERPL-MCNC: 1.9 MG/DL (ref 1.8–2.4)

## 2021-03-04 PROCEDURE — 83735 ASSAY OF MAGNESIUM: CPT

## 2021-03-04 PROCEDURE — 96374 THER/PROPH/DIAG INJ IV PUSH: CPT

## 2021-03-04 PROCEDURE — 74011000250 HC RX REV CODE- 250: Performed by: INTERNAL MEDICINE

## 2021-03-04 PROCEDURE — 74011250636 HC RX REV CODE- 250/636: Performed by: INTERNAL MEDICINE

## 2021-03-04 RX ORDER — SODIUM CHLORIDE 0.9 % (FLUSH) 0.9 %
10 SYRINGE (ML) INJECTION AS NEEDED
Status: DISCONTINUED | OUTPATIENT
Start: 2021-03-04 | End: 2021-03-06 | Stop reason: HOSPADM

## 2021-03-04 RX ADMIN — Medication 10 ML: at 07:37

## 2021-03-04 RX ADMIN — PROMETHAZINE HYDROCHLORIDE 25 MG: 25 INJECTION INTRAMUSCULAR; INTRAVENOUS at 07:39

## 2021-03-04 NOTE — PROGRESS NOTES
Clinical Social Work Note  Name: Paige Zepeda  : 1970  MRN: [id number]  Date of Service: 3/4/2021  Location of Service: [facility]  Date of Service: 3/4/2021    Type of Service: Case Management     Length of Service: 15 minutes    Patient Diagnosis: No diagnosis found. Referral Source: rn    Reason for Visit: fu    Subject: Seen patient at [facility]. Patient was by herself. Denied any emotional distress. Patient also denies any  economic needs. fine\" and affect is good. Insight is adequate. Judgment is adequate. Patient did not report suicidal ideations, intent or plans. Patient did not report homicidal ideations, intent or plans. Patient is oriented to self, place, time and situation. Protective Factors: Current care for physical and mental illness, adequate insight and judgment, family support, cultural and Buddhism beliefs and values that support self-care. Next Steps: SW gave contact information and encouraged pt to call should any needs arise. Pt verbalized understanding. SW intends to follow up by phone and/or face-to-face as needed. No flowsheet data found.       Signed By: Enolia Simmonds, LISW     2021

## 2021-03-04 NOTE — PROGRESS NOTES
Pt arrived ambulatory, labs drawn, phenergan given per order, no mag needed, pt discharged home ambulatory

## 2021-03-11 ENCOUNTER — HOSPITAL ENCOUNTER (OUTPATIENT)
Dept: INFUSION THERAPY | Age: 51
Discharge: HOME | End: 2021-03-11
Payer: MEDICARE

## 2021-03-11 VITALS
RESPIRATION RATE: 16 BRPM | HEART RATE: 92 BPM | OXYGEN SATURATION: 96 % | DIASTOLIC BLOOD PRESSURE: 88 MMHG | SYSTOLIC BLOOD PRESSURE: 152 MMHG | TEMPERATURE: 97.3 F

## 2021-03-11 LAB — MAGNESIUM SERPL-MCNC: 1.9 MG/DL (ref 1.8–2.4)

## 2021-03-11 PROCEDURE — 83735 ASSAY OF MAGNESIUM: CPT

## 2021-03-11 PROCEDURE — 74011250636 HC RX REV CODE- 250/636: Performed by: INTERNAL MEDICINE

## 2021-03-11 PROCEDURE — 74011000250 HC RX REV CODE- 250: Performed by: INTERNAL MEDICINE

## 2021-03-11 PROCEDURE — 96374 THER/PROPH/DIAG INJ IV PUSH: CPT

## 2021-03-11 RX ORDER — MAGNESIUM SULFATE HEPTAHYDRATE 40 MG/ML
2 INJECTION, SOLUTION INTRAVENOUS AS NEEDED
Status: DISCONTINUED | OUTPATIENT
Start: 2021-03-11 | End: 2021-03-13 | Stop reason: HOSPADM

## 2021-03-11 RX ORDER — SODIUM CHLORIDE 9 MG/ML
10 INJECTION INTRAMUSCULAR; INTRAVENOUS; SUBCUTANEOUS AS NEEDED
Status: DISCONTINUED | OUTPATIENT
Start: 2021-03-11 | End: 2021-03-13 | Stop reason: HOSPADM

## 2021-03-11 RX ADMIN — PROMETHAZINE HYDROCHLORIDE 25 MG: 25 INJECTION INTRAMUSCULAR; INTRAVENOUS at 07:40

## 2021-03-11 RX ADMIN — SODIUM CHLORIDE 10 ML: 9 INJECTION INTRAMUSCULAR; INTRAVENOUS; SUBCUTANEOUS at 08:00

## 2021-03-11 NOTE — PROGRESS NOTES
Arrived to the [facility]. Lab draw and port care completed. Patient tolerated well. Any issues or concerns during appointment: No.  Patient aware of next infusion appointment on 3/18/21  Discharged home in stable condition.

## 2021-03-18 ENCOUNTER — HOSPITAL ENCOUNTER (OUTPATIENT)
Dept: INFUSION THERAPY | Age: 51
Discharge: HOME | End: 2021-03-18
Payer: MEDICARE

## 2021-03-18 VITALS
RESPIRATION RATE: 18 BRPM | SYSTOLIC BLOOD PRESSURE: 146 MMHG | TEMPERATURE: 98.9 F | DIASTOLIC BLOOD PRESSURE: 84 MMHG | OXYGEN SATURATION: 95 %

## 2021-03-18 LAB — MAGNESIUM SERPL-MCNC: 2.1 MG/DL (ref 1.8–2.4)

## 2021-03-18 PROCEDURE — 74011000250 HC RX REV CODE- 250: Performed by: INTERNAL MEDICINE

## 2021-03-18 PROCEDURE — 74011250636 HC RX REV CODE- 250/636: Performed by: INTERNAL MEDICINE

## 2021-03-18 PROCEDURE — 83735 ASSAY OF MAGNESIUM: CPT

## 2021-03-18 PROCEDURE — 96374 THER/PROPH/DIAG INJ IV PUSH: CPT

## 2021-03-18 RX ADMIN — PROMETHAZINE HYDROCHLORIDE 25 MG: 25 INJECTION INTRAMUSCULAR; INTRAVENOUS at 07:56

## 2021-03-18 NOTE — PROGRESS NOTES
Arrived to the [facility]. Labs drawn, Port care completed, IV phenergan given per order, Mg 2.1 no replacements needed. Patient tolerated well. Any issues or concerns during appointment: none. Patient aware of next infusion appointment on 3/25 @ 3 [facility]  Discharged ambulatory.

## 2021-03-25 ENCOUNTER — HOSPITAL ENCOUNTER (OUTPATIENT)
Dept: INFUSION THERAPY | Age: 51
Discharge: HOME | End: 2021-03-25
Payer: MEDICARE

## 2021-03-25 VITALS
RESPIRATION RATE: 18 BRPM | SYSTOLIC BLOOD PRESSURE: 137 MMHG | TEMPERATURE: 97.1 F | OXYGEN SATURATION: 95 % | DIASTOLIC BLOOD PRESSURE: 88 MMHG | HEART RATE: 105 BPM

## 2021-03-25 LAB — MAGNESIUM SERPL-MCNC: 1.9 MG/DL (ref 1.8–2.4)

## 2021-03-25 PROCEDURE — 83735 ASSAY OF MAGNESIUM: CPT

## 2021-03-25 PROCEDURE — 96374 THER/PROPH/DIAG INJ IV PUSH: CPT

## 2021-03-25 PROCEDURE — 74011000250 HC RX REV CODE- 250: Performed by: INTERNAL MEDICINE

## 2021-03-25 PROCEDURE — 74011250636 HC RX REV CODE- 250/636: Performed by: INTERNAL MEDICINE

## 2021-03-25 RX ORDER — SODIUM CHLORIDE 0.9 % (FLUSH) 0.9 %
10 SYRINGE (ML) INJECTION AS NEEDED
Status: DISCONTINUED | OUTPATIENT
Start: 2021-03-25 | End: 2021-03-27 | Stop reason: HOSPADM

## 2021-03-25 RX ADMIN — PROMETHAZINE HYDROCHLORIDE 25 MG: 25 INJECTION INTRAMUSCULAR; INTRAVENOUS at 07:48

## 2021-03-25 RX ADMIN — Medication 10 ML: at 07:58

## 2021-03-25 NOTE — PROGRESS NOTES
Pt arrived ambulatory/  Labs drawn, port needle changed. Mag resulted 1.9, so no magnesium needed today. Phenergan per pt request.  Pt tolerated well. Pt aware of next appt 4/1 at [address] [place] [address].

## 2021-04-01 ENCOUNTER — HOSPITAL ENCOUNTER (OUTPATIENT)
Dept: INFUSION THERAPY | Age: 51
Discharge: HOME | End: 2021-04-01
Payer: MEDICARE

## 2021-04-01 VITALS
DIASTOLIC BLOOD PRESSURE: 61 MMHG | RESPIRATION RATE: 16 BRPM | OXYGEN SATURATION: 98 % | SYSTOLIC BLOOD PRESSURE: 135 MMHG | HEART RATE: 93 BPM | TEMPERATURE: 98.5 F

## 2021-04-01 LAB — MAGNESIUM SERPL-MCNC: 1.8 MG/DL (ref 1.8–2.4)

## 2021-04-01 PROCEDURE — 96374 THER/PROPH/DIAG INJ IV PUSH: CPT

## 2021-04-01 PROCEDURE — 74011000250 HC RX REV CODE- 250: Performed by: INTERNAL MEDICINE

## 2021-04-01 PROCEDURE — 83735 ASSAY OF MAGNESIUM: CPT

## 2021-04-01 PROCEDURE — 74011250636 HC RX REV CODE- 250/636: Performed by: INTERNAL MEDICINE

## 2021-04-01 RX ORDER — SODIUM CHLORIDE 0.9 % (FLUSH) 0.9 %
10 SYRINGE (ML) INJECTION AS NEEDED
Status: DISCONTINUED | OUTPATIENT
Start: 2021-04-01 | End: 2021-04-03 | Stop reason: HOSPADM

## 2021-04-01 RX ADMIN — PROMETHAZINE HYDROCHLORIDE 25 MG: 25 INJECTION INTRAMUSCULAR; INTRAVENOUS at 07:41

## 2021-04-01 RX ADMIN — Medication 10 ML: at 07:20

## 2021-04-01 RX ADMIN — SODIUM CHLORIDE 500 ML: 900 INJECTION, SOLUTION INTRAVENOUS at 07:20

## 2021-04-01 NOTE — PROGRESS NOTES
Pt arrived ambulatory to [facility]. Port accessed and blood drawn for mag level and sent to lab. NS infusing. Phenergan IV push. Pt aware of next appt on 4/8/21 at 3 [facility]. Mag 1.8. No replacements needed at this time. Discharged ambulatory.

## 2021-04-08 ENCOUNTER — HOSPITAL ENCOUNTER (OUTPATIENT)
Dept: INFUSION THERAPY | Age: 51
Discharge: HOME | End: 2021-04-08
Payer: MEDICARE

## 2021-04-08 VITALS
SYSTOLIC BLOOD PRESSURE: 140 MMHG | DIASTOLIC BLOOD PRESSURE: 77 MMHG | HEART RATE: 102 BPM | TEMPERATURE: 98.2 F | RESPIRATION RATE: 18 BRPM | OXYGEN SATURATION: 97 %

## 2021-04-08 LAB — MAGNESIUM SERPL-MCNC: 1.9 MG/DL (ref 1.8–2.4)

## 2021-04-08 PROCEDURE — 74011250636 HC RX REV CODE- 250/636: Performed by: INTERNAL MEDICINE

## 2021-04-08 PROCEDURE — 83735 ASSAY OF MAGNESIUM: CPT

## 2021-04-08 PROCEDURE — 96374 THER/PROPH/DIAG INJ IV PUSH: CPT

## 2021-04-08 PROCEDURE — 74011000250 HC RX REV CODE- 250: Performed by: INTERNAL MEDICINE

## 2021-04-08 RX ORDER — SODIUM CHLORIDE 0.9 % (FLUSH) 0.9 %
10 SYRINGE (ML) INJECTION AS NEEDED
Status: DISCONTINUED | OUTPATIENT
Start: 2021-04-08 | End: 2021-04-10 | Stop reason: HOSPADM

## 2021-04-08 RX ADMIN — PROMETHAZINE HYDROCHLORIDE 25 MG: 25 INJECTION INTRAMUSCULAR; INTRAVENOUS at 07:42

## 2021-04-08 RX ADMIN — Medication 10 ML: at 07:30

## 2021-04-08 RX ADMIN — Medication 10 ML: at 07:45

## 2021-04-08 NOTE — PROGRESS NOTES
Arrived to the [facility]. Labs and port care completed. Provided education on s/sx of infection    Patient instructed to report any side affects to ordering provider. Patient tolerated without probles. Any issues or concerns during appointment: replacements not required. Patient aware of next infusion appointment on 4/15/21 (date) at [address] [place] [address] (time). Discharged ambulatory.

## 2021-04-12 ENCOUNTER — ANESTHESIA EVENT (OUTPATIENT)
Dept: ENDOSCOPY | Age: 51
End: 2021-04-12
Payer: MEDICARE

## 2021-04-13 ENCOUNTER — ANESTHESIA (OUTPATIENT)
Dept: ENDOSCOPY | Age: 51
End: 2021-04-13
Payer: MEDICARE

## 2021-04-13 ENCOUNTER — HOSPITAL ENCOUNTER (OUTPATIENT)
Age: 51
Setting detail: OUTPATIENT SURGERY
Discharge: HOME | End: 2021-04-13
Attending: INTERNAL MEDICINE | Admitting: INTERNAL MEDICINE
Payer: MEDICARE

## 2021-04-13 VITALS
HEIGHT: 62 IN | RESPIRATION RATE: 18 BRPM | BODY MASS INDEX: 34.41 KG/M2 | DIASTOLIC BLOOD PRESSURE: 58 MMHG | SYSTOLIC BLOOD PRESSURE: 123 MMHG | HEART RATE: 73 BPM | OXYGEN SATURATION: 94 % | TEMPERATURE: 97.7 F | WEIGHT: 187 LBS

## 2021-04-13 LAB
GLUCOSE BLD STRIP.AUTO-MCNC: 147 MG/DL (ref 65–100)
SERVICE CMNT-IMP: ABNORMAL

## 2021-04-13 PROCEDURE — 74011000250 HC RX REV CODE- 250: Performed by: ANESTHESIOLOGY

## 2021-04-13 PROCEDURE — 2709999900 HC NON-CHARGEABLE SUPPLY: Performed by: INTERNAL MEDICINE

## 2021-04-13 PROCEDURE — 74011250636 HC RX REV CODE- 250/636: Performed by: ANESTHESIOLOGY

## 2021-04-13 PROCEDURE — 76040000025: Performed by: INTERNAL MEDICINE

## 2021-04-13 PROCEDURE — 82962 GLUCOSE BLOOD TEST: CPT

## 2021-04-13 PROCEDURE — 77030031722: Performed by: INTERNAL MEDICINE

## 2021-04-13 PROCEDURE — 74011000250 HC RX REV CODE- 250: Performed by: REGISTERED NURSE

## 2021-04-13 PROCEDURE — 74011250636 HC RX REV CODE- 250/636: Performed by: REGISTERED NURSE

## 2021-04-13 PROCEDURE — 76060000031 HC ANESTHESIA FIRST 0.5 HR: Performed by: INTERNAL MEDICINE

## 2021-04-13 PROCEDURE — 74011250636 HC RX REV CODE- 250/636: Performed by: INTERNAL MEDICINE

## 2021-04-13 RX ORDER — LIDOCAINE HYDROCHLORIDE 20 MG/ML
INJECTION, SOLUTION EPIDURAL; INFILTRATION; INTRACAUDAL; PERINEURAL AS NEEDED
Status: DISCONTINUED | OUTPATIENT
Start: 2021-04-13 | End: 2021-04-13 | Stop reason: HOSPADM

## 2021-04-13 RX ORDER — TRIAMCINOLONE ACETONIDE 40 MG/ML
40 INJECTION, SUSPENSION INTRA-ARTICULAR; INTRAMUSCULAR ONCE
Status: COMPLETED | OUTPATIENT
Start: 2021-04-13 | End: 2021-04-13

## 2021-04-13 RX ORDER — SODIUM CHLORIDE, SODIUM LACTATE, POTASSIUM CHLORIDE, CALCIUM CHLORIDE 600; 310; 30; 20 MG/100ML; MG/100ML; MG/100ML; MG/100ML
100 INJECTION, SOLUTION INTRAVENOUS CONTINUOUS
Status: DISCONTINUED | OUTPATIENT
Start: 2021-04-13 | End: 2021-04-13 | Stop reason: HOSPADM

## 2021-04-13 RX ORDER — HEPARIN 100 UNIT/ML
300 SYRINGE INTRAVENOUS AS NEEDED
Status: DISCONTINUED | OUTPATIENT
Start: 2021-04-13 | End: 2021-04-13

## 2021-04-13 RX ORDER — HEPARIN 100 UNIT/ML
500 SYRINGE INTRAVENOUS AS NEEDED
Status: DISCONTINUED | OUTPATIENT
Start: 2021-04-13 | End: 2021-04-13 | Stop reason: HOSPADM

## 2021-04-13 RX ORDER — SODIUM CHLORIDE 0.9 % (FLUSH) 0.9 %
5-40 SYRINGE (ML) INJECTION EVERY 8 HOURS
Status: DISCONTINUED | OUTPATIENT
Start: 2021-04-13 | End: 2021-04-13 | Stop reason: HOSPADM

## 2021-04-13 RX ORDER — SODIUM CHLORIDE 0.9 % (FLUSH) 0.9 %
5-40 SYRINGE (ML) INJECTION AS NEEDED
Status: DISCONTINUED | OUTPATIENT
Start: 2021-04-13 | End: 2021-04-13 | Stop reason: HOSPADM

## 2021-04-13 RX ORDER — PROPOFOL 10 MG/ML
INJECTION, EMULSION INTRAVENOUS AS NEEDED
Status: DISCONTINUED | OUTPATIENT
Start: 2021-04-13 | End: 2021-04-13 | Stop reason: HOSPADM

## 2021-04-13 RX ORDER — PROPOFOL 10 MG/ML
INJECTION, EMULSION INTRAVENOUS
Status: DISCONTINUED | OUTPATIENT
Start: 2021-04-13 | End: 2021-04-13 | Stop reason: HOSPADM

## 2021-04-13 RX ADMIN — PHENYLEPHRINE HYDROCHLORIDE 100 MCG: 10 INJECTION INTRAVENOUS at 08:25

## 2021-04-13 RX ADMIN — PROPOFOL 60 MG: 10 INJECTION, EMULSION INTRAVENOUS at 08:13

## 2021-04-13 RX ADMIN — PROPOFOL 200 MCG/KG/MIN: 10 INJECTION, EMULSION INTRAVENOUS at 08:13

## 2021-04-13 RX ADMIN — SODIUM CHLORIDE, SODIUM LACTATE, POTASSIUM CHLORIDE, AND CALCIUM CHLORIDE 100 ML/HR: 600; 310; 30; 20 INJECTION, SOLUTION INTRAVENOUS at 07:26

## 2021-04-13 RX ADMIN — PROMETHAZINE HYDROCHLORIDE 12.5 MG: 25 INJECTION INTRAMUSCULAR; INTRAVENOUS at 09:00

## 2021-04-13 RX ADMIN — PROPOFOL 10 MG: 10 INJECTION, EMULSION INTRAVENOUS at 08:15

## 2021-04-13 RX ADMIN — LIDOCAINE HYDROCHLORIDE 100 MG: 20 INJECTION, SOLUTION EPIDURAL; INFILTRATION; INTRACAUDAL; PERINEURAL at 08:13

## 2021-04-13 RX ADMIN — TRIAMCINOLONE ACETONIDE 15 MG: 40 INJECTION, SUSPENSION INTRA-ARTICULAR; INTRAMUSCULAR at 08:29

## 2021-04-13 NOTE — ANESTHESIA POSTPROCEDURE EVALUATION
Procedure(s):  ESOPHAGOGASTRODUODENOSCOPY (EGD) /BMI 35  ESOPHAGEAL DILATION  INJECTION. total IV anesthesia    Anesthesia Post Evaluation      Multimodal analgesia: multimodal analgesia used between 6 hours prior to anesthesia start to PACU discharge  Patient location during evaluation: PACU  Patient participation: complete - patient participated  Level of consciousness: awake and alert  Pain management: adequate  Airway patency: patent  Anesthetic complications: no  Cardiovascular status: acceptable and hemodynamically stable  Respiratory status: acceptable  Hydration status: acceptable  Post anesthesia nausea and vomiting:  none  Final Post Anesthesia Temperature Assessment:  Normothermia (36.0-37.5 degrees C)      INITIAL Post-op Vital signs:   Vitals Value Taken Time   /58 04/13/21 0923   Temp     Pulse 77 04/13/21 0926   Resp 16 04/13/21 0842   SpO2 97 % 04/13/21 0926   Vitals shown include unvalidated device data.

## 2021-04-13 NOTE — PROGRESS NOTES
Pt refused heparin flush of port a cath access site. Pt stated \"I have heparin at home. \"  RN flushed with 10 cc's normal saline and cath site remained accessed at discharge.

## 2021-04-13 NOTE — H&P
PreProcedure H&P Update    Today's Date:  4/13/2021    CC:  dysphagia    Subjective:   HPI: dysphagia    PMH:  Past Medical History:   Diagnosis Date    Anemia     blood transfusion 2013 X1 d/t \"perforated bowel\"-- Fe infusions 12/2017--- denies any current iron infusions 12/17/2019    Anxiety and depression     managed with meds    Asthma     allergy induced, denies any inhaler, patient denies    BMI 32.0-32.9,adult     BMI 32.2    C. difficile diarrhea 2013    in 7744 after complicated ERCP    Cervical dysplasia 1990s    Chronic insomnia     Chronic pain     all over     Chronic pancreatitis ([facility] [address]     Encounter for insertion of venous access port     Left chest port    Endometriosis     Esophageal stricture 2017    Fibromyalgia     managed with meds    Former cigarette smoker     GERD (gastroesophageal reflux disease)     daily meds---po and IV protonix- uses per port- alternates    HLD (hyperlipidemia)     pt denies     IBS (irritable bowel syndrome)     Migraine headache     Nausea & vomiting     pt reports she does better with phenergan than zofran - causes HA    Nonalcoholic fatty liver disease     Pancreatitis     PUD (peptic ulcer disease) 06/2017    Sinus tachycardia     Sphincter of Oddi dysfunction     Type 2 diabetes mellitus (HCC)     insulin reliant/FBS 1150-160 s.s of hypoglycemia at 78 A1c 9.2       Medications:   Current Facility-Administered Medications   Medication Dose Route Frequency    lactated Ringers infusion  100 mL/hr IntraVENous CONTINUOUS    lactated Ringers infusion  100 mL/hr IntraVENous CONTINUOUS    sodium chloride (NS) flush 5-40 mL  5-40 mL IntraVENous Q8H    sodium chloride (NS) flush 5-40 mL  5-40 mL IntraVENous PRN    triamcinolone acetonide (KENALOG-40) 40 mg/mL injection 40 mg  40 mg IntraMUSCular ONCE    heparin (porcine) pf 500 Units  500 Units InterCATHeter PRN         Objective:   Vitals:  Visit Vitals  BP (!) 142/74   Pulse 93   Temp 98.1 °F (36.7 °C)   Resp 16   Ht 5' 2\" (1.575 m)   Wt 84.8 kg (187 lb)   SpO2 97%   BMI 34.20 kg/m²     Exam:  General appearance: alert, cooperative, no distress  Lungs: clear to auscultation bilaterally anteriorly  Heart: regular rate and rhythm  Abdomen: soft, non-tender. Bowel sounds normal. No masses, no organomegaly      Data Review (Labs):    No results for input(s): WBC, HGB, HCT, PLT, MCV, NA, K, CL, CO2, BUN, CREA, CA, GLU, AP, TBIL, CBIL, ALB, TP, AML, LPSE, PTP, INR, APTT, HGBEXT, HCTEXT, PLTEXT, INREXT in the last 72 hours. No lab exists for component: SGOT, GPT, DBIL    Plan:     Dysphagia, recurring, with more \"choking\". Last EGD dilation Jan 28, 2021. Proceed with EGD dilation.

## 2021-04-13 NOTE — DISCHARGE INSTRUCTIONS
Gastrointestinal Esophagogastroduodenoscopy (EGD) - Upper Exam Discharge Instructions    1. Call Dr. Zaki Vasquez at [phone] for any problems or questions. 2. Contact the doctor's office for follow up appointment as directed. 3. Medication may cause drowsiness for several hours, therefore, do not drive or operate machinery for remainder of the day. Do not make any legal decisions today. 4. No alcohol today. 5. Ordinarily, you may resume regular diet and activity after exam unless otherwise specified by your physician. 6. For mild soreness in your throat you may use Cepacol throat lozenges or warm salt-water gargles as needed. Any additional instructions:  Repeat dilation in one month     Instructions given to Raven Innocent and other family members.

## 2021-04-13 NOTE — PROCEDURES
Esophagogastroduodenoscopy    DATE of PROCEDURE: 4/13/2021    INDICATION: dysphagia    POSTPROCEDURE DIAGNOSIS:  Esophageal stricture    MEDICATIONS ADMINISTERED: MAC anesthesia (see anesthesia report)    INSTRUMENT:    PROCEDURE:  After obtaining informed consent, the patient was placed in the left lateral position and sedated. The endoscope was advanced under direct vision without difficulty. The esophagus, stomach (including retroflexed views) and duodenum were evaluated. The patient was taken to the recovery area in stable condition. FINDINGS:  ESOPHAGUS: normal appearing; Guido #46, 48 with mild moderate < 1 cm superficial tear distally. Injected Kenalog 0.5 cc in three locations distally.   STOMACH: gastrojejunostomy anastomosis, no bleeding, minimal retained food material  DUODENUM: normal    Estimated blood loss: 0-minimal   Specimens obtained during procedure: none    PLAN: EGD dilation in one month

## 2021-04-13 NOTE — ROUTINE PROCESS
Pt discharged home with  ,  driving, VSS, instructions reviewed with Pt and  , understanding verbalized. All belongings sent home with Pt. Pt transported out via wheelchair by Omid Rios [facility].

## 2021-04-22 ENCOUNTER — HOSPITAL ENCOUNTER (OUTPATIENT)
Dept: INFUSION THERAPY | Age: 51
Discharge: HOME | End: 2021-04-22
Payer: MEDICARE

## 2021-04-22 VITALS
TEMPERATURE: 98.4 F | RESPIRATION RATE: 18 BRPM | DIASTOLIC BLOOD PRESSURE: 73 MMHG | HEART RATE: 94 BPM | SYSTOLIC BLOOD PRESSURE: 125 MMHG | OXYGEN SATURATION: 97 %

## 2021-04-22 LAB — MAGNESIUM SERPL-MCNC: 1.6 MG/DL (ref 1.8–2.4)

## 2021-04-22 PROCEDURE — 83735 ASSAY OF MAGNESIUM: CPT

## 2021-04-22 PROCEDURE — 96375 TX/PRO/DX INJ NEW DRUG ADDON: CPT

## 2021-04-22 PROCEDURE — 74011250636 HC RX REV CODE- 250/636: Performed by: INTERNAL MEDICINE

## 2021-04-22 PROCEDURE — 96365 THER/PROPH/DIAG IV INF INIT: CPT

## 2021-04-22 RX ORDER — MAGNESIUM SULFATE HEPTAHYDRATE 40 MG/ML
2 INJECTION, SOLUTION INTRAVENOUS ONCE
Status: COMPLETED | OUTPATIENT
Start: 2021-04-22 | End: 2021-04-22

## 2021-04-22 RX ORDER — SODIUM CHLORIDE 0.9 % (FLUSH) 0.9 %
5-10 SYRINGE (ML) INJECTION EVERY 8 HOURS
Status: DISCONTINUED | OUTPATIENT
Start: 2021-04-22 | End: 2021-04-24 | Stop reason: HOSPADM

## 2021-04-22 RX ADMIN — PROMETHAZINE HYDROCHLORIDE 25 MG: 25 INJECTION INTRAMUSCULAR; INTRAVENOUS at 07:47

## 2021-04-22 RX ADMIN — MAGNESIUM SULFATE HEPTAHYDRATE 2 G: 40 INJECTION, SOLUTION INTRAVENOUS at 08:11

## 2021-04-22 RX ADMIN — Medication 10 ML: at 09:15

## 2021-04-22 RX ADMIN — Medication 10 ML: at 07:45

## 2021-04-22 NOTE — PROGRESS NOTES
Arrived to the [facility]. Phenergan IV and 2g Mag IV completed. Patient tolerated well. Any issues or concerns during appointment: Renay Frias resulted as 1.6. Patient aware of next infusion appointment on 4/29/2021 at 7:15am.  Discharged ambulatory.

## 2021-04-29 ENCOUNTER — HOSPITAL ENCOUNTER (OUTPATIENT)
Dept: INFUSION THERAPY | Age: 51
Discharge: HOME | End: 2021-04-29
Payer: MEDICARE

## 2021-04-29 VITALS
DIASTOLIC BLOOD PRESSURE: 85 MMHG | RESPIRATION RATE: 18 BRPM | SYSTOLIC BLOOD PRESSURE: 123 MMHG | TEMPERATURE: 97.9 F | HEART RATE: 114 BPM | OXYGEN SATURATION: 96 %

## 2021-04-29 LAB
ALBUMIN SERPL-MCNC: 4.2 G/DL (ref 3.5–5)
ALBUMIN/GLOB SERPL: 1.1 {RATIO} (ref 1.2–3.5)
ALP SERPL-CCNC: 170 U/L (ref 50–136)
ALT SERPL-CCNC: 73 U/L (ref 12–65)
ANION GAP SERPL CALC-SCNC: 9 MMOL/L (ref 7–16)
AST SERPL-CCNC: 53 U/L (ref 15–37)
BILIRUB DIRECT SERPL-MCNC: 0.2 MG/DL
BILIRUB SERPL-MCNC: 0.5 MG/DL (ref 0.2–1.1)
BUN SERPL-MCNC: 8 MG/DL (ref 6–23)
CALCIUM SERPL-MCNC: 9.3 MG/DL (ref 8.3–10.4)
CHLORIDE SERPL-SCNC: 107 MMOL/L (ref 98–107)
CHOLEST SERPL-MCNC: 210 MG/DL
CO2 SERPL-SCNC: 23 MMOL/L (ref 21–32)
CREAT SERPL-MCNC: 0.8 MG/DL (ref 0.6–1)
CREAT UR-MCNC: 34.6 MG/DL
EST. AVERAGE GLUCOSE BLD GHB EST-MCNC: 235 MG/DL
GLOBULIN SER CALC-MCNC: 4 G/DL (ref 2.3–3.5)
GLUCOSE SERPL-MCNC: 234 MG/DL (ref 65–100)
HBA1C MFR BLD: 9.8 % (ref 4.2–6.3)
HDLC SERPL-MCNC: 49 MG/DL (ref 40–60)
HDLC SERPL: 4.3 {RATIO}
LDLC SERPL CALC-MCNC: 133.8 MG/DL
LIPID PROFILE,FLP: ABNORMAL
MAGNESIUM SERPL-MCNC: 2.1 MG/DL (ref 1.8–2.4)
MICROALBUMIN UR-MCNC: <0.5 MG/DL
MICROALBUMIN/CREAT UR-RTO: NORMAL MG/G
POTASSIUM SERPL-SCNC: 3.6 MMOL/L (ref 3.5–5.1)
PROT SERPL-MCNC: 8.2 G/DL (ref 6.3–8.2)
SODIUM SERPL-SCNC: 139 MMOL/L (ref 136–145)
TRIGL SERPL-MCNC: 136 MG/DL (ref 35–150)
VLDLC SERPL CALC-MCNC: 27.2 MG/DL (ref 6–23)

## 2021-04-29 PROCEDURE — 82043 UR ALBUMIN QUANTITATIVE: CPT

## 2021-04-29 PROCEDURE — 83735 ASSAY OF MAGNESIUM: CPT

## 2021-04-29 PROCEDURE — 74011000250 HC RX REV CODE- 250: Performed by: INTERNAL MEDICINE

## 2021-04-29 PROCEDURE — 80076 HEPATIC FUNCTION PANEL: CPT

## 2021-04-29 PROCEDURE — 80061 LIPID PANEL: CPT

## 2021-04-29 PROCEDURE — 80048 BASIC METABOLIC PNL TOTAL CA: CPT

## 2021-04-29 PROCEDURE — 96374 THER/PROPH/DIAG INJ IV PUSH: CPT

## 2021-04-29 PROCEDURE — 74011250636 HC RX REV CODE- 250/636: Performed by: INTERNAL MEDICINE

## 2021-04-29 PROCEDURE — 83036 HEMOGLOBIN GLYCOSYLATED A1C: CPT

## 2021-04-29 RX ORDER — SODIUM CHLORIDE 0.9 % (FLUSH) 0.9 %
10-40 SYRINGE (ML) INJECTION AS NEEDED
Status: DISCONTINUED | OUTPATIENT
Start: 2021-04-29 | End: 2021-05-01 | Stop reason: HOSPADM

## 2021-04-29 RX ADMIN — Medication 10 ML: at 07:15

## 2021-04-29 RX ADMIN — PROMETHAZINE HYDROCHLORIDE 25 MG: 25 INJECTION INTRAMUSCULAR; INTRAVENOUS at 07:40

## 2021-04-29 NOTE — PROGRESS NOTES
Arrived to the [facility]. Phenergan and blood draw from port completed. Patient tolerated well. Any issues or concerns during appointment: none. Patient aware of next infusion appointment on 5/6/2021 at 7:15am.  Discharged ambulatory.

## 2021-05-06 ENCOUNTER — HOSPITAL ENCOUNTER (OUTPATIENT)
Dept: INFUSION THERAPY | Age: 51
Discharge: HOME | End: 2021-05-06
Payer: MEDICARE

## 2021-05-06 VITALS
TEMPERATURE: 98.1 F | OXYGEN SATURATION: 94 % | SYSTOLIC BLOOD PRESSURE: 92 MMHG | RESPIRATION RATE: 18 BRPM | DIASTOLIC BLOOD PRESSURE: 54 MMHG | HEART RATE: 77 BPM

## 2021-05-06 LAB — MAGNESIUM SERPL-MCNC: 1.8 MG/DL (ref 1.8–2.4)

## 2021-05-06 PROCEDURE — 74011250636 HC RX REV CODE- 250/636: Performed by: INTERNAL MEDICINE

## 2021-05-06 PROCEDURE — 96365 THER/PROPH/DIAG IV INF INIT: CPT

## 2021-05-06 PROCEDURE — 83735 ASSAY OF MAGNESIUM: CPT

## 2021-05-06 PROCEDURE — 74011000250 HC RX REV CODE- 250: Performed by: INTERNAL MEDICINE

## 2021-05-06 RX ADMIN — SODIUM CHLORIDE 250 ML: 900 INJECTION, SOLUTION INTRAVENOUS at 08:05

## 2021-05-06 RX ADMIN — PROMETHAZINE HYDROCHLORIDE 25 MG: 25 INJECTION INTRAMUSCULAR; INTRAVENOUS at 07:44

## 2021-05-06 NOTE — PROGRESS NOTES
Pt. Discharged ambulatory. Tolerated infusion well. No distress noted. To call physician with any problems or concerns. Understanding voiced. To return to Infusions on 5/13/21.

## 2021-05-13 ENCOUNTER — HOSPITAL ENCOUNTER (OUTPATIENT)
Dept: INFUSION THERAPY | Age: 51
Discharge: HOME | End: 2021-05-13
Payer: MEDICARE

## 2021-05-13 VITALS
OXYGEN SATURATION: 96 % | TEMPERATURE: 98.3 F | SYSTOLIC BLOOD PRESSURE: 90 MMHG | RESPIRATION RATE: 18 BRPM | DIASTOLIC BLOOD PRESSURE: 71 MMHG | HEART RATE: 78 BPM

## 2021-05-13 LAB — MAGNESIUM SERPL-MCNC: 1.8 MG/DL (ref 1.8–2.4)

## 2021-05-13 PROCEDURE — 96374 THER/PROPH/DIAG INJ IV PUSH: CPT

## 2021-05-13 PROCEDURE — 96361 HYDRATE IV INFUSION ADD-ON: CPT

## 2021-05-13 PROCEDURE — 83735 ASSAY OF MAGNESIUM: CPT

## 2021-05-13 PROCEDURE — 74011000250 HC RX REV CODE- 250: Performed by: INTERNAL MEDICINE

## 2021-05-13 PROCEDURE — 74011250636 HC RX REV CODE- 250/636: Performed by: INTERNAL MEDICINE

## 2021-05-13 RX ORDER — SODIUM CHLORIDE 0.9 % (FLUSH) 0.9 %
10 SYRINGE (ML) INJECTION AS NEEDED
Status: DISCONTINUED | OUTPATIENT
Start: 2021-05-13 | End: 2021-05-15 | Stop reason: HOSPADM

## 2021-05-13 RX ADMIN — PROMETHAZINE HYDROCHLORIDE 25 MG: 25 INJECTION INTRAMUSCULAR; INTRAVENOUS at 07:36

## 2021-05-13 RX ADMIN — Medication 10 ML: at 07:20

## 2021-05-13 RX ADMIN — SODIUM CHLORIDE 500 ML: 900 INJECTION, SOLUTION INTRAVENOUS at 07:20

## 2021-05-13 NOTE — PROGRESS NOTES
Pt arrived ambulatory to [facility]. Port accessed and blood drawn and sent to lab. NS infusing. Phenergan IV. Pt aware of next appt on 5/20/21 at [address] [place] [address]. Awaiting transportation. Pt discharged ambulatory.

## 2021-05-13 NOTE — PROGRESS NOTES
Clinical Social Work Note  Name: oJse L Bynum  : 1970  MRN: [id number]  Date of Service: 2021  Location of Service: [facility]  Date of Service: 2021    Type of Service: Case Management     Length of Service: 15 minutes    Patient Diagnosis: No diagnosis found. Referral Source: rn    Reason for Visit: fu    Subject: Seen patient at [facility],  provided therapeutic reassurance. At  this moment, pt denies any psychosocial and economic needs. Mental Status Exam: Intellectual functioning appears to be intact. Mood is \"good\" and affect is good. Insight is adequate. Judgment is adequate. Patient did not report suicidal ideations, intent or plans. Patient did not report homicidal ideations, intent or plans. Patient is oriented to self, place, time and situation. Protective Factors: Current care for physical and mental illness, adequate insight and judgment, family support, cultural and Protestant beliefs and values that support self-care. Next Steps: LAXMI-ASHLEY gave contact information and encouraged pt to call should any needs arise. Pt verbalized understanding. LAXMI-CP intends to follow up by phone and/or face-to-face as needed. No flowsheet data found.       Signed By: LAXMI Tolentino     May 13, 2021

## 2021-05-20 ENCOUNTER — HOSPITAL ENCOUNTER (OUTPATIENT)
Dept: INFUSION THERAPY | Age: 51
Discharge: HOME | End: 2021-05-20
Payer: MEDICARE

## 2021-05-20 ENCOUNTER — ANESTHESIA EVENT (OUTPATIENT)
Dept: ENDOSCOPY | Age: 51
End: 2021-05-20
Payer: MEDICARE

## 2021-05-20 VITALS
SYSTOLIC BLOOD PRESSURE: 126 MMHG | HEART RATE: 120 BPM | RESPIRATION RATE: 18 BRPM | TEMPERATURE: 98 F | OXYGEN SATURATION: 91 % | DIASTOLIC BLOOD PRESSURE: 88 MMHG

## 2021-05-20 LAB — MAGNESIUM SERPL-MCNC: 1.7 MG/DL (ref 1.8–2.4)

## 2021-05-20 PROCEDURE — 74011250636 HC RX REV CODE- 250/636: Performed by: INTERNAL MEDICINE

## 2021-05-20 PROCEDURE — 96375 TX/PRO/DX INJ NEW DRUG ADDON: CPT

## 2021-05-20 PROCEDURE — 74011000250 HC RX REV CODE- 250: Performed by: INTERNAL MEDICINE

## 2021-05-20 PROCEDURE — 83735 ASSAY OF MAGNESIUM: CPT

## 2021-05-20 PROCEDURE — 96365 THER/PROPH/DIAG IV INF INIT: CPT

## 2021-05-20 RX ORDER — SODIUM CHLORIDE 0.9 % (FLUSH) 0.9 %
10 SYRINGE (ML) INJECTION EVERY 8 HOURS
Status: DISCONTINUED | OUTPATIENT
Start: 2021-05-20 | End: 2021-05-22 | Stop reason: HOSPADM

## 2021-05-20 RX ORDER — MAGNESIUM SULFATE 1 G/100ML
1 INJECTION INTRAVENOUS ONCE
Status: COMPLETED | OUTPATIENT
Start: 2021-05-20 | End: 2021-05-20

## 2021-05-20 RX ORDER — SODIUM CHLORIDE 0.9 % (FLUSH) 0.9 %
5-40 SYRINGE (ML) INJECTION AS NEEDED
Status: CANCELLED | OUTPATIENT
Start: 2021-05-20

## 2021-05-20 RX ORDER — SODIUM CHLORIDE, SODIUM LACTATE, POTASSIUM CHLORIDE, CALCIUM CHLORIDE 600; 310; 30; 20 MG/100ML; MG/100ML; MG/100ML; MG/100ML
100 INJECTION, SOLUTION INTRAVENOUS CONTINUOUS
Status: CANCELLED | OUTPATIENT
Start: 2021-05-20

## 2021-05-20 RX ORDER — SODIUM CHLORIDE 0.9 % (FLUSH) 0.9 %
5-40 SYRINGE (ML) INJECTION EVERY 8 HOURS
Status: CANCELLED | OUTPATIENT
Start: 2021-05-20

## 2021-05-20 RX ADMIN — MAGNESIUM SULFATE HEPTAHYDRATE 1 G: 1 INJECTION, SOLUTION INTRAVENOUS at 08:25

## 2021-05-20 RX ADMIN — Medication 10 ML: at 09:30

## 2021-05-20 RX ADMIN — PROMETHAZINE HYDROCHLORIDE 25 MG: 25 INJECTION INTRAMUSCULAR; INTRAVENOUS at 07:41

## 2021-05-20 NOTE — PROGRESS NOTES
Confirmed scheduled procedure with patient. Informed patient of time of arrival, entry location, and  policy. Opportunity for questions provided. No concerns voiced at this time.

## 2021-05-20 NOTE — PROGRESS NOTES
Arrived to the [facility]. Labs drawn & Mg level checked. Mg 1gm given IV for mg level 1.7. Port dressing & needle changed using sterile technique. Patient tolerated well. Any issues or concerns during appointment: none. Patient aware of next infusion appointment on 5-27-21 (date) at [address] [place] [address] (time). Discharged via ambulatory.

## 2021-05-21 ENCOUNTER — ANESTHESIA (OUTPATIENT)
Dept: ENDOSCOPY | Age: 51
End: 2021-05-21
Payer: MEDICARE

## 2021-05-21 ENCOUNTER — HOSPITAL ENCOUNTER (OUTPATIENT)
Age: 51
Setting detail: OUTPATIENT SURGERY
Discharge: HOME | End: 2021-05-21
Attending: INTERNAL MEDICINE | Admitting: INTERNAL MEDICINE
Payer: MEDICARE

## 2021-05-21 VITALS
SYSTOLIC BLOOD PRESSURE: 135 MMHG | HEIGHT: 62 IN | RESPIRATION RATE: 16 BRPM | BODY MASS INDEX: 34.78 KG/M2 | TEMPERATURE: 98.2 F | HEART RATE: 75 BPM | WEIGHT: 189 LBS | OXYGEN SATURATION: 96 % | DIASTOLIC BLOOD PRESSURE: 75 MMHG

## 2021-05-21 PROCEDURE — 74011250636 HC RX REV CODE- 250/636: Performed by: ANESTHESIOLOGY

## 2021-05-21 PROCEDURE — 2709999900 HC NON-CHARGEABLE SUPPLY: Performed by: INTERNAL MEDICINE

## 2021-05-21 PROCEDURE — 74011250636 HC RX REV CODE- 250/636: Performed by: NURSE ANESTHETIST, CERTIFIED REGISTERED

## 2021-05-21 PROCEDURE — 74011250636 HC RX REV CODE- 250/636: Performed by: INTERNAL MEDICINE

## 2021-05-21 PROCEDURE — 74011000250 HC RX REV CODE- 250: Performed by: NURSE ANESTHETIST, CERTIFIED REGISTERED

## 2021-05-21 PROCEDURE — 77030031722: Performed by: INTERNAL MEDICINE

## 2021-05-21 PROCEDURE — 76040000025: Performed by: INTERNAL MEDICINE

## 2021-05-21 PROCEDURE — 76060000031 HC ANESTHESIA FIRST 0.5 HR: Performed by: INTERNAL MEDICINE

## 2021-05-21 RX ORDER — LIDOCAINE HYDROCHLORIDE 20 MG/ML
INJECTION, SOLUTION EPIDURAL; INFILTRATION; INTRACAUDAL; PERINEURAL AS NEEDED
Status: DISCONTINUED | OUTPATIENT
Start: 2021-05-21 | End: 2021-05-21 | Stop reason: HOSPADM

## 2021-05-21 RX ORDER — TRIAMCINOLONE ACETONIDE 40 MG/ML
40 INJECTION, SUSPENSION INTRA-ARTICULAR; INTRAMUSCULAR
Status: COMPLETED | OUTPATIENT
Start: 2021-05-21 | End: 2021-05-21

## 2021-05-21 RX ORDER — PROPOFOL 10 MG/ML
INJECTION, EMULSION INTRAVENOUS AS NEEDED
Status: DISCONTINUED | OUTPATIENT
Start: 2021-05-21 | End: 2021-05-21 | Stop reason: HOSPADM

## 2021-05-21 RX ORDER — SODIUM CHLORIDE, SODIUM LACTATE, POTASSIUM CHLORIDE, CALCIUM CHLORIDE 600; 310; 30; 20 MG/100ML; MG/100ML; MG/100ML; MG/100ML
100 INJECTION, SOLUTION INTRAVENOUS CONTINUOUS
Status: DISCONTINUED | OUTPATIENT
Start: 2021-05-21 | End: 2021-05-21 | Stop reason: HOSPADM

## 2021-05-21 RX ADMIN — PROPOFOL 30 MG: 10 INJECTION, EMULSION INTRAVENOUS at 09:10

## 2021-05-21 RX ADMIN — PROPOFOL 20 MG: 10 INJECTION, EMULSION INTRAVENOUS at 09:15

## 2021-05-21 RX ADMIN — PROPOFOL 30 MG: 10 INJECTION, EMULSION INTRAVENOUS at 09:19

## 2021-05-21 RX ADMIN — PROPOFOL 30 MG: 10 INJECTION, EMULSION INTRAVENOUS at 09:09

## 2021-05-21 RX ADMIN — SODIUM CHLORIDE, SODIUM LACTATE, POTASSIUM CHLORIDE, AND CALCIUM CHLORIDE: 600; 310; 30; 20 INJECTION, SOLUTION INTRAVENOUS at 09:04

## 2021-05-21 RX ADMIN — LIDOCAINE HYDROCHLORIDE 100 MG: 20 INJECTION, SOLUTION EPIDURAL; INFILTRATION; INTRACAUDAL; PERINEURAL at 09:08

## 2021-05-21 RX ADMIN — TRIAMCINOLONE ACETONIDE 40 MG: 40 INJECTION, SUSPENSION INTRA-ARTICULAR; INTRAMUSCULAR at 09:27

## 2021-05-21 RX ADMIN — PROPOFOL 20 MG: 10 INJECTION, EMULSION INTRAVENOUS at 09:17

## 2021-05-21 RX ADMIN — PROPOFOL 20 MG: 10 INJECTION, EMULSION INTRAVENOUS at 09:11

## 2021-05-21 RX ADMIN — PROPOFOL 30 MG: 10 INJECTION, EMULSION INTRAVENOUS at 09:16

## 2021-05-21 RX ADMIN — PROPOFOL 30 MG: 10 INJECTION, EMULSION INTRAVENOUS at 09:14

## 2021-05-21 RX ADMIN — PROPOFOL 50 MG: 10 INJECTION, EMULSION INTRAVENOUS at 09:08

## 2021-05-21 RX ADMIN — PROPOFOL 30 MG: 10 INJECTION, EMULSION INTRAVENOUS at 09:21

## 2021-05-21 RX ADMIN — PROPOFOL 20 MG: 10 INJECTION, EMULSION INTRAVENOUS at 09:12

## 2021-05-21 RX ADMIN — PROPOFOL 30 MG: 10 INJECTION, EMULSION INTRAVENOUS at 09:13

## 2021-05-21 NOTE — H&P
Gastroenterology Outpatient History and Physical    Patient: Camilo Mccloud    Physician: Roby Rosas MD    Vital Signs: There were no vitals taken for this visit. Allergies:    Allergies   Allergen Reactions    Tape [Adhesive] Rash and Itching    Barium Iodide Nausea and Vomiting    Flagyl [Metronidazole] Nausea and Vomiting    Metformin Nausea and Vomiting     Stomach pain    Insulins Nausea and Vomiting     Humalog only       Chief Complaint: Dysphagia    History of Present Illness: Esophageal stricture    Justification for Procedure: Evaluation and treatment    History:  Past Medical History:   Diagnosis Date    Anemia     blood transfusion 2013 X1 d/t \"perforated bowel\"-- Fe infusions 12/2017--- denies any current iron infusions 12/17/2019    Anxiety and depression     managed with meds    Asthma     allergy induced, denies any inhaler, patient denies    BMI 32.0-32.9,adult     BMI 34    C. difficile diarrhea 2013    in 2154 after complicated ERCP    Cervical dysplasia 1990s    Chronic insomnia     Chronic pain     all over     Chronic pancreatitis ([facility] [address]     Encounter for insertion of venous access port     Left chest port    Endometriosis     Esophageal stricture 2017    Fibromyalgia     managed with meds    Former cigarette smoker     GERD (gastroesophageal reflux disease)     daily meds---po and IV protonix- uses per port- alternates    History of kidney stones 03/2021    X1-naturally pass    HLD (hyperlipidemia)     pt denies     IBS (irritable bowel syndrome)     Migraine headache     Nausea & vomiting     pt reports she does better with phenergan than zofran - causes HA    Nonalcoholic fatty liver disease     PUD (peptic ulcer disease) 06/2017    Sinus tachycardia     Sphincter of Oddi dysfunction     Type 2 diabetes mellitus (HCC)     insulin reliant/FBS: 190/ s.s of hypoglycemia at 80/ A1c 9.3      Past Surgical History:   Procedure Laterality Date    COLONOSCOPY N/A 2018    COLONOSCOPY performed by Lashon Bruce MD at [facility] EGD  2019         HX CARPAL TUNNEL RELEASE Right     along with trigger finger    HX COLONOSCOPY      HX ENDOSCOPY      36 fr thomas    HX ERCP  3/5/2013    resulted in perforated duodenum    HX HYSTERECTOMY      ovaries remain    HX LAP CHOLECYSTECTOMY      HX LAPAROTOMY  3/5/2013    exploratory for duodenal perforation with ERCP    HX ORTHOPAEDIC      right finger surgery 2017    HX OTHER SURGICAL Bilateral     thumb    HX OTHER SURGICAL      Kidney stones 2021    HX TOTAL COLECTOMY      [facility]X [facility]OLOGICAL  13 at [facility]-- stent removed 13. Dr Yuko Schmitt      port insertion, left chest wall    IR DRAIN CUTANEOUS/SUBCU ABSCESS      [place] ABDOMEN SURGERY PROC UNLISTED  last one placed      Hx of pancreatic stent, multiple    [place] ABDOMEN SURGERY PROC UNLISTED      lap nissen    [place] ABDOMEN SURGERY [address] UNLISTED      explor lap      Social History     Socioeconomic History    Marital status:      Spouse name: Not on file    Number of children: Not on file    Years of education: Not on file    Highest education level: Not on file   Tobacco Use    Smoking status: Former Smoker     Packs/day: 1.00     Years: 3.00     Pack years: 3.00     Quit date: 1993     Years since quittin.0    Smokeless tobacco: Never Used   Vaping Use    Vaping Use: Never used   Substance and Sexual Activity    Alcohol use: No    Drug use: No     Social Determinants of Health     Financial Resource Strain:     Difficulty of Paying Living Expenses:    Food Insecurity:     Worried About Running Out of Food in the Last Year:     Ran Out of Food in the Last Year:    Transportation Needs:     Lack of Transportation (Medical):      Lack of Transportation (Non-Medical):    Physical Activity:     Days of Exercise per Week:     Minutes of Exercise per Session:    Stress:     Feeling of Stress :    Social Connections:     Frequency of Communication with Friends and Family:     Frequency of Social Gatherings with Friends and Family:     Attends [employer] Services:     Active Member of Clubs or Organizations:     Attends Club or Organization Meetings:     Marital Status:       Family History   Problem Relation Age of Onset    Diabetes Mother     Hypertension Mother     Heart Disease Mother         cabg    Diabetes Father     Heart Disease Father     Hypertension Father     Other Father     No Known Problems Sister        Medications:   Prior to Admission medications    Medication Sig Start Date End Date Taking? Authorizing Provider   tiZANidine (ZANAFLEX) 4 mg tablet Take 4 mg by mouth as needed. Yes Provider, Historical   pantoprazole (Protonix) 40 mg tablet Take 40 mg by mouth two (2) times a day. Yes Provider, Historical   promethazine (PHENERGAN) 25 mg/mL injection 25 mg by IntraMUSCular route three (3) times daily as needed. States take IV   Yes Provider, Historical   pregabalin (LYRICA) 50 mg capsule Take 50 mg by mouth two (2) times a day. Yes Provider, Historical   zolpidem (AMBIEN) 10 mg tablet Take 10 mg by mouth nightly. Yes Provider, Historical   morphine IR (MS IR) 30 mg tablet Take 30 mg by mouth every eight (8) hours as needed for Pain. Yes Provider, Historical   nitroglycerin (NITROSTAT) 0.3 mg SL tablet Take 0.4 mg by mouth as needed (\"pt states taking for cramping\"). Yes Provider, Historical   citalopram (CELEXA) 20 mg tablet Take 20 mg by mouth daily. Indications: am   Yes Provider, Historical   LORazepam (ATIVAN) 1 mg tablet Take 1 mg by mouth three (3) times daily as needed for Anxiety.    Yes Provider, Historical   cyanocobalamin (VITAMIN B12) 1,000 mcg/mL injection INJECT 1 VIAL INTRAMUSCULARLY FOR 4 WEEKS THEN MONTHLY 11/25/17  Yes Provider, Historical   CARAFATE 100 mg/mL suspension TAKE 10 ML BY MOUTH 4 TIMES A DAY EVERY DAY ON A EMPTY STOMACH 1 HR BEFORE MEALS AND AT BEDTIME 9/7/17  Yes Provider, Historical   insulin degludec (TRESIBA FLEXTOUCH U-100) 100 unit/mL (3 mL) inpn 58 Units by SubCUTAneous route nightly. Yes Provider, Historical   hyoscyamine SL (LEVSIN/SL) 0.125 mg SL tablet 0.125 mg by SubLINGual route every six (6) hours as needed for Cramping. Yes Provider, Historical   insulin aspart (NOVOLOG) 100 unit/mL injection Use as directed  Patient taking differently: 7 Units by SubCUTAneous route Before breakfast, lunch, and dinner. Over 200 use sliding scale-  Always gets 7 units then if >200 gets extra - does ot know amount- has never taken more than 10 units regular 6/7/16  Yes Rosalio Shay NP   ondansetron hcl (ZOFRAN) 8 mg tablet Take 8 mg by mouth every eight (8) hours as needed for Nausea. Yes Provider, Historical   acetaminophen (Tylenol Extra Strength) 500 mg tablet Take  by mouth every six (6) hours as needed for Pain. Provider, Historical   promethazine (PHENERGAN) 25 mg tablet Take 25 mg by mouth every six (6) hours as needed for Nausea. Provider, Historical   glucagon (GLUCAGEN) 1 mg injection 1 mg by IntraMUSCular route as needed. 5/8/17   Provider, Historical   diphenhydrAMINE (BENADRYL) 25 mg capsule Take 25 mg by mouth every six (6) hours as needed. Provider, Historical   polyethylene glycol (MIRALAX) 17 gram packet Take 17 g by mouth as needed. Provider, Historical   0.9% sodium chloride solution by IntraVENous route as needed. Gives to herself via port at home daily -1000cc every other day per patient    Provider, Historical   promethazine (PHENERGAN) 25 mg suppository Insert 25 mg into rectum as needed for Nausea. Provider, Historical       Physical Exam:   General: no distress   HEENT: Head: Normocephalic, no lesions, without obvious abnormality.    Heart: regular rate and rhythm, S1, S2 normal, no murmur, click, rub or gallop   Lungs: chest clear, no wheezing, rales, normal symmetric air entry   Abdominal: Bowel sounds are normal, liver is not enlarged, spleen is not enlarged   Neurological: Grossly normal   Extremities: extremities normal, atraumatic, no cyanosis or edema     Findings/Diagnosis: Refractory esophageal stricture    Plan of Care/Planned Procedure: EGD

## 2021-05-21 NOTE — ANESTHESIA POSTPROCEDURE EVALUATION
Procedure(s):  ESOPHAGOGASTRODUODENOSCOPY (EGD) W/ DILATION AND KENALOG/ BMI 35  ESOPHAGEAL DILATION  INJECTION. total IV anesthesia    Anesthesia Post Evaluation      Multimodal analgesia: multimodal analgesia used between 6 hours prior to anesthesia start to PACU discharge  Patient location during evaluation: PACU  Patient participation: complete - patient participated  Level of consciousness: awake and alert  Pain management: adequate  Airway patency: patent  Anesthetic complications: no  Cardiovascular status: acceptable and hemodynamically stable  Respiratory status: acceptable  Hydration status: acceptable  Post anesthesia nausea and vomiting:  none  Final Post Anesthesia Temperature Assessment:  Normothermia (36.0-37.5 degrees C)      INITIAL Post-op Vital signs:   Vitals Value Taken Time   /75 05/21/21 0953   Temp     Pulse 78 05/21/21 1002   Resp 16 05/21/21 0953   SpO2 95 % 05/21/21 1002   Vitals shown include unvalidated device data.

## 2021-05-21 NOTE — DISCHARGE INSTRUCTIONS
Gastrointestinal Esophagogastroduodenoscopy (EGD) - Upper Exam Discharge Instructions    1. Call Dr. Orly Ravi for any problems or questions. 2. Contact the doctor's office for follow up appointment as directed. 3. Medication may cause drowsiness for several hours, therefore:  · Do not drive or operate machinery for remainder of the day. · No alcohol today. · Do not make any important or legal decisions for 24 hours. · Do not sign any legal documents for 24 hours. 5. Ordinarily, you may resume regular diet and activity after exam unless otherwise specified by your physician. 6. For mild soreness in your throat you may use Cepacol throat lozenges or warm salt-water gargles as needed. Any additional instructions: Follow up office visit with Dr. Marisela Franks to review progress and long term plans. Instructions given to Cody Moncada and other family members.   Instructions given by:  Valdemar Lynch

## 2021-05-21 NOTE — ROUTINE PROCESS
VSS. No complaints noted. Education given and reviewed with . Patient wheeled out via wheelchair by Mejia, [facility]. Patient stated port is to remain accessed.

## 2021-05-21 NOTE — PROCEDURES
PROCEDURE: Esophagogastroduodenoscopy with submucosal injection, Guido dilator    ENDOSCOPIST: Tonia Drummond M.D. PREOPERATIVE DIAGNOSIS: Dysphagia    POSTOPERATIVE DIAGNOSIS: Esophageal stricture    INSTRUMENTS: [id number]    SEDATION: MAC    DESCRIPTION: After informed consent was obtained, the patient was taken to the endoscopy suite and placed in the left lateral decubitus position. Intravenous sedation was administered by the Anesthesia provider. After adequate sedation had been achieved the endoscope was inserted over the tongue and through the posterior pharynx under direct visualization down the esophagus to the stomach and into the small bowel. The endoscopic was withdrawn from that point, performing a careful survey of the mucosa. Retroflexion was performed in the gastric fundus. FINDINGS:  Esophagus: Normal mucosal.  The distal esophagus is tight, but no obvious stricture was seen. Empiric dilation with 40 FG Bharti Ryan was notable for abrupt resistance in the distal esophagus. Inspection after endoscopy showed 3 short, linear mucosal tears immediately proximal to the GEJ. Kenalog 10 mg/mL was injected in 4 quadrants at the level of the tears in 1 mL aliquots. Stomach: Anatomy is consistent with prior gastrojejunostomy with preserved pylorus. The pylorus can be intubated by elongated retroflexion followed by counterclockwise rotation by 90 degrees and careful withdrawal of the scope. Small bowel: Visualized via gastrojejunostomy and pylorus. All visualized mucosa is normal.    Estimated blood loss:  0 cc-minimal    IMPRESSION: Apparent stricture of distal esophagus, although this was detected only by indirect evidence. This is now 3 consecutive Kenalog injections. PLAN: F/U OV with Dr. Zaki Vasquez to review progress and long term plans.       Cruz Bruno MD

## 2021-05-21 NOTE — ANESTHESIA PREPROCEDURE EVALUATION
Relevant Problems   CARDIOVASCULAR   (+) Hypertension      GASTROINTESTINAL   (+) GERD (gastroesophageal reflux disease)      ENDOCRINE   (+) DM type 2 (diabetes mellitus, type 2) (HCC)      HEMATOLOGY   (+) Iron deficiency anemia       Anesthetic History     PONV          Review of Systems / Medical History  Patient summary reviewed and pertinent labs reviewed    Pulmonary                   Neuro/Psych         Headaches and psychiatric history     Cardiovascular                  Exercise tolerance: >4 METS     GI/Hepatic/Renal     GERD: well controlled      PUD and liver disease (fatty)     Endo/Other    Diabetes: type 2, using insulin    Obesity and anemia     Other Findings   Comments: Chronic nausea  Sphincter of Oddi dysfunction  IBS  C. Diff  Chronic pancreatitis  Esophageal stricture           Physical Exam    Airway  Mallampati: II  TM Distance: 4 - 6 cm  Neck ROM: normal range of motion   Mouth opening: Diminished (comment)     Cardiovascular  Regular rate and rhythm,  S1 and S2 normal,  no murmur, click, rub, or gallop  Rhythm: regular  Rate: normal         Dental    Dentition: Poor dentition  Comments: Front teeth fragile   Pulmonary  Breath sounds clear to auscultation               Abdominal  GI exam deferred       Other Findings            Anesthetic Plan    ASA: 3  Anesthesia type: total IV anesthesia          Induction: Intravenous  Anesthetic plan and risks discussed with: Patient and Spouse

## 2021-05-27 ENCOUNTER — HOSPITAL ENCOUNTER (OUTPATIENT)
Dept: INFUSION THERAPY | Age: 51
Discharge: HOME | End: 2021-05-27
Payer: MEDICARE

## 2021-05-27 VITALS
DIASTOLIC BLOOD PRESSURE: 81 MMHG | SYSTOLIC BLOOD PRESSURE: 144 MMHG | TEMPERATURE: 98.4 F | HEART RATE: 100 BPM | OXYGEN SATURATION: 93 % | RESPIRATION RATE: 18 BRPM

## 2021-05-27 LAB — MAGNESIUM SERPL-MCNC: 1.8 MG/DL (ref 1.8–2.4)

## 2021-05-27 PROCEDURE — 74011000250 HC RX REV CODE- 250: Performed by: INTERNAL MEDICINE

## 2021-05-27 PROCEDURE — 83735 ASSAY OF MAGNESIUM: CPT

## 2021-05-27 PROCEDURE — 74011250636 HC RX REV CODE- 250/636: Performed by: INTERNAL MEDICINE

## 2021-05-27 PROCEDURE — 96374 THER/PROPH/DIAG INJ IV PUSH: CPT

## 2021-05-27 RX ORDER — SODIUM CHLORIDE 0.9 % (FLUSH) 0.9 %
10 SYRINGE (ML) INJECTION EVERY 8 HOURS
Status: DISCONTINUED | OUTPATIENT
Start: 2021-05-27 | End: 2021-05-29 | Stop reason: HOSPADM

## 2021-05-27 RX ADMIN — Medication 10 ML: at 07:52

## 2021-05-27 RX ADMIN — PROMETHAZINE HYDROCHLORIDE 25 MG: 25 INJECTION INTRAMUSCULAR; INTRAVENOUS at 07:46

## 2021-05-27 NOTE — PROGRESS NOTES
Arrived to the [facility] ambulatory. Port needle changed, labs drawn and promethazine given. Patient tolerated well. Any issues or concerns during appointment: no.  Patient aware of next infusion appointment on  6/3 at [id number]. Discharged to home ambulatory.

## 2021-06-03 ENCOUNTER — HOSPITAL ENCOUNTER (OUTPATIENT)
Dept: INFUSION THERAPY | Age: 51
Discharge: HOME | End: 2021-06-03
Payer: MEDICARE

## 2021-06-03 LAB — MAGNESIUM SERPL-MCNC: 1.9 MG/DL (ref 1.8–2.4)

## 2021-06-03 PROCEDURE — 83735 ASSAY OF MAGNESIUM: CPT

## 2021-06-03 PROCEDURE — 74011250636 HC RX REV CODE- 250/636: Performed by: INTERNAL MEDICINE

## 2021-06-03 PROCEDURE — 74011000250 HC RX REV CODE- 250: Performed by: INTERNAL MEDICINE

## 2021-06-03 PROCEDURE — 96374 THER/PROPH/DIAG INJ IV PUSH: CPT

## 2021-06-03 RX ADMIN — PROMETHAZINE HYDROCHLORIDE 25 MG: 25 INJECTION INTRAMUSCULAR; INTRAVENOUS at 08:14

## 2021-06-08 RX ORDER — PROMETHAZINE HYDROCHLORIDE 25 MG/ML
25 INJECTION, SOLUTION INTRAMUSCULAR; INTRAVENOUS
Status: DISCONTINUED | OUTPATIENT
Start: 2021-06-09 | End: 2021-06-08

## 2021-06-08 RX ORDER — SODIUM CHLORIDE 0.9 % (FLUSH) 0.9 %
10 SYRINGE (ML) INJECTION AS NEEDED
Status: DISCONTINUED | OUTPATIENT
Start: 2021-06-09 | End: 2021-06-08

## 2021-06-09 NOTE — PROGRESS NOTES
Arrived to the [facility] ambulatory. Port needle changed, labs drawn and promethazine given. No magnesium replacement needed. Patient tolerated well. Any issues or concerns during appointment: No.  Patient aware of next infusion appointment on  6/17/21@ at [address] [place] [address]. Discharged to home ambulatory.

## 2021-06-10 ENCOUNTER — HOSPITAL ENCOUNTER (OUTPATIENT)
Dept: INFUSION THERAPY | Age: 51
Discharge: HOME | End: 2021-06-10
Payer: MEDICARE

## 2021-06-10 VITALS
DIASTOLIC BLOOD PRESSURE: 64 MMHG | RESPIRATION RATE: 20 BRPM | SYSTOLIC BLOOD PRESSURE: 101 MMHG | OXYGEN SATURATION: 94 % | HEART RATE: 94 BPM | TEMPERATURE: 98.2 F

## 2021-06-10 LAB — MAGNESIUM SERPL-MCNC: 1.8 MG/DL (ref 1.8–2.4)

## 2021-06-10 PROCEDURE — 96374 THER/PROPH/DIAG INJ IV PUSH: CPT

## 2021-06-10 PROCEDURE — 74011250636 HC RX REV CODE- 250/636: Performed by: INTERNAL MEDICINE

## 2021-06-10 PROCEDURE — 83735 ASSAY OF MAGNESIUM: CPT

## 2021-06-10 PROCEDURE — 74011000250 HC RX REV CODE- 250: Performed by: INTERNAL MEDICINE

## 2021-06-10 RX ORDER — SODIUM CHLORIDE 0.9 % (FLUSH) 0.9 %
10 SYRINGE (ML) INJECTION AS NEEDED
Status: DISCONTINUED | OUTPATIENT
Start: 2021-06-10 | End: 2021-06-12 | Stop reason: HOSPADM

## 2021-06-10 RX ADMIN — PROMETHAZINE HYDROCHLORIDE 25 MG: 25 INJECTION INTRAMUSCULAR; INTRAVENOUS at 07:41

## 2021-06-10 RX ADMIN — Medication 10 ML: at 07:41

## 2021-06-17 ENCOUNTER — HOSPITAL ENCOUNTER (OUTPATIENT)
Dept: INFUSION THERAPY | Age: 51
Discharge: HOME | End: 2021-06-17
Payer: MEDICARE

## 2021-06-17 VITALS
OXYGEN SATURATION: 100 % | HEART RATE: 100 BPM | RESPIRATION RATE: 18 BRPM | SYSTOLIC BLOOD PRESSURE: 100 MMHG | WEIGHT: 185.6 LBS | BODY MASS INDEX: 33.95 KG/M2 | DIASTOLIC BLOOD PRESSURE: 59 MMHG | TEMPERATURE: 97.8 F

## 2021-06-17 LAB — MAGNESIUM SERPL-MCNC: 1.9 MG/DL (ref 1.8–2.4)

## 2021-06-17 PROCEDURE — 96374 THER/PROPH/DIAG INJ IV PUSH: CPT

## 2021-06-17 PROCEDURE — 74011250636 HC RX REV CODE- 250/636: Performed by: INTERNAL MEDICINE

## 2021-06-17 PROCEDURE — 83735 ASSAY OF MAGNESIUM: CPT

## 2021-06-17 PROCEDURE — 74011000250 HC RX REV CODE- 250: Performed by: INTERNAL MEDICINE

## 2021-06-17 RX ORDER — SODIUM CHLORIDE 0.9 % (FLUSH) 0.9 %
10 SYRINGE (ML) INJECTION AS NEEDED
Status: ACTIVE | OUTPATIENT
Start: 2021-06-17 | End: 2021-06-17

## 2021-06-17 RX ADMIN — PROMETHAZINE HYDROCHLORIDE 25 MG: 25 INJECTION INTRAMUSCULAR; INTRAVENOUS at 07:29

## 2021-06-17 RX ADMIN — Medication 10 ML: at 07:29

## 2021-06-17 RX ADMIN — Medication 10 ML: at 07:40

## 2021-06-17 NOTE — PROGRESS NOTES
Name: Naty Auguste    : 1970    MRN: [id number]    Date of Service: 2021    NISHIW-ASHLEY attempted to speak with patient, she was asleep. No flowsheet data found.         Electronically Signed By:  LAXMI Clinton

## 2021-06-17 NOTE — PROGRESS NOTES
Pt arrived ambulatory today at 0706, to receive labs/phenergan and have port needle changed out and left in place for home health. Pt tolerated without diffficulty, reporting relief of nausea after phenergan given. Patient discharged via ambulatory accompanied by father. Instructed to notify physician of any problems, questions or concerns. Allowed opportunity for patient/family to ask questions. Verbalized understanding. Next appointment is June 24 at [id number] with Teresita 55Greg.

## 2021-06-22 ENCOUNTER — ANESTHESIA EVENT (OUTPATIENT)
Dept: ENDOSCOPY | Age: 51
End: 2021-06-22
Payer: MEDICARE

## 2021-06-22 RX ORDER — SODIUM CHLORIDE, SODIUM LACTATE, POTASSIUM CHLORIDE, CALCIUM CHLORIDE 600; 310; 30; 20 MG/100ML; MG/100ML; MG/100ML; MG/100ML
100 INJECTION, SOLUTION INTRAVENOUS CONTINUOUS
Status: CANCELLED | OUTPATIENT
Start: 2021-06-22

## 2021-06-23 ENCOUNTER — HOSPITAL ENCOUNTER (OUTPATIENT)
Age: 51
Setting detail: OUTPATIENT SURGERY
Discharge: HOME | End: 2021-06-23
Attending: INTERNAL MEDICINE | Admitting: INTERNAL MEDICINE
Payer: MEDICARE

## 2021-06-23 ENCOUNTER — ANESTHESIA (OUTPATIENT)
Dept: ENDOSCOPY | Age: 51
End: 2021-06-23
Payer: MEDICARE

## 2021-06-23 VITALS
DIASTOLIC BLOOD PRESSURE: 90 MMHG | HEART RATE: 85 BPM | OXYGEN SATURATION: 96 % | SYSTOLIC BLOOD PRESSURE: 122 MMHG | TEMPERATURE: 97.8 F | RESPIRATION RATE: 16 BRPM

## 2021-06-23 PROCEDURE — 74011250636 HC RX REV CODE- 250/636: Performed by: ANESTHESIOLOGY

## 2021-06-23 PROCEDURE — 2709999900 HC NON-CHARGEABLE SUPPLY: Performed by: INTERNAL MEDICINE

## 2021-06-23 PROCEDURE — 74011000250 HC RX REV CODE- 250: Performed by: NURSE ANESTHETIST, CERTIFIED REGISTERED

## 2021-06-23 PROCEDURE — 76060000031 HC ANESTHESIA FIRST 0.5 HR: Performed by: INTERNAL MEDICINE

## 2021-06-23 PROCEDURE — 74011000250 HC RX REV CODE- 250: Performed by: ANESTHESIOLOGY

## 2021-06-23 PROCEDURE — 74011250636 HC RX REV CODE- 250/636: Performed by: NURSE ANESTHETIST, CERTIFIED REGISTERED

## 2021-06-23 PROCEDURE — 76040000025: Performed by: INTERNAL MEDICINE

## 2021-06-23 RX ORDER — HEPARIN 100 UNIT/ML
500 SYRINGE INTRAVENOUS
Status: DISCONTINUED | OUTPATIENT
Start: 2021-06-23 | End: 2021-06-23 | Stop reason: HOSPADM

## 2021-06-23 RX ORDER — PROPOFOL 10 MG/ML
INJECTION, EMULSION INTRAVENOUS AS NEEDED
Status: DISCONTINUED | OUTPATIENT
Start: 2021-06-23 | End: 2021-06-23 | Stop reason: HOSPADM

## 2021-06-23 RX ORDER — PROPOFOL 10 MG/ML
INJECTION, EMULSION INTRAVENOUS
Status: DISCONTINUED | OUTPATIENT
Start: 2021-06-23 | End: 2021-06-23 | Stop reason: HOSPADM

## 2021-06-23 RX ORDER — LIDOCAINE HYDROCHLORIDE 20 MG/ML
INJECTION, SOLUTION EPIDURAL; INFILTRATION; INTRACAUDAL; PERINEURAL AS NEEDED
Status: DISCONTINUED | OUTPATIENT
Start: 2021-06-23 | End: 2021-06-23 | Stop reason: HOSPADM

## 2021-06-23 RX ORDER — TRIAMCINOLONE ACETONIDE 40 MG/ML
40 INJECTION, SUSPENSION INTRA-ARTICULAR; INTRAMUSCULAR ONCE
Status: DISCONTINUED | OUTPATIENT
Start: 2021-06-23 | End: 2021-06-23 | Stop reason: HOSPADM

## 2021-06-23 RX ORDER — SODIUM CHLORIDE, SODIUM LACTATE, POTASSIUM CHLORIDE, CALCIUM CHLORIDE 600; 310; 30; 20 MG/100ML; MG/100ML; MG/100ML; MG/100ML
100 INJECTION, SOLUTION INTRAVENOUS CONTINUOUS
Status: DISCONTINUED | OUTPATIENT
Start: 2021-06-23 | End: 2021-06-23 | Stop reason: HOSPADM

## 2021-06-23 RX ADMIN — LIDOCAINE HYDROCHLORIDE 100 MG: 20 INJECTION, SOLUTION EPIDURAL; INFILTRATION; INTRACAUDAL; PERINEURAL at 12:50

## 2021-06-23 RX ADMIN — PROMETHAZINE HYDROCHLORIDE 6.25 MG: 25 INJECTION INTRAMUSCULAR; INTRAVENOUS at 13:24

## 2021-06-23 RX ADMIN — PROPOFOL 200 MCG/KG/MIN: 10 INJECTION, EMULSION INTRAVENOUS at 12:50

## 2021-06-23 RX ADMIN — SODIUM CHLORIDE, SODIUM LACTATE, POTASSIUM CHLORIDE, AND CALCIUM CHLORIDE: 600; 310; 30; 20 INJECTION, SOLUTION INTRAVENOUS at 12:46

## 2021-06-23 RX ADMIN — PROPOFOL 50 MG: 10 INJECTION, EMULSION INTRAVENOUS at 12:50

## 2021-06-23 NOTE — H&P
History and Physical for Outpatient Procedure             Date: 6/23/2021     History of Present Illness:  Patient has history of dysphagia and presents for EGD with possible esophageal dilation.       Past Medical History:   Diagnosis Date    Anemia     blood transfusion 2013 X1 d/t \"perforated bowel\"-- Fe infusions 12/2017--- denies any current iron infusions 12/17/2019    Anxiety and depression     managed with meds    C. difficile diarrhea 2013    in 8205 after complicated ERCP    Cervical dysplasia 1990s    Chronic insomnia     Chronic nausea     Chronic pain     all over     Chronic pancreatitis ([facility] [address]     COVID-19 vaccine series completed 04/21/2021    PT TO BRING CARD DOS    Dehydration     Encounter for insertion of venous access port     Left chest port    Esophageal stricture 2017    Fibromyalgia     managed with meds    Former cigarette smoker     GERD (gastroesophageal reflux disease)     controlled with med    History of kidney stones 03/2021    X1-naturally pass    HLD (hyperlipidemia)     pt denies     IBS (irritable bowel syndrome)     Migraine headache     Nausea & vomiting     pt reports she does better with phenergan than zofran - causes HA    Nonalcoholic fatty liver disease     PUD (peptic ulcer disease) 06/2017    Sinus tachycardia     per pt-- no tx needed    Sphincter of Oddi dysfunction     Type 2 diabetes mellitus ([facility] [address]     type 2-- sqbs am avg 120's--- no hypo     Past Surgical History:   Procedure Laterality Date    COLONOSCOPY N/A 4/4/2018    COLONOSCOPY performed by Melissa Gerardo MD at [address] [place] [address] EGD  12/18/2019         EGD  5/21/2021         HX CARPAL TUNNEL RELEASE Right     along with trigger finger    HX COLONOSCOPY      HX ENDOSCOPY  2017    36 fr thomas    HX ERCP  3/5/2013    resulted in perforated duodenum    HX HYSTERECTOMY  1998    ovaries remain    HX LAP CHOLECYSTECTOMY  1997    HX LAPAROTOMY  3/5/2013    exploratory for duodenal perforation with ERCP    HX ORTHOPAEDIC      right finger surgery 2017    HX OTHER SURGICAL Bilateral     thumb    HX OTHER SURGICAL      Kidney stones 2021    HX TOTAL COLECTOMY      HX UROLOGICAL  13 at [facility]-- stent removed 13. Dr Royal Wood      port insertion, left chest wall    IR DRAIN CUTANEOUS/SUBCU ABSCESS      [place] ABDOMEN SURGERY PROC UNLISTED  last one placed      Hx of pancreatic stent, multiple    [place] ABDOMEN SURGERY PROC UNLISTED      lap nissen    [place] ABDOM[place] SURGERY [address] UNLISTED      explor lap     In and  Family History   Problem Relation Age of Onset    Diabetes Mother     Hypertension Mother     Heart Disease Mother         cabg    Diabetes Father     Heart Disease Father     Hypertension Father     Other Father     No Known Problems Sister      Social History     Tobacco Use    Smoking status: Former Smoker     Packs/day: 1.00     Years: 3.00     Pack years: 3.00     Quit date: 1993     Years since quittin.1    Smokeless tobacco: Never Used   Substance Use Topics    Alcohol use: No        Allergies   Allergen Reactions    Tape [Adhesive] Rash and Itching    Barium Iodide Nausea and Vomiting    Flagyl [Metronidazole] Nausea and Vomiting    Metformin Nausea and Vomiting     Stomach pain    Insulins Nausea and Vomiting     Humalog only     Current Facility-Administered Medications   Medication Dose Route Frequency    lactated Ringers infusion  100 mL/hr IntraVENous CONTINUOUS    triamcinolone acetonide (KENALOG-40) 40 mg/mL injection 40 mg  40 mg IntraMUSCular ONCE        Review of Systems:  A detailed 10 organ review of systems is obtained with pertinent positives as listed in the History of Present Illness. All others are negative. Objective:     Physical Exam:  Visit Vitals  /74   Pulse (!) 103   Temp 98.1 °F (36.7 °C)   Resp 16   SpO2 96%      General:  Alert and oriented.   Heart: Regular rate and rhythm  Lungs:  Clear to auscultation bilaterally  Abdomen: Soft, nontender, nondistended    Impression/Plan:     Proceed with EGD with possible dilation as planned. I have discussed with the patient the technique, benefits, alternatives, and risks of these procedures, including medication reaction, immediate or delayed bleeding, or perforation of the gastrointestinal tract.      Signed By: Clarisa Maurice MD     June 23, 2021

## 2021-06-23 NOTE — DISCHARGE INSTRUCTIONS
Gastrointestinal Esophagogastroduodenoscopy (EGD) - Upper Exam Discharge Instructions    1. Call Dr. Charmaine Spain  for any problems or questions. 2. Contact the doctor's office for follow up appointment as directed. 3. Medication may cause drowsiness for several hours, therefore, do not drive or                  operate machinery for remainder of the day. 4. No alcohol today. 5. Ordinarily, you may resume regular diet and activity after exam unless otherwise              specified by your physician. 6. For mild soreness in your throat you may use Cepacol throat lozenges or warm               salt-water gargles as needed. Any additional instructions:   1. Soft diet today. 2. Advance diet tomorrow as tolerated. 3. Protonix 40 mg BID. 4. Avoid NSAIDs for 48 hours. 5. Repeat EGD with dilation in 4 weeks    Instructions given to Jeffniall Villa and other family members.

## 2021-06-23 NOTE — ANESTHESIA PREPROCEDURE EVALUATION
Relevant Problems   CARDIOVASCULAR   (+) Hypertension      GASTROINTESTINAL   (+) GERD (gastroesophageal reflux disease)      ENDOCRINE   (+) DM type 2 (diabetes mellitus, type 2) (HCC)      HEMATOLOGY   (+) Iron deficiency anemia       Anesthetic History     PONV          Review of Systems / Medical History  Patient summary reviewed and pertinent labs reviewed    Pulmonary                   Neuro/Psych         Headaches and psychiatric history     Cardiovascular    Hypertension              Exercise tolerance: >4 METS     GI/Hepatic/Renal     GERD: well controlled      PUD and liver disease (fatty)     Endo/Other    Diabetes: type 2, using insulin    Obesity and anemia     Other Findings   Comments: Chronic nausea  Sphincter of Oddi dysfunction  IBS  C. Diff  Chronic pancreatitis  Esophageal stricture           Physical Exam    Airway  Mallampati: II  TM Distance: 4 - 6 cm  Neck ROM: normal range of motion   Mouth opening: Diminished (comment)     Cardiovascular  Regular rate and rhythm,  S1 and S2 normal,  no murmur, click, rub, or gallop  Rhythm: regular  Rate: normal      Pertinent negatives: No peripheral edema   Dental    Dentition: Poor dentition  Comments: Front teeth fragile   Pulmonary  Breath sounds clear to auscultation               Abdominal  GI exam deferred       Other Findings            Anesthetic Plan    ASA: 3  Anesthesia type: total IV anesthesia          Induction: Intravenous  Anesthetic plan and risks discussed with: Patient and Spouse

## 2021-06-23 NOTE — ROUTINE PROCESS
VSS. No complaints noted. Education given and reviewed with . Patient wheeled out via wheelchair by Azul Sommer.

## 2021-06-23 NOTE — ANESTHESIA POSTPROCEDURE EVALUATION
Procedure(s):  ESOPHAGOGASTRODUODENOSCOPY (EGD)W/ DILATION KENAOG/ BMI 35  ESOPHAGEAL DILATION. total IV anesthesia    Anesthesia Post Evaluation      Multimodal analgesia: multimodal analgesia used between 6 hours prior to anesthesia start to PACU discharge  Patient location during evaluation: bedside  Patient participation: complete - patient participated  Level of consciousness: awake and responsive to light touch  Pain management: adequate  Airway patency: patent  Anesthetic complications: no  Cardiovascular status: acceptable, hemodynamically stable, blood pressure returned to baseline and stable  Respiratory status: acceptable, unassisted, spontaneous ventilation and nonlabored ventilation  Hydration status: acceptable        INITIAL Post-op Vital signs:   Vitals Value Taken Time   /90 06/23/21 1303   Temp 36.6 °C (97.8 °F) 06/23/21 1303   Pulse 85 06/23/21 1303   Resp 16 06/23/21 1303   SpO2 96 % 06/23/21 1303   Vitals shown include unvalidated device data.

## 2021-06-23 NOTE — OP NOTES
Gastroenterology Procedure Note           Procedure:  Esophagogastroduodenoscopy    Date of Procedure:  6/23/2021    Patient:  Brittany Cagle     1970    Indication:   History of esophageal stricture     Sedation:  MAC    Pre-Procedure Physical Exam:    Mental status:  alert and oriented  Airway:  normal oropharyngeal airway and neck mobility  CV:  regular rate and rhythm  Respiratory:  clear to auscultation    Procedure:  A History and Physical has been performed, and patient medication allergies have been reviewed. Risks of perforation, hemorrhage, adverse drug reaction, and aspiration were discussed. Informed consent was obtained for the procedure, including sedation. The patient was placed in the left lateral decubitus position. The heart rate, oxygen saturations, blood pressure, and response to care were monitored throughout the procedure. The gastroscope was passed through the mouth and advanced under direct vision to the second portion of the duodenum. A careful inspection was made as the gastroscope was withdrawn, including a retroflexed view of the proximal stomach. The patient tolerated the procedure well. Findings:     OROPHARYNX: Cords and pyriform recesses appear normal.   ESOPHAGUS: A benign-appearing intrinsic stenosis is seen in the distal esophagus. Serial dilation was performed using 42-44 Fr Guido dilators. Successful dilation was confirmed by the presence of mucosal disruption. STOMACH: On retroflexion, the flap-valve is classified as Hill Grade II, with a tissue fold at the lesser curvature but with periodic opening and closing around the endoscope. Prior gastrojejunostomy with preserved pylorus. DUODENUM: The bulb and second portions are normal.    Specimen:  No    Estimated Blood Loss:  Minimal    Implant:  None           Impression:    Esophageal stricture. Plan:  1. Soft diet today. 2. Advance diet tomorrow as tolerated. 3. Protonix 40 mg BID.   4. Avoid NSAIDs for 48 hours. 5. Repeat EGD with dilation in 4 weeks.     Signed:  Nelly Joseph MD  6/23/2021  12:51 PM

## 2021-06-24 ENCOUNTER — HOSPITAL ENCOUNTER (OUTPATIENT)
Dept: INFUSION THERAPY | Age: 51
Discharge: HOME | End: 2021-06-24
Payer: MEDICARE

## 2021-06-24 VITALS
DIASTOLIC BLOOD PRESSURE: 89 MMHG | HEART RATE: 104 BPM | OXYGEN SATURATION: 95 % | TEMPERATURE: 98.2 F | RESPIRATION RATE: 18 BRPM | SYSTOLIC BLOOD PRESSURE: 132 MMHG

## 2021-06-24 LAB — MAGNESIUM SERPL-MCNC: 1.9 MG/DL (ref 1.8–2.4)

## 2021-06-24 PROCEDURE — 74011250636 HC RX REV CODE- 250/636: Performed by: INTERNAL MEDICINE

## 2021-06-24 PROCEDURE — 83735 ASSAY OF MAGNESIUM: CPT

## 2021-06-24 PROCEDURE — 74011000250 HC RX REV CODE- 250: Performed by: INTERNAL MEDICINE

## 2021-06-24 PROCEDURE — 96374 THER/PROPH/DIAG INJ IV PUSH: CPT

## 2021-06-24 RX ORDER — SODIUM CHLORIDE 0.9 % (FLUSH) 0.9 %
10 SYRINGE (ML) INJECTION AS NEEDED
Status: DISCONTINUED | OUTPATIENT
Start: 2021-06-24 | End: 2021-06-26 | Stop reason: HOSPADM

## 2021-06-24 RX ADMIN — Medication 10 ML: at 07:46

## 2021-06-24 RX ADMIN — PROMETHAZINE HYDROCHLORIDE 25 MG: 25 INJECTION INTRAMUSCULAR; INTRAVENOUS at 07:45

## 2021-06-24 RX ADMIN — Medication 10 ML: at 07:25

## 2021-06-24 NOTE — PROGRESS NOTES
Problem: Knowledge Deficit  Goal: *Verbalizes understanding of procedures and medications  Outcome: Progressing Towards Goal     Problem: Patient Education:  Go to Education Activity  Goal: Patient/Family Education  Outcome: Progressing Towards Goal     Problem: Knowledge Deficit  Goal: *Verbalizes understanding of procedures and medications  Outcome: Progressing Towards Goal

## 2021-06-24 NOTE — PROGRESS NOTES
Arrived to the [facility]. Labs and port care completed. Patient tolerated without problems, medicated with Phenergan per request for nausea. Any issues or concerns during appointment: no.  Patient aware of next infusion appointment on 7/1/21 (date) at [address] [place] [address] (time). Discharged ambulatory.

## 2021-07-01 ENCOUNTER — HOSPITAL ENCOUNTER (OUTPATIENT)
Dept: INFUSION THERAPY | Age: 51
Discharge: HOME | End: 2021-07-01
Payer: MEDICARE

## 2021-07-01 VITALS
DIASTOLIC BLOOD PRESSURE: 59 MMHG | RESPIRATION RATE: 18 BRPM | OXYGEN SATURATION: 98 % | TEMPERATURE: 98.3 F | SYSTOLIC BLOOD PRESSURE: 88 MMHG | HEART RATE: 74 BPM

## 2021-07-01 LAB — MAGNESIUM SERPL-MCNC: 1.9 MG/DL (ref 1.8–2.4)

## 2021-07-01 PROCEDURE — 74011000250 HC RX REV CODE- 250: Performed by: INTERNAL MEDICINE

## 2021-07-01 PROCEDURE — 74011250636 HC RX REV CODE- 250/636: Performed by: INTERNAL MEDICINE

## 2021-07-01 PROCEDURE — 96374 THER/PROPH/DIAG INJ IV PUSH: CPT

## 2021-07-01 PROCEDURE — 83735 ASSAY OF MAGNESIUM: CPT

## 2021-07-01 RX ORDER — SODIUM CHLORIDE 0.9 % (FLUSH) 0.9 %
10 SYRINGE (ML) INJECTION AS NEEDED
Status: DISCONTINUED | OUTPATIENT
Start: 2021-07-01 | End: 2021-07-03 | Stop reason: HOSPADM

## 2021-07-01 RX ADMIN — PROMETHAZINE HYDROCHLORIDE 25 MG: 25 INJECTION INTRAMUSCULAR; INTRAVENOUS at 07:56

## 2021-07-01 RX ADMIN — Medication 10 ML: at 08:01

## 2021-07-01 NOTE — PROGRESS NOTES
Arrived to the [facility]. Port care, phenergan, and Mg level completed. Patient tolerated well. Any issues or concerns during appointment: none. Patient aware of next infusion appointment on 7/8 @ 12. Discharged ambulatory.

## 2021-07-08 ENCOUNTER — HOSPITAL ENCOUNTER (OUTPATIENT)
Dept: INFUSION THERAPY | Age: 51
Discharge: HOME | End: 2021-07-08
Payer: MEDICARE

## 2021-07-08 VITALS
SYSTOLIC BLOOD PRESSURE: 157 MMHG | RESPIRATION RATE: 18 BRPM | TEMPERATURE: 98.9 F | HEART RATE: 74 BPM | OXYGEN SATURATION: 100 % | DIASTOLIC BLOOD PRESSURE: 76 MMHG

## 2021-07-08 LAB — MAGNESIUM SERPL-MCNC: 1.9 MG/DL (ref 1.8–2.4)

## 2021-07-08 PROCEDURE — 96374 THER/PROPH/DIAG INJ IV PUSH: CPT

## 2021-07-08 PROCEDURE — 96361 HYDRATE IV INFUSION ADD-ON: CPT

## 2021-07-08 PROCEDURE — 74011250636 HC RX REV CODE- 250/636: Performed by: INTERNAL MEDICINE

## 2021-07-08 PROCEDURE — 83735 ASSAY OF MAGNESIUM: CPT

## 2021-07-08 PROCEDURE — 74011000250 HC RX REV CODE- 250: Performed by: INTERNAL MEDICINE

## 2021-07-08 RX ADMIN — PROMETHAZINE HYDROCHLORIDE 25 MG: 25 INJECTION INTRAMUSCULAR; INTRAVENOUS at 07:51

## 2021-07-08 RX ADMIN — SODIUM CHLORIDE 500 ML: 900 INJECTION, SOLUTION INTRAVENOUS at 08:01

## 2021-07-08 NOTE — PROGRESS NOTES
Pt. Discharged ambulatory. No distress noted. To call physician with any problems or concerns. Understanding voiced. To return to Infusions on 7/15/21.

## 2021-07-15 ENCOUNTER — HOSPITAL ENCOUNTER (OUTPATIENT)
Dept: INFUSION THERAPY | Age: 51
Discharge: HOME | End: 2021-07-15
Payer: MEDICARE

## 2021-07-15 VITALS
OXYGEN SATURATION: 94 % | RESPIRATION RATE: 18 BRPM | SYSTOLIC BLOOD PRESSURE: 132 MMHG | TEMPERATURE: 98.2 F | DIASTOLIC BLOOD PRESSURE: 85 MMHG | HEART RATE: 106 BPM

## 2021-07-15 LAB — MAGNESIUM SERPL-MCNC: 1.9 MG/DL (ref 1.8–2.4)

## 2021-07-15 PROCEDURE — 74011250636 HC RX REV CODE- 250/636: Performed by: INTERNAL MEDICINE

## 2021-07-15 PROCEDURE — 83735 ASSAY OF MAGNESIUM: CPT

## 2021-07-15 PROCEDURE — 74011000250 HC RX REV CODE- 250: Performed by: INTERNAL MEDICINE

## 2021-07-15 PROCEDURE — 96374 THER/PROPH/DIAG INJ IV PUSH: CPT

## 2021-07-15 RX ORDER — SODIUM CHLORIDE 0.9 % (FLUSH) 0.9 %
10-40 SYRINGE (ML) INJECTION AS NEEDED
Status: DISCONTINUED | OUTPATIENT
Start: 2021-07-15 | End: 2021-07-17 | Stop reason: HOSPADM

## 2021-07-15 RX ADMIN — Medication 10 ML: at 07:55

## 2021-07-15 RX ADMIN — Medication 10 ML: at 08:05

## 2021-07-15 RX ADMIN — PROMETHAZINE HYDROCHLORIDE 25 MG: 25 INJECTION INTRAMUSCULAR; INTRAVENOUS at 07:56

## 2021-07-15 NOTE — PROGRESS NOTES
Patient here for phenergan dose and possible magnesium replacement. Reviewed the procedure and process with her.

## 2021-07-15 NOTE — PROGRESS NOTES
Arrived to the [facility]. Assessment completed. Labs reviewed. Patient tolerated received phenergan 25 mg IV, as ordered. No magnesium replacement needed today. Any issues or concerns during appointment: complains of chest discomfort \"like she always has\" that also radiates to her left back. She states that she was seen by Dr. Itz Gaines this past Monday and that the pains were related to her \"esophageal spasms\" that she always has. Patient states that she has also been short of breath on exertion. See Doc. flowsheet for vital signs. Call placed to [facility], where patient states she was a former patient. They made her an appt. For today at 1300. Patient was informed of this. Patient aware of next infusion appointment on 7/22/21 (date) at [address] [place] [address] (time) with IV infusion center. Discharged ambulatory, with father. Patient instructed to call her doctor's office immediately for any problems or concerns. She verbalizes understanding.

## 2021-07-27 ENCOUNTER — ANESTHESIA EVENT (OUTPATIENT)
Dept: ENDOSCOPY | Age: 51
End: 2021-07-27
Payer: MEDICARE

## 2021-07-27 NOTE — PROGRESS NOTES
Called to remind patient of appt. She said whoever called from the hospital over the weekend she informed that she had been around family that were positive for COVID on 7/18. She called Maldonado's office to inform them, and they are to contact her later on whether or not she can come for procedure.

## 2021-07-28 ENCOUNTER — ANESTHESIA (OUTPATIENT)
Dept: ENDOSCOPY | Age: 51
End: 2021-07-28
Payer: MEDICARE

## 2021-07-28 ENCOUNTER — HOSPITAL ENCOUNTER (OUTPATIENT)
Age: 51
Setting detail: OUTPATIENT SURGERY
Discharge: HOME | End: 2021-07-28
Attending: INTERNAL MEDICINE | Admitting: INTERNAL MEDICINE
Payer: MEDICARE

## 2021-07-28 VITALS
DIASTOLIC BLOOD PRESSURE: 73 MMHG | OXYGEN SATURATION: 91 % | RESPIRATION RATE: 16 BRPM | TEMPERATURE: 97.1 F | SYSTOLIC BLOOD PRESSURE: 114 MMHG | HEART RATE: 70 BPM

## 2021-07-28 LAB
GLUCOSE BLD STRIP.AUTO-MCNC: 143 MG/DL (ref 65–100)
SERVICE CMNT-IMP: ABNORMAL

## 2021-07-28 PROCEDURE — 76060000031 HC ANESTHESIA FIRST 0.5 HR: Performed by: INTERNAL MEDICINE

## 2021-07-28 PROCEDURE — 2709999900 HC NON-CHARGEABLE SUPPLY: Performed by: INTERNAL MEDICINE

## 2021-07-28 PROCEDURE — 74011250636 HC RX REV CODE- 250/636: Performed by: INTERNAL MEDICINE

## 2021-07-28 PROCEDURE — 74011000250 HC RX REV CODE- 250

## 2021-07-28 PROCEDURE — 76040000025: Performed by: INTERNAL MEDICINE

## 2021-07-28 PROCEDURE — 74011250636 HC RX REV CODE- 250/636

## 2021-07-28 PROCEDURE — 82962 GLUCOSE BLOOD TEST: CPT

## 2021-07-28 RX ORDER — LIDOCAINE HYDROCHLORIDE 20 MG/ML
INJECTION, SOLUTION EPIDURAL; INFILTRATION; INTRACAUDAL; PERINEURAL AS NEEDED
Status: DISCONTINUED | OUTPATIENT
Start: 2021-07-28 | End: 2021-07-28 | Stop reason: HOSPADM

## 2021-07-28 RX ORDER — SODIUM CHLORIDE, SODIUM LACTATE, POTASSIUM CHLORIDE, CALCIUM CHLORIDE 600; 310; 30; 20 MG/100ML; MG/100ML; MG/100ML; MG/100ML
1000 INJECTION, SOLUTION INTRAVENOUS CONTINUOUS
Status: DISCONTINUED | OUTPATIENT
Start: 2021-07-28 | End: 2021-07-28 | Stop reason: HOSPADM

## 2021-07-28 RX ORDER — SODIUM CHLORIDE, SODIUM LACTATE, POTASSIUM CHLORIDE, CALCIUM CHLORIDE 600; 310; 30; 20 MG/100ML; MG/100ML; MG/100ML; MG/100ML
INJECTION, SOLUTION INTRAVENOUS
Status: DISCONTINUED | OUTPATIENT
Start: 2021-07-28 | End: 2021-07-28 | Stop reason: HOSPADM

## 2021-07-28 RX ORDER — GLYCOPYRROLATE 0.2 MG/ML
INJECTION INTRAMUSCULAR; INTRAVENOUS AS NEEDED
Status: DISCONTINUED | OUTPATIENT
Start: 2021-07-28 | End: 2021-07-28 | Stop reason: HOSPADM

## 2021-07-28 RX ORDER — TRIAMCINOLONE ACETONIDE 40 MG/ML
40 INJECTION, SUSPENSION INTRA-ARTICULAR; INTRAMUSCULAR
Status: DISCONTINUED | OUTPATIENT
Start: 2021-07-28 | End: 2021-07-28 | Stop reason: HOSPADM

## 2021-07-28 RX ORDER — TRIAMCINOLONE ACETONIDE 40 MG/ML
40 INJECTION, SUSPENSION INTRA-ARTICULAR; INTRAMUSCULAR ONCE
Status: DISCONTINUED | OUTPATIENT
Start: 2021-07-28 | End: 2021-07-28 | Stop reason: HOSPADM

## 2021-07-28 RX ORDER — PROPOFOL 10 MG/ML
INJECTION, EMULSION INTRAVENOUS AS NEEDED
Status: DISCONTINUED | OUTPATIENT
Start: 2021-07-28 | End: 2021-07-28 | Stop reason: HOSPADM

## 2021-07-28 RX ADMIN — PROPOFOL 140 MCG/KG/MIN: 10 INJECTION, EMULSION INTRAVENOUS at 08:58

## 2021-07-28 RX ADMIN — SODIUM CHLORIDE, POTASSIUM CHLORIDE, SODIUM LACTATE AND CALCIUM CHLORIDE 1000 ML: 600; 310; 30; 20 INJECTION, SOLUTION INTRAVENOUS at 08:16

## 2021-07-28 RX ADMIN — LIDOCAINE HYDROCHLORIDE 80 MG: 20 INJECTION, SOLUTION EPIDURAL; INFILTRATION; INTRACAUDAL; PERINEURAL at 08:57

## 2021-07-28 RX ADMIN — PROPOFOL 50 MG: 10 INJECTION, EMULSION INTRAVENOUS at 08:57

## 2021-07-28 RX ADMIN — SODIUM CHLORIDE, SODIUM LACTATE, POTASSIUM CHLORIDE, AND CALCIUM CHLORIDE: 600; 310; 30; 20 INJECTION, SOLUTION INTRAVENOUS at 08:52

## 2021-07-28 RX ADMIN — GLYCOPYRROLATE 0.1 MG: 0.2 INJECTION INTRAMUSCULAR; INTRAVENOUS at 08:54

## 2021-07-28 NOTE — DISCHARGE INSTRUCTIONS
Gastrointestinal Esophagogastroduodenoscopy (EGD) - Upper Exam Discharge Instructions    1. Call Dr. Bhavesh Coppola at [phone] for any problems or questions. 2. Contact the doctor's office for follow up appointment as directed. 3. Medication may cause drowsiness for several hours, therefore:  · Do not drive or operate machinery for remainder of the day. · No alcohol today. · Do not make any important or legal decisions for 24 hours. · Do not sign any legal documents for 24 hours. 5. Ordinarily, you may resume regular diet and activity after exam unless otherwise specified by your physician. 6. For mild soreness in your throat you may use Cepacol throat lozenges or warm salt-water gargles as needed. Any additional instructions:  1. Consume a soft diet for 2 days, avoid acidic foods like orange juice, red sauce, and caffeine beverages, etc. 2. Continue taking your Protonix X2 daily. 3. Office will call you to schedule follow up visit with Dr. Kadie Frost

## 2021-07-28 NOTE — ROUTINE PROCESS
Discharge instructions provided to and gone over with patient and family member. Opportunity for questions provided, all answered. Patient voices no complaints or concerns at this time. Patient presented to hospital with port accessed, port flushed and needle left in place as presented. Patient wheeled to car by staff and discharged home with family member.

## 2021-07-28 NOTE — H&P
Chief complaint/HPI and PMH:  Please refer to outpatient note below or attached to the chart. Today's exam:  LUNGS:  Clear and equal.  COR:  RRR without murmurs heard. NEURO:  A and Oriented fully. I have thoroughly explained the preparation, procedure and sedation process to the patient as well as the risks and alternatives. They will sign informed consent prior to the procedure. Chester De Luna MD                        Patient:   Ashley Webb  YOB: 1970   Date:                        07/12/2021 10:00 AM   Visit Type:                  Office Visit  Provider:   Sanjana Mcclure MD  Primary Care Provider: Brooke Sethi MD [facility]    This 44year old female presents for dysphagia. Clement Fly History of Present Illness:  1.  dysphagia. At her office visit of 10-1-20, due to cost of Levsin for her esophageal spasms, I had her try NTG to start with swallowing the spray to minimize headaches. She was to try crushing NTG if she didn't tolerate the NTG spray. She continued with multiple EGD dilations:    EGD 10-14-20 Dr Yoly Tiwari: Belvie Cam #48 moderate GEJ tear; gastritis. EGD 11-19-20 Dr Olga Schuster: retained gastric debris; Belvie Cam #46 GEJ tear. EGD 12-8-20 Dr Raciel Mike: CRE 18 mm with mild distal esophageal tear. EGD 1-28-21 Dr Yoly Tiwari: gastritis, Belvie Cam #50 moderate GEJ tear, injected Kenalog. EGD 4-13-21 Dr Yoly Tiwari: Belvie Cam #48 mild GEJ tear, injected Kenalog; minimal retained food. EGD 5-21-21 Dr Nereida Dangelo: Belvie Cam #40 with GEJ tear, injected Kenalog. EGD 6-23-21 Dr Cuco Hodges: Belvie Cam #44 with tear. She doesn't have followup EGD. She has severe nausea. It can be severe enough that she felt she was barely able to make today's appointment. Her DM is better, she continues to gain weight despite her report that she's \"hardly eating\" much. She admits to eating a large amount of sugar since she feels that is the only type of food she can eat.   She takes NTG tablet 0.3 mg she swallows it before breakfast, Levsin during the day. ROS is negative or as otherwise documented as per HPI, PMH, past chronic medical history. PROBLEM LIST:     Problem Description Onset Date Chronic Clinical Status Notes   GERD - Gastro-esophageal reflux disease 03/10/2011 Y  GERD w/o esophagitis. Fundoplication 8727. Bile gastritis May 2014. IBS - Irritable bowel syndrome 03/10/2011 Y  IBS constipation. Nonalcoholic fatty liver disease 05/27/2012 Y  Fatty liver on CT scans [facility] May 2012). Dysfunction of sphincter of Oddi 03/10/2011 Y  [place] [facility] ([place] Hanny Munoz) Nov 2006 ERCP: papillary stenosis/spasm, manometry w/elevated basal sphincter press c/w biliary sphincter HTN, elev basal pancreatic sphincter pressures; Dual sphincterotomy. Interim pain free spell from 2006 to March 2009. ERCP June 2009: elev pancreatic/biliary pressure (residual sphincter rather than stenosis); both orifices cut. EGD/ERCP [facility]) June 2010 negative. Relapsed March 2011 (lipase >2000). Acute pancreatitis March 2011. ERCP 4-29-11: Pancreatic stent [facility]). Admission 5-26-11 for lipase >2200. Admit 2-16-12 for pancreatitis (lipase 5920). [facility] Dr Aki Andino consult 4-11-12: surgical open sphincteroplasty 5-3-12. Hospitalized 1-14 to 1-14-13. ERCP 3-5-13 w duodenal perforation. [facility] turned patient down for re-consult 9-3-13. Pain Management Aug 2013. On weekly to 2x weekly IVF hydration with iv Dilaudid 2 mg iv Phenergan 25 mg iv. Feb 18 to Feb 23, 2014: daily IVF + Dilaudid 4 mg + Phenergan. Recurrent acute pancreatitis 05/31/2011 Y  Acute pancreatitis March 2011. ERCP 4-29-11: Pancreatic stent [facility]). Admission 5-26-11 for lipase >2200. Admit 2-16-12 for pancreatitis (lipase 5920). [facility] Dr Aki Andino consult 4-11-12: surgical open sphincteroplasty 5-3-12. Hospitalized 1-14 to 1-14-13. ERCP 3-5-13 w duodenal perforation. [facility] turned patient down for re-consult 9-3-13.   Pain Management Aug 2013. On weekly to 2x weekly IVF hydration with iv Dilaudid 2 mg iv Phenergan 25 mg iv. Feb 18 to Feb 23, 2014: daily IVF + Dilaudid 4 mg + Phenergan. Chronic abdominal pain 06/28/2013 Y  Nausea since early 2012. Zofran doesn't help with nausea any better than Phenergan. Lortab, Percocet and oxycodone causes nausea. Scopolamine patch didn't work (blistering rash). Past Medical/Surgical History:   (Detailed)  Disease/disorder Onset Date Management Date Comments   EUS+EGD ([facility]) 6-25-10: negative 2010 2010    ERCP ([facility]) 6-26-09: residual biliary sphincter 2009 Dual sphincterotomy 2009    EGD 12-8-06: no pancreatic stent 12/08/2006 2006    ERCP ([facility]) 11-17-06: SOD biliary,pancreatic 11/17/2006 Dual sphincterotomy 2006    EGD 4-7-04 (epig pn): intact fundoplication 8359  2650    EGD 12-4-00 (CP, epig pn): normal 2000 2000    Colonoscopy 12-4-00 (low pn, change BH): [facility] 2000 2000    Hysterectomy laparoscopic (intact ovaries) [address] [facility] fundoplication 3106 for GERD 1997 1997    Cholecystectomy laparoscopic (dyskinesia) 1997 1997 1997    60 fr thomas  EGD 12/18/2019    esophageal stricture- 39 fr thomas  EGD 08/17/2017    EGD 5-29-14 (nausea)  Dr Mary Hennessy 2014 New surgical changes (gastrojejunostomy anastamosis on posterior body of stomach is widely open with 2 limbs?); diffuse severe bile gastritis; bile secreted from ampulla, duodenum \"dead end\". Ulcerative esophagitis. UGI series 6-2-14 2014 Contrast passed readily through the gastrojejunomstomy anastomosis; no anastamosis stricture. Diabetes mellitus, type 1  PCP 2013 Patient doesn't know her HgbA1c. ERCP 3-5-13 (MRI 2-21-13: 2 mm CBD defect)  Dr Brandt Elizondo 2013 Balloon sweep of CBD sludge; surgical changes of surgical sphincteroplasty. Oversew of duodenal perforation March 2013  Dr Ricardo Hart 2013 Given location of oversew, monitor for potential outlet obstruction (verbal communication).    EGD 2-5-13 (epig pain)   Malathi 2013 Minimal gastritis. MRCP 13 (N/V/pain)    No acute findings. Exp Lap 13  Dr Caryn Corley, Dr Arianna Lucas 2013 Drainage of retroperitoneal abscess, peritonitis, drainage of intraabdominal abscess, duodenal resection, retrocolic gastrojejunostomy, abd washout. Exp Lap 13  Dr Arianna Lucas 2013 Drainage and debridement of persistent R retroperitoneal abscess. (IR also had drainage tubes which they are monitoring along w Dr Arianna Lucas). R ureteral stent 13  Dr Mary Anne Weston 2013 Obstruction of ureter due to pancreatic abscess. Stent REMOVED by cystoscopy 13. To be monitored by Dr Mary Anne Weston. Open transduodenal sphincteroplasty 5-3-12  Dr Alejandro Davis [facility] surgery)  Tight PD, firm pancreas w scar tissue c/w multiple bouts pancreatitis. Postop complication: Enterobacter arogenes of RUQP abscess, empyema (Rx 4 wks ertapenem). EUS 12 Indeterminate (3 criteria) for chronic pancreatitis. No strictures. Minimal dilation of PD to 3 mm. ERP [facility]) 11 SOD 11p; probable stenosis of major papilla orifice (elevated pressures but in sawtooth pattern not typical of sphincter HTN); dilation done and PD stent placed x 1 month. If not better, then surgical intervention. EGD 11 (to remove pancreatic stent)       EGD 17 (dysphagia, pc epig pain)   [facility]  Severe distal esophageal stricture w edema, narrowing ( scope). Gastrojejunostomy anastomosis patent, no bleeding. Lata #18, 21 and 24 dilators, no tears. Nutrition 5-18-15: mild malnutrtion: rec: protein meals       EUS 2016  Dr Maco Amador, [facility]  Indeterminate pancreatic parenchymal changes. Nutrition Consult 16  Kimber Javed, RD, [facility]  Rec: 4-6 sm meals; < 50 gram fat/day; liquid sources of protein (eg Glucerna)   EGD 17 (CP)   [facility]  Resolution of the edema, ulcers previously seen but a residual tight GEJ stricture;  scope; dilated to 10 mm without tears.    EGD 17   [place]am Point #36 with Kenalog injection. EGD 8-23-17  Dr Browning Point #40 of tight esophageal stricture. EGD 6-29-15 (severe DANE)  Dr Bong Rajput  Hemorrhagic gastritis with flecks of blood, actively bleeding anastamotic 6 mm HP polyp snared. Esophageal ulcers: Esophageal biopsies (Bx: inflamed squamous mucosa). EGD 5-29-14 (N/V, epig pain)  Dr Bong Rajput  Ulcerative esophagitis, diffuse bile gastritis, wide anastamosis in posterior body of stomach with 2 limbs; bile extruding from ampulla (duodenal \"dead end\"). UGI series 6-2-14    Oral contrast emptied readily through patent gastrojejunostomy (no anastamotic stricture noted), limited small bowel exam.   EGD 10-31-19 (dysphagia)  Dr Bong Rajput  [facility] 07/26/2020 -Spastic esophagus, Clydie Clink #52 dilator: minimal distal esophageal tear, retained food in the stomach. EGD 12-18-19 (dysphagia)  Dr Ginna Elkins  [facility] 07/26/2020 Wali Rodriguez #60 with UES > LES tear. Endoscopy summary from April 2018 to June 2019    [facility] 07/07/2019 -EGD 4-4-18: Clydie Clink #42 with distal 1 cm tear. Colonoscopy 4-4-18: small TA polyp. EGD 5-3-18:  TTS balloon 15 mm with small tear. EGD 6-5-18: balloon 12 mm with tear and intense spasms. EGD 7-17-18: CRE balloon 15 mm with no tears; corkscrew contractions. EGD 8-1-18: CRE 13.5 mm  EGD 8-14-18: Clydie Clink #48 with tear  EGD 8-31-18: Clydie Clink #48 with moderate tears  EGD 9-14-18: CRE 15 mm balloon. Bx negative for EoE. EGD 9-27-18: Savary #51  EGD 10-26-18: Clydie Clink #52 with significant tears. EGD 11-21-18: Clydie Clink #50 with tear. EGD 12-29-18: Clydie Clink #52 with minimal tear. EGD 2-4-19:  One cm deep defect distal esophagus (contained perforation?). Gastrograffin swallow: no free perforation. EGD 3-20-19: Mojgan Clink #52 with tear. EGD 5-15-19: Ruthie Clink #46 with tear. EGD 5-31-19: Clydie Clink #48 with tear, retained food in stomach. EGD 6-11-19: Austin Peterson #51 with no tears. MRCP 3-12-15    Normal pancreas. No CBC/PD dilation.    Tachycardia since duodenal surgeries  Dr Leslee Mejia of beta blockers due to recurrent hypotension. EGD Oct 2017 to Feb 2018 for esophageal dilations  [facility]  [facility] [facility] 03/05/2018 - [facility] [facility] 03/05/2018 -10-25-17: 11 mm balloon; 11-22-17: 15 mm with Kenalog; 12-22-17: 18 mm with Kenalog; 2-21-18: #44F (moderate tear) with Kenalog   EGD 3-8-16 (odynophagia)  Dr Latisha Burroughs  Normal esophagus; slight bleeding anastamosis of posterior wall (no ulcer seen); surgical changes distal stomach clear. EGD dilation summary Oct 2020 to June 2021    [facility] 07/02/2021 -  EGD 10-14-20 Dr Latisha Burroughs: Colon Bui #48 moderate GEJ tear; gastritis. EGD 11-19-20 Dr Russell Bass: retained gastric debris; Colon Bui #46 GEJ tear. EGD 12-8-20 Dr Vickey Aguillon: CRE 18 mm with mild distal esophageal tear. EGD 1-28-21 Dr Latisha Burroughs: gastritis, Colon Bui #50 moderate GEJ tear, injected Kenalog. EGD 4-13-21 Dr Latisha Burroughs: Colon Bui #48 mild GEJ tear, injected Kenalog; minimal retained food. EGD 5-21-21 Dr Graham Leon: Colon Bui #40 with GEJ tear, injected Kenalog. EGD 6-23-21 Dr Mary Esquivel: Colon Bui #44 with tear. Headache, migraine       Fibromyalgia worse w/stress + Ca supplements       EGD 9-8-20 (dysphagia)  Dr Latisha Burroughs  [facility] [facility] 10/01/2020 -Distal esophageal stricture Guido #50: moderate 2 cm tear. Gastrojejunostomy anastomosis, normal pylorus to duodenum. Hypotension, recurrent since April 2015    Probably from lopressor (was for tachycardia), muscle relaxant, lisinopril (for renal protection), dehydration (IVF help). Patient states not due to narcotics since she has had presyncope before taking any. Additional Medical History  Dx [facility] [place] Nov 2006 ERCP: papillary stenosis/spasm, manometry c/w biliary sphincter HTN, elev basal pancreatic sphincter pressures; Dual sphincterotomy. Interim pain free spell from 2006 to March 2009. ERCP June 2009: elev pancreatic/biliary pressure (residual sphincter rather than stenosis); both orifices cut. EGD/ERCP [facility]) June 2010 negative. Relapsed March 2011 (lipase >2000).   Acute pancreatitis March 2011. ERCP 4-29-11: Pancreatic stent [facility]). Admission 5-26-11 for lipase >2200. Admit 2-16-12 for pancreatitis (lipase 5920). [facility] [place] Cynid Arnold consult 4-11-12: surgical open sphincteroplasty 5-3-12. Hospitalized 1-14 to 1-14-13. ERCP 3-5-13 w duodenal perforation. [facility] [place] re-consult 9-3-13. Pain Management Aug 2013. 5x weekly IVF with  iv Phenergan 25. [facility] consult Dr Yary Fowler 4-6-16: visceral hypersensitivity. Also, patient with poorly controlled DM. [facility] rejected consult 2-12-20. Hx colon polyp. Next colon 2023. Additional Surgical History  Nausea since early 2012. Zofran not any better than Phenergan. Lortab, Percocet and oxycodone causes nausea. Scopolamine patch didn't work (blistering rash). Creon helps (causes constipation). Olanzapine Jan 2020\" crazy\". Dr Cyndi Arnold visit 7-16-14: Blind Loop Syndrome. Open duodenojejunostomy 9-22-14. IBS constipation. Amitiza 24 mcg BID ? Linzess 145 mcg x 1 diarrhea. GERD w/o esophagitis. Fundoplication 7980. Severe ulcers June 2017, tight stricture. Numerous EGD dilations    Esophageal spasms Mar 2016: levsin helps. NTG tabs: H/A so swallows 0.3 mg    B12 deficiency Dec 2014. On injections. Microcytic Anemia 4-7-15 (bleeding gastric polyp). Labs 6-12-15: WBC 4.9, Hgb 9.8, MCV 70, plt 251, ferritin 4.7, iron sat 5%, B12 = 255. iv iron Sept 2015. Fatty liver on CT scans [facility] May 2012).     Procedure History  Test Date Results Interp   EGD 08/23/2017 Esophageal Stricture    EGD 08/23/2017 Esophageal Stricture    EGD 06/01/2017 History of bypass gastrojejunostomy, Benign esophageal stricture    EGD 03/08/2016 Gastritis    EGD 06/29/2015 Gastric polyp, Gastritis, Pill esophagitis    EGD 05/29/2014 Bile reflux gastritis    EGD 02/05/2013 Gastritis      Duodenal perforation at 3-5-13 ERCP: Dr Reynaldo Romero performed exploratory laparotomy with jejunal patch of perforation, drain placement, discharged 3-15-13. Readmitted for fevers 3-24-13 w multiple abscesses on CT: IR placed drains in multiloculated abscesses: sent home on antibiotics 3-28-13 (also had C dif diarrhea). Readmitted 13 for reaccumulation of abscess and R hydroureter (in an area of abscess); IR placed drains, then transferred to [place] rehab 4-10-13 for iv antibiotics and drainage. Exploratory surgery 13 (Dr Eliane Rubin) b/c abscesses did not resolve at [place]: retroperitoneal and abdominal wall abscess drainage, duodenal resection (now a blind pouch), retrocolic gastrojejunostomy, cystoscopy ureteral stent R. Apparently, bile and acid reflux can result from this bypass surgery. Hospitalized 2013 for acute pancreatitis (N/V/pain/lipase 1310), Dec 2013. Hospitalized 2014 for chronic pancreatitis with N&V, electrolyte dysfunction. Lipase normal.    Family History:  (Detailed)  Relationship Family Member Name  Age at Death Condition Onset Age Cause of Death       Family history of Parents with DM  N   Father    MDS  N     Additional Family History  Father: Crohn's ileitis, MDS. 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 Quit smoking .  laid off . Preferred language is [place]. Education/Employment/Occupation:  Employment History Status Retired Restrictions            MARITAL STATUS/FAMILY/SOCIAL SUPPORT  Currently . Smoking status: Former smoker. ALCOHOL  There is no history of alcohol use. CAFFEINE  The patient uses caffeine.     Current Medications:  Medication Directions   Ambien 10 mg tablet take 1 tablet by oral route  every day prn   Ativan 1 mg tablet take 1 tablet by oral route 3 times every day as needed   Carafate 100 mg/mL oral suspension take 10 milliliter by oral route 4 times every day on an empty stomach 1 hour before meals and at bedtime   citalopram 20 mg tablet take 1 tablet by oral route  every day CYANOCOBALAMIN VIAL/INJ 1ML1S 1MG/ML INJECT 1000 MCG (1 ML) INTO THE MUSCLE EVERY WEEK   hydrocortisone acetate 25 mg rectal suppository insert 1 suppository by rectal route 2 times every day for 7 days then as needed   hyoscyamine sulfate 0.125 mg tablet take 1 tablet by oral route up to four times a day as needed   Lidocaine Viscous 2 % mucosal solution Shake well and take 10 milliliter by oral route 4x daily as needed   Lyrica take 1 capsule by oral route 2 times every day   Miralax 17 gram/dose oral powder take (17G)  by oral route  every day mixed with 8 oz. water, juice, soda, coffee or tea   morphine sulfate takes 10mg 3 times a day   NITROGLYCERIN 0.3 MG TABLET SL PLACE 1 TABLET SUBLINGUALLY. MAY REPEAT IF NEEDED NO MORE THAN 3 TABS WITHIN A 15 MIN. PERIOD   Novolog Mix 70-30 FlexPen 100 unit/mL subcutaneous pen inject by subcutaneous route as per insulin protocol   ondansetron 8 mg disintegrating tablet take 1 tablet (8MG)  by oral route  TID prn and place on top of the tongue where it will dissolve, then swallow   promethazine 25 mg/mL injection solution take 1 tablet by Oral route  tid   promethazine 25 mg tablet TAKE 1 TABLET EVERY 4 TO 6 HOURS AS NEEDED   promethazine 25 mg rectal suppository insert 1 suppository (25MG)  by rectal route up to 4  times every day prn nausea   Protonix 40 mg tablet,delayed release take 1 tablet by oral route 2 times every day   Tresiba FlexTouch U-100 insulin 100 unit/mL (3 mL) subcutaneous pen inject 40 units qhs by subcutaneous route as per insulin protocol     Allergies:  Ingredient Reaction (Severity) Medication Name Comment   ADHESIVE TAPE Hives / Skin Rash  PAPER TAPE   BARIUM SULFATE rapid heartbeat, sweating     INSULIN LISPRO nausea Humalog humalog   METRONIDAZOLE nausea Flagyl flagyl   METRONIDAZOLE HCL nausea Flagyl flagyl   Reviewed, no changes.     Vital Signs:  BP mm/Hg Pulse Resp Pulse Ox Temp F Ht (Total in.) Weight (lbs.) Weight (oz.) BMI   122/72 118 16 97.50 62.00 190.20  34.79   Assessment/Plan:  # Detail Type Description    1. Assessment Esophageal stricture (K22.2). Provider Plan I reviewed her dilation sizes and she has had a set back in that the recent EGD dilators have been smaller caliber than several months prior. Will continue with EGD dilations monthly. She needs this done monthly given that she continues to narrow down. She needs one scheduled for the next 1-2 weeks. I offered her second opinion to [address] regarding the refractory nature of her esophageal stricture. She declined for now. 2. Assessment Esophageal spasm (K22.4). Provider Plan She will continue NTG 0.3 mg tablets swallowing before meals prn. Levsin prn.         3. Assessment Nausea (R11.0). Provider Plan I suspect her chronic nausea is related to the many years of poorly controlled DM. Even though she states her DM is better controlled, I am concerned she may continue to have chronic nausea. Elements of this note may have been dictated using speech recognition software. As a result, errors of speech recognition may have occurred.       Provider:   Robyn Allen 07/12/2021 10:35 AM   Document generated by: Migdalia Harrington MD 07/12/2021    CC Providers:  Nicol Glaser MD, MD  [facility]  [address] [facility],  [address] [place] [address] Jemma-      Electronically signed by Migdalia Harrington MD on 07/12/2021 10:35 AM

## 2021-07-28 NOTE — ANESTHESIA PREPROCEDURE EVALUATION
Relevant Problems   CARDIOVASCULAR   (+) Hypertension      GASTROINTESTINAL   (+) GERD (gastroesophageal reflux disease)      ENDOCRINE   (+) DM type 2 (diabetes mellitus, type 2) (HCC)      HEMATOLOGY   (+) Iron deficiency anemia       Anesthetic History     PONV          Review of Systems / Medical History  Patient summary reviewed and pertinent labs reviewed    Pulmonary                   Neuro/Psych         Headaches and psychiatric history     Cardiovascular    Hypertension              Exercise tolerance: >4 METS     GI/Hepatic/Renal     GERD: well controlled      PUD and liver disease (fatty)     Endo/Other    Diabetes: type 2, using insulin    Obesity and anemia     Other Findings   Comments: Chronic nausea  Sphincter of Oddi dysfunction  IBS  C. Diff  Chronic pancreatitis  Esophageal stricture           Physical Exam    Airway  Mallampati: II  TM Distance: 4 - 6 cm  Neck ROM: normal range of motion   Mouth opening: Diminished (comment)     Cardiovascular  Regular rate and rhythm,  S1 and S2 normal,  no murmur, click, rub, or gallop  Rhythm: regular  Rate: normal      Pertinent negatives: No peripheral edema   Dental    Dentition: Poor dentition  Comments: Front teeth fragile   Pulmonary  Breath sounds clear to auscultation               Abdominal  GI exam deferred       Other Findings            Anesthetic Plan    ASA: 3  Anesthesia type: total IV anesthesia          Induction: Intravenous  Anesthetic plan and risks discussed with: Patient

## 2021-07-29 ENCOUNTER — APPOINTMENT (OUTPATIENT)
Dept: INFUSION THERAPY | Age: 51
End: 2021-07-29

## 2021-08-02 NOTE — PROGRESS NOTES
Arrived to the [facility]. Assessment completed. Patient tolerated phenergan injection well today. Magnesium level noted at 2.0. Any issues or concerns during appointment: none. Patient aware of next infusion appointment on 7/20/17 (date) at 21 [phone] (time) with lab and 1942 with IV infusion center. Discharged ambulatory, with spouse. Patient instructed to call her doctor's office immediately for any problems or concerns. She verbalizes understanding. Total Pulses: 427 Total Pulses (Location 1): 427

## 2021-08-05 ENCOUNTER — HOSPITAL ENCOUNTER (OUTPATIENT)
Dept: INFUSION THERAPY | Age: 51
Discharge: HOME | End: 2021-08-05
Payer: MEDICARE

## 2021-08-05 VITALS
SYSTOLIC BLOOD PRESSURE: 116 MMHG | WEIGHT: 190 LBS | OXYGEN SATURATION: 94 % | DIASTOLIC BLOOD PRESSURE: 78 MMHG | BODY MASS INDEX: 34.75 KG/M2 | TEMPERATURE: 98.8 F | HEART RATE: 88 BPM | RESPIRATION RATE: 18 BRPM

## 2021-08-05 LAB — MAGNESIUM SERPL-MCNC: 1.7 MG/DL (ref 1.8–2.4)

## 2021-08-05 PROCEDURE — 74011250636 HC RX REV CODE- 250/636: Performed by: INTERNAL MEDICINE

## 2021-08-05 PROCEDURE — 96375 TX/PRO/DX INJ NEW DRUG ADDON: CPT

## 2021-08-05 PROCEDURE — 83735 ASSAY OF MAGNESIUM: CPT

## 2021-08-05 PROCEDURE — 74011000250 HC RX REV CODE- 250: Performed by: INTERNAL MEDICINE

## 2021-08-05 PROCEDURE — 96365 THER/PROPH/DIAG IV INF INIT: CPT

## 2021-08-05 RX ORDER — MAGNESIUM SULFATE 1 G/100ML
1 INJECTION INTRAVENOUS ONCE
Status: COMPLETED | OUTPATIENT
Start: 2021-08-05 | End: 2021-08-05

## 2021-08-05 RX ORDER — SODIUM CHLORIDE 0.9 % (FLUSH) 0.9 %
10 SYRINGE (ML) INJECTION AS NEEDED
Status: DISCONTINUED | OUTPATIENT
Start: 2021-08-05 | End: 2021-08-07 | Stop reason: HOSPADM

## 2021-08-05 RX ADMIN — PROMETHAZINE HYDROCHLORIDE 25 MG: 25 INJECTION INTRAMUSCULAR; INTRAVENOUS at 08:20

## 2021-08-05 RX ADMIN — MAGNESIUM SULFATE HEPTAHYDRATE 1 G: 1 INJECTION, SOLUTION INTRAVENOUS at 09:42

## 2021-08-05 RX ADMIN — Medication 10 ML: at 07:15

## 2021-08-05 RX ADMIN — Medication 10 ML: at 10:20

## 2021-08-05 NOTE — PROGRESS NOTES
Arrived to the [facility]. Assessment completed, labs drawn and reviewed. 1gm of magnesium infused per MD order. Port accessed and flushed. Patient tolerated well. Any issues or concerns during appointment: none. Patient aware of next infusion appointment on 08/12/2021 at [address] [place] [address]. Discharged ambulatory.

## 2021-08-12 ENCOUNTER — HOSPITAL ENCOUNTER (OUTPATIENT)
Dept: INFUSION THERAPY | Age: 51
Discharge: HOME | End: 2021-08-12
Payer: MEDICARE

## 2021-08-12 VITALS
OXYGEN SATURATION: 94 % | SYSTOLIC BLOOD PRESSURE: 103 MMHG | TEMPERATURE: 98.9 F | HEART RATE: 93 BPM | RESPIRATION RATE: 18 BRPM | DIASTOLIC BLOOD PRESSURE: 56 MMHG

## 2021-08-12 LAB — MAGNESIUM SERPL-MCNC: 1.7 MG/DL (ref 1.8–2.4)

## 2021-08-12 PROCEDURE — 96365 THER/PROPH/DIAG IV INF INIT: CPT

## 2021-08-12 PROCEDURE — 96375 TX/PRO/DX INJ NEW DRUG ADDON: CPT

## 2021-08-12 PROCEDURE — 74011250636 HC RX REV CODE- 250/636: Performed by: INTERNAL MEDICINE

## 2021-08-12 PROCEDURE — 83735 ASSAY OF MAGNESIUM: CPT

## 2021-08-12 PROCEDURE — 74011000250 HC RX REV CODE- 250: Performed by: INTERNAL MEDICINE

## 2021-08-12 PROCEDURE — 96361 HYDRATE IV INFUSION ADD-ON: CPT

## 2021-08-12 RX ORDER — MAGNESIUM SULFATE 1 G/100ML
1 INJECTION INTRAVENOUS ONCE
Status: COMPLETED | OUTPATIENT
Start: 2021-08-12 | End: 2021-08-12

## 2021-08-12 RX ORDER — SODIUM CHLORIDE 0.9 % (FLUSH) 0.9 %
10-40 SYRINGE (ML) INJECTION AS NEEDED
Status: ACTIVE | OUTPATIENT
Start: 2021-08-12 | End: 2021-08-12

## 2021-08-12 RX ADMIN — PROMETHAZINE HYDROCHLORIDE 25 MG: 25 INJECTION INTRAMUSCULAR; INTRAVENOUS at 07:59

## 2021-08-12 RX ADMIN — Medication 10 ML: at 07:30

## 2021-08-12 RX ADMIN — SODIUM CHLORIDE 500 ML: 900 INJECTION, SOLUTION INTRAVENOUS at 08:09

## 2021-08-12 RX ADMIN — Medication 10 ML: at 09:41

## 2021-08-12 RX ADMIN — MAGNESIUM SULFATE HEPTAHYDRATE 1 G: 1 INJECTION, SOLUTION INTRAVENOUS at 08:54

## 2021-08-12 NOTE — PROGRESS NOTES
Clinical Social Work Note  Name: Tracy Daniels  : 1970  MRN: [id number]  Date of Service: 2021  Location of Service: [facility]  Date of Service: 2021    Type of Service: Case Management     Length of Service: 15 minutes    Patient Diagnosis: No diagnosis found. Referral Source: rn    Reason for Visit: fu    Subject: Seen patient by herself,  provided therapeutic reassurance. At  this moment, pt denies any psychosocial and economic needs. Mental Status Exam: Intellectual functioning appears to be intact. Mood is \"good\" and affect is good. Insight is adequate. Judgment is adequate. Patient did not report suicidal ideations, intent or plans. Patient did not report homicidal ideations, intent or plans. Patient is oriented to self, place, time and situation. Protective Factors: Current care for physical and mental illness, adequate insight and judgment, family support, cultural and [employer]ism beliefs and values that support self-care. Next Steps: LAXMI-ASHLEY gave contact information and encouraged pt to call should any needs arise. Pt verbalized understanding. LAXMI-ASHLEY intends to follow up by phone and/or face-to-face as needed. No flowsheet data found.       Signed By: LAXMI Law     2021

## 2021-08-12 NOTE — PROGRESS NOTES
Arrived to the [facility]. Magnesium level drawn, Mag 1.7, 1g IV magnesium and 25mg IV phenergan completed. Patient tolerated well. Any issues or concerns during appointment: none. Patient aware of next infusion appointment on 8/19 at 7:15am.  Discharged ambulatory to home.

## 2021-08-19 ENCOUNTER — HOSPITAL ENCOUNTER (OUTPATIENT)
Dept: INFUSION THERAPY | Age: 51
Discharge: HOME | End: 2021-08-19
Payer: MEDICARE

## 2021-08-19 VITALS
RESPIRATION RATE: 18 BRPM | BODY MASS INDEX: 34.75 KG/M2 | WEIGHT: 190 LBS | OXYGEN SATURATION: 96 % | DIASTOLIC BLOOD PRESSURE: 86 MMHG | HEART RATE: 123 BPM | TEMPERATURE: 98.3 F | SYSTOLIC BLOOD PRESSURE: 128 MMHG

## 2021-08-19 LAB — MAGNESIUM SERPL-MCNC: 1.8 MG/DL (ref 1.8–2.4)

## 2021-08-19 PROCEDURE — 74011250636 HC RX REV CODE- 250/636: Performed by: INTERNAL MEDICINE

## 2021-08-19 PROCEDURE — 74011000250 HC RX REV CODE- 250: Performed by: INTERNAL MEDICINE

## 2021-08-19 PROCEDURE — 83735 ASSAY OF MAGNESIUM: CPT

## 2021-08-19 PROCEDURE — 96374 THER/PROPH/DIAG INJ IV PUSH: CPT

## 2021-08-19 RX ORDER — SODIUM CHLORIDE 0.9 % (FLUSH) 0.9 %
10 SYRINGE (ML) INJECTION AS NEEDED
Status: DISCONTINUED | OUTPATIENT
Start: 2021-08-19 | End: 2021-08-21 | Stop reason: HOSPADM

## 2021-08-19 RX ORDER — PROMETHAZINE HYDROCHLORIDE 25 MG/ML
25 INJECTION, SOLUTION INTRAMUSCULAR; INTRAVENOUS ONCE
Status: DISCONTINUED | OUTPATIENT
Start: 2021-08-19 | End: 2021-08-19 | Stop reason: CLARIF

## 2021-08-19 RX ORDER — SODIUM CHLORIDE 9 MG/ML
25 INJECTION, SOLUTION INTRAVENOUS ONCE
Status: COMPLETED | OUTPATIENT
Start: 2021-08-19 | End: 2021-08-19

## 2021-08-19 RX ADMIN — PROMETHAZINE HYDROCHLORIDE 25 MG: 25 INJECTION INTRAMUSCULAR; INTRAVENOUS at 08:30

## 2021-08-19 RX ADMIN — SODIUM CHLORIDE 25 ML/HR: 900 INJECTION, SOLUTION INTRAVENOUS at 07:41

## 2021-08-19 RX ADMIN — Medication 10 ML: at 07:41

## 2021-08-19 NOTE — PROGRESS NOTES
Arrived to the [facility]. IV phenergan completed. No magnesium replacement needed per lab parameters. Patient tolerated Phenergan well. Any issues or concerns during appointment: NO.  Patient aware of next infusion appointment on 08/26/21 (date) at [address] [place] [address] (time). Discharged ambulatory.

## 2021-08-26 ENCOUNTER — HOSPITAL ENCOUNTER (OUTPATIENT)
Dept: INFUSION THERAPY | Age: 51
Discharge: HOME | End: 2021-08-26
Payer: MEDICARE

## 2021-08-26 VITALS
DIASTOLIC BLOOD PRESSURE: 73 MMHG | OXYGEN SATURATION: 97 % | BODY MASS INDEX: 34.75 KG/M2 | TEMPERATURE: 98.1 F | HEART RATE: 85 BPM | SYSTOLIC BLOOD PRESSURE: 114 MMHG | RESPIRATION RATE: 18 BRPM | WEIGHT: 190 LBS

## 2021-08-26 LAB — MAGNESIUM SERPL-MCNC: 1.8 MG/DL (ref 1.8–2.4)

## 2021-08-26 PROCEDURE — 74011000250 HC RX REV CODE- 250: Performed by: INTERNAL MEDICINE

## 2021-08-26 PROCEDURE — 83735 ASSAY OF MAGNESIUM: CPT

## 2021-08-26 PROCEDURE — 96374 THER/PROPH/DIAG INJ IV PUSH: CPT

## 2021-08-26 PROCEDURE — 74011250636 HC RX REV CODE- 250/636: Performed by: INTERNAL MEDICINE

## 2021-08-26 RX ORDER — SODIUM CHLORIDE 0.9 % (FLUSH) 0.9 %
10 SYRINGE (ML) INJECTION EVERY 8 HOURS
Status: DISCONTINUED | OUTPATIENT
Start: 2021-08-26 | End: 2021-08-28 | Stop reason: HOSPADM

## 2021-08-26 RX ADMIN — Medication 10 ML: at 07:41

## 2021-08-26 RX ADMIN — Medication 10 ML: at 09:00

## 2021-08-26 RX ADMIN — PROMETHAZINE HYDROCHLORIDE 25 MG: 25 INJECTION INTRAMUSCULAR; INTRAVENOUS at 07:41

## 2021-08-26 RX ADMIN — Medication 10 ML: at 07:52

## 2021-08-26 NOTE — PROGRESS NOTES
Pt arrived ambulatory today at 0715, to receive labs/possible Magnesium/port flushed and needle changed out, for home IV fluids & phenergan. Pt tolerated without difficulty. Patient discharged via ambulatory accompanied by father. Instructed to notify physician of any problems, questions or concerns. Allowed opportunity for patient/family to ask questions. Verbalized understanding. Next appointment is Sept 2 at [facility] with Teresita [id number].

## 2021-08-27 ENCOUNTER — HOSPITAL ENCOUNTER (EMERGENCY)
Age: 51
Discharge: HOME | End: 2021-08-27
Attending: EMERGENCY MEDICINE
Payer: MEDICARE

## 2021-08-27 VITALS
SYSTOLIC BLOOD PRESSURE: 150 MMHG | TEMPERATURE: 97.7 F | BODY MASS INDEX: 34.96 KG/M2 | WEIGHT: 190 LBS | HEART RATE: 87 BPM | RESPIRATION RATE: 16 BRPM | DIASTOLIC BLOOD PRESSURE: 70 MMHG | HEIGHT: 62 IN | OXYGEN SATURATION: 95 %

## 2021-08-27 DIAGNOSIS — R10.9 CHRONIC ABDOMINAL PAIN: Primary | ICD-10-CM

## 2021-08-27 DIAGNOSIS — B37.41 YEAST CYSTITIS: ICD-10-CM

## 2021-08-27 DIAGNOSIS — G89.29 CHRONIC ABDOMINAL PAIN: Primary | ICD-10-CM

## 2021-08-27 DIAGNOSIS — R11.0 NAUSEA WITHOUT VOMITING: ICD-10-CM

## 2021-08-27 LAB
ALBUMIN SERPL-MCNC: 3.7 G/DL (ref 3.5–5)
ALBUMIN/GLOB SERPL: 0.8 {RATIO} (ref 1.2–3.5)
ALP SERPL-CCNC: 121 U/L (ref 50–136)
ALT SERPL-CCNC: 100 U/L (ref 12–65)
ANION GAP SERPL CALC-SCNC: 11 MMOL/L (ref 7–16)
APPEARANCE UR: CLEAR
AST SERPL-CCNC: 96 U/L (ref 15–37)
BACTERIA URNS QL MICRO: NORMAL /HPF
BASOPHILS # BLD: 0 K/UL (ref 0–0.2)
BASOPHILS NFR BLD: 0 % (ref 0–2)
BILIRUB SERPL-MCNC: 0.6 MG/DL (ref 0.2–1.1)
BILIRUB UR QL: NEGATIVE
BUN SERPL-MCNC: 6 MG/DL (ref 6–23)
CALCIUM SERPL-MCNC: 8.9 MG/DL (ref 8.3–10.4)
CASTS URNS QL MICRO: 0 /LPF
CHLORIDE SERPL-SCNC: 110 MMOL/L (ref 98–107)
CO2 SERPL-SCNC: 19 MMOL/L (ref 21–32)
COLOR UR: YELLOW
CREAT SERPL-MCNC: 1.02 MG/DL (ref 0.6–1)
CRYSTALS URNS QL MICRO: 0 /LPF
DIFFERENTIAL METHOD BLD: ABNORMAL
EOSINOPHIL # BLD: 0 K/UL (ref 0–0.8)
EOSINOPHIL NFR BLD: 0 % (ref 0.5–7.8)
EPI CELLS #/AREA URNS HPF: NORMAL /HPF
ERYTHROCYTE [DISTWIDTH] IN BLOOD BY AUTOMATED COUNT: 13.4 % (ref 11.9–14.6)
GLOBULIN SER CALC-MCNC: 4.8 G/DL (ref 2.3–3.5)
GLUCOSE SERPL-MCNC: 243 MG/DL (ref 65–100)
GLUCOSE UR STRIP.AUTO-MCNC: >1000 MG/DL
HCT VFR BLD AUTO: 42 % (ref 35.8–46.3)
HGB BLD-MCNC: 13.3 G/DL (ref 11.7–15.4)
HGB UR QL STRIP: ABNORMAL
IMM GRANULOCYTES # BLD AUTO: 0 K/UL (ref 0–0.5)
IMM GRANULOCYTES NFR BLD AUTO: 0 % (ref 0–5)
KETONES UR QL STRIP.AUTO: 40 MG/DL
LEUKOCYTE ESTERASE UR QL STRIP.AUTO: NEGATIVE
LIPASE SERPL-CCNC: 57 U/L (ref 73–393)
LYMPHOCYTES # BLD: 0.6 K/UL (ref 0.5–4.6)
LYMPHOCYTES NFR BLD: 10 % (ref 13–44)
MCH RBC QN AUTO: 26.7 PG (ref 26.1–32.9)
MCHC RBC AUTO-ENTMCNC: 31.7 G/DL (ref 31.4–35)
MCV RBC AUTO: 84.3 FL (ref 79.6–97.8)
MONOCYTES # BLD: 0.3 K/UL (ref 0.1–1.3)
MONOCYTES NFR BLD: 5 % (ref 4–12)
MUCOUS THREADS URNS QL MICRO: 0 /LPF
NEUTS SEG # BLD: 5.2 K/UL (ref 1.7–8.2)
NEUTS SEG NFR BLD: 85 % (ref 43–78)
NITRITE UR QL STRIP.AUTO: NEGATIVE
NRBC # BLD: 0 K/UL (ref 0–0.2)
OTHER OBSERVATIONS,UCOM: NORMAL
PH UR STRIP: 6 [PH] (ref 5–9)
PLATELET # BLD AUTO: 209 K/UL (ref 150–450)
PMV BLD AUTO: 12.3 FL (ref 9.4–12.3)
POTASSIUM SERPL-SCNC: 5.2 MMOL/L (ref 3.5–5.1)
PROT SERPL-MCNC: 8.5 G/DL (ref 6.3–8.2)
PROT UR STRIP-MCNC: NEGATIVE MG/DL
RBC # BLD AUTO: 4.98 M/UL (ref 4.05–5.2)
RBC #/AREA URNS HPF: NORMAL /HPF
SODIUM SERPL-SCNC: 140 MMOL/L (ref 136–145)
SP GR UR REFRACTOMETRY: 1.02 (ref 1–1.02)
UROBILINOGEN UR QL STRIP.AUTO: 0.2 EU/DL (ref 0.2–1)
WBC # BLD AUTO: 6.2 K/UL (ref 4.3–11.1)
WBC URNS QL MICRO: NORMAL /HPF
YEAST URNS QL MICRO: NORMAL

## 2021-08-27 PROCEDURE — 96375 TX/PRO/DX INJ NEW DRUG ADDON: CPT

## 2021-08-27 PROCEDURE — 83690 ASSAY OF LIPASE: CPT

## 2021-08-27 PROCEDURE — 96374 THER/PROPH/DIAG INJ IV PUSH: CPT

## 2021-08-27 PROCEDURE — 96376 TX/PRO/DX INJ SAME DRUG ADON: CPT

## 2021-08-27 PROCEDURE — 96361 HYDRATE IV INFUSION ADD-ON: CPT

## 2021-08-27 PROCEDURE — 74011000250 HC RX REV CODE- 250: Performed by: EMERGENCY MEDICINE

## 2021-08-27 PROCEDURE — 99283 EMERGENCY DEPT VISIT LOW MDM: CPT

## 2021-08-27 PROCEDURE — 74011250636 HC RX REV CODE- 250/636: Performed by: EMERGENCY MEDICINE

## 2021-08-27 PROCEDURE — 81015 MICROSCOPIC EXAM OF URINE: CPT

## 2021-08-27 PROCEDURE — 80053 COMPREHEN METABOLIC PANEL: CPT

## 2021-08-27 PROCEDURE — 85025 COMPLETE CBC W/AUTO DIFF WBC: CPT

## 2021-08-27 PROCEDURE — 81001 URINALYSIS AUTO W/SCOPE: CPT

## 2021-08-27 RX ORDER — FLUCONAZOLE 100 MG/1
200 TABLET ORAL DAILY
Qty: 14 TABLET | Refills: 0 | Status: SHIPPED | OUTPATIENT
Start: 2021-08-27 | End: 2021-09-03

## 2021-08-27 RX ORDER — SODIUM CHLORIDE 0.9 % (FLUSH) 0.9 %
5-10 SYRINGE (ML) INJECTION AS NEEDED
Status: DISCONTINUED | OUTPATIENT
Start: 2021-08-27 | End: 2021-08-27 | Stop reason: HOSPADM

## 2021-08-27 RX ORDER — HYDROMORPHONE HYDROCHLORIDE 1 MG/ML
1 INJECTION, SOLUTION INTRAMUSCULAR; INTRAVENOUS; SUBCUTANEOUS ONCE
Status: COMPLETED | OUTPATIENT
Start: 2021-08-27 | End: 2021-08-27

## 2021-08-27 RX ORDER — SODIUM CHLORIDE 0.9 % (FLUSH) 0.9 %
5-10 SYRINGE (ML) INJECTION EVERY 8 HOURS
Status: DISCONTINUED | OUTPATIENT
Start: 2021-08-27 | End: 2021-08-27 | Stop reason: HOSPADM

## 2021-08-27 RX ORDER — HALOPERIDOL 5 MG/ML
5 INJECTION INTRAMUSCULAR
Status: COMPLETED | OUTPATIENT
Start: 2021-08-27 | End: 2021-08-27

## 2021-08-27 RX ORDER — DIPHENHYDRAMINE HYDROCHLORIDE 50 MG/ML
12.5 INJECTION, SOLUTION INTRAMUSCULAR; INTRAVENOUS
Status: COMPLETED | OUTPATIENT
Start: 2021-08-27 | End: 2021-08-27

## 2021-08-27 RX ADMIN — HYDROMORPHONE HYDROCHLORIDE 1 MG: 1 INJECTION, SOLUTION INTRAMUSCULAR; INTRAVENOUS; SUBCUTANEOUS at 13:52

## 2021-08-27 RX ADMIN — SODIUM CHLORIDE 1000 ML: 900 INJECTION, SOLUTION INTRAVENOUS at 10:58

## 2021-08-27 RX ADMIN — SODIUM CHLORIDE 1000 ML: 900 INJECTION, SOLUTION INTRAVENOUS at 13:52

## 2021-08-27 RX ADMIN — PROCHLORPERAZINE EDISYLATE 10 MG: 5 INJECTION INTRAMUSCULAR; INTRAVENOUS at 11:17

## 2021-08-27 RX ADMIN — HYDROMORPHONE HYDROCHLORIDE 1 MG: 1 INJECTION, SOLUTION INTRAMUSCULAR; INTRAVENOUS; SUBCUTANEOUS at 11:17

## 2021-08-27 RX ADMIN — DIPHENHYDRAMINE HYDROCHLORIDE 12.5 MG: 50 INJECTION, SOLUTION INTRAMUSCULAR; INTRAVENOUS at 11:17

## 2021-08-27 RX ADMIN — HALOPERIDOL LACTATE 5 MG: 5 INJECTION, SOLUTION INTRAMUSCULAR at 14:50

## 2021-08-27 RX ADMIN — PROMETHAZINE HYDROCHLORIDE 12.5 MG: 25 INJECTION INTRAMUSCULAR; INTRAVENOUS at 13:52

## 2021-08-27 NOTE — ED TRIAGE NOTES
Patient ambulatory to triage with mask in place. Patient reports chronic pancreatitis and nausea. Pt reports dry heaving since yesterday.

## 2021-08-27 NOTE — ED PROVIDER NOTES
Patient presents the ER with complaints of abdominal pain and vomiting. History of chronic pancreatitis as well as esophageal stricture. Frequently goes to infusion center for fluids and antiemetics. States since last evening she is had worsening pain nausea and vomiting. Denies any hematemesis or melena. Denies fevers or chills. Reports normal bowel movement earlier today. The history is provided by the patient. Nausea   This is a chronic problem. The current episode started yesterday. The problem occurs 2 to 4 times per day. The problem has been gradually worsening. The emesis has an appearance of stomach contents. There has been no fever. Associated symptoms include abdominal pain. Pertinent negatives include no fever, no sweats, no diarrhea, no headaches, no URI and no headaches.         Past Medical History:   Diagnosis Date    Anemia     blood transfusion 2013 X1 d/t \"perforated bowel\"-- Fe infusions 12/2017--- denies any current iron infusions 12/17/2019    Anxiety and depression     managed with meds    C. difficile diarrhea 2013    in 4223 after complicated ERCP    Cervical dysplasia 1990s    Chronic insomnia     Chronic nausea     Chronic pain     all over     Chronic pancreatitis ([facility] [address]     COVID-19 vaccine series completed 04/21/2021    PT TO BRING CARD DOS    Dehydration     Encounter for insertion of venous access port     Left chest port    Esophageal stricture 2017    Fibromyalgia     managed with meds    Former cigarette smoker     GERD (gastroesophageal reflux disease)     controlled with med    History of kidney stones 03/2021    X1-naturally pass    HLD (hyperlipidemia)     pt denies     IBS (irritable bowel syndrome)     Migraine headache     Nausea & vomiting     pt reports she does better with phenergan than zofran - causes HA    Nonalcoholic fatty liver disease     PUD (peptic ulcer disease) 06/2017    Sinus tachycardia     per pt-- no tx needed    Sphincter of Oddi dysfunction     Type 2 diabetes mellitus ([facility] [address]     type 2-- sqbs am avg 200--- Hypo symptoms at <100, Insulin dependent       Past Surgical History:   Procedure Laterality Date    COLONOSCOPY N/A 4/4/2018    COLONOSCOPY performed by Alexandra Kahn MD at [address] [facility] EGD  12/18/2019         EGD  5/21/2021         HX CARPAL TUNNEL RELEASE Right     along with trigger finger    HX COLONOSCOPY      HX ENDOSCOPY  2017    36 fr thomas    HX ERCP  3/5/2013    resulted in perforated duodenum    HX HYSTERECTOMY  1998    ovaries remain    HX LAP CHOLECYSTECTOMY  1997    HX LAPAROTOMY  3/5/2013    exploratory for duodenal perforation with ERCP    HX ORTHOPAEDIC      right finger surgery 11/2017    HX OTHER SURGICAL Bilateral     thumb    HX OTHER SURGICAL      Kidney stones march 2021    HX TOTAL COLECTOMY      HX UROLOGICAL  4/25/13 at [facility]-- stent removed 6-27-13.   Dr Landon Kang  2014    port insertion, left chest wall    IR DRAIN CUTANEOUS/SUBCU ABSCESS      DC ABDOMEN SURGERY PROC UNLISTED  last one placed  2012    Hx of pancreatic stent, multiple    DC ABDOMEN SURGERY PROC UNLISTED  1997    lap nissen    DC ABDOMEN SURGERY [address] UNLISTED  4/13    explor lap         Family History:   Problem Relation Age of Onset    Diabetes Mother     Hypertension Mother     Heart Disease Mother         cabg began in her 52's    Diabetes Father     Hypertension Father     Other Father     Stroke Father     Coronary Artery Disease Father 76    No Known Problems Sister        Social History     Socioeconomic History    Marital status:      Spouse name: Not on file    Number of children: Not on file    Years of education: Not on file    Highest education level: Not on file   Occupational History    Not on file   Tobacco Use    Smoking status: Former Smoker     Packs/day: 1.00     Years: 3.00     Pack years: 3.00     Quit date: 4/24/1993     Years since quittin.3    Smokeless tobacco: Never Used   Vaping Use    Vaping Use: Never used   Substance and Sexual Activity    Alcohol use: No    Drug use: No    Sexual activity: Not on file   Other Topics Concern    Not on file   Social History Narrative    Not on file     Social Determinants of Health     Financial Resource Strain:     Difficulty of Paying Living Expenses:    Food Insecurity:     Worried About Running Out of Food in the Last Year:     [address] in the Last Year:    Transportation Needs:     Lack of Transportation (Medical):  Lack of Transportation (Non-Medical):    Physical Activity:     Days of Exercise per Week:     Minutes of Exercise per Session:    Stress:     Feeling of Stress :    Social Connections:     Frequency of Communication with Friends and Family:     Frequency of Social Gatherings with Friends and Family:     Attends [employer] Services:     Active Member of Clubs or Organizations:     Attends Club or Organization Meetings:     Marital Status:    Intimate Partner Violence:     Fear of Current or Ex-Partner:     Emotionally Abused:     Physically Abused:     Sexually Abused: ALLERGIES: Tape [adhesive], Barium iodide, Flagyl [metronidazole], Metformin, and Insulins    Review of Systems   Constitutional: Positive for fatigue. Negative for fever. HENT: Negative for congestion, dental problem, trouble swallowing and voice change. Eyes: Negative for photophobia, redness and visual disturbance. Respiratory: Negative for choking, chest tightness, shortness of breath and stridor. Cardiovascular: Negative for chest pain and leg swelling. Gastrointestinal: Positive for abdominal pain, nausea and vomiting. Negative for blood in stool and diarrhea. Endocrine: Negative for polyphagia and polyuria. Genitourinary: Negative for decreased urine volume, flank pain and urgency.    Musculoskeletal: Negative for back pain, gait problem, joint swelling, neck pain and neck stiffness. Skin: Negative for color change, pallor and rash. Neurological: Negative for syncope, weakness, light-headedness and headaches. Hematological: Negative for adenopathy. Does not bruise/bleed easily. Psychiatric/Behavioral: Negative for behavioral problems and confusion. All other systems reviewed and are negative. Vitals:    08/27/21 1045   BP: (!) 145/79   Pulse: 95   Resp: 16   Temp: 97.7 °F (36.5 °C)   SpO2: 97%   Weight: 86.2 kg (190 lb)   Height: 5' 2\" (1.575 m)            Physical Exam  Vitals and nursing note reviewed. Constitutional:       General: She is not in acute distress. Appearance: Normal appearance. She is not ill-appearing. HENT:      Head: Normocephalic and atraumatic. Right Ear: External ear normal.      Left Ear: External ear normal.      Nose: Nose normal. No rhinorrhea. Eyes:      General:         Right eye: No discharge. Left eye: No discharge. Extraocular Movements: Extraocular movements intact. Pupils: Pupils are equal, round, and reactive to light. Cardiovascular:      Rate and Rhythm: Normal rate and regular rhythm. Pulses: Normal pulses. Heart sounds: Normal heart sounds. No murmur heard. No friction rub. Pulmonary:      Effort: Pulmonary effort is normal. No respiratory distress. Breath sounds: Normal breath sounds. No stridor. No wheezing or rhonchi. Abdominal:      General: Abdomen is flat. There is no distension. Palpations: Abdomen is soft. There is no mass. Tenderness: There is no abdominal tenderness. Hernia: No hernia is present. Musculoskeletal:         General: No swelling, tenderness, deformity or signs of injury. Cervical back: Normal range of motion and neck supple. No rigidity or tenderness. Skin:     Coloration: Skin is not jaundiced or pale. Findings: No bruising. Neurological:      General: No focal deficit present.       Mental Status: She is alert and oriented to person, place, and time. Cranial Nerves: No cranial nerve deficit. Sensory: No sensory deficit. Motor: No weakness.    Psychiatric:         Mood and Affect: Mood normal.         Behavior: Behavior normal.          MDM  Number of Diagnoses or Management Options  Diagnosis management comments: Plan for screening labs here, will treat symptomatically       Amount and/or Complexity of Data Reviewed  Clinical lab tests: ordered and reviewed  Review and summarize past medical records: yes    Risk of Complications, Morbidity, and/or Mortality  Presenting problems: moderate  Diagnostic procedures: moderate  Management options: high  General comments: High risk due to parenteral narcotics    Patient Progress  Patient progress: stable         Procedures

## 2021-08-27 NOTE — PROGRESS NOTES
Attempted to reach patient, but was not able to do so. Left voicemail to inform about COVID testing pre proc.

## 2021-08-27 NOTE — DISCHARGE INSTRUCTIONS
Follow-up with your primary care physician  Follow-up with your gastroenterologist  Return to the ER for new, worsening or life-threatening symptoms

## 2021-08-27 NOTE — ED NOTES
I have reviewed discharge instructions with the patient. The patient verbalized understanding. Patient left ED via Discharge Method: ambulatory to Home with (insert name of family/friend, self, transport family). Opportunity for questions and clarification provided. Patient given 1 scripts. To continue your aftercare when you leave the hospital, you may receive an automated call from our care team to check in on how you are doing.  This is a free service and part of our promise to provide the best care and service to meet your aftercare needs. \" If you have questions, or wish to unsubscribe from this service please call [phone].  Thank you for Choosing our [facility].

## 2021-08-28 ENCOUNTER — ANESTHESIA EVENT (OUTPATIENT)
Dept: ENDOSCOPY | Age: 51
End: 2021-08-28
Payer: MEDICARE

## 2021-08-28 RX ORDER — SODIUM CHLORIDE, SODIUM LACTATE, POTASSIUM CHLORIDE, CALCIUM CHLORIDE 600; 310; 30; 20 MG/100ML; MG/100ML; MG/100ML; MG/100ML
100 INJECTION, SOLUTION INTRAVENOUS CONTINUOUS
Status: CANCELLED | OUTPATIENT
Start: 2021-08-28

## 2021-08-28 RX ORDER — SODIUM CHLORIDE 0.9 % (FLUSH) 0.9 %
5-40 SYRINGE (ML) INJECTION EVERY 8 HOURS
Status: CANCELLED | OUTPATIENT
Start: 2021-08-28

## 2021-08-28 RX ORDER — SODIUM CHLORIDE 0.9 % (FLUSH) 0.9 %
5-40 SYRINGE (ML) INJECTION AS NEEDED
Status: CANCELLED | OUTPATIENT
Start: 2021-08-28

## 2021-08-28 NOTE — ADDENDUM NOTE
Encounter addended by: Chelsey Cobb RN on: 8/28/2021 5:34 AM   Actions taken: Charge Capture section accepted

## 2021-08-30 ENCOUNTER — HOSPITAL ENCOUNTER (OUTPATIENT)
Age: 51
Setting detail: OUTPATIENT SURGERY
Discharge: HOME | End: 2021-08-30
Attending: INTERNAL MEDICINE | Admitting: INTERNAL MEDICINE
Payer: MEDICARE

## 2021-08-30 ENCOUNTER — ANESTHESIA (OUTPATIENT)
Dept: ENDOSCOPY | Age: 51
End: 2021-08-30
Payer: MEDICARE

## 2021-08-30 VITALS
DIASTOLIC BLOOD PRESSURE: 87 MMHG | SYSTOLIC BLOOD PRESSURE: 143 MMHG | RESPIRATION RATE: 16 BRPM | HEART RATE: 61 BPM | OXYGEN SATURATION: 99 % | TEMPERATURE: 98.4 F

## 2021-08-30 LAB
GLUCOSE BLD STRIP.AUTO-MCNC: 148 MG/DL (ref 65–100)
SERVICE CMNT-IMP: ABNORMAL

## 2021-08-30 PROCEDURE — 2709999900 HC NON-CHARGEABLE SUPPLY: Performed by: INTERNAL MEDICINE

## 2021-08-30 PROCEDURE — 74011250636 HC RX REV CODE- 250/636: Performed by: NURSE ANESTHETIST, CERTIFIED REGISTERED

## 2021-08-30 PROCEDURE — 76060000031 HC ANESTHESIA FIRST 0.5 HR: Performed by: INTERNAL MEDICINE

## 2021-08-30 PROCEDURE — 82962 GLUCOSE BLOOD TEST: CPT

## 2021-08-30 PROCEDURE — 76040000025: Performed by: INTERNAL MEDICINE

## 2021-08-30 PROCEDURE — 74011250636 HC RX REV CODE- 250/636: Performed by: ANESTHESIOLOGY

## 2021-08-30 RX ORDER — PROPOFOL 10 MG/ML
INJECTION, EMULSION INTRAVENOUS AS NEEDED
Status: DISCONTINUED | OUTPATIENT
Start: 2021-08-30 | End: 2021-08-30 | Stop reason: HOSPADM

## 2021-08-30 RX ORDER — SODIUM CHLORIDE, SODIUM LACTATE, POTASSIUM CHLORIDE, CALCIUM CHLORIDE 600; 310; 30; 20 MG/100ML; MG/100ML; MG/100ML; MG/100ML
100 INJECTION, SOLUTION INTRAVENOUS CONTINUOUS
Status: DISCONTINUED | OUTPATIENT
Start: 2021-08-30 | End: 2021-08-30 | Stop reason: HOSPADM

## 2021-08-30 RX ORDER — ONDANSETRON 2 MG/ML
INJECTION INTRAMUSCULAR; INTRAVENOUS AS NEEDED
Status: DISCONTINUED | OUTPATIENT
Start: 2021-08-30 | End: 2021-08-30 | Stop reason: HOSPADM

## 2021-08-30 RX ADMIN — PROPOFOL 50 MG: 10 INJECTION, EMULSION INTRAVENOUS at 08:40

## 2021-08-30 RX ADMIN — PROPOFOL 50 MG: 10 INJECTION, EMULSION INTRAVENOUS at 08:38

## 2021-08-30 RX ADMIN — PROPOFOL 50 MG: 10 INJECTION, EMULSION INTRAVENOUS at 08:35

## 2021-08-30 RX ADMIN — ONDANSETRON 4 MG: 2 INJECTION INTRAMUSCULAR; INTRAVENOUS at 08:33

## 2021-08-30 RX ADMIN — SODIUM CHLORIDE, SODIUM LACTATE, POTASSIUM CHLORIDE, AND CALCIUM CHLORIDE: 600; 310; 30; 20 INJECTION, SOLUTION INTRAVENOUS at 08:24

## 2021-08-30 RX ADMIN — PROPOFOL 50 MG: 10 INJECTION, EMULSION INTRAVENOUS at 08:33

## 2021-08-30 NOTE — ROUTINE PROCESS
VSS. No complaints noted. Education given and reviewed with patient and . Patient wheeled out via wheelchair by John Byrd, [facility].

## 2021-08-30 NOTE — PROGRESS NOTES
Patient stated her port is to remain accessed for fluids at home and that she will do her own heparin flush.

## 2021-08-30 NOTE — PROCEDURES
Esophagogastroduodenoscopy (EGD) Procedure Note    Procedure: EGD    Pre-operative Diagnosis: Esophageal stricture      Post-operative Diagnosis: Esophageal stricture-dilated 50, 48, 52-Czech Guido with good disruption, no active bleeding or deep tears        Status post gastric bypass     Recommendations:  soft diet for 24 hours  -No acidic drinks or foods, nothing spicy or hot  -PPI twice daily for one week then as directed thereafter.  -Report any significant chest pain, painful swallowing after dilation.  -Avoid NSAIDs for 1 week up         Follow up:   Dr. Zak Li in 2-4 week     Anesthesia/Sedation:  MAC, (see separate report). Procedure Details:  Informed consent was obtained for the procedure, including sedation. Risks of perforation, hemorrhage, adverse drug reaction and aspiration were discussed. The patient was placed in the left lateral decubitus position. Based on the pre-procedure assessment, including review of the patient's medical history, medications, allergies, and review of systems, she had been deemed to be an appropriate candidate for anesthesia. The patient was monitored continuously with ECG tracing, pulse oximetry, blood pressure monitoring, and direct observations. Please refer to the anesthesia record for details and dosages of medications. The esophagus was intubated with direct visualization. The esophagus, stomach and duodenum were traversed to the full extent of the endoscope. Retroflexed views of the cardia and fundus were performed. The stomach was fully insufflated and deflated. Findings:   OROPHARYNX: The oropharynx was briefly viewed on entry and withdrawal with very brief evaluation of the cords, arytenoids and pyriform sinuses. No abnormalities found. ESOPHAGUS:  The esophagus was normal with an intact squamocolumnar z-line without erosions or evidence of Langford's intestinal metaplasia.   The mucosa was normal.  The endoscope was easily passed into the gastric pouch. There was mild stricture at about 37-38 cm, just above the GE junction. The endoscope passed through easily without resistance. Dilators 48, 48 and 52-Danish Monda Olives was passed easily without resistance. There is no blood on the dilators. Repeat endoscopy after each revealed modest disruption and improved patency. STOMACH: The gastric pouch was small and gastrojejunostomy widely patent. JEJUNUM :  The endoscope was easily passed into the third section of the duodenum. The villous mucosa was normal without blunting or scalloped folds. There were no mucosal erosions, ulcerations, raised lesions or vascular ectasias. EBL: 0 cc's. Pathology speciment:  None. Complications: No apparent complications and the patient tolerated sedation and the procedure well. Attending Attestation: I performed the procedure.     Tristen Cross MD

## 2021-08-30 NOTE — DISCHARGE INSTRUCTIONS
Gastrointestinal Esophagogastroduodenoscopy (EGD) - Upper Exam Discharge Instructions    1. Call Dr. Mikki Corea for any problems or questions. 2. Contact the doctor's office for follow up appointment as directed. 3. Medication may cause drowsiness for several hours, therefore:  · Do not drive or operate machinery for remainder of the day. · No alcohol today. · Do not make any important or legal decisions for 24 hours. · Do not sign any legal documents for 24 hours. 5. Ordinarily, you may resume regular diet and activity after exam unless otherwise              specified by your physician. 6. For mild soreness in your throat you may use Cepacol throat lozenges or warm               salt-water gargles as needed. Any additional instructions:    -Soft diet for 24 hours  -No acidic drinks or foods, nothing spicy or hot  -PPI twice daily for one week then as directed thereafter.  -Report any significant chest pain, painful swallowing after dilation.  -Avoid NSAIDs for 1 week up     Instructions given to Gregorio Sethi and other family members.

## 2021-08-30 NOTE — H&P
Chief complaint/HPI and PMH: outpt record reviewed  Today's exam:  LUNGS:  Clear and equal.  COR:  RRR without murmurs heard. NEURO:  A and Oriented fully. I have thoroughly explained the preparation, procedure and sedation process to the patient as well as the risks and alternatives. They will sign informed consent prior to the procedure. Ana Marte MD                        Patient:   Gertrudis Engel  YOB: 1970   Date:                        07/12/2021 10:00 AM   Visit Type:                  Office Visit  Provider:   Heriberto Singer MD  Primary Care Provider: Antoine Corral MD [facility]    This 44year old female presents for dysphagia. Paul Chavis History of Present Illness:  1.  dysphagia. At her office visit of 10-1-20, due to cost of Levsin for her esophageal spasms, I had her try NTG to start with swallowing the spray to minimize headaches. She was to try crushing NTG if she didn't tolerate the NTG spray. She continued with multiple EGD dilations:    EGD 10-14-20 Dr Cash Pulling: Yaniv [place] #48 moderate GEJ tear; gastritis. EGD 11-19-20 Dr Nicolasa Colvin: retained gastric debris; [place] #46 GEJ tear. EGD 12-8-20 Dr Heath Avendaño: CRE 18 mm with mild distal esophageal tear. EGD 1-28-21 Dr Cash Pulling: gastritis, [place] #50 moderate GEJ tear, injected Kenalog. EGD 4-13-21 Dr Cash Pulling: [place] #48 mild GEJ tear, injected Kenalog; minimal retained food. EGD 5-21-21 Dr Dee Joe: Yaniv [place] #40 with GEJ tear, injected Kenalog. EGD 6-23-21 Dr Rebeca Arciniega: [place] #44 with tear. She doesn't have followup EGD. She has severe nausea. It can be severe enough that she felt she was barely able to make today's appointment. Her DM is better, she continues to gain weight despite her report that she's \"hardly eating\" much. She admits to eating a large amount of sugar since she feels that is the only type of food she can eat.   She takes NTG tablet 0.3 mg she swallows it before breakfast, Levsin during the day.      ROS is negative or as otherwise documented as per HPI, PMH, past chronic medical history. PROBLEM LIST:     Problem Description Onset Date Chronic Clinical Status Notes   GERD - Gastro-esophageal reflux disease 03/10/2011 Y  GERD w/o esophagitis. Fundoplication 6438. Bile gastritis May 2014. IBS - Irritable bowel syndrome 03/10/2011 Y  IBS constipation. Nonalcoholic fatty liver disease 05/27/2012 Y  Fatty liver on CT scans [facility] May 2012). Dysfunction of sphincter of Oddi 03/10/2011 Y  Dx [facility] (Dr Rodolfo Yu) Nov 2006 ERCP: papillary stenosis/spasm, manometry w/elevated basal sphincter press c/w biliary sphincter HTN, elev basal pancreatic sphincter pressures; Dual sphincterotomy. Interim pain free spell from 2006 to March 2009. ERCP June 2009: elev pancreatic/biliary pressure (residual sphincter rather than stenosis); both orifices cut. EGD/ERCP [facility]) June 2010 negative. Relapsed March 2011 (lipase >2000). Acute pancreatitis March 2011. ERCP 4-29-11: Pancreatic stent [facility]). Admission 5-26-11 for lipase >2200. Admit 2-16-12 for pancreatitis (lipase 5920). [facility] Dr Miki Ruggiero consult 4-11-12: surgical open sphincteroplasty 5-3-12. Hospitalized 1-14 to 1-14-13. ERCP 3-5-13 w duodenal perforation. [facility] turned patient down for re-consult 9-3-13. Pain Management Aug 2013. On weekly to 2x weekly IVF hydration with iv Dilaudid 2 mg iv Phenergan 25 mg iv. Feb 18 to Feb 23, 2014: daily IVF + Dilaudid 4 mg + Phenergan. Recurrent acute pancreatitis 05/31/2011 Y  Acute pancreatitis March 2011. ERCP 4-29-11: Pancreatic stent [facility]). Admission 5-26-11 for lipase >2200. Admit 2-16-12 for pancreatitis (lipase 5920). [facility] Dr Miki Ruggiero consult 4-11-12: surgical open sphincteroplasty 5-3-12. Hospitalized 1-14 to 1-14-13. ERCP 3-5-13 w duodenal perforation. [facility] turned patient down for re-consult 9-3-13. Pain Management Aug 2013.   On weekly to 2x weekly IVF hydration with iv Dilaudid 2 mg iv Phenergan 25 mg iv. Feb 18 to Feb 23, 2014: daily IVF + Dilaudid 4 mg + Phenergan. Chronic abdominal pain 06/28/2013 Y  Nausea since early 2012. Zofran doesn't help with nausea any better than Phenergan. Lortab, Percocet and oxycodone causes nausea. Scopolamine patch didn't work (blistering rash). Past Medical/Surgical History:   (Detailed)  Disease/disorder Onset Date Management Date Comments   EUS+EGD ([facility]) 6-25-10: negative 2010 2010    ERCP ([facility]) 6-26-09: residual biliary sphincter 2009 Dual sphincterotomy 2009    EGD 12-8-06: no pancreatic stent 12/08/2006 2006    ERCP ([facility]) 11-17-06: SOD biliary,pancreatic 11/17/2006 Dual sphincterotomy 2006    EGD 4-7-04 (epig pn): intact fundoplication 9491  0193    EGD 12-4-00 (CP, epig pn): normal 2000 2000    Colonoscopy 12-4-00 (low pn, change BH): [facility] 2000 2000    Hysterectomy laparoscopic (intact ovaries) [id number] [facility] fundoplication 3779 for GERD 1997 1997    Cholecystectomy laparoscopic (dyskinesia) 1997 1997 1997    60 fr thomas  EGD 12/18/2019    esophageal stricture- 39 fr thomas  EGD 08/17/2017    EGD 5-29-14 (nausea)  Dr Kailyn Gibson 2014 New surgical changes (gastrojejunostomy anastamosis on posterior body of stomach is widely open with 2 limbs?); diffuse severe bile gastritis; bile secreted from ampulla, duodenum \"dead end\". Ulcerative esophagitis. UGI series 6-2-14 2014 Contrast passed readily through the gastrojejunomstomy anastomosis; no anastamosis stricture. Diabetes mellitus, type 1  PCP 2013 Patient doesn't know her HgbA1c. ERCP 3-5-13 (MRI 2-21-13: 2 mm CBD defect)  Dr Amos Campos 2013 Balloon sweep of CBD sludge; surgical changes of surgical sphincteroplasty. Oversew of duodenal perforation March 2013  Dr Stacy Ewing 2013 Given location of oversew, monitor for potential outlet obstruction (verbal communication). EGD 2-5-13 (epig pain)  Dr Kailyn Gibson 2013 Minimal gastritis.    MRCP 7-26-13 (N/V/pain)    No acute findings. Exp Lap 13  Dr Lisa Staley, Dr Fiona Gordillo  Drainage of retroperitoneal abscess, peritonitis, drainage of intraabdominal abscess, duodenal resection, retrocolic gastrojejunostomy, abd washout. Exp Lap 13  Dr Fiona Gordillo  Drainage and debridement of persistent R retroperitoneal abscess. (IR also had drainage tubes which they are monitoring along w Dr Fiona Gordillo). R ureteral stent 13  Dr Jenny Sanchez  Obstruction of ureter due to pancreatic abscess. Stent REMOVED by cystoscopy 13. To be monitored by Dr Jenny Sanchez. Open transduodenal sphincteroplasty 5-3-12  Dr Brittany Reagan [facility] surgery)  Tight PD, firm pancreas w scar tissue c/w multiple bouts pancreatitis. Postop complication: Enterobacter arogenes of RUQP abscess, empyema (Rx 4 wks ertapenem). EUS 12 Indeterminate (3 criteria) for chronic pancreatitis. No strictures. Minimal dilation of PD to 3 mm. ERP [facility]) 11 SOD 11p; probable stenosis of major papilla orifice (elevated pressures but in sawtooth pattern not typical of sphincter HTN); dilation done and PD stent placed x 1 month. If not better, then surgical intervention. EGD 11 (to remove pancreatic stent)       EGD 17 (dysphagia, pc epig pain)  Dr Yoly Tiwari  Severe distal esophageal stricture w edema, narrowing ( scope). Gastrojejunostomy anastomosis patent, no bleeding. Lata #18, 21 and 24 dilators, no tears. Nutrition 5-18-15: mild malnutrtion: rec: protein meals       EUS 2016  Dr Shelly Davis, [facility]  Indeterminate pancreatic parenchymal changes. Nutrition Consult 16  Jade Martinez, MANJULA, [facility]  Rec: 4-6 sm meals; < 50 gram fat/day; liquid sources of protein (eg Glucerna)   EGD 17 (CP)  Dr Yoly Tiwari  Resolution of the edema, ulcers previously seen but a residual tight GEJ stricture;  scope; dilated to 10 mm without tears. EGD 17  Dr Alem Corcoran #36 with Kenalog injection. EGD 8-23-17  Dr Tejal Ordoñez #40 of tight esophageal stricture. EGD 6-29-15 (severe DANE)  Dr Ta Alberto  Hemorrhagic gastritis with flecks of blood, actively bleeding anastamotic 6 mm HP polyp snared. Esophageal ulcers: Esophageal biopsies (Bx: inflamed squamous mucosa). EGD 5-29-14 (N/V, epig pain)  Dr Ta Alberto  Ulcerative esophagitis, diffuse bile gastritis, wide anastamosis in posterior body of stomach with 2 limbs; bile extruding from ampulla (duodenal \"dead end\"). UGI series 6-2-14    Oral contrast emptied readily through patent gastrojejunostomy (no anastamotic stricture noted), limited small bowel exam.   EGD 10-31-19 (dysphagia)  Dr Ta Alberto  [facility] 07/26/2020 -Spastic esophagus, Orien Rink #52 dilator: minimal distal esophageal tear, retained food in the stomach. EGD 12-18-19 (dysphagia)  Dr Hayde Villa  [facility] 07/26/2020 Rob Slight #60 with UES > LES tear. Endoscopy summary from April 2018 to June 2019    [facility] 07/07/2019 -EGD 4-4-18: Orien Rink #42 with distal 1 cm tear. Colonoscopy 4-4-18: small TA polyp. EGD 5-3-18:  TTS balloon 15 mm with small tear. EGD 6-5-18: balloon 12 mm with tear and intense spasms. EGD 7-17-18: CRE balloon 15 mm with no tears; corkscrew contractions. EGD 8-1-18: CRE 13.5 mm  EGD 8-14-18: Orien Rink #48 with tear  EGD 8-31-18: Orien Rink #48 with moderate tears  EGD 9-14-18: CRE 15 mm balloon. Bx negative for EoE. EGD 9-27-18: Savary #51  EGD 10-26-18: Orien Rink #52 with significant tears. EGD 11-21-18: Orien Rink #50 with tear. EGD 12-29-18: Orien Rink #52 with minimal tear. EGD 2-4-19:  One cm deep defect distal esophagus (contained perforation?). Gastrograffin swallow: no free perforation. EGD 3-20-19: Orien Rink #52 with tear. EGD 5-15-19: Orien Rink #46 with tear. EGD 5-31-19: Orien Rink #48 with tear, retained food in stomach. EGD 6-11-19: [place] #51 with no tears. MRCP 3-12-15    Normal pancreas. No CBC/PD dilation.    Tachycardia since duodenal surgeries  Dr Layton Ochoa of beta blockers due to recurrent hypotension. EGD Oct 2017 to Feb 2018 for esophageal dilations  [facility]  [facility] 03/05/2018 - [facility] [facility] 03/05/2018 -10-25-17: 11 mm balloon; 11-22-17: 15 mm with Kenalog; 12-22-17: 18 mm with Kenalog; 2-21-18: #44F (moderate tear) with Kenalog   EGD 3-8-16 (odynophagia)  Dr Alivia Mcfarlane  Normal esophagus; slight bleeding anastamosis of posterior wall (no ulcer seen); surgical changes distal stomach clear. EGD dilation summary Oct 2020 to June 2021    [facility] [facility] 07/02/2021 -  EGD 10-14-20 Dr Alivia Mcfarlane: Geraldene [place] #48 moderate GEJ tear; gastritis. EGD 11-19-20 Dr Sara Liao: retained gastric debris; Geraldene Garcia #46 GEJ tear. EGD 12-8-20 Dr Elda Viera: CRE 18 mm with mild distal esophageal tear. EGD 1-28-21 Dr Alivia Mcfarlane: gastritis, Geraldene Garcia #50 moderate GEJ tear, injected Kenalog. EGD 4-13-21 Dr Alivia Mcfarlane: Geraldene [place] #48 mild GEJ tear, injected Kenalog; minimal retained food. EGD 5-21-21 Dr Aleyda Chaudhari: Geraldene [place] #40 with GEJ tear, injected Kenalog. EGD 6-23-21 Dr Akira Cannon: Geraldene [place] #44 with tear. Headache, migraine       Fibromyalgia worse w/stress + Ca supplements       EGD 9-8-20 (dysphagia)  Dr Alivia Mcfarlane  [facility] [facility] 10/01/2020 -Distal esophageal stricture Guido #50: moderate 2 cm tear. Gastrojejunostomy anastomosis, normal pylorus to duodenum. Hypotension, recurrent since April 2015    Probably from lopressor (was for tachycardia), muscle relaxant, lisinopril (for renal protection), dehydration (IVF help). Patient states not due to narcotics since she has had presyncope before taking any. Additional Medical History  Dx [facility] Nov 2006 ERCP: papillary stenosis/spasm, manometry c/w biliary sphincter HTN, elev basal pancreatic sphincter pressures; Dual sphincterotomy. Interim pain free spell from 2006 to March 2009. ERCP June 2009: elev pancreatic/biliary pressure (residual sphincter rather than stenosis); both orifices cut. EGD/ERCP [facility]) June 2010 negative. Relapsed March 2011 (lipase >2000). Acute pancreatitis March 2011.   ERCP 4-29-11: Pancreatic stent [facility]). Admission 5-26-11 for lipase >2200. Admit 2-16-12 for pancreatitis (lipase 5920). [facility] Dr Teixeira Course consult 4-11-12: surgical open sphincteroplasty 5-3-12. Hospitalized 1-14 to 1-14-13. ERCP 3-5-13 w duodenal perforation. [facility] rejected re-consult 9-3-13. Pain Management Aug 2013. 5x weekly IVF with  iv Phenergan 25. [facility] consult Dr Bishop Hernandez 4-6-16: visceral hypersensitivity. Also, patient with poorly controlled DM. [facility] rejected consult 2-12-20. Hx colon polyp. Next colon 2023. Additional Surgical History  Nausea since early 2012. Zofran not any better than Phenergan. Lortab, Percocet and oxycodone causes nausea. Scopolamine patch didn't work (blistering rash). Creon helps (causes constipation). Olanzapine Jan 2020\" crazy\". Dr Teixeira Course visit 7-16-14: Blind Loop Syndrome. Open duodenojejunostomy 9-22-14. IBS constipation. Amitiza 24 mcg BID ? Linzess 145 mcg x 1 diarrhea. GERD w/o esophagitis. Fundoplication 2881. Severe ulcers June 2017, tight stricture. Numerous EGD dilations    Esophageal spasms Mar 2016: levsin helps. NTG tabs: H/A so swallows 0.3 mg    B12 deficiency Dec 2014. On injections. Microcytic Anemia 4-7-15 (bleeding gastric polyp). Labs 6-12-15: WBC 4.9, Hgb 9.8, MCV 70, plt 251, ferritin 4.7, iron sat 5%, B12 = 255. iv iron Sept 2015. Fatty liver on CT scans [facility] May 2012). Procedure History  Test Date Results Interp   EGD 08/23/2017 Esophageal Stricture    EGD 08/23/2017 Esophageal Stricture    EGD 06/01/2017 History of bypass gastrojejunostomy, Benign esophageal stricture    EGD 03/08/2016 Gastritis    EGD 06/29/2015 Gastric polyp, Gastritis, Pill esophagitis    EGD 05/29/2014 Bile reflux gastritis    EGD 02/05/2013 Gastritis      Duodenal perforation at 3-5-13 ERCP: Dr Herber Guzman performed exploratory laparotomy with jejunal patch of perforation, drain placement, discharged 3-15-13.   Readmitted for fevers 3-24-13 w multiple abscesses on CT: IR placed drains in multiloculated abscesses: sent home on antibiotics 3-28-13 (also had C dif diarrhea). Readmitted 13 for reaccumulation of abscess and R hydroureter (in an area of abscess); IR placed drains, then transferred to [facility] rehab 4-10-13 for iv antibiotics and drainage. Exploratory surgery 13 (Dr Gwendolyn Herrera) b/c abscesses did not resolve at [facility]: retroperitoneal and abdominal wall abscess drainage, duodenal resection (now a blind pouch), retrocolic gastrojejunostomy, cystoscopy ureteral stent R. Apparently, bile and acid reflux can result from this bypass surgery. Hospitalized 2013 for acute pancreatitis (N/V/pain/lipase 1310), Dec 2013. Hospitalized 2014 for chronic pancreatitis with N&V, electrolyte dysfunction. Lipase normal.    Family History:  (Detailed)  Relationship Family Member Name  Age at Death Condition Onset Age Cause of Death       Family history of Parents with DM  N   Father    MDS  N     Additional Family History  Father: Crohn's ileitis, MDS. 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 Quit smoking .  laid off . Preferred language is [place]. Education/Employment/Occupation:  Employment History Status Retired Restrictions            MARITAL STATUS/FAMILY/SOCIAL SUPPORT  Currently . Smoking status: Former smoker. ALCOHOL  There is no history of alcohol use. CAFFEINE  The patient uses caffeine.     Current Medications:  Medication Directions   Ambien 10 mg tablet take 1 tablet by oral route  every day prn   Ativan 1 mg tablet take 1 tablet by oral route 3 times every day as needed   Carafate 100 mg/mL oral suspension take 10 milliliter by oral route 4 times every day on an empty stomach 1 hour before meals and at bedtime   citalopram 20 mg tablet take 1 tablet by oral route  every day   CYANOCOBALAMIN VIAL/INJ 1ML1S 1MG/ML INJECT 1000 MCG (1 ML) INTO THE MUSCLE EVERY WEEK   hydrocortisone acetate 25 mg rectal suppository insert 1 suppository by rectal route 2 times every day for 7 days then as needed   hyoscyamine sulfate 0.125 mg tablet take 1 tablet by oral route up to four times a day as needed   Lidocaine Viscous 2 % mucosal solution Shake well and take 10 milliliter by oral route 4x daily as needed   Lyrica take 1 capsule by oral route 2 times every day   Miralax 17 gram/dose oral powder take (17G)  by oral route  every day mixed with 8 oz. water, juice, soda, coffee or tea   morphine sulfate takes 10mg 3 times a day   NITROGLYCERIN 0.3 MG TABLET SL PLACE 1 TABLET SUBLINGUALLY. MAY REPEAT IF NEEDED NO MORE THAN 3 TABS WITHIN A 15 MIN. PERIOD   Novolog Mix 70-30 FlexPen 100 unit/mL subcutaneous pen inject by subcutaneous route as per insulin protocol   ondansetron 8 mg disintegrating tablet take 1 tablet (8MG)  by oral route  TID prn and place on top of the tongue where it will dissolve, then swallow   promethazine 25 mg/mL injection solution take 1 tablet by Oral route  tid   promethazine 25 mg tablet TAKE 1 TABLET EVERY 4 TO 6 HOURS AS NEEDED   promethazine 25 mg rectal suppository insert 1 suppository (25MG)  by rectal route up to 4  times every day prn nausea   Protonix 40 mg tablet,delayed release take 1 tablet by oral route 2 times every day   Tresiba FlexTouch U-100 insulin 100 unit/mL (3 mL) subcutaneous pen inject 40 units qhs by subcutaneous route as per insulin protocol     Allergies:  Ingredient Reaction (Severity) Medication Name Comment   ADHESIVE TAPE Hives / Skin Rash  PAPER TAPE   BARIUM SULFATE rapid heartbeat, sweating     INSULIN LISPRO nausea Humalog humalog   METRONIDAZOLE nausea Flagyl flagyl   METRONIDAZOLE HCL nausea Flagyl flagyl   Reviewed, no changes.     Vital Signs:  BP mm/Hg Pulse Resp Pulse Ox Temp F Ht (Total in.) Weight (lbs.) Weight (oz.) BMI   122/72 118 16  97.50 62.00 190.20  34.79   Assessment/Plan:  # Detail Type Description    1. Assessment Esophageal stricture (K22.2). Provider Plan I reviewed her dilation sizes and she has had a set back in that the recent EGD dilators have been smaller caliber than several months prior. Will continue with EGD dilations monthly. She needs this done monthly given that she continues to narrow down. She needs one scheduled for the next 1-2 weeks. I offered her second opinion to [address] regarding the refractory nature of her esophageal stricture. She declined for now. 2. Assessment Esophageal spasm (K22.4). Provider Plan She will continue NTG 0.3 mg tablets swallowing before meals prn. Levsin prn.         3. Assessment Nausea (R11.0). Provider Plan I suspect her chronic nausea is related to the many years of poorly controlled DM. Even though she states her DM is better controlled, I am concerned she may continue to have chronic nausea. Elements of this note may have been dictated using speech recognition software. As a result, errors of speech recognition may have occurred.       Provider:   Gwendolyn Catalan 07/12/2021 10:35 AM   Document generated by: Dionicio Bean MD 07/12/2021    CC Providers:  Radha Shah MD, MD  [facility]  [address], [address] ,  [facility]-      Electronically signed by Dionicio Bean MD on 07/12/2021 10:35 AM

## 2021-08-30 NOTE — ANESTHESIA POSTPROCEDURE EVALUATION
Procedure(s):  ESOPHAGOGASTRODUODENOSCOPY (EGD)  ESOPHAGEAL DILATION. total IV anesthesia    Anesthesia Post Evaluation      Multimodal analgesia: multimodal analgesia used between 6 hours prior to anesthesia start to PACU discharge  Patient location during evaluation: PACU  Patient participation: complete - patient participated  Level of consciousness: awake  Pain management: adequate  Airway patency: patent  Anesthetic complications: no  Cardiovascular status: acceptable and hemodynamically stable  Respiratory status: acceptable  Hydration status: acceptable  Comments: Acceptable for discharge from PACU. Post anesthesia nausea and vomiting:  none  Final Post Anesthesia Temperature Assessment:  Normothermia (36.0-37.5 degrees C)      INITIAL Post-op Vital signs:   Vitals Value Taken Time   /82 08/30/21 0903   Temp 36.9 °C (98.4 °F) 08/30/21 0844   Pulse 59 08/30/21 0903   Resp 16 08/30/21 0853   SpO2 100 % 08/30/21 0903   Vitals shown include unvalidated device data.

## 2021-09-02 ENCOUNTER — HOSPITAL ENCOUNTER (OUTPATIENT)
Dept: INFUSION THERAPY | Age: 51
Discharge: HOME | End: 2021-09-02
Payer: MEDICARE

## 2021-09-02 VITALS
TEMPERATURE: 98.8 F | SYSTOLIC BLOOD PRESSURE: 145 MMHG | DIASTOLIC BLOOD PRESSURE: 98 MMHG | OXYGEN SATURATION: 95 % | RESPIRATION RATE: 20 BRPM | HEART RATE: 117 BPM

## 2021-09-02 LAB — MAGNESIUM SERPL-MCNC: 1.9 MG/DL (ref 1.8–2.4)

## 2021-09-02 PROCEDURE — 96374 THER/PROPH/DIAG INJ IV PUSH: CPT

## 2021-09-02 PROCEDURE — 83735 ASSAY OF MAGNESIUM: CPT

## 2021-09-02 PROCEDURE — 74011000250 HC RX REV CODE- 250: Performed by: INTERNAL MEDICINE

## 2021-09-02 PROCEDURE — 74011250636 HC RX REV CODE- 250/636: Performed by: INTERNAL MEDICINE

## 2021-09-02 PROCEDURE — 74011250636 HC RX REV CODE- 250/636

## 2021-09-02 RX ORDER — SODIUM CHLORIDE 0.9 % (FLUSH) 0.9 %
10 SYRINGE (ML) INJECTION AS NEEDED
Status: DISCONTINUED | OUTPATIENT
Start: 2021-09-02 | End: 2021-09-04 | Stop reason: HOSPADM

## 2021-09-02 RX ORDER — SODIUM CHLORIDE 9 MG/ML
500 INJECTION, SOLUTION INTRAVENOUS ONCE
Status: COMPLETED | OUTPATIENT
Start: 2021-09-02 | End: 2021-09-02

## 2021-09-02 RX ADMIN — PROMETHAZINE HYDROCHLORIDE 25 MG: 25 INJECTION INTRAMUSCULAR; INTRAVENOUS at 07:43

## 2021-09-02 RX ADMIN — SODIUM CHLORIDE 500 ML: 900 INJECTION, SOLUTION INTRAVENOUS at 07:37

## 2021-09-02 RX ADMIN — Medication 10 ML: at 07:37

## 2021-09-02 NOTE — PROGRESS NOTES
Pt arrived ambulatory to OPI, labs drawn, mag replacements not needed, phenergan IV push given per order, pt requested 500ml of fluids, states she's been dry heaving and went to the ER for pain and  NV, 500ml NS infused, pt discharged home ambulatory

## 2021-09-09 ENCOUNTER — HOSPITAL ENCOUNTER (OUTPATIENT)
Dept: INFUSION THERAPY | Age: 51
Discharge: HOME | End: 2021-09-09
Payer: MEDICARE

## 2021-09-09 VITALS
RESPIRATION RATE: 18 BRPM | TEMPERATURE: 98.6 F | OXYGEN SATURATION: 96 % | SYSTOLIC BLOOD PRESSURE: 158 MMHG | DIASTOLIC BLOOD PRESSURE: 76 MMHG | HEART RATE: 106 BPM

## 2021-09-09 LAB — MAGNESIUM SERPL-MCNC: 1.8 MG/DL (ref 1.8–2.4)

## 2021-09-09 PROCEDURE — 83735 ASSAY OF MAGNESIUM: CPT

## 2021-09-09 PROCEDURE — 96374 THER/PROPH/DIAG INJ IV PUSH: CPT

## 2021-09-09 PROCEDURE — 74011250636 HC RX REV CODE- 250/636: Performed by: INTERNAL MEDICINE

## 2021-09-09 PROCEDURE — 74011000250 HC RX REV CODE- 250: Performed by: INTERNAL MEDICINE

## 2021-09-09 RX ORDER — SODIUM CHLORIDE 0.9 % (FLUSH) 0.9 %
10-40 SYRINGE (ML) INJECTION AS NEEDED
Status: DISCONTINUED | OUTPATIENT
Start: 2021-09-09 | End: 2021-09-11 | Stop reason: HOSPADM

## 2021-09-09 RX ADMIN — PROMETHAZINE HYDROCHLORIDE 25 MG: 25 INJECTION INTRAMUSCULAR; INTRAVENOUS at 07:30

## 2021-09-09 RX ADMIN — Medication 10 ML: at 07:30

## 2021-09-09 NOTE — PROGRESS NOTES
Arrived to the [facility]. IV phenergan and blood draw completed. Patient tolerated well. Any issues or concerns during appointment: none. Patient aware of next infusion appointment on 9/16/2021 at 7:15am.  Discharged ambulatory.

## 2021-09-16 ENCOUNTER — HOSPITAL ENCOUNTER (OUTPATIENT)
Dept: INFUSION THERAPY | Age: 51
Discharge: HOME | End: 2021-09-16
Payer: MEDICARE

## 2021-09-16 VITALS
HEART RATE: 78 BPM | OXYGEN SATURATION: 93 % | TEMPERATURE: 98.1 F | SYSTOLIC BLOOD PRESSURE: 93 MMHG | RESPIRATION RATE: 18 BRPM | DIASTOLIC BLOOD PRESSURE: 63 MMHG

## 2021-09-16 LAB — MAGNESIUM SERPL-MCNC: 1.7 MG/DL (ref 1.8–2.4)

## 2021-09-16 PROCEDURE — 96365 THER/PROPH/DIAG IV INF INIT: CPT

## 2021-09-16 PROCEDURE — 74011000258 HC RX REV CODE- 258: Performed by: INTERNAL MEDICINE

## 2021-09-16 PROCEDURE — 83735 ASSAY OF MAGNESIUM: CPT

## 2021-09-16 PROCEDURE — 74011000250 HC RX REV CODE- 250: Performed by: INTERNAL MEDICINE

## 2021-09-16 PROCEDURE — 74011250636 HC RX REV CODE- 250/636: Performed by: INTERNAL MEDICINE

## 2021-09-16 PROCEDURE — 96375 TX/PRO/DX INJ NEW DRUG ADDON: CPT

## 2021-09-16 RX ORDER — SODIUM CHLORIDE 0.9 % (FLUSH) 0.9 %
10 SYRINGE (ML) INJECTION AS NEEDED
Status: DISCONTINUED | OUTPATIENT
Start: 2021-09-16 | End: 2021-09-18 | Stop reason: HOSPADM

## 2021-09-16 RX ADMIN — PROMETHAZINE HYDROCHLORIDE 25 MG: 25 INJECTION INTRAMUSCULAR; INTRAVENOUS at 07:37

## 2021-09-16 RX ADMIN — Medication 10 ML: at 09:19

## 2021-09-16 RX ADMIN — MAGNESIUM SULFATE HEPTAHYDRATE: 500 INJECTION, SOLUTION INTRAMUSCULAR; INTRAVENOUS at 08:40

## 2021-09-16 NOTE — PROGRESS NOTES
Pt arrived ambulatory. Port needle changed, labs drawn. Phenergan given. Mag level 1.7, I gram mag sulfate infused without complications. Pt aware of next appt 9/23 @ 4846. Discharged ambulatory, no distress noted.

## 2021-09-23 ENCOUNTER — HOSPITAL ENCOUNTER (OUTPATIENT)
Dept: INFUSION THERAPY | Age: 51
Discharge: HOME | End: 2021-09-23
Payer: MEDICARE

## 2021-09-23 VITALS
HEART RATE: 99 BPM | SYSTOLIC BLOOD PRESSURE: 123 MMHG | OXYGEN SATURATION: 99 % | TEMPERATURE: 98.8 F | DIASTOLIC BLOOD PRESSURE: 88 MMHG

## 2021-09-23 LAB — MAGNESIUM SERPL-MCNC: 1.8 MG/DL (ref 1.8–2.4)

## 2021-09-23 PROCEDURE — 74011000250 HC RX REV CODE- 250: Performed by: INTERNAL MEDICINE

## 2021-09-23 PROCEDURE — 83735 ASSAY OF MAGNESIUM: CPT

## 2021-09-23 PROCEDURE — 96374 THER/PROPH/DIAG INJ IV PUSH: CPT

## 2021-09-23 PROCEDURE — 74011250636 HC RX REV CODE- 250/636: Performed by: INTERNAL MEDICINE

## 2021-09-23 RX ORDER — SODIUM CHLORIDE 0.9 % (FLUSH) 0.9 %
10 SYRINGE (ML) INJECTION AS NEEDED
Status: DISCONTINUED | OUTPATIENT
Start: 2021-09-23 | End: 2021-09-25 | Stop reason: HOSPADM

## 2021-09-23 RX ORDER — MAGNESIUM SULFATE HEPTAHYDRATE 40 MG/ML
2 INJECTION, SOLUTION INTRAVENOUS ONCE
Status: ACTIVE | OUTPATIENT
Start: 2021-09-23 | End: 2021-09-23

## 2021-09-23 RX ADMIN — SODIUM CHLORIDE 500 ML: 900 INJECTION, SOLUTION INTRAVENOUS at 07:25

## 2021-09-23 RX ADMIN — Medication 10 ML: at 07:24

## 2021-09-23 RX ADMIN — PROMETHAZINE HYDROCHLORIDE 25 MG: 25 INJECTION INTRAMUSCULAR; INTRAVENOUS at 07:33

## 2021-09-23 NOTE — PROGRESS NOTES
Patient arrived ambulatory to infusion center. Here for IVF, IV phenergan, and mag level. Mag today was 1.8 and no replacements required. Tolerated infusion well. Patient's port needle and dressing changed. Patient manages port/hydration at home. Patient aware of next appt 9/30 @ 7:15AM. Discharged ambulatory.

## 2021-09-30 ENCOUNTER — HOSPITAL ENCOUNTER (OUTPATIENT)
Dept: INFUSION THERAPY | Age: 51
Discharge: HOME | End: 2021-09-30
Payer: MEDICARE

## 2021-09-30 VITALS
RESPIRATION RATE: 20 BRPM | DIASTOLIC BLOOD PRESSURE: 78 MMHG | SYSTOLIC BLOOD PRESSURE: 116 MMHG | HEART RATE: 99 BPM | OXYGEN SATURATION: 99 % | TEMPERATURE: 97.8 F

## 2021-09-30 LAB — MAGNESIUM SERPL-MCNC: 1.9 MG/DL (ref 1.8–2.4)

## 2021-09-30 PROCEDURE — 74011000250 HC RX REV CODE- 250

## 2021-09-30 PROCEDURE — 83735 ASSAY OF MAGNESIUM: CPT

## 2021-09-30 PROCEDURE — 96374 THER/PROPH/DIAG INJ IV PUSH: CPT

## 2021-09-30 PROCEDURE — 74011250636 HC RX REV CODE- 250/636

## 2021-09-30 RX ORDER — SODIUM CHLORIDE 0.9 % (FLUSH) 0.9 %
10 SYRINGE (ML) INJECTION AS NEEDED
Status: DISCONTINUED | OUTPATIENT
Start: 2021-09-30 | End: 2021-10-02 | Stop reason: HOSPADM

## 2021-09-30 RX ADMIN — PROMETHAZINE HYDROCHLORIDE 25 MG: 25 INJECTION INTRAMUSCULAR; INTRAVENOUS at 07:34

## 2021-09-30 RX ADMIN — Medication 10 ML: at 07:35

## 2021-09-30 NOTE — PROGRESS NOTES
Pt arrived ambulatory, port needle/dresing changed, labs drawn, no replacements needed, phenergan 25mg IV given per order, pt tolerated well, discharged home ambulatory

## 2021-10-07 ENCOUNTER — HOSPITAL ENCOUNTER (OUTPATIENT)
Dept: INFUSION THERAPY | Age: 51
Discharge: HOME | End: 2021-10-07
Payer: MEDICARE

## 2021-10-07 VITALS
SYSTOLIC BLOOD PRESSURE: 160 MMHG | RESPIRATION RATE: 18 BRPM | HEART RATE: 92 BPM | DIASTOLIC BLOOD PRESSURE: 85 MMHG | OXYGEN SATURATION: 97 % | TEMPERATURE: 98.7 F

## 2021-10-07 LAB — MAGNESIUM SERPL-MCNC: 1.9 MG/DL (ref 1.8–2.4)

## 2021-10-07 PROCEDURE — 96374 THER/PROPH/DIAG INJ IV PUSH: CPT

## 2021-10-07 PROCEDURE — 74011000250 HC RX REV CODE- 250: Performed by: INTERNAL MEDICINE

## 2021-10-07 PROCEDURE — 74011250636 HC RX REV CODE- 250/636: Performed by: INTERNAL MEDICINE

## 2021-10-07 PROCEDURE — 83735 ASSAY OF MAGNESIUM: CPT

## 2021-10-07 RX ORDER — SODIUM CHLORIDE 0.9 % (FLUSH) 0.9 %
10 SYRINGE (ML) INJECTION EVERY 8 HOURS
Status: DISCONTINUED | OUTPATIENT
Start: 2021-10-07 | End: 2021-10-09 | Stop reason: HOSPADM

## 2021-10-07 RX ADMIN — PROMETHAZINE HYDROCHLORIDE 25 MG: 25 INJECTION INTRAMUSCULAR; INTRAVENOUS at 07:52

## 2021-10-07 RX ADMIN — Medication 10 ML: at 07:58

## 2021-10-07 NOTE — PROGRESS NOTES
Arrived to the [facility] ambulatory. Port access, lab draw and promethazine completed. Patient tolerated well. Any issues or concerns during appointment: no.  Patient aware of next infusion appointment on 10/14 at [address] [place] [address]  Discharged to home ambulatory.

## 2021-10-21 ENCOUNTER — HOSPITAL ENCOUNTER (OUTPATIENT)
Dept: INFUSION THERAPY | Age: 51
Discharge: HOME | End: 2021-10-21
Payer: MEDICARE

## 2021-10-21 VITALS
RESPIRATION RATE: 18 BRPM | OXYGEN SATURATION: 95 % | HEART RATE: 111 BPM | DIASTOLIC BLOOD PRESSURE: 82 MMHG | SYSTOLIC BLOOD PRESSURE: 165 MMHG | TEMPERATURE: 98.6 F

## 2021-10-21 LAB — MAGNESIUM SERPL-MCNC: 1.8 MG/DL (ref 1.8–2.4)

## 2021-10-21 PROCEDURE — 74011250636 HC RX REV CODE- 250/636: Performed by: INTERNAL MEDICINE

## 2021-10-21 PROCEDURE — 83735 ASSAY OF MAGNESIUM: CPT

## 2021-10-21 PROCEDURE — 96374 THER/PROPH/DIAG INJ IV PUSH: CPT

## 2021-10-21 PROCEDURE — 74011000250 HC RX REV CODE- 250: Performed by: INTERNAL MEDICINE

## 2021-10-21 RX ORDER — SODIUM CHLORIDE 0.9 % (FLUSH) 0.9 %
10 SYRINGE (ML) INJECTION AS NEEDED
Status: DISCONTINUED | OUTPATIENT
Start: 2021-10-21 | End: 2021-10-23 | Stop reason: HOSPADM

## 2021-10-21 RX ADMIN — PROMETHAZINE HYDROCHLORIDE 25 MG: 25 INJECTION INTRAMUSCULAR; INTRAVENOUS at 07:33

## 2021-10-21 RX ADMIN — Medication 10 ML: at 07:43

## 2021-10-21 NOTE — PROGRESS NOTES
Arrived to the [facility]. Lab completed. Provided education on: no replacements required   Patient instructed to report any side affects to ordering provider. Patient tolerated without problems. Any issues or concerns during appointment: patient requests prn Phenergan for nausea. Coffee provided per patient request.  Patient aware of next infusion appointment on 10/28/21 (date) at [address] [place] [address] (time). Discharged ambulatory.

## 2021-10-28 ENCOUNTER — HOSPITAL ENCOUNTER (OUTPATIENT)
Dept: INFUSION THERAPY | Age: 51
Discharge: HOME | End: 2021-10-28
Payer: MEDICARE

## 2021-10-28 VITALS — BODY MASS INDEX: 35.2 KG/M2 | WEIGHT: 192.46 LBS

## 2021-10-28 LAB — MAGNESIUM SERPL-MCNC: 1.7 MG/DL (ref 1.8–2.4)

## 2021-10-28 PROCEDURE — 83735 ASSAY OF MAGNESIUM: CPT

## 2021-10-28 PROCEDURE — 96365 THER/PROPH/DIAG IV INF INIT: CPT

## 2021-10-28 PROCEDURE — 74011250636 HC RX REV CODE- 250/636: Performed by: INTERNAL MEDICINE

## 2021-10-28 PROCEDURE — 74011000250 HC RX REV CODE- 250: Performed by: INTERNAL MEDICINE

## 2021-10-28 PROCEDURE — 96375 TX/PRO/DX INJ NEW DRUG ADDON: CPT

## 2021-10-28 RX ORDER — SODIUM CHLORIDE 0.9 % (FLUSH) 0.9 %
10 SYRINGE (ML) INJECTION AS NEEDED
Status: DISCONTINUED | OUTPATIENT
Start: 2021-10-28 | End: 2021-10-30 | Stop reason: HOSPADM

## 2021-10-28 RX ORDER — SODIUM CHLORIDE 9 MG/ML
999 INJECTION, SOLUTION INTRAVENOUS ONCE
Status: COMPLETED | OUTPATIENT
Start: 2021-10-28 | End: 2021-10-28

## 2021-10-28 RX ORDER — MAGNESIUM SULFATE HEPTAHYDRATE 40 MG/ML
2 INJECTION, SOLUTION INTRAVENOUS ONCE
Status: COMPLETED | OUTPATIENT
Start: 2021-10-28 | End: 2021-10-28

## 2021-10-28 RX ADMIN — Medication 10 ML: at 09:31

## 2021-10-28 RX ADMIN — SODIUM CHLORIDE 999 ML/HR: 9 INJECTION, SOLUTION INTRAVENOUS at 07:12

## 2021-10-28 RX ADMIN — Medication 10 ML: at 07:11

## 2021-10-28 RX ADMIN — MAGNESIUM SULFATE HEPTAHYDRATE 2 G: 40 INJECTION, SOLUTION INTRAVENOUS at 08:22

## 2021-10-28 RX ADMIN — PROMETHAZINE HYDROCHLORIDE 25 MG: 25 INJECTION INTRAMUSCULAR; INTRAVENOUS at 07:43

## 2021-10-28 NOTE — PROGRESS NOTES
Patient arrived at [facility] for Phenergan and fluids. Assessment completed. No needs voiced at this time. Labs drawn, MAG 1.7. Mag 2g ordered and given. Patient tolerated infusion well and is aware of next appointment on 11/4/2021 @8023. Patient discharged ambulatory.

## 2021-11-04 ENCOUNTER — HOSPITAL ENCOUNTER (OUTPATIENT)
Dept: INFUSION THERAPY | Age: 51
Discharge: HOME | End: 2021-11-04
Payer: MEDICARE

## 2021-11-04 VITALS
DIASTOLIC BLOOD PRESSURE: 65 MMHG | RESPIRATION RATE: 18 BRPM | BODY MASS INDEX: 34.02 KG/M2 | TEMPERATURE: 98.1 F | SYSTOLIC BLOOD PRESSURE: 103 MMHG | WEIGHT: 186 LBS | OXYGEN SATURATION: 95 % | HEART RATE: 99 BPM

## 2021-11-04 LAB — MAGNESIUM SERPL-MCNC: 2 MG/DL (ref 1.8–2.4)

## 2021-11-04 PROCEDURE — 74011250636 HC RX REV CODE- 250/636: Performed by: INTERNAL MEDICINE

## 2021-11-04 PROCEDURE — 74011000250 HC RX REV CODE- 250: Performed by: INTERNAL MEDICINE

## 2021-11-04 PROCEDURE — 96374 THER/PROPH/DIAG INJ IV PUSH: CPT

## 2021-11-04 PROCEDURE — 83735 ASSAY OF MAGNESIUM: CPT

## 2021-11-04 RX ORDER — SODIUM CHLORIDE 0.9 % (FLUSH) 0.9 %
10-40 SYRINGE (ML) INJECTION AS NEEDED
Status: DISCONTINUED | OUTPATIENT
Start: 2021-11-04 | End: 2021-11-06 | Stop reason: HOSPADM

## 2021-11-04 RX ADMIN — Medication 10 ML: at 07:25

## 2021-11-04 RX ADMIN — SODIUM CHLORIDE 25 MG: 9 INJECTION INTRAMUSCULAR; INTRAVENOUS; SUBCUTANEOUS at 07:48

## 2021-11-04 RX ADMIN — SODIUM CHLORIDE 500 ML: 900 INJECTION, SOLUTION INTRAVENOUS at 07:46

## 2021-11-04 RX ADMIN — Medication 10 ML: at 08:43

## 2021-11-04 NOTE — PROGRESS NOTES
Patient arrived to [facility]. Port needle changed and Mg level drawn. IV Phenergan completed. Patient tolerated well. Mg+ 2.0 - no replacement needed. Any issues or concerns during appointment: None  Patient aware of next Infusion appointment on 11/11 (date) at [address] [place] [address] (time). Discharged ambulatory in stable condition.

## 2021-11-11 ENCOUNTER — HOSPITAL ENCOUNTER (OUTPATIENT)
Dept: INFUSION THERAPY | Age: 51
Discharge: HOME | End: 2021-11-11
Payer: MEDICARE

## 2021-11-11 VITALS
TEMPERATURE: 98.2 F | HEART RATE: 69 BPM | RESPIRATION RATE: 18 BRPM | DIASTOLIC BLOOD PRESSURE: 55 MMHG | OXYGEN SATURATION: 98 % | SYSTOLIC BLOOD PRESSURE: 94 MMHG

## 2021-11-11 LAB — MAGNESIUM SERPL-MCNC: 1.8 MG/DL (ref 1.8–2.4)

## 2021-11-11 PROCEDURE — 74011000250 HC RX REV CODE- 250: Performed by: INTERNAL MEDICINE

## 2021-11-11 PROCEDURE — 96374 THER/PROPH/DIAG INJ IV PUSH: CPT

## 2021-11-11 PROCEDURE — 83735 ASSAY OF MAGNESIUM: CPT

## 2021-11-11 PROCEDURE — 74011250636 HC RX REV CODE- 250/636: Performed by: INTERNAL MEDICINE

## 2021-11-11 RX ORDER — SODIUM CHLORIDE 0.9 % (FLUSH) 0.9 %
10 SYRINGE (ML) INJECTION AS NEEDED
Status: DISCONTINUED | OUTPATIENT
Start: 2021-11-11 | End: 2021-11-13 | Stop reason: HOSPADM

## 2021-11-11 RX ADMIN — Medication 10 ML: at 07:22

## 2021-11-11 RX ADMIN — Medication 10 ML: at 08:50

## 2021-11-11 RX ADMIN — SODIUM CHLORIDE 500 ML: 900 INJECTION, SOLUTION INTRAVENOUS at 07:48

## 2021-11-11 RX ADMIN — SODIUM CHLORIDE 25 MG: 9 INJECTION INTRAMUSCULAR; INTRAVENOUS; SUBCUTANEOUS at 07:57

## 2021-11-11 NOTE — PROGRESS NOTES
Patient arrived to [facility]. Port needle changed and lab drawn & reviewed. IV Phenergan completed. Patient tolerated well. Mg+ 1.8 - no replacement needed. Any issues or concerns during appointment: None  Patient aware of next Infusion appointment on 11/18 (date) at [address] [place] [address] (time). Discharged ambulatory in stable condition.

## 2021-11-18 ENCOUNTER — HOSPITAL ENCOUNTER (OUTPATIENT)
Dept: INFUSION THERAPY | Age: 51
Discharge: HOME | End: 2021-11-18
Payer: MEDICARE

## 2021-11-18 VITALS
OXYGEN SATURATION: 98 % | SYSTOLIC BLOOD PRESSURE: 82 MMHG | HEART RATE: 81 BPM | RESPIRATION RATE: 16 BRPM | TEMPERATURE: 97.6 F | DIASTOLIC BLOOD PRESSURE: 48 MMHG

## 2021-11-18 LAB — MAGNESIUM SERPL-MCNC: 1.9 MG/DL (ref 1.8–2.4)

## 2021-11-18 PROCEDURE — 83735 ASSAY OF MAGNESIUM: CPT

## 2021-11-18 PROCEDURE — 36591 DRAW BLOOD OFF VENOUS DEVICE: CPT

## 2021-11-18 PROCEDURE — 74011250636 HC RX REV CODE- 250/636: Performed by: INTERNAL MEDICINE

## 2021-11-18 PROCEDURE — 74011000250 HC RX REV CODE- 250: Performed by: INTERNAL MEDICINE

## 2021-11-18 RX ORDER — SODIUM CHLORIDE 0.9 % (FLUSH) 0.9 %
10 SYRINGE (ML) INJECTION AS NEEDED
Status: ACTIVE | OUTPATIENT
Start: 2021-11-18 | End: 2021-11-18

## 2021-11-18 RX ADMIN — Medication 10 ML: at 07:38

## 2021-11-18 NOTE — PROGRESS NOTES
Arrived to the [facility]. Port needle changed and lab draw completed. Patient tolerated well. Any issues or concerns during appointment: Patient lethargic and hypotensive upon arrival.  Patient is arousable and oriented. When asked, patient reports that she took ativan and dilaudid with AM meds prior to arrival.  Phenergan IV held today due to patient's lethargy/hypotension and patient instructed to drink plenty of fluids. Patient verbalized understanding. Patient aware of next infusion appointment on 11/25 (date) at 7:15 AM (time). Discharged ambulatory.

## 2021-11-23 RX ORDER — SODIUM CHLORIDE 0.9 % (FLUSH) 0.9 %
10 SYRINGE (ML) INJECTION AS NEEDED
Status: CANCELLED | OUTPATIENT
Start: 2021-11-25

## 2021-11-25 ENCOUNTER — HOSPITAL ENCOUNTER (OUTPATIENT)
Dept: INFUSION THERAPY | Age: 51
Discharge: HOME | End: 2021-11-25
Payer: MEDICARE

## 2021-11-25 VITALS
TEMPERATURE: 98.2 F | HEART RATE: 91 BPM | RESPIRATION RATE: 16 BRPM | SYSTOLIC BLOOD PRESSURE: 113 MMHG | DIASTOLIC BLOOD PRESSURE: 75 MMHG | OXYGEN SATURATION: 96 %

## 2021-11-25 LAB
ALBUMIN SERPL-MCNC: 3.4 G/DL (ref 3.5–5)
ALBUMIN/GLOB SERPL: 0.8 {RATIO} (ref 1.2–3.5)
ALP SERPL-CCNC: 113 U/L (ref 50–136)
ALT SERPL-CCNC: 78 U/L (ref 12–65)
ANION GAP SERPL CALC-SCNC: 6 MMOL/L (ref 7–16)
AST SERPL-CCNC: 66 U/L (ref 15–37)
BILIRUB DIRECT SERPL-MCNC: 0.1 MG/DL
BILIRUB SERPL-MCNC: 0.5 MG/DL (ref 0.2–1.1)
BUN SERPL-MCNC: 8 MG/DL (ref 6–23)
CALCIUM SERPL-MCNC: 8.7 MG/DL (ref 8.3–10.4)
CHLORIDE SERPL-SCNC: 100 MMOL/L (ref 98–107)
CO2 SERPL-SCNC: 28 MMOL/L (ref 21–32)
CREAT SERPL-MCNC: 0.8 MG/DL (ref 0.6–1)
CREAT UR-MCNC: 72.5 MG/DL
EST. AVERAGE GLUCOSE BLD GHB EST-MCNC: 226 MG/DL
GLOBULIN SER CALC-MCNC: 4.2 G/DL (ref 2.3–3.5)
GLUCOSE SERPL-MCNC: 234 MG/DL (ref 65–100)
HBA1C MFR BLD: 9.5 % (ref 4.2–6.3)
MAGNESIUM SERPL-MCNC: 1.5 MG/DL (ref 1.8–2.4)
MICROALBUMIN UR-MCNC: <0.5 MG/DL
MICROALBUMIN/CREAT UR-RTO: NORMAL MG/G
POTASSIUM SERPL-SCNC: 4.1 MMOL/L (ref 3.5–5.1)
PROT SERPL-MCNC: 7.6 G/DL (ref 6.3–8.2)
SODIUM SERPL-SCNC: 134 MMOL/L (ref 136–145)

## 2021-11-25 PROCEDURE — 74011000250 HC RX REV CODE- 250: Performed by: INTERNAL MEDICINE

## 2021-11-25 PROCEDURE — 80048 BASIC METABOLIC PNL TOTAL CA: CPT

## 2021-11-25 PROCEDURE — 96365 THER/PROPH/DIAG IV INF INIT: CPT

## 2021-11-25 PROCEDURE — 83735 ASSAY OF MAGNESIUM: CPT

## 2021-11-25 PROCEDURE — 96375 TX/PRO/DX INJ NEW DRUG ADDON: CPT

## 2021-11-25 PROCEDURE — 82043 UR ALBUMIN QUANTITATIVE: CPT

## 2021-11-25 PROCEDURE — 83036 HEMOGLOBIN GLYCOSYLATED A1C: CPT

## 2021-11-25 PROCEDURE — 74011250636 HC RX REV CODE- 250/636: Performed by: INTERNAL MEDICINE

## 2021-11-25 PROCEDURE — 80076 HEPATIC FUNCTION PANEL: CPT

## 2021-11-25 RX ORDER — SODIUM CHLORIDE 0.9 % (FLUSH) 0.9 %
10 SYRINGE (ML) INJECTION EVERY 8 HOURS
Status: DISCONTINUED | OUTPATIENT
Start: 2021-11-25 | End: 2021-11-27 | Stop reason: HOSPADM

## 2021-11-25 RX ORDER — MAGNESIUM SULFATE 1 G/100ML
1 INJECTION INTRAVENOUS ONCE
Status: COMPLETED | OUTPATIENT
Start: 2021-11-25 | End: 2021-11-25

## 2021-11-25 RX ADMIN — Medication 10 ML: at 09:22

## 2021-11-25 RX ADMIN — MAGNESIUM SULFATE HEPTAHYDRATE 1 G: 1 INJECTION, SOLUTION INTRAVENOUS at 08:20

## 2021-11-25 RX ADMIN — Medication 10 ML: at 07:37

## 2021-11-25 RX ADMIN — PROMETHAZINE HYDROCHLORIDE 25 MG: 25 INJECTION INTRAMUSCULAR; INTRAVENOUS at 07:33

## 2021-11-25 NOTE — PROGRESS NOTES
Arrived to the [facility] ambulatory. Labs, promethazine and 1gram magnesium completed. Patient tolerated well. Any issues or concerns during appointment: no, Mag=1.5 today. Patient aware of next infusion appointment on 12/2 at [address] [place] [address]. Discharged to home ambulatory.

## 2021-11-26 NOTE — PROGRESS NOTES
Pt reminded of scheduled procedure on Monday. Pt verbalized understanding of early arrival time and  policy.

## 2021-11-28 ENCOUNTER — ANESTHESIA EVENT (OUTPATIENT)
Dept: ENDOSCOPY | Age: 51
End: 2021-11-28
Payer: MEDICARE

## 2021-11-28 RX ORDER — SODIUM CHLORIDE, SODIUM LACTATE, POTASSIUM CHLORIDE, CALCIUM CHLORIDE 600; 310; 30; 20 MG/100ML; MG/100ML; MG/100ML; MG/100ML
100 INJECTION, SOLUTION INTRAVENOUS CONTINUOUS
Status: CANCELLED | OUTPATIENT
Start: 2021-11-28

## 2021-11-28 RX ORDER — SODIUM CHLORIDE 0.9 % (FLUSH) 0.9 %
5-40 SYRINGE (ML) INJECTION EVERY 8 HOURS
Status: CANCELLED | OUTPATIENT
Start: 2021-11-28

## 2021-11-28 RX ORDER — SODIUM CHLORIDE 0.9 % (FLUSH) 0.9 %
5-40 SYRINGE (ML) INJECTION AS NEEDED
Status: CANCELLED | OUTPATIENT
Start: 2021-11-28

## 2021-11-29 ENCOUNTER — ANESTHESIA (OUTPATIENT)
Dept: ENDOSCOPY | Age: 51
End: 2021-11-29
Payer: MEDICARE

## 2021-11-29 ENCOUNTER — HOSPITAL ENCOUNTER (OUTPATIENT)
Age: 51
Setting detail: OUTPATIENT SURGERY
Discharge: HOME | End: 2021-11-29
Attending: INTERNAL MEDICINE | Admitting: INTERNAL MEDICINE
Payer: MEDICARE

## 2021-11-29 VITALS
WEIGHT: 188 LBS | RESPIRATION RATE: 16 BRPM | BODY MASS INDEX: 34.6 KG/M2 | OXYGEN SATURATION: 96 % | DIASTOLIC BLOOD PRESSURE: 69 MMHG | HEIGHT: 62 IN | HEART RATE: 70 BPM | TEMPERATURE: 97.4 F | SYSTOLIC BLOOD PRESSURE: 119 MMHG

## 2021-11-29 LAB
GLUCOSE BLD STRIP.AUTO-MCNC: 106 MG/DL (ref 65–100)
SERVICE CMNT-IMP: ABNORMAL

## 2021-11-29 PROCEDURE — 74011000250 HC RX REV CODE- 250

## 2021-11-29 PROCEDURE — 74011250636 HC RX REV CODE- 250/636

## 2021-11-29 PROCEDURE — 74011250636 HC RX REV CODE- 250/636: Performed by: ANESTHESIOLOGY

## 2021-11-29 PROCEDURE — 82962 GLUCOSE BLOOD TEST: CPT

## 2021-11-29 PROCEDURE — 76040000025: Performed by: INTERNAL MEDICINE

## 2021-11-29 PROCEDURE — 2709999900 HC NON-CHARGEABLE SUPPLY: Performed by: INTERNAL MEDICINE

## 2021-11-29 PROCEDURE — 74011250636 HC RX REV CODE- 250/636: Performed by: INTERNAL MEDICINE

## 2021-11-29 PROCEDURE — 76060000031 HC ANESTHESIA FIRST 0.5 HR: Performed by: INTERNAL MEDICINE

## 2021-11-29 RX ORDER — LIDOCAINE HYDROCHLORIDE 20 MG/ML
INJECTION, SOLUTION EPIDURAL; INFILTRATION; INTRACAUDAL; PERINEURAL AS NEEDED
Status: DISCONTINUED | OUTPATIENT
Start: 2021-11-29 | End: 2021-11-29 | Stop reason: HOSPADM

## 2021-11-29 RX ORDER — HEPARIN 100 UNIT/ML
500 SYRINGE INTRAVENOUS AS NEEDED
Status: DISCONTINUED | OUTPATIENT
Start: 2021-11-29 | End: 2021-11-29 | Stop reason: HOSPADM

## 2021-11-29 RX ORDER — PROPOFOL 10 MG/ML
INJECTION, EMULSION INTRAVENOUS
Status: DISCONTINUED | OUTPATIENT
Start: 2021-11-29 | End: 2021-11-29 | Stop reason: HOSPADM

## 2021-11-29 RX ORDER — ONDANSETRON 2 MG/ML
INJECTION INTRAMUSCULAR; INTRAVENOUS AS NEEDED
Status: DISCONTINUED | OUTPATIENT
Start: 2021-11-29 | End: 2021-11-29 | Stop reason: HOSPADM

## 2021-11-29 RX ORDER — PROPOFOL 10 MG/ML
INJECTION, EMULSION INTRAVENOUS AS NEEDED
Status: DISCONTINUED | OUTPATIENT
Start: 2021-11-29 | End: 2021-11-29 | Stop reason: HOSPADM

## 2021-11-29 RX ORDER — SODIUM CHLORIDE, SODIUM LACTATE, POTASSIUM CHLORIDE, CALCIUM CHLORIDE 600; 310; 30; 20 MG/100ML; MG/100ML; MG/100ML; MG/100ML
100 INJECTION, SOLUTION INTRAVENOUS CONTINUOUS
Status: DISCONTINUED | OUTPATIENT
Start: 2021-11-29 | End: 2021-11-29 | Stop reason: HOSPADM

## 2021-11-29 RX ORDER — SODIUM CHLORIDE 9 MG/ML
10 INJECTION, SOLUTION INTRAVENOUS CONTINUOUS
Status: DISCONTINUED | OUTPATIENT
Start: 2021-11-29 | End: 2021-11-29 | Stop reason: HOSPADM

## 2021-11-29 RX ADMIN — LIDOCAINE HYDROCHLORIDE 100 MG: 20 INJECTION, SOLUTION EPIDURAL; INFILTRATION; INTRACAUDAL; PERINEURAL at 08:31

## 2021-11-29 RX ADMIN — ONDANSETRON 2 MG: 2 INJECTION INTRAMUSCULAR; INTRAVENOUS at 08:40

## 2021-11-29 RX ADMIN — SODIUM CHLORIDE, SODIUM LACTATE, POTASSIUM CHLORIDE, AND CALCIUM CHLORIDE 100 ML/HR: 600; 310; 30; 20 INJECTION, SOLUTION INTRAVENOUS at 07:13

## 2021-11-29 RX ADMIN — PROPOFOL 50 MG: 10 INJECTION, EMULSION INTRAVENOUS at 08:32

## 2021-11-29 RX ADMIN — PROPOFOL 140 MCG/KG/MIN: 10 INJECTION, EMULSION INTRAVENOUS at 08:32

## 2021-11-29 RX ADMIN — HEPARIN SODIUM (PORCINE) LOCK FLUSH IV SOLN 100 UNIT/ML 500 UNITS: 100 SOLUTION at 09:26

## 2021-11-29 NOTE — DISCHARGE INSTRUCTIONS
Gastrointestinal Esophagogastroduodenoscopy (EGD) - Upper Exam Discharge Instructions    1. Call Dr. Mehdi Ya at [phone] for any problems or questions. 2. Contact the doctor's office for follow up appointment as directed. 3. Medication may cause drowsiness for several hours, therefore:  · Do not drive or operate machinery for remainder of the day. · No alcohol today. · Do not make any important or legal decisions for 24 hours. · Do not sign any legal documents for 24 hours. 5. Ordinarily, you may resume regular diet and activity after exam unless otherwise specified by your physician. 6. For mild soreness in your throat you may use Cepacol throat lozenges or warm salt-water gargles as needed. Any additional instructions:      1. Repeat dilation as needed  2. Follow up in office  3. Consider manometric evaluation. 4. Consider referral to [facility] for esohagologist evaluation. Instructions given to Jonas Balta and other family members.

## 2021-11-29 NOTE — H&P
History and Physical for Outpatient Procedure             Date: 11/29/2021     History of Present Illness:  Patient has presents for EGD due to complaint of recurrent stricture.        Past Medical History:   Diagnosis Date    Anemia     blood transfusion 2013 X1 d/t \"perforated bowel\"-- Fe infusions 12/2017--- denies any current iron infusions 12/17/2019    Anxiety and depression     managed with meds    C. difficile diarrhea 2013    in 8960 after complicated ERCP    Cervical dysplasia 1990s    Chronic insomnia     ambien     Chronic nausea     Chronic pain     all over     [facility] [address] [address]     COVID-19 vaccine series completed 04/21/2021    3/29/2021 Pfizer     Dehydration     IVFs through port as needed for this     Encounter for insertion of venous access port     Left chest port    Esophageal stricture 2017    Fibromyalgia     managed with meds    Former cigarette smoker     GERD (gastroesophageal reflux disease)     controlled with med    History of kidney stones 03/2021    X1-naturally pass    HLD (hyperlipidemia)     pt denies     IBS (irritable bowel syndrome)     Migraine headache     does not have often but did have one recently; just takes tylenol    Nausea & vomiting     pt reports she does better with phenergan than zofran - causes HA    Nonalcoholic fatty liver disease     PUD (peptic ulcer disease) 06/2017    still having issues takes meds     Sinus tachycardia     per pt-- no tx needed    Sphincter of Oddi dysfunction     Type 2 diabetes mellitus ([facility] [address]     type 2-- sqbs am avg 150--- Hypo symptoms at <100, Insulin dependent; last A1C was 11/3/2020 = 9.0     Past Surgical History:   Procedure Laterality Date    COLONOSCOPY N/A 4/4/2018    COLONOSCOPY performed by Gumaro Brown [facility] at [facility] ENDOSCOPY    EGD  12/18/2019         EGD  5/21/2021         HX CARPAL TUNNEL RELEASE Right     along with trigger finger    HX COLONOSCOPY      HX ENDOSCOPY  2017    36 fr Siva Martin  HX ERCP  3/5/2013    resulted in perforated duodenum    HX HYSTERECTOMY      ovaries remain    HX LAP CHOLECYSTECTOMY      HX LAPAROTOMY  3/5/2013    exploratory for duodenal perforation with ERCP    HX ORTHOPAEDIC      right finger surgery 2017    HX OTHER SURGICAL Bilateral     thumb    HX OTHER SURGICAL      Kidney stones 2021    HX TOTAL COLECTOMY      HX UROLOGICAL  13 at [facility]-- stent removed 13.   Dr Delfino Sam      port insertion, left chest wall    IR DRAIN CUTANEOUS/SUBCU ABSCESS      [place] ABDOMEN SURGERY PROC UNLISTED  last one placed      Hx of pancreatic stent, multiple    AL ABDOMEN SURGERY PROC UNLISTED      lap nissen    AL ABDOMEN SURGERY [address] UNLISTED      explor lap     In and  Family History   Problem Relation Age of Onset    Diabetes Mother     Hypertension Mother     Heart Disease Mother         cabg began in her 52's    Diabetes Father     Hypertension Father     Other Father     Stroke Father     Coronary Art Dis Father 76    No Known Problems Sister      Social History     Tobacco Use    Smoking status: Former Smoker     Packs/day: 1.00     Years: 3.00     Pack years: 3.00     Quit date: 1993     Years since quittin.6    Smokeless tobacco: Never Used   Substance Use Topics    Alcohol use: No        Allergies   Allergen Reactions    Tape [Adhesive] Rash and Itching     Paper tape too    Barium Iodide Nausea and Vomiting    Barium Sulfate Palpitations    Flagyl [Metronidazole] Nausea and Vomiting    Metformin Nausea and Vomiting     Stomach pain    Statins-Hmg-Coa Reductase Inhibitors Myalgia    Insulins Nausea and Vomiting     Humalog only     Current Facility-Administered Medications   Medication Dose Route Frequency    lactated Ringers infusion  100 mL/hr IntraVENous CONTINUOUS    0.9% sodium chloride infusion  10 mL/hr IntraVENous CONTINUOUS        Review of Systems:  A detailed 10 organ review of systems is obtained with pertinent positives as listed in the History of Present Illness. All others are negative. Objective:     Physical Exam:  Visit Vitals  /79   Pulse 73   Temp 98.4 °F (36.9 °C)   Resp 17   Ht 5' 2\" (1.575 m)   Wt 85.3 kg (188 lb)   SpO2 99%   Breastfeeding No   BMI 34.39 kg/m²      General:  Alert and oriented. Heart: Regular rate and rhythm  Lungs:  Clear to auscultation bilaterally  Abdomen: Soft, nontender, nondistended    Impression/Plan:     Proceed with EGD as planned. I have discussed with the patient the technique, benefits, alternatives, and risks of these procedures, including medication reaction, immediate or delayed bleeding, or perforation of the gastrointestinal tract.      Signed By: Colonel Aaron MD     November 29, 2021

## 2021-11-29 NOTE — ANESTHESIA PREPROCEDURE EVALUATION
Anesthetic History     PONV          Review of Systems / Medical History  Patient summary reviewed and pertinent labs reviewed    Pulmonary  Within defined limits                 Neuro/Psych         Headaches and psychiatric history     Cardiovascular    Hypertension: well controlled              Exercise tolerance: >4 METS     GI/Hepatic/Renal     GERD: well controlled      PUD and liver disease (fatty)     Endo/Other    Diabetes: type 2, using insulin    Obesity and anemia     Other Findings   Comments: Chronic nausea  Sphincter of Oddi dysfunction  IBS  C. Diff  Chronic pancreatitis  Esophageal stricture  pancreatitis           Physical Exam    Airway  Mallampati: II  TM Distance: 4 - 6 cm  Neck ROM: normal range of motion   Mouth opening: Diminished (comment)    Comments: Small mouth--\"dental work kills me\" Cardiovascular  Regular rate and rhythm,  S1 and S2 normal,  no murmur, click, rub, or gallop  Rhythm: regular  Rate: normal      Pertinent negatives: No peripheral edema   Dental    Dentition: Poor dentition and Caps/crowns  Comments: Front teeth fragile   Pulmonary  Breath sounds clear to auscultation               Abdominal  GI exam deferred       Other Findings            Anesthetic Plan    ASA: 3  Anesthesia type: total IV anesthesia          Induction: Intravenous  Anesthetic plan and risks discussed with: Patient

## 2021-11-29 NOTE — PROCEDURES
Esophagogastroduodenoscopy    DATE of PROCEDURE: 11/29/2021    MEDICATIONS ADMINISTERED: MAC    INSTRUMENT: GIF    PROCEDURE:  After obtaining informed consent, the patient was placed in the left lateral position and sedated. The endoscope was advanced under direct vision without difficulty. The esophagus, stomach (including retroflexed views) and duodenum were evaluated. The patient was taken to the recovery area in stable condition. FINDINGS:    OROPHARYNX: Cords and pyriform recesses normal.    ESOPHAGUS: The esophagus is normal. The proximal, mid and distal portions are normal. The Z-Line is unremarkable. Stenosis seen at distal esophagus. Dilated with 46 Fr Guido dilator with mild-moderate resistance. Post dilation showed good mucosal disruption of the stricture tissue without evidence of deep mucosal injury. STOMACH:  Status post G-J. No anastomotic ulceration or stricture noted. Mucosa appeared unremarkable. JEJUNUM: The examined jejunum was unremarkable    Estimated blood loss: 0-minimal     PLAN:  1. Repeat dilation prn  2. Follow up in office  3. Consider manometric evaluation. 4. Consider referral to [facility] for esohagologist evaluation.     Mitchell Kolb MD  [facility], [place]

## 2021-11-29 NOTE — ROUTINE PROCESS
Reviewed discharge instructions with patient and family member/friend. All questions answered. Chest port kept as patient continues to use it. VSS.

## 2021-12-02 ENCOUNTER — HOSPITAL ENCOUNTER (OUTPATIENT)
Dept: INFUSION THERAPY | Age: 51
End: 2021-12-02

## 2021-12-02 RX ORDER — SODIUM CHLORIDE 0.9 % (FLUSH) 0.9 %
10 SYRINGE (ML) INJECTION AS NEEDED
Status: CANCELLED | OUTPATIENT
Start: 2021-12-02

## 2021-12-09 ENCOUNTER — HOSPITAL ENCOUNTER (OUTPATIENT)
Dept: INFUSION THERAPY | Age: 51
Discharge: HOME | End: 2021-12-09
Payer: MEDICARE

## 2021-12-09 VITALS
TEMPERATURE: 98.4 F | DIASTOLIC BLOOD PRESSURE: 53 MMHG | HEART RATE: 75 BPM | OXYGEN SATURATION: 95 % | RESPIRATION RATE: 16 BRPM | SYSTOLIC BLOOD PRESSURE: 85 MMHG

## 2021-12-09 LAB — MAGNESIUM SERPL-MCNC: 1.6 MG/DL (ref 1.8–2.4)

## 2021-12-09 PROCEDURE — 83735 ASSAY OF MAGNESIUM: CPT

## 2021-12-09 PROCEDURE — 74011250636 HC RX REV CODE- 250/636: Performed by: INTERNAL MEDICINE

## 2021-12-09 PROCEDURE — 96375 TX/PRO/DX INJ NEW DRUG ADDON: CPT

## 2021-12-09 PROCEDURE — 74011000250 HC RX REV CODE- 250: Performed by: INTERNAL MEDICINE

## 2021-12-09 PROCEDURE — 96365 THER/PROPH/DIAG IV INF INIT: CPT

## 2021-12-09 RX ORDER — SODIUM CHLORIDE 0.9 % (FLUSH) 0.9 %
10 SYRINGE (ML) INJECTION AS NEEDED
Status: DISCONTINUED | OUTPATIENT
Start: 2021-12-09 | End: 2021-12-11 | Stop reason: HOSPADM

## 2021-12-09 RX ORDER — MAGNESIUM SULFATE 1 G/100ML
1 INJECTION INTRAVENOUS ONCE
Status: COMPLETED | OUTPATIENT
Start: 2021-12-09 | End: 2021-12-09

## 2021-12-09 RX ADMIN — MAGNESIUM SULFATE HEPTAHYDRATE 1 G: 1 INJECTION, SOLUTION INTRAVENOUS at 08:18

## 2021-12-09 RX ADMIN — SODIUM CHLORIDE 500 ML: 900 INJECTION, SOLUTION INTRAVENOUS at 07:20

## 2021-12-09 RX ADMIN — Medication 10 ML: at 09:27

## 2021-12-09 RX ADMIN — Medication 10 ML: at 07:20

## 2021-12-09 RX ADMIN — PROMETHAZINE HYDROCHLORIDE 25 MG: 25 INJECTION INTRAMUSCULAR; INTRAVENOUS at 07:39

## 2021-12-09 NOTE — PROGRESS NOTES
Pt arrived ambulatory to [facility]. Port accessed and blood drawn and sent to lab. NS infusing. Phenergan IV. Mag 1.6. Mag 1 gm infusing. Pt aware of next appt on 12/16/21 at [address] [place] [address].

## 2021-12-13 ENCOUNTER — APPOINTMENT (OUTPATIENT)
Dept: GENERAL RADIOLOGY | Age: 51
End: 2021-12-13
Attending: EMERGENCY MEDICINE
Payer: MEDICARE

## 2021-12-13 ENCOUNTER — HOSPITAL ENCOUNTER (EMERGENCY)
Age: 51
Discharge: HOME | End: 2021-12-13
Attending: EMERGENCY MEDICINE
Payer: MEDICARE

## 2021-12-13 VITALS
DIASTOLIC BLOOD PRESSURE: 67 MMHG | TEMPERATURE: 98 F | WEIGHT: 185 LBS | OXYGEN SATURATION: 94 % | BODY MASS INDEX: 34.04 KG/M2 | HEIGHT: 62 IN | SYSTOLIC BLOOD PRESSURE: 126 MMHG | RESPIRATION RATE: 27 BRPM | HEART RATE: 89 BPM

## 2021-12-13 DIAGNOSIS — G89.29 CHRONIC ABDOMINAL PAIN: ICD-10-CM

## 2021-12-13 DIAGNOSIS — R11.0 NAUSEA WITHOUT VOMITING: Primary | ICD-10-CM

## 2021-12-13 DIAGNOSIS — R10.9 CHRONIC ABDOMINAL PAIN: ICD-10-CM

## 2021-12-13 LAB
ALBUMIN SERPL-MCNC: 3.7 G/DL (ref 3.5–5)
ALBUMIN/GLOB SERPL: 0.9 {RATIO} (ref 1.2–3.5)
ALP SERPL-CCNC: 126 U/L (ref 50–136)
ALT SERPL-CCNC: 65 U/L (ref 12–65)
ANION GAP SERPL CALC-SCNC: 7 MMOL/L (ref 7–16)
AST SERPL-CCNC: 56 U/L (ref 15–37)
ATRIAL RATE: 109 BPM
BASOPHILS # BLD: 0 K/UL (ref 0–0.2)
BASOPHILS NFR BLD: 0 % (ref 0–2)
BILIRUB SERPL-MCNC: 0.6 MG/DL (ref 0.2–1.1)
BUN SERPL-MCNC: 7 MG/DL (ref 6–23)
CALCIUM SERPL-MCNC: 9.1 MG/DL (ref 8.3–10.4)
CALCULATED P AXIS, ECG09: 33 DEGREES
CALCULATED R AXIS, ECG10: 26 DEGREES
CALCULATED T AXIS, ECG11: 53 DEGREES
CHLORIDE SERPL-SCNC: 108 MMOL/L (ref 98–107)
CO2 SERPL-SCNC: 25 MMOL/L (ref 21–32)
CREAT SERPL-MCNC: 0.92 MG/DL (ref 0.6–1)
DIAGNOSIS, 93000: NORMAL
DIFFERENTIAL METHOD BLD: ABNORMAL
EOSINOPHIL # BLD: 0 K/UL (ref 0–0.8)
EOSINOPHIL NFR BLD: 0 % (ref 0.5–7.8)
ERYTHROCYTE [DISTWIDTH] IN BLOOD BY AUTOMATED COUNT: 13.6 % (ref 11.9–14.6)
GLOBULIN SER CALC-MCNC: 4.3 G/DL (ref 2.3–3.5)
GLUCOSE SERPL-MCNC: 280 MG/DL (ref 65–100)
HCT VFR BLD AUTO: 41.6 % (ref 35.8–46.3)
HGB BLD-MCNC: 13.1 G/DL (ref 11.7–15.4)
IMM GRANULOCYTES # BLD AUTO: 0 K/UL (ref 0–0.5)
IMM GRANULOCYTES NFR BLD AUTO: 0 % (ref 0–5)
LIPASE SERPL-CCNC: 105 U/L (ref 73–393)
LYMPHOCYTES # BLD: 1.2 K/UL (ref 0.5–4.6)
LYMPHOCYTES NFR BLD: 25 % (ref 13–44)
MAGNESIUM SERPL-MCNC: 2.2 MG/DL (ref 1.8–2.4)
MCH RBC QN AUTO: 25.3 PG (ref 26.1–32.9)
MCHC RBC AUTO-ENTMCNC: 31.5 G/DL (ref 31.4–35)
MCV RBC AUTO: 80.3 FL (ref 79.6–97.8)
MONOCYTES # BLD: 0.4 K/UL (ref 0.1–1.3)
MONOCYTES NFR BLD: 7 % (ref 4–12)
NEUTS SEG # BLD: 3.4 K/UL (ref 1.7–8.2)
NEUTS SEG NFR BLD: 68 % (ref 43–78)
NRBC # BLD: 0 K/UL (ref 0–0.2)
P-R INTERVAL, ECG05: 144 MS
PLATELET # BLD AUTO: 191 K/UL (ref 150–450)
PMV BLD AUTO: 12.6 FL (ref 9.4–12.3)
POTASSIUM SERPL-SCNC: 4.2 MMOL/L (ref 3.5–5.1)
PROT SERPL-MCNC: 8 G/DL (ref 6.3–8.2)
Q-T INTERVAL, ECG07: 336 MS
QRS DURATION, ECG06: 62 MS
QTC CALCULATION (BEZET), ECG08: 452 MS
RBC # BLD AUTO: 5.18 M/UL (ref 4.05–5.2)
SODIUM SERPL-SCNC: 140 MMOL/L (ref 136–145)
TROPONIN-HIGH SENSITIVITY: 5.5 PG/ML (ref 0–14)
VENTRICULAR RATE, ECG03: 109 BPM
WBC # BLD AUTO: 5 K/UL (ref 4.3–11.1)

## 2021-12-13 PROCEDURE — 84484 ASSAY OF TROPONIN QUANT: CPT

## 2021-12-13 PROCEDURE — 74022 RADEX COMPL AQT ABD SERIES: CPT

## 2021-12-13 PROCEDURE — 74011250636 HC RX REV CODE- 250/636: Performed by: EMERGENCY MEDICINE

## 2021-12-13 PROCEDURE — 85025 COMPLETE CBC W/AUTO DIFF WBC: CPT

## 2021-12-13 PROCEDURE — 83690 ASSAY OF LIPASE: CPT

## 2021-12-13 PROCEDURE — 96374 THER/PROPH/DIAG INJ IV PUSH: CPT

## 2021-12-13 PROCEDURE — 83735 ASSAY OF MAGNESIUM: CPT

## 2021-12-13 PROCEDURE — 74011000250 HC RX REV CODE- 250: Performed by: EMERGENCY MEDICINE

## 2021-12-13 PROCEDURE — 99284 EMERGENCY DEPT VISIT MOD MDM: CPT

## 2021-12-13 PROCEDURE — 93005 ELECTROCARDIOGRAM TRACING: CPT | Performed by: EMERGENCY MEDICINE

## 2021-12-13 PROCEDURE — 80053 COMPREHEN METABOLIC PANEL: CPT

## 2021-12-13 RX ORDER — SODIUM CHLORIDE 0.9 % (FLUSH) 0.9 %
5-10 SYRINGE (ML) INJECTION EVERY 8 HOURS
Status: DISCONTINUED | OUTPATIENT
Start: 2021-12-13 | End: 2021-12-13 | Stop reason: HOSPADM

## 2021-12-13 RX ORDER — SODIUM CHLORIDE 0.9 % (FLUSH) 0.9 %
5-10 SYRINGE (ML) INJECTION AS NEEDED
Status: DISCONTINUED | OUTPATIENT
Start: 2021-12-13 | End: 2021-12-13 | Stop reason: HOSPADM

## 2021-12-13 RX ADMIN — SODIUM CHLORIDE 1000 ML: 900 INJECTION, SOLUTION INTRAVENOUS at 11:53

## 2021-12-13 RX ADMIN — SODIUM CHLORIDE 10 MG: 9 INJECTION INTRAMUSCULAR; INTRAVENOUS; SUBCUTANEOUS at 11:55

## 2021-12-13 NOTE — ED PROVIDER NOTES
Patient is a 28-year-old female presenting to the emergency department today requesting IV medications for her dry heaving which started 24 hours ago. The patient says she has not actually vomited during the last 24 hours but says that she feels like if she doesn't get IV medicine and fluids it will get worse. She has a history of IBS, chronic pancreatitis, fibromyalgia and chronic pain. The patient takes Phenergan and morphine at home. She has not missed any of her medications. She states that she continues to urinate without difficulty and is having bowel movements.            Past Medical History:   Diagnosis Date    Anemia     blood transfusion 2013 X1 d/t \"perforated bowel\"-- Fe infusions 12/2017--- denies any current iron infusions 12/17/2019    Anxiety and depression     managed with meds    C. difficile diarrhea 2013    in 4415 after complicated ERCP    Cervical dysplasia 1990s    Chronic insomnia     ambien     Chronic nausea     Chronic pain     all over     Chronic pancreatitis ([facility] [address]     COVID-19 vaccine series completed 04/21/2021    3/29/2021 Pfizer     Dehydration     IVFs through port as needed for this     Encounter for insertion of venous access port     Left chest port    Esophageal stricture 2017    Fibromyalgia     managed with meds    Former cigarette smoker     GERD (gastroesophageal reflux disease)     controlled with med    History of kidney stones 03/2021    X1-naturally pass    HLD (hyperlipidemia)     pt denies     IBS (irritable bowel syndrome)     Migraine headache     does not have often but did have one recently; just takes tylenol    Nausea & vomiting     pt reports she does better with phenergan than zofran - causes HA    Nonalcoholic fatty liver disease     PUD (peptic ulcer disease) 06/2017    still having issues takes meds     Sinus tachycardia     per pt-- no tx needed    Sphincter of Oddi dysfunction     Type 2 diabetes mellitus ([facility] [address]     type 2-- sqbs am avg 150--- Hypo symptoms at <100, Insulin dependent; last A1C was 11/3/2020 = 9.0       Past Surgical History:   Procedure Laterality Date    COLONOSCOPY N/A 2018    COLONOSCOPY performed by Oswaldo Bundy MD at [facility] EGD  2019         EGD  2021         HX CARPAL TUNNEL RELEASE Right     along with trigger finger    HX COLONOSCOPY      HX ENDOSCOPY      36 fr thomas    HX ERCP  3/5/2013    resulted in perforated duodenum    HX HYSTERECTOMY      ovaries remain    HX LAP CHOLECYSTECTOMY      HX LAPAROTOMY  3/5/2013    exploratory for duodenal perforation with ERCP    HX ORTHOPAEDIC      right finger surgery 2017    HX OTHER SURGICAL Bilateral     thumb    HX OTHER SURGICAL      Kidney stones 2021    HX TOTAL COLECTOMY      HX [facility]OLOGICAL  13 at [facility]-- stent removed 13.   Dr Roslyn Hernandez      port insertion, left chest wall    IR DRAIN CUTANEOUS/SUBCU ABSCESS      [place] ABDOMEN SURGERY PROC UNLISTED  last one placed      Hx of pancreatic stent, multiple    [place] ABDOMEN SURGERY PROC UNLISTED      lap nissen    [place] ABDOMEN SURGERY [address] UNLISTED      explor lap         Family History:   Problem Relation Age of Onset    Diabetes Mother     Hypertension Mother     Heart Disease Mother         cabg began in her 52's    Diabetes Father     Hypertension Father     Other Father     Stroke Father     Coronary Art Dis Father 76    No Known Problems Sister        Social History     Socioeconomic History    Marital status:      Spouse name: Not on file    Number of children: Not on file    Years of education: Not on file    Highest education level: Not on file   Occupational History    Not on file   Tobacco Use    Smoking status: Former Smoker     Packs/day: 1.00     Years: 3.00     Pack years: 3.00     Quit date: 1993     Years since quittin.6    Smokeless tobacco: Never Used Vaping Use    Vaping Use: Never used   Substance and Sexual Activity    Alcohol use: No    Drug use: Yes     Types: Prescription    Sexual activity: Not on file   Other Topics Concern    Not on file   Social History Narrative    Not on file     Social Determinants of Health     Financial Resource Strain:     Difficulty of Paying Living Expenses: Not on file   Food Insecurity:     Worried About Running Out of Food in the Last Year: Not on file    Halima of Food in the Last Year: Not on file   Transportation Needs:     Lack of Transportation (Medical): Not on file    Lack of Transportation (Non-Medical): Not on file   Physical Activity:     Days of Exercise per Week: Not on file    Minutes of Exercise per Session: Not on file   Stress:     Feeling of Stress : Not on file   Social Connections:     Frequency of Communication with Friends and Family: Not on file    Frequency of Social Gatherings with Friends and Family: Not on file    Attends Latter day Services: Not on file    Active Member of [address] [place] [address] or Organizations: Not on file    Attends Club or Organization Meetings: Not on file    Marital Status: Not on file   Intimate Partner Violence:     Fear of Current or Ex-Partner: Not on file    Emotionally Abused: Not on file    Physically Abused: Not on file    Sexually Abused: Not on file   Housing Stability:     Unable to Pay for Housing in the Last Year: Not on file    Number of [place] in the Last Year: Not on file    Unstable Housing in the Last Year: Not on file         ALLERGIES: Tape [adhesive], Barium iodide, Barium sulfate, Flagyl [metronidazole], Metformin, Statins-hmg-coa reductase inhibitors, and Insulins    Review of Systems   Gastrointestinal: Positive for abdominal pain and nausea. Negative for vomiting. All other systems reviewed and are negative.       Vitals:    12/13/21 1037 12/13/21 1039 12/13/21 1115 12/13/21 1123   BP:  (!) 116/97  109/82   Pulse:  (!) 115  99 Resp:    27   Temp:  98 °F (36.7 °C)     SpO2:  98% 95% 95%   Weight: 83.9 kg (185 lb)      Height: 5' 2\" (1.575 m)               Physical Exam     GENERAL:The patient has Body mass index is 33.84 kg/m². Well-hydrated. No acute distress or active dry heaving or vomiting  VITAL SIGNS: Heart rate, blood pressure, respiratory rate reviewed as recorded in  nurse's notes  EYES: Pupils reactive. Extraocular motion intact. No conjunctival redness or drainage. MOUTH/THROAT: Pharynx clear; airway patent. NECK: Supple, no meningeal signs. Trachea midline. No masses or thyromegaly. LUNGS: Breath sounds clear and equal bilaterally no accessory muscle use. CHEST: No deformity  CARDIOVASCULAR: Regular rate and rhythm  ABDOMEN: Soft with epigastric tenderness. No palpable masses or organomegaly. No  peritoneal signs. No rigidity. EXTREMITIES: No clubbing or cyanosis. No joint swelling. Normal muscle tone. No  restricted range of motion appreciated. NEUROLOGIC: Sensation is grossly intact. Cranial nerve exam reveals face is  symmetrical, tongue is midline speech is clear. SKIN: No rash or petechiae. Good skin turgor palpated. PSYCHIATRIC: Alert and oriented. Appropriate behavior and judgment.       MDM  Number of Diagnoses or Management Options  Diagnosis management comments: Viral infection, gastroenteritis, viral adenitis, pseudomembranous colitis, inflammatory  bowel disease, infectious diarrhea    Abdominal wall pain,     Constipation, fecal impaction, small bowel obstruction, partial small bowel obstruction,  Ileus    UTI, pyelonephritis, renal colic, ureteral stone     Peptic ulcer disease, esophagitis, GERD    Pancreatitis, pancreatic pseudocyst,    hepatic cirrhosis, GI bleed, esophageal varices, poisoning,    gallbladder disease, cholecystitis, diverticulitis, appendicitis, appendicitis with rupture,    ingestion of foreign material         Amount and/or Complexity of Data Reviewed  Clinical lab tests: reviewed and ordered  Tests in the radiology section of CPT®: ordered and reviewed  Tests in the medicine section of CPT®: ordered and reviewed  Review and summarize past medical records: yes  Independent visualization of images, tracings, or specimens: yes           Procedures

## 2021-12-13 NOTE — ED NOTES
I have reviewed discharge instructions with the patient and spouse. The patient and spouse verbalized understanding. Patient left ED via Discharge Method: ambulatory to Home with her . Opportunity for questions and clarification provided. Patient given 0 scripts. To continue your aftercare when you leave the hospital, you may receive an automated call from our care team to check in on how you are doing.  This is a free service and part of our promise to provide the best care and service to meet your aftercare needs. \" If you have questions, or wish to unsubscribe from this service please call [phone].  Thank you for Choosing our [facility] Emergency Department.

## 2021-12-13 NOTE — DISCHARGE INSTRUCTIONS
Continue taking your medications at home and follow-up with your gastroenterologist this week if your symptoms do not begin to improve.

## 2021-12-16 ENCOUNTER — HOSPITAL ENCOUNTER (OUTPATIENT)
Dept: INFUSION THERAPY | Age: 51
Discharge: HOME | End: 2021-12-16
Payer: MEDICARE

## 2021-12-16 VITALS
HEART RATE: 84 BPM | WEIGHT: 183.1 LBS | TEMPERATURE: 98.3 F | RESPIRATION RATE: 18 BRPM | SYSTOLIC BLOOD PRESSURE: 111 MMHG | DIASTOLIC BLOOD PRESSURE: 69 MMHG | OXYGEN SATURATION: 99 % | BODY MASS INDEX: 33.49 KG/M2

## 2021-12-16 LAB — MAGNESIUM SERPL-MCNC: 1.9 MG/DL (ref 1.8–2.4)

## 2021-12-16 PROCEDURE — 96374 THER/PROPH/DIAG INJ IV PUSH: CPT

## 2021-12-16 PROCEDURE — 74011250636 HC RX REV CODE- 250/636: Performed by: INTERNAL MEDICINE

## 2021-12-16 PROCEDURE — 74011000250 HC RX REV CODE- 250: Performed by: INTERNAL MEDICINE

## 2021-12-16 PROCEDURE — 83735 ASSAY OF MAGNESIUM: CPT

## 2021-12-16 RX ORDER — SODIUM CHLORIDE 0.9 % (FLUSH) 0.9 %
10 SYRINGE (ML) INJECTION AS NEEDED
Status: ACTIVE | OUTPATIENT
Start: 2021-12-16 | End: 2021-12-16

## 2021-12-16 RX ADMIN — Medication 10 ML: at 07:30

## 2021-12-16 RX ADMIN — PROMETHAZINE HYDROCHLORIDE 25 MG: 25 INJECTION INTRAMUSCULAR; INTRAVENOUS at 07:42

## 2021-12-16 RX ADMIN — Medication 10 ML: at 09:30

## 2021-12-16 NOTE — PROGRESS NOTES
Pt arrived ambulatory today at 0701, to receive labs/phenergan and have port needle changed out. Pt tolerated without difficulty. No magnesium needed. Patient discharged via ambulatory accompanied by father. Instructed to notify physician of any problems, questions or concerns. Allowed opportunity for patient/family to ask questions. Verbalized understanding. Next appointment is Dec 23 at [facility] with Teresita [phone].

## 2021-12-16 NOTE — PROGRESS NOTES
Problem: Knowledge Deficit  Goal: *Verbalizes understanding of procedures and medications  Outcome: Progressing Towards Goal     Problem: Infection - Risk of, Central Venous Catheter-Associated Bloodstream Infection  Goal: *Absence of infection signs and symptoms  Outcome: Progressing Towards Goal     Problem: Patient Education:  Go to Education Activity  Goal: Patient/Family Education  Outcome: Progressing Towards Goal

## 2021-12-23 ENCOUNTER — HOSPITAL ENCOUNTER (OUTPATIENT)
Dept: INFUSION THERAPY | Age: 51
Discharge: HOME | End: 2021-12-23
Payer: MEDICARE

## 2021-12-23 VITALS
RESPIRATION RATE: 18 BRPM | HEART RATE: 102 BPM | TEMPERATURE: 98.2 F | OXYGEN SATURATION: 97 % | SYSTOLIC BLOOD PRESSURE: 141 MMHG | DIASTOLIC BLOOD PRESSURE: 77 MMHG

## 2021-12-23 LAB — MAGNESIUM SERPL-MCNC: 1.8 MG/DL (ref 1.8–2.4)

## 2021-12-23 PROCEDURE — 74011000250 HC RX REV CODE- 250: Performed by: INTERNAL MEDICINE

## 2021-12-23 PROCEDURE — 74011250636 HC RX REV CODE- 250/636: Performed by: INTERNAL MEDICINE

## 2021-12-23 PROCEDURE — 96365 THER/PROPH/DIAG IV INF INIT: CPT

## 2021-12-23 PROCEDURE — 83735 ASSAY OF MAGNESIUM: CPT

## 2021-12-23 PROCEDURE — 96375 TX/PRO/DX INJ NEW DRUG ADDON: CPT

## 2021-12-23 RX ORDER — MAGNESIUM SULFATE HEPTAHYDRATE 40 MG/ML
2 INJECTION, SOLUTION INTRAVENOUS ONCE
Status: COMPLETED | OUTPATIENT
Start: 2021-12-23 | End: 2021-12-23

## 2021-12-23 RX ORDER — SODIUM CHLORIDE 0.9 % (FLUSH) 0.9 %
10 SYRINGE (ML) INJECTION AS NEEDED
Status: DISCONTINUED | OUTPATIENT
Start: 2021-12-23 | End: 2021-12-25 | Stop reason: HOSPADM

## 2021-12-23 RX ADMIN — Medication 10 ML: at 09:30

## 2021-12-23 RX ADMIN — Medication 10 ML: at 07:10

## 2021-12-23 RX ADMIN — PROMETHAZINE HYDROCHLORIDE 25 MG: 25 INJECTION INTRAMUSCULAR; INTRAVENOUS at 07:32

## 2021-12-23 RX ADMIN — MAGNESIUM SULFATE HEPTAHYDRATE 2 G: 40 INJECTION, SOLUTION INTRAVENOUS at 07:43

## 2021-12-30 ENCOUNTER — HOSPITAL ENCOUNTER (OUTPATIENT)
Dept: INFUSION THERAPY | Age: 51
Discharge: HOME | End: 2021-12-30
Payer: MEDICARE

## 2021-12-30 VITALS
HEART RATE: 104 BPM | OXYGEN SATURATION: 94 % | RESPIRATION RATE: 20 BRPM | DIASTOLIC BLOOD PRESSURE: 99 MMHG | SYSTOLIC BLOOD PRESSURE: 127 MMHG

## 2021-12-30 LAB — MAGNESIUM SERPL-MCNC: 2.1 MG/DL (ref 1.8–2.4)

## 2021-12-30 PROCEDURE — 96374 THER/PROPH/DIAG INJ IV PUSH: CPT

## 2021-12-30 PROCEDURE — 83735 ASSAY OF MAGNESIUM: CPT

## 2021-12-30 PROCEDURE — 74011250636 HC RX REV CODE- 250/636: Performed by: INTERNAL MEDICINE

## 2021-12-30 PROCEDURE — 74011000250 HC RX REV CODE- 250: Performed by: INTERNAL MEDICINE

## 2021-12-30 RX ORDER — SODIUM CHLORIDE 0.9 % (FLUSH) 0.9 %
10 SYRINGE (ML) INJECTION AS NEEDED
Status: DISCONTINUED | OUTPATIENT
Start: 2021-12-30 | End: 2022-01-01 | Stop reason: HOSPADM

## 2021-12-30 RX ADMIN — PROMETHAZINE HYDROCHLORIDE 25 MG: 25 INJECTION INTRAMUSCULAR; INTRAVENOUS at 07:33

## 2021-12-30 RX ADMIN — Medication 10 ML: at 07:46

## 2021-12-30 NOTE — PROGRESS NOTES
Arrived ambulatory, phenergan given IVP, Mag level 2.1, no replacements needed, pt discharged home ambulatory

## 2022-01-03 ENCOUNTER — ANESTHESIA (OUTPATIENT)
Dept: ENDOSCOPY | Age: 52
End: 2022-01-03
Payer: MEDICARE

## 2022-01-03 ENCOUNTER — HOSPITAL ENCOUNTER (OUTPATIENT)
Age: 52
Setting detail: OUTPATIENT SURGERY
Discharge: HOME | End: 2022-01-03
Attending: INTERNAL MEDICINE | Admitting: INTERNAL MEDICINE
Payer: MEDICARE

## 2022-01-03 ENCOUNTER — ANESTHESIA EVENT (OUTPATIENT)
Dept: ENDOSCOPY | Age: 52
End: 2022-01-03
Payer: MEDICARE

## 2022-01-03 VITALS
DIASTOLIC BLOOD PRESSURE: 64 MMHG | HEIGHT: 62 IN | SYSTOLIC BLOOD PRESSURE: 112 MMHG | RESPIRATION RATE: 16 BRPM | WEIGHT: 199 LBS | TEMPERATURE: 98.6 F | BODY MASS INDEX: 36.62 KG/M2 | HEART RATE: 73 BPM | OXYGEN SATURATION: 94 %

## 2022-01-03 LAB
GLUCOSE BLD STRIP.AUTO-MCNC: 123 MG/DL (ref 65–100)
SERVICE CMNT-IMP: ABNORMAL

## 2022-01-03 PROCEDURE — 82962 GLUCOSE BLOOD TEST: CPT

## 2022-01-03 PROCEDURE — 77030021593 HC FCPS BIOP ENDOSC BSC -A: Performed by: INTERNAL MEDICINE

## 2022-01-03 PROCEDURE — 2709999900 HC NON-CHARGEABLE SUPPLY: Performed by: INTERNAL MEDICINE

## 2022-01-03 PROCEDURE — 88305 TISSUE EXAM BY PATHOLOGIST: CPT

## 2022-01-03 PROCEDURE — 76060000031 HC ANESTHESIA FIRST 0.5 HR: Performed by: INTERNAL MEDICINE

## 2022-01-03 PROCEDURE — 88312 SPECIAL STAINS GROUP 1: CPT

## 2022-01-03 PROCEDURE — 74011250636 HC RX REV CODE- 250/636: Performed by: NURSE ANESTHETIST, CERTIFIED REGISTERED

## 2022-01-03 PROCEDURE — 74011250636 HC RX REV CODE- 250/636: Performed by: INTERNAL MEDICINE

## 2022-01-03 PROCEDURE — 76040000025: Performed by: INTERNAL MEDICINE

## 2022-01-03 PROCEDURE — 74011000250 HC RX REV CODE- 250: Performed by: NURSE ANESTHETIST, CERTIFIED REGISTERED

## 2022-01-03 RX ORDER — SODIUM CHLORIDE, SODIUM LACTATE, POTASSIUM CHLORIDE, CALCIUM CHLORIDE 600; 310; 30; 20 MG/100ML; MG/100ML; MG/100ML; MG/100ML
1000 INJECTION, SOLUTION INTRAVENOUS CONTINUOUS
Status: DISCONTINUED | OUTPATIENT
Start: 2022-01-03 | End: 2022-01-03 | Stop reason: HOSPADM

## 2022-01-03 RX ORDER — TRIAMCINOLONE ACETONIDE 40 MG/ML
40 INJECTION, SUSPENSION INTRA-ARTICULAR; INTRAMUSCULAR
Status: DISCONTINUED | OUTPATIENT
Start: 2022-01-03 | End: 2022-01-03 | Stop reason: HOSPADM

## 2022-01-03 RX ORDER — DEXTROMETHORPHAN/PSEUDOEPHED 2.5-7.5/.8
20 DROPS ORAL
Status: DISCONTINUED | OUTPATIENT
Start: 2022-01-03 | End: 2022-01-03 | Stop reason: HOSPADM

## 2022-01-03 RX ORDER — LIDOCAINE HYDROCHLORIDE 20 MG/ML
INJECTION, SOLUTION EPIDURAL; INFILTRATION; INTRACAUDAL; PERINEURAL AS NEEDED
Status: DISCONTINUED | OUTPATIENT
Start: 2022-01-03 | End: 2022-01-03 | Stop reason: HOSPADM

## 2022-01-03 RX ORDER — PROPOFOL 10 MG/ML
INJECTION, EMULSION INTRAVENOUS AS NEEDED
Status: DISCONTINUED | OUTPATIENT
Start: 2022-01-03 | End: 2022-01-03 | Stop reason: HOSPADM

## 2022-01-03 RX ADMIN — PHENYLEPHRINE HYDROCHLORIDE 200 MCG: 10 INJECTION INTRAVENOUS at 09:39

## 2022-01-03 RX ADMIN — PROPOFOL 20 MG: 10 INJECTION, EMULSION INTRAVENOUS at 09:41

## 2022-01-03 RX ADMIN — PROPOFOL 30 MG: 10 INJECTION, EMULSION INTRAVENOUS at 09:43

## 2022-01-03 RX ADMIN — PROPOFOL 30 MG: 10 INJECTION, EMULSION INTRAVENOUS at 09:39

## 2022-01-03 RX ADMIN — LIDOCAINE HYDROCHLORIDE 50 MG: 20 INJECTION, SOLUTION EPIDURAL; INFILTRATION; INTRACAUDAL; PERINEURAL at 09:35

## 2022-01-03 RX ADMIN — PROPOFOL 70 MG: 10 INJECTION, EMULSION INTRAVENOUS at 09:35

## 2022-01-03 RX ADMIN — PROPOFOL 30 MG: 10 INJECTION, EMULSION INTRAVENOUS at 09:36

## 2022-01-03 RX ADMIN — PROPOFOL 20 MG: 10 INJECTION, EMULSION INTRAVENOUS at 09:45

## 2022-01-03 RX ADMIN — PHENYLEPHRINE HYDROCHLORIDE 100 MCG: 10 INJECTION INTRAVENOUS at 09:47

## 2022-01-03 RX ADMIN — SODIUM CHLORIDE, SODIUM LACTATE, POTASSIUM CHLORIDE, AND CALCIUM CHLORIDE 1000 ML: 600; 310; 30; 20 INJECTION, SOLUTION INTRAVENOUS at 09:10

## 2022-01-03 NOTE — PROCEDURES
Esophagogastroduodenoscopy    DATE of PROCEDURE: 1/3/2022    INDICATIONS:  1. Dysphagia  2. Nausea and vomiting  3. Dry heaves    MEDICATIONS ADMINISTERED: MAC    INSTRUMENT: GIF    PROCEDURE:  After obtaining informed consent, the patient was placed in the left lateral position and sedated. The endoscope was advanced under direct vision without difficulty. The esophagus, stomach (including retroflexed views) and duodenum were evaluated. The patient was taken to the recovery area in stable condition. FINDINGS:  ESOPHAGUS: esophageal stenosis just proximal to the GE junction. Moderate trauma after [address] Guido dilation. STOMACH: small bowel to gastric anastomosis intact without ulceration. Pylorus patent. Random biopsies were taken from the antrum, incisura and body of the stomach. SMALL BOWEL: just distal to the duodenal bulb is a side to side small bowel anastomosis that appears healthy    Estimated blood loss: 0-minimal     PLAN:  1. Tight diabetic control  2.  Follow up in the office    Sheldon Anne MD  [facility], [place]

## 2022-01-03 NOTE — DISCHARGE INSTRUCTIONS
Gastrointestinal Esophagogastroduodenoscopy (EGD) - Upper Exam Discharge Instructions    1. Call Dr. George Lopez at [phone] for any problems or questions. 2. Contact the doctor's office for follow up appointment as directed. 3. Medication may cause drowsiness for several hours, therefore:  · Do not drive or operate machinery for remainder of the day. · No alcohol today. · Do not make any important or legal decisions for 24 hours. · Do not sign any legal documents for 24 hours. 5. Ordinarily, you may resume regular diet and activity after exam unless otherwise specified by your physician. 6. For mild soreness in your throat you may use Cepacol throat lozenges or warm salt-water gargles as needed. Any additional instructions:  Office will call you to follow up on pathology results and to schedule future appointments. Follow strict measures to control diabetes.

## 2022-01-03 NOTE — ANESTHESIA PREPROCEDURE EVALUATION
Relevant Problems   CARDIOVASCULAR   (+) Hypertension      GASTROINTESTINAL   (+) GERD (gastroesophageal reflux disease)      ENDOCRINE   (+) DM type 2 (diabetes mellitus, type 2) (HCC)      HEMATOLOGY   (+) Iron deficiency anemia       Anesthetic History     PONV          Review of Systems / Medical History  Patient summary reviewed and pertinent labs reviewed    Pulmonary  Within defined limits                 Neuro/Psych              Cardiovascular    Hypertension              Exercise tolerance: <4 METS     GI/Hepatic/Renal     GERD    Renal disease: stones  Liver disease    Comments: Chronic pancreatitis  Hepatic steatosis  Hx of esophageal stricture  Hx of Nissen fubdoplication Endo/Other    Diabetes         Other Findings            Physical Exam    Airway  Mallampati: II  TM Distance: 4 - 6 cm  Neck ROM: normal range of motion   Mouth opening: Diminished (comment)     Cardiovascular    Rhythm: regular           Dental    Dentition: Caps/crowns     Pulmonary                 Abdominal  GI exam deferred       Other Findings            Anesthetic Plan    ASA: 3  Anesthesia type: total IV anesthesia          Induction: Intravenous  Anesthetic plan and risks discussed with: Patient

## 2022-01-03 NOTE — H&P
[facility] H&P (Admission)        Date:  1/3/2022    Chief Complaint:  N/V, dry heaves, dysphagia    Subjective:     History of Present Illness:  Patient is a 46 y.o. being admitted for EGD with dilation. Dysphagia improved, but recurs quickly.     PMH:  Past Medical History:   Diagnosis Date    Anemia     blood transfusion 2013 X1 d/t \"perforated bowel\"-- Fe infusions 12/2017--- denies any current iron infusions 12/17/2019    Anxiety and depression     managed with meds    C. difficile diarrhea 2013    in 7962 after complicated ERCP    Cervical dysplasia 1990s    Chronic insomnia     ambien     Chronic nausea     Chronic pain     all over     Chronic pancreatitis ([facility] [address]     COVID-19 vaccine series completed 04/21/2021    3/29/2021 Pfizer     Dehydration     IVFs through port as needed for this     Encounter for insertion of venous access port     Left chest port    Esophageal stricture 2017    Fibromyalgia     managed with meds    Former cigarette smoker     GERD (gastroesophageal reflux disease)     controlled with med    History of kidney stones 03/2021    X1-naturally pass    HLD (hyperlipidemia)     pt denies     IBS (irritable bowel syndrome)     Migraine headache     does not have often but did have one recently; just takes tylenol    Nausea & vomiting     pt reports she does better with phenergan than zofran - causes HA    Nonalcoholic fatty liver disease     PUD (peptic ulcer disease) 06/2017    still having issues takes meds     Sinus tachycardia     per pt-- no tx needed    Sphincter of Oddi dysfunction     Type 2 diabetes mellitus ([facility] [address]     type 2-- sqbs am avg 150--- Hypo symptoms at <100, Insulin dependent; last A1C was 11/3/2020 = 9.0       PSH:  Past Surgical History:   Procedure Laterality Date    COLONOSCOPY N/A 4/4/2018    COLONOSCOPY performed by Lonnell Kayser, [facility] at [facility] ENDOSCOPY    EGD  12/18/2019         EGD  5/21/2021         HX CARPAL TUNNEL RELEASE Right     along with trigger finger    HX COLONOSCOPY      HX ENDOSCOPY  2017    36 fr thomas    HX ERCP  3/5/2013    resulted in perforated duodenum    HX HYSTERECTOMY  1998    ovaries remain    HX LAP CHOLECYSTECTOMY  1997    HX LAPAROTOMY  3/5/2013    exploratory for duodenal perforation with ERCP    HX ORTHOPAEDIC      right finger surgery 11/2017    HX OTHER SURGICAL Bilateral     thumb    HX OTHER SURGICAL      Kidney stones march 2021    HX TOTAL COLECTOMY      HX UROLOGICAL  4/25/13 at [facility]-- stent removed 6-27-13. Dr Alva John  2014    port insertion, left chest wall    IR DRAIN CUTANEOUS/SUBCU ABSCESS      [place] ABDOMEN SURGERY PROC UNLISTED  last one placed  2012    Hx of pancreatic stent, multiple    FL ABDOMEN SURGERY PROC UNLISTED  1997    lap nissen    FL ABDOMEN SURGERY [address] UNLISTED  4/13    explor lap       Allergies: Allergies   Allergen Reactions    Tape [Adhesive] Rash and Itching     Paper tape too    Barium Iodide Nausea and Vomiting    Barium Sulfate Palpitations    Flagyl [Metronidazole] Nausea and Vomiting    Metformin Nausea and Vomiting     Stomach pain    Statins-Hmg-Coa Reductase Inhibitors Myalgia    Insulins Nausea and Vomiting     Humalog only       Home Medications:  Prior to Admission medications    Medication Sig Start Date End Date Taking? Authorizing Provider   lipase-protease-amylase (CREON 36,000) 36,000-114,000- 180,000 unit cpDR capsule Take 1 Capsule by mouth. Yes Provider, Historical   citalopram (CELEXA) 20 mg tablet Take 20 mg by mouth daily. Indications: am   Yes Provider, Historical   lidocaine (XYLOCAINE) 2 % solution 10 mL every six (6) hours as needed. 4/28/21   Provider, Historical   morphine IR (MS IR) 30 mg tablet Take  by mouth three (3) times daily as needed. Provider, Historical   acetaminophen (Tylenol Extra Strength) 500 mg tablet Take  by mouth every six (6) hours as needed for Pain.     Provider, Historical   tiZANidine (ZANAFLEX) 4 mg tablet Take 4 mg by mouth as needed. Provider, Historical   pantoprazole (Protonix) 40 mg tablet Take 40 mg by mouth two (2) times a day. Provider, Historical   promethazine (PHENERGAN) 25 mg/mL injection 25 mg by IntraMUSCular route three (3) times daily as needed. States take IV    Provider, Historical   pregabalin (LYRICA) 50 mg capsule Take 100 mg by mouth two (2) times a day. Provider, Historical   zolpidem (AMBIEN) 10 mg tablet Take 10 mg by mouth nightly. Provider, Historical   promethazine (PHENERGAN) 25 mg tablet Take 25 mg by mouth every six (6) hours as needed for Nausea. Provider, Historical   nitroglycerin (NITROSTAT) 0.3 mg SL tablet Take 0.4 mg by mouth as needed (\"pt states taking for cramping\"). Provider, Historical   LORazepam (ATIVAN) 1 mg tablet Take 1 mg by mouth three (3) times daily as needed for Anxiety. Provider, Historical   cyanocobalamin (VITAMIN B12) 1,000 mcg/mL injection INJECT 1 VIAL INTRAMUSCULARLY FOR 4 WEEKS THEN MONTHLY 11/25/17   Provider, Historical   CARAFATE 100 mg/mL suspension TAKE 10 ML BY MOUTH 4 TIMES A DAY EVERY DAY ON A EMPTY STOMACH 1 HR BEFORE MEALS AND AT BEDTIME 9/7/17   Provider, Historical   diphenhydrAMINE (BENADRYL) 25 mg capsule Take 25 mg by mouth every six (6) hours as needed. Provider, Historical   insulin degludec (TRESIBA FLEXTOUCH U-100) 100 unit/mL (3 mL) inpn 68 Units by SubCUTAneous route nightly. Provider, Historical   polyethylene glycol (MIRALAX) 17 gram packet Take 17 g by mouth as needed. Provider, Historical   hyoscyamine SL (LEVSIN/SL) 0.125 mg SL tablet 0.125 mg by SubLINGual route every six (6) hours as needed for Cramping. Provider, Historical   0.9% sodium chloride solution by IntraVENous route as needed.  Gives to herself via port at home daily -1000cc every other day per patient  Patient not taking: Reported on 1/3/2022    Provider, Historical   insulin aspart (NOVOLOG) 100 unit/mL injection Use as directed  Patient taking differently: 7 Units by SubCUTAneous route Before breakfast, lunch, and dinner. Over 200 use sliding scale-  Always gets 7 units then if >200 gets extra - does ot know amount- has never taken more than 10 units regular 16   Keke Garg NP   promethazine (PHENERGAN) 25 mg suppository Insert 25 mg into rectum as needed for Nausea. Provider, Historical   ondansetron hcl (ZOFRAN) 8 mg tablet Take 8 mg by mouth every eight (8) hours as needed for Nausea. Provider, Historical       Hospital Medications:  Current Facility-Administered Medications   Medication Dose Route Frequency    simethicone (MYLICON) 80EK/3.3GN oral drops 20 mg  20 mg Oral ENDO ONCE    triamcinolone acetonide (KENALOG-40) 40 mg/mL injection 40 mg  40 mg IntraMUSCular NOW    lactated Ringers infusion 1,000 mL  1,000 mL IntraVENous CONTINUOUS       Social History:  Social History     Tobacco Use    Smoking status: Former Smoker     Packs/day: 1.00     Years: 3.00     Pack years: 3.00     Quit date: 1993     Years since quittin.7    Smokeless tobacco: Never Used   Substance Use Topics    Alcohol use: No         Family History:  Family History   Problem Relation Age of Onset    Diabetes Mother     Hypertension Mother     Heart Disease Mother         cabg began in her 52's    Diabetes Father     Hypertension Father     Other Father     Stroke Father     Coronary Art Dis Father 76    No Known Problems Sister        Review of Systems:  A detailed 10 system ROS is obtained, with pertinent positives as listed above. All others are negative.     Objective:     Physical Exam:  Vitals:  Visit Vitals  /81   Pulse 90   Temp 99.3 °F (37.4 °C)   Resp 16   Ht 5' 2\" (1.575 m)   Wt 90.3 kg (199 lb)   SpO2 97%   Breastfeeding No   BMI 36.40 kg/m²     Gen:  Pt is alert, cooperative, no acute distress  Skin: no large lesions  HEENT: MMM  Cardiovascular: Regular rate and rhythm. No murmurs, gallops, or rubs. Respiratory:  Comfortable breathing with no accessory muscle use. Clear breath sounds with no wheezes, rales, or rhonchi. GI:  Abdomen nondistended, soft, and nontender. Normal active bowel sounds. Neurological:  Gross memory appears intact. Patient is alert and oriented. Psychiatric:  Mood appears appropriate with judgement intact. Laboratory:    No results for input(s): WBC, HGB, HCT, PLT, MCV, NA, K, CL, CO2, BUN, CREA, CA, MG, GLU, AP, TBIL, CBIL, ALB, TP, AML, LPSE, PTP, INR, APTT, HGBEXT, HCTEXT, PLTEXT, INREXT in the last 72 hours. No lab exists for component: SGOT, GPT, DBIL    Assessment:       Active Problems:    * No active hospital problems. *      Plan:     Endoscopy and risks explained to the patient. Risks including reaction to sedation, cardiopulmonary events, infection, bleeding, perforation, requirement for surgery if complications should occur, death were explained to patient who expressed understanding and agreed to proceed with endoscopy.         Steffany Khalil MD  [facility] [place]

## 2022-01-03 NOTE — ANESTHESIA POSTPROCEDURE EVALUATION
Procedure(s):  ESOPHAGOGASTRODUODENOSCOPY (EGD)/ BMI 35  ESOPHAGEAL DILATION  ESOPHAGOGASTRODUODENAL (EGD) BIOPSY. total IV anesthesia    Anesthesia Post Evaluation      Multimodal analgesia: multimodal analgesia not used between 6 hours prior to anesthesia start to PACU discharge  Patient location during evaluation: PACU  Patient participation: complete - patient participated  Level of consciousness: awake  Pain management: adequate  Airway patency: patent  Anesthetic complications: no  Cardiovascular status: acceptable  Respiratory status: acceptable  Hydration status: acceptable  Post anesthesia nausea and vomiting:  none  Final Post Anesthesia Temperature Assessment:  Normothermia (36.0-37.5 degrees C)      INITIAL Post-op Vital signs:   Vitals Value Taken Time   /59 01/03/22 0957   Temp 37 °C (98.6 °F) 01/03/22 0953   Pulse 74 01/03/22 0957   Resp 14 01/03/22 0953   SpO2 96 % 01/03/22 0957   Vitals shown include unvalidated device data.

## 2022-01-06 ENCOUNTER — HOSPITAL ENCOUNTER (OUTPATIENT)
Dept: INFUSION THERAPY | Age: 52
Discharge: HOME | End: 2022-01-06
Payer: MEDICARE

## 2022-01-06 VITALS
TEMPERATURE: 98.6 F | SYSTOLIC BLOOD PRESSURE: 129 MMHG | OXYGEN SATURATION: 96 % | RESPIRATION RATE: 16 BRPM | HEART RATE: 101 BPM | DIASTOLIC BLOOD PRESSURE: 84 MMHG

## 2022-01-06 LAB — MAGNESIUM SERPL-MCNC: 2 MG/DL (ref 1.8–2.4)

## 2022-01-06 PROCEDURE — 74011250636 HC RX REV CODE- 250/636: Performed by: INTERNAL MEDICINE

## 2022-01-06 PROCEDURE — 83735 ASSAY OF MAGNESIUM: CPT

## 2022-01-06 PROCEDURE — 74011000250 HC RX REV CODE- 250: Performed by: INTERNAL MEDICINE

## 2022-01-06 PROCEDURE — 96374 THER/PROPH/DIAG INJ IV PUSH: CPT

## 2022-01-06 RX ORDER — SODIUM CHLORIDE 0.9 % (FLUSH) 0.9 %
10 SYRINGE (ML) INJECTION AS NEEDED
Status: DISCONTINUED | OUTPATIENT
Start: 2022-01-06 | End: 2022-01-08 | Stop reason: HOSPADM

## 2022-01-06 RX ADMIN — Medication 10 ML: at 08:30

## 2022-01-06 RX ADMIN — SODIUM CHLORIDE 500 ML: 900 INJECTION, SOLUTION INTRAVENOUS at 07:35

## 2022-01-06 RX ADMIN — PROMETHAZINE HYDROCHLORIDE 25 MG: 25 INJECTION INTRAMUSCULAR; INTRAVENOUS at 08:00

## 2022-01-06 NOTE — PROGRESS NOTES
Pt arrived ambulatory to [facility]. Port accessed and blood drawn and sent to lab. NS infusing. Phenergan IV. Mag 2.0. No replacements needed. Port remains accessed. Pt has no future appts with [facility].  to call pt with next appt day and time. Pt discharged ambulatory.

## 2022-01-10 NOTE — ED TRIAGE NOTES
Pt arrives via pov with complaints of vomiting \"dry heaves\" and chest spasms that radiate to the back. Pt states this started yesterday. NAD Masked.
1

## 2022-01-13 ENCOUNTER — HOSPITAL ENCOUNTER (OUTPATIENT)
Dept: INFUSION THERAPY | Age: 52
Discharge: HOME | End: 2022-01-13
Payer: MEDICARE

## 2022-01-13 VITALS
RESPIRATION RATE: 18 BRPM | SYSTOLIC BLOOD PRESSURE: 133 MMHG | TEMPERATURE: 98.9 F | HEART RATE: 115 BPM | DIASTOLIC BLOOD PRESSURE: 91 MMHG

## 2022-01-13 LAB — MAGNESIUM SERPL-MCNC: 1.9 MG/DL (ref 1.8–2.4)

## 2022-01-13 PROCEDURE — 83735 ASSAY OF MAGNESIUM: CPT

## 2022-01-13 PROCEDURE — 96374 THER/PROPH/DIAG INJ IV PUSH: CPT

## 2022-01-13 PROCEDURE — 74011000250 HC RX REV CODE- 250: Performed by: INTERNAL MEDICINE

## 2022-01-13 PROCEDURE — 74011250636 HC RX REV CODE- 250/636: Performed by: INTERNAL MEDICINE

## 2022-01-13 RX ORDER — SODIUM CHLORIDE 0.9 % (FLUSH) 0.9 %
10 SYRINGE (ML) INJECTION AS NEEDED
Status: DISCONTINUED | OUTPATIENT
Start: 2022-01-13 | End: 2022-01-15 | Stop reason: HOSPADM

## 2022-01-13 RX ADMIN — Medication 10 ML: at 07:45

## 2022-01-13 RX ADMIN — PROMETHAZINE HYDROCHLORIDE 25 MG: 25 INJECTION INTRAMUSCULAR; INTRAVENOUS at 07:35

## 2022-01-13 NOTE — PROGRESS NOTES
Arrived to the [facility]. Labs and central line care completed. Patient tolerated without problems. Patient did nt require replacement  Any issues or concerns during appointment: no.  Patient aware of next infusion appointment on 1/20/22 (date) at 0930 (time). Patient aware of next appointment time  Patient instructed to call provider with temperature of 100.4 or greater or nausea/vomiting/ diarrhea or pain not controlled by medications  Discharged ambulatory with family.

## 2022-01-20 ENCOUNTER — HOSPITAL ENCOUNTER (OUTPATIENT)
Dept: INFUSION THERAPY | Age: 52
Discharge: HOME | End: 2022-01-20
Payer: MEDICARE

## 2022-01-20 VITALS
DIASTOLIC BLOOD PRESSURE: 92 MMHG | SYSTOLIC BLOOD PRESSURE: 132 MMHG | HEART RATE: 123 BPM | RESPIRATION RATE: 18 BRPM | OXYGEN SATURATION: 94 % | TEMPERATURE: 98.3 F

## 2022-01-20 LAB — MAGNESIUM SERPL-MCNC: 1.9 MG/DL (ref 1.8–2.4)

## 2022-01-20 PROCEDURE — 74011250636 HC RX REV CODE- 250/636: Performed by: INTERNAL MEDICINE

## 2022-01-20 PROCEDURE — 74011000250 HC RX REV CODE- 250: Performed by: INTERNAL MEDICINE

## 2022-01-20 PROCEDURE — 83735 ASSAY OF MAGNESIUM: CPT

## 2022-01-20 PROCEDURE — 96374 THER/PROPH/DIAG INJ IV PUSH: CPT

## 2022-01-20 RX ORDER — SODIUM CHLORIDE 0.9 % (FLUSH) 0.9 %
10-40 SYRINGE (ML) INJECTION AS NEEDED
Status: DISCONTINUED | OUTPATIENT
Start: 2022-01-20 | End: 2022-01-22 | Stop reason: HOSPADM

## 2022-01-20 RX ADMIN — SODIUM CHLORIDE 25 MG: 9 INJECTION INTRAMUSCULAR; INTRAVENOUS; SUBCUTANEOUS at 10:15

## 2022-01-20 RX ADMIN — SODIUM CHLORIDE 500 ML: 9 INJECTION, SOLUTION INTRAVENOUS at 10:14

## 2022-01-20 RX ADMIN — SODIUM CHLORIDE, PRESERVATIVE FREE 10 ML: 5 INJECTION INTRAVENOUS at 10:30

## 2022-01-20 NOTE — PROGRESS NOTES
Arrived to the [facility]. Port needle changed and labs drawn & reviewed. No replacement needed. IV Phenergan completed. Patient tolerated well. Any issues or concerns during appointment: None. Patient aware of next infusion appointment on 1/27 (date) at [address] [place] [address] (time). Patient instructed to call provider with temperature of 100.4 or greater or nausea/vomiting/ diarrhea or pain not controlled by medications  Discharged ambulatory.

## 2022-01-27 ENCOUNTER — HOSPITAL ENCOUNTER (OUTPATIENT)
Dept: INFUSION THERAPY | Age: 52
Discharge: HOME | End: 2022-01-27
Payer: MEDICARE

## 2022-01-27 VITALS
HEART RATE: 100 BPM | RESPIRATION RATE: 18 BRPM | OXYGEN SATURATION: 96 % | DIASTOLIC BLOOD PRESSURE: 81 MMHG | SYSTOLIC BLOOD PRESSURE: 126 MMHG | BODY MASS INDEX: 33.91 KG/M2 | WEIGHT: 185.4 LBS | TEMPERATURE: 98.4 F

## 2022-01-27 LAB — MAGNESIUM SERPL-MCNC: 1.9 MG/DL (ref 1.8–2.4)

## 2022-01-27 PROCEDURE — 74011000250 HC RX REV CODE- 250: Performed by: INTERNAL MEDICINE

## 2022-01-27 PROCEDURE — 96374 THER/PROPH/DIAG INJ IV PUSH: CPT

## 2022-01-27 PROCEDURE — 74011250636 HC RX REV CODE- 250/636: Performed by: INTERNAL MEDICINE

## 2022-01-27 PROCEDURE — 83735 ASSAY OF MAGNESIUM: CPT

## 2022-01-27 RX ORDER — SODIUM CHLORIDE 0.9 % (FLUSH) 0.9 %
10-40 SYRINGE (ML) INJECTION AS NEEDED
Status: ACTIVE | OUTPATIENT
Start: 2022-01-27 | End: 2022-01-27

## 2022-01-27 RX ADMIN — SODIUM CHLORIDE, PRESERVATIVE FREE 10 ML: 5 INJECTION INTRAVENOUS at 07:30

## 2022-01-27 RX ADMIN — PROMETHAZINE HYDROCHLORIDE 25 MG: 25 INJECTION INTRAMUSCULAR; INTRAVENOUS at 07:58

## 2022-01-27 RX ADMIN — SODIUM CHLORIDE, PRESERVATIVE FREE 10 ML: 5 INJECTION INTRAVENOUS at 09:01

## 2022-01-27 RX ADMIN — SODIUM CHLORIDE 500 ML: 9 INJECTION, SOLUTION INTRAVENOUS at 07:56

## 2022-01-27 NOTE — PROGRESS NOTES
Patient arrived to [facility]. Port needle changed and IV Phenergan given. Labs drawn & reviewed. No replacements needed. Port flush completed. Patient tolerated well. Any issues or concerns during appointment: None  Patient aware of next Infusion appointment on 2/3 (date) at [address] [place] [address] (time). Discharged ambulatory in stable condition in stable condition.

## 2022-02-02 ENCOUNTER — HOSPITAL ENCOUNTER (OUTPATIENT)
Dept: INFUSION THERAPY | Age: 52
Discharge: HOME | End: 2022-02-02
Payer: MEDICARE

## 2022-02-02 ENCOUNTER — HOSPITAL ENCOUNTER (OUTPATIENT)
Dept: LAB | Age: 52
Discharge: HOME | End: 2022-02-02

## 2022-02-02 LAB
ALBUMIN SERPL-MCNC: 3.8 G/DL (ref 3.5–5)
ALBUMIN/GLOB SERPL: 0.8 {RATIO} (ref 1.2–3.5)
ALP SERPL-CCNC: 114 U/L (ref 50–136)
ALT SERPL-CCNC: 64 U/L (ref 12–65)
ANION GAP SERPL CALC-SCNC: 5 MMOL/L (ref 7–16)
AST SERPL-CCNC: 52 U/L (ref 15–37)
BILIRUB SERPL-MCNC: 0.5 MG/DL (ref 0.2–1.1)
BUN SERPL-MCNC: 7 MG/DL (ref 6–23)
CALCIUM SERPL-MCNC: 9.3 MG/DL (ref 8.3–10.4)
CHLORIDE SERPL-SCNC: 104 MMOL/L (ref 98–107)
CO2 SERPL-SCNC: 28 MMOL/L (ref 21–32)
CREAT SERPL-MCNC: 0.9 MG/DL (ref 0.6–1)
ERYTHROCYTE [DISTWIDTH] IN BLOOD BY AUTOMATED COUNT: 14.6 % (ref 11.9–14.6)
GLOBULIN SER CALC-MCNC: 4.8 G/DL (ref 2.3–3.5)
GLUCOSE SERPL-MCNC: 180 MG/DL (ref 65–100)
HCT VFR BLD AUTO: 41.3 % (ref 35.8–46.3)
HGB BLD-MCNC: 13.1 G/DL (ref 11.7–15.4)
LIPASE SERPL-CCNC: 74 U/L (ref 73–393)
MCH RBC QN AUTO: 25 PG (ref 26.1–32.9)
MCHC RBC AUTO-ENTMCNC: 31.7 G/DL (ref 31.4–35)
MCV RBC AUTO: 78.8 FL (ref 79.6–97.8)
NRBC # BLD: 0 K/UL (ref 0–0.2)
PLATELET # BLD AUTO: 160 K/UL (ref 150–450)
PMV BLD AUTO: 12.3 FL (ref 9.4–12.3)
POTASSIUM SERPL-SCNC: 4 MMOL/L (ref 3.5–5.1)
PROT SERPL-MCNC: 8.6 G/DL (ref 6.3–8.2)
RBC # BLD AUTO: 5.24 M/UL (ref 4.05–5.2)
SODIUM SERPL-SCNC: 137 MMOL/L (ref 136–145)
WBC # BLD AUTO: 6 K/UL (ref 4.3–11.1)

## 2022-02-02 PROCEDURE — 80053 COMPREHEN METABOLIC PANEL: CPT

## 2022-02-02 PROCEDURE — 85027 COMPLETE CBC AUTOMATED: CPT

## 2022-02-02 PROCEDURE — 83690 ASSAY OF LIPASE: CPT

## 2022-02-02 PROCEDURE — 36591 DRAW BLOOD OFF VENOUS DEVICE: CPT

## 2022-02-02 PROCEDURE — 83735 ASSAY OF MAGNESIUM: CPT

## 2022-02-02 RX ORDER — SODIUM CHLORIDE 0.9 % (FLUSH) 0.9 %
10 SYRINGE (ML) INJECTION AS NEEDED
Status: DISCONTINUED | OUTPATIENT
Start: 2022-02-02 | End: 2022-02-04 | Stop reason: HOSPADM

## 2022-02-02 RX ADMIN — Medication 10 ML: at 13:21

## 2022-02-02 NOTE — PROGRESS NOTES
Patient arrived to port lab for port access and lab draw   Maria Mendez [address] accessed and labs drawn per protocol   *Port remains accessed   Patient discharged from port lab ambulatory*

## 2022-02-03 ENCOUNTER — HOSPITAL ENCOUNTER (OUTPATIENT)
Dept: INFUSION THERAPY | Age: 52
Discharge: HOME | End: 2022-02-03
Payer: MEDICARE

## 2022-02-03 VITALS
SYSTOLIC BLOOD PRESSURE: 111 MMHG | RESPIRATION RATE: 18 BRPM | HEART RATE: 80 BPM | OXYGEN SATURATION: 96 % | DIASTOLIC BLOOD PRESSURE: 68 MMHG | TEMPERATURE: 97.8 F

## 2022-02-03 LAB
Lab: NORMAL
REFERENCE LAB,REFLB: NORMAL
TEST DESCRIPTION:,ATST: NORMAL

## 2022-02-03 PROCEDURE — 74011250636 HC RX REV CODE- 250/636: Performed by: INTERNAL MEDICINE

## 2022-02-03 PROCEDURE — 74011000250 HC RX REV CODE- 250: Performed by: INTERNAL MEDICINE

## 2022-02-03 PROCEDURE — 96374 THER/PROPH/DIAG INJ IV PUSH: CPT

## 2022-02-03 RX ORDER — SODIUM CHLORIDE 0.9 % (FLUSH) 0.9 %
10-40 SYRINGE (ML) INJECTION AS NEEDED
Status: DISCONTINUED | OUTPATIENT
Start: 2022-02-03 | End: 2022-02-05 | Stop reason: HOSPADM

## 2022-02-03 RX ADMIN — PROMETHAZINE HYDROCHLORIDE 25 MG: 25 INJECTION INTRAMUSCULAR; INTRAVENOUS at 07:31

## 2022-02-03 RX ADMIN — SODIUM CHLORIDE, PRESERVATIVE FREE 10 ML: 5 INJECTION INTRAVENOUS at 07:31

## 2022-02-03 NOTE — PROGRESS NOTES
Arrived to the [facility]. Phenergan IV and port needle and dressing change completed. Patient tolerated well. Any issues or concerns during appointment: magnesium resulted as 1.9. No replacements required. Patient aware of next infusion appointment on 2/10/2022 at 7:15am  Discharged ambulatory.

## 2022-02-10 ENCOUNTER — HOSPITAL ENCOUNTER (OUTPATIENT)
Dept: INFUSION THERAPY | Age: 52
Discharge: HOME | End: 2022-02-10
Payer: MEDICARE

## 2022-02-10 VITALS
TEMPERATURE: 98.3 F | SYSTOLIC BLOOD PRESSURE: 97 MMHG | OXYGEN SATURATION: 94 % | RESPIRATION RATE: 18 BRPM | HEART RATE: 79 BPM | DIASTOLIC BLOOD PRESSURE: 62 MMHG

## 2022-02-10 LAB
ALBUMIN SERPL-MCNC: 3.2 G/DL (ref 3.5–5)
ALBUMIN/GLOB SERPL: 0.8 {RATIO} (ref 1.2–3.5)
ALP SERPL-CCNC: 112 U/L (ref 50–136)
ALT SERPL-CCNC: 57 U/L (ref 12–65)
ANION GAP SERPL CALC-SCNC: 4 MMOL/L (ref 7–16)
AST SERPL-CCNC: 46 U/L (ref 15–37)
BASOPHILS # BLD: 0 K/UL (ref 0–0.2)
BASOPHILS NFR BLD: 1 % (ref 0–2)
BILIRUB SERPL-MCNC: 0.4 MG/DL (ref 0.2–1.1)
BUN SERPL-MCNC: 11 MG/DL (ref 6–23)
CALCIUM SERPL-MCNC: 8.5 MG/DL (ref 8.3–10.4)
CHLORIDE SERPL-SCNC: 105 MMOL/L (ref 98–107)
CO2 SERPL-SCNC: 26 MMOL/L (ref 21–32)
CREAT SERPL-MCNC: 0.8 MG/DL (ref 0.6–1)
DIFFERENTIAL METHOD BLD: ABNORMAL
EOSINOPHIL # BLD: 0.1 K/UL (ref 0–0.8)
EOSINOPHIL NFR BLD: 3 % (ref 0.5–7.8)
ERYTHROCYTE [DISTWIDTH] IN BLOOD BY AUTOMATED COUNT: 14.3 % (ref 11.9–14.6)
GLOBULIN SER CALC-MCNC: 4.1 G/DL (ref 2.3–3.5)
GLUCOSE SERPL-MCNC: 238 MG/DL (ref 65–100)
HCT VFR BLD AUTO: 36.6 % (ref 35.8–46.3)
HGB BLD-MCNC: 11.7 G/DL (ref 11.7–15.4)
IMM GRANULOCYTES # BLD AUTO: 0 K/UL (ref 0–0.5)
IMM GRANULOCYTES NFR BLD AUTO: 0 % (ref 0–5)
LIPASE SERPL-CCNC: 62 U/L (ref 73–393)
LYMPHOCYTES # BLD: 1.6 K/UL (ref 0.5–4.6)
LYMPHOCYTES NFR BLD: 32 % (ref 13–44)
MCH RBC QN AUTO: 25.7 PG (ref 26.1–32.9)
MCHC RBC AUTO-ENTMCNC: 32 G/DL (ref 31.4–35)
MCV RBC AUTO: 80.3 FL (ref 79.6–97.8)
MONOCYTES # BLD: 0.4 K/UL (ref 0.1–1.3)
MONOCYTES NFR BLD: 8 % (ref 4–12)
NEUTS SEG # BLD: 2.8 K/UL (ref 1.7–8.2)
NEUTS SEG NFR BLD: 57 % (ref 43–78)
NRBC # BLD: 0 K/UL (ref 0–0.2)
PLATELET # BLD AUTO: 154 K/UL (ref 150–450)
PMV BLD AUTO: 12.5 FL (ref 9.4–12.3)
POTASSIUM SERPL-SCNC: 4.5 MMOL/L (ref 3.5–5.1)
PROT SERPL-MCNC: 7.3 G/DL (ref 6.3–8.2)
RBC # BLD AUTO: 4.56 M/UL (ref 4.05–5.2)
SODIUM SERPL-SCNC: 135 MMOL/L (ref 136–145)
WBC # BLD AUTO: 4.9 K/UL (ref 4.3–11.1)

## 2022-02-10 PROCEDURE — 74011250636 HC RX REV CODE- 250/636: Performed by: INTERNAL MEDICINE

## 2022-02-10 PROCEDURE — 85025 COMPLETE CBC W/AUTO DIFF WBC: CPT

## 2022-02-10 PROCEDURE — 96374 THER/PROPH/DIAG INJ IV PUSH: CPT

## 2022-02-10 PROCEDURE — 80053 COMPREHEN METABOLIC PANEL: CPT

## 2022-02-10 PROCEDURE — 83690 ASSAY OF LIPASE: CPT

## 2022-02-10 PROCEDURE — 74011000250 HC RX REV CODE- 250: Performed by: INTERNAL MEDICINE

## 2022-02-10 PROCEDURE — 83735 ASSAY OF MAGNESIUM: CPT

## 2022-02-10 RX ORDER — SODIUM CHLORIDE 0.9 % (FLUSH) 0.9 %
10 SYRINGE (ML) INJECTION AS NEEDED
Status: DISCONTINUED | OUTPATIENT
Start: 2022-02-10 | End: 2022-02-12 | Stop reason: HOSPADM

## 2022-02-10 RX ADMIN — PROMETHAZINE HYDROCHLORIDE 25 MG: 25 INJECTION INTRAMUSCULAR; INTRAVENOUS at 08:01

## 2022-02-10 RX ADMIN — Medication 10 ML: at 10:00

## 2022-02-10 RX ADMIN — Medication 10 ML: at 07:35

## 2022-02-10 NOTE — PROGRESS NOTES
Arrived to the [facility]. Assessment completed, labs drawn and reviewed. Port changed. Patient will come back if magnesium level is low. Patient tolerated well. Any issues or concerns during appointment: none. Patient aware of next infusion appointment on 02/17/2022 at [address] [place] [address]. Patient instructed to call provider with temperature of 100.4 or greater or nausea/vomiting/ diarrhea or pain not controlled by medications. Discharged ambulatory.

## 2022-02-11 LAB — MAGNESIUM SERPL-MCNC: 1.9 MG/DL (ref 1.6–2.3)

## 2022-02-13 ENCOUNTER — ANESTHESIA EVENT (OUTPATIENT)
Dept: ENDOSCOPY | Age: 52
End: 2022-02-13
Payer: MEDICARE

## 2022-02-14 ENCOUNTER — HOSPITAL ENCOUNTER (OUTPATIENT)
Age: 52
Setting detail: OUTPATIENT SURGERY
Discharge: HOME | End: 2022-02-14
Attending: INTERNAL MEDICINE | Admitting: INTERNAL MEDICINE
Payer: MEDICARE

## 2022-02-14 ENCOUNTER — ANESTHESIA (OUTPATIENT)
Dept: ENDOSCOPY | Age: 52
End: 2022-02-14
Payer: MEDICARE

## 2022-02-14 VITALS
DIASTOLIC BLOOD PRESSURE: 71 MMHG | HEIGHT: 62 IN | OXYGEN SATURATION: 92 % | HEART RATE: 77 BPM | BODY MASS INDEX: 34.96 KG/M2 | RESPIRATION RATE: 18 BRPM | SYSTOLIC BLOOD PRESSURE: 126 MMHG | TEMPERATURE: 97.8 F | WEIGHT: 190 LBS

## 2022-02-14 LAB
GLUCOSE BLD STRIP.AUTO-MCNC: 92 MG/DL (ref 65–100)
SERVICE CMNT-IMP: NORMAL

## 2022-02-14 PROCEDURE — 74011250636 HC RX REV CODE- 250/636: Performed by: ANESTHESIOLOGY

## 2022-02-14 PROCEDURE — 74011000250 HC RX REV CODE- 250: Performed by: REGISTERED NURSE

## 2022-02-14 PROCEDURE — 82962 GLUCOSE BLOOD TEST: CPT

## 2022-02-14 PROCEDURE — 2709999900 HC NON-CHARGEABLE SUPPLY: Performed by: INTERNAL MEDICINE

## 2022-02-14 PROCEDURE — 74011000250 HC RX REV CODE- 250: Performed by: ANESTHESIOLOGY

## 2022-02-14 PROCEDURE — 74011250636 HC RX REV CODE- 250/636: Performed by: INTERNAL MEDICINE

## 2022-02-14 PROCEDURE — 77030031722: Performed by: INTERNAL MEDICINE

## 2022-02-14 PROCEDURE — 76060000031 HC ANESTHESIA FIRST 0.5 HR: Performed by: INTERNAL MEDICINE

## 2022-02-14 PROCEDURE — 76040000025: Performed by: INTERNAL MEDICINE

## 2022-02-14 PROCEDURE — 74011250636 HC RX REV CODE- 250/636: Performed by: REGISTERED NURSE

## 2022-02-14 RX ORDER — TRIAMCINOLONE ACETONIDE 40 MG/ML
40 INJECTION, SUSPENSION INTRA-ARTICULAR; INTRAMUSCULAR ONCE
Status: COMPLETED | OUTPATIENT
Start: 2022-02-14 | End: 2022-02-14

## 2022-02-14 RX ORDER — PROPOFOL 10 MG/ML
INJECTION, EMULSION INTRAVENOUS
Status: DISCONTINUED | OUTPATIENT
Start: 2022-02-14 | End: 2022-02-14 | Stop reason: HOSPADM

## 2022-02-14 RX ORDER — LIDOCAINE HYDROCHLORIDE 20 MG/ML
INJECTION, SOLUTION EPIDURAL; INFILTRATION; INTRACAUDAL; PERINEURAL AS NEEDED
Status: DISCONTINUED | OUTPATIENT
Start: 2022-02-14 | End: 2022-02-14 | Stop reason: HOSPADM

## 2022-02-14 RX ORDER — PROPOFOL 10 MG/ML
INJECTION, EMULSION INTRAVENOUS AS NEEDED
Status: DISCONTINUED | OUTPATIENT
Start: 2022-02-14 | End: 2022-02-14 | Stop reason: HOSPADM

## 2022-02-14 RX ORDER — SODIUM CHLORIDE, SODIUM LACTATE, POTASSIUM CHLORIDE, CALCIUM CHLORIDE 600; 310; 30; 20 MG/100ML; MG/100ML; MG/100ML; MG/100ML
100 INJECTION, SOLUTION INTRAVENOUS CONTINUOUS
Status: DISCONTINUED | OUTPATIENT
Start: 2022-02-14 | End: 2022-02-14 | Stop reason: HOSPADM

## 2022-02-14 RX ADMIN — TRIAMCINOLONE ACETONIDE 40 MG: 40 INJECTION, SUSPENSION INTRA-ARTICULAR; INTRAMUSCULAR at 10:00

## 2022-02-14 RX ADMIN — PROPOFOL 140 MCG/KG/MIN: 10 INJECTION, EMULSION INTRAVENOUS at 09:53

## 2022-02-14 RX ADMIN — LIDOCAINE HYDROCHLORIDE 50 MG: 20 INJECTION, SOLUTION EPIDURAL; INFILTRATION; INTRACAUDAL; PERINEURAL at 09:53

## 2022-02-14 RX ADMIN — PROMETHAZINE HYDROCHLORIDE 6.25 MG: 25 INJECTION INTRAMUSCULAR; INTRAVENOUS at 09:42

## 2022-02-14 RX ADMIN — PROPOFOL 50 MG: 10 INJECTION, EMULSION INTRAVENOUS at 09:53

## 2022-02-14 RX ADMIN — SODIUM CHLORIDE, SODIUM LACTATE, POTASSIUM CHLORIDE, AND CALCIUM CHLORIDE: 600; 310; 30; 20 INJECTION, SOLUTION INTRAVENOUS at 09:50

## 2022-02-14 NOTE — DISCHARGE INSTRUCTIONS
Gastrointestinal Esophagogastroduodenoscopy (EGD)/ Endoscopic Ultrasound(EUS)- Upper Exam Discharge Instructions    1. Call Dr. Iggy Roth  for any problems or questions. 2. Contact the doctor's office for follow up appointment as directed. 3. Medication may cause drowsiness for several hours, therefore, do not drive or operate machinery for remainder of the day. 4. No alcohol today. 5. Do not make any important decisions such as signing legal paperwork. 6. Ordinarily, you may resume regular diet and activity after exam unless otherwise specified by your physician. 7. For mild soreness in your throat you may use Cepacol throat lozenges or warm  salt-water gargles as needed. Any additional instructions: Follow-up EGD with dilation in 4 weeks         Instructions given to Juan Tiwari and other family members.

## 2022-02-14 NOTE — PROGRESS NOTES
Called pharmacy rep @[phone] to confirm kenalog to be tubed down to [facility], rep said it was tubed to wrong station and will re-send it.  Awaiting arrival.

## 2022-02-14 NOTE — PROCEDURES
Esophagogastroduodenoscopy    DATE of PROCEDURE: 2/14/2022    INDICATION:  1. Dysphagia  2. Esophageal stricture    POSTPROCEDURE DIAGNOSIS:  1. Esophageal stricture  2. Gastritis  3. Gastrojejunostomy    MEDICATIONS ADMINISTERED: MAC. Further details per anesthesia ntoe. INSTRUMENT: [id number]    PROCEDURE:  After obtaining informed consent, the patient was placed in the left lateral position and sedated. The endoscope was advanced under direct vision without difficulty. The esophagus, stomach (including retroflexed views) and duodenum were evaluated. The patient was taken to the recovery area in stable condition. FINDINGS:  ESOPHAGUS: Benign stricture immediately proximal to EG junction disrupted with 54 Fr Guido dilator. 4-quadrant injection of Kenalog 40 mg at level of stricture. Otherwise normal exam.  STOMACH: mild gastritis. Gastrojejunostomy. Otherwise normal exam. Small bowel is normal.       Estimated blood loss: 0-minimal   Specimens obtained during procedure: none    PLAN:  1.  Repeat EGD with dilation at hospital in 4 weeks    Kayleigh Miller MD

## 2022-02-14 NOTE — ANESTHESIA POSTPROCEDURE EVALUATION
Procedure(s):  ESOPHAGOGASTRODUODENOSCOPY (EGD)/ BMI 34  ESOPHAGEAL DILATION  KENALOG INJECTION. total IV anesthesia    Anesthesia Post Evaluation      Multimodal analgesia: multimodal analgesia not used between 6 hours prior to anesthesia start to PACU discharge  Patient location during evaluation: bedside  Patient participation: complete - patient participated  Level of consciousness: awake  Pain score: 1  Pain management: adequate  Airway patency: patent  Anesthetic complications: no  Cardiovascular status: acceptable  Respiratory status: acceptable  Hydration status: acceptable  Comments: Pt doing well. Post anesthesia nausea and vomiting:  none  Final Post Anesthesia Temperature Assessment:  Normothermia (36.0-37.5 degrees C)      INITIAL Post-op Vital signs:   Vitals Value Taken Time   /57 02/14/22 1007   Temp 36.6 °C (97.8 °F) 02/14/22 1007   Pulse 79 02/14/22 1010   Resp 16 02/14/22 1007   SpO2 93 % 02/14/22 1010   Vitals shown include unvalidated device data.

## 2022-02-14 NOTE — H&P
Gastroenterology Outpatient History and Physical    Patient: Kan Sandrita    Physician: Jannette Orantes MD    Vital Signs: Blood pressure 119/81, pulse 94, temperature 99.2 °F (37.3 °C), resp. rate 20, height 5' 2\" (1.575 m), weight 86.2 kg (190 lb), SpO2 94 %, not currently breastfeeding. Allergies:    Allergies   Allergen Reactions    Tape [Adhesive] Rash and Itching     Paper tape too    Barium Iodide Nausea and Vomiting    Barium Sulfate Palpitations    Flagyl [Metronidazole] Nausea and Vomiting    Metformin Nausea and Vomiting     Stomach pain    Statins-Hmg-Coa Reductase Inhibitors Myalgia    Insulins Nausea and Vomiting     Humalog only       Chief Complaint: Esophageal stricture, dysphagia    History of Present Illness: As Above    Justification for Procedure: As Above    History:  Past Medical History:   Diagnosis Date    Anemia     blood transfusion 2013 X1 d/t \"perforated bowel\"-- Fe infusions 12/2017--- denies any current iron infusions 12/17/2019    Anxiety and depression     managed with meds    C. difficile diarrhea 2013    in 9483 after complicated ERCP    Cervical dysplasia 1990s    Chronic insomnia     ambien     Chronic nausea     Chronic pain     all over     Chronic pancreatitis ([facility] [address]     COVID-19 vaccine series completed 04/21/2021    3/29/2021 Pfizer     Dehydration     IVFs through port as needed for this     Encounter for insertion of venous access port     Left chest port    Esophageal stricture 2017    Fibromyalgia     managed with meds    Former cigarette smoker     GERD (gastroesophageal reflux disease)     controlled with med    History of kidney stones 03/2021    X1-naturally pass    HLD (hyperlipidemia)     pt denies     IBS (irritable bowel syndrome)     Migraine headache     does not have often but did have one recently; just takes tylenol    Nausea & vomiting     pt reports she does better with phenergan than zofran - causes HA    Nonalcoholic fatty liver disease     PUD (peptic ulcer disease) 2017    still having issues takes meds     Sinus tachycardia     per pt-- no tx needed    Sphincter of Oddi dysfunction     Type 2 diabetes mellitus ([address]     type 2-- sqbs am avg 150--- Hypo symptoms at <100, Insulin dependent; last A1C was 11/3/2020 = 9.0      Past Surgical History:   Procedure Laterality Date    COLONOSCOPY N/A 2018    COLONOSCOPY performed by Shawna Adams MD at [place] [id number] EGD  2019         EGD  2021         HX CARPAL TUNNEL RELEASE Right     along with trigger finger    HX COLONOSCOPY      HX ENDOSCOPY      36 fr thomas    HX ERCP  3/5/2013    resulted in perforated duodenum    HX HYSTERECTOMY      ovaries remain    HX LAP CHOLECYSTECTOMY      HX LAPAROTOMY  3/5/2013    exploratory for duodenal perforation with ERCP    HX ORTHOPAEDIC      right finger surgery 2017    HX OTHER SURGICAL Bilateral     thumb    HX OTHER SURGICAL      Kidney stones 2021    HX TOTAL COLECTOMY      HX UROLOGICAL  13 at [facility]-- stent removed 13.   Dr Kenney Gold      port insertion, left chest wall    IR DRAIN CUTANEOUS/SUBCU ABSCESS      [place] ABDOMEN SURGERY PROC UNLISTED  last one placed      Hx of pancreatic stent, multiple    NM ABDOMEN SURGERY PROC UNLISTED      lap nissen    NM ABDOMEN SURGERY [address] UNLISTED      explor lap      Social History     Socioeconomic History    Marital status:    Tobacco Use    Smoking status: Former Smoker     Packs/day: 1.00     Years: 3.00     Pack years: 3.00     Quit date: 1993     Years since quittin.8    Smokeless tobacco: Never Used   Vaping Use    Vaping Use: Never used   Substance and Sexual Activity    Alcohol use: No    Drug use: Yes     Types: Prescription      Family History   Problem Relation Age of Onset    Diabetes Mother     Hypertension Mother     Heart Disease Mother cabg began in her 52's    Diabetes Father     Hypertension Father     Other Father     Stroke Father     Coronary Art Dis Father 76    No Known Problems Sister        Medications:   Prior to Admission medications    Medication Sig Start Date End Date Taking? Authorizing Provider   morphine IR (MS IR) 30 mg tablet Take  by mouth three (3) times daily as needed. Yes Provider, Historical   acetaminophen (Tylenol Extra Strength) 500 mg tablet Take  by mouth every six (6) hours as needed for Pain. Yes Provider, Historical   tiZANidine (ZANAFLEX) 4 mg tablet Take 4 mg by mouth as needed. Yes Provider, Historical   pantoprazole (Protonix) 40 mg tablet Take 40 mg by mouth two (2) times a day. Yes Provider, Historical   promethazine (PHENERGAN) 25 mg/mL injection 25 mg by IntraMUSCular route three (3) times daily as needed. States take IV   Yes Provider, Historical   pregabalin (LYRICA) 50 mg capsule Take 100 mg by mouth two (2) times a day. Yes Provider, Historical   zolpidem (AMBIEN) 10 mg tablet Take 10 mg by mouth nightly. Yes Provider, Historical   citalopram (CELEXA) 20 mg tablet Take 20 mg by mouth daily. Indications: am   Yes Provider, Historical   LORazepam (ATIVAN) 1 mg tablet Take 1 mg by mouth three (3) times daily as needed for Anxiety. Yes Provider, Historical   cyanocobalamin (VITAMIN B12) 1,000 mcg/mL injection INJECT 1 VIAL INTRAMUSCULARLY FOR 4 WEEKS THEN MONTHLY 11/25/17  Yes Provider, Historical   CARAFATE 100 mg/mL suspension TAKE 10 ML BY MOUTH 4 TIMES A DAY EVERY DAY ON A EMPTY STOMACH 1 HR BEFORE MEALS AND AT BEDTIME 9/7/17  Yes Provider, Historical   diphenhydrAMINE (BENADRYL) 25 mg capsule Take 25 mg by mouth every six (6) hours as needed. Yes Provider, Historical   insulin degludec (TRESIBA FLEXTOUCH U-100) 100 unit/mL (3 mL) inpn 68 Units by SubCUTAneous route nightly. Yes Provider, Historical   polyethylene glycol (MIRALAX) 17 gram packet Take 17 g by mouth as needed. Yes Provider, Historical   hyoscyamine SL (LEVSIN/SL) 0.125 mg SL tablet 0.125 mg by SubLINGual route every six (6) hours as needed for Cramping. Yes Provider, Historical   0.9% sodium chloride solution by IntraVENous route as needed. Gives to herself via port at home daily -1000cc every other day per patient   Yes Provider, Historical   insulin aspart (NOVOLOG) 100 unit/mL injection Use as directed  Patient taking differently: 7 Units by SubCUTAneous route Before breakfast, lunch, and dinner. Over 200 use sliding scale-  Always gets 7 units then if >200 gets extra - does ot know amount- has never taken more than 10 units regular 6/7/16  Yes Suzi Alvarez NP   ondansetron hcl (ZOFRAN) 8 mg tablet Take 8 mg by mouth every eight (8) hours as needed for Nausea. Yes Provider, Historical   lidocaine (XYLOCAINE) 2 % solution 10 mL every six (6) hours as needed. Patient not taking: Reported on 2/14/2022 4/28/21   Provider, Historical   promethazine (PHENERGAN) 25 mg tablet Take 25 mg by mouth every six (6) hours as needed for Nausea. Patient not taking: Reported on 2/14/2022    Provider, Historical   promethazine (PHENERGAN) 25 mg suppository Insert 25 mg into rectum as needed for Nausea.   Patient not taking: Reported on 2/14/2022    Provider, Historical       Physical Exam:         Heart: regular rate and rhythm, S1, S2 normal, no murmur, click, rub or gallop   Lungs: chest clear, no wheezing, rales, normal symmetric air entry, Heart exam - S1, S2 normal, no murmur, no gallop, rate regular   Abdominal: Bowel sounds are normal, Bowel sounds active, liver is not enlarged, spleen is not enlarged           Findings/Diagnosis:  Esophageal stricture, dysphagia    EGD with dilation        Signed:  Rudy Spain MD 2/14/2022

## 2022-02-14 NOTE — ROUTINE PROCESS
VSS. Discharge instructions reviewed with patient and , Gladys Beauchamp and copy of instructions sent home with patient. Dr. Estela Velasquez spoke with patient prior to discharge. Questions answered. Discharged via car[facility] Port flushed prior to discharge.

## 2022-02-14 NOTE — ANESTHESIA PREPROCEDURE EVALUATION
Relevant Problems   CARDIOVASCULAR   (+) Hypertension      GASTROINTESTINAL   (+) GERD (gastroesophageal reflux disease)      ENDOCRINE   (+) DM type 2 (diabetes mellitus, type 2) (HCC)      HEMATOLOGY   (+) Iron deficiency anemia       Anesthetic History     PONV          Review of Systems / Medical History  Patient summary reviewed and pertinent labs reviewed    Pulmonary          Smoker (Former)         Neuro/Psych         Psychiatric history (Anxiety/Depression)     Cardiovascular    Hypertension: well controlled              Exercise tolerance: >4 METS  Comments: Denies CP, SOB or changes in functional status   GI/Hepatic/Renal     GERD: well controlled    Renal disease: stones  PUD and liver disease    Comments: Chronic pancreatitis  Hepatic steatosis  Hx of esophageal stricture  Hx of Nissen fubdoplication Endo/Other    Diabetes: poorly controlled, type 2    Obesity     Other Findings   Comments: Chronic nausea  Chronic opioid use           Physical Exam    Airway  Mallampati: II  TM Distance: 4 - 6 cm  Neck ROM: normal range of motion   Mouth opening: Diminished (comment)     Cardiovascular    Rhythm: regular  Rate: normal         Dental    Dentition: Caps/crowns     Pulmonary                 Abdominal  GI exam deferred       Other Findings            Anesthetic Plan    ASA: 3  Anesthesia type: total IV anesthesia          Induction: Intravenous  Anesthetic plan and risks discussed with: Patient

## 2022-02-17 ENCOUNTER — HOSPITAL ENCOUNTER (OUTPATIENT)
Dept: INFUSION THERAPY | Age: 52
Discharge: HOME | End: 2022-02-17
Payer: MEDICARE

## 2022-02-17 VITALS
BODY MASS INDEX: 33.65 KG/M2 | SYSTOLIC BLOOD PRESSURE: 139 MMHG | DIASTOLIC BLOOD PRESSURE: 82 MMHG | HEART RATE: 104 BPM | RESPIRATION RATE: 18 BRPM | OXYGEN SATURATION: 96 % | TEMPERATURE: 97.7 F | WEIGHT: 184 LBS

## 2022-02-17 LAB — MAGNESIUM SERPL-MCNC: 2 MG/DL (ref 1.8–2.4)

## 2022-02-17 PROCEDURE — 74011250636 HC RX REV CODE- 250/636: Performed by: INTERNAL MEDICINE

## 2022-02-17 PROCEDURE — 74011000258 HC RX REV CODE- 258: Performed by: INTERNAL MEDICINE

## 2022-02-17 PROCEDURE — 83735 ASSAY OF MAGNESIUM: CPT

## 2022-02-17 PROCEDURE — 96365 THER/PROPH/DIAG IV INF INIT: CPT

## 2022-02-17 RX ORDER — SODIUM CHLORIDE 0.9 % (FLUSH) 0.9 %
10 SYRINGE (ML) INJECTION ONCE
Status: COMPLETED | OUTPATIENT
Start: 2022-02-17 | End: 2022-02-17

## 2022-02-17 RX ADMIN — Medication 10 ML: at 09:00

## 2022-02-17 RX ADMIN — PROMETHAZINE HYDROCHLORIDE: 25 INJECTION INTRAMUSCULAR; INTRAVENOUS at 08:27

## 2022-02-17 NOTE — PROGRESS NOTES
Arrived to the [facility]. Port needle changed using sterile technique. Labs drawn from port. Mg level 2.0. Phenergan given IV. Patient tolerated well. Any issues or concerns during appointment: none. Patient aware of next infusion appointment on 2-24-21 (date) at [address] [place] [address] (time). Patient instructed to call provider with temperature of 100.4 or greater or nausea/vomiting/ diarrhea or pain not controlled by medications  Discharged via ambulatory.

## 2022-02-24 ENCOUNTER — HOSPITAL ENCOUNTER (OUTPATIENT)
Dept: INFUSION THERAPY | Age: 52
Discharge: HOME | End: 2022-02-24
Payer: MEDICARE

## 2022-02-24 VITALS
TEMPERATURE: 98.1 F | RESPIRATION RATE: 18 BRPM | SYSTOLIC BLOOD PRESSURE: 109 MMHG | DIASTOLIC BLOOD PRESSURE: 64 MMHG | HEART RATE: 89 BPM | OXYGEN SATURATION: 98 %

## 2022-02-24 LAB
ALBUMIN SERPL-MCNC: 3.8 G/DL (ref 3.5–5)
ALBUMIN/GLOB SERPL: 0.9 {RATIO} (ref 1.2–3.5)
ALP SERPL-CCNC: 126 U/L (ref 50–136)
ALT SERPL-CCNC: 64 U/L (ref 12–65)
ANION GAP SERPL CALC-SCNC: 5 MMOL/L (ref 7–16)
AST SERPL-CCNC: 35 U/L (ref 15–37)
BASOPHILS # BLD: 0 K/UL (ref 0–0.2)
BASOPHILS NFR BLD: 1 % (ref 0–2)
BILIRUB SERPL-MCNC: 0.3 MG/DL (ref 0.2–1.1)
BUN SERPL-MCNC: 8 MG/DL (ref 6–23)
CALCIUM SERPL-MCNC: 9 MG/DL (ref 8.3–10.4)
CHLORIDE SERPL-SCNC: 108 MMOL/L (ref 98–107)
CO2 SERPL-SCNC: 27 MMOL/L (ref 21–32)
CREAT SERPL-MCNC: 0.8 MG/DL (ref 0.6–1)
DIFFERENTIAL METHOD BLD: ABNORMAL
EOSINOPHIL # BLD: 0.1 K/UL (ref 0–0.8)
EOSINOPHIL NFR BLD: 1 % (ref 0.5–7.8)
ERYTHROCYTE [DISTWIDTH] IN BLOOD BY AUTOMATED COUNT: 14.6 % (ref 11.9–14.6)
GLOBULIN SER CALC-MCNC: 4.1 G/DL (ref 2.3–3.5)
GLUCOSE SERPL-MCNC: 147 MG/DL (ref 65–100)
HCT VFR BLD AUTO: 38.6 % (ref 35.8–46.3)
HGB BLD-MCNC: 12 G/DL (ref 11.7–15.4)
IMM GRANULOCYTES # BLD AUTO: 0 K/UL (ref 0–0.5)
IMM GRANULOCYTES NFR BLD AUTO: 1 % (ref 0–5)
LIPASE SERPL-CCNC: 136 U/L (ref 73–393)
LYMPHOCYTES # BLD: 1.7 K/UL (ref 0.5–4.6)
LYMPHOCYTES NFR BLD: 22 % (ref 13–44)
MAGNESIUM SERPL-MCNC: 2.4 MG/DL (ref 1.8–2.4)
MCH RBC QN AUTO: 24.7 PG (ref 26.1–32.9)
MCHC RBC AUTO-ENTMCNC: 31.1 G/DL (ref 31.4–35)
MCV RBC AUTO: 79.6 FL (ref 79.6–97.8)
MONOCYTES # BLD: 0.6 K/UL (ref 0.1–1.3)
MONOCYTES NFR BLD: 7 % (ref 4–12)
NEUTS SEG # BLD: 5.4 K/UL (ref 1.7–8.2)
NEUTS SEG NFR BLD: 69 % (ref 43–78)
NRBC # BLD: 0 K/UL (ref 0–0.2)
PLATELET # BLD AUTO: 206 K/UL (ref 150–450)
PMV BLD AUTO: 12.1 FL (ref 9.4–12.3)
POTASSIUM SERPL-SCNC: 4.3 MMOL/L (ref 3.5–5.1)
PROT SERPL-MCNC: 7.9 G/DL (ref 6.3–8.2)
RBC # BLD AUTO: 4.85 M/UL (ref 4.05–5.2)
SODIUM SERPL-SCNC: 140 MMOL/L (ref 136–145)
WBC # BLD AUTO: 7.8 K/UL (ref 4.3–11.1)

## 2022-02-24 PROCEDURE — 80053 COMPREHEN METABOLIC PANEL: CPT

## 2022-02-24 PROCEDURE — 85025 COMPLETE CBC W/AUTO DIFF WBC: CPT

## 2022-02-24 PROCEDURE — 74011000258 HC RX REV CODE- 258: Performed by: INTERNAL MEDICINE

## 2022-02-24 PROCEDURE — 83690 ASSAY OF LIPASE: CPT

## 2022-02-24 PROCEDURE — 74011250636 HC RX REV CODE- 250/636: Performed by: INTERNAL MEDICINE

## 2022-02-24 PROCEDURE — 96374 THER/PROPH/DIAG INJ IV PUSH: CPT

## 2022-02-24 PROCEDURE — 83735 ASSAY OF MAGNESIUM: CPT

## 2022-02-24 RX ORDER — SODIUM CHLORIDE 0.9 % (FLUSH) 0.9 %
10 SYRINGE (ML) INJECTION AS NEEDED
Status: DISCONTINUED | OUTPATIENT
Start: 2022-02-24 | End: 2022-02-26 | Stop reason: HOSPADM

## 2022-02-24 RX ADMIN — Medication 10 ML: at 07:30

## 2022-02-24 RX ADMIN — Medication 10 ML: at 08:55

## 2022-02-24 RX ADMIN — PROMETHAZINE HYDROCHLORIDE 25 MG: 25 INJECTION INTRAMUSCULAR; INTRAVENOUS at 08:07

## 2022-02-24 NOTE — PROGRESS NOTES
Arrived to the [facility]. Assessment completed, labs drawn and reviewed. IV phenergan given. Patient tolerated well. Any issues or concerns during appointment: none. Patient aware of next infusion appointment on 03/03/2022 at [address] [place] [address]. Patient instructed to call provider with temperature of 100.4 or greater or nausea/vomiting/ diarrhea or pain not controlled by medications. Discharged ambulatory.

## 2022-03-03 ENCOUNTER — HOSPITAL ENCOUNTER (OUTPATIENT)
Dept: INFUSION THERAPY | Age: 52
Discharge: HOME | End: 2022-03-03
Payer: MEDICARE

## 2022-03-03 VITALS
SYSTOLIC BLOOD PRESSURE: 127 MMHG | RESPIRATION RATE: 18 BRPM | WEIGHT: 185 LBS | BODY MASS INDEX: 33.84 KG/M2 | OXYGEN SATURATION: 91 % | DIASTOLIC BLOOD PRESSURE: 88 MMHG | TEMPERATURE: 98.3 F | HEART RATE: 126 BPM

## 2022-03-03 LAB — MAGNESIUM SERPL-MCNC: 2 MG/DL (ref 1.8–2.4)

## 2022-03-03 PROCEDURE — 74011000258 HC RX REV CODE- 258: Performed by: INTERNAL MEDICINE

## 2022-03-03 PROCEDURE — 74011250636 HC RX REV CODE- 250/636: Performed by: INTERNAL MEDICINE

## 2022-03-03 PROCEDURE — 83735 ASSAY OF MAGNESIUM: CPT

## 2022-03-03 PROCEDURE — 96374 THER/PROPH/DIAG INJ IV PUSH: CPT

## 2022-03-03 RX ORDER — SODIUM CHLORIDE 9 MG/ML
25 INJECTION, SOLUTION INTRAVENOUS ONCE
Status: COMPLETED | OUTPATIENT
Start: 2022-03-03 | End: 2022-03-03

## 2022-03-03 RX ORDER — SODIUM CHLORIDE 0.9 % (FLUSH) 0.9 %
10 SYRINGE (ML) INJECTION AS NEEDED
Status: ACTIVE | OUTPATIENT
Start: 2022-03-03 | End: 2022-03-03

## 2022-03-03 RX ADMIN — PROMETHAZINE HYDROCHLORIDE 25 MG: 25 INJECTION INTRAMUSCULAR; INTRAVENOUS at 08:00

## 2022-03-03 RX ADMIN — SODIUM CHLORIDE 25 ML/HR: 9 INJECTION, SOLUTION INTRAVENOUS at 07:57

## 2022-03-03 RX ADMIN — Medication 10 ML: at 09:00

## 2022-03-03 RX ADMIN — Medication 10 ML: at 07:26

## 2022-03-03 RX ADMIN — Medication 10 ML: at 07:25

## 2022-03-03 NOTE — PROGRESS NOTES
Arrived to the [facility]. Port needle changed, blood drawn to check magnesium level. Phenergan 25mg  IV given for nausea. No mag replacement required per lab parameters. Patient tolerated well. Any issues or concerns during appointment: NO.  Patient aware of next infusion appointment on 03/10/22 (date) at [address] [place] [address] (time). Discharged ambulatory.

## 2022-03-10 ENCOUNTER — HOSPITAL ENCOUNTER (OUTPATIENT)
Dept: INFUSION THERAPY | Age: 52
Discharge: HOME | End: 2022-03-10
Payer: MEDICARE

## 2022-03-10 VITALS
HEART RATE: 104 BPM | DIASTOLIC BLOOD PRESSURE: 84 MMHG | TEMPERATURE: 98.1 F | OXYGEN SATURATION: 95 % | SYSTOLIC BLOOD PRESSURE: 155 MMHG | RESPIRATION RATE: 18 BRPM

## 2022-03-10 LAB — MAGNESIUM SERPL-MCNC: 2.1 MG/DL (ref 1.8–2.4)

## 2022-03-10 PROCEDURE — 83735 ASSAY OF MAGNESIUM: CPT

## 2022-03-10 PROCEDURE — 74011250636 HC RX REV CODE- 250/636: Performed by: INTERNAL MEDICINE

## 2022-03-10 PROCEDURE — 96374 THER/PROPH/DIAG INJ IV PUSH: CPT

## 2022-03-10 PROCEDURE — 74011000258 HC RX REV CODE- 258: Performed by: INTERNAL MEDICINE

## 2022-03-10 RX ORDER — SODIUM CHLORIDE 9 MG/ML
25 INJECTION, SOLUTION INTRAVENOUS ONCE
Status: COMPLETED | OUTPATIENT
Start: 2022-03-10 | End: 2022-03-10

## 2022-03-10 RX ORDER — SODIUM CHLORIDE 0.9 % (FLUSH) 0.9 %
10 SYRINGE (ML) INJECTION AS NEEDED
Status: ACTIVE | OUTPATIENT
Start: 2022-03-10 | End: 2022-03-10

## 2022-03-10 RX ADMIN — SODIUM CHLORIDE 25 ML/HR: 9 INJECTION, SOLUTION INTRAVENOUS at 07:45

## 2022-03-10 RX ADMIN — PROMETHAZINE HYDROCHLORIDE 25 MG: 25 INJECTION INTRAMUSCULAR; INTRAVENOUS at 08:05

## 2022-03-10 RX ADMIN — Medication 10 ML: at 07:46

## 2022-03-10 RX ADMIN — Medication 10 ML: at 08:45

## 2022-03-10 RX ADMIN — Medication 10 ML: at 07:45

## 2022-03-15 NOTE — PROGRESS NOTES
Message left with arrival time and location  And the need to have someone with her to stay during the procedure and drive her home.   Return number  left in messsage

## 2022-03-16 ENCOUNTER — ANESTHESIA EVENT (OUTPATIENT)
Dept: ENDOSCOPY | Age: 52
End: 2022-03-16
Payer: MEDICARE

## 2022-03-16 ENCOUNTER — HOSPITAL ENCOUNTER (OUTPATIENT)
Age: 52
Setting detail: OUTPATIENT SURGERY
Discharge: HOME | End: 2022-03-16
Attending: INTERNAL MEDICINE | Admitting: INTERNAL MEDICINE
Payer: MEDICARE

## 2022-03-16 ENCOUNTER — ANESTHESIA (OUTPATIENT)
Dept: ENDOSCOPY | Age: 52
End: 2022-03-16
Payer: MEDICARE

## 2022-03-16 VITALS
DIASTOLIC BLOOD PRESSURE: 84 MMHG | BODY MASS INDEX: 34.04 KG/M2 | TEMPERATURE: 97.8 F | OXYGEN SATURATION: 97 % | WEIGHT: 185 LBS | SYSTOLIC BLOOD PRESSURE: 164 MMHG | RESPIRATION RATE: 16 BRPM | HEIGHT: 62 IN | HEART RATE: 86 BPM

## 2022-03-16 LAB
GLUCOSE BLD STRIP.AUTO-MCNC: 129 MG/DL (ref 65–100)
SERVICE CMNT-IMP: ABNORMAL

## 2022-03-16 PROCEDURE — 77030008969: Performed by: INTERNAL MEDICINE

## 2022-03-16 PROCEDURE — 76040000025: Performed by: INTERNAL MEDICINE

## 2022-03-16 PROCEDURE — 74011250636 HC RX REV CODE- 250/636: Performed by: REGISTERED NURSE

## 2022-03-16 PROCEDURE — 74011000250 HC RX REV CODE- 250: Performed by: REGISTERED NURSE

## 2022-03-16 PROCEDURE — 76060000031 HC ANESTHESIA FIRST 0.5 HR: Performed by: INTERNAL MEDICINE

## 2022-03-16 PROCEDURE — 2709999900 HC NON-CHARGEABLE SUPPLY: Performed by: INTERNAL MEDICINE

## 2022-03-16 PROCEDURE — 82962 GLUCOSE BLOOD TEST: CPT

## 2022-03-16 RX ORDER — LIDOCAINE HYDROCHLORIDE 20 MG/ML
INJECTION, SOLUTION EPIDURAL; INFILTRATION; INTRACAUDAL; PERINEURAL AS NEEDED
Status: DISCONTINUED | OUTPATIENT
Start: 2022-03-16 | End: 2022-03-16 | Stop reason: HOSPADM

## 2022-03-16 RX ORDER — PROPOFOL 10 MG/ML
INJECTION, EMULSION INTRAVENOUS AS NEEDED
Status: DISCONTINUED | OUTPATIENT
Start: 2022-03-16 | End: 2022-03-16 | Stop reason: HOSPADM

## 2022-03-16 RX ORDER — PROPOFOL 10 MG/ML
INJECTION, EMULSION INTRAVENOUS
Status: DISCONTINUED | OUTPATIENT
Start: 2022-03-16 | End: 2022-03-16 | Stop reason: HOSPADM

## 2022-03-16 RX ORDER — SODIUM CHLORIDE, SODIUM LACTATE, POTASSIUM CHLORIDE, CALCIUM CHLORIDE 600; 310; 30; 20 MG/100ML; MG/100ML; MG/100ML; MG/100ML
INJECTION, SOLUTION INTRAVENOUS
Status: DISCONTINUED | OUTPATIENT
Start: 2022-03-16 | End: 2022-03-16 | Stop reason: HOSPADM

## 2022-03-16 RX ORDER — HEPARIN 100 UNIT/ML
300 SYRINGE INTRAVENOUS AS NEEDED
Status: DISCONTINUED | OUTPATIENT
Start: 2022-03-16 | End: 2022-03-16 | Stop reason: CLARIF

## 2022-03-16 RX ADMIN — LIDOCAINE HYDROCHLORIDE 50 MG: 20 INJECTION, SOLUTION EPIDURAL; INFILTRATION; INTRACAUDAL; PERINEURAL at 13:18

## 2022-03-16 RX ADMIN — SODIUM CHLORIDE, SODIUM LACTATE, POTASSIUM CHLORIDE, AND CALCIUM CHLORIDE: 600; 310; 30; 20 INJECTION, SOLUTION INTRAVENOUS at 13:15

## 2022-03-16 RX ADMIN — PROPOFOL 60 MG: 10 INJECTION, EMULSION INTRAVENOUS at 13:18

## 2022-03-16 RX ADMIN — PROPOFOL 40 MG: 10 INJECTION, EMULSION INTRAVENOUS at 13:20

## 2022-03-16 RX ADMIN — PROPOFOL 140 MCG/KG/MIN: 10 INJECTION, EMULSION INTRAVENOUS at 13:18

## 2022-03-16 NOTE — H&P
History and Physical for Endoscopic Ultrasound             Date: 3/16/2022     History of Present Illness:  Patient presents to undergo endoscopic ultrasound.     Past Medical History:   Diagnosis Date    Anemia     blood transfusion 2013 X1 d/t \"perforated bowel\"-- Fe infusions 12/2017--- denies any current iron infusions 12/17/2019    Anxiety and depression     managed with meds    C. difficile diarrhea 2013    in 9622 after complicated ERCP    Cervical dysplasia 1990s    Chronic insomnia     ambien     Chronic nausea     Chronic pain     all over     Chronic pancreatitis ([facility] [address]     COVID-19 vaccine series completed 04/21/2021    3/29/2021 Pfizer     Dehydration     IVFs through port as needed for this     Encounter for insertion of venous access port     Left chest port    Esophageal stricture 2017    Fibromyalgia     managed with meds    Former cigarette smoker     GERD (gastroesophageal reflux disease)     controlled with med    History of kidney stones 03/2021    X1-naturally pass    HLD (hyperlipidemia)     pt denies     IBS (irritable bowel syndrome)     Migraine headache     does not have often but did have one recently; just takes tylenol    Nausea & vomiting     pt reports she does better with phenergan than zofran - causes HA    Nonalcoholic fatty liver disease     PUD (peptic ulcer disease) 06/2017    still having issues takes meds     Sinus tachycardia     per pt-- no tx needed    Sphincter of Oddi dysfunction     Type 2 diabetes mellitus ([address]     type 2-- sqbs am avg 150--- Hypo symptoms at <100, Insulin dependent; last A1C was 11/3/2020 = 9.0     Past Surgical History:   Procedure Laterality Date    COLONOSCOPY N/A 4/4/2018    COLONOSCOPY performed by Belen Cabello [facility] at [facility] ENDOSCOPY    EGD  12/18/2019         EGD  5/21/2021         HX CARPAL TUNNEL RELEASE Right     along with trigger finger    HX COLONOSCOPY      HX ENDOSCOPY  2017    36 fr thomas    HX ERCP  3/5/2013 resulted in perforated duodenum    HX HYSTERECTOMY      ovaries remain    HX LAP CHOLECYSTECTOMY      HX LAPAROTOMY  3/5/2013    exploratory for duodenal perforation with ERCP    HX ORTHOPAEDIC      right finger surgery 2017    HX OTHER SURGICAL Bilateral     thumb    HX OTHER SURGICAL      Kidney stones 2021    HX TOTAL COLECTOMY      HX UROLOGICAL  13 at [facility]-- stent removed 13. Dr Libby Horowitz      port insertion, left chest wall    IR DRAIN CUTANEOUS/SUBCU ABSCESS      PA ABDOMEN SURGERY PROC UNLISTED  last one placed      Hx of pancreatic stent, multiple    PA ABDOMEN SURGERY PROC UNLISTED      lap nissen    PA ABDOMEN SURGERY [address] UNLISTED      explor lap      Family History   Problem Relation Age of Onset    Diabetes Mother     Hypertension Mother     Heart Disease Mother         cabg began in her 52's    Diabetes Father     Hypertension Father     Other Father     Stroke Father     Coronary Art Dis Father 76    No Known Problems Sister      Social History     Tobacco Use    Smoking status: Former Smoker     Packs/day: 1.00     Years: 3.00     Pack years: 3.00     Quit date: 1993     Years since quittin.9    Smokeless tobacco: Never Used   Substance Use Topics    Alcohol use: No        Allergies   Allergen Reactions    Tape [Adhesive] Rash and Itching     Paper tape too    Barium Iodide Nausea and Vomiting    Barium Sulfate Palpitations    Flagyl [Metronidazole] Nausea and Vomiting    Metformin Nausea and Vomiting     Stomach pain    Statins-Hmg-Coa Reductase Inhibitors Myalgia    Insulins Nausea and Vomiting     Humalog only     No current facility-administered medications for this encounter. Review of Systems:  A detailed 10 organ review of systems is obtained with pertinent positives as listed in the History of Present Illness. All others are negative.     Objective:     Physical Exam:  There were no vitals taken for this visit. General:  Alert and oriented. Heart: Regular rate and rhythm  Lungs:  Clear to auscultation bilaterally  Abdomen: Soft, nontender, nondistended    Impression/Plan:     Proceed with EUS as planned. I have discussed with the patient the technique, benefits, alternatives, and risks of the procedure, including medication reaction, immediate or delayed bleeding, or perforation of the gastrointestinal tract.     Signed By: Kiran Mo MD     March 16, 2022

## 2022-03-16 NOTE — OP NOTES
Procedure:  Esophagogastroduodenoscopy, Endoscopic ultrasound with Doppler     Date of Procedure:  3/16/2022    [place]  [facility]     1970    Indication:  Dysphagia, history of esophageal stricture, history of acute pancreatitis     Sedation:  MAC    Pre-Procedure Physical Exam:    Mental status:  alert and oriented  Airway:  normal oropharyngeal airway and neck mobility  CV:  regular rate and rhythm  Respiratory:  clear to auscultation    Procedure:  A History and Physical has been performed, and patient medication allergies have been reviewed. Risks of perforation, hemorrhage, adverse drug reaction, and aspiration were discussed. Informed consent was obtained for the procedure, including sedation. The patient was placed in the left lateral decubitus position. The heart rate, oxygen saturations, blood pressure, and response to care were monitored throughout the procedure. The linear echoendoscope was passed through the mouth and advanced under direct vision to the distal duodenum. As the scope was slowly withdrawn, detailed endoscopic images were obtained from the surrounding organs. The patient tolerated the procedure well. The gastroscope was passed through the mouth and advanced under direct vision to the second portion of the duodenum. A careful inspection was made as the gastroscope was withdrawn, including a retroflexed view of the proximal stomach. The patient tolerated the procedure well. Findings:     ENDOSCOPIC FINDINGS:   OROPHARYNX: Cords and pyriform recesses appear normal.   ESOPHAGUS: A benign-appearing intrinsic stenosis is seen in the distal esophagus. Serial dilation was performed using 52-56 Fr Guido dilators. Successful dilation was confirmed by the presence of mucosal disruption.    STOMACH: On retroflexion, the flap-valve is classified as Hill Grade I, with a normal, prominent tissue fold at the lesser curvature closely approximated to the endoscope. There is an intact Nissen fundoplication. The gastrojejunal anastomosis appears normal. The examined portion of the jejunum is unremarkable. EUS FINDINGS:  PANCREAS:  The pancreas is well-visualized from head to tail. Hyperechoic foci and stranding is seen throughout the pancreas. No cysts or masses are visualized. The main pancreatic duct is of normal caliber with a smooth, regular course, measuring up to 2 mm in the head, 2 mm in the body and 1 mm in the tail. Pancreas divisum is not seen. BILIARY TREE: The gallbladder appears normal. The common bile duct is well-visualized from its insertion in the ampulla to the bifurcation of right and left hepatic ducts. It is non-dilated, measuring up to 7 mm maximally with a gradual taper down to the level of the ampulla. There are no intraductal stones, sludge or debris. The ampulla appears normal endosonographically. OTHER ORGANS:  Views of the left lobe of the liver demonstrate no solid mass lesions. Hyperechoic foci and stranding is seen throughout the pancreas. There is no ascites in the upper abdomen. The left adrenal gland appears normal. The major vascular structures including the portal vein, splenic vessels and celiac artery appear unremarkable. There are no pathologically enlarged posterior mediastinal or upper abdominal lymph nodes. Specimen:  No    Estimated Blood Loss:  Minimal    Implant:  None           Impression:    1. Esophageal stricture. Dilated. 2. Gastrojejunal anastomosis. 3. Mild pancreatic parenchymal changes without convincing evidence of chronic pancreatitis. 4. Fatty infiltration of the liver. Plan:  1. Soft diet today. 2. Advance diet tomorrow as tolerated. 3. Protonix 40 mg twice daily, taken prior to breakfast and dinner. 4. Avoid NSAIDs for 48 hours. 5. Return to office in 2-3 months.      Signed:  Kiran Mo MD  3/16/2022  1:04 PM

## 2022-03-16 NOTE — ANESTHESIA POSTPROCEDURE EVALUATION
Procedure(s):  ESOPHAGOGASTRODUODENOSCOPY (EGD)  ENDOSCOPIC ULTRASOUND (EUS)/ BMI 34  ESOPHAGEAL DILATION. total IV anesthesia    Anesthesia Post Evaluation      Multimodal analgesia: multimodal analgesia used between 6 hours prior to anesthesia start to PACU discharge  Patient location during evaluation: PACU  Patient participation: complete - patient participated  Level of consciousness: awake and alert  Pain management: adequate  Airway patency: patent  Anesthetic complications: no  Cardiovascular status: acceptable  Respiratory status: acceptable  Hydration status: acceptable  Post anesthesia nausea and vomiting:  none  Final Post Anesthesia Temperature Assessment:  Normothermia (36.0-37.5 degrees C)      INITIAL Post-op Vital signs:   Vitals Value Taken Time   /75 03/16/22 1342   Temp 36.6 °C (97.8 °F) 03/16/22 1343   Pulse 86 03/16/22 1351   Resp 10 03/16/22 1343   SpO2 98 % 03/16/22 1351   Vitals shown include unvalidated device data.

## 2022-03-16 NOTE — ANESTHESIA PREPROCEDURE EVALUATION
Relevant Problems   No relevant active problems       Anesthetic History     PONV          Review of Systems / Medical History  Patient summary reviewed and pertinent labs reviewed    Pulmonary          Smoker (Former)         Neuro/Psych         Psychiatric history (Anxiety and depression)     Cardiovascular    Hypertension          Hyperlipidemia    Exercise tolerance: >4 METS     GI/Hepatic/Renal     GERD    Renal disease: stones  PUD and liver disease (Nonalcoholic fatty liver disease)    Comments: Esophageal stricture Endo/Other    Diabetes: type 2         Other Findings              Physical Exam    Airway  Mallampati: II  TM Distance: > 6 cm  Neck ROM: normal range of motion   Mouth opening: Normal     Cardiovascular  Regular rate and rhythm,  S1 and S2 normal,  no murmur, click, rub, or gallop             Dental  No notable dental hx       Pulmonary  Breath sounds clear to auscultation               Abdominal         Other Findings            Anesthetic Plan    ASA: 2  Anesthesia type: total IV anesthesia            Anesthetic plan and risks discussed with: Patient

## 2022-03-16 NOTE — DISCHARGE INSTRUCTIONS
Gastrointestinal Esophagogastroduodenoscopy (EGD) - Upper Exam Discharge Instructions    1. Call Dr. Andre Maloney at [phone] for any problems or questions. 2. Contact the doctor's office for follow up appointment as directed. 3. Medication may cause drowsiness for several hours, therefore:  · Do not drive or operate machinery for remainder of the day. · No alcohol today. · Do not make any important or legal decisions for 24 hours. · Do not sign any legal documents for 24 hours. 5. Ordinarily, you may resume regular diet and activity after exam unless otherwise specified by your physician. 6. For mild soreness in your throat you may use Cepacol throat lozenges or warm salt-water gargles as needed. Any additional instructions:    1. Soft diet today. 2. Advance diet tomorrow as tolerated. 3. Protonix 40 mg twice daily, taken prior to breakfast and dinner. 4. Avoid NSAIDs (ibuprofen, advil, motrin, aleve, nuprin, aspirin) for 48 hours. 5. Return to office in 2-3 months.

## 2022-03-17 ENCOUNTER — HOSPITAL ENCOUNTER (OUTPATIENT)
Dept: INFUSION THERAPY | Age: 52
Discharge: HOME | End: 2022-03-17
Payer: MEDICARE

## 2022-03-17 VITALS
SYSTOLIC BLOOD PRESSURE: 145 MMHG | HEART RATE: 102 BPM | DIASTOLIC BLOOD PRESSURE: 79 MMHG | TEMPERATURE: 98.2 F | RESPIRATION RATE: 18 BRPM | OXYGEN SATURATION: 97 %

## 2022-03-17 LAB — MAGNESIUM SERPL-MCNC: 2 MG/DL (ref 1.8–2.4)

## 2022-03-17 PROCEDURE — 74011000250 HC RX REV CODE- 250: Performed by: INTERNAL MEDICINE

## 2022-03-17 PROCEDURE — 74011000258 HC RX REV CODE- 258: Performed by: INTERNAL MEDICINE

## 2022-03-17 PROCEDURE — 74011250636 HC RX REV CODE- 250/636: Performed by: INTERNAL MEDICINE

## 2022-03-17 PROCEDURE — 83735 ASSAY OF MAGNESIUM: CPT

## 2022-03-17 PROCEDURE — 96365 THER/PROPH/DIAG IV INF INIT: CPT

## 2022-03-17 RX ORDER — SODIUM CHLORIDE 0.9 % (FLUSH) 0.9 %
10 SYRINGE (ML) INJECTION AS NEEDED
Status: DISCONTINUED | OUTPATIENT
Start: 2022-03-17 | End: 2022-03-19 | Stop reason: HOSPADM

## 2022-03-17 RX ADMIN — SODIUM CHLORIDE, PRESERVATIVE FREE 10 ML: 5 INJECTION INTRAVENOUS at 07:15

## 2022-03-17 RX ADMIN — SODIUM CHLORIDE, PRESERVATIVE FREE 10 ML: 5 INJECTION INTRAVENOUS at 08:11

## 2022-03-17 RX ADMIN — PROMETHAZINE HYDROCHLORIDE 25 MG: 25 INJECTION INTRAMUSCULAR; INTRAVENOUS at 07:51

## 2022-03-17 NOTE — PROGRESS NOTES
Magnesium level 2.0 today. No replacement required. Patient received Phenergan 25 mg IVPB for c/o of nausea which is ongoing.  to drive patient home. Patient discharged via ambulation accompanied by . Instructed to notify physician of any problems, questions or concerns after discharge. Next appointment is 03/24/2022 at [address] [place] [address] with infusion.

## 2022-03-31 ENCOUNTER — HOSPITAL ENCOUNTER (OUTPATIENT)
Dept: INFUSION THERAPY | Age: 52
Discharge: HOME | End: 2022-03-31
Payer: MEDICARE

## 2022-03-31 VITALS
TEMPERATURE: 98.2 F | DIASTOLIC BLOOD PRESSURE: 61 MMHG | HEART RATE: 86 BPM | OXYGEN SATURATION: 96 % | RESPIRATION RATE: 18 BRPM | SYSTOLIC BLOOD PRESSURE: 105 MMHG

## 2022-03-31 LAB — MAGNESIUM SERPL-MCNC: 1.8 MG/DL (ref 1.8–2.4)

## 2022-03-31 PROCEDURE — 96374 THER/PROPH/DIAG INJ IV PUSH: CPT

## 2022-03-31 PROCEDURE — 83735 ASSAY OF MAGNESIUM: CPT

## 2022-03-31 PROCEDURE — 74011250636 HC RX REV CODE- 250/636: Performed by: INTERNAL MEDICINE

## 2022-03-31 PROCEDURE — 74011000258 HC RX REV CODE- 258: Performed by: INTERNAL MEDICINE

## 2022-03-31 RX ORDER — SODIUM CHLORIDE 0.9 % (FLUSH) 0.9 %
10 SYRINGE (ML) INJECTION AS NEEDED
Status: DISCONTINUED | OUTPATIENT
Start: 2022-03-31 | End: 2022-04-02 | Stop reason: HOSPADM

## 2022-03-31 RX ADMIN — Medication 10 ML: at 08:10

## 2022-03-31 RX ADMIN — PROMETHAZINE HYDROCHLORIDE 25 MG: 25 INJECTION INTRAMUSCULAR; INTRAVENOUS at 07:56

## 2022-03-31 NOTE — PROGRESS NOTES
Arrived to the [facility]. Lab and port care completed. No replacements required Patient tolerated without problems. Any issues or concerns during appointment: patient was given PRN Phenergan for c/o nausea, also provided coffee per request.  Patient aware of next infusion appointment on 4/7/22 (date) at [address] [place] [address] (time). Patient instructed to call provider with temperature of 100.4 or greater or nausea/vomiting/ diarrhea or pain not controlled by medications  Discharged ambulatory.

## 2022-04-07 ENCOUNTER — HOSPITAL ENCOUNTER (OUTPATIENT)
Dept: INFUSION THERAPY | Age: 52
Discharge: HOME | End: 2022-04-07
Payer: MEDICARE

## 2022-04-07 VITALS
DIASTOLIC BLOOD PRESSURE: 78 MMHG | HEART RATE: 100 BPM | RESPIRATION RATE: 18 BRPM | SYSTOLIC BLOOD PRESSURE: 132 MMHG | TEMPERATURE: 98.4 F | OXYGEN SATURATION: 100 %

## 2022-04-07 LAB — MAGNESIUM SERPL-MCNC: 1.9 MG/DL (ref 1.8–2.4)

## 2022-04-07 PROCEDURE — 74011250636 HC RX REV CODE- 250/636: Performed by: INTERNAL MEDICINE

## 2022-04-07 PROCEDURE — 83735 ASSAY OF MAGNESIUM: CPT

## 2022-04-07 PROCEDURE — 96374 THER/PROPH/DIAG INJ IV PUSH: CPT

## 2022-04-07 PROCEDURE — 74011000258 HC RX REV CODE- 258: Performed by: INTERNAL MEDICINE

## 2022-04-07 RX ORDER — SODIUM CHLORIDE 0.9 % (FLUSH) 0.9 %
10 SYRINGE (ML) INJECTION EVERY 8 HOURS
Status: DISCONTINUED | OUTPATIENT
Start: 2022-04-07 | End: 2022-04-09 | Stop reason: HOSPADM

## 2022-04-07 RX ADMIN — Medication 10 ML: at 07:45

## 2022-04-07 RX ADMIN — PROMETHAZINE HYDROCHLORIDE 25 MG: 25 INJECTION INTRAMUSCULAR; INTRAVENOUS at 07:28

## 2022-04-07 NOTE — PROGRESS NOTES
Arrived to the [facility] ambulatory. Labs and phenergan completed. Patient tolerated well. Any issues or concerns during appointment: no.  Patient aware of next infusion appointment on 4/14 at 0930  Patient instructed to call provider with temperature of 100.4 or greater or nausea/vomiting/ diarrhea or pain not controlled by medications  Discharged to home ambulatory.

## 2022-04-14 ENCOUNTER — HOSPITAL ENCOUNTER (OUTPATIENT)
Dept: INFUSION THERAPY | Age: 52
Discharge: HOME | End: 2022-04-14
Payer: MEDICARE

## 2022-04-14 VITALS
TEMPERATURE: 98.8 F | SYSTOLIC BLOOD PRESSURE: 139 MMHG | DIASTOLIC BLOOD PRESSURE: 84 MMHG | HEART RATE: 112 BPM | OXYGEN SATURATION: 99 % | RESPIRATION RATE: 18 BRPM

## 2022-04-14 LAB — MAGNESIUM SERPL-MCNC: 1.9 MG/DL (ref 1.8–2.4)

## 2022-04-14 PROCEDURE — 96374 THER/PROPH/DIAG INJ IV PUSH: CPT

## 2022-04-14 PROCEDURE — 74011000258 HC RX REV CODE- 258: Performed by: INTERNAL MEDICINE

## 2022-04-14 PROCEDURE — 83735 ASSAY OF MAGNESIUM: CPT

## 2022-04-14 PROCEDURE — 74011250636 HC RX REV CODE- 250/636: Performed by: INTERNAL MEDICINE

## 2022-04-14 RX ORDER — SODIUM CHLORIDE 0.9 % (FLUSH) 0.9 %
10 SYRINGE (ML) INJECTION AS NEEDED
Status: DISCONTINUED | OUTPATIENT
Start: 2022-04-14 | End: 2022-04-16 | Stop reason: HOSPADM

## 2022-04-14 RX ADMIN — SODIUM CHLORIDE 25 MG: 900 INJECTION, SOLUTION INTRAVENOUS at 07:42

## 2022-04-14 RX ADMIN — Medication 10 ML: at 07:20

## 2022-04-14 RX ADMIN — SODIUM CHLORIDE 500 ML: 900 INJECTION, SOLUTION INTRAVENOUS at 07:20

## 2022-04-14 RX ADMIN — Medication 10 ML: at 08:05

## 2022-04-14 NOTE — PROGRESS NOTES
Pt arrived ambulatory to [facility]. Port accessed and blood drawn and sent to lab. NS infusing. Phenergan IV. No replacements needed today. Pt aware of next appt on 4/21/22 at 0715. Port flushed and remains accessed for home infusion. Discharged ambulatory.

## 2022-04-20 ENCOUNTER — ANESTHESIA EVENT (OUTPATIENT)
Dept: ENDOSCOPY | Age: 52
End: 2022-04-20
Payer: MEDICARE

## 2022-04-20 RX ORDER — SODIUM CHLORIDE, SODIUM LACTATE, POTASSIUM CHLORIDE, CALCIUM CHLORIDE 600; 310; 30; 20 MG/100ML; MG/100ML; MG/100ML; MG/100ML
100 INJECTION, SOLUTION INTRAVENOUS CONTINUOUS
Status: CANCELLED | OUTPATIENT
Start: 2022-04-20

## 2022-04-20 NOTE — PROGRESS NOTES
Arrival time and location confirmed  Will have someone with her to stay during the procedure and drive her home

## 2022-04-21 ENCOUNTER — HOSPITAL ENCOUNTER (OUTPATIENT)
Age: 52
Setting detail: OUTPATIENT SURGERY
Discharge: HOME | End: 2022-04-21
Attending: INTERNAL MEDICINE | Admitting: INTERNAL MEDICINE
Payer: MEDICARE

## 2022-04-21 ENCOUNTER — ANESTHESIA (OUTPATIENT)
Dept: ENDOSCOPY | Age: 52
End: 2022-04-21
Payer: MEDICARE

## 2022-04-21 VITALS
SYSTOLIC BLOOD PRESSURE: 97 MMHG | DIASTOLIC BLOOD PRESSURE: 53 MMHG | BODY MASS INDEX: 34.96 KG/M2 | OXYGEN SATURATION: 89 % | RESPIRATION RATE: 16 BRPM | HEART RATE: 65 BPM | WEIGHT: 190 LBS | TEMPERATURE: 97.7 F | HEIGHT: 62 IN

## 2022-04-21 PROCEDURE — 74011250636 HC RX REV CODE- 250/636: Performed by: NURSE ANESTHETIST, CERTIFIED REGISTERED

## 2022-04-21 PROCEDURE — 74011000250 HC RX REV CODE- 250: Performed by: NURSE ANESTHETIST, CERTIFIED REGISTERED

## 2022-04-21 PROCEDURE — C1726 CATH, BAL DIL, NON-VASCULAR: HCPCS | Performed by: INTERNAL MEDICINE

## 2022-04-21 PROCEDURE — 76060000031 HC ANESTHESIA FIRST 0.5 HR: Performed by: INTERNAL MEDICINE

## 2022-04-21 PROCEDURE — 74011250636 HC RX REV CODE- 250/636: Performed by: ANESTHESIOLOGY

## 2022-04-21 PROCEDURE — 2709999900 HC NON-CHARGEABLE SUPPLY: Performed by: INTERNAL MEDICINE

## 2022-04-21 PROCEDURE — 76040000025: Performed by: INTERNAL MEDICINE

## 2022-04-21 RX ORDER — PROPOFOL 10 MG/ML
INJECTION, EMULSION INTRAVENOUS AS NEEDED
Status: DISCONTINUED | OUTPATIENT
Start: 2022-04-21 | End: 2022-04-21 | Stop reason: HOSPADM

## 2022-04-21 RX ORDER — SODIUM CHLORIDE, SODIUM LACTATE, POTASSIUM CHLORIDE, CALCIUM CHLORIDE 600; 310; 30; 20 MG/100ML; MG/100ML; MG/100ML; MG/100ML
100 INJECTION, SOLUTION INTRAVENOUS CONTINUOUS
Status: DISCONTINUED | OUTPATIENT
Start: 2022-04-21 | End: 2022-04-21 | Stop reason: HOSPADM

## 2022-04-21 RX ORDER — LIDOCAINE HYDROCHLORIDE 20 MG/ML
INJECTION, SOLUTION EPIDURAL; INFILTRATION; INTRACAUDAL; PERINEURAL AS NEEDED
Status: DISCONTINUED | OUTPATIENT
Start: 2022-04-21 | End: 2022-04-21 | Stop reason: HOSPADM

## 2022-04-21 RX ORDER — PROPOFOL 10 MG/ML
INJECTION, EMULSION INTRAVENOUS
Status: DISCONTINUED | OUTPATIENT
Start: 2022-04-21 | End: 2022-04-21 | Stop reason: HOSPADM

## 2022-04-21 RX ADMIN — LIDOCAINE HYDROCHLORIDE 60 MG: 20 INJECTION, SOLUTION EPIDURAL; INFILTRATION; INTRACAUDAL; PERINEURAL at 10:37

## 2022-04-21 RX ADMIN — PROPOFOL 160 MCG/KG/MIN: 10 INJECTION, EMULSION INTRAVENOUS at 10:37

## 2022-04-21 RX ADMIN — PHENYLEPHRINE HYDROCHLORIDE 100 MCG: 10 INJECTION INTRAVENOUS at 10:40

## 2022-04-21 RX ADMIN — PROPOFOL 60 MG: 10 INJECTION, EMULSION INTRAVENOUS at 10:37

## 2022-04-21 RX ADMIN — SODIUM CHLORIDE, POTASSIUM CHLORIDE, SODIUM LACTATE AND CALCIUM CHLORIDE 100 ML/HR: 600; 310; 30; 20 INJECTION, SOLUTION INTRAVENOUS at 09:44

## 2022-04-21 NOTE — OP NOTES
Procedure:  Esophagogastroduodenoscopy    Date of Procedure:  4/21/2022    Patient:  Nancy Domínguez     1970    Indication:  Dysphagia, history of esophageal stricture     Sedation:  MAC    Pre-Procedure Physical Exam:    Mental status:  alert and oriented  Airway:  normal oropharyngeal airway and neck mobility  CV:  regular rate and rhythm  Respiratory:  clear to auscultation    Procedure:  A History and Physical has been performed, and patient medication allergies have been reviewed. Risks of perforation, hemorrhage, adverse drug reaction, and aspiration were discussed. Informed consent was obtained for the procedure, including sedation. The patient was placed in the left lateral decubitus position. The heart rate, oxygen saturations, blood pressure, and response to care were monitored throughout the procedure. The gastroscope was passed through the mouth and advanced under direct vision to the second portion of the duodenum. A careful inspection was made as the gastroscope was withdrawn, including a retroflexed view of the proximal stomach. The patient tolerated the procedure well. Findings:     OROPHARYNX: Cords and pyriform recesses appear normal.   ESOPHAGUS: A benign-appearing intrinsic stenosis is seen in the distal esophagus. Dilation was performed using a CRE balloon from 18-19 mm. Successful dilation was confirmed by the presence of mucosal disruption. STOMACH: On retroflexion, the flap-valve is classified as Hill Grade I, with a normal, prominent tissue fold at the lesser curvature closely approximated to the endoscope. There is an intact Nissen fundoplication. The gastrojejunal anastomosis appears normal. The examined portion of the jejunum is unremarkable. Specimen:  No     Estimated Blood Loss:  Minimal    Implant:  None           Impression:    Esophageal stricture. Dilated. Plan:  1. Soft diet today. 2. Advance diet tomorrow as tolerated.   3. Protonix 40 mg twice daily, taken prior to breakfast and dinner. 4. Avoid NSAIDs. 5. Repeat EGD with dilation in 2 months.      Signed:  Marleny Marrufo MD  4/21/2022  10:46 AM

## 2022-04-21 NOTE — ANESTHESIA POSTPROCEDURE EVALUATION
Procedure(s):  ESOPHAGOGASTRODUODENOSCOPY (EGD)/ 34  ESOPHAGEAL DILATION. total IV anesthesia    Anesthesia Post Evaluation        Patient location during evaluation: PACU  Patient participation: complete - patient participated  Level of consciousness: awake  Pain management: satisfactory to patient  Airway patency: patent  Anesthetic complications: no  Cardiovascular status: hemodynamically stable  Respiratory status: spontaneous ventilation  Hydration status: euvolemic  Post anesthesia nausea and vomiting:  none    BP near baseline. Pt feels well.     INITIAL Post-op Vital signs:   Vitals Value Taken Time   BP 97/53 04/21/22 1140   Temp 36.5 °C (97.7 °F) 04/21/22 1053   Pulse 65 04/21/22 1140   Resp 16 04/21/22 1117   SpO2 89 % 04/21/22 1140

## 2022-04-21 NOTE — DISCHARGE INSTRUCTIONS
Gastrointestinal Esophagogastroduodenoscopy (EGD)/ Endoscopic Ultrasound(EUS)- Upper Exam Discharge Instructions    1. Call Dr. Singleton Alert  for any problems or questions. [phone])  2. Contact the doctor's office for follow up appointment as directed. 3. Medication may cause drowsiness for several hours, therefore, do not drive or operate machinery for remainder of the day. 4. No alcohol today. 5. Do not make any important decisions such as signing legal paperwork. 6. Ordinarily, you may resume regular diet and activity after exam unless otherwise specified by your physician. 7. For mild soreness in your throat you may use Cepacol throat lozenges or warm  salt-water gargles as needed. Any additional instructions:   1. Soft diet today. 2. Advance diet tomorrow as tolerated. 3. Protonix 40 mg twice daily, taken prior to breakfast and dinner. 4. Avoid NSAIDs. - ibuprofen, Advil, motrin, Aleve, Naproxen, aspirin  5. Repeat EGD with dilation in 2 months. Instructions given to Marcial Huang and other family members.

## 2022-04-21 NOTE — PROGRESS NOTES
Patient's blood sugar in pre-op 169 per personal dexcom.  Per christine Blevins to use this blood sugar and not take a fingerstick blood sugar

## 2022-04-21 NOTE — ANESTHESIA PREPROCEDURE EVALUATION
Relevant Problems   No relevant active problems       Anesthetic History     PONV          Review of Systems / Medical History  Patient summary reviewed and pertinent labs reviewed    Pulmonary          Smoker (Former)         Neuro/Psych         Headaches and psychiatric history (Anxiety and depression)     Cardiovascular    Hypertension          Hyperlipidemia    Exercise tolerance: >4 METS  Comments: Normal echo and stress test 2017.    GI/Hepatic/Renal     GERD    Renal disease: stones  PUD and liver disease (Nonalcoholic fatty liver disease)    Comments: Esophageal stricture  Pancreatitis Endo/Other    Diabetes: type 2    Anemia     Other Findings            Physical Exam    Airway  Mallampati: II  TM Distance: < 4 cm  Neck ROM: normal range of motion   Mouth opening: Diminished (comment)     Cardiovascular  Regular rate and rhythm,  S1 and S2 normal,  no murmur, click, rub, or gallop             Dental  No notable dental hx       Pulmonary  Breath sounds clear to auscultation               Abdominal         Other Findings            Anesthetic Plan    ASA: 2  Anesthesia type: total IV anesthesia          Induction: Intravenous  Anesthetic plan and risks discussed with: Patient

## 2022-04-21 NOTE — H&P
History and Physical for Outpatient Procedure             Date: 4/21/2022     History of Present Illness:  Patient has history of dysphagia and presents for EGD with possible esophageal dilation.       Past Medical History:   Diagnosis Date    Anemia     blood transfusion 2013 X1 d/t \"perforated bowel\"-- Fe infusions 12/2017--- denies any current iron infusions 12/17/2019    Anxiety and depression     managed with meds    C. difficile diarrhea 2013    in 2819 after complicated ERCP    Cervical dysplasia 1990s    Chronic insomnia     ambien     Chronic nausea     Chronic pain     all over     Chronic pancreatitis ([facility] [address]     COVID-19 vaccine series completed 04/21/2021    3/29/2021 Pfizer     Dehydration     IVFs through port as needed for this     Encounter for insertion of venous access port     Left chest port    Esophageal stricture 2017    Fibromyalgia     managed with meds    Former cigarette smoker     GERD (gastroesophageal reflux disease)     controlled with med    History of kidney stones 03/2021    X1-naturally pass    HLD (hyperlipidemia)     pt denies     IBS (irritable bowel syndrome)     Migraine headache     does not have often but did have one recently; just takes tylenol    Nausea & vomiting     pt reports she does better with phenergan than zofran - causes HA    Nonalcoholic fatty liver disease     PUD (peptic ulcer disease) 06/2017    still having issues takes meds     Sinus tachycardia     per pt-- no tx needed    Sphincter of Oddi dysfunction     Type 2 diabetes mellitus ([facility] [address]     type 2-- sqbs am avg 150--- Hypo symptoms at <100, Insulin dependent; last A1C was 11/3/2020 = 9.0     Past Surgical History:   Procedure Laterality Date    COLONOSCOPY N/A 4/4/2018    COL[facility] performed by Mag[facility] at [facility] ENDOSCOPY    EGD  12/18/2019         EGD  5/21/2021         HX CARPAL TUNNEL RELEASE Right     along with trigger finger    HX COLONOSCOPY      HX ENDOSCOPY  2017 39 [address]    HX ERCP  3/5/2013    resulted in perforated duodenum    HX HYSTERECTOMY      ovaries remain    HX LAP CHOLECYSTECTOMY      HX LAPAROTOMY  3/5/2013    exploratory for duodenal perforation with ERCP    HX ORTHOPAEDIC      right finger surgery 2017    HX OTHER SURGICAL Bilateral     thumb    HX OTHER SURGICAL      Kidney stones 2021    HX TOTAL COLECTOMY      HX UROLOGICAL  13 at [facility]-- stent removed 13. Dr Michoacano Garcia      port insertion, left chest wall    IR DRAIN CUTANEOUS/SUBCU ABSCESS      MA ABDOMEN SURGERY PROC UNLISTED  last one placed      Hx of pancreatic stent, multiple    MA ABDOMEN SURGERY PROC UNLISTED      lap nissen    MA ABDOMEN SURGERY [address] UNLISTED      explor lap     In and  Family History   Problem Relation Age of Onset    Diabetes Mother     Hypertension Mother     Heart Disease Mother         cabg began in her 52's    Diabetes Father     Hypertension Father     Other Father     Stroke Father     Coronary Art Dis Father 76    No Known Problems Sister      Social History     Tobacco Use    Smoking status: Former Smoker     Packs/day: 1.00     Years: 3.00     Pack years: 3.00     Quit date: 1993     Years since quittin.0    Smokeless tobacco: Never Used   Substance Use Topics    Alcohol use: No        Allergies   Allergen Reactions    Tape [Adhesive] Rash and Itching     Paper tape too    Barium Iodide Nausea and Vomiting    Barium Sulfate Palpitations    Flagyl [Metronidazole] Nausea and Vomiting    Metformin Nausea and Vomiting     Stomach pain    Statins-Hmg-Coa Reductase Inhibitors Myalgia    Insulins Nausea and Vomiting     Humalog only     No current facility-administered medications for this encounter. Current Outpatient Medications   Medication Sig    lidocaine (XYLOCAINE) 2 % solution 10 mL every six (6) hours as needed.  (Patient not taking: Reported on 2/14/2022)    morphine IR (MS IR) 30 mg tablet Take  by mouth three (3) times daily as needed.  acetaminophen (Tylenol Extra Strength) 500 mg tablet Take  by mouth every six (6) hours as needed for Pain.  tiZANidine (ZANAFLEX) 4 mg tablet Take 4 mg by mouth as needed.  pantoprazole (Protonix) 40 mg tablet Take 40 mg by mouth two (2) times a day.  promethazine (PHENERGAN) 25 mg/mL injection 25 mg by IntraMUSCular route three (3) times daily as needed. States take IV    pregabalin (LYRICA) 50 mg capsule Take 100 mg by mouth two (2) times a day.  zolpidem (AMBIEN) 10 mg tablet Take 10 mg by mouth nightly.  promethazine (PHENERGAN) 25 mg tablet Take 25 mg by mouth every six (6) hours as needed for Nausea. (Patient not taking: Reported on 2/14/2022)    citalopram (CELEXA) 20 mg tablet Take 20 mg by mouth daily. Indications: am    LORazepam (ATIVAN) 1 mg tablet Take 1 mg by mouth three (3) times daily as needed for Anxiety.  cyanocobalamin (VITAMIN B12) 1,000 mcg/mL injection INJECT 1 VIAL INTRAMUSCULARLY FOR 4 WEEKS THEN MONTHLY    CARAFATE 100 mg/mL suspension TAKE 10 ML BY MOUTH 4 TIMES A DAY EVERY DAY ON A EMPTY STOMACH 1 HR BEFORE MEALS AND AT BEDTIME    diphenhydrAMINE (BENADRYL) 25 mg capsule Take 25 mg by mouth every six (6) hours as needed.  insulin degludec (TRESIBA FLEXTOUCH U-100) 100 unit/mL (3 mL) inpn 68 Units by SubCUTAneous route nightly.  polyethylene glycol (MIRALAX) 17 gram packet Take 17 g by mouth as needed.  hyoscyamine SL (LEVSIN/SL) 0.125 mg SL tablet 0.125 mg by SubLINGual route every six (6) hours as needed for Cramping.  0.9% sodium chloride solution by IntraVENous route as needed. Gives to herself via port at home daily -1000cc every other day per patient    insulin aspart (NOVOLOG) 100 unit/mL injection Use as directed (Patient taking differently: 7 Units by SubCUTAneous route Before breakfast, lunch, and dinner.  Over 200 use sliding scale-  Always gets 7 units then if >200 gets extra - does ot know amount- has never taken more than 10 units regular)    promethazine (PHENERGAN) 25 mg suppository Insert 25 mg into rectum as needed for Nausea. (Patient not taking: Reported on 2/14/2022)    ondansetron hcl (ZOFRAN) 8 mg tablet Take 8 mg by mouth every eight (8) hours as needed for Nausea. Review of Systems:  A detailed 10 organ review of systems is obtained with pertinent positives as listed in the History of Present Illness. All others are negative. Objective:     Physical Exam:  There were no vitals taken for this visit. General:  Alert and oriented. Heart: Regular rate and rhythm  Lungs:  Clear to auscultation bilaterally  Abdomen: Soft, nontender, nondistended    Impression/Plan:     Proceed with EGD with possible dilation as planned. I have discussed with the patient the technique, benefits, alternatives, and risks of these procedures, including medication reaction, immediate or delayed bleeding, or perforation of the gastrointestinal tract.      Signed By: Sharon Portillo MD     April 21, 2022

## 2022-04-21 NOTE — ROUTINE PROCESS
VSS. Discharge instructions reviewed with patient and , Jyothi Nova and copy of instructions sent home with patient. Dr. Madden Rather spoke with patient and  prior to discharge. Questions answered. Discharged via car, wheeled out by [facility]. Patient came with port accessed. Port is still accessed upon discharge. Port flushed with normal saline, per patient's request prior to discharge. Patient had hypotension in recovery, MD notified. Patient's BP returned closer to baseline before discharge.  MD approved BP

## 2022-04-24 ENCOUNTER — HOSPITAL ENCOUNTER (OUTPATIENT)
Dept: INFUSION THERAPY | Age: 52
Discharge: HOME | End: 2022-04-24
Payer: MEDICARE

## 2022-04-24 PROCEDURE — 96523 IRRIG DRUG DELIVERY DEVICE: CPT

## 2022-04-24 RX ORDER — SODIUM CHLORIDE 0.9 % (FLUSH) 0.9 %
10 SYRINGE (ML) INJECTION AS NEEDED
Status: DISCONTINUED | OUTPATIENT
Start: 2022-04-24 | End: 2022-04-26 | Stop reason: HOSPADM

## 2022-04-24 RX ADMIN — Medication 10 ML: at 09:34

## 2022-04-24 NOTE — PROGRESS NOTES
Arrived to the [facility]. Port flush and dressing change completed. Patient instructed to report any side affects to ordering provider. Patient tolerated well. Any issues or concerns during appointment: none. Patient aware of next infusion appointment on 04/28/2022 (date) at [address] [place] [address] (time). Discharged ambulatory.

## 2022-04-28 ENCOUNTER — HOSPITAL ENCOUNTER (OUTPATIENT)
Dept: INFUSION THERAPY | Age: 52
Discharge: HOME | End: 2022-04-28
Payer: MEDICARE

## 2022-04-28 VITALS
TEMPERATURE: 99 F | DIASTOLIC BLOOD PRESSURE: 87 MMHG | SYSTOLIC BLOOD PRESSURE: 146 MMHG | HEART RATE: 113 BPM | RESPIRATION RATE: 18 BRPM | OXYGEN SATURATION: 94 %

## 2022-04-28 LAB — MAGNESIUM SERPL-MCNC: 1.8 MG/DL (ref 1.8–2.4)

## 2022-04-28 PROCEDURE — 96374 THER/PROPH/DIAG INJ IV PUSH: CPT

## 2022-04-28 PROCEDURE — 83735 ASSAY OF MAGNESIUM: CPT

## 2022-04-28 PROCEDURE — 74011000250 HC RX REV CODE- 250: Performed by: INTERNAL MEDICINE

## 2022-04-28 PROCEDURE — 74011250636 HC RX REV CODE- 250/636: Performed by: INTERNAL MEDICINE

## 2022-04-28 PROCEDURE — 74011000258 HC RX REV CODE- 258: Performed by: INTERNAL MEDICINE

## 2022-04-28 RX ORDER — SODIUM CHLORIDE 0.9 % (FLUSH) 0.9 %
10-40 SYRINGE (ML) INJECTION AS NEEDED
Status: DISCONTINUED | OUTPATIENT
Start: 2022-04-28 | End: 2022-04-30 | Stop reason: HOSPADM

## 2022-04-28 RX ADMIN — PROMETHAZINE HYDROCHLORIDE 25 MG: 25 INJECTION INTRAMUSCULAR; INTRAVENOUS at 07:30

## 2022-04-28 RX ADMIN — SODIUM CHLORIDE, PRESERVATIVE FREE 10 ML: 5 INJECTION INTRAVENOUS at 07:50

## 2022-04-28 RX ADMIN — SODIUM CHLORIDE, PRESERVATIVE FREE 10 ML: 5 INJECTION INTRAVENOUS at 07:30

## 2022-04-28 NOTE — PROGRESS NOTES
Arrived to the [facility]. IV phenergan and port needle and dressing change completed. Patient tolerated well. Any issues or concerns during appointment: none. Patient aware of next infusion appointment on 5/5/2022 at 7:15am   Discharged ambulatory.

## 2022-05-05 ENCOUNTER — HOSPITAL ENCOUNTER (OUTPATIENT)
Dept: INFUSION THERAPY | Age: 52
Discharge: HOME | End: 2022-05-05
Payer: MEDICARE

## 2022-05-05 VITALS
SYSTOLIC BLOOD PRESSURE: 178 MMHG | OXYGEN SATURATION: 97 % | TEMPERATURE: 98.3 F | RESPIRATION RATE: 20 BRPM | DIASTOLIC BLOOD PRESSURE: 98 MMHG | HEART RATE: 116 BPM

## 2022-05-05 LAB — MAGNESIUM SERPL-MCNC: 2 MG/DL (ref 1.8–2.4)

## 2022-05-05 PROCEDURE — 74011000250 HC RX REV CODE- 250: Performed by: INTERNAL MEDICINE

## 2022-05-05 PROCEDURE — 74011250636 HC RX REV CODE- 250/636: Performed by: INTERNAL MEDICINE

## 2022-05-05 PROCEDURE — 83735 ASSAY OF MAGNESIUM: CPT

## 2022-05-05 PROCEDURE — 96365 THER/PROPH/DIAG IV INF INIT: CPT

## 2022-05-05 PROCEDURE — 74011000258 HC RX REV CODE- 258: Performed by: INTERNAL MEDICINE

## 2022-05-05 RX ORDER — SODIUM CHLORIDE 0.9 % (FLUSH) 0.9 %
10 SYRINGE (ML) INJECTION AS NEEDED
Status: DISCONTINUED | OUTPATIENT
Start: 2022-05-05 | End: 2022-05-07 | Stop reason: HOSPADM

## 2022-05-05 RX ADMIN — SODIUM CHLORIDE, PRESERVATIVE FREE 10 ML: 5 INJECTION INTRAVENOUS at 07:32

## 2022-05-05 RX ADMIN — SODIUM CHLORIDE, PRESERVATIVE FREE 10 ML: 5 INJECTION INTRAVENOUS at 08:05

## 2022-05-05 RX ADMIN — SODIUM CHLORIDE 25 MG: 900 INJECTION, SOLUTION INTRAVENOUS at 07:47

## 2022-05-05 NOTE — PROGRESS NOTES
Tolerated Phenergan 25 mg IVPB over 15 minutes today. Port needle changed today with blood return present. Magnesium level 2.0 today. No replacement needed. Patient discharged via ambulation accompanied by self. Instructed to notify physician of any problems, questions or concerns after discharge. Next appointment is 05/12/2022 at [address] [place] [address] with Infusion.

## 2022-05-12 ENCOUNTER — HOSPITAL ENCOUNTER (OUTPATIENT)
Dept: INFUSION THERAPY | Age: 52
Discharge: HOME | End: 2022-05-12
Payer: MEDICARE

## 2022-05-12 VITALS
DIASTOLIC BLOOD PRESSURE: 70 MMHG | SYSTOLIC BLOOD PRESSURE: 118 MMHG | TEMPERATURE: 98.6 F | HEART RATE: 94 BPM | RESPIRATION RATE: 18 BRPM | OXYGEN SATURATION: 97 %

## 2022-05-12 LAB — MAGNESIUM SERPL-MCNC: 1.9 MG/DL (ref 1.8–2.4)

## 2022-05-12 PROCEDURE — 96374 THER/PROPH/DIAG INJ IV PUSH: CPT

## 2022-05-12 PROCEDURE — 74011000258 HC RX REV CODE- 258: Performed by: INTERNAL MEDICINE

## 2022-05-12 PROCEDURE — 83735 ASSAY OF MAGNESIUM: CPT

## 2022-05-12 PROCEDURE — 74011250636 HC RX REV CODE- 250/636: Performed by: INTERNAL MEDICINE

## 2022-05-12 RX ORDER — SODIUM CHLORIDE 0.9 % (FLUSH) 0.9 %
10 SYRINGE (ML) INJECTION AS NEEDED
Status: DISCONTINUED | OUTPATIENT
Start: 2022-05-12 | End: 2022-05-14 | Stop reason: HOSPADM

## 2022-05-12 RX ADMIN — Medication 10 ML: at 07:49

## 2022-05-12 RX ADMIN — PROMETHAZINE HYDROCHLORIDE 25 MG: 25 INJECTION INTRAMUSCULAR; INTRAVENOUS at 07:35

## 2022-05-12 NOTE — PROGRESS NOTES
Arrived to the [facility]. Labs completed, no replacement required. Phenergan given per patient request for c/o nausea. Patient given coffee and Ginger Ale per request.   Any issues or concerns during appointment: no.  Patient aware of next infusion appointment on 5/19/22 (date) at [address] [place] [address] (time). Patient instructed to call provider with temperature of 100.4 or greater or nausea/vomiting/ diarrhea or pain not controlled by medications  Discharged ambulatory.

## 2022-05-20 ENCOUNTER — HOSPITAL ENCOUNTER (OUTPATIENT)
Dept: INFUSION THERAPY | Age: 52
Discharge: HOME | End: 2022-05-20
Payer: MEDICARE

## 2022-05-20 PROCEDURE — 74011000250 HC RX REV CODE- 250: Performed by: INTERNAL MEDICINE

## 2022-05-20 PROCEDURE — 96523 IRRIG DRUG DELIVERY DEVICE: CPT

## 2022-05-20 RX ORDER — HEPARIN 100 UNIT/ML
300 SYRINGE INTRAVENOUS AS NEEDED
Status: CANCELLED | OUTPATIENT
Start: 2022-05-20

## 2022-05-20 RX ORDER — SODIUM CHLORIDE 0.9 % (FLUSH) 0.9 %
5 SYRINGE (ML) INJECTION AS NEEDED
Status: DISCONTINUED | OUTPATIENT
Start: 2022-05-20 | End: 2022-05-22 | Stop reason: HOSPADM

## 2022-05-20 RX ADMIN — SODIUM CHLORIDE, PRESERVATIVE FREE 10 ML: 5 INJECTION INTRAVENOUS at 09:49

## 2022-05-26 ENCOUNTER — HOSPITAL ENCOUNTER (OUTPATIENT)
Dept: INFUSION THERAPY | Age: 52
Discharge: HOME | End: 2022-05-26
Payer: MEDICARE

## 2022-05-26 VITALS
OXYGEN SATURATION: 95 % | DIASTOLIC BLOOD PRESSURE: 87 MMHG | SYSTOLIC BLOOD PRESSURE: 153 MMHG | BODY MASS INDEX: 34.68 KG/M2 | WEIGHT: 189.6 LBS | HEART RATE: 112 BPM | TEMPERATURE: 97.8 F | RESPIRATION RATE: 18 BRPM

## 2022-05-26 LAB — MAGNESIUM SERPL-MCNC: 1.9 MG/DL (ref 1.8–2.4)

## 2022-05-26 PROCEDURE — 6360000002 HC RX W HCPCS: Performed by: INTERNAL MEDICINE

## 2022-05-26 PROCEDURE — 83735 ASSAY OF MAGNESIUM: CPT

## 2022-05-26 PROCEDURE — 96365 THER/PROPH/DIAG IV INF INIT: CPT

## 2022-05-26 PROCEDURE — 2580000003 HC RX 258: Performed by: INTERNAL MEDICINE

## 2022-05-26 RX ORDER — SODIUM CHLORIDE 9 MG/ML
INJECTION, SOLUTION INTRAVENOUS PRN
COMMUNITY

## 2022-05-26 RX ORDER — HYOSCYAMINE SULFATE 0.125 MG
TABLET ORAL PRN
Status: ON HOLD | COMMUNITY
Start: 2016-05-06 | End: 2022-07-28 | Stop reason: HOSPADM

## 2022-05-26 RX ORDER — LUBIPROSTONE 24 UG/1
24 CAPSULE, GELATIN COATED ORAL
COMMUNITY

## 2022-05-26 RX ORDER — SUCRALFATE ORAL 1 G/10ML
SUSPENSION ORAL
COMMUNITY
Start: 2017-09-07 | End: 2022-08-12

## 2022-05-26 RX ORDER — PREGABALIN 50 MG/1
100 CAPSULE ORAL 2 TIMES DAILY
Status: ON HOLD | COMMUNITY
End: 2022-07-28 | Stop reason: HOSPADM

## 2022-05-26 RX ORDER — PANTOPRAZOLE SODIUM 40 MG/1
40 TABLET, DELAYED RELEASE ORAL 2 TIMES DAILY
Status: ON HOLD | COMMUNITY
End: 2022-07-28 | Stop reason: SDUPTHER

## 2022-05-26 RX ORDER — LORAZEPAM 1 MG/1
1 TABLET ORAL 3 TIMES DAILY PRN
COMMUNITY
Start: 2014-10-07

## 2022-05-26 RX ORDER — ZOLPIDEM TARTRATE 10 MG/1
10 TABLET ORAL
COMMUNITY
Start: 2022-02-11

## 2022-05-26 RX ORDER — SODIUM CHLORIDE 0.9 % (FLUSH) 0.9 %
5-40 SYRINGE (ML) INJECTION PRN
Status: ACTIVE | OUTPATIENT
Start: 2022-05-26 | End: 2022-05-26

## 2022-05-26 RX ORDER — CITALOPRAM 20 MG/1
20 TABLET ORAL DAILY
Status: ON HOLD | COMMUNITY
Start: 2022-02-11 | End: 2022-07-28 | Stop reason: HOSPADM

## 2022-05-26 RX ORDER — PROMETHAZINE HYDROCHLORIDE 25 MG/ML
25 INJECTION, SOLUTION INTRAMUSCULAR; INTRAVENOUS 3 TIMES DAILY PRN
Status: ON HOLD | COMMUNITY
End: 2022-07-28 | Stop reason: HOSPADM

## 2022-05-26 RX ORDER — POLYETHYLENE GLYCOL 3350 17 G/17G
17 POWDER, FOR SOLUTION ORAL PRN
COMMUNITY

## 2022-05-26 RX ORDER — ONDANSETRON HYDROCHLORIDE 8 MG/1
8 TABLET, FILM COATED ORAL EVERY 8 HOURS PRN
Status: ON HOLD | COMMUNITY
End: 2022-07-28 | Stop reason: HOSPADM

## 2022-05-26 RX ORDER — PREGABALIN 100 MG/1
100 CAPSULE ORAL 2 TIMES DAILY
COMMUNITY

## 2022-05-26 RX ORDER — ACETAMINOPHEN 500 MG
TABLET ORAL EVERY 6 HOURS PRN
COMMUNITY

## 2022-05-26 RX ORDER — PROMETHAZINE HYDROCHLORIDE 12.5 MG/1
25 TABLET ORAL EVERY 6 HOURS PRN
Status: ON HOLD | COMMUNITY
Start: 2015-03-11 | End: 2022-07-28 | Stop reason: HOSPADM

## 2022-05-26 RX ORDER — LIDOCAINE HYDROCHLORIDE 20 MG/ML
10 SOLUTION OROPHARYNGEAL EVERY 6 HOURS PRN
COMMUNITY
Start: 2021-04-28

## 2022-05-26 RX ORDER — PROMETHAZINE HYDROCHLORIDE 25 MG/1
25 TABLET ORAL EVERY 6 HOURS PRN
Status: ON HOLD | COMMUNITY
End: 2022-07-28 | Stop reason: SDUPTHER

## 2022-05-26 RX ORDER — CYANOCOBALAMIN 1000 UG/ML
INJECTION INTRAMUSCULAR; SUBCUTANEOUS
COMMUNITY
Start: 2017-11-25

## 2022-05-26 RX ORDER — CITALOPRAM 10 MG/1
10 TABLET ORAL DAILY
Status: ON HOLD | COMMUNITY
End: 2022-07-28 | Stop reason: HOSPADM

## 2022-05-26 RX ORDER — DIPHENHYDRAMINE HCL 25 MG
25 CAPSULE ORAL EVERY 6 HOURS PRN
COMMUNITY

## 2022-05-26 RX ORDER — GABAPENTIN 250 MG/5ML
300 SOLUTION ORAL 3 TIMES DAILY
COMMUNITY
Start: 2014-10-07 | End: 2022-07-28

## 2022-05-26 RX ORDER — MORPHINE SULFATE 30 MG/1
TABLET, FILM COATED, EXTENDED RELEASE ORAL
Status: ON HOLD | COMMUNITY
Start: 2021-08-15 | End: 2022-07-28 | Stop reason: SDUPTHER

## 2022-05-26 RX ORDER — BLOOD SUGAR DIAGNOSTIC
STRIP MISCELLANEOUS
COMMUNITY
Start: 2020-08-24

## 2022-05-26 RX ORDER — NALOXONE HYDROCHLORIDE 4 MG/.1ML
4 SPRAY NASAL
COMMUNITY
Start: 2022-02-16

## 2022-05-26 RX ORDER — MORPHINE SULFATE 30 MG/1
TABLET ORAL 3 TIMES DAILY PRN
Status: ON HOLD | COMMUNITY
End: 2022-07-28 | Stop reason: HOSPADM

## 2022-05-26 RX ORDER — NITROGLYCERIN 0.3 MG/1
0.3 TABLET SUBLINGUAL
COMMUNITY
Start: 2021-03-18

## 2022-05-26 RX ORDER — ONDANSETRON 8 MG/1
8 TABLET, ORALLY DISINTEGRATING ORAL 3 TIMES DAILY
COMMUNITY
Start: 2016-04-19

## 2022-05-26 RX ORDER — PROMETHAZINE HYDROCHLORIDE 25 MG/1
25 SUPPOSITORY RECTAL PRN
Status: ON HOLD | COMMUNITY
End: 2022-07-28 | Stop reason: HOSPADM

## 2022-05-26 RX ORDER — INSULIN ASPART 100 [IU]/ML
INJECTION, SOLUTION INTRAVENOUS; SUBCUTANEOUS
COMMUNITY
Start: 2016-06-07

## 2022-05-26 RX ORDER — HYOSCYAMINE SULFATE 0.12 MG/1
0.12 TABLET SUBLINGUAL EVERY 6 HOURS PRN
COMMUNITY

## 2022-05-26 RX ORDER — TIZANIDINE 4 MG/1
4 TABLET ORAL PRN
Status: ON HOLD | COMMUNITY
End: 2022-07-28 | Stop reason: HOSPADM

## 2022-05-26 RX ORDER — FENTANYL 100 UG/H
1 PATCH TRANSDERMAL
Status: ON HOLD | COMMUNITY
Start: 2014-10-07 | End: 2022-07-28 | Stop reason: HOSPADM

## 2022-05-26 RX ADMIN — SODIUM CHLORIDE, PRESERVATIVE FREE 10 ML: 5 INJECTION INTRAVENOUS at 08:20

## 2022-05-26 RX ADMIN — SODIUM CHLORIDE, PRESERVATIVE FREE 10 ML: 5 INJECTION INTRAVENOUS at 07:45

## 2022-05-26 RX ADMIN — PROMETHAZINE HYDROCHLORIDE 25 MG: 25 INJECTION INTRAMUSCULAR; INTRAVENOUS at 07:49

## 2022-05-26 NOTE — PROGRESS NOTES
Arrived to the [facility]. Labs drawn; no magnesium required; phenergan  completed. Patient tolerated without difficulty. Any issues or concerns during appointment: none. Patient aware of next infusion appointment on June 2 (date) at [address] [place] [address] (time). Patient instructed to call provider with temperature of 100.4 or greater or nausea/vomiting/ diarrhea or pain not controlled by medications  Discharged ambulatory.

## 2022-06-01 ENCOUNTER — PREP FOR PROCEDURE (OUTPATIENT)
Dept: ADMINISTRATIVE | Age: 52
End: 2022-06-01

## 2022-06-01 RX ORDER — SODIUM CHLORIDE 0.9 % (FLUSH) 0.9 %
5-40 SYRINGE (ML) INJECTION PRN
OUTPATIENT
Start: 2022-06-01

## 2022-06-01 RX ORDER — SODIUM CHLORIDE 0.9 % (FLUSH) 0.9 %
5-40 SYRINGE (ML) INJECTION EVERY 12 HOURS SCHEDULED
OUTPATIENT
Start: 2022-06-01

## 2022-06-01 RX ORDER — SODIUM CHLORIDE 9 MG/ML
INJECTION, SOLUTION INTRAVENOUS PRN
OUTPATIENT
Start: 2022-06-01

## 2022-06-02 ENCOUNTER — HOSPITAL ENCOUNTER (OUTPATIENT)
Dept: INFUSION THERAPY | Age: 52
Discharge: HOME | End: 2022-06-02
Payer: MEDICARE

## 2022-06-02 VITALS
SYSTOLIC BLOOD PRESSURE: 153 MMHG | WEIGHT: 189.2 LBS | DIASTOLIC BLOOD PRESSURE: 85 MMHG | TEMPERATURE: 98.3 F | HEART RATE: 118 BPM | BODY MASS INDEX: 34.61 KG/M2 | RESPIRATION RATE: 18 BRPM

## 2022-06-02 LAB — MAGNESIUM SERPL-MCNC: 1.8 MG/DL (ref 1.8–2.4)

## 2022-06-02 PROCEDURE — 6360000002 HC RX W HCPCS: Performed by: INTERNAL MEDICINE

## 2022-06-02 PROCEDURE — 96365 THER/PROPH/DIAG IV INF INIT: CPT

## 2022-06-02 PROCEDURE — 83735 ASSAY OF MAGNESIUM: CPT

## 2022-06-02 PROCEDURE — 2580000003 HC RX 258: Performed by: INTERNAL MEDICINE

## 2022-06-02 RX ORDER — SODIUM CHLORIDE 0.9 % (FLUSH) 0.9 %
5-40 SYRINGE (ML) INJECTION PRN
Status: ACTIVE | OUTPATIENT
Start: 2022-06-02 | End: 2022-06-02

## 2022-06-02 RX ADMIN — SODIUM CHLORIDE, PRESERVATIVE FREE 10 ML: 5 INJECTION INTRAVENOUS at 07:40

## 2022-06-02 RX ADMIN — SODIUM CHLORIDE, PRESERVATIVE FREE 10 ML: 5 INJECTION INTRAVENOUS at 08:00

## 2022-06-02 RX ADMIN — SODIUM CHLORIDE, PRESERVATIVE FREE 10 ML: 5 INJECTION INTRAVENOUS at 07:35

## 2022-06-02 RX ADMIN — SODIUM CHLORIDE, PRESERVATIVE FREE 10 ML: 5 INJECTION INTRAVENOUS at 07:30

## 2022-06-02 RX ADMIN — PROMETHAZINE HYDROCHLORIDE 25 MG: 25 INJECTION INTRAMUSCULAR; INTRAVENOUS at 07:40

## 2022-06-02 NOTE — PROGRESS NOTES
Arrived to the [facility]. Port needle changed out, and left in for home health. Labs drawn; no magnesium required; phenergan  completed. Patient tolerated without difficulty. Any issues or concerns during appointment: none. Patient aware of next infusion appointment on June 9 (date) at [address] [place] [address] (time). Patient instructed to call provider with temperature of 100.4 or greater or nausea/vomiting/ diarrhea or pain not controlled by medications  Discharged ambulatory.

## 2022-06-08 ENCOUNTER — ANESTHESIA EVENT (OUTPATIENT)
Dept: ENDOSCOPY | Age: 52
End: 2022-06-08
Payer: MEDICARE

## 2022-06-08 ENCOUNTER — ANESTHESIA (OUTPATIENT)
Dept: ENDOSCOPY | Age: 52
End: 2022-06-08
Payer: MEDICARE

## 2022-06-08 ENCOUNTER — HOSPITAL ENCOUNTER (OUTPATIENT)
Age: 52
Setting detail: OUTPATIENT SURGERY
Discharge: HOME | End: 2022-06-08
Attending: INTERNAL MEDICINE | Admitting: INTERNAL MEDICINE
Payer: MEDICARE

## 2022-06-08 VITALS
TEMPERATURE: 96.8 F | WEIGHT: 195 LBS | HEART RATE: 52 BPM | SYSTOLIC BLOOD PRESSURE: 149 MMHG | OXYGEN SATURATION: 90 % | BODY MASS INDEX: 35.88 KG/M2 | RESPIRATION RATE: 18 BRPM | DIASTOLIC BLOOD PRESSURE: 71 MMHG | HEIGHT: 62 IN

## 2022-06-08 PROCEDURE — 3700000000 HC ANESTHESIA ATTENDED CARE: Performed by: INTERNAL MEDICINE

## 2022-06-08 PROCEDURE — 7100000010 HC PHASE II RECOVERY - FIRST 15 MIN: Performed by: INTERNAL MEDICINE

## 2022-06-08 PROCEDURE — 7100000011 HC PHASE II RECOVERY - ADDTL 15 MIN: Performed by: INTERNAL MEDICINE

## 2022-06-08 PROCEDURE — 2500000003 HC RX 250 WO HCPCS: Performed by: NURSE ANESTHETIST, CERTIFIED REGISTERED

## 2022-06-08 PROCEDURE — C1726 CATH, BAL DIL, NON-VASCULAR: HCPCS | Performed by: INTERNAL MEDICINE

## 2022-06-08 PROCEDURE — 2580000003 HC RX 258: Performed by: NURSE ANESTHETIST, CERTIFIED REGISTERED

## 2022-06-08 PROCEDURE — 2709999900 HC NON-CHARGEABLE SUPPLY: Performed by: INTERNAL MEDICINE

## 2022-06-08 PROCEDURE — 2500000003 HC RX 250 WO HCPCS

## 2022-06-08 PROCEDURE — 3609017700 HC EGD DILATION GASTRIC/DUODENAL STRICTURE: Performed by: INTERNAL MEDICINE

## 2022-06-08 PROCEDURE — 2580000003 HC RX 258: Performed by: INTERNAL MEDICINE

## 2022-06-08 PROCEDURE — 6360000002 HC RX W HCPCS: Performed by: NURSE ANESTHETIST, CERTIFIED REGISTERED

## 2022-06-08 RX ORDER — ONDANSETRON 2 MG/ML
INJECTION INTRAMUSCULAR; INTRAVENOUS PRN
Status: DISCONTINUED | OUTPATIENT
Start: 2022-06-08 | End: 2022-06-08 | Stop reason: SDUPTHER

## 2022-06-08 RX ORDER — LIDOCAINE HYDROCHLORIDE 20 MG/ML
INJECTION, SOLUTION EPIDURAL; INFILTRATION; INTRACAUDAL; PERINEURAL PRN
Status: DISCONTINUED | OUTPATIENT
Start: 2022-06-08 | End: 2022-06-08 | Stop reason: SDUPTHER

## 2022-06-08 RX ORDER — SODIUM CHLORIDE, SODIUM LACTATE, POTASSIUM CHLORIDE, CALCIUM CHLORIDE 600; 310; 30; 20 MG/100ML; MG/100ML; MG/100ML; MG/100ML
INJECTION, SOLUTION INTRAVENOUS CONTINUOUS PRN
Status: DISCONTINUED | OUTPATIENT
Start: 2022-06-08 | End: 2022-06-08 | Stop reason: SDUPTHER

## 2022-06-08 RX ORDER — PROPOFOL 10 MG/ML
INJECTION, EMULSION INTRAVENOUS PRN
Status: DISCONTINUED | OUTPATIENT
Start: 2022-06-08 | End: 2022-06-08 | Stop reason: SDUPTHER

## 2022-06-08 RX ORDER — HEPARIN SODIUM (PORCINE) LOCK FLUSH IV SOLN 100 UNIT/ML 100 UNIT/ML
500 SOLUTION INTRAVENOUS PRN
Status: DISCONTINUED | OUTPATIENT
Start: 2022-06-08 | End: 2022-06-08 | Stop reason: HOSPADM

## 2022-06-08 RX ORDER — PROPOFOL 10 MG/ML
INJECTION, EMULSION INTRAVENOUS PRN
Status: DISCONTINUED | OUTPATIENT
Start: 2022-06-08 | End: 2022-06-08

## 2022-06-08 RX ORDER — HEPARIN SODIUM (PORCINE) LOCK FLUSH IV SOLN 100 UNIT/ML 100 UNIT/ML
100 SOLUTION INTRAVENOUS PRN
Status: DISCONTINUED | OUTPATIENT
Start: 2022-06-08 | End: 2022-06-08

## 2022-06-08 RX ORDER — GLYCOPYRROLATE 0.2 MG/ML
INJECTION INTRAMUSCULAR; INTRAVENOUS PRN
Status: DISCONTINUED | OUTPATIENT
Start: 2022-06-08 | End: 2022-06-08 | Stop reason: SDUPTHER

## 2022-06-08 RX ORDER — SODIUM CHLORIDE, SODIUM LACTATE, POTASSIUM CHLORIDE, CALCIUM CHLORIDE 600; 310; 30; 20 MG/100ML; MG/100ML; MG/100ML; MG/100ML
INJECTION, SOLUTION INTRAVENOUS CONTINUOUS
Status: DISCONTINUED | OUTPATIENT
Start: 2022-06-08 | End: 2022-06-08 | Stop reason: HOSPADM

## 2022-06-08 RX ADMIN — SODIUM CHLORIDE, POTASSIUM CHLORIDE, SODIUM LACTATE AND CALCIUM CHLORIDE: 600; 310; 30; 20 INJECTION, SOLUTION INTRAVENOUS at 07:09

## 2022-06-08 RX ADMIN — SODIUM CHLORIDE, SODIUM LACTATE, POTASSIUM CHLORIDE, AND CALCIUM CHLORIDE: 600; 310; 30; 20 INJECTION, SOLUTION INTRAVENOUS at 07:10

## 2022-06-08 RX ADMIN — ONDANSETRON 4 MG: 2 INJECTION INTRAMUSCULAR; INTRAVENOUS at 07:16

## 2022-06-08 RX ADMIN — LIDOCAINE HYDROCHLORIDE 80 MG: 20 INJECTION, SOLUTION EPIDURAL; INFILTRATION; INTRACAUDAL; PERINEURAL at 07:16

## 2022-06-08 RX ADMIN — PROPOFOL 60 MG: 10 INJECTION, EMULSION INTRAVENOUS at 07:16

## 2022-06-08 RX ADMIN — GLYCOPYRROLATE 0.2 MG: 0.2 INJECTION INTRAMUSCULAR; INTRAVENOUS at 07:16

## 2022-06-08 ASSESSMENT — PAIN - FUNCTIONAL ASSESSMENT: PAIN_FUNCTIONAL_ASSESSMENT: NONE - DENIES PAIN

## 2022-06-08 NOTE — ANESTHESIA PRE PROCEDURE
Department of Anesthesiology  Preprocedure Note       Name:  Sophia Franco   Age:  46 y.o.  :  1970                                          MRN:  [id number]         Date:  2022      Surgeon: Saúl Helm):  Debora Kumar MD    Procedure: Procedure(s):  EGD ESOPHAGOGASTRODUODENOSCOPY DILATATION/ 34    Medications prior to admission:   Prior to Admission medications    Medication Sig Start Date End Date Taking? Authorizing Provider   blood glucose test strips (EXACTECH TEST) strip TEST BLOOD SUGAR FOUR TIMES DAILY 20   Historical Provider, MD   acetaminophen (TYLENOL) 500 MG tablet Take by mouth every 6 hours as needed    Historical Provider, MD   citalopram (CELEXA) 10 MG tablet Take 10 mg by mouth daily    Historical Provider, MD   citalopram (CELEXA) 20 mg tablet Take 20 mg by mouth daily 22   Historical Provider, MD   cyanocobalamin 1000 MCG/ML injection INJECT 1 VIAL INTRAMUSCULARLY FOR 4 WEEKS THEN MONTHLY 17   Historical Provider, MD   diclofenac sodium (VOLTAREN) 1 % GEL APPLY 1 GRAM TO AFFECTED AREA 4 TIMES A DAY AS NEEDED 10/2/18   Historical Provider, MD   diphenhydrAMINE (BENADRYL) 25 MG capsule Take 25 mg by mouth every 6 hours as needed    Historical Provider, MD   diphenhydrAMINE (SOMINEX) 25 MG tablet Take by mouth    Historical Provider, MD   fentaNYL (DURAGESIC) 100 MCG/HR Place 1 patch onto the skin every 72 hours. Patient not taking: Reported on 2022 10/7/14   Historical Provider, MD   gabapentin (NEURONTIN) 250 MG/5ML solution 300 mg by Enteral route 3 times daily.   Patient not taking: Reported on 2022 10/7/14   Historical Provider, MD   glucagon 1 MG injection Inject 1 mg into the muscle as needed 17   Historical Provider, MD   hyoscyamine (ANASPAZ;LEVSIN) 125 MCG tablet as needed 16   Historical Provider, MD   Hyoscyamine Sulfate SL 0.125 MG SUBL Place 0.125 mg under the tongue every 6 hours as needed    Historical Provider, MD influenza quadrivalent split vaccine (FLULAVAL QUADRIVALENT) 0.5 ML injection Inject into the muscle 11/9/21   Historical Provider, MD   insulin aspart (NOVOLOG) 100 UNIT/ML injection vial Use as directed 6/7/16   Historical Provider, MD   insulin aspart (NOVOLOG) 100 UNIT/ML injection pen 12 units at breakfast 14 units at lunch and 41 units at dinner plus sliding scale for blood sugars >150, up to 60 units per day 10/7/14   Historical Provider, MD   Insulin Degludec 100 UNIT/ML SOPN Inject 68 Units into the skin 10/27/21   Historical Provider, MD   insulin NPH (HUMULIN N;NOVOLIN N) 100 UNIT/ML injection pen Please inject 12 units into the skin with breakfast and 4 units with bedtime. (pen)  Patient not taking: Reported on 6/8/2022 10/7/14   Historical Provider, MD   lidocaine viscous hcl (XYLOCAINE) 2 % SOLN solution 10 mLs every 6 hours as needed 4/28/21   Historical Provider, MD   LORazepam (ATIVAN) 1 MG tablet Take 1 mg by mouth 3 times daily as needed. 10/7/14   Historical Provider, MD   lubiprostone (AMITIZA) 24 MCG capsule Take 24 mcg by mouth  Patient not taking: Reported on 6/8/2022    Historical Provider, MD   morphine (MS CONTIN) 30 MG extended release tablet  8/15/21   Historical Provider, MD   morphine (MSIR) 30 MG tablet Take by mouth 3 times daily as needed.     Historical Provider, MD   naloxone 4 MG/0.1ML LIQD nasal spray 4 mg once as needed 2/16/22   Historical Provider, MD   nitroGLYCERIN (NITROSTAT) 0.3 MG SL tablet Place 0.3 mg under the tongue  Patient not taking: Reported on 6/8/2022 3/18/21   Historical Provider, MD   ondansetron (ZOFRAN-ODT) 8 MG TBDP disintegrating tablet Place 8 mg under the tongue 3 times daily 4/19/16   Historical Provider, MD   ondansetron (ZOFRAN) 8 MG tablet Take 8 mg by mouth every 8 hours as needed    Historical Provider, MD   lipase-protease-amylase (CREON) 47214-776943 units CPEP delayed release capsule Take 1 capsule by mouth    Historical Provider, MD pantoprazole (PROTONIX) 40 MG tablet Take 40 mg by mouth 2 times daily    Historical Provider, MD   polyethylene glycol (MIRALAX) 17 GM/SCOOP powder Take 17 g by mouth as needed    Historical Provider, MD   pregabalin (LYRICA) 100 MG capsule Take 100 mg by mouth 2 times daily. Historical Provider, MD   pregabalin (LYRICA) 50 MG capsule Take 100 mg by mouth 2 times daily. Historical Provider, MD   promethazine (PHENERGAN) 12.5 mg tablet Take 25 mg by mouth every 6 hours as needed 3/11/15   Historical Provider, MD   promethazine (PHENERGAN) 25 MG suppository Place 25 mg rectally as needed    Historical Provider, MD   promethazine (PHENERGAN) 25 MG tablet Take 25 mg by mouth every 6 hours as needed    Historical Provider, MD   promethazine (PHENERGAN) 25 MG/ML injection Inject 25 mg into the muscle 3 times daily as needed    Historical Provider, MD   sodium chloride 0.9 % infusion Infuse intravenously as needed    Historical Provider, MD   sucralfate (CARAFATE) 1 GM/10ML suspension TAKE 10 ML BY MOUTH 4 TIMES A DAY EVERY DAY ON A EMPTY STOMACH 1 HR BEFORE MEALS AND AT BEDTIME 9/7/17   Historical Provider, MD   tiZANidine (ZANAFLEX) 4 MG tablet Take 4 mg by mouth as needed  Patient not taking: Reported on 6/8/2022    Historical Provider, MD   zolpidem (AMBIEN) 10 MG tablet Take 10 mg by mouth. 2/11/22   Historical Provider, MD       Current medications:    No current facility-administered medications for this encounter. Allergies:     Allergies   Allergen Reactions    Adhesive Tape Itching, Other (See Comments) and Rash     blisters  tegaderm  Paper tape too      Metronidazole Diarrhea and Nausea And Vomiting    Atorvastatin Myalgia    Iodine Other (See Comments)     Sweaty and clammy    Statins Myalgia    Barium Iodide Nausea And Vomiting     Diaphoresis      Barium Sulfate Palpitations    Insulin Lispro Nausea And Vomiting    Insulins Nausea And Vomiting     Humalog only    Metformin Nausea And Vomiting     Stomach pain  Stomach pain  Stomach pain  Stomach pain         Problem List:    Patient Active Problem List   Diagnosis Code    GERD (gastroesophageal reflux disease) K21.9    Hypoxemia R09.02    Acute pancreatitis K85.90    Nausea R11.0    Hypotension I95.9    Nausea & vomiting R11.2    Abdominal pain R10.9    Tachycardia R00.0    DM type 2 (diabetes mellitus, type 2) ([facility] [address] E11.9    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    Severe protein-calorie malnutrition (HCC) E43    Iron deficiency anemia D50.9    S/P exploratory laparotomy Z98.890    Aftercare following surgery Z48.89    Hyponatremia E87.1    Chronic pancreatitis ([facility] [address] K86.1    Pancreatitis K85.90    Hypertension I10    C. difficile diarrhea A04.72       Past Medical History:        Diagnosis Date    Anemia     blood transfusion 2013 X1 d/t \"perforated bowel\"-- Fe infusions 12/2017--- denies any current iron infusions 12/17/2019    Anxiety and depression     managed with meds    C. difficile diarrhea 2013    in 9055 after complicated ERCP    Cervical dysplasia 1990s    Chronic insomnia     ambien     Chronic nausea     Chronic pain     all over     Chronic pancreatitis ([facility] [address]     COVID-19 vaccine series completed 04/21/2021    3/29/2021 Pfizer     Dehydration     IVFs through port as needed for this     Encounter for insertion of venous access port     Left chest port    Esophageal stricture 2017    Fibromyalgia     managed with meds    Former cigarette smoker     GERD (gastroesophageal reflux disease)     controlled with med    History of kidney stones 03/2021    X1-naturally pass    HLD (hyperlipidemia)     pt denies     IBS (irritable bowel syndrome)     Migraine headache     does not have often but did have one recently; just takes tylenol    Nausea & vomiting     pt reports she does better with phenergan than zofran - causes HA    Nonalcoholic fatty liver disease     PUD (peptic ulcer disease) 06/2017 still having issues takes meds     Sinus tachycardia     per pt-- no tx needed    Sphincter of Oddi dysfunction     Type 2 diabetes mellitus ([facility] [address]     type 2-- sqbs am avg 150--- Hypo symptoms at <100, Insulin dependent; last A1C was 11/3/2020 = 9.0       Past Surgical History:        Procedure Laterality Date    CARPAL TUNNEL RELEASE Right     along with trigger finger    CHOLECYSTECTOMY, LAPAROSCOPIC      COLONOSCOPY      COLONOSCOPY N/A 2018    COLONOS[facility] Reyna Enriquez [facility] ENDOSCOPY    ERCP  3/5/2013    resulted in perforated duodenum    HYSTERECTOMY      ovaries remain    IR DRAIN SKIN ABSCESS      IR PORT PLACEMENT EQUAL OR GREATER THAN 5 YEARS  2018    IR PORT PLACEMENT EQUAL OR GREATER THAN 5 YEARS 2018 [facility]    LAPAROTOMY  3/5/2013    exploratory for duodenal perforation with ERCP    ORTHOPEDIC SURGERY      right finger surgery 2017    OTHER SURGICAL HISTORY Bilateral     thumb    OTHER SURGICAL HISTORY      Kidney stones 2021    MA ABDOMEN SURGERY PROC UNLISTED      explor lap    MA ABDOMEN SURGERY PROC UNLISTED      lap nissen    [facility]  last one placed      Hx of pancreatic stent, multiple    TOTAL COLECTOMY      UPPER GASTROINTESTINAL ENDOSCOPY  2021         UPPER GASTROINTESTINAL ENDOSCOPY  2017    36 fr cecily    UPPER GASTROINTESTINAL ENDOSCOPY  2019         UROLOGICAL SURGERY  13 at [facility]-- stent removed 13. Dr Ray Salgado  2014    port insertion, left chest wall       Social History:    Social History     Tobacco Use    Smoking status: Former Smoker     Packs/day: 1.00     Quit date: 1993     Years since quittin.1    Smokeless tobacco: Never Used   Substance Use Topics    Alcohol use:  No                                Counseling given: Not Answered      Vital Signs (Current):   Vitals:    22 0633   BP: (!) 160/77   Pulse: (!) 106   Resp: 18   Temp: 98.5 °F (36.9 °C)   TempSrc: Oral   SpO2: 93%   Weight: 195 lb (88.5 kg)   Height: 5' 2\" (1.575 m)                                              BP Readings from Last 3 Encounters:   06/08/22 (!) 160/77   06/02/22 (!) 153/85   05/26/22 (!) 153/87       NPO Status: Time of last liquid consumption: 2230                        Time of last solid consumption: 1500                        Date of last liquid consumption: 06/07/22                        Date of last solid food consumption: 06/07/22    BMI:   Wt Readings from Last 3 Encounters:   06/08/22 195 lb (88.5 kg)   06/02/22 189 lb 3.2 oz (85.8 kg)   05/26/22 189 lb 9.6 oz (86 kg)     Body mass index is 35.67 kg/m². CBC:   Lab Results   Component Value Date    WBC 7.8 02/24/2022    RBC 4.85 02/24/2022    HGB 12.0 02/24/2022    HCT 38.6 02/24/2022    MCV 79.6 02/24/2022    RDW 14.6 02/24/2022     02/24/2022       CMP:   Lab Results   Component Value Date     02/24/2022    K 4.3 02/24/2022     02/24/2022    CO2 27 02/24/2022    BUN 8 02/24/2022    CREATININE 0.80 02/24/2022    GFRAA >60 02/24/2022    AGRATIO 0.9 02/24/2022    GLUCOSE 147 02/24/2022    PROT 7.9 02/24/2022    CALCIUM 9.0 02/24/2022    BILITOT 0.3 02/24/2022    ALKPHOS 126 02/24/2022    AST 35 02/24/2022    ALT 64 02/24/2022       POC Tests: No results for input(s): POCGLU, POCNA, POCK, POCCL, POCBUN, POCHEMO, POCHCT in the last 72 hours.     Coags: No results found for: PROTIME, INR, APTT    HCG (If Applicable): No results found for: PREGTESTUR, PREGSERUM, HCG, HCGQUANT     ABGs: No results found for: PHART, PO2ART, OSA8GWG, LCV3JPO, BEART, V5XMJNEM     Type & Screen (If Applicable):  No results found for: LABABO, LABRH    Drug/Infectious Status (If Applicable):  No results found for: HIV, HEPCAB    COVID-19 Screening (If Applicable):   Lab Results   Component Value Date    COVID19 Not Detected 02/10/2022    COVID19 Performed 02/10/2022 Anesthesia Evaluation  Patient summary reviewed and Nursing notes reviewed no history of anesthetic complications:   Airway: Mallampati: II  TM distance: >3 FB   Neck ROM: full  Mouth opening: > = 3 FB   Dental:          Pulmonary:normal exam  breath sounds clear to auscultation                             Cardiovascular:  Exercise tolerance: good (>4 METS),   (+) hypertension: mild,         Rhythm: regular  Rate: normal                    Neuro/Psych:   (+) neuromuscular disease (Fibromyalgia):, headaches: migraine headaches, depression/anxiety             GI/Hepatic/Renal:   (+) GERD:, PUD, liver disease (Non-alcoholic fatty liver):,          ROS comment: Esophageal stricture    Sphincter of Oddi dysfunction, h/o pancreatitis    H/o C diff. Endo/Other:    (+) DiabetesType II DM, using insulin, blood dyscrasia (JORGE LUIS): anemia:., .                 Abdominal:             Vascular: Other Findings:           Anesthesia Plan      TIVA     ASA 3       Induction: intravenous. MIPS: Prophylactic antiemetics administered. Anesthetic plan and risks discussed with patient.                         Jennifer Boyd MD   6/8/2022

## 2022-06-08 NOTE — H&P
History and Physical for Procedure             Date: 6/8/2022     History of Present Illness:  Patient presents to undergo EGD.        Past Medical History:   Diagnosis Date    Anemia     blood transfusion 2013 X1 d/t \"perforated bowel\"-- Fe infusions 12/2017--- denies any current iron infusions 12/17/2019    Anxiety and depression     managed with meds    C. difficile diarrhea 2013    in 5898 after complicated ERCP    Cervical dysplasia 1990s    Chronic insomnia     ambien     Chronic nausea     Chronic pain     all over     Chronic pancreatitis ([facility] [address]     COVID-19 vaccine series completed 04/21/2021    3/29/2021 Pfizer     Dehydration     IVFs through port as needed for this     Encounter for insertion of venous access port     Left chest port    Esophageal stricture 2017    Fibromyalgia     managed with meds    Former cigarette smoker     GERD (gastroesophageal reflux disease)     controlled with med    History of kidney stones 03/2021    X1-naturally pass    HLD (hyperlipidemia)     pt denies     IBS (irritable bowel syndrome)     Migraine headache     does not have often but did have one recently; just takes tylenol    Nausea & vomiting     pt reports she does better with phenergan than zofran - causes HA    Nonalcoholic fatty liver disease     PUD (peptic ulcer disease) 06/2017    still having issues takes meds     Sinus tachycardia     per pt-- no tx needed    Sphincter of Oddi dysfunction     Type 2 diabetes mellitus ([address]     type 2-- sqbs am avg 150--- Hypo symptoms at <100, Insulin dependent; last A1C was 11/3/2020 = 9.0     Past Surgical History:   Procedure Laterality Date    CARPAL TUNNEL RELEASE Right     along with trigger finger    CHOLECYSTECTOMY, LAPAROSCOPIC  1997    COLONOSCOPY      COLONOSCOPY N/A 4/4/2018    COLON[facility] Franc[facility]    ERCP  3/5/2013    resulted in perforated duodenum    HYSTERECTOMY  1998    ovaries remain    IR DRAIN SKIN ABSCESS      IR PORT PLACEMENT EQUAL OR GREATER THAN 5 YEARS  2018    IR PORT PLACEMENT EQUAL OR GREATER THAN 5 YEARS 2018 [facility]    LAPAROTOMY  3/5/2013    exploratory for duodenal perforation with ERCP    ORTHOPEDIC SURGERY      right finger surgery 2017    OTHER SURGICAL HISTORY Bilateral     thumb    OTHER SURGICAL HISTORY      Kidney stones 2021    CA ABDOMEN SURGERY PROC UNLISTED      explor lap    CA ABDOMEN SURGERY PROC UNLISTED      lap nissen    CA ABDOMEN SURGERY PROC UNLISTED  last one placed      Hx of pancreatic stent, multiple    TOTAL COLECTOMY      UPPER GASTROINTESTINAL ENDOSCOPY  2021         UPPER GASTROINTESTINAL ENDOSCOPY      36 fr tony    UPPER GASTROINTESTINAL ENDOSCOPY  2019         UROLOGICAL SURGERY  13 at [facility]-- stent removed 13.   Dr Sujatha Pena  2014    port insertion, left chest wall     In and  Family History   Problem Relation Age of Onset    No Known Problems Sister     Coronary Art Dis Father 76    Stroke Father     Hypertension Father     Diabetes Father     Heart Disease Mother         cabg began in her 52's    Hypertension Mother     Diabetes Mother     Other Father      Social History     Tobacco Use    Smoking status: Former Smoker     Packs/day: 1.00     Quit date: 1993     Years since quittin.1    Smokeless tobacco: Never Used   Substance Use Topics    Alcohol use: No        Allergies   Allergen Reactions    Adhesive Tape Itching, Other (See Comments) and Rash     blisters  tegaderm  Paper tape too      Metronidazole Diarrhea and Nausea And Vomiting    Atorvastatin Myalgia    Iodine Other (See Comments)     Sweaty and clammy    Statins Myalgia    Barium Iodide Nausea And Vomiting     Diaphoresis      Barium Sulfate Palpitations    Insulin Lispro Nausea And Vomiting    Insulins Nausea And Vomiting     Humalog only   

## 2022-06-08 NOTE — OP NOTE
Operative Note              Procedure:  Esophagogastroduodenoscopy    Date of Procedure:  6/8/2022    Patient:  Miles Bravo     1970    Indication:  Dysphagia, history of esophageal stricture       Sedation:  MAC      Pre-Procedure Physical Exam:      Mental status:  alert and oriented   Airway:  normal oropharyngeal airway and neck mobility   CV:  regular rate and rhythm   Respiratory:  clear to auscultation      Procedure:   A History and Physical has been performed, and patient medication allergies have been reviewed. Risks of perforation, hemorrhage, adverse drug reaction, and aspiration were discussed. Informed consent was obtained for the procedure, including sedation. The patient was placed in the left lateral decubitus position. The heart rate, oxygen saturations, blood pressure, and response to care were monitored throughout the procedure. The gastroscope was passed through the mouth and advanced under direct vision to the second portion of the duodenum. A careful inspection was made as the gastroscope was withdrawn, including a retroflexed view of the proximal stomach. The patient tolerated  the procedure well. Findings:       OROPHARYNX: Cords and pyriform recesses appear normal.    ESOPHAGUS: A benign-appearing intrinsic stenosis is seen in the distal esophagus. Dilation was performed using a CRE balloon from 18 mm. Successful dilation was confirmed by the presence of mucosal disruption. STOMACH: On retroflexion, the flap-valve is classified as Hill Grade I, with a normal, prominent tissue fold at the lesser curvature closely approximated to the endoscope. There is an intact Nissen fundoplication. A large amount of retained food and vegetable debris was seen in the gastric body along the greater curvature. The gastrojejunal anastomosis appears  normal. The examined portion of the jejunum is unremarkable.       Specimen:  No       Estimated Blood Loss:  Minimal

## 2022-06-08 NOTE — ANESTHESIA POSTPROCEDURE EVALUATION
Department of Anesthesiology  Postprocedure Note    Patient: Gonzalez Castellano  MRN: [id number]  YOB: 1970  Date of evaluation: 6/8/2022  Time:  7:50 AM     Procedure Summary     Date: 06/08/22 Room / Location: [facility] ENDO 03 / [facility] ENDOSCOPY    Anesthesia Start: 0710 Anesthesia Stop: 1086    Procedure: EGD ESOPHAGOGASTRODUODENOSCOPY DILATATION/ 34 (N/A ) Diagnosis:       Stricture of esophagus      (K222)    Surgeons: Morgan Muro MD Responsible Provider: Erasmo Malagon MD    Anesthesia Type: TIVA ASA Status: 3          Anesthesia Type: TIVA    Karishma Phase I: Karishma Score: 10    Karishma Phase II: Karishma Score: 9    Last vitals: Reviewed and per EMR flowsheets.        Anesthesia Post Evaluation    Patient location during evaluation: PACU  Patient participation: complete - patient participated  Level of consciousness: awake and alert  Airway patency: patent  Nausea & Vomiting: no nausea and no vomiting  Complications: no  Cardiovascular status: hemodynamically stable  Respiratory status: acceptable, nonlabored ventilation and spontaneous ventilation  Hydration status: euvolemic  Comments: BP (!) 144/69   Pulse 98   Temp 96.8 °F (36 °C) (Skin) Comment: GI RR  Resp 18   Ht 5' 2\" (1.575 m)   Wt 195 lb (88.5 kg)   SpO2 94%   BMI 35.67 kg/m²     Multimodal analgesia pain management approach

## 2022-06-08 NOTE — FLOWSHEET NOTE
Portacath flushed with 5ml heparin lock flush solution.  Valero needle left in place with alcohol cap & short tubing attached per patient request.

## 2022-06-09 ENCOUNTER — HOSPITAL ENCOUNTER (OUTPATIENT)
Dept: INFUSION THERAPY | Age: 52
Discharge: HOME | End: 2022-06-09
Payer: MEDICARE

## 2022-06-09 VITALS
HEART RATE: 97 BPM | RESPIRATION RATE: 18 BRPM | TEMPERATURE: 99.1 F | DIASTOLIC BLOOD PRESSURE: 78 MMHG | SYSTOLIC BLOOD PRESSURE: 150 MMHG

## 2022-06-09 LAB — MAGNESIUM SERPL-MCNC: 1.9 MG/DL (ref 1.8–2.4)

## 2022-06-09 PROCEDURE — 83735 ASSAY OF MAGNESIUM: CPT

## 2022-06-09 PROCEDURE — 6360000002 HC RX W HCPCS: Performed by: INTERNAL MEDICINE

## 2022-06-09 PROCEDURE — 96365 THER/PROPH/DIAG IV INF INIT: CPT

## 2022-06-09 PROCEDURE — 2580000003 HC RX 258: Performed by: INTERNAL MEDICINE

## 2022-06-09 RX ORDER — SODIUM CHLORIDE 0.9 % (FLUSH) 0.9 %
5-40 SYRINGE (ML) INJECTION PRN
Status: DISCONTINUED | OUTPATIENT
Start: 2022-06-09 | End: 2022-06-10 | Stop reason: HOSPADM

## 2022-06-09 RX ADMIN — SODIUM CHLORIDE, PRESERVATIVE FREE 10 ML: 5 INJECTION INTRAVENOUS at 07:55

## 2022-06-09 RX ADMIN — PROMETHAZINE HYDROCHLORIDE 25 MG: 25 INJECTION INTRAMUSCULAR; INTRAVENOUS at 07:38

## 2022-06-09 RX ADMIN — SODIUM CHLORIDE, PRESERVATIVE FREE 10 ML: 5 INJECTION INTRAVENOUS at 07:35

## 2022-06-09 NOTE — PROGRESS NOTES
Pt arrived ambulatory, lab drawn, phenergan IVPB infused, port needle changed, pt discharged home ambulatory, aware of appointment on 6/16 at [facility]

## 2022-06-16 ENCOUNTER — HOSPITAL ENCOUNTER (OUTPATIENT)
Dept: INFUSION THERAPY | Age: 52
Discharge: HOME | End: 2022-06-16
Payer: MEDICARE

## 2022-06-16 VITALS
SYSTOLIC BLOOD PRESSURE: 113 MMHG | RESPIRATION RATE: 18 BRPM | HEART RATE: 98 BPM | TEMPERATURE: 98 F | DIASTOLIC BLOOD PRESSURE: 76 MMHG

## 2022-06-16 LAB — MAGNESIUM SERPL-MCNC: 1.6 MG/DL (ref 1.8–2.4)

## 2022-06-16 PROCEDURE — 6360000002 HC RX W HCPCS: Performed by: PHYSICAL MEDICINE & REHABILITATION

## 2022-06-16 PROCEDURE — 96365 THER/PROPH/DIAG IV INF INIT: CPT

## 2022-06-16 PROCEDURE — 96375 TX/PRO/DX INJ NEW DRUG ADDON: CPT

## 2022-06-16 PROCEDURE — 2580000003 HC RX 258: Performed by: PHYSICAL MEDICINE & REHABILITATION

## 2022-06-16 PROCEDURE — 83735 ASSAY OF MAGNESIUM: CPT

## 2022-06-16 RX ORDER — SODIUM CHLORIDE 0.9 % (FLUSH) 0.9 %
5-40 SYRINGE (ML) INJECTION 2 TIMES DAILY
Status: DISCONTINUED | OUTPATIENT
Start: 2022-06-16 | End: 2022-06-17 | Stop reason: HOSPADM

## 2022-06-16 RX ORDER — MAGNESIUM SULFATE 1 G/100ML
1000 INJECTION INTRAVENOUS ONCE
Status: COMPLETED | OUTPATIENT
Start: 2022-06-16 | End: 2022-06-16

## 2022-06-16 RX ADMIN — Medication 10 ML: at 09:08

## 2022-06-16 RX ADMIN — MAGNESIUM SULFATE HEPTAHYDRATE 1000 MG: 1 INJECTION, SOLUTION INTRAVENOUS at 08:31

## 2022-06-16 RX ADMIN — SODIUM CHLORIDE 25 MG: 900 INJECTION, SOLUTION INTRAVENOUS at 07:37

## 2022-06-16 NOTE — PROGRESS NOTES
Arrived to the [facility] ambulatory. Lab via port, 25mg promethazine and 1gm magnesium completed. Patient tolerated well. Any issues or concerns during appointment: no.  Patient aware of next infusion appointment on 6/23 at [address] [place] [address]  Patient instructed to call provider with temperature of 100.4 or greater or nausea/vomiting/ diarrhea or pain not controlled by medications  Discharged to home ambulatory.

## 2022-06-21 ENCOUNTER — HOSPITAL ENCOUNTER (OUTPATIENT)
Dept: NUCLEAR MEDICINE | Age: 52
Discharge: HOME | End: 2022-06-24

## 2022-06-21 DIAGNOSIS — T18.2XXA FOREIGN BODY IN STOMACH, INITIAL ENCOUNTER: ICD-10-CM

## 2022-06-23 ENCOUNTER — HOSPITAL ENCOUNTER (OUTPATIENT)
Dept: INFUSION THERAPY | Age: 52
Discharge: HOME | End: 2022-06-23
Payer: MEDICARE

## 2022-06-23 VITALS
SYSTOLIC BLOOD PRESSURE: 120 MMHG | HEART RATE: 97 BPM | RESPIRATION RATE: 18 BRPM | TEMPERATURE: 98 F | DIASTOLIC BLOOD PRESSURE: 75 MMHG

## 2022-06-23 LAB
ALBUMIN SERPL-MCNC: 3.3 G/DL (ref 3.5–5)
ALBUMIN/GLOB SERPL: 0.8 {RATIO} (ref 1.2–3.5)
ALP SERPL-CCNC: 161 U/L (ref 50–136)
ALT SERPL-CCNC: 47 U/L (ref 12–65)
ANION GAP SERPL CALC-SCNC: 8 MMOL/L (ref 7–16)
APPEARANCE UR: CLEAR
AST SERPL-CCNC: 40 U/L (ref 15–37)
BASOPHILS # BLD: 0 K/UL (ref 0–0.2)
BASOPHILS NFR BLD: 0 % (ref 0–2)
BILIRUB DIRECT SERPL-MCNC: 0.1 MG/DL
BILIRUB SERPL-MCNC: 0.4 MG/DL (ref 0.2–1.1)
BILIRUB UR QL: NEGATIVE
BUN SERPL-MCNC: 7 MG/DL (ref 6–23)
CALCIUM SERPL-MCNC: 8.8 MG/DL (ref 8.3–10.4)
CHLORIDE SERPL-SCNC: 100 MMOL/L (ref 98–107)
CHOLEST SERPL-MCNC: 203 MG/DL
CO2 SERPL-SCNC: 25 MMOL/L (ref 21–32)
COLOR UR: YELLOW
CREAT SERPL-MCNC: 0.8 MG/DL (ref 0.6–1)
DIFFERENTIAL METHOD BLD: ABNORMAL
EOSINOPHIL # BLD: 0.2 K/UL (ref 0–0.8)
EOSINOPHIL NFR BLD: 4 % (ref 0.5–7.8)
ERYTHROCYTE [DISTWIDTH] IN BLOOD BY AUTOMATED COUNT: 16.7 % (ref 11.9–14.6)
GLOBULIN SER CALC-MCNC: 4.4 G/DL (ref 2.3–3.5)
GLUCOSE SERPL-MCNC: 230 MG/DL (ref 65–100)
GLUCOSE UR STRIP.AUTO-MCNC: 500 MG/DL
HCT VFR BLD AUTO: 35 % (ref 35.8–46.3)
HDLC SERPL-MCNC: 39 MG/DL (ref 40–60)
HDLC SERPL: 5.2 {RATIO}
HGB BLD-MCNC: 10.7 G/DL (ref 11.7–15.4)
HGB UR QL STRIP: NEGATIVE
IMM GRANULOCYTES # BLD AUTO: 0 K/UL (ref 0–0.5)
IMM GRANULOCYTES NFR BLD AUTO: 0 % (ref 0–5)
KETONES UR QL STRIP.AUTO: NEGATIVE MG/DL
LDLC SERPL CALC-MCNC: 118.4 MG/DL
LEUKOCYTE ESTERASE UR QL STRIP.AUTO: NEGATIVE
LYMPHOCYTES # BLD: 1.5 K/UL (ref 0.5–4.6)
LYMPHOCYTES NFR BLD: 32 % (ref 13–44)
MAGNESIUM SERPL-MCNC: 1.8 MG/DL (ref 1.8–2.4)
MCH RBC QN AUTO: 22.5 PG (ref 26.1–32.9)
MCHC RBC AUTO-ENTMCNC: 30.6 G/DL (ref 31.4–35)
MCV RBC AUTO: 73.5 FL (ref 79.6–97.8)
MONOCYTES # BLD: 0.6 K/UL (ref 0.1–1.3)
MONOCYTES NFR BLD: 14 % (ref 4–12)
NEUTS SEG # BLD: 2.4 K/UL (ref 1.7–8.2)
NEUTS SEG NFR BLD: 50 % (ref 43–78)
NITRITE UR QL STRIP.AUTO: NEGATIVE
NRBC # BLD: 0 K/UL (ref 0–0.2)
PH UR STRIP: 6 [PH] (ref 5–9)
PLATELET # BLD AUTO: 188 K/UL (ref 150–450)
PMV BLD AUTO: 10.9 FL (ref 9.4–12.3)
POTASSIUM SERPL-SCNC: 4.4 MMOL/L (ref 3.5–5.1)
PROT SERPL-MCNC: 7.7 G/DL (ref 6.3–8.2)
PROT UR STRIP-MCNC: NEGATIVE MG/DL
RBC # BLD AUTO: 4.76 M/UL (ref 4.05–5.2)
SODIUM SERPL-SCNC: 133 MMOL/L (ref 136–145)
SP GR UR REFRACTOMETRY: 1.01 (ref 1–1.02)
TRIGL SERPL-MCNC: 228 MG/DL (ref 35–150)
TSH, 3RD GENERATION: 1.87 UIU/ML (ref 0.36–3)
UROBILINOGEN UR QL STRIP.AUTO: 0.2 EU/DL (ref 0.2–1)
VLDLC SERPL CALC-MCNC: 45.6 MG/DL (ref 6–23)
WBC # BLD AUTO: 4.7 K/UL (ref 4.3–11.1)

## 2022-06-23 PROCEDURE — 84443 ASSAY THYROID STIM HORMONE: CPT

## 2022-06-23 PROCEDURE — 85025 COMPLETE CBC W/AUTO DIFF WBC: CPT

## 2022-06-23 PROCEDURE — 80061 LIPID PANEL: CPT

## 2022-06-23 PROCEDURE — 96374 THER/PROPH/DIAG INJ IV PUSH: CPT

## 2022-06-23 PROCEDURE — 83036 HEMOGLOBIN GLYCOSYLATED A1C: CPT

## 2022-06-23 PROCEDURE — 2580000003 HC RX 258: Performed by: INTERNAL MEDICINE

## 2022-06-23 PROCEDURE — 80076 HEPATIC FUNCTION PANEL: CPT

## 2022-06-23 PROCEDURE — 84681 ASSAY OF C-PEPTIDE: CPT

## 2022-06-23 PROCEDURE — 83735 ASSAY OF MAGNESIUM: CPT

## 2022-06-23 PROCEDURE — 81003 URINALYSIS AUTO W/O SCOPE: CPT

## 2022-06-23 PROCEDURE — 6360000002 HC RX W HCPCS: Performed by: INTERNAL MEDICINE

## 2022-06-23 PROCEDURE — 80048 BASIC METABOLIC PNL TOTAL CA: CPT

## 2022-06-23 RX ADMIN — PROMETHAZINE HYDROCHLORIDE 25 MG: 25 INJECTION INTRAMUSCULAR; INTRAVENOUS at 08:06

## 2022-06-23 NOTE — PROGRESS NOTES
Arrived to the [facility]. Assessment completed, labs drawn/reviewed. No magnesium replacement needed, Phenergan IVPB completed. Patient tolerated well. Any issues or concerns during appointment: No.  Patient aware of next infusion appointment on 6/30/22 @0715. Patient instructed to call provider with temperature of 100.4 or greater or nausea/vomiting/ diarrhea or pain not controlled by medications  Discharged ambulatory.

## 2022-06-24 LAB
C PEPTIDE SERPL-MCNC: 1.9 NG/ML (ref 1.1–4.4)
EST. AVERAGE GLUCOSE BLD GHB EST-MCNC: 217 MG/DL
HBA1C MFR BLD: 9.2 % (ref 4.2–6.3)

## 2022-06-30 ENCOUNTER — HOSPITAL ENCOUNTER (OUTPATIENT)
Dept: INFUSION THERAPY | Age: 52
Discharge: HOME | End: 2022-06-30
Payer: MEDICARE

## 2022-06-30 VITALS
OXYGEN SATURATION: 95 % | HEART RATE: 112 BPM | TEMPERATURE: 98.4 F | RESPIRATION RATE: 18 BRPM | DIASTOLIC BLOOD PRESSURE: 70 MMHG | SYSTOLIC BLOOD PRESSURE: 157 MMHG

## 2022-06-30 LAB — MAGNESIUM SERPL-MCNC: 1.6 MG/DL (ref 1.8–2.4)

## 2022-06-30 PROCEDURE — 6360000002 HC RX W HCPCS: Performed by: INTERNAL MEDICINE

## 2022-06-30 PROCEDURE — 96365 THER/PROPH/DIAG IV INF INIT: CPT

## 2022-06-30 PROCEDURE — 2580000003 HC RX 258: Performed by: INTERNAL MEDICINE

## 2022-06-30 PROCEDURE — 2580000003 HC RX 258: Performed by: PHYSICAL MEDICINE & REHABILITATION

## 2022-06-30 PROCEDURE — 6360000002 HC RX W HCPCS: Performed by: PHYSICAL MEDICINE & REHABILITATION

## 2022-06-30 PROCEDURE — 36593 DECLOT VASCULAR DEVICE: CPT

## 2022-06-30 PROCEDURE — 96375 TX/PRO/DX INJ NEW DRUG ADDON: CPT

## 2022-06-30 PROCEDURE — 83735 ASSAY OF MAGNESIUM: CPT

## 2022-06-30 RX ORDER — MAGNESIUM SULFATE 1 G/100ML
1000 INJECTION INTRAVENOUS ONCE
Status: COMPLETED | OUTPATIENT
Start: 2022-06-30 | End: 2022-06-30

## 2022-06-30 RX ORDER — SODIUM CHLORIDE 0.9 % (FLUSH) 0.9 %
5-40 SYRINGE (ML) INJECTION PRN
Status: ACTIVE | OUTPATIENT
Start: 2022-06-30 | End: 2022-06-30

## 2022-06-30 RX ADMIN — WATER 2 MG: 1 INJECTION INTRAMUSCULAR; INTRAVENOUS; SUBCUTANEOUS at 08:20

## 2022-06-30 RX ADMIN — MAGNESIUM SULFATE HEPTAHYDRATE 1000 MG: 1 INJECTION, SOLUTION INTRAVENOUS at 09:20

## 2022-06-30 RX ADMIN — PROMETHAZINE HYDROCHLORIDE 25 MG: 25 INJECTION INTRAMUSCULAR; INTRAVENOUS at 08:01

## 2022-06-30 NOTE — PROGRESS NOTES
Arrived to the [facility]. Assessment completed, labs drawn/reviewed. Phernergan, CathFlo, & 1g magnesium (mag 1.6) completed. Patient tolerated well. Any issues or concerns during appointment: No.  Patient aware of next infusion appointment on 7/07/22 @8905. Patient instructed to call provider with temperature of 100.4 or greater or nausea/vomiting/ diarrhea or pain not controlled by medications  Discharged ambulatory.

## 2022-07-07 ENCOUNTER — HOSPITAL ENCOUNTER (OUTPATIENT)
Dept: INFUSION THERAPY | Age: 52
Discharge: HOME | End: 2022-07-07
Payer: MEDICARE

## 2022-07-07 VITALS
RESPIRATION RATE: 18 BRPM | OXYGEN SATURATION: 95 % | SYSTOLIC BLOOD PRESSURE: 137 MMHG | TEMPERATURE: 98.1 F | DIASTOLIC BLOOD PRESSURE: 80 MMHG | HEART RATE: 84 BPM

## 2022-07-07 LAB — MAGNESIUM SERPL-MCNC: 1.9 MG/DL (ref 1.8–2.4)

## 2022-07-07 PROCEDURE — 83735 ASSAY OF MAGNESIUM: CPT

## 2022-07-07 PROCEDURE — 2580000003 HC RX 258: Performed by: INTERNAL MEDICINE

## 2022-07-07 PROCEDURE — 96374 THER/PROPH/DIAG INJ IV PUSH: CPT

## 2022-07-07 PROCEDURE — 6360000002 HC RX W HCPCS: Performed by: INTERNAL MEDICINE

## 2022-07-07 PROCEDURE — 2580000003 HC RX 258: Performed by: PHYSICAL MEDICINE & REHABILITATION

## 2022-07-07 RX ORDER — SODIUM CHLORIDE 0.9 % (FLUSH) 0.9 %
5-40 SYRINGE (ML) INJECTION PRN
Status: ACTIVE | OUTPATIENT
Start: 2022-07-07 | End: 2022-07-07

## 2022-07-07 RX ORDER — MAGNESIUM SULFATE 1 G/100ML
1000 INJECTION INTRAVENOUS ONCE
Status: CANCELLED | OUTPATIENT
Start: 2022-07-07 | End: 2022-07-07

## 2022-07-07 RX ADMIN — SODIUM CHLORIDE, PRESERVATIVE FREE 10 ML: 5 INJECTION INTRAVENOUS at 07:50

## 2022-07-07 RX ADMIN — SODIUM CHLORIDE, PRESERVATIVE FREE 10 ML: 5 INJECTION INTRAVENOUS at 08:15

## 2022-07-07 RX ADMIN — SODIUM CHLORIDE 25 MG: 900 INJECTION, SOLUTION INTRAVENOUS at 07:55

## 2022-07-07 NOTE — PROGRESS NOTES
Arrived to the [facility]. Mg level 1.9 - no Mg infusion required. Patient tolerated well. Any issues or concerns during appointment: pt requested cathflo but port giving excellent blood return. Cathflo held. Patient aware of next infusion appointment on 7/14/22 at [address] [place] [address]. Patient instructed to call provider with temperature of 100.4 or greater or nausea/vomiting/ diarrhea or pain not controlled by medications  Discharged amb.

## 2022-07-15 ENCOUNTER — APPOINTMENT (OUTPATIENT)
Dept: CT IMAGING | Age: 52
End: 2022-07-15
Payer: MEDICARE

## 2022-07-15 ENCOUNTER — HOSPITAL ENCOUNTER (EMERGENCY)
Age: 52
Discharge: HOME | End: 2022-07-15
Attending: EMERGENCY MEDICINE
Payer: MEDICARE

## 2022-07-15 VITALS
SYSTOLIC BLOOD PRESSURE: 155 MMHG | WEIGHT: 195 LBS | HEART RATE: 100 BPM | DIASTOLIC BLOOD PRESSURE: 86 MMHG | HEIGHT: 62 IN | BODY MASS INDEX: 35.88 KG/M2 | OXYGEN SATURATION: 98 % | TEMPERATURE: 97.9 F | RESPIRATION RATE: 18 BRPM

## 2022-07-15 DIAGNOSIS — R19.7 DIARRHEA, UNSPECIFIED TYPE: ICD-10-CM

## 2022-07-15 DIAGNOSIS — R11.0 NAUSEA: Primary | ICD-10-CM

## 2022-07-15 LAB
ALBUMIN SERPL-MCNC: 4.2 G/DL (ref 3.5–5)
ALBUMIN/GLOB SERPL: 0.9 {RATIO} (ref 1.2–3.5)
ALP SERPL-CCNC: 95 U/L (ref 50–136)
ALT SERPL-CCNC: 46 U/L (ref 12–65)
ANION GAP SERPL CALC-SCNC: 7 MMOL/L (ref 7–16)
AST SERPL-CCNC: 41 U/L (ref 15–37)
BASOPHILS # BLD: 0 K/UL (ref 0–0.2)
BASOPHILS NFR BLD: 0 % (ref 0–2)
BILIRUB SERPL-MCNC: 1.1 MG/DL (ref 0.2–1.1)
BILIRUB UR QL: NEGATIVE
BUN SERPL-MCNC: 10 MG/DL (ref 6–23)
CALCIUM SERPL-MCNC: 9.4 MG/DL (ref 8.3–10.4)
CHLORIDE SERPL-SCNC: 109 MMOL/L (ref 98–107)
CO2 SERPL-SCNC: 24 MMOL/L (ref 21–32)
CREAT SERPL-MCNC: 0.9 MG/DL (ref 0.6–1)
DIFFERENTIAL METHOD BLD: ABNORMAL
EOSINOPHIL # BLD: 0 K/UL (ref 0–0.8)
EOSINOPHIL NFR BLD: 0 % (ref 0.5–7.8)
ERYTHROCYTE [DISTWIDTH] IN BLOOD BY AUTOMATED COUNT: 17.8 % (ref 11.9–14.6)
GLOBULIN SER CALC-MCNC: 4.8 G/DL (ref 2.3–3.5)
GLUCOSE SERPL-MCNC: 275 MG/DL (ref 65–100)
GLUCOSE UR QL STRIP.AUTO: >1000 MG/DL
HCT VFR BLD AUTO: 34.2 % (ref 35.8–46.3)
HGB BLD-MCNC: 10.5 G/DL (ref 11.7–15.4)
IMM GRANULOCYTES # BLD AUTO: 0.1 K/UL (ref 0–0.5)
IMM GRANULOCYTES NFR BLD AUTO: 1 % (ref 0–5)
KETONES UR-MCNC: 80 MG/DL
LEUKOCYTE ESTERASE UR QL STRIP: NEGATIVE
LIPASE SERPL-CCNC: 64 U/L (ref 73–393)
LYMPHOCYTES # BLD: 1 K/UL (ref 0.5–4.6)
LYMPHOCYTES NFR BLD: 13 % (ref 13–44)
MAGNESIUM SERPL-MCNC: 1.8 MG/DL (ref 1.8–2.4)
MCH RBC QN AUTO: 21.8 PG (ref 26.1–32.9)
MCHC RBC AUTO-ENTMCNC: 30.7 G/DL (ref 31.4–35)
MCV RBC AUTO: 71 FL (ref 79.6–97.8)
MONOCYTES # BLD: 0.7 K/UL (ref 0.1–1.3)
MONOCYTES NFR BLD: 9 % (ref 4–12)
NEUTS SEG # BLD: 6.1 K/UL (ref 1.7–8.2)
NEUTS SEG NFR BLD: 77 % (ref 43–78)
NITRITE UR QL: NEGATIVE
NRBC # BLD: 0 K/UL (ref 0–0.2)
PH UR: 6.5 [PH] (ref 5–9)
PLATELET # BLD AUTO: 208 K/UL (ref 150–450)
PMV BLD AUTO: 10.5 FL (ref 9.4–12.3)
POTASSIUM SERPL-SCNC: 3.7 MMOL/L (ref 3.5–5.1)
PROT SERPL-MCNC: 9 G/DL (ref 6.3–8.2)
PROT UR QL: ABNORMAL MG/DL
RBC # BLD AUTO: 4.82 M/UL (ref 4.05–5.2)
RBC # UR STRIP: ABNORMAL /UL
SERVICE CMNT-IMP: ABNORMAL
SODIUM SERPL-SCNC: 140 MMOL/L (ref 136–145)
SP GR UR: 1.01 (ref 1–1.02)
UROBILINOGEN UR QL: 0.2 EU/DL (ref 0.2–1)
WBC # BLD AUTO: 7.9 K/UL (ref 4.3–11.1)

## 2022-07-15 PROCEDURE — 96375 TX/PRO/DX INJ NEW DRUG ADDON: CPT

## 2022-07-15 PROCEDURE — 80053 COMPREHEN METABOLIC PANEL: CPT

## 2022-07-15 PROCEDURE — 74177 CT ABD & PELVIS W/CONTRAST: CPT

## 2022-07-15 PROCEDURE — 2580000003 HC RX 258: Performed by: EMERGENCY MEDICINE

## 2022-07-15 PROCEDURE — 83690 ASSAY OF LIPASE: CPT

## 2022-07-15 PROCEDURE — 99285 EMERGENCY DEPT VISIT HI MDM: CPT

## 2022-07-15 PROCEDURE — 81003 URINALYSIS AUTO W/O SCOPE: CPT

## 2022-07-15 PROCEDURE — 83735 ASSAY OF MAGNESIUM: CPT

## 2022-07-15 PROCEDURE — 96361 HYDRATE IV INFUSION ADD-ON: CPT

## 2022-07-15 PROCEDURE — 6360000002 HC RX W HCPCS: Performed by: EMERGENCY MEDICINE

## 2022-07-15 PROCEDURE — 6360000004 HC RX CONTRAST MEDICATION: Performed by: EMERGENCY MEDICINE

## 2022-07-15 PROCEDURE — 85025 COMPLETE CBC W/AUTO DIFF WBC: CPT

## 2022-07-15 PROCEDURE — 96374 THER/PROPH/DIAG INJ IV PUSH: CPT

## 2022-07-15 RX ORDER — MORPHINE SULFATE 4 MG/ML
4 INJECTION INTRAVENOUS ONCE
Status: COMPLETED | OUTPATIENT
Start: 2022-07-15 | End: 2022-07-15

## 2022-07-15 RX ORDER — SODIUM CHLORIDE, SODIUM LACTATE, POTASSIUM CHLORIDE, AND CALCIUM CHLORIDE .6; .31; .03; .02 G/100ML; G/100ML; G/100ML; G/100ML
1000 INJECTION, SOLUTION INTRAVENOUS ONCE
Status: COMPLETED | OUTPATIENT
Start: 2022-07-15 | End: 2022-07-15

## 2022-07-15 RX ORDER — DROPERIDOL 2.5 MG/ML
5 INJECTION, SOLUTION INTRAMUSCULAR; INTRAVENOUS ONCE
Status: COMPLETED | OUTPATIENT
Start: 2022-07-15 | End: 2022-07-15

## 2022-07-15 RX ORDER — ONDANSETRON 2 MG/ML
8 INJECTION INTRAMUSCULAR; INTRAVENOUS ONCE
Status: COMPLETED | OUTPATIENT
Start: 2022-07-15 | End: 2022-07-15

## 2022-07-15 RX ORDER — DIPHENHYDRAMINE HYDROCHLORIDE 50 MG/ML
25 INJECTION INTRAMUSCULAR; INTRAVENOUS
Status: COMPLETED | OUTPATIENT
Start: 2022-07-15 | End: 2022-07-15

## 2022-07-15 RX ORDER — ONDANSETRON 2 MG/ML
48 INJECTION INTRAMUSCULAR; INTRAVENOUS ONCE
Status: DISCONTINUED | OUTPATIENT
Start: 2022-07-15 | End: 2022-07-15

## 2022-07-15 RX ADMIN — MORPHINE SULFATE 4 MG: 4 INJECTION INTRAVENOUS at 14:39

## 2022-07-15 RX ADMIN — DIPHENHYDRAMINE HYDROCHLORIDE 25 MG: 50 INJECTION, SOLUTION INTRAMUSCULAR; INTRAVENOUS at 12:40

## 2022-07-15 RX ADMIN — IOPAMIDOL 100 ML: 755 INJECTION, SOLUTION INTRAVENOUS at 15:11

## 2022-07-15 RX ADMIN — SODIUM CHLORIDE, POTASSIUM CHLORIDE, SODIUM LACTATE AND CALCIUM CHLORIDE 1000 ML: 600; 310; 30; 20 INJECTION, SOLUTION INTRAVENOUS at 12:41

## 2022-07-15 RX ADMIN — ONDANSETRON 8 MG: 2 INJECTION INTRAMUSCULAR; INTRAVENOUS at 14:46

## 2022-07-15 RX ADMIN — DROPERIDOL 5 MG: 2.5 INJECTION, SOLUTION INTRAMUSCULAR; INTRAVENOUS at 12:38

## 2022-07-15 ASSESSMENT — ENCOUNTER SYMPTOMS
SORE THROAT: 0
FACIAL SWELLING: 0
TROUBLE SWALLOWING: 0
DIARRHEA: 1
COUGH: 0
SHORTNESS OF BREATH: 0
NAUSEA: 1
CHEST TIGHTNESS: 0
VOMITING: 0
VOICE CHANGE: 0
BACK PAIN: 0
EYE PAIN: 0
ABDOMINAL PAIN: 0

## 2022-07-15 ASSESSMENT — PAIN SCALES - GENERAL
PAINLEVEL_OUTOF10: 8
PAINLEVEL_OUTOF10: 9

## 2022-07-15 ASSESSMENT — LIFESTYLE VARIABLES: HOW OFTEN DO YOU HAVE A DRINK CONTAINING ALCOHOL: NEVER

## 2022-07-15 ASSESSMENT — PAIN DESCRIPTION - LOCATION: LOCATION: ABDOMEN

## 2022-07-15 NOTE — DISCHARGE INSTRUCTIONS
With your primary care doctor in 2 days. Please return the emergency department for any worsening symptoms. Diagnosed with nausea and diarrhea here. Your lab work and imaging here showed no evidence of any acute emergencies. Please use your nausea medication at home.

## 2022-07-15 NOTE — ED NOTES
I have reviewed discharge instructions with the patient. The patient verbalized understanding. Patient left ED via Discharge Method: ambulatory to Home with (Spouse). Opportunity for questions and clarification provided. Patient given 0 scripts. To continue your aftercare when you leave the hospital, you may receive an automated call from our care team to check in on how you are doing. This is a free service and part of our promise to provide the best care and service to meet your aftercare needs.  If you have questions, or wish to unsubscribe from this service please call [phone]. Thank you for Choosing our [employer] Emergency Department.       Kayce Santiago RN  07/15/22 9689

## 2022-07-15 NOTE — ED NOTES
Report given to Osiel Villegas RN who will assume care at this time.      Bridget Reyes, MAGALIS  07/15/22 1534

## 2022-07-15 NOTE — ED PROVIDER NOTES
[facility] Emergency Department Provider Note                   PCP:                Guanaco Berry MD               Age: 46 y.o. Sex: female     No diagnosis found. DISPOSITION          MDM  Number of Diagnoses or Management Options  Diagnosis management comments: Female presents for intractable nausea and diarrhea. Vital signs here are reviewed. Patient appears ill and uncomfortable. Otherwise she is in no acute distress. She has nonsurgical abdomen on physical examination. Her cbc  fairly unremarkable signs hemoglobin 10.5. CMP here is fairly unremarkable she does have a slightly elevated glucose and some elevated anion gap. Given fluid hydration as well as nausea medication. Patient does not want any antidiarrheal agents here. Patient patient she states that she is still having continued nausea and is now having some lower abdominal pain. Her heart rate is still in the 110s. At this time I will redose her with Zofran and give her some pain medication. CT of the abdomen was obtained due to her history of perforated diverticulitis with bowel resection and a fundoplication history in the 90s. Urinalysis here shows no evidence of UTI there is some slight blood in her urine. CT scan of the abdomen pelvis as read by radiology shows no evidence of any acute pathology. On reexamination of the patient she states that she is still nauseous but is having better nausea. Have offered her admission for intractable nausea. Patient is stating that the really only thing that seems to help her in the past was Dilaudid. At this time I will go ahead and discharge her she does have nausea medication at home. I have given her return precautions. Patient has a nonsurgical abdomen on discharge examination.   Patient is stable on discharge            Orders Placed This Encounter   Procedures    CBC with Auto Differential    Comprehensive Metabolic Panel    Lipase    Magnesium    Continuous Pulse Oximetry POCT Urine Dipstick    Saline lock IV        Hairs Oconnor is a 46 y.o. female who presents to the Emergency Department with chief complaint of    Chief Complaint   Patient presents with    Diarrhea      22-year-old female presents for diarrhea and intractable nausea. Patient reports onset of symptoms started last night. Symptoms been constant in time. She denies any alleviating or grading factors. No chest pain, shortness of breath or abdominal pain. She states that she has been having some intermittent diarrhea. She did take a stool softener as she deals with chronic constipation. She has had a history of previous abdominal surgeries in 2014 and 16 from perforated diverticulitis. Review of Systems   Constitutional:  Negative for activity change, chills and fever. HENT:  Negative for dental problem, drooling, facial swelling, sore throat, trouble swallowing and voice change. Eyes:  Negative for pain. Respiratory:  Negative for cough, chest tightness and shortness of breath. Cardiovascular:  Negative for chest pain and palpitations. Gastrointestinal:  Positive for diarrhea and nausea. Negative for abdominal pain and vomiting. Endocrine: Negative for polydipsia. Genitourinary:  Negative for difficulty urinating, dysuria and hematuria. Musculoskeletal:  Negative for back pain and neck pain. Skin:  Negative for rash and wound. Neurological:  Negative for dizziness, seizures, facial asymmetry, speech difficulty, numbness and headaches. Psychiatric/Behavioral:  Negative for agitation and behavioral problems.       Past Medical History:   Diagnosis Date    Anemia     blood transfusion 2013 X1 d/t \"perforated bowel\"-- Fe infusions 12/2017--- denies any current iron infusions 12/17/2019    Anxiety and depression     managed with meds    C. difficile diarrhea 2013    in 4862 after complicated ERCP    Cervical dysplasia 1990s    Chronic insomnia     ambien     Chronic nausea     Chronic pain     all over     Chronic pancreatitis ([facility] [address]     COVID-19 vaccine series completed 04/21/2021    3/29/2021 Pfizer     Dehydration     IVFs through port as needed for this     Encounter for insertion of venous access port     Left chest port    Esophageal stricture 2017    Fibromyalgia     managed with meds    Former cigarette smoker     GERD (gastroesophageal reflux disease)     controlled with med    History of kidney stones 03/2021    X1-naturally pass    HLD (hyperlipidemia)     pt denies     IBS (irritable bowel syndrome)     Migraine headache     does not have often but did have one recently; just takes tylenol    Nausea & vomiting     pt reports she does better with phenergan than zofran - causes HA    Nonalcoholic fatty liver disease     PUD (peptic ulcer disease) 06/2017    still having issues takes meds     Sinus tachycardia     per pt-- no tx needed    Sphincter of Oddi dysfunction     Type 2 diabetes mellitus ([facility] [address]     type 2-- sqbs am avg 150--- Hypo symptoms at <100, Insulin dependent; last A1C was 11/3/2020 = 9.0        Past Surgical History:   Procedure Laterality Date    CARPAL TUNNEL RELEASE Right     along with trigger finger    CHOLECYSTECTOMY, LAPAROSCOPIC  1997    COLONOSCOPY      COLONOSCOPY N/A 4/4/2018    COLONOSCOPY performed by Hortensia Choi MD at [facility] ENDOSCOPY    ERCP  3/5/2013    resulted in perforated duodenum    HYSTERECTOMY (CERVIX STATUS UNKNOWN)  1998    ovaries remain    IR DRAIN SKIN ABSCESS      IR PORT PLACEMENT EQUAL OR GREATER THAN 5 YEARS  9/25/2018    IR PORT PLACEMENT EQUAL OR GREATER THAN 5 YEARS 9/25/2018 [facility] SPE[facility]    LAPAROTOMY  3/5/2013    exploratory for duodenal perforation with ERCP    ORTHOPEDIC SURGERY      right finger surgery 11/2017    OTHER SURGICAL HISTORY Bilateral     thumb    OTHER SURGICAL HISTORY      Kidney stones march 2021    MO ABDOMEN SURGERY PROC UNLISTED  4/13    explor lap    MO ABDOMEN SURGERY PROC UNLISTED  1997    lap nissen    MO ABDOMEN SURGERY PROC UNLISTED  last one placed  2012    Hx of pancreatic stent, multiple    TOTAL COLECTOMY      UPPER GASTROINTESTINAL ENDOSCOPY  5/21/2021         UPPER GASTROINTESTINAL ENDOSCOPY  2017    36 fr tony    UPPER GASTROINTESTINAL ENDOSCOPY  12/18/2019         UPPER GASTROINTESTINAL ENDOSCOPY N/A 6/8/2022    EGD ESOPHAGOGASTRODUODENOSCOPY DILATATION/ 34 performed by Jerod Hampton MD at [address]  4/25/13 at [facility]    cytso-- stent removed 6-27-13. Dr Urban Castro  2014    port insertion, left chest wall        Family History   Problem Relation Age of Onset    No Known Problems Sister     Coronary Art Dis Father 76    Stroke Father     Hypertension Father     Diabetes Father     Heart Disease Mother         cabg began in her 52's    Hypertension Mother     Diabetes Mother     Other Father         Social Connections: Not on file        Allergies   Allergen Reactions    Adhesive Tape Itching, Other (See Comments) and Rash     blisters  tegaderm  Paper tape too      Metronidazole Diarrhea and Nausea And Vomiting    Atorvastatin Myalgia    Iodine Other (See Comments)     Sweaty and clammy    Statins Myalgia    Barium Iodide Nausea And Vomiting     Diaphoresis      Barium Sulfate Palpitations    Insulin Lispro Nausea And Vomiting    Insulins Nausea And Vomiting     Humalog only    Metformin Nausea And Vomiting     Stomach pain  Stomach pain  Stomach pain  Stomach pain          Vitals signs and nursing note reviewed. Patient Vitals for the past 4 hrs:   Temp Pulse Resp BP SpO2   07/15/22 1015 97.9 °F (36.6 °C) (!) 112 17 (!) 151/94 98 %          Physical Exam  Vitals and nursing note reviewed. Constitutional:       General: She is not in acute distress. Appearance: Normal appearance. She is not ill-appearing. HENT:      Head: Normocephalic and atraumatic.       Right Ear: Tympanic membrane normal.      Left Ear: Tympanic membrane normal. Mouth/Throat:      Mouth: Mucous membranes are dry. Pharynx: Oropharynx is clear. Eyes:      Extraocular Movements: Extraocular movements intact. Pupils: Pupils are equal, round, and reactive to light. Cardiovascular:      Rate and Rhythm: Regular rhythm. Tachycardia present. Heart sounds: No murmur heard. Pulmonary:      Effort: No respiratory distress. Breath sounds: No wheezing or rhonchi. Abdominal:      Palpations: Abdomen is soft. There is no mass. Tenderness: There is no abdominal tenderness. There is no guarding. Comments: Nonsurgical abdomen no signs of peritonitis rebound or guarding. Musculoskeletal:         General: No swelling or tenderness. Cervical back: Normal range of motion and neck supple. No rigidity or tenderness. Skin:     General: Skin is warm and dry. Capillary Refill: Capillary refill takes less than 2 seconds. Neurological:      General: No focal deficit present. Mental Status: She is alert and oriented to person, place, and time. Mental status is at baseline.    Psychiatric:         Mood and Affect: Mood normal.         Behavior: Behavior normal.        Procedures      Labs Reviewed   CBC WITH AUTO DIFFERENTIAL - Abnormal; Notable for the following components:       Result Value    Hemoglobin 10.5 (*)     Hematocrit 34.2 (*)     MCV 71.0 (*)     MCH 21.8 (*)     MCHC 30.7 (*)     RDW 17.8 (*)     Eosinophils % 0 (*)     All other components within normal limits   COMPREHENSIVE METABOLIC PANEL - Abnormal; Notable for the following components:    Chloride 109 (*)     Glucose 275 (*)     AST 41 (*)     Total Protein 9.0 (*)     Globulin 4.8 (*)     Albumin/Globulin Ratio 0.9 (*)     All other components within normal limits   LIPASE - Abnormal; Notable for the following components:    Lipase 64 (*)     All other components within normal limits   MAGNESIUM        No orders to display                          Voice dictation software was

## 2022-07-15 NOTE — ED TRIAGE NOTES
Pt arrives ambulatory to triage. Reports nausea that started last night. States diarrhea. Reports she receives phenergan at home and fluids through a port for chronic pancreatitis, pt reports unsure what causes her pancreatitis. Also reports generalized abd pain. NAD. Masked.

## 2022-07-19 ENCOUNTER — HOSPITAL ENCOUNTER (INPATIENT)
Age: 52
LOS: 7 days | Discharge: HOME | DRG: 392 | End: 2022-07-28
Attending: EMERGENCY MEDICINE | Admitting: HOSPITALIST
Payer: MEDICARE

## 2022-07-19 ENCOUNTER — APPOINTMENT (OUTPATIENT)
Dept: GENERAL RADIOLOGY | Age: 52
DRG: 392 | End: 2022-07-19
Payer: MEDICARE

## 2022-07-19 DIAGNOSIS — G89.29 CHRONIC ABDOMINAL PAIN: ICD-10-CM

## 2022-07-19 DIAGNOSIS — R10.9 CHRONIC ABDOMINAL PAIN: ICD-10-CM

## 2022-07-19 DIAGNOSIS — R11.2 INTRACTABLE VOMITING WITH NAUSEA: Primary | ICD-10-CM

## 2022-07-19 PROBLEM — E66.9 CLASS 1 OBESITY WITH SERIOUS COMORBIDITY AND BODY MASS INDEX (BMI) OF 34.0 TO 34.9 IN ADULT: Chronic | Status: ACTIVE | Noted: 2022-07-19

## 2022-07-19 PROBLEM — E66.811 CLASS 1 OBESITY WITH SERIOUS COMORBIDITY AND BODY MASS INDEX (BMI) OF 34.0 TO 34.9 IN ADULT: Chronic | Status: ACTIVE | Noted: 2022-07-19

## 2022-07-19 PROBLEM — D50.9 MICROCYTIC ANEMIA: Status: ACTIVE | Noted: 2022-07-19

## 2022-07-19 PROBLEM — Z98.890 S/P BALLOON DILATATION OF ESOPHAGEAL STRICTURE: Status: ACTIVE | Noted: 2022-07-19

## 2022-07-19 PROBLEM — R19.7 DIARRHEA: Status: RESOLVED | Noted: 2022-07-15 | Resolved: 2022-07-19

## 2022-07-19 PROBLEM — E87.6 HYPOKALEMIA DUE TO EXCESSIVE GASTROINTESTINAL LOSS OF POTASSIUM: Status: ACTIVE | Noted: 2022-07-19

## 2022-07-19 LAB
ALBUMIN SERPL-MCNC: 3.6 G/DL (ref 3.5–5)
ALBUMIN/GLOB SERPL: 0.9 {RATIO} (ref 1.2–3.5)
ALP SERPL-CCNC: 82 U/L (ref 50–136)
ALT SERPL-CCNC: 57 U/L (ref 12–65)
ANION GAP SERPL CALC-SCNC: 5 MMOL/L (ref 7–16)
AST SERPL-CCNC: 43 U/L (ref 15–37)
BASOPHILS # BLD: 0 K/UL (ref 0–0.2)
BASOPHILS NFR BLD: 0 % (ref 0–2)
BILIRUB SERPL-MCNC: 0.7 MG/DL (ref 0.2–1.1)
BILIRUB UR QL: NEGATIVE
BUN SERPL-MCNC: 6 MG/DL (ref 6–23)
CALCIUM SERPL-MCNC: 8.4 MG/DL (ref 8.3–10.4)
CHLORIDE SERPL-SCNC: 114 MMOL/L (ref 98–107)
CO2 SERPL-SCNC: 23 MMOL/L (ref 21–32)
CREAT SERPL-MCNC: 0.7 MG/DL (ref 0.6–1)
DIFFERENTIAL METHOD BLD: ABNORMAL
EOSINOPHIL # BLD: 0 K/UL (ref 0–0.8)
EOSINOPHIL NFR BLD: 0 % (ref 0.5–7.8)
ERYTHROCYTE [DISTWIDTH] IN BLOOD BY AUTOMATED COUNT: 17.2 % (ref 11.9–14.6)
GLOBULIN SER CALC-MCNC: 3.8 G/DL (ref 2.3–3.5)
GLUCOSE BLD STRIP.AUTO-MCNC: 157 MG/DL (ref 65–100)
GLUCOSE SERPL-MCNC: 137 MG/DL (ref 65–100)
GLUCOSE UR QL STRIP.AUTO: 100 MG/DL
HCT VFR BLD AUTO: 34.3 % (ref 35.8–46.3)
HGB BLD-MCNC: 10.5 G/DL (ref 11.7–15.4)
IMM GRANULOCYTES # BLD AUTO: 0 K/UL (ref 0–0.5)
IMM GRANULOCYTES NFR BLD AUTO: 0 % (ref 0–5)
KETONES UR-MCNC: NEGATIVE MG/DL
LEUKOCYTE ESTERASE UR QL STRIP: NEGATIVE
LIPASE SERPL-CCNC: 81 U/L (ref 73–393)
LYMPHOCYTES # BLD: 0.5 K/UL (ref 0.5–4.6)
LYMPHOCYTES NFR BLD: 12 % (ref 13–44)
MAGNESIUM SERPL-MCNC: 2 MG/DL (ref 1.8–2.4)
MCH RBC QN AUTO: 22.4 PG (ref 26.1–32.9)
MCHC RBC AUTO-ENTMCNC: 30.6 G/DL (ref 31.4–35)
MCV RBC AUTO: 73.3 FL (ref 79.6–97.8)
MONOCYTES # BLD: 0.2 K/UL (ref 0.1–1.3)
MONOCYTES NFR BLD: 5 % (ref 4–12)
NEUTS SEG # BLD: 3.5 K/UL (ref 1.7–8.2)
NEUTS SEG NFR BLD: 82 % (ref 43–78)
NITRITE UR QL: NEGATIVE
NRBC # BLD: 0 K/UL (ref 0–0.2)
PH UR: 7 [PH] (ref 5–9)
PLATELET # BLD AUTO: 169 K/UL (ref 150–450)
PMV BLD AUTO: 11.1 FL (ref 9.4–12.3)
POTASSIUM SERPL-SCNC: 3.2 MMOL/L (ref 3.5–5.1)
PROT SERPL-MCNC: 7.4 G/DL (ref 6.3–8.2)
PROT UR QL: NEGATIVE MG/DL
RBC # BLD AUTO: 4.68 M/UL (ref 4.05–5.2)
RBC # UR STRIP: NEGATIVE /UL
SERVICE CMNT-IMP: ABNORMAL
SERVICE CMNT-IMP: ABNORMAL
SODIUM SERPL-SCNC: 142 MMOL/L (ref 136–145)
SP GR UR: 1.02 (ref 1–1.02)
UROBILINOGEN UR QL: 0.2 EU/DL (ref 0.2–1)
WBC # BLD AUTO: 4.3 K/UL (ref 4.3–11.1)

## 2022-07-19 PROCEDURE — A4216 STERILE WATER/SALINE, 10 ML: HCPCS | Performed by: EMERGENCY MEDICINE

## 2022-07-19 PROCEDURE — 96376 TX/PRO/DX INJ SAME DRUG ADON: CPT

## 2022-07-19 PROCEDURE — 6360000002 HC RX W HCPCS: Performed by: INTERNAL MEDICINE

## 2022-07-19 PROCEDURE — 6360000002 HC RX W HCPCS: Performed by: FAMILY MEDICINE

## 2022-07-19 PROCEDURE — 99285 EMERGENCY DEPT VISIT HI MDM: CPT

## 2022-07-19 PROCEDURE — 74018 RADEX ABDOMEN 1 VIEW: CPT

## 2022-07-19 PROCEDURE — 2580000003 HC RX 258: Performed by: EMERGENCY MEDICINE

## 2022-07-19 PROCEDURE — 6360000002 HC RX W HCPCS: Performed by: EMERGENCY MEDICINE

## 2022-07-19 PROCEDURE — 81003 URINALYSIS AUTO W/O SCOPE: CPT

## 2022-07-19 PROCEDURE — 80053 COMPREHEN METABOLIC PANEL: CPT

## 2022-07-19 PROCEDURE — 82962 GLUCOSE BLOOD TEST: CPT

## 2022-07-19 PROCEDURE — C9113 INJ PANTOPRAZOLE SODIUM, VIA: HCPCS | Performed by: EMERGENCY MEDICINE

## 2022-07-19 PROCEDURE — 83735 ASSAY OF MAGNESIUM: CPT

## 2022-07-19 PROCEDURE — 2580000003 HC RX 258: Performed by: FAMILY MEDICINE

## 2022-07-19 PROCEDURE — C9113 INJ PANTOPRAZOLE SODIUM, VIA: HCPCS | Performed by: FAMILY MEDICINE

## 2022-07-19 PROCEDURE — A4216 STERILE WATER/SALINE, 10 ML: HCPCS | Performed by: FAMILY MEDICINE

## 2022-07-19 PROCEDURE — 96375 TX/PRO/DX INJ NEW DRUG ADDON: CPT

## 2022-07-19 PROCEDURE — 96374 THER/PROPH/DIAG INJ IV PUSH: CPT

## 2022-07-19 PROCEDURE — G0378 HOSPITAL OBSERVATION PER HR: HCPCS

## 2022-07-19 PROCEDURE — 83690 ASSAY OF LIPASE: CPT

## 2022-07-19 PROCEDURE — 85025 COMPLETE CBC W/AUTO DIFF WBC: CPT

## 2022-07-19 PROCEDURE — 6370000000 HC RX 637 (ALT 250 FOR IP): Performed by: FAMILY MEDICINE

## 2022-07-19 RX ORDER — ACETAMINOPHEN 325 MG/1
650 TABLET ORAL EVERY 6 HOURS PRN
Status: DISCONTINUED | OUTPATIENT
Start: 2022-07-19 | End: 2022-07-28 | Stop reason: HOSPADM

## 2022-07-19 RX ORDER — POTASSIUM CHLORIDE 7.45 MG/ML
10 INJECTION INTRAVENOUS PRN
Status: DISCONTINUED | OUTPATIENT
Start: 2022-07-19 | End: 2022-07-24

## 2022-07-19 RX ORDER — MORPHINE SULFATE 4 MG/ML
4 INJECTION INTRAVENOUS
Status: COMPLETED | OUTPATIENT
Start: 2022-07-19 | End: 2022-07-19

## 2022-07-19 RX ORDER — POLYETHYLENE GLYCOL 3350 17 G/17G
17 POWDER, FOR SOLUTION ORAL DAILY PRN
Status: DISCONTINUED | OUTPATIENT
Start: 2022-07-19 | End: 2022-07-28 | Stop reason: HOSPADM

## 2022-07-19 RX ORDER — POTASSIUM CHLORIDE 7.45 MG/ML
10 INJECTION INTRAVENOUS
Status: DISPENSED | OUTPATIENT
Start: 2022-07-19 | End: 2022-07-19

## 2022-07-19 RX ORDER — METOCLOPRAMIDE HYDROCHLORIDE 5 MG/ML
10 INJECTION INTRAMUSCULAR; INTRAVENOUS
Status: COMPLETED | OUTPATIENT
Start: 2022-07-19 | End: 2022-07-19

## 2022-07-19 RX ORDER — LORAZEPAM 1 MG/1
1 TABLET ORAL EVERY 8 HOURS PRN
Status: DISCONTINUED | OUTPATIENT
Start: 2022-07-19 | End: 2022-07-19

## 2022-07-19 RX ORDER — SODIUM CHLORIDE 0.9 % (FLUSH) 0.9 %
5-40 SYRINGE (ML) INJECTION EVERY 12 HOURS SCHEDULED
Status: DISCONTINUED | OUTPATIENT
Start: 2022-07-19 | End: 2022-07-28 | Stop reason: HOSPADM

## 2022-07-19 RX ORDER — ACETAMINOPHEN 650 MG/1
650 SUPPOSITORY RECTAL EVERY 6 HOURS PRN
Status: DISCONTINUED | OUTPATIENT
Start: 2022-07-19 | End: 2022-07-28 | Stop reason: HOSPADM

## 2022-07-19 RX ORDER — POTASSIUM CHLORIDE 20 MEQ/1
40 TABLET, EXTENDED RELEASE ORAL PRN
Status: DISCONTINUED | OUTPATIENT
Start: 2022-07-19 | End: 2022-07-24

## 2022-07-19 RX ORDER — SODIUM CHLORIDE 0.9 % (FLUSH) 0.9 %
5-40 SYRINGE (ML) INJECTION PRN
Status: DISCONTINUED | OUTPATIENT
Start: 2022-07-19 | End: 2022-07-28 | Stop reason: HOSPADM

## 2022-07-19 RX ORDER — THIAMINE HYDROCHLORIDE 100 MG/ML
500 INJECTION, SOLUTION INTRAMUSCULAR; INTRAVENOUS 3 TIMES DAILY
Status: DISCONTINUED | OUTPATIENT
Start: 2022-07-19 | End: 2022-07-19

## 2022-07-19 RX ORDER — THIAMINE HYDROCHLORIDE 100 MG/ML
500 INJECTION, SOLUTION INTRAMUSCULAR; INTRAVENOUS DAILY
Status: DISPENSED | OUTPATIENT
Start: 2022-07-19 | End: 2022-07-21

## 2022-07-19 RX ORDER — PREGABALIN 100 MG/1
100 CAPSULE ORAL 2 TIMES DAILY
Status: DISCONTINUED | OUTPATIENT
Start: 2022-07-19 | End: 2022-07-28 | Stop reason: HOSPADM

## 2022-07-19 RX ORDER — DIPHENHYDRAMINE HYDROCHLORIDE 50 MG/ML
12.5 INJECTION INTRAMUSCULAR; INTRAVENOUS
Status: COMPLETED | OUTPATIENT
Start: 2022-07-19 | End: 2022-07-19

## 2022-07-19 RX ORDER — INSULIN LISPRO 100 [IU]/ML
0-4 INJECTION, SOLUTION INTRAVENOUS; SUBCUTANEOUS NIGHTLY
Status: DISCONTINUED | OUTPATIENT
Start: 2022-07-19 | End: 2022-07-25

## 2022-07-19 RX ORDER — DROPERIDOL 2.5 MG/ML
2.5 INJECTION, SOLUTION INTRAMUSCULAR; INTRAVENOUS ONCE
Status: COMPLETED | OUTPATIENT
Start: 2022-07-19 | End: 2022-07-19

## 2022-07-19 RX ORDER — POTASSIUM CHLORIDE AND SODIUM CHLORIDE 900; 300 MG/100ML; MG/100ML
INJECTION, SOLUTION INTRAVENOUS CONTINUOUS
Status: DISCONTINUED | OUTPATIENT
Start: 2022-07-19 | End: 2022-07-21

## 2022-07-19 RX ORDER — CITALOPRAM 10 MG/1
40 TABLET ORAL DAILY
Status: DISCONTINUED | OUTPATIENT
Start: 2022-07-20 | End: 2022-07-28 | Stop reason: HOSPADM

## 2022-07-19 RX ORDER — ONDANSETRON 2 MG/ML
4 INJECTION INTRAMUSCULAR; INTRAVENOUS EVERY 6 HOURS PRN
Status: DISCONTINUED | OUTPATIENT
Start: 2022-07-19 | End: 2022-07-28 | Stop reason: HOSPADM

## 2022-07-19 RX ORDER — SODIUM CHLORIDE, SODIUM LACTATE, POTASSIUM CHLORIDE, AND CALCIUM CHLORIDE .6; .31; .03; .02 G/100ML; G/100ML; G/100ML; G/100ML
1000 INJECTION, SOLUTION INTRAVENOUS
Status: COMPLETED | OUTPATIENT
Start: 2022-07-19 | End: 2022-07-19

## 2022-07-19 RX ORDER — HYDROMORPHONE HYDROCHLORIDE 1 MG/ML
0.25 INJECTION, SOLUTION INTRAMUSCULAR; INTRAVENOUS; SUBCUTANEOUS EVERY 4 HOURS PRN
Status: DISCONTINUED | OUTPATIENT
Start: 2022-07-19 | End: 2022-07-20

## 2022-07-19 RX ORDER — PROCHLORPERAZINE EDISYLATE 5 MG/ML
10 INJECTION INTRAMUSCULAR; INTRAVENOUS EVERY 6 HOURS PRN
Status: DISCONTINUED | OUTPATIENT
Start: 2022-07-19 | End: 2022-07-19

## 2022-07-19 RX ORDER — ONDANSETRON 8 MG/1
8 TABLET, ORALLY DISINTEGRATING ORAL EVERY 8 HOURS PRN
Status: DISCONTINUED | OUTPATIENT
Start: 2022-07-19 | End: 2022-07-28 | Stop reason: HOSPADM

## 2022-07-19 RX ORDER — PROCHLORPERAZINE EDISYLATE 5 MG/ML
10 INJECTION INTRAMUSCULAR; INTRAVENOUS ONCE
Status: COMPLETED | OUTPATIENT
Start: 2022-07-19 | End: 2022-07-19

## 2022-07-19 RX ORDER — MORPHINE SULFATE 4 MG/ML
4 INJECTION INTRAVENOUS ONCE
Status: COMPLETED | OUTPATIENT
Start: 2022-07-19 | End: 2022-07-19

## 2022-07-19 RX ORDER — SODIUM CHLORIDE 9 MG/ML
INJECTION, SOLUTION INTRAVENOUS PRN
Status: DISCONTINUED | OUTPATIENT
Start: 2022-07-19 | End: 2022-07-28 | Stop reason: HOSPADM

## 2022-07-19 RX ORDER — LORAZEPAM 1 MG/1
1 TABLET ORAL EVERY 6 HOURS PRN
Status: DISCONTINUED | OUTPATIENT
Start: 2022-07-19 | End: 2022-07-20

## 2022-07-19 RX ORDER — PROCHLORPERAZINE EDISYLATE 5 MG/ML
10 INJECTION INTRAMUSCULAR; INTRAVENOUS EVERY 6 HOURS PRN
Status: DISCONTINUED | OUTPATIENT
Start: 2022-07-19 | End: 2022-07-20

## 2022-07-19 RX ORDER — ENOXAPARIN SODIUM 100 MG/ML
40 INJECTION SUBCUTANEOUS DAILY
Status: DISCONTINUED | OUTPATIENT
Start: 2022-07-19 | End: 2022-07-28 | Stop reason: HOSPADM

## 2022-07-19 RX ORDER — INSULIN LISPRO 100 [IU]/ML
0-8 INJECTION, SOLUTION INTRAVENOUS; SUBCUTANEOUS
Status: DISCONTINUED | OUTPATIENT
Start: 2022-07-19 | End: 2022-07-25

## 2022-07-19 RX ORDER — DEXTROSE MONOHYDRATE 50 MG/ML
100 INJECTION, SOLUTION INTRAVENOUS PRN
Status: DISCONTINUED | OUTPATIENT
Start: 2022-07-19 | End: 2022-07-28 | Stop reason: HOSPADM

## 2022-07-19 RX ORDER — MAGNESIUM SULFATE 1 G/100ML
1000 INJECTION INTRAVENOUS PRN
Status: DISCONTINUED | OUTPATIENT
Start: 2022-07-19 | End: 2022-07-28 | Stop reason: HOSPADM

## 2022-07-19 RX ORDER — DIPHENHYDRAMINE HYDROCHLORIDE 50 MG/ML
25 INJECTION INTRAMUSCULAR; INTRAVENOUS ONCE
Status: COMPLETED | OUTPATIENT
Start: 2022-07-19 | End: 2022-07-19

## 2022-07-19 RX ORDER — DIPHENHYDRAMINE HYDROCHLORIDE 50 MG/ML
25 INJECTION INTRAMUSCULAR; INTRAVENOUS EVERY 6 HOURS PRN
Status: DISCONTINUED | OUTPATIENT
Start: 2022-07-19 | End: 2022-07-25

## 2022-07-19 RX ADMIN — SODIUM CHLORIDE, PRESERVATIVE FREE 5 ML: 5 INJECTION INTRAVENOUS at 20:12

## 2022-07-19 RX ADMIN — POTASSIUM CHLORIDE AND SODIUM CHLORIDE: 900; 300 INJECTION, SOLUTION INTRAVENOUS at 16:02

## 2022-07-19 RX ADMIN — HYDROMORPHONE HYDROCHLORIDE 0.25 MG: 1 INJECTION, SOLUTION INTRAMUSCULAR; INTRAVENOUS; SUBCUTANEOUS at 15:24

## 2022-07-19 RX ADMIN — SODIUM CHLORIDE, PRESERVATIVE FREE 5 ML: 5 INJECTION INTRAVENOUS at 20:11

## 2022-07-19 RX ADMIN — PROCHLORPERAZINE EDISYLATE 10 MG: 5 INJECTION INTRAMUSCULAR; INTRAVENOUS at 15:13

## 2022-07-19 RX ADMIN — SODIUM CHLORIDE 40 MG: 9 INJECTION INTRAMUSCULAR; INTRAVENOUS; SUBCUTANEOUS at 20:12

## 2022-07-19 RX ADMIN — PROCHLORPERAZINE EDISYLATE 10 MG: 5 INJECTION INTRAMUSCULAR; INTRAVENOUS at 20:50

## 2022-07-19 RX ADMIN — MORPHINE SULFATE 4 MG: 4 INJECTION INTRAVENOUS at 08:54

## 2022-07-19 RX ADMIN — SODIUM CHLORIDE 40 MG: 9 INJECTION, SOLUTION INTRAMUSCULAR; INTRAVENOUS; SUBCUTANEOUS at 08:55

## 2022-07-19 RX ADMIN — LORAZEPAM 1 MG: 1 TABLET ORAL at 15:13

## 2022-07-19 RX ADMIN — SODIUM CHLORIDE, POTASSIUM CHLORIDE, SODIUM LACTATE AND CALCIUM CHLORIDE 1000 ML: 600; 310; 30; 20 INJECTION, SOLUTION INTRAVENOUS at 08:54

## 2022-07-19 RX ADMIN — DIPHENHYDRAMINE HYDROCHLORIDE 25 MG: 50 INJECTION, SOLUTION INTRAMUSCULAR; INTRAVENOUS at 15:13

## 2022-07-19 RX ADMIN — MORPHINE SULFATE 4 MG: 4 INJECTION INTRAVENOUS at 10:52

## 2022-07-19 RX ADMIN — DIPHENHYDRAMINE HYDROCHLORIDE 12.5 MG: 50 INJECTION, SOLUTION INTRAMUSCULAR; INTRAVENOUS at 08:55

## 2022-07-19 RX ADMIN — DROPERIDOL 2.5 MG: 2.5 INJECTION, SOLUTION INTRAMUSCULAR; INTRAVENOUS at 17:23

## 2022-07-19 RX ADMIN — HYDROMORPHONE HYDROCHLORIDE 0.25 MG: 1 INJECTION, SOLUTION INTRAMUSCULAR; INTRAVENOUS; SUBCUTANEOUS at 20:09

## 2022-07-19 RX ADMIN — PROCHLORPERAZINE EDISYLATE 10 MG: 5 INJECTION INTRAMUSCULAR; INTRAVENOUS at 09:39

## 2022-07-19 RX ADMIN — METOCLOPRAMIDE 10 MG: 5 INJECTION, SOLUTION INTRAMUSCULAR; INTRAVENOUS at 08:54

## 2022-07-19 ASSESSMENT — PAIN DESCRIPTION - LOCATION
LOCATION: CHEST;THROAT
LOCATION: THROAT;CHEST
LOCATION: CHEST;THROAT
LOCATION: ABDOMEN

## 2022-07-19 ASSESSMENT — ENCOUNTER SYMPTOMS
HEMATEMESIS: 0
DIARRHEA: 0
NAUSEA: 1
VOMITING: 1
HEMATOCHEZIA: 0
ABDOMINAL PAIN: 1

## 2022-07-19 ASSESSMENT — PAIN DESCRIPTION - ORIENTATION
ORIENTATION: UPPER
ORIENTATION: ANTERIOR
ORIENTATION: UPPER
ORIENTATION: UPPER

## 2022-07-19 ASSESSMENT — PAIN SCALES - GENERAL
PAINLEVEL_OUTOF10: 3
PAINLEVEL_OUTOF10: 9
PAINLEVEL_OUTOF10: 9
PAINLEVEL_OUTOF10: 8
PAINLEVEL_OUTOF10: 10
PAINLEVEL_OUTOF10: 9
PAINLEVEL_OUTOF10: 7
PAINLEVEL_OUTOF10: 9

## 2022-07-19 ASSESSMENT — LIFESTYLE VARIABLES: HOW OFTEN DO YOU HAVE A DRINK CONTAINING ALCOHOL: NEVER

## 2022-07-19 ASSESSMENT — PAIN DESCRIPTION - DESCRIPTORS: DESCRIPTORS: PENETRATING

## 2022-07-19 ASSESSMENT — PAIN - FUNCTIONAL ASSESSMENT: PAIN_FUNCTIONAL_ASSESSMENT: PREVENTS OR INTERFERES SOME ACTIVE ACTIVITIES AND ADLS

## 2022-07-19 NOTE — ED PROVIDER NOTES
Nothing  Associated symptoms: nausea and vomiting    Associated symptoms: no diarrhea, no dysuria, no fatigue, no fever, no hematemesis and no hematochezia        Review of Systems   Constitutional:  Negative for fatigue and fever. Gastrointestinal:  Positive for abdominal pain, nausea and vomiting. Negative for diarrhea, hematemesis and hematochezia. Genitourinary:  Negative for dysuria. All other systems reviewed and are negative.     Past Medical History:   Diagnosis Date    Anemia     blood transfusion 2013 X1 d/t \"perforated bowel\"-- Fe infusions 12/2017--- denies any current iron infusions 12/17/2019    Anxiety and depression     managed with meds    C. difficile diarrhea 2013    in 6860 after complicated ERCP    Cervical dysplasia 1990s    Chronic insomnia     ambien     Chronic nausea     Chronic pain     all over     Chronic pancreatitis ([address]     COVID-19 vaccine series completed 04/21/2021    3/29/2021 Pfizer     Dehydration     IVFs through port as needed for this     Encounter for insertion of venous access port     Left chest port    Esophageal stricture 2017    Fibromyalgia     managed with meds    Former cigarette smoker     GERD (gastroesophageal reflux disease)     controlled with med    History of kidney stones 03/2021    X1-naturally pass    HLD (hyperlipidemia)     pt denies     IBS (irritable bowel syndrome)     Migraine headache     does not have often but did have one recently; just takes tylenol    Nausea & vomiting     pt reports she does better with phenergan than zofran - causes HA    Nonalcoholic fatty liver disease     PUD (peptic ulcer disease) 06/2017    still having issues takes meds     Sinus tachycardia     per pt-- no tx needed    Sphincter of Oddi dysfunction     Type 2 diabetes mellitus ([address]     type 2-- sqbs am avg 150--- Hypo symptoms at <100, Insulin dependent; last A1C was 11/3/2020 = 9.0        Past Surgical History:   Procedure Laterality Date    CARPAL TUNNEL RELEASE Right     along with trigger finger    CHOLECYSTECTOMY, LAPAROSCOPIC      COLONOSCOPY      COLONOSCOPY N/A 2018    COLONOSCOPY performed by Gibran Welch MD at [facility] ENDOSCOPY    ERCP  3/5/2013    resulted in perforated duodenum    HYSTERECTOMY ([facility]      ovaries remain    IR DRAIN SKIN ABSCESS      IR PORT PLACEMENT EQUAL OR GREATER THAN 5 YEARS  2018    IR PORT PLACEMENT EQUAL OR GREATER THAN 5 YEARS 2018 [facility]    LAPAROTOMY  3/5/2013    exploratory for duodenal perforation with ERCP    ORTHOPEDIC SURGERY      right finger surgery 2017    OTHER SURGICAL HISTORY Bilateral     thumb    OTHER SURGICAL HISTORY      Kidney stones 2021    AL ABDOMEN SURGERY PROC UNLISTED      explor lap    AL ABDOMEN SURGERY PROC UNLISTED      lap nissen    AL ABDOMEN SURGERY PROC UNLISTED  last one placed      Hx of pancreatic stent, multiple    TOTAL COLECTOMY      UPPER GASTROINTESTINAL ENDOSCOPY  2021         UPPER GASTROINTESTINAL ENDOSCOPY      36 fr tony    UPPER GASTROINTESTINAL ENDOSCOPY  2019         UPPER GASTROINTESTINAL ENDOSCOPY N/A 2022    EGD ESOPHAGOGASTRODUODENOSCOPY DILATATION/ 34 performed by Pako Hill MD at [address] [place]  13 at [facility]    cyt-- stent removed 13.   Dr Ming Flores  2014    port insertion, left chest wall        Family History   Problem Relation Age of Onset    No Known Problems Sister     Coronary Art Dis Father 76    Stroke Father     Hypertension Father     Diabetes Father     Heart Disease Mother         cabg began in her 52's    Hypertension Mother     Diabetes Mother     Other Father         Social History     Socioeconomic History    Marital status:    Tobacco Use    Smoking status: Former     Packs/day: 1.00     Types: Cigarettes     Quit date: 1993     Years since quittin.2    Smokeless tobacco: Never   Substance and Sexual Activity    Alcohol use: No    Drug use: Yes     Types: Prescription         Adhesive tape, Metronidazole, Atorvastatin, Iodine, Statins, Barium iodide, Barium sulfate, Insulin lispro, Insulins, and Metformin     Previous Medications    ACETAMINOPHEN (TYLENOL) 500 MG TABLET    Take by mouth every 6 hours as needed    BLOOD GLUCOSE TEST STRIPS (EXACTECH TEST) STRIP    TEST BLOOD SUGAR FOUR TIMES DAILY    CITALOPRAM (CELEXA) 10 MG TABLET    Take 10 mg by mouth daily    CITALOPRAM (CELEXA) 20 MG TABLET    Take 20 mg by mouth daily    CYANOCOBALAMIN 1000 MCG/ML INJECTION    INJECT 1 VIAL INTRAMUSCULARLY FOR 4 WEEKS THEN MONTHLY    DICLOFENAC SODIUM (VOLTAREN) 1 % GEL    APPLY 1 GRAM TO AFFECTED AREA 4 TIMES A DAY AS NEEDED    DIPHENHYDRAMINE (BENADRYL) 25 MG CAPSULE    Take 25 mg by mouth every 6 hours as needed    DIPHENHYDRAMINE (SOMINEX) 25 MG TABLET    Take by mouth    FENTANYL (DURAGESIC) 100 MCG/HR    Place 1 patch onto the skin every 72 hours. GABAPENTIN (NEURONTIN) 250 MG/5ML SOLUTION    300 mg by Enteral route 3 times daily.     GLUCAGON 1 MG INJECTION    Inject 1 mg into the muscle as needed    HYOSCYAMINE (ANASPAZ;LEVSIN) 125 MCG TABLET    as needed    HYOSCYAMINE SULFATE SL 0.125 MG SUBL    Place 0.125 mg under the tongue every 6 hours as needed    INFLUENZA QUADRIVALENT SPLIT VACCINE (FLULAVAL QUADRIVALENT) 0.5 ML INJECTION    Inject into the muscle    INSULIN ASPART (NOVOLOG) 100 UNIT/ML INJECTION PEN    12 units at breakfast 14 units at lunch and 41 units at dinner plus sliding scale for blood sugars >150, up to 60 units per day    INSULIN ASPART (NOVOLOG) 100 UNIT/ML INJECTION VIAL    Use as directed    INSULIN DEGLUDEC 100 UNIT/ML SOPN    Inject 68 Units into the skin    INSULIN NPH (HUMULIN N;NOVOLIN N) 100 UNIT/ML INJECTION PEN    Please inject 12 units into the skin with breakfast and 4 units with bedtime. (pen)    LIDOCAINE VISCOUS HCL (XYLOCAINE) 2 % SOLN SOLUTION    10 mLs every 6 hours as needed    LIPASE-PROTEASE-AMYLASE (CREON) 09371-238866 UNITS CPEP DELAYED RELEASE CAPSULE    Take 1 capsule by mouth    LORAZEPAM (ATIVAN) 1 MG TABLET    Take 1 mg by mouth 3 times daily as needed. LUBIPROSTONE (AMITIZA) 24 MCG CAPSULE    Take 24 mcg by mouth    MORPHINE (MS CONTIN) 30 MG EXTENDED RELEASE TABLET        MORPHINE (MSIR) 30 MG TABLET    Take by mouth 3 times daily as needed. NALOXONE 4 MG/0.1ML LIQD NASAL SPRAY    4 mg once as needed    NITROGLYCERIN (NITROSTAT) 0.3 MG SL TABLET    Place 0.3 mg under the tongue    ONDANSETRON (ZOFRAN) 8 MG TABLET    Take 8 mg by mouth every 8 hours as needed    ONDANSETRON (ZOFRAN-ODT) 8 MG TBDP DISINTEGRATING TABLET    Place 8 mg under the tongue 3 times daily    PANTOPRAZOLE (PROTONIX) 40 MG TABLET    Take 40 mg by mouth 2 times daily    POLYETHYLENE GLYCOL (MIRALAX) 17 GM/SCOOP POWDER    Take 17 g by mouth as needed    PREGABALIN (LYRICA) 100 MG CAPSULE    Take 100 mg by mouth 2 times daily. PREGABALIN (LYRICA) 50 MG CAPSULE    Take 100 mg by mouth 2 times daily. PROMETHAZINE (PHENERGAN) 12.5 MG TABLET    Take 25 mg by mouth every 6 hours as needed    PROMETHAZINE (PHENERGAN) 25 MG SUPPOSITORY    Place 25 mg rectally as needed    PROMETHAZINE (PHENERGAN) 25 MG TABLET    Take 25 mg by mouth every 6 hours as needed    PROMETHAZINE (PHENERGAN) 25 MG/ML INJECTION    Inject 25 mg into the muscle 3 times daily as needed    SODIUM CHLORIDE 0.9 % INFUSION    Infuse intravenously as needed    SUCRALFATE (CARAFATE) 1 GM/10ML SUSPENSION    TAKE 10 ML BY MOUTH 4 TIMES A DAY EVERY DAY ON A EMPTY STOMACH 1 HR BEFORE MEALS AND AT BEDTIME    TIZANIDINE (ZANAFLEX) 4 MG TABLET    Take 4 mg by mouth as needed    ZOLPIDEM (AMBIEN) 10 MG TABLET    Take 10 mg by mouth. Vitals signs and nursing note reviewed.    Patient Vitals for the past 4 hrs:   Temp Pulse Resp BP SpO2   07/19/22 0715 99 °F (37.2 °C) 99 17 (!) 180/91 98 %          Physical Exam  Vitals and nursing note reviewed. Constitutional:       General: She is not in acute distress. Appearance: Normal appearance. She is not ill-appearing, toxic-appearing or diaphoretic. HENT:      Head: Normocephalic and atraumatic. Mouth/Throat:      Mouth: Mucous membranes are moist.      Pharynx: Oropharynx is clear. Eyes:      Extraocular Movements: Extraocular movements intact. Conjunctiva/sclera: Conjunctivae normal.      Pupils: Pupils are equal, round, and reactive to light. Cardiovascular:      Rate and Rhythm: Normal rate and regular rhythm. Pulses: Normal pulses. Heart sounds: Normal heart sounds. Pulmonary:      Effort: Pulmonary effort is normal.      Breath sounds: Normal breath sounds. Abdominal:      General: Bowel sounds are normal. There is no distension. Tenderness: There is abdominal tenderness. There is no right CVA tenderness, left CVA tenderness, guarding or rebound. Skin:     General: Skin is warm and dry. Capillary Refill: Capillary refill takes less than 2 seconds. Neurological:      General: No focal deficit present. Mental Status: She is alert and oriented to person, place, and time. Mental status is at baseline. Psychiatric:         Mood and Affect: Mood normal.         Behavior: Behavior normal.         Thought Content:  Thought content normal.        Procedures      Labs Reviewed   CBC WITH AUTO DIFFERENTIAL - Abnormal; Notable for the following components:       Result Value    Hemoglobin 10.5 (*)     Hematocrit 34.3 (*)     MCV 73.3 (*)     MCH 22.4 (*)     MCHC 30.6 (*)     RDW 17.2 (*)     Seg Neutrophils 82 (*)     Lymphocytes 12 (*)     Eosinophils % 0 (*)     All other components within normal limits   COMPREHENSIVE METABOLIC PANEL - Abnormal; Notable for the following components:    Potassium 3.2 (*)     Chloride 114 (*)     Anion Gap 5 (*)     Glucose 137 (*)     AST 43 (*)     Globulin 3.8 (*)     Albumin/Globulin Ratio 0.9 (*) All other components within normal limits   POCT URINALYSIS DIPSTICK - Abnormal; Notable for the following components:    Glucose, UA  (*)     All other components within normal limits   LIPASE   MAGNESIUM        No orders to display                          Voice dictation software was used during the making of this note. This software is not perfect and grammatical and other typographical errors may be present. This note has not been completely proofread for errors.      Milton Marin, DO  07/19/22 Melonie Ramsey , DO  07/19/22 1004

## 2022-07-19 NOTE — CONSULTS
[facility] Consult Note       Primary GI Physician: Dr Marty Frazier    Referring Provider:  Dr Walter Villalta Date:  7/19/2022    Admit Date:  7/19/2022    Chief Complaint:  Nausea and Vomiting    Subjective:     History of Present Illness:  Patient is a 46 y.o. female with PMH including but not limited to opioid induced constipation, IBS, Sphincter of Oddi dysfunction who is seen in consultation on 7/19/22 at the request of Dr. Laurie Robertson for abdominal pain, nausea, and vomiting. She was last seen in our office 5/9/22 for dry heaving. At that time she reported GERD controlled on Protonix BID. She has a hx of Sphincter of Oddi dysfunction with multiple prior procedures with ERCP in 2013 with perforation requiring surgery. She was getting weekly IVF and daily Phenergan injections. EGD 6/8/22 with esophageal stricture dilated to balloon 18 mm and gastric bezoar. EUS 3/16/22 with esophogeal stricture s/p dilation, gastrojejunal anastomosis, mild pancreatic parenchymal changes without convincing evidence of chronic pancreatitis and fatty infiltration of the liver. Colonoscopy in 2018 with descending TA colon polyp and recall 4/2023. GES was reccommended after EGD wth bezoar. She presented to the ED on 7/19 with vomiting and retching and abdominal pain. She reported home medications not helping. Labs today with normal WBC at 4.3, hgb low at 10.5 (unchanged since last month), LFT's normal with with exception of mildly elevated AST at 43, K low at 3.2, WNL BUN/Cr, WNL lipase and UA negative. CT with no acute findings on 7/15. Compazine and dilaudid has helped, in the past.     She received compazine today without improvement in nausea/vomiting. Pain is rated at 10/10. Pain is substernal without radiation. Reports some odynophagia. She has been off of carafate. She reports ongoing intractable heaving without vomiting. She has a hx of Nissen Fundoplication.      Reports having BM daily. Denies any melena/hematochezia. Has not been able to have the GES done due to not being able to hold nausea medication. CT A/P with IV contrast 7/15/22  HISTORY:  Lower abdominal pain, intractable nausea, hx of rectal reconstruction   from perf diverticulitis       COMPARISON: 10/25/2018       EXAM: CT abdomen and pelvis with iv contrast       TECHNIQUE:    Thin section axial CT was performed from the lung bases through the symphysis   pubis during uneventful rapid bolus intravenous administration of 125 mL of   Isovue 370. Oral contrast was also administered. Radiation dose reduction   techniques were used for this study. Our CT scanners use one or all of the   following: Automated exposure control, adjustment of the mA and/or kV according   to patient size, use of iterative reconstruction. FINDINGS:       There is minimal bibasilar atelectasis. CT abdomen: There is hepatic steatosis. The spleen enhances homogeneously   without discrete lesions. There is no biliary ductal dilatation. The patient is   status post cholecystectomy. The pancreas and adrenal glands are normal. The   kidneys enhance symmetrically. Bowel loops in the upper abdomen are normal. No   definite upper abdominal lymphadenopathy seen. CT pelvis: The appendix is normal. The bladder and rectum are normal. No pelvic   adenopathy seen. No free air or free fluid seen within the pelvis. Bone window evaluation demonstrates no aggressive osseous lesions. Impression   Stable exam without acute findings.           PMH:  Past Medical History:   Diagnosis Date    Anemia     blood transfusion 2013 X1 d/t \"perforated bowel\"-- Fe infusions 12/2017--- denies any current iron infusions 12/17/2019    Anxiety and depression     managed with meds    C. difficile diarrhea 2013    in 2060 after complicated ERCP    Cervical dysplasia 1990s    Chronic insomnia     ambien     Chronic nausea     Chronic pain     all over Chronic pancreatitis ([facility] [address]     COVID-19 vaccine series completed 04/21/2021    3/29/2021 Pfizer     Dehydration     IVFs through port as needed for this     Encounter for insertion of venous access port     Left chest port    Esophageal stricture 2017    Fibromyalgia     managed with meds    Former cigarette smoker     GERD (gastroesophageal reflux disease)     controlled with med    History of kidney stones 03/2021    X1-naturally pass    HLD (hyperlipidemia)     pt denies     IBS (irritable bowel syndrome)     Migraine headache     does not have often but did have one recently; just takes tylenol    Nausea & vomiting     pt reports she does better with phenergan than zofran - causes HA    Nonalcoholic fatty liver disease     PUD (peptic ulcer disease) 06/2017    still having issues takes meds     Sinus tachycardia     per pt-- no tx needed    Sphincter of Oddi dysfunction     Type 2 diabetes mellitus ([facility] [address]     type 2-- sqbs am avg 150--- Hypo symptoms at <100, Insulin dependent; last A1C was 11/3/2020 = 9.0       PSH:  Past Surgical History:   Procedure Laterality Date    CARPAL TUNNEL RELEASE Right     along with trigger finger    CHOLECYSTECTOMY, LAPAROSCOPIC  1997    COLONOSCOPY      COLONOSCOPY N/A 4/4/2018    COLONOSCOPY performed by Archie Goodpasture, MD at [facility] ENDOSCOPY    ERCP  3/5/2013    resulted in perforated duodenum    HYSTERECTOMY (CERVIX STATUS UNKNOWN)  1998    ovaries remain    IR DRAIN SKIN ABSCESS      IR PORT PLACEMENT EQUAL OR GREATER THAN 5 YEARS  9/25/2018    IR PORT PLACEMENT EQUAL OR GREATER THAN 5 YEARS 9/25/2018 [facility] RADIOLOGY SPECIALS    LAPAROTOMY  3/5/2013    exploratory for duodenal perforation with ERCP    ORTHOPEDIC SURGERY      right finger surgery 11/2017    OTHER SURGICAL HISTORY Bilateral     thumb    OTHER SURGICAL HISTORY      Kidney stones march 2021    [place] ABDOMEN SURGERY PROC UNLISTED  4/13    explor lap    [place] ABDOMEN SURGERY PROC UNLISTED  1997    lap nissen    [place] ABDOMEN capsule Take 25 mg by mouth every 6 hours as needed    Historical Provider, MD   diphenhydrAMINE (SOMINEX) 25 MG tablet Take by mouth    Historical Provider, MD   fentaNYL (DURAGESIC) 100 MCG/HR Place 1 patch onto the skin every 72 hours. Patient not taking: Reported on 6/8/2022 10/7/14   Historical Provider, MD   gabapentin (NEURONTIN) 250 MG/5ML solution 300 mg by Enteral route 3 times daily. Patient not taking: Reported on 6/8/2022 10/7/14   Historical Provider, MD   glucagon 1 MG injection Inject 1 mg into the muscle as needed 5/8/17   Historical Provider, MD   hyoscyamine (ANASPAZ;LEVSIN) 125 MCG tablet as needed 5/6/16   Historical Provider, MD   Hyoscyamine Sulfate SL 0.125 MG SUBL Place 0.125 mg under the tongue every 6 hours as needed    Historical Provider, MD   influenza quadrivalent split vaccine (FLULAVAL QUADRIVALENT) 0.5 ML injection Inject into the muscle 11/9/21   Historical Provider, MD   insulin aspart (NOVOLOG) 100 UNIT/ML injection vial Use as directed 6/7/16   Historical Provider, MD   insulin aspart (NOVOLOG) 100 UNIT/ML injection pen 12 units at breakfast 14 units at lunch and 41 units at dinner plus sliding scale for blood sugars >150, up to 60 units per day 10/7/14   Historical Provider, MD   Insulin Degludec 100 UNIT/ML SOPN Inject 68 Units into the skin 10/27/21   Historical Provider, MD   insulin NPH (HUMULIN N;NOVOLIN N) 100 UNIT/ML injection pen Please inject 12 units into the skin with breakfast and 4 units with bedtime. (pen)  Patient not taking: Reported on 6/8/2022 10/7/14   Historical Provider, MD   lidocaine viscous hcl (XYLOCAINE) 2 % SOLN solution 10 mLs every 6 hours as needed 4/28/21   Historical Provider, MD   LORazepam (ATIVAN) 1 MG tablet Take 1 mg by mouth 3 times daily as needed.  10/7/14   Historical Provider, MD   lubiprostone (AMITIZA) 24 MCG capsule Take 24 mcg by mouth  Patient not taking: Reported on 6/8/2022    Historical Provider, MD   morphine (MS CONTIN) 30 MG extended release tablet  8/15/21   Historical Provider, MD   morphine (MSIR) 30 MG tablet Take by mouth 3 times daily as needed. Historical Provider, MD   naloxone 4 MG/0.1ML LIQD nasal spray 4 mg once as needed 2/16/22   Historical Provider, MD   nitroGLYCERIN (NITROSTAT) 0.3 MG SL tablet Place 0.3 mg under the tongue  Patient not taking: Reported on 6/8/2022 3/18/21   Historical Provider, MD   ondansetron (ZOFRAN-ODT) 8 MG TBDP disintegrating tablet Place 8 mg under the tongue 3 times daily 4/19/16   Historical Provider, MD   ondansetron (ZOFRAN) 8 MG tablet Take 8 mg by mouth every 8 hours as needed    Historical Provider, MD   lipase-protease-amylase (CREON) 95480-942950 units CPEP delayed release capsule Take 1 capsule by mouth    Historical Provider, MD   pantoprazole (PROTONIX) 40 MG tablet Take 40 mg by mouth 2 times daily    Historical Provider, MD   polyethylene glycol (MIRALAX) 17 GM/SCOOP powder Take 17 g by mouth as needed    Historical Provider, MD   pregabalin (LYRICA) 100 MG capsule Take 100 mg by mouth 2 times daily. Historical Provider, MD   pregabalin (LYRICA) 50 MG capsule Take 100 mg by mouth 2 times daily.     Historical Provider, MD   promethazine (PHENERGAN) 12.5 mg tablet Take 25 mg by mouth every 6 hours as needed 3/11/15   Historical Provider, MD   promethazine (PHENERGAN) 25 MG suppository Place 25 mg rectally as needed    Historical Provider, MD   promethazine (PHENERGAN) 25 MG tablet Take 25 mg by mouth every 6 hours as needed    Historical Provider, MD   promethazine (PHENERGAN) 25 MG/ML injection Inject 25 mg into the muscle 3 times daily as needed    Historical Provider, MD   sodium chloride 0.9 % infusion Infuse intravenously as needed    Historical Provider, MD   sucralfate (CARAFATE) 1 GM/10ML suspension TAKE 10 ML BY MOUTH 4 TIMES A DAY EVERY DAY ON A EMPTY STOMACH 1 HR BEFORE MEALS AND AT BEDTIME 9/7/17   Historical Provider, MD   tiZANidine (ZANAFLEX) 4 MG tablet Take 4 mg by mouth as needed  Patient not taking: Reported on 6/8/2022    Historical Provider, MD   zolpidem (AMBIEN) 10 MG tablet Take 10 mg by mouth. 2/11/22   Historical Provider, MD       Hospital Medications:  No current facility-administered medications for this encounter. Current Outpatient Medications   Medication Sig    blood glucose test strips (EXACTECH TEST) strip TEST BLOOD SUGAR FOUR TIMES DAILY    acetaminophen (TYLENOL) 500 MG tablet Take by mouth every 6 hours as needed    citalopram (CELEXA) 10 MG tablet Take 10 mg by mouth daily    citalopram (CELEXA) 20 mg tablet Take 20 mg by mouth daily    cyanocobalamin 1000 MCG/ML injection INJECT 1 VIAL INTRAMUSCULARLY FOR 4 WEEKS THEN MONTHLY    diclofenac sodium (VOLTAREN) 1 % GEL APPLY 1 GRAM TO AFFECTED AREA 4 TIMES A DAY AS NEEDED    diphenhydrAMINE (BENADRYL) 25 MG capsule Take 25 mg by mouth every 6 hours as needed    diphenhydrAMINE (SOMINEX) 25 MG tablet Take by mouth    fentaNYL (DURAGESIC) 100 MCG/HR Place 1 patch onto the skin every 72 hours. (Patient not taking: Reported on 6/8/2022)    gabapentin (NEURONTIN) 250 MG/5ML solution 300 mg by Enteral route 3 times daily.  (Patient not taking: Reported on 6/8/2022)    glucagon 1 MG injection Inject 1 mg into the muscle as needed    hyoscyamine (ANASPAZ;LEVSIN) 125 MCG tablet as needed    Hyoscyamine Sulfate SL 0.125 MG SUBL Place 0.125 mg under the tongue every 6 hours as needed    influenza quadrivalent split vaccine (FLULAVAL QUADRIVALENT) 0.5 ML injection Inject into the muscle    insulin aspart (NOVOLOG) 100 UNIT/ML injection vial Use as directed    insulin aspart (NOVOLOG) 100 UNIT/ML injection pen 12 units at breakfast 14 units at lunch and 41 units at dinner plus sliding scale for blood sugars >150, up to 60 units per day    Insulin Degludec 100 UNIT/ML SOPN Inject 68 Units into the skin    insulin NPH (HUMULIN N;NOVOLIN N) 100 UNIT/ML injection pen Please inject 12 units into the skin with breakfast and 4 units with bedtime. (pen) (Patient not taking: Reported on 6/8/2022)    lidocaine viscous hcl (XYLOCAINE) 2 % SOLN solution 10 mLs every 6 hours as needed    LORazepam (ATIVAN) 1 MG tablet Take 1 mg by mouth 3 times daily as needed. lubiprostone (AMITIZA) 24 MCG capsule Take 24 mcg by mouth (Patient not taking: Reported on 6/8/2022)    morphine (MS CONTIN) 30 MG extended release tablet     morphine (MSIR) 30 MG tablet Take by mouth 3 times daily as needed. naloxone 4 MG/0.1ML LIQD nasal spray 4 mg once as needed    nitroGLYCERIN (NITROSTAT) 0.3 MG SL tablet Place 0.3 mg under the tongue (Patient not taking: Reported on 6/8/2022)    ondansetron (ZOFRAN-ODT) 8 MG TBDP disintegrating tablet Place 8 mg under the tongue 3 times daily    ondansetron (ZOFRAN) 8 MG tablet Take 8 mg by mouth every 8 hours as needed    lipase-protease-amylase (CREON) 67531-334155 units CPEP delayed release capsule Take 1 capsule by mouth    pantoprazole (PROTONIX) 40 MG tablet Take 40 mg by mouth 2 times daily    polyethylene glycol (MIRALAX) 17 GM/SCOOP powder Take 17 g by mouth as needed    pregabalin (LYRICA) 100 MG capsule Take 100 mg by mouth 2 times daily. pregabalin (LYRICA) 50 MG capsule Take 100 mg by mouth 2 times daily.     promethazine (PHENERGAN) 12.5 mg tablet Take 25 mg by mouth every 6 hours as needed    promethazine (PHENERGAN) 25 MG suppository Place 25 mg rectally as needed    promethazine (PHENERGAN) 25 MG tablet Take 25 mg by mouth every 6 hours as needed    promethazine (PHENERGAN) 25 MG/ML injection Inject 25 mg into the muscle 3 times daily as needed    sodium chloride 0.9 % infusion Infuse intravenously as needed    sucralfate (CARAFATE) 1 GM/10ML suspension TAKE 10 ML BY MOUTH 4 TIMES A DAY EVERY DAY ON A EMPTY STOMACH 1 HR BEFORE MEALS AND AT BEDTIME    tiZANidine (ZANAFLEX) 4 MG tablet Take 4 mg by mouth as needed (Patient not taking: Reported on 6/8/2022)    zolpidem (AMBIEN) 10 MG tablet Take 10 mg *   CO2 23   BUN 6   CREATININE 0.70   CALCIUM 8.4   MG 2.0   GLUCOSE 137*   ALKPHOS 82   AST 43*   ALT 57   BILITOT 0.7   ALBUMIN 0.9*   PROT 7.4   LIPASE 81       Assessment:     Active Problems:    * No active hospital problems. *  Resolved Problems:    * No resolved hospital problems. *    46 y.o. female with PMH including but not limited to opioid inducted constipation, IBS, Sphincter of Oddi dysfunction and chronic nausea admitted with abdominal pain, nausea and vomiting. She reported home medications not helping. Labs today with normal WBC at 4.3, hgb low at 10.5 (unchanged since last month), LFT's normal with with exception of mildly elevated AST at 43, K low at 3.2, WNL BUN/Cr, WNL lipase and UA negative. CT with no acute findings on 7/15. EGD 6/8/22 with esophageal stricture dilated to balloon 18 mm and gastric bezoar. EUS 3/16/22 with esophogeal stricture s/p dilation, gastrojejunal anastomosis, mild pancreatic parenchymal changes without convincing evidence of chronic panc and fatty infiltration of the liver. She has intractable heaving and inability to keep down PO intake. K 3.2. Plan:     - Supportive care with IVF, antiemetics  - PPI BID IV  -Follow electrolytes and correct as needed  - GES outpatient has not been able to be completed due to the need to hold antiemetics. Strong clinical suspicion for delayed gastric emptying.   -Pain and nausea control. Would like to minimize Narcotics, if possible as this will exacerbate underlying delayed gastric emptying. She has not been on a reglan trial. She did receive some reglan IV, in the ED, without improvement. Could consider liquid erythromycin for delayed gastric emptying pending clinical course. Would need to evaluate QTC interval.  - Recommend admission by [facility]. We will follow along. Dr. Jessica Lamb to follow with further recommendations.        MARISOL Jewell    ATTENDING NOTE:  I have seen the patient and agree with the above assessment and plan. Patient has opiate-induced constipation and suspected gastroparesis likely due to diabetes, prior vagotomy and further compounded by opiate use. In addition, patient has a prior diagnosis of sphincter of Oddi dysfunction as well as chronic pancreatitis though this was not demonstrated by recent EUS and is no longer suspected. She has been undergoing frequent esophageal dilations for known stricture with most recent CRE dilation to maximum inner diameter of 18 mm. Patient will be admitted by hospitalist for supportive care measures. She will remain NPO and will receive IV fluids antiemetics. Would minimize use of opiates exceeding patient's home medications. Will reevaluate tomorrow to determine need for repeat EGD. Would also consider placement of a G tube for gastric decompression as patient status post Nissen fundoplication is unable to vomit. Will continue to follow.      Serena Shelton MD

## 2022-07-19 NOTE — ED NOTES
Patient port access changed out, pt unable to tolerate tegaderm in port kit, dressing changed with [id number] that was obtained from 5th floor.       Lucy Luciano RN  07/19/22 [id number]

## 2022-07-19 NOTE — ED TRIAGE NOTES
Pt arrives ambulatory to triage. Reports continued nausea and abd pain. Hx of chronic pancreatitis. Has a port she uses at home to give herself meds for nausea and pain and fluids. [address] is accessed  but pt states it has been accessed for almost 2 weeks and needs to be changed. NAD. Masked.

## 2022-07-19 NOTE — H&P
4/2023. GES was reccommended after EGD wth bezoar. Review of Systems:  10 systems reviewed and negative except as noted in HPI. Assessment & Plan:     Principal Problem:    Intractable nausea and vomiting  Etiology unclear. Possibly delayed gastric emptying associated with DM. Admit for observation. Start IVF. Admin compazine 10 + benadryl 25 now. Prn compazine. Low dose dilaudid 0.25 IV, use sparingly. Check EKG to assess qtc, at risk of prolongation from electrolyte imbalances, citalopram and antiemetics. GI following. Appreciate rec's     GERD with stricture - s/p EGD 6/8/22 with esophageal stricture dilated to balloon 18 mm and gastric bezoar. IV PPI BID. T2DM - uncontrolled per last endocriology note. Start SSI only given risk of hypoglycemia. Check a1c in AM     CASSEI/Mood d/o - resume citalopram 40 mg pending qtc assessment. Prn ativan. Active Problems:    Class 1 obesity with serious comorbidity and body mass index (BMI) of 34.0 to 34.9 in adult  Plan: adds complexity. Check AM cortisol and tsh     Hypokalemia due to excessive gastrointestinal loss of potassium  Plan: replete IV,  monitor mag. Microcytic anemia  Plan: h/o JORGE LUIS in the past.     Chronic pancreatitis ([facility]  Plan: no evidence of this on recent EUS 3/2022. Fibromoyalgia - lyrica       Dispo/Discharge Planning:     Admit for obs     Diet: ADULT DIET;  Clear Liquid; GI Morrison (GERD/Peptic Ulcer)  VTE ppx: lovenox   Code status: Full Code    Hospital Problems:  Principal Problem:    Intractable nausea and vomiting  Active Problems:    Class 1 obesity with serious comorbidity and body mass index (BMI) of 34.0 to 34.9 in adult    Hypokalemia due to excessive gastrointestinal loss of potassium    Microcytic anemia    S/P balloon dilatation of esophageal stricture    GERD with stricture    Type 2 diabetes mellitus with hyperglycemia, with long-term current use of insulin (HCC)    Chronic pancreatitis ([address]  Resolved Problems: * No resolved hospital problems.  *       Past History:     Past Medical History:   Diagnosis Date    Anemia     blood transfusion 2013 X1 d/t \"perforated bowel\"-- Fe infusions 12/2017--- denies any current iron infusions 12/17/2019    Anxiety and depression     managed with meds    C. difficile diarrhea 2013    in 0994 after complicated ERCP    Cervical dysplasia 1990s    Chronic insomnia     ambien     Chronic nausea     Chronic pain     all over     Chronic pancreatitis ([address]     COVID-19 vaccine series completed 04/21/2021    3/29/2021 Pfizer     Dehydration     IVFs through port as needed for this     Encounter for insertion of venous access port     Left chest port    Esophageal stricture 2017    Fibromyalgia     managed with meds    Former cigarette smoker     GERD (gastroesophageal reflux disease)     controlled with med    History of kidney stones 03/2021    X1-naturally pass    HLD (hyperlipidemia)     pt denies     IBS (irritable bowel syndrome)     Migraine headache     does not have often but did have one recently; just takes tylenol    Nausea & vomiting     pt reports she does better with phenergan than zofran - causes HA    Nonalcoholic fatty liver disease     Pancreatitis 6/23/2014    PUD (peptic ulcer disease) 06/2017    still having issues takes meds     Severe protein-calorie malnutrition ([address] 4/26/2013    Sinus tachycardia     per pt-- no tx needed    Sphincter of Oddi dysfunction     Type 2 diabetes mellitus ([address]     type 2-- sqbs am avg 150--- Hypo symptoms at <100, Insulin dependent; last A1C was 11/3/2020 = 9.0     Past Surgical History:   Procedure Laterality Date    CARPAL TUNNEL RELEASE Right     along with trigger finger    CHOLECYSTECTOMY, LAPAROSCOPIC  1997    COLONOSCOPY      COLONOSCOPY N/A 4/4/2018    COLONOSCOPY performed by Ricardo Nova MD at [facility] ENDOSCOPY    ERCP  3/5/2013    resulted in perforated duodenum    HYSTERECTOMY (CERVIX STATUS UNKNOWN)  1998    ovaries remain    IR DRAIN SKIN ABSCESS      IR PORT PLACEMENT EQUAL OR GREATER THAN 5 YEARS  2018    IR PORT PLACEMENT EQUAL OR GREATER THAN 5 YEARS 2018 [facility] RAD[facility]OLOGY SPECIALS    LAPAROTOMY  3/5/2013    exploratory for duodenal perforation with ERCP    ORTHOPEDIC SURGERY      right finger surgery 2017    OTHER SURGICAL HISTORY Bilateral     thumb    OTHER SURGICAL HISTORY      Kidney stones 2021    MA ABDOMEN SURGERY PROC UNLISTED      explor lap    MA ABDOMEN SURGERY PROC UNLISTED      lap nissen    MA ABDOMEN SURGERY PROC UNLISTED  last one placed      Hx of pancreatic stent, multiple    TOTAL COLECTOMY      UPPER GASTROINTESTINAL ENDOSCOPY  2021         UPPER GASTROINTESTINAL ENDOSCOPY  2017    36 fr tony    UPPER GASTROINTESTINAL ENDOSCOPY  2019         UPPER GASTROINTESTINAL ENDOSCOPY N/A 2022    EGD ESOPHAGOGASTRODUODENOSCOPY /  performed by Adamaris Noriega MD at [address] at [facility]-- stent removed 13. Dr Michael Rubio      port insertion, left chest wall      Social History     Tobacco Use    Smoking status: Former     Packs/day: 1.00     Types: Cigarettes     Quit date: 1993     Years since quittin.2    Smokeless tobacco: Never   Substance Use Topics    Alcohol use: No      Social History     Substance and Sexual Activity   Drug Use Yes    Types: Prescription     Family History   Problem Relation Age of Onset    No Known Problems Sister     Coronary Art Dis Father 76    Stroke Father     Hypertension Father     Diabetes Father     Heart Disease Mother         cabg began in her 52's    Hypertension Mother     Diabetes Mother     Other Father       Family history reviewed and negative except as noted above.       Immunization History   Administered Date(s) Administered    COVID-19, PFIZER PURPLE top, DILUTE for use, (age 15 y+), 30mcg/0.3mL 2021, 2021, 2021 Influenza, Quadv, IM, PF (6 mo and older Fluzone, Flulaval, Fluarix, and 3 yrs and older Afluria) 11/12/2020    Influenza, Trivalent Pf 10/27/2013     Allergies   Allergen Reactions    Adhesive Tape Itching, Other (See Comments) and Rash     blisters  tegaderm  Paper tape too      Metronidazole Diarrhea and Nausea And Vomiting    Atorvastatin Myalgia    Iodine Other (See Comments)     Sweaty and clammy    Statins Myalgia    Barium Iodide Nausea And Vomiting     Diaphoresis      Barium Sulfate Palpitations    Insulin Lispro Nausea And Vomiting    Insulins Nausea And Vomiting     Humalog only    Metformin Nausea And Vomiting     Stomach pain  Stomach pain  Stomach pain  Stomach pain       Prior to Admit Medications:  Current Outpatient Medications   Medication Instructions    acetaminophen (TYLENOL) 500 MG tablet Oral, EVERY 6 HOURS PRN    blood glucose test strips (EXACTECH TEST) strip TEST BLOOD SUGAR FOUR TIMES DAILY    citalopram (CELEXA) 10 mg, Oral, DAILY    citalopram (CELEXA) 20 mg, Oral, DAILY    cyanocobalamin 1000 MCG/ML injection INJECT 1 VIAL INTRAMUSCULARLY FOR 4 WEEKS THEN MONTHLY    diclofenac sodium (VOLTAREN) 1 % GEL APPLY 1 GRAM TO AFFECTED AREA 4 TIMES A DAY AS NEEDED    diphenhydrAMINE (BENADRYL) 25 mg, Oral, EVERY 6 HOURS PRN    diphenhydrAMINE (SOMINEX) 25 MG tablet Oral    fentaNYL (DURAGESIC) 100 MCG/HR 1 patch, EVERY 72 HOURS    gabapentin (NEURONTIN) 300 mg, 3 TIMES DAILY    glucagon 1 mg, IntraMUSCular, PRN    hyoscyamine (ANASPAZ;LEVSIN) 125 MCG tablet PRN    Hyoscyamine Sulfate SL 0.125 mg, SubLINGual, EVERY 6 HOURS PRN    influenza quadrivalent split vaccine (FLULAVAL QUADRIVALENT) 0.5 ML injection IntraMUSCular    insulin aspart (NOVOLOG) 100 UNIT/ML injection pen 12 units at breakfast 14 units at lunch and 41 units at dinner plus sliding scale for blood sugars >150, up to 60 units per day    insulin aspart (NOVOLOG) 100 UNIT/ML injection vial Use as directed    Insulin Degludec 68 Weight as of this encounter: 190 lb (86.2 kg). No intake or output data in the 24 hours ending 07/19/22 1455      Physical Exam:    Blood pressure (!) 169/64, pulse 99, temperature 99 °F (37.2 °C), temperature source Oral, resp. rate 17, height 5' 2\" (1.575 m), weight 190 lb (86.2 kg), SpO2 98 %. General:    Obese. Pale. NAD  Head:  Normocephalic, atraumatic  Eyes:  Sclerae appear normal.  Pupils equally round. ENT:  Nares appear normal, no drainage. Moist oral mucosa  Neck:  No restricted ROM. Trachea midline   CV:   RRR. No m/r/g. No jugular venous distension. Lungs:   CTAB. No wheezing, rhonchi, or rales. Symmetric expansion. Abdomen: Bowel sounds present. Soft, mildly tender, nondistended. Extremities: No cyanosis or clubbing. No edema  Skin:     Port site clean, no signs of infectionNo rashes. Pale coloration. Warm and dry. Neuro:  CN II-XII grossly intact. Sensation intact. A&Ox3  Psych:  Normal mood and affect.       I have reviewed ordered lab tests and independently visualized imaging below:    Last 24hr Labs:  Recent Results (from the past 24 hour(s))   CBC with Diff    Collection Time: 07/19/22  8:50 AM   Result Value Ref Range    WBC 4.3 4.3 - 11.1 K/uL    RBC 4.68 4.05 - 5.2 M/uL    Hemoglobin 10.5 (L) 11.7 - 15.4 g/dL    Hematocrit 34.3 (L) 35.8 - 46.3 %    MCV 73.3 (L) 79.6 - 97.8 FL    MCH 22.4 (L) 26.1 - 32.9 PG    MCHC 30.6 (L) 31.4 - 35.0 g/dL    RDW 17.2 (H) 11.9 - 14.6 %    Platelets 795 850 - 871 K/uL    MPV 11.1 9.4 - 12.3 FL    nRBC 0.00 0.0 - 0.2 K/uL    Differential Type AUTOMATED      Seg Neutrophils 82 (H) 43 - 78 %    Lymphocytes 12 (L) 13 - 44 %    Monocytes 5 4.0 - 12.0 %    Eosinophils % 0 (L) 0.5 - 7.8 %    Basophils 0 0.0 - 2.0 %    Immature Granulocytes 0 0.0 - 5.0 %    Segs Absolute 3.5 1.7 - 8.2 K/UL    Absolute Lymph # 0.5 0.5 - 4.6 K/UL    Absolute Mono # 0.2 0.1 - 1.3 K/UL    Absolute Eos # 0.0 0.0 - 0.8 K/UL    Basophils Absolute 0.0 0.0 - 0.2 K/UL Absolute Immature Granulocyte 0.0 0.0 - 0.5 K/UL   CMP    Collection Time: 07/19/22  8:50 AM   Result Value Ref Range    Sodium 142 136 - 145 mmol/L    Potassium 3.2 (L) 3.5 - 5.1 mmol/L    Chloride 114 (H) 98 - 107 mmol/L    CO2 23 21 - 32 mmol/L    Anion Gap 5 (L) 7 - 16 mmol/L    Glucose 137 (H) 65 - 100 mg/dL    BUN 6 6 - 23 MG/DL    CREATININE 0.70 0.6 - 1.0 MG/DL    GFR African American >60 >60 ml/min/1.73m2    GFR Non- >60 >60 ml/min/1.73m2    Calcium 8.4 8.3 - 10.4 MG/DL    Total Bilirubin 0.7 0.2 - 1.1 MG/DL    ALT 57 12 - 65 U/L    AST 43 (H) 15 - 37 U/L    Alk Phosphatase 82 50 - 136 U/L    Total Protein 7.4 6.3 - 8.2 g/dL    Albumin 3.6 3.5 - 5.0 g/dL    Globulin 3.8 (H) 2.3 - 3.5 g/dL    Albumin/Globulin Ratio 0.9 (L) 1.2 - 3.5     Lipase    Collection Time: 07/19/22  8:50 AM   Result Value Ref Range    Lipase 81 73 - 393 U/L   Magnesium    Collection Time: 07/19/22  8:50 AM   Result Value Ref Range    Magnesium 2.0 1.8 - 2.4 mg/dL   POCT Urinalysis no Micro    Collection Time: 07/19/22  9:48 AM   Result Value Ref Range    Specific Gravity, Urine, POC 1.020 1.001 - 1.023      pH, Urine, POC 7.0 5.0 - 9.0      Protein, Urine, POC Negative NEG mg/dL    Glucose, UA  (A) NEG mg/dL    KETONES, Urine, POC Negative NEG mg/dL    Bilirubin, Urine, POC Negative NEG      Blood, UA POC Negative NEG      URINE UROBILINOGEN POC 0.2 0.2 - 1.0 EU/dL    Nitrate, Urine, POC Negative NEG      Leukocyte Est, UA POC Negative NEG      Performed by: Giovanna Crabtree        Other Studies:  No results found. Echocardiogram:  No results found for this or any previous visit.       Meds previously ordered:  Orders Placed This Encounter   Medications    lactated ringers bolus    pantoprazole (PROTONIX) 40 mg in sodium chloride (PF) 10 mL injection    metoclopramide (REGLAN) injection 10 mg    diphenhydrAMINE (BENADRYL) injection 12.5 mg    morphine injection 4 mg    DISCONTD: prochlorperazine (COMPAZINE) injection 10 mg    prochlorperazine (COMPAZINE) injection 10 mg    morphine injection 4 mg    DISCONTD: pantoprazole (PROTONIX) 40 mg in sodium chloride (PF) 10 mL injection    potassium chloride 10 mEq/100 mL IVPB (Peripheral Line)    sodium chloride flush 0.9 % injection 5-40 mL    sodium chloride flush 0.9 % injection 5-40 mL    0.9 % sodium chloride infusion    enoxaparin (LOVENOX) injection 40 mg     Order Specific Question:   Indication of Use     Answer:   Prophylaxis-DVT/PE    OR Linked Order Group     ondansetron (ZOFRAN-ODT) disintegrating tablet 8 mg     ondansetron (ZOFRAN) injection 4 mg    OR Linked Order Group     acetaminophen (TYLENOL) tablet 650 mg     acetaminophen (TYLENOL) suppository 650 mg    polyethylene glycol (GLYCOLAX) packet 17 g    0.9% NaCl with KCl 40 mEq infusion    pantoprazole (PROTONIX) 40 mg in sodium chloride (PF) 10 mL injection    OR Linked Order Group     potassium chloride (KLOR-CON M) extended release tablet 40 mEq     potassium bicarb-citric acid (EFFER-K) effervescent tablet 40 mEq     potassium chloride 10 mEq/100 mL IVPB (Peripheral Line)    magnesium sulfate 1000 mg in dextrose 5% 100 mL IVPB    DISCONTD: thiamine (B-1) injection 500 mg    prochlorperazine (COMPAZINE) injection 10 mg    thiamine (B-1) injection 500 mg    glucose chewable tablet 16 g    OR Linked Order Group     dextrose bolus 10% 125 mL     dextrose bolus 10% 250 mL    glucagon (rDNA) injection 1 mg    dextrose 5 % solution    insulin lispro (HUMALOG) injection vial 0-8 Units    insulin lispro (HUMALOG) injection vial 0-4 Units    LORazepam (ATIVAN) tablet 1 mg    pregabalin (LYRICA) capsule 100 mg    AND Linked Order Group     prochlorperazine (COMPAZINE) injection 10 mg     diphenhydrAMINE (BENADRYL) injection 25 mg    HYDROmorphone HCl PF (DILAUDID) injection 0.25 mg    citalopram (CELEXA) tablet 40 mg           Signed:  Anibal Muro DO, DO    Part of this note may have been written by using a voice dictation software. The note has been proof read but may still contain some grammatical/other typographical errors.

## 2022-07-19 NOTE — ED NOTES
TRANSFER - OUT REPORT:    Verbal report given to [address] on [address]  being transferred to [facility]   for routine progression of patient care       Report consisted of patient's Situation, Background, Assessment and   Recommendations(SBAR). Information from the following report(s) ED SBAR was reviewed with the receiving nurse. Lines:   Single Lumen Implantable Port 05/26/22 Left Subclavian (Active)        Opportunity for questions and clarification was provided.       Patient transported with:  Mahin Mendoza RN  07/19/22 [id number]

## 2022-07-20 LAB
ALBUMIN SERPL-MCNC: 3.8 G/DL (ref 3.5–5)
ALBUMIN/GLOB SERPL: 1 {RATIO} (ref 1.2–3.5)
ALP SERPL-CCNC: 84 U/L (ref 50–136)
ALT SERPL-CCNC: 49 U/L (ref 12–65)
ANION GAP SERPL CALC-SCNC: 7 MMOL/L (ref 7–16)
AST SERPL-CCNC: 25 U/L (ref 15–37)
BASOPHILS # BLD: 0 K/UL (ref 0–0.2)
BASOPHILS NFR BLD: 0 % (ref 0–2)
BILIRUB SERPL-MCNC: 1.2 MG/DL (ref 0.2–1.1)
BUN SERPL-MCNC: 6 MG/DL (ref 6–23)
CALCIUM SERPL-MCNC: 8.5 MG/DL (ref 8.3–10.4)
CHLORIDE SERPL-SCNC: 106 MMOL/L (ref 98–107)
CO2 SERPL-SCNC: 24 MMOL/L (ref 21–32)
CORTIS AM PEAK SERPL-MCNC: 39.3 UG/DL (ref 7–25)
CREAT SERPL-MCNC: 0.6 MG/DL (ref 0.6–1)
DIFFERENTIAL METHOD BLD: ABNORMAL
EOSINOPHIL # BLD: 0 K/UL (ref 0–0.8)
EOSINOPHIL NFR BLD: 0 % (ref 0.5–7.8)
ERYTHROCYTE [DISTWIDTH] IN BLOOD BY AUTOMATED COUNT: 17.5 % (ref 11.9–14.6)
EST. AVERAGE GLUCOSE BLD GHB EST-MCNC: 180 MG/DL
GLOBULIN SER CALC-MCNC: 4 G/DL (ref 2.3–3.5)
GLUCOSE BLD STRIP.AUTO-MCNC: 142 MG/DL (ref 65–100)
GLUCOSE BLD STRIP.AUTO-MCNC: 153 MG/DL (ref 65–100)
GLUCOSE BLD STRIP.AUTO-MCNC: 165 MG/DL (ref 65–100)
GLUCOSE BLD STRIP.AUTO-MCNC: 177 MG/DL (ref 65–100)
GLUCOSE SERPL-MCNC: 173 MG/DL (ref 65–100)
HBA1C MFR BLD: 7.9 % (ref 4.8–5.6)
HCT VFR BLD AUTO: 35.3 % (ref 35.8–46.3)
HGB BLD-MCNC: 10.6 G/DL (ref 11.7–15.4)
IMM GRANULOCYTES # BLD AUTO: 0 K/UL (ref 0–0.5)
IMM GRANULOCYTES NFR BLD AUTO: 0 % (ref 0–5)
LYMPHOCYTES # BLD: 1.4 K/UL (ref 0.5–4.6)
LYMPHOCYTES NFR BLD: 20 % (ref 13–44)
MAGNESIUM SERPL-MCNC: 2.1 MG/DL (ref 1.8–2.4)
MCH RBC QN AUTO: 22.2 PG (ref 26.1–32.9)
MCHC RBC AUTO-ENTMCNC: 30 G/DL (ref 31.4–35)
MCV RBC AUTO: 74 FL (ref 79.6–97.8)
MONOCYTES # BLD: 0.5 K/UL (ref 0.1–1.3)
MONOCYTES NFR BLD: 8 % (ref 4–12)
NEUTS SEG # BLD: 5.1 K/UL (ref 1.7–8.2)
NEUTS SEG NFR BLD: 72 % (ref 43–78)
NRBC # BLD: 0 K/UL (ref 0–0.2)
PLATELET # BLD AUTO: 171 K/UL (ref 150–450)
PMV BLD AUTO: ABNORMAL FL (ref 9.4–12.3)
POTASSIUM SERPL-SCNC: 3.9 MMOL/L (ref 3.5–5.1)
PROT SERPL-MCNC: 7.8 G/DL (ref 6.3–8.2)
RBC # BLD AUTO: 4.77 M/UL (ref 4.05–5.2)
SERVICE CMNT-IMP: ABNORMAL
SODIUM SERPL-SCNC: 137 MMOL/L (ref 136–145)
TSH W FREE THYROID IF ABNORMAL: 0.57 UIU/ML (ref 0.36–3.74)
WBC # BLD AUTO: 7.1 K/UL (ref 4.3–11.1)

## 2022-07-20 PROCEDURE — 96372 THER/PROPH/DIAG INJ SC/IM: CPT

## 2022-07-20 PROCEDURE — 6360000002 HC RX W HCPCS: Performed by: INTERNAL MEDICINE

## 2022-07-20 PROCEDURE — 83036 HEMOGLOBIN GLYCOSYLATED A1C: CPT

## 2022-07-20 PROCEDURE — 82533 TOTAL CORTISOL: CPT

## 2022-07-20 PROCEDURE — 6370000000 HC RX 637 (ALT 250 FOR IP): Performed by: FAMILY MEDICINE

## 2022-07-20 PROCEDURE — C9113 INJ PANTOPRAZOLE SODIUM, VIA: HCPCS | Performed by: FAMILY MEDICINE

## 2022-07-20 PROCEDURE — 82962 GLUCOSE BLOOD TEST: CPT

## 2022-07-20 PROCEDURE — A4216 STERILE WATER/SALINE, 10 ML: HCPCS | Performed by: FAMILY MEDICINE

## 2022-07-20 PROCEDURE — 84443 ASSAY THYROID STIM HORMONE: CPT

## 2022-07-20 PROCEDURE — G0378 HOSPITAL OBSERVATION PER HR: HCPCS

## 2022-07-20 PROCEDURE — 2580000003 HC RX 258: Performed by: FAMILY MEDICINE

## 2022-07-20 PROCEDURE — 85025 COMPLETE CBC W/AUTO DIFF WBC: CPT

## 2022-07-20 PROCEDURE — 96376 TX/PRO/DX INJ SAME DRUG ADON: CPT

## 2022-07-20 PROCEDURE — 6360000002 HC RX W HCPCS: Performed by: FAMILY MEDICINE

## 2022-07-20 PROCEDURE — 80053 COMPREHEN METABOLIC PANEL: CPT

## 2022-07-20 PROCEDURE — 83735 ASSAY OF MAGNESIUM: CPT

## 2022-07-20 PROCEDURE — 2580000003 HC RX 258: Performed by: INTERNAL MEDICINE

## 2022-07-20 PROCEDURE — 96375 TX/PRO/DX INJ NEW DRUG ADDON: CPT

## 2022-07-20 PROCEDURE — 6370000000 HC RX 637 (ALT 250 FOR IP): Performed by: INTERNAL MEDICINE

## 2022-07-20 RX ORDER — HYDROMORPHONE HYDROCHLORIDE 1 MG/ML
0.5 INJECTION, SOLUTION INTRAMUSCULAR; INTRAVENOUS; SUBCUTANEOUS ONCE
Status: COMPLETED | OUTPATIENT
Start: 2022-07-20 | End: 2022-07-20

## 2022-07-20 RX ORDER — DIAZEPAM 5 MG/ML
5 INJECTION, SOLUTION INTRAMUSCULAR; INTRAVENOUS 2 TIMES DAILY
Status: DISCONTINUED | OUTPATIENT
Start: 2022-07-21 | End: 2022-07-21

## 2022-07-20 RX ORDER — PROCHLORPERAZINE EDISYLATE 5 MG/ML
10 INJECTION INTRAMUSCULAR; INTRAVENOUS EVERY 6 HOURS PRN
Status: DISCONTINUED | OUTPATIENT
Start: 2022-07-20 | End: 2022-07-28 | Stop reason: HOSPADM

## 2022-07-20 RX ORDER — CLONIDINE 0.2 MG/24H
1 PATCH, EXTENDED RELEASE TRANSDERMAL WEEKLY
Status: DISCONTINUED | OUTPATIENT
Start: 2022-07-20 | End: 2022-07-21

## 2022-07-20 RX ORDER — DIAZEPAM 5 MG/ML
5 INJECTION, SOLUTION INTRAMUSCULAR; INTRAVENOUS 3 TIMES DAILY
Status: DISCONTINUED | OUTPATIENT
Start: 2022-07-20 | End: 2022-07-20

## 2022-07-20 RX ORDER — HYDRALAZINE HYDROCHLORIDE 20 MG/ML
20 INJECTION INTRAMUSCULAR; INTRAVENOUS EVERY 6 HOURS PRN
Status: DISCONTINUED | OUTPATIENT
Start: 2022-07-20 | End: 2022-07-28 | Stop reason: HOSPADM

## 2022-07-20 RX ORDER — HYDRALAZINE HYDROCHLORIDE 20 MG/ML
10 INJECTION INTRAMUSCULAR; INTRAVENOUS EVERY 6 HOURS PRN
Status: DISCONTINUED | OUTPATIENT
Start: 2022-07-20 | End: 2022-07-20

## 2022-07-20 RX ORDER — DIPHENHYDRAMINE HYDROCHLORIDE 50 MG/ML
25 INJECTION INTRAMUSCULAR; INTRAVENOUS ONCE
Status: COMPLETED | OUTPATIENT
Start: 2022-07-20 | End: 2022-07-20

## 2022-07-20 RX ORDER — HYDROMORPHONE HYDROCHLORIDE 1 MG/ML
1 INJECTION, SOLUTION INTRAMUSCULAR; INTRAVENOUS; SUBCUTANEOUS EVERY 4 HOURS PRN
Status: DISCONTINUED | OUTPATIENT
Start: 2022-07-20 | End: 2022-07-22

## 2022-07-20 RX ADMIN — PROCHLORPERAZINE EDISYLATE 10 MG: 5 INJECTION INTRAMUSCULAR; INTRAVENOUS at 03:21

## 2022-07-20 RX ADMIN — HYDROMORPHONE HYDROCHLORIDE 1 MG: 1 INJECTION, SOLUTION INTRAMUSCULAR; INTRAVENOUS; SUBCUTANEOUS at 20:56

## 2022-07-20 RX ADMIN — POTASSIUM CHLORIDE AND SODIUM CHLORIDE: 900; 300 INJECTION, SOLUTION INTRAVENOUS at 12:16

## 2022-07-20 RX ADMIN — SODIUM CHLORIDE, PRESERVATIVE FREE 5 ML: 5 INJECTION INTRAVENOUS at 21:02

## 2022-07-20 RX ADMIN — HYDROMORPHONE HYDROCHLORIDE 0.25 MG: 1 INJECTION, SOLUTION INTRAMUSCULAR; INTRAVENOUS; SUBCUTANEOUS at 04:02

## 2022-07-20 RX ADMIN — HYDROMORPHONE HYDROCHLORIDE 0.25 MG: 1 INJECTION, SOLUTION INTRAMUSCULAR; INTRAVENOUS; SUBCUTANEOUS at 00:09

## 2022-07-20 RX ADMIN — DIPHENHYDRAMINE HYDROCHLORIDE 25 MG: 50 INJECTION, SOLUTION INTRAMUSCULAR; INTRAVENOUS at 00:09

## 2022-07-20 RX ADMIN — PROCHLORPERAZINE EDISYLATE 10 MG: 5 INJECTION INTRAMUSCULAR; INTRAVENOUS at 21:45

## 2022-07-20 RX ADMIN — SODIUM CHLORIDE, PRESERVATIVE FREE 5 ML: 5 INJECTION INTRAVENOUS at 08:22

## 2022-07-20 RX ADMIN — HYDRALAZINE HYDROCHLORIDE 10 MG: 20 INJECTION INTRAMUSCULAR; INTRAVENOUS at 12:15

## 2022-07-20 RX ADMIN — HYDROMORPHONE HYDROCHLORIDE 1 MG: 1 INJECTION, SOLUTION INTRAMUSCULAR; INTRAVENOUS; SUBCUTANEOUS at 16:06

## 2022-07-20 RX ADMIN — DIPHENHYDRAMINE HYDROCHLORIDE 25 MG: 50 INJECTION, SOLUTION INTRAMUSCULAR; INTRAVENOUS at 14:54

## 2022-07-20 RX ADMIN — DIAZEPAM 5 MG: 10 INJECTION, SOLUTION INTRAMUSCULAR; INTRAVENOUS at 14:55

## 2022-07-20 RX ADMIN — PROCHLORPERAZINE EDISYLATE 10 MG: 5 INJECTION INTRAMUSCULAR; INTRAVENOUS at 14:54

## 2022-07-20 RX ADMIN — DIPHENHYDRAMINE HYDROCHLORIDE 25 MG: 50 INJECTION, SOLUTION INTRAMUSCULAR; INTRAVENOUS at 06:00

## 2022-07-20 RX ADMIN — DIPHENHYDRAMINE HYDROCHLORIDE 25 MG: 50 INJECTION, SOLUTION INTRAMUSCULAR; INTRAVENOUS at 21:45

## 2022-07-20 RX ADMIN — SODIUM CHLORIDE 40 MG: 9 INJECTION INTRAMUSCULAR; INTRAVENOUS; SUBCUTANEOUS at 08:22

## 2022-07-20 RX ADMIN — POTASSIUM CHLORIDE AND SODIUM CHLORIDE: 900; 300 INJECTION, SOLUTION INTRAVENOUS at 04:02

## 2022-07-20 RX ADMIN — THIAMINE HYDROCHLORIDE 500 MG: 100 INJECTION, SOLUTION INTRAMUSCULAR; INTRAVENOUS at 08:19

## 2022-07-20 RX ADMIN — SODIUM CHLORIDE 40 MG: 9 INJECTION INTRAMUSCULAR; INTRAVENOUS; SUBCUTANEOUS at 20:55

## 2022-07-20 RX ADMIN — HYDRALAZINE HYDROCHLORIDE 20 MG: 20 INJECTION INTRAMUSCULAR; INTRAVENOUS at 21:03

## 2022-07-20 RX ADMIN — ENOXAPARIN SODIUM 40 MG: 100 INJECTION SUBCUTANEOUS at 08:19

## 2022-07-20 RX ADMIN — DIPHENHYDRAMINE HYDROCHLORIDE 25 MG: 50 INJECTION, SOLUTION INTRAMUSCULAR; INTRAVENOUS at 12:01

## 2022-07-20 RX ADMIN — HYDROMORPHONE HYDROCHLORIDE 0.5 MG: 1 INJECTION, SOLUTION INTRAMUSCULAR; INTRAVENOUS; SUBCUTANEOUS at 08:21

## 2022-07-20 RX ADMIN — HYDROMORPHONE HYDROCHLORIDE 1 MG: 1 INJECTION, SOLUTION INTRAMUSCULAR; INTRAVENOUS; SUBCUTANEOUS at 12:01

## 2022-07-20 RX ADMIN — PHENOL 1 SPRAY: 1.5 LIQUID ORAL at 03:27

## 2022-07-20 RX ADMIN — PROMETHAZINE HYDROCHLORIDE 12.5 MG: 25 INJECTION INTRAMUSCULAR; INTRAVENOUS at 09:42

## 2022-07-20 ASSESSMENT — PAIN DESCRIPTION - DESCRIPTORS
DESCRIPTORS: SHARP
DESCRIPTORS: ACHING;PENETRATING

## 2022-07-20 ASSESSMENT — PAIN - FUNCTIONAL ASSESSMENT: PAIN_FUNCTIONAL_ASSESSMENT: PREVENTS OR INTERFERES SOME ACTIVE ACTIVITIES AND ADLS

## 2022-07-20 ASSESSMENT — PAIN SCALES - GENERAL
PAINLEVEL_OUTOF10: 3
PAINLEVEL_OUTOF10: 3
PAINLEVEL_OUTOF10: 8
PAINLEVEL_OUTOF10: 3
PAINLEVEL_OUTOF10: 6
PAINLEVEL_OUTOF10: 9
PAINLEVEL_OUTOF10: 3
PAINLEVEL_OUTOF10: 8
PAINLEVEL_OUTOF10: 8

## 2022-07-20 ASSESSMENT — PAIN DESCRIPTION - ORIENTATION
ORIENTATION: MID;UPPER
ORIENTATION: ANTERIOR
ORIENTATION: ANTERIOR

## 2022-07-20 ASSESSMENT — PAIN DESCRIPTION - LOCATION
LOCATION: THROAT
LOCATION: ABDOMEN;THROAT
LOCATION: ABDOMEN
LOCATION: ABDOMEN

## 2022-07-20 NOTE — PROGRESS NOTES
ASSESSMENT NOTE    Attending Physician: Meryl Infante DO  Admit Problem: Chronic abdominal pain [R10.9, G89.29]  Intractable nausea and vomiting [R11.2]  Intractable vomiting with nausea [R11.2]  Date/Time of Admission: 7/19/2022  8:02 AM  Problem List:  Patient Active Problem List   Diagnosis    GERD with stricture    Intractable nausea and vomiting    Tachycardia    Type 2 diabetes mellitus with hyperglycemia, with long-term current use of insulin (HCC)    Sphincter of Oddi dysfunction    Iron deficiency anemia    S/P exploratory laparotomy    Chronic pancreatitis ([facility] [address]    Hypertension    Class 1 obesity with serious comorbidity and body mass index (BMI) of 34.0 to 34.9 in adult    Hypokalemia due to excessive gastrointestinal loss of potassium    Microcytic anemia    S/P balloon dilatation of esophageal stricture       Service Assessment  Patient Orientation     Cognition     History Provided By Medical Record   Primary Caregiver     Accompanied By/Relationship     Support Systems Spouse/Significant Other (spouse: Colt Grijalva  [phone])   [address] [facility] is: [facility]   PCP Verified by CM Yes (Dr Dennis Benton   [phone])   Last Visit to PCP     Prior Functional Level     Current Functional Level Independent in ADLs/IADLs   Can patient return to prior living arrangement Yes   Ability to make needs known: Good   Family able to assist with home care needs: Yes   Would you like for me to discuss the discharge plan with any other family members/significant others, and if so, who?      Financial Resources Medicare   Community Resources     CM/SW Referral       Social/Functional History  Lives With Spouse   Type of Home     Home Layout     Home Access     Entrance Stairs - Number of Steps     Entrance Stairs - Rails     Bathroom Shower/Tub     Bathroom Toilet     Bathroom Equipment     Bathroom Accessibility     Home Equipment     Receives Help From Family ADL Assistance     Bath     Dressing     Grooming     Feeding     Toileting     Homemaking Assistance     Meal Prep     Laundry     Vacuuming     Cleaning     Gardening     Yard Work     Driving     Shopping          Other (Comment)     Homemaking Responsibilities     Meal Prep Responsibility     Laundry Responsibility     Cleaning Responsibility     Bill Paying/Finance Responsibility     Shopping Responsibility     Dependent Care Responsibility     Health Care Management     Other (Comment)     Ambulation Assistance     Transfer Assistance     Active      Patient's  Info     Mode of Transportation     Education     Occupation On disability   Type of Occupation       Discharge [address] Spouse/Significant Other (spouse: Julia Ordonez  [phone])   Current Services Prior To Admission None   Potential Assistance Needed N/A   DME     DME     DME Ordered? No   Potential Assistance Purchasing Medications No   Meds-to-Beds: Does the patient want to have any new prescriptions delivered to bedside prior to discharge? Type of Home Care Services None   Patient expects to be discharged to: House   Follow Up Appointment: Best Day/Time     One/Two Story Residence:     # of Interior Steps     Height of Each Step (in)     [employer] Available     History of Falls? Services At/After Discharge  Transition of Care Consult (CM Consult): Discharge Planning   Internal Home Health     Internal Hospice     Reason Outside Agency [address]     Partner SNF     Reason Why Partner SNF Not Chosen     Internal Comfort Care     Reason Outside [address] Discharge None   [place] Resource Information Provided?  No   [address] of [address] at [address]charge [address] Time of Discharge     Confirm Follow Up Transport Self     Condition of Participation: Discharge Planning  The plan for Transition of Care is related to the following treatment goals: return to baseline   The Patient and/or Patient Representative was provided with a Choice of Provider? Patient   Name of the Patient Representative who was provided with the Choice of Provider and agrees with the Discharge Plan? The Patient and/or Patient Representative Agree with the Discharge Plan? Yes   Freedom of Choice list was provided with basic dialogue that supports the individualized plan of care/goals, treatment preferences, and shares the quality data associated with the providers?  Yes     Documentation for Discharge Appeal  Discharge Appealed by     Date notified by QIO of appeal request:     Time notified by QIO of appeal request:     Detailed Notice of Discharge given to:     Date Notice of Discharge given:     Time Notice of Discharge given:     [facility]     [facility]     Date Notified of Outcome     Time Notified of Outcome     Outcome of appeal           John Mckeon RN 07/20/22 11:42 AM

## 2022-07-20 NOTE — PROGRESS NOTES
[facility] Progress Note         Admit Date:  7/19/2022    Today's Date:  7/20/2022    CC:  Nausea/vomiting    Subjective:     Patient is sitting in bed. She has an NGT to LIWS with 500ml of brown liquid output. Denies any abdominal pain. Has ongoing nausea. Reports that last BM was 3-4 days ago.      Medications:   Current Facility-Administered Medications   Medication Dose Route Frequency    phenol 1.4 % mouth spray 1 spray  1 spray Mouth/Throat Q2H PRN    HYDROmorphone HCl PF (DILAUDID) injection 1 mg  1 mg IntraVENous Q4H PRN    promethazine (PHENERGAN) 12.5 mg in sodium chloride 0.9 % 50 mL IVPB  12.5 mg IntraVENous Q6H PRN    sodium chloride flush 0.9 % injection 5-40 mL  5-40 mL IntraVENous 2 times per day    sodium chloride flush 0.9 % injection 5-40 mL  5-40 mL IntraVENous PRN    0.9 % sodium chloride infusion   IntraVENous PRN    enoxaparin (LOVENOX) injection 40 mg  40 mg SubCUTAneous Daily    ondansetron (ZOFRAN-ODT) disintegrating tablet 8 mg  8 mg Oral Q8H PRN    Or    ondansetron (ZOFRAN) injection 4 mg  4 mg IntraVENous Q6H PRN    acetaminophen (TYLENOL) tablet 650 mg  650 mg Oral Q6H PRN    Or    acetaminophen (TYLENOL) suppository 650 mg  650 mg Rectal Q6H PRN    polyethylene glycol (GLYCOLAX) packet 17 g  17 g Oral Daily PRN    0.9% NaCl with KCl 40 mEq infusion   IntraVENous Continuous    pantoprazole (PROTONIX) 40 mg in sodium chloride (PF) 10 mL injection  40 mg IntraVENous Q12H    potassium chloride (KLOR-CON M) extended release tablet 40 mEq  40 mEq Oral PRN    Or    potassium bicarb-citric acid (EFFER-K) effervescent tablet 40 mEq  40 mEq Oral PRN    Or    potassium chloride 10 mEq/100 mL IVPB (Peripheral Line)  10 mEq IntraVENous PRN    magnesium sulfate 1000 mg in dextrose 5% 100 mL IVPB  1,000 mg IntraVENous PRN    thiamine (B-1) injection 500 mg  500 mg IntraVENous Daily    glucose chewable tablet 16 g  4 tablet Oral PRN    dextrose bolus 10% 125 mL  125 mL IntraVENous PRN    Or    dextrose bolus 10% 250 mL  250 mL IntraVENous PRN    glucagon (rDNA) injection 1 mg  1 mg IntraMUSCular PRN    dextrose 5 % solution  100 mL/hr IntraVENous PRN    insulin lispro (HUMALOG) injection vial 0-8 Units  0-8 Units SubCUTAneous TID WC    insulin lispro (HUMALOG) injection vial 0-4 Units  0-4 Units SubCUTAneous Nightly    pregabalin (LYRICA) capsule 100 mg  100 mg Oral BID    citalopram (CELEXA) tablet 40 mg  40 mg Oral Daily    diphenhydrAMINE (BENADRYL) injection 25 mg  25 mg IntraVENous Q6H PRN    LORazepam (ATIVAN) tablet 1 mg  1 mg Oral Q6H PRN       Review of Systems:  ROS was obtained, with pertinent positives as listed above. No chest pain or SOB. Diet:  NPO    Objective:   Vitals:  BP (!) 175/88 Comment: nurse notified  Pulse 89   Temp 97.7 °F (36.5 °C) (Axillary)   Resp 19   Ht 5' 2\" (1.575 m)   Wt 190 lb (86.2 kg)   SpO2 98%   BMI 34.75 kg/m²   Intake/Output:  07/20 0701 - 07/20 1900  In: 525 [I.V.:475]  Out: 1250 [Urine:1000]  07/18 1901 - 07/20 0700  In: 2520.9 [I.V.:1520.8]  Out: 300 [Urine:300]  Exam:  General appearance: alert, cooperative, no distress  ENT: NGT to LIWS with brown liquid output  Lungs: clear to auscultation bilaterally anteriorly  Heart: regular rate and rhythm  Abdomen: soft, non-tender.  Bowel sounds normal. No masses, no organomegaly  Extremities: extremities normal, atraumatic, no cyanosis or edema  Neuro:  alert and oriented    Data Review (Labs):    Recent Labs     07/19/22  0850 07/20/22  0518   WBC 4.3 7.1   HGB 10.5* 10.6*   HCT 34.3* 35.3*    171   MCV 73.3* 74.0*    137   K 3.2* 3.9   * 106   CO2 23 24   BUN 6 6   MG 2.0 2.1   AST 43* 25   ALT 57 49       Assessment:     Principal Problem:    Intractable nausea and vomiting  Active Problems:    Class 1 obesity with serious comorbidity and body mass index (BMI) of 34.0 to 34.9 in adult    Hypokalemia due to excessive gastrointestinal loss of potassium    Microcytic anemia GERD with stricture    Type 2 diabetes mellitus with hyperglycemia, with long-term current use of insulin (HCC)    Chronic pancreatitis (HCC)    S/P balloon dilatation of esophageal stricture  Resolved Problems:    * No resolved hospital problems. *    46 y.o. female with PMH including but not limited to opioid inducted constipation, IBS, Sphincter of Oddi dysfunction and chronic nausea admitted with abdominal pain, nausea and vomiting. She reported home medications not helping. Labs today with normal WBC at 4.3, hgb low at 10.5 (unchanged since last month), LFT's normal with with exception of mildly elevated AST at 43, K low at 3.2, WNL BUN/Cr, WNL lipase and UA negative. CT with no acute findings on 7/15. EGD 6/8/22 with esophageal stricture dilated to balloon 18 mm and gastric bezoar. EUS 3/16/22 with esophogeal stricture s/p dilation, gastrojejunal anastomosis, mild pancreatic parenchymal changes without convincing evidence of chronic panc and fatty infiltration of the liver. She has intractable heaving and inability to keep down PO intake. K 3.2. Delayed gastric emptying likely exacerbated by opioids in the setting of prior Vagotomy and underlying DM II. Plan:     Minimize narcotics/opioids as this contributes to delayed gastric emptying  Discussed reduction of opioids with patient. She was reluctant. Discussed dietary modifications regarding delayed gastric empyting  Continue with antiemetics  Follow electrolytes and correct as needed  Tight Glucose control  NGT to LIWS- Trial of clamping today. MARISOL Carmona    Patient is seen and examined in collaboration with Dr. Rad Bolden. Assessment and plan as per Dr. Rad Bolden. ATTENDING NOTE:  I have seen the patient and agree with the above assessment and plan. Patient has delayed gastric emptying of multifactorial etiology, related to poorly controlled diabetes, chronic use of opiates and likely prior vagotomy.  Prior EUS was

## 2022-07-20 NOTE — PROGRESS NOTES
Hospitalist Progress Note   Admit Date:  2022  8:02 AM   Name:  Ness Yi   Age:  46 y.o. Sex:  female  :  1970   MRN:  [id number]   Room:      Presenting Complaint: Nausea     Reason(s) for Admission: Chronic abdominal pain [R10.9, G89.29]  Intractable nausea and vomiting [R11.2]  Intractable vomiting with nausea [R11.2]     Hospital Course & Interval History:     Ness Yi is a 46 y.o. female with medical history of Obesity, T2DM (ddx ), Chronic pancreatitis, IBS,  GERD, h/o Sphincter of Oddi dsyfunction s/p multiple procedures and ERCP in  with perforation requiring surgery, Esophageal stricture s/p balloon dilation  who presented with vomiting, retching and abdominal pain refractory to her home medications. In the ED, she his afebrile and hypertensive. Labs significant for K 3.2, AST mildly elevated 43  hgb 10.5 MCV 73.3 lipase wnl. She was given 1 L LR bolus, reglan 10 IV, benadryl 12.5 IV, morhpine 4 mg x 2, Protonix 40 IV and compazine 10 mg IV with minimal relief of symptoms. She was seen 4 days ago with similar symptoms and CT a/p at that time was unremarkable. She continued to dry heave over the weekend. She tried to eat a banana yesterday and has had worsening abd pain, dry heaving and nausea since then. States dilaudid, benadryl, and compazine usually knock it out. Seen by GI in the ED who recommended admission with supportive care including IVF and antiemetics. Per GI and medical record review  Sphincter of Oddi dsyfunction with multiple prior procedures with ERCP in  with perforation requiring surgery. She was getting weekly IVF and daily phenergan injections. EGD 22 with esophageal stricture dilated to balloon 18 mm and gastric bezoar.   EUS 3/16/22 with esophogeal stricture s/p dilation, gastrojejunal anastomosis, mild pancreatic parenchymal changes without convincing evidence of chronic pancreatitis and fatty infiltration of the liver. Colonoscopy in 2018 with descending TA colon polyp and recall 4/2023. GES was reccommended after EGD wth bezoar. Subjective/24hr Events (07/20/22): Patient with complex GI history as outlined by gastroenterology consultation-admitted with intractable nausea and vomiting. Chronic abdominal pain multifactorial recent EGD with gastric bezoar. GES recommended-recommendations to minimize narcotics but patient complained of intractable pain and need for accelerated dosing. She took immediate release 30 mg 3 times daily morphine sulfate at home. She also asked that her Compazine be changed as not working. Likely narcotic dependent complicating above. Patient with NG tube low to mid suction and significant output noted. Review of Systems:  10 systems reviewed and negative except as noted in HPI. Assessment & Plan:      Principal Problem:    Intractable nausea and vomiting  7/20-significant NG tube output noted-continue same for now. Reluctantly I am increasing narcotics and ordering Phenergan for nausea-patient very likely narcotic dependent-await further recommendations GI.       GERD with stricture - s/p EGD 6/8/22 with esophageal stricture dilated to balloon 18 mm and gastric bezoar. IV PPI BID.  7/20-this plan unchanged. GI considering possible gastrostomy tube placement. T2DM -   7/20-continue current sliding scale addition of long-acting insulin as needed. CASSIE/Mood d/o - resume citalopram 40 mg pending qtc assessment. Prn ativan.   7/20-this plan unchanged. Active Problems:    Class 1 obesity with serious comorbidity and body mass index (BMI) of 34.0 to 34.9 in adult  Plan: adds complexity. Hypokalemia due to excessive gastrointestinal loss of potassium  Plan: replete IV,  monitor mag.  7/2083-[id number]    Microcytic anemia  Plan: h/o JORGE LUIS in the past.     Chronic pancreatitis ([facility] [address]  Plan: no evidence of this on recent EUS 3/2022. Fibromoyalgia - lyrica        Dispo/Discharge Planning:    Admit for obs      Diet: ADULT DIET; Clear Liquid; GI [place] (GERD/Peptic Ulcer)  VTE ppx: lovenox   Code status: Full Code            Hospital Problems:  Principal Problem:    Intractable nausea and vomiting  Active Problems:    Class 1 obesity with serious comorbidity and body mass index (BMI) of 34.0 to 34.9 in adult    Hypokalemia due to excessive gastrointestinal loss of potassium    Microcytic anemia    GERD with stricture    Type 2 diabetes mellitus with hyperglycemia, with long-term current use of insulin (HCC)    Chronic pancreatitis (HCC)    S/P balloon dilatation of esophageal stricture  Resolved Problems:    * No resolved hospital problems. *      Objective:   Patient Vitals for the past 24 hrs:   Temp Pulse Resp BP SpO2   07/20/22 0724 97.7 °F (36.5 °C) 89 19 (!) 175/88 98 %   07/20/22 0402 -- -- 18 -- --   07/20/22 0401 99.1 °F (37.3 °C) 95 17 (!) 167/76 95 %   07/20/22 0029 99.5 °F (37.5 °C) 99 17 (!) 168/77 98 %   07/20/22 0007 -- -- 19 -- --   07/19/22 1844 98.1 °F (36.7 °C) (!) 103 18 (!) 176/86 99 %   07/19/22 1801 -- -- -- (!) 156/87 95 %   07/19/22 1732 -- -- -- (!) 121/99 95 %   07/19/22 1722 -- -- -- (!) 171/89 --   07/19/22 1632 -- 98 -- (!) 172/75 95 %   07/19/22 1600 -- -- -- (!) 171/80 94 %   07/19/22 1544 -- -- -- (!) 170/81 95 %   07/19/22 1534 -- -- -- (!) 171/86 92 %   07/19/22 1520 -- -- -- (!) 179/77 98 %   07/19/22 1116 -- -- -- (!) 169/64 98 %   07/19/22 1101 -- -- -- (!) 168/78 97 %   07/19/22 1056 -- -- -- (!) 168/65 94 %       Oxygen Therapy  SpO2: 98 %  O2 Device: None (Room air)    Estimated body mass index is 34.75 kg/m² as calculated from the following:    Height as of this encounter: 5' 2\" (1.575 m). Weight as of this encounter: 190 lb (86.2 kg).     Intake/Output Summary (Last 24 hours) at 7/20/2022 1045  Last data filed at 7/20/2022 0821  Gross per 24 hour   Intake 3045.9 ml   Output 1550 ml   Net 1495.9 ml Physical Exam:   Blood pressure (!) 175/88, pulse 89, temperature 97.7 °F (36.5 °C), temperature source Axillary, resp. rate 19, height 5' 2\" (1.575 m), weight 190 lb (86.2 kg), SpO2 98 %. General:    Claiming analgesia ineffective and significant pain. Also significant nausea with need for Phenergan. Head:  Normocephalic, atraumatic  Eyes:  Sclerae appear normal.  Pupils equally round. ENT:  Nasogastric tube in place with significant output. No obvious clear drainage. Moist oral mucosa  Neck:  No restricted ROM. Trachea midline   CV:   RRR. No m/r/g. No jugular venous distension. Lungs:   CTAB. No wheezing, rhonchi, or rales. Symmetric expansion. Abdomen:   Infrequent bowel sounds low pitch. No gross distention. No palpable masses. Extremities: No cyanosis or clubbing. No edema-patient has anterior chest wall port  Skin:     No rashes and normal coloration. Warm and dry. Neuro:  CN II-XII grossly intact. Sensation intact. A&Ox3  Psych:  Normal mood and affect.       I have reviewed ordered lab tests and independently visualized imaging below:    Recent Labs:  Recent Results (from the past 48 hour(s))   CBC with Diff    Collection Time: 07/19/22  8:50 AM   Result Value Ref Range    WBC 4.3 4.3 - 11.1 K/uL    RBC 4.68 4.05 - 5.2 M/uL    Hemoglobin 10.5 (L) 11.7 - 15.4 g/dL    Hematocrit 34.3 (L) 35.8 - 46.3 %    MCV 73.3 (L) 79.6 - 97.8 FL    MCH 22.4 (L) 26.1 - 32.9 PG    MCHC 30.6 (L) 31.4 - 35.0 g/dL    RDW 17.2 (H) 11.9 - 14.6 %    Platelets 222 266 - 235 K/uL    MPV 11.1 9.4 - 12.3 FL    nRBC 0.00 0.0 - 0.2 K/uL    Differential Type AUTOMATED      Seg Neutrophils 82 (H) 43 - 78 %    Lymphocytes 12 (L) 13 - 44 %    Monocytes 5 4.0 - 12.0 %    Eosinophils % 0 (L) 0.5 - 7.8 %    Basophils 0 0.0 - 2.0 %    Immature Granulocytes 0 0.0 - 5.0 %    Segs Absolute 3.5 1.7 - 8.2 K/UL    Absolute Lymph # 0.5 0.5 - 4.6 K/UL    Absolute Mono # 0.2 0.1 - 1.3 K/UL    Absolute Eos # 0.0 0.0 - 0.8 K/UL Basophils Absolute 0.0 0.0 - 0.2 K/UL    Absolute Immature Granulocyte 0.0 0.0 - 0.5 K/UL   CMP    Collection Time: 07/19/22  8:50 AM   Result Value Ref Range    Sodium 142 136 - 145 mmol/L    Potassium 3.2 (L) 3.5 - 5.1 mmol/L    Chloride 114 (H) 98 - 107 mmol/L    CO2 23 21 - 32 mmol/L    Anion Gap 5 (L) 7 - 16 mmol/L    Glucose 137 (H) 65 - 100 mg/dL    BUN 6 6 - 23 MG/DL    CREATININE 0.70 0.6 - 1.0 MG/DL    GFR African American >60 >60 ml/min/1.73m2    GFR Non- >60 >60 ml/min/1.73m2    Calcium 8.4 8.3 - 10.4 MG/DL    Total Bilirubin 0.7 0.2 - 1.1 MG/DL    ALT 57 12 - 65 U/L    AST 43 (H) 15 - 37 U/L    Alk Phosphatase 82 50 - 136 U/L    Total Protein 7.4 6.3 - 8.2 g/dL    Albumin 3.6 3.5 - 5.0 g/dL    Globulin 3.8 (H) 2.3 - 3.5 g/dL    Albumin/Globulin Ratio 0.9 (L) 1.2 - 3.5     Lipase    Collection Time: 07/19/22  8:50 AM   Result Value Ref Range    Lipase 81 73 - 393 U/L   Magnesium    Collection Time: 07/19/22  8:50 AM   Result Value Ref Range    Magnesium 2.0 1.8 - 2.4 mg/dL   POCT Urinalysis no Micro    Collection Time: 07/19/22  9:48 AM   Result Value Ref Range    Specific Gravity, Urine, POC 1.020 1.001 - 1.023      pH, Urine, POC 7.0 5.0 - 9.0      Protein, Urine, POC Negative NEG mg/dL    Glucose, UA  (A) NEG mg/dL    KETONES, Urine, POC Negative NEG mg/dL    Bilirubin, Urine, POC Negative NEG      Blood, UA POC Negative NEG      URINE UROBILINOGEN POC 0.2 0.2 - 1.0 EU/dL    Nitrate, Urine, POC Negative NEG      Leukocyte Est, UA POC Negative NEG      Performed by: Krista Del Toro    POCT Glucose    Collection Time: 07/19/22  9:03 PM   Result Value Ref Range    POC Glucose 157 (H) 65 - 100 mg/dL    Performed by: Jeanne Torres    CBC with Auto Differential    Collection Time: 07/20/22  5:18 AM   Result Value Ref Range    WBC 7.1 4.3 - 11.1 K/uL    RBC 4.77 4.05 - 5.2 M/uL    Hemoglobin 10.6 (L) 11.7 - 15.4 g/dL    Hematocrit 35.3 (L) 35.8 - 46.3 %    MCV 74.0 (L) 79.6 - 97.8 FL    MCH 22.2 (L) 26.1 - 32.9 PG    MCHC 30.0 (L) 31.4 - 35.0 g/dL    RDW 17.5 (H) 11.9 - 14.6 %    Platelets 487 274 - 490 K/uL    MPV Unable to calculate. Recommend adding IPF.  9.4 - 12.3 FL    nRBC 0.00 0.0 - 0.2 K/uL    Differential Type AUTOMATED      Seg Neutrophils 72 43 - 78 %    Lymphocytes 20 13 - 44 %    Monocytes 8 4.0 - 12.0 %    Eosinophils % 0 (L) 0.5 - 7.8 %    Basophils 0 0.0 - 2.0 %    Immature Granulocytes 0 0.0 - 5.0 %    Segs Absolute 5.1 1.7 - 8.2 K/UL    Absolute Lymph # 1.4 0.5 - 4.6 K/UL    Absolute Mono # 0.5 0.1 - 1.3 K/UL    Absolute Eos # 0.0 0.0 - 0.8 K/UL    Basophils Absolute 0.0 0.0 - 0.2 K/UL    Absolute Immature Granulocyte 0.0 0.0 - 0.5 K/UL   Magnesium    Collection Time: 07/20/22  5:18 AM   Result Value Ref Range    Magnesium 2.1 1.8 - 2.4 mg/dL   Comprehensive Metabolic Panel    Collection Time: 07/20/22  5:18 AM   Result Value Ref Range    Sodium 137 136 - 145 mmol/L    Potassium 3.9 3.5 - 5.1 mmol/L    Chloride 106 98 - 107 mmol/L    CO2 24 21 - 32 mmol/L    Anion Gap 7 7 - 16 mmol/L    Glucose 173 (H) 65 - 100 mg/dL    BUN 6 6 - 23 MG/DL    CREATININE 0.60 0.6 - 1.0 MG/DL    GFR African American >60 >60 ml/min/1.73m2    GFR Non- >60 >60 ml/min/1.73m2    Calcium 8.5 8.3 - 10.4 MG/DL    Total Bilirubin 1.2 (H) 0.2 - 1.1 MG/DL    ALT 49 12 - 65 U/L    AST 25 15 - 37 U/L    Alk Phosphatase 84 50 - 136 U/L    Total Protein 7.8 6.3 - 8.2 g/dL    Albumin 3.8 3.5 - 5.0 g/dL    Globulin 4.0 (H) 2.3 - 3.5 g/dL    Albumin/Globulin Ratio 1.0 (L) 1.2 - 3.5     Cortisol AM, Total    Collection Time: 07/20/22  5:18 AM   Result Value Ref Range    Cortisol - AM 39.3 (H) 7 - 25 ug/dL   TSH with Reflex    Collection Time: 07/20/22  5:18 AM   Result Value Ref Range    TSH w Free Thyroid if Abnormal 0.57 0.358 - 3.740 UIU/ML   Hemoglobin A1C    Collection Time: 07/20/22  5:18 AM   Result Value Ref Range    Hemoglobin A1C 7.9 (H) 4.8 - 5.6 %    eAG 180 mg/dL   POCT Glucose Collection Time: 07/20/22  7:58 AM   Result Value Ref Range    POC Glucose 177 (H) 65 - 100 mg/dL    Performed by: Мария        Other Studies:  XR ABDOMEN (KUB) (SINGLE AP VIEW)   Final Result   Enteric tube tip overlies the gastric body.                    Current Meds:  Current Facility-Administered Medications   Medication Dose Route Frequency    phenol 1.4 % mouth spray 1 spray  1 spray Mouth/Throat Q2H PRN    HYDROmorphone HCl PF (DILAUDID) injection 1 mg  1 mg IntraVENous Q4H PRN    promethazine (PHENERGAN) 12.5 mg in sodium chloride 0.9 % 50 mL IVPB  12.5 mg IntraVENous Q6H PRN    sodium chloride flush 0.9 % injection 5-40 mL  5-40 mL IntraVENous 2 times per day    sodium chloride flush 0.9 % injection 5-40 mL  5-40 mL IntraVENous PRN    0.9 % sodium chloride infusion   IntraVENous PRN    enoxaparin (LOVENOX) injection 40 mg  40 mg SubCUTAneous Daily    ondansetron (ZOFRAN-ODT) disintegrating tablet 8 mg  8 mg Oral Q8H PRN    Or    ondansetron (ZOFRAN) injection 4 mg  4 mg IntraVENous Q6H PRN    acetaminophen (TYLENOL) tablet 650 mg  650 mg Oral Q6H PRN    Or    acetaminophen (TYLENOL) suppository 650 mg  650 mg Rectal Q6H PRN    polyethylene glycol (GLYCOLAX) packet 17 g  17 g Oral Daily PRN    0.9% NaCl with KCl 40 mEq infusion   IntraVENous Continuous    pantoprazole (PROTONIX) 40 mg in sodium chloride (PF) 10 mL injection  40 mg IntraVENous Q12H    potassium chloride (KLOR-CON M) extended release tablet 40 mEq  40 mEq Oral PRN    Or    potassium bicarb-citric acid (EFFER-K) effervescent tablet 40 mEq  40 mEq Oral PRN    Or    potassium chloride 10 mEq/100 mL IVPB (Peripheral Line)  10 mEq IntraVENous PRN    magnesium sulfate 1000 mg in dextrose 5% 100 mL IVPB  1,000 mg IntraVENous PRN    thiamine (B-1) injection 500 mg  500 mg IntraVENous Daily    glucose chewable tablet 16 g  4 tablet Oral PRN    dextrose bolus 10% 125 mL  125 mL IntraVENous PRN    Or    dextrose bolus 10% 250 mL  250 mL IntraVENous PRN    glucagon (rDNA) injection 1 mg  1 mg IntraMUSCular PRN    dextrose 5 % solution  100 mL/hr IntraVENous PRN    insulin lispro (HUMALOG) injection vial 0-8 Units  0-8 Units SubCUTAneous TID WC    insulin lispro (HUMALOG) injection vial 0-4 Units  0-4 Units SubCUTAneous Nightly    pregabalin (LYRICA) capsule 100 mg  100 mg Oral BID    citalopram (CELEXA) tablet 40 mg  40 mg Oral Daily    diphenhydrAMINE (BENADRYL) injection 25 mg  25 mg IntraVENous Q6H PRN    LORazepam (ATIVAN) tablet 1 mg  1 mg Oral Q6H PRN       Signed:  Ashley Esposito DO    Part of this note may have been written by using a voice dictation software. The note has been proof read but may still contain some grammatical/other typographical errors.

## 2022-07-21 ENCOUNTER — ANESTHESIA EVENT (OUTPATIENT)
Dept: ENDOSCOPY | Age: 52
DRG: 392 | End: 2022-07-21
Payer: MEDICARE

## 2022-07-21 ENCOUNTER — APPOINTMENT (OUTPATIENT)
Dept: GENERAL RADIOLOGY | Age: 52
DRG: 392 | End: 2022-07-21
Payer: MEDICARE

## 2022-07-21 PROBLEM — D72.9 NEUTROPHILIC LEUKOCYTOSIS: Status: ACTIVE | Noted: 2022-07-21

## 2022-07-21 PROBLEM — D72.828 NEUTROPHILIC LEUKOCYTOSIS: Status: ACTIVE | Noted: 2022-07-21

## 2022-07-21 PROBLEM — R65.10 SIRS (SYSTEMIC INFLAMMATORY RESPONSE SYNDROME) (HCC): Status: ACTIVE | Noted: 2022-07-21

## 2022-07-21 LAB
ALBUMIN SERPL-MCNC: 4 G/DL (ref 3.5–5)
ALBUMIN/GLOB SERPL: 1 {RATIO} (ref 1.2–3.5)
ALP SERPL-CCNC: 86 U/L (ref 50–136)
ALT SERPL-CCNC: 43 U/L (ref 12–65)
ANION GAP SERPL CALC-SCNC: 10 MMOL/L (ref 7–16)
AST SERPL-CCNC: 22 U/L (ref 15–37)
BASOPHILS # BLD: 0 K/UL (ref 0–0.2)
BASOPHILS NFR BLD: 0 % (ref 0–2)
BILIRUB SERPL-MCNC: 1.2 MG/DL (ref 0.2–1.1)
BUN SERPL-MCNC: 9 MG/DL (ref 6–23)
CALCIUM SERPL-MCNC: 9 MG/DL (ref 8.3–10.4)
CHLORIDE SERPL-SCNC: 108 MMOL/L (ref 98–107)
CO2 SERPL-SCNC: 16 MMOL/L (ref 21–32)
CREAT SERPL-MCNC: 0.8 MG/DL (ref 0.6–1)
DIFFERENTIAL METHOD BLD: ABNORMAL
EOSINOPHIL # BLD: 0 K/UL (ref 0–0.8)
EOSINOPHIL NFR BLD: 0 % (ref 0.5–7.8)
ERYTHROCYTE [DISTWIDTH] IN BLOOD BY AUTOMATED COUNT: 18.4 % (ref 11.9–14.6)
GLOBULIN SER CALC-MCNC: 4.2 G/DL (ref 2.3–3.5)
GLUCOSE BLD STRIP.AUTO-MCNC: 168 MG/DL (ref 65–100)
GLUCOSE BLD STRIP.AUTO-MCNC: 170 MG/DL (ref 65–100)
GLUCOSE BLD STRIP.AUTO-MCNC: 183 MG/DL (ref 65–100)
GLUCOSE BLD STRIP.AUTO-MCNC: 198 MG/DL (ref 65–100)
GLUCOSE SERPL-MCNC: 219 MG/DL (ref 65–100)
HCT VFR BLD AUTO: 38.9 % (ref 35.8–46.3)
HGB BLD-MCNC: 11.5 G/DL (ref 11.7–15.4)
IMM GRANULOCYTES # BLD AUTO: 0.1 K/UL (ref 0–0.5)
IMM GRANULOCYTES NFR BLD AUTO: 1 % (ref 0–5)
LYMPHOCYTES # BLD: 1 K/UL (ref 0.5–4.6)
LYMPHOCYTES NFR BLD: 8 % (ref 13–44)
MAGNESIUM SERPL-MCNC: 2.2 MG/DL (ref 1.8–2.4)
MCH RBC QN AUTO: 22.3 PG (ref 26.1–32.9)
MCHC RBC AUTO-ENTMCNC: 29.6 G/DL (ref 31.4–35)
MCV RBC AUTO: 75.4 FL (ref 79.6–97.8)
MONOCYTES # BLD: 0.9 K/UL (ref 0.1–1.3)
MONOCYTES NFR BLD: 7 % (ref 4–12)
NEUTS SEG # BLD: 11.4 K/UL (ref 1.7–8.2)
NEUTS SEG NFR BLD: 85 % (ref 43–78)
NRBC # BLD: 0 K/UL (ref 0–0.2)
PLATELET # BLD AUTO: 237 K/UL (ref 150–450)
PMV BLD AUTO: ABNORMAL FL (ref 9.4–12.3)
POTASSIUM SERPL-SCNC: 4 MMOL/L (ref 3.5–5.1)
PROT SERPL-MCNC: 8.2 G/DL (ref 6.3–8.2)
RBC # BLD AUTO: 5.16 M/UL (ref 4.05–5.2)
SERVICE CMNT-IMP: ABNORMAL
SODIUM SERPL-SCNC: 134 MMOL/L (ref 136–145)
WBC # BLD AUTO: 13.5 K/UL (ref 4.3–11.1)

## 2022-07-21 PROCEDURE — 2580000003 HC RX 258: Performed by: HOSPITALIST

## 2022-07-21 PROCEDURE — 83735 ASSAY OF MAGNESIUM: CPT

## 2022-07-21 PROCEDURE — 2500000003 HC RX 250 WO HCPCS: Performed by: HOSPITALIST

## 2022-07-21 PROCEDURE — 6360000002 HC RX W HCPCS: Performed by: INTERNAL MEDICINE

## 2022-07-21 PROCEDURE — 96375 TX/PRO/DX INJ NEW DRUG ADDON: CPT

## 2022-07-21 PROCEDURE — 36415 COLL VENOUS BLD VENIPUNCTURE: CPT

## 2022-07-21 PROCEDURE — 87040 BLOOD CULTURE FOR BACTERIA: CPT

## 2022-07-21 PROCEDURE — G0378 HOSPITAL OBSERVATION PER HR: HCPCS

## 2022-07-21 PROCEDURE — A4216 STERILE WATER/SALINE, 10 ML: HCPCS | Performed by: FAMILY MEDICINE

## 2022-07-21 PROCEDURE — 2580000003 HC RX 258: Performed by: FAMILY MEDICINE

## 2022-07-21 PROCEDURE — 96372 THER/PROPH/DIAG INJ SC/IM: CPT

## 2022-07-21 PROCEDURE — 85025 COMPLETE CBC W/AUTO DIFF WBC: CPT

## 2022-07-21 PROCEDURE — 1100000000 HC RM PRIVATE

## 2022-07-21 PROCEDURE — 6370000000 HC RX 637 (ALT 250 FOR IP): Performed by: INTERNAL MEDICINE

## 2022-07-21 PROCEDURE — 6360000002 HC RX W HCPCS: Performed by: FAMILY MEDICINE

## 2022-07-21 PROCEDURE — 80053 COMPREHEN METABOLIC PANEL: CPT

## 2022-07-21 PROCEDURE — 2500000003 HC RX 250 WO HCPCS: Performed by: INTERNAL MEDICINE

## 2022-07-21 PROCEDURE — C9113 INJ PANTOPRAZOLE SODIUM, VIA: HCPCS | Performed by: FAMILY MEDICINE

## 2022-07-21 PROCEDURE — 74018 RADEX ABDOMEN 1 VIEW: CPT

## 2022-07-21 PROCEDURE — 96376 TX/PRO/DX INJ SAME DRUG ADON: CPT

## 2022-07-21 PROCEDURE — 82962 GLUCOSE BLOOD TEST: CPT

## 2022-07-21 RX ORDER — ENALAPRILAT 2.5 MG/2ML
1.25 INJECTION INTRAVENOUS EVERY 6 HOURS SCHEDULED
Status: DISCONTINUED | OUTPATIENT
Start: 2022-07-21 | End: 2022-07-23

## 2022-07-21 RX ORDER — DIAZEPAM 5 MG/ML
5 INJECTION, SOLUTION INTRAMUSCULAR; INTRAVENOUS 3 TIMES DAILY
Status: DISCONTINUED | OUTPATIENT
Start: 2022-07-21 | End: 2022-07-23

## 2022-07-21 RX ORDER — CLONIDINE 0.3 MG/24H
1 PATCH, EXTENDED RELEASE TRANSDERMAL WEEKLY
Status: DISCONTINUED | OUTPATIENT
Start: 2022-07-21 | End: 2022-07-28 | Stop reason: HOSPADM

## 2022-07-21 RX ADMIN — POTASSIUM CHLORIDE AND SODIUM CHLORIDE: 900; 300 INJECTION, SOLUTION INTRAVENOUS at 12:14

## 2022-07-21 RX ADMIN — HYDROMORPHONE HYDROCHLORIDE 1 MG: 1 INJECTION, SOLUTION INTRAMUSCULAR; INTRAVENOUS; SUBCUTANEOUS at 13:14

## 2022-07-21 RX ADMIN — PROCHLORPERAZINE EDISYLATE 10 MG: 5 INJECTION INTRAMUSCULAR; INTRAVENOUS at 05:08

## 2022-07-21 RX ADMIN — SODIUM BICARBONATE: 84 INJECTION, SOLUTION INTRAVENOUS at 20:54

## 2022-07-21 RX ADMIN — HYDROMORPHONE HYDROCHLORIDE 1 MG: 1 INJECTION, SOLUTION INTRAMUSCULAR; INTRAVENOUS; SUBCUTANEOUS at 09:06

## 2022-07-21 RX ADMIN — DIAZEPAM 5 MG: 10 INJECTION, SOLUTION INTRAMUSCULAR; INTRAVENOUS at 13:14

## 2022-07-21 RX ADMIN — HYDROMORPHONE HYDROCHLORIDE 1 MG: 1 INJECTION, SOLUTION INTRAMUSCULAR; INTRAVENOUS; SUBCUTANEOUS at 01:08

## 2022-07-21 RX ADMIN — DIAZEPAM 5 MG: 10 INJECTION, SOLUTION INTRAMUSCULAR; INTRAVENOUS at 09:13

## 2022-07-21 RX ADMIN — ONDANSETRON 4 MG: 2 INJECTION INTRAMUSCULAR; INTRAVENOUS at 09:07

## 2022-07-21 RX ADMIN — DIPHENHYDRAMINE HYDROCHLORIDE 25 MG: 50 INJECTION, SOLUTION INTRAMUSCULAR; INTRAVENOUS at 23:04

## 2022-07-21 RX ADMIN — DIAZEPAM 5 MG: 10 INJECTION, SOLUTION INTRAMUSCULAR; INTRAVENOUS at 22:24

## 2022-07-21 RX ADMIN — HYDRALAZINE HYDROCHLORIDE 20 MG: 20 INJECTION INTRAMUSCULAR; INTRAVENOUS at 01:18

## 2022-07-21 RX ADMIN — METHYLNALTREXONE BROMIDE 12 MG: 12 INJECTION, SOLUTION SUBCUTANEOUS at 16:06

## 2022-07-21 RX ADMIN — HYDROMORPHONE HYDROCHLORIDE 1 MG: 1 INJECTION, SOLUTION INTRAMUSCULAR; INTRAVENOUS; SUBCUTANEOUS at 05:07

## 2022-07-21 RX ADMIN — PROCHLORPERAZINE EDISYLATE 10 MG: 5 INJECTION INTRAMUSCULAR; INTRAVENOUS at 17:07

## 2022-07-21 RX ADMIN — POTASSIUM CHLORIDE AND SODIUM CHLORIDE: 900; 300 INJECTION, SOLUTION INTRAVENOUS at 01:08

## 2022-07-21 RX ADMIN — DIPHENHYDRAMINE HYDROCHLORIDE 25 MG: 50 INJECTION, SOLUTION INTRAMUSCULAR; INTRAVENOUS at 05:08

## 2022-07-21 RX ADMIN — SODIUM CHLORIDE, PRESERVATIVE FREE 10 ML: 5 INJECTION INTRAVENOUS at 09:14

## 2022-07-21 RX ADMIN — DIAZEPAM 5 MG: 10 INJECTION, SOLUTION INTRAMUSCULAR; INTRAVENOUS at 09:06

## 2022-07-21 RX ADMIN — HYDROMORPHONE HYDROCHLORIDE 1 MG: 1 INJECTION, SOLUTION INTRAMUSCULAR; INTRAVENOUS; SUBCUTANEOUS at 21:53

## 2022-07-21 RX ADMIN — ENOXAPARIN SODIUM 40 MG: 100 INJECTION SUBCUTANEOUS at 09:07

## 2022-07-21 RX ADMIN — PROCHLORPERAZINE EDISYLATE 10 MG: 5 INJECTION INTRAMUSCULAR; INTRAVENOUS at 10:59

## 2022-07-21 RX ADMIN — HYDROMORPHONE HYDROCHLORIDE 1 MG: 1 INJECTION, SOLUTION INTRAMUSCULAR; INTRAVENOUS; SUBCUTANEOUS at 17:07

## 2022-07-21 RX ADMIN — DIPHENHYDRAMINE HYDROCHLORIDE 25 MG: 50 INJECTION, SOLUTION INTRAMUSCULAR; INTRAVENOUS at 17:07

## 2022-07-21 RX ADMIN — SODIUM CHLORIDE 40 MG: 9 INJECTION INTRAMUSCULAR; INTRAVENOUS; SUBCUTANEOUS at 09:07

## 2022-07-21 RX ADMIN — SODIUM CHLORIDE 40 MG: 9 INJECTION INTRAMUSCULAR; INTRAVENOUS; SUBCUTANEOUS at 20:53

## 2022-07-21 RX ADMIN — ENALAPRILAT 1.25 MG: 2.5 INJECTION INTRAVENOUS at 14:51

## 2022-07-21 RX ADMIN — ENALAPRILAT 1.25 MG: 2.5 INJECTION INTRAVENOUS at 17:07

## 2022-07-21 RX ADMIN — HYDRALAZINE HYDROCHLORIDE 20 MG: 20 INJECTION INTRAMUSCULAR; INTRAVENOUS at 20:53

## 2022-07-21 RX ADMIN — ONDANSETRON 4 MG: 2 INJECTION INTRAMUSCULAR; INTRAVENOUS at 14:51

## 2022-07-21 RX ADMIN — ENALAPRILAT 1.25 MG: 2.5 INJECTION INTRAVENOUS at 23:04

## 2022-07-21 RX ADMIN — ONDANSETRON 4 MG: 2 INJECTION INTRAMUSCULAR; INTRAVENOUS at 01:08

## 2022-07-21 RX ADMIN — DIPHENHYDRAMINE HYDROCHLORIDE 25 MG: 50 INJECTION, SOLUTION INTRAMUSCULAR; INTRAVENOUS at 12:14

## 2022-07-21 ASSESSMENT — PAIN DESCRIPTION - LOCATION
LOCATION: ABDOMEN
LOCATION: ABDOMEN;THROAT
LOCATION: ABDOMEN

## 2022-07-21 ASSESSMENT — PAIN SCALES - GENERAL
PAINLEVEL_OUTOF10: 10
PAINLEVEL_OUTOF10: 8
PAINLEVEL_OUTOF10: 5
PAINLEVEL_OUTOF10: 10
PAINLEVEL_OUTOF10: 8
PAINLEVEL_OUTOF10: 10
PAINLEVEL_OUTOF10: 0
PAINLEVEL_OUTOF10: 0

## 2022-07-21 ASSESSMENT — PAIN DESCRIPTION - ORIENTATION
ORIENTATION: MID
ORIENTATION: MID

## 2022-07-21 ASSESSMENT — PAIN DESCRIPTION - DESCRIPTORS
DESCRIPTORS: SHARP
DESCRIPTORS: SHARP

## 2022-07-21 NOTE — PROGRESS NOTES
Hospitalist Progress Note   Admit Date:  2022  8:02 AM   Name:  Abdirahman Espinal   Age:  46 y.o. Sex:  female  :  1970   MRN:  [id number]   Room:      Presenting Complaint: Nausea     Reason(s) for Admission: Chronic abdominal pain [R10.9, G89.29]  Intractable nausea and vomiting [R11.2]  Intractable vomiting with nausea [R11.2]     Hospital Course & Interval History:     Abdirahman Espinal is a 46 y.o. 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 In the ED, she his afebrile and hypertensive. Labs significant for K 3.2, AST mildly elevated 43  hgb 10.5 MCV 73.3 lipase wnl. She was given 1 L LR bolus, reglan 10 IV, benadryl 12.5 IV, morhpine 4 mg x 2, Protonix 40 IV and compazine 10 mg IV with minimal relief of symptoms. She was seen 4 days ago with similar symptoms and CT a/p at that time was unremarkable. She continued to dry heave over the weekend. She tried to eat a banana yesterday and has had worsening abd pain, dry heaving and nausea since then. States dilaudid, benadryl, and compazine usually knock it out. Seen by GI in the ED who recommended admission with supportive care including IVF and antiemetics. Per GI and medical record review  Sphincter of Oddi dsyfunction with multiple prior procedures with ERCP in  with perforation requiring surgery. She was getting weekly IVF and daily phenergan injections. EGD 22 with esophageal stricture dilated to balloon 18 mm and gastric bezoar.   EUS 3/16/22 with esophogeal stricture s/p dilation, gastrojejunal anastomosis, mild pancreatic parenchymal changes without convincing evidence of chronic pancreatitis and fatty control-continue same     CASSIE/Mood d/o - resume citalopram 40 mg pending qtc assessment. Prn ativan.   7/21--clinically stable-continue same meds. Leukocytosis with left shift/SIRS  7/21--no clear source-check blood cultures x2, order for chest x-ray and procalcitonin level. Observe. Concern is recent vomiting-at risk for occult aspiration. No empiric antibiotics for now     Active Problems:    Class 1 obesity with serious comorbidity and body mass index (BMI) of 34.0 to 34.9 in adult  Plan: adds complexity. Hypokalemia due to excessive gastrointestinal loss of potassium  Plan: replete IV,  monitor mag.  7/21--solved-follow closely. Microcytic anemia  Plan: h/o JORGE LUIS in the past.     Chronic pancreatitis ([address]  Plan: no evidence of this on recent EUS 3/2022. Fibromoyalgia - lyrica        Dispo/Discharge Planning:    Admit for obs      Diet: ADULT DIET; Clear Liquid; GI Chicot (GERD/Peptic Ulcer)  VTE ppx: lovenox   Code status: Full Code            Hospital Problems:  Principal Problem:    Intractable nausea and vomiting  Active Problems:    Class 1 obesity with serious comorbidity and body mass index (BMI) of 34.0 to 34.9 in adult    Hypokalemia due to excessive gastrointestinal loss of potassium    Microcytic anemia    SIRS (systemic inflammatory response syndrome) (HCC)    Neutrophilic leukocytosis    GERD with stricture    Type 2 diabetes mellitus with hyperglycemia, with long-term current use of insulin (HCC)    Chronic pancreatitis (HCC)    S/P balloon dilatation of esophageal stricture  Resolved Problems:    * No resolved hospital problems.  *      Objective:   Patient Vitals for the past 24 hrs:   Temp Pulse Resp BP SpO2   07/21/22 1148 98.1 °F (36.7 °C) (!) 116 19 (!) 179/99 97 %   07/21/22 0735 98.2 °F (36.8 °C) (!) 123 20 (!) 162/79 98 %   07/21/22 0407 97.7 °F (36.5 °C) (!) 127 20 (!) 168/80 98 %   07/21/22 0118 -- -- -- (!) 184/88 --   07/21/22 0009 97.9 °F (36.6 °C) (!) 129 20 (!) 184/88 97 %   07/20/22 2103 -- -- -- (!) 178/96 --   07/20/22 1948 98.1 °F (36.7 °C) (!) 110 20 (!) 178/96 97 %   07/20/22 1636 -- -- 18 -- --   07/20/22 1502 98.2 °F (36.8 °C) (!) 113 19 (!) 190/98 96 %       Oxygen Therapy  SpO2: 97 %  O2 Device: None (Room air)    Estimated body mass index is 34.75 kg/m² as calculated from the following:    Height as of this encounter: 5' 2\" (1.575 m). Weight as of this encounter: 190 lb (86.2 kg). Intake/Output Summary (Last 24 hours) at 7/21/2022 1421  Last data filed at 7/21/2022 1148  Gross per 24 hour   Intake 0 ml   Output 1750 ml   Net -1750 ml         Physical Exam:   Blood pressure (!) 179/99, pulse (!) 116, temperature 98.1 °F (36.7 °C), temperature source Oral, resp. rate 19, height 5' 2\" (1.575 m), weight 190 lb (86.2 kg), SpO2 97 %. General-alert and oriented x3, cooperative at time of exam-very reluctant to decrease frequency or dosing of narcotics  Eyes-conjunctive are clear pupils equal round react to light extraocular muscles intact  Ears-no deformity-no drainage  Nares-no nasal deformity-no discharge-turbinates not significantly enlarged  Throat-oral mucosa moist, no erythema or exudate  Heart-regular rate and rhythm no rubs gallops clicks or murmurs. No JVD grossly  Lungs-clear auscultation palpation and percussion, symmetric excursion of the chest wall. No wheezing  Abdomen-no increased distention-NG tube to LIS persist.  No localized tenderness. Extremities-no significant edema, moves all extremities symmetrically. Skin-no obvious breakdown or significant rash  Neurologic-cranial nerves II through XII grossly intact, no focal motor deficits grossly. No tremor    Psychiatric-no suicidal ideations or homicidal ideations. Denies depressed thoughts.                               I have reviewed ordered lab tests and independently visualized imaging below:    Recent Labs:  Recent Results (from the past 48 hour(s))   POCT Glucose    Collection Time: 07/19/22  9:03 PM   Result Value Ref Range    POC Glucose 157 (H) 65 - 100 mg/dL    Performed by: Mj Brooks    CBC with Auto Differential    Collection Time: 07/20/22  5:18 AM   Result Value Ref Range    WBC 7.1 4.3 - 11.1 K/uL    RBC 4.77 4.05 - 5.2 M/uL    Hemoglobin 10.6 (L) 11.7 - 15.4 g/dL    Hematocrit 35.3 (L) 35.8 - 46.3 %    MCV 74.0 (L) 79.6 - 97.8 FL    MCH 22.2 (L) 26.1 - 32.9 PG    MCHC 30.0 (L) 31.4 - 35.0 g/dL    RDW 17.5 (H) 11.9 - 14.6 %    Platelets 442 442 - 054 K/uL    MPV Unable to calculate. Recommend adding IPF.  9.4 - 12.3 FL    nRBC 0.00 0.0 - 0.2 K/uL    Differential Type AUTOMATED      Seg Neutrophils 72 43 - 78 %    Lymphocytes 20 13 - 44 %    Monocytes 8 4.0 - 12.0 %    Eosinophils % 0 (L) 0.5 - 7.8 %    Basophils 0 0.0 - 2.0 %    Immature Granulocytes 0 0.0 - 5.0 %    Segs Absolute 5.1 1.7 - 8.2 K/UL    Absolute Lymph # 1.4 0.5 - 4.6 K/UL    Absolute Mono # 0.5 0.1 - 1.3 K/UL    Absolute Eos # 0.0 0.0 - 0.8 K/UL    Basophils Absolute 0.0 0.0 - 0.2 K/UL    Absolute Immature Granulocyte 0.0 0.0 - 0.5 K/UL   Magnesium    Collection Time: 07/20/22  5:18 AM   Result Value Ref Range    Magnesium 2.1 1.8 - 2.4 mg/dL   Comprehensive Metabolic Panel    Collection Time: 07/20/22  5:18 AM   Result Value Ref Range    Sodium 137 136 - 145 mmol/L    Potassium 3.9 3.5 - 5.1 mmol/L    Chloride 106 98 - 107 mmol/L    CO2 24 21 - 32 mmol/L    Anion Gap 7 7 - 16 mmol/L    Glucose 173 (H) 65 - 100 mg/dL    BUN 6 6 - 23 MG/DL    Creatinine 0.60 0.6 - 1.0 MG/DL    GFR African American >60 >60 ml/min/1.73m2    GFR Non- >60 >60 ml/min/1.73m2    Calcium 8.5 8.3 - 10.4 MG/DL    Total Bilirubin 1.2 (H) 0.2 - 1.1 MG/DL    ALT 49 12 - 65 U/L    AST 25 15 - 37 U/L    Alk Phosphatase 84 50 - 136 U/L    Total Protein 7.8 6.3 - 8.2 g/dL    Albumin 3.8 3.5 - 5.0 g/dL    Globulin 4.0 (H) 2.3 - 3.5 g/dL    Albumin/Globulin Ratio 1.0 (L) 1.2 - 3.5     Cortisol AM, Total    Collection Time: 07/20/22 5:18 AM   Result Value Ref Range    Cortisol - AM 39.3 (H) 7 - 25 ug/dL   TSH with Reflex    Collection Time: 07/20/22  5:18 AM   Result Value Ref Range    TSH w Free Thyroid if Abnormal 0.57 0.358 - 3.740 UIU/ML   Hemoglobin A1C    Collection Time: 07/20/22  5:18 AM   Result Value Ref Range    Hemoglobin A1C 7.9 (H) 4.8 - 5.6 %    eAG 180 mg/dL   POCT Glucose    Collection Time: 07/20/22  7:58 AM   Result Value Ref Range    POC Glucose 177 (H) 65 - 100 mg/dL    Performed by: [employer]ney[facility]CA    POCT Glucose    Collection Time: 07/20/22 11:35 AM   Result Value Ref Range    POC Glucose 153 (H) 65 - 100 mg/dL    Performed by: CombsCourtneyPCA    POCT Glucose    Collection Time: 07/20/22  4:41 PM   Result Value Ref Range    POC Glucose 142 (H) 65 - 100 mg/dL    Performed by: CombsCourtneyPCA    POCT Glucose    Collection Time: 07/20/22  8:37 PM   Result Value Ref Range    POC Glucose 165 (H) 65 - 100 mg/dL    Performed by: StormWindCNA    CBC with Auto Differential    Collection Time: 07/21/22  4:14 AM   Result Value Ref Range    WBC 13.5 (H) 4.3 - 11.1 K/uL    RBC 5.16 4.05 - 5.2 M/uL    Hemoglobin 11.5 (L) 11.7 - 15.4 g/dL    Hematocrit 38.9 35.8 - 46.3 %    MCV 75.4 (L) 79.6 - 97.8 FL    MCH 22.3 (L) 26.1 - 32.9 PG    MCHC 29.6 (L) 31.4 - 35.0 g/dL    RDW 18.4 (H) 11.9 - 14.6 %    Platelets 217 167 - 095 K/uL    MPV Unable to calculate. Recommend adding IPF.  9.4 - 12.3 FL    nRBC 0.00 0.0 - 0.2 K/uL    Differential Type AUTOMATED      Seg Neutrophils 85 (H) 43 - 78 %    Lymphocytes 8 (L) 13 - 44 %    Monocytes 7 4.0 - 12.0 %    Eosinophils % 0 (L) 0.5 - 7.8 %    Basophils 0 0.0 - 2.0 %    Immature Granulocytes 1 0.0 - 5.0 %    Segs Absolute 11.4 (H) 1.7 - 8.2 K/UL    Absolute Lymph # 1.0 0.5 - 4.6 K/UL    Absolute Mono # 0.9 0.1 - 1.3 K/UL    Absolute Eos # 0.0 0.0 - 0.8 K/UL    Basophils Absolute 0.0 0.0 - 0.2 K/UL    Absolute Immature Granulocyte 0.1 0.0 - 0.5 K/UL   Magnesium    Collection Time: 07/21/22  4:14 AM Result Value Ref Range    Magnesium 2.2 1.8 - 2.4 mg/dL   Comprehensive Metabolic Panel    Collection Time: 07/21/22  4:14 AM   Result Value Ref Range    Sodium 134 (L) 136 - 145 mmol/L    Potassium 4.0 3.5 - 5.1 mmol/L    Chloride 108 (H) 98 - 107 mmol/L    CO2 16 (L) 21 - 32 mmol/L    Anion Gap 10 7 - 16 mmol/L    Glucose 219 (H) 65 - 100 mg/dL    BUN 9 6 - 23 MG/DL    Creatinine 0.80 0.6 - 1.0 MG/DL    GFR African American >60 >60 ml/min/1.73m2    GFR Non- >60 >60 ml/min/1.73m2    Calcium 9.0 8.3 - 10.4 MG/DL    Total Bilirubin 1.2 (H) 0.2 - 1.1 MG/DL    ALT 43 12 - 65 U/L    AST 22 15 - 37 U/L    Alk Phosphatase 86 50 - 136 U/L    Total Protein 8.2 6.3 - 8.2 g/dL    Albumin 4.0 3.5 - 5.0 g/dL    Globulin 4.2 (H) 2.3 - 3.5 g/dL    Albumin/Globulin Ratio 1.0 (L) 1.2 - 3.5     POCT Glucose    Collection Time: 07/21/22  7:32 AM   Result Value Ref Range    POC Glucose 183 (H) 65 - 100 mg/dL    Performed by: Rachid    POCT Glucose    Collection Time: 07/21/22 11:47 AM   Result Value Ref Range    POC Glucose 168 (H) 65 - 100 mg/dL    Performed by: Rachid        Other Studies:  XR ABDOMEN (KUB) (SINGLE AP VIEW)   Final Result   The bowel gas pattern is normal. There is no evidence of   obstruction. There is no free air visualized on this supine view. There are no   renal calculi. The lung bases are clear. Nasogastric tube terminates just below   the left hemidiaphragm in the proximal stomach. Postop changes related to   gastric surgery are noted. XR ABDOMEN (KUB) (SINGLE AP VIEW)   Final Result   Enteric tube tip overlies the gastric body.                XR CHEST PORTABLE    (Results Pending)       Current Meds:  Current Facility-Administered Medications   Medication Dose Route Frequency    diazePAM (VALIUM) injection 5 mg  5 mg IntraVENous TID    methylnaltrexone (RELISTOR) injection 12 mg  12 mg SubCUTAneous Once    cloNIDine (CATAPRES) 0.3 MG/24HR 1 patch  1 patch TransDERmal Weekly enalaprilat (VASOTEC) injection 1.25 mg  1.25 mg IntraVENous 4 times per day    phenol 1.4 % mouth spray 1 spray  1 spray Mouth/Throat Q2H PRN    HYDROmorphone HCl PF (DILAUDID) injection 1 mg  1 mg IntraVENous Q4H PRN    prochlorperazine (COMPAZINE) injection 10 mg  10 mg IntraVENous Q6H PRN    hydrALAZINE (APRESOLINE) injection 20 mg  20 mg IntraVENous Q6H PRN    sodium chloride flush 0.9 % injection 5-40 mL  5-40 mL IntraVENous 2 times per day    sodium chloride flush 0.9 % injection 5-40 mL  5-40 mL IntraVENous PRN    0.9 % sodium chloride infusion   IntraVENous PRN    enoxaparin (LOVENOX) injection 40 mg  40 mg SubCUTAneous Daily    ondansetron (ZOFRAN-ODT) disintegrating tablet 8 mg  8 mg Oral Q8H PRN    Or    ondansetron (ZOFRAN) injection 4 mg  4 mg IntraVENous Q6H PRN    acetaminophen (TYLENOL) tablet 650 mg  650 mg Oral Q6H PRN    Or    acetaminophen (TYLENOL) suppository 650 mg  650 mg Rectal Q6H PRN    polyethylene glycol (GLYCOLAX) packet 17 g  17 g Oral Daily PRN    0.9% NaCl with KCl 40 mEq infusion   IntraVENous Continuous    pantoprazole (PROTONIX) 40 mg in sodium chloride (PF) 10 mL injection  40 mg IntraVENous Q12H    potassium chloride (KLOR-CON M) extended release tablet 40 mEq  40 mEq Oral PRN    Or    potassium bicarb-citric acid (EFFER-K) effervescent tablet 40 mEq  40 mEq Oral PRN    Or    potassium chloride 10 mEq/100 mL IVPB (Peripheral Line)  10 mEq IntraVENous PRN    magnesium sulfate 1000 mg in dextrose 5% 100 mL IVPB  1,000 mg IntraVENous PRN    glucose chewable tablet 16 g  4 tablet Oral PRN    dextrose bolus 10% 125 mL  125 mL IntraVENous PRN    Or    dextrose bolus 10% 250 mL  250 mL IntraVENous PRN    glucagon (rDNA) injection 1 mg  1 mg IntraMUSCular PRN    dextrose 5 % solution  100 mL/hr IntraVENous PRN    insulin lispro (HUMALOG) injection vial 0-8 Units  0-8 Units SubCUTAneous TID WC    insulin lispro (HUMALOG) injection vial 0-4 Units  0-4 Units SubCUTAneous Nightly

## 2022-07-21 NOTE — PROGRESS NOTES
Date of admission:  7/19/2022    Today's date:  7/21/2022    CC:     Nausea     HPI:   Patient reports no significant interval improvement of nausea or epigastric pain. She has continued to request opioid analgesia.      ROS:    Gen: No fevers, no chills   CV:   No chest pain, no SOB    Current Medications:     Current Facility-Administered Medications   Medication Dose Route Frequency    phenol 1.4 % mouth spray 1 spray  1 spray Mouth/Throat Q2H PRN    HYDROmorphone HCl PF (DILAUDID) injection 1 mg  1 mg IntraVENous Q4H PRN    prochlorperazine (COMPAZINE) injection 10 mg  10 mg IntraVENous Q6H PRN    diazePAM (VALIUM) injection 5 mg  5 mg IntraVENous BID    cloNIDine (CATAPRES) 0.2 MG/24HR 1 patch  1 patch TransDERmal Weekly    hydrALAZINE (APRESOLINE) injection 20 mg  20 mg IntraVENous Q6H PRN    sodium chloride flush 0.9 % injection 5-40 mL  5-40 mL IntraVENous 2 times per day    sodium chloride flush 0.9 % injection 5-40 mL  5-40 mL IntraVENous PRN    0.9 % sodium chloride infusion   IntraVENous PRN    enoxaparin (LOVENOX) injection 40 mg  40 mg SubCUTAneous Daily    ondansetron (ZOFRAN-ODT) disintegrating tablet 8 mg  8 mg Oral Q8H PRN    Or    ondansetron (ZOFRAN) injection 4 mg  4 mg IntraVENous Q6H PRN    acetaminophen (TYLENOL) tablet 650 mg  650 mg Oral Q6H PRN    Or    acetaminophen (TYLENOL) suppository 650 mg  650 mg Rectal Q6H PRN    polyethylene glycol (GLYCOLAX) packet 17 g  17 g Oral Daily PRN    0.9% NaCl with KCl 40 mEq infusion   IntraVENous Continuous    pantoprazole (PROTONIX) 40 mg in sodium chloride (PF) 10 mL injection  40 mg IntraVENous Q12H    potassium chloride (KLOR-CON M) extended release tablet 40 mEq  40 mEq Oral PRN    Or    potassium bicarb-citric acid (EFFER-K) effervescent tablet 40 mEq  40 mEq Oral PRN    Or    potassium chloride 10 mEq/100 mL IVPB (Peripheral Line)  10 mEq IntraVENous PRN    magnesium sulfate 1000 mg in dextrose 5% 100 mL IVPB  1,000 mg IntraVENous PRN thiamine (B-1) injection 500 mg  500 mg IntraVENous Daily    glucose chewable tablet 16 g  4 tablet Oral PRN    dextrose bolus 10% 125 mL  125 mL IntraVENous PRN    Or    dextrose bolus 10% 250 mL  250 mL IntraVENous PRN    glucagon (rDNA) injection 1 mg  1 mg IntraMUSCular PRN    dextrose 5 % solution  100 mL/hr IntraVENous PRN    insulin lispro (HUMALOG) injection vial 0-8 Units  0-8 Units SubCUTAneous TID WC    insulin lispro (HUMALOG) injection vial 0-4 Units  0-4 Units SubCUTAneous Nightly    pregabalin (LYRICA) capsule 100 mg  100 mg Oral BID    citalopram (CELEXA) tablet 40 mg  40 mg Oral Daily    diphenhydrAMINE (BENADRYL) injection 25 mg  25 mg IntraVENous Q6H PRN       Physical Exam:   Vitals:  BP (!) 162/79   Pulse (!) 123   Temp 98.2 °F (36.8 °C) (Oral)   Resp 20   Ht 5' 2\" (1.575 m)   Wt 190 lb (86.2 kg)   SpO2 98%   BMI 34.75 kg/m²   Intake/Output:  07/21 0701 - 07/21 1900  In: -   Out: 200   07/19 1901 - 07/21 0700  In: 2045.8 [I.V.:1995.8]  Out: 3250 [Urine:2300]    HEENT:  No scleral icterus, NG tube to intermittent wall suction   Abdomen: Soft, Mild epigastric tenderness without rebound, hypoactive  bowel sounds  Extremities:  No cyanosis, no leg edema  Neuro:  Alert and oriented to person, place, and time    Data:     Recent Results (from the past 24 hour(s))   POCT Glucose    Collection Time: 07/20/22 11:35 AM   Result Value Ref Range    POC Glucose 153 (H) 65 - 100 mg/dL    Performed by: GlidertneyPCA    POCT Glucose    Collection Time: 07/20/22  4:41 PM   Result Value Ref Range    POC Glucose 142 (H) 65 - 100 mg/dL    Performed by: CombsCourtneyPCA    POCT Glucose    Collection Time: 07/20/22  8:37 PM   Result Value Ref Range    POC Glucose 165 (H) 65 - 100 mg/dL    Performed by: Javier    CBC with Auto Differential    Collection Time: 07/21/22  4:14 AM   Result Value Ref Range    WBC 13.5 (H) 4.3 - 11.1 K/uL    RBC 5.16 4.05 - 5.2 M/uL    Hemoglobin 11.5 (L) 11.7 - 15.4 g/dL Hematocrit 38.9 35.8 - 46.3 %    MCV 75.4 (L) 79.6 - 97.8 FL    MCH 22.3 (L) 26.1 - 32.9 PG    MCHC 29.6 (L) 31.4 - 35.0 g/dL    RDW 18.4 (H) 11.9 - 14.6 %    Platelets 353 057 - 305 K/uL    MPV Unable to calculate. Recommend adding IPF. 9.4 - 12.3 FL    nRBC 0.00 0.0 - 0.2 K/uL    Differential Type AUTOMATED      Seg Neutrophils 85 (H) 43 - 78 %    Lymphocytes 8 (L) 13 - 44 %    Monocytes 7 4.0 - 12.0 %    Eosinophils % 0 (L) 0.5 - 7.8 %    Basophils 0 0.0 - 2.0 %    Immature Granulocytes 1 0.0 - 5.0 %    Segs Absolute 11.4 (H) 1.7 - 8.2 K/UL    Absolute Lymph # 1.0 0.5 - 4.6 K/UL    Absolute Mono # 0.9 0.1 - 1.3 K/UL    Absolute Eos # 0.0 0.0 - 0.8 K/UL    Basophils Absolute 0.0 0.0 - 0.2 K/UL    Absolute Immature Granulocyte 0.1 0.0 - 0.5 K/UL   Magnesium    Collection Time: 07/21/22  4:14 AM   Result Value Ref Range    Magnesium 2.2 1.8 - 2.4 mg/dL   Comprehensive Metabolic Panel    Collection Time: 07/21/22  4:14 AM   Result Value Ref Range    Sodium 134 (L) 136 - 145 mmol/L    Potassium 4.0 3.5 - 5.1 mmol/L    Chloride 108 (H) 98 - 107 mmol/L    CO2 16 (L) 21 - 32 mmol/L    Anion Gap 10 7 - 16 mmol/L    Glucose 219 (H) 65 - 100 mg/dL    BUN 9 6 - 23 MG/DL    Creatinine 0.80 0.6 - 1.0 MG/DL    GFR African American >60 >60 ml/min/1.73m2    GFR Non- >60 >60 ml/min/1.73m2    Calcium 9.0 8.3 - 10.4 MG/DL    Total Bilirubin 1.2 (H) 0.2 - 1.1 MG/DL    ALT 43 12 - 65 U/L    AST 22 15 - 37 U/L    Alk Phosphatase 86 50 - 136 U/L    Total Protein 8.2 6.3 - 8.2 g/dL    Albumin 4.0 3.5 - 5.0 g/dL    Globulin 4.2 (H) 2.3 - 3.5 g/dL    Albumin/Globulin Ratio 1.0 (L) 1.2 - 3.5     POCT Glucose    Collection Time: 07/21/22  7:32 AM   Result Value Ref Range    POC Glucose 183 (H) 65 - 100 mg/dL    Performed by: Rachid        Impression/Plan:       78-year-old female with poorly controlled diabetes and chronic abdominal pain, admitted with worsening nausea and epigastric pain. CT was unremarkable.  I had a lengthy discussion with patient and her  today regarding concern for ongoing chronic opiate use worsening symptoms of gastroparesis and constipation. 1. Will increase dosing of the diazepam today. 2. Check plain abdominal imaging for ileus. 3. Pending results of imaging, will consider methylnaltrexone. 4. NPO, IV fluids, NG tube to intermittent low wall suction. 5. Tentatively plan for EGD/EUS tomorrow. Although no radiographic evidence of pancreatitis, given her history of presumed sphincter of Oddi dysfunction, reevaluation of the pancreas would be helpful.      Signed:  Jerod Hampton MD  7/21/2022  8:42 AM

## 2022-07-22 ENCOUNTER — ANESTHESIA (OUTPATIENT)
Dept: ENDOSCOPY | Age: 52
DRG: 392 | End: 2022-07-22
Payer: MEDICARE

## 2022-07-22 ENCOUNTER — APPOINTMENT (OUTPATIENT)
Dept: GENERAL RADIOLOGY | Age: 52
DRG: 392 | End: 2022-07-22
Payer: MEDICARE

## 2022-07-22 LAB
ALBUMIN SERPL-MCNC: 3.6 G/DL (ref 3.5–5)
ALBUMIN/GLOB SERPL: 0.9 {RATIO} (ref 1.2–3.5)
ALP SERPL-CCNC: 81 U/L (ref 50–136)
ALT SERPL-CCNC: 36 U/L (ref 12–65)
ANION GAP SERPL CALC-SCNC: 11 MMOL/L (ref 7–16)
AST SERPL-CCNC: 17 U/L (ref 15–37)
BASOPHILS # BLD: 0 K/UL (ref 0–0.2)
BASOPHILS NFR BLD: 0 % (ref 0–2)
BILIRUB SERPL-MCNC: 0.9 MG/DL (ref 0.2–1.1)
BUN SERPL-MCNC: 8 MG/DL (ref 6–23)
CALCIUM SERPL-MCNC: 8.6 MG/DL (ref 8.3–10.4)
CHLORIDE SERPL-SCNC: 107 MMOL/L (ref 98–107)
CO2 SERPL-SCNC: 17 MMOL/L (ref 21–32)
CREAT SERPL-MCNC: 0.6 MG/DL (ref 0.6–1)
DIFFERENTIAL METHOD BLD: ABNORMAL
EOSINOPHIL # BLD: 0 K/UL (ref 0–0.8)
EOSINOPHIL NFR BLD: 0 % (ref 0.5–7.8)
ERYTHROCYTE [DISTWIDTH] IN BLOOD BY AUTOMATED COUNT: 18.6 % (ref 11.9–14.6)
GLOBULIN SER CALC-MCNC: 4.2 G/DL (ref 2.3–3.5)
GLUCOSE BLD STRIP.AUTO-MCNC: 173 MG/DL (ref 65–100)
GLUCOSE BLD STRIP.AUTO-MCNC: 182 MG/DL (ref 65–100)
GLUCOSE BLD STRIP.AUTO-MCNC: 189 MG/DL (ref 65–100)
GLUCOSE BLD STRIP.AUTO-MCNC: 224 MG/DL (ref 65–100)
GLUCOSE BLD STRIP.AUTO-MCNC: 231 MG/DL (ref 65–100)
GLUCOSE SERPL-MCNC: 199 MG/DL (ref 65–100)
HCT VFR BLD AUTO: 38.3 % (ref 35.8–46.3)
HGB BLD-MCNC: 11.3 G/DL (ref 11.7–15.4)
IMM GRANULOCYTES # BLD AUTO: 0.1 K/UL (ref 0–0.5)
IMM GRANULOCYTES NFR BLD AUTO: 1 % (ref 0–5)
LYMPHOCYTES # BLD: 1 K/UL (ref 0.5–4.6)
LYMPHOCYTES NFR BLD: 8 % (ref 13–44)
MAGNESIUM SERPL-MCNC: 2.2 MG/DL (ref 1.8–2.4)
MCH RBC QN AUTO: 22.3 PG (ref 26.1–32.9)
MCHC RBC AUTO-ENTMCNC: 29.5 G/DL (ref 31.4–35)
MCV RBC AUTO: 75.5 FL (ref 79.6–97.8)
MONOCYTES # BLD: 0.9 K/UL (ref 0.1–1.3)
MONOCYTES NFR BLD: 7 % (ref 4–12)
NEUTS SEG # BLD: 10.4 K/UL (ref 1.7–8.2)
NEUTS SEG NFR BLD: 84 % (ref 43–78)
NRBC # BLD: 0 K/UL (ref 0–0.2)
PLATELET # BLD AUTO: 195 K/UL (ref 150–450)
PMV BLD AUTO: 10.5 FL (ref 9.4–12.3)
POTASSIUM SERPL-SCNC: 3.8 MMOL/L (ref 3.5–5.1)
PROCALCITONIN SERPL-MCNC: <0.05 NG/ML (ref 0–0.49)
PROT SERPL-MCNC: 7.8 G/DL (ref 6.3–8.2)
RBC # BLD AUTO: 5.07 M/UL (ref 4.05–5.2)
SERVICE CMNT-IMP: ABNORMAL
SODIUM SERPL-SCNC: 135 MMOL/L (ref 136–145)
WBC # BLD AUTO: 12.5 K/UL (ref 4.3–11.1)

## 2022-07-22 PROCEDURE — 0D738ZZ DILATION OF LOWER ESOPHAGUS, VIA NATURAL OR ARTIFICIAL OPENING ENDOSCOPIC: ICD-10-PCS | Performed by: INTERNAL MEDICINE

## 2022-07-22 PROCEDURE — 3609018500 HC EGD US SCOPE W/ADJACENT STRUCTURES: Performed by: INTERNAL MEDICINE

## 2022-07-22 PROCEDURE — 0DHA8UZ INSERTION OF FEEDING DEVICE INTO JEJUNUM, VIA NATURAL OR ARTIFICIAL OPENING ENDOSCOPIC: ICD-10-PCS | Performed by: INTERNAL MEDICINE

## 2022-07-22 PROCEDURE — 2500000003 HC RX 250 WO HCPCS: Performed by: NURSE ANESTHETIST, CERTIFIED REGISTERED

## 2022-07-22 PROCEDURE — 6360000002 HC RX W HCPCS: Performed by: FAMILY MEDICINE

## 2022-07-22 PROCEDURE — 7100000000 HC PACU RECOVERY - FIRST 15 MIN: Performed by: INTERNAL MEDICINE

## 2022-07-22 PROCEDURE — 6360000002 HC RX W HCPCS: Performed by: ANESTHESIOLOGY

## 2022-07-22 PROCEDURE — 1100000000 HC RM PRIVATE

## 2022-07-22 PROCEDURE — 6360000002 HC RX W HCPCS: Performed by: NURSE ANESTHETIST, CERTIFIED REGISTERED

## 2022-07-22 PROCEDURE — 2580000003 HC RX 258: Performed by: HOSPITALIST

## 2022-07-22 PROCEDURE — 80053 COMPREHEN METABOLIC PANEL: CPT

## 2022-07-22 PROCEDURE — 85025 COMPLETE CBC W/AUTO DIFF WBC: CPT

## 2022-07-22 PROCEDURE — 88305 TISSUE EXAM BY PATHOLOGIST: CPT

## 2022-07-22 PROCEDURE — 2500000003 HC RX 250 WO HCPCS: Performed by: INTERNAL MEDICINE

## 2022-07-22 PROCEDURE — 7100000001 HC PACU RECOVERY - ADDTL 15 MIN: Performed by: INTERNAL MEDICINE

## 2022-07-22 PROCEDURE — 3609012400 HC EGD TRANSORAL BIOPSY SINGLE/MULTIPLE: Performed by: INTERNAL MEDICINE

## 2022-07-22 PROCEDURE — C1726 CATH, BAL DIL, NON-VASCULAR: HCPCS | Performed by: INTERNAL MEDICINE

## 2022-07-22 PROCEDURE — 2580000003 HC RX 258: Performed by: ANESTHESIOLOGY

## 2022-07-22 PROCEDURE — 2580000003 HC RX 258: Performed by: FAMILY MEDICINE

## 2022-07-22 PROCEDURE — 2709999900 HC NON-CHARGEABLE SUPPLY: Performed by: INTERNAL MEDICINE

## 2022-07-22 PROCEDURE — A4216 STERILE WATER/SALINE, 10 ML: HCPCS | Performed by: FAMILY MEDICINE

## 2022-07-22 PROCEDURE — 6360000002 HC RX W HCPCS: Performed by: INTERNAL MEDICINE

## 2022-07-22 PROCEDURE — 3700000001 HC ADD 15 MINUTES (ANESTHESIA): Performed by: INTERNAL MEDICINE

## 2022-07-22 PROCEDURE — 84145 PROCALCITONIN (PCT): CPT

## 2022-07-22 PROCEDURE — 36415 COLL VENOUS BLD VENIPUNCTURE: CPT

## 2022-07-22 PROCEDURE — 3700000000 HC ANESTHESIA ATTENDED CARE: Performed by: INTERNAL MEDICINE

## 2022-07-22 PROCEDURE — 0FJG8ZZ INSPECTION OF PANCREAS, VIA NATURAL OR ARTIFICIAL OPENING ENDOSCOPIC: ICD-10-PCS | Performed by: INTERNAL MEDICINE

## 2022-07-22 PROCEDURE — 2700000000 HC OXYGEN THERAPY PER DAY

## 2022-07-22 PROCEDURE — 0DB68ZX EXCISION OF STOMACH, VIA NATURAL OR ARTIFICIAL OPENING ENDOSCOPIC, DIAGNOSTIC: ICD-10-PCS | Performed by: INTERNAL MEDICINE

## 2022-07-22 PROCEDURE — 82962 GLUCOSE BLOOD TEST: CPT

## 2022-07-22 PROCEDURE — 6370000000 HC RX 637 (ALT 250 FOR IP): Performed by: FAMILY MEDICINE

## 2022-07-22 PROCEDURE — C9113 INJ PANTOPRAZOLE SODIUM, VIA: HCPCS | Performed by: FAMILY MEDICINE

## 2022-07-22 PROCEDURE — 71045 X-RAY EXAM CHEST 1 VIEW: CPT

## 2022-07-22 PROCEDURE — 6370000000 HC RX 637 (ALT 250 FOR IP): Performed by: INTERNAL MEDICINE

## 2022-07-22 PROCEDURE — 2500000003 HC RX 250 WO HCPCS: Performed by: HOSPITALIST

## 2022-07-22 PROCEDURE — C1753 CATH, INTRAVAS ULTRASOUND: HCPCS | Performed by: INTERNAL MEDICINE

## 2022-07-22 PROCEDURE — 88312 SPECIAL STAINS GROUP 1: CPT

## 2022-07-22 PROCEDURE — 83735 ASSAY OF MAGNESIUM: CPT

## 2022-07-22 RX ORDER — DIPHENHYDRAMINE HYDROCHLORIDE 50 MG/ML
12.5 INJECTION INTRAMUSCULAR; INTRAVENOUS
Status: DISCONTINUED | OUTPATIENT
Start: 2022-07-22 | End: 2022-07-22 | Stop reason: HOSPADM

## 2022-07-22 RX ORDER — HYDROMORPHONE HYDROCHLORIDE 2 MG/ML
0.25 INJECTION, SOLUTION INTRAMUSCULAR; INTRAVENOUS; SUBCUTANEOUS EVERY 5 MIN PRN
Status: DISCONTINUED | OUTPATIENT
Start: 2022-07-22 | End: 2022-07-22

## 2022-07-22 RX ORDER — DEXAMETHASONE SODIUM PHOSPHATE 4 MG/ML
INJECTION, SOLUTION INTRA-ARTICULAR; INTRALESIONAL; INTRAMUSCULAR; INTRAVENOUS; SOFT TISSUE PRN
Status: DISCONTINUED | OUTPATIENT
Start: 2022-07-22 | End: 2022-07-22 | Stop reason: SDUPTHER

## 2022-07-22 RX ORDER — SODIUM CHLORIDE 9 MG/ML
INJECTION, SOLUTION INTRAVENOUS PRN
Status: DISCONTINUED | OUTPATIENT
Start: 2022-07-22 | End: 2022-07-22 | Stop reason: HOSPADM

## 2022-07-22 RX ORDER — SODIUM CHLORIDE, SODIUM LACTATE, POTASSIUM CHLORIDE, CALCIUM CHLORIDE 600; 310; 30; 20 MG/100ML; MG/100ML; MG/100ML; MG/100ML
INJECTION, SOLUTION INTRAVENOUS CONTINUOUS
Status: DISCONTINUED | OUTPATIENT
Start: 2022-07-22 | End: 2022-07-22 | Stop reason: HOSPADM

## 2022-07-22 RX ORDER — PROCHLORPERAZINE EDISYLATE 5 MG/ML
5 INJECTION INTRAMUSCULAR; INTRAVENOUS
Status: COMPLETED | OUTPATIENT
Start: 2022-07-22 | End: 2022-07-22

## 2022-07-22 RX ORDER — HEPARIN SODIUM (PORCINE) LOCK FLUSH IV SOLN 100 UNIT/ML 100 UNIT/ML
300 SOLUTION INTRAVENOUS PRN
Status: DISCONTINUED | OUTPATIENT
Start: 2022-07-22 | End: 2022-07-28 | Stop reason: HOSPADM

## 2022-07-22 RX ORDER — SODIUM CHLORIDE 0.9 % (FLUSH) 0.9 %
5-40 SYRINGE (ML) INJECTION EVERY 12 HOURS SCHEDULED
Status: DISCONTINUED | OUTPATIENT
Start: 2022-07-22 | End: 2022-07-22 | Stop reason: HOSPADM

## 2022-07-22 RX ORDER — MORPHINE SULFATE 15 MG/1
15 TABLET ORAL EVERY 4 HOURS PRN
Status: DISCONTINUED | OUTPATIENT
Start: 2022-07-22 | End: 2022-07-24

## 2022-07-22 RX ORDER — OXYCODONE HYDROCHLORIDE 5 MG/1
5 TABLET ORAL
Status: DISCONTINUED | OUTPATIENT
Start: 2022-07-22 | End: 2022-07-22 | Stop reason: HOSPADM

## 2022-07-22 RX ORDER — SUCCINYLCHOLINE CHLORIDE 20 MG/ML
INJECTION INTRAMUSCULAR; INTRAVENOUS PRN
Status: DISCONTINUED | OUTPATIENT
Start: 2022-07-22 | End: 2022-07-22 | Stop reason: SDUPTHER

## 2022-07-22 RX ORDER — HYDROMORPHONE HYDROCHLORIDE 2 MG/ML
0.5 INJECTION, SOLUTION INTRAMUSCULAR; INTRAVENOUS; SUBCUTANEOUS EVERY 5 MIN PRN
Status: DISCONTINUED | OUTPATIENT
Start: 2022-07-22 | End: 2022-07-22

## 2022-07-22 RX ORDER — LABETALOL HYDROCHLORIDE 5 MG/ML
10 INJECTION, SOLUTION INTRAVENOUS
Status: DISCONTINUED | OUTPATIENT
Start: 2022-07-22 | End: 2022-07-22 | Stop reason: HOSPADM

## 2022-07-22 RX ORDER — HEPARIN SODIUM (PORCINE) LOCK FLUSH IV SOLN 100 UNIT/ML 100 UNIT/ML
300 SOLUTION INTRAVENOUS PRN
Status: COMPLETED | OUTPATIENT
Start: 2022-07-22 | End: 2022-07-22

## 2022-07-22 RX ORDER — ROCURONIUM BROMIDE 10 MG/ML
INJECTION, SOLUTION INTRAVENOUS PRN
Status: DISCONTINUED | OUTPATIENT
Start: 2022-07-22 | End: 2022-07-22 | Stop reason: SDUPTHER

## 2022-07-22 RX ORDER — DIAZEPAM 5 MG/ML
5 INJECTION, SOLUTION INTRAMUSCULAR; INTRAVENOUS ONCE
Status: COMPLETED | OUTPATIENT
Start: 2022-07-22 | End: 2022-07-22

## 2022-07-22 RX ORDER — SODIUM CHLORIDE, SODIUM LACTATE, POTASSIUM CHLORIDE, AND CALCIUM CHLORIDE .6; .31; .03; .02 G/100ML; G/100ML; G/100ML; G/100ML
500 INJECTION, SOLUTION INTRAVENOUS ONCE
Status: CANCELLED | OUTPATIENT
Start: 2022-07-22 | End: 2022-07-22

## 2022-07-22 RX ORDER — LIDOCAINE HYDROCHLORIDE 20 MG/ML
INJECTION, SOLUTION EPIDURAL; INFILTRATION; INTRACAUDAL; PERINEURAL PRN
Status: DISCONTINUED | OUTPATIENT
Start: 2022-07-22 | End: 2022-07-22 | Stop reason: SDUPTHER

## 2022-07-22 RX ORDER — LIDOCAINE HYDROCHLORIDE 10 MG/ML
1 INJECTION, SOLUTION INFILTRATION; PERINEURAL
Status: DISCONTINUED | OUTPATIENT
Start: 2022-07-22 | End: 2022-07-22 | Stop reason: HOSPADM

## 2022-07-22 RX ORDER — HALOPERIDOL 5 MG/ML
1 INJECTION INTRAMUSCULAR
Status: DISCONTINUED | OUTPATIENT
Start: 2022-07-22 | End: 2022-07-22 | Stop reason: HOSPADM

## 2022-07-22 RX ORDER — SODIUM CHLORIDE 0.9 % (FLUSH) 0.9 %
5-40 SYRINGE (ML) INJECTION PRN
Status: DISCONTINUED | OUTPATIENT
Start: 2022-07-22 | End: 2022-07-22 | Stop reason: HOSPADM

## 2022-07-22 RX ORDER — ONDANSETRON 2 MG/ML
INJECTION INTRAMUSCULAR; INTRAVENOUS PRN
Status: DISCONTINUED | OUTPATIENT
Start: 2022-07-22 | End: 2022-07-22 | Stop reason: SDUPTHER

## 2022-07-22 RX ORDER — PROPOFOL 10 MG/ML
INJECTION, EMULSION INTRAVENOUS PRN
Status: DISCONTINUED | OUTPATIENT
Start: 2022-07-22 | End: 2022-07-22 | Stop reason: SDUPTHER

## 2022-07-22 RX ORDER — HYDRALAZINE HYDROCHLORIDE 20 MG/ML
10 INJECTION INTRAMUSCULAR; INTRAVENOUS
Status: DISCONTINUED | OUTPATIENT
Start: 2022-07-22 | End: 2022-07-22 | Stop reason: HOSPADM

## 2022-07-22 RX ADMIN — SODIUM CHLORIDE, POTASSIUM CHLORIDE, SODIUM LACTATE AND CALCIUM CHLORIDE: 600; 310; 30; 20 INJECTION, SOLUTION INTRAVENOUS at 08:35

## 2022-07-22 RX ADMIN — PROCHLORPERAZINE EDISYLATE 5 MG: 5 INJECTION INTRAMUSCULAR; INTRAVENOUS at 10:40

## 2022-07-22 RX ADMIN — PREGABALIN 100 MG: 100 CAPSULE ORAL at 13:12

## 2022-07-22 RX ADMIN — ENALAPRILAT 1.25 MG: 2.5 INJECTION INTRAVENOUS at 17:23

## 2022-07-22 RX ADMIN — ENALAPRILAT 1.25 MG: 2.5 INJECTION INTRAVENOUS at 13:03

## 2022-07-22 RX ADMIN — Medication 10 ML: at 11:10

## 2022-07-22 RX ADMIN — ONDANSETRON 4 MG: 2 INJECTION INTRAMUSCULAR; INTRAVENOUS at 02:07

## 2022-07-22 RX ADMIN — HEPARIN 300 UNITS: 100 SYRINGE at 10:06

## 2022-07-22 RX ADMIN — DIAZEPAM 5 MG: 10 INJECTION, SOLUTION INTRAMUSCULAR; INTRAVENOUS at 20:24

## 2022-07-22 RX ADMIN — SODIUM CHLORIDE, PRESERVATIVE FREE 10 ML: 5 INJECTION INTRAVENOUS at 21:31

## 2022-07-22 RX ADMIN — MORPHINE SULFATE 15 MG: 15 TABLET ORAL at 20:25

## 2022-07-22 RX ADMIN — PROPOFOL 200 MG: 10 INJECTION, EMULSION INTRAVENOUS at 09:10

## 2022-07-22 RX ADMIN — PROCHLORPERAZINE EDISYLATE 10 MG: 5 INJECTION INTRAMUSCULAR; INTRAVENOUS at 06:49

## 2022-07-22 RX ADMIN — PREGABALIN 100 MG: 100 CAPSULE ORAL at 20:25

## 2022-07-22 RX ADMIN — PROCHLORPERAZINE EDISYLATE 10 MG: 5 INJECTION INTRAMUSCULAR; INTRAVENOUS at 20:24

## 2022-07-22 RX ADMIN — SODIUM CHLORIDE 40 MG: 9 INJECTION INTRAMUSCULAR; INTRAVENOUS; SUBCUTANEOUS at 11:06

## 2022-07-22 RX ADMIN — DIAZEPAM 5 MG: 10 INJECTION, SOLUTION INTRAMUSCULAR; INTRAVENOUS at 11:05

## 2022-07-22 RX ADMIN — MORPHINE SULFATE 15 MG: 15 TABLET ORAL at 15:52

## 2022-07-22 RX ADMIN — ROCURONIUM BROMIDE 5 MG: 50 INJECTION, SOLUTION INTRAVENOUS at 09:10

## 2022-07-22 RX ADMIN — PHENOL 1 SPRAY: 1.5 LIQUID ORAL at 16:07

## 2022-07-22 RX ADMIN — ONDANSETRON 4 MG: 2 INJECTION INTRAMUSCULAR; INTRAVENOUS at 09:37

## 2022-07-22 RX ADMIN — HYDROMORPHONE HYDROCHLORIDE 1 MG: 1 INJECTION, SOLUTION INTRAMUSCULAR; INTRAVENOUS; SUBCUTANEOUS at 07:43

## 2022-07-22 RX ADMIN — SODIUM CHLORIDE, POTASSIUM CHLORIDE, SODIUM LACTATE AND CALCIUM CHLORIDE: 600; 310; 30; 20 INJECTION, SOLUTION INTRAVENOUS at 08:31

## 2022-07-22 RX ADMIN — INSULIN LISPRO 2 UNITS: 100 INJECTION, SOLUTION INTRAVENOUS; SUBCUTANEOUS at 17:19

## 2022-07-22 RX ADMIN — ENOXAPARIN SODIUM 40 MG: 100 INJECTION SUBCUTANEOUS at 11:06

## 2022-07-22 RX ADMIN — DIAZEPAM 5 MG: 10 INJECTION, SOLUTION INTRAMUSCULAR; INTRAVENOUS at 05:09

## 2022-07-22 RX ADMIN — CITALOPRAM HYDROBROMIDE 40 MG: 10 TABLET ORAL at 13:12

## 2022-07-22 RX ADMIN — LIDOCAINE HYDROCHLORIDE 30 MG: 20 INJECTION, SOLUTION EPIDURAL; INFILTRATION; INTRACAUDAL; PERINEURAL at 09:10

## 2022-07-22 RX ADMIN — HYDROMORPHONE HYDROCHLORIDE 1 MG: 1 INJECTION, SOLUTION INTRAMUSCULAR; INTRAVENOUS; SUBCUTANEOUS at 02:07

## 2022-07-22 RX ADMIN — DIPHENHYDRAMINE HYDROCHLORIDE 25 MG: 50 INJECTION, SOLUTION INTRAMUSCULAR; INTRAVENOUS at 22:26

## 2022-07-22 RX ADMIN — DEXAMETHASONE SODIUM PHOSPHATE 4 MG: 4 INJECTION, SOLUTION INTRAMUSCULAR; INTRAVENOUS at 09:37

## 2022-07-22 RX ADMIN — ENALAPRILAT 1.25 MG: 2.5 INJECTION INTRAVENOUS at 05:09

## 2022-07-22 RX ADMIN — Medication 160 MG: at 09:10

## 2022-07-22 RX ADMIN — SODIUM BICARBONATE: 84 INJECTION, SOLUTION INTRAVENOUS at 11:45

## 2022-07-22 RX ADMIN — DIAZEPAM 5 MG: 10 INJECTION, SOLUTION INTRAMUSCULAR; INTRAVENOUS at 17:22

## 2022-07-22 RX ADMIN — SODIUM BICARBONATE: 84 INJECTION, SOLUTION INTRAVENOUS at 22:27

## 2022-07-22 RX ADMIN — PROCHLORPERAZINE EDISYLATE 10 MG: 5 INJECTION INTRAMUSCULAR; INTRAVENOUS at 00:09

## 2022-07-22 RX ADMIN — SODIUM CHLORIDE 40 MG: 9 INJECTION INTRAMUSCULAR; INTRAVENOUS; SUBCUTANEOUS at 21:22

## 2022-07-22 RX ADMIN — SODIUM CHLORIDE, PRESERVATIVE FREE 10 ML: 5 INJECTION INTRAVENOUS at 11:10

## 2022-07-22 RX ADMIN — DIPHENHYDRAMINE HYDROCHLORIDE 25 MG: 50 INJECTION, SOLUTION INTRAMUSCULAR; INTRAVENOUS at 15:51

## 2022-07-22 ASSESSMENT — PAIN DESCRIPTION - DESCRIPTORS
DESCRIPTORS: SHARP
DESCRIPTORS: SORE
DESCRIPTORS: SORE
DESCRIPTORS: ACHING
DESCRIPTORS: SORE
DESCRIPTORS: ACHING

## 2022-07-22 ASSESSMENT — PAIN - FUNCTIONAL ASSESSMENT
PAIN_FUNCTIONAL_ASSESSMENT: NONE - DENIES PAIN
PAIN_FUNCTIONAL_ASSESSMENT: NONE - DENIES PAIN

## 2022-07-22 ASSESSMENT — PAIN SCALES - GENERAL
PAINLEVEL_OUTOF10: 8
PAINLEVEL_OUTOF10: 8
PAINLEVEL_OUTOF10: 4
PAINLEVEL_OUTOF10: 7
PAINLEVEL_OUTOF10: 8
PAINLEVEL_OUTOF10: 8
PAINLEVEL_OUTOF10: 0

## 2022-07-22 ASSESSMENT — PAIN DESCRIPTION - ORIENTATION
ORIENTATION: MID
ORIENTATION: MID
ORIENTATION: ANTERIOR
ORIENTATION: ANTERIOR

## 2022-07-22 ASSESSMENT — PAIN DESCRIPTION - LOCATION
LOCATION: ABDOMEN
LOCATION: THROAT;ABDOMEN
LOCATION: THROAT
LOCATION: THROAT;ABDOMEN

## 2022-07-22 ASSESSMENT — PAIN DESCRIPTION - PAIN TYPE: TYPE: SURGICAL PAIN

## 2022-07-22 NOTE — ANESTHESIA PROCEDURE NOTES
Airway  Date/Time: 7/22/2022 9:12 AM  Urgency: elective      General Information and Staff    Patient location during procedure: OR  Anesthesiologist: Kimberly Bailey MD  Resident/CRNA: Cyrilla Mohs, APRN - CRNA  Performed: resident/CRNA     Indications and Patient Condition  Indications for airway management: anesthesia  Spontaneous Ventilation: absent  Sedation level: deep  Preoxygenated: yes  Patient position: sniffing  MILS maintained throughout  Mask difficulty assessment: not attempted    Final Airway Details  Final airway type: endotracheal airway      Successful airway: ETT  Cuffed: yes   Successful intubation technique: direct laryngoscopy  Facilitating devices/methods: intubating stylet and cricoid pressure  Endotracheal tube insertion site: oral  Blade: Hetal  Blade size: #3  ETT size (mm): 7.0  Cormack-Lehane Classification: grade I - full view of glottis  Placement verified by: chest auscultation and capnometry   Measured from: gums  ETT to gums (cm): 22  Number of attempts at approach: 1  Ventilation between attempts: bag mask  Number of other approaches attempted: 0

## 2022-07-22 NOTE — PROGRESS NOTES
0430: Pt very anxious and has worked herself up to almost tears and did not sleep well last night. BP has been elevated: 173/78. PRN hydralazine given earlier in shift did not bring BP down. Has scheduled valium 3x day. Benadryl has been given Q6 PRN. Messaged hospitalist. Orders received for 1x dose of 5mg valium IV.     0530: Pt resting quietly in bed.       0600: Chest XR resulted. Suggestion to advance NGT by at least 5cm. Messaged hospitalist. Advance the tube 5cm. 4039: NGT placed at 53cm connected to LIS.

## 2022-07-22 NOTE — PROGRESS NOTES
Patient S/P EGD and endoscopic US on 0722. TF to be started today via [facility]. CM following for needs.

## 2022-07-22 NOTE — H&P
History and Physical for Endoscopic Ultrasound             Date: 7/22/2022     History of Present Illness:  Patient presents to undergo EGD and endoscopic ultrasound. Patient has intractable nausea and history of prior acute pancreatitis. She is status post Nissen fundoplication and gastroenteric anastomosis.      Past Medical History:   Diagnosis Date    Anemia     blood transfusion 2013 X1 d/t \"perforated bowel\"-- Fe infusions 12/2017--- denies any current iron infusions 12/17/2019    Anxiety and depression     managed with meds    C. difficile diarrhea 2013    in 3820 after complicated ERCP    Cervical dysplasia 1990s    Chronic insomnia     ambien     Chronic nausea     Chronic pain     all over     Chronic pancreatitis ([address]     COVID-19 vaccine series completed 04/21/2021    3/29/2021 Pfizer     Dehydration     IVFs through port as needed for this     Encounter for insertion of venous access port     Left chest port    Esophageal stricture 2017    Fibromyalgia     managed with meds    Former cigarette smoker     GERD (gastroesophageal reflux disease)     controlled with med    History of kidney stones 03/2021    X1-naturally pass    HLD (hyperlipidemia)     pt denies     IBS (irritable bowel syndrome)     Migraine headache     does not have often but did have one recently; just takes tylenol    Nausea & vomiting     pt reports she does better with phenergan than zofran - causes HA    Nonalcoholic fatty liver disease     Pancreatitis 6/23/2014    PUD (peptic ulcer disease) 06/2017    still having issues takes meds     Severe protein-calorie malnutrition ([address] 4/26/2013    Sinus tachycardia     per pt-- no tx needed    Sphincter of Oddi dysfunction     Type 2 diabetes mellitus ([address]     type 2-- sqbs am avg 150--- Hypo symptoms at <100, Insulin dependent; last A1C was 11/3/2020 = 9.0     Past Surgical History:   Procedure Laterality Date    CARPAL TUNNEL RELEASE Right     along with trigger finger (See Comments) and Rash     blisters  tegaderm  Paper tape too      Metronidazole Diarrhea and Nausea And Vomiting    Atorvastatin Myalgia    Iodine Other (See Comments)     Sweaty and clammy    Statins Myalgia    Barium Iodide Nausea And Vomiting     Diaphoresis      Barium Sulfate Palpitations    Insulin Lispro Nausea And Vomiting    Insulins Nausea And Vomiting     Humalog only    Metformin Nausea And Vomiting     Stomach pain  Stomach pain  Stomach pain  Stomach pain       Current Facility-Administered Medications   Medication Dose Route Frequency    diazePAM (VALIUM) injection 5 mg  5 mg IntraVENous TID    cloNIDine (CATAPRES) 0.3 MG/24HR 1 patch  1 patch TransDERmal Weekly    enalaprilat (VASOTEC) injection 1.25 mg  1.25 mg IntraVENous 4 times per day    sodium bicarbonate 25 mEq in sodium chloride 0.45 % 1,000 mL infusion   IntraVENous Continuous    phenol 1.4 % mouth spray 1 spray  1 spray Mouth/Throat Q2H PRN    HYDROmorphone HCl PF (DILAUDID) injection 1 mg  1 mg IntraVENous Q4H PRN    prochlorperazine (COMPAZINE) injection 10 mg  10 mg IntraVENous Q6H PRN    hydrALAZINE (APRESOLINE) injection 20 mg  20 mg IntraVENous Q6H PRN    sodium chloride flush 0.9 % injection 5-40 mL  5-40 mL IntraVENous 2 times per day    sodium chloride flush 0.9 % injection 5-40 mL  5-40 mL IntraVENous PRN    0.9 % sodium chloride infusion   IntraVENous PRN    enoxaparin (LOVENOX) injection 40 mg  40 mg SubCUTAneous Daily    ondansetron (ZOFRAN-ODT) disintegrating tablet 8 mg  8 mg Oral Q8H PRN    Or    ondansetron (ZOFRAN) injection 4 mg  4 mg IntraVENous Q6H PRN    acetaminophen (TYLENOL) tablet 650 mg  650 mg Oral Q6H PRN    Or    acetaminophen (TYLENOL) suppository 650 mg  650 mg Rectal Q6H PRN    polyethylene glycol (GLYCOLAX) packet 17 g  17 g Oral Daily PRN    pantoprazole (PROTONIX) 40 mg in sodium chloride (PF) 10 mL injection  40 mg IntraVENous Q12H    potassium chloride (KLOR-CON M) extended release tablet 40 mEq  40 mEq Oral PRN    Or    potassium bicarb-citric acid (EFFER-K) effervescent tablet 40 mEq  40 mEq Oral PRN    Or    potassium chloride 10 mEq/100 mL IVPB (Peripheral Line)  10 mEq IntraVENous PRN    magnesium sulfate 1000 mg in dextrose 5% 100 mL IVPB  1,000 mg IntraVENous PRN    glucose chewable tablet 16 g  4 tablet Oral PRN    dextrose bolus 10% 125 mL  125 mL IntraVENous PRN    Or    dextrose bolus 10% 250 mL  250 mL IntraVENous PRN    glucagon (rDNA) injection 1 mg  1 mg IntraMUSCular PRN    dextrose 5 % solution  100 mL/hr IntraVENous PRN    insulin lispro (HUMALOG) injection vial 0-8 Units  0-8 Units SubCUTAneous TID WC    insulin lispro (HUMALOG) injection vial 0-4 Units  0-4 Units SubCUTAneous Nightly    pregabalin (LYRICA) capsule 100 mg  100 mg Oral BID    citalopram (CELEXA) tablet 40 mg  40 mg Oral Daily    diphenhydrAMINE (BENADRYL) injection 25 mg  25 mg IntraVENous Q6H PRN        Review of Systems:  A detailed 10 organ review of systems is obtained with pertinent positives as listed in the History of Present Illness. All others are negative. Objective:     Physical Exam:  BP (!) 173/78 Comment: pt anxious  Pulse (!) 122   Temp 97.7 °F (36.5 °C) (Oral)   Resp 19   Ht 5' 2\" (1.575 m)   Wt 190 lb (86.2 kg)   SpO2 96%   BMI 34.75 kg/m²    General:  Alert and oriented. Heart: Regular rate and rhythm  Lungs:  Clear to auscultation bilaterally  Abdomen: Soft, nontender, nondistended    Impression/Plan:     Proceed with EGD and EUS as planned. I have discussed with the patient the technique, benefits, alternatives, and risks of the procedure, including medication reaction, immediate or delayed bleeding, or perforation of the gastrointestinal tract.     Signed By: Yarely Staley MD     July 22, 2022

## 2022-07-22 NOTE — ANESTHESIA POSTPROCEDURE EVALUATION
Department of Anesthesiology  Postprocedure Note    Patient: Angelika Bhardwaj  MRN: [id number]  YOB: 1970  Date of evaluation: 7/22/2022      Procedure Summary     Date: 07/22/22 Room / Location: [facility] ENDO FLOURO 1 / [facility] ENDOSCOPY    Anesthesia Start: 0901 Anesthesia Stop: 1001    Procedures:       ENDOSCOPIC ULTRASOUND/35 DTBM 100 (Upper GI Region)      EGD BIOPSY With balloon dialtion (Upper GI Region) Diagnosis:       Epigastric pain      Nausea and vomiting, intractability of vomiting not specified, unspecified vomiting type      (Epigastric pain [R10.13])      (Nausea and vomiting, intractability of vomiting not specified, unspecified vomiting type [R11.2])    Surgeons: Maile Alva MD Responsible Provider: Leenane Crabtree MD    Anesthesia Type: TIVA, general ASA Status: 3          Anesthesia Type: No value filed.     Karishma Phase I: Karishma Score: 8    Karishma Phase II:        Anesthesia Post Evaluation    Patient location during evaluation: PACU  Patient participation: complete - patient participated  Level of consciousness: awake and alert  Airway patency: patent  Nausea & Vomiting: no nausea and no vomiting  Complications: no  Cardiovascular status: hemodynamically stable  Respiratory status: acceptable  Hydration status: euvolemic  Multimodal analgesia pain management approach

## 2022-07-22 NOTE — PROGRESS NOTES
infiltration of the liver. Colonoscopy in 2018 with descending TA colon polyp and recall 4/2023. GES was reccommended after EGD wth bezoar. Subjective/24hr Events (07/22/22): Patient seen following EGD. She had dilation of stricture. Small bowel placement of Dobbhoff tube. Recommended stop motility agents twice daily PPI avoid NSAIDs discontinue IV narcotics and may need GJ tube pending clinical course. Patient is extremely worried about attenuating narcotics. Chronic pain syndrome and narcotic dependence. No complaints of chest pain cough dyspnea. Leukocytosis improved 12.5 thousand today and procalcitonin negative-observing. Review of Systems:  10 systems reviewed and negative except as noted in HPI. Assessment & Plan:      Principal Problem:    Intractable nausea and vomiting  7/20-significant NG tube output noted-continue same for now. Reluctantly I am increasing narcotics and ordering Phenergan for nausea-patient very likely narcotic dependent-await further recommendations GI.  7/21--continue NG tube to LIS-possible EGD and endoscopic ultrasound tomorrow July 22. Counseled patient regarding attenuate narcotics but she is reluctant  7/22--nasogastric tube removed, Dobbhoff tube placed in small bowel at time of EGD-dilation of esophageal stricture at that time. GI recommended discontinue IV narcotics. May need GJ tube. Twice daily PPI avoid NSAIDs stop motility agent. Monitor. Meds with sips of water. GERD with stricture - s/p EGD 6/8/22 with esophageal stricture dilated to balloon 18 mm and gastric bezoar. IV PPI BID.  7/20-this plan unchanged. GI considering possible gastrostomy tube placement. 7/21-as above.7/22--twice daily PPI-status post dilation distal esophageal stricture. Chronic pain syndrome/narcotic dependence  7/22--discontinue IV narcotics-patient took high-dose morphine with long-acting and immediate release.   We will stop long-acting and attenuate immediate release but will need to increase frequency and will need to taper decreased to avoid severe withdrawal.  Agree with current Valium and clonidine added for BP which should help. T2DM -   7/20-continue current sliding scale addition of long-acting insulin as needed. 7/21--fair control-continue same7/22--changes. CASSIE/Mood d/o - resume citalopram 40 mg pending qtc assessment. Prn ativan.   7/21--clinically stable-continue same meds. Leukocytosis with left shift/SIRS  7/21--no clear source-check blood cultures x2, order for chest x-ray and procalcitonin level. Observe. Concern is recent vomiting-at risk for occult aspiration. No empiric antibiotics for now  7/22--continue to observe off antibiotics-leukocytosis improved. Procalcitonin less than 0.05. Afebrile. White blood count improving     Active Problems:    Class 1 obesity with serious comorbidity and body mass index (BMI) of 34.0 to 34.9 in adult  Plan: adds complexity. Hypokalemia due to excessive gastrointestinal loss of potassium  Plan: replete IV,  monitor mag.  7/21--solved-follow closely. Microcytic anemia  Plan: h/o JORGE LUIS in the past.     Chronic pancreatitis ([facility]  Plan: no evidence of this on recent EUS 3/2022.      Fibromoyalgia - lyrica        Dispo/Discharge Planning:    Admit for obs      Diet: ADULT DIET; n.p.o. meds with sips  VTE ppx: lovenox   Code status: Full Code            Hospital Problems:  Principal Problem:    Intractable nausea and vomiting  Active Problems:    Class 1 obesity with serious comorbidity and body mass index (BMI) of 34.0 to 34.9 in adult    Hypokalemia due to excessive gastrointestinal loss of potassium    Microcytic anemia    SIRS (systemic inflammatory response syndrome) (HCC)    Neutrophilic leukocytosis    GERD with stricture    Type 2 diabetes mellitus with hyperglycemia, with long-term current use of insulin (HCC)    Chronic pancreatitis (HCC)    S/P balloon dilatation of esophageal stricture  Resolved Problems:    * No resolved hospital problems. *      Objective:   Patient Vitals for the past 24 hrs:   Temp Pulse Resp BP SpO2   07/22/22 1145 97.9 °F (36.6 °C) (!) 112 17 129/67 95 %   07/22/22 1045 -- (!) 120 16 (!) 153/67 96 %   07/22/22 1040 -- (!) 116 16 (!) 148/89 98 %   07/22/22 1035 -- (!) 112 15 (!) 163/83 99 %   07/22/22 1030 98 °F (36.7 °C) (!) 114 15 (!) 164/78 98 %   07/22/22 1027 -- (!) 117 16 (!) 156/68 99 %   07/22/22 1022 -- (!) 116 16 (!) 157/58 98 %   07/22/22 1015 -- (!) 118 14 (!) 155/68 98 %   07/22/22 1012 -- (!) 119 15 (!) 147/68 98 %   07/22/22 1007 -- (!) 117 16 137/63 98 %   07/22/22 1002 -- (!) 121 15 (!) 141/62 99 %   07/22/22 1000 97.4 °F (36.3 °C) (!) 122 14 (!) 141/72 97 %   07/22/22 0906 -- (!) 134 18 (!) 142/71 100 %   07/22/22 0827 97.9 °F (36.6 °C) (!) 126 20 133/80 97 %   07/22/22 0727 97.9 °F (36.6 °C) (!) 121 17 (!) 151/78 95 %   07/22/22 0421 97.7 °F (36.5 °C) (!) 122 19 (!) 173/78 96 %   07/22/22 0237 -- -- 18 -- --   07/21/22 2300 -- 98 -- -- --   07/21/22 2223 -- -- 18 -- --   07/21/22 2221 98.4 °F (36.9 °C) (!) 131 19 (!) 177/81 96 %   07/21/22 2053 -- -- -- (!) 176/96 --   07/21/22 2007 98.1 °F (36.7 °C) (!) 114 18 (!) 176/96 98 %   07/21/22 1542 97.4 °F (36.3 °C) (!) 119 19 (!) 171/86 97 %         Oxygen Therapy  SpO2: 95 %  Pulse via Oximetry: 120 beats per minute  Pulse Oximeter Device Location: Right  O2 Device: Nasal cannula  O2 Flow Rate (L/min): 3 L/min    Estimated body mass index is 34.75 kg/m² as calculated from the following:    Height as of this encounter: 5' 2\" (1.575 m). Weight as of this encounter: 190 lb (86.2 kg). Intake/Output Summary (Last 24 hours) at 7/22/2022 1422  Last data filed at 7/22/2022 0948  Gross per 24 hour   Intake 1668 ml   Output 1202 ml   Net 466 ml           Physical Exam:   Blood pressure 129/67, pulse (!) 112, temperature 97.9 °F (36.6 °C), temperature source Oral, resp.  rate 17, height 5' 2\" (1.575 m), weight 190 lb (86.2 kg), SpO2 95 %. Physical Exam:   General:                      Alert, cooperative, no distress, appears stated age. Eyes:                                           Conjunctivae/corneas clear. Pupils equally round and reactive to light, extraocular movements intact. Lungs:                       Clear to auscultation bilaterally. No wheezing. Heart:                     Regular rate and rhythm, S1, S2 normal, no murmur, click, rub or gallop. Abdomen:                       No increased abdominal distention. Now with small caliber-Dobbhoff tube naris. Bowel sounds present. Extremities:                     Extremities normal, atraumatic, no cyanosis or increased edema  Neurologic:                     CNII-XII intact. Normal strength, sensation and reflexes throughout. Lab/Data Review: All lab results for the last 24 hours reviewed. I have reviewed ordered lab tests and independently visualized imaging below:    Recent Labs:  Recent Results (from the past 48 hour(s))   POCT Glucose    Collection Time: 07/20/22  4:41 PM   Result Value Ref Range    POC Glucose 142 (H) 65 - 100 mg/dL    Performed by: [facility]    POCT Glucose    Collection Time: 07/20/22  8:37 PM   Result Value Ref Range    POC Glucose 165 (H) 65 - 100 mg/dL    Performed by: [facility]    CBC with Auto Differential    Collection Time: 07/21/22  4:14 AM   Result Value Ref Range    WBC 13.5 (H) 4.3 - 11.1 K/uL    RBC 5.16 4.05 - 5.2 M/uL    Hemoglobin 11.5 (L) 11.7 - 15.4 g/dL    Hematocrit 38.9 35.8 - 46.3 %    MCV 75.4 (L) 79.6 - 97.8 FL    MCH 22.3 (L) 26.1 - 32.9 PG    MCHC 29.6 (L) 31.4 - 35.0 g/dL    RDW 18.4 (H) 11.9 - 14.6 %    Platelets 750 984 - 602 K/uL    MPV Unable to calculate. Recommend adding IPF.  9.4 - 12.3 FL    nRBC 0.00 0.0 - 0.2 K/uL    Differential Type AUTOMATED      Seg Neutrophils 85 (H) 43 - 78 %    Lymphocytes 8 (L) 13 - 44 %    Monocytes 7 4.0 - 12.0 % Eosinophils % 0 (L) 0.5 - 7.8 %    Basophils 0 0.0 - 2.0 %    Immature Granulocytes 1 0.0 - 5.0 %    Segs Absolute 11.4 (H) 1.7 - 8.2 K/UL    Absolute Lymph # 1.0 0.5 - 4.6 K/UL    Absolute Mono # 0.9 0.1 - 1.3 K/UL    Absolute Eos # 0.0 0.0 - 0.8 K/UL    Basophils Absolute 0.0 0.0 - 0.2 K/UL    Absolute Immature Granulocyte 0.1 0.0 - 0.5 K/UL   Magnesium    Collection Time: 07/21/22  4:14 AM   Result Value Ref Range    Magnesium 2.2 1.8 - 2.4 mg/dL   Comprehensive Metabolic Panel    Collection Time: 07/21/22  4:14 AM   Result Value Ref Range    Sodium 134 (L) 136 - 145 mmol/L    Potassium 4.0 3.5 - 5.1 mmol/L    Chloride 108 (H) 98 - 107 mmol/L    CO2 16 (L) 21 - 32 mmol/L    Anion Gap 10 7 - 16 mmol/L    Glucose 219 (H) 65 - 100 mg/dL    BUN 9 6 - 23 MG/DL    Creatinine 0.80 0.6 - 1.0 MG/DL    GFR African American >60 >60 ml/min/1.73m2    GFR Non- >60 >60 ml/min/1.73m2    Calcium 9.0 8.3 - 10.4 MG/DL    Total Bilirubin 1.2 (H) 0.2 - 1.1 MG/DL    ALT 43 12 - 65 U/L    AST 22 15 - 37 U/L    Alk Phosphatase 86 50 - 136 U/L    Total Protein 8.2 6.3 - 8.2 g/dL    Albumin 4.0 3.5 - 5.0 g/dL    Globulin 4.2 (H) 2.3 - 3.5 g/dL    Albumin/Globulin Ratio 1.0 (L) 1.2 - 3.5     POCT Glucose    Collection Time: 07/21/22  7:32 AM   Result Value Ref Range    POC Glucose 183 (H) 65 - 100 mg/dL    Performed by: Rachid    POCT Glucose    Collection Time: 07/21/22 11:47 AM   Result Value Ref Range    POC Glucose 168 (H) 65 - 100 mg/dL    Performed by:  Rachid    Culture, Blood 1    Collection Time: 07/21/22  3:24 PM    Specimen: Blood   Result Value Ref Range    Special Requests RIGHT  Antecubital        Culture NO GROWTH AFTER 14 HOURS     Culture, Blood 1    Collection Time: 07/21/22  3:25 PM    Specimen: Blood   Result Value Ref Range    Special Requests RIGHT  FOREARM        Culture NO GROWTH AFTER 14 HOURS     POCT Glucose    Collection Time: 07/21/22  5:12 PM   Result Value Ref Range    POC Glucose 170 (H) 65 - 100 mg/dL    Performed by: Ольга    POCT Glucose    Collection Time: 07/21/22 10:21 PM   Result Value Ref Range    POC Glucose 198 (H) 65 - 100 mg/dL    Performed by: Noris    Magnesium    Collection Time: 07/22/22  5:27 AM   Result Value Ref Range    Magnesium 2.2 1.8 - 2.4 mg/dL   Comprehensive Metabolic Panel    Collection Time: 07/22/22  5:27 AM   Result Value Ref Range    Sodium 135 (L) 136 - 145 mmol/L    Potassium 3.8 3.5 - 5.1 mmol/L    Chloride 107 98 - 107 mmol/L    CO2 17 (L) 21 - 32 mmol/L    Anion Gap 11 7 - 16 mmol/L    Glucose 199 (H) 65 - 100 mg/dL    BUN 8 6 - 23 MG/DL    Creatinine 0.60 0.6 - 1.0 MG/DL    GFR African American >60 >60 ml/min/1.73m2    GFR Non- >60 >60 ml/min/1.73m2    Calcium 8.6 8.3 - 10.4 MG/DL    Total Bilirubin 0.9 0.2 - 1.1 MG/DL    ALT 36 12 - 65 U/L    AST 17 15 - 37 U/L    Alk Phosphatase 81 50 - 136 U/L    Total Protein 7.8 6.3 - 8.2 g/dL    Albumin 3.6 3.5 - 5.0 g/dL    Globulin 4.2 (H) 2.3 - 3.5 g/dL    Albumin/Globulin Ratio 0.9 (L) 1.2 - 3.5     Procalcitonin    Collection Time: 07/22/22  5:27 AM   Result Value Ref Range    Procalcitonin <0.05 0.00 - 0.49 ng/mL   CBC with Auto Differential    Collection Time: 07/22/22  6:13 AM   Result Value Ref Range    WBC 12.5 (H) 4.3 - 11.1 K/uL    RBC 5.07 4.05 - 5.2 M/uL    Hemoglobin 11.3 (L) 11.7 - 15.4 g/dL    Hematocrit 38.3 35.8 - 46.3 %    MCV 75.5 (L) 79.6 - 97.8 FL    MCH 22.3 (L) 26.1 - 32.9 PG    MCHC 29.5 (L) 31.4 - 35.0 g/dL    RDW 18.6 (H) 11.9 - 14.6 %    Platelets 520 205 - 058 K/uL    MPV 10.5 9.4 - 12.3 FL    nRBC 0.00 0.0 - 0.2 K/uL    Differential Type AUTOMATED      Seg Neutrophils 84 (H) 43 - 78 %    Lymphocytes 8 (L) 13 - 44 %    Monocytes 7 4.0 - 12.0 %    Eosinophils % 0 (L) 0.5 - 7.8 %    Basophils 0 0.0 - 2.0 %    Immature Granulocytes 1 0.0 - 5.0 %    Segs Absolute 10.4 (H) 1.7 - 8.2 K/UL    Absolute Lymph # 1.0 0.5 - 4.6 K/UL    Absolute Mono # 0.9 0.1 - 1.3 K/UL    Absolute Eos # 0.0 0.0 - 0.8 K/UL    Basophils Absolute 0.0 0.0 - 0.2 K/UL    Absolute Immature Granulocyte 0.1 0.0 - 0.5 K/UL   POCT Glucose    Collection Time: 07/22/22  7:42 AM   Result Value Ref Range    POC Glucose 182 (H) 65 - 100 mg/dL    Performed by: Lilian    POCT Glucose    Collection Time: 07/22/22 10:09 AM   Result Value Ref Range    POC Glucose 173 (H) 65 - 100 mg/dL    Performed by: Agustina    POCT Glucose    Collection Time: 07/22/22 11:46 AM   Result Value Ref Range    POC Glucose 189 (H) 65 - 100 mg/dL    Performed by: Lilian        Other Studies:  XR CHEST PORTABLE   Final Result   1. No acute intrathoracic process. 2. The distal side-port of the enteric tube lies in the lower esophagus. Recommend advancement by at least 5 cm. XR ABDOMEN (KUB) (SINGLE AP VIEW)   Final Result   The bowel gas pattern is normal. There is no evidence of   obstruction. There is no free air visualized on this supine view. There are no   renal calculi. The lung bases are clear. Nasogastric tube terminates just below   the left hemidiaphragm in the proximal stomach. Postop changes related to   gastric surgery are noted. XR ABDOMEN (KUB) (SINGLE AP VIEW)   Final Result   Enteric tube tip overlies the gastric body.                    Current Meds:  Current Facility-Administered Medications   Medication Dose Route Frequency    heparin flush 100 UNIT/ML injection 300 Units  300 Units IntraCATHeter PRN    diazePAM (VALIUM) injection 5 mg  5 mg IntraVENous TID    cloNIDine (CATAPRES) 0.3 MG/24HR 1 patch  1 patch TransDERmal Weekly    enalaprilat (VASOTEC) injection 1.25 mg  1.25 mg IntraVENous 4 times per day    sodium bicarbonate 25 mEq in sodium chloride 0.45 % 1,000 mL infusion   IntraVENous Continuous    phenol 1.4 % mouth spray 1 spray  1 spray Mouth/Throat Q2H PRN    prochlorperazine (COMPAZINE) injection 10 mg  10 mg IntraVENous Q6H PRN    hydrALAZINE (APRESOLINE) injection 20 mg  20 mg IntraVENous Q6H PRN    sodium chloride flush 0.9 % injection 5-40 mL  5-40 mL IntraVENous 2 times per day    sodium chloride flush 0.9 % injection 5-40 mL  5-40 mL IntraVENous PRN    0.9 % sodium chloride infusion   IntraVENous PRN    enoxaparin (LOVENOX) injection 40 mg  40 mg SubCUTAneous Daily    ondansetron (ZOFRAN-ODT) disintegrating tablet 8 mg  8 mg Oral Q8H PRN    Or    ondansetron (ZOFRAN) injection 4 mg  4 mg IntraVENous Q6H PRN    acetaminophen (TYLENOL) tablet 650 mg  650 mg Oral Q6H PRN    Or    acetaminophen (TYLENOL) suppository 650 mg  650 mg Rectal Q6H PRN    polyethylene glycol (GLYCOLAX) packet 17 g  17 g Oral Daily PRN    pantoprazole (PROTONIX) 40 mg in sodium chloride (PF) 10 mL injection  40 mg IntraVENous Q12H    potassium chloride (KLOR-CON M) extended release tablet 40 mEq  40 mEq Oral PRN    Or    potassium bicarb-citric acid (EFFER-K) effervescent tablet 40 mEq  40 mEq Oral PRN    Or    potassium chloride 10 mEq/100 mL IVPB (Peripheral Line)  10 mEq IntraVENous PRN    magnesium sulfate 1000 mg in dextrose 5% 100 mL IVPB  1,000 mg IntraVENous PRN    glucose chewable tablet 16 g  4 tablet Oral PRN    dextrose bolus 10% 125 mL  125 mL IntraVENous PRN    Or    dextrose bolus 10% 250 mL  250 mL IntraVENous PRN    glucagon (rDNA) injection 1 mg  1 mg IntraMUSCular PRN    dextrose 5 % solution  100 mL/hr IntraVENous PRN    insulin lispro (HUMALOG) injection vial 0-8 Units  0-8 Units SubCUTAneous TID WC    insulin lispro (HUMALOG) injection vial 0-4 Units  0-4 Units SubCUTAneous Nightly    pregabalin (LYRICA) capsule 100 mg  100 mg Oral BID    citalopram (CELEXA) tablet 40 mg  40 mg Oral Daily    diphenhydrAMINE (BENADRYL) injection 25 mg  25 mg IntraVENous Q6H PRN       Signed:  Birder Severin, DO    Part of this note may have been written by using a voice dictation software.   The note has been proof read but may still contain some grammatical/other typographical errors.

## 2022-07-22 NOTE — PROGRESS NOTES
Physician Progress Note      Austin MALCOLM #:                  [id number]  :                       1970  ADMIT DATE:       2022 8:02 AM  DISCH DATE:  RESPONDING  PROVIDER #:        CINDY HIRSCH DO          QUERY TEXT:    Pt admitted with intractable N/V . Pt noted to have per  EDG: Esophageal   stricture, gastritis, Pancreatic lipomatosis, dilated CBD without obstruction,    . If possible, please document in progress notes and discharge summary the   relationship, if any, between intractable N/V  and Gastritis, Gastroparesis,   or GERD. The medical record reflects the following:  Risk Factors: 46 YOF, Chronic pain, Opioid dependence, IBS, DM2,  Clinical Indicators: Abd pain, N/V   Consult note: Strong clinical suspicion for delayed gastric emptying. Patient has opiate-induced constipation and suspected gastroparesis likely due   to diabetes, prior vagotomy and further compounded by opiate use  Treatment: Monitoring, GI Consult, NPO, NG LIS, antiemetic's, IV PPI, Dobhoff   placed and feeding to start, IVFs    Thank you,  Alexandra Bear RN, C BSN  Clinical   [email or web address]  Options provided:  -- Intractable N/V due to Gastritis  -- Intractable N/V due to Gastroparesis  -- Intractable N/V due to GERD  -- Intractable N/V due to Gastritis  -- Intractable N/V due to Opioid Induced Constipation  -- Intractable N/V is due to, Please document evidence. -- Other - I will add my own diagnosis  -- Disagree - Not applicable / Not valid  -- Disagree - Clinically unable to determine / Unknown  -- Refer to Clinical Documentation Reviewer    PROVIDER RESPONSE TEXT:    Provider is clinically unable to determine a response to this query. complex case see gastroenterology documentations    Query created by:  Javy Burns on 2022 2:12 PM      Electronically signed by:  Lady Steven CARLSON 2022 3:13 PM

## 2022-07-22 NOTE — PERIOP NOTE
TRANSFER - OUT REPORT:    Verbal report given to [address] on [facility] Sites  being transferred to [facility] for routine progression of patient care       Report consisted of patients Situation, Background, Assessment and   Recommendations(SBAR). Information from the following report(s) Nurse Handoff Report, Surgery Report, Intake/Output, MAR, Recent Results, and Cardiac Rhythm sinus tach  was reviewed with the receiving nurse. Lines:   Single Lumen Implantable Port 05/26/22 Left Subclavian (Active)   Port A Cath Status Accessed 07/22/22 0207   Criteria for Appropriate Use Limited/no vessel suitable for conventional peripheral access 07/22/22 0207   Site Assessment Clean, dry & intact 07/22/22 0207   Line Care Other (Comment) 07/21/22 0852   Alcohol Cap Used Yes 07/22/22 0207   Date of Last Dressing Change 07/19/22 07/22/22 0207   Dressing Type Transparent; Antimicrobial 07/22/22 0207   Dressing Status Clean, dry & intact 07/22/22 0207   Dressing Intervention New 07/19/22 2000   Single Lumen Status Infusing 07/22/22 0207       Peripheral IV 07/22/22 Right;Dorsal Hand (Active)   Site Assessment Clean, dry & intact 07/22/22 1000   Line Status Flushed; Infusing 07/22/22 1000   Line Care Connections checked and tightened 07/22/22 1000   Phlebitis Assessment No symptoms 07/22/22 1000   Infiltration Assessment 0 07/22/22 1000   Dressing Status Clean, dry & intact 07/22/22 1000   Dressing Type Transparent 07/22/22 1000        Opportunity for questions and clarification was provided. Patient transported with:   O2 @ 2 liters  Tech    VTE prophylaxis orders have been written for [facility]. Patient and family given floor number and nurses name. Family updated re: pt status after security code verified.

## 2022-07-22 NOTE — ANESTHESIA PRE PROCEDURE
Department of Anesthesiology  Preprocedure Note       Name:  Zoe Pickett   Age:  46 y.o.  :  1970                                          MRN:  [id number]         Date:  2022      Surgeon: Gildardo Goodrich):  Griselda Jesus MD    Procedure: Procedure(s):  ENDOSCOPIC ULTRASOUND/35 ROOM 207  EGD ESOPHAGOGASTRODUODENOSCOPY    Medications prior to admission:   Prior to Admission medications    Medication Sig Start Date End Date Taking? Authorizing Provider   blood glucose test strips (EXACTECH TEST) strip TEST BLOOD SUGAR FOUR TIMES DAILY 20   Historical Provider, MD   acetaminophen (TYLENOL) 500 MG tablet Take by mouth every 6 hours as needed    Historical Provider, MD   citalopram (CELEXA) 10 MG tablet Take 10 mg by mouth daily    Historical Provider, MD   citalopram (CELEXA) 20 mg tablet Take 20 mg by mouth daily 22   Historical Provider, MD   cyanocobalamin 1000 MCG/ML injection INJECT 1 VIAL INTRAMUSCULARLY FOR 4 WEEKS THEN MONTHLY 17   Historical Provider, MD   diclofenac sodium (VOLTAREN) 1 % GEL APPLY 1 GRAM TO AFFECTED AREA 4 TIMES A DAY AS NEEDED 10/2/18   Historical Provider, MD   diphenhydrAMINE (BENADRYL) 25 MG capsule Take 25 mg by mouth every 6 hours as needed    Historical Provider, MD   diphenhydrAMINE (SOMINEX) 25 MG tablet Take by mouth    Historical Provider, MD   fentaNYL (DURAGESIC) 100 MCG/HR Place 1 patch onto the skin every 72 hours. Patient not taking: Reported on 2022 10/7/14   Historical Provider, MD   gabapentin (NEURONTIN) 250 MG/5ML solution 300 mg by Enteral route 3 times daily.   Patient not taking: Reported on 2022 10/7/14   Historical Provider, MD   glucagon 1 MG injection Inject 1 mg into the muscle as needed 17   Historical Provider, MD   hyoscyamine (ANASPAZ;LEVSIN) 125 MCG tablet as needed 16   Historical Provider, MD   Hyoscyamine Sulfate SL 0.125 MG SUBL Place 0.125 mg under the tongue every 6 hours as needed    Historical Provider, MD   influenza quadrivalent split vaccine (FLULAVAL QUADRIVALENT) 0.5 ML injection Inject into the muscle 11/9/21   Historical Provider, MD   insulin aspart (NOVOLOG) 100 UNIT/ML injection vial Use as directed 6/7/16   Historical Provider, MD   insulin aspart (NOVOLOG) 100 UNIT/ML injection pen 12 units at breakfast 14 units at lunch and 41 units at dinner plus sliding scale for blood sugars >150, up to 60 units per day 10/7/14   Historical Provider, MD   Insulin Degludec 100 UNIT/ML SOPN Inject 68 Units into the skin 10/27/21   Historical Provider, MD   insulin NPH (HUMULIN N;NOVOLIN N) 100 UNIT/ML injection pen Please inject 12 units into the skin with breakfast and 4 units with bedtime. (pen)  Patient not taking: Reported on 6/8/2022 10/7/14   Historical Provider, MD   lidocaine viscous hcl (XYLOCAINE) 2 % SOLN solution 10 mLs every 6 hours as needed 4/28/21   Historical Provider, MD   LORazepam (ATIVAN) 1 MG tablet Take 1 mg by mouth 3 times daily as needed. 10/7/14   Historical Provider, MD   lubiprostone (AMITIZA) 24 MCG capsule Take 24 mcg by mouth  Patient not taking: Reported on 6/8/2022    Historical Provider, MD   morphine (MS CONTIN) 30 MG extended release tablet  8/15/21   Historical Provider, MD   morphine (MSIR) 30 MG tablet Take by mouth 3 times daily as needed.     Historical Provider, MD   naloxone 4 MG/0.1ML LIQD nasal spray 4 mg once as needed 2/16/22   Historical Provider, MD   nitroGLYCERIN (NITROSTAT) 0.3 MG SL tablet Place 0.3 mg under the tongue  Patient not taking: Reported on 6/8/2022 3/18/21   Historical Provider, MD   ondansetron (ZOFRAN-ODT) 8 MG TBDP disintegrating tablet Place 8 mg under the tongue 3 times daily 4/19/16   Historical Provider, MD   ondansetron (ZOFRAN) 8 MG tablet Take 8 mg by mouth every 8 hours as needed    Historical Provider, MD   lipase-protease-amylase (CREON) 27833-318289 units CPEP delayed release capsule Take 1 capsule by mouth    Historical Provider, MD   pantoprazole (PROTONIX) 40 MG tablet Take 40 mg by mouth 2 times daily    Historical Provider, MD   polyethylene glycol (MIRALAX) 17 GM/SCOOP powder Take 17 g by mouth as needed    Historical Provider, MD   pregabalin (LYRICA) 100 MG capsule Take 100 mg by mouth 2 times daily. Historical Provider, MD   pregabalin (LYRICA) 50 MG capsule Take 100 mg by mouth 2 times daily. Historical Provider, MD   promethazine (PHENERGAN) 12.5 mg tablet Take 25 mg by mouth every 6 hours as needed 3/11/15   Historical Provider, MD   promethazine (PHENERGAN) 25 MG suppository Place 25 mg rectally as needed    Historical Provider, MD   promethazine (PHENERGAN) 25 MG tablet Take 25 mg by mouth every 6 hours as needed    Historical Provider, MD   promethazine (PHENERGAN) 25 MG/ML injection Inject 25 mg into the muscle 3 times daily as needed    Historical Provider, MD   sodium chloride 0.9 % infusion Infuse intravenously as needed    Historical Provider, MD   sucralfate (CARAFATE) 1 GM/10ML suspension TAKE 10 ML BY MOUTH 4 TIMES A DAY EVERY DAY ON A EMPTY STOMACH 1 HR BEFORE MEALS AND AT BEDTIME 9/7/17   Historical Provider, MD   tiZANidine (ZANAFLEX) 4 MG tablet Take 4 mg by mouth as needed  Patient not taking: Reported on 6/8/2022    Historical Provider, MD   zolpidem (AMBIEN) 10 MG tablet Take 10 mg by mouth. 2/11/22   Historical Provider, MD       Current medications:    No current facility-administered medications for this visit. No current outpatient medications on file.      Facility-Administered Medications Ordered in Other Visits   Medication Dose Route Frequency Provider Last Rate Last Admin    lidocaine 1 % injection 1 mL  1 mL IntraDERmal Once PRN Diego Ochoa MD        lactated ringers infusion   IntraVENous Continuous Diego Ochoa  mL/hr at 07/22/22 0835 New Bag at 07/22/22 0835    sodium chloride flush 0.9 % injection 5-40 mL  5-40 mL IntraVENous 2 times per day MD Jenn Patel sodium chloride flush 0.9 % injection 5-40 mL  5-40 mL IntraVENous PRN Karen Main MD        0.9 % sodium chloride infusion   IntraVENous PRN Karen Main MD        sodium chloride flush 0.9 % injection 5-40 mL  5-40 mL IntraVENous 2 times per day Karen Main MD        sodium chloride flush 0.9 % injection 5-40 mL  5-40 mL IntraVENous PRN Karen Main MD        0.9 % sodium chloride infusion   IntraVENous PRN Karen Main MD        HYDROmorphone HCl PF (DILAUDID) injection 0.25 mg  0.25 mg IntraVENous Q5 Min PRN Karen Main MD        HYDROmorphone HCl PF (DILAUDID) injection 0.5 mg  0.5 mg IntraVENous Q5 Min PRN Karen Main MD        oxyCODONE (ROXICODONE) immediate release tablet 5 mg  5 mg Oral Once PRN Karen Main MD        prochlorperazine (COMPAZINE) injection 5 mg  5 mg IntraVENous Once PRN Karen Main MD        haloperidol lactate (HALDOL) injection 1 mg  1 mg IntraVENous Once PRN Karen Main MD        diphenhydrAMINE (BENADRYL) injection 12.5 mg  12.5 mg IntraVENous Once PRN Karen Main MD        labetalol (NORMODYNE;TRANDATE) injection 10 mg  10 mg IntraVENous Q15 Min PRN Karen Main MD        Or    hydrALAZINE (APRESOLINE) injection 10 mg  10 mg IntraVENous Q15 Min PRN Karen Main MD        diazePAM (VALIUM) injection 5 mg  5 mg IntraVENous TID Chandrakant Brooks MD   5 mg at 07/21/22 2224    cloNIDine (CATAPRES) 0.3 MG/24HR 1 patch  1 patch TransDERmal Weekly Johnny Haney DO   1 patch at 07/21/22 1606    enalaprilat (VASOTEC) injection 1.25 mg  1.25 mg IntraVENous 4 times per day Johnny Haney DO   1.25 mg at 07/22/22 0509    sodium bicarbonate 25 mEq in sodium chloride 0.45 % 1,000 mL infusion   IntraVENous Continuous Kathy Cardenas  mL/hr at 07/21/22 2054 New Bag at 07/21/22 2054    phenol 1.4 % mouth spray 1 spray  1 spray Mouth/Throat Q2H PRN Suzanna Guzman MD   1 spray at 07/20/22 8677    HYDROmorphone HCl PF (DILAUDID) injection 1 mg  1 mg IntraVENous Q4H PRN Marval Hack, DO   1 mg at 07/22/22 0743    prochlorperazine (COMPAZINE) injection 10 mg  10 mg IntraVENous Q6H PRN Chandrakant Brooks MD   10 mg at 07/22/22 7594    hydrALAZINE (APRESOLINE) injection 20 mg  20 mg IntraVENous Q6H PRN Marval Hack, DO   20 mg at 07/21/22 2053    sodium chloride flush 0.9 % injection 5-40 mL  5-40 mL IntraVENous 2 times per day Lequire Schwab, DO   10 mL at 07/21/22 0914    sodium chloride flush 0.9 % injection 5-40 mL  5-40 mL IntraVENous PRN Naomie Schwab, DO   5 mL at 07/19/22 2011    0.9 % sodium chloride infusion   IntraVENous PRN Naomie Schwab, DO        enoxaparin (LOVENOX) injection 40 mg  40 mg SubCUTAneous Daily Naomie Schwab, DO   40 mg at 07/21/22 6249    ondansetron (ZOFRAN-ODT) disintegrating tablet 8 mg  8 mg Oral Q8H PRN Naomie Schwab, DO        Or    [facility] injection 4 mg  4 mg IntraVENous Q6H PRN Lequire Schwab, DO   4 mg at 07/22/22 0207    acetaminophen (TYLENOL) tablet 650 mg  650 mg Oral Q6H PRN Naomie Schwab, DO        Or    acetaminophen (TYLENOL) suppository 650 mg  650 mg Rectal Q6H PRN Lequire Schwab, DO        polyethylene glycol (GLYCOLAX) packet 17 g  17 g Oral Daily PRN Lequire Schwab, DO        pantoprazole (PROTONIX) 40 mg in sodium chloride (PF) 10 mL injection  40 mg IntraVENous Q12H Naomie Schwab, DO   40 mg at 07/21/22 2053    potassium chloride (KLOR-CON M) extended release tablet 40 mEq  40 mEq Oral PRN Naomie Schwab, DO        Or    potassium bicarb-citric acid (EFFER-K) effervescent tablet 40 mEq  40 mEq Oral PRN Lequire Schwab, DO        Or    potassium chloride 10 mEq/100 mL IVPB (Peripheral Line)  10 mEq IntraVENous PRN Naomie Schwab, DO        magnesium sulfate 1000 mg in dextrose 5% 100 mL IVPB  1,000 mg IntraVENous PRN Naomie Schwab, DO        glucose chewable tablet 16 g  4 tablet Oral PRN Naomie Schwab, DO        dextrose bolus 10% 125 mL  125 mL IntraVENous PRN Claria Reusing, DO        Or    dextrose bolus 10% 250 mL  250 mL IntraVENous PRN Claria Reusing, DO        glucagon (rDNA) injection 1 mg  1 mg IntraMUSCular PRN Claria Reusing, DO        dextrose 5 % solution  100 mL/hr IntraVENous PRN Claria Reusing, DO        insulin lispro (HUMALOG) injection vial 0-8 Units  0-8 Units SubCUTAneous TID WC Claria Reusing, DO        insulin lispro (HUMALOG) injection vial 0-4 Units  0-4 Units SubCUTAneous Nightly Claria Reusing, DO        pregabalin (LYRICA) capsule 100 mg  100 mg Oral BID Claria Reusing, DO        citalopram (CELEXA) tablet 40 mg  40 mg Oral Daily Claria Reusing, DO        diphenhydrAMINE (BENADRYL) injection 25 mg  25 mg IntraVENous Q6H PRN Vaibhav Dumont MD   25 mg at 07/21/22 6187       Allergies:     Allergies   Allergen Reactions    Adhesive Tape Itching, Other (See Comments) and Rash     blisters  tegaderm  Paper tape too      Metronidazole Diarrhea and Nausea And Vomiting    Atorvastatin Myalgia    Iodine Other (See Comments)     Sweaty and clammy    Statins Myalgia    Barium Iodide Nausea And Vomiting     Diaphoresis      Barium Sulfate Palpitations    Insulin Lispro Nausea And Vomiting    Insulins Nausea And Vomiting     Humalog only    Metformin Nausea And Vomiting     Stomach pain  Stomach pain  Stomach pain  Stomach pain         Problem List:    Patient Active Problem List   Diagnosis Code    GERD with stricture K21.9, K22.2    Intractable nausea and vomiting R11.2    Tachycardia R00.0    Type 2 diabetes mellitus with hyperglycemia, with long-term current use of insulin ([facility]) E11.65, Z79.4    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    Iron deficiency anemia D50.9    S/P exploratory laparotomy Z98.890    Chronic pancreatitis ([facility]) K86.1    Hypertension I10    Class 1 obesity with serious comorbidity and body mass index (BMI) of 34.0 to 34.9 in adult E66.9, Z68.34    Hypokalemia due to excessive gastrointestinal loss of potassium E87.6    Microcytic anemia D50.9    S/P balloon dilatation of esophageal stricture Z98.890    SIRS (systemic inflammatory response syndrome) (HCC) C85.15    Neutrophilic leukocytosis U53.6       Past Medical History:        Diagnosis Date    Anemia     blood transfusion 2013 X1 d/t \"perforated bowel\"-- Fe infusions 12/2017--- denies any current iron infusions 12/17/2019    Anxiety and depression     managed with meds    C. difficile diarrhea 2013    in 8727 after complicated ERCP    Cervical dysplasia 1990s    Chronic insomnia     ambien     Chronic nausea     Chronic pain     all over     Chronic pancreatitis ([address]     COVID-19 vaccine series completed 04/21/2021    3/29/2021 Pfizer     Dehydration     IVFs through port as needed for this     Encounter for insertion of venous access port     Left chest port    Esophageal stricture 2017    Fibromyalgia     managed with meds    Former cigarette smoker     GERD (gastroesophageal reflux disease)     controlled with med    History of kidney stones 03/2021    X1-naturally pass    HLD (hyperlipidemia)     pt denies     IBS (irritable bowel syndrome)     Migraine headache     does not have often but did have one recently; just takes tylenol    Nausea & vomiting     pt reports she does better with phenergan than zofran - causes HA    Nonalcoholic fatty liver disease     Pancreatitis 6/23/2014    PUD (peptic ulcer disease) 06/2017    still having issues takes meds     Severe protein-calorie malnutrition ([address] 4/26/2013    Sinus tachycardia     per pt-- no tx needed    Sphincter of Oddi dysfunction     Type 2 diabetes mellitus ([address]     type 2-- sqbs am avg 150--- Hypo symptoms at <100, Insulin dependent; last A1C was 11/3/2020 = 9.0       Past Surgical History:        Procedure Laterality Date    CARPAL TUNNEL RELEASE Right     along with trigger finger    CHOLECYSTECTOMY, [facility] COLONOSCOPY      COLONOSCOPY N/A 2018    COLONOSCOPY performed by Reji Barrera MD at [facility] ENDOSCOPY    ERCP  3/5/2013    resulted in perforated duodenum    HYSTERECTOMY ([address])  1998    ovaries remain    IR DRAIN SKIN ABSCESS      IR PORT PLACEMENT EQUAL OR GREATER THAN 5 YEARS  2018    IR PORT PLACEMENT EQUAL OR GREATER THAN 5 YEARS 2018 [facility]    LAPAROTOMY  3/5/2013    exploratory for duodenal perforation with ERCP    ORTHOPEDIC SURGERY      right finger surgery 2017    OTHER SURGICAL HISTORY Bilateral     thumb    OTHER SURGICAL HISTORY      Kidney stones 2021    MI ABDOMEN SURGERY PROC UNLISTED      explor lap    MI ABDOMEN SURGERY PROC UNLISTED      lap nissen    MI ABDOMEN SURGERY PROC UNLISTED  last one placed      Hx of pancreatic stent, multiple    TOTAL COLECTOMY      UPPER GASTROINTESTINAL ENDOSCOPY  2021         UPPER GASTROINTESTINAL ENDOSCOPY      36 fr tony    UPPER GASTROINTESTINAL ENDOSCOPY  2019         UPPER GASTROINTESTINAL ENDOSCOPY N/A 2022    EGD ESOPHAGOGASTRODUODENOSCOPY DILATATION/  performed by Pascual Brian MD at [address] [place] [address]  13 at [facility]-- stent removed 13. Dr Manju Frias  2014    port insertion, left chest wall       Social History:    Social History     Tobacco Use    Smoking status: Former     Packs/day: 1.00     Types: Cigarettes     Quit date: 1993     Years since quittin.2    Smokeless tobacco: Never   Substance Use Topics    Alcohol use: No                                Counseling given: Not Answered      Vital Signs (Current): There were no vitals filed for this visit.                                            BP Readings from Last 3 Encounters:   22 133/80   07/15/22 (!) 155/86   22 137/80       NPO Status: BMI:   Wt Readings from Last 3 Encounters:   07/19/22 190 lb (86.2 kg)   07/15/22 195 lb (88.5 kg)   06/08/22 195 lb (88.5 kg)     There is no height or weight on file to calculate BMI.    CBC:   Lab Results   Component Value Date/Time    WBC 12.5 07/22/2022 06:13 AM    RBC 5.07 07/22/2022 06:13 AM    HGB 11.3 07/22/2022 06:13 AM    HCT 38.3 07/22/2022 06:13 AM    MCV 75.5 07/22/2022 06:13 AM    RDW 18.6 07/22/2022 06:13 AM     07/22/2022 06:13 AM       CMP:   Lab Results   Component Value Date/Time     07/22/2022 05:27 AM    K 3.8 07/22/2022 05:27 AM     07/22/2022 05:27 AM    CO2 17 07/22/2022 05:27 AM    BUN 8 07/22/2022 05:27 AM    CREATININE 0.60 07/22/2022 05:27 AM    GFRAA >60 07/22/2022 05:27 AM    AGRATIO 0.9 02/24/2022 07:26 AM    LABGLOM >60 07/22/2022 05:27 AM    GLUCOSE 199 07/22/2022 05:27 AM    PROT 7.8 07/22/2022 05:27 AM    CALCIUM 8.6 07/22/2022 05:27 AM    BILITOT 0.9 07/22/2022 05:27 AM    ALKPHOS 81 07/22/2022 05:27 AM    ALKPHOS 126 02/24/2022 07:26 AM    AST 17 07/22/2022 05:27 AM    ALT 36 07/22/2022 05:27 AM       POC Tests:   Recent Labs     07/22/22  0742   POCGLU 182*       Coags: No results found for: PROTIME, INR, APTT    HCG (If Applicable): No results found for: PREGTESTUR, PREGSERUM, HCG, HCGQUANT     ABGs: No results found for: PHART, PO2ART, OYV1CFG, VKY6GQC, BEART, T3CAZQPW     Type & Screen (If Applicable):  No results found for: LABABO, LABRH    Drug/Infectious Status (If Applicable):  No results found for: HIV, HEPCAB    COVID-19 Screening (If Applicable):   Lab Results   Component Value Date/Time    COVID19 Not Detected 02/10/2022 10:11 AM    COVID19 Performed 02/10/2022 10:11 AM           Anesthesia Evaluation  Patient summary reviewed and Nursing notes reviewed no history of anesthetic complications:   Airway: Mallampati: II  TM distance: >3 FB   Neck ROM: full  Mouth opening: > = 3 FB   Dental:          Pulmonary:normal exam  breath sounds clear to auscultation                             Cardiovascular:  Exercise tolerance: good (>4 METS),   (+) hypertension: mild,         Rhythm: regular  Rate: normal                    Neuro/Psych:   (+) neuromuscular disease (Fibromyalgia):, headaches: migraine headaches, depression/anxiety             GI/Hepatic/Renal:   (+) GERD:, PUD, liver disease (Non-alcoholic fatty liver):,          ROS comment: Esophageal stricture    Sphincter of Oddi dysfunction, h/o pancreatitis    H/o C diff. Endo/Other:    (+) DiabetesType II DM, using insulin, blood dyscrasia (JORGE LUIS): anemia:., .                 Abdominal:             Vascular: Other Findings: NGT            Anesthesia Plan      TIVA and general     ASA 3       Induction: intravenous and rapid sequence. MIPS: Prophylactic antiemetics administered. Anesthetic plan and risks discussed with patient and spouse.                         Jess Fulton MD   7/22/2022

## 2022-07-22 NOTE — PROGRESS NOTES
END OF SHIFT NOTE:    INTAKE/OUTPUT  07/21 0701 - 07/22 0700  In: -   Out: 1150 [Urine:500]  Voiding: Yes  Catheter: No  Drain:   NG/OG/NJ/NE Tube Nasogastric 16 fr Left nostril (Active)   Surrounding Skin Clean, dry & intact 07/21/22 1930   Securement device Tape 07/22/22 0207   Status Suction-low intermittent 07/22/22 0207   Placement Verified External Catheter Length 07/22/22 0207   NG/OG/NJ/NE External Measurement (cm) 48 cm 07/22/22 0207   Drainage Appearance Brown 07/22/22 0207   Output (mL) 50 ml 07/22/22 0207   Action Taken Tubing changed 07/22/22 0207               Flatus: Patient does not have flatus present. Stool: 0 occurrences. Characteristics:           Stool Assessment  Last BM (including prior to admit): 07/18/22 (per pt)    Emesis: 0  occurrences. Characteristics:        VITAL SIGNS  Patient Vitals for the past 12 hrs:   Temp Pulse Resp BP SpO2   07/22/22 0727 97.9 °F (36.6 °C) (!) 121 17 (!) 151/78 95 %   07/22/22 0421 97.7 °F (36.5 °C) (!) 122 19 (!) 173/78 96 %   07/22/22 0237 -- -- 18 -- --   07/21/22 2300 -- 98 -- -- --   07/21/22 2223 -- -- 18 -- --   07/21/22 2221 98.4 °F (36.9 °C) (!) 131 19 (!) 177/81 96 %   07/21/22 2053 -- -- -- (!) 176/96 --   07/21/22 2007 98.1 °F (36.7 °C) (!) 114 18 (!) 176/96 98 %       Pain Assessment  @BSHSIFLOW(13)@  Pain Location: Abdomen  Patient's Stated Pain Goal: 0 - No pain    Ambulating  Yes    Shift report given to oncoming nurse at the bedside.     Greer Pereyra RN

## 2022-07-22 NOTE — OP NOTE
Procedure:  Esophagogastroduodenoscopy with dilation, Endoscopic ultrasound with Doppler     Date of Procedure:  7/22/2022    Patient:  Shi Santos     1970     Indication:  Intractable nausea, dysphagia, history of esophageal stricture, history of acute pancreatitis      Sedation:  MAC      Pre-Procedure Physical Exam:      Mental status:  alert and oriented   Airway:  normal oropharyngeal airway and neck mobility   CV:  regular rate and rhythm   Respiratory:  clear to auscultation      Procedure:   A History and Physical has been performed, and patient medication allergies have been reviewed. Risks of perforation, hemorrhage, adverse drug reaction, and aspiration were discussed. Informed consent was obtained for the procedure, including sedation. The patient was placed in the left lateral decubitus position. The heart rate, oxygen saturations, blood pressure, and response to care were monitored throughout the procedure. The gastroscope was passed through the mouth and advanced under direct vision to the second portion of the duodenum. A careful inspection was made as the gastroscope was withdrawn, including a retroflexed view of the proximal stomach. As there was no retained food debris or liquid in the stomach, NG tube was removed. The linear echoendoscope was then passed through the mouth and advanced under direct vision to the distal duodenum. As the scope was slowly withdrawn, detailed endoscopic images were obtained from the surrounding organs. The gastroscope was again passed to facilitate advancement of a Dobhoff tube into the small bowel. The patient tolerated the procedure well. Findings:      ENDOSCOPIC FINDINGS:   OROPHARYNX: Cords and pyriform recesses appear normal.   ESOPHAGUS: A benign-appearing intrinsic stenosis is seen in the distal esophagus. Dilation was performed using a CRE balloon to a maximum diameter of 18 mm.  Successful dilation was confirmed by the presence of mucosal disruption. STOMACH: On retroflexion, the flap-valve is classified as Hill Grade I, with a normal, prominent tissue fold at the lesser curvature closely approximated to the endoscope. There is an intact Nissen fundoplication. Mild erythematous gastritis was seen in the gastric body. Biopsies ere obtained for H pylori. The gastrojejunal anastomosis appears normal. The examined portion of the jejunum is unremarkable. EUS FINDINGS:  PANCREAS:  The pancreas is well-visualized from head to tail. There is diffuse mild fatty infiltration of pancreatic parenchyma. No cysts or masses are visualized. The main pancreatic duct is of normal caliber with a smooth, regular course,  measuring up to 3 mm in the head, 2 mm in the body and 1 mm in the tail. Pancreas divisum is not seen. BILIARY TREE: The gallbladder is absent. The common bile duct is well-visualized from its insertion in the ampulla to the bifurcation of right and left hepatic ducts. It is mildly dilated, measuring up to 9 mm maximally with a gradual taper down  to the level of the ampulla. There are no intraductal stones, sludge or debris. The ampulla appears normal endosonographically. OTHER ORGANS:  Views of the left lobe of the liver demonstrate no solid mass lesions. Fatty infiltration of the liver. There is no ascites in the upper abdomen. The left adrenal gland appears normal. The major vascular structures including the portal vein, splenic vessels and celiac artery appear unremarkable. There are no pathologically enlarged posterior mediastinal or upper abdominal lymph nodes. Specimen:  Yes      Estimated Blood Loss:  Minimal      Implant:  Dobhoff tube              Impression:     1. Esophageal stricture. Dilated. 2. Gastritis. Biopsies. 3. Fatty infiltration of the liver. 4. Pancreatic lipomatosis.  This condition of fatty replacement of the pancreas that may associated with obesity, dyslipidemia, diabetes, steroid use or hereditary pancreatitis. There is no established risk of progression to fibrosis or chronic pancreatitis (as with nonalcoholic fatty liver disease, steatohepatitis and cirrhosis). In some cases, patients may have associated pancreatic insufficiency. 5. Dilated common bile duct. In the absence of biliary obstruction, this likely reflects benign post-cholecystectomy biliary dilatation. 6. Dobhoff tube successfully placed. Plan:   1. Start enteral feeding today. 2. Follow-up pathology results. 3. Continue PPI b.i.d.    4. Avoid NSAIDs. 5. Will hold promotility therapy. 6. Will discontinue IV opiates as there has been no identified objective indication. 7. Pending clinical course, will consider placement of G-J tube.      Signed:  Pascual Brian MD  7/22/2022  8:49 AM

## 2022-07-22 NOTE — PROGRESS NOTES
.END OF SHIFT NOTE:    INTAKE/OUTPUT  07/21 0701 - 07/22 0700  In: -   Out: 1300 [Urine:500]  Voiding: Yes  Catheter: No  Drain:   NG/OG/NJ/NE Tube Nasogastric 8 fr Right nostril (Active)   Surrounding Skin Clean, dry & intact 07/22/22 1030   Securement device Bridle 07/22/22 1030   Status Clamped 07/22/22 1030   Tube feeding/verify rate (mL/hr) 10 mL/hr 07/22/22 1640   Tube Feeding Intake (mL) 24 ml 07/22/22 1640   Free Water/Flush (mL) 60 mL 07/22/22 1640               Flatus: Patient does not have flatus present. Stool:  occurrences. Characteristics:           Stool Assessment  Last BM (including prior to admit): 07/18/22 (per pt)    Emesis:  occurrences. Characteristics:        VITAL SIGNS  Patient Vitals for the past 12 hrs:   Temp Pulse Resp BP SpO2   07/22/22 1558 97.5 °F (36.4 °C) (!) 111 18 (!) 164/83 96 %   07/22/22 1145 97.9 °F (36.6 °C) (!) 112 17 129/67 95 %   07/22/22 1045 -- (!) 120 16 (!) 153/67 96 %   07/22/22 1040 -- (!) 116 16 (!) 148/89 98 %   07/22/22 1035 -- (!) 112 15 (!) 163/83 99 %   07/22/22 1030 98 °F (36.7 °C) (!) 114 15 (!) 164/78 98 %   07/22/22 1027 -- (!) 117 16 (!) 156/68 99 %   07/22/22 1022 -- (!) 116 16 (!) 157/58 98 %   07/22/22 1015 -- (!) 118 14 (!) 155/68 98 %   07/22/22 1012 -- (!) 119 15 (!) 147/68 98 %   07/22/22 1007 -- (!) 117 16 137/63 98 %   07/22/22 1002 -- (!) 121 15 (!) 141/62 99 %   07/22/22 1000 97.4 °F (36.3 °C) (!) 122 14 (!) 141/72 97 %   07/22/22 0906 -- (!) 134 18 (!) 142/71 100 %   07/22/22 0827 97.9 °F (36.6 °C) (!) 126 20 133/80 97 %   07/22/22 0727 97.9 °F (36.6 °C) (!) 121 17 (!) 151/78 95 %       Pain Assessment  @BSIFLOW(13)@  Pain Location: Throat  Patient's Stated Pain Goal: 3    Ambulating  Yes, Walked in giang today. Shift report given to oncoming nurse at the bedside.     Elia Crawford RN

## 2022-07-23 LAB
ALBUMIN SERPL-MCNC: 3 G/DL (ref 3.5–5)
ALBUMIN/GLOB SERPL: 0.8 {RATIO} (ref 1.2–3.5)
ALP SERPL-CCNC: 73 U/L (ref 50–136)
ALT SERPL-CCNC: 34 U/L (ref 12–65)
ANION GAP SERPL CALC-SCNC: 11 MMOL/L (ref 7–16)
AST SERPL-CCNC: 22 U/L (ref 15–37)
BASOPHILS # BLD: 0 K/UL (ref 0–0.2)
BASOPHILS NFR BLD: 0 % (ref 0–2)
BILIRUB SERPL-MCNC: 0.6 MG/DL (ref 0.2–1.1)
BUN SERPL-MCNC: 8 MG/DL (ref 6–23)
CALCIUM SERPL-MCNC: 8.4 MG/DL (ref 8.3–10.4)
CHLORIDE SERPL-SCNC: 107 MMOL/L (ref 98–107)
CO2 SERPL-SCNC: 21 MMOL/L (ref 21–32)
CREAT SERPL-MCNC: 0.5 MG/DL (ref 0.6–1)
DIFFERENTIAL METHOD BLD: ABNORMAL
EOSINOPHIL # BLD: 0 K/UL (ref 0–0.8)
EOSINOPHIL NFR BLD: 1 % (ref 0.5–7.8)
ERYTHROCYTE [DISTWIDTH] IN BLOOD BY AUTOMATED COUNT: 18.2 % (ref 11.9–14.6)
GLOBULIN SER CALC-MCNC: 3.8 G/DL (ref 2.3–3.5)
GLUCOSE BLD STRIP.AUTO-MCNC: 139 MG/DL (ref 65–100)
GLUCOSE BLD STRIP.AUTO-MCNC: 165 MG/DL (ref 65–100)
GLUCOSE BLD STRIP.AUTO-MCNC: 175 MG/DL (ref 65–100)
GLUCOSE BLD STRIP.AUTO-MCNC: 192 MG/DL (ref 65–100)
GLUCOSE SERPL-MCNC: 182 MG/DL (ref 65–100)
HCT VFR BLD AUTO: 34.1 % (ref 35.8–46.3)
HGB BLD-MCNC: 10.2 G/DL (ref 11.7–15.4)
IMM GRANULOCYTES # BLD AUTO: 0 K/UL (ref 0–0.5)
IMM GRANULOCYTES NFR BLD AUTO: 0 % (ref 0–5)
LYMPHOCYTES # BLD: 1.6 K/UL (ref 0.5–4.6)
LYMPHOCYTES NFR BLD: 22 % (ref 13–44)
MAGNESIUM SERPL-MCNC: 2.1 MG/DL (ref 1.8–2.4)
MCH RBC QN AUTO: 22.3 PG (ref 26.1–32.9)
MCHC RBC AUTO-ENTMCNC: 29.9 G/DL (ref 31.4–35)
MCV RBC AUTO: 74.6 FL (ref 79.6–97.8)
MONOCYTES # BLD: 0.7 K/UL (ref 0.1–1.3)
MONOCYTES NFR BLD: 10 % (ref 4–12)
NEUTS SEG # BLD: 5.1 K/UL (ref 1.7–8.2)
NEUTS SEG NFR BLD: 68 % (ref 43–78)
NRBC # BLD: 0 K/UL (ref 0–0.2)
PLATELET # BLD AUTO: 182 K/UL (ref 150–450)
PMV BLD AUTO: ABNORMAL FL (ref 9.4–12.3)
POTASSIUM SERPL-SCNC: 3.4 MMOL/L (ref 3.5–5.1)
PROT SERPL-MCNC: 6.8 G/DL (ref 6.3–8.2)
RBC # BLD AUTO: 4.57 M/UL (ref 4.05–5.2)
SERVICE CMNT-IMP: ABNORMAL
SODIUM SERPL-SCNC: 139 MMOL/L (ref 136–145)
WBC # BLD AUTO: 7.5 K/UL (ref 4.3–11.1)

## 2022-07-23 PROCEDURE — A4216 STERILE WATER/SALINE, 10 ML: HCPCS | Performed by: FAMILY MEDICINE

## 2022-07-23 PROCEDURE — C9113 INJ PANTOPRAZOLE SODIUM, VIA: HCPCS | Performed by: FAMILY MEDICINE

## 2022-07-23 PROCEDURE — 2700000000 HC OXYGEN THERAPY PER DAY

## 2022-07-23 PROCEDURE — 6360000002 HC RX W HCPCS: Performed by: INTERNAL MEDICINE

## 2022-07-23 PROCEDURE — 36415 COLL VENOUS BLD VENIPUNCTURE: CPT

## 2022-07-23 PROCEDURE — 85025 COMPLETE CBC W/AUTO DIFF WBC: CPT

## 2022-07-23 PROCEDURE — 82962 GLUCOSE BLOOD TEST: CPT

## 2022-07-23 PROCEDURE — 94760 N-INVAS EAR/PLS OXIMETRY 1: CPT

## 2022-07-23 PROCEDURE — 2580000003 HC RX 258: Performed by: FAMILY MEDICINE

## 2022-07-23 PROCEDURE — 6370000000 HC RX 637 (ALT 250 FOR IP): Performed by: FAMILY MEDICINE

## 2022-07-23 PROCEDURE — 2500000003 HC RX 250 WO HCPCS: Performed by: INTERNAL MEDICINE

## 2022-07-23 PROCEDURE — 6370000000 HC RX 637 (ALT 250 FOR IP): Performed by: INTERNAL MEDICINE

## 2022-07-23 PROCEDURE — 1100000000 HC RM PRIVATE

## 2022-07-23 PROCEDURE — 83735 ASSAY OF MAGNESIUM: CPT

## 2022-07-23 PROCEDURE — 2500000003 HC RX 250 WO HCPCS: Performed by: HOSPITALIST

## 2022-07-23 PROCEDURE — 80053 COMPREHEN METABOLIC PANEL: CPT

## 2022-07-23 PROCEDURE — 2580000003 HC RX 258: Performed by: HOSPITALIST

## 2022-07-23 PROCEDURE — 6360000002 HC RX W HCPCS: Performed by: FAMILY MEDICINE

## 2022-07-23 RX ORDER — LISINOPRIL 5 MG/1
10 TABLET ORAL DAILY
Status: DISCONTINUED | OUTPATIENT
Start: 2022-07-24 | End: 2022-07-28 | Stop reason: HOSPADM

## 2022-07-23 RX ORDER — LORAZEPAM 1 MG/1
1 TABLET ORAL 3 TIMES DAILY
Status: DISCONTINUED | OUTPATIENT
Start: 2022-07-23 | End: 2022-07-28 | Stop reason: HOSPADM

## 2022-07-23 RX ORDER — ZOLPIDEM TARTRATE 5 MG/1
5 TABLET ORAL NIGHTLY PRN
Status: DISCONTINUED | OUTPATIENT
Start: 2022-07-23 | End: 2022-07-24

## 2022-07-23 RX ADMIN — CITALOPRAM HYDROBROMIDE 40 MG: 10 TABLET ORAL at 13:12

## 2022-07-23 RX ADMIN — SODIUM CHLORIDE 40 MG: 9 INJECTION INTRAMUSCULAR; INTRAVENOUS; SUBCUTANEOUS at 09:31

## 2022-07-23 RX ADMIN — ZOLPIDEM TARTRATE 5 MG: 5 TABLET ORAL at 22:24

## 2022-07-23 RX ADMIN — PROCHLORPERAZINE EDISYLATE 10 MG: 5 INJECTION INTRAMUSCULAR; INTRAVENOUS at 20:43

## 2022-07-23 RX ADMIN — ONDANSETRON 4 MG: 2 INJECTION INTRAMUSCULAR; INTRAVENOUS at 06:16

## 2022-07-23 RX ADMIN — PREGABALIN 100 MG: 100 CAPSULE ORAL at 09:31

## 2022-07-23 RX ADMIN — ONDANSETRON 4 MG: 2 INJECTION INTRAMUSCULAR; INTRAVENOUS at 17:58

## 2022-07-23 RX ADMIN — ONDANSETRON 4 MG: 2 INJECTION INTRAMUSCULAR; INTRAVENOUS at 10:26

## 2022-07-23 RX ADMIN — ONDANSETRON 4 MG: 2 INJECTION INTRAMUSCULAR; INTRAVENOUS at 00:23

## 2022-07-23 RX ADMIN — SODIUM CHLORIDE 40 MG: 9 INJECTION INTRAMUSCULAR; INTRAVENOUS; SUBCUTANEOUS at 20:44

## 2022-07-23 RX ADMIN — SODIUM CHLORIDE, PRESERVATIVE FREE 10 ML: 5 INJECTION INTRAVENOUS at 20:51

## 2022-07-23 RX ADMIN — ENALAPRILAT 1.25 MG: 2.5 INJECTION INTRAVENOUS at 00:24

## 2022-07-23 RX ADMIN — ENOXAPARIN SODIUM 40 MG: 100 INJECTION SUBCUTANEOUS at 09:31

## 2022-07-23 RX ADMIN — MORPHINE SULFATE 15 MG: 15 TABLET ORAL at 00:24

## 2022-07-23 RX ADMIN — SODIUM BICARBONATE: 84 INJECTION, SOLUTION INTRAVENOUS at 09:49

## 2022-07-23 RX ADMIN — PREGABALIN 100 MG: 100 CAPSULE ORAL at 20:44

## 2022-07-23 RX ADMIN — DIPHENHYDRAMINE HYDROCHLORIDE 25 MG: 50 INJECTION, SOLUTION INTRAMUSCULAR; INTRAVENOUS at 17:58

## 2022-07-23 RX ADMIN — SODIUM BICARBONATE: 84 INJECTION, SOLUTION INTRAVENOUS at 20:48

## 2022-07-23 RX ADMIN — SODIUM CHLORIDE, PRESERVATIVE FREE 10 ML: 5 INJECTION INTRAVENOUS at 11:23

## 2022-07-23 RX ADMIN — LORAZEPAM 1 MG: 1 TABLET ORAL at 09:31

## 2022-07-23 RX ADMIN — MORPHINE SULFATE 15 MG: 15 TABLET ORAL at 06:17

## 2022-07-23 RX ADMIN — ENALAPRILAT 1.25 MG: 2.5 INJECTION INTRAVENOUS at 12:00

## 2022-07-23 RX ADMIN — LORAZEPAM 1 MG: 1 TABLET ORAL at 20:44

## 2022-07-23 RX ADMIN — LORAZEPAM 1 MG: 1 TABLET ORAL at 13:15

## 2022-07-23 RX ADMIN — ENALAPRILAT 1.25 MG: 2.5 INJECTION INTRAVENOUS at 06:16

## 2022-07-23 ASSESSMENT — PAIN SCALES - GENERAL
PAINLEVEL_OUTOF10: 5
PAINLEVEL_OUTOF10: 4
PAINLEVEL_OUTOF10: 6
PAINLEVEL_OUTOF10: 2
PAINLEVEL_OUTOF10: 3

## 2022-07-23 ASSESSMENT — PAIN DESCRIPTION - ORIENTATION: ORIENTATION: ANTERIOR

## 2022-07-23 ASSESSMENT — PAIN DESCRIPTION - DESCRIPTORS
DESCRIPTORS: SORE
DESCRIPTORS: SORE

## 2022-07-23 ASSESSMENT — PAIN DESCRIPTION - LOCATION
LOCATION: THROAT;ABDOMEN
LOCATION: THROAT;ABDOMEN

## 2022-07-23 NOTE — PROGRESS NOTES
Hospitalist Progress Note   Admit Date:  2022  8:02 AM   Name:  Zoe Pickett   Age:  46 y.o. Sex:  female  :  1970   MRN:  [id number]   Room:      Presenting Complaint: Nausea     Reason(s) for Admission: Chronic abdominal pain [R10.9, G89.29]  Intractable nausea and vomiting [R11.2]  Intractable vomiting with nausea [R11.2]     Hospital Course & Interval History:     Zoe Pickett is a 46 y.o. 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 In the ED, she his afebrile and hypertensive. Labs significant for K 3.2, AST mildly elevated 43  hgb 10.5 MCV 73.3 lipase wnl. She was given 1 L LR bolus, reglan 10 IV, benadryl 12.5 IV, morhpine 4 mg x 2, Protonix 40 IV and compazine 10 mg IV with minimal relief of symptoms. She was seen 4 days ago with similar symptoms and CT a/p at that time was unremarkable. She continued to dry heave over the weekend. She tried to eat a banana yesterday and has had worsening abd pain, dry heaving and nausea since then. States dilaudid, benadryl, and compazine usually knock it out. Seen by GI in the ED who recommended admission with supportive care including IVF and antiemetics. Per GI and medical record review  Sphincter of Oddi dsyfunction with multiple prior procedures with ERCP in  with perforation requiring surgery. She was getting weekly IVF and daily phenergan injections. EGD 22 with esophageal stricture dilated to balloon 18 mm and gastric bezoar.   EUS 3/16/22 with esophogeal stricture s/p dilation, gastrojejunal anastomosis, mild pancreatic parenchymal changes without convincing evidence of chronic pancreatitis and fatty infiltration of the liver. Colonoscopy in 2018 with descending TA colon polyp and recall 4/2023. GES was reccommended after EGD wth bezoar. Subjective/24hr Events (07/23/22): Patient looks and feels significantly better. She is interested in weaning herself off narcotics completely and has tolerated dramatic decrease 15 mg from previous 30. Discussed with her decreased timing interval on dosing and will wean off while hospitalized. She will discontinue long-acting narcotics at time of discharge. Encouraged ambulation. Currently receiving small caliber tube feedings with Dobbhoff tube. GI follow-up and recommendations appreciated. Status post dilation of stricture with distal small bowel Dobbhoff tube placement. Has hypokalemia but standing order for replacement. Review of Systems:  10 systems reviewed and negative except as noted in HPI. Assessment & Plan:      Principal Problem:    Intractable nausea and vomiting  7/20-significant NG tube output noted-continue same for now. Reluctantly I am increasing narcotics and ordering Phenergan for nausea-patient very likely narcotic dependent-await further recommendations GI.  7/21--continue NG tube to LIS-possible EGD and endoscopic ultrasound tomorrow July 22. Counseled patient regarding attenuate narcotics but she is reluctant  7/22--nasogastric tube removed, Dobbhoff tube placed in small bowel at time of EGD-dilation of esophageal stricture at that time. GI recommended discontinue IV narcotics. May need GJ tube. Twice daily PPI avoid NSAIDs stop motility agent. Monitor. Meds with sips of water. 7/23--patient improving-continue to wean narcotics. GERD with stricture - s/p EGD 6/8/22 with esophageal stricture dilated to balloon 18 mm and gastric bezoar. IV PPI BID.  7/20-this plan unchanged. GI considering possible gastrostomy tube placement.   7/21-as above.7/22--twice daily PPI-status post dilation distal esophageal stricture. Chronic pain syndrome/narcotic dependence  7/22--discontinue IV narcotics-patient took high-dose morphine with long-acting and immediate release. We will stop long-acting and attenuate immediate release but will need to increase frequency and will need to taper decreased to avoid severe withdrawal.  Agree with current Valium and clonidine added for BP which should help. 7/23--patient appears motivated and appears improved today with significant decreased dosing. T2DM -   7/20-continue current sliding scale addition of long-acting insulin as needed. 7/21--fair control-continue same7/23-no change     CASSIE/Mood d/o - resume citalopram 40 mg pending qtc assessment. Prn ativan.   7/21--clinically stable-continue same meds. Active Problems:    Class 1 obesity with serious comorbidity and body mass index (BMI) of 34.0 to 34.9 in adult  Plan: adds complexity. Hypokalemia due to excessive gastrointestinal loss of potassium  Plan: replete IV,  monitor mag.  7/21--solved-follow closely. Microcytic anemia  Plan: h/o JORGE LUIS in the past.     Chronic pancreatitis ([facility] [address] possible history of--  Plan: no evidence of this on recent EUS 3/2022.      Fibromoyalgia - lyrica        Dispo/Discharge Planning:    Admit for obs      Diet: ADULT DIET; n.p.o. meds with sips  VTE ppx: lovenox   Code status: Full Code            Hospital Problems:  Principal Problem:    Intractable nausea and vomiting  Active Problems:    Class 1 obesity with serious comorbidity and body mass index (BMI) of 34.0 to 34.9 in adult    Hypokalemia due to excessive gastrointestinal loss of potassium    Microcytic anemia    SIRS (systemic inflammatory response syndrome) (HCC)    Neutrophilic leukocytosis    GERD with stricture    Type 2 diabetes mellitus with hyperglycemia, with long-term current use of insulin (HCC)    Chronic pancreatitis (HCC)    S/P balloon dilatation of esophageal stricture  Resolved Problems:    * No resolved hospital problems. *      Objective:   Patient Vitals for the past 24 hrs:   Temp Pulse Resp BP SpO2   07/23/22 1123 97.7 °F (36.5 °C) (!) 105 17 (!) 130/59 --   07/23/22 0758 97.7 °F (36.5 °C) 100 16 137/71 94 %   07/23/22 0347 98.2 °F (36.8 °C) 98 20 (!) 149/78 96 %   07/22/22 2346 97.5 °F (36.4 °C) (!) 103 20 (!) 154/80 96 %   07/22/22 1935 97.9 °F (36.6 °C) (!) 114 20 (!) 148/75 97 %   07/22/22 1558 97.5 °F (36.4 °C) (!) 111 18 (!) 164/83 96 %         Oxygen Therapy  SpO2: 94 %  Pulse via Oximetry: 120 beats per minute  Pulse Oximeter Device Location: Right  O2 Device: Nasal cannula  O2 Flow Rate (L/min): 3 L/min    Estimated body mass index is 34.75 kg/m² as calculated from the following:    Height as of this encounter: 5' 2\" (1.575 m). Weight as of this encounter: 190 lb (86.2 kg). Intake/Output Summary (Last 24 hours) at 7/23/2022 1300  Last data filed at 7/23/2022 5940  Gross per 24 hour   Intake 2158.47 ml   Output 2000 ml   Net 158.47 ml           Physical Exam:   Blood pressure (!) 130/59, pulse (!) 105, temperature 97.7 °F (36.5 °C), temperature source Oral, resp. rate 17, height 5' 2\" (1.575 m), weight 190 lb (86.2 kg), SpO2 94 %. Physical Exam:   General-alert and oriented x3, cooperative and calm  Eyes-conjunctive are clear pupils equal round react to light extraocular muscles intact    Ears-no deformity-no drainage  Nares-no nasal deformity-no discharge-turbinates not significantly enlarged  Throat-oral mucosa moist, no erythema or exudate  Heart-regular rate and rhythm no rubs gallops clicks or murmurs. No JVD grossly  Lungs-clear auscultation palpation and percussion, symmetric excursion of the chest wall. No wheezing    Abdomen-soft, nontender, no gross percussible organomegaly. No gross distention. Bowel sounds present all 4 quadrants--Dobbhoff tube in place. Extremities-no significant edema, moves all extremities symmetrically.   Skin-no obvious breakdown or significant rash  Neurologic-cranial nerves II through XII grossly intact, no focal motor deficits grossly. No tremor    Psychiatric-no suicidal ideations or homicidal ideations. Denies depressed thoughts. Lab/Data Review: All lab results for the last 24 hours reviewed.                               I have reviewed ordered lab tests and independently visualized imaging below:    Recent Labs:  Recent Results (from the past 48 hour(s))   Culture, Blood 1    Collection Time: 07/21/22  3:24 PM    Specimen: Blood   Result Value Ref Range    Special Requests RIGHT  Antecubital        Culture NO GROWTH AFTER 14 HOURS     Culture, Blood 1    Collection Time: 07/21/22  3:25 PM    Specimen: Blood   Result Value Ref Range    Special Requests RIGHT  FOREARM        Culture NO GROWTH AFTER 14 HOURS     POCT Glucose    Collection Time: 07/21/22  5:12 PM   Result Value Ref Range    POC Glucose 170 (H) 65 - 100 mg/dL    Performed by: Ольга    POCT Glucose    Collection Time: 07/21/22 10:21 PM   Result Value Ref Range    POC Glucose 198 (H) 65 - 100 mg/dL    Performed by: Noris    Magnesium    Collection Time: 07/22/22  5:27 AM   Result Value Ref Range    Magnesium 2.2 1.8 - 2.4 mg/dL   Comprehensive Metabolic Panel    Collection Time: 07/22/22  5:27 AM   Result Value Ref Range    Sodium 135 (L) 136 - 145 mmol/L    Potassium 3.8 3.5 - 5.1 mmol/L    Chloride 107 98 - 107 mmol/L    CO2 17 (L) 21 - 32 mmol/L    Anion Gap 11 7 - 16 mmol/L    Glucose 199 (H) 65 - 100 mg/dL    BUN 8 6 - 23 MG/DL    Creatinine 0.60 0.6 - 1.0 MG/DL    GFR African American >60 >60 ml/min/1.73m2    GFR Non- >60 >60 ml/min/1.73m2    Calcium 8.6 8.3 - 10.4 MG/DL    Total Bilirubin 0.9 0.2 - 1.1 MG/DL    ALT 36 12 - 65 U/L    AST 17 15 - 37 U/L    Alk Phosphatase 81 50 - 136 U/L    Total Protein 7.8 6.3 - 8.2 g/dL    Albumin 3.6 3.5 - 5.0 g/dL    Globulin 4.2 (H) 2.3 - 3.5 g/dL    Albumin/Globulin Ratio 0.9 (L) 1.2 - 3.5 Procalcitonin    Collection Time: 07/22/22  5:27 AM   Result Value Ref Range    Procalcitonin <0.05 0.00 - 0.49 ng/mL   CBC with Auto Differential    Collection Time: 07/22/22  6:13 AM   Result Value Ref Range    WBC 12.5 (H) 4.3 - 11.1 K/uL    RBC 5.07 4.05 - 5.2 M/uL    Hemoglobin 11.3 (L) 11.7 - 15.4 g/dL    Hematocrit 38.3 35.8 - 46.3 %    MCV 75.5 (L) 79.6 - 97.8 FL    MCH 22.3 (L) 26.1 - 32.9 PG    MCHC 29.5 (L) 31.4 - 35.0 g/dL    RDW 18.6 (H) 11.9 - 14.6 %    Platelets 869 706 - 473 K/uL    MPV 10.5 9.4 - 12.3 FL    nRBC 0.00 0.0 - 0.2 K/uL    Differential Type AUTOMATED      Seg Neutrophils 84 (H) 43 - 78 %    Lymphocytes 8 (L) 13 - 44 %    Monocytes 7 4.0 - 12.0 %    Eosinophils % 0 (L) 0.5 - 7.8 %    Basophils 0 0.0 - 2.0 %    Immature Granulocytes 1 0.0 - 5.0 %    Segs Absolute 10.4 (H) 1.7 - 8.2 K/UL    Absolute Lymph # 1.0 0.5 - 4.6 K/UL    Absolute Mono # 0.9 0.1 - 1.3 K/UL    Absolute Eos # 0.0 0.0 - 0.8 K/UL    Basophils Absolute 0.0 0.0 - 0.2 K/UL    Absolute Immature Granulocyte 0.1 0.0 - 0.5 K/UL   POCT Glucose    Collection Time: 07/22/22  7:42 AM   Result Value Ref Range    POC Glucose 182 (H) 65 - 100 mg/dL    Performed by: ChaiNordicplanaPCT    POCT Glucose    Collection Time: 07/22/22 10:09 AM   Result Value Ref Range    POC Glucose 173 (H) 65 - 100 mg/dL    Performed by: Agustina    POCT Glucose    Collection Time: 07/22/22 11:46 AM   Result Value Ref Range    POC Glucose 189 (H) 65 - 100 mg/dL    Performed by: Lilian    POCT Glucose    Collection Time: 07/22/22  4:12 PM   Result Value Ref Range    POC Glucose 231 (H) 65 - 100 mg/dL    Performed by: ArcadioCT    POCT Glucose    Collection Time: 07/22/22  8:51 PM   Result Value Ref Range    POC Glucose 224 (H) 65 - 100 mg/dL    Performed by: Javier    Comprehensive Metabolic Panel    Collection Time: 07/23/22  7:24 AM   Result Value Ref Range    Sodium 139 136 - 145 mmol/L    Potassium 3.4 (L) 3.5 - 5.1 mmol/L    Chloride 107 98 - 107 mmol/L    CO2 21 21 - 32 mmol/L    Anion Gap 11 7 - 16 mmol/L    Glucose 182 (H) 65 - 100 mg/dL    BUN 8 6 - 23 MG/DL    Creatinine 0.50 (L) 0.6 - 1.0 MG/DL    GFR African American >60 >60 ml/min/1.73m2    GFR Non- >60 >60 ml/min/1.73m2    Calcium 8.4 8.3 - 10.4 MG/DL    Total Bilirubin 0.6 0.2 - 1.1 MG/DL    ALT 34 12 - 65 U/L    AST 22 15 - 37 U/L    Alk Phosphatase 73 50 - 136 U/L    Total Protein 6.8 6.3 - 8.2 g/dL    Albumin 3.0 (L) 3.5 - 5.0 g/dL    Globulin 3.8 (H) 2.3 - 3.5 g/dL    Albumin/Globulin Ratio 0.8 (L) 1.2 - 3.5     CBC with Auto Differential    Collection Time: 07/23/22  7:24 AM   Result Value Ref Range    WBC 7.5 4.3 - 11.1 K/uL    RBC 4.57 4.05 - 5.2 M/uL    Hemoglobin 10.2 (L) 11.7 - 15.4 g/dL    Hematocrit 34.1 (L) 35.8 - 46.3 %    MCV 74.6 (L) 79.6 - 97.8 FL    MCH 22.3 (L) 26.1 - 32.9 PG    MCHC 29.9 (L) 31.4 - 35.0 g/dL    RDW 18.2 (H) 11.9 - 14.6 %    Platelets 170 542 - 843 K/uL    MPV Unable to calculate. Recommend adding IPF.  9.4 - 12.3 FL    nRBC 0.00 0.0 - 0.2 K/uL    Differential Type AUTOMATED      Seg Neutrophils 68 43 - 78 %    Lymphocytes 22 13 - 44 %    Monocytes 10 4.0 - 12.0 %    Eosinophils % 1 0.5 - 7.8 %    Basophils 0 0.0 - 2.0 %    Immature Granulocytes 0 0.0 - 5.0 %    Segs Absolute 5.1 1.7 - 8.2 K/UL    Absolute Lymph # 1.6 0.5 - 4.6 K/UL    Absolute Mono # 0.7 0.1 - 1.3 K/UL    Absolute Eos # 0.0 0.0 - 0.8 K/UL    Basophils Absolute 0.0 0.0 - 0.2 K/UL    Absolute Immature Granulocyte 0.0 0.0 - 0.5 K/UL   Magnesium    Collection Time: 07/23/22  7:24 AM   Result Value Ref Range    Magnesium 2.1 1.8 - 2.4 mg/dL   POCT Glucose    Collection Time: 07/23/22  7:59 AM   Result Value Ref Range    POC Glucose 139 (H) 65 - 100 mg/dL    Performed by: Lilian    POCT Glucose    Collection Time: 07/23/22 11:23 AM   Result Value Ref Range    POC Glucose 192 (H) 65 - 100 mg/dL    Performed by: NataliaJigsaw MeetingFrannie        Other Studies:  XR CHEST PORTABLE   Final Result   1. No acute intrathoracic process. 2. The distal side-port of the enteric tube lies in the lower esophagus. Recommend advancement by at least 5 cm. XR ABDOMEN (KUB) (SINGLE AP VIEW)   Final Result   The bowel gas pattern is normal. There is no evidence of   obstruction. There is no free air visualized on this supine view. There are no   renal calculi. The lung bases are clear. Nasogastric tube terminates just below   the left hemidiaphragm in the proximal stomach. Postop changes related to   gastric surgery are noted. XR ABDOMEN (KUB) (SINGLE AP VIEW)   Final Result   Enteric tube tip overlies the gastric body.                    Current Meds:  Current Facility-Administered Medications   Medication Dose Route Frequency    LORazepam (ATIVAN) tablet 1 mg  1 mg Oral TID    zolpidem (AMBIEN) tablet 5 mg  5 mg Oral Nightly PRN    [START ON 7/24/2022] lisinopril (PRINIVIL;ZESTRIL) tablet 10 mg  10 mg Oral Daily    heparin flush 100 UNIT/ML injection 300 Units  300 Units IntraCATHeter PRN    morphine (MSIR) tablet 15 mg  15 mg Oral Q4H PRN    cloNIDine (CATAPRES) 0.3 MG/24HR 1 patch  1 patch TransDERmal Weekly    sodium bicarbonate 25 mEq in sodium chloride 0.45 % 1,000 mL infusion   IntraVENous Continuous    phenol 1.4 % mouth spray 1 spray  1 spray Mouth/Throat Q2H PRN    prochlorperazine (COMPAZINE) injection 10 mg  10 mg IntraVENous Q6H PRN    hydrALAZINE (APRESOLINE) injection 20 mg  20 mg IntraVENous Q6H PRN    sodium chloride flush 0.9 % injection 5-40 mL  5-40 mL IntraVENous 2 times per day    sodium chloride flush 0.9 % injection 5-40 mL  5-40 mL IntraVENous PRN    0.9 % sodium chloride infusion   IntraVENous PRN    enoxaparin (LOVENOX) injection 40 mg  40 mg SubCUTAneous Daily    ondansetron (ZOFRAN-ODT) disintegrating tablet 8 mg  8 mg Oral Q8H PRN    Or    ondansetron (ZOFRAN) injection 4 mg  4 mg IntraVENous Q6H PRN    acetaminophen (TYLENOL) tablet 650 mg  650 mg Oral Q6H PRN    Or    acetaminophen (TYLENOL) suppository 650 mg  650 mg Rectal Q6H PRN    polyethylene glycol (GLYCOLAX) packet 17 g  17 g Oral Daily PRN    pantoprazole (PROTONIX) 40 mg in sodium chloride (PF) 10 mL injection  40 mg IntraVENous Q12H    potassium chloride (KLOR-CON M) extended release tablet 40 mEq  40 mEq Oral PRN    Or    potassium bicarb-citric acid (EFFER-K) effervescent tablet 40 mEq  40 mEq Oral PRN    Or    potassium chloride 10 mEq/100 mL IVPB (Peripheral Line)  10 mEq IntraVENous PRN    magnesium sulfate 1000 mg in dextrose 5% 100 mL IVPB  1,000 mg IntraVENous PRN    glucose chewable tablet 16 g  4 tablet Oral PRN    dextrose bolus 10% 125 mL  125 mL IntraVENous PRN    Or    dextrose bolus 10% 250 mL  250 mL IntraVENous PRN    glucagon (rDNA) injection 1 mg  1 mg IntraMUSCular PRN    dextrose 5 % solution  100 mL/hr IntraVENous PRN    insulin lispro (HUMALOG) injection vial 0-8 Units  0-8 Units SubCUTAneous TID WC    insulin lispro (HUMALOG) injection vial 0-4 Units  0-4 Units SubCUTAneous Nightly    pregabalin (LYRICA) capsule 100 mg  100 mg Oral BID    citalopram (CELEXA) tablet 40 mg  40 mg Oral Daily    diphenhydrAMINE (BENADRYL) injection 25 mg  25 mg IntraVENous Q6H PRN       Signed:  Ezequiel Jeff DO    Part of this note may have been written by using a voice dictation software. The note has been proof read but may still contain some grammatical/other typographical errors.

## 2022-07-23 NOTE — PROGRESS NOTES
END OF SHIFT NOTE:    INTAKE/OUTPUT  07/22 0701 - 07/23 0700  In: 3826.5 [I.V.:3567.5]  Out: 3190 [Urine:2200]  Voiding: Yes  Catheter: No  Drain:   NG/OG/NJ/NE Tube Nasogastric 8 fr Right nostril (Active)   Surrounding Skin Clean, dry & intact 07/22/22 1953   Securement device Bridle 07/22/22 1953   Status Clamped 07/22/22 1953   Placement Verified External Catheter Length 07/22/22 1953   Tube Feeding Standard with Fiber 07/22/22 1953   Tube feeding/verify rate (mL/hr) 10 mL/hr 07/22/22 1953   Tube Feeding Intake (mL) 115 ml 07/23/22 0619   Free Water/Flush (mL) 60 mL 07/22/22 1953               Flatus: Patient does not  have flatus present. Stool:  occurrences. Characteristics:           Stool Assessment  Last BM (including prior to admit): 07/18/22    Emesis:  occurrences. Characteristics:        VITAL SIGNS  Patient Vitals for the past 12 hrs:   Temp Pulse Resp BP SpO2   07/23/22 0347 98.2 °F (36.8 °C) 98 20 (!) 149/78 96 %   07/22/22 2346 97.5 °F (36.4 °C) (!) 103 20 (!) 154/80 96 %   07/22/22 1935 97.9 °F (36.6 °C) (!) 114 20 (!) 148/75 97 %       Pain Assessment  @BSHSIFLOW(13)@  Pain Location: Throat, Abdomen  Patient's Stated Pain Goal: 0 - No pain    Ambulating  Yes    Shift report given to oncoming nurse at the bedside.     Gunnar Garcia RN

## 2022-07-23 NOTE — PROGRESS NOTES
Date of admission:  7/19/2022    Today's date:  7/23/2022    CC:     Intractable nausea     HPI:   Patient feels much improved this morning with decrease in nausea. She is not received IV opiates over the past 24 hours. She is tolerating enteral tube feeds per Dobhoff.      ROS:    Gen: No fevers, no chills   CV:   No chest pain, no SOB    Current Medications:     Current Facility-Administered Medications   Medication Dose Route Frequency    heparin flush 100 UNIT/ML injection 300 Units  300 Units IntraCATHeter PRN    morphine (MSIR) tablet 15 mg  15 mg Oral Q4H PRN    diazePAM (VALIUM) injection 5 mg  5 mg IntraVENous TID    cloNIDine (CATAPRES) 0.3 MG/24HR 1 patch  1 patch TransDERmal Weekly    enalaprilat (VASOTEC) injection 1.25 mg  1.25 mg IntraVENous 4 times per day    sodium bicarbonate 25 mEq in sodium chloride 0.45 % 1,000 mL infusion   IntraVENous Continuous    phenol 1.4 % mouth spray 1 spray  1 spray Mouth/Throat Q2H PRN    prochlorperazine (COMPAZINE) injection 10 mg  10 mg IntraVENous Q6H PRN    hydrALAZINE (APRESOLINE) injection 20 mg  20 mg IntraVENous Q6H PRN    sodium chloride flush 0.9 % injection 5-40 mL  5-40 mL IntraVENous 2 times per day    sodium chloride flush 0.9 % injection 5-40 mL  5-40 mL IntraVENous PRN    0.9 % sodium chloride infusion   IntraVENous PRN    enoxaparin (LOVENOX) injection 40 mg  40 mg SubCUTAneous Daily    ondansetron (ZOFRAN-ODT) disintegrating tablet 8 mg  8 mg Oral Q8H PRN    Or    ondansetron (ZOFRAN) injection 4 mg  4 mg IntraVENous Q6H PRN    acetaminophen (TYLENOL) tablet 650 mg  650 mg Oral Q6H PRN    Or    acetaminophen (TYLENOL) suppository 650 mg  650 mg Rectal Q6H PRN    polyethylene glycol (GLYCOLAX) packet 17 g  17 g Oral Daily PRN    pantoprazole (PROTONIX) 40 mg in sodium chloride (PF) 10 mL injection  40 mg IntraVENous Q12H    potassium chloride (KLOR-CON M) extended release tablet 40 mEq  40 mEq Oral PRN    Or    potassium bicarb-citric acid (EFFER-K) effervescent tablet 40 mEq  40 mEq Oral PRN    Or    potassium chloride 10 mEq/100 mL IVPB (Peripheral Line)  10 mEq IntraVENous PRN    magnesium sulfate 1000 mg in dextrose 5% 100 mL IVPB  1,000 mg IntraVENous PRN    glucose chewable tablet 16 g  4 tablet Oral PRN    dextrose bolus 10% 125 mL  125 mL IntraVENous PRN    Or    dextrose bolus 10% 250 mL  250 mL IntraVENous PRN    glucagon (rDNA) injection 1 mg  1 mg IntraMUSCular PRN    dextrose 5 % solution  100 mL/hr IntraVENous PRN    insulin lispro (HUMALOG) injection vial 0-8 Units  0-8 Units SubCUTAneous TID WC    insulin lispro (HUMALOG) injection vial 0-4 Units  0-4 Units SubCUTAneous Nightly    pregabalin (LYRICA) capsule 100 mg  100 mg Oral BID    citalopram (CELEXA) tablet 40 mg  40 mg Oral Daily    diphenhydrAMINE (BENADRYL) injection 25 mg  25 mg IntraVENous Q6H PRN       Physical Exam:   Vitals:  /71   Pulse 100   Temp 97.7 °F (36.5 °C)   Resp 16   Ht 5' 2\" (1.575 m)   Wt 190 lb (86.2 kg)   SpO2 94%   BMI 34.75 kg/m²   Intake/Output:  No intake/output data recorded.   07/21 1901 - 07/23 0700  In: 3826.5 [I.V.:3567.5]  Out: 2902 [Urine:2700]    HEENT:  No scleral icterus, + Dobhoff tube   Abdomen: Soft, nontender, normoactive bowel sounds  Extremities:  No cyanosis, no leg edema  Neuro:  Alert and oriented to person, place, and time    Data:     Recent Results (from the past 24 hour(s))   POCT Glucose    Collection Time: 07/22/22 10:09 AM   Result Value Ref Range    POC Glucose 173 (H) 65 - 100 mg/dL    Performed by: Agustina    POCT Glucose    Collection Time: 07/22/22 11:46 AM   Result Value Ref Range    POC Glucose 189 (H) 65 - 100 mg/dL    Performed by: [facility]    POCT Glucose    Collection Time: 07/22/22  4:12 PM   Result Value Ref Range    POC Glucose 231 (H) 65 - 100 mg/dL    Performed by: [facility]    POCT Glucose    Collection Time: 07/22/22  8:51 PM   Result Value Ref Range    POC Glucose 224 (H) 65 - 100 mg/dL    Performed by: Javier    POCT Glucose    Collection Time: 07/23/22  7:59 AM   Result Value Ref Range    POC Glucose 139 (H) 65 - 100 mg/dL    Performed by: Lilian      EGD/EUS findings (7/22/22):   1. Esophageal stricture. Dilated. 2. Gastritis. Biopsies. 3. Fatty infiltration of the liver. 4. Pancreatic lipomatosis. This condition of fatty replacement of the pancreas that may associated with obesity, dyslipidemia, diabetes, steroid use or hereditary pancreatitis. There is no established risk of progression to fibrosis or chronic pancreatitis (as with nonalcoholic fatty liver disease, steatohepatitis and cirrhosis). In some cases, patients may have associated pancreatic insufficiency. 5. Dilated common bile duct. In the absence of biliary obstruction, this likely reflects benign post-cholecystectomy biliary dilatation. 6. Dobhoff tube successfully placed. Impression/Plan:       61-year-old female with suboptimally controlled diabetes and chronic abdominal pain, admitted with worsening nausea and epigastric pain. CT was unremarkable. EGD yesterday was performed with dilatation of distal esophageal stricture. Patient also noted to have gastritis, fatty infiltration of the liver and moderate lipomatosis of the pancreas. The previously placed NG tube was removed. She is receiving enteral feeds per Dorosendohogabriel. I have continued to discuss with patient and her  my concern for ongoing chronic opiate use worsening symptoms of gastroparesis and constipation. In addition, there has been has been excessive use of multiple antiemetics. I have emphasized that all of these medicines affect the brain. Since decreasing dosing of these medications, discontinuing IV analgesia and resuming enteral feeding, there has been significant clinical improvement. 1. Continue enteral feeding, IV fluids. 2. Strict avoidance of IV opiate analgesia. 3. Minimize oral opiates.    4. Will reevaluate tomorrow. If continued improvement, would consider placement of a GJ tube.      Signed:  Rich Vivar MD  7/23/2022  8:33 AM

## 2022-07-23 NOTE — PROGRESS NOTES
Reviewed notes for spiritual concerns. Noted:      Patient progressing in her care goals          Will follow as needed.

## 2022-07-24 LAB
ANION GAP SERPL CALC-SCNC: 12 MMOL/L (ref 7–16)
BUN SERPL-MCNC: 5 MG/DL (ref 6–23)
CALCIUM SERPL-MCNC: 8.8 MG/DL (ref 8.3–10.4)
CHLORIDE SERPL-SCNC: 106 MMOL/L (ref 98–107)
CO2 SERPL-SCNC: 22 MMOL/L (ref 21–32)
CREAT SERPL-MCNC: 0.5 MG/DL (ref 0.6–1)
GLUCOSE BLD STRIP.AUTO-MCNC: 177 MG/DL (ref 65–100)
GLUCOSE BLD STRIP.AUTO-MCNC: 190 MG/DL (ref 65–100)
GLUCOSE BLD STRIP.AUTO-MCNC: 191 MG/DL (ref 65–100)
GLUCOSE BLD STRIP.AUTO-MCNC: 233 MG/DL (ref 65–100)
GLUCOSE SERPL-MCNC: 200 MG/DL (ref 65–100)
POTASSIUM SERPL-SCNC: 3.1 MMOL/L (ref 3.5–5.1)
SERVICE CMNT-IMP: ABNORMAL
SODIUM SERPL-SCNC: 140 MMOL/L (ref 136–145)

## 2022-07-24 PROCEDURE — 6370000000 HC RX 637 (ALT 250 FOR IP): Performed by: HOSPITALIST

## 2022-07-24 PROCEDURE — 1100000000 HC RM PRIVATE

## 2022-07-24 PROCEDURE — 6360000002 HC RX W HCPCS: Performed by: INTERNAL MEDICINE

## 2022-07-24 PROCEDURE — 2580000003 HC RX 258: Performed by: FAMILY MEDICINE

## 2022-07-24 PROCEDURE — 80048 BASIC METABOLIC PNL TOTAL CA: CPT

## 2022-07-24 PROCEDURE — 6370000000 HC RX 637 (ALT 250 FOR IP): Performed by: FAMILY MEDICINE

## 2022-07-24 PROCEDURE — 2500000003 HC RX 250 WO HCPCS: Performed by: HOSPITALIST

## 2022-07-24 PROCEDURE — 36415 COLL VENOUS BLD VENIPUNCTURE: CPT

## 2022-07-24 PROCEDURE — 6360000002 HC RX W HCPCS: Performed by: FAMILY MEDICINE

## 2022-07-24 PROCEDURE — 6370000000 HC RX 637 (ALT 250 FOR IP): Performed by: INTERNAL MEDICINE

## 2022-07-24 PROCEDURE — 2580000003 HC RX 258: Performed by: HOSPITALIST

## 2022-07-24 PROCEDURE — A4216 STERILE WATER/SALINE, 10 ML: HCPCS | Performed by: FAMILY MEDICINE

## 2022-07-24 PROCEDURE — 82962 GLUCOSE BLOOD TEST: CPT

## 2022-07-24 PROCEDURE — 6360000002 HC RX W HCPCS: Performed by: HOSPITALIST

## 2022-07-24 PROCEDURE — C9113 INJ PANTOPRAZOLE SODIUM, VIA: HCPCS | Performed by: FAMILY MEDICINE

## 2022-07-24 RX ORDER — ZOLPIDEM TARTRATE 5 MG/1
5 TABLET ORAL ONCE
Status: COMPLETED | OUTPATIENT
Start: 2022-07-24 | End: 2022-07-24

## 2022-07-24 RX ORDER — LANOLIN ALCOHOL/MO/W.PET/CERES
400 CREAM (GRAM) TOPICAL DAILY
Status: DISCONTINUED | OUTPATIENT
Start: 2022-07-24 | End: 2022-07-28 | Stop reason: HOSPADM

## 2022-07-24 RX ORDER — ZOLPIDEM TARTRATE 5 MG/1
10 TABLET ORAL NIGHTLY PRN
Status: DISCONTINUED | OUTPATIENT
Start: 2022-07-24 | End: 2022-07-28 | Stop reason: HOSPADM

## 2022-07-24 RX ORDER — MORPHINE SULFATE 15 MG/1
15 TABLET ORAL EVERY 8 HOURS PRN
Status: DISCONTINUED | OUTPATIENT
Start: 2022-07-24 | End: 2022-07-26

## 2022-07-24 RX ORDER — POTASSIUM CHLORIDE 7.45 MG/ML
10 INJECTION INTRAVENOUS
Status: DISPENSED | OUTPATIENT
Start: 2022-07-24 | End: 2022-07-24

## 2022-07-24 RX ADMIN — SODIUM CHLORIDE 40 MG: 9 INJECTION INTRAMUSCULAR; INTRAVENOUS; SUBCUTANEOUS at 08:37

## 2022-07-24 RX ADMIN — PROCHLORPERAZINE EDISYLATE 10 MG: 5 INJECTION INTRAMUSCULAR; INTRAVENOUS at 05:21

## 2022-07-24 RX ADMIN — SODIUM CHLORIDE, PRESERVATIVE FREE 10 ML: 5 INJECTION INTRAVENOUS at 22:49

## 2022-07-24 RX ADMIN — LORAZEPAM 1 MG: 1 TABLET ORAL at 13:55

## 2022-07-24 RX ADMIN — MORPHINE SULFATE 15 MG: 15 TABLET ORAL at 14:00

## 2022-07-24 RX ADMIN — CITALOPRAM HYDROBROMIDE 40 MG: 10 TABLET ORAL at 13:02

## 2022-07-24 RX ADMIN — DIPHENHYDRAMINE HYDROCHLORIDE 25 MG: 50 INJECTION, SOLUTION INTRAMUSCULAR; INTRAVENOUS at 05:20

## 2022-07-24 RX ADMIN — PROCHLORPERAZINE EDISYLATE 10 MG: 5 INJECTION INTRAMUSCULAR; INTRAVENOUS at 13:00

## 2022-07-24 RX ADMIN — Medication 400 MG: at 11:45

## 2022-07-24 RX ADMIN — SODIUM BICARBONATE: 84 INJECTION, SOLUTION INTRAVENOUS at 19:31

## 2022-07-24 RX ADMIN — DIPHENHYDRAMINE HYDROCHLORIDE 25 MG: 50 INJECTION, SOLUTION INTRAMUSCULAR; INTRAVENOUS at 13:00

## 2022-07-24 RX ADMIN — SODIUM BICARBONATE: 84 INJECTION, SOLUTION INTRAVENOUS at 08:35

## 2022-07-24 RX ADMIN — POTASSIUM CHLORIDE 10 MEQ: 7.46 INJECTION, SOLUTION INTRAVENOUS at 14:59

## 2022-07-24 RX ADMIN — LORAZEPAM 1 MG: 1 TABLET ORAL at 21:58

## 2022-07-24 RX ADMIN — POTASSIUM CHLORIDE 10 MEQ: 7.46 INJECTION, SOLUTION INTRAVENOUS at 12:42

## 2022-07-24 RX ADMIN — POTASSIUM CHLORIDE 10 MEQ: 7.46 INJECTION, SOLUTION INTRAVENOUS at 17:16

## 2022-07-24 RX ADMIN — ZOLPIDEM TARTRATE 5 MG: 5 TABLET ORAL at 21:58

## 2022-07-24 RX ADMIN — POTASSIUM CHLORIDE 10 MEQ: 7.46 INJECTION, SOLUTION INTRAVENOUS at 13:55

## 2022-07-24 RX ADMIN — MORPHINE SULFATE 15 MG: 15 TABLET ORAL at 09:57

## 2022-07-24 RX ADMIN — ALTEPLASE 1 MG: 2.2 INJECTION, POWDER, LYOPHILIZED, FOR SOLUTION INTRAVENOUS at 23:06

## 2022-07-24 RX ADMIN — ZOLPIDEM TARTRATE 5 MG: 5 TABLET ORAL at 23:05

## 2022-07-24 RX ADMIN — PHENOL 1 SPRAY: 1.5 LIQUID ORAL at 19:31

## 2022-07-24 RX ADMIN — ENOXAPARIN SODIUM 40 MG: 100 INJECTION SUBCUTANEOUS at 08:37

## 2022-07-24 RX ADMIN — PREGABALIN 100 MG: 100 CAPSULE ORAL at 08:39

## 2022-07-24 RX ADMIN — POTASSIUM CHLORIDE 10 MEQ: 7.46 INJECTION, SOLUTION INTRAVENOUS at 18:27

## 2022-07-24 RX ADMIN — PREGABALIN 100 MG: 100 CAPSULE ORAL at 21:58

## 2022-07-24 RX ADMIN — POTASSIUM CHLORIDE 10 MEQ: 7.46 INJECTION, SOLUTION INTRAVENOUS at 11:41

## 2022-07-24 RX ADMIN — POTASSIUM CHLORIDE 10 MEQ: 7.46 INJECTION, SOLUTION INTRAVENOUS at 16:05

## 2022-07-24 RX ADMIN — LORAZEPAM 1 MG: 1 TABLET ORAL at 08:38

## 2022-07-24 RX ADMIN — LISINOPRIL 10 MG: 5 TABLET ORAL at 08:38

## 2022-07-24 RX ADMIN — SODIUM CHLORIDE, PRESERVATIVE FREE 10 ML: 5 INJECTION INTRAVENOUS at 08:39

## 2022-07-24 RX ADMIN — SODIUM CHLORIDE 40 MG: 9 INJECTION INTRAMUSCULAR; INTRAVENOUS; SUBCUTANEOUS at 21:59

## 2022-07-24 ASSESSMENT — PAIN DESCRIPTION - LOCATION
LOCATION: ABDOMEN
LOCATION: ABDOMEN
LOCATION: THROAT

## 2022-07-24 ASSESSMENT — PAIN DESCRIPTION - DESCRIPTORS
DESCRIPTORS: SORE
DESCRIPTORS: ACHING
DESCRIPTORS: ACHING;BURNING

## 2022-07-24 ASSESSMENT — PAIN SCALES - GENERAL
PAINLEVEL_OUTOF10: 6
PAINLEVEL_OUTOF10: 3
PAINLEVEL_OUTOF10: 7

## 2022-07-24 NOTE — PROGRESS NOTES
Date of admission:  7/19/2022    Today's date:  7/24/2022    CC:     Intractable nausea     HPI:   Patient overall feels much improved. Tolerate enteral feeding. She has been ambulating in the halls.      ROS:    Gen: No fevers, no chills   CV:   No chest pain, no SOB    Current Medications:     Current Facility-Administered Medications   Medication Dose Route Frequency    LORazepam (ATIVAN) tablet 1 mg  1 mg Oral TID    zolpidem (AMBIEN) tablet 5 mg  5 mg Oral Nightly PRN    lisinopril (PRINIVIL;ZESTRIL) tablet 10 mg  10 mg Oral Daily    heparin flush 100 UNIT/ML injection 300 Units  300 Units IntraCATHeter PRN    morphine (MSIR) tablet 15 mg  15 mg Oral Q4H PRN    cloNIDine (CATAPRES) 0.3 MG/24HR 1 patch  1 patch TransDERmal Weekly    sodium bicarbonate 25 mEq in sodium chloride 0.45 % 1,000 mL infusion   IntraVENous Continuous    phenol 1.4 % mouth spray 1 spray  1 spray Mouth/Throat Q2H PRN    prochlorperazine (COMPAZINE) injection 10 mg  10 mg IntraVENous Q6H PRN    hydrALAZINE (APRESOLINE) injection 20 mg  20 mg IntraVENous Q6H PRN    sodium chloride flush 0.9 % injection 5-40 mL  5-40 mL IntraVENous 2 times per day    sodium chloride flush 0.9 % injection 5-40 mL  5-40 mL IntraVENous PRN    0.9 % sodium chloride infusion   IntraVENous PRN    enoxaparin (LOVENOX) injection 40 mg  40 mg SubCUTAneous Daily    ondansetron (ZOFRAN-ODT) disintegrating tablet 8 mg  8 mg Oral Q8H PRN    Or    ondansetron (ZOFRAN) injection 4 mg  4 mg IntraVENous Q6H PRN    acetaminophen (TYLENOL) tablet 650 mg  650 mg Oral Q6H PRN    Or    acetaminophen (TYLENOL) suppository 650 mg  650 mg Rectal Q6H PRN    polyethylene glycol (GLYCOLAX) packet 17 g  17 g Oral Daily PRN    pantoprazole (PROTONIX) 40 mg in sodium chloride (PF) 10 mL injection  40 mg IntraVENous Q12H    potassium chloride (KLOR-CON M) extended release tablet 40 mEq  40 mEq Oral PRN    Or    potassium bicarb-citric acid (EFFER-K) effervescent tablet 40 mEq  40 mEq Oral PRN    Or    potassium chloride 10 mEq/100 mL IVPB (Peripheral Line)  10 mEq IntraVENous PRN    magnesium sulfate 1000 mg in dextrose 5% 100 mL IVPB  1,000 mg IntraVENous PRN    glucose chewable tablet 16 g  4 tablet Oral PRN    dextrose bolus 10% 125 mL  125 mL IntraVENous PRN    Or    dextrose bolus 10% 250 mL  250 mL IntraVENous PRN    glucagon (rDNA) injection 1 mg  1 mg IntraMUSCular PRN    dextrose 5 % solution  100 mL/hr IntraVENous PRN    insulin lispro (HUMALOG) injection vial 0-8 Units  0-8 Units SubCUTAneous TID WC    insulin lispro (HUMALOG) injection vial 0-4 Units  0-4 Units SubCUTAneous Nightly    pregabalin (LYRICA) capsule 100 mg  100 mg Oral BID    citalopram (CELEXA) tablet 40 mg  40 mg Oral Daily    diphenhydrAMINE (BENADRYL) injection 25 mg  25 mg IntraVENous Q6H PRN       Physical Exam:   Vitals:  BP (!) 160/74   Pulse 100   Temp 97.7 °F (36.5 °C) (Oral)   Resp 17   Ht 5' 2\" (1.575 m)   Wt 190 lb (86.2 kg)   SpO2 91%   BMI 34.75 kg/m²   Intake/Output:  No intake/output data recorded.   07/22 1901 - 07/24 0700  In: 2289 [I.V.:2004]  Out: 5200 [Urine:5200]    HEENT:  No scleral icterus, Dobhoff tube   Abdomen: Soft, nontender, normoactive bowel sounds  Extremities:  No cyanosis, no leg edema  Neuro:  Alert and oriented to person, place, and time    Data:     Recent Results (from the past 24 hour(s))   POCT Glucose    Collection Time: 07/23/22 11:23 AM   Result Value Ref Range    POC Glucose 192 (H) 65 - 100 mg/dL    Performed by: [facility]    POCT Glucose    Collection Time: 07/23/22  4:51 PM   Result Value Ref Range    POC Glucose 175 (H) 65 - 100 mg/dL    Performed by: [facility]    POCT Glucose    Collection Time: 07/23/22  8:56 PM   Result Value Ref Range    POC Glucose 165 (H) 65 - 100 mg/dL    Performed by: Yusuf    Basic Metabolic Panel    Collection Time: 07/24/22  7:04 AM   Result Value Ref Range    Sodium 140 136 - 145 mmol/L    Potassium 3.1 (L) 3.5 - 5.1 mmol/L    Chloride 106 98 - 107 mmol/L    CO2 22 21 - 32 mmol/L    Anion Gap 12 7 - 16 mmol/L    Glucose 200 (H) 65 - 100 mg/dL    BUN 5 (L) 6 - 23 MG/DL    Creatinine 0.50 (L) 0.6 - 1.0 MG/DL    GFR African American >60 >60 ml/min/1.73m2    GFR Non- >60 >60 ml/min/1.73m2    Calcium 8.8 8.3 - 10.4 MG/DL       Impression/Plan:       EGD/EUS findings (7/22/22):   1. Esophageal stricture. Dilated. 2. Gastritis. Biopsies. 3. Fatty infiltration of the liver. 4. Pancreatic lipomatosis. This condition of fatty replacement of the pancreas that may associated with obesity, dyslipidemia, diabetes, steroid use or hereditary pancreatitis. There is no established risk of progression to fibrosis or chronic pancreatitis (as with nonalcoholic fatty liver disease, steatohepatitis and cirrhosis). In some cases, patients may have associated pancreatic insufficiency. 5. Dilated common bile duct. In the absence of biliary obstruction, this likely reflects benign post-cholecystectomy biliary dilatation. 6. Dobhoff tube successfully placed. Impression/Plan:         57-year-old female with suboptimally controlled diabetes and chronic abdominal pain, admitted with worsening nausea and epigastric pain. CT was unremarkable. EGD yesterday was performed with dilatation of distal esophageal stricture. Patient also noted to have gastritis, fatty infiltration of the liver and moderate lipomatosis of the pancreas. The previously placed NG tube was removed. She is receiving enteral feeds per Dobbhoff. I have continued to discuss with patient and her  my concern for ongoing chronic opiate use worsening symptoms of gastroparesis and constipation. In addition, there has been has been excessive use of multiple antiemetics. Since discontinuing IV opiates, decreasing antiemetics, and resuming enteral feeding, there has been significant clinical improvement. 1. Continue enteral feeding, IV fluids.    2. Strict avoidance of IV opiate analgesia. 3. Minimize oral opiates. 4. Will plan for PEG-J on Monday.     Signed:  Caitlin Heredia MD  7/24/2022  8:05 AM

## 2022-07-24 NOTE — PROGRESS NOTES
Hospitalist Progress Note   Admit Date:  2022  8:02 AM   Name:  Angelika Bhardwaj   Age:  46 y.o. Sex:  female  :  1970   MRN:  [id number]   Room:      Presenting Complaint: Nausea     Reason(s) for Admission: Chronic abdominal pain [R10.9, G89.29]  Intractable nausea and vomiting [R11.2]  Intractable vomiting with nausea [R11.2]     Hospital Course & Interval History:     Angelika Bhardwaj is a 46 y.o. 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 In the ED, she his afebrile and hypertensive. Labs significant for K 3.2, AST mildly elevated 43  hgb 10.5 MCV 73.3 lipase wnl. She was given 1 L LR bolus, reglan 10 IV, benadryl 12.5 IV, morhpine 4 mg x 2, Protonix 40 IV and compazine 10 mg IV with minimal relief of symptoms. She was seen 4 days ago with similar symptoms and CT a/p at that time was unremarkable. She continued to dry heave over the weekend. She tried to eat a banana yesterday and has had worsening abd pain, dry heaving and nausea since then. States dilaudid, benadryl, and compazine usually knock it out. Seen by GI in the ED who recommended admission with supportive care including IVF and antiemetics. Per GI and medical record review  Sphincter of Oddi dsyfunction with multiple prior procedures with ERCP in  with perforation requiring surgery. She was getting weekly IVF and daily phenergan injections. EGD 22 with esophageal stricture dilated to balloon 18 mm and gastric bezoar.   EUS 3/16/22 with esophogeal stricture s/p dilation, gastrojejunal anastomosis, mild pancreatic parenchymal changes without convincing evidence of chronic pancreatitis and fatty infiltration of the liver. Colonoscopy in 2018 with descending TA colon polyp and recall 4/2023. GES was reccommended after EGD wth bezoar. Subjective/24hr Events (07/24/22): She has not been using narcotics but discussed may use for significant withdrawal symptoms. Some formication type symptoms of skin sensation being abnormal.  Serum potassium low at 3.1 and it appears that standing order was not utilized for potassium of 3.4. To avoid confusion I am eliminating the standing order for electrolyte replacement--not being consistently utilized and placing patients at risk if electrolytes not replaced. Will order replacement and follow-up labs. GI plans endoscopic J-tube placement tomorrow. Patient doing well with distal placed Dobbhoff and enteral feeds. [facility]. Very motivated for complete narcotic cessation. GI symptoms significantly improved with significant narcotic wean. Some significant narcotic withdrawal symptoms-hypertensive-clonidine assisting with withdrawal symptoms. Decrease frequency morphine sulfate oral to every 8 hours at patient request.  She is really not utilizing. She wants to stop narcotics completely and this is encouraged and she was given reinforcement today. Denies dyspnea, chest pain at rest, denies orthopnea. Denies dysuria. No shaking chills or fevers. Some dermal sensations of skin crawling likely related to narcotic withdrawal.             Review of Systems:  10 systems reviewed and negative except as noted in HPI. Assessment & Plan:      Principal Problem:    Intractable nausea and vomiting  7/20-significant NG tube output noted-continue same for now. Reluctantly I am increasing narcotics and ordering Phenergan for nausea-patient very likely narcotic dependent-await further recommendations GI.  7/21--continue NG tube to LIS-possible EGD and endoscopic ultrasound tomorrow July 22.   Counseled patient regarding attenuate narcotics but she is reluctant  7/22--nasogastric tube removed, Dobbhoff tube placed in small bowel at time of EGD-dilation of esophageal stricture at that time. GI recommended discontinue IV narcotics. May need GJ tube. Twice daily PPI avoid NSAIDs stop motility agent. Monitor. Meds with sips of water. 7/23--patient improving-continue to wean narcotics. 7/24-continue same-decrease frequency narcotics. For endoscopic insertion of J-tube tomorrow July 25. GERD with stricture - s/p EGD 6/8/22 with esophageal stricture dilated to balloon 18 mm and gastric bezoar. IV PPI BID.  7/20-this plan unchanged. GI considering possible gastrostomy tube placement. 7/21-as above.7/22--twice daily PPI-status post dilation distal esophageal stricture. Chronic pain syndrome/narcotic dependence  7/22--discontinue IV narcotics-patient took high-dose morphine with long-acting and immediate release. We will stop long-acting and attenuate immediate release but will need to increase frequency and will need to taper decreased to avoid severe withdrawal.  Agree with current Valium and clonidine added for BP which should help. 7/23--patient appears motivated and appears improved today with significant decreased dosing. 7/25--as above continue to wean. Clonidine-currently patch for hypertension and withdrawal symptoms. Will need continue oral clonidine when taking orals to avoid clonidine rebound withdrawal     T2DM -   7/20-continue current sliding scale addition of long-acting insulin as needed. 7/21--fair control-continue same7/23-no change     CASSIE/Mood d/o - resume citalopram 40 mg pending qtc assessment. Prn ativan.   7/21--clinically stable-continue same meds. Active Problems:    Class 1 obesity with serious comorbidity and body mass index (BMI) of 34.0 to 34.9 in adult  Plan: adds complexity. Hypokalemia due to excessive gastrointestinal loss of potassium  Plan: replete IV,  monitor mag.  7/21--solved-follow closely. Microcytic anemia  Plan: h/o JORGE LUIS in the past.     Chronic pancreatitis ([address] possible history of--  Plan: no evidence of this on recent EUS 3/2022. Fibromoyalgia - lyrica        Dispo/Discharge Planning:    Admit for obs      Diet: ADULT DIET; n.p.o. meds with sips  VTE ppx: lovenox   Code status: Full Code            Hospital Problems:  Principal Problem:    Intractable nausea and vomiting  Active Problems:    Class 1 obesity with serious comorbidity and body mass index (BMI) of 34.0 to 34.9 in adult    Hypokalemia due to excessive gastrointestinal loss of potassium    Microcytic anemia    SIRS (systemic inflammatory response syndrome) (HCC)    Neutrophilic leukocytosis    GERD with stricture    Type 2 diabetes mellitus with hyperglycemia, with long-term current use of insulin (HCC)    Chronic pancreatitis (HCC)    S/P balloon dilatation of esophageal stricture  Resolved Problems:    * No resolved hospital problems. *      Objective:   Patient Vitals for the past 24 hrs:   Temp Pulse Resp BP SpO2   07/24/22 0838 -- -- -- (!) 162/81 --   07/24/22 0805 97.5 °F (36.4 °C) (!) 109 -- (!) 162/81 94 %   07/24/22 0252 97.7 °F (36.5 °C) 100 17 (!) 160/74 91 %   07/23/22 2332 98.1 °F (36.7 °C) 98 17 (!) 148/79 94 %   07/23/22 1953 97.7 °F (36.5 °C) 97 17 (!) 172/80 94 %   07/23/22 1605 97.9 °F (36.6 °C) (!) 105 17 (!) 140/79 92 %   07/23/22 1123 97.7 °F (36.5 °C) (!) 105 17 (!) 130/59 --         Oxygen Therapy  SpO2: 94 %  Pulse via Oximetry: 120 beats per minute  Pulse Oximeter Device Mode: Intermittent  Pulse Oximeter Device Location: Right  O2 Device: None (Room air)  O2 Flow Rate (L/min): 3 L/min    Estimated body mass index is 34.75 kg/m² as calculated from the following:    Height as of this encounter: 5' 2\" (1.575 m). Weight as of this encounter: 190 lb (86.2 kg).     Intake/Output Summary (Last 24 hours) at 7/24/2022 1032  Last data filed at 7/24/2022 0524  Gross per 24 hour   Intake 965 ml   Output 3200 ml Net -2235 ml           Physical Exam:   Blood pressure (!) 162/81, pulse (!) 109, temperature 97.5 °F (36.4 °C), temperature source Oral, resp. rate 17, height 5' 2\" (1.575 m), weight 190 lb (86.2 kg), SpO2 94 %. Physical Exam:   General:                      Alert, cooperative, no distress, appears stated age. Eyes:                                           Conjunctivae/corneas clear. Pupils equally round and reactive to light, extraocular movements intact. Lungs:                       Clear to auscultation bilaterally. No wheezing. Heart:                     Regular rate and rhythm, S1, S2 normal, no murmur, click, rub or gallop. Abdomen:                       Soft, non-tender. Bowel sounds noted. No masses,  No organomegaly. Extremities:                     Extremities normal, atraumatic, no cyanosis-moves all extremities symmetrically. Neurologic:                     CNII-XII intact. Normal strength, sensation and reflexes throughout. Lab/Data Review: All lab results for the last 24 hours reviewed.              I have reviewed ordered lab tests and independently visualized imaging below:    Recent Labs:  Recent Results (from the past 48 hour(s))   POCT Glucose    Collection Time: 07/22/22 11:46 AM   Result Value Ref Range    POC Glucose 189 (H) 65 - 100 mg/dL    Performed by: IPXI    POCT Glucose    Collection Time: 07/22/22  4:12 PM   Result Value Ref Range    POC Glucose 231 (H) 65 - 100 mg/dL    Performed by: [employer]    POCT Glucose    Collection Time: 07/22/22  8:51 PM   Result Value Ref Range    POC Glucose 224 (H) 65 - 100 mg/dL    Performed by: Javier    Comprehensive Metabolic Panel    Collection Time: 07/23/22  7:24 AM   Result Value Ref Range    Sodium 139 136 - 145 mmol/L    Potassium 3.4 (L) 3.5 - 5.1 mmol/L    Chloride 107 98 - 107 mmol/L    CO2 21 21 - 32 mmol/L    Anion Gap 11 7 - 16 mmol/L    Glucose 182 (H) 65 - 100 mg/dL    BUN 8 6 - 23 MG/DL Creatinine 0.50 (L) 0.6 - 1.0 MG/DL    GFR African American >60 >60 ml/min/1.73m2    GFR Non- >60 >60 ml/min/1.73m2    Calcium 8.4 8.3 - 10.4 MG/DL    Total Bilirubin 0.6 0.2 - 1.1 MG/DL    ALT 34 12 - 65 U/L    AST 22 15 - 37 U/L    Alk Phosphatase 73 50 - 136 U/L    Total Protein 6.8 6.3 - 8.2 g/dL    Albumin 3.0 (L) 3.5 - 5.0 g/dL    Globulin 3.8 (H) 2.3 - 3.5 g/dL    Albumin/Globulin Ratio 0.8 (L) 1.2 - 3.5     CBC with Auto Differential    Collection Time: 07/23/22  7:24 AM   Result Value Ref Range    WBC 7.5 4.3 - 11.1 K/uL    RBC 4.57 4.05 - 5.2 M/uL    Hemoglobin 10.2 (L) 11.7 - 15.4 g/dL    Hematocrit 34.1 (L) 35.8 - 46.3 %    MCV 74.6 (L) 79.6 - 97.8 FL    MCH 22.3 (L) 26.1 - 32.9 PG    MCHC 29.9 (L) 31.4 - 35.0 g/dL    RDW 18.2 (H) 11.9 - 14.6 %    Platelets 838 265 - 881 K/uL    MPV Unable to calculate. Recommend adding IPF.  9.4 - 12.3 FL    nRBC 0.00 0.0 - 0.2 K/uL    Differential Type AUTOMATED      Seg Neutrophils 68 43 - 78 %    Lymphocytes 22 13 - 44 %    Monocytes 10 4.0 - 12.0 %    Eosinophils % 1 0.5 - 7.8 %    Basophils 0 0.0 - 2.0 %    Immature Granulocytes 0 0.0 - 5.0 %    Segs Absolute 5.1 1.7 - 8.2 K/UL    Absolute Lymph # 1.6 0.5 - 4.6 K/UL    Absolute Mono # 0.7 0.1 - 1.3 K/UL    Absolute Eos # 0.0 0.0 - 0.8 K/UL    Basophils Absolute 0.0 0.0 - 0.2 K/UL    Absolute Immature Granulocyte 0.0 0.0 - 0.5 K/UL   Magnesium    Collection Time: 07/23/22  7:24 AM   Result Value Ref Range    Magnesium 2.1 1.8 - 2.4 mg/dL   POCT Glucose    Collection Time: 07/23/22  7:59 AM   Result Value Ref Range    POC Glucose 139 (H) 65 - 100 mg/dL    Performed by: [employer]    POCT Glucose    Collection Time: 07/23/22 11:23 AM   Result Value Ref Range    POC Glucose 192 (H) 65 - 100 mg/dL    Performed by: [employer]    POCT Glucose    Collection Time: 07/23/22  4:51 PM   Result Value Ref Range    POC Glucose 175 (H) 65 - 100 mg/dL    Performed by: [employer]    POCT Glucose Collection Time: 07/23/22  8:56 PM   Result Value Ref Range    POC Glucose 165 (H) 65 - 100 mg/dL    Performed by: Leroy Quinones    Basic Metabolic Panel    Collection Time: 07/24/22  7:04 AM   Result Value Ref Range    Sodium 140 136 - 145 mmol/L    Potassium 3.1 (L) 3.5 - 5.1 mmol/L    Chloride 106 98 - 107 mmol/L    CO2 22 21 - 32 mmol/L    Anion Gap 12 7 - 16 mmol/L    Glucose 200 (H) 65 - 100 mg/dL    BUN 5 (L) 6 - 23 MG/DL    Creatinine 0.50 (L) 0.6 - 1.0 MG/DL    GFR African American >60 >60 ml/min/1.73m2    GFR Non- >60 >60 ml/min/1.73m2    Calcium 8.8 8.3 - 10.4 MG/DL   POCT Glucose    Collection Time: 07/24/22  8:07 AM   Result Value Ref Range    POC Glucose 190 (H) 65 - 100 mg/dL    Performed by: Jose        Other Studies:  XR CHEST PORTABLE   Final Result   1. No acute intrathoracic process. 2. The distal side-port of the enteric tube lies in the lower esophagus. Recommend advancement by at least 5 cm. XR ABDOMEN (KUB) (SINGLE AP VIEW)   Final Result   The bowel gas pattern is normal. There is no evidence of   obstruction. There is no free air visualized on this supine view. There are no   renal calculi. The lung bases are clear. Nasogastric tube terminates just below   the left hemidiaphragm in the proximal stomach. Postop changes related to   gastric surgery are noted. XR ABDOMEN (KUB) (SINGLE AP VIEW)   Final Result   Enteric tube tip overlies the gastric body.                    Current Meds:  Current Facility-Administered Medications   Medication Dose Route Frequency    morphine (MSIR) tablet 15 mg  15 mg Oral Q8H PRN    potassium chloride 10 mEq/100 mL IVPB (Peripheral Line)  20 mEq IntraVENous Q2H    magnesium oxide (MAG-OX) tablet 400 mg  400 mg Oral Daily    LORazepam (ATIVAN) tablet 1 mg  1 mg Oral TID    zolpidem (AMBIEN) tablet 5 mg  5 mg Oral Nightly PRN    lisinopril (PRINIVIL;ZESTRIL) tablet 10 mg  10 mg Oral Daily    heparin flush 100 UNIT/ML a voice dictation software. The note has been proof read but may still contain some grammatical/other typographical errors.

## 2022-07-24 NOTE — PROGRESS NOTES
END OF SHIFT NOTE:    INTAKE/OUTPUT  07/23 0701 - 07/24 0700  In: 965 [I.V.:855]  Out: 3200 [Urine:3200]  Voiding: Yes  Catheter: No  Drain:   NG/OG/NJ/NE Tube Nasogastric 8 fr Right nostril (Active)   Surrounding Skin Clean, dry & intact 07/23/22 1945   Securement device Bridle 07/23/22 1945   Status Clamped 07/23/22 1945   Placement Verified External Catheter Length 07/23/22 1945   Tube Feeding Standard with Fiber 07/23/22 1945   Tube feeding/verify rate (mL/hr) 10 mL/hr 07/23/22 0700   Tube Feeding Intake (mL) 110 ml 07/24/22 0524   Free Water/Flush (mL) 60 mL 07/22/22 1953               Flatus: Patient does have flatus present. Stool: 0 occurrences. Characteristics:           Stool Assessment  Last BM (including prior to admit): 07/18/22    Emesis:  occurrences. Characteristics:        VITAL SIGNS  Patient Vitals for the past 12 hrs:   Temp Pulse Resp BP SpO2   07/24/22 0252 97.7 °F (36.5 °C) 100 17 (!) 160/74 91 %   07/23/22 2332 98.1 °F (36.7 °C) 98 17 (!) 148/79 94 %   07/23/22 1953 97.7 °F (36.5 °C) 97 17 (!) 172/80 94 %       Pain Assessment  @BSHSIFLOW(13)@  Pain Location: Throat, Abdomen  Patient's Stated Pain Goal: 0 - No pain    Ambulating  Yes    Shift report given to oncoming nurse at the bedside.     Rita Jin RN

## 2022-07-25 LAB
ANION GAP SERPL CALC-SCNC: 10 MMOL/L (ref 7–16)
BASOPHILS # BLD: 0 K/UL (ref 0–0.2)
BASOPHILS NFR BLD: 0 % (ref 0–2)
BUN SERPL-MCNC: 6 MG/DL (ref 6–23)
CALCIUM SERPL-MCNC: 8.3 MG/DL (ref 8.3–10.4)
CHLORIDE SERPL-SCNC: 103 MMOL/L (ref 98–107)
CO2 SERPL-SCNC: 27 MMOL/L (ref 21–32)
CREAT SERPL-MCNC: 0.5 MG/DL (ref 0.6–1)
DIFFERENTIAL METHOD BLD: ABNORMAL
EOSINOPHIL # BLD: 0.2 K/UL (ref 0–0.8)
EOSINOPHIL NFR BLD: 3 % (ref 0.5–7.8)
ERYTHROCYTE [DISTWIDTH] IN BLOOD BY AUTOMATED COUNT: 18.4 % (ref 11.9–14.6)
GLUCOSE BLD STRIP.AUTO-MCNC: 235 MG/DL (ref 65–100)
GLUCOSE BLD STRIP.AUTO-MCNC: 252 MG/DL (ref 65–100)
GLUCOSE BLD STRIP.AUTO-MCNC: 256 MG/DL (ref 65–100)
GLUCOSE BLD STRIP.AUTO-MCNC: 303 MG/DL (ref 65–100)
GLUCOSE SERPL-MCNC: 287 MG/DL (ref 65–100)
HCT VFR BLD AUTO: 35 % (ref 35.8–46.3)
HGB BLD-MCNC: 10.4 G/DL (ref 11.7–15.4)
IMM GRANULOCYTES # BLD AUTO: 0 K/UL (ref 0–0.5)
IMM GRANULOCYTES NFR BLD AUTO: 1 % (ref 0–5)
LYMPHOCYTES # BLD: 1.3 K/UL (ref 0.5–4.6)
LYMPHOCYTES NFR BLD: 22 % (ref 13–44)
MAGNESIUM SERPL-MCNC: 2.1 MG/DL (ref 1.8–2.4)
MCH RBC QN AUTO: 22 PG (ref 26.1–32.9)
MCHC RBC AUTO-ENTMCNC: 29.7 G/DL (ref 31.4–35)
MCV RBC AUTO: 74.2 FL (ref 79.6–97.8)
MONOCYTES # BLD: 0.5 K/UL (ref 0.1–1.3)
MONOCYTES NFR BLD: 8 % (ref 4–12)
NEUTS SEG # BLD: 3.9 K/UL (ref 1.7–8.2)
NEUTS SEG NFR BLD: 67 % (ref 43–78)
NRBC # BLD: 0 K/UL (ref 0–0.2)
PHOSPHATE SERPL-MCNC: 2.3 MG/DL (ref 2.5–4.5)
PLATELET # BLD AUTO: 175 K/UL (ref 150–450)
PMV BLD AUTO: 11.3 FL (ref 9.4–12.3)
POTASSIUM SERPL-SCNC: 3.2 MMOL/L (ref 3.5–5.1)
RBC # BLD AUTO: 4.72 M/UL (ref 4.05–5.2)
SERVICE CMNT-IMP: ABNORMAL
SODIUM SERPL-SCNC: 140 MMOL/L (ref 136–145)
WBC # BLD AUTO: 5.8 K/UL (ref 4.3–11.1)

## 2022-07-25 PROCEDURE — 6370000000 HC RX 637 (ALT 250 FOR IP): Performed by: INTERNAL MEDICINE

## 2022-07-25 PROCEDURE — 83735 ASSAY OF MAGNESIUM: CPT

## 2022-07-25 PROCEDURE — 6360000002 HC RX W HCPCS: Performed by: INTERNAL MEDICINE

## 2022-07-25 PROCEDURE — 82962 GLUCOSE BLOOD TEST: CPT

## 2022-07-25 PROCEDURE — 80048 BASIC METABOLIC PNL TOTAL CA: CPT

## 2022-07-25 PROCEDURE — 6370000000 HC RX 637 (ALT 250 FOR IP): Performed by: HOSPITALIST

## 2022-07-25 PROCEDURE — C9113 INJ PANTOPRAZOLE SODIUM, VIA: HCPCS | Performed by: FAMILY MEDICINE

## 2022-07-25 PROCEDURE — 84100 ASSAY OF PHOSPHORUS: CPT

## 2022-07-25 PROCEDURE — 2580000003 HC RX 258: Performed by: FAMILY MEDICINE

## 2022-07-25 PROCEDURE — 6370000000 HC RX 637 (ALT 250 FOR IP): Performed by: FAMILY MEDICINE

## 2022-07-25 PROCEDURE — 2500000003 HC RX 250 WO HCPCS: Performed by: HOSPITALIST

## 2022-07-25 PROCEDURE — A4216 STERILE WATER/SALINE, 10 ML: HCPCS | Performed by: FAMILY MEDICINE

## 2022-07-25 PROCEDURE — 1100000000 HC RM PRIVATE

## 2022-07-25 PROCEDURE — 6360000002 HC RX W HCPCS: Performed by: HOSPITALIST

## 2022-07-25 PROCEDURE — 6360000002 HC RX W HCPCS: Performed by: FAMILY MEDICINE

## 2022-07-25 PROCEDURE — 2580000003 HC RX 258: Performed by: HOSPITALIST

## 2022-07-25 PROCEDURE — 85025 COMPLETE CBC W/AUTO DIFF WBC: CPT

## 2022-07-25 RX ORDER — SODIUM CHLORIDE 0.9 % (FLUSH) 0.9 %
5-40 SYRINGE (ML) INJECTION PRN
Status: CANCELLED | OUTPATIENT
Start: 2022-07-25

## 2022-07-25 RX ORDER — INSULIN GLARGINE 100 [IU]/ML
30 INJECTION, SOLUTION SUBCUTANEOUS NIGHTLY
Status: DISCONTINUED | OUTPATIENT
Start: 2022-07-25 | End: 2022-07-28 | Stop reason: HOSPADM

## 2022-07-25 RX ORDER — INSULIN LISPRO 100 [IU]/ML
0-8 INJECTION, SOLUTION INTRAVENOUS; SUBCUTANEOUS EVERY 6 HOURS
Status: DISCONTINUED | OUTPATIENT
Start: 2022-07-25 | End: 2022-07-25

## 2022-07-25 RX ORDER — INSULIN LISPRO 100 [IU]/ML
0-4 INJECTION, SOLUTION INTRAVENOUS; SUBCUTANEOUS EVERY 6 HOURS
Status: DISCONTINUED | OUTPATIENT
Start: 2022-07-25 | End: 2022-07-25

## 2022-07-25 RX ORDER — LIDOCAINE HYDROCHLORIDE 10 MG/ML
1 INJECTION, SOLUTION INFILTRATION; PERINEURAL
Status: CANCELLED | OUTPATIENT
Start: 2022-07-25 | End: 2022-07-25

## 2022-07-25 RX ORDER — SODIUM CHLORIDE 0.9 % (FLUSH) 0.9 %
5-40 SYRINGE (ML) INJECTION EVERY 12 HOURS SCHEDULED
Status: CANCELLED | OUTPATIENT
Start: 2022-07-25

## 2022-07-25 RX ORDER — DIPHENHYDRAMINE HYDROCHLORIDE 50 MG/ML
50 INJECTION INTRAMUSCULAR; INTRAVENOUS EVERY 6 HOURS PRN
Status: DISCONTINUED | OUTPATIENT
Start: 2022-07-25 | End: 2022-07-28 | Stop reason: HOSPADM

## 2022-07-25 RX ORDER — POTASSIUM CHLORIDE 7.45 MG/ML
10 INJECTION INTRAVENOUS
Status: COMPLETED | OUTPATIENT
Start: 2022-07-25 | End: 2022-07-25

## 2022-07-25 RX ORDER — SODIUM CHLORIDE 9 MG/ML
INJECTION, SOLUTION INTRAVENOUS PRN
Status: CANCELLED | OUTPATIENT
Start: 2022-07-25

## 2022-07-25 RX ORDER — INSULIN LISPRO 100 [IU]/ML
0-4 INJECTION, SOLUTION INTRAVENOUS; SUBCUTANEOUS NIGHTLY
Status: DISCONTINUED | OUTPATIENT
Start: 2022-07-25 | End: 2022-07-25

## 2022-07-25 RX ORDER — MIDAZOLAM HYDROCHLORIDE 2 MG/2ML
2 INJECTION, SOLUTION INTRAMUSCULAR; INTRAVENOUS
Status: CANCELLED | OUTPATIENT
Start: 2022-07-25 | End: 2022-07-25

## 2022-07-25 RX ORDER — SODIUM CHLORIDE, SODIUM LACTATE, POTASSIUM CHLORIDE, CALCIUM CHLORIDE 600; 310; 30; 20 MG/100ML; MG/100ML; MG/100ML; MG/100ML
INJECTION, SOLUTION INTRAVENOUS CONTINUOUS
Status: CANCELLED | OUTPATIENT
Start: 2022-07-25

## 2022-07-25 RX ORDER — INSULIN LISPRO 100 [IU]/ML
0-4 INJECTION, SOLUTION INTRAVENOUS; SUBCUTANEOUS
Status: DISCONTINUED | OUTPATIENT
Start: 2022-07-25 | End: 2022-07-28 | Stop reason: HOSPADM

## 2022-07-25 RX ADMIN — MORPHINE SULFATE 15 MG: 15 TABLET ORAL at 09:29

## 2022-07-25 RX ADMIN — POTASSIUM BICARBONATE 40 MEQ: 782 TABLET, EFFERVESCENT ORAL at 09:07

## 2022-07-25 RX ADMIN — POTASSIUM CHLORIDE 10 MEQ: 7.46 INJECTION, SOLUTION INTRAVENOUS at 13:55

## 2022-07-25 RX ADMIN — PROCHLORPERAZINE EDISYLATE 10 MG: 5 INJECTION INTRAMUSCULAR; INTRAVENOUS at 06:15

## 2022-07-25 RX ADMIN — SODIUM BICARBONATE: 84 INJECTION, SOLUTION INTRAVENOUS at 20:19

## 2022-07-25 RX ADMIN — SODIUM CHLORIDE, PRESERVATIVE FREE 5 ML: 5 INJECTION INTRAVENOUS at 09:10

## 2022-07-25 RX ADMIN — DIPHENHYDRAMINE HYDROCHLORIDE 25 MG: 50 INJECTION, SOLUTION INTRAMUSCULAR; INTRAVENOUS at 04:53

## 2022-07-25 RX ADMIN — ONDANSETRON 4 MG: 2 INJECTION INTRAMUSCULAR; INTRAVENOUS at 15:41

## 2022-07-25 RX ADMIN — PREGABALIN 100 MG: 100 CAPSULE ORAL at 09:07

## 2022-07-25 RX ADMIN — CITALOPRAM HYDROBROMIDE 40 MG: 10 TABLET ORAL at 12:04

## 2022-07-25 RX ADMIN — SODIUM CHLORIDE 40 MG: 9 INJECTION INTRAMUSCULAR; INTRAVENOUS; SUBCUTANEOUS at 09:06

## 2022-07-25 RX ADMIN — ZOLPIDEM TARTRATE 10 MG: 5 TABLET ORAL at 21:48

## 2022-07-25 RX ADMIN — DIPHENHYDRAMINE HYDROCHLORIDE 50 MG: 50 INJECTION, SOLUTION INTRAMUSCULAR; INTRAVENOUS at 11:39

## 2022-07-25 RX ADMIN — INSULIN LISPRO 2 UNITS: 100 INJECTION, SOLUTION INTRAVENOUS; SUBCUTANEOUS at 17:55

## 2022-07-25 RX ADMIN — INSULIN GLARGINE 30 UNITS: 100 INJECTION, SOLUTION SUBCUTANEOUS at 21:28

## 2022-07-25 RX ADMIN — POTASSIUM CHLORIDE 10 MEQ: 7.46 INJECTION, SOLUTION INTRAVENOUS at 15:43

## 2022-07-25 RX ADMIN — LISINOPRIL 10 MG: 5 TABLET ORAL at 09:07

## 2022-07-25 RX ADMIN — LORAZEPAM 1 MG: 1 TABLET ORAL at 21:35

## 2022-07-25 RX ADMIN — MORPHINE SULFATE 15 MG: 15 TABLET ORAL at 15:38

## 2022-07-25 RX ADMIN — SODIUM BICARBONATE: 84 INJECTION, SOLUTION INTRAVENOUS at 08:57

## 2022-07-25 RX ADMIN — PROCHLORPERAZINE EDISYLATE 10 MG: 5 INJECTION INTRAMUSCULAR; INTRAVENOUS at 17:35

## 2022-07-25 RX ADMIN — POTASSIUM CHLORIDE 10 MEQ: 7.46 INJECTION, SOLUTION INTRAVENOUS at 14:58

## 2022-07-25 RX ADMIN — SODIUM CHLORIDE 40 MG: 9 INJECTION INTRAMUSCULAR; INTRAVENOUS; SUBCUTANEOUS at 21:35

## 2022-07-25 RX ADMIN — LORAZEPAM 1 MG: 1 TABLET ORAL at 13:55

## 2022-07-25 RX ADMIN — DIPHENHYDRAMINE HYDROCHLORIDE 50 MG: 50 INJECTION, SOLUTION INTRAMUSCULAR; INTRAVENOUS at 17:35

## 2022-07-25 RX ADMIN — INSULIN LISPRO 2 UNITS: 100 INJECTION, SOLUTION INTRAVENOUS; SUBCUTANEOUS at 12:04

## 2022-07-25 RX ADMIN — LORAZEPAM 1 MG: 1 TABLET ORAL at 09:06

## 2022-07-25 RX ADMIN — ENOXAPARIN SODIUM 40 MG: 100 INJECTION SUBCUTANEOUS at 09:08

## 2022-07-25 RX ADMIN — PREGABALIN 100 MG: 100 CAPSULE ORAL at 21:35

## 2022-07-25 RX ADMIN — INSULIN LISPRO 6 UNITS: 100 INJECTION, SOLUTION INTRAVENOUS; SUBCUTANEOUS at 09:08

## 2022-07-25 RX ADMIN — POTASSIUM BICARBONATE 40 MEQ: 782 TABLET, EFFERVESCENT ORAL at 21:35

## 2022-07-25 RX ADMIN — ONDANSETRON 4 MG: 2 INJECTION INTRAMUSCULAR; INTRAVENOUS at 09:03

## 2022-07-25 RX ADMIN — PROCHLORPERAZINE EDISYLATE 10 MG: 5 INJECTION INTRAMUSCULAR; INTRAVENOUS at 12:04

## 2022-07-25 ASSESSMENT — PAIN SCALES - GENERAL
PAINLEVEL_OUTOF10: 6
PAINLEVEL_OUTOF10: 6
PAINLEVEL_OUTOF10: 0
PAINLEVEL_OUTOF10: 6
PAINLEVEL_OUTOF10: 3
PAINLEVEL_OUTOF10: 5

## 2022-07-25 ASSESSMENT — PAIN - FUNCTIONAL ASSESSMENT
PAIN_FUNCTIONAL_ASSESSMENT: ACTIVITIES ARE NOT PREVENTED

## 2022-07-25 ASSESSMENT — PAIN DESCRIPTION - LOCATION
LOCATION: ABDOMEN

## 2022-07-25 ASSESSMENT — PAIN DESCRIPTION - ORIENTATION
ORIENTATION: MID

## 2022-07-25 ASSESSMENT — PAIN DESCRIPTION - PAIN TYPE
TYPE: ACUTE PAIN
TYPE: ACUTE PAIN

## 2022-07-25 ASSESSMENT — PAIN DESCRIPTION - FREQUENCY
FREQUENCY: CONTINUOUS

## 2022-07-25 ASSESSMENT — PAIN DESCRIPTION - ONSET
ONSET: ON-GOING

## 2022-07-25 ASSESSMENT — PAIN DESCRIPTION - DESCRIPTORS
DESCRIPTORS: ACHING

## 2022-07-25 NOTE — PROGRESS NOTES
Hospitalist Progress Note   Admit Date:  2022  8:02 AM   Name:  Glenys Rosa   Age:  46 y.o. Sex:  female  :  1970   MRN:  [id number]   Room:      Presenting Complaint: Nausea     Reason(s) for Admission: Chronic abdominal pain [R10.9, G89.29]  Intractable nausea and vomiting [R11.2]  Intractable vomiting with nausea [R11.2]     Hospital Course & Interval History:     Glenys Rosa is a 46 y.o. 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 In the ED, she his afebrile and hypertensive. Labs significant for K 3.2, AST mildly elevated 43  hgb 10.5 MCV 73.3 lipase wnl. She was given 1 L LR bolus, reglan 10 IV, benadryl 12.5 IV, morhpine 4 mg x 2, Protonix 40 IV and compazine 10 mg IV with minimal relief of symptoms. She was seen 4 days ago with similar symptoms and CT a/p at that time was unremarkable. She continued to dry heave over the weekend. She tried to eat a banana yesterday and has had worsening abd pain, dry heaving and nausea since then. States dilaudid, benadryl, and compazine usually knock it out. Seen by GI in the ED who recommended admission with supportive care including IVF and antiemetics. Per GI and medical record review  Sphincter of Oddi dsyfunction with multiple prior procedures with ERCP in  with perforation requiring surgery. She was getting weekly IVF and daily phenergan injections. EGD 22 with esophageal stricture dilated to balloon 18 mm and gastric bezoar.   EUS 3/16/22 with esophogeal stricture s/p dilation, gastrojejunal anastomosis, mild pancreatic parenchymal changes without convincing evidence of chronic pancreatitis and fatty infiltration of the liver. Colonoscopy in 2018 with descending TA colon polyp and recall 4/2023. GES was reccommended after EGD wth bezoar. Subjective/24hr Events (07/24/22): Patient seen at bedside, resting in bed comfortably. She denies any abdominal pain. Nausea and vomiting is optimally controlled. Tube feeds are currently running. Patient's endoscopic G-tube placement has been postponed till tomorrow as patient had tube feeds running overnight. No headache, fever, chills shortness of breath. Review of Systems:  10 point review of system is negative except for what mentioned above. Assessment & Plan:      Principal Problem:    Intractable nausea and vomiting  7/20-significant NG tube output noted-continue same for now. Reluctantly I am increasing narcotics and ordering Phenergan for nausea-patient very likely narcotic dependent-await further recommendations GI.  7/21--continue NG tube to LIS-possible EGD and endoscopic ultrasound tomorrow July 22. Counseled patient regarding attenuate narcotics but she is reluctant  7/22--nasogastric tube removed, Dobbhoff tube placed in small bowel at time of EGD-dilation of esophageal stricture at that time. GI recommended discontinue IV narcotics. May need GJ tube. Twice daily PPI avoid NSAIDs stop motility agent. Monitor. Meds with sips of water. 7/23--patient improving-continue to wean narcotics. 7/24-continue same-decrease frequency narcotics. 7/25-  Patient is endoscopic G-tube placement has been postponed till 7/26. Tube feeds to be held at midnight. GERD with stricture - s/p EGD 6/8/22 with esophageal stricture dilated to balloon 18 mm and gastric bezoar. IV PPI BID.  7/20-this plan unchanged. GI considering possible gastrostomy tube placement. 7/21-as above.7/22--twice daily PPI-status post dilation distal esophageal stricture.     Chronic pain syndrome/narcotic dependence  7/22--discontinue IV narcotics-patient took high-dose morphine with long-acting and immediate release. We will stop long-acting and attenuate immediate release but will need to increase frequency and will need to taper decreased to avoid severe withdrawal.  Agree with current Valium and clonidine added for BP which should help. 7/23--patient appears motivated and appears improved today with significant decreased dosing. 7/25--as above continue to wean. Clonidine-currently patch for hypertension and withdrawal symptoms. Will need continue oral clonidine when taking orals to avoid clonidine rebound withdrawal     T2DM -   7/25-  Blood glucoses suboptimally controlled. Added Lantus 30 units subcu nightly  Continue low-dose and medium low sliding scale every 6 hours. Monitor POC glucose every 4 hours. Hypokalemia-  7/25: 3.2 this morning  Oral and IV KCl replacement. Recheck morning BMP.    CASSIE/Mood d/o - resume citalopram 40 mg pending qtc assessment. Prn ativan.   7/21--clinically stable-continue same meds. Active Problems:    Class 1 obesity with serious comorbidity and body mass index (BMI) of 34.0 to 34.9 in adult  Plan: adds complexity. Hypokalemia due to excessive gastrointestinal loss of potassium  Plan: replete IV,  monitor mag.  7/21--solved-follow closely. Microcytic anemia  Plan: h/o JORGE LUIS in the past.     Chronic pancreatitis ([address] possible history of--  Plan: no evidence of this on recent EUS 3/2022. Fibromoyalgia - lyrica        Dispo/Discharge Planning:  Patient is not medically clear for discharge yet, will need endoscopic J-tube placement on 7/26. Diet: Tube feeding, strict n.p.o. for now.   VTE ppx: lovenox   Code status: Full Code            Hospital Problems:  Principal Problem:    Intractable nausea and vomiting  Active Problems:    Class 1 obesity with serious comorbidity and body mass index (BMI) of 34.0 to 34.9 in adult    Hypokalemia due to excessive gastrointestinal loss of potassium    Microcytic anemia    SIRS (systemic inflammatory response syndrome) (HCC)    Neutrophilic leukocytosis    GERD with stricture    Type 2 diabetes mellitus with hyperglycemia, with long-term current use of insulin (HCC)    Chronic pancreatitis (HCC)    S/P balloon dilatation of esophageal stricture  Resolved Problems:    * No resolved hospital problems. *      Objective:   Patient Vitals for the past 24 hrs:   Temp Pulse Resp BP SpO2   07/25/22 0732 97.7 °F (36.5 °C) (!) 112 19 (!) 158/84 96 %   07/25/22 0422 97.7 °F (36.5 °C) (!) 113 -- (!) 160/78 94 %   07/24/22 2339 97.3 °F (36.3 °C) 98 16 (!) 168/83 93 %   07/24/22 1953 97.3 °F (36.3 °C) 98 16 (!) 154/76 93 %   07/24/22 1951 -- 97 12 (!) 171/70 --   07/24/22 1456 97.9 °F (36.6 °C) (!) 103 16 (!) 141/60 93 %   07/24/22 1128 98.1 °F (36.7 °C) 94 16 137/61 94 %   07/24/22 0838 -- -- -- (!) 162/81 --   07/24/22 0805 97.5 °F (36.4 °C) (!) 109 -- (!) 162/81 94 %         Oxygen Therapy  SpO2: 96 %  Pulse via Oximetry: 120 beats per minute  Pulse Oximeter Device Mode: Intermittent  Pulse Oximeter Device Location: Right  O2 Device: None (Room air)  O2 Flow Rate (L/min): 3 L/min    Estimated body mass index is 34.75 kg/m² as calculated from the following:    Height as of this encounter: 5' 2\" (1.575 m). Weight as of this encounter: 190 lb (86.2 kg). Intake/Output Summary (Last 24 hours) at 7/25/2022 0737  Last data filed at 7/25/2022 0725  Gross per 24 hour   Intake 1117 ml   Output 1950 ml   Net -833 ml           Physical Exam:   Blood pressure (!) 158/84, pulse (!) 112, temperature 97.7 °F (36.5 °C), temperature source Axillary, resp. rate 19, height 5' 2\" (1.575 m), weight 190 lb (86.2 kg), SpO2 96 %.     Physical Exam:   General:                 Alert, awake, NAD, on room air  HEENT:                 Head NCAT, PERRLA positive, MMM, NG tube in place  Lungs:                   CTA B, no wheezing or rhonchi  Heart:                     Regular rate and rhythm, S1, S2 normal, no murmur, click, rub or gallop. Abdomen:                       Soft, non-tender. Bowel sounds noted. No masses,  No organomegaly. Extremities:                     Extremities normal, atraumatic, no cyanosis-moves all extremities symmetrically. Neurologic:                   GCS 15, cranial nerves intact, no motor or sensory deficit  Psych:                       AOx3, mood and affect appropriate        Lab/Data Review: All lab results for the last 24 hours reviewed. I have reviewed ordered lab tests and independently visualized imaging below:    Recent Labs:  Recent Results (from the past 48 hour(s))   POCT Glucose    Collection Time: 07/23/22  7:59 AM   Result Value Ref Range    POC Glucose 139 (H) 65 - 100 mg/dL    Performed by: [facility]    POCT Glucose    Collection Time: 07/23/22 11:23 AM   Result Value Ref Range    POC Glucose 192 (H) 65 - 100 mg/dL    Performed by: [facility]annie    POCT Glucose    Collection Time: 07/23/22  4:51 PM   Result Value Ref Range    POC Glucose 175 (H) 65 - 100 mg/dL    Performed by: Lilian    POCT Glucose    Collection Time: 07/23/22  8:56 PM   Result Value Ref Range    POC Glucose 165 (H) 65 - 100 mg/dL    Performed by: Hazel Talbert    Basic Metabolic Panel    Collection Time: 07/24/22  7:04 AM   Result Value Ref Range    Sodium 140 136 - 145 mmol/L    Potassium 3.1 (L) 3.5 - 5.1 mmol/L    Chloride 106 98 - 107 mmol/L    CO2 22 21 - 32 mmol/L    Anion Gap 12 7 - 16 mmol/L    Glucose 200 (H) 65 - 100 mg/dL    BUN 5 (L) 6 - 23 MG/DL    Creatinine 0.50 (L) 0.6 - 1.0 MG/DL    GFR African American >60 >60 ml/min/1.73m2    GFR Non- >60 >60 ml/min/1.73m2    Calcium 8.8 8.3 - 10.4 MG/DL   POCT Glucose    Collection Time: 07/24/22  8:07 AM   Result Value Ref Range    POC Glucose 190 (H) 65 - 100 mg/dL    Performed by:  Jose    POCT Glucose    Collection Time: 07/24/22 11:34 AM   Result Value Ref Range    POC Glucose 191 (H) 65 - 100 mg/dL Performed by: Jose    POCT Glucose    Collection Time: 07/24/22  4:11 PM   Result Value Ref Range    POC Glucose 177 (H) 65 - 100 mg/dL    Performed by: Lilian    POCT Glucose    Collection Time: 07/24/22  9:01 PM   Result Value Ref Range    POC Glucose 233 (H) 65 - 100 mg/dL    Performed by: Tay Escobedo    Basic Metabolic Panel    Collection Time: 07/25/22  5:04 AM   Result Value Ref Range    Sodium 140 136 - 145 mmol/L    Potassium 3.2 (L) 3.5 - 5.1 mmol/L    Chloride 103 98 - 107 mmol/L    CO2 27 21 - 32 mmol/L    Anion Gap 10 7 - 16 mmol/L    Glucose 287 (H) 65 - 100 mg/dL    BUN 6 6 - 23 MG/DL    Creatinine 0.50 (L) 0.6 - 1.0 MG/DL    GFR African American >60 >60 ml/min/1.73m2    GFR Non- >60 >60 ml/min/1.73m2    Calcium 8.3 8.3 - 10.4 MG/DL   Magnesium    Collection Time: 07/25/22  5:04 AM   Result Value Ref Range    Magnesium 2.1 1.8 - 2.4 mg/dL   CBC with Auto Differential    Collection Time: 07/25/22  5:04 AM   Result Value Ref Range    WBC 5.8 4.3 - 11.1 K/uL    RBC 4.72 4.05 - 5.2 M/uL    Hemoglobin 10.4 (L) 11.7 - 15.4 g/dL    Hematocrit 35.0 (L) 35.8 - 46.3 %    MCV 74.2 (L) 79.6 - 97.8 FL    MCH 22.0 (L) 26.1 - 32.9 PG    MCHC 29.7 (L) 31.4 - 35.0 g/dL    RDW 18.4 (H) 11.9 - 14.6 %    Platelets 665 286 - 009 K/uL    MPV 11.3 9.4 - 12.3 FL    nRBC 0.00 0.0 - 0.2 K/uL    Differential Type AUTOMATED      Seg Neutrophils 67 43 - 78 %    Lymphocytes 22 13 - 44 %    Monocytes 8 4.0 - 12.0 %    Eosinophils % 3 0.5 - 7.8 %    Basophils 0 0.0 - 2.0 %    Immature Granulocytes 1 0.0 - 5.0 %    Segs Absolute 3.9 1.7 - 8.2 K/UL    Absolute Lymph # 1.3 0.5 - 4.6 K/UL    Absolute Mono # 0.5 0.1 - 1.3 K/UL    Absolute Eos # 0.2 0.0 - 0.8 K/UL    Basophils Absolute 0.0 0.0 - 0.2 K/UL    Absolute Immature Granulocyte 0.0 0.0 - 0.5 K/UL       Other Studies:  XR CHEST PORTABLE   Final Result   1. No acute intrathoracic process.    2. The distal side-port of the enteric tube lies in the lower esophagus. Recommend advancement by at least 5 cm. XR ABDOMEN (KUB) (SINGLE AP VIEW)   Final Result   The bowel gas pattern is normal. There is no evidence of   obstruction. There is no free air visualized on this supine view. There are no   renal calculi. The lung bases are clear. Nasogastric tube terminates just below   the left hemidiaphragm in the proximal stomach. Postop changes related to   gastric surgery are noted. XR ABDOMEN (KUB) (SINGLE AP VIEW)   Final Result   Enteric tube tip overlies the gastric body.                    Current Meds:  Current Facility-Administered Medications   Medication Dose Route Frequency    morphine (MSIR) tablet 15 mg  15 mg Oral Q8H PRN    magnesium oxide (MAG-OX) tablet 400 mg  400 mg Oral Daily    zolpidem (AMBIEN) tablet 10 mg  10 mg Oral Nightly PRN    LORazepam (ATIVAN) tablet 1 mg  1 mg Oral TID    lisinopril (PRINIVIL;ZESTRIL) tablet 10 mg  10 mg Oral Daily    heparin flush 100 UNIT/ML injection 300 Units  300 Units IntraCATHeter PRN    cloNIDine (CATAPRES) 0.3 MG/24HR 1 patch  1 patch TransDERmal Weekly    sodium bicarbonate 25 mEq in sodium chloride 0.45 % 1,000 mL infusion   IntraVENous Continuous    phenol 1.4 % mouth spray 1 spray  1 spray Mouth/Throat Q2H PRN    prochlorperazine (COMPAZINE) injection 10 mg  10 mg IntraVENous Q6H PRN    hydrALAZINE (APRESOLINE) injection 20 mg  20 mg IntraVENous Q6H PRN    sodium chloride flush 0.9 % injection 5-40 mL  5-40 mL IntraVENous 2 times per day    sodium chloride flush 0.9 % injection 5-40 mL  5-40 mL IntraVENous PRN    0.9 % sodium chloride infusion   IntraVENous PRN    enoxaparin (LOVENOX) injection 40 mg  40 mg SubCUTAneous Daily    ondansetron (ZOFRAN-ODT) disintegrating tablet 8 mg  8 mg Oral Q8H PRN    Or    ondansetron (ZOFRAN) injection 4 mg  4 mg IntraVENous Q6H PRN    acetaminophen (TYLENOL) tablet 650 mg  650 mg Oral Q6H PRN    Or    acetaminophen (TYLENOL) suppository 650 mg  650 mg Rectal Q6H PRN    polyethylene glycol (GLYCOLAX) packet 17 g  17 g Oral Daily PRN    pantoprazole (PROTONIX) 40 mg in sodium chloride (PF) 10 mL injection  40 mg IntraVENous Q12H    magnesium sulfate 1000 mg in dextrose 5% 100 mL IVPB  1,000 mg IntraVENous PRN    glucose chewable tablet 16 g  4 tablet Oral PRN    dextrose bolus 10% 125 mL  125 mL IntraVENous PRN    Or    dextrose bolus 10% 250 mL  250 mL IntraVENous PRN    glucagon (rDNA) injection 1 mg  1 mg IntraMUSCular PRN    dextrose 5 % solution  100 mL/hr IntraVENous PRN    insulin lispro (HUMALOG) injection vial 0-8 Units  0-8 Units SubCUTAneous TID WC    insulin lispro (HUMALOG) injection vial 0-4 Units  0-4 Units SubCUTAneous Nightly    pregabalin (LYRICA) capsule 100 mg  100 mg Oral BID    citalopram (CELEXA) tablet 40 mg  40 mg Oral Daily    diphenhydrAMINE (BENADRYL) injection 25 mg  25 mg IntraVENous Q6H PRN       Signed:  Deonna Taylor MD    Part of this note may have been written by using a voice dictation software. The note has been proof read but may still contain some grammatical/other typographical errors.

## 2022-07-25 NOTE — CARE COORDINATION
CM following for discharge needs. LOS 4 D    Patient off the floor when CM rounded. Per notes, patient in GI lab for J tube placement. Tube feeding continue through Dobbhoff. PT/OT not ordered. CM will continue to monitor patient's status.

## 2022-07-25 NOTE — PROGRESS NOTES
Old feeding tube tubing removed. New feeding tube tubing placed. New bottle of tube feeding Vital AF hung. Infusing continuously.

## 2022-07-25 NOTE — PROGRESS NOTES
.END OF SHIFT NOTE:    INTAKE/OUTPUT  07/23 0701 - 07/24 0700  In: 965 [I.V.:855]  Out: 3200 [Urine:3200]  Voiding: Yes  Catheter: No  Drain:   NG/OG/NJ/NE Tube Nasogastric 8 fr Right nostril (Active)   Surrounding Skin Clean, dry & intact 07/24/22 0705   Securement device Bridle 07/24/22 0705   Status Other (Comment) 07/24/22 0705   Placement Verified External Catheter Length 07/24/22 0705   Tube Feeding Other Tube Feeding (must specify product in comment) 07/24/22 1900   Tube feeding/verify rate (mL/hr) 60 mL/hr 07/24/22 1900   Tube Feeding Intake (mL) 27 ml 07/24/22 1700   Free Water/Flush (mL) 0 mL 07/24/22 1700   Action Taken Other (comment) 07/24/22 1900               Flatus: Patient does have flatus present. Stool:  occurrences. Characteristics:           Stool Assessment  Last BM (including prior to admit): 07/18/22    Emesis:  occurrences. Characteristics:        VITAL SIGNS  Patient Vitals for the past 12 hrs:   Temp Pulse Resp BP SpO2   07/24/22 1953 97.3 °F (36.3 °C) 98 16 (!) 154/76 93 %   07/24/22 1951 -- 97 12 (!) 171/70 --   07/24/22 1456 97.9 °F (36.6 °C) (!) 103 16 (!) 141/60 93 %   07/24/22 1128 98.1 °F (36.7 °C) 94 16 137/61 94 %   07/24/22 0838 -- -- -- (!) 162/81 --   07/24/22 0805 97.5 °F (36.4 °C) (!) 109 -- (!) 162/81 94 %       Pain Assessment  @BSHSIFLOW(13)@  Pain Location: Throat  Patient's Stated Pain Goal: 9    Ambulating  Yes, in giang with . Tolerated very well. Shift report given to oncoming nurse at the bedside.     Melisa Grove RN

## 2022-07-25 NOTE — PROGRESS NOTES
GI DAILY PROGRESS NOTE    Admit Date:  7/19/2022    Today's Date:  7/25/2022    CC:  Nausea    Subjective:     Patient seen and examined this AM. TF were not stopped overnight. Tolerating feeds well far. No acute issues overnight.      Medications:   Current Facility-Administered Medications   Medication Dose Route Frequency    potassium bicarb-citric acid (EFFER-K) effervescent tablet 40 mEq  40 mEq Orogastric BID    diphenhydrAMINE (BENADRYL) injection 50 mg  50 mg IntraVENous Q6H PRN    insulin lispro (HUMALOG) injection vial 0-8 Units  0-8 Units SubCUTAneous Q6H    insulin lispro (HUMALOG) injection vial 0-4 Units  0-4 Units SubCUTAneous Q6H    insulin glargine (LANTUS) injection vial 30 Units  30 Units SubCUTAneous Nightly    potassium chloride 10 mEq/100 mL IVPB (Peripheral Line)  10 mEq IntraVENous Q1H    morphine (MSIR) tablet 15 mg  15 mg Oral Q8H PRN    magnesium oxide (MAG-OX) tablet 400 mg  400 mg Oral Daily    zolpidem (AMBIEN) tablet 10 mg  10 mg Oral Nightly PRN    LORazepam (ATIVAN) tablet 1 mg  1 mg Oral TID    lisinopril (PRINIVIL;ZESTRIL) tablet 10 mg  10 mg Oral Daily    heparin flush 100 UNIT/ML injection 300 Units  300 Units IntraCATHeter PRN    cloNIDine (CATAPRES) 0.3 MG/24HR 1 patch  1 patch TransDERmal Weekly    sodium bicarbonate 25 mEq in sodium chloride 0.45 % 1,000 mL infusion   IntraVENous Continuous    phenol 1.4 % mouth spray 1 spray  1 spray Mouth/Throat Q2H PRN    prochlorperazine (COMPAZINE) injection 10 mg  10 mg IntraVENous Q6H PRN    hydrALAZINE (APRESOLINE) injection 20 mg  20 mg IntraVENous Q6H PRN    sodium chloride flush 0.9 % injection 5-40 mL  5-40 mL IntraVENous 2 times per day    sodium chloride flush 0.9 % injection 5-40 mL  5-40 mL IntraVENous PRN    0.9 % sodium chloride infusion   IntraVENous PRN    enoxaparin (LOVENOX) injection 40 mg  40 mg SubCUTAneous Daily    ondansetron (ZOFRAN-ODT) disintegrating tablet 8 mg  8 mg Oral Q8H PRN    Or    ondansetron (ZOFRAN) Radiology:  [unfilled]    Assessment:     Principal Problem:    Intractable nausea and vomiting  Active Problems:    Class 1 obesity with serious comorbidity and body mass index (BMI) of 34.0 to 34.9 in adult    Hypokalemia due to excessive gastrointestinal loss of potassium    Microcytic anemia    SIRS (systemic inflammatory response syndrome) (HCC)    Neutrophilic leukocytosis    GERD with stricture    Type 2 diabetes mellitus with hyperglycemia, with long-term current use of insulin (HCC)    Chronic pancreatitis (HCC)    S/P balloon dilatation of esophageal stricture  Resolved Problems:    * No resolved hospital problems.  *      Plan:     PEG/J tomorrow      David Finley MD  [facility], [place]

## 2022-07-25 NOTE — CONSULTS
Comprehensive Nutrition Assessment    Type and Reason for Visit: Reassess  Tube Feeding Management (GI)    Nutrition Recommendations/Plan:   Enteral Nutrition:   Enteral Access: Nasogastric  Formula: Peptide Based (Vital AF 1.2 Willie)  Delivery: Continuous feeding: Continue at goal rate of 60ml/hour. Water flush Continue 60 ml every 4 hours  Modulars: None not indicated at this time   Once s/p PEGJ can trial Osmolite for tolerance in anticipation of d/c home with enteral feeds. IVF: continue per MD order  Labs:   EN labs: BMP daily, Mg MWF and Phos MWF. POC Glucoses/SSI Active  Meals and Snacks:  Continue current diet. NPO     Malnutrition Assessment:  Malnutrition Status: At risk for malnutrition (Comment) (reported prolonged poor PO intake r/t N/V)    Nutrition Assessment:  Nutrition History: Pt reports poor intake/appetite for \"10 years now\" r/t uncontrollable N/V/dry heaving. Pt reports only being able to tolerate soft foods such as baked potatoes, ice creams, and soups. Pt denies any weight loss, reports UBW of ~190lb for last >2 years. Of note, pt did receive TPN x2-3 months in patient following gastric perforation in 2015. Pt also with hx of Gtube, however only used for draining/venting, pt has never received TF. 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 Nutrition Interval:  Pt s/p EGD w/ NGT placement 7/22. Findings of gastritis, pancreatic lipomatosis. TF initiated 7/22 per GI. Patient seen reclined in bed. TF infusing at ordered rate. Patient and spouse with many questions regarding TF, PEGJ, and diet. Answered questions to the best of my ability and deferred diet to GI.  Planned PEGJ today postponed until 7/26 as patient's TF were not held overnight. Discussed changing TF to trial for tolerance prior to d/c with patient and RN, Silvia Litten. Will wait until post PEGJ. Abdominal Status (last documented):   Last BM (including prior to admit): 07/25/22, GI Symptoms: Nausea   Pertinent Medications: SSI, zofran PRN, protonix, compazine  IVF: Sodium Bicarbonate 25 meq in 1/2 NS @ 100ml/hr   Electrolyte replacements: 7/24: Mag Ox, Kcl 10 mew x7; 7/25: EFFER-K  Pertinent Labs:   Lab Results   Component Value Date/Time     07/25/2022 05:04 AM    K 3.2 07/25/2022 05:04 AM     07/25/2022 05:04 AM    CO2 27 07/25/2022 05:04 AM    BUN 6 07/25/2022 05:04 AM    CREATININE 0.50 07/25/2022 05:04 AM    GLUCOSE 287 07/25/2022 05:04 AM    CALCIUM 8.3 07/25/2022 05:04 AM    PHOS 2.3 07/25/2022 05:04 AM    MG 2.1 07/25/2022 05:04 AM         and   Lab Results   Component Value Date/Time    POCGLU 235 07/25/2022 11:31 AM    POCGLU 303 07/25/2022 07:34 AM    POCGLU 233 07/24/2022 09:01 PM    POCGLU 177 07/24/2022 04:11 PM    POCGLU 191 07/24/2022 11:34 AM    POCGLU 190 07/24/2022 08:07 AM   Labs reviewed, noted hyponatremia. Current Nutrition Therapies:  Diet NPO Exceptions are: Sips of Water with Meds  ADULT TUBE FEEDING; Nasogastric; Peptide Based; Continuous; 10; Yes; 10; Other (specify); 10; 60; 60; Q 4 hours  Diet NPO Exceptions are: Sips of Water with Meds    Current Intake:   Average Meal Intake: NPO Average Supplements Intake: NPO      Anthropometric Measures:  Height: 5' 2\" (157.5 cm)  Current Body Wt: 190 lb 0.6 oz (86.2 kg) (7/19), Weight source: Stated  BMI: 34.7, Obese Class 1 (BMI 30.0-34.9)     Ideal Body Weight (Kg) (Calculated): 50 kg (110 lbs), 172.8 %  Usual Body Wt: 184 lb (83.5 kg) (per MD office visit 7/2021), Percent weight change: 3.3       Edema:Peripheral Vascular  Peripheral Vascular (WDL): Within Defined Limits   BMI Category Obese Class 1 (BMI 30.0-34. 9)    Estimated Daily

## 2022-07-25 NOTE — PROGRESS NOTES
Hourly rounds performed this shift. Patient denies needs at this time. Bed in low position. The call light and personal items are in reach. Will continue to monitor and report to oncoming nurse. Patient's tube feeding running at 60 ml/hr. Patient has been nauseous throughout shift and I have medicated accordingly.

## 2022-07-25 NOTE — PROGRESS NOTES
GI/Dr. Tye Thomas    PEG J that was planned for today 7/25 was cancelled (tube feeds were not held after midnight). Instead we have re-scheduled for PEG J to be completed tomorrow 7/26/22. Order placed requesting that tube feeds are held after midnight.       Joce Garcias PA-C  [facility]

## 2022-07-26 ENCOUNTER — ANESTHESIA (OUTPATIENT)
Dept: ENDOSCOPY | Age: 52
DRG: 392 | End: 2022-07-26
Payer: MEDICARE

## 2022-07-26 ENCOUNTER — APPOINTMENT (OUTPATIENT)
Dept: CT IMAGING | Age: 52
DRG: 392 | End: 2022-07-26
Payer: MEDICARE

## 2022-07-26 ENCOUNTER — ANESTHESIA EVENT (OUTPATIENT)
Dept: ENDOSCOPY | Age: 52
DRG: 392 | End: 2022-07-26
Payer: MEDICARE

## 2022-07-26 LAB
ANION GAP SERPL CALC-SCNC: 5 MMOL/L (ref 7–16)
BUN SERPL-MCNC: 7 MG/DL (ref 6–23)
CALCIUM SERPL-MCNC: 8.7 MG/DL (ref 8.3–10.4)
CHLORIDE SERPL-SCNC: 102 MMOL/L (ref 98–107)
CO2 SERPL-SCNC: 32 MMOL/L (ref 21–32)
CREAT SERPL-MCNC: 0.5 MG/DL (ref 0.6–1)
GLUCOSE BLD STRIP.AUTO-MCNC: 133 MG/DL (ref 65–100)
GLUCOSE BLD STRIP.AUTO-MCNC: 138 MG/DL (ref 65–100)
GLUCOSE BLD STRIP.AUTO-MCNC: 188 MG/DL (ref 65–100)
GLUCOSE BLD STRIP.AUTO-MCNC: 202 MG/DL (ref 65–100)
GLUCOSE SERPL-MCNC: 227 MG/DL (ref 65–100)
MAGNESIUM SERPL-MCNC: 2.3 MG/DL (ref 1.8–2.4)
PHOSPHATE SERPL-MCNC: 2.7 MG/DL (ref 2.5–4.5)
POTASSIUM SERPL-SCNC: 3.6 MMOL/L (ref 3.5–5.1)
SERVICE CMNT-IMP: ABNORMAL
SODIUM SERPL-SCNC: 139 MMOL/L (ref 136–145)

## 2022-07-26 PROCEDURE — 6370000000 HC RX 637 (ALT 250 FOR IP): Performed by: HOSPITALIST

## 2022-07-26 PROCEDURE — 2580000003 HC RX 258: Performed by: FAMILY MEDICINE

## 2022-07-26 PROCEDURE — 2500000003 HC RX 250 WO HCPCS

## 2022-07-26 PROCEDURE — 80048 BASIC METABOLIC PNL TOTAL CA: CPT

## 2022-07-26 PROCEDURE — 3700000000 HC ANESTHESIA ATTENDED CARE: Performed by: INTERNAL MEDICINE

## 2022-07-26 PROCEDURE — 2500000003 HC RX 250 WO HCPCS: Performed by: INTERNAL MEDICINE

## 2022-07-26 PROCEDURE — 6360000002 HC RX W HCPCS: Performed by: INTERNAL MEDICINE

## 2022-07-26 PROCEDURE — 6360000002 HC RX W HCPCS: Performed by: FAMILY MEDICINE

## 2022-07-26 PROCEDURE — 6370000000 HC RX 637 (ALT 250 FOR IP): Performed by: INTERNAL MEDICINE

## 2022-07-26 PROCEDURE — 82962 GLUCOSE BLOOD TEST: CPT

## 2022-07-26 PROCEDURE — 3609013300 HC EGD TUBE PLACEMENT: Performed by: INTERNAL MEDICINE

## 2022-07-26 PROCEDURE — 7100000010 HC PHASE II RECOVERY - FIRST 15 MIN: Performed by: INTERNAL MEDICINE

## 2022-07-26 PROCEDURE — 83735 ASSAY OF MAGNESIUM: CPT

## 2022-07-26 PROCEDURE — 6360000002 HC RX W HCPCS: Performed by: HOSPITALIST

## 2022-07-26 PROCEDURE — 74176 CT ABD & PELVIS W/O CONTRAST: CPT

## 2022-07-26 PROCEDURE — 6360000002 HC RX W HCPCS

## 2022-07-26 PROCEDURE — 36591 DRAW BLOOD OFF VENOUS DEVICE: CPT

## 2022-07-26 PROCEDURE — 7100000011 HC PHASE II RECOVERY - ADDTL 15 MIN: Performed by: INTERNAL MEDICINE

## 2022-07-26 PROCEDURE — C9113 INJ PANTOPRAZOLE SODIUM, VIA: HCPCS | Performed by: FAMILY MEDICINE

## 2022-07-26 PROCEDURE — 84100 ASSAY OF PHOSPHORUS: CPT

## 2022-07-26 PROCEDURE — 1100000000 HC RM PRIVATE

## 2022-07-26 PROCEDURE — 2580000003 HC RX 258

## 2022-07-26 PROCEDURE — 0DH63UZ INSERTION OF FEEDING DEVICE INTO STOMACH, PERCUTANEOUS APPROACH: ICD-10-PCS | Performed by: INTERNAL MEDICINE

## 2022-07-26 PROCEDURE — 3E0G76Z INTRODUCTION OF NUTRITIONAL SUBSTANCE INTO UPPER GI, VIA NATURAL OR ARTIFICIAL OPENING: ICD-10-PCS | Performed by: INTERNAL MEDICINE

## 2022-07-26 PROCEDURE — 6370000000 HC RX 637 (ALT 250 FOR IP): Performed by: FAMILY MEDICINE

## 2022-07-26 PROCEDURE — 2500000003 HC RX 250 WO HCPCS: Performed by: HOSPITALIST

## 2022-07-26 PROCEDURE — 2580000003 HC RX 258: Performed by: HOSPITALIST

## 2022-07-26 PROCEDURE — 2720000010 HC SURG SUPPLY STERILE: Performed by: INTERNAL MEDICINE

## 2022-07-26 PROCEDURE — 3700000001 HC ADD 15 MINUTES (ANESTHESIA): Performed by: INTERNAL MEDICINE

## 2022-07-26 PROCEDURE — A4216 STERILE WATER/SALINE, 10 ML: HCPCS | Performed by: FAMILY MEDICINE

## 2022-07-26 PROCEDURE — 2709999900 HC NON-CHARGEABLE SUPPLY: Performed by: INTERNAL MEDICINE

## 2022-07-26 DEVICE — 3-PORT THROUGH-THE-PEG J-TUBE KIT
Type: IMPLANTABLE DEVICE | Status: FUNCTIONAL
Brand: ENDOVIVE JEJUNAL FEEDING TUBE

## 2022-07-26 RX ORDER — HYDROMORPHONE HYDROCHLORIDE 1 MG/ML
1 INJECTION, SOLUTION INTRAMUSCULAR; INTRAVENOUS; SUBCUTANEOUS ONCE
Status: COMPLETED | OUTPATIENT
Start: 2022-07-26 | End: 2022-07-26

## 2022-07-26 RX ORDER — LIDOCAINE HYDROCHLORIDE 20 MG/ML
INJECTION, SOLUTION EPIDURAL; INFILTRATION; INTRACAUDAL; PERINEURAL PRN
Status: DISCONTINUED | OUTPATIENT
Start: 2022-07-26 | End: 2022-07-26 | Stop reason: SDUPTHER

## 2022-07-26 RX ORDER — GLYCOPYRROLATE 0.2 MG/ML
INJECTION INTRAMUSCULAR; INTRAVENOUS PRN
Status: DISCONTINUED | OUTPATIENT
Start: 2022-07-26 | End: 2022-07-26 | Stop reason: SDUPTHER

## 2022-07-26 RX ORDER — MORPHINE SULFATE 15 MG/1
15 TABLET ORAL EVERY 6 HOURS PRN
Status: DISCONTINUED | OUTPATIENT
Start: 2022-07-26 | End: 2022-07-28 | Stop reason: HOSPADM

## 2022-07-26 RX ORDER — PROPOFOL 10 MG/ML
INJECTION, EMULSION INTRAVENOUS PRN
Status: DISCONTINUED | OUTPATIENT
Start: 2022-07-26 | End: 2022-07-26 | Stop reason: SDUPTHER

## 2022-07-26 RX ORDER — HYDROMORPHONE HYDROCHLORIDE 1 MG/ML
1 INJECTION, SOLUTION INTRAMUSCULAR; INTRAVENOUS; SUBCUTANEOUS EVERY 4 HOURS PRN
Status: DISCONTINUED | OUTPATIENT
Start: 2022-07-26 | End: 2022-07-28 | Stop reason: HOSPADM

## 2022-07-26 RX ORDER — SODIUM CHLORIDE, SODIUM LACTATE, POTASSIUM CHLORIDE, CALCIUM CHLORIDE 600; 310; 30; 20 MG/100ML; MG/100ML; MG/100ML; MG/100ML
INJECTION, SOLUTION INTRAVENOUS CONTINUOUS PRN
Status: DISCONTINUED | OUTPATIENT
Start: 2022-07-26 | End: 2022-07-26 | Stop reason: SDUPTHER

## 2022-07-26 RX ADMIN — HYDROMORPHONE HYDROCHLORIDE 1 MG: 1 INJECTION, SOLUTION INTRAMUSCULAR; INTRAVENOUS; SUBCUTANEOUS at 15:16

## 2022-07-26 RX ADMIN — ZOLPIDEM TARTRATE 10 MG: 5 TABLET ORAL at 22:17

## 2022-07-26 RX ADMIN — PROPOFOL 10 MG: 10 INJECTION, EMULSION INTRAVENOUS at 09:00

## 2022-07-26 RX ADMIN — PROPOFOL 160 MCG/KG/MIN: 10 INJECTION, EMULSION INTRAVENOUS at 08:40

## 2022-07-26 RX ADMIN — INSULIN LISPRO 2 UNITS: 100 INJECTION, SOLUTION INTRAVENOUS; SUBCUTANEOUS at 00:04

## 2022-07-26 RX ADMIN — DIPHENHYDRAMINE HYDROCHLORIDE 50 MG: 50 INJECTION, SOLUTION INTRAMUSCULAR; INTRAVENOUS at 22:13

## 2022-07-26 RX ADMIN — CEFAZOLIN SODIUM 2000 MG: 100 INJECTION, POWDER, LYOPHILIZED, FOR SOLUTION INTRAVENOUS at 08:40

## 2022-07-26 RX ADMIN — SODIUM CHLORIDE, SODIUM LACTATE, POTASSIUM CHLORIDE, AND CALCIUM CHLORIDE: 600; 310; 30; 20 INJECTION, SOLUTION INTRAVENOUS at 08:37

## 2022-07-26 RX ADMIN — HYDROMORPHONE HYDROCHLORIDE 1 MG: 1 INJECTION, SOLUTION INTRAMUSCULAR; INTRAVENOUS; SUBCUTANEOUS at 11:16

## 2022-07-26 RX ADMIN — LORAZEPAM 1 MG: 1 TABLET ORAL at 13:11

## 2022-07-26 RX ADMIN — MORPHINE SULFATE 15 MG: 15 TABLET ORAL at 10:40

## 2022-07-26 RX ADMIN — SODIUM BICARBONATE: 84 INJECTION, SOLUTION INTRAVENOUS at 11:25

## 2022-07-26 RX ADMIN — SODIUM CHLORIDE 40 MG: 9 INJECTION INTRAMUSCULAR; INTRAVENOUS; SUBCUTANEOUS at 08:07

## 2022-07-26 RX ADMIN — HYDROMORPHONE HYDROCHLORIDE 1 MG: 1 INJECTION, SOLUTION INTRAMUSCULAR; INTRAVENOUS; SUBCUTANEOUS at 19:15

## 2022-07-26 RX ADMIN — GLYCOPYRROLATE 0.1 MG: 0.2 INJECTION, SOLUTION INTRAMUSCULAR; INTRAVENOUS at 08:39

## 2022-07-26 RX ADMIN — PREGABALIN 100 MG: 100 CAPSULE ORAL at 20:53

## 2022-07-26 RX ADMIN — PROPOFOL 60 MG: 10 INJECTION, EMULSION INTRAVENOUS at 08:39

## 2022-07-26 RX ADMIN — LIDOCAINE HYDROCHLORIDE 50 MG: 20 INJECTION, SOLUTION EPIDURAL; INFILTRATION; INTRACAUDAL; PERINEURAL at 08:39

## 2022-07-26 RX ADMIN — ONDANSETRON 4 MG: 2 INJECTION INTRAMUSCULAR; INTRAVENOUS at 09:41

## 2022-07-26 RX ADMIN — CITALOPRAM HYDROBROMIDE 40 MG: 10 TABLET ORAL at 13:10

## 2022-07-26 RX ADMIN — ONDANSETRON 8 MG: 8 TABLET, ORALLY DISINTEGRATING ORAL at 04:25

## 2022-07-26 RX ADMIN — DIPHENHYDRAMINE HYDROCHLORIDE 50 MG: 50 INJECTION, SOLUTION INTRAMUSCULAR; INTRAVENOUS at 08:07

## 2022-07-26 RX ADMIN — HYDROMORPHONE HYDROCHLORIDE 1 MG: 1 INJECTION, SOLUTION INTRAMUSCULAR; INTRAVENOUS; SUBCUTANEOUS at 23:33

## 2022-07-26 RX ADMIN — PROCHLORPERAZINE EDISYLATE 10 MG: 5 INJECTION INTRAMUSCULAR; INTRAVENOUS at 14:20

## 2022-07-26 RX ADMIN — SODIUM CHLORIDE 40 MG: 9 INJECTION INTRAMUSCULAR; INTRAVENOUS; SUBCUTANEOUS at 20:53

## 2022-07-26 RX ADMIN — POTASSIUM BICARBONATE 40 MEQ: 782 TABLET, EFFERVESCENT ORAL at 20:53

## 2022-07-26 RX ADMIN — DIPHENHYDRAMINE HYDROCHLORIDE 50 MG: 50 INJECTION, SOLUTION INTRAMUSCULAR; INTRAVENOUS at 14:20

## 2022-07-26 RX ADMIN — DIPHENHYDRAMINE HYDROCHLORIDE 50 MG: 50 INJECTION, SOLUTION INTRAMUSCULAR; INTRAVENOUS at 04:25

## 2022-07-26 RX ADMIN — LORAZEPAM 1 MG: 1 TABLET ORAL at 20:53

## 2022-07-26 RX ADMIN — SODIUM CHLORIDE, PRESERVATIVE FREE 5 ML: 5 INJECTION INTRAVENOUS at 08:23

## 2022-07-26 ASSESSMENT — PAIN DESCRIPTION - ONSET
ONSET: ON-GOING

## 2022-07-26 ASSESSMENT — PAIN DESCRIPTION - PAIN TYPE
TYPE: ACUTE PAIN;SURGICAL PAIN
TYPE: SURGICAL PAIN
TYPE: ACUTE PAIN
TYPE: SURGICAL PAIN
TYPE: ACUTE PAIN;SURGICAL PAIN
TYPE: ACUTE PAIN;SURGICAL PAIN

## 2022-07-26 ASSESSMENT — PAIN - FUNCTIONAL ASSESSMENT
PAIN_FUNCTIONAL_ASSESSMENT: PREVENTS OR INTERFERES SOME ACTIVE ACTIVITIES AND ADLS
PAIN_FUNCTIONAL_ASSESSMENT: 0-10
PAIN_FUNCTIONAL_ASSESSMENT: PREVENTS OR INTERFERES SOME ACTIVE ACTIVITIES AND ADLS

## 2022-07-26 ASSESSMENT — PAIN DESCRIPTION - DESCRIPTORS
DESCRIPTORS: BURNING;ACHING
DESCRIPTORS: ACHING;SORE
DESCRIPTORS: ACHING;BURNING
DESCRIPTORS: ACHING;BURNING
DESCRIPTORS: BURNING;ACHING
DESCRIPTORS: ACHING;SORE

## 2022-07-26 ASSESSMENT — PAIN DESCRIPTION - LOCATION
LOCATION: ABDOMEN

## 2022-07-26 ASSESSMENT — PAIN SCALES - GENERAL
PAINLEVEL_OUTOF10: 1
PAINLEVEL_OUTOF10: 0
PAINLEVEL_OUTOF10: 0
PAINLEVEL_OUTOF10: 5
PAINLEVEL_OUTOF10: 0
PAINLEVEL_OUTOF10: 9
PAINLEVEL_OUTOF10: 0
PAINLEVEL_OUTOF10: 9
PAINLEVEL_OUTOF10: 9
PAINLEVEL_OUTOF10: 10
PAINLEVEL_OUTOF10: 1
PAINLEVEL_OUTOF10: 8

## 2022-07-26 ASSESSMENT — PAIN DESCRIPTION - ORIENTATION
ORIENTATION: MID

## 2022-07-26 ASSESSMENT — PAIN DESCRIPTION - FREQUENCY
FREQUENCY: CONTINUOUS

## 2022-07-26 ASSESSMENT — PAIN SCALES - WONG BAKER: WONGBAKER_NUMERICALRESPONSE: 0

## 2022-07-26 NOTE — PROCEDURES
Procedure:  Esophagogastroduodenoscopy, PEG-J placement     Date of Procedure:  7/26/2022    Patient: Darwin Tamez     1970    Indication: Placement of PEG-J    Sedation:  MAC    Pre-Procedure Physical Exam:    Mental status:  alert and oriented  Airway:  normal oropharyngeal airway and neck mobility  CV:  regular rate and rhythm  Respiratory:  clear to auscultation    Procedure:  A History and Physical has been performed, and patient medication allergies have been  reviewed. Risks of perforation, hemorrhage, adverse drug reaction, and aspiration were discussed. Informed consent was obtained for the procedure, including sedation. The patient was placed in the left lateral decubitus position. The heart rate, oxygen saturations, blood pressure, and response to care were monitored throughout the procedure. IV antibiotics were administered for infection prophylaxis. The gastroscope was passed through the mouth and advanced under direct vision to the second portion of the duodenum. A careful inspection was made as the gastroscope was withdrawn, including a retroflexed view of the proximal stomach. Previous gastrojejunostomy was noted. I was able to locate a spot just proximal to the anastomosis with 1:1 pressure that appeared suitable. A 20 Fr gastrostomy was then placed percutaneously using the standard pull-through technique. Transillumination was performed to determine a site for gastrostomy placement. The skin was prepped in sterile fashion. Lidocaine was injected subcutaneously for local anesthetic. A catheter was then advanced into the stomach under endoscopic guidance. A looped wire was inserted into the stomach through a catheter. A polypectomy snare was used to grasp the wire, which was then pulled with the gastroscope through the mouth. The gastrostomy tube was attached and pulled into position on the anterior wall of the stomach.   Successful placement was confirmed endoscopically. A wire was passed into the stomach through the gastrostomy tube. The scope was passed and using a snare the wire was advanced deeply into the jejunum. Placement was confirmed endoscopically. Patient tolerated the procedure well. Findings:     OROPHARYNX: Cords and pyriform recesses appear normal.   ESOPHAGUS: The esophagus is normal.   STOMACH: The fundus, body, antrum, and pylorus are normal.  A PEG-J tube was placed as discussed above. S/ P nissen. S/P gastrojejunostomy. DUODENUM: The duodenum appears normal.    Specimen:  No    Estimated Blood Loss:  5 ml. Implant:  PEG-J tube    Impression:   A PEG-J tube was placed successfully. Plan:   1. NPO except meds until morning. 2. Begin tube feeding per PEG-J in the morning. 3. Routine ostomy care.     Signed:  Cheo Ackerman MD  7/26/2022  8:34 AM

## 2022-07-26 NOTE — PROGRESS NOTES
TRANSFER - IN REPORT:    Verbal report received from [facility] on Jose Antonio Carmona  being received from [phone] for ordered procedure      Report consisted of patient's Situation, Background, Assessment and   Recommendations(SBAR). Information from the following report(s) Nurse Handoff Report was reviewed with the receiving nurse. Opportunity for questions and clarification was provided. Assessment completed upon patient's arrival to unit and care assumed.

## 2022-07-26 NOTE — PROGRESS NOTES
END OF SHIFT NOTE:    INTAKE/OUTPUT  07/25 0701 - 07/26 0700  In: 2069.4 [I.V.:825.4]  Out: 9732 [Urine:3875]  Voiding: Yes  Catheter: No  Drain:   NG/OG/NJ/NE Tube Nasogastric 8 fr Right nostril (Active)   Surrounding Skin Clean, dry & intact 07/26/22 0230   Securement device Bridle 07/26/22 0230   Status Clamped 07/26/22 0230   Placement Verified External Catheter Length 07/26/22 0230   NG/OG/NJ/NE External Measurement (cm) 69 cm 07/26/22 0230   Tube Feeding Other Tube Feeding (must specify product in comment) 07/25/22 1905   Tube feeding/verify rate (mL/hr) 60 mL/hr 07/25/22 1905   Tube Feeding Intake (mL) 332 ml 07/25/22 2336   Free Water/Flush (mL) 120 mL 07/25/22 2336   Action Taken Other (comment) 07/24/22 1900               Flatus: Patient does have flatus present. Stool:  0 occurrences. Characteristics:           Stool Assessment  Last BM (including prior to admit): 07/25/22 (per pt)    Emesis: 0 occurrences. Characteristics:        VITAL SIGNS  Patient Vitals for the past 12 hrs:   Temp Pulse Resp BP SpO2   07/26/22 0342 97.7 °F (36.5 °C) 95 18 129/74 93 %   07/25/22 2332 98.4 °F (36.9 °C) 99 18 135/83 90 %   07/25/22 2130 -- -- -- (!) 159/73 --   07/25/22 1956 98.6 °F (37 °C) (!) 102 18 (!) 178/76 94 %       Pain Assessment  @BSHSIFLOW(13)@  Pain Location: Abdomen  Patient's Stated Pain Goal: 0 - No pain    Ambulating  Yes to the bathroom     Shift report given to oncoming nurse at the bedside. Pt reports some nausea this morning. Feeding tube held, flushed and clamped at 0000. CHG bath completed.    Preston Lopez RN

## 2022-07-26 NOTE — ANESTHESIA POSTPROCEDURE EVALUATION
Department of Anesthesiology  Postprocedure Note    Patient: Megan Estrella  MRN: [id number]  YOB: 1970  Date of evaluation: 7/26/2022      Procedure Summary     Date: 07/26/22 Room / Location: [facility] ENDO 03 / [facility] ENDOSCOPY    Anesthesia Start: 3152 Anesthesia Stop: 0930    Procedure: EGD PEG TUBE PLACEMENT with j tube placement ROOM 207 (Upper GI Region) Diagnosis:       Feeding difficulties      (Feeding difficulties [R63.30])    Surgeons: Carlos Maldonado MD Responsible Provider: Shakila Farias MD    Anesthesia Type: TIVA ASA Status: 3          Anesthesia Type: TIVA    Karishma Phase I: Karishma Score: 10    Karishma Phase II: Karishma Score: 10      Anesthesia Post Evaluation    Patient location during evaluation: PACU  Patient participation: complete - patient participated  Level of consciousness: awake and alert  Pain score: 0  Airway patency: patent  Nausea & Vomiting: no nausea and no vomiting  Complications: no  Cardiovascular status: blood pressure returned to baseline  Respiratory status: acceptable  Hydration status: euvolemic  Comments: /62   Pulse 95   Temp 97.9 °F (36.6 °C) (Axillary)   Resp 17   Ht 5' 2\" (1.575 m)   Wt 183 lb (83 kg)   SpO2 94%   BMI 33.47 kg/m²   Multimodal analgesia pain management approach

## 2022-07-26 NOTE — PROGRESS NOTES
END OF SHIFT NOTE:    INTAKE/OUTPUT  07/25 0701 - 07/26 0700  In: 2069.4 [I.V.:825.4]  Out: 8710 [Urine:3875]  Voiding: Yes  Catheter: No  Drain:   Gastrostomy/Enterostomy/Jejunostomy Tube Percutaneous Endoscopic Gastrostomy (PEG) LUQ 1 20 fr (Active)   Site Description Clean, dry & intact 07/26/22 1235   J Port Status Clamped 07/26/22 1235   G Port Status Clamped 07/26/22 1235   Surrounding Skin Clean, dry & intact 07/26/22 1235   Dressing Status Clean, dry & intact 07/26/22 1235   Dressing Type Split gauze 07/26/22 1235               Flatus: Patient does have flatus present. Stool: 0 occurrences. Characteristics:           Stool Assessment  Last BM (including prior to admit): 07/25/22 (Per pt)    Emesis: 0 occurrences. Characteristics:        VITAL SIGNS  Patient Vitals for the past 12 hrs:   Temp Pulse Resp BP SpO2   07/26/22 1635 97.7 °F (36.5 °C) 99 17 (!) 155/81 91 %   07/26/22 1140 97.9 °F (36.6 °C) 95 -- 136/62 94 %   07/26/22 0957 -- 98 17 (!) 157/83 96 %   07/26/22 0945 -- 96 17 (!) 183/99 100 %   07/26/22 0937 -- 96 17 (!) 168/96 98 %   07/26/22 0929 -- 91 17 (!) 142/87 99 %   07/26/22 0925 96.8 °F (36 °C) 93 15 (!) 142/87 98 %   07/26/22 0830 98.1 °F (36.7 °C) (!) 104 18 (!) 168/78 95 %   07/26/22 0806 -- -- -- (!) 179/91 --       Pain Assessment  @BSIFLOW(13)@  Pain Location: Abdomen  Patient's Stated Pain Goal: 0 - No pain    Ambulating  Yes    Shift report given to oncoming nurse at the bedside.     Cecilia Allison RN

## 2022-07-26 NOTE — H&P
COLONOSCOPY      COLONOSCOPY N/A 4/4/2018    COLONOSCOPY performed by Jenise Hughes MD at [facility] ENDOSCOPY    ERCP  3/5/2013    resulted in perforated duodenum    HYSTERECTOMY ([address])  1998    ovaries remain    IR DRAIN SKIN ABSCESS      IR PORT PLACEMENT EQUAL OR GREATER THAN 5 YEARS  9/25/2018    IR PORT PLACEMENT EQUAL OR GREATER THAN 5 YEARS 9/25/2018 [facility] RADIOLOGY SPECIALS    LAPAROTOMY  3/5/2013    exploratory for duodenal perforation with ERCP    ORTHOPEDIC SURGERY      right finger surgery 11/2017    OTHER SURGICAL HISTORY Bilateral     thumb    OTHER SURGICAL HISTORY      Kidney stones march 2021    IN ABDOMEN SURGERY PROC UNLISTED  4/13    explor lap    IN ABDOMEN SURGERY PROC UNLISTED  1997    lap nissen    IN ABDOMEN SURGERY PROC UNLISTED  last one placed  2012    Hx of pancreatic stent, multiple    TOTAL COLECTOMY      UPPER GASTROINTESTINAL ENDOSCOPY  5/21/2021         UPPER GASTROINTESTINAL ENDOSCOPY  2017    36 fr tony    UPPER GASTROINTESTINAL ENDOSCOPY  12/18/2019         UPPER GASTROINTESTINAL ENDOSCOPY N/A 6/8/2022    EGD ESOPHAGOGASTRODUODENOSCOPY DILATATION/ 34 performed by Ansley Cartagena MD at [facility] 13 N/A 7/22/2022    ENDOSCOPIC ULTRASOUND/35 ROOM 207 performed by Ansley Cartagena MD at [facility] 13 N/A 7/22/2022    EGD BIOPSY With balloon dialtion performed by Ansley Cartagena MD at [address] [place]  4/25/13 at [facility]-- stent removed 6-27-13.   Dr Bessy Gupta  2014    port insertion, left chest wall     In and  Family History   Problem Relation Age of Onset    No Known Problems Sister     Coronary Art Dis Father 76    Stroke Father     Hypertension Father     Diabetes Father     Heart Disease Mother         cabg began in her 52's    Hypertension Mother     Diabetes Mother     Other Father      Social History     Tobacco Use    Smoking status: Former     Packs/day: 1.00     Types: Cigarettes     Quit date: 1993     Years since quittin.2    Smokeless tobacco: Never   Substance Use Topics    Alcohol use: No        Allergies   Allergen Reactions    Adhesive Tape Itching, Other (See Comments) and Rash     blisters  tegaderm  Paper tape too      Metronidazole Diarrhea and Nausea And Vomiting    Atorvastatin Myalgia    Iodine Other (See Comments)     Sweaty and clammy    Statins Myalgia    Barium Iodide Nausea And Vomiting     Diaphoresis      Barium Sulfate Palpitations    Insulin Lispro Nausea And Vomiting    Insulins Nausea And Vomiting     Humalog only    Metformin Nausea And Vomiting     Stomach pain  Stomach pain  Stomach pain  Stomach pain       Current Facility-Administered Medications   Medication Dose Route Frequency    ceFAZolin (ANCEF) 2000 mg in sterile water 20 mL IV syringe  2,000 mg IntraVENous Once    potassium bicarb-citric acid (EFFER-K) effervescent tablet 40 mEq  40 mEq Orogastric BID    diphenhydrAMINE (BENADRYL) injection 50 mg  50 mg IntraVENous Q6H PRN    insulin glargine (LANTUS) injection vial 30 Units  30 Units SubCUTAneous Nightly    insulin lispro (HUMALOG) injection vial 0-4 Units  0-4 Units SubCUTAneous 4x Daily AC & HS    morphine (MSIR) tablet 15 mg  15 mg Oral Q8H PRN    magnesium oxide (MAG-OX) tablet 400 mg  400 mg Oral Daily    zolpidem (AMBIEN) tablet 10 mg  10 mg Oral Nightly PRN    LORazepam (ATIVAN) tablet 1 mg  1 mg Oral TID    lisinopril (PRINIVIL;ZESTRIL) tablet 10 mg  10 mg Oral Daily    heparin flush 100 UNIT/ML injection 300 Units  300 Units IntraCATHeter PRN    cloNIDine (CATAPRES) 0.3 MG/24HR 1 patch  1 patch TransDERmal Weekly    sodium bicarbonate 25 mEq in sodium chloride 0.45 % 1,000 mL infusion   IntraVENous Continuous    phenol 1.4 % mouth spray 1 spray  1 spray Mouth/Throat Q2H PRN    prochlorperazine (COMPAZINE) injection 10 mg  10 mg IntraVENous Q6H PRN    hydrALAZINE (APRESOLINE) injection 20 mg  20 mg IntraVENous Q6H PRN    sodium chloride flush 0.9 % injection 5-40 mL  5-40 mL IntraVENous 2 times per day    sodium chloride flush 0.9 % injection 5-40 mL  5-40 mL IntraVENous PRN    0.9 % sodium chloride infusion   IntraVENous PRN    enoxaparin (LOVENOX) injection 40 mg  40 mg SubCUTAneous Daily    ondansetron (ZOFRAN-ODT) disintegrating tablet 8 mg  8 mg Oral Q8H PRN    Or    ondansetron (ZOFRAN) injection 4 mg  4 mg IntraVENous Q6H PRN    acetaminophen (TYLENOL) tablet 650 mg  650 mg Oral Q6H PRN    Or    acetaminophen (TYLENOL) suppository 650 mg  650 mg Rectal Q6H PRN    polyethylene glycol (GLYCOLAX) packet 17 g  17 g Oral Daily PRN    pantoprazole (PROTONIX) 40 mg in sodium chloride (PF) 10 mL injection  40 mg IntraVENous Q12H    magnesium sulfate 1000 mg in dextrose 5% 100 mL IVPB  1,000 mg IntraVENous PRN    glucose chewable tablet 16 g  4 tablet Oral PRN    dextrose bolus 10% 125 mL  125 mL IntraVENous PRN    Or    dextrose bolus 10% 250 mL  250 mL IntraVENous PRN    glucagon (rDNA) injection 1 mg  1 mg IntraMUSCular PRN    dextrose 5 % solution  100 mL/hr IntraVENous PRN    pregabalin (LYRICA) capsule 100 mg  100 mg Oral BID    citalopram (CELEXA) tablet 40 mg  40 mg Oral Daily        Review of Systems:  A detailed 10 organ review of systems is obtained with pertinent positives as listed in the History of Present Illness. All others are negative. Objective:     Physical Exam:  BP (!) 179/91   Pulse (!) 105   Temp 97.5 °F (36.4 °C) (Oral)   Resp 18   Ht 5' 2\" (1.575 m)   Wt 190 lb (86.2 kg)   SpO2 95%   BMI 34.75 kg/m²    General:  Alert and oriented. Heart: Regular rate and rhythm  Lungs:  Clear to auscultation bilaterally  Abdomen: Soft, nontender, nondistended    Impression/Plan:     Proceed with EGD with PEG/J as planned.  I have discussed with the patient the technique, benefits, alternatives, and risks of these procedures, including medication reaction, immediate or delayed bleeding, or perforation of the gastrointestinal tract.      Signed By: Cheo Ackerman MD     July 26, 2022

## 2022-07-26 NOTE — PROGRESS NOTES
2300: Pt had multiple orders for blood sugar checks and sliding scales. Messaged hospitalist for clarification. Orders for low dose sliding scale with ACHS finger sticks. 2400: Pt NPO. Tube feedings held for Peg tube placement tomorrow.

## 2022-07-26 NOTE — PROGRESS NOTES
Hospitalist Progress Note   Admit Date:  2022  8:02 AM   Name:  Ayesha Maddox   Age:  46 y.o. Sex:  female  :  1970   MRN:  [id number]   Room:      Presenting Complaint: Nausea     Reason(s) for Admission: Chronic abdominal pain [R10.9, G89.29]  Intractable nausea and vomiting [R11.2]  Intractable vomiting with nausea [R11.2]     Hospital Course & Interval History:     Ayesha Maddox is a 46 y.o. 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 In the ED, she his afebrile and hypertensive. Labs significant for K 3.2, AST mildly elevated 43  hgb 10.5 MCV 73.3 lipase wnl. She was given 1 L LR bolus, reglan 10 IV, benadryl 12.5 IV, morhpine 4 mg x 2, Protonix 40 IV and compazine 10 mg IV with minimal relief of symptoms. She was seen 4 days ago with similar symptoms and CT a/p at that time was unremarkable. She continued to dry heave over the weekend. She tried to eat a banana yesterday and has had worsening abd pain, dry heaving and nausea since then. States dilaudid, benadryl, and compazine usually knock it out. Seen by GI in the ED who recommended admission with supportive care including IVF and antiemetics. Per GI and medical record review  Sphincter of Oddi dsyfunction with multiple prior procedures with ERCP in  with perforation requiring surgery. She was getting weekly IVF and daily phenergan injections. EGD 22 with esophageal stricture dilated to balloon 18 mm and gastric bezoar.   EUS 3/16/22 with esophogeal stricture s/p dilation, gastrojejunal anastomosis, mild pancreatic parenchymal changes without convincing evidence of chronic pancreatitis and fatty release. We will stop long-acting and attenuate immediate release but will need to increase frequency and will need to taper decreased to avoid severe withdrawal.  Agree with current Valium and clonidine added for BP which should help. 7/23--patient appears motivated and appears improved today with significant decreased dosing. 7/25--as above continue to wean. Clonidine-currently patch for hypertension and withdrawal symptoms. Will need continue oral clonidine when taking orals to avoid clonidine rebound withdrawal     T2DM -   7/26-  Blood glucoses suboptimally controlled. Added Lantus 30 units subcu nightly  Continue low-dose and medium low sliding scale every 6 hours. Monitor POC glucose every 4 hours. Hypokalemia-  7/26: 3.6 this morning  Oral and IV KCl replacement. Recheck morning BMP.    CASSIE/Mood d/o - resume citalopram 40 mg pending qtc assessment. Prn ativan.   7/21--clinically stable-continue same meds. Active Problems:    Class 1 obesity with serious comorbidity and body mass index (BMI) of 34.0 to 34.9 in adult  Plan: adds complexity. Hypokalemia due to excessive gastrointestinal loss of potassium  Plan: replete IV,  monitor mag.  7/21--solved-follow closely. Microcytic anemia  Plan: h/o JORGE LUIS in the past.     Chronic pancreatitis ([facility] [address] possible history of--  Plan: no evidence of this on recent EUS 3/2022. Fibromoyalgia - lyrica        Dispo/Discharge Planning:  Patient to be monitored for next 24 to 48 hours post G-tube placement. Tube feedings to be started on 7/27. Currently pain is not optimally controlled, on IV Dilaudid. Diet: Tube feedings to be started on 7/27.   VTE ppx: lovenox   Code status: Full Code            Hospital Problems:  Principal Problem:    Intractable nausea and vomiting  Active Problems:    Class 1 obesity with serious comorbidity and body mass index (BMI) of 34.0 to 34.9 in adult    Hypokalemia due to excessive gastrointestinal loss of potassium noted.  No masses,  No organomegaly. ,  J-tube inserted, insertion site clean and dry. Extremities:                     Extremities normal, atraumatic, no cyanosis-moves all extremities symmetrically. Neurologic:                   GCS 15, cranial nerves intact, no motor or sensory deficit  Psych:                       AOx3, mood and affect appropriate        Lab/Data Review: All lab results for the last 24 hours reviewed. I have reviewed ordered lab tests and independently visualized imaging below:    Recent Labs:  Recent Results (from the past 48 hour(s))   POCT Glucose    Collection Time: 07/24/22  8:07 AM   Result Value Ref Range    POC Glucose 190 (H) 65 - 100 mg/dL    Performed by: Jose    POCT Glucose    Collection Time: 07/24/22 11:34 AM   Result Value Ref Range    POC Glucose 191 (H) 65 - 100 mg/dL    Performed by:  Jose    POCT Glucose    Collection Time: 07/24/22  4:11 PM   Result Value Ref Range    POC Glucose 177 (H) 65 - 100 mg/dL    Performed by: Lilian    POCT Glucose    Collection Time: 07/24/22  9:01 PM   Result Value Ref Range    POC Glucose 233 (H) 65 - 100 mg/dL    Performed by: Juanis Borjas    Basic Metabolic Panel    Collection Time: 07/25/22  5:04 AM   Result Value Ref Range    Sodium 140 136 - 145 mmol/L    Potassium 3.2 (L) 3.5 - 5.1 mmol/L    Chloride 103 98 - 107 mmol/L    CO2 27 21 - 32 mmol/L    Anion Gap 10 7 - 16 mmol/L    Glucose 287 (H) 65 - 100 mg/dL    BUN 6 6 - 23 MG/DL    Creatinine 0.50 (L) 0.6 - 1.0 MG/DL    GFR African American >60 >60 ml/min/1.73m2    GFR Non- >60 >60 ml/min/1.73m2    Calcium 8.3 8.3 - 10.4 MG/DL   Magnesium    Collection Time: 07/25/22  5:04 AM   Result Value Ref Range    Magnesium 2.1 1.8 - 2.4 mg/dL   CBC with Auto Differential    Collection Time: 07/25/22  5:04 AM   Result Value Ref Range    WBC 5.8 4.3 - 11.1 K/uL    RBC 4.72 4.05 - 5.2 M/uL    Hemoglobin 10.4 (L) 11.7 - 15.4 g/dL Hematocrit 35.0 (L) 35.8 - 46.3 %    MCV 74.2 (L) 79.6 - 97.8 FL    MCH 22.0 (L) 26.1 - 32.9 PG    MCHC 29.7 (L) 31.4 - 35.0 g/dL    RDW 18.4 (H) 11.9 - 14.6 %    Platelets 539 538 - 258 K/uL    MPV 11.3 9.4 - 12.3 FL    nRBC 0.00 0.0 - 0.2 K/uL    Differential Type AUTOMATED      Seg Neutrophils 67 43 - 78 %    Lymphocytes 22 13 - 44 %    Monocytes 8 4.0 - 12.0 %    Eosinophils % 3 0.5 - 7.8 %    Basophils 0 0.0 - 2.0 %    Immature Granulocytes 1 0.0 - 5.0 %    Segs Absolute 3.9 1.7 - 8.2 K/UL    Absolute Lymph # 1.3 0.5 - 4.6 K/UL    Absolute Mono # 0.5 0.1 - 1.3 K/UL    Absolute Eos # 0.2 0.0 - 0.8 K/UL    Basophils Absolute 0.0 0.0 - 0.2 K/UL    Absolute Immature Granulocyte 0.0 0.0 - 0.5 K/UL   Phosphorus    Collection Time: 07/25/22  5:04 AM   Result Value Ref Range    Phosphorus 2.3 (L) 2.5 - 4.5 MG/DL   POCT Glucose    Collection Time: 07/25/22  7:34 AM   Result Value Ref Range    POC Glucose 303 (H) 65 - 100 mg/dL    Performed by: CombsCourtneyPCA    POCT Glucose    Collection Time: 07/25/22 11:31 AM   Result Value Ref Range    POC Glucose 235 (H) 65 - 100 mg/dL    Performed by: CombsCourtneyPCA    POCT Glucose    Collection Time: 07/25/22  5:46 PM   Result Value Ref Range    POC Glucose 252 (H) 65 - 100 mg/dL    Performed by: CombsCourtneyPCA    POCT Glucose    Collection Time: 07/25/22 11:36 PM   Result Value Ref Range    POC Glucose 256 (H) 65 - 100 mg/dL    Performed by: Romi    Magnesium    Collection Time: 07/26/22  4:36 AM   Result Value Ref Range    Magnesium 2.3 1.8 - 2.4 mg/dL   Phosphorus    Collection Time: 07/26/22  4:36 AM   Result Value Ref Range    Phosphorus 2.7 2.5 - 4.5 MG/DL       Other Studies:  XR CHEST PORTABLE   Final Result   1. No acute intrathoracic process. 2. The distal side-port of the enteric tube lies in the lower esophagus. Recommend advancement by at least 5 cm.       XR ABDOMEN (KUB) (SINGLE AP VIEW)   Final Result   The bowel gas pattern is normal. There is no evidence of   obstruction. There is no free air visualized on this supine view. There are no   renal calculi. The lung bases are clear. Nasogastric tube terminates just below   the left hemidiaphragm in the proximal stomach. Postop changes related to   gastric surgery are noted. XR ABDOMEN (KUB) (SINGLE AP VIEW)   Final Result   Enteric tube tip overlies the gastric body.                    Current Meds:  Current Facility-Administered Medications   Medication Dose Route Frequency    potassium bicarb-citric acid (EFFER-K) effervescent tablet 40 mEq  40 mEq Orogastric BID    diphenhydrAMINE (BENADRYL) injection 50 mg  50 mg IntraVENous Q6H PRN    insulin glargine (LANTUS) injection vial 30 Units  30 Units SubCUTAneous Nightly    insulin lispro (HUMALOG) injection vial 0-4 Units  0-4 Units SubCUTAneous 4x Daily AC & HS    morphine (MSIR) tablet 15 mg  15 mg Oral Q8H PRN    magnesium oxide (MAG-OX) tablet 400 mg  400 mg Oral Daily    zolpidem (AMBIEN) tablet 10 mg  10 mg Oral Nightly PRN    LORazepam (ATIVAN) tablet 1 mg  1 mg Oral TID    lisinopril (PRINIVIL;ZESTRIL) tablet 10 mg  10 mg Oral Daily    heparin flush 100 UNIT/ML injection 300 Units  300 Units IntraCATHeter PRN    cloNIDine (CATAPRES) 0.3 MG/24HR 1 patch  1 patch TransDERmal Weekly    sodium bicarbonate 25 mEq in sodium chloride 0.45 % 1,000 mL infusion   IntraVENous Continuous    phenol 1.4 % mouth spray 1 spray  1 spray Mouth/Throat Q2H PRN    prochlorperazine (COMPAZINE) injection 10 mg  10 mg IntraVENous Q6H PRN    hydrALAZINE (APRESOLINE) injection 20 mg  20 mg IntraVENous Q6H PRN    sodium chloride flush 0.9 % injection 5-40 mL  5-40 mL IntraVENous 2 times per day    sodium chloride flush 0.9 % injection 5-40 mL  5-40 mL IntraVENous PRN    0.9 % sodium chloride infusion   IntraVENous PRN    enoxaparin (LOVENOX) injection 40 mg  40 mg SubCUTAneous Daily    ondansetron (ZOFRAN-ODT) disintegrating tablet 8 mg  8 mg Oral Q8H PRN    Or ondansetron (ZOFRAN) injection 4 mg  4 mg IntraVENous Q6H PRN    acetaminophen (TYLENOL) tablet 650 mg  650 mg Oral Q6H PRN    Or    acetaminophen (TYLENOL) suppository 650 mg  650 mg Rectal Q6H PRN    polyethylene glycol (GLYCOLAX) packet 17 g  17 g Oral Daily PRN    pantoprazole (PROTONIX) 40 mg in sodium chloride (PF) 10 mL injection  40 mg IntraVENous Q12H    magnesium sulfate 1000 mg in dextrose 5% 100 mL IVPB  1,000 mg IntraVENous PRN    glucose chewable tablet 16 g  4 tablet Oral PRN    dextrose bolus 10% 125 mL  125 mL IntraVENous PRN    Or    dextrose bolus 10% 250 mL  250 mL IntraVENous PRN    glucagon (rDNA) injection 1 mg  1 mg IntraMUSCular PRN    dextrose 5 % solution  100 mL/hr IntraVENous PRN    pregabalin (LYRICA) capsule 100 mg  100 mg Oral BID    citalopram (CELEXA) tablet 40 mg  40 mg Oral Daily       Signed:  Dorie Queen MD    Part of this note may have been written by using a voice dictation software. The note has been proof read but may still contain some grammatical/other typographical errors.

## 2022-07-26 NOTE — ANESTHESIA PRE PROCEDURE
Department of Anesthesiology  Preprocedure Note       Name:  Zoe Pickett   Age:  46 y.o.  :  1970                                          MRN:  [id number]         Date:  2022      Surgeon: Gildardo Goodrich):  Queen Dany MD    Procedure: Procedure(s):  EGD PEG TUBE PLACEMENT ROOM 207    Medications prior to admission:   Prior to Admission medications    Medication Sig Start Date End Date Taking? Authorizing Provider   blood glucose test strips (EXACTECH TEST) strip TEST BLOOD SUGAR FOUR TIMES DAILY 20   Historical Provider, MD   acetaminophen (TYLENOL) 500 MG tablet Take by mouth every 6 hours as needed    Historical Provider, MD   citalopram (CELEXA) 10 MG tablet Take 10 mg by mouth daily    Historical Provider, MD   citalopram (CELEXA) 20 mg tablet Take 20 mg by mouth daily 22   Historical Provider, MD   cyanocobalamin 1000 MCG/ML injection INJECT 1 VIAL INTRAMUSCULARLY FOR 4 WEEKS THEN MONTHLY 17   Historical Provider, MD   diclofenac sodium (VOLTAREN) 1 % GEL APPLY 1 GRAM TO AFFECTED AREA 4 TIMES A DAY AS NEEDED 10/2/18   Historical Provider, MD   diphenhydrAMINE (BENADRYL) 25 MG capsule Take 25 mg by mouth every 6 hours as needed    Historical Provider, MD   diphenhydrAMINE (SOMINEX) 25 MG tablet Take by mouth    Historical Provider, MD   fentaNYL (DURAGESIC) 100 MCG/HR Place 1 patch onto the skin every 72 hours. Patient not taking: No sig reported 10/7/14   Historical Provider, MD   gabapentin (NEURONTIN) 250 MG/5ML solution 300 mg by Enteral route 3 times daily.   Patient not taking: No sig reported 10/7/14   Historical Provider, MD   glucagon 1 MG injection Inject 1 mg into the muscle as needed 17   Historical Provider, MD   hyoscyamine (ANASPAZ;LEVSIN) 125 MCG tablet as needed 16   Historical Provider, MD   Hyoscyamine Sulfate SL 0.125 MG SUBL Place 0.125 mg under the tongue every 6 hours as needed    Historical Provider, MD   influenza quadrivalent split vaccine (FLUZONE;FLUARIX;FLULAVAL;AFLURIA) 0.5 ML injection Inject into the muscle 11/9/21   Historical Provider, MD   insulin aspart (NOVOLOG) 100 UNIT/ML injection vial Use as directed 6/7/16   Historical Provider, MD   insulin aspart (NOVOLOG) 100 UNIT/ML injection pen 12 units at breakfast 14 units at lunch and 41 units at dinner plus sliding scale for blood sugars >150, up to 60 units per day 10/7/14   Historical Provider, MD   Insulin Degludec 100 UNIT/ML SOPN Inject 68 Units into the skin 10/27/21   Historical Provider, MD   insulin NPH (HUMULIN N;NOVOLIN N) 100 UNIT/ML injection pen Please inject 12 units into the skin with breakfast and 4 units with bedtime. (pen)  Patient not taking: No sig reported 10/7/14   Historical Provider, MD   lidocaine viscous hcl (XYLOCAINE) 2 % SOLN solution 10 mLs every 6 hours as needed 4/28/21   Historical Provider, MD   LORazepam (ATIVAN) 1 MG tablet Take 1 mg by mouth 3 times daily as needed. 10/7/14   Historical Provider, MD   lubiprostone (AMITIZA) 24 MCG capsule Take 24 mcg by mouth  Patient not taking: No sig reported    Historical Provider, MD   morphine (MS CONTIN) 30 MG extended release tablet  8/15/21   Historical Provider, MD   morphine (MSIR) 30 MG tablet Take by mouth 3 times daily as needed.     Historical Provider, MD   naloxone 4 MG/0.1ML LIQD nasal spray 4 mg once as needed 2/16/22   Historical Provider, MD   nitroGLYCERIN (NITROSTAT) 0.3 MG SL tablet Place 0.3 mg under the tongue  Patient not taking: No sig reported 3/18/21   Historical Provider, MD   ondansetron (ZOFRAN-ODT) 8 MG TBDP disintegrating tablet Place 8 mg under the tongue 3 times daily 4/19/16   Historical Provider, MD   ondansetron (ZOFRAN) 8 MG tablet Take 8 mg by mouth every 8 hours as needed    Historical Provider, MD   lipase-protease-amylase (CREON) 68433-737260 units CPEP delayed release capsule Take 1 capsule by mouth    Historical Provider, MD   pantoprazole (PROTONIX) 40 MG tablet Take 40 mg by mouth 2 times daily    Historical Provider, MD   polyethylene glycol (GLYCOLAX) 17 GM/SCOOP powder Take 17 g by mouth as needed    Historical Provider, MD   pregabalin (LYRICA) 100 MG capsule Take 100 mg by mouth 2 times daily. Historical Provider, MD   pregabalin (LYRICA) 50 MG capsule Take 100 mg by mouth 2 times daily. Historical Provider, MD   promethazine (PHENERGAN) 12.5 mg tablet Take 25 mg by mouth every 6 hours as needed 3/11/15   Historical Provider, MD   promethazine (PHENERGAN) 25 MG suppository Place 25 mg rectally as needed    Historical Provider, MD   promethazine (PHENERGAN) 25 MG tablet Take 25 mg by mouth every 6 hours as needed    Historical Provider, MD   promethazine (PHENERGAN) 25 MG/ML injection Inject 25 mg into the muscle 3 times daily as needed    Historical Provider, MD   sodium chloride 0.9 % infusion Infuse intravenously as needed    Historical Provider, MD   sucralfate (CARAFATE) 1 GM/10ML suspension TAKE 10 ML BY MOUTH 4 TIMES A DAY EVERY DAY ON A EMPTY STOMACH 1 HR BEFORE MEALS AND AT BEDTIME 9/7/17   Historical Provider, MD   tiZANidine (ZANAFLEX) 4 MG tablet Take 4 mg by mouth as needed  Patient not taking: No sig reported    Historical Provider, MD   zolpidem (AMBIEN) 10 MG tablet Take 10 mg by mouth. 2/11/22   Historical Provider, MD       Current medications:    No current facility-administered medications for this visit. No current outpatient medications on file.      Facility-Administered Medications Ordered in Other Visits   Medication Dose Route Frequency Provider Last Rate Last Admin    ceFAZolin (ANCEF) 2000 mg in sterile water 20 mL IV syringe  2,000 mg IntraVENous Once Lindsay Fitch MD        potassium bicarb-citric acid (EFFER-K) effervescent tablet 40 mEq  40 mEq Orogastric BID Lessie Nageotte, MD   40 mEq at 07/25/22 2135    diphenhydrAMINE (BENADRYL) injection 50 mg  50 mg IntraVENous Q6H PRN Lessie Nageotte, MD   50 mg at 07/26/22 0807    insulin glargine (LANTUS) injection vial 30 Units  30 Units SubCUTAneous Nightly Maxcine Cowden, MD   30 Units at 07/25/22 2128    insulin lispro (HUMALOG) injection vial 0-4 Units  0-4 Units SubCUTAneous 4x Daily AC & HS Doneharika Cheung MD   2 Units at 07/26/22 0004    morphine (MSIR) tablet 15 mg  15 mg Oral Q8H PRN Patricia Scott DO   15 mg at 07/25/22 1538    magnesium oxide (MAG-OX) tablet 400 mg  400 mg Oral Daily Patricia Scott DO   400 mg at 07/24/22 1145    zolpidem (AMBIEN) tablet 10 mg  10 mg Oral Nightly PRN Cleone Denver, MD   10 mg at 07/25/22 2148    LORazepam (ATIVAN) tablet 1 mg  1 mg Oral TID Belinda David MD   1 mg at 07/25/22 2135    lisinopril (PRINIVIL;ZESTRIL) tablet 10 mg  10 mg Oral Daily Patricia Scott DO   10 mg at 07/25/22 9400    heparin flush 100 UNIT/ML injection 300 Units  300 Units IntraCATHeter PRN Grayson Ingram MD        cloNIDine (CATAPRES) 0.3 MG/24HR 1 patch  1 patch TransDERmal Weekly Patricia Scott DO   1 patch at 07/21/22 1606    sodium bicarbonate 25 mEq in sodium chloride 0.45 % 1,000 mL infusion   IntraVENous Continuous Cleone Denver,  mL/hr at 07/25/22 2019 New Bag at 07/25/22 2019    phenol 1.4 % mouth spray 1 spray  1 spray Mouth/Throat Q2H PRN Jordyn Tabares MD   1 spray at 07/24/22 1931    prochlorperazine (COMPAZINE) injection 10 mg  10 mg IntraVENous Q6H PRN Belinda David MD   10 mg at 07/25/22 1735    hydrALAZINE (APRESOLINE) injection 20 mg  20 mg IntraVENous Q6H PRN Patricia Scott DO   20 mg at 07/21/22 2053    sodium chloride flush 0.9 % injection 5-40 mL  5-40 mL IntraVENous 2 times per day Sabrina Factor, DO   5 mL at 07/26/22 2558    sodium chloride flush 0.9 % injection 5-40 mL  5-40 mL IntraVENous PRN Sabrina Factor, DO   5 mL at 07/19/22 2011    0.9 % sodium chloride infusion   IntraVENous PRN Sabrina Factor, DO        enoxaparin (LOVENOX) injection 40 mg  40 mg SubCUTAneous Daily Sabrina Factor, DO   40 mg at 07/25/22 0908    ondansetron (ZOFRAN-ODT) disintegrating tablet 8 mg  8 mg Oral Q8H PRN Cathlyn Ervin, DO   8 mg at 07/26/22 0425    Or    ondansetron (ZOFRAN) injection 4 mg  4 mg IntraVENous Q6H PRN Cathlyn Ervin, DO   4 mg at 07/25/22 1541    acetaminophen (TYLENOL) tablet 650 mg  650 mg Oral Q6H PRN Cathlyn Ervin, DO        Or    acetaminophen (TYLENOL) suppository 650 mg  650 mg Rectal Q6H PRN Cathlyn Ervin, DO        polyethylene glycol (GLYCOLAX) packet 17 g  17 g Oral Daily PRN Cathlyn Ervin, DO        pantoprazole (PROTONIX) 40 mg in sodium chloride (PF) 10 mL injection  40 mg IntraVENous Q12H Cathlyn Ervin, DO   40 mg at 07/26/22 7554    magnesium sulfate 1000 mg in dextrose 5% 100 mL IVPB  1,000 mg IntraVENous PRN Cathlyn Ervin, DO        glucose chewable tablet 16 g  4 tablet Oral PRN Cathlyn Ervin, DO        dextrose bolus 10% 125 mL  125 mL IntraVENous PRN Cathlyn Ervin, DO        Or    dextrose bolus 10% 250 mL  250 mL IntraVENous PRN Cathlyn Ervin, DO        glucagon (rDNA) injection 1 mg  1 mg IntraMUSCular PRN Cathlyn Ervin, DO        dextrose 5 % solution  100 mL/hr IntraVENous PRN Cathlyn Ervin, DO        pregabalin (LYRICA) capsule 100 mg  100 mg Oral BID Cathlyn Ervin, DO   100 mg at 07/25/22 2135    citalopram (CELEXA) tablet 40 mg  40 mg Oral Daily Cathlyn Ervin, DO   40 mg at 07/25/22 1204       Allergies:     Allergies   Allergen Reactions    Adhesive Tape Itching, Other (See Comments) and Rash     blisters  tegaderm  Paper tape too      Metronidazole Diarrhea and Nausea And Vomiting    Atorvastatin Myalgia    Iodine Other (See Comments)     Sweaty and clammy    Statins Myalgia    Barium Iodide Nausea And Vomiting     Diaphoresis      Barium Sulfate Palpitations    Insulin Lispro Nausea And Vomiting    Insulins Nausea And Vomiting     Humalog only    Metformin Nausea And Vomiting     Stomach pain  Stomach pain  Stomach pain  Stomach pain         Problem List: disease     Pancreatitis 6/23/2014    PUD (peptic ulcer disease) 06/2017    still having issues takes meds     Severe protein-calorie malnutrition ([facility] 4/26/2013    Sinus tachycardia     per pt-- no tx needed    Sphincter of Oddi dysfunction     Type 2 diabetes mellitus ([facility]     type 2-- sqbs am avg 150--- Hypo symptoms at <100, Insulin dependent; last A1C was 11/3/2020 = 9.0       Past Surgical History:        Procedure Laterality Date    CARPAL TUNNEL RELEASE Right     along with trigger finger    CHOLECYSTECTOMY, LAPAROSCOPIC  1997    COLONOSCOPY      COLONOSCOPY N/A 4/4/2018    COLONOSCOPY performed [facility] Dalila Archer MD at [facility] ENDOSCOPY    ERCP  3/5/2013    resulted in perforated duodenum    HYSTERECTOMY ([address] [facility] [address])  1998    ovaries remain    IR DRAIN SKIN ABSCESS      IR PORT PLACEMENT EQUAL OR GREATER THAN 5 YEARS  9/25/2018    IR PORT PLACEMENT EQUAL OR GREATER THAN 5 YEARS 9/25/2018 [facility] SPECI[facility]    LAPAROTOMY  3/5/2013    exploratory for duodenal perforation with ERCP    ORTHOPEDIC SURGERY      right finger surgery 11/2017    OTHER SURGICAL HISTORY Bilateral     thumb    OTHER SURGICAL HISTORY      Kidney stones march 2021    MI ABDOMEN SURGERY PROC UNLISTED  4/13    explor lap    MI ABDOMEN SURGERY PROC UNLISTED  1997    lap nissen    MI ABDOMEN SURGERY PROC UNLISTED  last one placed  2012    Hx of pancreatic stent, multiple    TOTAL COLECTOMY      UPPER GASTROINTESTINAL ENDOSCOPY  5/21/2021         UPPER GASTROINTESTINAL ENDOSCOPY  2017    36 fr cecily    UPPER GASTROINTESTINAL ENDOSCOPY  12/18/2019         UPPER GASTROINTESTINAL ENDOSCOPY N/A 6/8/2022    EGD ESOPHAGOGASTRODUODENOSCOPY DILATATION/ 34 performed by Reta Carey MD at [address] [place] [address] Dr Curtis N/A 7/22/2022    ENDOSCOPIC ULTRASOUND/35 ROOM 207 performed by Reta Carey MD at [address] [place] [address] Dr Curtis N/A 7/22/2022    EGD BIOPSY With balloon dialtion performed by Michelle Zarco MD at [address] [place] [address] [address] at [facility]    cytso-- stent removed 13. Dr Fly Urbina      port insertion, left chest wall       Social History:    Social History     Tobacco Use    Smoking status: Former     Packs/day: 1.00     Types: Cigarettes     Quit date: 1993     Years since quittin.2    Smokeless tobacco: Never   Substance Use Topics    Alcohol use: No                                Counseling given: Not Answered      Vital Signs (Current): There were no vitals filed for this visit.                                            BP Readings from Last 3 Encounters:   22 (!) 179/91   07/15/22 (!) 155/86   22 137/80       NPO Status:                                                                                 BMI:   Wt Readings from Last 3 Encounters:   22 190 lb (86.2 kg)   07/15/22 195 lb (88.5 kg)   22 195 lb (88.5 kg)     There is no height or weight on file to calculate BMI.    CBC:   Lab Results   Component Value Date/Time    WBC 5.8 2022 05:04 AM    RBC 4.72 2022 05:04 AM    HGB 10.4 2022 05:04 AM    HCT 35.0 2022 05:04 AM    MCV 74.2 2022 05:04 AM    RDW 18.4 2022 05:04 AM     2022 05:04 AM       CMP:   Lab Results   Component Value Date/Time     2022 05:04 AM    K 3.2 2022 05:04 AM     2022 05:04 AM    CO2 27 2022 05:04 AM    BUN 6 2022 05:04 AM    CREATININE 0.50 2022 05:04 AM    GFRAA >60 2022 05:04 AM    AGRATIO 0.9 2022 07:26 AM    LABGLOM >60 2022 05:04 AM    GLUCOSE 287 2022 05:04 AM    PROT 6.8 2022 07:24 AM    CALCIUM 8.3 2022 05:04 AM    BILITOT 0.6 2022 07:24 AM    ALKPHOS 73 2022 07:24 AM    ALKPHOS 126 2022 07:26 AM    AST 22 2022 07:24 AM    ALT 34 2022 07:24 AM       POC Tests:   Recent Labs     07/26/22  0733   POCGLU 202*       Coags: No results found for: PROTIME, INR, APTT    HCG (If Applicable): No results found for: PREGTESTUR, PREGSERUM, HCG, HCGQUANT     ABGs: No results found for: PHART, PO2ART, ZSS8YVG, POS3CNV, BEART, X4UBTXDS     Type & Screen (If Applicable):  No results found for: LABABO, LABRH    Drug/Infectious Status (If Applicable):  No results found for: HIV, HEPCAB    COVID-19 Screening (If Applicable):   Lab Results   Component Value Date/Time    COVID19 Not Detected 02/10/2022 10:11 AM    COVID19 Performed 02/10/2022 10:11 AM           Anesthesia Evaluation  Patient summary reviewed and Nursing notes reviewed  Airway: Mallampati: II  TM distance: >3 FB   Neck ROM: full  Mouth opening: > = 3 FB   Dental:          Pulmonary:normal exam  breath sounds clear to auscultation                             Cardiovascular:  Exercise tolerance: good (>4 METS),   (+) hypertension: mild,         Rhythm: regular  Rate: normal                    Neuro/Psych:   (+) neuromuscular disease (Fibromyalgia):, headaches: migraine headaches, depression/anxiety             GI/Hepatic/Renal:   (+) GERD: well controlled, PUD, liver disease (Non-alcoholic fatty liver):,          ROS comment: Esophageal stricture    Sphincter of Oddi dysfunction, h/o pancreatitis    H/o C diff. Endo/Other:    (+) DiabetesType II DM, using insulin, blood dyscrasia (Hb 10.4): anemia:., electrolyte abnormalities (hypokalemia ), . Abdominal:             Vascular: Other Findings: NGT            Anesthesia Plan      TIVA     ASA 3       Induction: intravenous. Anesthetic plan and risks discussed with patient and spouse.                         Yola Maddox MD   7/26/2022

## 2022-07-26 NOTE — PLAN OF CARE
and comorbid symptoms for stability, deterioration, or improvement   Collaborate with multidisciplinary team to address chronic and comorbid conditions and prevent exacerbation or deterioration   Update acute care plan with appropriate goals if chronic or comorbid symptoms are exacerbated and prevent overall improvement and discharge

## 2022-07-26 NOTE — PLAN OF CARE
Problem: Discharge Planning  Goal: Discharge to home or other facility with appropriate resources  Outcome: Progressing Towards Goal     Problem: Pain  Goal: Verbalizes/displays adequate comfort level or baseline comfort level  Outcome: Progressing Towards Goal     Problem: Safety - Adult  Goal: Free from fall injury  Outcome: Progressing Towards Goal     Problem: Chronic Conditions and Co-morbidities  Goal: Patient's chronic conditions and co-morbidity symptoms are monitored and maintained or improved  Outcome: Progressing Towards Goal

## 2022-07-27 LAB
ANION GAP SERPL CALC-SCNC: 8 MMOL/L (ref 7–16)
BACTERIA SPEC CULT: NORMAL
BACTERIA SPEC CULT: NORMAL
BUN SERPL-MCNC: 6 MG/DL (ref 6–23)
CALCIUM SERPL-MCNC: 8.6 MG/DL (ref 8.3–10.4)
CHLORIDE SERPL-SCNC: 100 MMOL/L (ref 98–107)
CO2 SERPL-SCNC: 30 MMOL/L (ref 21–32)
CREAT SERPL-MCNC: 0.5 MG/DL (ref 0.6–1)
GLUCOSE BLD STRIP.AUTO-MCNC: 165 MG/DL (ref 65–100)
GLUCOSE BLD STRIP.AUTO-MCNC: 170 MG/DL (ref 65–100)
GLUCOSE BLD STRIP.AUTO-MCNC: 245 MG/DL (ref 65–100)
GLUCOSE SERPL-MCNC: 173 MG/DL (ref 65–100)
MAGNESIUM SERPL-MCNC: 2.2 MG/DL (ref 1.8–2.4)
PHOSPHATE SERPL-MCNC: 3.3 MG/DL (ref 2.5–4.5)
POTASSIUM SERPL-SCNC: 3.6 MMOL/L (ref 3.5–5.1)
SERVICE CMNT-IMP: ABNORMAL
SERVICE CMNT-IMP: NORMAL
SERVICE CMNT-IMP: NORMAL
SODIUM SERPL-SCNC: 138 MMOL/L (ref 136–145)

## 2022-07-27 PROCEDURE — 6370000000 HC RX 637 (ALT 250 FOR IP): Performed by: HOSPITALIST

## 2022-07-27 PROCEDURE — 6370000000 HC RX 637 (ALT 250 FOR IP): Performed by: FAMILY MEDICINE

## 2022-07-27 PROCEDURE — 2500000003 HC RX 250 WO HCPCS: Performed by: HOSPITALIST

## 2022-07-27 PROCEDURE — 36591 DRAW BLOOD OFF VENOUS DEVICE: CPT

## 2022-07-27 PROCEDURE — C9113 INJ PANTOPRAZOLE SODIUM, VIA: HCPCS | Performed by: FAMILY MEDICINE

## 2022-07-27 PROCEDURE — 6370000000 HC RX 637 (ALT 250 FOR IP): Performed by: INTERNAL MEDICINE

## 2022-07-27 PROCEDURE — 2580000003 HC RX 258: Performed by: HOSPITALIST

## 2022-07-27 PROCEDURE — 83735 ASSAY OF MAGNESIUM: CPT

## 2022-07-27 PROCEDURE — 80048 BASIC METABOLIC PNL TOTAL CA: CPT

## 2022-07-27 PROCEDURE — 6360000002 HC RX W HCPCS: Performed by: HOSPITALIST

## 2022-07-27 PROCEDURE — 84100 ASSAY OF PHOSPHORUS: CPT

## 2022-07-27 PROCEDURE — 2580000003 HC RX 258: Performed by: FAMILY MEDICINE

## 2022-07-27 PROCEDURE — A4216 STERILE WATER/SALINE, 10 ML: HCPCS | Performed by: FAMILY MEDICINE

## 2022-07-27 PROCEDURE — 1100000000 HC RM PRIVATE

## 2022-07-27 PROCEDURE — 6360000002 HC RX W HCPCS: Performed by: FAMILY MEDICINE

## 2022-07-27 PROCEDURE — 82962 GLUCOSE BLOOD TEST: CPT

## 2022-07-27 RX ADMIN — SODIUM CHLORIDE, PRESERVATIVE FREE 10 ML: 5 INJECTION INTRAVENOUS at 10:35

## 2022-07-27 RX ADMIN — LISINOPRIL 10 MG: 5 TABLET ORAL at 08:35

## 2022-07-27 RX ADMIN — INSULIN LISPRO 2 UNITS: 100 INJECTION, SOLUTION INTRAVENOUS; SUBCUTANEOUS at 21:47

## 2022-07-27 RX ADMIN — HYDROMORPHONE HYDROCHLORIDE 1 MG: 1 INJECTION, SOLUTION INTRAMUSCULAR; INTRAVENOUS; SUBCUTANEOUS at 04:07

## 2022-07-27 RX ADMIN — CITALOPRAM HYDROBROMIDE 40 MG: 10 TABLET ORAL at 13:24

## 2022-07-27 RX ADMIN — SODIUM CHLORIDE 40 MG: 9 INJECTION INTRAMUSCULAR; INTRAVENOUS; SUBCUTANEOUS at 21:48

## 2022-07-27 RX ADMIN — MORPHINE SULFATE 15 MG: 15 TABLET ORAL at 21:47

## 2022-07-27 RX ADMIN — HYDROMORPHONE HYDROCHLORIDE 1 MG: 1 INJECTION, SOLUTION INTRAMUSCULAR; INTRAVENOUS; SUBCUTANEOUS at 15:20

## 2022-07-27 RX ADMIN — HYDROMORPHONE HYDROCHLORIDE 1 MG: 1 INJECTION, SOLUTION INTRAMUSCULAR; INTRAVENOUS; SUBCUTANEOUS at 19:17

## 2022-07-27 RX ADMIN — PREGABALIN 100 MG: 100 CAPSULE ORAL at 21:49

## 2022-07-27 RX ADMIN — LORAZEPAM 1 MG: 1 TABLET ORAL at 08:36

## 2022-07-27 RX ADMIN — DIPHENHYDRAMINE HYDROCHLORIDE 50 MG: 50 INJECTION, SOLUTION INTRAMUSCULAR; INTRAVENOUS at 11:39

## 2022-07-27 RX ADMIN — ONDANSETRON 4 MG: 2 INJECTION INTRAMUSCULAR; INTRAVENOUS at 19:17

## 2022-07-27 RX ADMIN — LORAZEPAM 1 MG: 1 TABLET ORAL at 21:48

## 2022-07-27 RX ADMIN — SODIUM BICARBONATE: 84 INJECTION, SOLUTION INTRAVENOUS at 00:17

## 2022-07-27 RX ADMIN — ZOLPIDEM TARTRATE 10 MG: 5 TABLET ORAL at 21:47

## 2022-07-27 RX ADMIN — DIPHENHYDRAMINE HYDROCHLORIDE 50 MG: 50 INJECTION, SOLUTION INTRAMUSCULAR; INTRAVENOUS at 05:02

## 2022-07-27 RX ADMIN — SODIUM CHLORIDE 40 MG: 9 INJECTION INTRAMUSCULAR; INTRAVENOUS; SUBCUTANEOUS at 08:35

## 2022-07-27 RX ADMIN — INSULIN GLARGINE 30 UNITS: 100 INJECTION, SOLUTION SUBCUTANEOUS at 21:48

## 2022-07-27 RX ADMIN — ENOXAPARIN SODIUM 40 MG: 100 INJECTION SUBCUTANEOUS at 08:35

## 2022-07-27 RX ADMIN — Medication 400 MG: at 08:35

## 2022-07-27 RX ADMIN — PREGABALIN 100 MG: 100 CAPSULE ORAL at 08:35

## 2022-07-27 RX ADMIN — SODIUM CHLORIDE, PRESERVATIVE FREE 5 ML: 5 INJECTION INTRAVENOUS at 21:49

## 2022-07-27 RX ADMIN — LORAZEPAM 1 MG: 1 TABLET ORAL at 13:24

## 2022-07-27 RX ADMIN — ONDANSETRON 4 MG: 2 INJECTION INTRAMUSCULAR; INTRAVENOUS at 11:39

## 2022-07-27 RX ADMIN — MORPHINE SULFATE 15 MG: 15 TABLET ORAL at 11:07

## 2022-07-27 RX ADMIN — SODIUM BICARBONATE: 84 INJECTION, SOLUTION INTRAVENOUS at 10:53

## 2022-07-27 RX ADMIN — HYDROMORPHONE HYDROCHLORIDE 1 MG: 1 INJECTION, SOLUTION INTRAMUSCULAR; INTRAVENOUS; SUBCUTANEOUS at 08:34

## 2022-07-27 ASSESSMENT — PAIN DESCRIPTION - LOCATION
LOCATION: ABDOMEN

## 2022-07-27 ASSESSMENT — PAIN DESCRIPTION - DESCRIPTORS
DESCRIPTORS: ACHING;SHARP;SORE
DESCRIPTORS: ACHING;SORE;SHARP
DESCRIPTORS: SHARP;SORE
DESCRIPTORS: ACHING;SHARP;SORE

## 2022-07-27 ASSESSMENT — PAIN SCALES - GENERAL
PAINLEVEL_OUTOF10: 4
PAINLEVEL_OUTOF10: 0
PAINLEVEL_OUTOF10: 8
PAINLEVEL_OUTOF10: 0
PAINLEVEL_OUTOF10: 8
PAINLEVEL_OUTOF10: 4
PAINLEVEL_OUTOF10: 7
PAINLEVEL_OUTOF10: 8
PAINLEVEL_OUTOF10: 0

## 2022-07-27 ASSESSMENT — PAIN DESCRIPTION - ORIENTATION
ORIENTATION: LEFT;UPPER
ORIENTATION: MID
ORIENTATION: LEFT
ORIENTATION: LEFT;LOWER

## 2022-07-27 NOTE — CONSULTS
(Vital AF) initiated 7/22 per GI. 7/25: TF to goal and tolerating well. Patient s/p PEGJ 7/26. Seen with  at bedside. Discussed again trial of low fiber standard formula in  anticipation of discharge. She asks how she will know if she is not tolerating. Discussed pain and bloating  are possible symptoms but discussed low suspicion of this occurring due to Jtube placement. She states that someone mentioned Glucerna. Explained trial of  Osmolite first due to lower fat contribution but if glucose labs increase may consider trial of Glucerna. She voiced understanding and asks when she will be able to eat again.  Deferred to  MD.    Abdominal Status (last documented):   Last BM (including prior to admit): 07/25/22, GI Symptoms: Flatus   Pertinent Medications: Lantus 30 units nightly, SSI (2 units yesterday, none thus far today), zofran and compazine PRN, protonix, Mag Ox - held, EFFER-K 40 meq  BID  IVF: Sodium Bicarbonate 25 meq in 1/2 NS @ 100ml/hr   Electrolyte replacements: 7/24: Mag Ox, Kcl 10 mew x7; 7/25: EFFER-K  Pertinent Labs:   Lab Results   Component Value Date/Time     07/27/2022 05:17 AM    K 3.6 07/27/2022 05:17 AM     07/27/2022 05:17 AM    CO2 30 07/27/2022 05:17 AM    BUN 6 07/27/2022 05:17 AM    CREATININE 0.50 07/27/2022 05:17 AM    GLUCOSE 173 07/27/2022 05:17 AM    CALCIUM 8.6 07/27/2022 05:17 AM    PHOS 3.3 07/27/2022 05:18 AM    MG 2.2 07/27/2022 05:18 AM         and   Lab Results   Component Value Date/Time    POCGLU 165 07/27/2022 07:39 AM    POCGLU 133 07/26/2022 08:42 PM    POCGLU 138 07/26/2022 04:37 PM    POCGLU 188 07/26/2022 11:40 AM    POCGLU 202 07/26/2022 07:33 AM    POCGLU 256 07/25/2022 11:36 PM   Labs reviewed, noted hyponatremia resolved, glucose elevated and  managed as above, all other lytes WNL    Current Nutrition Therapies:  Diet NPO Exceptions are: Sips of Water with Meds    Current Intake:   Average Meal Intake: NPO Average Supplements Intake: NPO Anthropometric Measures:  Height: 5' 2\" (157.5 cm)  Current Body Wt: 170 lb 6.7 oz (77.3 kg) (7/27), Weight source: Bed Scale  BMI: 31.2, Obese Class 1 (BMI 30.0-34.9)     Ideal Body Weight (Kg) (Calculated): 50 kg (110 lbs), 154.9 %  Usual Body Wt: 184 lb (83.5 kg) (per MD office visit 7/2021), Percent weight change: 3.3       Edema:    BMI Category Obese Class 1 (BMI 30.0-34. 9)    Estimated Daily Nutrient Needs:  Energy (kcal/day): 0875-8689 (Kcal/kg (20-25) Weight used: 77.3 kg Current (7/27)  Protein (g/day): 77-96 (20% kcal) Weight Used: (Other (Comment)) 86.2 kg  Fluid (ml/day):   (1 ml/kcal)    Nutrition Diagnosis:   Inadequate oral intake related to altered GI function as evidenced by  (intractable N/V, suspected gastroparesis, PEGJ for primary needs)    Nutrition Interventions:   Food and/or Nutrient Delivery: Continue NPO, Start Tube Feeding     Coordination of Nutrition Care: Continue to monitor while inpatient  Plan of Care discussed with: Saw Hyman RN    Goals:   Previous Goal Met: Goal(s) Achieved  Active Goal: Tolerate nutrition support at goal rate, within 2 days       Nutrition Monitoring and Evaluation:      Food/Nutrient Intake Outcomes: Diet Advancement/Tolerance, Enteral Nutrition Intake/Tolerance  Physical Signs/Symptoms Outcomes: Biochemical Data, Nausea or Vomiting, Weight    Discharge Planning:    Enteral Nutrition    [facility]

## 2022-07-27 NOTE — PROGRESS NOTES
END OF SHIFT NOTE:    INTAKE/OUTPUT  07/26 0701 - 07/27 0700  In: 989 [I.V.:873]  Out: 3500 [Urine:3500]  Voiding: Yes  Catheter: No  Drain:   Gastrostomy/Enterostomy/Jejunostomy Tube Percutaneous Endoscopic Gastrostomy (PEG) LUQ 1 20 fr (Active)   Site Description Clean, dry & intact 07/27/22 0150   J Port Status Clamped 07/27/22 0150   G Port Status Clamped 07/27/22 0150   Surrounding Skin Clean, dry & intact 07/27/22 0150   Dressing Status Clean, dry & intact 07/27/22 0150   Dressing Type Split gauze 07/27/22 0150               Flatus: Patient does have flatus present. Stool: 0 occurrences. Characteristics:           Stool Assessment  Last BM (including prior to admit): 07/25/22    Emesis:  0 occurrences. Characteristics:        VITAL SIGNS  Patient Vitals for the past 12 hrs:   Temp Pulse Resp BP SpO2   07/27/22 0437 -- -- 18 -- --   07/27/22 0356 98.2 °F (36.8 °C) 93 18 (!) 142/77 91 %   07/27/22 0004 98.1 °F (36.7 °C) 96 18 (!) 161/83 93 %   07/26/22 1945 -- -- 16 -- --   07/26/22 1921 98.5 °F (36.9 °C) 93 18 124/71 93 %       Pain Assessment  @BSHSIFLOW(13)@  Pain Location: Abdomen  Patient's Stated Pain Goal: 2    Ambulating  No    Shift report given to oncoming nurse at the bedside.     Jennifer Harrington RN

## 2022-07-27 NOTE — PROGRESS NOTES
[facility] Progress Note         Admit Date:  7/19/2022    Today's Date:  7/27/2022    CC:  Chronic abd pain, N/V. S/p Peg J placement 7/26. Subjective:     Patient s/p PEG J placement 7/26. Had post procedure pain surrounding tube site. Now pain continues but it has improved and CT was negative for acute findings. She denies N/V. No GI bleeding. She is currently laying in bed. She did walk the halls with her  this morning.       Medications:   Current Facility-Administered Medications   Medication Dose Route Frequency    HYDROmorphone HCl PF (DILAUDID) injection 1 mg  1 mg IntraVENous Q4H PRN    morphine (MSIR) tablet 15 mg  15 mg Oral Q6H PRN    diphenhydrAMINE (BENADRYL) injection 50 mg  50 mg IntraVENous Q6H PRN    insulin glargine (LANTUS) injection vial 30 Units  30 Units SubCUTAneous Nightly    insulin lispro (HUMALOG) injection vial 0-4 Units  0-4 Units SubCUTAneous 4x Daily AC & HS    magnesium oxide (MAG-OX) tablet 400 mg  400 mg Oral Daily    zolpidem (AMBIEN) tablet 10 mg  10 mg Oral Nightly PRN    LORazepam (ATIVAN) tablet 1 mg  1 mg Oral TID    lisinopril (PRINIVIL;ZESTRIL) tablet 10 mg  10 mg Oral Daily    heparin flush 100 UNIT/ML injection 300 Units  300 Units IntraCATHeter PRN    cloNIDine (CATAPRES) 0.3 MG/24HR 1 patch  1 patch TransDERmal Weekly    sodium bicarbonate 25 mEq in sodium chloride 0.45 % 1,000 mL infusion   IntraVENous Continuous    phenol 1.4 % mouth spray 1 spray  1 spray Mouth/Throat Q2H PRN    prochlorperazine (COMPAZINE) injection 10 mg  10 mg IntraVENous Q6H PRN    hydrALAZINE (APRESOLINE) injection 20 mg  20 mg IntraVENous Q6H PRN    sodium chloride flush 0.9 % injection 5-40 mL  5-40 mL IntraVENous 2 times per day    sodium chloride flush 0.9 % injection 5-40 mL  5-40 mL IntraVENous PRN    0.9 % sodium chloride infusion   IntraVENous PRN    enoxaparin (LOVENOX) injection 40 mg  40 mg SubCUTAneous Daily    ondansetron (ZOFRAN-ODT) disintegrating tablet 8 mg  8 mg Oral Q8H PRN    Or    ondansetron (ZOFRAN) injection 4 mg  4 mg IntraVENous Q6H PRN    acetaminophen (TYLENOL) tablet 650 mg  650 mg Oral Q6H PRN    Or    acetaminophen (TYLENOL) suppository 650 mg  650 mg Rectal Q6H PRN    polyethylene glycol (GLYCOLAX) packet 17 g  17 g Oral Daily PRN    pantoprazole (PROTONIX) 40 mg in sodium chloride (PF) 10 mL injection  40 mg IntraVENous Q12H    magnesium sulfate 1000 mg in dextrose 5% 100 mL IVPB  1,000 mg IntraVENous PRN    glucose chewable tablet 16 g  4 tablet Oral PRN    dextrose bolus 10% 125 mL  125 mL IntraVENous PRN    Or    dextrose bolus 10% 250 mL  250 mL IntraVENous PRN    glucagon (rDNA) injection 1 mg  1 mg IntraMUSCular PRN    dextrose 5 % solution  100 mL/hr IntraVENous PRN    pregabalin (LYRICA) capsule 100 mg  100 mg Oral BID    citalopram (CELEXA) tablet 40 mg  40 mg Oral Daily       Review of Systems:  ROS was obtained, with pertinent positives as listed above. No chest pain or SOB. Diet:  NPO. Peg J in place for future tube feeds. Objective:   Vitals:  BP (!) 121/59   Pulse 92   Temp 98.4 °F (36.9 °C) (Oral)   Resp 18   Ht 5' 2\" (1.575 m)   Wt 183 lb (83 kg)   SpO2 93%   BMI 33.47 kg/m²   Intake/Output:  No intake/output data recorded. 07/25 1901 - 07/27 0700  In: 2150.4 [I.V.:1698.4]  Out: 4200 [Urine:4200]  Exam:  General appearance: alert, cooperative, no distress  Lungs: clear to auscultation bilaterally anteriorly  Heart: regular rate and rhythm  Abdomen: soft, +Mild diffuse ttp (improved per pt).   Bowel sounds normal. No masses, no organomegaly  Extremities: extremities normal, atraumatic, no cyanosis or edema  Neuro:  alert and oriented    Data Review (Labs):    Recent Labs     07/25/22  0504 07/26/22  0436 07/26/22  0820 07/27/22  0517 07/27/22  0518   WBC 5.8  --   --   --   --    HGB 10.4*  --   --   --   --    HCT 35.0*  --   --   --   --      --   --   --   --    MCV 74.2*  --   --   --   --     --  139 138  --    K 3.2*  --  3.6 3.6  --      --  102 100  --    CO2 27  --  32 30  --    BUN 6  --  7 6  --    MG 2.1 2.3  --   --  2.2     EUS 3/16/22 with esophogeal stricture s/p dilation, gastrojejunal anastomosis, mild pancreatic parenchymal changes without convincing evidence of chronic pancreatitis and fatty infiltration of the liver. EGD 6/8/22 with esophageal stricture dilated to balloon 18 mm and gastric bezoar. Colonoscopy in 2018 with descending TA colon polyp and recall 4/2023. GES was reccommended after EGD wth bezoar. Has not been able to have the GES done due to not being able to hold nausea medication. EGD/EUS findings (7/22/22):   1. Esophageal stricture. Dilated. 2. Gastritis. Biopsies. 3. Fatty infiltration of the liver. 4. Pancreatic lipomatosis. This condition of fatty replacement of the pancreas that may associated with obesity, dyslipidemia, diabetes, steroid use or hereditary pancreatitis. There is no established risk of progression to fibrosis or chronic pancreatitis (as with nonalcoholic fatty liver disease, steatohepatitis and cirrhosis). In some cases, patients may have associated pancreatic insufficiency. 5. Dilated common bile duct. In the absence of biliary obstruction, this likely reflects benign post-cholecystectomy biliary dilatation. 6. Dobhoff tube successfully placed    EGD, PEG-J placement 7/26/22   Impression:A PEG-J tube was placed successfully. CT a/p wo IV contrast 7/26/22   FINDINGS: There is minimal bibasilar atelectasis present. CT abdomen: There is a J-tube present. No contour deforming abnormality    involving the liver and spleen. The patient is status post cholecystectomy. The   pancreas is normal. The adrenal glands are unremarkable. Limited evaluation the   bowel loops in the upper abdomen is unremarkable. There is no definite upper   abdominal lymphadenopathy. There is a nonobstructing left renal calculus present. CT pelvis: No inflammatory change in the right lower quadrant. The appendix is   normal. There is no pelvic adenopathy. There is no free fluid or free air   present within the pelvis. Bone window evaluation demonstrates no aggressive osseous lesions. 1. Nonobstructing left renal calculus. 2. J-tube. Assessment:     Principal Problem:    Intractable nausea and vomiting  Active Problems:    Class 1 obesity with serious comorbidity and body mass index (BMI) of 34.0 to 34.9 in adult    Hypokalemia due to excessive gastrointestinal loss of potassium    Microcytic anemia    SIRS (systemic inflammatory response syndrome) (HCC)    Neutrophilic leukocytosis    GERD with stricture    Type 2 diabetes mellitus with hyperglycemia, with long-term current use of insulin (HCC)    Chronic pancreatitis (HCC)    S/P balloon dilatation of esophageal stricture  Resolved Problems:    * No resolved hospital problems. *           66-year-old female with suboptimally controlled diabetes, GERD, s/p Nissen fundoplication, opioid induced constipation/IBS, prior diagnosis of sphincter of oddi dysfunction and chronic pancreatitis though not demonstrated on recent EUS and no longer suspected (multiple prior procedures with ERCP 2013 with perforation requiring surgery), and chronic abdominal pain, admitted with worsening nausea and epigastric pain. CT 7/15 was unremarkable. She had colo 2018, EUS 3/2022, EGD 6/2022 as above. GES outpatient not able to complete due to need to hold anti-emetics. IV Reglan in ED no help. Most recent EGD 7/22 with dilatation of distal esophageal stricture. Patient also noted to have gastritis, fatty infiltration of the liver and moderate lipomatosis of the pancreas. The previously placed NG tube was removed. She was receiving enteral feeds per Dobbhoff.  During hospitalization has had discussions regarding concern for ongoing chronic opiate use, worsening symptoms of gastroparesis and constipation. In addition, there had been excessive use of multiple antiemetics. Since discontinuing IV opiates, decreasing antiemetics, and resuming enteral feeding she reportedly had significant clinical improvement. PEG-J placed Tuesday 7/26/22. Post procedure, pt c/o increased pain surrounding PEG J site. CT a/p unremarkable as above. Plan:     -Begin tube feeding per PEG-J today.   -Routine ostomy care  -Nutrition/Diet choices is a concern for her. Discussed consideration for nutrition consult of which she agrees. Discussed slow advancement in diet, longterm- low residue diet with small, frequent meals. Can discuss further/in more detail with nutrition.  -Anticipates discharge home in near future, possibly tomorrow.     Lisa Guo PA-C  [facility]    Patient is seen and examined in collaboration with Dr. Alesia Kay.  Assessment and plan as per Dr. Alesia Kay. forehead

## 2022-07-27 NOTE — PROGRESS NOTES
RNCM reviewed chart and discussed in IDR. Patient S/P EGD, PEG-J tube placement 07/26. Dietitian consulted for TF management. Referral started to [facility] for home TF. CM following.

## 2022-07-27 NOTE — PLAN OF CARE
Problem: Discharge Planning  Goal: Discharge to home or other facility with appropriate resources  7/26/2022 2349 by Arianne Dougherty RN  Outcome: Progressing Towards Goal  7/26/2022 1741 by Abdi Moura RN  Outcome: Progressing Towards Goal  Flowsheets (Taken 7/26/2022 0715)  Discharge to home or other facility with appropriate resources:   Identify barriers to discharge with patient and caregiver   Arrange for needed discharge resources and transportation as appropriate   Identify discharge learning needs (meds, wound care, etc)   Refer to discharge planning if patient needs post-hospital services based on physician order or complex needs related to functional status, cognitive ability or social support system     Problem: Pain  Goal: Verbalizes/displays adequate comfort level or baseline comfort level  7/26/2022 2349 by Arianne Dougherty RN  Outcome: Progressing Towards Goal  7/26/2022 1741 by Abdi Moura RN  Outcome: Progressing Towards Goal  Flowsheets (Taken 7/26/2022 0715)  Verbalizes/displays adequate comfort level or baseline comfort level:   Encourage patient to monitor pain and request assistance   Assess pain using appropriate pain scale   Administer analgesics based on type and severity of pain and evaluate response   Implement non-pharmacological measures as appropriate and evaluate response   Notify Licensed Independent Practitioner if interventions unsuccessful or patient reports new pain   Consider cultural and social influences on pain and pain management     Problem: Safety - Adult  Goal: Free from fall injury  7/26/2022 2349 by Arianne Dougherty RN  Outcome: Progressing Towards Goal  7/26/2022 1741 by Abdi Moura RN  Outcome: Progressing Towards Goal  Flowsheets  Taken 7/26/2022 1235  Free From Fall Injury: Instruct family/caregiver on patient safety  Taken 7/26/2022 0715  Free From Fall Injury: Instruct family/caregiver on patient safety     Problem: Chronic Conditions and Co-morbidities  Goal: Patient's chronic conditions and co-morbidity symptoms are monitored and maintained or improved  7/26/2022 2349 by Gerald Linares RN  Outcome: Progressing Towards Goal  7/26/2022 1741 by Dayton Khan RN  Outcome: Progressing Towards Goal  Flowsheets (Taken 7/26/2022 0715)  Care Plan - Patient's Chronic Conditions and Co-Morbidity Symptoms are Monitored and Maintained or Improved:   Monitor and assess patient's chronic conditions and comorbid symptoms for stability, deterioration, or improvement   Collaborate with multidisciplinary team to address chronic and comorbid conditions and prevent exacerbation or deterioration   Update acute care plan with appropriate goals if chronic or comorbid symptoms are exacerbated and prevent overall improvement and discharge

## 2022-07-27 NOTE — PROGRESS NOTES
.END OF SHIFT NOTE:    INTAKE/OUTPUT  07/26 0701 - 07/27 0700  In: 873 [I.V.:873]  Out: 3500 [Urine:3500]  Voiding: Yes  Catheter: No  Drain:   Gastrostomy/Enterostomy/Jejunostomy Tube Percutaneous Endoscopic Gastrostomy (PEG) LUQ 1 20 fr (Active)   Site Description Clean, dry & intact 07/27/22 0715   J Port Status Clamped 07/27/22 0715   G Port Status Clamped 07/27/22 0150   Surrounding Skin Clean, dry & intact 07/27/22 0715   Dressing Status Clean, dry & intact 07/27/22 0715   Dressing Type Split gauze 07/27/22 0715               Flatus: Patient does have flatus present. Stool:  occurrences. Characteristics:           Stool Assessment  Last BM (including prior to admit): 07/25/22    Emesis:  occurrences. Characteristics:        VITAL SIGNS  Patient Vitals for the past 12 hrs:   Temp Pulse Resp BP SpO2   07/27/22 1928 98.8 °F (37.1 °C) 95 -- (!) 159/73 97 %   07/27/22 1544 98.6 °F (37 °C) 100 18 (!) 158/76 100 %   07/27/22 1130 -- -- -- (!) 148/87 --   07/27/22 1127 98 °F (36.7 °C) 92 18 (!) 148/87 99 %   07/27/22 1115 -- -- -- (!) 129/96 --   07/27/22 1107 -- -- 18 -- --       Pain Assessment  @[facility](13)@  Pain Location: Abdomen  Patient's Stated Pain Goal: 0 - No pain    Ambulating  Yes    Shift report given to oncoming nurse at the bedside.     Janki Gutiérrez RN

## 2022-07-27 NOTE — PROGRESS NOTES
Hospitalist Progress Note   Admit Date:  2022  8:02 AM   Name:  Marcia Moncada   Age:  46 y.o. Sex:  female  :  1970   MRN:  [id number]   Room:      Presenting Complaint: Nausea     Reason(s) for Admission: Chronic abdominal pain [R10.9, G89.29]  Intractable nausea and vomiting [R11.2]  Intractable vomiting with nausea [R11.2]     Hospital Course & Interval History:     Marcia Moncada is a 46 y.o. 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 In the ED, she his afebrile and hypertensive. Labs significant for K 3.2, AST mildly elevated 43  hgb 10.5 MCV 73.3 lipase wnl. She was given 1 L LR bolus, reglan 10 IV, benadryl 12.5 IV, morhpine 4 mg x 2, Protonix 40 IV and compazine 10 mg IV with minimal relief of symptoms. She was seen 4 days ago with similar symptoms and CT a/p at that time was unremarkable. She continued to dry heave over the weekend. She tried to eat a banana yesterday and has had worsening abd pain, dry heaving and nausea since then. States dilaudid, benadryl, and compazine usually knock it out. Seen by GI in the ED who recommended admission with supportive care including IVF and antiemetics. Per GI and medical record review  Sphincter of Oddi dsyfunction with multiple prior procedures with ERCP in  with perforation requiring surgery. She was getting weekly IVF and daily phenergan injections. EGD 22 with esophageal stricture dilated to balloon 18 mm and gastric bezoar.   EUS 3/16/22 with esophogeal stricture s/p dilation, gastrojejunal anastomosis, mild pancreatic parenchymal changes without convincing evidence of chronic pancreatitis and fatty Problem:    Intractable nausea and vomiting  Active Problems:    Class 1 obesity with serious comorbidity and body mass index (BMI) of 34.0 to 34.9 in adult    Hypokalemia due to excessive gastrointestinal loss of potassium    Microcytic anemia    SIRS (systemic inflammatory response syndrome) (HCC)    Neutrophilic leukocytosis    GERD with stricture    Type 2 diabetes mellitus with hyperglycemia, with long-term current use of insulin (HCC)    Chronic pancreatitis (HCC)    S/P balloon dilatation of esophageal stricture  Resolved Problems:    * No resolved hospital problems. *      Objective:   Patient Vitals for the past 24 hrs:   Temp Pulse Resp BP SpO2   07/27/22 0729 98.4 °F (36.9 °C) 92 18 (!) 121/59 93 %   07/27/22 0437 -- -- 18 -- --   07/27/22 0356 98.2 °F (36.8 °C) 93 18 (!) 142/77 91 %   07/27/22 0004 98.1 °F (36.7 °C) 96 18 (!) 161/83 93 %   07/26/22 1945 -- -- 16 -- --   07/26/22 1921 98.5 °F (36.9 °C) 93 18 124/71 93 %   07/26/22 1635 97.7 °F (36.5 °C) 99 17 (!) 155/81 91 %   07/26/22 1546 -- -- 18 -- --   07/26/22 1146 -- -- 17 -- --   07/26/22 1140 97.9 °F (36.6 °C) 95 -- 136/62 94 %   07/26/22 1110 -- -- 17 -- --   07/26/22 0957 -- 98 17 (!) 157/83 96 %   07/26/22 0945 -- 96 17 (!) 183/99 100 %   07/26/22 0937 -- 96 17 (!) 168/96 98 %   07/26/22 0929 -- 91 17 (!) 142/87 99 %   07/26/22 0925 96.8 °F (36 °C) 93 15 (!) 142/87 98 %   07/26/22 0830 98.1 °F (36.7 °C) (!) 104 18 (!) 168/78 95 %   07/26/22 0806 -- -- -- (!) 179/91 --   07/26/22 0732 97.5 °F (36.4 °C) (!) 105 18 (!) 179/91 95 %         Oxygen Therapy  SpO2: 93 %  Pulse via Oximetry: 98 beats per minute  Pulse Oximeter Device Mode: Continuous  Pulse Oximeter Device Location: Right  O2 Device: None (Room air)  O2 Flow Rate (L/min): 3 L/min    Estimated body mass index is 33.47 kg/m² as calculated from the following:    Height as of this encounter: 5' 2\" (1.575 m). Weight as of this encounter: 183 lb (83 kg).     Intake/Output Summary (Last 24 hours) at 7/27/2022 0731  Last data filed at 7/27/2022 0550  Gross per 24 hour   Intake 873 ml   Output 3500 ml   Net -2627 ml           Physical Exam:   Blood pressure (!) 121/59, pulse 92, temperature 98.4 °F (36.9 °C), temperature source Oral, resp. rate 18, height 5' 2\" (1.575 m), weight 183 lb (83 kg), SpO2 93 %. Physical Exam:   General:                 Alert, awake, NAD on room air  HEENT:                 Head NCAT, PERRLA positive, MMM  Lungs:                   CTA B, no wheezing or rhonchi  Heart:                     Regular rate and rhythm, S1, S2 normal, no murmur, click, rub or gallop. Abdomen:                       Soft, non-tender. Bowel sounds noted. No masses,  No organomegaly. ,  J-tube inserted, insertion site clean and dry. Extremities:                     Extremities normal, atraumatic, no cyanosis-moves all extremities symmetrically. Neurologic:                   GCS 15, cranial nerves intact, no motor or sensory deficit  Psych:                       AOx3, mood and affect appropriate        Lab/Data Review: All lab results for the last 24 hours reviewed.              I have reviewed ordered lab tests and independently visualized imaging below:    Recent Labs:  Recent Results (from the past 48 hour(s))   POCT Glucose    Collection Time: 07/25/22  7:34 AM   Result Value Ref Range    POC Glucose 303 (H) 65 - 100 mg/dL    Performed by: Storm Bringer StudiostneyPCA    POCT Glucose    Collection Time: 07/25/22 11:31 AM   Result Value Ref Range    POC Glucose 235 (H) 65 - 100 mg/dL    Performed by: Storm Bringer StudiostneyPCA    POCT Glucose    Collection Time: 07/25/22  5:46 PM   Result Value Ref Range    POC Glucose 252 (H) 65 - 100 mg/dL    Performed by: Storm Bringer StudiostneyPCA    POCT Glucose    Collection Time: 07/25/22 11:36 PM   Result Value Ref Range    POC Glucose 256 (H) 65 - 100 mg/dL    Performed by: Romi    Magnesium    Collection Time: 07/26/22  4:36 AM   Result Value Ref Range    Magnesium 2.3 1.8 - 2.4 mg/dL   Phosphorus    Collection Time: 07/26/22  4:36 AM   Result Value Ref Range    Phosphorus 2.7 2.5 - 4.5 MG/DL   POCT Glucose    Collection Time: 07/26/22  7:33 AM   Result Value Ref Range    POC Glucose 202 (H) 65 - 100 mg/dL    Performed by: Drive    Basic Metabolic Panel    Collection Time: 07/26/22  8:20 AM   Result Value Ref Range    Sodium 139 136 - 145 mmol/L    Potassium 3.6 3.5 - 5.1 mmol/L    Chloride 102 98 - 107 mmol/L    CO2 32 21 - 32 mmol/L    Anion Gap 5 (L) 7 - 16 mmol/L    Glucose 227 (H) 65 - 100 mg/dL    BUN 7 6 - 23 MG/DL    Creatinine 0.50 (L) 0.6 - 1.0 MG/DL    GFR African American >60 >60 ml/min/1.73m2    GFR Non- >60 >60 ml/min/1.73m2    Calcium 8.7 8.3 - 10.4 MG/DL   POCT Glucose    Collection Time: 07/26/22 11:40 AM   Result Value Ref Range    POC Glucose 188 (H) 65 - 100 mg/dL    Performed by: Drive    POCT Glucose    Collection Time: 07/26/22  4:37 PM   Result Value Ref Range    POC Glucose 138 (H) 65 - 100 mg/dL    Performed by: English TVaPCT    POCT Glucose    Collection Time: 07/26/22  8:42 PM   Result Value Ref Range    POC Glucose 133 (H) 65 - 100 mg/dL    Performed by: Romi    Magnesium    Collection Time: 07/27/22  5:18 AM   Result Value Ref Range    Magnesium 2.2 1.8 - 2.4 mg/dL   Phosphorus    Collection Time: 07/27/22  5:18 AM   Result Value Ref Range    Phosphorus 3.3 2.5 - 4.5 MG/DL       Other Studies:  CT ABDOMEN PELVIS WO CONTRAST Additional Contrast? Radiologist Recommendation   Final Result         1. Nonobstructing left renal calculus. 2. J-tube. XR CHEST PORTABLE   Final Result   1. No acute intrathoracic process. 2. The distal side-port of the enteric tube lies in the lower esophagus. Recommend advancement by at least 5 cm. XR ABDOMEN (KUB) (SINGLE AP VIEW)   Final Result   The bowel gas pattern is normal. There is no evidence of   obstruction.  There is no free air visualized on this supine view. There are no   renal calculi. The lung bases are clear. Nasogastric tube terminates just below   the left hemidiaphragm in the proximal stomach. Postop changes related to   gastric surgery are noted. XR ABDOMEN (KUB) (SINGLE AP VIEW)   Final Result   Enteric tube tip overlies the gastric body.                    Current Meds:  Current Facility-Administered Medications   Medication Dose Route Frequency    HYDROmorphone HCl PF (DILAUDID) injection 1 mg  1 mg IntraVENous Q4H PRN    morphine (MSIR) tablet 15 mg  15 mg Oral Q6H PRN    potassium bicarb-citric acid (EFFER-K) effervescent tablet 40 mEq  40 mEq Orogastric BID    diphenhydrAMINE (BENADRYL) injection 50 mg  50 mg IntraVENous Q6H PRN    insulin glargine (LANTUS) injection vial 30 Units  30 Units SubCUTAneous Nightly    insulin lispro (HUMALOG) injection vial 0-4 Units  0-4 Units SubCUTAneous 4x Daily AC & HS    magnesium oxide (MAG-OX) tablet 400 mg  400 mg Oral Daily    zolpidem (AMBIEN) tablet 10 mg  10 mg Oral Nightly PRN    LORazepam (ATIVAN) tablet 1 mg  1 mg Oral TID    lisinopril (PRINIVIL;ZESTRIL) tablet 10 mg  10 mg Oral Daily    heparin flush 100 UNIT/ML injection 300 Units  300 Units IntraCATHeter PRN    cloNIDine (CATAPRES) 0.3 MG/24HR 1 patch  1 patch TransDERmal Weekly    sodium bicarbonate 25 mEq in sodium chloride 0.45 % 1,000 mL infusion   IntraVENous Continuous    phenol 1.4 % mouth spray 1 spray  1 spray Mouth/Throat Q2H PRN    prochlorperazine (COMPAZINE) injection 10 mg  10 mg IntraVENous Q6H PRN    hydrALAZINE (APRESOLINE) injection 20 mg  20 mg IntraVENous Q6H PRN    sodium chloride flush 0.9 % injection 5-40 mL  5-40 mL IntraVENous 2 times per day    sodium chloride flush 0.9 % injection 5-40 mL  5-40 mL IntraVENous PRN    0.9 % sodium chloride infusion   IntraVENous PRN    enoxaparin (LOVENOX) injection 40 mg  40 mg SubCUTAneous Daily    ondansetron (ZOFRAN-ODT) disintegrating tablet 8 mg  8 mg Oral Q8H PRN    Or ondansetron (ZOFRAN) injection 4 mg  4 mg IntraVENous Q6H PRN    acetaminophen (TYLENOL) tablet 650 mg  650 mg Oral Q6H PRN    Or    acetaminophen (TYLENOL) suppository 650 mg  650 mg Rectal Q6H PRN    polyethylene glycol (GLYCOLAX) packet 17 g  17 g Oral Daily PRN    pantoprazole (PROTONIX) 40 mg in sodium chloride (PF) 10 mL injection  40 mg IntraVENous Q12H    magnesium sulfate 1000 mg in dextrose 5% 100 mL IVPB  1,000 mg IntraVENous PRN    glucose chewable tablet 16 g  4 tablet Oral PRN    dextrose bolus 10% 125 mL  125 mL IntraVENous PRN    Or    dextrose bolus 10% 250 mL  250 mL IntraVENous PRN    glucagon (rDNA) injection 1 mg  1 mg IntraMUSCular PRN    dextrose 5 % solution  100 mL/hr IntraVENous PRN    pregabalin (LYRICA) capsule 100 mg  100 mg Oral BID    citalopram (CELEXA) tablet 40 mg  40 mg Oral Daily       Signed:  Brown Min MD    Part of this note may have been written by using a voice dictation software. The note has been proof read but may still contain some grammatical/other typographical errors.

## 2022-07-28 VITALS
SYSTOLIC BLOOD PRESSURE: 135 MMHG | BODY MASS INDEX: 31.36 KG/M2 | OXYGEN SATURATION: 92 % | HEIGHT: 62 IN | HEART RATE: 95 BPM | DIASTOLIC BLOOD PRESSURE: 72 MMHG | WEIGHT: 170.4 LBS | RESPIRATION RATE: 18 BRPM | TEMPERATURE: 98.5 F

## 2022-07-28 LAB
ANION GAP SERPL CALC-SCNC: 3 MMOL/L (ref 7–16)
ANION GAP SERPL CALC-SCNC: ABNORMAL MMOL/L (ref 7–16)
BUN SERPL-MCNC: 6 MG/DL (ref 6–23)
BUN SERPL-MCNC: ABNORMAL MG/DL (ref 6–23)
CALCIUM SERPL-MCNC: 8.6 MG/DL (ref 8.3–10.4)
CALCIUM SERPL-MCNC: ABNORMAL MG/DL (ref 8.3–10.4)
CHLORIDE SERPL-SCNC: 98 MMOL/L (ref 98–107)
CHLORIDE SERPL-SCNC: ABNORMAL MMOL/L (ref 98–107)
CO2 SERPL-SCNC: 32 MMOL/L (ref 21–32)
CO2 SERPL-SCNC: ABNORMAL MMOL/L (ref 21–32)
CREAT SERPL-MCNC: 0.7 MG/DL (ref 0.6–1)
CREAT SERPL-MCNC: ABNORMAL MG/DL (ref 0.6–1)
GLUCOSE BLD STRIP.AUTO-MCNC: 143 MG/DL (ref 65–100)
GLUCOSE BLD STRIP.AUTO-MCNC: 298 MG/DL (ref 65–100)
GLUCOSE BLD STRIP.AUTO-MCNC: 328 MG/DL (ref 65–100)
GLUCOSE SERPL-MCNC: 316 MG/DL (ref 65–100)
GLUCOSE SERPL-MCNC: ABNORMAL MG/DL (ref 65–100)
POTASSIUM SERPL-SCNC: 4 MMOL/L (ref 3.5–5.1)
POTASSIUM SERPL-SCNC: ABNORMAL MMOL/L (ref 3.5–5.1)
SERVICE CMNT-IMP: ABNORMAL
SODIUM SERPL-SCNC: 133 MMOL/L (ref 136–145)
SODIUM SERPL-SCNC: ABNORMAL MMOL/L (ref 136–145)

## 2022-07-28 PROCEDURE — 80048 BASIC METABOLIC PNL TOTAL CA: CPT

## 2022-07-28 PROCEDURE — 6370000000 HC RX 637 (ALT 250 FOR IP): Performed by: HOSPITALIST

## 2022-07-28 PROCEDURE — A4216 STERILE WATER/SALINE, 10 ML: HCPCS | Performed by: FAMILY MEDICINE

## 2022-07-28 PROCEDURE — 6370000000 HC RX 637 (ALT 250 FOR IP): Performed by: FAMILY MEDICINE

## 2022-07-28 PROCEDURE — 82962 GLUCOSE BLOOD TEST: CPT

## 2022-07-28 PROCEDURE — 6370000000 HC RX 637 (ALT 250 FOR IP): Performed by: INTERNAL MEDICINE

## 2022-07-28 PROCEDURE — 6360000002 HC RX W HCPCS: Performed by: INTERNAL MEDICINE

## 2022-07-28 PROCEDURE — 2580000003 HC RX 258: Performed by: FAMILY MEDICINE

## 2022-07-28 PROCEDURE — C9113 INJ PANTOPRAZOLE SODIUM, VIA: HCPCS | Performed by: FAMILY MEDICINE

## 2022-07-28 PROCEDURE — 6360000002 HC RX W HCPCS: Performed by: HOSPITALIST

## 2022-07-28 PROCEDURE — 6360000002 HC RX W HCPCS: Performed by: FAMILY MEDICINE

## 2022-07-28 RX ORDER — CLONIDINE 0.3 MG/24H
1 PATCH, EXTENDED RELEASE TRANSDERMAL WEEKLY
Qty: 4 PATCH | Refills: 1 | Status: ON HOLD | OUTPATIENT
Start: 2022-08-04 | End: 2022-08-10

## 2022-07-28 RX ORDER — PANTOPRAZOLE SODIUM 40 MG/1
40 TABLET, DELAYED RELEASE ORAL 2 TIMES DAILY
Qty: 30 TABLET | Refills: 3 | Status: SHIPPED | OUTPATIENT
Start: 2022-07-28

## 2022-07-28 RX ORDER — MORPHINE SULFATE 30 MG/1
30 TABLET, FILM COATED, EXTENDED RELEASE ORAL 2 TIMES DAILY
Qty: 10 TABLET | Refills: 0 | Status: SHIPPED | OUTPATIENT
Start: 2022-07-28 | End: 2022-08-02

## 2022-07-28 RX ORDER — HYDROMORPHONE HYDROCHLORIDE 2 MG/1
2 TABLET ORAL EVERY 6 HOURS PRN
Qty: 12 TABLET | Refills: 0 | Status: SHIPPED | OUTPATIENT
Start: 2022-07-28 | End: 2022-07-31

## 2022-07-28 RX ORDER — LISINOPRIL 10 MG/1
10 TABLET ORAL DAILY
Qty: 30 TABLET | Refills: 3 | Status: SHIPPED | OUTPATIENT
Start: 2022-07-29

## 2022-07-28 RX ORDER — CITALOPRAM 40 MG/1
40 TABLET ORAL DAILY
Qty: 30 TABLET | Refills: 3 | Status: SHIPPED | OUTPATIENT
Start: 2022-07-28 | End: 2022-08-12

## 2022-07-28 RX ORDER — PROMETHAZINE HYDROCHLORIDE 25 MG/1
25 TABLET ORAL EVERY 8 HOURS PRN
Qty: 30 TABLET | Refills: 2 | Status: SHIPPED | OUTPATIENT
Start: 2022-07-28

## 2022-07-28 RX ADMIN — HYDROMORPHONE HYDROCHLORIDE 1 MG: 1 INJECTION, SOLUTION INTRAMUSCULAR; INTRAVENOUS; SUBCUTANEOUS at 00:28

## 2022-07-28 RX ADMIN — PROCHLORPERAZINE EDISYLATE 10 MG: 5 INJECTION INTRAMUSCULAR; INTRAVENOUS at 07:37

## 2022-07-28 RX ADMIN — LISINOPRIL 10 MG: 5 TABLET ORAL at 08:31

## 2022-07-28 RX ADMIN — PROCHLORPERAZINE EDISYLATE 10 MG: 5 INJECTION INTRAMUSCULAR; INTRAVENOUS at 00:28

## 2022-07-28 RX ADMIN — INSULIN LISPRO 2 UNITS: 100 INJECTION, SOLUTION INTRAVENOUS; SUBCUTANEOUS at 12:42

## 2022-07-28 RX ADMIN — LORAZEPAM 1 MG: 1 TABLET ORAL at 13:45

## 2022-07-28 RX ADMIN — SODIUM CHLORIDE 40 MG: 9 INJECTION INTRAMUSCULAR; INTRAVENOUS; SUBCUTANEOUS at 08:30

## 2022-07-28 RX ADMIN — CITALOPRAM HYDROBROMIDE 40 MG: 10 TABLET ORAL at 13:45

## 2022-07-28 RX ADMIN — SODIUM CHLORIDE, PRESERVATIVE FREE 10 ML: 5 INJECTION INTRAVENOUS at 08:37

## 2022-07-28 RX ADMIN — MORPHINE SULFATE 15 MG: 15 TABLET ORAL at 09:43

## 2022-07-28 RX ADMIN — HYDROMORPHONE HYDROCHLORIDE 1 MG: 1 INJECTION, SOLUTION INTRAMUSCULAR; INTRAVENOUS; SUBCUTANEOUS at 11:00

## 2022-07-28 RX ADMIN — LORAZEPAM 1 MG: 1 TABLET ORAL at 08:30

## 2022-07-28 RX ADMIN — ONDANSETRON 8 MG: 8 TABLET, ORALLY DISINTEGRATING ORAL at 04:42

## 2022-07-28 RX ADMIN — ENOXAPARIN SODIUM 40 MG: 100 INJECTION SUBCUTANEOUS at 08:31

## 2022-07-28 RX ADMIN — Medication 400 MG: at 08:30

## 2022-07-28 RX ADMIN — ONDANSETRON 8 MG: 8 TABLET, ORALLY DISINTEGRATING ORAL at 12:48

## 2022-07-28 RX ADMIN — DIPHENHYDRAMINE HYDROCHLORIDE 50 MG: 50 INJECTION, SOLUTION INTRAMUSCULAR; INTRAVENOUS at 00:28

## 2022-07-28 RX ADMIN — INSULIN LISPRO 3 UNITS: 100 INJECTION, SOLUTION INTRAVENOUS; SUBCUTANEOUS at 08:40

## 2022-07-28 RX ADMIN — MORPHINE SULFATE 15 MG: 15 TABLET ORAL at 15:42

## 2022-07-28 RX ADMIN — DIPHENHYDRAMINE HYDROCHLORIDE 50 MG: 50 INJECTION, SOLUTION INTRAMUSCULAR; INTRAVENOUS at 15:42

## 2022-07-28 RX ADMIN — HYDROMORPHONE HYDROCHLORIDE 1 MG: 1 INJECTION, SOLUTION INTRAMUSCULAR; INTRAVENOUS; SUBCUTANEOUS at 04:42

## 2022-07-28 RX ADMIN — DIPHENHYDRAMINE HYDROCHLORIDE 50 MG: 50 INJECTION, SOLUTION INTRAMUSCULAR; INTRAVENOUS at 07:37

## 2022-07-28 RX ADMIN — PREGABALIN 100 MG: 100 CAPSULE ORAL at 08:30

## 2022-07-28 ASSESSMENT — PAIN DESCRIPTION - DESCRIPTORS
DESCRIPTORS: ACHING
DESCRIPTORS: ACHING

## 2022-07-28 ASSESSMENT — PAIN DESCRIPTION - LOCATION
LOCATION: ABDOMEN
LOCATION: ABDOMEN

## 2022-07-28 ASSESSMENT — PAIN SCALES - GENERAL
PAINLEVEL_OUTOF10: 6
PAINLEVEL_OUTOF10: 7
PAINLEVEL_OUTOF10: 8

## 2022-07-28 ASSESSMENT — PAIN DESCRIPTION - ORIENTATION
ORIENTATION: LEFT
ORIENTATION: MID

## 2022-07-28 NOTE — DISCHARGE SUMMARY
Hospitalist Discharge Summary     Patient ID:  Tonya Whatley  [id number]  35 y.o.  1970  Admit date: 7/19/2022  8:02 AM  Discharge date and time: 7/28/2022  Attending: Amita Pagan MD  PCP:  Alireza Kowalski MD  Treatment Team: Attending Provider: Amita Pagan MD; Utilization Reviewer: Dante Kaba RN; : Khai Caraballo, RN; Utilization Reviewer: Rey Feldman RN; Hospitalist: Amita Pagan MD; Surgeon: Noris Street MD; Consulting Physician: Noris Street MD; Registered Nurse: Lupe Umana, HUBERT; Dietitian: Lukasz Desai RD    Principal Diagnosis Intractable nausea and vomiting   Principal Problem:    Intractable nausea and vomiting  Active Problems:    Class 1 obesity with serious comorbidity and body mass index (BMI) of 34.0 to 34.9 in adult    Hypokalemia due to excessive gastrointestinal loss of potassium    Microcytic anemia    SIRS (systemic inflammatory response syndrome) ([address]    Neutrophilic leukocytosis    GERD with stricture    Type 2 diabetes mellitus with hyperglycemia, with long-term current use of insulin (HCC)    Chronic pancreatitis ([address]    S/P balloon dilatation of esophageal stricture  Resolved Problems:    * No resolved hospital problems. Levine, Susan. 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 In the ED, she his afebrile and hypertensive. Labs significant for K 3.2, AST mildly elevated 43  hgb 10.5 MCV 73.3 lipase wnl. She was given 1 L LR bolus, reglan 10 IV, benadryl 12.5 IV, morhpine 4 mg x 2, Protonix 40 IV and compazine 10 mg IV with minimal relief of symptoms. She was seen 4 days ago with similar symptoms and CT a/p at that time was unremarkable.  She continued to dry heave over the weekend. She tried to eat a banana yesterday and has had worsening abd pain, dry heaving and nausea since then. States dilaudid, benadryl, and compazine usually knock it out. Seen by GI in the ED who recommended admission with supportive care including IVF and antiemetics. Per GI and medical record review  Sphincter of Oddi dsyfunction with multiple prior procedures with ERCP in 2013 with perforation requiring surgery. She was getting weekly IVF and daily phenergan injections. EGD 6/8/22 with esophageal stricture dilated to balloon 18 mm and gastric bezoar. EUS 3/16/22 with esophogeal stricture s/p dilation, gastrojejunal anastomosis, mild pancreatic parenchymal changes without convincing evidence of chronic pancreatitis and fatty infiltration of the liver. Colonoscopy in 2018 with descending TA colon polyp and recall 4/2023. GES was reccommended after EGD wth bezoar. Assessment and plan:  Principal Problem:    Intractable nausea and vomiting  7/20-significant NG tube output noted-continue same for now. Reluctantly I am increasing narcotics and ordering Phenergan for nausea-patient very likely narcotic dependent-await further recommendations GI.  7/21--continue NG tube to LIS-possible EGD and endoscopic ultrasound tomorrow July 22. Counseled patient regarding attenuate narcotics but she is reluctant  7/22--nasogastric tube removed, Dobbhoff tube placed in small bowel at time of EGD-dilation of esophageal stricture at that time. GI recommended discontinue IV narcotics. May need GJ tube. Twice daily PPI avoid NSAIDs stop motility agent. Monitor. Meds with sips of water. 7/23--patient improving-continue to wean narcotics. 7/24-continue same-decrease frequency narcotics. 7/28-  Status post J-tube placement, POD # 2  Abdominal pain has improved significantly and patient is tolerating oral diet. CTAP without contrast showed nonobstructing left renal calculus and J-tube in place.   Patient will be discharged on oral Dilaudid and MS Contin to optimize pain control after discharge. Tube feedings have been started, patient is tolerating tube feedings well. Patient and her  has received teaching for tube feeding prior to discharge.  has arranged home supplies for tube feedings. GERD with stricture - s/p EGD 6/8/22 with esophageal stricture dilated to balloon 18 mm and gastric bezoar. IV PPI BID.  7/20-this plan unchanged. GI considering possible gastrostomy tube placement. 7/21-as above.7/22--twice daily PPI-status post dilation distal esophageal stricture. Chronic pain syndrome/narcotic dependence  7/22--discontinue IV narcotics-patient took high-dose morphine with long-acting and immediate release. We will stop long-acting and attenuate immediate release but will need to increase frequency and will need to taper decreased to avoid severe withdrawal.  Agree with current Valium and clonidine added for BP which should help. 7/23--patient appears motivated and appears improved today with significant decreased dosing. 7/25--as above continue to wean. Clonidine-currently patch for hypertension and withdrawal symptoms. Will need continue oral clonidine when taking orals to avoid clonidine rebound withdrawal  7/28-  Discharging on oral Dilaudid and MS Contin. Patient advised to follow-up with pain management doctor as outpatient. T2DM -  7/28-  Patient to resume home insulin regimen. Hypokalemia-  7/28: Resolved, 4.0 this morning. CASSIE/Mood d/o - resume citalopram 40 mg pending qtc assessment. Prn ativan.   7/21--clinically stable-continue same meds. Active Problems:    Class 1 obesity with serious comorbidity and body mass index (BMI) of 34.0 to 34.9 in adult  Plan: adds complexity. Microcytic anemia  Plan: h/o JORGE LUIS in the past.     Chronic pancreatitis ([facility] possible history of--  Plan: no evidence of this on recent EUS 3/2022.      Fibromoyalgia - lyrica        Dispo/Discharge Planning:  Patient to be discharged home today with home health. Significant Diagnostic Studies:   HISTORY: Severe left upper quadrant abdominal pain with nausea. Exam: CT abdomen and pelvis without contrast       Technique: Thin section axial CT images are obtained from the lung bases to the   pubic symphysis. Radiation dose reduction techniques were used for this study. Our CT scanners use one or all of the following: Automated exposure control,   adjustment of the mA and/or kV according to patient size, use of iterative   reconstruction. Comparison: 7/15/2022       FINDINGS: There is minimal bibasilar atelectasis present. CT abdomen: There is a J-tube present. No contour deforming abnormality   involving the liver and spleen. The patient is status post cholecystectomy. The   pancreas is normal. The adrenal glands are unremarkable. Limited evaluation the   bowel loops in the upper abdomen is unremarkable. There is no definite upper   abdominal lymphadenopathy. There is a nonobstructing left renal calculus present. CT pelvis: No inflammatory change in the right lower quadrant. The appendix is   normal. There is no pelvic adenopathy. There is no free fluid or free air   present within the pelvis. Bone window evaluation demonstrates no aggressive osseous lesions. Impression           1. Nonobstructing left renal calculus. 2. J-tube. KUB       INDICATION:  Follow-up ileus. COMPARISON: Abdominal x-ray 7/19/2022. CT abdomen and pelvis 7/15/2022       Single supine view of the abdomen was obtained. Impression   The bowel gas pattern is normal. There is no evidence of   obstruction. There is no free air visualized on this supine view. There are no   renal calculi. The lung bases are clear.  Nasogastric tube terminates just below   the left hemidiaphragm in the proximal stomach       Labs: Results:       Chemistry Recent Labs     07/27/22  0517 07/28/22  0455 07/28/22  0722    CORRECTION TO MEDICAL RECORD-DISREGARD THESE TEST RESULTS 133*   K 3.6 CORRECTION TO MEDICAL RECORD-DISREGARD THESE TEST RESULTS 4.0    CORRECTION TO MEDICAL RECORD-DISREGARD THESE TEST RESULTS 98   CO2 30 CORRECTION TO MEDICAL RECORD-DISREGARD THESE TEST RESULTS 32   BUN 6 CORRECTION TO MEDICAL RECORD-DISREGARD THESE TEST RESULTS 6      CBC w/Diff No results for input(s): WBC, RBC, HGB, HCT, PLT in the last 72 hours. Invalid input(s): GRANS, LYMPH, EOS   Cardiac Enzymes No results for input(s): CPK, GIANFRANCO in the last 72 hours. Invalid input(s): CKRMB, CKND1, TROIP   Coagulation No results for input(s): INR, APTT in the last 72 hours. Invalid input(s): PTP    Lipid Panel Lab Results   Component Value Date/Time    CHOL 203 06/23/2022 07:38 AM    HDL 39 06/23/2022 07:38 AM      BNP Invalid input(s): BNPP   Liver Enzymes No results for input(s): TP, ALB in the last 72 hours. Invalid input(s): TBIL, AP, SGOT, GPT, DBIL   Thyroid Studies Lab Results   Component Value Date/Time    TSH 0.696 02/13/2020 07:47 AM            Discharge Exam:  /76   Pulse 99   Temp 97.5 °F (36.4 °C) (Oral)   Resp 18   Ht 5' 2\" (1.575 m)   Wt 170 lb 6.4 oz (77.3 kg)   SpO2 92%   BMI 31.17 kg/m²   General appearance: alert, cooperative, no distress, appears stated age  Lungs: clear to auscultation bilaterally  Heart: regular rate and rhythm, S1, S2 normal, no murmur, click, rub or gallop  Abdomen: soft, non-tender. Bowel sounds normal. No masses,  no organomegaly, PEG tube in place. Site clean dry and intact.   Extremities: no cyanosis or edema  Neurologic: Grossly normal    Disposition: home  Discharge Condition: stable  Patient Instructions:      Medication List        START taking these medications      cloNIDine 0.3 MG/24HR Ptwk  Commonly known as: CATAPRES  Place 1 patch onto the skin once a week  Start taking on: August 4, 2022 HYDROmorphone 2 MG tablet  Commonly known as: Dilaudid  Take 1 tablet by mouth every 6 hours as needed for Pain for up to 3 days. lisinopril 10 MG tablet  Commonly known as: PRINIVIL;ZESTRIL  Take 1 tablet by mouth in the morning. Start taking on: July 29, 2022            CHANGE how you take these medications      citalopram 40 MG tablet  Commonly known as: CELEXA  Take 1 tablet by mouth daily  What changed:   medication strength  how much to take  Another medication with the same name was removed. Continue taking this medication, and follow the directions you see here. Hyoscyamine Sulfate SL 0.125 MG Subl  What changed: Another medication with the same name was removed. Continue taking this medication, and follow the directions you see here. morphine 30 MG extended release tablet  Commonly known as: MS CONTIN  Take 1 tablet by mouth in the morning and 1 tablet before bedtime. Do all this for 5 days. What changed:   how much to take  how to take this  when to take this  Another medication with the same name was removed. Continue taking this medication, and follow the directions you see here. pregabalin 100 MG capsule  Commonly known as: LYRICA  What changed: Another medication with the same name was removed. Continue taking this medication, and follow the directions you see here. promethazine 25 MG tablet  Commonly known as: PHENERGAN  Take 1 tablet by mouth every 8 hours as needed for Nausea  What changed:   when to take this  reasons to take this  Another medication with the same name was removed. Continue taking this medication, and follow the directions you see here.             CONTINUE taking these medications      acetaminophen 500 MG tablet  Commonly known as: TYLENOL     cyanocobalamin 1000 MCG/ML injection     diclofenac sodium 1 % Gel  Commonly known as: VOLTAREN     diphenhydrAMINE 25 MG capsule  Commonly known as: BENADRYL     [employer] Test strip  Generic drug: blood glucose test strips     glucagon 1 MG injection     influenza quadrivalent split vaccine 0.5 ML injection  Commonly known as: FLUZONE;FLUARIX;FLULAVAL;AFLURIA     * insulin aspart 100 UNIT/ML injection pen  Commonly known as: NovoLOG     * insulin aspart 100 UNIT/ML injection vial  Commonly known as: NOVOLOG     Insulin Degludec 100 UNIT/ML Sopn     lidocaine viscous hcl 2 % Soln solution  Commonly known as: XYLOCAINE     lipase-protease-amylase 45485-451574 units Cpep delayed release capsule  Commonly known as: CREON     LORazepam 1 MG tablet  Commonly known as: ATIVAN     lubiprostone 24 MCG capsule  Commonly known as: AMITIZA     naloxone 4 MG/0.1ML Liqd nasal spray     nitroGLYCERIN 0.3 MG SL tablet  Commonly known as: NITROSTAT     ondansetron 8 MG Tbdp disintegrating tablet  Commonly known as: ZOFRAN-ODT     pantoprazole 40 MG tablet  Commonly known as: PROTONIX  Take 1 tablet by mouth in the morning and 1 tablet before bedtime. polyethylene glycol 17 GM/SCOOP powder  Commonly known as: GLYCOLAX     sodium chloride 0.9 % infusion     sucralfate 1 GM/10ML suspension  Commonly known as: CARAFATE     zolpidem 10 MG tablet  Commonly known as: AMBIEN           * This list has 2 medication(s) that are the same as other medications prescribed for you. Read the directions carefully, and ask your doctor or other care provider to review them with you.                 STOP taking these medications      diphenhydrAMINE 25 MG tablet  Commonly known as: SOMINEX     fentaNYL 100 MCG/HR  Commonly known as: DURAGESIC     gabapentin 250 MG/5ML solution  Commonly known as: NEURONTIN     insulin  UNIT/ML injection pen  Commonly known as: HUMULIN N;NOVOLIN N     ondansetron 8 MG tablet  Commonly known as: ZOFRAN     tiZANidine 4 MG tablet  Commonly known as: Derril Gist               Where to Get Your Medications        These medications were sent to [facility]/pharmacy #[id number]- [address] - [address] [facility] [phone] - F [phone]  [address]

## 2022-07-28 NOTE — PROGRESS NOTES
Patient with discharge orders today. Patient received [facility] teaching/instructions. Patient and spouse declined home health RN. Patient's spouse to provide transportation. Patient has met all treatment goals and milestones. No further supportive needs identified. CM following until discharged.        ASSESSMENT NOTE    Attending Physician: James Heck MD  Admit Problem: Chronic abdominal pain [R10.9, G89.29]  Intractable nausea and vomiting [R11.2]  Intractable vomiting with nausea [R11.2]  Date/Time of Admission: 7/19/2022  8:02 AM  Problem List:  Patient Active Problem List   Diagnosis    GERD with stricture    Intractable nausea and vomiting    Tachycardia    Type 2 diabetes mellitus with hyperglycemia, with long-term current use of insulin (HCC)    Sphincter of Oddi dysfunction    Iron deficiency anemia    S/P exploratory laparotomy    Chronic pancreatitis ([facility]    Hypertension    Class 1 obesity with serious comorbidity and body mass index (BMI) of 34.0 to 34.9 in adult    Hypokalemia due to excessive gastrointestinal loss of potassium    Microcytic anemia    S/P balloon dilatation of esophageal stricture    SIRS (systemic inflammatory response syndrome) ([facility])    Neutrophilic leukocytosis       Service Assessment  Patient Orientation     Cognition     History Provided By Medical Record   Primary Caregiver     Accompanied By/Relationship     Support Systems Spouse/Significant Other (spouse: Jackson Rosen  [phone])   [facility] is: Legal Next of [facility] 67   PCP Verified by CM Yes (Dr Coronado Adjutant   [phone])   Last Visit to PCP     Prior Functional Level     Current Functional Level Independent in ADLs/IADLs   Can patient return to prior living arrangement Yes   Ability to make needs known: Good   Family able to assist with home care needs: Yes   Would you like for me to discuss the discharge plan with any other family members/significant others, and if so, who? Health     Internal Hospice     Reason Outside Agency [address]     Partner SNF     Reason Why Partner SNF Not Chosen     Internal Comfort Care     Reason Outside [address] Discharge DME ([facility])   [facility] Resource Information Provided? No   Mode of Transport at Discharge [address] Time of Discharge     Confirm Follow Up Transport Self     Condition of Participation: Discharge Planning  The plan for Transition of Care is related to the following treatment goals: return to baseline   The Patient and/or Patient Representative was provided with a Choice of Provider? Patient   Name of the Patient Representative who was provided with the Choice of Provider and agrees with the Discharge Plan? The Patient and/or Patient Representative Agree with the Discharge Plan? Yes   Freedom of Choice list was provided with basic dialogue that supports the individualized plan of care/goals, treatment preferences, and shares the quality data associated with the providers?  Yes     Documentation for Discharge Appeal  Discharge Appealed by     Date notified by [facility] of appeal request:     Time notified by [facility] of appeal request:     Detailed Notice of Discharge given to:     Date Notice of Discharge given:     Time Notice of Discharge given:     Date records sent to [employer] [facility]     Time records sent to [employer] [facility]     Date Notified of Outcome     Time Notified of Outcome     Outcome of appeal           Khai Caraballo RN 07/28/22 3:52 PM

## 2022-07-28 NOTE — PROGRESS NOTES
[facility] Progress Note         Admit Date:  7/19/2022    Today's Date:  7/28/2022    CC:  Chronic abd pain, N/V. S/p Peg J placement 7/26. Subjective:     Patient s/p PEG J placement 7/26. Had post procedure pain surrounding tube site. Now pain is improved. CT was negative for acute findings. She denies N/V. No GI bleeding. No recent BMs but passing flatus. Tolerated baked potato last night, grits this morning. Trying to take it easy. Tolerating PEG J tube feeds. She anticipates possible discharge home later today.     Medications:   Current Facility-Administered Medications   Medication Dose Route Frequency    HYDROmorphone HCl PF (DILAUDID) injection 1 mg  1 mg IntraVENous Q4H PRN    morphine (MSIR) tablet 15 mg  15 mg Oral Q6H PRN    diphenhydrAMINE (BENADRYL) injection 50 mg  50 mg IntraVENous Q6H PRN    insulin glargine (LANTUS) injection vial 30 Units  30 Units SubCUTAneous Nightly    insulin lispro (HUMALOG) injection vial 0-4 Units  0-4 Units SubCUTAneous 4x Daily AC & HS    magnesium oxide (MAG-OX) tablet 400 mg  400 mg Oral Daily    zolpidem (AMBIEN) tablet 10 mg  10 mg Oral Nightly PRN    LORazepam (ATIVAN) tablet 1 mg  1 mg Oral TID    lisinopril (PRINIVIL;ZESTRIL) tablet 10 mg  10 mg Oral Daily    heparin flush 100 UNIT/ML injection 300 Units  300 Units IntraCATHeter PRN    cloNIDine (CATAPRES) 0.3 MG/24HR 1 patch  1 patch TransDERmal Weekly    sodium bicarbonate 25 mEq in sodium chloride 0.45 % 1,000 mL infusion   IntraVENous Continuous    phenol 1.4 % mouth spray 1 spray  1 spray Mouth/Throat Q2H PRN    prochlorperazine (COMPAZINE) injection 10 mg  10 mg IntraVENous Q6H PRN    hydrALAZINE (APRESOLINE) injection 20 mg  20 mg IntraVENous Q6H PRN    sodium chloride flush 0.9 % injection 5-40 mL  5-40 mL IntraVENous 2 times per day    sodium chloride flush 0.9 % injection 5-40 mL  5-40 mL IntraVENous PRN    0.9 % sodium chloride infusion   IntraVENous PRN    enoxaparin (LOVENOX) injection 40 mg  40 mg SubCUTAneous Daily    ondansetron (ZOFRAN-ODT) disintegrating tablet 8 mg  8 mg Oral Q8H PRN    Or    ondansetron (ZOFRAN) injection 4 mg  4 mg IntraVENous Q6H PRN    acetaminophen (TYLENOL) tablet 650 mg  650 mg Oral Q6H PRN    Or    acetaminophen (TYLENOL) suppository 650 mg  650 mg Rectal Q6H PRN    polyethylene glycol (GLYCOLAX) packet 17 g  17 g Oral Daily PRN    pantoprazole (PROTONIX) 40 mg in sodium chloride (PF) 10 mL injection  40 mg IntraVENous Q12H    magnesium sulfate 1000 mg in dextrose 5% 100 mL IVPB  1,000 mg IntraVENous PRN    glucose chewable tablet 16 g  4 tablet Oral PRN    dextrose bolus 10% 125 mL  125 mL IntraVENous PRN    Or    dextrose bolus 10% 250 mL  250 mL IntraVENous PRN    glucagon (rDNA) injection 1 mg  1 mg IntraMUSCular PRN    dextrose 5 % solution  100 mL/hr IntraVENous PRN    pregabalin (LYRICA) capsule 100 mg  100 mg Oral BID    citalopram (CELEXA) tablet 40 mg  40 mg Oral Daily       Review of Systems:  ROS was obtained, with pertinent positives as listed above. No chest pain or SOB. Diet:  Regular as tolerated. Peg J in place for future tube feeds. Objective:   Vitals:  /76   Pulse 99   Temp 97.5 °F (36.4 °C) (Oral)   Resp 18   Ht 5' 2\" (1.575 m)   Wt 170 lb 6.4 oz (77.3 kg)   SpO2 92%   BMI 31.17 kg/m²   Intake/Output:  07/28 0701 - 07/28 1900  In: 5434 [P.O.:250; I.V.:1200]  Out: 1200 [Urine:1200]  07/26 1901 - 07/28 0700  In: 963 [I.V.:873]  Out: 4000 [Urine:4000]  Exam:  General appearance: alert, cooperative, no distress  Abdomen: soft, +Mild diffuse ttp (improved per pt).   PEG J. Bowel sounds normal.   Neuro:  alert and oriented    Data Review (Labs):    Recent Labs     07/26/22  0436 07/26/22  0820 07/27/22  0517 07/27/22  0518 07/28/22  0455 07/28/22  0722   NA  --  139 138  --  CORRECTION TO MEDICAL RECORD-DISREGARD THESE TEST RESULTS 133*   K  --  3.6 3.6  --  CORRECTION TO MEDICAL RECORD-DISREGARD THESE TEST RESULTS 4.0   CL  --  102 100  --  CORRECTION TO MEDICAL RECORD-DISREGARD THESE TEST RESULTS 98   CO2  --  32 30  --  CORRECTION TO MEDICAL RECORD-DISREGARD THESE TEST RESULTS 32   BUN  --  7 6  --  CORRECTION TO MEDICAL RECORD-DISREGARD THESE TEST RESULTS 6   MG 2.3  --   --  2.2  --   --      EUS 3/16/22 with esophogeal stricture s/p dilation, gastrojejunal anastomosis, mild pancreatic parenchymal changes without convincing evidence of chronic pancreatitis and fatty infiltration of the liver. EGD 6/8/22 with esophageal stricture dilated to balloon 18 mm and gastric bezoar. Colonoscopy in 2018 with descending TA colon polyp and recall 4/2023. GES was reccommended after EGD wth bezoar. Has not been able to have the GES done due to not being able to hold nausea medication. EGD/EUS findings (7/22/22):   1. Esophageal stricture. Dilated. 2. Gastritis. Biopsies. 3. Fatty infiltration of the liver. 4. Pancreatic lipomatosis. This condition of fatty replacement of the pancreas that may associated with obesity, dyslipidemia, diabetes, steroid use or hereditary pancreatitis. There is no established risk of progression to fibrosis or chronic pancreatitis (as with nonalcoholic fatty liver disease, steatohepatitis and cirrhosis). In some cases, patients may have associated pancreatic insufficiency. 5. Dilated common bile duct. In the absence of biliary obstruction, this likely reflects benign post-cholecystectomy biliary dilatation. 6. Dobhoff tube successfully placed    EGD, PEG-J placement 7/26/22   Impression:A PEG-J tube was placed successfully. CT a/p wo IV contrast 7/26/22   FINDINGS: There is minimal bibasilar atelectasis present. CT abdomen: There is a J-tube present. No contour deforming abnormality    involving the liver and spleen. The patient is status post cholecystectomy. The   pancreas is normal. The adrenal glands are unremarkable.  Limited evaluation the   bowel loops in the upper abdomen is unremarkable. There is no definite upper   abdominal lymphadenopathy. There is a nonobstructing left renal calculus present. CT pelvis: No inflammatory change in the right lower quadrant. The appendix is   normal. There is no pelvic adenopathy. There is no free fluid or free air   present within the pelvis. Bone window evaluation demonstrates no aggressive osseous lesions. 1. Nonobstructing left renal calculus. 2. J-tube. Assessment:     Principal Problem:    Intractable nausea and vomiting  Active Problems:    Class 1 obesity with serious comorbidity and body mass index (BMI) of 34.0 to 34.9 in adult    Hypokalemia due to excessive gastrointestinal loss of potassium    Microcytic anemia    SIRS (systemic inflammatory response syndrome) (HCC)    Neutrophilic leukocytosis    GERD with stricture    Type 2 diabetes mellitus with hyperglycemia, with long-term current use of insulin (HCC)    Chronic pancreatitis (HCC)    S/P balloon dilatation of esophageal stricture  Resolved Problems:    * No resolved hospital problems. *           53-year-old female with suboptimally controlled diabetes, GERD, s/p Nissen fundoplication, opioid induced constipation/IBS, prior diagnosis of sphincter of oddi dysfunction and chronic pancreatitis though not demonstrated on recent EUS and no longer suspected (multiple prior procedures with ERCP 2013 with perforation requiring surgery), and chronic abdominal pain, admitted with worsening nausea and epigastric pain. CT 7/15 was unremarkable. She had colo 2018, EUS 3/2022, EGD 6/2022 as above. GES outpatient not able to complete due to need to hold anti-emetics. IV Reglan in ED no help. Most recent EGD 7/22 with dilatation of distal esophageal stricture. Patient also noted to have gastritis, fatty infiltration of the liver and moderate lipomatosis of the pancreas. The previously placed NG tube was removed.  She was receiving enteral feeds per [place]. During hospitalization has had discussions regarding concern for ongoing chronic opiate use, worsening symptoms of gastroparesis and constipation. In addition, there had been excessive use of multiple antiemetics. Since discontinuing IV opiates, decreasing antiemetics, and resuming enteral feeding she reportedly had significant clinical improvement. PEG-J placed Tuesday 7/26/22. Post procedure, pt c/o increased pain surrounding PEG J site. CT a/p unremarkable as above. Plan:     -Continue tube feeding per PEG-J (to help supplement caloric needs if unable to tolerate much by mouth)   -G-tube for decompression as needed  -Regular diet as tolerated. Recommended low residue measures. Appreciate nutrition assist.    -Routine ostomy care  -Stool softener qd-bid prn, MiraLax prn for chronic constipation. Has tried [address] outpt per pt.  -Avoid/Minimize opioid as able.  -Has chronic scheduled delivery of IVF/IV antiemetics weekly per pt. Ok to continue for now as needed.  -Anticipates discharge home in near future, possibly later today.   Upon hospital discharge, will arrange GI outpatient follow up with myself, Dr. Augusta Vasquez, or her primary GI MD.    Deb Lopez PA-C  [facility]    Patient is seen and examined in collaboration with Dr. Dimple Casas.  Assessment and plan as per Dr. Dimple Casas.

## 2022-07-28 NOTE — PROGRESS NOTES
D/c paperwork reviewed with pt and  at bedside. All questions answered. Chest port left in for home health care. No acute signs of distress. No further questions at this time.

## 2022-07-28 NOTE — DISCHARGE INSTRUCTIONS
Learning about your central vascular access device: Changing the dressing  What is a central vascular access device? A CVAD is a thin, flexible tube. It's also called a central line. It is used when you need to receive medicine, fluids, nutrients, or blood products for several weeks or more. The fluids are put through the CVAD so that they move quickly into the bloodstream. The line can be used many times, so you are notpoked with a needle every time. A CVAD is put through the skin into a vein, often in the neck, chest, arm, or groin. The point where it leaves the skin is called the exit site. Usually about 12 inches of the line stays outside of the body. But sometimes the CVAD is completely under the skin. The line may have two or three ends so that you can get more than one medicine at a time. These ends are called lumens. The endof each lumen is covered with a cap. General guidelines  Try to keep the exit site dry. When you shower, cover the site with waterproof material, such as plastic wrap. Be sure you cover both the exit site and the cap(s). Never touch the open end of the line if the cap is off. Never use scissors, knives, pins, or other sharp objects near the line or other tubing. If it has a clamp, keep it clamped when you aren't using it. Fasten or tape it to your body to prevent pulling or dangling. Avoid clothing that rubs or pulls on it. Avoid bending or crimping it. Always wash your hands before you touch your line. Check it every day for signs of infection. These include pain, tenderness, swelling, drainage, pus, redness, or warmth at or near the exit site. How to change the dressing  If you just got your CVAD, do not let the exit site get wet for 72 hours. Avoidexercise until your doctor says it is okay. If you have a gauze dressing, change it every 48 hours. If you have a clear plastic dressing, change it every 5 days.  Also change your dressing if it is damp, bloody, loose, or dirty. Your doctor may also give you directions forwhen to change the dressing. Be sure you have all your supplies ready. These include medical tape, a surgical mask, sterile gloves, your dressing, an applicator, and skin-protecting swabs. The names and brands of the items will vary. Your doctor or nurse may give you specific instructions for changing the dressing. Here arebasic tips for how to change the dressing. You may need help changing it. Wash your hands with soap and water for 15 to 30 seconds. Dry them with paper towels. Put on the surgical mask. Loosen and remove your old dressing. Peel the dressing toward the CVAD, not away from it. You may need to use an adhesive remover if it doesn't come off easily. Look at the area carefully for redness, swelling, drainage, tenderness, or warmth. If you notice any of these, call your doctor. Wash your hands again, and put on the sterile gloves. Clean the area with the applicator your doctor gave you or with alcohol and swabs. Clean in an up-down or side-to-side motion. When you have finished, let the area dry for about 30 seconds. Swab the edges of the cleaned area with the skin protector. Remove the backing from the clear plastic dressing. Place the dressing or the gauze your doctor gave you over the site. Tape the CVAD to your skin so it will not dangle or pull. When should you call for help? Call 911 anytime you think you may need emergency care. For example, call if:  You passed out (lost consciousness). You have severe trouble breathing. You have sudden chest pain and shortness of breath, or you cough up blood. You have a fast or uneven pulse. Call your doctor now or seek immediate medical care if:  You have signs of infection, such as: Increased pain, swelling, warmth, or redness. Red streaks leading from the exit site. Pus or blood draining from the exit site. A fever. You have a fever or you have chills.   You have swelling in your face, chest, neck, or arm on the side where the central line is. You have signs of a blood clot, such as bulging veins near the line. Your central line is leaking. You feel resistance when you inject medicine or fluids into your line. Your central line is out of place. This may happen after severe coughing or vomiting, or if you pull on the central line. Watch closely for changes in your health, and be sure to contact your doctor if:  You have any concerns about your line. Follow-up care is a key part of your treatment and safety. Be sure to make and go to all appointments, and call your doctor if you are having problems. It's also a good idea to know your test results and keep alist of the medicines you take. Where can you learn more? Go to https://ValueClick.VOZ. org and sign in to your [employer] account. Enter Q250 in the [facility] box to learn more about \"Learning about your central vascular access device: Changing the dressing. \"     If you do not have an account, please click on the \"Sign Up Now\" link. Current as of: January 20, 2022               Content Version: 13.3  © 2006-2022 WHOOP. Care instructions adapted under license by [facility] ([facility]). If you have questions about a medical condition or this instruction, always ask your healthcare professional. [place] [address] any warranty or liability for your use of this information. Nausea and Vomiting: Care Instructions  Overview     When you are nauseated, you may feel weak and sweaty and notice a lot of saliva in your mouth. Nausea often leads to vomiting. Most of the time you do not needto worry about nausea and vomiting, but they can be signs of other illnesses. Two common causes of nausea and vomiting are a stomach infection and food poisoning. Nausea and vomiting from a viral stomach infection will usually start to improve within 24 hours.  Nausea and vomiting from food poisoning maylast from 12 to 48 hours. The doctor has checked you carefully, but problems can develop later. If you notice any problems or new symptoms, get medical treatment right away. Follow-up care is a key part of your treatment and safety. Be sure to make and go to all appointments, and call your doctor if you are having problems. It's also a good idea to know your test results and keep alist of the medicines you take. How can you care for yourself at home? To prevent dehydration, drink plenty of fluids. Choose water and other clear liquids until you feel better. If you have kidney, heart, or liver disease and have to limit fluids, talk with your doctor before you increase the amount of fluids you drink. Rest in bed until you feel better. When you are able to eat, try clear soups, mild foods, and liquids until all symptoms are gone for 12 to 48 hours. Other good choices include dry toast, crackers, cooked cereal, and gelatin dessert, such as Jell-O. When should you call for help? Call 911 anytime you think you may need emergency care. For example, call if:    You passed out (lost consciousness). Call your doctor now or seek immediate medical care if:    You have symptoms of dehydration, such as:  Dry eyes and a dry mouth. Passing only a little urine. Feeling thirstier than usual.     You have new or worsening belly pain. You have a new or higher fever. You vomit blood or what looks like coffee grounds. Watch closely for changes in your health, and be sure to contact your doctor if:    You have ongoing nausea and vomiting. Your vomiting is getting worse. Your vomiting lasts longer than 2 days. You are not getting better as expected. Where can you learn more? Go to https://SetPoint Medicalpina.CloudCheckr. org and sign in to your [employer] account. Enter 52 [id number] in the [employer] box to learn more about \"Nausea and Vomiting: Care Instructions. \"     If you do not have an account, please click on the \"Sign Up Now\" link. Current as of: March 9, 2022               Content Version: 13.3  © 2006-2022 DentLight. Care instructions adapted under license by [facility] ([facility]). If you have questions about a medical condition or this instruction, always ask your healthcare professional. [address] any warranty or liability for your use of this information. Home Tube Feeding: Care Instructions  Overview  Tube feeding is a way of providing nutrition and fluids through a tube into the stomach or intestines. The tube may be inserted through the skin and into the stomach during surgery, or it may go through the mouth or nose, down the throat, and then into the stomach. Tube feeding can nourish people who have a short illness that makes swallowing difficult or people who have a severeillness, and it may prolong life. Follow-up care is a key part of your treatment and safety. Be sure to make and go to all appointments, and call your doctor if you are having problems. It's also a good idea to know your test results and keep alist of the medicines you take. How can you care for yourself at home? Follow your doctor's instructions for use and care of the feeding tube. Your doctor will:  Tell you what tube feeding formula and fluids to put through the tube. Show you how to care for the skin around the tube. Be sure to follow instructions on keeping the area clean. Teach you how to watch for infection or blockage of the tube. Tell you what activities you can do. Keep the formula in the refrigerator after opening it. Follow your doctor's instructions about how long formula that's in the hanging bag can sit out at room temperature. For the caregiver  Wash your hands before handling the tube and formula. Wash the top of the can of formula before you open it. Tube feedings that go into the stomach:  The person you are caring for needs to be sitting up or have their head up during the feeding and for 30 to 60 minutes afterward. These feedings can be given in about 30 minutes, five or six times throughout a day. Tube feedings that go into the intestine: The person you are caring for will have a pump that slowly pushes the formula into the intestine over several hours. This is often done at night. If nausea, diarrhea, or stomach cramps happen during feeding, slow the rate that the formula comes through the tube. Then gradually increase the amount as the person can tolerate it. Flush the tube with plain water after each feeding to keep it clean. Do not put anything other than formula or water through the tube unless your doctor has told you to. Take care of yourself. Do not try to do everything yourself. Ask other family members to help, and find out what other types of help may be available. Eat well and get enough rest. Make sure you do not ignore your own health while you are caring for your loved one. Schedule time for yourself. Get out of the house to do things you enjoy, run errands, or go shopping. When should you call for help? Call your doctor now or seek immediate medical care if:    You have signs of infection, such as: Increased pain, swelling, warmth, or redness around the tube. Red streaks leading from the area where the tube is inserted. Pus draining from the tube area. A fever. The tube comes out or becomes blocked. You have nausea, vomiting, or diarrhea. Watch closely for changes in your health, and be sure to contact your doctor if:    You have any problems with your feeding. Where can you learn more? Go to https://Phase Eightpina.JustUs Ltd. org and sign in to your [employer] account. Enter T123 in the [facility] box to learn more about \"Home Tube Feeding: Care Instructions. \"     If you do not have an account, please click on the \"Sign Up Now\" link.   Current as of: September 8, 2021               Content Version: 13.3  © 3063-3714 [employer]. Care instructions adapted under license by [facility] ([facility]). If you have questions about a medical condition or this instruction, always ask your healthcare professional. [address] any warranty or liability for your use of this information.

## 2022-07-28 NOTE — CONSULTS
Comprehensive Nutrition Assessment    Type and Reason for Visit: Reassess  Tube Feeding Management (GI) and Diet Education (GI) and re-consult RN generated per Hospitalist     Nutrition Recommendations/Plan:   Enteral Nutrition: Change formula and change to nocturnal feeds for home  Enteral Access: PEG/J  Formula: Diabetic (Glucerna 1.5 Willie)  Delivery: Cyclic feeding: Initiate Infuse at 90 ml/hr from 7 PM to 7 AM daily  Water flush Initiate 30 ml other (specify) before and after cycle - tube patency only, oral fluids on PO diet  Modulars: None not indicated at this time   Enteral regimen at goal to provide per 24 hours:  1620 calories (100% estimated calorie needs), 89 grams protein (100% estimated protein needs) and 880 ml free fluid based on 12 hr infusion  IV Fluids:  Continue per MD order  Labs:   EN labs: BMP daily, Mg MWF and Phos MWF. POC Glucoses/SSI Active  Meals and Snacks:  Continue current diet. NPO,diet advance per GI     Malnutrition Assessment:  Malnutrition Status: At risk for malnutrition (Comment) (reported prolonged poor PO intake r/t N/V)    Nutrition Assessment:  Nutrition History: Pt reports poor intake/appetite for \"10 years now\" r/t uncontrollable N/V/dry heaving. Pt reports only being able to tolerate soft foods such as baked potatoes, ice creams, and soups. Pt denies any weight loss, reports UBW of ~190lb for last >2 years. Of note, pt did receive TPN x2-3 months in patient following gastric perforation in 2015. Pt also with hx of Gtube, however only used for draining/venting, pt has never received TF. 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    Nutrition Interval:  Pt s/p EGD w/ NGT placement 7/22. Findings of gastritis, pancreatic lipomatosis. TF (Vital AF) initiated 7/22 per GI. 7/25: TF to goal and tolerating well. Patient s/p PEGJ 7/26. 7/27: TF changed to standard without fiber to trial for tolerance. Notified by CM of potential d/c today. Will change TF to [facility] and change to nocturnal feeds for home. Patient has been stable on TF for ~6 days. Diet has been advanced. Patient seen reclined in bed with spouse at bedside. Discussed changes above in anticipation of d/c. She states that she ate about 1/4 of grits with cheese this am. She states that she has been able to drink. She has additional questions regarding insurance approval and delivery information. Let her know that home DME will address all of this during training. Discussed that RD will return shortly for gastroparesis diet after getting documentation for potential d/c. She voiced understanding. Addendum 1238: Completed gastroparesis, low residue diet modified for DM considerations. Patient and spouse voice understanding. Expext good compliance. Written materials provided: Grant Simeon GI soft diet modified for low fat and progression of small frequent meals.     Abdominal Status (last documented):   Last BM (including prior to admit): 07/25/22, GI Symptoms: Nausea   Pertinent Medications: Lantus 30 units nightly, SSI (2 units yesterday, 3 units thus far today), zofran and compazine PRN (utilizing when available), protonix, Mag Ox - held, EFFER-K 40 meq  BID  IVF: Sodium Bicarbonate 25 meq in 1/2 NS @ 100ml/hr   Electrolyte replacements: 7/24: Mag Ox, Kcl 10 mew x7; 7/25: EFFER-K  Pertinent Labs:   Lab Results   Component Value Date/Time     07/28/2022 07:22 AM    K 4.0 07/28/2022 07:22 AM    CL 98 07/28/2022 07:22 AM    CO2 32 07/28/2022 07:22 AM    BUN 6 07/28/2022 07:22 AM    CREATININE 0.70 07/28/2022 07:22 AM    GLUCOSE 316 07/28/2022 07:22 AM    CALCIUM 8.6 07/28/2022 07:22 AM    PHOS 3.3 07/27/2022 05:18 AM    MG 2.2 07/27/2022 05:18 AM         and   Lab Results   Component Value Date/Time    POCGLU 328 07/28/2022 08:00 AM    POCGLU 245 07/27/2022 09:06 PM    POCGLU 143 07/27/2022 04:51 PM    POCGLU 170 07/27/2022 11:35 AM    POCGLU 165 07/27/2022 07:39 AM    POCGLU 133 07/26/2022 08:42 PM   Labs reviewed: hyponatremia, glucose significantly elevated on standard formula - would benefit from CHO controlled formula    Current Nutrition Therapies:  ADULT TUBE FEEDING; PEG/J; Standard without Fiber; Continuous; 20; Yes; 10; Q 6 hours; 60; 90; Q 4 hours  ADULT DIET; Regular; 4 carb choices (60 gm/meal)  Current Tube Feeding (TF) Orders:  Feeding Route: PEG/J  Formula: Standard without Fiber  Schedule: Continuous  Feeding Regimen: 60 ml/hr  Additives/Modulars: None  Water Flushes: 90 ml Q4 hrs  Current TF & Flush Orders Provides: infusing at goal  Goal TF & Flush Orders Provides: 1721 calories (100% estimated calorie needs), 80 grams protein (100% estimated protein needs) and 1721 ml free fluid (~1 ml/kcal) based on 24 hr infusion     Current Intake:   Average Meal Intake: 1-25% Average Supplements Intake: NPO      Anthropometric Measures:  Height: 5' 2\" (157.5 cm)  Current Body Wt: 170 lb 6.7 oz (77.3 kg) (7/27), Weight source: Bed Scale  BMI: 31.2, Obese Class 1 (BMI 30.0-34.9)     Ideal Body Weight (Kg) (Calculated): 50 kg (110 lbs), 154.9 %  Usual Body Wt: 184 lb (83.5 kg) (per MD office visit 7/2021), Percent weight change: 3.3       Edema:    BMI Category Obese Class 1 (BMI 30.0-34. 9)    Estimated Daily Nutrient Needs:  Energy (kcal/day): 0723-4312 (Kcal/kg (20-25) Weight used: 77.3 kg Current (7/27)  Protein (g/day): 77-96 (20% kcal) Weight Used: (Other (Comment)) 86.2 kg  Fluid (ml/day):   (1 ml/kcal)    Nutrition Diagnosis:   Inadequate oral intake related to altered GI function as evidenced by  (intractable N/V, gastroparesis, PEGJ for primary needs)    Nutrition Interventions:   Food and/or Nutrient Delivery: Continue Current Diet, Modify Tube Feeding  Nutrition Education/Counseling: Education needed  Coordination of Nutrition Care: Continue to monitor while inpatient      Goals:   Previous Goal Met: Goal(s) Achieved  Active Goal: Tolerate nutrition support at goal rate       Nutrition Monitoring and Evaluation:      Food/Nutrient Intake Outcomes: Food and Nutrient Intake, Enteral Nutrition Intake/Tolerance  Physical Signs/Symptoms Outcomes: Biochemical Data, Nausea or Vomiting, Meal Time Behavior, Weight    Discharge Planning:    Continue current diet, Enteral Nutrition    Nuvia Tran

## 2022-08-04 ENCOUNTER — HOSPITAL ENCOUNTER (OUTPATIENT)
Dept: INFUSION THERAPY | Age: 52
Discharge: HOME | End: 2022-08-04
Payer: MEDICARE

## 2022-08-04 VITALS
HEART RATE: 105 BPM | SYSTOLIC BLOOD PRESSURE: 130 MMHG | RESPIRATION RATE: 18 BRPM | TEMPERATURE: 98.6 F | DIASTOLIC BLOOD PRESSURE: 78 MMHG

## 2022-08-04 LAB — MAGNESIUM SERPL-MCNC: 1.8 MG/DL (ref 1.8–2.4)

## 2022-08-04 PROCEDURE — 6360000002 HC RX W HCPCS: Performed by: INTERNAL MEDICINE

## 2022-08-04 PROCEDURE — 96374 THER/PROPH/DIAG INJ IV PUSH: CPT

## 2022-08-04 PROCEDURE — 83735 ASSAY OF MAGNESIUM: CPT

## 2022-08-04 PROCEDURE — 2580000003 HC RX 258: Performed by: INTERNAL MEDICINE

## 2022-08-04 RX ORDER — SODIUM CHLORIDE 0.9 % (FLUSH) 0.9 %
5-40 SYRINGE (ML) INJECTION PRN
Status: DISCONTINUED | OUTPATIENT
Start: 2022-08-04 | End: 2022-08-05 | Stop reason: HOSPADM

## 2022-08-04 RX ADMIN — PROMETHAZINE HYDROCHLORIDE 25 MG: 25 INJECTION INTRAMUSCULAR; INTRAVENOUS at 07:44

## 2022-08-04 RX ADMIN — SODIUM CHLORIDE, PRESERVATIVE FREE 10 ML: 5 INJECTION INTRAVENOUS at 07:59

## 2022-08-04 RX ADMIN — SODIUM CHLORIDE, PRESERVATIVE FREE 10 ML: 5 INJECTION INTRAVENOUS at 07:40

## 2022-08-04 NOTE — PROGRESS NOTES
Arrived to the [facility]. Assessment completed, port needle changed, labs drawn/reviewed. Mg level 1.8- no Mg infusion required, Phernergan 25mg completed. Patient tolerated well. Any issues or concerns during appointment: Pt port site has a hole when deaccessed. Reaccessed in different area, no redness or swelling or other signs of infection. Advised pt to speak with physician about this, pt verbalized understanding. Patient aware of next infusion appointment on 8/11/22 at [address] [place] [address]. Patient instructed to call provider with temperature of 100.4 or greater or nausea/vomiting/ diarrhea or pain not controlled by medications  Discharged ambulatory.

## 2022-08-08 ENCOUNTER — PREP FOR PROCEDURE (OUTPATIENT)
Dept: ADMINISTRATIVE | Age: 52
End: 2022-08-08

## 2022-08-08 RX ORDER — SODIUM CHLORIDE 0.9 % (FLUSH) 0.9 %
5-40 SYRINGE (ML) INJECTION EVERY 12 HOURS SCHEDULED
OUTPATIENT
Start: 2022-08-08

## 2022-08-08 RX ORDER — SODIUM CHLORIDE 9 MG/ML
INJECTION, SOLUTION INTRAVENOUS PRN
OUTPATIENT
Start: 2022-08-08

## 2022-08-08 RX ORDER — SODIUM CHLORIDE 0.9 % (FLUSH) 0.9 %
5-40 SYRINGE (ML) INJECTION PRN
OUTPATIENT
Start: 2022-08-08

## 2022-08-09 ENCOUNTER — ANESTHESIA EVENT (OUTPATIENT)
Dept: ENDOSCOPY | Age: 52
End: 2022-08-09
Payer: MEDICARE

## 2022-08-09 ASSESSMENT — LIFESTYLE VARIABLES: SMOKING_STATUS: 1

## 2022-08-09 NOTE — PROGRESS NOTES
Attempted X2 to call patient to confirm scheduled procedure.  No answer, voicemail message left instructing patient of arrival time (0600) and to call [phone] to confirm arrival.

## 2022-08-09 NOTE — ANESTHESIA PRE PROCEDURE
Department of Anesthesiology  Preprocedure Note       Name:  Roseline Kwan   Age:  46 y.o.  :  1970                                          MRN:  [id number]         Date:  2022      Surgeon: Keisha Torres):  Pascual Brian MD    Procedure: Procedure(s):  J TUBE PLACEMENT    Medications prior to admission:   Prior to Admission medications    Medication Sig Start Date End Date Taking? Authorizing Provider   citalopram (CELEXA) 40 MG tablet Take 1 tablet by mouth daily 22   Lessie Nageotte, MD   promethazine (PHENERGAN) 25 MG tablet Take 1 tablet by mouth every 8 hours as needed for Nausea 22   Lessie Nageotte, MD   pantoprazole (PROTONIX) 40 MG tablet Take 1 tablet by mouth in the morning and 1 tablet before bedtime.  22   Lessie Nageotte, MD   lisinopril (PRINIVIL;ZESTRIL) 10 MG tablet Take 1 tablet by mouth in the morning. 22   Lessie Nageotte, MD   cloNIDine (CATAPRES) 0.3 MG/24HR PTWK Place 1 patch onto the skin once a week 8/4/22 9/3/22  Lessie Nageotte, MD   blood glucose test strips (EXACTECH TEST) strip TEST BLOOD SUGAR FOUR TIMES DAILY 20   Historical Provider, MD   acetaminophen (TYLENOL) 500 MG tablet Take by mouth every 6 hours as needed    Historical Provider, MD   cyanocobalamin 1000 MCG/ML injection INJECT 1 VIAL INTRAMUSCULARLY FOR 4 WEEKS THEN MONTHLY 17   Historical Provider, MD   diclofenac sodium (VOLTAREN) 1 % GEL APPLY 1 GRAM TO AFFECTED AREA 4 TIMES A DAY AS NEEDED 10/2/18   Historical Provider, MD   diphenhydrAMINE (BENADRYL) 25 MG capsule Take 25 mg by mouth every 6 hours as needed    Historical Provider, MD   glucagon 1 MG injection Inject 1 mg into the muscle as needed 17   Historical Provider, MD   Hyoscyamine Sulfate SL 0.125 MG SUBL Place 0.125 mg under the tongue every 6 hours as needed    Historical Provider, MD   influenza quadrivalent split vaccine (FLUZONE;FLUARIX;FLULAVAL;AFLURIA) 0.5 ML injection Inject into the muscle 11/9/21   Historical Provider, MD   insulin aspart (NOVOLOG) 100 UNIT/ML injection vial Use as directed 6/7/16   Historical Provider, MD   insulin aspart (NOVOLOG) 100 UNIT/ML injection pen 12 units at breakfast 14 units at lunch and 41 units at dinner plus sliding scale for blood sugars >150, up to 60 units per day 10/7/14   Historical Provider, MD   Insulin Degludec 100 UNIT/ML SOPN Inject 68 Units into the skin 10/27/21   Historical Provider, MD   lidocaine viscous hcl (XYLOCAINE) 2 % SOLN solution 10 mLs every 6 hours as needed 4/28/21   Historical Provider, MD   LORazepam (ATIVAN) 1 MG tablet Take 1 mg by mouth 3 times daily as needed. 10/7/14   Historical Provider, MD   lubiprostone (AMITIZA) 24 MCG capsule Take 24 mcg by mouth    Historical Provider, MD   naloxone 4 MG/0.1ML LIQD nasal spray 4 mg once as needed 2/16/22   Historical Provider, MD   nitroGLYCERIN (NITROSTAT) 0.3 MG SL tablet Place 0.3 mg under the tongue 3/18/21   Historical Provider, MD   ondansetron (ZOFRAN-ODT) 8 MG TBDP disintegrating tablet Place 8 mg under the tongue 3 times daily 4/19/16   Historical Provider, MD   lipase-protease-amylase (CREON) 11978-874921 units CPEP delayed release capsule Take 1 capsule by mouth    Historical Provider, MD   polyethylene glycol (GLYCOLAX) 17 GM/SCOOP powder Take 17 g by mouth as needed    Historical Provider, MD   pregabalin (LYRICA) 100 MG capsule Take 100 mg by mouth 2 times daily. Historical Provider, MD   sodium chloride 0.9 % infusion Infuse intravenously as needed    Historical Provider, MD   sucralfate (CARAFATE) 1 GM/10ML suspension TAKE 10 ML BY MOUTH 4 TIMES A DAY EVERY DAY ON A EMPTY STOMACH 1 HR BEFORE MEALS AND AT BEDTIME 9/7/17   Historical Provider, MD   zolpidem (AMBIEN) 10 MG tablet Take 10 mg by mouth. 2/11/22   Historical Provider, MD   gabapentin (NEURONTIN) 250 MG/5ML solution 300 mg by Enteral route 3 times daily.   Patient not taking: No sig reported 10/7/14 7/28/22 Historical Provider, MD   insulin NPH (HUMULIN N;NOVOLIN N) 100 UNIT/ML injection pen Please inject 12 units into the skin with breakfast and 4 units with bedtime. (pen)  Patient not taking: No sig reported 10/7/14 7/28/22  Historical Provider, MD       Current medications:    No current facility-administered medications for this encounter. Current Outpatient Medications   Medication Sig Dispense Refill    citalopram (CELEXA) 40 MG tablet Take 1 tablet by mouth daily 30 tablet 3    promethazine (PHENERGAN) 25 MG tablet Take 1 tablet by mouth every 8 hours as needed for Nausea 30 tablet 2    pantoprazole (PROTONIX) 40 MG tablet Take 1 tablet by mouth in the morning and 1 tablet before bedtime. 30 tablet 3    lisinopril (PRINIVIL;ZESTRIL) 10 MG tablet Take 1 tablet by mouth in the morning.  30 tablet 3    cloNIDine (CATAPRES) 0.3 MG/24HR PTWK Place 1 patch onto the skin once a week 4 patch 1    blood glucose test strips (EXACTECH TEST) strip TEST BLOOD SUGAR FOUR TIMES DAILY      acetaminophen (TYLENOL) 500 MG tablet Take by mouth every 6 hours as needed      cyanocobalamin 1000 MCG/ML injection INJECT 1 VIAL INTRAMUSCULARLY FOR 4 WEEKS THEN MONTHLY      diclofenac sodium (VOLTAREN) 1 % GEL APPLY 1 GRAM TO AFFECTED AREA 4 TIMES A DAY AS NEEDED      diphenhydrAMINE (BENADRYL) 25 MG capsule Take 25 mg by mouth every 6 hours as needed      glucagon 1 MG injection Inject 1 mg into the muscle as needed      Hyoscyamine Sulfate SL 0.125 MG SUBL Place 0.125 mg under the tongue every 6 hours as needed      influenza quadrivalent split vaccine (FLUZONE;FLUARIX;FLULAVAL;AFLURIA) 0.5 ML injection Inject into the muscle      insulin aspart (NOVOLOG) 100 UNIT/ML injection vial Use as directed      insulin aspart (NOVOLOG) 100 UNIT/ML injection pen 12 units at breakfast 14 units at lunch and 41 units at dinner plus sliding scale for blood sugars >150, up to 60 units per day      Insulin Degludec 100 UNIT/ML SOPN Inject 68 Units into the skin      lidocaine viscous hcl (XYLOCAINE) 2 % SOLN solution 10 mLs every 6 hours as needed      LORazepam (ATIVAN) 1 MG tablet Take 1 mg by mouth 3 times daily as needed.  lubiprostone (AMITIZA) 24 MCG capsule Take 24 mcg by mouth      naloxone 4 MG/0.1ML LIQD nasal spray 4 mg once as needed      nitroGLYCERIN (NITROSTAT) 0.3 MG SL tablet Place 0.3 mg under the tongue      ondansetron (ZOFRAN-ODT) 8 MG TBDP disintegrating tablet Place 8 mg under the tongue 3 times daily      lipase-protease-amylase (CREON) 35868-745111 units CPEP delayed release capsule Take 1 capsule by mouth      polyethylene glycol (GLYCOLAX) 17 GM/SCOOP powder Take 17 g by mouth as needed      pregabalin (LYRICA) 100 MG capsule Take 100 mg by mouth 2 times daily.  sodium chloride 0.9 % infusion Infuse intravenously as needed      sucralfate (CARAFATE) 1 GM/10ML suspension TAKE 10 ML BY MOUTH 4 TIMES A DAY EVERY DAY ON A EMPTY STOMACH 1 HR BEFORE MEALS AND AT BEDTIME      zolpidem (AMBIEN) 10 MG tablet Take 10 mg by mouth. Allergies:     Allergies   Allergen Reactions    Adhesive Tape Itching, Other (See Comments) and Rash     blisters  tegaderm  Paper tape too      Metronidazole Diarrhea and Nausea And Vomiting    Atorvastatin Myalgia    Iodine Other (See Comments)     Sweaty and clammy    Statins Myalgia    Barium Iodide Nausea And Vomiting     Diaphoresis      Barium Sulfate Palpitations    Insulin Lispro Nausea And Vomiting    Insulins Nausea And Vomiting     Humalog only    Metformin Nausea And Vomiting     Stomach pain  Stomach pain  Stomach pain  Stomach pain         Problem List:    Patient Active Problem List   Diagnosis Code    GERD with stricture K21.9, K22.2    Intractable nausea and vomiting R11.2    Tachycardia R00.0    Type 2 diabetes mellitus with hyperglycemia, with long-term current use of insulin ([facility]) E11.65, Z79.4    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    Iron deficiency anemia D50.9    S/P exploratory laparotomy Z98.890    Chronic pancreatitis (HCC) K86.1    Hypertension I10    Class 1 obesity with serious comorbidity and body mass index (BMI) of 34.0 to 34.9 in adult E66.9, Z68.34    Hypokalemia due to excessive gastrointestinal loss of potassium E87.6    Microcytic anemia D50.9    S/P balloon dilatation of esophageal stricture Z98.890    SIRS (systemic inflammatory response syndrome) (HCC) R67.84    Neutrophilic leukocytosis G86.2       Past Medical History:        Diagnosis Date    Anemia     blood transfusion 2013 X1 d/t \"perforated bowel\"-- Fe infusions 12/2017--- denies any current iron infusions 12/17/2019    Anxiety and depression     managed with meds    C. difficile diarrhea 2013    in 9573 after complicated ERCP    Cervical dysplasia 1990s    Chronic insomnia     ambien     Chronic nausea     Chronic pain     all over     Chronic pancreatitis ([facility] [address]     COVID-19 vaccine series completed 04/21/2021    3/29/2021 Pfizer     Dehydration     IVFs through port as needed for this     Encounter for insertion of venous access port     Left chest port    Esophageal stricture 2017    Fibromyalgia     managed with meds    Former cigarette smoker     GERD (gastroesophageal reflux disease)     controlled with med    History of kidney stones 03/2021    X1-naturally pass    HLD (hyperlipidemia)     pt denies     IBS (irritable bowel syndrome)     Migraine headache     does not have often but did have one recently; just takes tylenol    Nausea & vomiting     pt reports she does better with phenergan than zofran - causes HA    Nonalcoholic fatty liver disease     Pancreatitis 6/23/2014    PUD (peptic ulcer disease) 06/2017    still having issues takes meds     Severe protein-calorie malnutrition ([address] 4/26/2013    Sinus tachycardia     per pt-- no tx needed    Sphincter of Oddi dysfunction     Type 2 diabetes mellitus ([address]     type 2-- sqbs am avg 150--- Hypo symptoms at <100, Insulin dependent; last A1C was 11/3/2020 = 9.0       Past Surgical History:        Procedure Laterality Date    CARPAL TUNNEL RELEASE Right     along with trigger finger    CHOLECYSTECTOMY, LAPAROSCOPIC  1997    COLONOSCOPY      COLONOSCOPY N/A 4/4/2018    COLON[facility]ias MD [facility] ENDOSCOPY    ERCP  3/5/2013    resulted in perforated duodenum    GASTROSTOMY TUBE PLACEMENT N/A 7/26/2022    EGD PEG TUBE PLACEMENT with j tube placement ROOM 207 performed by Gerardo Farmer MD at [place] [address] ([address] [place] [address])  1998    ovaries remain    IR DRAIN SKIN ABSCESS      IR PORT PLACEMENT EQUAL OR GREATER THAN 5 YEARS  9/25/2018    IR PORT PLACEMENT EQUAL OR GREATER THAN 5 YEARS 9/25/2018 [facility] SPECIALS    LAPAROTOMY  3/5/2013    exploratory for duodenal perforation with ERCP    ORTHOPEDIC SURGERY      right finger surgery 11/2017    OTHER SURGICAL HISTORY Bilateral     thumb    OTHER SURGICAL HISTORY      Kidney stones march 2021    MS ABDOMEN SURGERY PROC UNLISTED  4/13    explor lap    MS ABDOMEN SURGERY PROC UNLISTED  1997    lap nissen    MS ABDOMEN SURGERY PROC UNLISTED  last one placed  2012    Hx of pancreatic stent, multiple    TOTAL COLECTOMY      UPPER GASTROINTESTINAL ENDOSCOPY  5/21/2021         UPPER GASTROINTESTINAL ENDOSCOPY  2017    36 fr tony    UPPER GASTROINTESTINAL ENDOSCOPY  12/18/2019         UPPER GASTROINTESTINAL ENDOSCOPY N/A 6/8/2022    EGD ESOPHAGOGASTRODUODENOSCOPY DILATATION/ 34 performed by Cindy Silva MD at [address] [facility] N/A 7/22/2022    ENDOSCOPIC ULTRASOUND/35 ROOM 207 performed by Cindy Silva MD at [address] [facility] N/A 7/22/2022    EGD BIOPSY With balloon dialtion performed by Cindy Silva MD at [address] [place] [address]  4/25/13 at [facility]-- stent removed 6-27-13. Dr German Ramirez      port insertion, left chest wall       Social History:    Social History     Tobacco Use    Smoking status: Former     Packs/day: 1.00     Types: Cigarettes     Quit date: 1993     Years since quittin.3    Smokeless tobacco: Never   Substance Use Topics    Alcohol use: No                                Counseling given: Not Answered      Vital Signs (Current): There were no vitals filed for this visit. BP Readings from Last 3 Encounters:   22 130/78   22 135/72   07/15/22 (!) 155/86       NPO Status:                                                                                 BMI:   Wt Readings from Last 3 Encounters:   22 170 lb 6.4 oz (77.3 kg)   07/15/22 195 lb (88.5 kg)   22 195 lb (88.5 kg)     There is no height or weight on file to calculate BMI.    CBC:   Lab Results   Component Value Date/Time    WBC 5.8 2022 05:04 AM    RBC 4.72 2022 05:04 AM    HGB 10.4 2022 05:04 AM    HCT 35.0 2022 05:04 AM    MCV 74.2 2022 05:04 AM    RDW 18.4 2022 05:04 AM     2022 05:04 AM       CMP:   Lab Results   Component Value Date/Time     2022 07:22 AM    K 4.0 2022 07:22 AM    CL 98 2022 07:22 AM    CO2 32 2022 07:22 AM    BUN 6 2022 07:22 AM    CREATININE 0.70 2022 07:22 AM    GFRAA >60 2022 07:22 AM    AGRATIO 0.9 2022 07:26 AM    LABGLOM >60 2022 07:22 AM    GLUCOSE 316 2022 07:22 AM    PROT 6.8 2022 07:24 AM    CALCIUM 8.6 2022 07:22 AM    BILITOT 0.6 2022 07:24 AM    ALKPHOS 73 2022 07:24 AM    ALKPHOS 126 2022 07:26 AM    AST 22 2022 07:24 AM    ALT 34 2022 07:24 AM       POC Tests: No results for input(s): POCGLU, POCNA, POCK, POCCL, POCBUN, POCHEMO, POCHCT in the last 72 hours.     Coags: No results found for: PROTIME, INR, APTT    HCG (If Applicable): No results found for: PREGTESTUR, PREGSERUM, HCG, HCGQUANT     ABGs: No results found for: PHART, PO2ART, LYA1FMO, IRJ1WMY, BEART, V5EBGFOR     Type & Screen (If Applicable):  No results found for: LABABO, LABRH    Drug/Infectious Status (If Applicable):  No results found for: HIV, HEPCAB    COVID-19 Screening (If Applicable):   Lab Results   Component Value Date/Time    COVID19 Not Detected 02/10/2022 10:11 AM    COVID19 Performed 02/10/2022 10:11 AM           Anesthesia Evaluation  Patient summary reviewed  Airway: Mallampati: II  TM distance: >3 FB   Neck ROM: full  Mouth opening: > = 3 FB   Dental: normal exam         Pulmonary:normal exam    (+) current smoker (Former)                           Cardiovascular:    (+) hypertension:, dysrhythmias (Sinus tachycardia):, hyperlipidemia                  Neuro/Psych:                ROS comment: Fibromyalgia GI/Hepatic/Renal:   (+) GERD:, liver disease (SOW):, renal disease: kidney stones, morbid obesity (obese)         ROS comment: Esophageal stricture  Sphincter of Oddi dysfunction  Chronic pancreatitis. Endo/Other:    (+) DiabetesType II DM, , .                 Abdominal:             Vascular: Other Findings:           Anesthesia Plan      TIVA     ASA 3     (Chronic nausea (baseline), no vomiting.)        Anesthetic plan and risks discussed with patient and spouse.                         Bard Siobhan MD   8/9/2022

## 2022-08-10 ENCOUNTER — ANESTHESIA (OUTPATIENT)
Dept: ENDOSCOPY | Age: 52
End: 2022-08-10
Payer: MEDICARE

## 2022-08-10 ENCOUNTER — HOSPITAL ENCOUNTER (OUTPATIENT)
Age: 52
Setting detail: OBSERVATION
LOS: 1 days | Discharge: HOME | End: 2022-08-12
Attending: INTERNAL MEDICINE | Admitting: INTERNAL MEDICINE
Payer: MEDICARE

## 2022-08-10 PROBLEM — R65.10 SIRS (SYSTEMIC INFLAMMATORY RESPONSE SYNDROME) (HCC): Status: RESOLVED | Noted: 2022-07-21 | Resolved: 2022-08-10

## 2022-08-10 PROBLEM — D72.828 NEUTROPHILIC LEUKOCYTOSIS: Status: RESOLVED | Noted: 2022-07-21 | Resolved: 2022-08-10

## 2022-08-10 PROBLEM — K94.13 MALFUNCTION OF JEJUNOSTOMY TUBE (HCC): Status: ACTIVE | Noted: 2022-08-10

## 2022-08-10 PROBLEM — E11.43 GASTROPARESIS DUE TO DM (HCC): Chronic | Status: ACTIVE | Noted: 2022-08-10

## 2022-08-10 PROBLEM — K31.84 GASTROPARESIS DUE TO DM (HCC): Chronic | Status: ACTIVE | Noted: 2022-08-10

## 2022-08-10 PROBLEM — K94.23 PEG TUBE MALFUNCTION (HCC): Status: ACTIVE | Noted: 2022-08-10

## 2022-08-10 PROBLEM — K94.22 INFECTION OF PEG SITE (HCC): Status: ACTIVE | Noted: 2022-08-10

## 2022-08-10 PROBLEM — D72.9 NEUTROPHILIC LEUKOCYTOSIS: Status: RESOLVED | Noted: 2022-07-21 | Resolved: 2022-08-10

## 2022-08-10 LAB
GLUCOSE BLD STRIP.AUTO-MCNC: 111 MG/DL (ref 65–100)
GLUCOSE BLD STRIP.AUTO-MCNC: 133 MG/DL (ref 65–100)
GLUCOSE BLD STRIP.AUTO-MCNC: 85 MG/DL (ref 65–100)
GLUCOSE BLD STRIP.AUTO-MCNC: 89 MG/DL (ref 65–100)
GLUCOSE BLD STRIP.AUTO-MCNC: 92 MG/DL (ref 65–100)
GLUCOSE BLD STRIP.AUTO-MCNC: 99 MG/DL (ref 65–100)
SERVICE CMNT-IMP: ABNORMAL
SERVICE CMNT-IMP: ABNORMAL
SERVICE CMNT-IMP: NORMAL

## 2022-08-10 PROCEDURE — 99222 1ST HOSP IP/OBS MODERATE 55: CPT | Performed by: SURGERY

## 2022-08-10 PROCEDURE — 87205 SMEAR GRAM STAIN: CPT

## 2022-08-10 PROCEDURE — 6370000000 HC RX 637 (ALT 250 FOR IP): Performed by: NURSE PRACTITIONER

## 2022-08-10 PROCEDURE — 6360000002 HC RX W HCPCS: Performed by: FAMILY MEDICINE

## 2022-08-10 PROCEDURE — 2580000003 HC RX 258: Performed by: FAMILY MEDICINE

## 2022-08-10 PROCEDURE — 3609017100 HC EGD: Performed by: INTERNAL MEDICINE

## 2022-08-10 PROCEDURE — 3700000000 HC ANESTHESIA ATTENDED CARE: Performed by: INTERNAL MEDICINE

## 2022-08-10 PROCEDURE — 6360000002 HC RX W HCPCS: Performed by: NURSE ANESTHETIST, CERTIFIED REGISTERED

## 2022-08-10 PROCEDURE — 6360000002 HC RX W HCPCS: Performed by: ANESTHESIOLOGY

## 2022-08-10 PROCEDURE — 2580000003 HC RX 258: Performed by: NURSE ANESTHETIST, CERTIFIED REGISTERED

## 2022-08-10 PROCEDURE — 94760 N-INVAS EAR/PLS OXIMETRY 1: CPT

## 2022-08-10 PROCEDURE — 2580000003 HC RX 258: Performed by: NURSE PRACTITIONER

## 2022-08-10 PROCEDURE — 6360000002 HC RX W HCPCS: Performed by: NURSE PRACTITIONER

## 2022-08-10 PROCEDURE — 82962 GLUCOSE BLOOD TEST: CPT

## 2022-08-10 PROCEDURE — 2500000003 HC RX 250 WO HCPCS: Performed by: ANESTHESIOLOGY

## 2022-08-10 PROCEDURE — 2500000003 HC RX 250 WO HCPCS: Performed by: NURSE ANESTHETIST, CERTIFIED REGISTERED

## 2022-08-10 PROCEDURE — 87075 CULTR BACTERIA EXCEPT BLOOD: CPT

## 2022-08-10 PROCEDURE — 3700000001 HC ADD 15 MINUTES (ANESTHESIA): Performed by: INTERNAL MEDICINE

## 2022-08-10 PROCEDURE — 7100000011 HC PHASE II RECOVERY - ADDTL 15 MIN: Performed by: INTERNAL MEDICINE

## 2022-08-10 PROCEDURE — 87077 CULTURE AEROBIC IDENTIFY: CPT

## 2022-08-10 PROCEDURE — 87186 SC STD MICRODIL/AGAR DIL: CPT

## 2022-08-10 PROCEDURE — G0378 HOSPITAL OBSERVATION PER HR: HCPCS

## 2022-08-10 PROCEDURE — 2700000000 HC OXYGEN THERAPY PER DAY

## 2022-08-10 PROCEDURE — 7100000010 HC PHASE II RECOVERY - FIRST 15 MIN: Performed by: INTERNAL MEDICINE

## 2022-08-10 RX ORDER — INSULIN LISPRO 100 [IU]/ML
0-8 INJECTION, SOLUTION INTRAVENOUS; SUBCUTANEOUS
Status: DISCONTINUED | OUTPATIENT
Start: 2022-08-10 | End: 2022-08-12 | Stop reason: HOSPADM

## 2022-08-10 RX ORDER — CITALOPRAM 20 MG/1
20 TABLET ORAL DAILY
Status: DISCONTINUED | OUTPATIENT
Start: 2022-08-10 | End: 2022-08-12 | Stop reason: HOSPADM

## 2022-08-10 RX ORDER — INSULIN LISPRO 100 [IU]/ML
14 INJECTION, SOLUTION INTRAVENOUS; SUBCUTANEOUS
Status: DISCONTINUED | OUTPATIENT
Start: 2022-08-10 | End: 2022-08-10

## 2022-08-10 RX ORDER — ACETAMINOPHEN 325 MG/1
650 TABLET ORAL EVERY 6 HOURS PRN
Status: DISCONTINUED | OUTPATIENT
Start: 2022-08-10 | End: 2022-08-10 | Stop reason: SDUPTHER

## 2022-08-10 RX ORDER — GLYCOPYRROLATE 0.2 MG/ML
INJECTION INTRAMUSCULAR; INTRAVENOUS PRN
Status: DISCONTINUED | OUTPATIENT
Start: 2022-08-10 | End: 2022-08-10 | Stop reason: SDUPTHER

## 2022-08-10 RX ORDER — LORAZEPAM 1 MG/1
1 TABLET ORAL EVERY 6 HOURS PRN
Status: DISCONTINUED | OUTPATIENT
Start: 2022-08-10 | End: 2022-08-12 | Stop reason: HOSPADM

## 2022-08-10 RX ORDER — PANTOPRAZOLE SODIUM 40 MG/1
40 TABLET, DELAYED RELEASE ORAL 2 TIMES DAILY
Status: DISCONTINUED | OUTPATIENT
Start: 2022-08-10 | End: 2022-08-12 | Stop reason: HOSPADM

## 2022-08-10 RX ORDER — TIZANIDINE 2 MG/1
4 TABLET ORAL EVERY 6 HOURS PRN
Status: DISCONTINUED | OUTPATIENT
Start: 2022-08-10 | End: 2022-08-12 | Stop reason: HOSPADM

## 2022-08-10 RX ORDER — SODIUM CHLORIDE 0.9 % (FLUSH) 0.9 %
5-40 SYRINGE (ML) INJECTION PRN
Status: DISCONTINUED | OUTPATIENT
Start: 2022-08-10 | End: 2022-08-12 | Stop reason: HOSPADM

## 2022-08-10 RX ORDER — PROMETHAZINE HYDROCHLORIDE 25 MG/1
25 SUPPOSITORY RECTAL EVERY 6 HOURS PRN
Status: DISCONTINUED | OUTPATIENT
Start: 2022-08-10 | End: 2022-08-12 | Stop reason: HOSPADM

## 2022-08-10 RX ORDER — PREGABALIN 100 MG/1
100 CAPSULE ORAL 2 TIMES DAILY
Status: DISCONTINUED | OUTPATIENT
Start: 2022-08-10 | End: 2022-08-12 | Stop reason: HOSPADM

## 2022-08-10 RX ORDER — PROCHLORPERAZINE 25 MG
25 SUPPOSITORY, RECTAL RECTAL EVERY 12 HOURS PRN
Status: DISCONTINUED | OUTPATIENT
Start: 2022-08-10 | End: 2022-08-12 | Stop reason: HOSPADM

## 2022-08-10 RX ORDER — CIPROFLOXACIN 2 MG/ML
400 INJECTION, SOLUTION INTRAVENOUS EVERY 12 HOURS
Status: DISCONTINUED | OUTPATIENT
Start: 2022-08-10 | End: 2022-08-12 | Stop reason: HOSPADM

## 2022-08-10 RX ORDER — HYDROMORPHONE HYDROCHLORIDE 1 MG/ML
0.5 INJECTION, SOLUTION INTRAMUSCULAR; INTRAVENOUS; SUBCUTANEOUS EVERY 4 HOURS PRN
Status: DISCONTINUED | OUTPATIENT
Start: 2022-08-10 | End: 2022-08-12 | Stop reason: HOSPADM

## 2022-08-10 RX ORDER — PROMETHAZINE HYDROCHLORIDE 25 MG/1
25 TABLET ORAL EVERY 8 HOURS PRN
Status: DISCONTINUED | OUTPATIENT
Start: 2022-08-10 | End: 2022-08-12 | Stop reason: HOSPADM

## 2022-08-10 RX ORDER — SODIUM CHLORIDE 9 MG/ML
INJECTION, SOLUTION INTRAVENOUS CONTINUOUS
Status: DISCONTINUED | OUTPATIENT
Start: 2022-08-10 | End: 2022-08-12 | Stop reason: HOSPADM

## 2022-08-10 RX ORDER — POLYETHYLENE GLYCOL 3350 17 G/17G
17 POWDER, FOR SOLUTION ORAL PRN
Status: DISCONTINUED | OUTPATIENT
Start: 2022-08-10 | End: 2022-08-12 | Stop reason: HOSPADM

## 2022-08-10 RX ORDER — PROMETHAZINE HYDROCHLORIDE 25 MG/ML
6.25 INJECTION, SOLUTION INTRAMUSCULAR; INTRAVENOUS ONCE
Status: COMPLETED | OUTPATIENT
Start: 2022-08-10 | End: 2022-08-10

## 2022-08-10 RX ORDER — ONDANSETRON 2 MG/ML
4 INJECTION INTRAMUSCULAR; INTRAVENOUS EVERY 6 HOURS PRN
Status: DISCONTINUED | OUTPATIENT
Start: 2022-08-10 | End: 2022-08-12 | Stop reason: HOSPADM

## 2022-08-10 RX ORDER — SODIUM CHLORIDE 0.9 % (FLUSH) 0.9 %
5-40 SYRINGE (ML) INJECTION EVERY 12 HOURS SCHEDULED
Status: DISCONTINUED | OUTPATIENT
Start: 2022-08-10 | End: 2022-08-12 | Stop reason: HOSPADM

## 2022-08-10 RX ORDER — ONDANSETRON 2 MG/ML
4 INJECTION INTRAMUSCULAR; INTRAVENOUS EVERY 6 HOURS PRN
Status: DISCONTINUED | OUTPATIENT
Start: 2022-08-10 | End: 2022-08-10 | Stop reason: SDUPTHER

## 2022-08-10 RX ORDER — DEXTROSE MONOHYDRATE 100 MG/ML
INJECTION, SOLUTION INTRAVENOUS CONTINUOUS PRN
Status: DISCONTINUED | OUTPATIENT
Start: 2022-08-10 | End: 2022-08-12 | Stop reason: HOSPADM

## 2022-08-10 RX ORDER — SODIUM CHLORIDE 9 MG/ML
INJECTION, SOLUTION INTRAVENOUS PRN
Status: DISCONTINUED | OUTPATIENT
Start: 2022-08-10 | End: 2022-08-12 | Stop reason: HOSPADM

## 2022-08-10 RX ORDER — ACETAMINOPHEN 650 MG/1
650 SUPPOSITORY RECTAL EVERY 6 HOURS PRN
Status: DISCONTINUED | OUTPATIENT
Start: 2022-08-10 | End: 2022-08-12 | Stop reason: HOSPADM

## 2022-08-10 RX ORDER — PROPOFOL 10 MG/ML
INJECTION, EMULSION INTRAVENOUS PRN
Status: DISCONTINUED | OUTPATIENT
Start: 2022-08-10 | End: 2022-08-10 | Stop reason: SDUPTHER

## 2022-08-10 RX ORDER — INSULIN LISPRO 100 [IU]/ML
0-4 INJECTION, SOLUTION INTRAVENOUS; SUBCUTANEOUS NIGHTLY
Status: DISCONTINUED | OUTPATIENT
Start: 2022-08-10 | End: 2022-08-12 | Stop reason: HOSPADM

## 2022-08-10 RX ORDER — METOCLOPRAMIDE HYDROCHLORIDE 5 MG/ML
10 INJECTION INTRAMUSCULAR; INTRAVENOUS ONCE
Status: COMPLETED | OUTPATIENT
Start: 2022-08-10 | End: 2022-08-10

## 2022-08-10 RX ORDER — POLYETHYLENE GLYCOL 3350 17 G/17G
17 POWDER, FOR SOLUTION ORAL DAILY PRN
Status: DISCONTINUED | OUTPATIENT
Start: 2022-08-10 | End: 2022-08-11

## 2022-08-10 RX ORDER — SODIUM CHLORIDE 0.9 % (FLUSH) 0.9 %
5-40 SYRINGE (ML) INJECTION PRN
Status: DISCONTINUED | OUTPATIENT
Start: 2022-08-10 | End: 2022-08-10 | Stop reason: ALTCHOICE

## 2022-08-10 RX ORDER — LIDOCAINE HYDROCHLORIDE 20 MG/ML
INJECTION, SOLUTION EPIDURAL; INFILTRATION; INTRACAUDAL; PERINEURAL PRN
Status: DISCONTINUED | OUTPATIENT
Start: 2022-08-10 | End: 2022-08-10 | Stop reason: SDUPTHER

## 2022-08-10 RX ORDER — LISINOPRIL 5 MG/1
10 TABLET ORAL DAILY
Status: DISCONTINUED | OUTPATIENT
Start: 2022-08-10 | End: 2022-08-12 | Stop reason: HOSPADM

## 2022-08-10 RX ORDER — SODIUM CHLORIDE, SODIUM LACTATE, POTASSIUM CHLORIDE, CALCIUM CHLORIDE 600; 310; 30; 20 MG/100ML; MG/100ML; MG/100ML; MG/100ML
INJECTION, SOLUTION INTRAVENOUS CONTINUOUS PRN
Status: DISCONTINUED | OUTPATIENT
Start: 2022-08-10 | End: 2022-08-10 | Stop reason: SDUPTHER

## 2022-08-10 RX ORDER — ZOLPIDEM TARTRATE 5 MG/1
10 TABLET ORAL NIGHTLY PRN
Status: DISCONTINUED | OUTPATIENT
Start: 2022-08-10 | End: 2022-08-12 | Stop reason: HOSPADM

## 2022-08-10 RX ORDER — HYDROMORPHONE HYDROCHLORIDE 1 MG/ML
0.5 INJECTION, SOLUTION INTRAMUSCULAR; INTRAVENOUS; SUBCUTANEOUS EVERY 10 MIN PRN
Status: COMPLETED | OUTPATIENT
Start: 2022-08-10 | End: 2022-08-10

## 2022-08-10 RX ORDER — TIZANIDINE 4 MG/1
4 TABLET ORAL EVERY 6 HOURS PRN
COMMUNITY

## 2022-08-10 RX ORDER — TIZANIDINE 2 MG/1
4 TABLET ORAL 3 TIMES DAILY
Status: CANCELLED | OUTPATIENT
Start: 2022-08-10

## 2022-08-10 RX ORDER — ONDANSETRON 4 MG/1
4 TABLET, ORALLY DISINTEGRATING ORAL EVERY 8 HOURS PRN
Status: DISCONTINUED | OUTPATIENT
Start: 2022-08-10 | End: 2022-08-12 | Stop reason: HOSPADM

## 2022-08-10 RX ORDER — DIPHENHYDRAMINE HYDROCHLORIDE 50 MG/ML
25 INJECTION INTRAMUSCULAR; INTRAVENOUS ONCE
Status: COMPLETED | OUTPATIENT
Start: 2022-08-10 | End: 2022-08-10

## 2022-08-10 RX ORDER — ACETAMINOPHEN 325 MG/1
650 TABLET ORAL EVERY 6 HOURS PRN
Status: DISCONTINUED | OUTPATIENT
Start: 2022-08-10 | End: 2022-08-12 | Stop reason: HOSPADM

## 2022-08-10 RX ORDER — NALOXONE HYDROCHLORIDE 4 MG/.1ML
1 SPRAY NASAL
Status: DISCONTINUED | OUTPATIENT
Start: 2022-08-10 | End: 2022-08-10 | Stop reason: SDUPTHER

## 2022-08-10 RX ORDER — DIPHENHYDRAMINE HCL 25 MG
25 CAPSULE ORAL EVERY 6 HOURS PRN
Status: DISCONTINUED | OUTPATIENT
Start: 2022-08-10 | End: 2022-08-12 | Stop reason: HOSPADM

## 2022-08-10 RX ADMIN — GLYCOPYRROLATE 0.1 MG: 0.2 INJECTION, SOLUTION INTRAMUSCULAR; INTRAVENOUS at 07:24

## 2022-08-10 RX ADMIN — PROMETHAZINE HYDROCHLORIDE 6.25 MG: 25 INJECTION INTRAMUSCULAR; INTRAVENOUS at 10:39

## 2022-08-10 RX ADMIN — CIPROFLOXACIN 400 MG: 2 INJECTION, SOLUTION INTRAVENOUS at 13:06

## 2022-08-10 RX ADMIN — LIDOCAINE HYDROCHLORIDE 100 MG: 20 INJECTION, SOLUTION EPIDURAL; INFILTRATION; INTRACAUDAL; PERINEURAL at 07:23

## 2022-08-10 RX ADMIN — DIPHENHYDRAMINE HYDROCHLORIDE 25 MG: 50 INJECTION, SOLUTION INTRAMUSCULAR; INTRAVENOUS at 17:33

## 2022-08-10 RX ADMIN — PANTOPRAZOLE SODIUM 40 MG: 40 TABLET, DELAYED RELEASE ORAL at 21:38

## 2022-08-10 RX ADMIN — PREGABALIN 100 MG: 100 CAPSULE ORAL at 21:38

## 2022-08-10 RX ADMIN — SODIUM CHLORIDE, PRESERVATIVE FREE 10 ML: 5 INJECTION INTRAVENOUS at 21:37

## 2022-08-10 RX ADMIN — PROPOFOL 180 MCG/KG/MIN: 10 INJECTION, EMULSION INTRAVENOUS at 07:22

## 2022-08-10 RX ADMIN — ONDANSETRON 4 MG: 2 INJECTION INTRAMUSCULAR; INTRAVENOUS at 17:34

## 2022-08-10 RX ADMIN — METOCLOPRAMIDE 10 MG: 5 INJECTION, SOLUTION INTRAMUSCULAR; INTRAVENOUS at 17:33

## 2022-08-10 RX ADMIN — HYDROMORPHONE HYDROCHLORIDE 0.5 MG: 1 INJECTION, SOLUTION INTRAMUSCULAR; INTRAVENOUS; SUBCUTANEOUS at 08:05

## 2022-08-10 RX ADMIN — SODIUM CHLORIDE: 900 INJECTION, SOLUTION INTRAVENOUS at 21:41

## 2022-08-10 RX ADMIN — LORAZEPAM 1 MG: 1 TABLET ORAL at 13:52

## 2022-08-10 RX ADMIN — HYDROMORPHONE HYDROCHLORIDE 0.5 MG: 1 INJECTION, SOLUTION INTRAMUSCULAR; INTRAVENOUS; SUBCUTANEOUS at 17:34

## 2022-08-10 RX ADMIN — SODIUM CHLORIDE, SODIUM LACTATE, POTASSIUM CHLORIDE, AND CALCIUM CHLORIDE: 600; 310; 30; 20 INJECTION, SOLUTION INTRAVENOUS at 07:19

## 2022-08-10 RX ADMIN — ONDANSETRON 4 MG: 2 INJECTION INTRAMUSCULAR; INTRAVENOUS at 08:05

## 2022-08-10 RX ADMIN — ONDANSETRON 4 MG: 4 TABLET, ORALLY DISINTEGRATING ORAL at 13:52

## 2022-08-10 RX ADMIN — SODIUM CHLORIDE: 900 INJECTION, SOLUTION INTRAVENOUS at 12:55

## 2022-08-10 RX ADMIN — CIPROFLOXACIN 400 MG: 2 INJECTION, SOLUTION INTRAVENOUS at 21:38

## 2022-08-10 RX ADMIN — HYDROMORPHONE HYDROCHLORIDE 0.5 MG: 1 INJECTION, SOLUTION INTRAMUSCULAR; INTRAVENOUS; SUBCUTANEOUS at 13:27

## 2022-08-10 RX ADMIN — PROPOFOL 50 MG: 10 INJECTION, EMULSION INTRAVENOUS at 07:23

## 2022-08-10 RX ADMIN — HYDROMORPHONE HYDROCHLORIDE 0.5 MG: 1 INJECTION, SOLUTION INTRAMUSCULAR; INTRAVENOUS; SUBCUTANEOUS at 21:52

## 2022-08-10 RX ADMIN — HYDROMORPHONE HYDROCHLORIDE 0.5 MG: 1 INJECTION, SOLUTION INTRAMUSCULAR; INTRAVENOUS; SUBCUTANEOUS at 08:47

## 2022-08-10 ASSESSMENT — PAIN DESCRIPTION - ORIENTATION
ORIENTATION: MID
ORIENTATION: MID;UPPER

## 2022-08-10 ASSESSMENT — PAIN DESCRIPTION - ONSET: ONSET: ON-GOING

## 2022-08-10 ASSESSMENT — PAIN SCALES - GENERAL
PAINLEVEL_OUTOF10: 9
PAINLEVEL_OUTOF10: 0
PAINLEVEL_OUTOF10: 8
PAINLEVEL_OUTOF10: 9
PAINLEVEL_OUTOF10: 8

## 2022-08-10 ASSESSMENT — PAIN - FUNCTIONAL ASSESSMENT
PAIN_FUNCTIONAL_ASSESSMENT: NONE - DENIES PAIN
PAIN_FUNCTIONAL_ASSESSMENT: PREVENTS OR INTERFERES SOME ACTIVE ACTIVITIES AND ADLS
PAIN_FUNCTIONAL_ASSESSMENT: PREVENTS OR INTERFERES SOME ACTIVE ACTIVITIES AND ADLS
PAIN_FUNCTIONAL_ASSESSMENT: 0-10

## 2022-08-10 ASSESSMENT — PAIN DESCRIPTION - FREQUENCY: FREQUENCY: CONTINUOUS

## 2022-08-10 ASSESSMENT — PAIN DESCRIPTION - DESCRIPTORS
DESCRIPTORS: SORE
DESCRIPTORS: ACHING
DESCRIPTORS: ACHING

## 2022-08-10 ASSESSMENT — PAIN DESCRIPTION - PAIN TYPE
TYPE: CHRONIC PAIN;SURGICAL PAIN
TYPE: CHRONIC PAIN;SURGICAL PAIN

## 2022-08-10 ASSESSMENT — PAIN DESCRIPTION - LOCATION
LOCATION: ABDOMEN

## 2022-08-10 NOTE — CONSULTS
[facility] Hospitalist Consult   Admit Date:  8/10/2022  5:47 AM   Name:  Carmen Schmitt   Age:  46 y.o. Sex:  female  :  1970   MRN:  [id number]   Room:  /    Presenting Complaint:  GJ tube infection   Reason(s) for Admission: PEG tube malfunction [facility]) [K94.23]  Malfunction of jejunostomy tube [facility]) [K94.13]     Hospitalists consulted by Faisal Nolan MD for: medical management, DM, infection    History of Presenting Illness:   Carmen Schmitt is a 46 y.o. female with history of obesity, severe gastroparesis, opioid-induced constipation, IBS, GERD s/p nissen fundoplication, esophageal stricture s/p balloon dilation,  sphincter of karen dysfunction who was admitted for PEG-J infection, severe gastroparesis. She was recently admitted from - for intractable n/v. At that time, NGT was placed to suction for decompression. She underwent EGD with EUS w/ Dr. Bradley Boyd showing dilated esophageal stricture, gastritis, fatty infiltration of the liver, pancreatic lipomatosis. She continued to not tolerate PO intake with n/v. She ewas encouraged to reduce narcotics. PEG-J placed on 22 by Dr Rain Godwin. She reported feeling better following DC. She accidentally got her J tube tangled up and it came out on 21 while rec'ing nutrition. Since then, not able to tolerate much PO intake. She underwent EGD today which showed cellullitis at ostomy site and G tube was removed, the j tube had been previously removed inadveretently by the patient. She reports the site has been red since procedure and unable to tell when the drainage became purulent given similar appearance as TF's. C/o foul odor from ostomy site. Denies fevers or chills. C/o intractable headace since this AM and requesting something for pain       Review of Systems:  10 systems reviewed and negative except as noted in HPI. Assessment & Plan:     Cellulitis of PEG site - IV cipro per primary. S/p G tube removal. Check CBC, CMP. Wound culture. If fails to improven, broaden coverage. Currently not septic. Acute headache - admin reglan 10 IV with benadryl 25 IV x 1 dose. Increase rate of cIVF. Intractable n/v - surgery consulted for GJ tube placement. NPO. IVF. Antiemetics prn, minimize use of narcotics per GI. Severe gastroparesis - diabetes related. Malfunction of PEG and jejunostomy tube - inadvertently removed J tube. G tube removed today. GERD with stricture - s/p EGD 6/8/22 with esophageal stricture dilated to balloon 18 mm and gastric bezoar. PPI BID. Type 2 diabetes mellitus with hyperglycemia, with long-term current use of insulin ([address]  Hold scheduled insulin. Random glucose 80s. Start SSI. Lab Results   Component Value Date    LABA1C 7.9 (H) 07/20/2022     Lab Results   Component Value Date     07/20/2022     HTN - lisinopril   Mood disorder - citalopram   Fibromyalgia - lyrica   Obesity - lifestyle modifications. Discharge Planning:      Per primary     Diet:  Diet NPO  DVT PPx: per primary   Code status: Full Code    Principal Problem:    PEG tube malfunction ([address]  Active Problems:    Class 1 obesity with serious comorbidity and body mass index (BMI) of 31.0 to 31.9 in adult    Infection of PEG site ([address]    Malfunction of jejunostomy tube ([address]    GERD with stricture    Intractable nausea and vomiting    Type 2 diabetes mellitus with hyperglycemia, with long-term current use of insulin ([address]  Resolved Problems:    * No resolved hospital problems.  *      Past History:    Past Medical History:   Diagnosis Date    Anemia     blood transfusion 2013 X1 d/t \"perforated bowel\"-- Fe infusions 12/2017--- denies any current iron infusions 12/17/2019    Anxiety and depression     managed with meds    C. difficile diarrhea 2013    in 9428 after complicated ERCP    Cervical dysplasia 1990s    Chronic insomnia     ambien     Chronic nausea     Chronic pain     all over     Chronic pancreatitis ([address] COVID-19 vaccine series completed 04/21/2021    3/29/2021 Pfizer     Dehydration     IVFs through port as needed for this     Encounter for insertion of venous access port     Left chest port    Esophageal stricture 2017    Fibromyalgia     managed with meds    Former cigarette smoker     GERD (gastroesophageal reflux disease)     controlled with med    History of kidney stones 03/2021    X1-naturally pass    HLD (hyperlipidemia)     pt denies     IBS (irritable bowel syndrome)     Migraine headache     does not have often but did have one recently; just takes tylenol    Nausea & vomiting     pt reports she does better with phenergan than zofran - causes HA    Nonalcoholic fatty liver disease     Pancreatitis 6/23/2014    PUD (peptic ulcer disease) 06/2017    still having issues takes meds     Severe protein-calorie malnutrition ([facility] [address] 4/26/2013    Sinus tachycardia     per pt-- no tx needed    Sphincter of Oddi dysfunction     Type 2 diabetes mellitus ([facility] [address]     type 2-- sqbs am avg 150--- Hypo symptoms at <100, Insulin dependent; last A1C was 11/3/2020 = 9.0       Past Surgical History:   Procedure Laterality Date    CARPAL TUNNEL RELEASE Right     along with trigger finger    CHOLECYSTECTOMY, LAPAROSCOPIC  1997    COLONOSCOPY      COLONOSCOPY N/A 4/4/2018    COLONOSCOPY performed by Kelvin Bell MD at [facility] ENDOSCOPY    ERCP  3/5/2013    resulted in perforated duodenum    GASTROSTOMY TUBE PLACEMENT N/A 7/26/2022    EGD PEG TUBE PLACEMENT with j tube placement ROOM 207 performed by Holden Bass MD at [facility] ([facility])  1998    ovaries remain    IR DRAIN SKIN ABSCESS      IR PORT PLACEMENT EQUAL OR GREATER THAN 5 YEARS  9/25/2018    IR PORT PLACEMENT EQUAL OR GREATER THAN 5 YEARS 9/25/2018 [facility] RADI[facility]Y SPECIALS    LAPAROTOMY  3/5/2013    exploratory for duodenal perforation with ERCP    ORTHOPEDIC SURGERY      right finger surgery 11/2017    OTHER SURGICAL HISTORY Bilateral Influenza, Trivalent Pf 10/27/2013     Allergies   Allergen Reactions    Adhesive Tape Itching, Other (See Comments) and Rash     blisters  tegaderm  Paper tape too      Metronidazole Diarrhea and Nausea And Vomiting    Atorvastatin Myalgia    Iodine Other (See Comments)     Sweaty and clammy    Statins Myalgia    Barium Iodide Nausea And Vomiting     Diaphoresis      Barium Sulfate Palpitations    Insulin Lispro Nausea And Vomiting    Insulins Nausea And Vomiting     Humalog only    Metformin Nausea And Vomiting     Stomach pain  Stomach pain  Stomach pain  Stomach pain        Current Facility-Administered Medications   Medication Dose Route Frequency    citalopram (CELEXA) tablet 20 mg  20 mg Oral Daily    diphenhydrAMINE (BENADRYL) capsule 25 mg  25 mg Oral Q6H PRN    hyoscyamine (LEVSIN/SL) sublingual tablet 0.125 mg  0.125 mg SubLINGual Q6H PRN    lisinopril (PRINIVIL;ZESTRIL) tablet 10 mg  10 mg Oral Daily    LORazepam (ATIVAN) tablet 1 mg  1 mg Oral Q6H PRN    pantoprazole (PROTONIX) tablet 40 mg  40 mg Oral BID    polyethylene glycol (GLYCOLAX) packet 17 g  17 g Oral PRN    pregabalin (LYRICA) capsule 100 mg  100 mg Oral BID    promethazine (PHENERGAN) tablet 25 mg  25 mg Oral Q8H PRN    zolpidem (AMBIEN) tablet 10 mg  10 mg Oral Nightly PRN    tiZANidine (ZANAFLEX) tablet 4 mg  4 mg Oral Q6H PRN    sodium chloride flush 0.9 % injection 5-40 mL  5-40 mL IntraVENous 2 times per day    sodium chloride flush 0.9 % injection 5-40 mL  5-40 mL IntraVENous PRN    0.9 % sodium chloride infusion   IntraVENous PRN    ondansetron (ZOFRAN-ODT) disintegrating tablet 4 mg  4 mg Oral Q8H PRN    Or    ondansetron (ZOFRAN) injection 4 mg  4 mg IntraVENous Q6H PRN    acetaminophen (TYLENOL) tablet 650 mg  650 mg Oral Q6H PRN    Or    acetaminophen (TYLENOL) suppository 650 mg  650 mg Rectal Q6H PRN    polyethylene glycol (GLYCOLAX) packet 17 g  17 g Oral Daily PRN    0.9 % sodium chloride infusion   IntraVENous Continuous glucose chewable tablet 16 g  4 tablet Oral PRN    dextrose bolus 10% 125 mL  125 mL IntraVENous PRN    Or    dextrose bolus 10% 250 mL  250 mL IntraVENous PRN    glucagon (rDNA) injection 1 mg  1 mg SubCUTAneous PRN    dextrose 10 % infusion   IntraVENous Continuous PRN    HYDROmorphone HCl PF (DILAUDID) injection 0.5 mg  0.5 mg IntraVENous Q4H PRN    ciprofloxacin (CIPRO) IVPB 400 mg  400 mg IntraVENous Q12H    promethazine (PHENERGAN) suppository 25 mg  25 mg Rectal Q6H PRN    prochlorperazine (COMPAZINE) suppository 25 mg  25 mg Rectal Q12H PRN    glucose chewable tablet 16 g  4 tablet Oral PRN    dextrose bolus 10% 125 mL  125 mL IntraVENous PRN    Or    dextrose bolus 10% 250 mL  250 mL IntraVENous PRN    glucagon (rDNA) injection 1 mg  1 mg SubCUTAneous PRN    dextrose 10 % infusion   IntraVENous Continuous PRN    insulin lispro (HUMALOG) injection vial 0-8 Units  0-8 Units SubCUTAneous TID WC    insulin lispro (HUMALOG) injection vial 0-4 Units  0-4 Units SubCUTAneous Nightly       Objective:   Patient Vitals for the past 24 hrs:   Temp Pulse Resp BP SpO2   08/10/22 1639 97.8 °F (36.6 °C) (!) 105 18 (!) 154/71 94 %   08/10/22 1400 -- -- -- (!) 178/82 99 %   08/10/22 1350 -- -- -- -- 90 %   08/10/22 1340 -- -- -- -- 91 %   08/10/22 1330 -- -- -- -- 90 %   08/10/22 1320 -- 94 16 (!) 190/90 96 %   08/10/22 1310 -- -- -- -- 94 %   08/10/22 1300 -- -- -- (!) 173/94 93 %   08/10/22 0910 -- 95 18 (!) 146/80 91 %   08/10/22 0900 -- 95 18 (!) 141/63 92 %   08/10/22 0850 -- 88 18 (!) 163/66 94 %   08/10/22 0840 -- 84 18 (!) 148/78 94 %   08/10/22 0830 -- 87 18 (!) 145/76 96 %   08/10/22 0820 -- 82 18 (!) 148/80 97 %   08/10/22 0810 -- 86 16 (!) 140/72 98 %   08/10/22 0800 -- 86 16 139/70 98 %   08/10/22 0750 -- 92 16 120/70 98 %   08/10/22 0740 98 °F (36.7 °C) 86 14 114/62 97 %   08/10/22 0735 -- 85 18 114/62 99 %   08/10/22 0628 97.7 °F (36.5 °C) 91 18 130/81 96 %       Oxygen Therapy  SpO2: 94 %  Pulse via Oximetry: 97 beats per minute  Pulse Oximeter Device Mode: Continuous  O2 Device: Nasal cannula  O2 Flow Rate (L/min): 2 L/min    Estimated body mass index is 31.09 kg/m² as calculated from the following:    Height as of this encounter: 5' 2\" (1.575 m). Weight as of this encounter: 170 lb (77.1 kg). Intake/Output Summary (Last 24 hours) at 8/10/2022 1938  Last data filed at 8/10/2022 1639  Gross per 24 hour   Intake 200 ml   Output 0 ml   Net 200 ml         Physical Exam:  Blood pressure (!) 154/71, pulse (!) 105, temperature 97.8 °F (36.6 °C), temperature source Oral, resp. rate 18, height 5' 2\" (1.575 m), weight 170 lb (77.1 kg), SpO2 94 %. General:    Well nourished. Obese. NAD. Head:  Normocephalic, atraumatic  Eyes:  Sclerae appear normal.  Pupils equally round. ENT:  Nares appear normal, no drainage. Moist oral mucosa  Neck:  No restricted ROM. Trachea midline   CV:   RRR. No m/r/g. No jugular venous distension. Lungs:   CTAB. No wheezing, rhonchi, or rales. Symmetric expansion. Abdomen: Bowel sounds present. Soft, nontender, nondistended. Ostomy site erythematous with purulence. No odor. Extremities: No cyanosis or clubbing. No edema  Skin:      pale coloration. Warm and dry. Ostomy site erythematous with purulence. No odor. Neuro:  CN II-XII grossly intact. Sensation intact. A&Ox3  Psych:  Normal mood and affect.       I have personally reviewed labs and tests showing:  Recent Labs:  Recent Results (from the past 24 hour(s))   POCT Glucose    Collection Time: 08/10/22  6:44 AM   Result Value Ref Range    POC Glucose 92 65 - 100 mg/dL    Performed by: Irene Watson    POCT Glucose    Collection Time: 08/10/22  1:35 PM   Result Value Ref Range    POC Glucose 99 65 - 100 mg/dL    Performed by: Nany    POCT Glucose    Collection Time: 08/10/22  2:35 PM   Result Value Ref Range    POC Glucose 85 65 - 100 mg/dL    Performed by: Tereza    POCT Glucose    Collection Time: 08/10/22  3:10 PM   Result Value Ref Range    POC Glucose 89 65 - 100 mg/dL    Performed by: Twila    POCT Glucose    Collection Time: 08/10/22  4:48 PM   Result Value Ref Range    POC Glucose 111 (H) 65 - 100 mg/dL    Performed by: Mike Osullivan        I have personally reviewed imaging studies showing:  No results found. Echocardiogram:  No results found for this or any previous visit. Signed:  Rajiv Mejia DO, DO    Part of this note may have been written by using a voice dictation software. The note has been proof read but may still contain some grammatical/other typographical errors.

## 2022-08-10 NOTE — OP NOTE
Procedure:  Esophagogastroduodenoscopy    Date of Procedure:  8/10/2022    Patient:  Yehuda Gtz     1970    Indication:   PEG-J tube dysfunction     Sedation:  MAC    Pre-Procedure Physical Exam:    Mental status:  alert and oriented  Airway:  normal oropharyngeal airway and neck mobility  CV:  regular rate and rhythm  Respiratory:  clear to auscultation    Procedure:  A History and Physical has been performed, and patient medication allergies have been reviewed. Risks of perforation, hemorrhage, adverse drug reaction, and aspiration were discussed. Informed consent was obtained for the procedure, including sedation. The patient was placed in the left lateral decubitus position. The heart rate, oxygen saturations, blood pressure, and response to care were monitored throughout the procedure. The  gastroscope was passed through the ostomy and advanced under direct vision to the small bowel. A careful inspection was made as the gastroscope was withdrawn, including a retroflexed view of the proximal stomach. The patient tolerated the procedure well. Findings:     OSTOMY:  The ostomy site is gaping and inflamed with significant exudate and purulent drainage. The previously placed tube was removed. STOMACH:  Patient is status post Nissen fundoplication and gastrojejunostomy. The  gastroscope was passed through the ostomy site to the jejunum. SMALL BOWEL: The bulb and second portions are normal.    Specimen:  No    Estimated Blood Loss:  Minimal     Implant:  None           Impression:    Cellulitis at ostomy site. G-tube was removed. The J-tube had been previously removed inadvertently by the patient. Plan:  1. Keep ostomy site dressed with gauze. 2. Will consult surgery for surgical jejunostomy. 3. Due to intractable nausea, will admit for observation and antibiotics. Anticipate discharge home soon.      Signed:  Serena Shelton MD  8/10/2022  7:04 AM

## 2022-08-10 NOTE — FLOWSHEET NOTE
08/10/22 1320   Pain Assessment   Pain Assessment 0-10   Patient's Stated Pain Goal 8   Pain Location Abdomen   Pain Orientation Mid   Pain Descriptors Sore   Functional Pain Assessment Prevents or interferes some active activities and ADLs   Pain Type Chronic pain   Pain Frequency Continuous   Pain Onset On-going     Dilaudid 0.5 mg IV given. Patient requesting Ativan as well for anxiety and nausea. Not time for Zofran or Phenergan. Patient educated.

## 2022-08-10 NOTE — CONSULTS
H&P/Consult Note/Progress Note/Office Note:   Abdirahman Espinal  MRN: [id number]  :1970  Age:51 y.o.    HPI: Abdirahman Espinal is a 46 y.o. female who presents with a infection at [address] [place] [address] tube site. This patient recently had a PEG-J tube placed on 2022. The J tube was pulled out accidentally by the patient. This tube site then became infected and was removed. This patient has a history of severe gastroparesis causing the placement of the GJ tube. The patient states that she has had a history of nausea and vomiting for many years. She also has a history of sphincter of Oddi dysfunction. This patient has a very significant past surgical history of a duodenal perforatio secondary to an ERCP in . This required an exploratory laparotomy for repair of this perforation.   The patient subsequently developed multiple intra-abdominal infections which required laparotomy and washout and took a significant time to heal.      Past Medical History:   Diagnosis Date    Anemia     blood transfusion 2013 X1 d/t \"perforated bowel\"-- Fe infusions 2017--- denies any current iron infusions 2019    Anxiety and depression     managed with meds    C. difficile diarrhea     in 9622 after complicated ERCP    Cervical dysplasia     Chronic insomnia     ambien     Chronic nausea     Chronic pain     all over     Chronic pancreatitis ([facility] [address]     COVID-19 vaccine series completed 2021    3/29/2021 Pfizer     Dehydration     IVFs through port as needed for this     Encounter for insertion of venous access port     Left chest port    Esophageal stricture     Fibromyalgia     managed with meds    Former cigarette smoker     GERD (gastroesophageal reflux disease)     controlled with med    History of kidney stones 03/2021    X1-naturally pass    HLD (hyperlipidemia)     pt denies     IBS (irritable bowel syndrome)     Migraine headache     does not have often but did have one recently; just takes tylenol    Nausea & vomiting     pt reports she does better with phenergan than zofran - causes HA    Nonalcoholic fatty liver disease     Pancreatitis 6/23/2014    PUD (peptic ulcer disease) 06/2017    still having issues takes meds     Severe protein-calorie malnutrition ([facility] [address] 4/26/2013    Sinus tachycardia     per pt-- no tx needed    Sphincter of Oddi dysfunction     Type 2 diabetes mellitus ([facility] [address]     type 2-- sqbs am avg 150--- Hypo symptoms at <100, Insulin dependent; last A1C was 11/3/2020 = 9.0     Past Surgical History:   Procedure Laterality Date    CARPAL TUNNEL RELEASE Right     along with trigger finger    CHOLECYSTECTOMY, LAPAROSCOPIC  1997    COLONOSCOPY      COLONOSCOPY N/A 4/4/2018    COLONOSCOPY performed by Mirza Currie MD at [facility] ENDOSCOPY    ERCP  3/5/2013    resulted in perforated duodenum    GASTROSTOMY TUBE PLACEMENT N/A 7/26/2022    EGD PEG TUBE PLACEMENT with j tube placement ROOM 207 performed by Chao Dasilva MD at [address] ([facility])  1998    ovaries remain    IR DRAIN SKIN ABSCESS      IR PORT PLACEMENT EQUAL OR GREATER THAN 5 YEARS  9/25/2018    IR PORT PLACEMENT EQUAL OR GREATER THAN 5 YEARS 9/25/2018 [facility] RADIOLOGY SPECIALS    LAPAROTOMY  3/5/2013    exploratory for duodenal perforation with ERCP    ORTHOPEDIC SURGERY      right finger surgery 11/2017    OTHER SURGICAL HISTORY Bilateral     thumb    OTHER SURGICAL HISTORY      Kidney stones march 2021    NM ABDOMEN SURGERY PROC UNLISTED  4/13    explor lap    [place] ABDOMEN SURGERY PROC UNLISTED  1997    lap nissen    [place] ABDOMEN SURGERY [facility]  last one placed  2012    Hx of pancreatic stent, multiple    TOTAL COLECTOMY      UPPER GASTROINTESTINAL ENDOSCOPY  5/21/2021         UPPER GASTROINTESTINAL ENDOSCOPY  2017    36 fr cecily    UPPER GASTROINTESTINAL ENDOSCOPY  12/18/2019         UPPER GASTROINTESTINAL ENDOSCOPY N/A 6/8/2022    EGD ESOPHAGOGASTRODUODENOSCOPY DILATATION/ 34 performed by Romelia Wetzel MD at [address] [place] [address] N/A 7/22/2022    ENDOSCOPIC ULTRASOUND/35 ROOM 207 performed by Romelia Wetzel MD at [address] [place] [address] 7/22/2022    EGD BIOPSY With balloon dialtion performed by Romelia Wetzel MD at [address]  4/25/13 at [facility]-- stent removed 6-27-13.   Dr Adrianne Sandoval  2014    port insertion, left chest wall     Current Facility-Administered Medications   Medication Dose Route Frequency    citalopram (CELEXA) tablet 20 mg  20 mg Oral Daily    diphenhydrAMINE (BENADRYL) capsule 25 mg  25 mg Oral Q6H PRN    hyoscyamine (LEVSIN/SL) sublingual tablet 0.125 mg  0.125 mg SubLINGual Q6H PRN    lisinopril (PRINIVIL;ZESTRIL) tablet 10 mg  10 mg Oral Daily    LORazepam (ATIVAN) tablet 1 mg  1 mg Oral Q6H PRN    pantoprazole (PROTONIX) tablet 40 mg  40 mg Oral BID    polyethylene glycol (GLYCOLAX) packet 17 g  17 g Oral PRN    pregabalin (LYRICA) capsule 100 mg  100 mg Oral BID    promethazine (PHENERGAN) tablet 25 mg  25 mg Oral Q8H PRN    zolpidem (AMBIEN) tablet 10 mg  10 mg Oral Nightly PRN    tiZANidine (ZANAFLEX) tablet 4 mg  4 mg Oral Q6H PRN    sodium chloride flush 0.9 % injection 5-40 mL  5-40 mL IntraVENous 2 times per day    sodium chloride flush 0.9 % injection 5-40 mL  5-40 mL IntraVENous PRN    0.9 % sodium chloride infusion   IntraVENous PRN    ondansetron (ZOFRAN-ODT) disintegrating tablet 4 mg  4 mg Oral Q8H PRN    Or    ondansetron (ZOFRAN) injection 4 mg  4 mg IntraVENous Q6H PRN    acetaminophen (TYLENOL) tablet 650 mg  650 mg Oral Q6H PRN    Or    acetaminophen (TYLENOL) suppository 650 mg  650 mg Rectal Q6H PRN    polyethylene glycol (GLYCOLAX) packet 17 g  17 g Oral Daily PRN    0.9 % sodium chloride infusion   IntraVENous Continuous    glucose chewable tablet 16 g  4 tablet Oral PRN    dextrose bolus 10% 125 mL  125 mL IntraVENous PRN Or    dextrose bolus 10% 250 mL  250 mL IntraVENous PRN    glucagon (rDNA) injection 1 mg  1 mg SubCUTAneous PRN    dextrose 10 % infusion   IntraVENous Continuous PRN    HYDROmorphone HCl PF (DILAUDID) injection 0.5 mg  0.5 mg IntraVENous Q4H PRN    ciprofloxacin (CIPRO) IVPB 400 mg  400 mg IntraVENous Q12H    promethazine (PHENERGAN) suppository 25 mg  25 mg Rectal Q6H PRN    prochlorperazine (COMPAZINE) suppository 25 mg  25 mg Rectal Q12H PRN    glucose chewable tablet 16 g  4 tablet Oral PRN    dextrose bolus 10% 125 mL  125 mL IntraVENous PRN    Or    dextrose bolus 10% 250 mL  250 mL IntraVENous PRN    glucagon (rDNA) injection 1 mg  1 mg SubCUTAneous PRN    dextrose 10 % infusion   IntraVENous Continuous PRN    insulin lispro (HUMALOG) injection vial 0-8 Units  0-8 Units SubCUTAneous TID WC    insulin lispro (HUMALOG) injection vial 0-4 Units  0-4 Units SubCUTAneous Nightly    metoclopramide (REGLAN) injection 10 mg  10 mg IntraVENous Once    And    diphenhydrAMINE (BENADRYL) injection 25 mg  25 mg IntraVENous Once     Adhesive tape, Metronidazole, Atorvastatin, Iodine, Statins, Barium iodide, Barium sulfate, Insulin lispro, Insulins, and Metformin  Social History     Socioeconomic History    Marital status:      Spouse name: None    Number of children: None    Years of education: None    Highest education level: None   Tobacco Use    Smoking status: Former     Packs/day: 1.00     Types: Cigarettes     Quit date: 1993     Years since quittin.3    Smokeless tobacco: Never   Substance and Sexual Activity    Alcohol use: No    Drug use: Yes     Types: Prescription     Social History     Tobacco Use   Smoking Status Former    Packs/day: 1.00    Types: Cigarettes    Quit date: 1993    Years since quittin.3   Smokeless Tobacco Never     Family History   Problem Relation Age of Onset    No Known Problems Sister     Coronary Art Dis Father 76    Stroke Father     Hypertension Father 146/80 -- -- 95 18 91 % -- --   08/10/22 0900 (!) 141/63 -- -- 95 18 92 % -- --   08/10/22 0850 (!) 163/66 -- -- 88 18 94 % -- --   08/10/22 0840 (!) 148/78 -- -- 84 18 94 % -- --   08/10/22 0830 (!) 145/76 -- -- 87 18 96 % -- --   08/10/22 0820 (!) 148/80 -- -- 82 18 97 % -- --   08/10/22 0810 (!) 140/72 -- -- 86 16 98 % -- --   08/10/22 0800 139/70 -- -- 86 16 98 % -- --   08/10/22 0750 120/70 -- -- 92 16 98 % -- --   08/10/22 0740 114/62 98 °F (36.7 °C) Temporal 86 14 97 % -- --   08/10/22 0735 114/62 -- -- 85 18 99 % -- --   08/10/22 0628 130/81 97.7 °F (36.5 °C) Oral 91 18 96 % 5' 2\" (1.575 m) 170 lb (77.1 kg)       Amount and/or Complexity of Data Reviewed and Analyzed:  I reviewed and analyzed all of the unique labs and radiologic studies that are shown below as well as any that are in the HPI, and any that are in the expanded problem list below  *Each unique test, order, or document contributes to the combination of 2 or combination of 3 in Category 1 below. For this visit I also reviewed old records and prior notes. No results for input(s): WBC, HGB, PLT, NA, K, CL, CO2, BUN, CREA, GLU, INR, APTT, ALT, AML, AML, LCAD, PCO2, PO2, HCO3 in the last 72 hours.     Invalid input(s): PTP, TBIL, TBILI, CBIL, SGOT, GPT, AP, LPSE, NH4, TROPT, TROIQ,  PH  Review of most recent CBC  Lab Results   Component Value Date    WBC 5.8 07/25/2022    HGB 10.4 (L) 07/25/2022    HCT 35.0 (L) 07/25/2022    MCV 74.2 (L) 07/25/2022     07/25/2022       Review of most recent BMP  Lab Results   Component Value Date/Time     07/28/2022 07:22 AM    K 4.0 07/28/2022 07:22 AM    CL 98 07/28/2022 07:22 AM    CO2 32 07/28/2022 07:22 AM    BUN 6 07/28/2022 07:22 AM    CREATININE 0.70 07/28/2022 07:22 AM    GLUCOSE 316 07/28/2022 07:22 AM    CALCIUM 8.6 07/28/2022 07:22 AM        Review of most recent LFTs (and lipase if done)  Lab Results   Component Value Date    ALT 34 07/23/2022    AST 22 07/23/2022    ALKPHOS 73 The adrenal glands are unremarkable. Limited evaluation the  bowel loops in the upper abdomen is unremarkable. There is no definite upper  abdominal lymphadenopathy. There is a nonobstructing left renal calculus present. CT pelvis: No inflammatory change in the right lower quadrant. The appendix is  normal. There is no pelvic adenopathy. There is no free fluid or free air  present within the pelvis. Bone window evaluation demonstrates no aggressive osseous lesions. Impression  1. Nonobstructing left renal calculus. 2. J-tube. US Result (most recent):  No results found for this or any previous visit from the past 3650 days. Admission date (for inpatients): 8/10/2022   Day of Surgery  Procedure(s):  EGD ESOPHAGOGASTRODUODENOSCOPY        ASSESSMENT/PLAN:  [unfilled]  Principal Problem:    PEG tube malfunction ([address]  Active Problems:    Class 1 obesity with serious comorbidity and body mass index (BMI) of 31.0 to 31.9 in adult    Infection of PEG site ([address]    Malfunction of jejunostomy tube ([address]    GERD with stricture    Intractable nausea and vomiting    Type 2 diabetes mellitus with hyperglycemia, with long-term current use of insulin ([address]  Resolved Problems:    * No resolved hospital problems. *       Would not recommend surgical placement of GJ tube at this time secondary to patient's significant surgical history, would only use as a last resort if patient absolutely needed placement of the feeding tube  Would recommend IR evaluation for GJ tube placement after the current infection has resolved        Number and Complexity of Problems addressed and   Risks of complications and/or morbidity of management            Level of MDM (2/3 elements below)  Number and Complexity of Problems Addressed Amount and/or Complexity of Data to be Reviewed and Analyzed  *Each unique test, order, or document contributes to the combination of 2 or combination of 3 in Category 1 below.  Risk of Complications and/or Morbidity or Mortality of pt Management     00554  01727 SF Minimal  ?1self-limited or minor problem Minimal or none Minimal risk of morbidity from additional diagnostic testing or Rx   35209  38166 Low Low  ? 2or more self-limited or minor problems;    or  ? 1stable chronic illness;    or  ?1acute, uncomplicated illness or injury   Limited  (Must meet the requirements of at least 1 of the 2 categories)  Category 1: Tests and documents   ? Any combination of 2 from the following:  ?Review of prior external note(s) from each unique source*;  ?review of the result(s) of each unique test*;   ?ordering of each unique test*    or   Category 2: Assessment requiring an independent historian(s)  (For the categories of independent interpretation of tests and discussion of management or test interpretation, see moderate or high) Low risk of morbidity from additional diagnostic testing or treatment     95987  44752 Mod Moderate  ? 1or more chronic illnesses with exacerbation, progression, or side effects of treatment;    or  ?2or more stable chronic illnesses;    or  ?1undiagnosed new problem with uncertain prognosis;    or  ?1acute illness with systemic symptoms;    or  ?1acute complicated injury   Moderate  (Must meet the requirements of at least 1 out of 3 categories)  Category 1: Tests, documents, or independent historian(s)  ? Any combination of 3 from the following:   ?Review of prior external note(s) from each unique source*;  ?Review of the result(s) of each unique test*;  ?Ordering of each unique test*;  ?Assessment requiring an independent historian(s)    or  Category 2: Independent interpretation of tests   ? Independent interpretation of a test performed by another physician/other qualified health care professional (not separately reported);     or  Category 3: Discussion of management or test interpretation  ? Discussion of management or test interpretation with external physician/other qualified health care management  service on this patient. I have independently seen the patient. I have independently obtained the above history from the patient/family. I have independently examined the patient with above findings. I have independently reviewed data/labs for this patient and developed the above plan of care (MDM).   Signed: Alesia Thao MD, FACS

## 2022-08-10 NOTE — PROGRESS NOTES
Pt requesting more nausea and pain med at this time. Medication to be given per [facility].

## 2022-08-10 NOTE — PROGRESS NOTES
Spoke with pharmacy Claudia Odonnell about verifying medications; starting Cipro and verifying prn Dilaudid and Zofran. Educated patient that it is not time to have more Phenergan yet. Still no bed placement at this time.

## 2022-08-10 NOTE — PROGRESS NOTES
Patient requesting blood sugar to be checked again (at 1430 it was ~99). Rechecked at this time and BS is 85. Will continue to monitor.

## 2022-08-10 NOTE — DISCHARGE INSTRUCTIONS
Gastrointestinal Esophagogastroduodenoscopy (EGD)/ Endoscopic Ultrasound(EUS)- Upper Exam Discharge Instructions    1. Call Dr. Jessica Lamb at [phone]  for any problems or questions. 2. Contact the doctor's office for follow up appointment as directed. 3. Medication may cause drowsiness for several hours, therefore, do not drive or operate machinery for remainder of the day. 4. No alcohol today. 5. Do not make any important decisions such as signing legal paperwork. 6. Ordinarily, you may resume regular diet and activity after exam unless otherwise specified by your physician. 7. For mild soreness in your throat you may use Cepacol throat lozenges or warm  salt-water gargles as needed. Any additional instructions:   1. Keep ostomy site dressed with gauze. 2. Refer to surgeon for surgical jejunostomy. 3. If gastric decompression still needed, would place new gastrostomy tube after existing ostomy site heals. 4. Will give a 1 week course of antibiotics.

## 2022-08-10 NOTE — H&P
History and Physical for Procedure             Date: 8/10/2022     History of Present Illness:  Patient presents to undergo EGD with PEG-J change. She recently inadvertently pulled out her J-tube. She complains of continuous nausea and pain around the ostomy site. She has noted purulent drainage as well.        Past Medical History:   Diagnosis Date    Anemia     blood transfusion 2013 X1 d/t \"perforated bowel\"-- Fe infusions 12/2017--- denies any current iron infusions 12/17/2019    Anxiety and depression     managed with meds    C. difficile diarrhea 2013    in 5073 after complicated ERCP    Cervical dysplasia 1990s    Chronic insomnia     ambien     Chronic nausea     Chronic pain     all over     Chronic pancreatitis ([address]     COVID-19 vaccine series completed 04/21/2021    3/29/2021 Pfizer     Dehydration     IVFs through port as needed for this     Encounter for insertion of venous access port     Left chest port    Esophageal stricture 2017    Fibromyalgia     managed with meds    Former cigarette smoker     GERD (gastroesophageal reflux disease)     controlled with med    History of kidney stones 03/2021    X1-naturally pass    HLD (hyperlipidemia)     pt denies     IBS (irritable bowel syndrome)     Migraine headache     does not have often but did have one recently; just takes tylenol    Nausea & vomiting     pt reports she does better with phenergan than zofran - causes HA    Nonalcoholic fatty liver disease     Pancreatitis 6/23/2014    PUD (peptic ulcer disease) 06/2017    still having issues takes meds     Severe protein-calorie malnutrition ([address] 4/26/2013    Sinus tachycardia     per pt-- no tx needed    Sphincter of Oddi dysfunction     Type 2 diabetes mellitus ([address]     type 2-- sqbs am avg 150--- Hypo symptoms at <100, Insulin dependent; last A1C was 11/3/2020 = 9.0     Past Surgical History:   Procedure Laterality Date    CARPAL TUNNEL RELEASE Right     along with trigger finger CHOLECYSTECTOMY, LAPAROSCOPIC  1997    COLONOSCOPY      COLONOSCOPY N/A 4/4/2018    COLONOSCOPY performed by Darwin Carnes MD at [facility] ENDOSCOPY    ERCP  3/5/2013    resulted in perforated duodenum    GASTROSTOMY TUBE PLACEMENT N/A 7/26/2022    EGD PEG TUBE PLACEMENT with j tube placement ROOM 207 performed by Queen Dany MD at [address] ([address] [facility])  1998    ovaries remain    IR DRAIN SKIN ABSCESS      IR PORT PLACEMENT EQUAL OR GREATER THAN 5 YEARS  9/25/2018    IR PORT PLACEMENT EQUAL OR GREATER THAN 5 YEARS 9/25/2018 [facility] RADIOLOGY SPECIALS    LAPAROTOMY  3/5/2013    exploratory for duodenal perforation with ERCP    ORTHOPEDIC SURGERY      right finger surgery 11/2017    OTHER SURGICAL HISTORY Bilateral     thumb    OTHER SURGICAL HISTORY      Kidney stones march 2021    [place] ABDOMEN SURGERY PROC UNLISTED  4/13    explor lap    [place] ABDOMEN SURGERY PROC UNLISTED  1997    lap nissen    [place] ABDOMEN SURGERY PROC UNLISTED  last one placed  2012    Hx of pancreatic stent, multiple    TOTAL COLECTOMY      UPPER GASTROINTESTINAL ENDOSCOPY  5/21/2021         UPPER GASTROINTESTINAL ENDOSCOPY  2017    36 fr tony    UPPER GASTROINTESTINAL ENDOSCOPY  12/18/2019         UPPER GASTROINTESTINAL ENDOSCOPY N/A 6/8/2022    EGD ESOPHAGOGASTRODUODENOSCOPY DILATATION/ 34 performed by Griselda Jesus MD at [address] [place] [address] N/A 7/22/2022    ENDOSCOPIC ULTRASOUND/35 ROOM 207 performed by Griselda Jesus MD at [address] [place] [address] N/A 7/22/2022    EGD BIOPSY With balloon dialtion performed by Griselda Jesus MD at [address]  4/25/13 at [facility]-- stent removed 6-27-13.   Dr Karely Velasco  2014    port insertion, left chest wall     In and  Family History   Problem Relation Age of Onset    No Known Problems Sister     Coronary Art Dis Father 76    Stroke Father     Hypertension Father Diabetes Father     Heart Disease Mother         cabg began in her 52's    Hypertension Mother     Diabetes Mother     Other Father      Social History     Tobacco Use    Smoking status: Former     Packs/day: 1.00     Types: Cigarettes     Quit date: 1993     Years since quittin.3    Smokeless tobacco: Never   Substance Use Topics    Alcohol use: No        Allergies   Allergen Reactions    Adhesive Tape Itching, Other (See Comments) and Rash     blisters  tegaderm  Paper tape too      Metronidazole Diarrhea and Nausea And Vomiting    Atorvastatin Myalgia    Iodine Other (See Comments)     Sweaty and clammy    Statins Myalgia    Barium Iodide Nausea And Vomiting     Diaphoresis      Barium Sulfate Palpitations    Insulin Lispro Nausea And Vomiting    Insulins Nausea And Vomiting     Humalog only    Metformin Nausea And Vomiting     Stomach pain  Stomach pain  Stomach pain  Stomach pain       No current facility-administered medications for this encounter. Review of Systems:  A detailed 10 organ review of systems is obtained with pertinent positives as listed in the History of Present Illness. All others are negative. Objective:     Physical Exam:  /81   Pulse 91   Temp 97.7 °F (36.5 °C) (Oral)   Resp 18   Ht 5' 2\" (1.575 m)   Wt 170 lb (77.1 kg)   SpO2 96%   BMI 31.09 kg/m²    General:  Alert and oriented. Heart: Regular rate and rhythm  Lungs:  Clear to auscultation bilaterally  Abdomen: Soft, nontender, nondistended    Impression/Plan:     Proceed with EGD as planned. I have discussed with the patient the technique, benefits, alternatives, and risks of these procedures, including medication reaction, immediate or delayed bleeding, or perforation of the gastrointestinal tract.      Signed By: Kathy Newman MD     August 10, 2022

## 2022-08-10 NOTE — PROGRESS NOTES
Care assumed from Elham Damon, report given at bedside. Educated patient that we are in contact with MD, pharmacy about nausea medications and pain medications.

## 2022-08-10 NOTE — ANESTHESIA POSTPROCEDURE EVALUATION
Department of Anesthesiology  Postprocedure Note    Patient: Mame Brooks  MRN: [id number]  YOB: 1970  Date of evaluation: 8/10/2022      Procedure Summary     Date: 08/10/22 Room / Location: [facility] ENDO 03 / [facility] ENDOSCOPY    Anesthesia Start: 0719 Anesthesia Stop: 3725    Procedure: EGD ESOPHAGOGASTRODUODENOSCOPY Diagnosis:       PEG tube malfunction ([address]      (PEG tube malfunction ([address] [T70.39])    Surgeons: Rosa Armenta MD Responsible Provider: Titi June MD    Anesthesia Type: TIVA ASA Status: 3          Anesthesia Type: TIVA    Karishma Phase I: Karishma Score: 10    Karishma Phase II: Karishma Score: 7      Anesthesia Post Evaluation    Patient location during evaluation: PACU  Patient participation: complete - patient participated  Level of consciousness: awake  Airway patency: patent  Nausea & Vomiting: nausea (Chronic nausea - unchanged from preop)  Complications: no  Cardiovascular status: blood pressure returned to baseline and hemodynamically stable  Respiratory status: acceptable  Hydration status: stable  Multimodal analgesia pain management approach

## 2022-08-10 NOTE — PROGRESS NOTES
Pt in bed dry heaving. No emesis produced.  updated. All medications given allowed by [facility] and MD orders. Pt educated.

## 2022-08-10 NOTE — H&P
[facility] H&P (Admission)        Date:  8/10/2022    Primary GI Physician:  Dr. Eric Guzman    Chief Complaint:  Ilean Michel tube infection, Severe Gastroparesis    Subjective:     History of Present Illness:  Patient is a 46 y.o. female with PMH ncluding but not limited to opioid induced constipation, IBS, Nissen Fundpolication, Sphincter of Oddi dysfunction, Severe Gastroparesis, who is being admitted for PEG-J infection, severe gastroparesis. Mrs. Simi Sanchez was recently admitted from 7/19/2022- 7/28/2022 for intractable nausea/vomiting. She had a long hx of nausea and vomiting and severe gastroparesis. During hospitalization, she had a NG tube placed to suction for decompression. She then underwent EGD with EUS with Dr. Eric Guzman on 7/22 outlined below. She continued to not tolerate oral intake and experienced nausea and vomiting. She was encouraged to reduce Narcotics. PEG-J was placed on 7/26/2022 with Dr. Alina Bernstein and that procedure is outlined below. She reported that she was feeling better following discharge and was actually about to go on vacation. However, accidentally got her J tube tangled up and it came out. This happened on 8/4/2022. She has not been able to tolerate much PO intake. She does tell me that she has been eating tomato sandwiches recently. She peels the tomatoes. Denies any recent changes in bowel habits. Denies any melena/hematochezia. EGD 6/8/22 with esophageal stricture dilated to balloon 18 mm and gastric bezoar. EUS 3/16/22 with esophogeal stricture s/p dilation, gastrojejunal anastomosis, mild pancreatic parenchymal changes without convincing evidence of chronic pancreatitis and fatty infiltration of the liver. Colonoscopy in 2018 with descending TA colon polyp and recall 4/2023. GES was reccommended after EGD wth bezoar.       PMH:  Past Medical History:   Diagnosis Date    Anemia     blood transfusion 2013 X1 d/t \"perforated bowel\"-- Fe infusions 12/2017--- denies any current iron infusions 12/17/2019    Anxiety and depression     managed with meds    C. difficile diarrhea 2013    in 8140 after complicated ERCP    Cervical dysplasia 1990s    Chronic insomnia     ambien     Chronic nausea     Chronic pain     all over     Chronic pancreatitis ([facility] [address]     COVID-19 vaccine series completed 04/21/2021    3/29/2021 Pfizer     Dehydration     IVFs through port as needed for this     Encounter for insertion of venous access port     Left chest port    Esophageal stricture 2017    Fibromyalgia     managed with meds    Former cigarette smoker     GERD (gastroesophageal reflux disease)     controlled with med    History of kidney stones 03/2021    X1-naturally pass    HLD (hyperlipidemia)     pt denies     IBS (irritable bowel syndrome)     Migraine headache     does not have often but did have one recently; just takes tylenol    Nausea & vomiting     pt reports she does better with phenergan than zofran - causes HA    Nonalcoholic fatty liver disease     Pancreatitis 6/23/2014    PUD (peptic ulcer disease) 06/2017    still having issues takes meds     Severe protein-calorie malnutrition ([address] 4/26/2013    Sinus tachycardia     per pt-- no tx needed    Sphincter of Oddi dysfunction     Type 2 diabetes mellitus ([address]     type 2-- sqbs am avg 150--- Hypo symptoms at <100, Insulin dependent; last A1C was 11/3/2020 = 9.0       PSH:  Past Surgical History:   Procedure Laterality Date    CARPAL TUNNEL RELEASE Right     along with trigger finger    CHOLECYSTECTOMY, LAPAROSCOPIC  1997    COLONOSCOPY      COLONOSCOPY N/A 4/4/2018    COLONOSCOPY performed by Jenise Hughes MD at [facility] ENDOSCOPY    ERCP  3/5/2013    resulted in perforated duodenum    GASTROSTOMY TUBE PLACEMENT N/A 7/26/2022    EGD PEG TUBE PLACEMENT with j tube placement ROOM 207 performed by Edward Martin MD at [address] [place] [address] (CERVIX STATUS UNKNOWN)  1998    ovaries remain    IR DRAIN SKIN ABSCESS      IR PORT PLACEMENT EQUAL OR GREATER THAN 5 YEARS  9/25/2018    IR PORT PLACEMENT EQUAL OR GREATER THAN 5 YEARS 9/25/2018 [facility] RADIOLOGY SPECIALS    LAPAROTOMY  3/5/2013    exploratory for duodenal perforation with ERCP    ORTHOPEDIC SURGERY      right finger surgery 11/2017    OTHER SURGICAL HISTORY Bilateral     thumb    OTHER SURGICAL HISTORY      Kidney stones march 2021    VA ABDOMEN SURGERY PROC UNLISTED  4/13    explor lap    VA ABDOMEN SURGERY PROC UNLISTED  1997    lap nissen    VA ABDOMEN SURGERY PROC UNLISTED  last one placed  2012    Hx of pancreatic stent, multiple    TOTAL COLECTOMY      UPPER GASTROINTESTINAL ENDOSCOPY  5/21/2021         UPPER GASTROINTESTINAL ENDOSCOPY  2017    36 fr tony    UPPER GASTROINTESTINAL ENDOSCOPY  12/18/2019         UPPER GASTROINTESTINAL ENDOSCOPY N/A 6/8/2022    EGD ESOPHAGOGASTRODUODENOSCOPY DILATATION/ 34 performed by Kathy Newman MD at [address] [place] [address] Dr Curtis N/A 7/22/2022    ENDOSCOPIC ULTRASOUND/35 ROOM 207 performed by Kathy Newman MD at [address] [place] [address] Dr Curtis N/A 7/22/2022    EGD BIOPSY With balloon dialtion performed by Kathy Newman MD at [address]  4/25/13 at [facility]-- stent removed 6-27-13. Dr Denice Parra  2014    port insertion, left chest wall       Allergies:   Allergies   Allergen Reactions    Adhesive Tape Itching, Other (See Comments) and Rash     blisters  tegaderm  Paper tape too      Metronidazole Diarrhea and Nausea And Vomiting    Atorvastatin Myalgia    Iodine Other (See Comments)     Sweaty and clammy    Statins Myalgia    Barium Iodide Nausea And Vomiting     Diaphoresis      Barium Sulfate Palpitations    Insulin Lispro Nausea And Vomiting    Insulins Nausea And Vomiting     Humalog only    Metformin Nausea And Vomiting     Stomach pain  Stomach pain  Stomach pain  Stomach pain         Home Medications:  Prior to Admission medications Medication Sig Start Date End Date Taking? Authorizing Provider   tiZANidine (ZANAFLEX) 4 MG tablet Take 4 mg by mouth every 6 hours as needed   Yes Historical Provider, MD   citalopram (CELEXA) 40 MG tablet Take 1 tablet by mouth daily 7/28/22   Mario Torres MD   promethazine (PHENERGAN) 25 MG tablet Take 1 tablet by mouth every 8 hours as needed for Nausea 7/28/22   Mario Torres MD   pantoprazole (PROTONIX) 40 MG tablet Take 1 tablet by mouth in the morning and 1 tablet before bedtime.  7/28/22   Mario Torres MD   lisinopril (PRINIVIL;ZESTRIL) 10 MG tablet Take 1 tablet by mouth in the morning. 7/29/22   Mario Torres MD   cloNIDine (CATAPRES) 0.3 MG/24HR PTWK Place 1 patch onto the skin once a week 8/4/22 9/3/22  Mario Torres MD   blood glucose test strips (EXACTECH TEST) strip TEST BLOOD SUGAR FOUR TIMES DAILY 8/24/20   Historical Provider, MD   acetaminophen (TYLENOL) 500 MG tablet Take by mouth every 6 hours as needed    Historical Provider, MD   cyanocobalamin 1000 MCG/ML injection INJECT 1 VIAL INTRAMUSCULARLY FOR 4 WEEKS THEN MONTHLY 11/25/17   Historical Provider, MD   diclofenac sodium (VOLTAREN) 1 % GEL APPLY 1 GRAM TO AFFECTED AREA 4 TIMES A DAY AS NEEDED 10/2/18   Historical Provider, MD   diphenhydrAMINE (BENADRYL) 25 MG capsule Take 25 mg by mouth every 6 hours as needed    Historical Provider, MD   glucagon 1 MG injection Inject 1 mg into the muscle as needed 5/8/17   Historical Provider, MD   Hyoscyamine Sulfate SL 0.125 MG SUBL Place 0.125 mg under the tongue every 6 hours as needed    Historical Provider, MD   influenza quadrivalent split vaccine (FLUZONE;FLUARIX;FLULAVAL;AFLURIA) 0.5 ML injection Inject into the muscle 11/9/21   Historical Provider, MD   insulin aspart (NOVOLOG) 100 UNIT/ML injection vial Use as directed 6/7/16   Historical Provider, MD   insulin aspart (NOVOLOG) 100 UNIT/ML injection pen 12 units at breakfast 14 units at lunch and 41 units at dinner plus Medications:  Current Facility-Administered Medications   Medication Dose Route Frequency    ondansetron (ZOFRAN) injection 4 mg  4 mg IntraVENous Q6H PRN       Social History:  Social History     Tobacco Use    Smoking status: Former     Packs/day: 1.00     Types: Cigarettes     Quit date: 1993     Years since quittin.3    Smokeless tobacco: Never   Substance Use Topics    Alcohol use: No         Family History:  Family History   Problem Relation Age of Onset    No Known Problems Sister     Coronary Art Dis Father 76    Stroke Father     Hypertension Father     Diabetes Father     Heart Disease Mother         cabg began in her 52's    Hypertension Mother     Diabetes Mother     Other Father        Review of Systems:  A detailed 10 system ROS is obtained, with pertinent positives as listed above. All others are negative. Objective:     Physical Exam:  Vitals:  BP (!) 148/78   Pulse 84   Temp 98 °F (36.7 °C) (Temporal)   Resp 18   Ht 5' 2\" (1.575 m)   Wt 170 lb (77.1 kg)   SpO2 94%   BMI 31.09 kg/m²   Gen:  Pt is alert, cooperative, no acute distress  Skin:  Extremities and face reveal no rashes. HEENT: Sclerae anicteric. Extra-occular muscles are intact. No oral ulcers. No abnormal pigmentation of the lips. The neck is supple. Cardiovascular: Regular rate and rhythm. No murmurs, gallops, or rubs. Respiratory:  Comfortable breathing with no accessory muscle use. Clear breath sounds with no wheezes, rales, or rhonchi. GI:  Abdomen nondistended, soft, and TTP IN EPIGASTRIC REGION AND PRIOR PEG-J SITE, THERE IS A DRESSING WHICH IS CDI,  Normal active bowel sounds. No enlargement of the liver or spleen. No masses palpable. Rectal:  Deferred  Musculoskeletal:  No pitting edema of the lower legs. Neurological:  Gross memory appears intact. Patient is alert and oriented. Psychiatric:  Mood appears appropriate with judgement intact.   Lymphatic:  No cervical or supraclavicular adenopathy. Laboratory:    No results for input(s): WBC, HGB, HCT, PLT, MCV, NA, K, CL, CO2, BUN, CREA, CA, MG, GLU, AST, ALT, ALB, TP, AML, INR, APTT in the last 72 hours. Invalid input(s): AP, GPT, TBIL, TBILI, DBIL, CBIL, LPSE, PTP      EGD with EUS on 7/22/2022 with Dr. Vaughn Mattson  Findings:      ENDOSCOPIC FINDINGS:   OROPHARYNX: Cords and pyriform recesses appear normal.   ESOPHAGUS: A benign-appearing intrinsic stenosis is seen in the distal esophagus. Dilation was performed using a CRE balloon to a maximum diameter of 18 mm. Successful dilation was confirmed by the presence of mucosal disruption. STOMACH: On retroflexion, the flap-valve is classified as Hill Grade I, with a normal, prominent tissue fold at the lesser curvature closely approximated to the endoscope. There is an intact Nissen fundoplication. Mild erythematous gastritis was seen in the gastric body. Biopsies ere obtained for H pylori. The gastrojejunal anastomosis appears normal. The examined portion of the jejunum is unremarkable. EUS FINDINGS:  PANCREAS:  The pancreas is well-visualized from head to tail. There is diffuse mild fatty infiltration of pancreatic parenchyma. No cysts or masses are visualized. The main pancreatic duct is of normal caliber with a smooth, regular course,  measuring up to 3 mm in the head, 2 mm in the body and 1 mm in the tail. Pancreas divisum is not seen. BILIARY TREE: The gallbladder is absent. The common bile duct is well-visualized from its insertion in the ampulla to the bifurcation of right and left hepatic ducts. It is mildly dilated, measuring up to 9 mm maximally with a gradual taper down  to the level of the ampulla. There are no intraductal stones, sludge or debris. The ampulla appears normal endosonographically. OTHER ORGANS:  Views of the left lobe of the liver demonstrate no solid mass lesions. Fatty infiltration of the liver. There is no ascites in the upper abdomen.  The left adrenal morning. 3. Routine ostomy care. Mark French MD  7/26/2022  8:34 AM  Assessment:       Principal Problem:    PEG tube malfunction ([facility] [address]  Active Problems:    Infection of PEG site ([facility] [address]    GERD with stricture    Intractable nausea and vomiting    Type 2 diabetes mellitus with hyperglycemia, with long-term current use of insulin (HCC)  Resolved Problems:    * No resolved hospital problems. *    54 yo female patient of Dr. Evy Sibley with PMH ncluding but not limited to opioid induced constipation, IBS, Nissen Fundpolication, Sphincter of Oddi dysfunction, Severe Gastroparesis, who is being admitted today following EGD for PEG-J change after patient inadvertently pulled out her J-tube. She had a J tube placed recently for severe gastroparesis. She has noted purulent drainage as well from the site. The GJ tube has been removed. Plan:       Check labs  Consult Surgery for GJ tube placement  Antibiotics for PEG-J site infection. She is allergic to Flagyl. Will start on IV cipro. Continue with home medications  IVF  NPO for now  Antiemetics PRN  Pain management- Would like to minimize narcotics and this was discussed with patient today. Dilaudid 0.5mg IV every 4 hours. We will consult hospital medicine for help with managing her blood glucose levels    MARISOL Vo    Patient is seen and examined in collaboration with Dr. Hardik Salinas. Assessment and plan as per Dr. Hardik Salinas.

## 2022-08-10 NOTE — PROGRESS NOTES
Pt requesting ice chips and more pain medication at this time. Dr. Vidhi Taylor notified no orders received for pain medication at this time; per MD nails with pt having ice chips.

## 2022-08-10 NOTE — PROGRESS NOTES
TRANSFER - OUT REPORT:    Verbal report given to [facility] on Ness Yi  being transferred to [facility] [place] 2[place] for routine progression of patient care       Report consisted of patient's Situation, Background, Assessment and   Recommendations(SBAR). Information from the following report(s) Nurse Handoff Report, Adult Overview, Intake/Output, MAR, Recent Results, and Med Rec Status was reviewed with the receiving nurse. Lines:   Single Lumen Implantable Port 05/26/22 Left Subclavian (Active)   Criteria for Appropriate Use Limited/no vessel suitable for conventional peripheral access 08/10/22 0640   Site Assessment Clean, dry & intact 08/10/22 0640   Alcohol Cap Used Yes 08/10/22 0640   Dressing Type Transparent 08/10/22 0640   Dressing Status Clean, dry & intact 08/10/22 0640   Single Lumen Status Flushed 08/10/22 0640        Opportunity for questions and clarification was provided.       Patient transported with:  Registered Nurse

## 2022-08-10 NOTE — PROGRESS NOTES
Pt stating she has 9/10 pain at j tube removal site as well as nausea. Dr. Marge Colin notified  and verbal orders received for 0.5 of dilaudid IV and to repeat the same dose in 10 min if needed for two doses. Verbal orders also received for 4mg of zofran for pt nausea.

## 2022-08-10 NOTE — PROGRESS NOTES
[address] [place] [address] received request from RN for a meal tray for PT's Spouse. PT was in bed with Spouse at bedside. PT told [address] [place] [address] about PT's upcoming surgery. PT expressed concerns about surgery. [address] [place] [address] provided caring spiritual presence, active listening, and therapeutic communication. PT expressed importance of bo and prayer. PT is Roman Catholic. PT requested prayer.  offered prayer and additional support. [address] [place] [address] verified request for meals and checked for allergies. [address] [place] [address] checked with RN . [address] [place] [address] requested a tray for dinner on 8/10 and breakfast, lunch, and dinner on 8/11 and 8 /12. Rev. Segundo Jasso M.Div.

## 2022-08-11 LAB
ALBUMIN SERPL-MCNC: 2.9 G/DL (ref 3.5–5)
ALBUMIN/GLOB SERPL: 0.7 {RATIO} (ref 1.2–3.5)
ALP SERPL-CCNC: 91 U/L (ref 50–136)
ALT SERPL-CCNC: 32 U/L (ref 12–65)
ANION GAP SERPL CALC-SCNC: 5 MMOL/L (ref 7–16)
AST SERPL-CCNC: 23 U/L (ref 15–37)
BASOPHILS # BLD: 0 K/UL (ref 0–0.2)
BASOPHILS NFR BLD: 0 % (ref 0–2)
BILIRUB SERPL-MCNC: 0.4 MG/DL (ref 0.2–1.1)
BUN SERPL-MCNC: 6 MG/DL (ref 6–23)
CALCIUM SERPL-MCNC: 8.4 MG/DL (ref 8.3–10.4)
CHLORIDE SERPL-SCNC: 110 MMOL/L (ref 98–107)
CO2 SERPL-SCNC: 26 MMOL/L (ref 21–32)
CREAT SERPL-MCNC: 0.6 MG/DL (ref 0.6–1)
DIFFERENTIAL METHOD BLD: ABNORMAL
EOSINOPHIL # BLD: 0.1 K/UL (ref 0–0.8)
EOSINOPHIL NFR BLD: 2 % (ref 0.5–7.8)
ERYTHROCYTE [DISTWIDTH] IN BLOOD BY AUTOMATED COUNT: 18.1 % (ref 11.9–14.6)
GLOBULIN SER CALC-MCNC: 3.9 G/DL (ref 2.3–3.5)
GLUCOSE BLD STRIP.AUTO-MCNC: 153 MG/DL (ref 65–100)
GLUCOSE BLD STRIP.AUTO-MCNC: 161 MG/DL (ref 65–100)
GLUCOSE BLD STRIP.AUTO-MCNC: 163 MG/DL (ref 65–100)
GLUCOSE BLD STRIP.AUTO-MCNC: 173 MG/DL (ref 65–100)
GLUCOSE SERPL-MCNC: 115 MG/DL (ref 65–100)
HCT VFR BLD AUTO: 32.8 % (ref 35.8–46.3)
HGB BLD-MCNC: 9.6 G/DL (ref 11.7–15.4)
IMM GRANULOCYTES # BLD AUTO: 0 K/UL (ref 0–0.5)
IMM GRANULOCYTES NFR BLD AUTO: 0 % (ref 0–5)
LYMPHOCYTES # BLD: 1.8 K/UL (ref 0.5–4.6)
LYMPHOCYTES NFR BLD: 36 % (ref 13–44)
MAGNESIUM SERPL-MCNC: 1.9 MG/DL (ref 1.8–2.4)
MCH RBC QN AUTO: 21.9 PG (ref 26.1–32.9)
MCHC RBC AUTO-ENTMCNC: 29.3 G/DL (ref 31.4–35)
MCV RBC AUTO: 74.9 FL (ref 79.6–97.8)
MONOCYTES # BLD: 0.5 K/UL (ref 0.1–1.3)
MONOCYTES NFR BLD: 10 % (ref 4–12)
NEUTS SEG # BLD: 2.6 K/UL (ref 1.7–8.2)
NEUTS SEG NFR BLD: 51 % (ref 43–78)
NRBC # BLD: 0 K/UL (ref 0–0.2)
PLATELET # BLD AUTO: 206 K/UL (ref 150–450)
PMV BLD AUTO: 10.9 FL (ref 9.4–12.3)
POTASSIUM SERPL-SCNC: 4.1 MMOL/L (ref 3.5–5.1)
PROT SERPL-MCNC: 6.8 G/DL (ref 6.3–8.2)
RBC # BLD AUTO: 4.38 M/UL (ref 4.05–5.2)
SERVICE CMNT-IMP: ABNORMAL
SODIUM SERPL-SCNC: 141 MMOL/L (ref 136–145)
WBC # BLD AUTO: 5.1 K/UL (ref 4.3–11.1)

## 2022-08-11 PROCEDURE — 6370000000 HC RX 637 (ALT 250 FOR IP): Performed by: NURSE PRACTITIONER

## 2022-08-11 PROCEDURE — 85025 COMPLETE CBC W/AUTO DIFF WBC: CPT

## 2022-08-11 PROCEDURE — G0378 HOSPITAL OBSERVATION PER HR: HCPCS

## 2022-08-11 PROCEDURE — 80053 COMPREHEN METABOLIC PANEL: CPT

## 2022-08-11 PROCEDURE — 96376 TX/PRO/DX INJ SAME DRUG ADON: CPT

## 2022-08-11 PROCEDURE — 6360000002 HC RX W HCPCS: Performed by: NURSE PRACTITIONER

## 2022-08-11 PROCEDURE — 2580000003 HC RX 258: Performed by: NURSE PRACTITIONER

## 2022-08-11 PROCEDURE — 2580000003 HC RX 258: Performed by: FAMILY MEDICINE

## 2022-08-11 PROCEDURE — 96374 THER/PROPH/DIAG INJ IV PUSH: CPT

## 2022-08-11 PROCEDURE — 82962 GLUCOSE BLOOD TEST: CPT

## 2022-08-11 PROCEDURE — 83735 ASSAY OF MAGNESIUM: CPT

## 2022-08-11 RX ORDER — POLYETHYLENE GLYCOL 3350 17 G/17G
17 POWDER, FOR SOLUTION ORAL DAILY
Status: DISCONTINUED | OUTPATIENT
Start: 2022-08-11 | End: 2022-08-12 | Stop reason: HOSPADM

## 2022-08-11 RX ADMIN — LORAZEPAM 1 MG: 1 TABLET ORAL at 06:05

## 2022-08-11 RX ADMIN — CIPROFLOXACIN 400 MG: 2 INJECTION, SOLUTION INTRAVENOUS at 21:26

## 2022-08-11 RX ADMIN — HYDROMORPHONE HYDROCHLORIDE 0.5 MG: 1 INJECTION, SOLUTION INTRAMUSCULAR; INTRAVENOUS; SUBCUTANEOUS at 20:48

## 2022-08-11 RX ADMIN — ZOLPIDEM TARTRATE 10 MG: 5 TABLET ORAL at 21:56

## 2022-08-11 RX ADMIN — HYOSCYAMINE SULFATE 0.12 MG: 0.12 TABLET ORAL; SUBLINGUAL at 08:49

## 2022-08-11 RX ADMIN — ACETAMINOPHEN 650 MG: 325 TABLET, FILM COATED ORAL at 10:43

## 2022-08-11 RX ADMIN — PANTOPRAZOLE SODIUM 40 MG: 40 TABLET, DELAYED RELEASE ORAL at 20:48

## 2022-08-11 RX ADMIN — HYDROMORPHONE HYDROCHLORIDE 0.5 MG: 1 INJECTION, SOLUTION INTRAMUSCULAR; INTRAVENOUS; SUBCUTANEOUS at 15:02

## 2022-08-11 RX ADMIN — HYDROMORPHONE HYDROCHLORIDE 0.5 MG: 1 INJECTION, SOLUTION INTRAMUSCULAR; INTRAVENOUS; SUBCUTANEOUS at 10:43

## 2022-08-11 RX ADMIN — PANTOPRAZOLE SODIUM 40 MG: 40 TABLET, DELAYED RELEASE ORAL at 08:50

## 2022-08-11 RX ADMIN — PROMETHAZINE HYDROCHLORIDE 25 MG: 25 TABLET ORAL at 03:38

## 2022-08-11 RX ADMIN — DIPHENHYDRAMINE HYDROCHLORIDE 25 MG: 25 CAPSULE ORAL at 10:43

## 2022-08-11 RX ADMIN — DIPHENHYDRAMINE HYDROCHLORIDE 25 MG: 25 CAPSULE ORAL at 04:49

## 2022-08-11 RX ADMIN — CIPROFLOXACIN 400 MG: 2 INJECTION, SOLUTION INTRAVENOUS at 08:50

## 2022-08-11 RX ADMIN — LORAZEPAM 1 MG: 1 TABLET ORAL at 12:27

## 2022-08-11 RX ADMIN — CITALOPRAM HYDROBROMIDE 20 MG: 20 TABLET ORAL at 08:50

## 2022-08-11 RX ADMIN — PREGABALIN 100 MG: 100 CAPSULE ORAL at 08:50

## 2022-08-11 RX ADMIN — ONDANSETRON 4 MG: 4 TABLET, ORALLY DISINTEGRATING ORAL at 08:49

## 2022-08-11 RX ADMIN — LORAZEPAM 1 MG: 1 TABLET ORAL at 18:18

## 2022-08-11 RX ADMIN — PROMETHAZINE HYDROCHLORIDE 25 MG: 25 TABLET ORAL at 15:02

## 2022-08-11 RX ADMIN — PREGABALIN 100 MG: 100 CAPSULE ORAL at 20:48

## 2022-08-11 RX ADMIN — LISINOPRIL 10 MG: 5 TABLET ORAL at 08:53

## 2022-08-11 RX ADMIN — POLYETHYLENE GLYCOL 3350 17 G: 17 POWDER, FOR SOLUTION ORAL at 12:27

## 2022-08-11 RX ADMIN — SODIUM CHLORIDE, PRESERVATIVE FREE 8 ML: 5 INJECTION INTRAVENOUS at 08:54

## 2022-08-11 RX ADMIN — ZOLPIDEM TARTRATE 10 MG: 5 TABLET ORAL at 00:18

## 2022-08-11 RX ADMIN — SODIUM CHLORIDE: 900 INJECTION, SOLUTION INTRAVENOUS at 06:41

## 2022-08-11 RX ADMIN — HYDROMORPHONE HYDROCHLORIDE 0.5 MG: 1 INJECTION, SOLUTION INTRAMUSCULAR; INTRAVENOUS; SUBCUTANEOUS at 02:25

## 2022-08-11 RX ADMIN — ONDANSETRON 4 MG: 4 TABLET, ORALLY DISINTEGRATING ORAL at 00:18

## 2022-08-11 RX ADMIN — HYDROMORPHONE HYDROCHLORIDE 0.5 MG: 1 INJECTION, SOLUTION INTRAMUSCULAR; INTRAVENOUS; SUBCUTANEOUS at 06:41

## 2022-08-11 RX ADMIN — ONDANSETRON 4 MG: 4 TABLET, ORALLY DISINTEGRATING ORAL at 18:18

## 2022-08-11 RX ADMIN — DIPHENHYDRAMINE HYDROCHLORIDE 25 MG: 25 CAPSULE ORAL at 18:18

## 2022-08-11 ASSESSMENT — PAIN SCALES - GENERAL
PAINLEVEL_OUTOF10: 8
PAINLEVEL_OUTOF10: 0
PAINLEVEL_OUTOF10: 0
PAINLEVEL_OUTOF10: 8
PAINLEVEL_OUTOF10: 8
PAINLEVEL_OUTOF10: 10
PAINLEVEL_OUTOF10: 5

## 2022-08-11 ASSESSMENT — PAIN DESCRIPTION - DESCRIPTORS
DESCRIPTORS: ACHING;SHARP
DESCRIPTORS: DISCOMFORT;ACHING
DESCRIPTORS: SHARP;DISCOMFORT

## 2022-08-11 ASSESSMENT — PAIN DESCRIPTION - LOCATION
LOCATION: ABDOMEN

## 2022-08-11 ASSESSMENT — PAIN DESCRIPTION - ORIENTATION
ORIENTATION: MID;LEFT

## 2022-08-11 ASSESSMENT — PAIN - FUNCTIONAL ASSESSMENT
PAIN_FUNCTIONAL_ASSESSMENT: ACTIVITIES ARE NOT PREVENTED
PAIN_FUNCTIONAL_ASSESSMENT: ACTIVITIES ARE NOT PREVENTED

## 2022-08-11 ASSESSMENT — PAIN DESCRIPTION - PAIN TYPE
TYPE: ACUTE PAIN
TYPE: ACUTE PAIN

## 2022-08-11 NOTE — PROGRESS NOTES
[facility] Progress Note         Admit Date:  8/10/2022    Today's Date:  8/11/2022    CC:  intractable nausea/vomiting. Severe gastroparesis, PEG site cellulitis    Subjective:     Patient reports some improvement in nausea and pain following IV dilaudid and benadryl. She reports lack of sleep and would like something to help with that. She did receive Ambien last night. Had some dry heaving overnight. Reports feeling constipated and passed a few \"small pellets\" overnight.      Medications:   Current Facility-Administered Medications   Medication Dose Route Frequency    citalopram (CELEXA) tablet 20 mg  20 mg Oral Daily    diphenhydrAMINE (BENADRYL) capsule 25 mg  25 mg Oral Q6H PRN    hyoscyamine (LEVSIN/SL) sublingual tablet 0.125 mg  0.125 mg SubLINGual Q6H PRN    lisinopril (PRINIVIL;ZESTRIL) tablet 10 mg  10 mg Oral Daily    LORazepam (ATIVAN) tablet 1 mg  1 mg Oral Q6H PRN    pantoprazole (PROTONIX) tablet 40 mg  40 mg Oral BID    polyethylene glycol (GLYCOLAX) packet 17 g  17 g Oral PRN    pregabalin (LYRICA) capsule 100 mg  100 mg Oral BID    promethazine (PHENERGAN) tablet 25 mg  25 mg Oral Q8H PRN    zolpidem (AMBIEN) tablet 10 mg  10 mg Oral Nightly PRN    tiZANidine (ZANAFLEX) tablet 4 mg  4 mg Oral Q6H PRN    sodium chloride flush 0.9 % injection 5-40 mL  5-40 mL IntraVENous 2 times per day    sodium chloride flush 0.9 % injection 5-40 mL  5-40 mL IntraVENous PRN    0.9 % sodium chloride infusion   IntraVENous PRN    ondansetron (ZOFRAN-ODT) disintegrating tablet 4 mg  4 mg Oral Q8H PRN    Or    ondansetron (ZOFRAN) injection 4 mg  4 mg IntraVENous Q6H PRN    acetaminophen (TYLENOL) tablet 650 mg  650 mg Oral Q6H PRN    Or    acetaminophen (TYLENOL) suppository 650 mg  650 mg Rectal Q6H PRN    polyethylene glycol (GLYCOLAX) packet 17 g  17 g Oral Daily PRN    0.9 % sodium chloride infusion   IntraVENous Continuous    glucose chewable tablet 16 g  4 tablet Oral PRN    dextrose bolus 10% 125 mL  125 mL IntraVENous PRN    Or    dextrose bolus 10% 250 mL  250 mL IntraVENous PRN    glucagon (rDNA) injection 1 mg  1 mg SubCUTAneous PRN    dextrose 10 % infusion   IntraVENous Continuous PRN    HYDROmorphone HCl PF (DILAUDID) injection 0.5 mg  0.5 mg IntraVENous Q4H PRN    ciprofloxacin (CIPRO) IVPB 400 mg  400 mg IntraVENous Q12H    promethazine (PHENERGAN) suppository 25 mg  25 mg Rectal Q6H PRN    prochlorperazine (COMPAZINE) suppository 25 mg  25 mg Rectal Q12H PRN    glucose chewable tablet 16 g  4 tablet Oral PRN    dextrose bolus 10% 125 mL  125 mL IntraVENous PRN    Or    dextrose bolus 10% 250 mL  250 mL IntraVENous PRN    glucagon (rDNA) injection 1 mg  1 mg SubCUTAneous PRN    dextrose 10 % infusion   IntraVENous Continuous PRN    insulin lispro (HUMALOG) injection vial 0-8 Units  0-8 Units SubCUTAneous TID WC    insulin lispro (HUMALOG) injection vial 0-4 Units  0-4 Units SubCUTAneous Nightly       Review of Systems:  ROS was obtained, with pertinent positives as listed above. No chest pain or SOB. Diet:      Objective:   Vitals:  /80   Pulse 100   Temp 98.4 °F (36.9 °C) (Oral)   Resp 18   Ht 5' 2\" (1.575 m)   Wt 170 lb (77.1 kg)   SpO2 98%   BMI 31.09 kg/m²   Intake/Output:  08/11 0701 - 08/11 1900  In: -   Out: 300 [Urine:300]  08/09 1901 - 08/11 0700  In: 2456.6 [I.V.:2456.6]  Out: 1100 [Urine:1100]  Exam:  General appearance: alert, cooperative, no distress  Lungs: clear to auscultation bilaterally anteriorly  Heart: regular rate and rhythm  Abdomen: soft, non-tender. PEG site with small amount of purulent drainage.   Bowel sounds normal. No masses, no organomegaly  Extremities: extremities normal, atraumatic, no cyanosis or edema  Neuro:  alert and oriented    Data Review (Labs):    Recent Labs     08/11/22  0439 08/11/22  0440   WBC  --  5.1   HGB  --  9.6*   HCT  --  32.8*   PLT  --  206   MCV  --  74.9*   NA  --  141   K  --  4.1   CL  --  110*   CO2  --  26   BUN --  6   MG 1.9  --    AST  --  23   ALT  --  32       Assessment:     Principal Problem:    PEG tube malfunction (HCC)  Active Problems:    Class 1 obesity with serious comorbidity and body mass index (BMI) of 31.0 to 31.9 in adult    Infection of PEG site ([facility] [address]    Malfunction of jejunostomy tube (HCC)    Gastroparesis due to DM ([facility] [address]    GERD with stricture    Intractable nausea and vomiting    Type 2 diabetes mellitus with hyperglycemia, with long-term current use of insulin ([facility] [address]  Resolved Problems:    * No resolved hospital problems. *      43 yo female patient of Dr. Jessica Lamb with PMH ncluding but not limited to opioid induced constipation, IBS, Nissen Fundpolication, Sphincter of Oddi dysfunction, Severe Gastroparesis, who is being admitted today following EGD for PEG-J change after patient inadvertently pulled out her J-tube. She had a J tube placed recently for severe gastroparesis. She has noted purulent drainage as well from the site. The GJ tube has been removed. 8/11/2022: WBC 5.1, HGB 9.6, MCV 74, . Plan:   [address] [facility] and Surgical Consults  Per Surgery, Surgical GJ tube placement not recommended at this time. Dr. Lottie Oro recommended IR evaluation for GJ tube placement after current infection has resolved. On IV cipro for cellulitis of PEG site. [facility] med recommendations. Wound cultures performed overnight and pending. Continue with PPI     Home  meds continued    Dilaudid 0.5mg IV every 4 hours PRN. Trying to minimize opioids as these will exacerbate the delayed gastric emptying. She voices understanding. Compazine, Phenergan, Zofran available for nausea    Offered NGT for decompression but she declined this. She reports that she would like anesthesia for NGT placement, if needed. Will give a full liquid diet today. Start on Miralax 17gm PO Qday     She is asking for Haldol today for nausea and sleep.  I explained that Haldol along with Compazine, zofran, phenergan can be high risk for prolonged QT interval.     DVT Prophylaxis: MARISOL Brewer    Patient is seen and examined in collaboration with Dr. Jenny Mccord. Assessment and plan as per Dr. Jenny Mccord.

## 2022-08-11 NOTE — PROGRESS NOTES
Hospitalist Progress Note   Admit Date:  8/10/2022  5:47 AM   Name:  Dayanara Gaines   Age:  46 y.o. Sex:  female  :  1970   MRN:  [id number]   Room:  214/01    Presenting Complaint: No chief complaint on file. Reason(s) for Admission: [facility] tube malfunction ([facility] [K94.23]  Malfunction of jejunostomy tube [facility]) [facility] Course & Interval History:   Please refer to the admission H&P for details of presentation. In summary, Dayanara Gaines is a 46 y.o. female with medical history significant for obesity, severe gastroparesis, opioid-induced constipation, IBS, GERD s/p nissen fundoplication, esophageal stricture disposable dilatation, speech Oddi dysfunction who was admitted for PEG-J infection, severe gastroparesis. She was recently admitted from - for intractable n/v and   underwent EGD with EUS w/ Dr. Ivana Ribera showing dilated esophageal stricture, gastritis, fatty infiltration of the liver, pancreatic lipomatosis. PEG-J placed on 22. She reported feeling better following DC. She accidentally got her J tube tangled up and it came out on 21 while rec'ing nutrition. Since then, not able to tolerate much PO intake. She underwent EGD 8/10 which showed cellullitis at ostomy site and G tube was removed, the j tube had been previously removed inadveretently by the patient. Subjective/24 hr Events (22) : Patient is seen and examined at bedside. Patient continues to report nausea not relieved with antiemetics. Says she has not been able to rest/sleep since arrival due to continued nausea. Continues to have abdominal pain. Patient denies fever, chills, chest pains, shortness of breath. Reports hx of abdominal abscesses after abdominal procedure in the past (~). Review of Systems: 10 point review of systems is otherwise negative with the exception of the elements mentioned above.         Assessment & Plan:   PEG tube malfunction  -removed by GI yesterday. Surgery was consulted and are not recommending surgical GJ tube placement at this time. Recommending IR evaluation with GJ tube placement after current patient was resolved. -management as per primary    PEG-J Site infection/Cellulitis  -continue on IV Cipro while pending wound cultures pending  -no signs of systemic infectious process. Afebrile without leukocytosis. Gastroparesis due to DM with intractable nausea  - Compazine, Phenergan, Zofran available for nausea  - refused NGT for decompression.   - start full liquid diet  - miralax PO daily    Class 1 obesity with serious comorbidity and body mass index (BMI) of 31.0 to 27.9 in adult - [facility] care      Type 2 diabetes mellitus with hyperglycemia, with long-term current use of insulin  - on humalog sliding scale  - monitor FS 3 times daily prior to meals and prior to bedtime    Diet:  ADULT DIET; Full Liquid; nothing red  DVT PPx: SCDs per primary  Code status: Full Code      I have personally reviewed and ordered clinical lab tests and independently visualized images, tracing. I spent 35 minutes of time caring for this patient at bedside or nearby, and more than 50 percent of which was spent on coordination of care and/or patient/family counseling regarding the disease process, status , and treatment options/plan of care. Hospital Problems:  Principal Problem:    PEG tube malfunction ([address]  Active Problems:    Class 1 obesity with serious comorbidity and body mass index (BMI) of 31.0 to 31.9 in adult    Infection of PEG site ([address]    Malfunction of jejunostomy tube (HCC)    Gastroparesis due to DM ([address]    GERD with stricture    Intractable nausea and vomiting    Type 2 diabetes mellitus with hyperglycemia, with long-term current use of insulin ([address]  Resolved Problems:    * No resolved hospital problems.  *      Objective:   Patient Vitals for the past 24 hrs:   Temp Pulse Resp BP SpO2   08/11/22 1100 98.2 °F (36.8 °C) 94 18 (!) 144/82 98 %   08/11/22 0732 98.4 °F (36.9 °C) 100 18 130/80 98 %   08/11/22 0416 98.8 °F (37.1 °C) 89 18 (!) 142/73 94 %   08/11/22 0011 98.1 °F (36.7 °C) 89 18 (!) 149/71 91 %   08/10/22 2000 98.1 °F (36.7 °C) 93 18 (!) 153/66 91 %   08/10/22 1639 97.8 °F (36.6 °C) (!) 105 18 (!) 154/71 94 %   08/10/22 1400 -- -- -- (!) 178/82 99 %   08/10/22 1350 -- -- -- -- 90 %       Oxygen Therapy  SpO2: 98 %  Pulse via Oximetry: 97 beats per minute  Pulse Oximeter Device Mode: Intermittent  O2 Device: None (Room air)  O2 Flow Rate (L/min): 2 L/min    Estimated body mass index is 31.09 kg/m² as calculated from the following:    Height as of this encounter: 5' 2\" (1.575 m). Weight as of this encounter: 170 lb (77.1 kg). Intake/Output Summary (Last 24 hours) at 8/11/2022 1346  Last data filed at 8/11/2022 1100  Gross per 24 hour   Intake 2256.57 ml   Output 2200 ml   Net 56.57 ml         Physical Exam:   Blood pressure (!) 144/82, pulse 94, temperature 98.2 °F (36.8 °C), temperature source Oral, resp. rate 18, height 5' 2\" (1.575 m), weight 170 lb (77.1 kg), SpO2 98 %. General:    Alert and awake. Not in distress  Head:  Normocephalic, atraumatic  Eyes:  Sclerae appear normal.  Pupils equally round. ENT:  Nares appear normal, no drainage. Moist oral mucosa  Neck:  No restricted ROM. Trachea midline   CV:   RRR. No m/r/g. No jugular venous distension. Lungs:   CTAB. No wheezing  Symmetric expansion. Abdomen: Bowel sounds present. Soft, slight tenderness to palpation of epigastric region, nondistended. PEG site with small amount of purulent drainage  Extremities: No cyanosis or clubbing. No edema  Skin:     No rashes and normal coloration. Warm and dry. Neuro:  CN II-XII grossly intact. Sensation intact. A&Ox3  Psych:  Normal mood and affect.       I have personally reviewed labs and tests showing:  Recent Labs:  Recent Results (from the past 48 hour(s))   POCT Glucose    Collection Time: 08/10/22  6:44 AM Result Value Ref Range    POC Glucose 92 65 - 100 mg/dL    Performed by: Floridalma Schaefer    POCT Glucose    Collection Time: 08/10/22  1:35 PM   Result Value Ref Range    POC Glucose 99 65 - 100 mg/dL    Performed by: Nany    POCT Glucose    Collection Time: 08/10/22  2:35 PM   Result Value Ref Range    POC Glucose 85 65 - 100 mg/dL    Performed by: Tereza    POCT Glucose    Collection Time: 08/10/22  3:10 PM   Result Value Ref Range    POC Glucose 89 65 - 100 mg/dL    Performed by: Twila    POCT Glucose    Collection Time: 08/10/22  4:48 PM   Result Value Ref Range    POC Glucose 111 (H) 65 - 100 mg/dL    Performed by: [facility]    POCT Glucose    Collection Time: 08/10/22  9:20 PM   Result Value Ref Range    POC Glucose 133 (H) 65 - 100 mg/dL    Performed by: Romi    Culture, Wound    Collection Time: 08/10/22 10:00 PM    Specimen: Skin   Result Value Ref Range    Special Requests NO SPECIAL REQUESTS      Gram stain 5 TO 10 WBCS SEEN /OIF     Gram stain NO DEFINITE ORGANISM SEEN      Culture        No growth after short period of incubation. Further results to follow after overnight incubation.    Magnesium    Collection Time: 08/11/22  4:39 AM   Result Value Ref Range    Magnesium 1.9 1.8 - 2.4 mg/dL   CBC with Auto Differential    Collection Time: 08/11/22  4:40 AM   Result Value Ref Range    WBC 5.1 4.3 - 11.1 K/uL    RBC 4.38 4.05 - 5.2 M/uL    Hemoglobin 9.6 (L) 11.7 - 15.4 g/dL    Hematocrit 32.8 (L) 35.8 - 46.3 %    MCV 74.9 (L) 79.6 - 97.8 FL    MCH 21.9 (L) 26.1 - 32.9 PG    MCHC 29.3 (L) 31.4 - 35.0 g/dL    RDW 18.1 (H) 11.9 - 14.6 %    Platelets 090 408 - 839 K/uL    MPV 10.9 9.4 - 12.3 FL    nRBC 0.00 0.0 - 0.2 K/uL    Differential Type AUTOMATED      Seg Neutrophils 51 43 - 78 %    Lymphocytes 36 13 - 44 %    Monocytes 10 4.0 - 12.0 %    Eosinophils % 2 0.5 - 7.8 %    Basophils 0 0.0 - 2.0 %    Immature Granulocytes 0 0.0 - 5.0 %    Segs Absolute 2.6 pregabalin (LYRICA) capsule 100 mg  100 mg Oral BID    promethazine (PHENERGAN) tablet 25 mg  25 mg Oral Q8H PRN    zolpidem (AMBIEN) tablet 10 mg  10 mg Oral Nightly PRN    tiZANidine (ZANAFLEX) tablet 4 mg  4 mg Oral Q6H PRN    sodium chloride flush 0.9 % injection 5-40 mL  5-40 mL IntraVENous 2 times per day    sodium chloride flush 0.9 % injection 5-40 mL  5-40 mL IntraVENous PRN    0.9 % sodium chloride infusion   IntraVENous PRN    ondansetron (ZOFRAN-ODT) disintegrating tablet 4 mg  4 mg Oral Q8H PRN    Or    ondansetron (ZOFRAN) injection 4 mg  4 mg IntraVENous Q6H PRN    acetaminophen (TYLENOL) tablet 650 mg  650 mg Oral Q6H PRN    Or    acetaminophen (TYLENOL) suppository 650 mg  650 mg Rectal Q6H PRN    0.9 % sodium chloride infusion   IntraVENous Continuous    glucose chewable tablet 16 g  4 tablet Oral PRN    dextrose bolus 10% 125 mL  125 mL IntraVENous PRN    Or    dextrose bolus 10% 250 mL  250 mL IntraVENous PRN    glucagon (rDNA) injection 1 mg  1 mg SubCUTAneous PRN    dextrose 10 % infusion   IntraVENous Continuous PRN    HYDROmorphone HCl PF (DILAUDID) injection 0.5 mg  0.5 mg IntraVENous Q4H PRN    ciprofloxacin (CIPRO) IVPB 400 mg  400 mg IntraVENous Q12H    promethazine (PHENERGAN) suppository 25 mg  25 mg Rectal Q6H PRN    prochlorperazine (COMPAZINE) suppository 25 mg  25 mg Rectal Q12H PRN    glucose chewable tablet 16 g  4 tablet Oral PRN    dextrose bolus 10% 125 mL  125 mL IntraVENous PRN    Or    dextrose bolus 10% 250 mL  250 mL IntraVENous PRN    glucagon (rDNA) injection 1 mg  1 mg SubCUTAneous PRN    dextrose 10 % infusion   IntraVENous Continuous PRN    insulin lispro (HUMALOG) injection vial 0-8 Units  0-8 Units SubCUTAneous TID WC    insulin lispro (HUMALOG) injection vial 0-4 Units  0-4 Units SubCUTAneous Nightly       Signed:  Claudius Kanner, MD    Part of this note may have been written by using a voice dictation software.   The note has been proof read but may still contain some

## 2022-08-11 NOTE — PROGRESS NOTES
Spiritual Care Visit, initial visit. Visited with patient at bedside. Provided meal for patient's spouse. Prayed for patient's healing and health. Visit by Ian Child, Staff .  Maria Del Carmen, Torie.B., B.A.

## 2022-08-11 NOTE — PROGRESS NOTES
RNCM reviewed chart. Patient last admission 07/19 - 07/28 with PEG J-tube placement. Patient returned to facility with PEG-J tube accidentally being tangled up and coming out on 08/04. Gi admitting, surgery consulted, Hospitalist following for DM and infection. CM following for supportive needs. ASSESSMENT NOTE    Attending Physician: Marianne Westno MD  Admit Problem: PEG tube malfunction [facility]) [R74.07]  Malfunction of jejunostomy tube [facility]) [K94.13]  Date/Time of Admission: 8/10/2022  5:47 AM  Problem List:  Patient Active Problem List   Diagnosis    GERD with stricture    Intractable nausea and vomiting    Type 2 diabetes mellitus with hyperglycemia, with long-term current use of insulin (HCC)    Sphincter of Oddi dysfunction    Iron deficiency anemia    S/P exploratory laparotomy    Chronic pancreatitis ([address]    Hypertension    Class 1 obesity with serious comorbidity and body mass index (BMI) of 31.0 to 31.9 in adult    Hypokalemia due to excessive gastrointestinal loss of potassium    Microcytic anemia    S/P balloon dilatation of esophageal stricture    PEG tube malfunction (HCC)    Infection of PEG site ([address]    Malfunction of jejunostomy tube ([address]    Gastroparesis due to DM [facility])       Service Assessment  Patient Orientation Alert and Oriented   Cognition Alert   History Provided By Patient   Primary Caregiver Self   Accompanied By/Relationship     Support Systems Spouse/Significant Other   Patient's Healthcare Decision Maker is: Legal Next of Aide 67   PCP Verified by CM Yes (Dr Shelly Muniz  [phone])   Last Visit to PCP Within last 3 months   Prior Functional Level Independent in ADLs/IADLs   Current Functional Level     Can patient return to prior living arrangement Yes   Ability to make needs known: Good   Family able to assist with home care needs: Yes   Would you like for me to discuss the discharge plan with any other family members/significant others, and if so, who?      Financial Resources [facility] Resources     CM/SW Referral       Social/Functional History  Lives With Spouse   Type of [address] [facility] Access     Entrance Stairs - Number of Steps     Entrance Stairs - Rails     Bathroom Shower/Tub     Bathroom Toilet     Bathroom Equipment     Bathroom Accessibility     Home Equipment     Receives Help From Family   ADL Assistance     [employer]     Grooming     Feeding     Toileting     [address] Work     Driving     Shopping          Other (Comment)     Homemaking Responsibilities     Meal Prep Responsibility     Laundry Responsibility     Cleaning Responsibility     Bill Paying/Finance Responsibility     Shopping Responsibility     Dependent Care Responsibility     Health Care Management     Other (Comment)     Ambulation Assistance     Transfer Assistance     Active      Patient's  Info     Mode of Transportation     Education     Occupation On disability   Type of Occupation       Discharge Planning   Type of [address] Spouse/Significant Other   Current Services Prior To Admission None   Potential Assistance Needed N/A   DME     DME     DME Ordered? No   Potential Assistance Purchasing Medications No   Meds-to-Beds: Does the patient want to have any new prescriptions delivered to bedside prior to discharge? Type of Home Care Services None   Patient expects to be discharged to: House   Follow Up Appointment: Best Day/Time     One/Two Story Residence:     # of Interior Steps     Height of Each Step (in)     [employer] Available     History of Falls?        Services At/After Discharge  Transition of Care Consult (CM Consult): Discharge Planning, DME/Supply Assistance ([facility])   Internal Home Health     Internal Hospice     Reason Outside Agency Chosen Partner SNF     Reason Why Partner SNF Not Chosen     Internal Comfort Care     Reason Outside [address] Discharge DME ([place] [facility])   [address] Resource Information Provided? No   Mode of Transport at Discharge [address] [facility] Time of Discharge     Confirm Follow Up Transport Self     Condition of Participation: Discharge Planning  The plan for Transition of Care is related to the following treatment goals: return to baseline   The Patient and/or Patient Representative was provided with a Choice of Provider? Patient   Name of the Patient Representative who was provided with the Choice of Provider and agrees with the Discharge Plan? The Patient and/or Patient Representative Agree with the Discharge Plan? Yes   Freedom of Choice list was provided with basic dialogue that supports the individualized plan of care/goals, treatment preferences, and shares the quality data associated with the providers?        Documentation for Discharge Appeal  Discharge Appealed by     Date notified by [facility] of appeal request:     Time notified by [facility] of appeal request:     Detailed Notice of Discharge given to:     Date Notice of Discharge given:     Time Notice of Discharge given:     Date records sent to [facility]     Time records sent to Evelyn Clark     Date Notified of Outcome     Time Notified of Outcome     Outcome of appeal           John Mckeon RN 08/11/22 2:03 PM

## 2022-08-12 VITALS
HEART RATE: 89 BPM | TEMPERATURE: 98.1 F | OXYGEN SATURATION: 94 % | BODY MASS INDEX: 34.14 KG/M2 | SYSTOLIC BLOOD PRESSURE: 146 MMHG | RESPIRATION RATE: 18 BRPM | WEIGHT: 185.5 LBS | DIASTOLIC BLOOD PRESSURE: 86 MMHG | HEIGHT: 62 IN

## 2022-08-12 PROBLEM — K94.23 PEG TUBE MALFUNCTION (HCC): Status: RESOLVED | Noted: 2022-08-10 | Resolved: 2022-08-12

## 2022-08-12 PROBLEM — K94.13 MALFUNCTION OF JEJUNOSTOMY TUBE (HCC): Status: RESOLVED | Noted: 2022-08-10 | Resolved: 2022-08-12

## 2022-08-12 LAB
ANION GAP SERPL CALC-SCNC: 2 MMOL/L (ref 7–16)
BUN SERPL-MCNC: 3 MG/DL (ref 6–23)
CALCIUM SERPL-MCNC: 8.2 MG/DL (ref 8.3–10.4)
CHLORIDE SERPL-SCNC: 112 MMOL/L (ref 98–107)
CO2 SERPL-SCNC: 27 MMOL/L (ref 21–32)
CREAT SERPL-MCNC: 0.6 MG/DL (ref 0.6–1)
GLUCOSE BLD STRIP.AUTO-MCNC: 203 MG/DL (ref 65–100)
GLUCOSE BLD STRIP.AUTO-MCNC: 236 MG/DL (ref 65–100)
GLUCOSE SERPL-MCNC: 183 MG/DL (ref 65–100)
POTASSIUM SERPL-SCNC: 3.7 MMOL/L (ref 3.5–5.1)
SERVICE CMNT-IMP: ABNORMAL
SERVICE CMNT-IMP: ABNORMAL
SODIUM SERPL-SCNC: 141 MMOL/L (ref 136–145)

## 2022-08-12 PROCEDURE — 2580000003 HC RX 258: Performed by: NURSE PRACTITIONER

## 2022-08-12 PROCEDURE — 82962 GLUCOSE BLOOD TEST: CPT

## 2022-08-12 PROCEDURE — 6370000000 HC RX 637 (ALT 250 FOR IP): Performed by: NURSE PRACTITIONER

## 2022-08-12 PROCEDURE — 6360000002 HC RX W HCPCS: Performed by: NURSE PRACTITIONER

## 2022-08-12 PROCEDURE — 2580000003 HC RX 258: Performed by: INTERNAL MEDICINE

## 2022-08-12 PROCEDURE — 36591 DRAW BLOOD OFF VENOUS DEVICE: CPT

## 2022-08-12 PROCEDURE — 6370000000 HC RX 637 (ALT 250 FOR IP): Performed by: FAMILY MEDICINE

## 2022-08-12 PROCEDURE — 36415 COLL VENOUS BLD VENIPUNCTURE: CPT

## 2022-08-12 PROCEDURE — 85025 COMPLETE CBC W/AUTO DIFF WBC: CPT

## 2022-08-12 PROCEDURE — 96376 TX/PRO/DX INJ SAME DRUG ADON: CPT

## 2022-08-12 PROCEDURE — G0378 HOSPITAL OBSERVATION PER HR: HCPCS

## 2022-08-12 PROCEDURE — 80048 BASIC METABOLIC PNL TOTAL CA: CPT

## 2022-08-12 PROCEDURE — 96375 TX/PRO/DX INJ NEW DRUG ADDON: CPT

## 2022-08-12 RX ORDER — CIPROFLOXACIN 500 MG/1
500 TABLET, FILM COATED ORAL 2 TIMES DAILY
Qty: 14 TABLET | Refills: 0 | Status: SHIPPED | OUTPATIENT
Start: 2022-08-12 | End: 2022-08-19

## 2022-08-12 RX ADMIN — LISINOPRIL 10 MG: 5 TABLET ORAL at 08:59

## 2022-08-12 RX ADMIN — PREGABALIN 100 MG: 100 CAPSULE ORAL at 08:58

## 2022-08-12 RX ADMIN — INSULIN LISPRO 2 UNITS: 100 INJECTION, SOLUTION INTRAVENOUS; SUBCUTANEOUS at 09:24

## 2022-08-12 RX ADMIN — SODIUM CHLORIDE, PRESERVATIVE FREE 10 ML: 5 INJECTION INTRAVENOUS at 09:08

## 2022-08-12 RX ADMIN — ACETAMINOPHEN 650 MG: 325 TABLET, FILM COATED ORAL at 06:23

## 2022-08-12 RX ADMIN — HYDROMORPHONE HYDROCHLORIDE 0.5 MG: 1 INJECTION, SOLUTION INTRAMUSCULAR; INTRAVENOUS; SUBCUTANEOUS at 08:57

## 2022-08-12 RX ADMIN — HYDROMORPHONE HYDROCHLORIDE 0.5 MG: 1 INJECTION, SOLUTION INTRAMUSCULAR; INTRAVENOUS; SUBCUTANEOUS at 03:29

## 2022-08-12 RX ADMIN — PANTOPRAZOLE SODIUM 40 MG: 40 TABLET, DELAYED RELEASE ORAL at 08:58

## 2022-08-12 RX ADMIN — SODIUM CHLORIDE: 900 INJECTION, SOLUTION INTRAVENOUS at 03:29

## 2022-08-12 RX ADMIN — ONDANSETRON 4 MG: 4 TABLET, ORALLY DISINTEGRATING ORAL at 03:29

## 2022-08-12 RX ADMIN — CIPROFLOXACIN 400 MG: 2 INJECTION, SOLUTION INTRAVENOUS at 09:01

## 2022-08-12 RX ADMIN — DIPHENHYDRAMINE HYDROCHLORIDE 25 MG: 25 CAPSULE ORAL at 06:23

## 2022-08-12 RX ADMIN — POLYETHYLENE GLYCOL 3350 17 G: 17 POWDER, FOR SOLUTION ORAL at 08:57

## 2022-08-12 RX ADMIN — CITALOPRAM HYDROBROMIDE 20 MG: 20 TABLET ORAL at 08:58

## 2022-08-12 RX ADMIN — ONDANSETRON 4 MG: 2 INJECTION INTRAMUSCULAR; INTRAVENOUS at 10:45

## 2022-08-12 ASSESSMENT — PAIN SCALES - GENERAL
PAINLEVEL_OUTOF10: 8
PAINLEVEL_OUTOF10: 8
PAINLEVEL_OUTOF10: 0
PAINLEVEL_OUTOF10: 1

## 2022-08-12 ASSESSMENT — PAIN DESCRIPTION - DESCRIPTORS
DESCRIPTORS: ACHING;SORE
DESCRIPTORS: ACHING
DESCRIPTORS: ACHING

## 2022-08-12 ASSESSMENT — PAIN DESCRIPTION - LOCATION
LOCATION: ABDOMEN
LOCATION: ABDOMEN
LOCATION: HEAD

## 2022-08-12 ASSESSMENT — PAIN DESCRIPTION - PAIN TYPE
TYPE: ACUTE PAIN
TYPE: ACUTE PAIN

## 2022-08-12 ASSESSMENT — PAIN DESCRIPTION - ORIENTATION: ORIENTATION: LEFT;UPPER

## 2022-08-12 NOTE — PLAN OF CARE

## 2022-08-12 NOTE — PROGRESS NOTES
Hospitalist Progress Note   Admit Date:  8/10/2022  5:47 AM   Name:  Shi Santos   Age:  46 y.o. Sex:  female  :  1970   MRN:  [id number]   Room:  214/01    Presenting Complaint: No chief complaint on file. Reason(s) for Admission: PEG tube malfunction ([facility] [K94.23]  Malfunction of jejunostomy tube [facility]) [place] [facility] Course & Interval History:   Please refer to the admission H&P for details of presentation. In summary, Shi Santos is a 46 y.o. female with medical history significant for obesity, severe gastroparesis, opioid-induced constipation, IBS, GERD s/p nissen fundoplication, esophageal stricture disposable dilatation, speech Oddi dysfunction who was admitted for PEG-J infection, severe gastroparesis. She was recently admitted from - for intractable n/v and   underwent EGD with EUS w/ Dr. Vaughn Mattson showing dilated esophageal stricture, gastritis, fatty infiltration of the liver, pancreatic lipomatosis. PEG-J placed on 22. She reported feeling better following DC. She accidentally got her J tube tangled up and it came out on 21 while rec'ing nutrition. Since then, not able to tolerate much PO intake. She underwent EGD 8/10 which showed cellullitis at ostomy site and G tube was removed, the j tube had been previously removed inadveretently by the patient. Subjective/24 hr Events (22) : Patient is seen and examined at bedside. No acute events reported overnight. Patient's nausea has improved and tolerating full liquid diet. Tach site with minimal erythema without any tenderness or purulence. Patient is planned for discharge by primary team today. Patient denies fever, chills, chest pains, shortness of breath. Review of Systems: 10 point review of systems is otherwise negative with the exception of the elements mentioned above. Assessment & Plan:   PEG tube malfunction  -removed by GI 8/10.   Surgery was consulted and are not recommending surgical GJ tube placement at this time. Recommending IR evaluation with GJ tube placement after current patient was resolved. -management as per primary    PEG-J Site infection/Cellulitis  -will be continued on PO Cipro. no signs of systemic infectious process. Afebrile without leukocytosis.  Bcx with GNRs    Gastroparesis due to DM with intractable nausea  - Compazine, Phenergan, Zofran available for nausea  - refused NGT for decompression.   - start full liquid diet  - miralax PO daily    Class 1 obesity with serious comorbidity and body mass index (BMI) of 31.0 to 98.8 in adult - [facility]      Type 2 diabetes mellitus with hyperglycemia, with long-term current use of insulin  - on humalog sliding scale  - monitor FS 3 times daily prior to meals and prior to bedtime    Diet:  No diet orders on file  DVT PPx: SCDs per primary  Code status: Prior    Hospital Problems:  Principal Problem (Resolved):    PEG tube malfunction (HCC)  Active Problems:    Class 1 obesity with serious comorbidity and body mass index (BMI) of 31.0 to 31.9 in adult    Infection of PEG site ([address]    Gastroparesis due to DM ([address]    GERD with stricture    Type 2 diabetes mellitus with hyperglycemia, with long-term current use of insulin ([address]  Resolved Problems:    Malfunction of jejunostomy tube (HCC)    Intractable nausea and vomiting      Objective:   Patient Vitals for the past 24 hrs:   Temp Pulse Resp BP SpO2   08/12/22 1117 98.1 °F (36.7 °C) 89 18 (!) 146/86 94 %   08/12/22 0823 98.6 °F (37 °C) 94 16 (!) 149/97 94 %   08/12/22 0359 -- -- 18 -- --   08/12/22 0328 97.5 °F (36.4 °C) 91 18 (!) 143/81 95 %   08/12/22 0046 97.9 °F (36.6 °C) 87 18 120/85 90 %   08/11/22 2118 -- -- 18 -- --   08/11/22 1940 98.1 °F (36.7 °C) 87 19 (!) 151/80 94 %       Oxygen Therapy  SpO2: 94 %  Pulse via Oximetry: 97 beats per minute  Pulse Oximeter Device Mode: Intermittent  O2 Device: None (Room air)  O2 Flow Rate (L/min): 2 L/min    Estimated body mass index is 33.93 kg/m² as calculated from the following:    Height as of this encounter: 5' 2\" (1.575 m). Weight as of this encounter: 185 lb 8 oz (84.1 kg). Intake/Output Summary (Last 24 hours) at 8/12/2022 1700  Last data filed at 8/12/2022 0470  Gross per 24 hour   Intake 970 ml   Output 9000 ml   Net -8030 ml         Physical Exam:   Blood pressure (!) 146/86, pulse 89, temperature 98.1 °F (36.7 °C), temperature source Oral, resp. rate 18, height 5' 2\" (1.575 m), weight 185 lb 8 oz (84.1 kg), SpO2 94 %. General:    Alert and awake. Not in distress  Head:  Normocephalic, atraumatic  Eyes:  Sclerae appear normal.  Pupils equally round. ENT:  Nares appear normal, no drainage. Moist oral mucosa  Neck:  No restricted ROM. Trachea midline   CV:   RRR. No m/r/g. No jugular venous distension. Lungs:   CTAB. No wheezing  Symmetric expansion. Abdomen: Bowel sounds present. Soft, slight tenderness to palpation of epigastric region, nondistended. PEG site clean without purulent discharge or erythema  Extremities: No cyanosis or clubbing. No edema  Skin:     No rashes and normal coloration. Warm and dry. Neuro:  CN II-XII grossly intact. Sensation intact. A&Ox3  Psych:  Normal mood and affect.       I have personally reviewed labs and tests showing:  Recent Labs:  Recent Results (from the past 48 hour(s))   POCT Glucose    Collection Time: 08/10/22  9:20 PM   Result Value Ref Range    POC Glucose 133 (H) 65 - 100 mg/dL    Performed by: Romi    Culture, Wound    Collection Time: 08/10/22 10:00 PM    Specimen: Skin   Result Value Ref Range    Special Requests NO SPECIAL REQUESTS      Gram stain 5 TO 10 WBCS SEEN /OIF     Gram stain NO DEFINITE ORGANISM SEEN      Culture (A)       SCANT GRAM NEGATIVE RODS IDENTIFICATION AND SUSCEPTIBILITY TO FOLLOW    Culture CULTURE IN PROGRESS,FURTHER UPDATES TO FOLLOW     Culture, Anaerobic    Collection Time: 08/10/22 10:00 PM Specimen: Skin   Result Value Ref Range    Special Requests NO SPECIAL REQUESTS      Culture        SUBCULTURE IS NECESSARY TO DETERMINE PRESENCE OR ABSENCE OF ANAEROBIC BACTERIA IN THIS CULTURE. FURTHER REPORT TO FOLLOW AFTER INCUBATION OF SUBCULTURE.    Magnesium    Collection Time: 08/11/22  4:39 AM   Result Value Ref Range    Magnesium 1.9 1.8 - 2.4 mg/dL   CBC with Auto Differential    Collection Time: 08/11/22  4:40 AM   Result Value Ref Range    WBC 5.1 4.3 - 11.1 K/uL    RBC 4.38 4.05 - 5.2 M/uL    Hemoglobin 9.6 (L) 11.7 - 15.4 g/dL    Hematocrit 32.8 (L) 35.8 - 46.3 %    MCV 74.9 (L) 79.6 - 97.8 FL    MCH 21.9 (L) 26.1 - 32.9 PG    MCHC 29.3 (L) 31.4 - 35.0 g/dL    RDW 18.1 (H) 11.9 - 14.6 %    Platelets 291 228 - 119 K/uL    MPV 10.9 9.4 - 12.3 FL    nRBC 0.00 0.0 - 0.2 K/uL    Differential Type AUTOMATED      Seg Neutrophils 51 43 - 78 %    Lymphocytes 36 13 - 44 %    Monocytes 10 4.0 - 12.0 %    Eosinophils % 2 0.5 - 7.8 %    Basophils 0 0.0 - 2.0 %    Immature Granulocytes 0 0.0 - 5.0 %    Segs Absolute 2.6 1.7 - 8.2 K/UL    Absolute Lymph # 1.8 0.5 - 4.6 K/UL    Absolute Mono # 0.5 0.1 - 1.3 K/UL    Absolute Eos # 0.1 0.0 - 0.8 K/UL    Basophils Absolute 0.0 0.0 - 0.2 K/UL    Absolute Immature Granulocyte 0.0 0.0 - 0.5 K/UL   Comprehensive Metabolic Panel    Collection Time: 08/11/22  4:40 AM   Result Value Ref Range    Sodium 141 136 - 145 mmol/L    Potassium 4.1 3.5 - 5.1 mmol/L    Chloride 110 (H) 98 - 107 mmol/L    CO2 26 21 - 32 mmol/L    Anion Gap 5 (L) 7 - 16 mmol/L    Glucose 115 (H) 65 - 100 mg/dL    BUN 6 6 - 23 MG/DL    Creatinine 0.60 0.6 - 1.0 MG/DL    GFR African American >60 >60 ml/min/1.73m2    GFR Non- >60 >60 ml/min/1.73m2    Calcium 8.4 8.3 - 10.4 MG/DL    Total Bilirubin 0.4 0.2 - 1.1 MG/DL    ALT 32 12 - 65 U/L    AST 23 15 - 37 U/L    Alk Phosphatase 91 50 - 136 U/L    Total Protein 6.8 6.3 - 8.2 g/dL    Albumin 2.9 (L) 3.5 - 5.0 g/dL    Globulin 3.9 (H) 2.3 - 3.5 g/dL    Albumin/Globulin Ratio 0.7 (L) 1.2 - 3.5     POCT Glucose    Collection Time: 08/11/22  7:31 AM   Result Value Ref Range    POC Glucose 153 (H) 65 - 100 mg/dL    Performed by: Rachid    POCT Glucose    Collection Time: 08/11/22 11:32 AM   Result Value Ref Range    POC Glucose 161 (H) 65 - 100 mg/dL    Performed by: Rachid    POCT Glucose    Collection Time: 08/11/22  4:31 PM   Result Value Ref Range    POC Glucose 163 (H) 65 - 100 mg/dL    Performed by:  Rachid    POCT Glucose    Collection Time: 08/11/22  9:54 PM   Result Value Ref Range    POC Glucose 173 (H) 65 - 100 mg/dL    Performed by: Nathanael Montalvo    Basic Metabolic Panel w/ Reflex to MG    Collection Time: 08/12/22  4:07 AM   Result Value Ref Range    Sodium 141 136 - 145 mmol/L    Potassium 3.7 3.5 - 5.1 mmol/L    Chloride 112 (H) 98 - 107 mmol/L    CO2 27 21 - 32 mmol/L    Anion Gap 2 (L) 7 - 16 mmol/L    Glucose 183 (H) 65 - 100 mg/dL    BUN 3 (L) 6 - 23 MG/DL    Creatinine 0.60 0.6 - 1.0 MG/DL    GFR African American >60 >60 ml/min/1.73m2    GFR Non- >60 >60 ml/min/1.73m2    Calcium 8.2 (L) 8.3 - 10.4 MG/DL   CBC with Auto Differential    Collection Time: 08/12/22  5:33 AM   Result Value Ref Range    WBC 3.8 (L) 4.3 - 11.1 K/uL    RBC 4.38 4.05 - 5.2 M/uL    Hemoglobin 9.5 (L) 11.7 - 15.4 g/dL    Hematocrit 32.7 (L) 35.8 - 46.3 %    MCV 74.7 (L) 79.6 - 97.8 FL    MCH 21.7 (L) 26.1 - 32.9 PG    MCHC 29.1 (L) 31.4 - 35.0 g/dL    RDW 17.8 (H) 11.9 - 14.6 %    Platelets 135 762 - 668 K/uL    MPV 10.8 9.4 - 12.3 FL    nRBC 0.00 0.0 - 0.2 K/uL    Differential Type PENDING    POCT Glucose    Collection Time: 08/12/22  8:24 AM   Result Value Ref Range    POC Glucose 203 (H) 65 - 100 mg/dL    Performed by: Lilian    POCT Glucose    Collection Time: 08/12/22 11:18 AM   Result Value Ref Range    POC Glucose 236 (H) 65 - 100 mg/dL    Performed by: Lilian        I have personally reviewed imaging studies showing: Other Studies:  No orders to display       Current Meds:  No current facility-administered medications for this encounter. Current Outpatient Medications   Medication Sig    ciprofloxacin (CIPRO) 500 MG tablet Take 1 tablet by mouth in the morning and 1 tablet before bedtime. Do all this for 7 days. tiZANidine (ZANAFLEX) 4 MG tablet Take 4 mg by mouth every 6 hours as needed    promethazine (PHENERGAN) 25 MG tablet Take 1 tablet by mouth every 8 hours as needed for Nausea    pantoprazole (PROTONIX) 40 MG tablet Take 1 tablet by mouth in the morning and 1 tablet before bedtime. lisinopril (PRINIVIL;ZESTRIL) 10 MG tablet Take 1 tablet by mouth in the morning. blood glucose test strips (EXACTECH TEST) strip TEST BLOOD SUGAR FOUR TIMES DAILY    acetaminophen (TYLENOL) 500 MG tablet Take by mouth every 6 hours as needed    cyanocobalamin 1000 MCG/ML injection INJECT 1 VIAL INTRAMUSCULARLY FOR 4 WEEKS THEN MONTHLY    diclofenac sodium (VOLTAREN) 1 % GEL APPLY 1 GRAM TO AFFECTED AREA 4 TIMES A DAY AS NEEDED    diphenhydrAMINE (BENADRYL) 25 MG capsule Take 25 mg by mouth every 6 hours as needed    glucagon 1 MG injection Inject 1 mg into the muscle as needed    Hyoscyamine Sulfate SL 0.125 MG SUBL Place 0.125 mg under the tongue every 6 hours as needed    influenza quadrivalent split vaccine (FLUZONE;FLUARIX;FLULAVAL;AFLURIA) 0.5 ML injection Inject into the muscle    insulin aspart (NOVOLOG) 100 UNIT/ML injection vial Use as directed    insulin aspart (NOVOLOG) 100 UNIT/ML injection pen 12 units at breakfast 14 units at lunch and 41 units at dinner plus sliding scale for blood sugars >150, up to 60 units per day    Insulin Degludec 100 UNIT/ML SOPN Inject 68 Units into the skin    lidocaine viscous hcl (XYLOCAINE) 2 % SOLN solution 10 mLs every 6 hours as needed    LORazepam (ATIVAN) 1 MG tablet Take 1 mg by mouth 3 times daily as needed.     lubiprostone (AMITIZA) 24 MCG capsule Take 24 mcg by mouth    naloxone 4 MG/0.1ML LIQD nasal spray 4 mg once as needed    nitroGLYCERIN (NITROSTAT) 0.3 MG SL tablet Place 0.3 mg under the tongue    ondansetron (ZOFRAN-ODT) 8 MG TBDP disintegrating tablet Place 8 mg under the tongue 3 times daily    lipase-protease-amylase (CREON) 81420-213891 units CPEP delayed release capsule Take 1 capsule by mouth    polyethylene glycol (GLYCOLAX) 17 GM/SCOOP powder Take 17 g by mouth as needed    pregabalin (LYRICA) 100 MG capsule Take 100 mg by mouth 2 times daily. sodium chloride 0.9 % infusion Infuse intravenously as needed    zolpidem (AMBIEN) 10 MG tablet Take 10 mg by mouth. Signed:  Sara Georges MD    Part of this note may have been written by using a voice dictation software. The note has been proof read but may still contain some grammatical/other typographical errors.

## 2022-08-12 NOTE — PROGRESS NOTES
Patient's Rx for Cipro was sent to [facility] in [place], per her request.   [facility]

## 2022-08-12 NOTE — PROGRESS NOTES
Patient with discharge orders today. Patient's family to provide transportation. Patient has met all treatment goals and milestones. No further supportive needs identified to CM. CM following until discharged. ASSESSMENT NOTE    Attending Physician: Xu Zuniga MD  Admit Problem: PEG tube malfunction [facility]) [B12.66]  Malfunction of jejunostomy tube [facility]) [K94.13]  Date/Time of Admission: 8/10/2022  5:47 AM  Problem List:  Patient Active Problem List   Diagnosis    GERD with stricture    Type 2 diabetes mellitus with hyperglycemia, with long-term current use of insulin (HCC)    Sphincter of Oddi dysfunction    Iron deficiency anemia    S/P exploratory laparotomy    Chronic pancreatitis ([facility]    Hypertension    Class 1 obesity with serious comorbidity and body mass index (BMI) of 31.0 to 31.9 in adult    Hypokalemia due to excessive gastrointestinal loss of potassium    Microcytic anemia    S/P balloon dilatation of esophageal stricture    Infection of PEG site ([facility]    Gastroparesis due to DM [facility])       Service Assessment  Patient Orientation Alert and Oriented   Cognition Alert   History Provided By Patient   Primary Caregiver Self   Accompanied By/Relationship     Support Systems Spouse/Significant Other   Patient's Healthcare Decision Maker is: Legal Next of [address]   PCP Verified by CM Yes (Dr Berkowitz [facility]  [phone])   Last Visit to PCP Within last 3 months   Prior Functional Level Independent in ADLs/IADLs   Current Functional Level     Can patient return to prior living arrangement Yes   Ability to make needs known: Good   Family able to assist with home care needs: Yes   Would you like for me to discuss the discharge plan with any other family members/significant others, and if so, who?      Financial [email or web address]     CM/SW Referral       Social/Functional History  Lives With Spouse   Type of [address] [place] [address]     Entrance Stairs - Number of Steps     Entrance Stairs - Rails     Bathroom Shower/Tub     Bathroom Toilet     Bathroom Equipment     Bathroom Accessibility     Home Equipment     Receives Help From Family   ADL Assistance     [employer]     Grooming     Feeding     Toileting     Homemaking Assistance     Meal Prep     Laundry     Vacuuming     Cleaning     Gardening     Yard Work     Driving     Shopping          Other (Comment)     Homemaking Responsibilities     Meal Prep Responsibility     Laundry Responsibility     Cleaning Responsibility     Bill Paying/Finance Responsibility     Shopping Responsibility     Dependent Care Responsibility     Health Care Management     Other (Comment)     Ambulation Assistance     Transfer Assistance     Active      Patient's  Info     Mode of Transportation     Education     Occupation On disability   Type of Occupation       Discharge Planning   Type of [address] Spouse/Significant Other   Current Services Prior To Admission None   Potential Assistance Needed N/A   DME     DME     DME Ordered? No   Potential Assistance Purchasing Medications No   Meds-to-Beds: Does the patient want to have any new prescriptions delivered to bedside prior to discharge? Type of Home Care Services None   Patient expects to be discharged to: House   Follow Up Appointment: Best Day/Time     One/Two Story Residence:     # of Interior Steps     Height of Each Step (in)     [employer] Available     History of Falls? Services At/After Discharge  Transition of Care Consult (CM Consult): Discharge Planning   Internal Home Health     Internal Hospice     Reason Outside Agency [address]     Partner SNF     Reason Why Partner SNF Not Chosen     Internal Comfort Care     Reason Outside [address] Discharge     [facility] Resource Information Provided?  No   Mode of Transport at Discharge [address] Transport Time of Discharge     Confirm Follow Up Transport Self     Condition of Participation: Discharge Planning  The plan for Transition of Care is related to the following treatment goals: return to baseline   The Patient and/or Patient Representative was provided with a Choice of Provider? Patient   Name of the Patient Representative who was provided with the Choice of Provider and agrees with the Discharge Plan? The Patient and/or Patient Representative Agree with the Discharge Plan? Yes   Freedom of Choice list was provided with basic dialogue that supports the individualized plan of care/goals, treatment preferences, and shares the quality data associated with the providers?        Documentation for Discharge Appeal  Discharge Appealed by     Date notified by [facility] of appeal request:     Time notified by [facility] of appeal request:     Detailed Notice of Discharge given to:     Date Notice of Discharge given:     Time Notice of Discharge given:     Date records sent to [facility]     Time records sent to Evelyn Clark     Date Notified of Outcome     Time Notified of Outcome     Outcome of appeal           Toan Otoole RN 08/12/22 11:54 AM

## 2022-08-12 NOTE — DISCHARGE SUMMARY
[facility] Discharge Summary      Patient ID:  Mame Brooks  [id number]  57 y.o.  1970    Admit date:  8/10/2022    Discharge date and time:  8/12/2022     Primary GI Physician: Jona Plaza    Admitting Physician:  Rosa Armenta MD     Discharge Physician: Luan Whitaker    Admission Diagnoses:  PEG tube malfunction [facility]) [X81.01]  Malfunction of jejunostomy tube [facility]) [K94.13]    Discharge Diagnoses:  Principal Problem (Resolved):    PEG tube malfunction ([facility]  Active Problems:    Class 1 obesity with serious comorbidity and body mass index (BMI) of 31.0 to 31.9 in adult    Infection of PEG site ([facility]    Gastroparesis due to DM ([facility]    GERD with stricture    Type 2 diabetes mellitus with hyperglycemia, with long-term current use of insulin ([facility]  Resolved Problems:    Malfunction of jejunostomy tube (HCC)    Intractable nausea and vomiting      Consults:  Surgery, Hospitalist    Significant Diagnostic Studies:  Recent Labs     08/11/22  0439 08/11/22  0440 08/12/22  0407 08/12/22  0533   WBC  --  5.1  --  3.8*   HGB  --  9.6*  --  9.5*   HCT  --  32.8*  --  32.7*   PLT  --  206  --  202   MCV  --  74.9*  --  74.7*   NA  --  141 141  --    K  --  4.1 3.7  --    CL  --  110* 112*  --    CO2  --  26 27  --    BUN  --  6 3*  --    CREATININE  --  0.60 0.60  --    CALCIUM  --  8.4 8.2*  --    MG 1.9  --   --   --    GLUCOSE  --  115* 183*  --    ALKPHOS  --  91  --   --    AST  --  23  --   --    ALT  --  32  --   --    BILITOT  --  0.4  --   --    ALBUMIN  --  0.7*  --   --    PROT  --  6.8  --   --        Hospital Course:  Admitted with suspected gastrojejunostomy site infection. Received IVF and IV abx along with symptom control. Demonstrated to tolerate full liquid diet well. Site noted to have minimal erythema, no induration or tenderness, no purulence on day of discharge. Home medications unchanged, and opioids were not escalated. To take Cipro 500 mg BID for 7 additional days. Patient instructed to clean site once daily with mild soap and water, pat dry, and keep bandaged until site resolved. Objective:   Vitals:  BP (!) 149/97   Pulse 94   Temp 98.6 °F (37 °C) (Oral)   Resp 16   Ht 5' 2\" (1.575 m)   Wt 170 lb (77.1 kg)   SpO2 94%   BMI 31.09 kg/m²   Intake/Output:  08/12 0701 - 08/12 1900  In: -   [facility]  08/10 1901 - 08/12 0700  In: 3576.6 [P.O.:470; I.V.:3106.6]  Out: 2500 [Urine:2500]  Exam:  General appearance: alert, cooperative, no distress  Abdomen: soft, non-tender. Bowel sounds normal. No masses, no organomegaly. Ostomy site clean with slightly erythematous rim and eschar. No purulence, induration, tenderness, or fluctuance  Neuro:  alert and oriented    Disposition:  Home    Diet:  Full liquid or as tolerated    Activity:  as tolerated    Patient Instructions:   Current Discharge Medication List        START taking these medications    Details   ciprofloxacin (CIPRO) 500 MG tablet Take 1 tablet by mouth in the morning and 1 tablet before bedtime. Do all this for 7 days. Qty: 14 tablet, Refills: 0           CONTINUE these medications which have NOT CHANGED    Details   tiZANidine (ZANAFLEX) 4 MG tablet Take 4 mg by mouth every 6 hours as needed      promethazine (PHENERGAN) 25 MG tablet Take 1 tablet by mouth every 8 hours as needed for Nausea  Qty: 30 tablet, Refills: 2      pantoprazole (PROTONIX) 40 MG tablet Take 1 tablet by mouth in the morning and 1 tablet before bedtime. Qty: 30 tablet, Refills: 3      lisinopril (PRINIVIL;ZESTRIL) 10 MG tablet Take 1 tablet by mouth in the morning.   Qty: 30 tablet, Refills: 3      blood glucose test strips (EXACTECH TEST) strip TEST BLOOD SUGAR FOUR TIMES DAILY      acetaminophen (TYLENOL) 500 MG tablet Take by mouth every 6 hours as needed      cyanocobalamin 1000 MCG/ML injection INJECT 1 VIAL INTRAMUSCULARLY FOR 4 WEEKS THEN MONTHLY      diclofenac sodium (VOLTAREN) 1 % GEL APPLY 1 GRAM TO AFFECTED AREA 4 insulin NPH (HUMULIN N;NOVOLIN N) 100 UNIT/ML injection pen Comments:   Reason for Stopping:         sucralfate (CARAFATE) 1 GM/10ML suspension Comments:   Reason for Stopping: Follow up: To be arranged by Dr. Hay Rodriguez, who was contacted at the time of discharge. Total time discharging patient took greater than 30 minutes.     Signed:  Sarah Gaffney MD  8/12/2022  9:47 AM

## 2022-08-12 NOTE — CONSULTS
Comprehensive Nutrition Assessment    Type and Reason for Visit: Initial, Consult  Poor Intake/Appetite 5 or More Days (Hospitalists)    Nutrition Recommendations/Plan:   Continue current diet  Education regarding FLD with progression to GI soft with small frequent meals completed with patient. She voiced understanding and was able to recall GI soft diet education provided last admission. Recommended glucose control oral nutrition supplement while on FLD and then as needed once able to progress to GI soft. Malnutrition Assessment:  Malnutrition Status: At risk for malnutrition (Comment) (h/o gastroparesis with previous TF to supplement PO, no current EN access)    Nutrition Assessment:  Nutrition History: Patient well known to RD from last admission. She states that she was tolerating TF and following GI soft diet at home prior to current admission. 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 Recent admission for intractable N/V found to have gastritis, pancreatic lipomatosis, and suspected gastroparesis. She presented back with dislodged Gjtube. She is s/p EGD with EN tube removal, noted gaping and inflamed ostomy. Surgery consulted but prefer to avoid surgical placement due to significant abdominal surgical history and recommended IR placement once current infection resolved. Nutrition Interval:  Patient seen and discussed with RN, Mima Chaney. She has questions regarding discharge diet and progression at home. Per GI note, d/c FLD with progression to GI soft as tolerated. Provided information regarding FLD.  Recommended glucose controlled oral nutrition supplement while on FLD and then as needed once progressed back to GI soft. She is able to recall previous diet education regarding GI soft diet and able to recall small frequent meals. Current Nutrition Therapies:  ADULT DIET; Full Liquid; nothing red    Current Intake:   Average Meal Intake: 26-50%        Anthropometric Measures:  Height: 5' 2\" (157.5 cm)  Current Body Wt: 185 lb 6.5 oz (84.1 kg) (8/12), Weight source: Bed Scale  BMI: 33.9, Obese Class 1 (BMI 30.0-34. 9)  Admission Body Weight: 170 lb (77.1 kg) (stated)  Ideal Body Weight (Kg) (Calculated): 50 kg (110 lbs), 168.6 %  Usual Body Wt: 184 lb 1.4 oz (83.5 kg) (office wt 7/2021), Percent weight change: 0.7       Edema:    BMI Category Obese Class 1 (BMI 30.0-34. 9)  Estimated Daily Nutrient Needs:  Energy (kcal/day): 4310-9769 (Kcal/kg (18-23) Weight used: 84.1 kg Current  Protein (g/day): 75-96 (20% kcal) Weight Used: (Current) 84.1 kg  Fluid (ml/day):   (1 ml/kcal)    Nutrition Diagnosis:   Food & Nutrition-related knowledge deficit related to  (diet progression) as evidenced by  (patient with questions)  Nutrition Interventions:   Food and/or Nutrient Delivery: Continue Current Diet  Nutrition Education/Counseling: Education completed  Coordination of Nutrition Care: Continue to monitor while inpatient       Goals:   Previous Goal Met: Goal(s) Achieved  Active Goal: Teach back diet education       Nutrition Monitoring and Evaluation:      Food/Nutrient Intake Outcomes: Food and Nutrient Intake  Physical Signs/Symptoms Outcomes: GI Status, Weight    Discharge Planning:    Continue current diet (Start oral nutrition supplement)    EZEQUIEL CALABRESE RD

## 2022-08-14 LAB
BACTERIA SPEC CULT: ABNORMAL
GRAM STN SPEC: ABNORMAL
GRAM STN SPEC: ABNORMAL
SERVICE CMNT-IMP: ABNORMAL

## 2022-08-18 ENCOUNTER — HOSPITAL ENCOUNTER (OUTPATIENT)
Dept: INFUSION THERAPY | Age: 52
Discharge: HOME | End: 2022-08-18
Payer: MEDICARE

## 2022-08-18 VITALS
DIASTOLIC BLOOD PRESSURE: 86 MMHG | SYSTOLIC BLOOD PRESSURE: 141 MMHG | TEMPERATURE: 98.5 F | RESPIRATION RATE: 16 BRPM | OXYGEN SATURATION: 96 % | HEART RATE: 99 BPM

## 2022-08-18 LAB — MAGNESIUM SERPL-MCNC: 2.2 MG/DL (ref 1.8–2.4)

## 2022-08-18 PROCEDURE — 83735 ASSAY OF MAGNESIUM: CPT

## 2022-08-18 PROCEDURE — 96374 THER/PROPH/DIAG INJ IV PUSH: CPT

## 2022-08-18 PROCEDURE — 2580000003 HC RX 258: Performed by: INTERNAL MEDICINE

## 2022-08-18 PROCEDURE — 6360000002 HC RX W HCPCS: Performed by: INTERNAL MEDICINE

## 2022-08-18 RX ORDER — SODIUM CHLORIDE 0.9 % (FLUSH) 0.9 %
10 SYRINGE (ML) INJECTION PRN
Status: DISCONTINUED | OUTPATIENT
Start: 2022-08-18 | End: 2022-08-19 | Stop reason: HOSPADM

## 2022-08-18 RX ADMIN — SODIUM CHLORIDE, PRESERVATIVE FREE 10 ML: 5 INJECTION INTRAVENOUS at 08:05

## 2022-08-18 RX ADMIN — SODIUM CHLORIDE 25 MG: 900 INJECTION, SOLUTION INTRAVENOUS at 07:50

## 2022-08-18 RX ADMIN — SODIUM CHLORIDE, PRESERVATIVE FREE 10 ML: 5 INJECTION INTRAVENOUS at 07:31

## 2022-08-18 NOTE — PROGRESS NOTES
Arrived to the [facility]. Phenergan  IVPB completed. Patient tolerated without difficulty. Any issues or concerns during appointment: None. Patient aware of next infusion appointment on 08/25/2022 (date) at [address] [place] [address] (time). Patient instructed to call provider with temperature of 100.4 or greater or nausea/vomiting/ diarrhea or pain not controlled by medications  Discharged Ambulatory.

## 2022-08-19 LAB
BACTERIA SPEC CULT: NORMAL
SERVICE CMNT-IMP: NORMAL

## 2022-09-01 ENCOUNTER — HOSPITAL ENCOUNTER (OUTPATIENT)
Dept: INFUSION THERAPY | Age: 52
Discharge: HOME | End: 2022-09-01
Payer: MEDICARE

## 2022-09-01 VITALS
HEART RATE: 103 BPM | DIASTOLIC BLOOD PRESSURE: 89 MMHG | SYSTOLIC BLOOD PRESSURE: 144 MMHG | TEMPERATURE: 98.7 F | RESPIRATION RATE: 18 BRPM

## 2022-09-01 LAB — MAGNESIUM SERPL-MCNC: 2.1 MG/DL (ref 1.8–2.4)

## 2022-09-01 PROCEDURE — 96365 THER/PROPH/DIAG IV INF INIT: CPT

## 2022-09-01 PROCEDURE — 2580000003 HC RX 258: Performed by: PHYSICAL MEDICINE & REHABILITATION

## 2022-09-01 PROCEDURE — 83735 ASSAY OF MAGNESIUM: CPT

## 2022-09-01 PROCEDURE — 6360000002 HC RX W HCPCS: Performed by: PHYSICAL MEDICINE & REHABILITATION

## 2022-09-01 RX ORDER — SODIUM CHLORIDE 0.9 % (FLUSH) 0.9 %
5-40 SYRINGE (ML) INJECTION PRN
Status: ACTIVE | OUTPATIENT
Start: 2022-09-01 | End: 2022-09-01

## 2022-09-01 RX ADMIN — PROMETHAZINE HYDROCHLORIDE 25 MG: 25 INJECTION INTRAMUSCULAR; INTRAVENOUS at 07:40

## 2022-09-01 RX ADMIN — SODIUM CHLORIDE, PRESERVATIVE FREE 10 ML: 5 INJECTION INTRAVENOUS at 07:39

## 2022-09-01 RX ADMIN — SODIUM CHLORIDE, PRESERVATIVE FREE 10 ML: 5 INJECTION INTRAVENOUS at 07:59

## 2022-09-08 LAB
ERYTHROCYTE [DISTWIDTH] IN BLOOD BY AUTOMATED COUNT: 17.8 % (ref 11.9–14.6)
HCT VFR BLD AUTO: 32.7 % (ref 35.8–46.3)
HGB BLD-MCNC: 9.5 G/DL (ref 11.7–15.4)
MCH RBC QN AUTO: 21.7 PG (ref 26.1–32.9)
MCHC RBC AUTO-ENTMCNC: 29.1 G/DL (ref 31.4–35)
MCV RBC AUTO: 74.7 FL (ref 79.6–97.8)
NRBC # BLD: 0 K/UL (ref 0–0.2)
PLATELET # BLD AUTO: 202 K/UL (ref 150–450)
PMV BLD AUTO: 10.8 FL (ref 9.4–12.3)
RBC # BLD AUTO: 4.38 M/UL (ref 4.05–5.2)
WBC # BLD AUTO: 3.8 K/UL (ref 4.3–11.1)

## 2022-09-28 NOTE — PROGRESS NOTES
Arrived to the [facility]. Labs and Magnesium replacement  completed. Patient tolerated without problems. Any issues or concerns during appointment: no.  Patient aware of next infusion appointment on 11/12/20 (date) at [address] [place] [address] (time). Discharged ambulatory. VTE Assessment already completed for this visit

## 2022-09-29 ENCOUNTER — HOSPITAL ENCOUNTER (OUTPATIENT)
Dept: INFUSION THERAPY | Age: 52
End: 2022-09-29

## 2022-10-12 ENCOUNTER — HOSPITAL ENCOUNTER (EMERGENCY)
Age: 52
Discharge: HOME | End: 2022-10-12
Attending: EMERGENCY MEDICINE
Payer: MEDICARE

## 2022-10-12 VITALS
RESPIRATION RATE: 19 BRPM | HEART RATE: 90 BPM | SYSTOLIC BLOOD PRESSURE: 130 MMHG | HEIGHT: 62 IN | OXYGEN SATURATION: 94 % | WEIGHT: 180 LBS | DIASTOLIC BLOOD PRESSURE: 94 MMHG | BODY MASS INDEX: 33.13 KG/M2 | TEMPERATURE: 98.7 F

## 2022-10-12 DIAGNOSIS — G89.29 CHRONIC UPPER ABDOMINAL PAIN: Primary | ICD-10-CM

## 2022-10-12 DIAGNOSIS — R11.2 NAUSEA AND VOMITING, UNSPECIFIED VOMITING TYPE: ICD-10-CM

## 2022-10-12 DIAGNOSIS — R10.10 CHRONIC UPPER ABDOMINAL PAIN: Primary | ICD-10-CM

## 2022-10-12 DIAGNOSIS — K86.1 CHRONIC BILIARY PANCREATITIS (HCC): ICD-10-CM

## 2022-10-12 LAB
ALBUMIN SERPL-MCNC: 3.8 G/DL (ref 3.5–5)
ALBUMIN/GLOB SERPL: 0.9 {RATIO} (ref 0.4–1.6)
ALP SERPL-CCNC: 116 U/L (ref 50–136)
ALT SERPL-CCNC: 30 U/L (ref 12–65)
ANION GAP SERPL CALC-SCNC: 9 MMOL/L (ref 2–11)
APPEARANCE UR: CLEAR
AST SERPL-CCNC: 37 U/L (ref 15–37)
BASOPHILS # BLD: 0 K/UL (ref 0–0.2)
BASOPHILS NFR BLD: 1 % (ref 0–2)
BILIRUB SERPL-MCNC: 0.5 MG/DL (ref 0.2–1.1)
BILIRUB UR QL: NEGATIVE
BUN SERPL-MCNC: 10 MG/DL (ref 6–23)
CALCIUM SERPL-MCNC: 9.5 MG/DL (ref 8.3–10.4)
CHLORIDE SERPL-SCNC: 112 MMOL/L (ref 101–110)
CO2 SERPL-SCNC: 20 MMOL/L (ref 21–32)
COLOR UR: NORMAL
CREAT SERPL-MCNC: 0.9 MG/DL (ref 0.6–1)
DIFFERENTIAL METHOD BLD: ABNORMAL
EKG ATRIAL RATE: 109 BPM
EKG DIAGNOSIS: NORMAL
EKG P AXIS: 54 DEGREES
EKG P-R INTERVAL: 129 MS
EKG Q-T INTERVAL: 321 MS
EKG QRS DURATION: 64 MS
EKG QTC CALCULATION (BAZETT): 435 MS
EKG R AXIS: 47 DEGREES
EKG T AXIS: 58 DEGREES
EKG VENTRICULAR RATE: 110 BPM
EOSINOPHIL # BLD: 0 K/UL (ref 0–0.8)
EOSINOPHIL NFR BLD: 1 % (ref 0.5–7.8)
ERYTHROCYTE [DISTWIDTH] IN BLOOD BY AUTOMATED COUNT: 19.9 % (ref 11.9–14.6)
GLOBULIN SER CALC-MCNC: 4.4 G/DL (ref 2.8–4.5)
GLUCOSE SERPL-MCNC: 205 MG/DL (ref 65–100)
GLUCOSE UR STRIP.AUTO-MCNC: 500 MG/DL
HCT VFR BLD AUTO: 35.9 % (ref 35.8–46.3)
HGB BLD-MCNC: 11 G/DL (ref 11.7–15.4)
HGB UR QL STRIP: NEGATIVE
IMM GRANULOCYTES # BLD AUTO: 0 K/UL (ref 0–0.5)
IMM GRANULOCYTES NFR BLD AUTO: 1 % (ref 0–5)
KETONES UR QL STRIP.AUTO: NEGATIVE MG/DL
LEUKOCYTE ESTERASE UR QL STRIP.AUTO: NEGATIVE
LIPASE SERPL-CCNC: 288 U/L (ref 73–393)
LYMPHOCYTES # BLD: 1.2 K/UL (ref 0.5–4.6)
LYMPHOCYTES NFR BLD: 37 % (ref 13–44)
MCH RBC QN AUTO: 23.1 PG (ref 26.1–32.9)
MCHC RBC AUTO-ENTMCNC: 30.6 G/DL (ref 31.4–35)
MCV RBC AUTO: 75.4 FL (ref 82–102)
MONOCYTES # BLD: 0.4 K/UL (ref 0.1–1.3)
MONOCYTES NFR BLD: 11 % (ref 4–12)
NEUTS SEG # BLD: 1.7 K/UL (ref 1.7–8.2)
NEUTS SEG NFR BLD: 50 % (ref 43–78)
NITRITE UR QL STRIP.AUTO: NEGATIVE
NRBC # BLD: 0 K/UL (ref 0–0.2)
PH UR STRIP: 6.5 [PH] (ref 5–9)
PLATELET # BLD AUTO: 127 K/UL (ref 150–450)
PMV BLD AUTO: ABNORMAL FL (ref 9.4–12.3)
POTASSIUM SERPL-SCNC: 4.7 MMOL/L (ref 3.5–5.1)
PROT SERPL-MCNC: 8.2 G/DL (ref 6.3–8.2)
PROT UR STRIP-MCNC: NEGATIVE MG/DL
RBC # BLD AUTO: 4.76 M/UL (ref 4.05–5.2)
SODIUM SERPL-SCNC: 141 MMOL/L (ref 133–143)
SP GR UR REFRACTOMETRY: 1.01 (ref 1–1.02)
UROBILINOGEN UR QL STRIP.AUTO: 0.2 EU/DL (ref 0.2–1)
WBC # BLD AUTO: 3.4 K/UL (ref 4.3–11.1)

## 2022-10-12 PROCEDURE — 96374 THER/PROPH/DIAG INJ IV PUSH: CPT

## 2022-10-12 PROCEDURE — 81003 URINALYSIS AUTO W/O SCOPE: CPT

## 2022-10-12 PROCEDURE — 80053 COMPREHEN METABOLIC PANEL: CPT

## 2022-10-12 PROCEDURE — 2580000003 HC RX 258: Performed by: EMERGENCY MEDICINE

## 2022-10-12 PROCEDURE — 96375 TX/PRO/DX INJ NEW DRUG ADDON: CPT

## 2022-10-12 PROCEDURE — 85025 COMPLETE CBC W/AUTO DIFF WBC: CPT

## 2022-10-12 PROCEDURE — 83690 ASSAY OF LIPASE: CPT

## 2022-10-12 PROCEDURE — 93005 ELECTROCARDIOGRAM TRACING: CPT | Performed by: EMERGENCY MEDICINE

## 2022-10-12 PROCEDURE — 99284 EMERGENCY DEPT VISIT MOD MDM: CPT

## 2022-10-12 PROCEDURE — 6360000002 HC RX W HCPCS: Performed by: EMERGENCY MEDICINE

## 2022-10-12 PROCEDURE — 96376 TX/PRO/DX INJ SAME DRUG ADON: CPT

## 2022-10-12 RX ORDER — OXYCODONE HYDROCHLORIDE 5 MG/1
5 TABLET ORAL EVERY 6 HOURS PRN
Qty: 20 TABLET | Refills: 0 | Status: SHIPPED | OUTPATIENT
Start: 2022-10-12 | End: 2022-10-17

## 2022-10-12 RX ORDER — DIPHENHYDRAMINE HYDROCHLORIDE 50 MG/ML
25 INJECTION INTRAMUSCULAR; INTRAVENOUS
Status: COMPLETED | OUTPATIENT
Start: 2022-10-12 | End: 2022-10-12

## 2022-10-12 RX ORDER — HEPARIN SODIUM (PORCINE) LOCK FLUSH IV SOLN 100 UNIT/ML 100 UNIT/ML
300 SOLUTION INTRAVENOUS
Status: COMPLETED | OUTPATIENT
Start: 2022-10-12 | End: 2022-10-12

## 2022-10-12 RX ORDER — HYDROMORPHONE HYDROCHLORIDE 1 MG/ML
1.5 INJECTION, SOLUTION INTRAMUSCULAR; INTRAVENOUS; SUBCUTANEOUS
Status: COMPLETED | OUTPATIENT
Start: 2022-10-12 | End: 2022-10-12

## 2022-10-12 RX ORDER — HYOSCYAMINE SULFATE 0.12 MG/1
0.25 TABLET SUBLINGUAL 4 TIMES DAILY PRN
Qty: 20 EACH | Refills: 1 | Status: SHIPPED | OUTPATIENT
Start: 2022-10-12

## 2022-10-12 RX ORDER — ONDANSETRON 2 MG/ML
8 INJECTION INTRAMUSCULAR; INTRAVENOUS
Status: COMPLETED | OUTPATIENT
Start: 2022-10-12 | End: 2022-10-12

## 2022-10-12 RX ORDER — PROCHLORPERAZINE MALEATE 10 MG
10 TABLET ORAL EVERY 6 HOURS PRN
Qty: 12 TABLET | Refills: 0 | Status: SHIPPED | OUTPATIENT
Start: 2022-10-12

## 2022-10-12 RX ORDER — PROMETHAZINE HYDROCHLORIDE 25 MG/ML
12.5 INJECTION, SOLUTION INTRAMUSCULAR; INTRAVENOUS EVERY 6 HOURS PRN
Status: DISCONTINUED | OUTPATIENT
Start: 2022-10-12 | End: 2022-10-12 | Stop reason: HOSPADM

## 2022-10-12 RX ORDER — HYDROMORPHONE HYDROCHLORIDE 1 MG/ML
0.5 INJECTION, SOLUTION INTRAMUSCULAR; INTRAVENOUS; SUBCUTANEOUS
Status: DISCONTINUED | OUTPATIENT
Start: 2022-10-12 | End: 2022-10-12 | Stop reason: SDUPTHER

## 2022-10-12 RX ORDER — SODIUM CHLORIDE, SODIUM LACTATE, POTASSIUM CHLORIDE, AND CALCIUM CHLORIDE .6; .31; .03; .02 G/100ML; G/100ML; G/100ML; G/100ML
1000 INJECTION, SOLUTION INTRAVENOUS
Status: COMPLETED | OUTPATIENT
Start: 2022-10-12 | End: 2022-10-12

## 2022-10-12 RX ORDER — HYDROMORPHONE HYDROCHLORIDE 1 MG/ML
1 INJECTION, SOLUTION INTRAMUSCULAR; INTRAVENOUS; SUBCUTANEOUS
Status: COMPLETED | OUTPATIENT
Start: 2022-10-12 | End: 2022-10-12

## 2022-10-12 RX ADMIN — HYDROMORPHONE HYDROCHLORIDE 1 MG: 1 INJECTION, SOLUTION INTRAMUSCULAR; INTRAVENOUS; SUBCUTANEOUS at 12:27

## 2022-10-12 RX ADMIN — ONDANSETRON 8 MG: 2 INJECTION INTRAMUSCULAR; INTRAVENOUS at 12:26

## 2022-10-12 RX ADMIN — HEPARIN 300 UNITS: 100 SYRINGE at 13:16

## 2022-10-12 RX ADMIN — PROMETHAZINE HYDROCHLORIDE 25 MG: 25 INJECTION INTRAMUSCULAR; INTRAVENOUS at 10:40

## 2022-10-12 RX ADMIN — SODIUM CHLORIDE, POTASSIUM CHLORIDE, SODIUM LACTATE AND CALCIUM CHLORIDE 1000 ML: 600; 310; 30; 20 INJECTION, SOLUTION INTRAVENOUS at 10:36

## 2022-10-12 RX ADMIN — HYDROMORPHONE HYDROCHLORIDE 1.5 MG: 1 INJECTION, SOLUTION INTRAMUSCULAR; INTRAVENOUS; SUBCUTANEOUS at 10:37

## 2022-10-12 RX ADMIN — DIPHENHYDRAMINE HYDROCHLORIDE 25 MG: 50 INJECTION, SOLUTION INTRAMUSCULAR; INTRAVENOUS at 12:27

## 2022-10-12 ASSESSMENT — ENCOUNTER SYMPTOMS
RHINORRHEA: 0
ABDOMINAL PAIN: 1
FACIAL SWELLING: 0
EYE DISCHARGE: 0
BACK PAIN: 0
SHORTNESS OF BREATH: 0
EYE REDNESS: 0
VOMITING: 1
COLOR CHANGE: 0
COUGH: 0
NAUSEA: 1

## 2022-10-12 ASSESSMENT — PAIN SCALES - GENERAL
PAINLEVEL_OUTOF10: 8
PAINLEVEL_OUTOF10: 10
PAINLEVEL_OUTOF10: 9

## 2022-10-12 ASSESSMENT — PAIN DESCRIPTION - DESCRIPTORS: DESCRIPTORS: SHARP;SHOOTING

## 2022-10-12 ASSESSMENT — PAIN DESCRIPTION - LOCATION: LOCATION: ABDOMEN

## 2022-10-12 ASSESSMENT — PAIN DESCRIPTION - ORIENTATION: ORIENTATION: LEFT;UPPER

## 2022-10-12 ASSESSMENT — PAIN - FUNCTIONAL ASSESSMENT: PAIN_FUNCTIONAL_ASSESSMENT: 0-10

## 2022-10-12 NOTE — ED PROVIDER NOTES
[facility] Emergency Department Provider Note                   PCP:                Oral Ramirez MD               Age: 46 y.o. Sex: female       ICD-10-CM    1. Chronic upper abdominal pain  R10.10     G89.29       2. Chronic biliary pancreatitis (HCC)  K86.1       3. Nausea and vomiting, unspecified vomiting type  R11.2           DISPOSITION         New Prescriptions    No medications on file       Orders Placed This Encounter   Procedures    CBC with Diff    CMP    Lipase    Urinalysis w rflx microscopic    Diet NPO    Misc nursing order (specify)    EKG 12 Lead (Select if Upper Abd Pain, or SOB, Diaphoresis or Tachy)    Saline lock IV         Melly Arellano is a 46 y.o. female who presents to the Emergency Department with chief complaint of    Chief Complaint   Patient presents with    Abdominal Pain      Chief complaint : Abdominal pain, chronic pancreatitis    HISTORY OF PRESENT ILLNESS :  Location : Epigastric    Quality : Aching    Quantity : Constant    Timing : Chronic, all the time, for years, but worse over the last 3 days    Severity : Moderate, probably at least 9/10    Context : Chronic pancreatitis,    Alleviating / exacerbating factors : Phenergan tablets at home but no analgesics    Associated Symptoms : Nausea with dry heaving, no vomiting due to Nissen, no diarrhea    -------------------------------    SOCIAL HISTORY : , former smoker, nondrinker        Review of Systems   Constitutional:  Positive for appetite change. Negative for chills and fever. HENT:  Negative for facial swelling and rhinorrhea. Eyes:  Negative for discharge and redness. Respiratory:  Negative for cough and shortness of breath. Cardiovascular:  Positive for palpitations. Negative for chest pain. Gastrointestinal:  Positive for abdominal pain, nausea and vomiting. Endocrine: Negative for polydipsia and polyuria. Genitourinary:  Negative for difficulty urinating and dysuria.    Musculoskeletal: Negative for arthralgias and back pain. Skin:  Negative for color change and pallor. Neurological:  Negative for dizziness and headaches. All other systems reviewed and are negative. All other systems reviewed and are negative.       Past Medical History:   Diagnosis Date    Anemia     blood transfusion 2013 X1 d/t \"perforated bowel\"-- Fe infusions 12/2017--- denies any current iron infusions 12/17/2019    Anxiety and depression     managed with meds    C. difficile diarrhea 2013    in 8184 after complicated ERCP    Cervical dysplasia 1990s    Chronic insomnia     ambien     Chronic nausea     Chronic pain     all over     Chronic pancreatitis ([facility] [address]     COVID-19 vaccine series completed 04/21/2021    3/29/2021 Pfizer     Dehydration     IVFs through port as needed for this     Encounter for insertion of venous access port     Left chest port    Esophageal stricture 2017    Fibromyalgia     managed with meds    Former cigarette smoker     GERD (gastroesophageal reflux disease)     controlled with med    History of kidney stones 03/2021    X1-naturally pass    HLD (hyperlipidemia)     pt denies     IBS (irritable bowel syndrome)     Migraine headache     does not have often but did have one recently; just takes tylenol    Nausea & vomiting     pt reports she does better with phenergan than zofran - causes HA    Nonalcoholic fatty liver disease     Pancreatitis 6/23/2014    PUD (peptic ulcer disease) 06/2017    still having issues takes meds     Severe protein-calorie malnutrition ([address] 4/26/2013    Sinus tachycardia     per pt-- no tx needed    Sphincter of Oddi dysfunction     Type 2 diabetes mellitus ([address]     type 2-- sqbs am avg 150--- Hypo symptoms at <100, Insulin dependent; last A1C was 11/3/2020 = 9.0        Past Surgical History:   Procedure Laterality Date    CARPAL TUNNEL RELEASE Right     along with trigger finger    CHOLECYSTECTOMY, LAPAROSCOPIC  1997    COLONOSCOPY      COLONOSCOPY N/A 4/4/2018 COLONOSCOPY performed by Taylor Hawley MD at [facility] ENDOSCOPY    ERCP  3/5/2013    resulted in perforated duodenum    GASTROSTOMY TUBE PLACEMENT N/A 7/26/2022    EGD PEG TUBE PLACEMENT with j tube placement ROOM 207 performed by Zohreh Duran MD at [address] ([facility])  1998    ovaries remain    IR DRAIN SKIN ABSCESS      IR PORT PLACEMENT EQUAL OR GREATER THAN 5 YEARS  9/25/2018    IR PORT PLACEMENT EQUAL OR GREATER THAN 5 YEARS 9/25/2018 [facility] RADIOLOGY SPECIALS    LAPAROTOMY  3/5/2013    exploratory for duodenal perforation with ERCP    ORTHOPEDIC SURGERY      right finger surgery 11/2017    OTHER SURGICAL HISTORY Bilateral     thumb    OTHER SURGICAL HISTORY      Kidney stones march 2021    GA ABDOMEN SURGERY PROC UNLISTED  4/13    explor lap    GA ABDOMEN SURGERY PROC UNLISTED  1997    lap nissen    GA ABDOMEN SURGERY PROC UNLISTED  last one placed  2012    Hx of pancreatic stent, multiple    TOTAL COLECTOMY      UPPER GASTROINTESTINAL ENDOSCOPY  5/21/2021         UPPER GASTROINTESTINAL ENDOSCOPY  2017    36 fr tony    UPPER GASTROINTESTINAL ENDOSCOPY  12/18/2019         UPPER GASTROINTESTINAL ENDOSCOPY N/A 6/8/2022    EGD ESOPHAGOGASTRODUODENOSCOPY DILATATION/ 34 performed by Sukhi Shannon MD at [place] [address] N/A 7/22/2022    ENDOSCOPIC ULTRASOUND/35 ROOM 207 performed by Sukhi Shannon MD at [place] [address] N/A 7/22/2022    EGD BIOPSY With balloon dialtion performed by Sukih Shannon MD at [place] [address] N/A 8/10/2022    EGD ESOPHAGOGASTRODUODENOSCOPY performed by Sukhi Shannon MD at [address]  4/25/13 at [facility]-- stent removed 6-27-13.   Dr Chandra Back  2014    port insertion, left chest wall        Family History   Problem Relation Age of Onset    No Known Problems Sister     Coronary Art Dis Father 76    Stroke Father     Hypertension Father     Diabetes Father     Heart Disease Mother         cabg began in her 52's    Hypertension Mother     Diabetes Mother     Other Father         Social Connections: Not on file        Allergies   Allergen Reactions    Adhesive Tape Itching, Other (See Comments) and Rash     blisters  tegaderm  Paper tape too      Metronidazole Diarrhea and Nausea And Vomiting    Atorvastatin Myalgia    Iodine Other (See Comments)     Sweaty and clammy    Statins Myalgia    Barium Iodide Nausea And Vomiting     Diaphoresis      Barium Sulfate Palpitations    Insulin Lispro Nausea And Vomiting    Insulins Nausea And Vomiting     Humalog only    Metformin Nausea And Vomiting     Stomach pain  Stomach pain  Stomach pain  Stomach pain          Vitals signs and nursing note reviewed. Patient Vitals for the past 4 hrs:   Temp Pulse Resp BP SpO2   10/12/22 1045 -- (!) 105 19 119/61 97 %   10/12/22 1031 -- (!) 105 20 106/69 95 %   10/12/22 0850 98.7 °F (37.1 °C) (!) 125 15 (!) 165/72 98 %          Physical Exam  Vitals and nursing note reviewed. Constitutional:       General: She is not in acute distress. Appearance: Normal appearance. She is well-developed. She is not ill-appearing, toxic-appearing or diaphoretic. HENT:      Head: Normocephalic and atraumatic. Right Ear: External ear normal.      Left Ear: External ear normal.      Nose: No rhinorrhea. Eyes:      General: No scleral icterus. Right eye: No discharge. Left eye: No discharge. Conjunctiva/sclera: Conjunctivae normal.   Cardiovascular:      Rate and Rhythm: Normal rate and regular rhythm. Pulmonary:      Effort: Pulmonary effort is normal. No respiratory distress. Breath sounds: Normal breath sounds. Abdominal:      Palpations: Abdomen is soft. There is no fluid wave or pulsatile mass. Tenderness: There is no abdominal tenderness. There is no guarding or rebound.    Musculoskeletal: General: No deformity or signs of injury. Normal range of motion. Cervical back: Normal range of motion and neck supple. No rigidity. Skin:     General: Skin is warm and dry. Coloration: Skin is not jaundiced or pale. Neurological:      General: No focal deficit present. Mental Status: She is alert and oriented to person, place, and time. Psychiatric:         Mood and Affect: Mood normal.         Behavior: Behavior normal.         Thought Content: Thought content normal.        MDM      Procedures    Labs Reviewed   CBC WITH AUTO DIFFERENTIAL - Abnormal; Notable for the following components:       Result Value    WBC 3.4 (*)     Hemoglobin 11.0 (*)     MCV 75.4 (*)     MCH 23.1 (*)     MCHC 30.6 (*)     RDW 19.9 (*)     Platelets 254 (*)     All other components within normal limits   COMPREHENSIVE METABOLIC PANEL - Abnormal; Notable for the following components:    Chloride 112 (*)     CO2 20 (*)     Glucose 205 (*)     All other components within normal limits   LIPASE   URINALYSIS        No orders to display            Roscoe Coma Scale  Eye Opening: Spontaneous  Best Verbal Response: Oriented  Best Motor Response: Obeys commands  Roscoe Coma Scale Score: 15                     Voice dictation software was used during the making of this note. This software is not perfect and grammatical and other typographical errors may be present. This note has not been completely proofread for errors.        Riya Bassett MD  10/12/22 [id number]       Riya Bassett MD  10/12/22 2405

## 2022-10-12 NOTE — DISCHARGE INSTRUCTIONS
Clear liquid diet for a few days, then gradually re-introduce food, starting with some simple, bland stuff

## 2022-10-12 NOTE — ED TRIAGE NOTES
Pt ambulatory to triage. Pt reports LUQ pain that wraps around to the back and dry heaving. Pt states she had pancreatitis in July of this year and had to be admitted. Pt denies fever/chills, vomiting/diarrhea, body aches, headaches, dysuria, hematuria.

## 2022-10-27 ENCOUNTER — HOSPITAL ENCOUNTER (EMERGENCY)
Age: 52
Discharge: HOME | End: 2022-10-27
Attending: EMERGENCY MEDICINE
Payer: MEDICARE

## 2022-10-27 VITALS
OXYGEN SATURATION: 97 % | HEART RATE: 105 BPM | HEIGHT: 62 IN | DIASTOLIC BLOOD PRESSURE: 80 MMHG | WEIGHT: 182 LBS | RESPIRATION RATE: 16 BRPM | SYSTOLIC BLOOD PRESSURE: 155 MMHG | TEMPERATURE: 98.3 F | BODY MASS INDEX: 33.49 KG/M2

## 2022-10-27 DIAGNOSIS — R10.13 EPIGASTRIC PAIN: Primary | ICD-10-CM

## 2022-10-27 DIAGNOSIS — K08.89 PAIN, DENTAL: ICD-10-CM

## 2022-10-27 DIAGNOSIS — R11.2 NAUSEA AND VOMITING, UNSPECIFIED VOMITING TYPE: ICD-10-CM

## 2022-10-27 LAB
ALBUMIN SERPL-MCNC: 3.6 G/DL (ref 3.5–5)
ALBUMIN/GLOB SERPL: 0.7 {RATIO} (ref 0.4–1.6)
ALP SERPL-CCNC: 137 U/L (ref 50–136)
ALT SERPL-CCNC: 29 U/L (ref 12–65)
ANION GAP SERPL CALC-SCNC: 7 MMOL/L (ref 2–11)
AST SERPL-CCNC: 28 U/L (ref 15–37)
BASOPHILS # BLD: 0 K/UL (ref 0–0.2)
BASOPHILS NFR BLD: 1 % (ref 0–2)
BILIRUB SERPL-MCNC: 0.5 MG/DL (ref 0.2–1.1)
BILIRUB UR QL: NEGATIVE
BUN SERPL-MCNC: 7 MG/DL (ref 6–23)
CALCIUM SERPL-MCNC: 9.9 MG/DL (ref 8.3–10.4)
CHLORIDE SERPL-SCNC: 113 MMOL/L (ref 101–110)
CO2 SERPL-SCNC: 19 MMOL/L (ref 21–32)
CREAT SERPL-MCNC: 0.9 MG/DL (ref 0.6–1)
DIFFERENTIAL METHOD BLD: ABNORMAL
EOSINOPHIL # BLD: 0.1 K/UL (ref 0–0.8)
EOSINOPHIL NFR BLD: 2 % (ref 0.5–7.8)
ERYTHROCYTE [DISTWIDTH] IN BLOOD BY AUTOMATED COUNT: 20.2 % (ref 11.9–14.6)
GLOBULIN SER CALC-MCNC: 5.1 G/DL (ref 2.8–4.5)
GLUCOSE SERPL-MCNC: 191 MG/DL (ref 65–100)
GLUCOSE UR QL STRIP.AUTO: NEGATIVE MG/DL
HCT VFR BLD AUTO: 39.2 % (ref 35.8–46.3)
HGB BLD-MCNC: 11.8 G/DL (ref 11.7–15.4)
IMM GRANULOCYTES # BLD AUTO: 0 K/UL (ref 0–0.5)
IMM GRANULOCYTES NFR BLD AUTO: 0 % (ref 0–5)
KETONES UR-MCNC: NEGATIVE MG/DL
LEUKOCYTE ESTERASE UR QL STRIP: NEGATIVE
LIPASE SERPL-CCNC: 130 U/L (ref 73–393)
LYMPHOCYTES # BLD: 2.3 K/UL (ref 0.5–4.6)
LYMPHOCYTES NFR BLD: 41 % (ref 13–44)
MCH RBC QN AUTO: 22.5 PG (ref 26.1–32.9)
MCHC RBC AUTO-ENTMCNC: 30.1 G/DL (ref 31.4–35)
MCV RBC AUTO: 74.8 FL (ref 82–102)
MONOCYTES # BLD: 0.5 K/UL (ref 0.1–1.3)
MONOCYTES NFR BLD: 8 % (ref 4–12)
NEUTS SEG # BLD: 2.7 K/UL (ref 1.7–8.2)
NEUTS SEG NFR BLD: 48 % (ref 43–78)
NITRITE UR QL: NEGATIVE
NRBC # BLD: 0 K/UL (ref 0–0.2)
PH UR: 7 [PH] (ref 5–9)
PLATELET # BLD AUTO: 248 K/UL (ref 150–450)
PMV BLD AUTO: 10.8 FL (ref 9.4–12.3)
POTASSIUM SERPL-SCNC: 4.7 MMOL/L (ref 3.5–5.1)
PROT SERPL-MCNC: 8.7 G/DL (ref 6.3–8.2)
PROT UR QL: NEGATIVE MG/DL
RBC # BLD AUTO: 5.24 M/UL (ref 4.05–5.2)
RBC # UR STRIP: NEGATIVE /UL
SERVICE CMNT-IMP: NORMAL
SODIUM SERPL-SCNC: 139 MMOL/L (ref 133–143)
SP GR UR: 1.02 (ref 1–1.02)
UROBILINOGEN UR QL: 0.2 EU/DL (ref 0.2–1)
WBC # BLD AUTO: 5.6 K/UL (ref 4.3–11.1)

## 2022-10-27 PROCEDURE — 81003 URINALYSIS AUTO W/O SCOPE: CPT

## 2022-10-27 PROCEDURE — 83690 ASSAY OF LIPASE: CPT

## 2022-10-27 PROCEDURE — 6370000000 HC RX 637 (ALT 250 FOR IP): Performed by: EMERGENCY MEDICINE

## 2022-10-27 PROCEDURE — 2580000003 HC RX 258: Performed by: EMERGENCY MEDICINE

## 2022-10-27 PROCEDURE — 96374 THER/PROPH/DIAG INJ IV PUSH: CPT

## 2022-10-27 PROCEDURE — 80053 COMPREHEN METABOLIC PANEL: CPT

## 2022-10-27 PROCEDURE — 96361 HYDRATE IV INFUSION ADD-ON: CPT

## 2022-10-27 PROCEDURE — 6360000002 HC RX W HCPCS: Performed by: EMERGENCY MEDICINE

## 2022-10-27 PROCEDURE — 99284 EMERGENCY DEPT VISIT MOD MDM: CPT

## 2022-10-27 PROCEDURE — 85025 COMPLETE CBC W/AUTO DIFF WBC: CPT

## 2022-10-27 PROCEDURE — 96375 TX/PRO/DX INJ NEW DRUG ADDON: CPT

## 2022-10-27 RX ORDER — METOCLOPRAMIDE HYDROCHLORIDE 5 MG/ML
10 INJECTION INTRAMUSCULAR; INTRAVENOUS
Status: COMPLETED | OUTPATIENT
Start: 2022-10-27 | End: 2022-10-27

## 2022-10-27 RX ORDER — AMOXICILLIN 875 MG/1
875 TABLET, COATED ORAL 2 TIMES DAILY
Qty: 14 TABLET | Refills: 0 | Status: SHIPPED | OUTPATIENT
Start: 2022-10-27 | End: 2022-11-03

## 2022-10-27 RX ORDER — OXYCODONE HYDROCHLORIDE AND ACETAMINOPHEN 5; 325 MG/1; MG/1
2 TABLET ORAL
Status: COMPLETED | OUTPATIENT
Start: 2022-10-27 | End: 2022-10-27

## 2022-10-27 RX ORDER — 0.9 % SODIUM CHLORIDE 0.9 %
1000 INTRAVENOUS SOLUTION INTRAVENOUS ONCE
Status: COMPLETED | OUTPATIENT
Start: 2022-10-27 | End: 2022-10-27

## 2022-10-27 RX ORDER — OXYCODONE HYDROCHLORIDE 5 MG/1
5 TABLET ORAL EVERY 8 HOURS PRN
Qty: 16 TABLET | Refills: 0 | Status: SHIPPED | OUTPATIENT
Start: 2022-10-27 | End: 2022-11-01

## 2022-10-27 RX ORDER — HYDROMORPHONE HYDROCHLORIDE 1 MG/ML
1 INJECTION, SOLUTION INTRAMUSCULAR; INTRAVENOUS; SUBCUTANEOUS
Status: COMPLETED | OUTPATIENT
Start: 2022-10-27 | End: 2022-10-27

## 2022-10-27 RX ORDER — SODIUM CHLORIDE, SODIUM LACTATE, POTASSIUM CHLORIDE, AND CALCIUM CHLORIDE .6; .31; .03; .02 G/100ML; G/100ML; G/100ML; G/100ML
1000 INJECTION, SOLUTION INTRAVENOUS
Status: DISCONTINUED | OUTPATIENT
Start: 2022-10-27 | End: 2022-10-27

## 2022-10-27 RX ORDER — PROMETHAZINE HYDROCHLORIDE 25 MG/1
25 TABLET ORAL 4 TIMES DAILY PRN
Qty: 12 TABLET | Refills: 0 | Status: SHIPPED | OUTPATIENT
Start: 2022-10-27 | End: 2022-10-31

## 2022-10-27 RX ORDER — ONDANSETRON HYDROCHLORIDE 8 MG/1
8 TABLET, FILM COATED ORAL 3 TIMES DAILY PRN
Qty: 12 TABLET | Refills: 1 | Status: SHIPPED | OUTPATIENT
Start: 2022-10-27

## 2022-10-27 RX ORDER — OXYCODONE HYDROCHLORIDE AND ACETAMINOPHEN 5; 325 MG/1; MG/1
2 TABLET ORAL
Status: DISCONTINUED | OUTPATIENT
Start: 2022-10-27 | End: 2022-10-27

## 2022-10-27 RX ORDER — ONDANSETRON 4 MG/1
8 TABLET, ORALLY DISINTEGRATING ORAL
Status: COMPLETED | OUTPATIENT
Start: 2022-10-27 | End: 2022-10-27

## 2022-10-27 RX ADMIN — SODIUM CHLORIDE 1000 ML: 9 INJECTION, SOLUTION INTRAVENOUS at 10:33

## 2022-10-27 RX ADMIN — ONDANSETRON 8 MG: 4 TABLET, ORALLY DISINTEGRATING ORAL at 12:32

## 2022-10-27 RX ADMIN — OXYCODONE AND ACETAMINOPHEN 1 TABLET: 5; 325 TABLET ORAL at 12:31

## 2022-10-27 RX ADMIN — HYDROMORPHONE HYDROCHLORIDE 1 MG: 1 INJECTION, SOLUTION INTRAMUSCULAR; INTRAVENOUS; SUBCUTANEOUS at 10:34

## 2022-10-27 RX ADMIN — METOCLOPRAMIDE 10 MG: 5 INJECTION, SOLUTION INTRAMUSCULAR; INTRAVENOUS at 10:34

## 2022-10-27 ASSESSMENT — ENCOUNTER SYMPTOMS
VOMITING: 1
NAUSEA: 1
SHORTNESS OF BREATH: 0
EYE REDNESS: 0
ABDOMINAL PAIN: 1
BACK PAIN: 0
COLOR CHANGE: 0
FACIAL SWELLING: 0
COUGH: 0
RHINORRHEA: 0
EYE DISCHARGE: 0

## 2022-10-27 ASSESSMENT — PAIN DESCRIPTION - ORIENTATION: ORIENTATION: MID

## 2022-10-27 ASSESSMENT — PAIN DESCRIPTION - DESCRIPTORS: DESCRIPTORS: ACHING;SHARP;SHOOTING

## 2022-10-27 ASSESSMENT — PAIN SCALES - GENERAL: PAINLEVEL_OUTOF10: 9

## 2022-10-27 ASSESSMENT — PAIN DESCRIPTION - LOCATION: LOCATION: ABDOMEN

## 2022-10-27 ASSESSMENT — PAIN DESCRIPTION - PAIN TYPE: TYPE: CHRONIC PAIN

## 2022-10-27 ASSESSMENT — PAIN - FUNCTIONAL ASSESSMENT: PAIN_FUNCTIONAL_ASSESSMENT: 0-10

## 2022-10-27 NOTE — DISCHARGE INSTRUCTIONS
Clear liquid diet for a few days  Follow-up with Dr. Kenny Zimmerman and with Dr. Vandana Modi, I think you need to be back with pain management  Use the amoxicillin regarding the dental pain

## 2022-10-27 NOTE — ED PROVIDER NOTES
[facility] Emergency Department Provider Note                   PCP:                Leah Ivey MD               Age: 46 y.o. Sex: female       ICD-10-CM    1. Epigastric pain  R10.13 oxyCODONE (ROXICODONE) 5 MG immediate release tablet      2. Nausea and vomiting, unspecified vomiting type  R11.2       3. Pain, dental  K08.89           DISPOSITION Decision To Discharge 10/27/2022 12:42:52 PM       Discharge Medication List as of 10/27/2022 12:48 PM        START taking these medications    Details   amoxicillin (AMOXIL) 875 MG tablet Take 1 tablet by mouth 2 times daily for 7 days, Disp-14 tablet, R-0Normal      ondansetron (ZOFRAN) 8 MG tablet Take 1 tablet by mouth 3 times daily as needed for Nausea or Vomiting, Disp-12 tablet, R-1Normal      !! promethazine (PHENERGAN) 25 MG tablet Take 1 tablet by mouth 4 times daily as needed for Nausea, Disp-12 tablet, R-0Normal      oxyCODONE (ROXICODONE) 5 MG immediate release tablet Take 1 tablet by mouth every 8 hours as needed for Pain for up to 5 days. Intended supply: 5 days. Take lowest dose possible to manage pain, Disp-16 tablet, R-0Normal       !! - Potential duplicate medications found. Please discuss with provider. Orders Placed This Encounter   Procedures    CBC with Diff    CMP    Lipase    POCT Urinalysis no Micro         Amina Kwong is a 46 y.o. female who presents to the Emergency Department with chief complaint of    Chief Complaint   Patient presents with    Abdominal Pain    Dental Pain      Chief complaint : Nominal pain, nausea, dry heaving    HISTORY OF PRESENT ILLNESS :  Location : Epigastric pain    Quality : Feels like prior pancreatitis spells    Quantity : Constant    Timing : Chronic, worsening yesterday    Severity : Moderate    Context : Recently seen for the same, used to be on pain management till GI suggested she stop seeing them, according to her    Alleviating / exacerbating factors :  Worse with food and retching    Associated Symptoms : Tooth aches after several recent dental extractions    -------------------------------    SOCIAL HISTORY : , Former smoker, nondrinker        Review of Systems   Constitutional:  Negative for chills and fever. HENT:  Positive for dental problem. Negative for congestion, facial swelling and rhinorrhea. Eyes:  Negative for discharge and redness. Respiratory:  Negative for cough and shortness of breath. Cardiovascular:  Negative for chest pain and palpitations. Gastrointestinal:  Positive for abdominal pain, nausea and vomiting. Endocrine: Negative for polydipsia and polyuria. Genitourinary:  Negative for difficulty urinating and dysuria. Musculoskeletal:  Negative for arthralgias and back pain. Skin:  Negative for color change and pallor. Neurological:  Negative for dizziness and headaches. All other systems reviewed and are negative. All other systems reviewed and are negative.       Past Medical History:   Diagnosis Date    Anemia     blood transfusion 2013 X1 d/t \"perforated bowel\"-- Fe infusions 12/2017--- denies any current iron infusions 12/17/2019    Anxiety and depression     managed with meds    C. difficile diarrhea 2013    in 8571 after complicated ERCP    Cervical dysplasia 1990s    Chronic insomnia     ambien     Chronic nausea     Chronic pain     all over     Chronic pancreatitis ([facility] [address]     COVID-19 vaccine series completed 04/21/2021    3/29/2021 Pfizer     Dehydration     IVFs through port as needed for this     Encounter for insertion of venous access port     Left chest port    Esophageal stricture 2017    Fibromyalgia     managed with meds    Former cigarette smoker     GERD (gastroesophageal reflux disease)     controlled with med    History of kidney stones 03/2021    X1-naturally pass    HLD (hyperlipidemia)     pt denies     IBS (irritable bowel syndrome)     Migraine headache     does not have often but did have one recently; just takes tylenol    Nausea & vomiting     pt reports she does better with phenergan than zofran - causes HA    Nonalcoholic fatty liver disease     Pancreatitis 6/23/2014    PUD (peptic ulcer disease) 06/2017    still having issues takes meds     Severe protein-calorie malnutrition ([facility] [address] 4/26/2013    Sinus tachycardia     per pt-- no tx needed    Sphincter of Oddi dysfunction     Type 2 diabetes mellitus ([facility] [address]     type 2-- sqbs am avg 150--- Hypo symptoms at <100, Insulin dependent; last A1C was 11/3/2020 = 9.0        Past Surgical History:   Procedure Laterality Date    CARPAL TUNNEL RELEASE Right     along with trigger finger    CHOLECYSTECTOMY, LAPAROSCOPIC  1997    COLONOSCOPY      COLONOSCOPY N/A 4/4/2018    COLONOSCOPY performed by Kory Koch MD at [facility] ENDOSCOPY    ERCP  3/5/2013    resulted in perforated duodenum    GASTROSTOMY TUBE PLACEMENT N/A 7/26/2022    EGD PEG TUBE PLACEMENT with j tube placement ROOM 207 performed by Rito Lares MD at [facility] ([facility])  1998    ovaries remain    IR DRAIN SKIN ABSCESS      IR PORT PLACEMENT EQUAL OR GREATER THAN 5 YEARS  9/25/2018    IR PORT PLACEMENT EQUAL OR GREATER THAN 5 YEARS 9/25/2018 [facility]    LAPAROTOMY  3/5/2013    exploratory for duodenal perforation with ERCP    ORTHOPEDIC SURGERY      right finger surgery 11/2017    OTHER SURGICAL HISTORY Bilateral     thumb    OTHER SURGICAL HISTORY      Kidney stones march 2021    AK ABDOMEN SURGERY PROC UNLISTED  4/13    explor lap    AK ABDOMEN SURGERY PROC UNLISTED  1997    lap nissen    AK ABDOMEN SURGERY [facility]  last one placed  2012    Hx of pancreatic stent, multiple    TOTAL COLECTOMY      UPPER GASTROINTESTINAL ENDOSCOPY  5/21/2021         UPPER GASTROINTESTINAL ENDOSCOPY  2017    36 fr cecily    UPPER GASTROINTESTINAL ENDOSCOPY  12/18/2019         UPPER GASTROINTESTINAL ENDOSCOPY N/A 6/8/2022    EGD ESOPHAGOGASTRODUODENOSCOPY DILATATION/ 34 performed by Rufus Quiroz MD at [address] [place] [address] N/A 7/22/2022    ENDOSCOPIC ULTRASOUND/35 ROOM 207 performed by Rufus Quiroz MD at [address] [place] [address] 7/22/2022    EGD BIOPSY With balloon dialtion performed by Rufus Quiroz MD at [address] [place] [address] N/A 8/10/2022    EGD ESOPHAGOGASTRODUODENOSCOPY performed by Rufus Quiroz MD at [address] [place]  4/25/13 at [facility]-- stent removed 6-27-13. Dr Boris Cowden  2014    port insertion, left chest wall        Family History   Problem Relation Age of Onset    No Known Problems Sister     Coronary Art Dis Father 76    Stroke Father     Hypertension Father     Diabetes Father     Heart Disease Mother         cabg began in her 52's    Hypertension Mother     Diabetes Mother     Other Father         Social Connections: Not on file        Allergies   Allergen Reactions    Adhesive Tape Itching, Other (See Comments) and Rash     blisters  tegaderm  Paper tape too      Metronidazole Diarrhea and Nausea And Vomiting    Atorvastatin Myalgia    Iodine Other (See Comments)     Sweaty and clammy    Statins Myalgia    Barium Iodide Nausea And Vomiting     Diaphoresis      Barium Sulfate Palpitations    Insulin Lispro Nausea And Vomiting    Insulins Nausea And Vomiting     Humalog only    Metformin Nausea And Vomiting     Stomach pain  Stomach pain  Stomach pain  Stomach pain          Vitals signs and nursing note reviewed. Patient Vitals for the past 4 hrs:   Temp Pulse BP SpO2   10/27/22 1255 98.3 °F (36.8 °C) (!) 105 (!) 155/80 97 %   10/27/22 1245 -- 98 (!) 148/87 97 %          Physical Exam  Vitals and nursing note reviewed. Constitutional:       General: She is not in acute distress. Appearance: Normal appearance. She is well-developed. She is not ill-appearing, toxic-appearing or diaphoretic.    HENT: Head: Normocephalic and atraumatic. Right Ear: External ear normal.      Left Ear: External ear normal.      Nose: No rhinorrhea. Eyes:      General: No scleral icterus. Right eye: No discharge. Left eye: No discharge. Conjunctiva/sclera: Conjunctivae normal.   Cardiovascular:      Rate and Rhythm: Normal rate and regular rhythm. Heart sounds: No murmur heard. Pulmonary:      Effort: Pulmonary effort is normal. No respiratory distress. Breath sounds: Normal breath sounds. Abdominal:      Palpations: Abdomen is soft. There is no fluid wave or pulsatile mass. Tenderness: Tenderness: Not very tender when distracted. There is no right CVA tenderness, left CVA tenderness, guarding or rebound. Musculoskeletal:         General: No deformity or signs of injury. Normal range of motion. Cervical back: Normal range of motion and neck supple. No rigidity. Skin:     General: Skin is warm and dry. Coloration: Skin is not jaundiced or pale. Neurological:      General: No focal deficit present. Mental Status: She is alert and oriented to person, place, and time. Psychiatric:         Mood and Affect: Mood normal.         Behavior: Behavior normal.         Thought Content:  Thought content normal.        MDM  Number of Diagnoses or Management Options  Epigastric pain: established, worsening  Nausea and vomiting, unspecified vomiting type: established, worsening  Pain, dental: new, no workup  Diagnosis management comments: Chronic pancreatitis       Amount and/or Complexity of Data Reviewed  Clinical lab tests: reviewed and ordered  Decide to obtain previous medical records or to obtain history from someone other than the patient: yes    Risk of Complications, Morbidity, and/or Mortality  Presenting problems: moderate  Diagnostic procedures: low  Management options: low    Patient Progress  Patient progress: improved      Procedures    Labs Reviewed   CBC WITH AUTO DIFFERENTIAL - Abnormal; Notable for the following components:       Result Value    RBC 5.24 (*)     MCV 74.8 (*)     MCH 22.5 (*)     MCHC 30.1 (*)     RDW 20.2 (*)     All other components within normal limits   COMPREHENSIVE METABOLIC PANEL - Abnormal; Notable for the following components:    Chloride 113 (*)     CO2 19 (*)     Glucose 191 (*)     Alk Phosphatase 137 (*)     Total Protein 8.7 (*)     Globulin 5.1 (*)     All other components within normal limits   LIPASE   POCT URINALYSIS DIPSTICK        No orders to display            [place] Coma Scale  Eye Opening: Spontaneous  Best Verbal Response: Oriented  Best Motor Response: Obeys commands  [place] Coma Scale Score: 15                     Voice dictation software was used during the making of this note. This software is not perfect and grammatical and other typographical errors may be present. This note has not been completely proofread for errors.        Salazar Garcias MD  10/27/22 6906

## 2022-10-27 NOTE — ED TRIAGE NOTES
Patient presents ambulatory to triage in no apparent distress with mask in place. Pt expresses concern for abdominal pain with dry heaving that has been going on \"forever\" r/t her chronic pancreatitis, but sts it is worse today than it has been. Pt expresses concern for dental pain following having 3 teeth pulled on 10/16. Pt was also diagnosed with COVID on 10/18. Pt has port that she would like accessed for any lab draws and medication administrations.

## 2022-10-27 NOTE — ED NOTES
I have reviewed discharge instructions with the patient. The patient verbalized understanding. Patient left ED via Discharge Method: ambulatory to Home with spouse. Opportunity for questions and clarification provided. Patient given 3 e- scripts. To continue your aftercare when you leave the hospital, you may receive an automated call from our care team to check in on how you are doing. This is a free service and part of our promise to provide the best care and service to meet your aftercare needs.  If you have questions, or wish to unsubscribe from this service please call [phone]. Thank you for Choosing our [facility] Emergency Department.         Wally Santana RN  10/27/22 1379

## 2022-11-17 ENCOUNTER — PREP FOR PROCEDURE (OUTPATIENT)
Dept: ADMINISTRATIVE | Age: 52
End: 2022-11-17

## 2022-11-17 RX ORDER — SODIUM CHLORIDE 0.9 % (FLUSH) 0.9 %
5-40 SYRINGE (ML) INJECTION EVERY 12 HOURS SCHEDULED
Status: CANCELLED | OUTPATIENT
Start: 2022-11-17

## 2022-11-17 RX ORDER — SODIUM CHLORIDE 9 MG/ML
INJECTION, SOLUTION INTRAVENOUS PRN
Status: CANCELLED | OUTPATIENT
Start: 2022-11-17

## 2022-11-17 RX ORDER — SODIUM CHLORIDE 0.9 % (FLUSH) 0.9 %
5-40 SYRINGE (ML) INJECTION PRN
Status: CANCELLED | OUTPATIENT
Start: 2022-11-17

## 2022-11-18 RX ORDER — CARVEDILOL 6.25 MG/1
6.25 TABLET ORAL 2 TIMES DAILY WITH MEALS
COMMUNITY

## 2022-11-18 NOTE — PRE-PROCEDURE INSTRUCTIONS
Patient verified name, , and procedure. Type: 1a; abbreviated assessment per anesthesia guidelines  Labs per surgeon: none found for PAT. Labs per anesthesia: POC Glucose DOS. Instructed pt that they will be notified by the Gi Lab for time of arrival. If any questions please call the GI lab at [phone]. Follow diet and prep instructions per office. May have clear liquids until 4 hours prior to time of arrival.    [place] or shower the night before and the am of surgery with antibacterial soap. No lotions, oils, powders, cologne on skin. No make up, eye make up or jewelry. Wear loose fitting comfortable, clean clothing. Must have adult present in building the entire time . Medications for the day of procedure Pantoprazole, Carvedilol, Nausea medicines as needed, Pregbalin, Lorazepam. Patient instructed to take only 80% of Degludec Insulin the night before the procedure. Patient verbalized understanding. Per anesthesia protocol: If you feel symptoms of low blood sugar while fasting, check level. If low, drink only 4 ounces of a clear, sugary liquid and call pre-op at [phone]. The following discharge instructions reviewed with patient: medication given during procedure may cause drowsiness for several hours, therefore, do not drive or operate machinery for remainder of the day, no alcohol on the day of your procedure, resume regular diet and activity unless otherwise directed, for mild sore throat you may use Cepacol throat lozenges or warm salt water gargles as needed, call your physician for any problems or questions. Patient verbalizes understanding.

## 2022-11-23 ENCOUNTER — HOSPITAL ENCOUNTER (OUTPATIENT)
Age: 52
Setting detail: OUTPATIENT SURGERY
Discharge: HOME | End: 2022-11-23
Attending: INTERNAL MEDICINE | Admitting: INTERNAL MEDICINE
Payer: MEDICARE

## 2022-11-23 ENCOUNTER — ANESTHESIA (OUTPATIENT)
Dept: ENDOSCOPY | Age: 52
End: 2022-11-23
Payer: MEDICARE

## 2022-11-23 ENCOUNTER — ANESTHESIA EVENT (OUTPATIENT)
Dept: ENDOSCOPY | Age: 52
End: 2022-11-23
Payer: MEDICARE

## 2022-11-23 VITALS
DIASTOLIC BLOOD PRESSURE: 61 MMHG | SYSTOLIC BLOOD PRESSURE: 125 MMHG | BODY MASS INDEX: 33.86 KG/M2 | OXYGEN SATURATION: 94 % | WEIGHT: 184 LBS | HEART RATE: 87 BPM | HEIGHT: 62 IN | RESPIRATION RATE: 16 BRPM | TEMPERATURE: 97.1 F

## 2022-11-23 LAB
GLUCOSE BLD STRIP.AUTO-MCNC: 128 MG/DL (ref 65–100)
SERVICE CMNT-IMP: ABNORMAL

## 2022-11-23 PROCEDURE — 6360000002 HC RX W HCPCS: Performed by: ANESTHESIOLOGY

## 2022-11-23 PROCEDURE — 7100000011 HC PHASE II RECOVERY - ADDTL 15 MIN: Performed by: INTERNAL MEDICINE

## 2022-11-23 PROCEDURE — 3700000000 HC ANESTHESIA ATTENDED CARE: Performed by: INTERNAL MEDICINE

## 2022-11-23 PROCEDURE — 3609017700 HC EGD DILATION GASTRIC/DUODENAL STRICTURE: Performed by: INTERNAL MEDICINE

## 2022-11-23 PROCEDURE — 2500000003 HC RX 250 WO HCPCS

## 2022-11-23 PROCEDURE — C1726 CATH, BAL DIL, NON-VASCULAR: HCPCS | Performed by: INTERNAL MEDICINE

## 2022-11-23 PROCEDURE — 2580000003 HC RX 258: Performed by: ANESTHESIOLOGY

## 2022-11-23 PROCEDURE — 82962 GLUCOSE BLOOD TEST: CPT

## 2022-11-23 PROCEDURE — 7100000010 HC PHASE II RECOVERY - FIRST 15 MIN: Performed by: INTERNAL MEDICINE

## 2022-11-23 PROCEDURE — 2709999900 HC NON-CHARGEABLE SUPPLY: Performed by: INTERNAL MEDICINE

## 2022-11-23 PROCEDURE — 6360000002 HC RX W HCPCS

## 2022-11-23 RX ORDER — SODIUM CHLORIDE 0.9 % (FLUSH) 0.9 %
5-40 SYRINGE (ML) INJECTION PRN
Status: DISCONTINUED | OUTPATIENT
Start: 2022-11-23 | End: 2022-11-23 | Stop reason: HOSPADM

## 2022-11-23 RX ORDER — PROCHLORPERAZINE EDISYLATE 5 MG/ML
5 INJECTION INTRAMUSCULAR; INTRAVENOUS ONCE
Status: COMPLETED | OUTPATIENT
Start: 2022-11-23 | End: 2022-11-23

## 2022-11-23 RX ORDER — SODIUM CHLORIDE 0.9 % (FLUSH) 0.9 %
5-40 SYRINGE (ML) INJECTION EVERY 12 HOURS SCHEDULED
Status: DISCONTINUED | OUTPATIENT
Start: 2022-11-23 | End: 2022-11-23 | Stop reason: HOSPADM

## 2022-11-23 RX ORDER — PROPOFOL 10 MG/ML
INJECTION, EMULSION INTRAVENOUS CONTINUOUS PRN
Status: DISCONTINUED | OUTPATIENT
Start: 2022-11-23 | End: 2022-11-23 | Stop reason: SDUPTHER

## 2022-11-23 RX ORDER — SODIUM CHLORIDE, SODIUM LACTATE, POTASSIUM CHLORIDE, CALCIUM CHLORIDE 600; 310; 30; 20 MG/100ML; MG/100ML; MG/100ML; MG/100ML
INJECTION, SOLUTION INTRAVENOUS CONTINUOUS
Status: DISCONTINUED | OUTPATIENT
Start: 2022-11-23 | End: 2022-11-23 | Stop reason: HOSPADM

## 2022-11-23 RX ORDER — LIDOCAINE HYDROCHLORIDE 20 MG/ML
INJECTION, SOLUTION EPIDURAL; INFILTRATION; INTRACAUDAL; PERINEURAL PRN
Status: DISCONTINUED | OUTPATIENT
Start: 2022-11-23 | End: 2022-11-23 | Stop reason: SDUPTHER

## 2022-11-23 RX ORDER — PROPOFOL 10 MG/ML
INJECTION, EMULSION INTRAVENOUS PRN
Status: DISCONTINUED | OUTPATIENT
Start: 2022-11-23 | End: 2022-11-23 | Stop reason: SDUPTHER

## 2022-11-23 RX ORDER — SODIUM CHLORIDE 9 MG/ML
INJECTION, SOLUTION INTRAVENOUS PRN
Status: DISCONTINUED | OUTPATIENT
Start: 2022-11-23 | End: 2022-11-23 | Stop reason: HOSPADM

## 2022-11-23 RX ADMIN — PROPOFOL 50 MG: 10 INJECTION, EMULSION INTRAVENOUS at 14:28

## 2022-11-23 RX ADMIN — PROPOFOL 150 MCG/KG/MIN: 10 INJECTION, EMULSION INTRAVENOUS at 14:29

## 2022-11-23 RX ADMIN — SODIUM CHLORIDE, SODIUM LACTATE, POTASSIUM CHLORIDE, AND CALCIUM CHLORIDE: 600; 310; 30; 20 INJECTION, SOLUTION INTRAVENOUS at 14:23

## 2022-11-23 RX ADMIN — LIDOCAINE HYDROCHLORIDE 40 MG: 20 INJECTION, SOLUTION EPIDURAL; INFILTRATION; INTRACAUDAL; PERINEURAL at 14:28

## 2022-11-23 RX ADMIN — PROCHLORPERAZINE EDISYLATE 5 MG: 5 INJECTION INTRAMUSCULAR; INTRAVENOUS at 15:08

## 2022-11-23 ASSESSMENT — LIFESTYLE VARIABLES: SMOKING_STATUS: 1

## 2022-11-23 NOTE — OP NOTE
Procedure:  Esophagogastroduodenoscopy    Date of Procedure:  11/23/2022    Patient:  Nova Woodall     1970    Indication:  Dysphagia, history of esophageal stricture       Sedation:  MAC      Pre-Procedure Physical Exam:      Mental status:  alert and oriented   Airway:  normal oropharyngeal airway and neck mobility   CV:  regular rate and rhythm   Respiratory:  clear to auscultation      Procedure:   A History and Physical has been performed, and patient medication allergies have been reviewed. Risks of perforation, hemorrhage, adverse drug reaction, and aspiration were discussed. Informed consent was obtained for the procedure, including sedation. The patient was placed in the left lateral decubitus position. The heart rate, oxygen saturations, blood pressure, and response to care were monitored throughout the procedure. The gastroscope was passed through the mouth and advanced under direct vision to the second portion of the duodenum. A careful inspection was made as the gastroscope was withdrawn, including a retroflexed view of the proximal stomach. The patient tolerated  the procedure well. Findings:       OROPHARYNX: Cords and pyriform recesses appear normal.    ESOPHAGUS: A benign-appearing intrinsic stenosis is seen in the distal esophagus. Dilation was performed using a CRE balloon to 18 mm. Successful dilation was confirmed by the presence of mucosal disruption. STOMACH: On retroflexion, the flap-valve is classified as Hill Grade I, with a normal, prominent tissue fold at the lesser curvature closely approximated to the endoscope. There is an intact Nissen fundoplication. The gastrojejunal anastomosis appears normal. The examined portion of the jejunum is unremarkable. Specimen:  No       Estimated Blood Loss:  Minimal      Implant:  None             Impression:     Esophageal stricture. Dilated. Plan:   1. Soft diet today.    2. Advance diet tomorrow as tolerated. 3. Protonix 40 mg twice daily, taken prior to breakfast and dinner. 4. Avoid NSAIDs. 5. Return to office in 2 months.     Carlos Alberto Lzoada MD

## 2022-11-23 NOTE — PROGRESS NOTES
Pt and caregiver given discharge instructions with verbalization of understanding. Pt will be discharge via wheelchair to car.

## 2022-11-23 NOTE — DISCHARGE INSTRUCTIONS
Gastrointestinal Esophagogastroduodenoscopy (EGD) - Upper Exam Discharge Instructions    1. Call Dr. Naylor Given at [phone] for any problems or questions. 2. Contact the doctor's office for follow up appointment as directed. 3. Medication may cause drowsiness for several hours, therefore:  Do not drive or operate machinery for remainder of the day. No alcohol today. Do not make any important or legal decisions for 24 hours. Do not sign any legal documents for 24 hours. 5. Ordinarily, you may resume regular diet and activity after exam unless otherwise specified by your physician. 6. For mild soreness in your throat you may use Cepacol throat lozenges or warm salt-water gargles as needed. Any additional instructions:     Plan:   1. Soft diet today. 2. Advance diet tomorrow as tolerated. 3. Protonix 40 mg twice daily, taken prior to breakfast and dinner. 4. Avoid NSAIDs. 5. Return to office in 2 months.

## 2022-11-23 NOTE — ANESTHESIA PRE PROCEDURE
Department of Anesthesiology  Preprocedure Note       Name:  Steven Gilbert   Age:  45 y.o.  :  1970                                          MRN:  [id number]         Date:  2022      Surgeon: Yadira Machado):  Eyad Cristobal MD    Procedure: Procedure(s):  EGD ESOPHAGOGASTRODUODENOSCOPY DILATATION/34    Medications prior to admission:   Prior to Admission medications    Medication Sig Start Date End Date Taking? Authorizing Provider   carvedilol (COREG) 6.25 MG tablet Take 6.25 mg by mouth 2 times daily (with meals)    Historical Provider, MD   ondansetron (ZOFRAN) 8 MG tablet Take 1 tablet by mouth 3 times daily as needed for Nausea or Vomiting 10/27/22   Christi Comer MD   Hyoscyamine Sulfate SL (LEVSIN/SL) 0.125 MG SUBL Place 0.25 mg under the tongue 4 times daily as needed (abdominal pain or cramping) 10/12/22   Christi Comer MD   tiZANidine (ZANAFLEX) 4 MG tablet Take 4 mg by mouth every 6 hours as needed    Historical Provider, MD   promethazine (PHENERGAN) 25 MG tablet Take 1 tablet by mouth every 8 hours as needed for Nausea 22   Shena Steele MD   pantoprazole (PROTONIX) 40 MG tablet Take 1 tablet by mouth in the morning and 1 tablet before bedtime.  22   Shena Steele MD   lisinopril (PRINIVIL;ZESTRIL) 10 MG tablet Take 1 tablet by mouth in the morning. 22   Shena Steele MD   citalopram (CELEXA) 40 MG tablet Take 1 tablet by mouth daily 22  Shena Steele MD   cloNIDine (CATAPRES) 0.3 MG/24HR PTWK Place 1 patch onto the skin once a week 8/4/22 8/10/22  Shena Steele MD   blood glucose test strips (EXACTECH TEST) strip TEST BLOOD SUGAR FOUR TIMES DAILY 20   Historical Provider, MD   acetaminophen (TYLENOL) 500 MG tablet Take by mouth every 6 hours as needed    Historical Provider, MD   cyanocobalamin 1000 MCG/ML injection INJECT 1 VIAL INTRAMUSCULARLY FOR 4 WEEKS THEN MONTHLY 17   Historical Provider, MD   diclofenac sodium (VOLTAREN) 1 % GEL APPLY 1 GRAM TO AFFECTED AREA 4 TIMES A DAY AS NEEDED 10/2/18   Historical Provider, MD   diphenhydrAMINE (BENADRYL) 25 MG capsule Take 25 mg by mouth every 6 hours as needed    Historical Provider, MD   glucagon 1 MG injection Inject 1 mg into the muscle as needed 5/8/17   Historical Provider, MD   Hyoscyamine Sulfate SL 0.125 MG SUBL Place 0.125 mg under the tongue every 6 hours as needed    Historical Provider, MD   influenza quadrivalent split vaccine (FLUZONE;FLUARIX;FLULAVAL;AFLURIA) 0.5 ML injection Inject into the muscle 11/9/21   Historical Provider, MD   insulin aspart (NOVOLOG) 100 UNIT/ML injection pen 15 units plus sliding scale for blood sugar >151 10/7/14   Historical Provider, MD   Insulin Degludec 100 UNIT/ML SOPN Inject 58 Units into the skin at bedtime 10/27/21   Historical Provider, MD   lidocaine viscous hcl (XYLOCAINE) 2 % SOLN solution 10 mLs every 6 hours as needed 4/28/21   Historical Provider, MD   LORazepam (ATIVAN) 1 MG tablet Take 1 mg by mouth in the morning, at noon, and at bedtime. 10/7/14   Historical Provider, MD   naloxone 4 MG/0.1ML LIQD nasal spray 4 mg once as needed 2/16/22   Historical Provider, MD   ondansetron (ZOFRAN-ODT) 8 MG TBDP disintegrating tablet Place 8 mg under the tongue 3 times daily 4/19/16   Historical Provider, MD   polyethylene glycol (GLYCOLAX) 17 GM/SCOOP powder Take 17 g by mouth as needed    Historical Provider, MD   pregabalin (LYRICA) 100 MG capsule Take 100 mg by mouth 2 times daily. Historical Provider, MD   sodium chloride 0.9 % infusion Infuse intravenously as needed    Historical Provider, MD   zolpidem (AMBIEN) 10 MG tablet Take 10 mg by mouth. 2/11/22   Historical Provider, MD   gabapentin (NEURONTIN) 250 MG/5ML solution 300 mg by Enteral route 3 times daily.   Patient not taking: No sig reported 10/7/14 7/28/22  Historical Provider, MD   insulin NPH (HUMULIN N;NOVOLIN N) 100 UNIT/ML injection pen Please inject 12 units into the skin with breakfast and 4 units with bedtime. (pen)  Patient not taking: No sig reported 10/7/14 7/28/22  Historical Provider, MD   sucralfate (CARAFATE) 1 GM/10ML suspension TAKE 10 ML BY MOUTH 4 TIMES A DAY EVERY DAY ON A EMPTY STOMACH 1 HR BEFORE MEALS AND AT BEDTIME 9/7/17 8/12/22  Historical Provider, MD       Current medications:    No current facility-administered medications for this visit. No current outpatient medications on file. Facility-Administered Medications Ordered in Other Visits   Medication Dose Route Frequency Provider Last Rate Last Admin    sodium chloride flush 0.9 % injection 5-40 mL  5-40 mL IntraVENous 2 times per day Jeffrey Rodriguez MD        sodium chloride flush 0.9 % injection 5-40 mL  5-40 mL IntraVENous PRN Jeffrey Rodriguez MD        0.9 % sodium chloride infusion   IntraVENous PRN Jeffrey Rodriguez MD           Allergies:     Allergies   Allergen Reactions    Adhesive Tape Itching, Other (See Comments) and Rash     blisters  tegaderm  Paper tape too      Metronidazole Diarrhea and Nausea And Vomiting    Atorvastatin Myalgia    Iodine Other (See Comments)     Sweaty and clammy    Statins Myalgia    Barium Iodide Nausea And Vomiting     Diaphoresis      Barium Sulfate Palpitations    Insulin Lispro Nausea And Vomiting    Insulins Nausea And Vomiting     Humalog only    Metformin Nausea And Vomiting     Stomach pain  Stomach pain  Stomach pain  Stomach pain         Problem List:    Patient Active Problem List   Diagnosis Code    GERD with stricture K21.9, K22.2    Type 2 diabetes mellitus with hyperglycemia, with long-term current use of insulin ([facility]) E11.65, Z79.4    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    Iron deficiency anemia D50.9    S/P exploratory laparotomy Z98.890    Chronic pancreatitis ([facility]) K86.1    Hypertension I10    Class 1 obesity with serious comorbidity and body mass index (BMI) of 31.0 to 31.9 in adult E66.9, Z68.31    Hypokalemia due to excessive gastrointestinal loss of potassium E87.6    Microcytic anemia D50.9    S/P balloon dilatation of esophageal stricture Z98.890    Infection of PEG site (HCC) K94.22    Gastroparesis due to DM ([address] E11.43, K31.84       Past Medical History:        Diagnosis Date    Anemia     blood transfusion 2013 X1 d/t \"perforated bowel\"-- Fe infusions 12/2017--- denies any current iron infusions 12/17/2019    Anxiety and depression     managed with meds    C. difficile diarrhea 2013    in 0758 after complicated ERCP    Cervical dysplasia 1990s    Chronic insomnia     ambien     Chronic nausea     Chronic pain     all over     Chronic pancreatitis ([address]     COVID-19 vaccine series completed 04/21/2021    3/29/2021 Pfizer     Dehydration     IVFs through port as needed for this     Encounter for insertion of venous access port     Left chest port    Esophageal stricture 2017    Fibromyalgia     managed with meds    Former cigarette smoker     GERD (gastroesophageal reflux disease)     controlled with med    History of blood transfusion     History of kidney stones 03/2021    X1-naturally pass    HLD (hyperlipidemia)     pt denies     Hypertension     IBS (irritable bowel syndrome)     Migraine headache     does not have often but did have one recently; just takes tylenol    Nausea & vomiting     pt reports she does better with phenergan than zofran - causes HA    Nonalcoholic fatty liver disease     Pancreatitis 06/23/2014    PUD (peptic ulcer disease) 06/2017    still having issues takes meds     Severe protein-calorie malnutrition ([address] 04/26/2013    Sinus tachycardia     per pt-- no tx needed    Sphincter of Oddi dysfunction     Type 2 diabetes mellitus ([address]     type 2-- sqbs am avg 140-160-- Hypo symptoms at <100       Past Surgical History:        Procedure Laterality Date    CARPAL TUNNEL RELEASE Right     along with trigger finger    CHOLECYSTECTOMY, LAPAROSCOPIC  1997    COLONOSCOPY      COLONOSCOPY N/A 4/4/2018    COLONOSCOPY performed [facility] Sobia Richardson [facility] at [facility] END[facility]COPY    ERCP  3/5/2013    resulted in perforated duodenum    GASTROSTOMY TUBE PLACEMENT N/A 7/26/2022    EGD PEG TUBE PLACEMENT with j tube placement ROOM 207 performed by Kay Goss MD at [facility] ([address] [place] [address])  1998    ovaries remain    IR DRAIN SKIN ABSCESS      IR PORT PLACEMENT EQUAL OR GREATER THAN 5 YEARS  9/25/2018    IR PORT PLACEMENT EQUAL OR GREATER THAN 5 YEARS 9/25/2018 [facility]    LAPAROTOMY  3/5/2013    exploratory for duodenal perforation with ERCP    ORTHOPEDIC SURGERY      right finger surgery 11/2017    OTHER SURGICAL HISTORY Bilateral     thumb    OTHER SURGICAL HISTORY      Kidney stones march 2021    AL ABDOMEN SURGERY PROC UNLISTED  4/13    explor lap    AL ABDOMEN SURGERY PROC UNLISTED  1997    lap nissen    AL ABDOMEN SURGERY PROC UNLISTED  last one placed  2012    Hx of pancreatic stent, multiple    TOTAL COLECTOMY      UPPER GASTROINTESTINAL ENDOSCOPY  5/21/2021         UPPER GASTROINTESTINAL ENDOSCOPY  2017    36 fr tony    UPPER GASTROINTESTINAL ENDOSCOPY  12/18/2019         UPPER GASTROINTESTINAL ENDOSCOPY N/A 6/8/2022    EGD ESOPHAGOGASTRODUODENOSCOPY DILATATION/ 34 performed by Bossman Dowell MD at [address] [place] N/A 7/22/2022    ENDOSCOPIC ULTRASOUND/35 ROOM 207 performed by Bossman Dowell MD at [address] [place] N/A 7/22/2022    EGD BIOPSY With balloon dialtion performed by Bossman Dowell MD at [address] [place] N/A 8/10/2022    EGD ESOPHAGOGASTRODUODENOSCOPY performed by Bossman Dowell MD at [address] [place] [address]  4/25/13 at [facility]-- stent removed 6-27-13.   Dr Mauri Watson  2014    port insertion, left chest wall       Social History:    Social History     Tobacco Use    Smoking status: Former     Packs/day: 1.00     Types: Cigarettes     Quit date: 1993     Years since quittin.6    Smokeless tobacco: Never   Substance Use Topics    Alcohol use: No                                Counseling given: Not Answered      Vital Signs (Current): There were no vitals filed for this visit. BP Readings from Last 3 Encounters:   10/27/22 (!) 155/80   10/12/22 (!) 130/94   22 (!) 144/89       NPO Status:                                                                                 BMI:   Wt Readings from Last 3 Encounters:   22 184 lb (83.5 kg)   10/27/22 182 lb (82.6 kg)   10/12/22 180 lb (81.6 kg)     There is no height or weight on file to calculate BMI.    CBC:   Lab Results   Component Value Date/Time    WBC 5.6 10/27/2022 08:55 AM    RBC 5.24 10/27/2022 08:55 AM    HGB 11.8 10/27/2022 08:55 AM    HCT 39.2 10/27/2022 08:55 AM    MCV 74.8 10/27/2022 08:55 AM    RDW 20.2 10/27/2022 08:55 AM     10/27/2022 08:55 AM       CMP:   Lab Results   Component Value Date/Time     10/27/2022 08:55 AM    K 4.7 10/27/2022 08:55 AM     10/27/2022 08:55 AM    CO2 19 10/27/2022 08:55 AM    BUN 7 10/27/2022 08:55 AM    CREATININE 0.90 10/27/2022 08:55 AM    GFRAA >60 2022 04:07 AM    AGRATIO 0.9 2022 07:26 AM    LABGLOM >60 10/27/2022 08:55 AM    GLUCOSE 191 10/27/2022 08:55 AM    PROT 8.7 10/27/2022 08:55 AM    CALCIUM 9.9 10/27/2022 08:55 AM    BILITOT 0.5 10/27/2022 08:55 AM    ALKPHOS 137 10/27/2022 08:55 AM    ALKPHOS 126 2022 07:26 AM    AST 28 10/27/2022 08:55 AM    ALT 29 10/27/2022 08:55 AM       POC Tests: No results for input(s): POCGLU, POCNA, POCK, POCCL, POCBUN, POCHEMO, POCHCT in the last 72 hours.     Coags: No results found for: PROTIME, INR, APTT    HCG (If Applicable): No results found for: PREGTESTUR, PREGSERUM, HCG, HCGQUANT     ABGs: No results found for: PHART, PO2ART, WKN0OGX, STC3WFB, BEART, T5NCUVVU     Type & Screen (If Applicable):  No results found for: LABABO, LABRH    Drug/Infectious Status (If Applicable):  No results found for: HIV, HEPCAB    COVID-19 Screening (If Applicable):   Lab Results   Component Value Date/Time    COVID19 Not Detected 02/10/2022 10:11 AM    COVID19 Performed 02/10/2022 10:11 AM           Anesthesia Evaluation  Patient summary reviewed and Nursing notes reviewed   history of anesthetic complications: PONV. Airway: Mallampati: II  TM distance: >3 FB   Neck ROM: full  Comment: Large neck  Mouth opening: > = 3 FB   Dental:    (+) poor dentition      Pulmonary:normal exam    (+) current smoker (Former)    Sleep apnea: snores. Cardiovascular:    (+) hypertension:, dysrhythmias (Sinus tachycardia):, hyperlipidemia                  Neuro/Psych:                ROS comment: Fibromyalgia GI/Hepatic/Renal:   (+) GERD:, liver disease (SOW):, renal disease: kidney stones, morbid obesity (obese)         ROS comment: Esophageal stricture  Sphincter of Oddi dysfunction  Chronic pancreatitis  Chronic nausea. Endo/Other:    (+) DiabetesType II DM, , .                 Abdominal:             Vascular: Other Findings:             Anesthesia Plan      TIVA     ASA 3     (Chronic nausea (baseline), no vomiting. Took Zofran this AM)  Induction: intravenous. Anesthetic plan and risks discussed with patient.                         Rony Licona MD   11/23/2022

## 2022-11-23 NOTE — ANESTHESIA POSTPROCEDURE EVALUATION
Department of Anesthesiology  Postprocedure Note    Patient: Brandon Lee  MRN: [id number]  YOB: 1970  Date of evaluation: 11/23/2022      Procedure Summary     Date: 11/23/22 Room / Location: [facility] ENDO 03 / [facility] ENDOSCOPY    Anesthesia Start: 0829 Anesthesia Stop: 1004    Procedure: EGD ESOPHAGOGASTRODUODENOSCOPY DILATATION/34 (Upper GI Region) Diagnosis:       Esophageal obstruction      (Esophageal obstruction [K22.2])    Surgeons: Eleni Perrin MD Responsible Provider: Meredith Walters MD    Anesthesia Type: TIVA ASA Status: 3          Anesthesia Type: TIVA    Karishma Phase I: Karishma Score: 10    Karishma Phase II: Karishma Score: 10      Anesthesia Post Evaluation    Patient location during evaluation: PACU  Patient participation: complete - patient participated  Level of consciousness: awake and alert  Airway patency: patent  Nausea: well controlled. Complications: no  Cardiovascular status: acceptable.   Respiratory status: acceptable  Hydration status: stable

## 2022-11-23 NOTE — H&P
History and Physical for Procedure             Date: 11/23/2022     History of Present Illness:  Patient presents to undergo EGD.        Past Medical History:   Diagnosis Date    Anemia     blood transfusion 2013 X1 d/t \"perforated bowel\"-- Fe infusions 12/2017--- denies any current iron infusions 12/17/2019    Anxiety and depression     managed with meds    C. difficile diarrhea 2013    in 8673 after complicated ERCP    Cervical dysplasia 1990s    Chronic insomnia     ambien     Chronic nausea     Chronic pain     all over     Chronic pancreatitis ([facility]     COVID-19 vaccine series completed 04/21/2021    3/29/2021 Pfizer     Dehydration     IVFs through port as needed for this     Encounter for insertion of venous access port     Left chest port    Esophageal stricture 2017    Fibromyalgia     managed with meds    Former cigarette smoker     GERD (gastroesophageal reflux disease)     controlled with med    History of blood transfusion     History of kidney stones 03/2021    X1-naturally pass    HLD (hyperlipidemia)     pt denies     Hypertension     IBS (irritable bowel syndrome)     Migraine headache     does not have often but did have one recently; just takes tylenol    Nausea & vomiting     pt reports she does better with phenergan than zofran - causes HA    Nonalcoholic fatty liver disease     Pancreatitis 06/23/2014    PUD (peptic ulcer disease) 06/2017    still having issues takes meds     Severe protein-calorie malnutrition ([address] 04/26/2013    Sinus tachycardia     per pt-- no tx needed    Sphincter of Oddi dysfunction     Type 2 diabetes mellitus ([address]     type 2-- sqbs am avg 140-160-- Hypo symptoms at <100     Past Surgical History:   Procedure Laterality Date    CARPAL TUNNEL RELEASE Right     along with trigger finger    CHOLECYSTECTOMY, LAPAROSCOPIC  1997    COLONOSCOPY      COLONOSCOPY N/A 4/4/2018    COLONOSCOPY performed by Marcia Grover MD at [facility] ENDOSCOPY    ERCP  3/5/2013    resulted in perforated duodenum    GASTROSTOMY TUBE PLACEMENT N/A 7/26/2022    EGD PEG TUBE PLACEMENT with j tube placement ROOM 207 performed by Paul Ngo MD at [address] ([facility])  1998    ovaries remain    IR DRAIN SKIN ABSCESS      IR PORT PLACEMENT EQUAL OR GREATER THAN 5 YEARS  9/25/2018    IR PORT PLACEMENT EQUAL OR GREATER THAN 5 YEARS 9/25/2018 [facility] SPE[facility]    LAPAROTOMY  3/5/2013    exploratory for duodenal perforation with ERCP    ORTHOPEDIC SURGERY      right finger surgery 11/2017    OTHER SURGICAL HISTORY Bilateral     thumb    OTHER SURGICAL HISTORY      Kidney stones march 2021    OR ABDOMEN SURGERY PROC UNLISTED  4/13    explor lap    OR ABDOMEN SURGERY PROC UNLISTED  1997    lap nissen    OR ABDOMEN SURGERY PROC UNLISTED  last one placed  2012    Hx of pancreatic stent, multiple    TOTAL COLECTOMY      UPPER GASTROINTESTINAL ENDOSCOPY  5/21/2021         UPPER GASTROINTESTINAL ENDOSCOPY  2017    36 fr tony    UPPER GASTROINTESTINAL ENDOSCOPY  12/18/2019         UPPER GASTROINTESTINAL ENDOSCOPY N/A 6/8/2022    EGD ESOPHAGOGASTRODUODENOSCOPY DILATATION/ 34 performed by Eyad Cristobal MD at [address] N/A 7/22/2022    ENDOSCOPIC ULTRASOUND/35 ROOM 207 performed by Eyad Cristobal MD at [address] N/A 7/22/2022    EGD BIOPSY With balloon dialtion performed by Eyad Cristobal MD at [address] N/A 8/10/2022    EGD ESOPHAGOGASTRODUODENOSCOPY performed by Eyad Cristobal MD at [address]  4/25/13 at [facility]    cyt-- stent removed 6-27-13.   Dr Manson Crigler  2014    port insertion, left chest wall     In and  Family History   Problem Relation Age of Onset    No Known Problems Sister     Coronary Art Dis Father 76    Stroke Father     Hypertension Father     Diabetes Father     Heart Disease Mother cabg began in her 52's    Hypertension Mother     Diabetes Mother     Other Father      Social History     Tobacco Use    Smoking status: Former     Packs/day: 1.00     Types: Cigarettes     Quit date: 1993     Years since quittin.6    Smokeless tobacco: Never   Substance Use Topics    Alcohol use: No        Allergies   Allergen Reactions    Adhesive Tape Itching, Other (See Comments) and Rash     blisters  tegaderm  Paper tape too      Metronidazole Diarrhea and Nausea And Vomiting    Atorvastatin Myalgia    Iodine Other (See Comments)     Sweaty and clammy    Statins Myalgia    Barium Iodide Nausea And Vomiting     Diaphoresis      Barium Sulfate Palpitations    Insulin Lispro Nausea And Vomiting    Insulins Nausea And Vomiting     Humalog only    Metformin Nausea And Vomiting     Stomach pain  Stomach pain  Stomach pain  Stomach pain       No current facility-administered medications for this encounter. Review of Systems:  A detailed 10 organ review of systems is obtained with pertinent positives as listed in the History of Present Illness. All others are negative. Objective:     Physical Exam:  Ht 5' 2\" (1.575 m)   Wt 184 lb (83.5 kg)   BMI 33.65 kg/m²    General:  Alert and oriented. Heart: Regular rate and rhythm  Lungs:  Clear to auscultation bilaterally  Abdomen: Soft, nontender, nondistended    Impression/Plan:     Proceed with EGD as planned. I have discussed with the patient the technique, benefits, alternatives, and risks of these procedures, including medication reaction, immediate or delayed bleeding, or perforation of the gastrointestinal tract.      Signed By: Loly Lyon MD     2022

## 2022-12-12 RX ORDER — LIDOCAINE HYDROCHLORIDE 10 MG/ML
1 INJECTION, SOLUTION INFILTRATION; PERINEURAL
OUTPATIENT
Start: 2022-12-12 | End: 2022-12-13

## 2022-12-12 RX ORDER — SODIUM CHLORIDE 0.9 % (FLUSH) 0.9 %
5-40 SYRINGE (ML) INJECTION EVERY 12 HOURS SCHEDULED
OUTPATIENT
Start: 2022-12-12

## 2022-12-12 RX ORDER — LABETALOL HYDROCHLORIDE 5 MG/ML
10 INJECTION, SOLUTION INTRAVENOUS
OUTPATIENT
Start: 2022-12-12

## 2022-12-12 RX ORDER — OXYCODONE HYDROCHLORIDE 5 MG/1
5 TABLET ORAL PRN
OUTPATIENT
Start: 2022-12-12 | End: 2022-12-12

## 2022-12-12 RX ORDER — SODIUM CHLORIDE 9 MG/ML
INJECTION, SOLUTION INTRAVENOUS PRN
OUTPATIENT
Start: 2022-12-12

## 2022-12-12 RX ORDER — ACETAMINOPHEN 500 MG
1000 TABLET ORAL ONCE
OUTPATIENT
Start: 2022-12-12 | End: 2022-12-12

## 2022-12-12 RX ORDER — SODIUM CHLORIDE, SODIUM LACTATE, POTASSIUM CHLORIDE, CALCIUM CHLORIDE 600; 310; 30; 20 MG/100ML; MG/100ML; MG/100ML; MG/100ML
INJECTION, SOLUTION INTRAVENOUS CONTINUOUS
OUTPATIENT
Start: 2022-12-12 | End: 2022-12-13

## 2022-12-12 RX ORDER — DEXTROSE MONOHYDRATE 100 MG/ML
INJECTION, SOLUTION INTRAVENOUS CONTINUOUS PRN
OUTPATIENT
Start: 2022-12-12

## 2022-12-12 RX ORDER — ONDANSETRON 2 MG/ML
4 INJECTION INTRAMUSCULAR; INTRAVENOUS
OUTPATIENT
Start: 2022-12-12 | End: 2022-12-13

## 2022-12-12 RX ORDER — HYDROMORPHONE HYDROCHLORIDE 2 MG/ML
0.5 INJECTION, SOLUTION INTRAMUSCULAR; INTRAVENOUS; SUBCUTANEOUS
OUTPATIENT
Start: 2022-12-12 | End: 2022-12-13

## 2022-12-12 RX ORDER — HYDROMORPHONE HCL 110MG/55ML
0.5 PATIENT CONTROLLED ANALGESIA SYRINGE INTRAVENOUS EVERY 5 MIN PRN
OUTPATIENT
Start: 2022-12-12

## 2022-12-12 RX ORDER — HYDROMORPHONE HYDROCHLORIDE 2 MG/ML
0.25 INJECTION, SOLUTION INTRAMUSCULAR; INTRAVENOUS; SUBCUTANEOUS
OUTPATIENT
Start: 2022-12-12 | End: 2022-12-13

## 2022-12-12 RX ORDER — OXYCODONE HYDROCHLORIDE 5 MG/1
10 TABLET ORAL PRN
OUTPATIENT
Start: 2022-12-12 | End: 2022-12-12

## 2022-12-12 RX ORDER — HYDROMORPHONE HCL 110MG/55ML
0.25 PATIENT CONTROLLED ANALGESIA SYRINGE INTRAVENOUS EVERY 5 MIN PRN
OUTPATIENT
Start: 2022-12-12

## 2022-12-12 RX ORDER — MIDAZOLAM HYDROCHLORIDE 2 MG/2ML
2 INJECTION, SOLUTION INTRAMUSCULAR; INTRAVENOUS
OUTPATIENT
Start: 2022-12-12 | End: 2022-12-13

## 2022-12-12 RX ORDER — SODIUM CHLORIDE 0.9 % (FLUSH) 0.9 %
5-40 SYRINGE (ML) INJECTION PRN
OUTPATIENT
Start: 2022-12-12

## 2022-12-13 ENCOUNTER — HOSPITAL ENCOUNTER (OUTPATIENT)
Dept: INTERVENTIONAL RADIOLOGY/VASCULAR | Age: 52
Discharge: HOME | End: 2022-12-16
Payer: MEDICARE

## 2022-12-13 ENCOUNTER — ANESTHESIA (OUTPATIENT)
Dept: INTERVENTIONAL RADIOLOGY/VASCULAR | Age: 52
End: 2022-12-13

## 2022-12-13 ENCOUNTER — ANESTHESIA EVENT (OUTPATIENT)
Dept: INTERVENTIONAL RADIOLOGY/VASCULAR | Age: 52
End: 2022-12-13

## 2022-12-13 VITALS
HEIGHT: 62 IN | OXYGEN SATURATION: 96 % | SYSTOLIC BLOOD PRESSURE: 163 MMHG | HEART RATE: 97 BPM | WEIGHT: 185 LBS | DIASTOLIC BLOOD PRESSURE: 96 MMHG | TEMPERATURE: 97.6 F | BODY MASS INDEX: 34.04 KG/M2 | RESPIRATION RATE: 18 BRPM

## 2022-12-13 DIAGNOSIS — Z45.2 EXHAUSTED VASCULAR ACCESS: ICD-10-CM

## 2022-12-13 LAB
GLUCOSE BLD STRIP.AUTO-MCNC: 154 MG/DL (ref 65–100)
SERVICE CMNT-IMP: ABNORMAL

## 2022-12-13 PROCEDURE — 82962 GLUCOSE BLOOD TEST: CPT

## 2022-12-13 ASSESSMENT — PAIN DESCRIPTION - DESCRIPTORS: DESCRIPTORS: ACHING;THROBBING

## 2022-12-13 ASSESSMENT — PAIN DESCRIPTION - LOCATION: LOCATION: MOUTH

## 2022-12-13 ASSESSMENT — LIFESTYLE VARIABLES: SMOKING_STATUS: 1

## 2022-12-13 ASSESSMENT — PAIN DESCRIPTION - FREQUENCY: FREQUENCY: CONTINUOUS

## 2022-12-13 ASSESSMENT — PAIN DESCRIPTION - PAIN TYPE: TYPE: SURGICAL PAIN

## 2022-12-13 ASSESSMENT — PAIN SCALES - GENERAL: PAINLEVEL_OUTOF10: 8

## 2022-12-13 NOTE — DISCHARGE INSTRUCTIONS
If you have any questions about your procedure, please call the Interventional Radiology department at [phone]. After business hours (5pm) and weekends, call the answering service at [phone] and ask for the Radiologist on call to be paged. Si tiene Preguntas acerca del procedimiento, por favor llame al departamento de Radiología Intervencional al [phone]. Después de horas de oficina (5 pm) y los fines de [place], llamar al Carmela Burger al [phone] y pregunte por el Radiologo de [facility].

## 2022-12-13 NOTE — OR NURSING
Prep complete. Patient ready for procedure. Patient has had multiple ports and declined teaching at this time. Mother at bedside.

## 2022-12-13 NOTE — ANESTHESIA PRE PROCEDURE
Department of Anesthesiology  Preprocedure Note       Name:  Argelia Siegel   Age:  45 y.o.  :  1970                                          MRN:  [id number]         Date:  2022      Surgeon: * No surgeons listed *    Procedure: * No procedures listed *    Medications prior to admission:   Prior to Admission medications    Medication Sig Start Date End Date Taking? Authorizing Provider   carvedilol (COREG) 6.25 MG tablet Take 6.25 mg by mouth 2 times daily (with meals)    Historical Provider, MD   ondansetron (ZOFRAN) 8 MG tablet Take 1 tablet by mouth 3 times daily as needed for Nausea or Vomiting 10/27/22   Dylan Love MD   Hyoscyamine Sulfate SL (LEVSIN/SL) 0.125 MG SUBL Place 0.25 mg under the tongue 4 times daily as needed (abdominal pain or cramping) 10/12/22   Dylan Love MD   tiZANidine (ZANAFLEX) 4 MG tablet Take 4 mg by mouth every 6 hours as needed    Historical Provider, MD   promethazine (PHENERGAN) 25 MG tablet Take 1 tablet by mouth every 8 hours as needed for Nausea 22   Zaina Trujillo MD   pantoprazole (PROTONIX) 40 MG tablet Take 1 tablet by mouth in the morning and 1 tablet before bedtime.  22   Zaina Trujillo MD   lisinopril (PRINIVIL;ZESTRIL) 10 MG tablet Take 1 tablet by mouth in the morning. 22   Zaina Trujillo MD   citalopram (CELEXA) 40 MG tablet Take 1 tablet by mouth daily 22  Zaina Trujillo MD   cloNIDine (CATAPRES) 0.3 MG/24HR PTWK Place 1 patch onto the skin once a week 8/4/22 8/10/22  Zaina Trujillo MD   blood glucose test strips (EXACTECH TEST) strip TEST BLOOD SUGAR FOUR TIMES DAILY 20   Historical Provider, MD   acetaminophen (TYLENOL) 500 MG tablet Take by mouth every 6 hours as needed    Historical Provider, MD   cyanocobalamin 1000 MCG/ML injection INJECT 1 VIAL INTRAMUSCULARLY FOR 4 WEEKS THEN MONTHLY 17   Historical Provider, MD   diclofenac sodium (VOLTAREN) 1 % GEL APPLY 1 GRAM TO AFFECTED AREA 4 TIMES A DAY AS NEEDED 10/2/18   Historical Provider, MD   diphenhydrAMINE (BENADRYL) 25 MG capsule Take 25 mg by mouth every 6 hours as needed    Historical Provider, MD   glucagon 1 MG injection Inject 1 mg into the muscle as needed 5/8/17   Historical Provider, MD   Hyoscyamine Sulfate SL 0.125 MG SUBL Place 0.125 mg under the tongue every 6 hours as needed    Historical Provider, MD   influenza quadrivalent split vaccine (FLUZONE;FLUARIX;FLULAVAL;AFLURIA) 0.5 ML injection Inject into the muscle 11/9/21   Historical Provider, MD   insulin aspart (NOVOLOG) 100 UNIT/ML injection pen 15 units plus sliding scale for blood sugar >151 10/7/14   Historical Provider, MD   Insulin Degludec 100 UNIT/ML SOPN Inject 58 Units into the skin at bedtime 10/27/21   Historical Provider, MD   lidocaine viscous hcl (XYLOCAINE) 2 % SOLN solution 10 mLs every 6 hours as needed 4/28/21   Historical Provider, MD   LORazepam (ATIVAN) 1 MG tablet Take 1 mg by mouth in the morning, at noon, and at bedtime. 10/7/14   Historical Provider, MD   naloxone 4 MG/0.1ML LIQD nasal spray 4 mg once as needed 2/16/22   Historical Provider, MD   ondansetron (ZOFRAN-ODT) 8 MG TBDP disintegrating tablet Place 8 mg under the tongue 3 times daily 4/19/16   Historical Provider, MD   polyethylene glycol (GLYCOLAX) 17 GM/SCOOP powder Take 17 g by mouth as needed    Historical Provider, MD   pregabalin (LYRICA) 100 MG capsule Take 100 mg by mouth 2 times daily. Historical Provider, MD   sodium chloride 0.9 % infusion Infuse intravenously as needed    Historical Provider, MD   zolpidem (AMBIEN) 10 MG tablet Take 10 mg by mouth. 2/11/22   Historical Provider, MD   gabapentin (NEURONTIN) 250 MG/5ML solution 300 mg by Enteral route 3 times daily. Patient not taking: No sig reported 10/7/14 7/28/22  Historical Provider, MD   insulin NPH (HUMULIN N;NOVOLIN N) 100 UNIT/ML injection pen Please inject 12 units into the skin with breakfast and 4 units with bedtime. (pen)  Patient not taking: No sig reported 10/7/14 7/28/22  Historical Provider, MD   sucralfate (CARAFATE) 1 GM/10ML suspension TAKE 10 ML BY MOUTH 4 TIMES A DAY EVERY DAY ON A EMPTY STOMACH 1 HR BEFORE MEALS AND AT BEDTIME 9/7/17 8/12/22  Historical Provider, MD       Current medications:    Current Outpatient Medications   Medication Sig Dispense Refill    carvedilol (COREG) 6.25 MG tablet Take 6.25 mg by mouth 2 times daily (with meals)      ondansetron (ZOFRAN) 8 MG tablet Take 1 tablet by mouth 3 times daily as needed for Nausea or Vomiting 12 tablet 1    Hyoscyamine Sulfate SL (LEVSIN/SL) 0.125 MG SUBL Place 0.25 mg under the tongue 4 times daily as needed (abdominal pain or cramping) 20 each 1    tiZANidine (ZANAFLEX) 4 MG tablet Take 4 mg by mouth every 6 hours as needed      promethazine (PHENERGAN) 25 MG tablet Take 1 tablet by mouth every 8 hours as needed for Nausea 30 tablet 2    pantoprazole (PROTONIX) 40 MG tablet Take 1 tablet by mouth in the morning and 1 tablet before bedtime. 30 tablet 3    lisinopril (PRINIVIL;ZESTRIL) 10 MG tablet Take 1 tablet by mouth in the morning.  30 tablet 3    blood glucose test strips (EXACTECH TEST) strip TEST BLOOD SUGAR FOUR TIMES DAILY      acetaminophen (TYLENOL) 500 MG tablet Take by mouth every 6 hours as needed      cyanocobalamin 1000 MCG/ML injection INJECT 1 VIAL INTRAMUSCULARLY FOR 4 WEEKS THEN MONTHLY      diclofenac sodium (VOLTAREN) 1 % GEL APPLY 1 GRAM TO AFFECTED AREA 4 TIMES A DAY AS NEEDED      diphenhydrAMINE (BENADRYL) 25 MG capsule Take 25 mg by mouth every 6 hours as needed      glucagon 1 MG injection Inject 1 mg into the muscle as needed      Hyoscyamine Sulfate SL 0.125 MG SUBL Place 0.125 mg under the tongue every 6 hours as needed      influenza quadrivalent split vaccine (FLUZONE;FLUARIX;FLULAVAL;AFLURIA) 0.5 ML injection Inject into the muscle      insulin aspart (NOVOLOG) 100 UNIT/ML injection pen 15 units plus sliding scale for blood sugar >151      Insulin Degludec 100 UNIT/ML SOPN Inject 58 Units into the skin at bedtime      lidocaine viscous hcl (XYLOCAINE) 2 % SOLN solution 10 mLs every 6 hours as needed      LORazepam (ATIVAN) 1 MG tablet Take 1 mg by mouth in the morning, at noon, and at bedtime. naloxone 4 MG/0.1ML LIQD nasal spray 4 mg once as needed      ondansetron (ZOFRAN-ODT) 8 MG TBDP disintegrating tablet Place 8 mg under the tongue 3 times daily      polyethylene glycol (GLYCOLAX) 17 GM/SCOOP powder Take 17 g by mouth as needed      pregabalin (LYRICA) 100 MG capsule Take 100 mg by mouth 2 times daily. sodium chloride 0.9 % infusion Infuse intravenously as needed      zolpidem (AMBIEN) 10 MG tablet Take 10 mg by mouth. No current facility-administered medications for this visit. Allergies:     Allergies   Allergen Reactions    Adhesive Tape Itching, Other (See Comments) and Rash     blisters  tegaderm  Paper tape too      Metronidazole Diarrhea and Nausea And Vomiting    Atorvastatin Myalgia    Iodine Other (See Comments)     Sweaty and clammy    Statins Myalgia    Barium Iodide Nausea And Vomiting     Diaphoresis      Barium Sulfate Palpitations    Insulin Lispro Nausea And Vomiting    Insulins Nausea And Vomiting     Humalog only    Metformin Nausea And Vomiting     Stomach pain  Stomach pain  Stomach pain  Stomach pain         Problem List:    Patient Active Problem List   Diagnosis Code    GERD with stricture K21.9, K22.2    Type 2 diabetes mellitus with hyperglycemia, with long-term current use of insulin ([facility]) E11.65, Z79.4    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    Iron deficiency anemia D50.9    S/P exploratory laparotomy Z98.890    Chronic pancreatitis ([facility]) K86.1    Hypertension I10    Class 1 obesity with serious comorbidity and body mass index (BMI) of 31.0 to 31.9 in adult E66.9, Z68.31    Hypokalemia due to excessive gastrointestinal loss of potassium E87.6    Microcytic anemia D50.9    S/P balloon dilatation of esophageal stricture Z98.890    Infection of PEG site ([address] K94.22    Gastroparesis due to DM ([address] E11.43, K31.84       Past Medical History:        Diagnosis Date    Anemia     blood transfusion 2013 X1 d/t \"perforated bowel\"-- Fe infusions 12/2017--- denies any current iron infusions 12/17/2019    Anxiety and depression     managed with meds    C. difficile diarrhea 2013    in 8936 after complicated ERCP    Cervical dysplasia 1990s    Chronic insomnia     ambien     Chronic nausea     Chronic pain     all over     Chronic pancreatitis ([address]     COVID-19 vaccine series completed 04/21/2021    3/29/2021 Pfizer     Dehydration     IVFs through port as needed for this     Encounter for insertion of venous access port     Left chest port    Esophageal stricture 2017    Fibromyalgia     managed with meds    Former cigarette smoker     GERD (gastroesophageal reflux disease)     controlled with med    History of blood transfusion     History of kidney stones 03/2021    X1-naturally pass    HLD (hyperlipidemia)     pt denies     Hypertension     IBS (irritable bowel syndrome)     Migraine headache     does not have often but did have one recently; just takes tylenol    Nausea & vomiting     pt reports she does better with phenergan than zofran - causes HA    Nonalcoholic fatty liver disease     Pancreatitis 06/23/2014    PUD (peptic ulcer disease) 06/2017    still having issues takes meds     Severe protein-calorie malnutrition ([address] 04/26/2013    Sinus tachycardia     per pt-- no tx needed    Sphincter of Oddi dysfunction     Type 2 diabetes mellitus ([address]     type 2-- sqbs am avg 140-160-- Hypo symptoms at <100       Past Surgical History:        Procedure Laterality Date    CARPAL TUNNEL RELEASE Right     along with trigger finger    CHOLECYSTECTOMY, LAPAROSCOPIC  1997    COLONOSCOPY      COLONOSCOPY N/A 4/4/2018    COLONOSCOPY performed by Rober Rayo MD at [facility] ENDOSCOPY    ERCP  3/5/2013 resulted in perforated duodenum    GASTROSTOMY TUBE PLACEMENT N/A 7/26/2022    EGD PEG TUBE PLACEMENT with j tube placement ROOM 207 performed by Paul Ngo MD at [address] ([address] [facility])  1998    ovaries remain    IR DRAIN SKIN ABSCESS      IR PORT PLACEMENT EQUAL OR GREATER THAN 5 YEARS  9/25/2018    IR PORT PLACEMENT EQUAL OR GREATER THAN 5 YEARS 9/25/2018 [facility]    LAPAROTOMY  3/5/2013    exploratory for duodenal perforation with ERCP    ORTHOPEDIC SURGERY      right finger surgery 11/2017    OTHER SURGICAL HISTORY Bilateral     thumb    OTHER SURGICAL HISTORY      Kidney stones march 2021    AK ABDOMEN SURGERY PROC UNLISTED  4/13    explor lap    AK ABDOMEN SURGERY PROC UNLISTED  1997    lap nissen    AK ABDOMEN SURGERY PROC UNLISTED  last one placed  2012    Hx of pancreatic stent, multiple    TOTAL COLECTOMY      UPPER GASTROINTESTINAL ENDOSCOPY  5/21/2021         UPPER GASTROINTESTINAL ENDOSCOPY  2017    36 fr tony    UPPER GASTROINTESTINAL ENDOSCOPY  12/18/2019         UPPER GASTROINTESTINAL ENDOSCOPY N/A 6/8/2022    EGD ESOPHAGOGASTRODUODENOSCOPY DILATATION/ 34 performed by Eyad Cristobal MD at [place] [address] N/A 7/22/2022    ENDOSCOPIC ULTRASOUND/35 ROOM 207 performed by Eyad Cristobal MD at [place] [address] N/A 7/22/2022    EGD BIOPSY With balloon dialtion performed by Eyad Cristobal MD at [place] [address] N/A 8/10/2022    EGD ESOPHAGOGASTRODUODENOSCOPY performed by Eyad Cristobal MD at [place] [address] N/A 11/23/2022    EGD ESOPHAGOGASTRODUODENOSCOPY DILATATION/34 performed by Eyad Cristboal MD at [address]  4/25/13 at [facility]-- stent removed 6-27-13.   Dr Manson Crigler  2014    port insertion, left chest wall       Social History:    Social History     Tobacco Use    Smoking status: Former     Packs/day: 1.00     Types: Cigarettes     Quit date: 1993     Years since quittin.6    Smokeless tobacco: Never   Substance Use Topics    Alcohol use: No                                Counseling given: Not Answered      Vital Signs (Current): There were no vitals filed for this visit. BP Readings from Last 3 Encounters:   22 (!) 163/96   22 125/61   10/27/22 (!) 155/80       NPO Status:                                                                                 BMI:   Wt Readings from Last 3 Encounters:   22 185 lb (83.9 kg)   22 184 lb (83.5 kg)   10/27/22 182 lb (82.6 kg)     There is no height or weight on file to calculate BMI.    CBC:   Lab Results   Component Value Date/Time    WBC 5.6 10/27/2022 08:55 AM    RBC 5.24 10/27/2022 08:55 AM    HGB 11.8 10/27/2022 08:55 AM    HCT 39.2 10/27/2022 08:55 AM    MCV 74.8 10/27/2022 08:55 AM    RDW 20.2 10/27/2022 08:55 AM     10/27/2022 08:55 AM       CMP:   Lab Results   Component Value Date/Time     10/27/2022 08:55 AM    K 4.7 10/27/2022 08:55 AM     10/27/2022 08:55 AM    CO2 19 10/27/2022 08:55 AM    BUN 7 10/27/2022 08:55 AM    CREATININE 0.90 10/27/2022 08:55 AM    GFRAA >60 2022 04:07 AM    AGRATIO 0.9 2022 07:26 AM    LABGLOM >60 10/27/2022 08:55 AM    GLUCOSE 191 10/27/2022 08:55 AM    PROT 8.7 10/27/2022 08:55 AM    CALCIUM 9.9 10/27/2022 08:55 AM    BILITOT 0.5 10/27/2022 08:55 AM    ALKPHOS 137 10/27/2022 08:55 AM    ALKPHOS 126 2022 07:26 AM    AST 28 10/27/2022 08:55 AM    ALT 29 10/27/2022 08:55 AM       POC Tests: No results for input(s): POCGLU, POCNA, POCK, POCCL, POCBUN, POCHEMO, POCHCT in the last 72 hours.     Coags: No results found for: PROTIME, INR, APTT    HCG (If Applicable): No results found for: PREGTESTUR, PREGSERUM, HCG, HCGQUANT     ABGs: No results found for: PHART, PO2ART, AQD0VNJ, WGK7IEU, BEART, A4ESKDVD     Type & Screen (If Applicable):  No results found for: LABABO, LABRH    Drug/Infectious Status (If Applicable):  No results found for: HIV, HEPCAB    COVID-19 Screening (If Applicable):   Lab Results   Component Value Date/Time    COVID19 Not Detected 02/10/2022 10:11 AM    COVID19 Performed 02/10/2022 10:11 AM           Anesthesia Evaluation  Patient summary reviewed and Nursing notes reviewed   history of anesthetic complications: PONV. Airway: Mallampati: IV  TM distance: >3 FB   Neck ROM: full  Comment: Large neck  Mouth opening: < 3 FB   Dental:      Comment: Recent total extractions with temporary permanent on bottom. Very swollen and painful to open mouth. Pulmonary:normal exam    (+) current smoker (Former)    Sleep apnea: snores. Cardiovascular:    (+) hypertension:, dysrhythmias (Sinus tachycardia):, hyperlipidemia        Rhythm: regular  Rate: normal                    Neuro/Psych:                ROS comment: Fibromyalgia GI/Hepatic/Renal:   (+) GERD:, liver disease (SOW):, renal disease: kidney stones, morbid obesity (obese)         ROS comment: Esophageal stricture  Sphincter of Oddi dysfunction  Chronic pancreatitis  Chronic nausea. Endo/Other:    (+) DiabetesType II DM, , .                 Abdominal:             Vascular: Other Findings:             Anesthesia Plan      MAC     ASA 3     (Spoke with patient and mother, about the risks related to anesthesia in the setting of her recent dental procedure that will promote safety over anesthesia. We will use a nasal trumpet if necessary to support her airway. Mukul Contreras is aware of goal to stay at a MAC level vs TIVA    Patient cancelled by proceduralist)  Induction: intravenous. MIPS: Postoperative opioids intended and Prophylactic antiemetics administered. Anesthetic plan and risks discussed with patient and mother.       Plan discussed with CRNA and surgical team.                  Veronica Taylor MD   12/13/2022

## 2022-12-13 NOTE — PROGRESS NOTES
Department of Interventional Radiology  [phone]                                 Progress Note  Patient: Jeffery Merrill MRN: [id number]  SSN: [id number]    YOB: 1970  Age: 45 y.o. Sex: female      Subjective:   Pt presents for venous chest port removal and replacement. Unfortunately she had all of her teeth removed last week for dental caries and has been taking Augmentin BID for just under a week. She c/o maxillofacial swelling and pain. She also tells me that the last time her left chest port was accessed was in October; however, at that time is was oozing yellow material.   She denies fever/chills. She has chronic nausea and dehydration and requires long term venous access for Phenergan and IV fluids. She has not had or used her access for infusions since October. Review of Systems:    Pertinent items are noted in HPI. Objective:     Vitals:    12/13/22 0930   BP: (!) 163/96   Pulse: 97   Resp: 18   Temp: 97.6 °F (36.4 °C)   TempSrc: Temporal   SpO2: 96%   Weight: 185 lb (83.9 kg)   Height: 5' 2\" (1.575 m)        Pain Assessment                      Pain Level: 8   Patient's Stated Pain Goal: 0 - No pain   Pain Location: Mouth       Pain Descriptors: Aching, Throbbing       Pain Type: Surgical pain   Pain Frequency: Continuous   Non-Pharmaceutical Pain Intervention(s): Distraction     Intake and Output:             Physical Exam:   Left chest port site w/o redness, no bogginess. Lab/Data Review:  none  Assessment:   Chronic nausea and dehydration requiring long term central venous access  Recent dental extractions for dental caries, remains on oral antibiotics    Plan:   Chest port looks ok to use  We will access her chest port today  Recommend she not have a new port replaced until she has completed her antibiotic therapy, and potentially her dental work.     Signed By: Cherri Mcneil PA-C     December 13, 2022

## 2022-12-24 ENCOUNTER — HOSPITAL ENCOUNTER (INPATIENT)
Age: 52
LOS: 6 days | Discharge: HOME | DRG: 315 | End: 2022-12-30
Attending: FAMILY MEDICINE | Admitting: HOSPITALIST
Payer: MEDICARE

## 2022-12-24 DIAGNOSIS — K08.89 PAIN, DENTAL: Primary | ICD-10-CM

## 2022-12-24 PROBLEM — B95.61 MSSA BACTEREMIA: Status: ACTIVE | Noted: 2022-12-24

## 2022-12-24 PROBLEM — R78.81 MSSA BACTEREMIA: Status: ACTIVE | Noted: 2022-12-24

## 2022-12-24 PROBLEM — I27.20 PULMONARY HYPERTENSION (HCC): Chronic | Status: ACTIVE | Noted: 2022-12-24

## 2022-12-24 PROBLEM — D64.9 ANEMIA: Chronic | Status: ACTIVE | Noted: 2022-12-24

## 2022-12-24 LAB
GLUCOSE BLD STRIP.AUTO-MCNC: 136 MG/DL (ref 65–100)
SERVICE CMNT-IMP: ABNORMAL

## 2022-12-24 PROCEDURE — 82962 GLUCOSE BLOOD TEST: CPT

## 2022-12-24 PROCEDURE — 6370000000 HC RX 637 (ALT 250 FOR IP): Performed by: INTERNAL MEDICINE

## 2022-12-24 PROCEDURE — 2580000003 HC RX 258: Performed by: INTERNAL MEDICINE

## 2022-12-24 PROCEDURE — 6370000000 HC RX 637 (ALT 250 FOR IP): Performed by: HOSPITALIST

## 2022-12-24 PROCEDURE — 1100000000 HC RM PRIVATE

## 2022-12-24 PROCEDURE — 6360000002 HC RX W HCPCS: Performed by: INTERNAL MEDICINE

## 2022-12-24 RX ORDER — ACETAMINOPHEN 325 MG/1
650 TABLET ORAL EVERY 6 HOURS PRN
Status: DISCONTINUED | OUTPATIENT
Start: 2022-12-24 | End: 2022-12-30 | Stop reason: HOSPADM

## 2022-12-24 RX ORDER — ACETAMINOPHEN 650 MG/1
650 SUPPOSITORY RECTAL EVERY 6 HOURS PRN
Status: DISCONTINUED | OUTPATIENT
Start: 2022-12-24 | End: 2022-12-30 | Stop reason: HOSPADM

## 2022-12-24 RX ORDER — ONDANSETRON 4 MG/1
4 TABLET, ORALLY DISINTEGRATING ORAL EVERY 8 HOURS PRN
Status: DISCONTINUED | OUTPATIENT
Start: 2022-12-24 | End: 2022-12-29

## 2022-12-24 RX ORDER — INSULIN LISPRO 100 [IU]/ML
0-4 INJECTION, SOLUTION INTRAVENOUS; SUBCUTANEOUS NIGHTLY
Status: DISCONTINUED | OUTPATIENT
Start: 2022-12-24 | End: 2022-12-30 | Stop reason: HOSPADM

## 2022-12-24 RX ORDER — DEXTROSE MONOHYDRATE 100 MG/ML
INJECTION, SOLUTION INTRAVENOUS CONTINUOUS PRN
Status: DISCONTINUED | OUTPATIENT
Start: 2022-12-24 | End: 2022-12-30 | Stop reason: HOSPADM

## 2022-12-24 RX ORDER — LISINOPRIL 5 MG/1
10 TABLET ORAL DAILY
Status: DISCONTINUED | OUTPATIENT
Start: 2022-12-25 | End: 2022-12-30 | Stop reason: HOSPADM

## 2022-12-24 RX ORDER — ONDANSETRON 2 MG/ML
4 INJECTION INTRAMUSCULAR; INTRAVENOUS EVERY 6 HOURS PRN
Status: DISCONTINUED | OUTPATIENT
Start: 2022-12-24 | End: 2022-12-29

## 2022-12-24 RX ORDER — CARVEDILOL 6.25 MG/1
6.25 TABLET ORAL 2 TIMES DAILY WITH MEALS
Status: DISCONTINUED | OUTPATIENT
Start: 2022-12-25 | End: 2022-12-30 | Stop reason: HOSPADM

## 2022-12-24 RX ORDER — PANTOPRAZOLE SODIUM 40 MG/1
40 TABLET, DELAYED RELEASE ORAL
Status: DISCONTINUED | OUTPATIENT
Start: 2022-12-25 | End: 2022-12-30 | Stop reason: HOSPADM

## 2022-12-24 RX ORDER — INSULIN LISPRO 100 [IU]/ML
0-4 INJECTION, SOLUTION INTRAVENOUS; SUBCUTANEOUS
Status: DISCONTINUED | OUTPATIENT
Start: 2022-12-25 | End: 2022-12-30 | Stop reason: HOSPADM

## 2022-12-24 RX ORDER — DIPHENHYDRAMINE HCL 25 MG
25 CAPSULE ORAL EVERY 8 HOURS PRN
Status: DISCONTINUED | OUTPATIENT
Start: 2022-12-24 | End: 2022-12-30 | Stop reason: HOSPADM

## 2022-12-24 RX ORDER — HYDROMORPHONE HYDROCHLORIDE 1 MG/ML
0.5 INJECTION, SOLUTION INTRAMUSCULAR; INTRAVENOUS; SUBCUTANEOUS EVERY 4 HOURS PRN
Status: DISCONTINUED | OUTPATIENT
Start: 2022-12-24 | End: 2022-12-29

## 2022-12-24 RX ORDER — ZOLPIDEM TARTRATE 5 MG/1
5 TABLET ORAL NIGHTLY PRN
Status: DISCONTINUED | OUTPATIENT
Start: 2022-12-24 | End: 2022-12-25

## 2022-12-24 RX ORDER — SODIUM CHLORIDE 0.9 % (FLUSH) 0.9 %
5-40 SYRINGE (ML) INJECTION PRN
Status: DISCONTINUED | OUTPATIENT
Start: 2022-12-24 | End: 2022-12-30 | Stop reason: HOSPADM

## 2022-12-24 RX ORDER — SODIUM CHLORIDE 9 MG/ML
INJECTION, SOLUTION INTRAVENOUS PRN
Status: DISCONTINUED | OUTPATIENT
Start: 2022-12-24 | End: 2022-12-30 | Stop reason: HOSPADM

## 2022-12-24 RX ORDER — POLYETHYLENE GLYCOL 3350 17 G/17G
17 POWDER, FOR SOLUTION ORAL DAILY PRN
Status: DISCONTINUED | OUTPATIENT
Start: 2022-12-24 | End: 2022-12-30 | Stop reason: HOSPADM

## 2022-12-24 RX ORDER — SODIUM CHLORIDE 0.9 % (FLUSH) 0.9 %
5-40 SYRINGE (ML) INJECTION EVERY 12 HOURS SCHEDULED
Status: DISCONTINUED | OUTPATIENT
Start: 2022-12-24 | End: 2022-12-30 | Stop reason: HOSPADM

## 2022-12-24 RX ORDER — LORAZEPAM 1 MG/1
1 TABLET ORAL EVERY 6 HOURS PRN
Status: DISCONTINUED | OUTPATIENT
Start: 2022-12-24 | End: 2022-12-30 | Stop reason: HOSPADM

## 2022-12-24 RX ADMIN — ZOLPIDEM TARTRATE 5 MG: 5 TABLET ORAL at 23:04

## 2022-12-24 RX ADMIN — HYDROMORPHONE HYDROCHLORIDE 0.5 MG: 1 INJECTION, SOLUTION INTRAMUSCULAR; INTRAVENOUS; SUBCUTANEOUS at 20:31

## 2022-12-24 RX ADMIN — DIPHENHYDRAMINE HYDROCHLORIDE 25 MG: 25 CAPSULE ORAL at 20:32

## 2022-12-24 RX ADMIN — SODIUM CHLORIDE, PRESERVATIVE FREE 10 ML: 5 INJECTION INTRAVENOUS at 20:32

## 2022-12-24 RX ADMIN — CEFAZOLIN SODIUM 2000 MG: 100 INJECTION, POWDER, LYOPHILIZED, FOR SOLUTION INTRAVENOUS at 23:27

## 2022-12-24 ASSESSMENT — PAIN SCALES - GENERAL
PAINLEVEL_OUTOF10: 0
PAINLEVEL_OUTOF10: 7

## 2022-12-24 ASSESSMENT — PAIN DESCRIPTION - FREQUENCY: FREQUENCY: INTERMITTENT

## 2022-12-24 ASSESSMENT — PAIN DESCRIPTION - ONSET: ONSET: ON-GOING

## 2022-12-24 ASSESSMENT — PAIN DESCRIPTION - PAIN TYPE: TYPE: ACUTE PAIN;SURGICAL PAIN

## 2022-12-24 ASSESSMENT — PAIN DESCRIPTION - DESCRIPTORS: DESCRIPTORS: THROBBING

## 2022-12-24 ASSESSMENT — PAIN - FUNCTIONAL ASSESSMENT: PAIN_FUNCTIONAL_ASSESSMENT: ACTIVITIES ARE NOT PREVENTED

## 2022-12-24 ASSESSMENT — PAIN DESCRIPTION - LOCATION: LOCATION: MOUTH

## 2022-12-24 NOTE — PROGRESS NOTES
TRANSFER - IN REPORT:    Verbal report received from HUBERT Stewart on Kayden Cole  being received from [facility] for routine progression of patient care      Report consisted of patient's Situation, Background, Assessment and   Recommendations(SBAR). Information from the following report(s) Nurse Handoff Report, MAR, Recent Results, and Neuro Assessment was reviewed with the receiving nurse. Opportunity for questions and clarification was provided. Assessment completed upon patient's arrival to unit and care assumed.

## 2022-12-24 NOTE — H&P
Hospitalist History and Physical   Admit Date:  2022  5:57 PM   Name:  Amanda Wolfe   Age:  45 y.o. Sex:  female  :  1970   MRN:  [id number]   Room:  3/    Presenting Complaint: No chief complaint on file. Reason(s) for Admission: MSSA bacteremia [R78.81, B95.61]     History of Present Illness:       Amanda Wolfe is a 45 y.o. female with medical history of  chronic pancreatitis, DM2, HTN, anxiety, gastroparesis, port, who is evaluated post transfer from [facility] for MSSA bacteremia and ID consult. She had recent oral surgery for dental extractions 22 with penrose drain placement. Took course of augmentin and amoxil. Developed positive blood cultures MSSA 22. [facility] 22 negative to date. S/p IV vancomycin /[id number] and started ancef 22. Penrose drain since removed. S/p CT neck was without abscess. ECHO- no infective endocarditis and has moderate pulmonary HTN. FULL CODE     Rufino Guzman [phone]      Medications reconciled per prior orders at [facility]     Review of Systems:        10 systems reviewed and negative except as noted in HPI. - no fever, no vision change, no chest pain, no edema, no dyspnea or cough, eats ok, had BM         Assessment & Plan:     Principal Problem:    MSSA bacteremia  Plan:   D1 ancef  ID consult  IR consult for port removal       Dental infection:  Followed by oral surgery   Penrose drain since removed           Active Problems:    Class 1 obesity with serious comorbidity and body mass index (BMI) of 31.0 to 31.9 in adult  Plan:   Noted          Chronic pancreatitis ([address]  Plan:     Gastroparesis due to DM ([address]  Plan:   Diet as tolerant           Anemia  Plan:   HGB 9.3-trend           Pulmonary hypertension ([address]  Plan:   Noted          Type 2 diabetes mellitus with hyperglycemia, with long-term current use of insulin ([address]  Plan:   SSI           Hypertension  Plan:   Resume lisinopril and coreg       Anticipated discharge needs:     Pending     Diet: ADULT DIET; Regular; 3 carb choices (45 gm/meal)  VTE ppx: SCD  Code status: Full Code    Hospital Problems:  Principal Problem:    MSSA bacteremia  Active Problems:    Class 1 obesity with serious comorbidity and body mass index (BMI) of 31.0 to 31.9 in adult    Gastroparesis due to DM (HCC)    Anemia    Pulmonary hypertension (HCC)    Type 2 diabetes mellitus with hyperglycemia, with long-term current use of insulin (HCC)    Chronic pancreatitis ([facility]    Hypertension  Resolved Problems:    * No resolved hospital problems.  *       Past History:     Past Medical History:   Diagnosis Date    Anemia     blood transfusion 2013 X1 d/t \"perforated bowel\"-- Fe infusions 12/2017--- denies any current iron infusions 12/17/2019    Anxiety and depression     managed with meds    C. difficile diarrhea 2013    in 8053 after complicated ERCP    Cervical dysplasia 1990s    Chronic insomnia     ambien     Chronic nausea     Chronic pain     all over     Chronic pancreatitis ([facility]     COVID-19 vaccine series completed 04/21/2021    3/29/2021 Pfizer     Dehydration     IVFs through port as needed for this     Encounter for insertion of venous access port     Left chest port    Esophageal stricture 2017    Fibromyalgia     managed with meds    Former cigarette smoker     GERD (gastroesophageal reflux disease)     controlled with med    History of blood transfusion     History of kidney stones 03/2021    X1-naturally pass    HLD (hyperlipidemia)     pt denies     Hypertension     IBS (irritable bowel syndrome)     Migraine headache     does not have often but did have one recently; just takes tylenol    Nausea & vomiting     pt reports she does better with phenergan than zofran - causes HA    Nonalcoholic fatty liver disease     Pancreatitis 06/23/2014    PUD (peptic ulcer disease) 06/2017    still having issues takes meds     Severe protein-calorie malnutrition ([address] 04/26/2013    Sinus tachycardia     per pt-- no tx needed    Sphincter of Oddi dysfunction     Type 2 diabetes mellitus ([address]     type 2-- sqbs am avg 140-160-- Hypo symptoms at <100       Past Surgical History:   Procedure Laterality Date    CARPAL TUNNEL RELEASE Right     along with trigger finger    CHOLECYSTECTOMY, LAPAROSCOPIC  1997    COLONOSCOPY      COLONOSCOPY N/A 4/4/2018    COLONOSCOPY performed by Cristi Sanchez MD at [facility] ENDOSCOPY    ERCP  3/5/2013    resulted in perforated duodenum    GASTROSTOMY TUBE PLACEMENT N/A 7/26/2022    EGD PEG TUBE PLACEMENT with j tube placement ROOM 207 performed by Jose Wilcox MD at [address] [facility] ([facility])  1998    ovaries remain    IR DRAIN SKIN ABSCESS      IR PORT PLACEMENT EQUAL OR GREATER THAN 5 YEARS  9/25/2018    IR PORT PLACEMENT EQUAL OR GREATER THAN 5 YEARS 9/25/2018 [facility] RADIOLOGY SPECIALS    LAPAROTOMY  3/5/2013    exploratory for duodenal perforation with ERCP    ORTHOPEDIC SURGERY      right finger surgery 11/2017    OTHER SURGICAL HISTORY Bilateral     thumb    OTHER SURGICAL HISTORY      Kidney stones march 2021    AL ABDOMEN SURGERY PROC UNLISTED  4/13    explor lap    AL ABDOMEN SURGERY PROC UNLISTED  1997    lap nissen    AL ABDOMEN SURGERY PROC UNLISTED  last one placed  2012    Hx of pancreatic stent, multiple    TOTAL COLECTOMY      UPPER GASTROINTESTINAL ENDOSCOPY  5/21/2021         UPPER GASTROINTESTINAL ENDOSCOPY  2017    36 fr tony    UPPER GASTROINTESTINAL ENDOSCOPY  12/18/2019         UPPER GASTROINTESTINAL ENDOSCOPY N/A 6/8/2022    EGD ESOPHAGOGASTRODUODENOSCOPY DILATATION/ 34 performed by Adelaida Urban MD at [address] [place] [address] N/A 7/22/2022    ENDOSCOPIC ULTRASOUND/35 ROOM 207 performed by Adelaida Urban MD at [address] [place] [address] N/A 7/22/2022    EGD BIOPSY With balloon dialtion performed by Adelaida Urban MD at [facility] ENDOSCOPY    UPPER GASTROINTESTINAL ENDOSCOPY N/A 8/10/2022    EGD ESOPHAGOGASTRODUODENOSCOPY performed by Cindy Silva MD at [address] [place] [address] N/A 2022    EGD ESOPHAGOGASTRODUODENOSCOPY /34 performed by Cindy Silva MD at [address]  [address] at [facility]    cyt-- stent removed 13.   Dr Alok Lopez      port insertion, left chest wall        Social History     Tobacco Use    Smoking status: Former     Packs/day: 1.00     Types: Cigarettes     Quit date: 1993     Years since quittin.6    Smokeless tobacco: Never   Substance Use Topics    Alcohol use: No      Social History     Substance and Sexual Activity   Drug Use Not Currently       Family History   Problem Relation Age of Onset    No Known Problems Sister     Coronary Art Dis Father 76    Stroke Father     Hypertension Father     Diabetes Father     Heart Disease Mother         cabg began in her 52's    Hypertension Mother     Diabetes Mother     Other Father         Immunization History   Administered Date(s) Administered    COVID-19, PFIZER PURPLE top, DILUTE for use, (age 15 y+), 30mcg/0.3mL 2021, 2021, 2021    Influenza, FLUARIX, FLULAVAL, FLUZONE (age 10 mo+) AND AFLURIA, (age 1 y+), PF, 0.5mL 2020    Influenza, Trivalent PF 10/27/2013     Allergies   Allergen Reactions    Adhesive Tape Itching, Other (See Comments) and Rash     blisters  tegaderm  Paper tape too      Metronidazole Diarrhea and Nausea And Vomiting    Atorvastatin Myalgia    Iodine Other (See Comments)     Sweaty and clammy    Statins Myalgia    Barium Iodide Nausea And Vomiting     Diaphoresis      Barium Sulfate Palpitations    Insulin Lispro Nausea And Vomiting    Insulins Nausea And Vomiting     Humalog only    Metformin Nausea And Vomiting     Stomach pain  Stomach pain  Stomach pain  Stomach pain       Prior to Admit Medications:  Current Outpatient Medications   Medication Instructions    acetaminophen (TYLENOL) 500 MG tablet Oral, EVERY 6 HOURS PRN    blood glucose test strips (EXACTECH TEST) strip TEST BLOOD SUGAR FOUR TIMES DAILY    carvedilol (COREG) 6.25 mg, Oral, 2 TIMES DAILY WITH MEALS    cyanocobalamin 1000 MCG/ML injection INJECT 1 VIAL INTRAMUSCULARLY FOR 4 WEEKS THEN MONTHLY    diclofenac sodium (VOLTAREN) 1 % GEL APPLY 1 GRAM TO AFFECTED AREA 4 TIMES A DAY AS NEEDED    diphenhydrAMINE (BENADRYL) 25 mg, Oral, EVERY 6 HOURS PRN    glucagon 1 mg, IntraMUSCular, PRN    Hyoscyamine Sulfate SL (LEVSIN/SL) 0.25 mg, SubLINGual, 4 TIMES DAILY PRN    Hyoscyamine Sulfate SL 0.125 mg, SubLINGual, EVERY 6 HOURS PRN    influenza quadrivalent split vaccine (FLUZONE;FLUARIX;FLULAVAL;AFLURIA) 0.5 ML injection IntraMUSCular    insulin aspart (NOVOLOG) 100 UNIT/ML injection pen 15 units plus sliding scale for blood sugar >151    Insulin Degludec 58 Units, SubCUTAneous, Nightly    lidocaine viscous hcl (XYLOCAINE) 2 % SOLN solution 10 mLs, EVERY 6 HOURS PRN    lisinopril (PRINIVIL;ZESTRIL) 10 mg, Oral, DAILY    LORazepam (ATIVAN) 1 mg, Oral, 3 times daily    naloxone 4 mg, ONCE PRN    ondansetron (ZOFRAN) 8 mg, Oral, 3 TIMES DAILY PRN    ondansetron (ZOFRAN-ODT) 8 mg, SubLINGual, 3 TIMES DAILY    pantoprazole (PROTONIX) 40 mg, Oral, 2 TIMES DAILY    polyethylene glycol (GLYCOLAX) 17 g, Oral, PRN    pregabalin (LYRICA) 100 mg, Oral, 2 TIMES DAILY    promethazine (PHENERGAN) 25 mg, Oral, EVERY 8 HOURS PRN    sodium chloride 0.9 % infusion IntraVENous, PRN    tiZANidine (ZANAFLEX) 4 mg, Oral, EVERY 6 HOURS PRN    zolpidem (AMBIEN) 10 mg, Oral         Objective:   Patient Vitals for the past 24 hrs:   Temp Pulse Resp BP SpO2   12/24/22 1748 98.2 °F (36.8 °C) 87 18 (!) 155/87 97 %       Oxygen Therapy  SpO2: 97 %    Estimated body mass index is 32.41 kg/m² as calculated from the following:    Height as of this encounter: 5' 2\" (1.575 m).     Weight as of this encounter: 177 lb 3.2 oz (80.4 kg). No intake or output data in the 24 hours ending 12/24/22 1849      Physical Exam:    Blood pressure (!) 155/87, pulse 87, temperature 98.2 °F (36.8 °C), temperature source Oral, resp. rate 18, height 5' 2\" (1.575 m), weight 177 lb 3.2 oz (80.4 kg), SpO2 97 %. General:    Well nourished. Alert, no distress  Head:  Normocephalic, atraumatic, chin with wound dressing   Eyes:  Sclerae appear normal.  Pupils equally round. ENT:  Nares appear normal, no drainage. Moist oral mucosa  Neck:  No restricted ROM. Trachea midline   CV:   RRR. No m/r/g. No jugular venous distension. No edema   Lungs:   CTAB. No wheezing, rhonchi, or rales. Symmetric expansion. Abdomen: Bowel sounds present. Soft, nontender, nondistended. Extremities: No cyanosis or clubbing. No edema  Skin:     No rashes and normal coloration. Warm and dry. Neuro:  CN II-XII grossly intact. Psych:  Normal mood and affect. I have personally reviewed labs and tests showing:  Recent Labs:  No results found for this or any previous visit (from the past 24 hour(s)). I have personally reviewed imaging studies showing:  No results found. Echocardiogram:  No results found for this or any previous visit.         Orders Placed This Encounter   Medications    sodium chloride flush 0.9 % injection 5-40 mL    sodium chloride flush 0.9 % injection 5-40 mL    0.9 % sodium chloride infusion    OR Linked Order Group     ondansetron (ZOFRAN-ODT) disintegrating tablet 4 mg     ondansetron (ZOFRAN) injection 4 mg    polyethylene glycol (GLYCOLAX) packet 17 g    OR Linked Order Group     acetaminophen (TYLENOL) tablet 650 mg     acetaminophen (TYLENOL) suppository 650 mg    glucose chewable tablet 16 g    OR Linked Order Group     dextrose bolus 10% 125 mL     dextrose bolus 10% 250 mL    glucagon (rDNA) injection 1 mg    dextrose 10 % infusion    insulin lispro (HUMALOG) injection vial 0-4 Units    insulin lispro (HUMALOG) injection vial 0-4 Units    ceFAZolin (ANCEF) 2000 mg in sterile water 20 mL IV syringe     Order Specific Question:   Antimicrobial Indications     Answer:   Sepsis of Unknown Etiology     Order Specific Question:   Sepsis duration of therapy     Answer:   7 days         Signed:  Priscilla Miller MD    Part of this note may have been written by using a voice dictation software.  The note has been proof read but may still contain some grammatical/other typographical errors.

## 2022-12-25 LAB
ANION GAP SERPL CALC-SCNC: 6 MMOL/L (ref 2–11)
BASOPHILS # BLD: 0 K/UL (ref 0–0.2)
BASOPHILS NFR BLD: 0 % (ref 0–2)
BUN SERPL-MCNC: 5 MG/DL (ref 6–23)
CALCIUM SERPL-MCNC: 8.6 MG/DL (ref 8.3–10.4)
CHLORIDE SERPL-SCNC: 108 MMOL/L (ref 101–110)
CO2 SERPL-SCNC: 24 MMOL/L (ref 21–32)
CREAT SERPL-MCNC: 0.7 MG/DL (ref 0.6–1)
DIFFERENTIAL METHOD BLD: ABNORMAL
EOSINOPHIL # BLD: 0.1 K/UL (ref 0–0.8)
EOSINOPHIL NFR BLD: 3 % (ref 0.5–7.8)
ERYTHROCYTE [DISTWIDTH] IN BLOOD BY AUTOMATED COUNT: 17.2 % (ref 11.9–14.6)
EST. AVERAGE GLUCOSE BLD GHB EST-MCNC: 160 MG/DL
GLUCOSE BLD STRIP.AUTO-MCNC: 162 MG/DL (ref 65–100)
GLUCOSE BLD STRIP.AUTO-MCNC: 165 MG/DL (ref 65–100)
GLUCOSE BLD STRIP.AUTO-MCNC: 167 MG/DL (ref 65–100)
GLUCOSE BLD STRIP.AUTO-MCNC: 194 MG/DL (ref 65–100)
GLUCOSE SERPL-MCNC: 168 MG/DL (ref 65–100)
HBA1C MFR BLD: 7.2 % (ref 4.8–5.6)
HCT VFR BLD AUTO: 31.2 % (ref 35.8–46.3)
HGB BLD-MCNC: 9.3 G/DL (ref 11.7–15.4)
IMM GRANULOCYTES # BLD AUTO: 0 K/UL (ref 0–0.5)
IMM GRANULOCYTES NFR BLD AUTO: 0 % (ref 0–5)
LYMPHOCYTES # BLD: 1.7 K/UL (ref 0.5–4.6)
LYMPHOCYTES NFR BLD: 35 % (ref 13–44)
MCH RBC QN AUTO: 22.4 PG (ref 26.1–32.9)
MCHC RBC AUTO-ENTMCNC: 29.8 G/DL (ref 31.4–35)
MCV RBC AUTO: 75 FL (ref 82–102)
MONOCYTES # BLD: 0.4 K/UL (ref 0.1–1.3)
MONOCYTES NFR BLD: 9 % (ref 4–12)
NEUTS SEG # BLD: 2.5 K/UL (ref 1.7–8.2)
NEUTS SEG NFR BLD: 53 % (ref 43–78)
NRBC # BLD: 0 K/UL (ref 0–0.2)
PLATELET # BLD AUTO: 209 K/UL (ref 150–450)
PMV BLD AUTO: 10.2 FL (ref 9.4–12.3)
POTASSIUM SERPL-SCNC: 3.7 MMOL/L (ref 3.5–5.1)
RBC # BLD AUTO: 4.16 M/UL (ref 4.05–5.2)
SERVICE CMNT-IMP: ABNORMAL
SODIUM SERPL-SCNC: 138 MMOL/L (ref 133–143)
WBC # BLD AUTO: 4.8 K/UL (ref 4.3–11.1)

## 2022-12-25 PROCEDURE — 85025 COMPLETE CBC W/AUTO DIFF WBC: CPT

## 2022-12-25 PROCEDURE — 6360000002 HC RX W HCPCS: Performed by: HOSPITALIST

## 2022-12-25 PROCEDURE — 36415 COLL VENOUS BLD VENIPUNCTURE: CPT

## 2022-12-25 PROCEDURE — 6370000000 HC RX 637 (ALT 250 FOR IP): Performed by: HOSPITALIST

## 2022-12-25 PROCEDURE — 2580000003 HC RX 258: Performed by: INTERNAL MEDICINE

## 2022-12-25 PROCEDURE — 6370000000 HC RX 637 (ALT 250 FOR IP): Performed by: INTERNAL MEDICINE

## 2022-12-25 PROCEDURE — 6360000002 HC RX W HCPCS: Performed by: INTERNAL MEDICINE

## 2022-12-25 PROCEDURE — 80048 BASIC METABOLIC PNL TOTAL CA: CPT

## 2022-12-25 PROCEDURE — 83036 HEMOGLOBIN GLYCOSYLATED A1C: CPT

## 2022-12-25 PROCEDURE — 1100000000 HC RM PRIVATE

## 2022-12-25 PROCEDURE — 82962 GLUCOSE BLOOD TEST: CPT

## 2022-12-25 PROCEDURE — 87040 BLOOD CULTURE FOR BACTERIA: CPT

## 2022-12-25 RX ORDER — ZOLPIDEM TARTRATE 5 MG/1
10 TABLET ORAL NIGHTLY PRN
Status: DISCONTINUED | OUTPATIENT
Start: 2022-12-25 | End: 2022-12-30 | Stop reason: HOSPADM

## 2022-12-25 RX ORDER — ENOXAPARIN SODIUM 100 MG/ML
40 INJECTION SUBCUTANEOUS DAILY
Status: DISCONTINUED | OUTPATIENT
Start: 2022-12-25 | End: 2022-12-30 | Stop reason: HOSPADM

## 2022-12-25 RX ORDER — POTASSIUM CHLORIDE 7.45 MG/ML
10 INJECTION INTRAVENOUS
Status: DISCONTINUED | OUTPATIENT
Start: 2022-12-25 | End: 2022-12-25

## 2022-12-25 RX ADMIN — DIPHENHYDRAMINE HYDROCHLORIDE 25 MG: 25 CAPSULE ORAL at 14:45

## 2022-12-25 RX ADMIN — HYDROMORPHONE HYDROCHLORIDE 0.5 MG: 1 INJECTION, SOLUTION INTRAMUSCULAR; INTRAVENOUS; SUBCUTANEOUS at 20:07

## 2022-12-25 RX ADMIN — SODIUM CHLORIDE, PRESERVATIVE FREE 20 ML: 5 INJECTION INTRAVENOUS at 08:30

## 2022-12-25 RX ADMIN — PANTOPRAZOLE SODIUM 40 MG: 40 TABLET, DELAYED RELEASE ORAL at 06:14

## 2022-12-25 RX ADMIN — HYDROMORPHONE HYDROCHLORIDE 0.5 MG: 1 INJECTION, SOLUTION INTRAMUSCULAR; INTRAVENOUS; SUBCUTANEOUS at 06:14

## 2022-12-25 RX ADMIN — HYDROMORPHONE HYDROCHLORIDE 0.5 MG: 1 INJECTION, SOLUTION INTRAMUSCULAR; INTRAVENOUS; SUBCUTANEOUS at 10:46

## 2022-12-25 RX ADMIN — ONDANSETRON 4 MG: 2 INJECTION INTRAMUSCULAR; INTRAVENOUS at 14:55

## 2022-12-25 RX ADMIN — ENOXAPARIN SODIUM 40 MG: 100 INJECTION SUBCUTANEOUS at 12:25

## 2022-12-25 RX ADMIN — SODIUM CHLORIDE, PRESERVATIVE FREE 10 ML: 5 INJECTION INTRAVENOUS at 20:07

## 2022-12-25 RX ADMIN — CARVEDILOL 6.25 MG: 6.25 TABLET, FILM COATED ORAL at 16:57

## 2022-12-25 RX ADMIN — CARVEDILOL 6.25 MG: 6.25 TABLET, FILM COATED ORAL at 08:32

## 2022-12-25 RX ADMIN — DIPHENHYDRAMINE HYDROCHLORIDE 25 MG: 25 CAPSULE ORAL at 06:14

## 2022-12-25 RX ADMIN — LISINOPRIL 10 MG: 5 TABLET ORAL at 08:32

## 2022-12-25 RX ADMIN — PANTOPRAZOLE SODIUM 40 MG: 40 TABLET, DELAYED RELEASE ORAL at 16:57

## 2022-12-25 RX ADMIN — ZOLPIDEM TARTRATE 10 MG: 5 TABLET ORAL at 21:54

## 2022-12-25 RX ADMIN — CEFAZOLIN SODIUM 2000 MG: 100 INJECTION, POWDER, LYOPHILIZED, FOR SOLUTION INTRAVENOUS at 16:57

## 2022-12-25 RX ADMIN — HYDROMORPHONE HYDROCHLORIDE 0.5 MG: 1 INJECTION, SOLUTION INTRAMUSCULAR; INTRAVENOUS; SUBCUTANEOUS at 14:45

## 2022-12-25 RX ADMIN — CEFAZOLIN SODIUM 2000 MG: 100 INJECTION, POWDER, LYOPHILIZED, FOR SOLUTION INTRAVENOUS at 08:32

## 2022-12-25 ASSESSMENT — PAIN SCALES - GENERAL
PAINLEVEL_OUTOF10: 8
PAINLEVEL_OUTOF10: 4
PAINLEVEL_OUTOF10: 6
PAINLEVEL_OUTOF10: 7

## 2022-12-25 ASSESSMENT — PAIN DESCRIPTION - PAIN TYPE
TYPE: ACUTE PAIN;SURGICAL PAIN
TYPE: ACUTE PAIN;SURGICAL PAIN

## 2022-12-25 ASSESSMENT — PAIN DESCRIPTION - ORIENTATION
ORIENTATION: ANTERIOR
ORIENTATION: ANTERIOR

## 2022-12-25 ASSESSMENT — PAIN DESCRIPTION - DESCRIPTORS
DESCRIPTORS: ACHING
DESCRIPTORS: ACHING;SORE

## 2022-12-25 ASSESSMENT — PAIN DESCRIPTION - FREQUENCY
FREQUENCY: INTERMITTENT
FREQUENCY: INTERMITTENT

## 2022-12-25 ASSESSMENT — PAIN DESCRIPTION - ONSET
ONSET: GRADUAL
ONSET: GRADUAL

## 2022-12-25 ASSESSMENT — PAIN DESCRIPTION - LOCATION
LOCATION: MOUTH
LOCATION: MOUTH

## 2022-12-25 NOTE — PROGRESS NOTES
Hospitalist Progress Note   Admit Date:  2022  5:57 PM   Name:  Aleah Alexander   Age:  45 y.o. Sex:  female  :  1970   MRN:  [id number]   Room:  [facility]    Presenting Complaint: positive blood cultures  Reason(s) for Admission: MSSA bacteremia [R78.81, B95.61]     Hospital Course & Interval History:   Patient is a 56-year-old female with history of chronic pancreatitis, DM2, HTN, anxiety, gastroparesis status post recent port exchange on  admitted on  from [facility] for MSSA bacteremia and ID consult. Patient had a recent oral surgery for dental extraction on 2022 with Penrose drain placement. Patient was on Augmentin and Amoxil since the procedure. Patient was found to have positive blood culture with MSSA on 2022, repeat blood cultures from  negative to date. Patient was given IV vancomycin and Unasyn on  and started on Ancef on 2022. Penrose drain since removed, status post CT neck without any evidence of abscess. Echo was unremarkable for any evidence of infective endocarditis, showed moderate pulmonary hypertension. Subjective/24hr Events (22): Patient seen at bedside, resting in bed comfortably per  She is alert and awake, AOx3, on room air. She denies having any chest pain, abdominal pain, nausea vomiting or diarrhea. No reported fever chills or night sweats. ROS:  10 point review of system is negative except for what mentioned above. Assessment & Plan:     Principal Problem:    MSSA bacteremia  -  Repeating blood cultures x2 sets today  Continue Ancef -  IR consulted for port removal with catheter tip to be sent for culture. ID on board, appreciate their help.           Dental infection:  Followed by oral surgery   Penrose drain since removed         Active Problems:    Class 1 obesity with serious comorbidity and body mass index (BMI) of 31.0 to 31.9 in adult  Plan:   Noted, increases all cause mortality morbidity. Patient encouraged to lose weight. Chronic pancreatitis ([address]    Gastroparesis due to DM [facility])  Plan:   Diet as tolerant   Patient had NG tube earlier this year, which was removed in August.  Patient has been on glimepiride since then. Anemia  HGB 9.3- stable       Pulmonary hypertension ([address]  Plan:   Patient is currently on room air, does not appear to be volume overloaded. Type 2 diabetes mellitus with hyperglycemia, with long-term current use of insulin ([facility])  Plan:   Monitor POC glucose QA CHS  Continue sliding scale insulin for now. Hypertension  12/25-  Blood pressure is optimally controlled. Continue Coreg 6.25 mg p.o. twice daily along with lisinopril 10 mg p.o. daily. Discharge Planning:    Pending clinical course. Diet:  ADULT DIET; Regular; 3 carb choices (45 gm/meal)  DVT PPx: lovenox  Code status: Full Code    I spent 36 minutes of time caring for this patient on the unit nearby or at bedside, and more than 50 percent was spent on coordination of care activities, and/or patient/family counseling regarding status and plan of care. Hospital Problems             Last Modified POA    * (Principal) MSSA bacteremia 12/24/2022 Yes    Class 1 obesity with serious comorbidity and body mass index (BMI) of 31.0 to 31.9 in adult (Chronic) 12/24/2022 Yes    Gastroparesis due to DM ([address] (Chronic) 12/24/2022 Yes    Anemia (Chronic) 12/24/2022 Yes    Pulmonary hypertension ([address] (Chronic) 12/24/2022 Yes    Type 2 diabetes mellitus with hyperglycemia, with long-term current use of insulin ([address] 12/24/2022 Yes    Overview Signed 7/19/2022  2:48 PM by Светлана William DO     Followed by endocrinology OP. Recommends avoidance of GLP-1RA agonist d/t h/o chronic pancreatitis.             Chronic pancreatitis ([address] 12/24/2022 Yes    Overview Signed 7/19/2022  2:52 PM by Светлана William DO       EUS 3/16/22 with esophogeal stricture s/p dilation, gastrojejunal anastomosis, mild pancreatic parenchymal changes without convincing evidence of chronic pancreatitis and fatty infiltration of the liver. No longer on pancreatic enzyme replacement. Hypertension 12/24/2022 Yes       Objective:   Patient Vitals for the past 24 hrs:   Temp Pulse Resp BP SpO2   12/25/22 0306 98.2 °F (36.8 °C) 88 18 139/74 95 %   12/24/22 2243 98.4 °F (36.9 °C) 84 18 130/73 97 %   12/24/22 1748 98.2 °F (36.8 °C) 87 18 (!) 155/87 97 %       Estimated body mass index is 32.41 kg/m² as calculated from the following:    Height as of this encounter: 5' 2\" (1.575 m). Weight as of this encounter: 177 lb 3.2 oz (80.4 kg). Intake/Output Summary (Last 24 hours) at 12/25/2022 0726  Last data filed at 12/25/2022 0306  Gross per 24 hour   Intake --   Output 100 ml   Net -100 ml         Physical Exam:     Blood pressure 139/74, pulse 88, temperature 98.2 °F (36.8 °C), temperature source Oral, resp. rate 18, height 5' 2\" (1.575 m), weight 177 lb 3.2 oz (80.4 kg), SpO2 95 %. General:    Alert, awake, NAD, on room air  Head:  Normocephalic, atraumatic  Eyes:  Sclerae appear normal. Pupils equally round. ENT:  Nares appear normal, no drainage. Moist oral mucosa  Neck:  No restricted ROM. Trachea midline. CV:   RRR. No m/r/g. No jugular venous distension. Lungs:   CTAB. No wheezing, rhonchi, or rales. Respirations even, unlabored. Abdomen: Bowel sounds present. Soft, nontender, nondistended. Extremities: No cyanosis or clubbing. No edema. Skin:     No rashes and normal coloration. Warm and dry. Port site with slight dark center nonerythematous, nontender. Neuro:  CN II-XII grossly intact. Sensation intact. A&Ox3  Psych:  Normal mood and affect.       I have reviewed ordered lab tests and independently visualized imaging below:    Recent Labs:  Recent Results (from the past 48 hour(s))   POCT Glucose    Collection Time: 12/24/22  7:30 PM   Result Value Ref Range    POC Glucose 136 (H) 65 - 100 mg/dL    Performed by: Nena    Basic Metabolic Panel w/ Reflex to MG    Collection Time: 12/25/22  6:28 AM   Result Value Ref Range    Sodium 138 133 - 143 mmol/L    Potassium 3.7 3.5 - 5.1 mmol/L    Chloride 108 101 - 110 mmol/L    CO2 24 21 - 32 mmol/L    Anion Gap 6 2 - 11 mmol/L    Glucose 168 (H) 65 - 100 mg/dL    BUN 5 (L) 6 - 23 MG/DL    Creatinine 0.70 0.6 - 1.0 MG/DL    Est, Glom Filt Rate >60 >60 ml/min/1.73m2    Calcium 8.6 8.3 - 10.4 MG/DL   CBC with Auto Differential    Collection Time: 12/25/22  6:28 AM   Result Value Ref Range    WBC 4.8 4.3 - 11.1 K/uL    RBC 4.16 4.05 - 5.2 M/uL    Hemoglobin 9.3 (L) 11.7 - 15.4 g/dL    Hematocrit 31.2 (L) 35.8 - 46.3 %    MCV 75.0 (L) 82 - 102 FL    MCH 22.4 (L) 26.1 - 32.9 PG    MCHC 29.8 (L) 31.4 - 35.0 g/dL    RDW 17.2 (H) 11.9 - 14.6 %    Platelets 215 808 - 849 K/uL    MPV 10.2 9.4 - 12.3 FL    nRBC 0.00 0.0 - 0.2 K/uL    Differential Type AUTOMATED      Seg Neutrophils 53 43 - 78 %    Lymphocytes 35 13 - 44 %    Monocytes 9 4.0 - 12.0 %    Eosinophils % 3 0.5 - 7.8 %    Basophils 0 0.0 - 2.0 %    Immature Granulocytes 0 0.0 - 5.0 %    Segs Absolute 2.5 1.7 - 8.2 K/UL    Absolute Lymph # 1.7 0.5 - 4.6 K/UL    Absolute Mono # 0.4 0.1 - 1.3 K/UL    Absolute Eos # 0.1 0.0 - 0.8 K/UL    Basophils Absolute 0.0 0.0 - 0.2 K/UL    Absolute Immature Granulocyte 0.0 0.0 - 0.5 K/UL   Hemoglobin A1c    Collection Time: 12/25/22  6:28 AM   Result Value Ref Range    Hemoglobin A1C 7.2 (H) 4.8 - 5.6 %    eAG 160 mg/dL         Other Studies:  No results found.     Current Meds:  Current Facility-Administered Medications   Medication Dose Route Frequency    sodium chloride flush 0.9 % injection 5-40 mL  5-40 mL IntraVENous 2 times per day    sodium chloride flush 0.9 % injection 5-40 mL  5-40 mL IntraVENous PRN    0.9 % sodium chloride infusion   IntraVENous PRN    ondansetron (ZOFRAN-ODT) disintegrating tablet 4 mg  4 mg Oral Q8H PRN    Or    ondansetron (ZOFRAN) injection 4 mg  4 mg IntraVENous Q6H PRN    polyethylene glycol (GLYCOLAX) packet 17 g  17 g Oral Daily PRN    acetaminophen (TYLENOL) tablet 650 mg  650 mg Oral Q6H PRN    Or    acetaminophen (TYLENOL) suppository 650 mg  650 mg Rectal Q6H PRN    glucose chewable tablet 16 g  4 tablet Oral PRN    dextrose bolus 10% 125 mL  125 mL IntraVENous PRN    Or    dextrose bolus 10% 250 mL  250 mL IntraVENous PRN    glucagon (rDNA) injection 1 mg  1 mg SubCUTAneous PRN    dextrose 10 % infusion   IntraVENous Continuous PRN    insulin lispro (HUMALOG) injection vial 0-4 Units  0-4 Units SubCUTAneous TID WC    insulin lispro (HUMALOG) injection vial 0-4 Units  0-4 Units SubCUTAneous Nightly    ceFAZolin (ANCEF) 2000 mg in sterile water 20 mL IV syringe  2,000 mg IntraVENous Q8H    HYDROmorphone HCl PF (DILAUDID) injection 0.5 mg  0.5 mg IntraVENous Q4H PRN    diphenhydrAMINE (BENADRYL) capsule 25 mg  25 mg Oral Q8H PRN    LORazepam (ATIVAN) tablet 1 mg  1 mg Oral Q6H PRN    carvedilol (COREG) tablet 6.25 mg  6.25 mg Oral BID WC    lisinopril (PRINIVIL;ZESTRIL) tablet 10 mg  10 mg Oral Daily    pantoprazole (PROTONIX) tablet 40 mg  40 mg Oral BID AC    zolpidem (AMBIEN) tablet 5 mg  5 mg Oral Nightly PRN       Signed:  Yue Boyle MD    Part of this note may have been written by using a voice dictation software. The note has been proof read but may still contain some grammatical/other typographical errors.

## 2022-12-25 NOTE — CONSULTS
Infectious Disease Consult  Today's Date: 12/25/2022   Admit Date: 12/24/2022    Impression:   MSSA bacteremia: not usual oral trixie, I think the port is more likely source. Site looks bruised but not being used regularly and there appears to have been some concern over last several months about the site integrity. Is not using port regularly now so agree with removal.  No other sites on exam or ROS suggesting other source or seeded sites. Plan:   Continue Ancef  Repeat blood cx as I cannot find negative blood cx at [facility] in [facility] [place]  IR consult to remove port; please send tip for cx  Repeat blood cx again 12-24 hours post line removal.   If this all continues to look like line infection and clears with removal would anticipate 2 weeks IV ABX at home with home health. No PICC line till port out and blood clear  ID following    Anti-infectives: Ancef 12/24-  Unasyn 12/23  Vanc 12/23    Subjective:   Date of Consultation:  December 25, 2022  Date of Admission: 12/24/2022   Referring Provider: Marty Ocampo  Reason for Consult: MSSA bacteremia    Patient is a 45 y.o. female with a history of chronic pancreatitis and Tyep 2 DM with gastroparesis. She has a port for TPN (placed 2018)who was admitted on 12/24/2022 after being transferred from [facility] at [facility] for MSSA bacteremia for an ID consult. Had + blood cx there on 12/22, repeats 12/23 NGTD at transfer although I find no such cultures in Care Everywhere. She had no cultures done here. TTE at [facility] does not show vegetation or significant valve dysfunction. Had dental surgery/extractions and implants placed on 12/7/22 and required penrose. Treated with oral ABX. She has port in L chest that is not really used regularly now. It is not hurting or hot but looks somewhat bruised. She notes that within the last several months there has been some concern that the skin had a break in it here.      Patient Active Problem List Diagnosis    GERD with stricture    Type 2 diabetes mellitus with hyperglycemia, with long-term current use of insulin (HCC)    Sphincter of Oddi dysfunction    Iron deficiency anemia    S/P exploratory laparotomy    Chronic pancreatitis (HCC)    Hypertension    Class 1 obesity with serious comorbidity and body mass index (BMI) of 31.0 to 31.9 in adult    Hypokalemia due to excessive gastrointestinal loss of potassium    Microcytic anemia    S/P balloon dilatation of esophageal stricture    Infection of PEG site ([address]    Gastroparesis due to DM ([address]    MSSA bacteremia    Anemia    Pulmonary hypertension ([address]     Past Medical History:   Diagnosis Date    Anemia     blood transfusion 2013 X1 d/t \"perforated bowel\"-- Fe infusions 12/2017--- denies any current iron infusions 12/17/2019    Anxiety and depression     managed with meds    C. difficile diarrhea 2013    in 5327 after complicated ERCP    Cervical dysplasia 1990s    Chronic insomnia     ambien     Chronic nausea     Chronic pain     all over     Chronic pancreatitis ([address]     COVID-19 vaccine series completed 04/21/2021    3/29/2021 Pfizer     Dehydration     IVFs through port as needed for this     Encounter for insertion of venous access port     Left chest port    Esophageal stricture 2017    Fibromyalgia     managed with meds    Former cigarette smoker     GERD (gastroesophageal reflux disease)     controlled with med    History of blood transfusion     History of kidney stones 03/2021    X1-naturally pass    HLD (hyperlipidemia)     pt denies     Hypertension     IBS (irritable bowel syndrome)     Migraine headache     does not have often but did have one recently; just takes tylenol    Nausea & vomiting     pt reports she does better with phenergan than zofran - causes HA    Nonalcoholic fatty liver disease     Pancreatitis 06/23/2014    PUD (peptic ulcer disease) 06/2017    still having issues takes meds     Severe protein-calorie malnutrition ([address] 2013    Sinus tachycardia     per pt-- no tx needed    Sphincter of Oddi dysfunction     Type 2 diabetes mellitus ([facility]     type 2-- sqbs am avg 140-160-- Hypo symptoms at <100      Family History   Problem Relation Age of Onset    No Known Problems Sister     Coronary Art Dis Father 76    Stroke Father     Hypertension Father     Diabetes Father     Heart Disease Mother         cabg began in her 52's    Hypertension Mother     Diabetes Mother     Other Father       Social History     Tobacco Use    Smoking status: Former     Packs/day: 1.00     Types: Cigarettes     Quit date: 1993     Years since quittin.6    Smokeless tobacco: Never   Substance Use Topics    Alcohol use: No     Past Surgical History:   Procedure Laterality Date    CARPAL TUNNEL RELEASE Right     along with trigger finger    CHOLECYSTECTOMY, LAPAROSCOPIC      COLONOSCOPY      COLONOSCOPY N/A 2018    COLONOSCOPY performed by Evelina Nascimento MD at [facility] ENDOSCOPY    ERCP  3/5/2013    resulted in perforated duodenum    GASTROSTOMY TUBE PLACEMENT N/A 2022    EGD PEG TUBE PLACEMENT with j tube placement ROOM 207 performed by Tavares Marina MD at [facility] ([address] [place] [address])  1998    ovaries remain    IR DRAIN SKIN ABSCESS      IR PORT PLACEMENT EQUAL OR GREATER THAN 5 YEARS  2018    IR PORT PLACEMENT EQUAL OR GREATER THAN 5 YEARS 2018 [facility] SPE[facility]    LAPAROTOMY  3/5/2013    exploratory for duodenal perforation with ERCP    ORTHOPEDIC SURGERY      right finger surgery 2017    OTHER SURGICAL HISTORY Bilateral     thumb    OTHER SURGICAL HISTORY      Kidney stones 2021    DC ABDOMEN SURGERY PROC UNLISTED      explor lap    DC ABDOMEN SURGERY PROC UNLISTED      lap nissen    DC ABDOMEN SURGERY PROC UNLISTED  last one placed      Hx of pancreatic stent, multiple    TOTAL COLECTOMY      UPPER GASTROINTESTINAL ENDOSCOPY  2021         UPPER GASTROINTESTINAL ENDOSCOPY 2017    36 fr tony    UPPER GASTROINTESTINAL ENDOSCOPY  12/18/2019         UPPER GASTROINTESTINAL ENDOSCOPY N/A 6/8/2022    EGD ESOPHAGOGASTRODUODENOSCOPY DILATATION/ 34 performed by Cindy Silva MD at [place] [address] N/A 7/22/2022    ENDOSCOPIC ULTRASOUND/35 ROOM 207 performed by Cindy Silva MD at [place] [address] 7/22/2022    EGD BIOPSY With balloon dialtion performed by Cindy Silva MD at [place] [address] N/A 8/10/2022    EGD ESOPHAGOGASTRODUODENOSCOPY performed by Cindy Silva MD at [place] [address] N/A 11/23/2022    EGD ESOPHAGOGASTRODUODENOSCOPY DILATATION/34 performed by Cindy Silva MD at [address] [place]  4/25/13 at [facility]-- stent removed 6-27-13. Dr Alok Lopez  2014    port insertion, left chest wall      Prior to Admission medications    Medication Sig Start Date End Date Taking? Authorizing Provider   carvedilol (COREG) 6.25 MG tablet Take 6.25 mg by mouth 2 times daily (with meals)    Historical Provider, MD   ondansetron (ZOFRAN) 8 MG tablet Take 1 tablet by mouth 3 times daily as needed for Nausea or Vomiting 10/27/22   Celena Cueto MD   Hyoscyamine Sulfate SL (LEVSIN/SL) 0.125 MG SUBL Place 0.25 mg under the tongue 4 times daily as needed (abdominal pain or cramping) 10/12/22   Celena Cueto MD   tiZANidine (ZANAFLEX) 4 MG tablet Take 4 mg by mouth every 6 hours as needed    Historical Provider, MD   promethazine (PHENERGAN) 25 MG tablet Take 1 tablet by mouth every 8 hours as needed for Nausea 7/28/22   Mario Torres MD   pantoprazole (PROTONIX) 40 MG tablet Take 1 tablet by mouth in the morning and 1 tablet before bedtime.  7/28/22   Mario Torres MD   lisinopril (PRINIVIL;ZESTRIL) 10 MG tablet Take 1 tablet by mouth in the morning. 7/29/22   Mario Torres MD citalopram (CELEXA) 40 MG tablet Take 1 tablet by mouth daily 7/28/22 8/12/22  Yvonne Tirado MD   cloNIDine (CATAPRES) 0.3 MG/24HR PTWK Place 1 patch onto the skin once a week 8/4/22 8/10/22  Yvonne Tirado MD   blood glucose test strips [facility]) strip TEST BLOOD SUGAR FOUR TIMES DAILY 8/24/20   Historical Provider, MD   acetaminophen (TYLENOL) 500 MG tablet Take by mouth every 6 hours as needed    Historical Provider, MD   cyanocobalamin 1000 MCG/ML injection INJECT 1 VIAL INTRAMUSCULARLY FOR 4 WEEKS THEN MONTHLY 11/25/17   Historical Provider, MD   diclofenac sodium (VOLTAREN) 1 % GEL APPLY 1 GRAM TO AFFECTED AREA 4 TIMES A DAY AS NEEDED 10/2/18   Historical Provider, MD   diphenhydrAMINE (BENADRYL) 25 MG capsule Take 25 mg by mouth every 6 hours as needed    Historical Provider, MD   glucagon 1 MG injection Inject 1 mg into the muscle as needed 5/8/17   Historical Provider, MD   Hyoscyamine Sulfate SL 0.125 MG SUBL Place 0.125 mg under the tongue every 6 hours as needed    Historical Provider, MD   influenza quadrivalent split vaccine (FLUZONE;FLUARIX;FLULAVAL;AFLURIA) 0.5 ML injection Inject into the muscle 11/9/21   Historical Provider, MD   insulin aspart (NOVOLOG) 100 UNIT/ML injection pen 15 units plus sliding scale for blood sugar >151 10/7/14   Historical Provider, MD   Insulin Degludec 100 UNIT/ML SOPN Inject 58 Units into the skin at bedtime 10/27/21   Historical Provider, MD   lidocaine viscous hcl (XYLOCAINE) 2 % SOLN solution 10 mLs every 6 hours as needed 4/28/21   Historical Provider, MD   LORazepam (ATIVAN) 1 MG tablet Take 1 mg by mouth in the morning, at noon, and at bedtime.  10/7/14   Historical Provider, MD   naloxone 4 MG/0.1ML LIQD nasal spray 4 mg once as needed 2/16/22   Historical Provider, MD   ondansetron (ZOFRAN-ODT) 8 MG TBDP disintegrating tablet Place 8 mg under the tongue 3 times daily 4/19/16   Historical Provider, MD   polyethylene glycol (GLYCOLAX) 17 GM/SCOOP powder Take 17 g by mouth as needed    Historical Provider, MD   pregabalin (LYRICA) 100 MG capsule Take 100 mg by mouth 2 times daily. Historical Provider, MD   sodium chloride 0.9 % infusion Infuse intravenously as needed    Historical Provider, MD   zolpidem (AMBIEN) 10 MG tablet Take 10 mg by mouth. 22   Historical Provider, MD   gabapentin (NEURONTIN) 250 MG/5ML solution 300 mg by Enteral route 3 times daily. Patient not taking: No sig reported 10/7/14 7/28/22  Historical Provider, MD   insulin NPH (HUMULIN N;NOVOLIN N) 100 UNIT/ML injection pen Please inject 12 units into the skin with breakfast and 4 units with bedtime. (pen)  Patient not taking: No sig reported 10/7/14 7/28/22  Historical Provider, MD   sucralfate (CARAFATE) 1 GM/10ML suspension TAKE 10 ML BY MOUTH 4 TIMES A DAY EVERY DAY ON A EMPTY STOMACH 1 HR BEFORE MEALS AND AT BEDTIME 17  Historical Provider, MD     Allergies   Allergen Reactions    Adhesive Tape Itching, Other (See Comments) and Rash     blisters  tegaderm  Paper tape too      Metronidazole Diarrhea and Nausea And Vomiting    Atorvastatin Myalgia    Iodine Other (See Comments)     Sweaty and clammy    Statins Myalgia    Barium Iodide Nausea And Vomiting     Diaphoresis      Barium Sulfate Palpitations    Insulin Lispro Nausea And Vomiting    Insulins Nausea And Vomiting     Humalog only    Metformin Nausea And Vomiting     Stomach pain  Stomach pain  Stomach pain  Stomach pain            Review of Systems:  10 point Ros negative other than what mentioned in HPI. A comprehensive review of systems was negative. Objective:   Blood pressure 139/74, pulse 88, temperature 98.2 °F (36.8 °C), temperature source Oral, resp. rate 18, height 5' 2\" (1.575 m), weight 177 lb 3.2 oz (80.4 kg), SpO2 95 %.   Temp (24hrs), Av.3 °F (36.8 °C), Min:98.2 °F (36.8 °C), Max:98.4 °F (36.9 °C)       Lines: L subclavian port; not hot or red or swollen, scar over apex site appears healed, almost bruise like darker tissue color around port site    Exam:     General: NC/AT, alert and cooperative, looks stated age, in NAD, sitting up in bed,   present   HEENT: PERRL, non-icteric sclera, no splinter hemorrhages   Neck: supple, symmetric, no masses or lymphadenopathy, bandaid under chin were drain was; no local redness or induration    Cardiac:Nl S1/S2, no murmurs/rubs/gallops/clicks, no LE edema   Pulmonary:clear bilaterally shun/wheezes, good air movement    Abdomen: soft, NT, non-distended, BS normal   Skin:no rashes, lesions or wounds   MSK:no enlarged or swollen joints in limbs or sternoclavicular area   Extremities: no cords/clots/cyanosis, pulses palpable and symmetric    Psychiatric: mood and affect appropriate to situation       Data Review:   Recent Results (from the past 24 hour(s))   POCT Glucose    Collection Time: 12/24/22  7:30 PM   Result Value Ref Range    POC Glucose 136 (H) 65 - 100 mg/dL    Performed by: Nena    Basic Metabolic Panel w/ Reflex to MG    Collection Time: 12/25/22  6:28 AM   Result Value Ref Range    Sodium 138 133 - 143 mmol/L    Potassium 3.7 3.5 - 5.1 mmol/L    Chloride 108 101 - 110 mmol/L    CO2 24 21 - 32 mmol/L    Anion Gap 6 2 - 11 mmol/L    Glucose 168 (H) 65 - 100 mg/dL    BUN 5 (L) 6 - 23 MG/DL    Creatinine 0.70 0.6 - 1.0 MG/DL    Est, Glom Filt Rate >60 >60 ml/min/1.73m2    Calcium 8.6 8.3 - 10.4 MG/DL   CBC with Auto Differential    Collection Time: 12/25/22  6:28 AM   Result Value Ref Range    WBC 4.8 4.3 - 11.1 K/uL    RBC 4.16 4.05 - 5.2 M/uL    Hemoglobin 9.3 (L) 11.7 - 15.4 g/dL    Hematocrit 31.2 (L) 35.8 - 46.3 %    MCV 75.0 (L) 82 - 102 FL    MCH 22.4 (L) 26.1 - 32.9 PG    MCHC 29.8 (L) 31.4 - 35.0 g/dL    RDW 17.2 (H) 11.9 - 14.6 %    Platelets 440 747 - 992 K/uL    MPV 10.2 9.4 - 12.3 FL    nRBC 0.00 0.0 - 0.2 K/uL    Differential Type AUTOMATED      Seg Neutrophils 53 43 - 78 %    Lymphocytes 35 13 - 44 % Monocytes 9 4.0 - 12.0 %    Eosinophils % 3 0.5 - 7.8 %    Basophils 0 0.0 - 2.0 %    Immature Granulocytes 0 0.0 - 5.0 %    Segs Absolute 2.5 1.7 - 8.2 K/UL    Absolute Lymph # 1.7 0.5 - 4.6 K/UL    Absolute Mono # 0.4 0.1 - 1.3 K/UL    Absolute Eos # 0.1 0.0 - 0.8 K/UL    Basophils Absolute 0.0 0.0 - 0.2 K/UL    Absolute Immature Granulocyte 0.0 0.0 - 0.5 K/UL   Hemoglobin A1c    Collection Time: 12/25/22  6:28 AM   Result Value Ref Range    Hemoglobin A1C 7.2 (H) 4.8 - 5.6 %    eAG 160 mg/dL        Microbiology:  [unfilled]        Studies/Imaging:  personally reviewed   12/23/22 TTE: Summary      Normal chamber sizes. Normal left ventricular systolic function. EF 60%    Normal diastolic function. Normal right ventricular function. No significant valve stenosis or regurgitation. No cardiac valve vegetations are seen. Evidence of moderate pulmonary hypertension. Findings      Mitral Valve    The mitral valve is normal in structure and function. There is no mitral valve stenosis. Trace mitral regurgitation. There is no vegetation seen on the mitral valve. Aortic Valve    The aortic valve is trileaflet. The aortic valve opens well. The aortic valve is normal in structure and function. No significant stenosis noted. No aortic regurgitation is present. There is no vegetation seen on the aortic valve. Tricuspid Valve    The tricuspid valve is normal in structure and function. There is no tricuspid stenosis. Trace tricuspid regurgitation. Estimated right ventricular systolic pressure is 46 mmHg. There is no vegetation seen on the tricuspid valve. Pulmonic Valve    The pulmonic valve is not well visualized. Left Atrium    Normal left atrium. Left Ventricle    The Left Ventricle is normal in size. There is no LV Thrombus seen. Normal left ventricular systolic function.  EF 60%    The Left Ventricular wall motion is normal.    Normal Diastolic function.      Signed By: Leona Borrego MD     December 25, 2022

## 2022-12-26 LAB
GLUCOSE BLD STRIP.AUTO-MCNC: 152 MG/DL (ref 65–100)
GLUCOSE BLD STRIP.AUTO-MCNC: 176 MG/DL (ref 65–100)
GLUCOSE BLD STRIP.AUTO-MCNC: 177 MG/DL (ref 65–100)
GLUCOSE BLD STRIP.AUTO-MCNC: 221 MG/DL (ref 65–100)
SERVICE CMNT-IMP: ABNORMAL

## 2022-12-26 PROCEDURE — 6360000002 HC RX W HCPCS: Performed by: INTERNAL MEDICINE

## 2022-12-26 PROCEDURE — 6360000002 HC RX W HCPCS: Performed by: HOSPITALIST

## 2022-12-26 PROCEDURE — 1100000000 HC RM PRIVATE

## 2022-12-26 PROCEDURE — 2580000003 HC RX 258: Performed by: INTERNAL MEDICINE

## 2022-12-26 PROCEDURE — 6370000000 HC RX 637 (ALT 250 FOR IP): Performed by: HOSPITALIST

## 2022-12-26 PROCEDURE — 6370000000 HC RX 637 (ALT 250 FOR IP): Performed by: INTERNAL MEDICINE

## 2022-12-26 PROCEDURE — 82962 GLUCOSE BLOOD TEST: CPT

## 2022-12-26 RX ORDER — L. ACIDOPHILUS/L.BULGARICUS 1MM CELL
4 TABLET ORAL 3 TIMES DAILY
Status: DISCONTINUED | OUTPATIENT
Start: 2022-12-26 | End: 2022-12-30 | Stop reason: HOSPADM

## 2022-12-26 RX ADMIN — HYDROMORPHONE HYDROCHLORIDE 0.5 MG: 1 INJECTION, SOLUTION INTRAMUSCULAR; INTRAVENOUS; SUBCUTANEOUS at 20:23

## 2022-12-26 RX ADMIN — LACTOBACILLUS TAB 4 TABLET: TAB at 20:23

## 2022-12-26 RX ADMIN — HYDROMORPHONE HYDROCHLORIDE 0.5 MG: 1 INJECTION, SOLUTION INTRAMUSCULAR; INTRAVENOUS; SUBCUTANEOUS at 00:37

## 2022-12-26 RX ADMIN — CEFAZOLIN SODIUM 2000 MG: 100 INJECTION, POWDER, LYOPHILIZED, FOR SOLUTION INTRAVENOUS at 00:29

## 2022-12-26 RX ADMIN — PANTOPRAZOLE SODIUM 40 MG: 40 TABLET, DELAYED RELEASE ORAL at 04:34

## 2022-12-26 RX ADMIN — DIPHENHYDRAMINE HYDROCHLORIDE 25 MG: 25 CAPSULE ORAL at 22:26

## 2022-12-26 RX ADMIN — DIPHENHYDRAMINE HYDROCHLORIDE 25 MG: 25 CAPSULE ORAL at 16:35

## 2022-12-26 RX ADMIN — ENOXAPARIN SODIUM 40 MG: 100 INJECTION SUBCUTANEOUS at 08:25

## 2022-12-26 RX ADMIN — DIPHENHYDRAMINE HYDROCHLORIDE 25 MG: 25 CAPSULE ORAL at 08:35

## 2022-12-26 RX ADMIN — PANTOPRAZOLE SODIUM 40 MG: 40 TABLET, DELAYED RELEASE ORAL at 15:06

## 2022-12-26 RX ADMIN — CARVEDILOL 6.25 MG: 6.25 TABLET, FILM COATED ORAL at 08:25

## 2022-12-26 RX ADMIN — ACETAMINOPHEN 650 MG: 325 TABLET ORAL at 15:08

## 2022-12-26 RX ADMIN — SODIUM CHLORIDE, PRESERVATIVE FREE 10 ML: 5 INJECTION INTRAVENOUS at 20:28

## 2022-12-26 RX ADMIN — LISINOPRIL 10 MG: 5 TABLET ORAL at 08:25

## 2022-12-26 RX ADMIN — ONDANSETRON 4 MG: 4 TABLET, ORALLY DISINTEGRATING ORAL at 10:14

## 2022-12-26 RX ADMIN — DIPHENHYDRAMINE HYDROCHLORIDE 25 MG: 25 CAPSULE ORAL at 00:37

## 2022-12-26 RX ADMIN — CEFAZOLIN SODIUM 2000 MG: 100 INJECTION, POWDER, LYOPHILIZED, FOR SOLUTION INTRAVENOUS at 16:32

## 2022-12-26 RX ADMIN — LACTOBACILLUS TAB 4 TABLET: TAB at 10:14

## 2022-12-26 RX ADMIN — SODIUM CHLORIDE, PRESERVATIVE FREE 10 ML: 5 INJECTION INTRAVENOUS at 08:40

## 2022-12-26 RX ADMIN — HYDROMORPHONE HYDROCHLORIDE 0.5 MG: 1 INJECTION, SOLUTION INTRAMUSCULAR; INTRAVENOUS; SUBCUTANEOUS at 08:33

## 2022-12-26 RX ADMIN — ZOLPIDEM TARTRATE 10 MG: 5 TABLET ORAL at 22:26

## 2022-12-26 RX ADMIN — CEFAZOLIN SODIUM 2000 MG: 100 INJECTION, POWDER, LYOPHILIZED, FOR SOLUTION INTRAVENOUS at 08:33

## 2022-12-26 RX ADMIN — CARVEDILOL 6.25 MG: 6.25 TABLET, FILM COATED ORAL at 16:31

## 2022-12-26 RX ADMIN — HYDROMORPHONE HYDROCHLORIDE 0.5 MG: 1 INJECTION, SOLUTION INTRAMUSCULAR; INTRAVENOUS; SUBCUTANEOUS at 16:32

## 2022-12-26 RX ADMIN — HYDROMORPHONE HYDROCHLORIDE 0.5 MG: 1 INJECTION, SOLUTION INTRAMUSCULAR; INTRAVENOUS; SUBCUTANEOUS at 12:35

## 2022-12-26 RX ADMIN — LACTOBACILLUS TAB 4 TABLET: TAB at 15:06

## 2022-12-26 RX ADMIN — HYDROMORPHONE HYDROCHLORIDE 0.5 MG: 1 INJECTION, SOLUTION INTRAMUSCULAR; INTRAVENOUS; SUBCUTANEOUS at 04:33

## 2022-12-26 ASSESSMENT — PAIN SCALES - GENERAL
PAINLEVEL_OUTOF10: 2
PAINLEVEL_OUTOF10: 7
PAINLEVEL_OUTOF10: 4
PAINLEVEL_OUTOF10: 3
PAINLEVEL_OUTOF10: 0
PAINLEVEL_OUTOF10: 7
PAINLEVEL_OUTOF10: 7
PAINLEVEL_OUTOF10: 2
PAINLEVEL_OUTOF10: 7

## 2022-12-26 ASSESSMENT — PAIN DESCRIPTION - DESCRIPTORS
DESCRIPTORS: ACHING
DESCRIPTORS: DISCOMFORT
DESCRIPTORS: ACHING

## 2022-12-26 ASSESSMENT — PAIN - FUNCTIONAL ASSESSMENT
PAIN_FUNCTIONAL_ASSESSMENT: ACTIVITIES ARE NOT PREVENTED

## 2022-12-26 ASSESSMENT — PAIN DESCRIPTION - PAIN TYPE: TYPE: ACUTE PAIN;SURGICAL PAIN

## 2022-12-26 ASSESSMENT — PAIN DESCRIPTION - LOCATION
LOCATION: MOUTH
LOCATION: MOUTH
LOCATION: ABDOMEN
LOCATION: MOUTH

## 2022-12-26 ASSESSMENT — PAIN DESCRIPTION - FREQUENCY: FREQUENCY: INTERMITTENT

## 2022-12-26 ASSESSMENT — PAIN DESCRIPTION - ORIENTATION
ORIENTATION: MID
ORIENTATION: LEFT;RIGHT;MID
ORIENTATION: MID
ORIENTATION: MID

## 2022-12-26 ASSESSMENT — PAIN DESCRIPTION - ONSET: ONSET: ON-GOING

## 2022-12-26 NOTE — PROGRESS NOTES
Hospitalist Progress Note   Admit Date:  2022  5:57 PM   Name:  Yehuda Gtz   Age:  45 y.o. Sex:  female  :  1970   MRN:  [id number]   Room:  [facility]/    Presenting Complaint: positive blood cultures  Reason(s) for Admission: MSSA bacteremia [R78.81, B95.61]     Hospital Course & Interval History:   Patient is a 78-year-old female with history of chronic pancreatitis, DM2, HTN, anxiety, gastroparesis status post recent port exchange on  admitted on  from [facility] for MSSA bacteremia and ID consult. Patient had a recent oral surgery for dental extraction on 2022 with Penrose drain placement. Patient was on Augmentin and Amoxil since the procedure. Patient was found to have positive blood culture with MSSA on 2022, repeat blood cultures from  negative to date. Patient was given IV vancomycin and Unasyn on  and started on Ancef on 2022. Penrose drain since removed, status post CT neck without any evidence of abscess. Echo was unremarkable for any evidence of infective endocarditis, showed moderate pulmonary hypertension. Subjective/24hr Events (22): Patient seen at bedside, resting in bed comfortably. She is alert and awake, denies any chest pain, palpitations, nausea vomiting, night sweats. Patient did report of mild pain at the port site in past 24 to 36 hours however currently denies active pain. No overnight events. ROS:  10 point review of system is negative except for what mentioned above. Assessment & Plan:     Principal Problem:    MSSA bacteremia  -  Repeat blood cultures from  negative so far. Continue Ancef -  Adding probiotics to prevent antibiotic associated diarrhea. IR consulted for port removal with catheter tip to be sent for culture. Attempted calling IR extension , no one is answering. ID on board, appreciate their help.           Dental infection:  Followed by oral surgery   Penrose drain since removed         Active Problems:    Class 1 obesity with serious comorbidity and body mass index (BMI) of 31.0 to 31.9 in adult  Plan:   Noted, increases all cause mortality morbidity. Patient encouraged to lose weight. Chronic pancreatitis (HCC)    Gastroparesis due to DM [facility])  Plan:   Diet as tolerant   Patient had NG tube earlier this year, which was removed in August.         Anemia  HGB 9.3- stable       Pulmonary hypertension ([address]  Plan:   Patient is currently on room air, does not appear to be volume overloaded. Type 2 diabetes mellitus with hyperglycemia, with long-term current use of insulin (HCC)  Plan:   Monitor POC glucose QA CHS  Continue sliding scale insulin for now. Hypertension  12/26-  Blood pressure is optimally controlled. Continue Coreg 6.25 mg p.o. twice daily along with lisinopril 10 mg p.o. daily. Discharge Planning:    Pending clinical course. Diet:  ADULT DIET; Easy to Chew; 3 carb choices (45 gm/meal)  DVT PPx: lovenox  Code status: Full Code    I spent 36 minutes of time caring for this patient on the unit nearby or at bedside, and more than 50 percent was spent on coordination of care activities, and/or patient/family counseling regarding status and plan of care. Hospital Problems             Last Modified POA    * (Principal) MSSA bacteremia 12/24/2022 Yes    Class 1 obesity with serious comorbidity and body mass index (BMI) of 31.0 to 31.9 in adult (Chronic) 12/24/2022 Yes    Gastroparesis due to DM ([address] (Chronic) 12/24/2022 Yes    Anemia (Chronic) 12/24/2022 Yes    Pulmonary hypertension ([address] (Chronic) 12/24/2022 Yes    Type 2 diabetes mellitus with hyperglycemia, with long-term current use of insulin ([address] 12/24/2022 Yes    Overview Signed 7/19/2022  2:48 PM by Radha Bowers DO     Followed by endocrinology OP. Recommends avoidance of GLP-1RA agonist d/t h/o chronic pancreatitis.             Chronic pancreatitis ([facility] 12/24/2022 Yes    Overview Signed 7/19/2022  2:52 PM by Dolores Mcdaniels, DO       EUS 3/16/22 with esophogeal stricture s/p dilation, gastrojejunal anastomosis, mild pancreatic parenchymal changes without convincing evidence of chronic pancreatitis and fatty infiltration of the liver. No longer on pancreatic enzyme replacement. Hypertension 12/24/2022 Yes     Objective:   Patient Vitals for the past 24 hrs:   Temp Pulse Resp BP SpO2   12/26/22 0630 98.4 °F (36.9 °C) 85 17 112/66 93 %   12/26/22 0258 98.1 °F (36.7 °C) 90 17 122/83 96 %   12/25/22 2313 98.6 °F (37 °C) 95 17 93/80 96 %   12/25/22 1930 98.6 °F (37 °C) 83 18 136/71 97 %   12/25/22 1501 98.8 °F (37.1 °C) 90 18 (!) 146/77 95 %   12/25/22 1201 98.4 °F (36.9 °C) 92 18 136/79 94 %   12/25/22 0808 98.2 °F (36.8 °C) 96 18 118/70 95 %         Estimated body mass index is 32.41 kg/m² as calculated from the following:    Height as of this encounter: 5' 2\" (1.575 m). Weight as of this encounter: 177 lb 3.2 oz (80.4 kg). Intake/Output Summary (Last 24 hours) at 12/26/2022 0735  Last data filed at 12/26/2022 0258  Gross per 24 hour   Intake 720 ml   Output 1050 ml   Net -330 ml           Physical Exam:     Blood pressure 112/66, pulse 85, temperature 98.4 °F (36.9 °C), temperature source Oral, resp. rate 17, height 5' 2\" (1.575 m), weight 177 lb 3.2 oz (80.4 kg), SpO2 93 %. General:    Alert, awake, NAD, on room air  Head:  Normocephalic, atraumatic  Eyes:  Sclerae appear normal. Pupils equally round. ENT:  Nares appear normal, no drainage. Moist oral mucosa  Neck:  No restricted ROM. Trachea midline. CV:   RRR. No m/r/g. No jugular venous distension. Lungs:   CTAB. No wheezing, rhonchi, or rales. Respirations even, unlabored. Abdomen: Bowel sounds present. Soft, nontender, nondistended. Extremities: No cyanosis or clubbing. No edema. Skin:     No rashes and normal coloration. Warm and dry.   Port site with slight dark center nonerythematous, nontender. Neuro:  CN II-XII grossly intact. Sensation intact. A&Ox3  Psych:  Normal mood and affect.       I have reviewed ordered lab tests and independently visualized imaging below:    Recent Labs:  Recent Results (from the past 48 hour(s))   POCT Glucose    Collection Time: 12/24/22  7:30 PM   Result Value Ref Range    POC Glucose 136 (H) 65 - 100 mg/dL    Performed by: Nena    Basic Metabolic Panel w/ Reflex to MG    Collection Time: 12/25/22  6:28 AM   Result Value Ref Range    Sodium 138 133 - 143 mmol/L    Potassium 3.7 3.5 - 5.1 mmol/L    Chloride 108 101 - 110 mmol/L    CO2 24 21 - 32 mmol/L    Anion Gap 6 2 - 11 mmol/L    Glucose 168 (H) 65 - 100 mg/dL    BUN 5 (L) 6 - 23 MG/DL    Creatinine 0.70 0.6 - 1.0 MG/DL    Est, Glom Filt Rate >60 >60 ml/min/1.73m2    Calcium 8.6 8.3 - 10.4 MG/DL   CBC with Auto Differential    Collection Time: 12/25/22  6:28 AM   Result Value Ref Range    WBC 4.8 4.3 - 11.1 K/uL    RBC 4.16 4.05 - 5.2 M/uL    Hemoglobin 9.3 (L) 11.7 - 15.4 g/dL    Hematocrit 31.2 (L) 35.8 - 46.3 %    MCV 75.0 (L) 82 - 102 FL    MCH 22.4 (L) 26.1 - 32.9 PG    MCHC 29.8 (L) 31.4 - 35.0 g/dL    RDW 17.2 (H) 11.9 - 14.6 %    Platelets 949 124 - 249 K/uL    MPV 10.2 9.4 - 12.3 FL    nRBC 0.00 0.0 - 0.2 K/uL    Differential Type AUTOMATED      Seg Neutrophils 53 43 - 78 %    Lymphocytes 35 13 - 44 %    Monocytes 9 4.0 - 12.0 %    Eosinophils % 3 0.5 - 7.8 %    Basophils 0 0.0 - 2.0 %    Immature Granulocytes 0 0.0 - 5.0 %    Segs Absolute 2.5 1.7 - 8.2 K/UL    Absolute Lymph # 1.7 0.5 - 4.6 K/UL    Absolute Mono # 0.4 0.1 - 1.3 K/UL    Absolute Eos # 0.1 0.0 - 0.8 K/UL    Basophils Absolute 0.0 0.0 - 0.2 K/UL    Absolute Immature Granulocyte 0.0 0.0 - 0.5 K/UL   Hemoglobin A1c    Collection Time: 12/25/22  6:28 AM   Result Value Ref Range    Hemoglobin A1C 7.2 (H) 4.8 - 5.6 %    eAG 160 mg/dL   POCT Glucose    Collection Time: 12/25/22  8:50 AM   Result Value Ref Range    POC Glucose 162 (H) 65 - 100 mg/dL    Performed by: [employer]    Culture, Blood 1    Collection Time: 12/25/22  9:10 AM    Specimen: Blood   Result Value Ref Range    Special Requests RIGHT  HAND        Culture NO GROWTH AFTER 20 HOURS     Culture, Blood 1    Collection Time: 12/25/22  9:10 AM    Specimen: Blood   Result Value Ref Range    Special Requests LEFT  Antecubital        Culture NO GROWTH AFTER 20 HOURS     POCT Glucose    Collection Time: 12/25/22 12:02 PM   Result Value Ref Range    POC Glucose 194 (H) 65 - 100 mg/dL    Performed by: [employer]    POCT Glucose    Collection Time: 12/25/22  4:25 PM   Result Value Ref Range    POC Glucose 165 (H) 65 - 100 mg/dL    Performed by: [employer]    POCT Glucose    Collection Time: 12/25/22  8:46 PM   Result Value Ref Range    POC Glucose 167 (H) 65 - 100 mg/dL    Performed by: Filiberto    POCT Glucose    Collection Time: 12/26/22  7:19 AM   Result Value Ref Range    POC Glucose 152 (H) 65 - 100 mg/dL    Performed by: Annabel          Other Studies:  No results found.     Current Meds:  Current Facility-Administered Medications   Medication Dose Route Frequency    zolpidem (AMBIEN) tablet 10 mg  10 mg Oral Nightly PRN    enoxaparin (LOVENOX) injection 40 mg  40 mg SubCUTAneous Daily    sodium chloride flush 0.9 % injection 5-40 mL  5-40 mL IntraVENous 2 times per day    sodium chloride flush 0.9 % injection 5-40 mL  5-40 mL IntraVENous PRN    0.9 % sodium chloride infusion   IntraVENous PRN    ondansetron (ZOFRAN-ODT) disintegrating tablet 4 mg  4 mg Oral Q8H PRN    Or    ondansetron (ZOFRAN) injection 4 mg  4 mg IntraVENous Q6H PRN    polyethylene glycol (GLYCOLAX) packet 17 g  17 g Oral Daily PRN    acetaminophen (TYLENOL) tablet 650 mg  650 mg Oral Q6H PRN    Or    acetaminophen (TYLENOL) suppository 650 mg  650 mg Rectal Q6H PRN    glucose chewable tablet 16 g  4 tablet Oral PRN    dextrose bolus 10% 125 mL  125 mL IntraVENous PRN    Or    dextrose bolus 10% 250 mL  250 mL IntraVENous PRN    glucagon (rDNA) injection 1 mg  1 mg SubCUTAneous PRN    dextrose 10 % infusion   IntraVENous Continuous PRN    insulin lispro (HUMALOG) injection vial 0-4 Units  0-4 Units SubCUTAneous TID WC    insulin lispro (HUMALOG) injection vial 0-4 Units  0-4 Units SubCUTAneous Nightly    ceFAZolin (ANCEF) 2000 mg in sterile water 20 mL IV syringe  2,000 mg IntraVENous Q8H    HYDROmorphone HCl PF (DILAUDID) injection 0.5 mg  0.5 mg IntraVENous Q4H PRN    diphenhydrAMINE (BENADRYL) capsule 25 mg  25 mg Oral Q8H PRN    LORazepam (ATIVAN) tablet 1 mg  1 mg Oral Q6H PRN    carvedilol (COREG) tablet 6.25 mg  6.25 mg Oral BID WC    lisinopril (PRINIVIL;ZESTRIL) tablet 10 mg  10 mg Oral Daily    pantoprazole (PROTONIX) tablet 40 mg  40 mg Oral BID AC       Signed:  Birdena Merlin, MD    Part of this note may have been written by using a voice dictation software. The note has been proof read but may still contain some grammatical/other typographical errors.

## 2022-12-27 ENCOUNTER — APPOINTMENT (OUTPATIENT)
Dept: INTERVENTIONAL RADIOLOGY/VASCULAR | Age: 52
DRG: 315 | End: 2022-12-27
Attending: FAMILY MEDICINE
Payer: MEDICARE

## 2022-12-27 LAB
GLUCOSE BLD STRIP.AUTO-MCNC: 181 MG/DL (ref 65–100)
GLUCOSE BLD STRIP.AUTO-MCNC: 186 MG/DL (ref 65–100)
GLUCOSE BLD STRIP.AUTO-MCNC: 204 MG/DL (ref 65–100)
GLUCOSE BLD STRIP.AUTO-MCNC: 218 MG/DL (ref 65–100)
SERVICE CMNT-IMP: ABNORMAL

## 2022-12-27 PROCEDURE — 6370000000 HC RX 637 (ALT 250 FOR IP): Performed by: HOSPITALIST

## 2022-12-27 PROCEDURE — 6360000002 HC RX W HCPCS: Performed by: INTERNAL MEDICINE

## 2022-12-27 PROCEDURE — 0JPT0WZ REMOVAL OF TOTALLY IMPLANTABLE VASCULAR ACCESS DEVICE FROM TRUNK SUBCUTANEOUS TISSUE AND FASCIA, OPEN APPROACH: ICD-10-PCS | Performed by: RADIOLOGY

## 2022-12-27 PROCEDURE — 2580000003 HC RX 258: Performed by: INTERNAL MEDICINE

## 2022-12-27 PROCEDURE — 82962 GLUCOSE BLOOD TEST: CPT

## 2022-12-27 PROCEDURE — 6370000000 HC RX 637 (ALT 250 FOR IP): Performed by: INTERNAL MEDICINE

## 2022-12-27 PROCEDURE — 2580000003 HC RX 258: Performed by: PHYSICIAN ASSISTANT

## 2022-12-27 PROCEDURE — 2709999900 IR REMOVE TUNNELED CVAD W SQ PORT/PUMP INSERT

## 2022-12-27 PROCEDURE — 6360000002 HC RX W HCPCS: Performed by: PHYSICIAN ASSISTANT

## 2022-12-27 PROCEDURE — 1100000000 HC RM PRIVATE

## 2022-12-27 PROCEDURE — 2500000003 HC RX 250 WO HCPCS: Performed by: PHYSICIAN ASSISTANT

## 2022-12-27 PROCEDURE — 87070 CULTURE OTHR SPECIMN AEROBIC: CPT

## 2022-12-27 RX ORDER — LIDOCAINE HYDROCHLORIDE AND EPINEPHRINE BITARTRATE 20; .01 MG/ML; MG/ML
INJECTION, SOLUTION SUBCUTANEOUS
Status: COMPLETED | OUTPATIENT
Start: 2022-12-27 | End: 2022-12-27

## 2022-12-27 RX ORDER — SODIUM CHLORIDE 9 MG/ML
INJECTION, SOLUTION INTRAVENOUS CONTINUOUS PRN
Status: COMPLETED | OUTPATIENT
Start: 2022-12-27 | End: 2022-12-27

## 2022-12-27 RX ORDER — MIDAZOLAM HYDROCHLORIDE 1 MG/ML
INJECTION INTRAMUSCULAR; INTRAVENOUS
Status: COMPLETED | OUTPATIENT
Start: 2022-12-27 | End: 2022-12-27

## 2022-12-27 RX ADMIN — HYDROMORPHONE HYDROCHLORIDE 0.5 MG: 1 INJECTION, SOLUTION INTRAMUSCULAR; INTRAVENOUS; SUBCUTANEOUS at 17:31

## 2022-12-27 RX ADMIN — CEFAZOLIN SODIUM 2000 MG: 100 INJECTION, POWDER, LYOPHILIZED, FOR SOLUTION INTRAVENOUS at 07:38

## 2022-12-27 RX ADMIN — LIDOCAINE HYDROCHLORIDE AND EPINEPHRINE 5 ML: 20; 10 INJECTION, SOLUTION INFILTRATION; PERINEURAL at 09:57

## 2022-12-27 RX ADMIN — PANTOPRAZOLE SODIUM 40 MG: 40 TABLET, DELAYED RELEASE ORAL at 15:24

## 2022-12-27 RX ADMIN — LACTOBACILLUS TAB 4 TABLET: TAB at 13:14

## 2022-12-27 RX ADMIN — DIPHENHYDRAMINE HYDROCHLORIDE 25 MG: 25 CAPSULE ORAL at 13:14

## 2022-12-27 RX ADMIN — MIDAZOLAM 2 MG: 1 INJECTION INTRAMUSCULAR; INTRAVENOUS at 09:53

## 2022-12-27 RX ADMIN — HYDROMORPHONE HYDROCHLORIDE 0.5 MG: 1 INJECTION, SOLUTION INTRAMUSCULAR; INTRAVENOUS; SUBCUTANEOUS at 05:14

## 2022-12-27 RX ADMIN — SODIUM CHLORIDE, PRESERVATIVE FREE 10 ML: 5 INJECTION INTRAVENOUS at 08:13

## 2022-12-27 RX ADMIN — CARVEDILOL 6.25 MG: 6.25 TABLET, FILM COATED ORAL at 08:06

## 2022-12-27 RX ADMIN — HYDROMORPHONE HYDROCHLORIDE 0.5 MG: 1 INJECTION, SOLUTION INTRAMUSCULAR; INTRAVENOUS; SUBCUTANEOUS at 00:10

## 2022-12-27 RX ADMIN — SODIUM CHLORIDE, PRESERVATIVE FREE 10 ML: 5 INJECTION INTRAVENOUS at 20:10

## 2022-12-27 RX ADMIN — INSULIN LISPRO 1 UNITS: 100 INJECTION, SOLUTION INTRAVENOUS; SUBCUTANEOUS at 16:36

## 2022-12-27 RX ADMIN — CEFAZOLIN SODIUM 2000 MG: 100 INJECTION, POWDER, LYOPHILIZED, FOR SOLUTION INTRAVENOUS at 00:11

## 2022-12-27 RX ADMIN — LACTOBACILLUS TAB 4 TABLET: TAB at 08:06

## 2022-12-27 RX ADMIN — LACTOBACILLUS TAB 4 TABLET: TAB at 20:09

## 2022-12-27 RX ADMIN — HYDROMORPHONE HYDROCHLORIDE 0.5 MG: 1 INJECTION, SOLUTION INTRAMUSCULAR; INTRAVENOUS; SUBCUTANEOUS at 09:16

## 2022-12-27 RX ADMIN — HYDROMORPHONE HYDROCHLORIDE 0.5 MG: 1 INJECTION, SOLUTION INTRAMUSCULAR; INTRAVENOUS; SUBCUTANEOUS at 21:48

## 2022-12-27 RX ADMIN — ONDANSETRON 4 MG: 2 INJECTION INTRAMUSCULAR; INTRAVENOUS at 08:06

## 2022-12-27 RX ADMIN — LISINOPRIL 10 MG: 5 TABLET ORAL at 08:06

## 2022-12-27 RX ADMIN — SODIUM CHLORIDE 500 ML: 900 INJECTION, SOLUTION INTRAVENOUS at 09:53

## 2022-12-27 RX ADMIN — ZOLPIDEM TARTRATE 10 MG: 5 TABLET ORAL at 21:50

## 2022-12-27 RX ADMIN — ACETAMINOPHEN 650 MG: 325 TABLET ORAL at 20:09

## 2022-12-27 RX ADMIN — CEFAZOLIN SODIUM 2000 MG: 100 INJECTION, POWDER, LYOPHILIZED, FOR SOLUTION INTRAVENOUS at 15:24

## 2022-12-27 RX ADMIN — ONDANSETRON 4 MG: 2 INJECTION INTRAMUSCULAR; INTRAVENOUS at 00:10

## 2022-12-27 RX ADMIN — CARVEDILOL 6.25 MG: 6.25 TABLET, FILM COATED ORAL at 16:36

## 2022-12-27 RX ADMIN — HYDROMORPHONE HYDROCHLORIDE 0.5 MG: 1 INJECTION, SOLUTION INTRAMUSCULAR; INTRAVENOUS; SUBCUTANEOUS at 13:14

## 2022-12-27 ASSESSMENT — PAIN DESCRIPTION - ONSET
ONSET: GRADUAL
ONSET: ON-GOING
ONSET: ON-GOING

## 2022-12-27 ASSESSMENT — PAIN DESCRIPTION - ORIENTATION
ORIENTATION: INNER
ORIENTATION: MID
ORIENTATION: MID
ORIENTATION: INNER
ORIENTATION: INNER
ORIENTATION: MID

## 2022-12-27 ASSESSMENT — PAIN DESCRIPTION - PAIN TYPE
TYPE: ACUTE PAIN;SURGICAL PAIN
TYPE: SURGICAL PAIN
TYPE: ACUTE PAIN;SURGICAL PAIN

## 2022-12-27 ASSESSMENT — PAIN SCALES - GENERAL
PAINLEVEL_OUTOF10: 9
PAINLEVEL_OUTOF10: 2
PAINLEVEL_OUTOF10: 8
PAINLEVEL_OUTOF10: 5
PAINLEVEL_OUTOF10: 4
PAINLEVEL_OUTOF10: 9
PAINLEVEL_OUTOF10: 8
PAINLEVEL_OUTOF10: 6
PAINLEVEL_OUTOF10: 3

## 2022-12-27 ASSESSMENT — PAIN - FUNCTIONAL ASSESSMENT
PAIN_FUNCTIONAL_ASSESSMENT: ACTIVITIES ARE NOT PREVENTED
PAIN_FUNCTIONAL_ASSESSMENT: ACTIVITIES ARE NOT PREVENTED
PAIN_FUNCTIONAL_ASSESSMENT: 0-10
PAIN_FUNCTIONAL_ASSESSMENT: ACTIVITIES ARE NOT PREVENTED

## 2022-12-27 ASSESSMENT — PAIN DESCRIPTION - DESCRIPTORS
DESCRIPTORS: SORE
DESCRIPTORS: SORE
DESCRIPTORS: ACHING;SORE
DESCRIPTORS: ACHING;BURNING
DESCRIPTORS: ACHING
DESCRIPTORS: ACHING

## 2022-12-27 ASSESSMENT — PAIN DESCRIPTION - FREQUENCY
FREQUENCY: CONTINUOUS

## 2022-12-27 ASSESSMENT — PAIN DESCRIPTION - LOCATION
LOCATION: MOUTH
LOCATION: GENERALIZED

## 2022-12-27 NOTE — PRE SEDATION
Sedation Pre-Procedure Note    Patient Name: Peri Javier   YOB: 1970  Room/Bed: [facility]  Medical Record Number: [id number]  Date: 12/27/2022   Time: 9:54 AM       Indication:  MSSA bacteremia    Consent: I have discussed with the patient and/or the patient representative the indication, alternatives, and the possible risks and/or complications of the planned procedure and the anesthesia methods. The patient and/or patient representative appear to understand and agree to proceed.    Vital Signs:   Vitals:    12/27/22 0933   BP: 139/76   Pulse: 85   Resp: 16   Temp: 98.2 °F (36.8 °C)   SpO2: 97%       Past Medical History:   has a past medical history of Anemia, Anxiety and depression, C. difficile diarrhea, Cervical dysplasia, Chronic insomnia, Chronic nausea, Chronic pain, Chronic pancreatitis (HCC), COVID-19 vaccine series completed, Dehydration, Encounter for insertion of venous access port, Esophageal stricture, Fibromyalgia, Former cigarette smoker, GERD (gastroesophageal reflux disease), History of blood transfusion, History of kidney stones, HLD (hyperlipidemia), Hypertension, IBS (irritable bowel syndrome), Migraine headache, Nausea & vomiting, Nonalcoholic fatty liver disease, Pancreatitis, PUD (peptic ulcer disease), Severe protein-calorie malnutrition (HCC), Sinus tachycardia, Sphincter of Oddi dysfunction, and Type 2 diabetes mellitus (HCC).    Past Surgical History:   has a past surgical history that includes pr abdomen surgery proc unlisted (4/13); total colectomy; pr abdomen surgery proc unlisted (1997); Cholecystectomy, laparoscopic (1997); pr abdomen surgery proc unlisted (last one placed  2012); Upper gastrointestinal endoscopy (5/21/2021); orthopedic surgery; ERCP (3/5/2013); laparotomy (3/5/2013); Colonoscopy; Upper gastrointestinal endoscopy (2017); Colonoscopy (N/A, 4/4/2018); Carpal tunnel release (Right); Upper gastrointestinal endoscopy (12/18/2019); Hysterectomy  (1998); vascular surgery (2014); Urological Surgery (4/25/13 at [facility]); IR ABSCESS DRAINAGE; other surgical history (Bilateral); other surgical history; IR PORT PLACEMENT > 5 YEARS (9/25/2018); Upper gastrointestinal endoscopy (N/A, 6/8/2022); Upper gastrointestinal endoscopy (N/A, 7/22/2022); Upper gastrointestinal endoscopy (N/A, 7/22/2022); Gastrostomy tube placement (N/A, 7/26/2022); Upper gastrointestinal endoscopy (N/A, 8/10/2022); and Upper gastrointestinal endoscopy (N/A, 11/23/2022). Medications:   Scheduled Meds:    lactobacillus acidophilus  4 tablet Oral TID    enoxaparin  40 mg SubCUTAneous Daily    sodium chloride flush  5-40 mL IntraVENous 2 times per day    insulin lispro  0-4 Units SubCUTAneous TID WC    insulin lispro  0-4 Units SubCUTAneous Nightly    ceFAZolin  2,000 mg IntraVENous Q8H    carvedilol  6.25 mg Oral BID WC    lisinopril  10 mg Oral Daily    pantoprazole  40 mg Oral BID AC     Continuous Infusions:    sodium chloride      sodium chloride      dextrose       PRN Meds: sodium chloride, zolpidem, sodium chloride flush, sodium chloride, ondansetron **OR** ondansetron, polyethylene glycol, acetaminophen **OR** acetaminophen, glucose, dextrose bolus **OR** dextrose bolus, glucagon (rDNA), dextrose, HYDROmorphone, diphenhydramine, LORazepam  Home Meds:   Prior to Admission medications    Medication Sig Start Date End Date Taking?  Authorizing Provider   carvedilol (COREG) 6.25 MG tablet Take 6.25 mg by mouth 2 times daily (with meals)    Historical Provider, MD   ondansetron (ZOFRAN) 8 MG tablet Take 1 tablet by mouth 3 times daily as needed for Nausea or Vomiting 10/27/22   Fani Stone MD   Hyoscyamine Sulfate SL (LEVSIN/SL) 0.125 MG SUBL Place 0.25 mg under the tongue 4 times daily as needed (abdominal pain or cramping) 10/12/22   Fani Stone MD   tiZANidine (ZANAFLEX) 4 MG tablet Take 4 mg by mouth every 6 hours as needed    Historical Provider, MD   promethazine (PHENERGAN) 25 MG tablet Take 1 tablet by mouth every 8 hours as needed for Nausea 7/28/22   Frida Masters MD   pantoprazole (PROTONIX) 40 MG tablet Take 1 tablet by mouth in the morning and 1 tablet before bedtime.  7/28/22   Frida Masters MD   lisinopril (PRINIVIL;ZESTRIL) 10 MG tablet Take 1 tablet by mouth in the morning. 7/29/22   Frida Masters MD   citalopram (CELEXA) 40 MG tablet Take 1 tablet by mouth daily 7/28/22 8/12/22  Frida Masters MD   cloNIDine (CATAPRES) 0.3 MG/24HR PTWK Place 1 patch onto the skin once a week 8/4/22 8/10/22  Frida Masters MD   blood glucose test strips (EXACTECH TEST) strip TEST BLOOD SUGAR FOUR TIMES DAILY 8/24/20   Historical Provider, MD   acetaminophen (TYLENOL) 500 MG tablet Take by mouth every 6 hours as needed    Historical Provider, MD   cyanocobalamin 1000 MCG/ML injection INJECT 1 VIAL INTRAMUSCULARLY FOR 4 WEEKS THEN MONTHLY 11/25/17   Historical Provider, MD   diclofenac sodium (VOLTAREN) 1 % GEL APPLY 1 GRAM TO AFFECTED AREA 4 TIMES A DAY AS NEEDED 10/2/18   Historical Provider, MD   diphenhydrAMINE (BENADRYL) 25 MG capsule Take 25 mg by mouth every 6 hours as needed    Historical Provider, MD   glucagon 1 MG injection Inject 1 mg into the muscle as needed 5/8/17   Historical Provider, MD   Hyoscyamine Sulfate SL 0.125 MG SUBL Place 0.125 mg under the tongue every 6 hours as needed    Historical Provider, MD   influenza quadrivalent split vaccine (FLUZONE;FLUARIX;FLULAVAL;AFLURIA) 0.5 ML injection Inject into the muscle 11/9/21   Historical Provider, MD   insulin aspart (NOVOLOG) 100 UNIT/ML injection pen 15 units plus sliding scale for blood sugar >151 10/7/14   Historical Provider, MD   Insulin Degludec 100 UNIT/ML SOPN Inject 58 Units into the skin at bedtime 10/27/21   Historical Provider, MD   lidocaine viscous hcl (XYLOCAINE) 2 % SOLN solution 10 mLs every 6 hours as needed 4/28/21   Historical Provider, MD   LORazepam (ATIVAN) 1 MG tablet Take 1 mg by mouth in the morning, at noon, and at bedtime. 10/7/14   Historical Provider, MD   naloxone 4 MG/0.1ML LIQD nasal spray 4 mg once as needed 2/16/22   Historical Provider, MD   ondansetron (ZOFRAN-ODT) 8 MG TBDP disintegrating tablet Place 8 mg under the tongue 3 times daily 4/19/16   Historical Provider, MD   polyethylene glycol (GLYCOLAX) 17 GM/SCOOP powder Take 17 g by mouth as needed    Historical Provider, MD   pregabalin (LYRICA) 100 MG capsule Take 100 mg by mouth 2 times daily. Historical Provider, MD   sodium chloride 0.9 % infusion Infuse intravenously as needed    Historical Provider, MD   zolpidem (AMBIEN) 10 MG tablet Take 10 mg by mouth. 2/11/22   Historical Provider, MD   gabapentin (NEURONTIN) 250 MG/5ML solution 300 mg by Enteral route 3 times daily. Patient not taking: No sig reported 10/7/14 7/28/22  Historical Provider, MD   insulin NPH (HUMULIN N;NOVOLIN N) 100 UNIT/ML injection pen Please inject 12 units into the skin with breakfast and 4 units with bedtime. (pen)  Patient not taking: No sig reported 10/7/14 7/28/22  Historical Provider, MD   sucralfate (CARAFATE) 1 GM/10ML suspension TAKE 10 ML BY MOUTH 4 TIMES A DAY EVERY DAY ON A EMPTY STOMACH 1 HR BEFORE MEALS AND AT BEDTIME 9/7/17 8/12/22  Historical Provider, MD     Pre-Sedation Documentation and Exam:   I have personally completed a history, physical exam & review of systems for this patient (see notes).     Mallampati Airway Assessment:  normal neck range of motion, Mallampati Class II - (soft palate, fauces & uvula are visible), edentulous    Prior History of Anesthesia Complications:   none    ASA Classification:  Class 3 - A patient with severe systemic disease that limits activity but is not incapacitating    Sedation/ Anesthesia Plan:   intravenous sedation    Medications Planned:   midazolam (Versed) intravenously and fentanyl intravenously    Patient is an appropriate candidate for plan of sedation: yes    Electronically signed by Deanna Qiu PA-C on 12/27/2022 at 9:54 AM

## 2022-12-27 NOTE — PROGRESS NOTES
Infectious Disease Progress Note  Today's Date: 12/27/2022   Admit Date: 12/24/2022    Impression:   MSSA bacteremia: not usual oral trixie, I think the port is more likely source. Site looks bruised but not being used regularly and there appears to have been some concern over last several months about the site integrity. Is not using port regularly now so agree with removal.  No other sites on exam or ROS suggesting other source or seeded sites. Blood cultures collected 12/25/22 with NGTD. Patient had port removed 12/27/22 and catheter tip has been sent for culture per notes. TTE 12/23/22 with no mention of vegetation. Plan:   Continue Ancef   Repeat blood cultures ordered to be collected tonight at 22:00, post port removal.     If repeat blood cultures remain negative and s/p port removal, anticipate 2 weeks IV ABX at home with home health. Will eventually need PICC line but hold on PICC placement for now as port was just removed today and repeat cultures are pending. ID following    Anti-infectives: Ancef 12/24-  Unasyn 12/23  Vanc 12/23    Subjective: Interval events:  Afebrile. Patient states she is feeling okay. Port was removed earlier this morning. Patient complains of some upper gum pain, which she thinks is from her lower teeth/implants rubbing/making contact with her upper gum. Patient reports oral surgery did see her earlier this morning. Patient is a 45 y.o. female with a history of chronic pancreatitis and Tyep 2 DM with gastroparesis. She has a port for TPN (placed 2018)who was admitted on 12/24/2022 after being transferred from [facility] at [facility] for MSSA bacteremia for an ID consult. Had + blood cx there on 12/22, repeats 12/23 NGTD at transfer although I find no such cultures in Care Everywhere. She had no cultures done here. TTE at [facility] does not show vegetation or significant valve dysfunction.      Had dental surgery/extractions and implants placed on 12/7/22 and required penrose. Treated with oral ABX. She has port in L chest that is not really used regularly now. It is not hurting or hot but looks somewhat bruised. She notes that within the last several months there has been some concern that the skin had a break in it here.      Patient Active Problem List   Diagnosis    GERD with stricture    Type 2 diabetes mellitus with hyperglycemia, with long-term current use of insulin (HCC)    Sphincter of Oddi dysfunction    Iron deficiency anemia    S/P exploratory laparotomy    Chronic pancreatitis ([address]    Hypertension    Class 1 obesity with serious comorbidity and body mass index (BMI) of 31.0 to 31.9 in adult    Hypokalemia due to excessive gastrointestinal loss of potassium    Microcytic anemia    S/P balloon dilatation of esophageal stricture    Infection of PEG site ([address]    Gastroparesis due to DM ([address]    MSSA bacteremia    Anemia    Pulmonary hypertension ([address]     Past Medical History:   Diagnosis Date    Anemia     blood transfusion 2013 X1 d/t \"perforated bowel\"-- Fe infusions 12/2017--- denies any current iron infusions 12/17/2019    Anxiety and depression     managed with meds    C. difficile diarrhea 2013    in 1139 after complicated ERCP    Cervical dysplasia 1990s    Chronic insomnia     ambien     Chronic nausea     Chronic pain     all over     Chronic pancreatitis ([address]     COVID-19 vaccine series completed 04/21/2021    3/29/2021 Pfizer     Dehydration     IVFs through port as needed for this     Encounter for insertion of venous access port     Left chest port    Esophageal stricture 2017    Fibromyalgia     managed with meds    Former cigarette smoker     GERD (gastroesophageal reflux disease)     controlled with med    History of blood transfusion     History of kidney stones 03/2021    X1-naturally pass    HLD (hyperlipidemia)     pt denies     Hypertension     IBS (irritable bowel syndrome)     Migraine headache     does not have often but did have one recently; just takes tylenol    Nausea & vomiting     pt reports she does better with phenergan than zofran - causes HA    Nonalcoholic fatty liver disease     Pancreatitis 2014    PUD (peptic ulcer disease) 2017    still having issues takes meds     Severe protein-calorie malnutrition ([facility] 2013    Sinus tachycardia     per pt-- no tx needed    Sphincter of Oddi dysfunction     Type 2 diabetes mellitus (HCC)     type 2-- sqbs am avg 140-160-- Hypo symptoms at <100      Family History   Problem Relation Age of Onset    No Known Problems Sister     Coronary Art Dis Father 76    Stroke Father     Hypertension Father     Diabetes Father     Heart Disease Mother         cabg began in her 52's    Hypertension Mother     Diabetes Mother     Other Father       Social History     Tobacco Use    Smoking status: Former     Packs/day: 1.00     Types: Cigarettes     Quit date: 1993     Years since quittin.6    Smokeless tobacco: Never   Substance Use Topics    Alcohol use: No     Past Surgical History:   Procedure Laterality Date    CARPAL TUNNEL RELEASE Right     along with trigger finger    CHOLECYSTECTOMY, LAPAROSCOPIC      COLONOSCOPY      COLONOSCOPY N/A 2018    COLONOSCOPY performed by Rosina Fritz MD at [facility] ENDOSCOPY    ERCP  3/5/2013    resulted in perforated duodenum    GASTROSTOMY TUBE PLACEMENT N/A 2022    EGD PEG TUBE PLACEMENT with j tube placement ROOM 207 performed by Dorian Gonzáles MD at [facility] ([address] [place] [address])      ovaries remain    IR DRAIN SKIN ABSCESS      IR PORT PLACEMENT EQUAL OR GREATER THAN 5 YEARS  2018    IR PORT PLACEMENT EQUAL OR GREATER THAN 5 YEARS 2018 [facility]    LAPAROTOMY  3/5/2013    exploratory for duodenal perforation with ERCP    ORTHOPEDIC SURGERY      right finger surgery 2017    OTHER SURGICAL HISTORY Bilateral     thumb    OTHER SURGICAL HISTORY      Kidney stones 2021 MI ABDOMEN SURGERY PROC UNLISTED  4/13    explor lap    MI ABDOMEN SURGERY PROC UNLISTED  1997    lap nissen    MI ABDOMEN SURGERY PROC UNLISTED  last one placed  2012    Hx of pancreatic stent, multiple    TOTAL COLECTOMY      UPPER GASTROINTESTINAL ENDOSCOPY  5/21/2021         UPPER GASTROINTESTINAL ENDOSCOPY  2017    36 fr tony    UPPER GASTROINTESTINAL ENDOSCOPY  12/18/2019         UPPER GASTROINTESTINAL ENDOSCOPY N/A 6/8/2022    EGD ESOPHAGOGASTRODUODENOSCOPY DILATATION/ 34 performed by Chandrakant Brooks MD at [address] [place] [address] N/A 7/22/2022    ENDOSCOPIC ULTRASOUND/35 ROOM 207 performed by Chandrakant Brooks MD at [address] [place] [address] N/A 7/22/2022    EGD BIOPSY With balloon dialtion performed by Chandrakant Brooks MD at [address] [place] [address] N/A 8/10/2022    EGD ESOPHAGOGASTRODUODENOSCOPY performed by Chandrakant Brooks MD at [address] [place] [address] N/A 11/23/2022    EGD ESOPHAGOGASTRODUODENOSCOPY DILATATION/34 performed by Chandrakant Brooks MD at [address]  4/25/13 at [facility]-- stent removed 6-27-13. Dr Jordi Heck  2014    port insertion, left chest wall      Prior to Admission medications    Medication Sig Start Date End Date Taking?  Authorizing Provider   carvedilol (COREG) 6.25 MG tablet Take 6.25 mg by mouth 2 times daily (with meals)    Historical Provider, MD   ondansetron (ZOFRAN) 8 MG tablet Take 1 tablet by mouth 3 times daily as needed for Nausea or Vomiting 10/27/22   Paul Modi MD   Hyoscyamine Sulfate SL (LEVSIN/SL) 0.125 MG SUBL Place 0.25 mg under the tongue 4 times daily as needed (abdominal pain or cramping) 10/12/22   Paul Modi MD   tiZANidine (ZANAFLEX) 4 MG tablet Take 4 mg by mouth every 6 hours as needed    Historical Provider, MD   promethazine (PHENERGAN) 25 MG tablet Take 1 tablet by mouth every 8 hours as needed for Nausea 7/28/22   Kaya Muro MD   pantoprazole (PROTONIX) 40 MG tablet Take 1 tablet by mouth in the morning and 1 tablet before bedtime.  7/28/22   Kaya Muro MD   lisinopril (PRINIVIL;ZESTRIL) 10 MG tablet Take 1 tablet by mouth in the morning. 7/29/22   Kaya Muro MD   citalopram (CELEXA) 40 MG tablet Take 1 tablet by mouth daily 7/28/22 8/12/22  Kaya Muro MD   cloNIDine (CATAPRES) 0.3 MG/24HR PTWK Place 1 patch onto the skin once a week 8/4/22 8/10/22  Kaya Muro MD   blood glucose test strips (EXACTECH TEST) strip TEST BLOOD SUGAR FOUR TIMES DAILY 8/24/20   Historical Provider, MD   acetaminophen (TYLENOL) 500 MG tablet Take by mouth every 6 hours as needed    Historical Provider, MD   cyanocobalamin 1000 MCG/ML injection INJECT 1 VIAL INTRAMUSCULARLY FOR 4 WEEKS THEN MONTHLY 11/25/17   Historical Provider, MD   diclofenac sodium (VOLTAREN) 1 % GEL APPLY 1 GRAM TO AFFECTED AREA 4 TIMES A DAY AS NEEDED 10/2/18   Historical Provider, MD   diphenhydrAMINE (BENADRYL) 25 MG capsule Take 25 mg by mouth every 6 hours as needed    Historical Provider, MD   glucagon 1 MG injection Inject 1 mg into the muscle as needed 5/8/17   Historical Provider, MD   Hyoscyamine Sulfate SL 0.125 MG SUBL Place 0.125 mg under the tongue every 6 hours as needed    Historical Provider, MD   influenza quadrivalent split vaccine (FLUZONE;FLUARIX;FLULAVAL;AFLURIA) 0.5 ML injection Inject into the muscle 11/9/21   Historical Provider, MD   insulin aspart (NOVOLOG) 100 UNIT/ML injection pen 15 units plus sliding scale for blood sugar >151 10/7/14   Historical Provider, MD   Insulin Degludec 100 UNIT/ML SOPN Inject 58 Units into the skin at bedtime 10/27/21   Historical Provider, MD   lidocaine viscous hcl (XYLOCAINE) 2 % SOLN solution 10 mLs every 6 hours as needed 4/28/21   Historical Provider, MD   LORazepam (ATIVAN) 1 MG tablet Take 1 mg by mouth in the morning, at noon, and at bedtime. 10/7/14   Historical Provider, MD   naloxone 4 MG/0.1ML LIQD nasal spray 4 mg once as needed 2/16/22   Historical Provider, MD   ondansetron (ZOFRAN-ODT) 8 MG TBDP disintegrating tablet Place 8 mg under the tongue 3 times daily 4/19/16   Historical Provider, MD   polyethylene glycol (GLYCOLAX) 17 GM/SCOOP powder Take 17 g by mouth as needed    Historical Provider, MD   pregabalin (LYRICA) 100 MG capsule Take 100 mg by mouth 2 times daily. Historical Provider, MD   sodium chloride 0.9 % infusion Infuse intravenously as needed    Historical Provider, MD   zolpidem (AMBIEN) 10 MG tablet Take 10 mg by mouth. 2/11/22   Historical Provider, MD   gabapentin (NEURONTIN) 250 MG/5ML solution 300 mg by Enteral route 3 times daily. Patient not taking: No sig reported 10/7/14 7/28/22  Historical Provider, MD   insulin NPH (HUMULIN N;NOVOLIN N) 100 UNIT/ML injection pen Please inject 12 units into the skin with breakfast and 4 units with bedtime. (pen)  Patient not taking: No sig reported 10/7/14 7/28/22  Historical Provider, MD   sucralfate (CARAFATE) 1 GM/10ML suspension TAKE 10 ML BY MOUTH 4 TIMES A DAY EVERY DAY ON A EMPTY STOMACH 1 HR BEFORE MEALS AND AT BEDTIME 9/7/17 8/12/22  Historical Provider, MD     Allergies   Allergen Reactions    Adhesive Tape Itching, Other (See Comments) and Rash     blisters  tegaderm  Paper tape too      Metronidazole Diarrhea and Nausea And Vomiting    Atorvastatin Myalgia    Iodine Other (See Comments)     Sweaty and clammy    Statins Myalgia    Barium Iodide Nausea And Vomiting     Diaphoresis      Barium Sulfate Palpitations    Insulin Lispro Nausea And Vomiting    Insulins Nausea And Vomiting     Humalog only    Metformin Nausea And Vomiting     Stomach pain  Stomach pain  Stomach pain  Stomach pain            Review of Systems:  10 point Ros negative other than what mentioned in HPI. A comprehensive review of systems was negative.           Objective:   Blood pressure 129/69, pulse 85, temperature 98.2 °F (36.8 °C), temperature source Temporal, resp. rate 16, height 5' 2\" (1.575 m), weight 160 lb (72.6 kg), SpO2 94 %. Temp (24hrs), Av.4 °F (36.9 °C), Min:98.2 °F (36.8 °C), Max:98.9 °F (37.2 °C)       Lines: L subclavian port; not hot or red or swollen, scar over apex site appears healed, almost bruise like darker tissue color around port site    Exam:  Unchanged 22 unless noted below:     General: alert and cooperative, looks stated age, in NAD, lying in bed,   present   HEENT: PERRL, non-icteric sclera, no splinter hemorrhages, upper gum (midline) with mild redness. Neck: supple, symmetric, no masses or lymphadenopathy, bandaid under chin were drain was; no local redness or induration    Cardiac:Nl S1/S2, no murmurs/rubs/gallops/clicks, no LE edema   Pulmonary:clear bilaterally shun/wheezes, good air movement    Abdomen: soft, NT, non-distended, BS normal   Skin:no rashes, lesions or wounds, bandage over previous port site as port was removed this morning. MSK:no enlarged or swollen joints in limbs or sternoclavicular area   Extremities: no cords/clots/cyanosis, pulses palpable and symmetric    Psychiatric: mood and affect appropriate to situation       Data Review:   Recent Results (from the past 24 hour(s))   POCT Glucose    Collection Time: 22  3:50 PM   Result Value Ref Range    POC Glucose 177 (H) 65 - 100 mg/dL    Performed by: Davonte    POCT Glucose    Collection Time: 22  8:25 PM   Result Value Ref Range    POC Glucose 221 (H) 65 - 100 mg/dL    Performed by: Rowena    POCT Glucose    Collection Time: 22  9:38 AM   Result Value Ref Range    POC Glucose 204 (H) 65 - 100 mg/dL    Performed by: Rica Medley         Microbiology:  [unfilled]        Studies/Imaging:  personally reviewed   22 TTE: Summary      Normal chamber sizes. Normal left ventricular systolic function.  EF 60%    Normal diastolic function. Normal right ventricular function. No significant valve stenosis or regurgitation. No cardiac valve vegetations are seen. Evidence of moderate pulmonary hypertension. Findings      Mitral Valve    The mitral valve is normal in structure and function. There is no mitral valve stenosis. Trace mitral regurgitation. There is no vegetation seen on the mitral valve. Aortic Valve    The aortic valve is trileaflet. The aortic valve opens well. The aortic valve is normal in structure and function. No significant stenosis noted. No aortic regurgitation is present. There is no vegetation seen on the aortic valve. Tricuspid Valve    The tricuspid valve is normal in structure and function. There is no tricuspid stenosis. Trace tricuspid regurgitation. Estimated right ventricular systolic pressure is 46 mmHg. There is no vegetation seen on the tricuspid valve. Pulmonic Valve    The pulmonic valve is not well visualized. Left Atrium    Normal left atrium. Left Ventricle    The Left Ventricle is normal in size. There is no LV Thrombus seen. Normal left ventricular systolic function. EF 60%    The Left Ventricular wall motion is normal.    Normal Diastolic function.      Signed By: Jarrett Negrete, APRN - CNP     December 27, 2022

## 2022-12-27 NOTE — PROGRESS NOTES
Hospitalist Progress Note   Admit Date:  2022  5:57 PM   Name:  Shantanu Lowe   Age:  45 y.o. Sex:  female  :  1970   MRN:  [id number]   Room:  [facility]/    Presenting Complaint: positive blood cultures  Reason(s) for Admission: MSSA bacteremia [R78.81, B95.61]     Hospital Course & Interval History:   Patient is a 49-year-old female with history of chronic pancreatitis, DM2, HTN, anxiety, gastroparesis status post recent port exchange on  admitted on  from [facility] for MSSA bacteremia and ID consult. Patient had a recent oral surgery for dental extraction on 2022 with Penrose drain placement. Patient was on Augmentin and Amoxil since the procedure. Patient was found to have positive blood culture with MSSA on 2022, repeat blood cultures from  negative to date. Patient was given IV vancomycin and Unasyn on  and started on Ancef on 2022. Penrose drain since removed, status post CT neck without any evidence of abscess. Echo was unremarkable for any evidence of infective endocarditis, showed moderate pulmonary hypertension. Subjective/24hr Events (22): Patient seen at bedside, resting in bed comfortably  Patient examined post chest port removal.  Patient is alert and awake, AOx3, on room air. Does report of mild soreness in left upper anterior chest but denies any nausea, vomiting, abdominal pain, headache, fever chills or night sweats. No overnight events. ROS:  10 point review of system is negative except for what mentioned above. Assessment & Plan:     Principal Problem:    MSSA bacteremia  -  Repeat blood cultures from  negative so far. Continue Ancef -  Continue probiotics to prevent antibiotic associated diarrhea. Status post port removal on . Sutures can be removed by home health aide RN  ID on board, appreciate their help.           Dental infection:  Followed by oral surgery   Penrose drain since removed         Active Problems:    Class 1 obesity with serious comorbidity and body mass index (BMI) of 31.0 to 31.9 in adult  Plan:   Noted, increases all cause mortality morbidity. Patient encouraged to lose weight. Chronic pancreatitis (HCC)    Gastroparesis due to DM [facility])  Plan:   Diet as tolerant   Patient had NG tube earlier this year, which was removed in August.         Anemia  HGB 9.3- stable       Pulmonary hypertension ([address]  Plan:   Patient is currently on room air, does not appear to be volume overloaded. Type 2 diabetes mellitus with hyperglycemia, with long-term current use of insulin (HCC)  Plan:   Monitor POC glucose QA CHS  Continue sliding scale insulin for now. Hypertension  12/27-  Blood pressure is optimally controlled. Continue Coreg 6.25 mg p.o. twice daily along with lisinopril 10 mg p.o. daily. Discharge Planning:    Pending clinical course. Diet:  ADULT DIET; Easy to Chew  DVT PPx: lovenox  Code status: Full Code    I spent 36 minutes of time caring for this patient on the unit nearby or at bedside, and more than 50 percent was spent on coordination of care activities, and/or patient/family counseling regarding status and plan of care. Hospital Problems             Last Modified POA    * (Principal) MSSA bacteremia 12/24/2022 Yes    Class 1 obesity with serious comorbidity and body mass index (BMI) of 31.0 to 31.9 in adult (Chronic) 12/24/2022 Yes    Gastroparesis due to DM ([address] (Chronic) 12/24/2022 Yes    Anemia (Chronic) 12/24/2022 Yes    Pulmonary hypertension ([address] (Chronic) 12/24/2022 Yes    Type 2 diabetes mellitus with hyperglycemia, with long-term current use of insulin ([address] 12/24/2022 Yes    Overview Signed 7/19/2022  2:48 PM by Rajiv Blair DO     Followed by endocrinology OP. Recommends avoidance of GLP-1RA agonist d/t h/o chronic pancreatitis.             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 No longer on pancreatic enzyme replacement. Hypertension 12/24/2022 Yes     Objective:   Patient Vitals for the past 24 hrs:   Temp Pulse Resp BP SpO2   12/27/22 0725 98.2 °F (36.8 °C) 90 18 120/71 97 %   12/27/22 0514 -- -- 17 -- --   12/27/22 0406 98.9 °F (37.2 °C) 88 20 123/69 95 %   12/27/22 0010 -- -- 17 -- --   12/26/22 2334 98.3 °F (36.8 °C) 85 20 136/75 99 %   12/26/22 2023 -- -- 17 -- --   12/26/22 1935 98.2 °F (36.8 °C) 82 20 130/76 98 %   12/26/22 1630 98.4 °F (36.9 °C) 95 -- 131/68 --   12/26/22 1513 98.8 °F (37.1 °C) 90 17 (!) 140/72 98 %   12/26/22 1305 -- -- 16 -- --   12/26/22 1235 -- -- 18 -- --   12/26/22 1107 99 °F (37.2 °C) 89 17 119/87 99 %   12/26/22 0903 -- -- 16 -- --   12/26/22 0815 -- 97 18 136/74 --         Estimated body mass index is 29.26 kg/m² as calculated from the following:    Height as of this encounter: 5' 2\" (1.575 m). Weight as of this encounter: 160 lb (72.6 kg). Intake/Output Summary (Last 24 hours) at 12/27/2022 0809  Last data filed at 12/26/2022 1513  Gross per 24 hour   Intake 368 ml   Output 500 ml   Net -132 ml           Physical Exam:     Blood pressure 120/71, pulse 90, temperature 98.2 °F (36.8 °C), temperature source Oral, resp. rate 18, height 5' 2\" (1.575 m), weight 160 lb (72.6 kg), SpO2 97 %. General:    Alert, awake, NAD, on room air  Head:  Normocephalic, atraumatic  Eyes:  Sclerae appear normal. Pupils equally round. ENT:  Nares appear normal, no drainage. Moist oral mucosa  Neck:  No restricted ROM. Trachea midline. CV:   RRR. No m/r/g. No jugular venous distension. Lungs:   CTAB. No wheezing, rhonchi, or rales. Respirations even, unlabored. Abdomen: Bowel sounds present.  Soft, nontender, nondistended. Extremities: No cyanosis or clubbing. No edema. Skin:     No rashes and normal coloration. Warm and dry. Suture with dressing noted on left upper anterior chest, status post port removal.  Neuro:  CN II-XII grossly intact. Sensation intact. A&Ox3  Psych:  Normal mood and affect.       I have reviewed ordered lab tests and independently visualized imaging below:    Recent Labs:  Recent Results (from the past 48 hour(s))   POCT Glucose    Collection Time: 12/25/22  8:50 AM   Result Value Ref Range    POC Glucose 162 (H) 65 - 100 mg/dL    Performed by: [email or web address] [employer]    Culture, Blood 1    Collection Time: 12/25/22  9:10 AM    Specimen: Blood   Result Value Ref Range    Special Requests RIGHT  HAND        Culture NO GROWTH 2 DAYS     Culture, Blood 1    Collection Time: 12/25/22  9:10 AM    Specimen: Blood   Result Value Ref Range    Special Requests LEFT  Antecubital        Culture NO GROWTH 2 DAYS     POCT Glucose    Collection Time: 12/25/22 12:02 PM   Result Value Ref Range    POC Glucose 194 (H) 65 - 100 mg/dL    Performed by: [email or web address]    POCT Glucose    Collection Time: 12/25/22  4:25 PM   Result Value Ref Range    POC Glucose 165 (H) 65 - 100 mg/dL    Performed by: [email or web address]    POCT Glucose    Collection Time: 12/25/22  8:46 PM   Result Value Ref Range    POC Glucose 167 (H) 65 - 100 mg/dL    Performed by: Filiberto    POCT Glucose    Collection Time: 12/26/22  7:19 AM   Result Value Ref Range    POC Glucose 152 (H) 65 - 100 mg/dL    Performed by: Annabel    POCT Glucose    Collection Time: 12/26/22 11:00 AM   Result Value Ref Range    POC Glucose 176 (H) 65 - 100 mg/dL    Performed by: [facility]    POCT Glucose    Collection Time: 12/26/22  3:50 PM   Result Value Ref Range    POC Glucose 177 (H) 65 - 100 mg/dL    Performed by: [employer]    POCT Glucose    Collection Time: 12/26/22  8:25 PM   Result Value Ref Range    POC Glucose 221 (H) 65 - 100 mg/dL    Performed by: Isela Norris          Other Studies:  No results found.     Current Meds:  Current Facility-Administered Medications   Medication Dose Route Frequency    lactobacillus acidophilus (FLORANEX) 4 tablet  4 tablet Oral TID    zolpidem (AMBIEN) tablet 10 mg  10 mg Oral Nightly PRN    enoxaparin (LOVENOX) injection 40 mg  40 mg SubCUTAneous Daily    sodium chloride flush 0.9 % injection 5-40 mL  5-40 mL IntraVENous 2 times per day    sodium chloride flush 0.9 % injection 5-40 mL  5-40 mL IntraVENous PRN    0.9 % sodium chloride infusion   IntraVENous PRN    ondansetron (ZOFRAN-ODT) disintegrating tablet 4 mg  4 mg Oral Q8H PRN    Or    ondansetron (ZOFRAN) injection 4 mg  4 mg IntraVENous Q6H PRN    polyethylene glycol (GLYCOLAX) packet 17 g  17 g Oral Daily PRN    acetaminophen (TYLENOL) tablet 650 mg  650 mg Oral Q6H PRN    Or    acetaminophen (TYLENOL) suppository 650 mg  650 mg Rectal Q6H PRN    glucose chewable tablet 16 g  4 tablet Oral PRN    dextrose bolus 10% 125 mL  125 mL IntraVENous PRN    Or    dextrose bolus 10% 250 mL  250 mL IntraVENous PRN    glucagon (rDNA) injection 1 mg  1 mg SubCUTAneous PRN    dextrose 10 % infusion   IntraVENous Continuous PRN    insulin lispro (HUMALOG) injection vial 0-4 Units  0-4 Units SubCUTAneous TID WC    insulin lispro (HUMALOG) injection vial 0-4 Units  0-4 Units SubCUTAneous Nightly    ceFAZolin (ANCEF) 2000 mg in sterile water 20 mL IV syringe  2,000 mg IntraVENous Q8H    HYDROmorphone HCl PF (DILAUDID) injection 0.5 mg  0.5 mg IntraVENous Q4H PRN    diphenhydrAMINE (BENADRYL) capsule 25 mg  25 mg Oral Q8H PRN    LORazepam (ATIVAN) tablet 1 mg  1 mg Oral Q6H PRN    carvedilol (COREG) tablet 6.25 mg  6.25 mg Oral BID WC    lisinopril (PRINIVIL;ZESTRIL) tablet 10 mg  10 mg Oral Daily    pantoprazole (PROTONIX) tablet 40 mg  40 mg Oral BID AC       Signed:  Phylicia Barber MD    Part of this note may have been written by using a voice dictation software. The note has been proof read but may still contain some grammatical/other typographical errors.

## 2022-12-27 NOTE — ACP (ADVANCE CARE PLANNING)
Advance Care Planning     General Advance Care Planning (ACP) Conversation    Date of Conversation: 12/24/2022  Conducted with: Patient with Decision Making Capacity    Healthcare Decision Maker:  No healthcare decision makers have been documented. Click here to complete [address] including selection of the Healthcare Decision Maker Relationship (ie \"Primary\")  Today we documented Decision Maker(s) consistent with Legal Next of Kin hierarchy.     Content/Action Overview:  DECLINED ACP Conversation - will revisit periodically  Reviewed DNR/DNI and patient elects Full Code (Attempt Resuscitation)        Length of Voluntary ACP Conversation in minutes:  <16 minutes (Non-Billable)    Ian Whaley RN

## 2022-12-27 NOTE — PLAN OF CARE
Problem: Safety - Adult  Goal: Free from fall injury  Outcome: Progressing     Problem: ABCDS Injury Assessment  Goal: Absence of physical injury  Outcome: Progressing     Problem: Pain  Goal: Verbalizes/displays adequate comfort level or baseline comfort level  Outcome: Progressing     Problem: Chronic Conditions and Co-morbidities  Goal: Patient's chronic conditions and co-morbidity symptoms are monitored and maintained or improved  Outcome: Progressing

## 2022-12-27 NOTE — BRIEF OP NOTE
Department of Interventional Radiology  [phone]        Interventional Radiology Brief Procedure Note    Patient: Zoe Pickett MRN: [id number]  SSN: [id number]    YOB: 1970  Age: 45 y.o. Sex: female      Date of Procedure: 12/27/2022    Pre-Procedure Diagnosis: MSSA bacteremia, gastroparesis, chronic pancreatitis, recent dental extractions due to caries, post op abscess    Post-Procedure Diagnosis: SAME    Procedure(s): Venous Chest Port Removal    Brief Description of Procedure: as above    Performed By: Erika Wilkerson PA-C     Assistants: None    Anesthesia:Lidocaine, Versed    Estimated Blood Loss: Less than 10ml    Specimens:  Microbiology    Implants:  None    Findings: well healed chest port incision    Complications: None    Recommendations: suture removal 2 weeks     Follow Up: 2 weeks.   Ok for [facility] to remove sutures    Signed By: Erika Wilkerson PA-C     December 27, 2022

## 2022-12-27 NOTE — PROGRESS NOTES
Patient has remained A&O x 4. Pt aware to be NPO after midnight for port removal tomorrow. Dilaudid x3  Benadryl x2  Zofran x1  Tylenol x1    Bandage to chin changed twice, scant drainage. Patient rounded on hourly by myself or patient assistant and encouraged to call with any needs. No signs of distress noted. Bed low, locked, call bell within reach.    Wan Sharpe RN

## 2022-12-28 LAB
GLUCOSE BLD STRIP.AUTO-MCNC: 161 MG/DL (ref 65–100)
GLUCOSE BLD STRIP.AUTO-MCNC: 182 MG/DL (ref 65–100)
GLUCOSE BLD STRIP.AUTO-MCNC: 202 MG/DL (ref 65–100)
GLUCOSE BLD STRIP.AUTO-MCNC: 202 MG/DL (ref 65–100)
SERVICE CMNT-IMP: ABNORMAL

## 2022-12-28 PROCEDURE — 6370000000 HC RX 637 (ALT 250 FOR IP): Performed by: INTERNAL MEDICINE

## 2022-12-28 PROCEDURE — 6360000002 HC RX W HCPCS: Performed by: INTERNAL MEDICINE

## 2022-12-28 PROCEDURE — 87040 BLOOD CULTURE FOR BACTERIA: CPT

## 2022-12-28 PROCEDURE — 6370000000 HC RX 637 (ALT 250 FOR IP): Performed by: HOSPITALIST

## 2022-12-28 PROCEDURE — 2580000003 HC RX 258: Performed by: INTERNAL MEDICINE

## 2022-12-28 PROCEDURE — 36415 COLL VENOUS BLD VENIPUNCTURE: CPT

## 2022-12-28 PROCEDURE — 6360000002 HC RX W HCPCS: Performed by: HOSPITALIST

## 2022-12-28 PROCEDURE — 82962 GLUCOSE BLOOD TEST: CPT

## 2022-12-28 PROCEDURE — 6370000000 HC RX 637 (ALT 250 FOR IP): Performed by: NURSE PRACTITIONER

## 2022-12-28 PROCEDURE — 1100000000 HC RM PRIVATE

## 2022-12-28 RX ORDER — OXYCODONE HYDROCHLORIDE 5 MG/1
5 TABLET ORAL EVERY 6 HOURS PRN
Status: DISCONTINUED | OUTPATIENT
Start: 2022-12-28 | End: 2022-12-29

## 2022-12-28 RX ADMIN — ENOXAPARIN SODIUM 40 MG: 100 INJECTION SUBCUTANEOUS at 08:14

## 2022-12-28 RX ADMIN — CARVEDILOL 6.25 MG: 6.25 TABLET, FILM COATED ORAL at 16:20

## 2022-12-28 RX ADMIN — HYDROMORPHONE HYDROCHLORIDE 0.5 MG: 1 INJECTION, SOLUTION INTRAMUSCULAR; INTRAVENOUS; SUBCUTANEOUS at 05:37

## 2022-12-28 RX ADMIN — PANTOPRAZOLE SODIUM 40 MG: 40 TABLET, DELAYED RELEASE ORAL at 16:06

## 2022-12-28 RX ADMIN — LACTOBACILLUS TAB 4 TABLET: TAB at 21:36

## 2022-12-28 RX ADMIN — SODIUM CHLORIDE, PRESERVATIVE FREE 10 ML: 5 INJECTION INTRAVENOUS at 08:14

## 2022-12-28 RX ADMIN — HYDROMORPHONE HYDROCHLORIDE 0.5 MG: 1 INJECTION, SOLUTION INTRAMUSCULAR; INTRAVENOUS; SUBCUTANEOUS at 18:10

## 2022-12-28 RX ADMIN — CEFAZOLIN SODIUM 2000 MG: 100 INJECTION, POWDER, LYOPHILIZED, FOR SOLUTION INTRAVENOUS at 16:06

## 2022-12-28 RX ADMIN — HYDROMORPHONE HYDROCHLORIDE 0.5 MG: 1 INJECTION, SOLUTION INTRAMUSCULAR; INTRAVENOUS; SUBCUTANEOUS at 02:04

## 2022-12-28 RX ADMIN — OXYCODONE 5 MG: 5 TABLET ORAL at 16:40

## 2022-12-28 RX ADMIN — PANTOPRAZOLE SODIUM 40 MG: 40 TABLET, DELAYED RELEASE ORAL at 05:37

## 2022-12-28 RX ADMIN — SODIUM CHLORIDE, PRESERVATIVE FREE 10 ML: 5 INJECTION INTRAVENOUS at 21:37

## 2022-12-28 RX ADMIN — DIPHENHYDRAMINE HYDROCHLORIDE 25 MG: 25 CAPSULE ORAL at 10:08

## 2022-12-28 RX ADMIN — LORAZEPAM 1 MG: 1 TABLET ORAL at 12:54

## 2022-12-28 RX ADMIN — ACETAMINOPHEN 650 MG: 325 TABLET ORAL at 04:23

## 2022-12-28 RX ADMIN — ONDANSETRON 4 MG: 4 TABLET, ORALLY DISINTEGRATING ORAL at 08:24

## 2022-12-28 RX ADMIN — ACETAMINOPHEN 650 MG: 325 TABLET ORAL at 11:10

## 2022-12-28 RX ADMIN — DIPHENHYDRAMINE HYDROCHLORIDE 25 MG: 25 CAPSULE ORAL at 18:10

## 2022-12-28 RX ADMIN — DIPHENHYDRAMINE HYDROCHLORIDE 25 MG: 25 CAPSULE ORAL at 02:04

## 2022-12-28 RX ADMIN — HYDROMORPHONE HYDROCHLORIDE 0.5 MG: 1 INJECTION, SOLUTION INTRAMUSCULAR; INTRAVENOUS; SUBCUTANEOUS at 10:01

## 2022-12-28 RX ADMIN — ONDANSETRON 4 MG: 4 TABLET, ORALLY DISINTEGRATING ORAL at 16:20

## 2022-12-28 RX ADMIN — LISINOPRIL 10 MG: 5 TABLET ORAL at 08:13

## 2022-12-28 RX ADMIN — CEFAZOLIN SODIUM 2000 MG: 100 INJECTION, POWDER, LYOPHILIZED, FOR SOLUTION INTRAVENOUS at 08:14

## 2022-12-28 RX ADMIN — CARVEDILOL 6.25 MG: 6.25 TABLET, FILM COATED ORAL at 08:13

## 2022-12-28 RX ADMIN — LACTOBACILLUS TAB 4 TABLET: TAB at 12:55

## 2022-12-28 RX ADMIN — HYDROMORPHONE HYDROCHLORIDE 0.5 MG: 1 INJECTION, SOLUTION INTRAMUSCULAR; INTRAVENOUS; SUBCUTANEOUS at 13:46

## 2022-12-28 RX ADMIN — ZOLPIDEM TARTRATE 10 MG: 5 TABLET ORAL at 21:36

## 2022-12-28 RX ADMIN — CEFAZOLIN SODIUM 2000 MG: 100 INJECTION, POWDER, LYOPHILIZED, FOR SOLUTION INTRAVENOUS at 00:15

## 2022-12-28 RX ADMIN — LACTOBACILLUS TAB 4 TABLET: TAB at 08:13

## 2022-12-28 RX ADMIN — HYDROMORPHONE HYDROCHLORIDE 0.5 MG: 1 INJECTION, SOLUTION INTRAMUSCULAR; INTRAVENOUS; SUBCUTANEOUS at 21:36

## 2022-12-28 RX ADMIN — INSULIN LISPRO 1 UNITS: 100 INJECTION, SOLUTION INTRAVENOUS; SUBCUTANEOUS at 12:49

## 2022-12-28 ASSESSMENT — PAIN SCALES - GENERAL
PAINLEVEL_OUTOF10: 7
PAINLEVEL_OUTOF10: 3
PAINLEVEL_OUTOF10: 7
PAINLEVEL_OUTOF10: 10
PAINLEVEL_OUTOF10: 7
PAINLEVEL_OUTOF10: 3
PAINLEVEL_OUTOF10: 3
PAINLEVEL_OUTOF10: 9
PAINLEVEL_OUTOF10: 3
PAINLEVEL_OUTOF10: 9
PAINLEVEL_OUTOF10: 7
PAINLEVEL_OUTOF10: 2
PAINLEVEL_OUTOF10: 6
PAINLEVEL_OUTOF10: 3
PAINLEVEL_OUTOF10: 0

## 2022-12-28 ASSESSMENT — PAIN DESCRIPTION - LOCATION
LOCATION: CHEST
LOCATION: CHEST;MOUTH
LOCATION: CHEST
LOCATION: MOUTH
LOCATION: CHEST
LOCATION: MOUTH
LOCATION: CHEST

## 2022-12-28 ASSESSMENT — PAIN DESCRIPTION - ORIENTATION
ORIENTATION: LEFT
ORIENTATION: MID
ORIENTATION: LEFT
ORIENTATION: LEFT
ORIENTATION: LEFT;MID
ORIENTATION: MID
ORIENTATION: LEFT

## 2022-12-28 ASSESSMENT — PAIN - FUNCTIONAL ASSESSMENT
PAIN_FUNCTIONAL_ASSESSMENT: ACTIVITIES ARE NOT PREVENTED
PAIN_FUNCTIONAL_ASSESSMENT: PREVENTS OR INTERFERES SOME ACTIVE ACTIVITIES AND ADLS
PAIN_FUNCTIONAL_ASSESSMENT: ACTIVITIES ARE NOT PREVENTED
PAIN_FUNCTIONAL_ASSESSMENT: PREVENTS OR INTERFERES SOME ACTIVE ACTIVITIES AND ADLS

## 2022-12-28 ASSESSMENT — PAIN DESCRIPTION - DESCRIPTORS
DESCRIPTORS: ACHING

## 2022-12-28 NOTE — PROGRESS NOTES
Hospitalist Progress Note   Admit Date:  2022  5:57 PM   Name:  Quintin Martinez   Age:  45 y.o. Sex:  female  :  1970   MRN:  [id number]   Room:  [facility]/    Presenting Complaint: positive blood cultures  Reason(s) for Admission: MSSA bacteremia [R78.81, B95.61]     Hospital Course & Interval History:   Per previous note: Patient is a 78-year-old female with history of chronic pancreatitis, DM2, HTN, anxiety, gastroparesis status post recent port exchange on  admitted on  from [facility] for MSSA bacteremia and ID consult. Patient had a recent oral surgery for dental extraction on 2022 with Penrose drain placement. Patient was on Augmentin and Amoxil since the procedure. Patient was found to have positive blood culture with MSSA on 2022, repeat blood cultures from  negative to date. . Patient was given IV vancomycin and Unasyn on  and started on Ancef on 2022. Jerzy Power Penrose drain since removed, status post CT neck without any evidence of abscess. Echo was unremarkable for any evidence of infective endocarditis, showed moderate pulmonary hypertension. Subjective/24hr Events (22): Reports pain 7/10 but able to tolerate diet. Denies fever, chills, n/v. Wants something different for pain   Assessment & Plan:     Principal Problem:    MSSA bacteremia  -  -Repeat blood cultures from  negative so far. Continue Ancef -  Continue probiotics to prevent antibiotic associated diarrhea. Status post port removal on . Sutures can be removed by home health aide RN  ID on board, appreciate their help.  22  -Bcx redrawn today   -Per ID if repeat Bcx negative patient will likely have 2 weeks of IV abx. Hold PICC placement for now          Dental infection:  -Followed by oral surgery   -Penrose drain since removed.  Port removed  with tip culture NGTD         Active Problems:    Class 1 obesity with serious comorbidity and body mass index (BMI) of 31.0 to 31.9 in adult  Plan:   -Noted, increases all cause mortality morbidity.  -Patient encouraged to lose weight. Chronic pancreatitis ([address]    Gastroparesis due to DM [facility])  Plan:   -Diet as tolerant  -Patient stable        Anemia  12/28/22  -Anemia labs in a.m.  -No obvious blood loss        Pulmonary hypertension ([address]  Plan:   Patient is currently on room air, does not appear to be volume overloaded. Type 2 diabetes mellitus with hyperglycemia, with long-term current use of insulin ([facility])  Plan:   -Monitor POC glucose QA CHS  -Continue sliding scale insulin for now. -Ranges acceptable         Hypertension  12/27-  Blood pressure is optimally controlled. Continue Coreg 6.25 mg p.o. twice daily along with lisinopril 10 mg p.o. daily. Discharge Planning:    Pending clinical course. Diet:  ADULT DIET; Easy to Chew  DVT PPx: lovenox  Code status: Full Code    Hospital Problems             Last Modified POA    * (Principal) MSSA bacteremia 12/24/2022 Yes    Class 1 obesity with serious comorbidity and body mass index (BMI) of 31.0 to 31.9 in adult (Chronic) 12/24/2022 Yes    Gastroparesis due to DM ([address] (Chronic) 12/24/2022 Yes    Anemia (Chronic) 12/24/2022 Yes    Pulmonary hypertension ([address] (Chronic) 12/24/2022 Yes    Type 2 diabetes mellitus with hyperglycemia, with long-term current use of insulin ([address] 12/24/2022 Yes    Overview Signed 7/19/2022  2:48 PM by Rebecca Crowley DO     Followed by endocrinology OP. Recommends avoidance of GLP-1RA agonist d/t h/o chronic pancreatitis. 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 No longer on pancreatic enzyme replacement. Hypertension 12/24/2022 Yes     Objective:   Patient Vitals for the past 24 hrs:   Temp Pulse Resp BP SpO2   12/28/22 1346 -- -- 18 139/72 --   12/28/22 1214 98.2 °F (36.8 °C) 78 17 119/76 98 %   12/28/22 1119 97.6 °F (36.4 °C) 79 17 114/64 97 %   12/28/22 1001 -- -- 20 -- --   12/28/22 0802 98 °F (36.7 °C) 81 16 120/66 98 %   12/28/22 0325 98.1 °F (36.7 °C) 79 16 (!) 109/56 97 %   12/28/22 0204 -- -- 17 -- --   12/27/22 2353 97.5 °F (36.4 °C) 79 18 115/61 98 %   12/27/22 2148 -- -- 17 -- --   12/27/22 2012 98.2 °F (36.8 °C) 73 16 127/66 99 %   12/27/22 1620 98.4 °F (36.9 °C) 69 19 133/75 97 %       Estimated body mass index is 29.26 kg/m² as calculated from the following:    Height as of this encounter: 5' 2\" (1.575 m). Weight as of this encounter: 160 lb (72.6 kg). Intake/Output Summary (Last 24 hours) at 12/28/2022 1608  Last data filed at 12/28/2022 1214  Gross per 24 hour   Intake --   Output 1800 ml   Net -1800 ml         Physical Exam:     Blood pressure 139/72, pulse 78, temperature 98.2 °F (36.8 °C), temperature source Oral, resp. rate 18, height 5' 2\" (1.575 m), weight 160 lb (72.6 kg), SpO2 98 %. General:    Alert, awake, NAD, on room air  Head:  Normocephalic, atraumatic  Eyes:  Sclerae appear normal. Pupils equally round. ENT:  Nares appear normal, no drainage. Moist oral mucosa  Neck:  No restricted ROM. Trachea midline. CV:   RRR. No m/r/g. No jugular venous distension. Lungs:   CTAB. No wheezing, rhonchi, or rales. Respirations even, unlabored. Abdomen: Bowel sounds present. Soft, nontender, nondistended. Extremities: No cyanosis or clubbing. No edema. Skin:     No rashes and normal coloration. Warm and dry. Suture with dressing noted on left upper anterior chest, status post port removal.  Neuro:  CN II-XII grossly intact. Sensation intact. A&Ox3  Psych:  Normal mood and affect.       I have reviewed ordered lab tests and independently visualized imaging below:    Recent Labs:  Recent Results (from the past 48 hour(s))   POCT Glucose    Collection Time: 12/26/22  8:25 PM   Result Value Ref Range    POC Glucose 221 (H) 65 - 100 mg/dL    Performed by: Karley Larios    POCT Glucose    Collection Time: 12/27/22  9:38 AM   Result Value Ref Range    POC Glucose 204 (H) 65 - 100 mg/dL    Performed by: Duane Gomez, Catheter Tip    Collection Time: 12/27/22 10:05 AM    Specimen: Catheter Tip   Result Value Ref Range    Special Requests NO SPECIAL REQUESTS      Culture NO GROWTH 1 DAY     POCT Glucose    Collection Time: 12/27/22 11:32 AM   Result Value Ref Range    POC Glucose 181 (H) 65 - 100 mg/dL    Performed by: Jh    POCT Glucose    Collection Time: 12/27/22  4:32 PM   Result Value Ref Range    POC Glucose 218 (H) 65 - 100 mg/dL    Performed by: Jh    POCT Glucose    Collection Time: 12/27/22  8:15 PM   Result Value Ref Range    POC Glucose 186 (H) 65 - 100 mg/dL    Performed by: Rowena    POCT Glucose    Collection Time: 12/28/22  8:05 AM   Result Value Ref Range    POC Glucose 182 (H) 65 - 100 mg/dL    Performed by: Ravinder    POCT Glucose    Collection Time: 12/28/22 12:12 PM   Result Value Ref Range    POC Glucose 202 (H) 65 - 100 mg/dL    Performed by: NileshaPCT    POCT Glucose    Collection Time: 12/28/22  3:56 PM   Result Value Ref Range    POC Glucose 161 (H) 65 - 100 mg/dL    Performed by: Cara Pacheco          Other Studies:  No results found.     Current Meds:  Current Facility-Administered Medications   Medication Dose Route Frequency    0.9 % sodium chloride infusion    Continuous PRN    lactobacillus acidophilus (FLORANEX) 4 tablet  4 tablet Oral TID    zolpidem (AMBIEN) tablet 10 mg  10 mg Oral Nightly PRN    enoxaparin (LOVENOX) injection 40 mg  40 mg SubCUTAneous Daily    sodium chloride flush 0.9 % injection 5-40 mL  5-40 mL IntraVENous 2 times per day    sodium chloride flush 0.9 % injection 5-40 mL  5-40 mL IntraVENous PRN    0.9 % sodium chloride infusion   IntraVENous PRN    ondansetron (ZOFRAN-ODT) disintegrating tablet 4 mg  4 mg Oral Q8H PRN    Or    ondansetron (ZOFRAN) injection 4 mg  4 mg IntraVENous Q6H PRN    polyethylene glycol (GLYCOLAX) packet 17 g  17 g Oral Daily PRN    acetaminophen (TYLENOL) tablet 650 mg  650 mg Oral Q6H PRN    Or    acetaminophen (TYLENOL) suppository 650 mg  650 mg Rectal Q6H PRN    glucose chewable tablet 16 g  4 tablet Oral PRN    dextrose bolus 10% 125 mL  125 mL IntraVENous PRN    Or    dextrose bolus 10% 250 mL  250 mL IntraVENous PRN    glucagon (rDNA) injection 1 mg  1 mg SubCUTAneous PRN    dextrose 10 % infusion   IntraVENous Continuous PRN    insulin lispro (HUMALOG) injection vial 0-4 Units  0-4 Units SubCUTAneous TID WC    insulin lispro (HUMALOG) injection vial 0-4 Units  0-4 Units SubCUTAneous Nightly    ceFAZolin (ANCEF) 2000 mg in sterile water 20 mL IV syringe  2,000 mg IntraVENous Q8H    HYDROmorphone HCl PF (DILAUDID) injection 0.5 mg  0.5 mg IntraVENous Q4H PRN    diphenhydrAMINE (BENADRYL) capsule 25 mg  25 mg Oral Q8H PRN    LORazepam (ATIVAN) tablet 1 mg  1 mg Oral Q6H PRN    carvedilol (COREG) tablet 6.25 mg  6.25 mg Oral BID WC    lisinopril (PRINIVIL;ZESTRIL) tablet 10 mg  10 mg Oral Daily    pantoprazole (PROTONIX) tablet 40 mg  40 mg Oral BID AC       Signed:  MARISOL Reddy - CNP    Part of this note may have been written by using a voice dictation software. The note has been proof read but may still contain some grammatical/other typographical errors.

## 2022-12-28 NOTE — PROGRESS NOTES
Infectious Disease Progress Note  Today's Date: 12/28/2022   Admit Date: 12/24/2022    Impression:   MSSA bacteremia: not usual oral trixie, I think the port is more likely source. Site looks bruised but not being used regularly and there appears to have been some concern over last several months about the site integrity. Is not using port regularly now so agree with removal.  No other sites on exam or ROS suggesting other source or seeded sites. Blood cultures collected 12/25/22 with NGTD. Patient had port removed 12/27/22 and catheter tip culture has NGTD. TTE 12/23/22 with no mention of vegetation. Plan:   Continue Ancef   Follow blood culture results that were collected this morning. If repeat blood cultures remain negative and s/p port removal, anticipate 2 weeks IV ABX at home with home health. Will eventually need PICC line but hold on PICC placement for now as port was just removed yessterday and repeat cultures are pending. ID following    Anti-infectives: Ancef 12/24-  Unasyn 12/23  Vanc 12/23    Subjective: Interval events:  Afebrile. Patient has no specific complaints other than she would like to be discharged soon. Patient is a 45 y.o. female with a history of chronic pancreatitis and Tyep 2 DM with gastroparesis. She has a port for TPN (placed 2018)who was admitted on 12/24/2022 after being transferred from [facility] at [facility] for MSSA bacteremia for an ID consult. Had + blood cx there on 12/22, repeats 12/23 NGTD at transfer although I find no such cultures in Care Everywhere. She had no cultures done here. TTE at [facility] does not show vegetation or significant valve dysfunction. Had dental surgery/extractions and implants placed on 12/7/22 and required penrose. Treated with oral ABX. She has port in L chest that is not really used regularly now. It is not hurting or hot but looks somewhat bruised.   She notes that within the last several months there has been some concern that the skin had a break in it here.      Patient Active Problem List   Diagnosis    GERD with stricture    Type 2 diabetes mellitus with hyperglycemia, with long-term current use of insulin (HCC)    Sphincter of Oddi dysfunction    Iron deficiency anemia    S/P exploratory laparotomy    Chronic pancreatitis ([address]    Hypertension    Class 1 obesity with serious comorbidity and body mass index (BMI) of 31.0 to 31.9 in adult    Hypokalemia due to excessive gastrointestinal loss of potassium    Microcytic anemia    S/P balloon dilatation of esophageal stricture    Infection of PEG site ([address]    Gastroparesis due to DM ([address]    MSSA bacteremia    Anemia    Pulmonary hypertension ([address]     Past Medical History:   Diagnosis Date    Anemia     blood transfusion 2013 X1 d/t \"perforated bowel\"-- Fe infusions 12/2017--- denies any current iron infusions 12/17/2019    Anxiety and depression     managed with meds    C. difficile diarrhea 2013    in 8186 after complicated ERCP    Cervical dysplasia 1990s    Chronic insomnia     ambien     Chronic nausea     Chronic pain     all over     Chronic pancreatitis ([address]     COVID-19 vaccine series completed 04/21/2021    3/29/2021 Pfizer     Dehydration     IVFs through port as needed for this     Encounter for insertion of venous access port     Left chest port    Esophageal stricture 2017    Fibromyalgia     managed with meds    Former cigarette smoker     GERD (gastroesophageal reflux disease)     controlled with med    History of blood transfusion     History of kidney stones 03/2021    X1-naturally pass    HLD (hyperlipidemia)     pt denies     Hypertension     IBS (irritable bowel syndrome)     Migraine headache     does not have often but did have one recently; just takes tylenol    Nausea & vomiting     pt reports she does better with phenergan than zofran - causes HA    Nonalcoholic fatty liver disease     Pancreatitis 06/23/2014    PUD (peptic ulcer disease) 2017    still having issues takes meds     Severe protein-calorie malnutrition ([address] 2013    Sinus tachycardia     per pt-- no tx needed    Sphincter of Oddi dysfunction     Type 2 diabetes mellitus (HCC)     type 2-- sqbs am avg 140-160-- Hypo symptoms at <100      Family History   Problem Relation Age of Onset    No Known Problems Sister     Coronary Art Dis Father 76    Stroke Father     Hypertension Father     Diabetes Father     Heart Disease Mother         cabg began in her 52's    Hypertension Mother     Diabetes Mother     Other Father       Social History     Tobacco Use    Smoking status: Former     Packs/day: 1.00     Types: Cigarettes     Quit date: 1993     Years since quittin.6    Smokeless tobacco: Never   Substance Use Topics    Alcohol use: No     Past Surgical History:   Procedure Laterality Date    CARPAL TUNNEL RELEASE Right     along with trigger finger    CHOLECYSTECTOMY, LAPAROSCOPIC      COLONOSCOPY      COLONOSCOPY N/A 2018    COLONOSCOPY performed by Naveen Escobar MD at [facility] ENDOSCOPY    ERCP  3/5/2013    resulted in perforated duodenum    GASTROSTOMY TUBE PLACEMENT N/A 2022    EGD PEG TUBE PLACEMENT with j tube placement ROOM 207 performed by Shelba Heimlich, MD at [address] [place] [address] ([address] [place] [address])  1998    ovaries remain    IR DRAIN SKIN ABSCESS      IR PORT PLACEMENT EQUAL OR GREATER THAN 5 YEARS  2018    IR PORT PLACEMENT EQUAL OR GREATER THAN 5 YEARS 2018 [facility]    LAPAROTOMY  3/5/2013    exploratory for duodenal perforation with ERCP    ORTHOPEDIC SURGERY      right finger surgery 2017    OTHER SURGICAL HISTORY Bilateral     thumb    OTHER SURGICAL HISTORY      Kidney stones 2021    [place] ABDOMEN SURGERY PROC UNLISTED      explor lap    [place] ABDOMEN SURGERY PROC UNLISTED      lap nissen    [place] ABDOMEN SURGERY PROC UNLISTED  last one placed      Hx of pancreatic stent, multiple    TOTAL COLECTOMY      UPPER GASTROINTESTINAL ENDOSCOPY  5/21/2021         UPPER GASTROINTESTINAL ENDOSCOPY  2017    36 fr cecily    UPPER GASTROINTESTINAL ENDOSCOPY  12/18/2019         UPPER GASTROINTESTINAL ENDOSCOPY N/A 6/8/2022    EGD ESOPHAGOGASTRODUODENOSCOPY DILATATION/ 34 performed by Janey Estevez MD at [address] [place] [address],[facility] N/A 7/22/2022    ENDOSCOPIC ULTRASOUND/35 ROOM 207 performed by Janey Estevez MD at [address] [place] [address],[facility] 7/22/2022    EGD BIOPSY With balloon dialtion performed by Janey Estevez MD at [address] [place] [address],[facility] N/A 8/10/2022    EGD ESOPHAGOGASTRODUODENOSCOPY performed by Janey Estevez MD at [address] [place] [address],[facility] N/A 11/23/2022    EGD ESOPHAGOGASTRODUODENOSCOPY DILATATION/34 performed by Janey Estevez MD at [address]  4/25/13 at [facility]-- stent removed 6-27-13. Dr Cadence Delacruz  2014    port insertion, left chest wall      Prior to Admission medications    Medication Sig Start Date End Date Taking? Authorizing Provider   carvedilol (COREG) 6.25 MG tablet Take 6.25 mg by mouth 2 times daily (with meals)    Historical Provider, MD   ondansetron (ZOFRAN) 8 MG tablet Take 1 tablet by mouth 3 times daily as needed for Nausea or Vomiting 10/27/22   Brooke Jasso MD   Hyoscyamine Sulfate SL (LEVSIN/SL) 0.125 MG SUBL Place 0.25 mg under the tongue 4 times daily as needed (abdominal pain or cramping) 10/12/22   Brooke Jasso MD   tiZANidine (ZANAFLEX) 4 MG tablet Take 4 mg by mouth every 6 hours as needed    Historical Provider, MD   promethazine (PHENERGAN) 25 MG tablet Take 1 tablet by mouth every 8 hours as needed for Nausea 7/28/22   Brown Min MD   pantoprazole (PROTONIX) 40 MG tablet Take 1 tablet by mouth in the morning and 1 tablet before bedtime.  7/28/22   Brown Min MD   lisinopril (PRINIVIL;ZESTRIL) 10 MG tablet Take 1 tablet by mouth in the morning. 7/29/22   Mary Reyez MD   citalopram (CELEXA) 40 MG tablet Take 1 tablet by mouth daily 7/28/22 8/12/22  Mary Reyez MD   cloNIDine (CATAPRES) 0.3 MG/24HR PTWK Place 1 patch onto the skin once a week 8/4/22 8/10/22  Mary Reyez MD   blood glucose test strips (EXACTECH TEST) strip TEST BLOOD SUGAR FOUR TIMES DAILY 8/24/20   Historical Provider, MD   acetaminophen (TYLENOL) 500 MG tablet Take by mouth every 6 hours as needed    Historical Provider, MD   cyanocobalamin 1000 MCG/ML injection INJECT 1 VIAL INTRAMUSCULARLY FOR 4 WEEKS THEN MONTHLY 11/25/17   Historical Provider, MD   diclofenac sodium (VOLTAREN) 1 % GEL APPLY 1 GRAM TO AFFECTED AREA 4 TIMES A DAY AS NEEDED 10/2/18   Historical Provider, MD   diphenhydrAMINE (BENADRYL) 25 MG capsule Take 25 mg by mouth every 6 hours as needed    Historical Provider, MD   glucagon 1 MG injection Inject 1 mg into the muscle as needed 5/8/17   Historical Provider, MD   Hyoscyamine Sulfate SL 0.125 MG SUBL Place 0.125 mg under the tongue every 6 hours as needed    Historical Provider, MD   influenza quadrivalent split vaccine (FLUZONE;FLUARIX;FLULAVAL;AFLURIA) 0.5 ML injection Inject into the muscle 11/9/21   Historical Provider, MD   insulin aspart (NOVOLOG) 100 UNIT/ML injection pen 15 units plus sliding scale for blood sugar >151 10/7/14   Historical Provider, MD   Insulin Degludec 100 UNIT/ML SOPN Inject 58 Units into the skin at bedtime 10/27/21   Historical Provider, MD   lidocaine viscous hcl (XYLOCAINE) 2 % SOLN solution 10 mLs every 6 hours as needed 4/28/21   Historical Provider, MD   LORazepam (ATIVAN) 1 MG tablet Take 1 mg by mouth in the morning, at noon, and at bedtime.  10/7/14   Historical Provider, MD   naloxone 4 MG/0.1ML LIQD nasal spray 4 mg once as needed 2/16/22   Historical Provider, MD   ondansetron (ZOFRAN-ODT) 8 MG TBDP disintegrating tablet Place 8 mg under the tongue 3 times daily 16   Historical Provider, MD   polyethylene glycol (GLYCOLAX) 17 GM/SCOOP powder Take 17 g by mouth as needed    Historical Provider, MD   pregabalin (LYRICA) 100 MG capsule Take 100 mg by mouth 2 times daily. Historical Provider, MD   sodium chloride 0.9 % infusion Infuse intravenously as needed    Historical Provider, MD   zolpidem (AMBIEN) 10 MG tablet Take 10 mg by mouth. 22   Historical Provider, MD   gabapentin (NEURONTIN) 250 MG/5ML solution 300 mg by Enteral route 3 times daily. Patient not taking: No sig reported 10/7/14 7/28/22  Historical Provider, MD   insulin NPH (HUMULIN N;NOVOLIN N) 100 UNIT/ML injection pen Please inject 12 units into the skin with breakfast and 4 units with bedtime. (pen)  Patient not taking: No sig reported 10/7/14 7/28/22  Historical Provider, MD   sucralfate (CARAFATE) 1 GM/10ML suspension TAKE 10 ML BY MOUTH 4 TIMES A DAY EVERY DAY ON A EMPTY STOMACH 1 HR BEFORE MEALS AND AT BEDTIME 17  Historical Provider, MD     Allergies   Allergen Reactions    Adhesive Tape Itching, Other (See Comments) and Rash     blisters  tegaderm  Paper tape too      Metronidazole Diarrhea and Nausea And Vomiting    Atorvastatin Myalgia    Iodine Other (See Comments)     Sweaty and clammy    Statins Myalgia    Barium Iodide Nausea And Vomiting     Diaphoresis      Barium Sulfate Palpitations    Insulin Lispro Nausea And Vomiting    Insulins Nausea And Vomiting     Humalog only    Metformin Nausea And Vomiting     Stomach pain  Stomach pain  Stomach pain  Stomach pain            Review of Systems:  10 point Ros negative other than what mentioned in HPI. A comprehensive review of systems was negative. Objective:   Blood pressure 120/66, pulse 81, temperature 98 °F (36.7 °C), temperature source Oral, resp. rate 16, height 5' 2\" (1.575 m), weight 160 lb (72.6 kg), SpO2 98 %.   Temp (24hrs), Av.1 °F (36.7 °C), Min:97.5 °F (36.4 °C), Max:98.5 °F (36.9 °C)       Lines: L subclavian port; not hot or red or swollen, scar over apex site appears healed, almost bruise like darker tissue color around port site    Exam:  Unchanged 12/28/22 unless noted below:     General: alert and cooperative, looks stated age, in NAD, lying in bed,   present   HEENT: PERRL, non-icteric sclera, no splinter hemorrhages, upper gum (midline) with mild redness. Neck: supple, symmetric, no masses or lymphadenopathy, bandaid under chin were drain was; no local redness or induration    Cardiac:Nl S1/S2, no murmurs/rubs/gallops/clicks, no LE edema   Pulmonary:clear bilaterally shun/wheezes, good air movement    Abdomen: soft, NT, non-distended, BS normal   Skin:no rashes, lesions or wounds, bandage over previous port site. Area with mild erythema. Sutures intact.   No drainage noted   MSK:no enlarged or swollen joints in limbs or sternoclavicular area   Extremities: no cords/clots/cyanosis, pulses palpable and symmetric    Psychiatric: mood and affect appropriate to situation       Data Review:   Recent Results (from the past 24 hour(s))   POCT Glucose    Collection Time: 12/27/22  9:38 AM   Result Value Ref Range    POC Glucose 204 (H) 65 - 100 mg/dL    Performed by: Trenna Hammans, Catheter Tip    Collection Time: 12/27/22 10:05 AM    Specimen: Catheter Tip   Result Value Ref Range    Special Requests NO SPECIAL REQUESTS      Culture NO GROWTH 1 DAY     POCT Glucose    Collection Time: 12/27/22 11:32 AM   Result Value Ref Range    POC Glucose 181 (H) 65 - 100 mg/dL    Performed by: Jh    POCT Glucose    Collection Time: 12/27/22  4:32 PM   Result Value Ref Range    POC Glucose 218 (H) 65 - 100 mg/dL    Performed by: Jh    POCT Glucose    Collection Time: 12/27/22  8:15 PM   Result Value Ref Range    POC Glucose 186 (H) 65 - 100 mg/dL    Performed by: Rowena    POCT Glucose    Collection Time: 12/28/22  8:05 AM   Result Value Ref Range    POC Glucose 182 (H) 65 - 100 mg/dL    Performed by: Ravinder         Microbiology:  [unfilled]        Studies/Imaging:  personally reviewed   12/23/22 TTE: Summary      Normal chamber sizes. Normal left ventricular systolic function. EF 60%    Normal diastolic function. Normal right ventricular function. No significant valve stenosis or regurgitation. No cardiac valve vegetations are seen. Evidence of moderate pulmonary hypertension. Findings      Mitral Valve    The mitral valve is normal in structure and function. There is no mitral valve stenosis. Trace mitral regurgitation. There is no vegetation seen on the mitral valve. Aortic Valve    The aortic valve is trileaflet. The aortic valve opens well. The aortic valve is normal in structure and function. No significant stenosis noted. No aortic regurgitation is present. There is no vegetation seen on the aortic valve. Tricuspid Valve    The tricuspid valve is normal in structure and function. There is no tricuspid stenosis. Trace tricuspid regurgitation. Estimated right ventricular systolic pressure is 46 mmHg. There is no vegetation seen on the tricuspid valve. Pulmonic Valve    The pulmonic valve is not well visualized. Left Atrium    Normal left atrium. Left Ventricle    The Left Ventricle is normal in size. There is no LV Thrombus seen. Normal left ventricular systolic function. EF 60%    The Left Ventricular wall motion is normal.    Normal Diastolic function.      Signed By: Yamilet Chavez, APRN - CNP     December 28, 2022

## 2022-12-29 LAB
ANION GAP SERPL CALC-SCNC: 7 MMOL/L (ref 2–11)
BACTERIA SPEC CULT: NORMAL
BUN SERPL-MCNC: 6 MG/DL (ref 6–23)
CALCIUM SERPL-MCNC: 9 MG/DL (ref 8.3–10.4)
CHLORIDE SERPL-SCNC: 109 MMOL/L (ref 101–110)
CO2 SERPL-SCNC: 22 MMOL/L (ref 21–32)
CREAT SERPL-MCNC: 0.6 MG/DL (ref 0.6–1)
ERYTHROCYTE [DISTWIDTH] IN BLOOD BY AUTOMATED COUNT: 17.2 % (ref 11.9–14.6)
FERRITIN SERPL-MCNC: 21 NG/ML (ref 8–388)
GLUCOSE BLD STRIP.AUTO-MCNC: 166 MG/DL (ref 65–100)
GLUCOSE BLD STRIP.AUTO-MCNC: 175 MG/DL (ref 65–100)
GLUCOSE BLD STRIP.AUTO-MCNC: 181 MG/DL (ref 65–100)
GLUCOSE BLD STRIP.AUTO-MCNC: 218 MG/DL (ref 65–100)
GLUCOSE SERPL-MCNC: 180 MG/DL (ref 65–100)
HCT VFR BLD AUTO: 31.3 % (ref 35.8–46.3)
HGB BLD-MCNC: 9 G/DL (ref 11.7–15.4)
IRON SATN MFR SERPL: 6 %
IRON SERPL-MCNC: 19 UG/DL (ref 35–150)
MCH RBC QN AUTO: 22.2 PG (ref 26.1–32.9)
MCHC RBC AUTO-ENTMCNC: 28.8 G/DL (ref 31.4–35)
MCV RBC AUTO: 77.3 FL (ref 82–102)
NRBC # BLD: 0 K/UL (ref 0–0.2)
PLATELET # BLD AUTO: 52 K/UL (ref 150–450)
PMV BLD AUTO: 11.1 FL (ref 9.4–12.3)
POTASSIUM SERPL-SCNC: 3.7 MMOL/L (ref 3.5–5.1)
RBC # BLD AUTO: 4.05 M/UL (ref 4.05–5.2)
SERVICE CMNT-IMP: ABNORMAL
SERVICE CMNT-IMP: NORMAL
SODIUM SERPL-SCNC: 138 MMOL/L (ref 133–143)
TIBC SERPL-MCNC: 327 UG/DL (ref 250–450)
WBC # BLD AUTO: 3.4 K/UL (ref 4.3–11.1)

## 2022-12-29 PROCEDURE — 6360000002 HC RX W HCPCS: Performed by: INTERNAL MEDICINE

## 2022-12-29 PROCEDURE — 82962 GLUCOSE BLOOD TEST: CPT

## 2022-12-29 PROCEDURE — 6360000002 HC RX W HCPCS: Performed by: HOSPITALIST

## 2022-12-29 PROCEDURE — 6370000000 HC RX 637 (ALT 250 FOR IP): Performed by: HOSPITALIST

## 2022-12-29 PROCEDURE — 80048 BASIC METABOLIC PNL TOTAL CA: CPT

## 2022-12-29 PROCEDURE — 6370000000 HC RX 637 (ALT 250 FOR IP): Performed by: INTERNAL MEDICINE

## 2022-12-29 PROCEDURE — 83550 IRON BINDING TEST: CPT

## 2022-12-29 PROCEDURE — 36415 COLL VENOUS BLD VENIPUNCTURE: CPT

## 2022-12-29 PROCEDURE — 82728 ASSAY OF FERRITIN: CPT

## 2022-12-29 PROCEDURE — 85027 COMPLETE CBC AUTOMATED: CPT

## 2022-12-29 PROCEDURE — 2580000003 HC RX 258: Performed by: INTERNAL MEDICINE

## 2022-12-29 PROCEDURE — 6370000000 HC RX 637 (ALT 250 FOR IP): Performed by: NURSE PRACTITIONER

## 2022-12-29 PROCEDURE — 1100000000 HC RM PRIVATE

## 2022-12-29 RX ORDER — FERROUS SULFATE 325(65) MG
325 TABLET ORAL 2 TIMES DAILY WITH MEALS
Status: DISCONTINUED | OUTPATIENT
Start: 2022-12-29 | End: 2022-12-30 | Stop reason: HOSPADM

## 2022-12-29 RX ORDER — TRAMADOL HYDROCHLORIDE 50 MG/1
50 TABLET ORAL EVERY 6 HOURS PRN
Status: DISCONTINUED | OUTPATIENT
Start: 2022-12-29 | End: 2022-12-30 | Stop reason: HOSPADM

## 2022-12-29 RX ORDER — ONDANSETRON 4 MG/1
4 TABLET, ORALLY DISINTEGRATING ORAL EVERY 4 HOURS PRN
Status: DISCONTINUED | OUTPATIENT
Start: 2022-12-29 | End: 2022-12-30 | Stop reason: HOSPADM

## 2022-12-29 RX ORDER — OXYCODONE HYDROCHLORIDE 5 MG/1
5 TABLET ORAL EVERY 4 HOURS PRN
Status: DISCONTINUED | OUTPATIENT
Start: 2022-12-29 | End: 2022-12-30 | Stop reason: HOSPADM

## 2022-12-29 RX ORDER — ONDANSETRON 2 MG/ML
4 INJECTION INTRAMUSCULAR; INTRAVENOUS EVERY 4 HOURS PRN
Status: DISCONTINUED | OUTPATIENT
Start: 2022-12-29 | End: 2022-12-30 | Stop reason: HOSPADM

## 2022-12-29 RX ADMIN — FERROUS SULFATE TAB 325 MG (65 MG ELEMENTAL FE) 325 MG: 325 (65 FE) TAB at 16:26

## 2022-12-29 RX ADMIN — CEFAZOLIN SODIUM 2000 MG: 100 INJECTION, POWDER, LYOPHILIZED, FOR SOLUTION INTRAVENOUS at 00:37

## 2022-12-29 RX ADMIN — CEFAZOLIN SODIUM 2000 MG: 100 INJECTION, POWDER, LYOPHILIZED, FOR SOLUTION INTRAVENOUS at 23:59

## 2022-12-29 RX ADMIN — CARVEDILOL 6.25 MG: 6.25 TABLET, FILM COATED ORAL at 16:28

## 2022-12-29 RX ADMIN — OXYCODONE 5 MG: 5 TABLET ORAL at 23:58

## 2022-12-29 RX ADMIN — OXYCODONE 5 MG: 5 TABLET ORAL at 10:18

## 2022-12-29 RX ADMIN — LACTOBACILLUS TAB 4 TABLET: TAB at 07:44

## 2022-12-29 RX ADMIN — ONDANSETRON 4 MG: 2 INJECTION INTRAMUSCULAR; INTRAVENOUS at 19:34

## 2022-12-29 RX ADMIN — CEFAZOLIN SODIUM 2000 MG: 100 INJECTION, POWDER, LYOPHILIZED, FOR SOLUTION INTRAVENOUS at 07:50

## 2022-12-29 RX ADMIN — LACTOBACILLUS TAB 4 TABLET: TAB at 14:32

## 2022-12-29 RX ADMIN — ONDANSETRON 4 MG: 4 TABLET, ORALLY DISINTEGRATING ORAL at 01:15

## 2022-12-29 RX ADMIN — HYDROMORPHONE HYDROCHLORIDE 0.5 MG: 1 INJECTION, SOLUTION INTRAMUSCULAR; INTRAVENOUS; SUBCUTANEOUS at 07:45

## 2022-12-29 RX ADMIN — PANTOPRAZOLE SODIUM 40 MG: 40 TABLET, DELAYED RELEASE ORAL at 05:43

## 2022-12-29 RX ADMIN — SODIUM CHLORIDE, PRESERVATIVE FREE 10 ML: 5 INJECTION INTRAVENOUS at 19:35

## 2022-12-29 RX ADMIN — CEFAZOLIN SODIUM 2000 MG: 100 INJECTION, POWDER, LYOPHILIZED, FOR SOLUTION INTRAVENOUS at 16:29

## 2022-12-29 RX ADMIN — ENOXAPARIN SODIUM 40 MG: 100 INJECTION SUBCUTANEOUS at 07:45

## 2022-12-29 RX ADMIN — ONDANSETRON 4 MG: 4 TABLET, ORALLY DISINTEGRATING ORAL at 12:10

## 2022-12-29 RX ADMIN — HYDROMORPHONE HYDROCHLORIDE 0.5 MG: 1 INJECTION, SOLUTION INTRAMUSCULAR; INTRAVENOUS; SUBCUTANEOUS at 02:55

## 2022-12-29 RX ADMIN — PANTOPRAZOLE SODIUM 40 MG: 40 TABLET, DELAYED RELEASE ORAL at 16:27

## 2022-12-29 RX ADMIN — LORAZEPAM 1 MG: 1 TABLET ORAL at 16:44

## 2022-12-29 RX ADMIN — OXYCODONE 5 MG: 5 TABLET ORAL at 16:26

## 2022-12-29 RX ADMIN — OXYCODONE 5 MG: 5 TABLET ORAL at 01:16

## 2022-12-29 RX ADMIN — ZOLPIDEM TARTRATE 10 MG: 5 TABLET ORAL at 22:11

## 2022-12-29 RX ADMIN — LACTOBACILLUS TAB 4 TABLET: TAB at 19:34

## 2022-12-29 RX ADMIN — SODIUM CHLORIDE, PRESERVATIVE FREE 10 ML: 5 INJECTION INTRAVENOUS at 07:30

## 2022-12-29 RX ADMIN — CARVEDILOL 6.25 MG: 6.25 TABLET, FILM COATED ORAL at 07:47

## 2022-12-29 RX ADMIN — LISINOPRIL 10 MG: 5 TABLET ORAL at 07:44

## 2022-12-29 RX ADMIN — HYDROMORPHONE HYDROCHLORIDE 0.5 MG: 1 INJECTION, SOLUTION INTRAMUSCULAR; INTRAVENOUS; SUBCUTANEOUS at 12:23

## 2022-12-29 ASSESSMENT — PAIN DESCRIPTION - LOCATION
LOCATION: MOUTH
LOCATION: GENERALIZED
LOCATION: CHEST;MOUTH
LOCATION: MOUTH;CHEST
LOCATION: GENERALIZED
LOCATION: ARM

## 2022-12-29 ASSESSMENT — PAIN SCALES - GENERAL
PAINLEVEL_OUTOF10: 6
PAINLEVEL_OUTOF10: 7
PAINLEVEL_OUTOF10: 3
PAINLEVEL_OUTOF10: 6
PAINLEVEL_OUTOF10: 9
PAINLEVEL_OUTOF10: 6
PAINLEVEL_OUTOF10: 3
PAINLEVEL_OUTOF10: 6
PAINLEVEL_OUTOF10: 3

## 2022-12-29 ASSESSMENT — PAIN - FUNCTIONAL ASSESSMENT
PAIN_FUNCTIONAL_ASSESSMENT: ACTIVITIES ARE NOT PREVENTED

## 2022-12-29 ASSESSMENT — PAIN DESCRIPTION - ORIENTATION
ORIENTATION: LEFT
ORIENTATION: RIGHT
ORIENTATION: LEFT
ORIENTATION: LEFT

## 2022-12-29 ASSESSMENT — PAIN DESCRIPTION - DESCRIPTORS
DESCRIPTORS: ACHING

## 2022-12-29 ASSESSMENT — PAIN DESCRIPTION - PAIN TYPE: TYPE: ACUTE PAIN

## 2022-12-29 NOTE — PLAN OF CARE
Today has been uneventful. oxycodone x2  Dilaudid x2  Ativan x1  Zofran x1    Patient has remained A&O x 4. Patient educated on new medication. Patient rounded on hourly by myself or patient assistant and encouraged to call with any needs. No signs of distress noted. Bed low, locked, call bailey within reach.    Adi Penn RN    Problem: Safety - Adult  Goal: Free from fall injury  Outcome: Progressing     Problem: ABCDS Injury Assessment  Goal: Absence of physical injury  Outcome: Progressing  Flowsheets (Taken 12/29/2022 0755)  Absence of Physical Injury: Implement safety measures based on patient assessment     Problem: Pain  Goal: Verbalizes/displays adequate comfort level or baseline comfort level  Outcome: Progressing     Problem: Chronic Conditions and Co-morbidities  Goal: Patient's chronic conditions and co-morbidity symptoms are monitored and maintained or improved  Outcome: Progressing

## 2022-12-29 NOTE — PLAN OF CARE
Patient has remained A&O x 4. Pt states that dilaudid helps for a short while but that her pain is moderate to severe 1-1.5 hours before the next dosage due. NP made aware and oxycodone added. Dressing to left chest changed since it was saturated during shower. Site cleansed with saline and new dressing applied. Dressing has remained CDI, no drainage noted. Patient rounded on hourly by myself or patient assistant and encouraged to call with any needs. No signs of distress noted. Bed low, locked, call bailey within reach.    Sunshine Clay RN    Problem: Safety - Adult  Goal: Free from fall injury  Outcome: Progressing     Problem: ABCDS Injury Assessment  Goal: Absence of physical injury  Outcome: Progressing  Flowsheets (Taken 12/28/2022 0830)  Absence of Physical Injury: Implement safety measures based on patient assessment     Problem: Pain  Goal: Verbalizes/displays adequate comfort level or baseline comfort level  Outcome: Progressing     Problem: Chronic Conditions and Co-morbidities  Goal: Patient's chronic conditions and co-morbidity symptoms are monitored and maintained or improved  Outcome: Progressing

## 2022-12-29 NOTE — PROGRESS NOTES
VERY TENTATIVE OPAT ORDERS SINCE HOLIDAY APPROACHING  IF BLOOD CX 12/27 REMAIN NEGATIVE TOMORROW BY 10:30 am WILL PLAN ON BELOW      ID OPAT/OPFU    Patient: Frank Beckham MRN: [id number]  SSN: [id number]   YOB: 1970  Age: 45 y.o. Sex: female      Diagnosis:  MSSA bacteremia with port as presumed source   Admit date:  12/24/2022    Discharge Date: 12/29/2022     Admitting Physician: Phylicia Barber MD    Infectious Disease Physician:  Suman Mehta MD    Assessment and Plan:  1) Ancef 2 gm IV every 24 hours with EOT 1/10/2023  2) Routine PICC Care  3) Labs:  Every Monday: CBC with diff, Creatinine and LFT's       Please fax results to [phone]. Follow Up: Follow-up with [facility] IS NOT required.

## 2022-12-29 NOTE — PROGRESS NOTES
Infectious Disease Progress Note  Today's Date: 12/29/2022   Admit Date: 12/24/2022    Impression:   MSSA bacteremia: not usual oral trixie, I think the port is more likely source. Site looks bruised but not being used regularly and there appears to have been some concern over last several months about the site integrity. Port removed 12/27, tip cx negative. Repeat blood cultures collected 12/25/22 with NGTD at day 4. TTE 12/23/22 with no mention of vegetation. Plan:   Continue Ancef   Follow 12/28 blood cx from post port removal  If 12/28 blood cx still negative tomorrow AM around 10:30 AM, ok to place PICC line. Plan 2 weeks of ABX from 12/28. Tentative OPAT orders submitted given approach another holiday weekend. Patient: Frank Beckham        MRN: [id number]        SSN: [id number]  Assessment and Plan:  1) Ancef 2 gm IV every 24 hours with EOT 1/10/2023  2) Routine PICC Care  3) Labs:  Every Monday: CBC with diff, Creatinine and LFT's                  Please fax results to [phone]. Follow Up: Follow-up with [facility] IS NOT required. Will order PICC for tomorrow if blood cx remain NGTD  IF blood cx negative tomorrow and PICC and home health can be set up she can be discharged from ID standpoint      ID following    Anti-infectives: Ancef 12/24-  Unasyn 12/23  Vanc 12/23    Subjective: Interval events:  Afebrile. No complaints, port site ok      Patient is a 45 y.o. female with a history of chronic pancreatitis and Tyep 2 DM with gastroparesis. She has a port for TPN (placed 2018)who was admitted on 12/24/2022 after being transferred from [facility] at [facility] for MSSA bacteremia for an ID consult. Had + blood cx there on 12/22, repeats 12/23 NGTD at transfer although I find no such cultures in Care Everywhere. She had no cultures done here. TTE at [facility] does not show vegetation or significant valve dysfunction.      Had dental surgery/extractions and implants placed on 12/7/22 and required penrose. Treated with oral ABX. She has port in L chest that is not really used regularly now. It is not hurting or hot but looks somewhat bruised. She notes that within the last several months there has been some concern that the skin had a break in it here.      Patient Active Problem List   Diagnosis    GERD with stricture    Type 2 diabetes mellitus with hyperglycemia, with long-term current use of insulin (HCC)    Sphincter of Oddi dysfunction    Iron deficiency anemia    S/P exploratory laparotomy    Chronic pancreatitis ([address]    Hypertension    Class 1 obesity with serious comorbidity and body mass index (BMI) of 31.0 to 31.9 in adult    Hypokalemia due to excessive gastrointestinal loss of potassium    Microcytic anemia    S/P balloon dilatation of esophageal stricture    Infection of PEG site ([address]    Gastroparesis due to DM ([address]    MSSA bacteremia    Anemia    Pulmonary hypertension ([address]     Past Medical History:   Diagnosis Date    Anemia     blood transfusion 2013 X1 d/t \"perforated bowel\"-- Fe infusions 12/2017--- denies any current iron infusions 12/17/2019    Anxiety and depression     managed with meds    C. difficile diarrhea 2013    in 8308 after complicated ERCP    Cervical dysplasia 1990s    Chronic insomnia     ambien     Chronic nausea     Chronic pain     all over     Chronic pancreatitis ([address]     COVID-19 vaccine series completed 04/21/2021    3/29/2021 Pfizer     Dehydration     IVFs through port as needed for this     Encounter for insertion of venous access port     Left chest port    Esophageal stricture 2017    Fibromyalgia     managed with meds    Former cigarette smoker     GERD (gastroesophageal reflux disease)     controlled with med    History of blood transfusion     History of kidney stones 03/2021    X1-naturally pass    HLD (hyperlipidemia)     pt denies     Hypertension     IBS (irritable bowel syndrome) Migraine headache     does not have often but did have one recently; just takes tylenol    Nausea & vomiting     pt reports she does better with phenergan than zofran - causes HA    Nonalcoholic fatty liver disease     Pancreatitis 2014    PUD (peptic ulcer disease) 2017    still having issues takes meds     Severe protein-calorie malnutrition ([facility] 2013    Sinus tachycardia     per pt-- no tx needed    Sphincter of Oddi dysfunction     Type 2 diabetes mellitus (HCC)     type 2-- sqbs am avg 140-160-- Hypo symptoms at <100      Family History   Problem Relation Age of Onset    No Known Problems Sister     Coronary Art Dis Father 76    Stroke Father     Hypertension Father     Diabetes Father     Heart Disease Mother         cabg began in her 52's    Hypertension Mother     Diabetes Mother     Other Father       Social History     Tobacco Use    Smoking status: Former     Packs/day: 1.00     Types: Cigarettes     Quit date: 1993     Years since quittin.7    Smokeless tobacco: Never   Substance Use Topics    Alcohol use: No     Past Surgical History:   Procedure Laterality Date    CARPAL TUNNEL RELEASE Right     along with trigger finger    CHOLECYSTECTOMY, LAPAROSCOPIC      COLONOSCOPY      COLONOSCOPY N/A 2018    COLONOSCOPY performed by Dalila Archer MD at [facility] ENDOSCOPY    ERCP  3/5/2013    resulted in perforated duodenum    GASTROSTOMY TUBE PLACEMENT N/A 2022    EGD PEG TUBE PLACEMENT with j tube placement ROOM 207 performed by Edis Hayes MD at [address] [facility] ([facility])  1998    ovaries remain    IR DRAIN SKIN ABSCESS      IR PORT PLACEMENT EQUAL OR GREATER THAN 5 YEARS  2018    IR PORT PLACEMENT EQUAL OR GREATER THAN 5 YEARS 2018 [facility]    LAPAROTOMY  3/5/2013    exploratory for duodenal perforation with ERCP    ORTHOPEDIC SURGERY      right finger surgery 2017    OTHER SURGICAL HISTORY Bilateral     thumb    OTHER SURGICAL HISTORY      Kidney stones march 2021    [place] ABDOMEN SURGERY PROC UNLISTED  4/13    explor lap    [place] ABDOMEN SURGERY PROC UNLISTED  1997    lap nissen    [place] ABDOMEN SURGERY PROC UNLISTED  last one placed  2012    Hx of pancreatic stent, multiple    TOTAL COLECTOMY      UPPER GASTROINTESTINAL ENDOSCOPY  5/21/2021         UPPER GASTROINTESTINAL ENDOSCOPY  2017    36 fr tony    UPPER GASTROINTESTINAL ENDOSCOPY  12/18/2019         UPPER GASTROINTESTINAL ENDOSCOPY N/A 6/8/2022    EGD ESOPHAGOGASTRODUODENOSCOPY DILATATION/ 34 performed by Chandrakant Brooks MD at [place] [address] N/A 7/22/2022    ENDOSCOPIC ULTRASOUND/35 ROOM 207 performed by Chandrakant Brooks MD at [place] [address] 7/22/2022    EGD BIOPSY With balloon dialtion performed by Chandrakant Brooks MD at [place] [address] N/A 8/10/2022    EGD ESOPHAGOGASTRODUODENOSCOPY performed by Chandrakant Brooks MD at [place] [address] N/A 11/23/2022    EGD ESOPHAGOGASTRODUODENOSCOPY DILATATION/34 performed by Chandrakant Brooks MD at [address]  4/25/13 at [facility]-- stent removed 6-27-13. Dr Jordi Heck  2014    port insertion, left chest wall      Prior to Admission medications    Medication Sig Start Date End Date Taking?  Authorizing Provider   carvedilol (COREG) 6.25 MG tablet Take 6.25 mg by mouth 2 times daily (with meals)    Historical Provider, MD   ondansetron (ZOFRAN) 8 MG tablet Take 1 tablet by mouth 3 times daily as needed for Nausea or Vomiting 10/27/22   Paul Modi MD   Hyoscyamine Sulfate SL (LEVSIN/SL) 0.125 MG SUBL Place 0.25 mg under the tongue 4 times daily as needed (abdominal pain or cramping) 10/12/22   Paul Modi MD   tiZANidine (ZANAFLEX) 4 MG tablet Take 4 mg by mouth every 6 hours as needed    Historical Provider, MD   promethazine (PHENERGAN) 25 MG tablet Take 1 tablet by mouth every 8 hours as needed for Nausea 7/28/22   Kaya Muro MD   pantoprazole (PROTONIX) 40 MG tablet Take 1 tablet by mouth in the morning and 1 tablet before bedtime.  7/28/22   Kaya Muro MD   lisinopril (PRINIVIL;ZESTRIL) 10 MG tablet Take 1 tablet by mouth in the morning. 7/29/22   Kaya Muro MD   citalopram (CELEXA) 40 MG tablet Take 1 tablet by mouth daily 7/28/22 8/12/22  Kaya Muro MD   cloNIDine (CATAPRES) 0.3 MG/24HR PTWK Place 1 patch onto the skin once a week 8/4/22 8/10/22  Kaya Muro MD   blood glucose test strips (EXACTECH TEST) strip TEST BLOOD SUGAR FOUR TIMES DAILY 8/24/20   Historical Provider, MD   acetaminophen (TYLENOL) 500 MG tablet Take by mouth every 6 hours as needed    Historical Provider, MD   cyanocobalamin 1000 MCG/ML injection INJECT 1 VIAL INTRAMUSCULARLY FOR 4 WEEKS THEN MONTHLY 11/25/17   Historical Provider, MD   diclofenac sodium (VOLTAREN) 1 % GEL APPLY 1 GRAM TO AFFECTED AREA 4 TIMES A DAY AS NEEDED 10/2/18   Historical Provider, MD   diphenhydrAMINE (BENADRYL) 25 MG capsule Take 25 mg by mouth every 6 hours as needed    Historical Provider, MD   glucagon 1 MG injection Inject 1 mg into the muscle as needed 5/8/17   Historical Provider, MD   Hyoscyamine Sulfate SL 0.125 MG SUBL Place 0.125 mg under the tongue every 6 hours as needed    Historical Provider, MD   influenza quadrivalent split vaccine (FLUZONE;FLUARIX;FLULAVAL;AFLURIA) 0.5 ML injection Inject into the muscle 11/9/21   Historical Provider, MD   insulin aspart (NOVOLOG) 100 UNIT/ML injection pen 15 units plus sliding scale for blood sugar >151 10/7/14   Historical Provider, MD   Insulin Degludec 100 UNIT/ML SOPN Inject 58 Units into the skin at bedtime 10/27/21   Historical Provider, MD   lidocaine viscous hcl (XYLOCAINE) 2 % SOLN solution 10 mLs every 6 hours as needed 4/28/21   Historical Provider, MD   LORazepam (ATIVAN) 1 MG tablet Take 1 mg by mouth in the morning, at noon, and at bedtime. 10/7/14   Historical Provider, MD   naloxone 4 MG/0.1ML LIQD nasal spray 4 mg once as needed 2/16/22   Historical Provider, MD   ondansetron (ZOFRAN-ODT) 8 MG TBDP disintegrating tablet Place 8 mg under the tongue 3 times daily 4/19/16   Historical Provider, MD   polyethylene glycol (GLYCOLAX) 17 GM/SCOOP powder Take 17 g by mouth as needed    Historical Provider, MD   pregabalin (LYRICA) 100 MG capsule Take 100 mg by mouth 2 times daily. Historical Provider, MD   sodium chloride 0.9 % infusion Infuse intravenously as needed    Historical Provider, MD   zolpidem (AMBIEN) 10 MG tablet Take 10 mg by mouth. 2/11/22   Historical Provider, MD   gabapentin (NEURONTIN) 250 MG/5ML solution 300 mg by Enteral route 3 times daily. Patient not taking: No sig reported 10/7/14 7/28/22  Historical Provider, MD   insulin NPH (HUMULIN N;NOVOLIN N) 100 UNIT/ML injection pen Please inject 12 units into the skin with breakfast and 4 units with bedtime. (pen)  Patient not taking: No sig reported 10/7/14 7/28/22  Historical Provider, MD   sucralfate (CARAFATE) 1 GM/10ML suspension TAKE 10 ML BY MOUTH 4 TIMES A DAY EVERY DAY ON A EMPTY STOMACH 1 HR BEFORE MEALS AND AT BEDTIME 9/7/17 8/12/22  Historical Provider, MD     Allergies   Allergen Reactions    Adhesive Tape Itching, Other (See Comments) and Rash     blisters  tegaderm  Paper tape too      Metronidazole Diarrhea and Nausea And Vomiting    Atorvastatin Myalgia    Iodine Other (See Comments)     Sweaty and clammy    Statins Myalgia    Barium Iodide Nausea And Vomiting     Diaphoresis      Barium Sulfate Palpitations    Insulin Lispro Nausea And Vomiting    Insulins Nausea And Vomiting     Humalog only    Metformin Nausea And Vomiting     Stomach pain  Stomach pain  Stomach pain  Stomach pain            Review of Systems:  10 point Ros negative other than what mentioned in HPI. A comprehensive review of systems was negative. Objective:   Blood pressure 135/77, pulse 84, temperature 98.3 °F (36.8 °C), temperature source Oral, resp. rate 18, height 5' 2\" (1.575 m), weight 160 lb (72.6 kg), SpO2 96 %. Temp (24hrs), Av.1 °F (36.7 °C), Min:97.6 °F (36.4 °C), Max:98.4 °F (36.9 °C)       Lines: L subclavian port; not hot or red or swollen, scar over apex site appears healed, almost bruise like darker tissue color around port site    Exam:  Unchanged 22 unless noted below:     General: alert and cooperative, looks stated age, in NAD, lying in bed,   present   HEENT: PERRL, non-icteric sclera, no splinter hemorrhages, upper gum (midline) with mild redness. Neck: supple, symmetric, no masses or lymphadenopathy, bandaid under chin were drain was; no local redness or induration    Cardiac:Nl S1/S2, no murmurs/rubs/gallops/clicks, no LE edema   Pulmonary:clear bilaterally shun/wheezes, good air movement    Abdomen: soft, NT, non-distended, BS normal   Skin:no rashes, lesions or wounds, bandage over previous port site. No redness now  Sutures intact. No drainage noted   MSK:no enlarged or swollen joints in limbs or sternoclavicular area   Extremities: no cords/clots/cyanosis, pulses palpable and symmetric    Psychiatric: mood and affect appropriate to situation       Data Review:   Recent Results (from the past 24 hour(s))   POCT Glucose    Collection Time: 22 12:12 PM   Result Value Ref Range    POC Glucose 202 (H) 65 - 100 mg/dL    Performed by:  Ravinder    POCT Glucose    Collection Time: 22  3:56 PM   Result Value Ref Range    POC Glucose 161 (H) 65 - 100 mg/dL    Performed by: Conny Schirmer    POCT Glucose    Collection Time: 22  8:31 PM   Result Value Ref Range    POC Glucose 202 (H) 65 - 100 mg/dL    Performed by: Filiberto    POCT Glucose    Collection Time: 22  7:39 AM   Result Value Ref Range    POC Glucose 166 (H) 65 - 100 mg/dL    Performed by: Davonte CHOI Collection Time: 12/29/22  7:58 AM   Result Value Ref Range    WBC 3.4 (L) 4.3 - 11.1 K/uL    RBC 4.05 4.05 - 5.2 M/uL    Hemoglobin 9.0 (L) 11.7 - 15.4 g/dL    Hematocrit 31.3 (L) 35.8 - 46.3 %    MCV 77.3 (L) 82 - 102 FL    MCH 22.2 (L) 26.1 - 32.9 PG    MCHC 28.8 (L) 31.4 - 35.0 g/dL    RDW 17.2 (H) 11.9 - 14.6 %    Platelets 52 (L) 005 - 450 K/uL    MPV 11.1 9.4 - 12.3 FL    nRBC 0.00 0.0 - 0.2 K/uL   Basic Metabolic Panel    Collection Time: 12/29/22  7:58 AM   Result Value Ref Range    Sodium 138 133 - 143 mmol/L    Potassium 3.7 3.5 - 5.1 mmol/L    Chloride 109 101 - 110 mmol/L    CO2 22 21 - 32 mmol/L    Anion Gap 7 2 - 11 mmol/L    Glucose 180 (H) 65 - 100 mg/dL    BUN 6 6 - 23 MG/DL    Creatinine 0.60 0.6 - 1.0 MG/DL    Est, Glom Filt Rate >60 >60 ml/min/1.73m2    Calcium 9.0 8.3 - 10.4 MG/DL   Ferritin    Collection Time: 12/29/22  7:58 AM   Result Value Ref Range    Ferritin 21 8 - 388 NG/ML   Transferrin Saturation    Collection Time: 12/29/22  7:59 AM   Result Value Ref Range    Iron 19 (L) 35 - 150 ug/dL    TIBC 327 250 - 450 ug/dL    TRANSFERRIN SATURATION 6 (L) >20 %        Microbiology:  [unfilled]        Studies/Imaging:  personally reviewed   12/23/22 TTE: Summary      Normal chamber sizes. Normal left ventricular systolic function. EF 60%    Normal diastolic function. Normal right ventricular function. No significant valve stenosis or regurgitation. No cardiac valve vegetations are seen. Evidence of moderate pulmonary hypertension. Findings      Mitral Valve    The mitral valve is normal in structure and function. There is no mitral valve stenosis. Trace mitral regurgitation. There is no vegetation seen on the mitral valve. Aortic Valve    The aortic valve is trileaflet. The aortic valve opens well. The aortic valve is normal in structure and function. No significant stenosis noted. No aortic regurgitation is present.     There is no vegetation seen on the aortic valve. Tricuspid Valve    The tricuspid valve is normal in structure and function. There is no tricuspid stenosis. Trace tricuspid regurgitation. Estimated right ventricular systolic pressure is 46 mmHg. There is no vegetation seen on the tricuspid valve. Pulmonic Valve    The pulmonic valve is not well visualized. Left Atrium    Normal left atrium. Left Ventricle    The Left Ventricle is normal in size. There is no LV Thrombus seen. Normal left ventricular systolic function. EF 60%    The Left Ventricular wall motion is normal.    Normal Diastolic function.      Signed By: Camilla Araujo MD     December 29, 2022

## 2022-12-29 NOTE — CARE COORDINATION
Case Management Assessment  Initial Evaluation    Date/Time of Evaluation: 12/29/2022 9:31 AM  Assessment Completed by: Avinash Plata RN    If patient is discharged prior to next notation, then this note serves as note for discharge by case management. Patient Name: Lia Martel                   YOB: 1970  Diagnosis: MSSA bacteremia [R78.81, A38.58]                   Date / Time: 12/24/2022  5:57 PM    Patient Admission Status: Inpatient     Current PCP: Rory Clark MD  PCP verified by CM? Chart Reviewed: Yes      History Provided by: Medical Record  Patient Orientation: Alert and Oriented    Patient Cognition: Alert    Hospitalization in the last 30 days (Readmission):  No    If yes, Readmission Assessment in  Navigator will be completed. Advance Directives:     Code Status: Full Code     Primary Decision MakeCy Ha - [phone]    Discharge Planning  Patient lives with: Spouse/Significant Other Type of Home: House  Primary Care Giver: Self  Patient Support Systems include: Spouse/Significant Other   Current Financial resources: Medicare  Current community resources:    Current services prior to admission: None  Type of Home Care services:  None    ADLS  Prior functional level: Independent in ADLs/IADLs  Current functional level: Independent in ADLs/IADLs    PT AM-PAC:   /24  OT AM-PAC:   /24    Family can provide assistance at DC: Yes  Would you like Case Management to discuss the discharge plan with any other family members/significant others, and if so, who? No  Plans to Return to Present Housing: Yes  Other Identified Issues/Barriers to RETURNING to current housing: Patient will be returning home with home IV abx  Potential Assistance needed at discharge:  Other (Comment) (OP IV Abx)  Patient expects to discharge to: [address] [place] [address] for transportation at discharge: Family    Financial  Payor: Shanita Berg / Plan: [facility] HMO / Product Type: *No Product type* /     Does insurance require precert for SNF: Yes    Potential assistance Purchasing Medications: No      [id number] [place] [place] - [address] [facility] P [phone] - F [phone]  [address] [facility]  [place] [address]  Phone: [phone] Fax: [phone]      Factors facilitating achievement of predicted outcomes: Family support, Cooperative, and Pleasant    Barriers to discharge: Pain, Limited family support, and Medical complications    Additional Case Management Notes: Patient lives with spouse independent of ADL's  Will discharge home with Home health and Home IV ABX's    The Plan for Transition of Care is related to the following treatment goals of MSSA bacteremia [R78.81, Z38.12]    IF APPLICABLE: The Patient and/or patient representative Hyacinth Bee and her family were provided with a choice of provider and agrees with the discharge plan. Freedom of choice list with basic dialogue that supports the patient's individualized plan of care/goals and shares the quality data associated with the providers was provided to: (P) Patient   Patient Representative Name:       The Patient and/or Patient Representative Agree with the Discharge Plan?  (P) Yes    Rush Taylor RN  Case Management Department

## 2022-12-29 NOTE — PROGRESS NOTES
Hospitalist Progress Note   Admit Date:  2022  5:57 PM   Name:  Amanda Wolfe   Age:  45 y.o. Sex:  female  :  1970   MRN:  [id number]   Room:  [facility] [place]    Presenting Complaint: positive blood cultures  Reason(s) for Admission: MSSA bacteremia [R78.81, B95.61]     Hospital Course & Interval History:   Per previous note: Patient is a 49-year-old female with history of chronic pancreatitis, DM2, HTN, anxiety, gastroparesis status post recent port exchange on  admitted on  from [facility] for MSSA bacteremia and ID consult. Patient had a recent oral surgery for dental extraction on 2022 with Penrose drain placement. Patient was on Augmentin and Amoxil since the procedure. Patient was found to have positive blood culture with MSSA on 2022, repeat blood cultures from  negative to date. . Patient was given IV vancomycin and Unasyn on  and started on Ancef on 2022. Christy Karst Penrose drain since removed, status post CT neck without any evidence of abscess. Echo was unremarkable for any evidence of infective endocarditis, showed moderate pulmonary hypertension. Subjective/24hr Events (22): Reports good pain control. Urged patient to avoid IV pain meds as she can not discharge with these meds  Assessment & Plan:     Principal Problem:    MSSA bacteremia  -  -Repeat blood cultures from  negative so far. Continue Ancef -  Continue probiotics to prevent antibiotic associated diarrhea. Status post port removal on . Sutures can be removed by home health aide RN  ID on board, appreciate their help.  22  -Bcx redrawn today   -Per ID if repeat Bcx negative patient will likely have 2 weeks of IV abx.  Hold PICC placement for now  22  -If Bcx remain negative tomorrow, PICC can be placed and patient is stable for discharge   -Ancef 2 gm daily with EOT 1/10/23        Dental infection:  22  -Followed by oral surgery -Penrose drain since removed. Port removed 12/27 with tip culture NGTD         Active Problems:    Class 1 obesity with serious comorbidity and body mass index (BMI) of 31.0 to 31.9 in adult  Plan:   -Noted, increases all cause mortality morbidity.  -Patient encouraged to lose weight. Chronic pancreatitis ([address]    Gastroparesis due to DM [facility])  Plan:   -Diet as tolerant  -Patient stable        Anemia  JORGE LUIS  12/28/22  -Anemia labs in a.m.  -No obvious blood loss   12/29/22  -FE added       Pulmonary hypertension ([address]  Plan:   Patient is currently on room air, does not appear to be volume overloaded. Type 2 diabetes mellitus with hyperglycemia, with long-term current use of insulin ([facility])  Plan:   -Monitor POC glucose QA CHS  -Continue sliding scale insulin for now. -Ranges acceptable         Hypertension  12/27-  Blood pressure is optimally controlled. Continue Coreg 6.25 mg p.o. twice daily along with lisinopril 10 mg p.o. daily. Discharge Planning:    Likely tomorrow     Diet:  ADULT DIET; Easy to Chew  DVT PPx: lovenox  Code status: Full Code    Hospital Problems             Last Modified POA    * (Principal) MSSA bacteremia 12/24/2022 Yes    Class 1 obesity with serious comorbidity and body mass index (BMI) of 31.0 to 31.9 in adult (Chronic) 12/24/2022 Yes    Gastroparesis due to DM ([address] (Chronic) 12/24/2022 Yes    Anemia (Chronic) 12/24/2022 Yes    Pulmonary hypertension ([address] (Chronic) 12/24/2022 Yes    Type 2 diabetes mellitus with hyperglycemia, with long-term current use of insulin ([address] 12/24/2022 Yes    Overview Signed 7/19/2022  2:48 PM by Fay Dugan DO     Followed by endocrinology OP. Recommends avoidance of GLP-1RA agonist d/t h/o chronic pancreatitis.             Chronic pancreatitis ([address] 12/24/2022 Yes    Overview Signed 7/19/2022  2:52 PM by Fay Dugan DO       EUS 3/16/22 with esophogeal stricture s/p dilation, gastrojejunal anastomosis, mild pancreatic parenchymal changes without convincing evidence of chronic pancreatitis and fatty infiltration of the liver. No longer on pancreatic enzyme replacement. Hypertension 12/24/2022 Yes     Objective:   Patient Vitals for the past 24 hrs:   Temp Pulse Resp BP SpO2   12/29/22 1120 98.2 °F (36.8 °C) 76 17 132/72 99 %   12/29/22 1018 -- -- 16 -- --   12/29/22 0755 98.3 °F (36.8 °C) 84 18 135/77 96 %   12/29/22 0747 -- 84 -- -- --   12/29/22 0745 -- -- 18 -- --   12/29/22 0744 -- -- -- 135/77 --   12/29/22 0314 97.7 °F (36.5 °C) 85 18 128/70 96 %   12/29/22 0255 -- -- 19 -- --   12/29/22 0116 -- -- 17 -- --   12/28/22 2239 98.1 °F (36.7 °C) 87 17 118/62 97 %   12/28/22 2136 -- -- 19 -- --   12/28/22 1940 98.4 °F (36.9 °C) 80 17 121/69 96 %   12/28/22 1840 -- -- 16 -- --   12/28/22 1810 -- -- 20 -- --   12/28/22 1710 -- -- 16 -- --   12/28/22 1640 -- -- 18 -- --   12/28/22 1608 98.1 °F (36.7 °C) 86 17 138/78 99 %   12/28/22 1416 -- -- 18 -- --   12/28/22 1346 -- -- 18 139/72 --       Estimated body mass index is 29.26 kg/m² as calculated from the following:    Height as of this encounter: 5' 2\" (1.575 m). Weight as of this encounter: 160 lb (72.6 kg). Intake/Output Summary (Last 24 hours) at 12/29/2022 1340  Last data filed at 12/29/2022 1238  Gross per 24 hour   Intake 720 ml   Output 1200 ml   Net -480 ml         Physical Exam:     Blood pressure 132/72, pulse 76, temperature 98.2 °F (36.8 °C), temperature source Oral, resp. rate 17, height 5' 2\" (1.575 m), weight 160 lb (72.6 kg), SpO2 99 %. General:    Alert, awake, NAD, on room air  Head:  Normocephalic, atraumatic  Eyes:  Sclerae appear normal. Pupils equally round. ENT:  Nares appear normal, no drainage. Moist oral mucosa  Neck:  No restricted ROM. Trachea midline. CV:   RRR. No m/r/g. No jugular venous distension. Lungs:   CTAB. No wheezing, rhonchi, or rales. Respirations even, unlabored. Abdomen: Bowel sounds present.  Soft, nontender, nondistended. Extremities: No cyanosis or clubbing. No edema. Skin:     No rashes and normal coloration. Warm and dry. Suture with dressing noted on left upper anterior chest, status post port removal.  Neuro:  CN II-XII grossly intact. Sensation intact. A&Ox3  Psych:  Normal mood and affect. I have reviewed ordered lab tests and independently visualized imaging below:    Recent Labs:  Recent Results (from the past 48 hour(s))   POCT Glucose    Collection Time: 12/27/22  4:32 PM   Result Value Ref Range    POC Glucose 218 (H) 65 - 100 mg/dL    Performed by: Jh    POCT Glucose    Collection Time: 12/27/22  8:15 PM   Result Value Ref Range    POC Glucose 186 (H) 65 - 100 mg/dL    Performed by: Rowena    POCT Glucose    Collection Time: 12/28/22  8:05 AM   Result Value Ref Range    POC Glucose 182 (H) 65 - 100 mg/dL    Performed by: Ravinder    Culture, Blood 1    Collection Time: 12/28/22 10:28 AM    Specimen: Blood   Result Value Ref Range    Special Requests LEFT  FOREARM        Culture NO GROWTH 1 DAY     Culture, Blood 1    Collection Time: 12/28/22 10:30 AM    Specimen: Blood   Result Value Ref Range    Special Requests RIGHT  ARM        Culture NO GROWTH 1 DAY     POCT Glucose    Collection Time: 12/28/22 12:12 PM   Result Value Ref Range    POC Glucose 202 (H) 65 - 100 mg/dL    Performed by:  Ravinder    POCT Glucose    Collection Time: 12/28/22  3:56 PM   Result Value Ref Range    POC Glucose 161 (H) 65 - 100 mg/dL    Performed by: Calli Mello    POCT Glucose    Collection Time: 12/28/22  8:31 PM   Result Value Ref Range    POC Glucose 202 (H) 65 - 100 mg/dL    Performed by: Filiberto    POCT Glucose    Collection Time: 12/29/22  7:39 AM   Result Value Ref Range    POC Glucose 166 (H) 65 - 100 mg/dL    Performed by: Davonte    CBC    Collection Time: 12/29/22  7:58 AM   Result Value Ref Range    WBC 3.4 (L) 4.3 - 11.1 K/uL    RBC 4.05 4.05 - 5.2 M/uL Hemoglobin 9.0 (L) 11.7 - 15.4 g/dL    Hematocrit 31.3 (L) 35.8 - 46.3 %    MCV 77.3 (L) 82 - 102 FL    MCH 22.2 (L) 26.1 - 32.9 PG    MCHC 28.8 (L) 31.4 - 35.0 g/dL    RDW 17.2 (H) 11.9 - 14.6 %    Platelets 52 (L) 425 - 450 K/uL    MPV 11.1 9.4 - 12.3 FL    nRBC 0.00 0.0 - 0.2 K/uL   Basic Metabolic Panel    Collection Time: 12/29/22  7:58 AM   Result Value Ref Range    Sodium 138 133 - 143 mmol/L    Potassium 3.7 3.5 - 5.1 mmol/L    Chloride 109 101 - 110 mmol/L    CO2 22 21 - 32 mmol/L    Anion Gap 7 2 - 11 mmol/L    Glucose 180 (H) 65 - 100 mg/dL    BUN 6 6 - 23 MG/DL    Creatinine 0.60 0.6 - 1.0 MG/DL    Est, Glom Filt Rate >60 >60 ml/min/1.73m2    Calcium 9.0 8.3 - 10.4 MG/DL   Ferritin    Collection Time: 12/29/22  7:58 AM   Result Value Ref Range    Ferritin 21 8 - 388 NG/ML   Transferrin Saturation    Collection Time: 12/29/22  7:59 AM   Result Value Ref Range    Iron 19 (L) 35 - 150 ug/dL    TIBC 327 250 - 450 ug/dL    TRANSFERRIN SATURATION 6 (L) >20 %   POCT Glucose    Collection Time: 12/29/22 11:05 AM   Result Value Ref Range    POC Glucose 181 (H) 65 - 100 mg/dL    Performed by: Davonte          Other Studies:  No results found.     Current Meds:  Current Facility-Administered Medications   Medication Dose Route Frequency    oxyCODONE (ROXICODONE) immediate release tablet 5 mg  5 mg Oral Q6H PRN    0.9 % sodium chloride infusion    Continuous PRN    lactobacillus acidophilus (FLORANEX) 4 tablet  4 tablet Oral TID    zolpidem (AMBIEN) tablet 10 mg  10 mg Oral Nightly PRN    enoxaparin (LOVENOX) injection 40 mg  40 mg SubCUTAneous Daily    sodium chloride flush 0.9 % injection 5-40 mL  5-40 mL IntraVENous 2 times per day    sodium chloride flush 0.9 % injection 5-40 mL  5-40 mL IntraVENous PRN    0.9 % sodium chloride infusion   IntraVENous PRN    ondansetron (ZOFRAN-ODT) disintegrating tablet 4 mg  4 mg Oral Q8H PRN    Or    ondansetron (ZOFRAN) injection 4 mg  4 mg IntraVENous Q6H PRN polyethylene glycol (GLYCOLAX) packet 17 g  17 g Oral Daily PRN    acetaminophen (TYLENOL) tablet 650 mg  650 mg Oral Q6H PRN    Or    acetaminophen (TYLENOL) suppository 650 mg  650 mg Rectal Q6H PRN    glucose chewable tablet 16 g  4 tablet Oral PRN    dextrose bolus 10% 125 mL  125 mL IntraVENous PRN    Or    dextrose bolus 10% 250 mL  250 mL IntraVENous PRN    glucagon (rDNA) injection 1 mg  1 mg SubCUTAneous PRN    dextrose 10 % infusion   IntraVENous Continuous PRN    insulin lispro (HUMALOG) injection vial 0-4 Units  0-4 Units SubCUTAneous TID WC    insulin lispro (HUMALOG) injection vial 0-4 Units  0-4 Units SubCUTAneous Nightly    ceFAZolin (ANCEF) 2000 mg in sterile water 20 mL IV syringe  2,000 mg IntraVENous Q8H    HYDROmorphone HCl PF (DILAUDID) injection 0.5 mg  0.5 mg IntraVENous Q4H PRN    diphenhydrAMINE (BENADRYL) capsule 25 mg  25 mg Oral Q8H PRN    LORazepam (ATIVAN) tablet 1 mg  1 mg Oral Q6H PRN    carvedilol (COREG) tablet 6.25 mg  6.25 mg Oral BID     lisinopril (PRINIVIL;ZESTRIL) tablet 10 mg  10 mg Oral Daily    pantoprazole (PROTONIX) tablet 40 mg  40 mg Oral BID AC       Signed:  MARISOL Francois - CNP    Part of this note may have been written by using a voice dictation software. The note has been proof read but may still contain some grammatical/other typographical errors.

## 2022-12-30 ENCOUNTER — APPOINTMENT (OUTPATIENT)
Dept: INTERVENTIONAL RADIOLOGY/VASCULAR | Age: 52
DRG: 315 | End: 2022-12-30
Attending: FAMILY MEDICINE
Payer: MEDICARE

## 2022-12-30 VITALS
BODY MASS INDEX: 29.67 KG/M2 | TEMPERATURE: 98.5 F | HEART RATE: 86 BPM | WEIGHT: 161.25 LBS | HEIGHT: 62 IN | DIASTOLIC BLOOD PRESSURE: 70 MMHG | OXYGEN SATURATION: 100 % | RESPIRATION RATE: 16 BRPM | SYSTOLIC BLOOD PRESSURE: 146 MMHG

## 2022-12-30 LAB
BACTERIA SPEC CULT: NORMAL
BACTERIA SPEC CULT: NORMAL
GLUCOSE BLD STRIP.AUTO-MCNC: 206 MG/DL (ref 65–100)
GLUCOSE BLD STRIP.AUTO-MCNC: 211 MG/DL (ref 65–100)
SERVICE CMNT-IMP: ABNORMAL
SERVICE CMNT-IMP: ABNORMAL
SERVICE CMNT-IMP: NORMAL
SERVICE CMNT-IMP: NORMAL

## 2022-12-30 PROCEDURE — 76937 US GUIDE VASCULAR ACCESS: CPT

## 2022-12-30 PROCEDURE — 6360000002 HC RX W HCPCS: Performed by: INTERNAL MEDICINE

## 2022-12-30 PROCEDURE — 6370000000 HC RX 637 (ALT 250 FOR IP): Performed by: NURSE PRACTITIONER

## 2022-12-30 PROCEDURE — 2580000003 HC RX 258: Performed by: INTERNAL MEDICINE

## 2022-12-30 PROCEDURE — 2500000003 HC RX 250 WO HCPCS: Performed by: PHYSICIAN ASSISTANT

## 2022-12-30 PROCEDURE — 6360000002 HC RX W HCPCS: Performed by: HOSPITALIST

## 2022-12-30 PROCEDURE — 6370000000 HC RX 637 (ALT 250 FOR IP): Performed by: INTERNAL MEDICINE

## 2022-12-30 PROCEDURE — C1894 INTRO/SHEATH, NON-LASER: HCPCS

## 2022-12-30 PROCEDURE — 0JH63XZ INSERTION OF TUNNELED VASCULAR ACCESS DEVICE INTO CHEST SUBCUTANEOUS TISSUE AND FASCIA, PERCUTANEOUS APPROACH: ICD-10-PCS | Performed by: RADIOLOGY

## 2022-12-30 PROCEDURE — 6360000002 HC RX W HCPCS: Performed by: FAMILY MEDICINE

## 2022-12-30 PROCEDURE — 02HV33Z INSERTION OF INFUSION DEVICE INTO SUPERIOR VENA CAVA, PERCUTANEOUS APPROACH: ICD-10-PCS | Performed by: RADIOLOGY

## 2022-12-30 PROCEDURE — 82962 GLUCOSE BLOOD TEST: CPT

## 2022-12-30 PROCEDURE — 6370000000 HC RX 637 (ALT 250 FOR IP): Performed by: HOSPITALIST

## 2022-12-30 RX ORDER — OXYCODONE HYDROCHLORIDE 5 MG/1
5 TABLET ORAL EVERY 4 HOURS PRN
Qty: 18 TABLET | Refills: 0 | Status: SHIPPED | OUTPATIENT
Start: 2022-12-30 | End: 2023-01-02

## 2022-12-30 RX ORDER — FERROUS SULFATE 325(65) MG
325 TABLET ORAL 2 TIMES DAILY WITH MEALS
Qty: 30 TABLET | Refills: 3 | Status: SHIPPED | OUTPATIENT
Start: 2022-12-30

## 2022-12-30 RX ORDER — LIDOCAINE HYDROCHLORIDE AND EPINEPHRINE 10; 10 MG/ML; UG/ML
INJECTION, SOLUTION INFILTRATION; PERINEURAL
Status: COMPLETED | OUTPATIENT
Start: 2022-12-30 | End: 2022-12-30

## 2022-12-30 RX ORDER — CEFAZOLIN SODIUM 1 G/3ML
INJECTION, POWDER, FOR SOLUTION INTRAMUSCULAR; INTRAVENOUS ONCE
Status: CANCELLED
Start: 2022-12-30 | End: 2022-12-30

## 2022-12-30 RX ORDER — L. ACIDOPHILUS/L.BULGARICUS 1MM CELL
4 TABLET ORAL 3 TIMES DAILY
Qty: 120 TABLET | Refills: 0 | Status: SHIPPED | OUTPATIENT
Start: 2022-12-30 | End: 2023-01-09

## 2022-12-30 RX ADMIN — OXYCODONE 5 MG: 5 TABLET ORAL at 12:20

## 2022-12-30 RX ADMIN — LIDOCAINE HYDROCHLORIDE,EPINEPHRINE BITARTRATE 15 ML: 10; .01 INJECTION, SOLUTION INFILTRATION; PERINEURAL at 15:33

## 2022-12-30 RX ADMIN — INSULIN LISPRO 1 UNITS: 100 INJECTION, SOLUTION INTRAVENOUS; SUBCUTANEOUS at 12:27

## 2022-12-30 RX ADMIN — PANTOPRAZOLE SODIUM 40 MG: 40 TABLET, DELAYED RELEASE ORAL at 05:15

## 2022-12-30 RX ADMIN — CEFAZOLIN SODIUM 2000 MG: 100 INJECTION, POWDER, LYOPHILIZED, FOR SOLUTION INTRAVENOUS at 07:54

## 2022-12-30 RX ADMIN — ONDANSETRON 4 MG: 2 INJECTION INTRAMUSCULAR; INTRAVENOUS at 07:22

## 2022-12-30 RX ADMIN — INSULIN LISPRO 1 UNITS: 100 INJECTION, SOLUTION INTRAVENOUS; SUBCUTANEOUS at 08:10

## 2022-12-30 RX ADMIN — CEFAZOLIN SODIUM 2000 MG: 100 INJECTION, POWDER, LYOPHILIZED, FOR SOLUTION INTRAVENOUS at 17:00

## 2022-12-30 RX ADMIN — LORAZEPAM 1 MG: 1 TABLET ORAL at 10:15

## 2022-12-30 RX ADMIN — CARVEDILOL 6.25 MG: 6.25 TABLET, FILM COATED ORAL at 07:50

## 2022-12-30 RX ADMIN — SODIUM CHLORIDE, PRESERVATIVE FREE 10 ML: 5 INJECTION INTRAVENOUS at 08:21

## 2022-12-30 RX ADMIN — LISINOPRIL 10 MG: 5 TABLET ORAL at 07:49

## 2022-12-30 RX ADMIN — ENOXAPARIN SODIUM 40 MG: 100 INJECTION SUBCUTANEOUS at 09:00

## 2022-12-30 RX ADMIN — FERROUS SULFATE TAB 325 MG (65 MG ELEMENTAL FE) 325 MG: 325 (65 FE) TAB at 07:50

## 2022-12-30 RX ADMIN — LACTOBACILLUS TAB 4 TABLET: TAB at 07:50

## 2022-12-30 ASSESSMENT — PAIN DESCRIPTION - ORIENTATION: ORIENTATION: LEFT

## 2022-12-30 ASSESSMENT — PAIN - FUNCTIONAL ASSESSMENT
PAIN_FUNCTIONAL_ASSESSMENT: ACTIVITIES ARE NOT PREVENTED
PAIN_FUNCTIONAL_ASSESSMENT: NONE - DENIES PAIN

## 2022-12-30 ASSESSMENT — PAIN SCALES - GENERAL
PAINLEVEL_OUTOF10: 6
PAINLEVEL_OUTOF10: 0

## 2022-12-30 ASSESSMENT — PAIN DESCRIPTION - DESCRIPTORS: DESCRIPTORS: ACHING;SORE;DISCOMFORT

## 2022-12-30 ASSESSMENT — PAIN DESCRIPTION - LOCATION: LOCATION: CHEST;NECK

## 2022-12-30 NOTE — PROGRESS NOTES
completed initial visit with patient.  was at bedside and supportive. Patient was being discharged and was optimistic.  provided pastoral presence, prayer and empathetic listening.    Signed by  Aimee Becker M.Div.

## 2022-12-30 NOTE — PROGRESS NOTES
TRANSFER - OUT REPORT:    Verbal report given to Lalitha Ward on [facility]  being transferred to [facility] for routine progression of patient care       Report consisted of patient's Situation, Background, Assessment and   Recommendations(SBAR). Information from the following report(s) Nurse Handoff Report, MAR, and Event Log was reviewed with the receiving nurse. [place] Assessment: No data recorded  Lines:   CVC Single Lumen 12/30/22 Right Subclavian (Active)       Peripheral IV 80/58/32 Right Cephalic (Active)   Site Assessment Clean, dry & intact 12/30/22 0255   Line Status Flushed 12/30/22 0255   Line Care Connections checked and tightened 12/30/22 0255   Phlebitis Assessment No symptoms 12/30/22 0255   Infiltration Assessment 0 12/30/22 0255   Alcohol Cap Used Yes 12/30/22 0255   Dressing Status Clean, dry & intact 12/30/22 0255   Dressing Type Transparent 12/30/22 0255        Opportunity for questions and clarification was provided.

## 2022-12-30 NOTE — PROGRESS NOTES
Infectious Disease Progress Note  Today's Date: 12/30/2022   Admit Date: 12/24/2022     Impression:   MSSA bacteremia: not usual oral trixie, I think the port is more likely source. Site looks bruised but not being used regularly and there appears to have been some concern over last several months about the site integrity. Port removed 12/27, tip cx negative. Repeat blood cultures collected 12/25/22 after transfer from OSH were negative. TTE 12/23/22 with no mention of vegetation. Repeat blood cx 12/28 after port removal are negative so far, just shy of 48 hours. Afebrile. Plan:   Continue Ancef   Follow 12/28 blood cx from post port removal: we are nearly at 48 hours negative, anticipate PICC today and  home with home IV ABX as below. If 12/28 blood cx still negative tomorrow AM around 10:30 AM, ok to place PICC line. Plan 2 weeks of ABX from 12/28. Tentative OPAT orders submitted given approach another holiday weekend. Patient: Angelika Bhardwaj        MRN: [id number]        SSN: [id number]  Assessment and Plan:  1) Ancef 2 gm IV every 24 hours with EOT 1/10/2023  2) Routine PICC Care  3) Labs:  Every Monday: CBC with diff, Creatinine and LFT's                   Please fax results to [phone]. Follow Up: Follow-up with [facility] IS NOT required. ID will see again as needed, if blood cx 12/28 should be + please re-consult and repeat blood cx again. Anti-infectives:    Ancef 12/24-  Unasyn 12/23  Vanc 12/23   chart reviewed, not seen

## 2022-12-30 NOTE — CONSULTS
Consult and call to PICC team has been placed by primary RN. Please re-consult if PICC team unsuccessful.

## 2022-12-30 NOTE — PROGRESS NOTES
Physician Progress Note      Chino Otto  CSN #:                  [id number]  :                       1970  ADMIT DATE:       2022 5:57 PM  [address] [place] DATE:  RESPONDING  PROVIDER #:        Rufino Martínez NP          QUERY TEXT:    Pt admitted with MSSA bacteremia. Pt noted to have a recent oral surgery for   dental extractions on 2022 also noted to have a Port. After study, can   the suspected etiology of the patient's MSSA bacteremia be further specified   as: The medical record reflects the following:  Risk Factors: 45 yr, recent oral surgery for dental extractions on 2022    Clinical Indicators: Port removed  with tip culture with no growth, per   documentation by infectious disease on PN  MSSA bacteremia not usual oral   trixie, I think the port is more likely source. Site looks bruised but not   being used regularly and there appears to have been some concern over last   several months about the site integrity    Treatment: removal of Port, ID consult, Ancef IV  Options provided:  -- MSSA bacteremia, due to, Please document source.   -- MSSA bacteremia due to recent oral surgery for dental extractions  -- MSSA bacteremia related to [facility]  -- Other - I will add my own diagnosis  -- Disagree - Not applicable / Not valid  -- Disagree - Clinically unable to determine / Unknown  -- Refer to Clinical Documentation Reviewer    PROVIDER RESPONSE TEXT:    This patient has MSSA bacteremia due to possible infected port    Query created by: Feng Chahal on 2022 12:33 PM      Electronically signed by:  Rufino Martínez NP 2022 2:53 PM

## 2022-12-30 NOTE — PROGRESS NOTES
Bird arms assessed for picc line placement veins to small to     accommodate line placement see images below.

## 2022-12-30 NOTE — BRIEF OP NOTE
Department of Interventional Radiology  [phone]        Interventional Radiology Brief Procedure Note    Patient: Yehuda Gtz MRN: [id number]  SSN: [id number]    YOB: 1970  Age: 45 y.o.   Sex: female      Date of Procedure: 12/30/2022    Pre-Procedure Diagnosis: chronic pancreatitis, bacteremia    Post-Procedure Diagnosis: SAME    Procedure(s): Tunneled Central Venous Catheter    Brief Description of Procedure: as above    Performed By: Cornelius Layton PA-C     Assistants: None    Anesthesia:Lidocaine    Estimated Blood Loss: Less than 10ml    Specimens:  None    Implants:  Subcutaneous Port    Findings: catheter tip in right atrium     Complications: None    Recommendations: ok to use catheter     Follow Up: prn    Signed By: Cornelius Layton PA-C     December 30, 2022

## 2022-12-30 NOTE — DISCHARGE SUMMARY
Hospitalist Discharge Summary   Admit Date:  2022  5:57 PM   DC Note date: 2022  Name:  Roseline Kwan   Age:  45 y.o. Sex:  female  :  1970   MRN:  [id number]   Room:  [facility]  PCP:  Annalee Chaudhari MD    Presenting Complaint: No chief complaint on file. Initial Admission Diagnosis: MSSA bacteremia [R78.81, B95.61]     Problem List for this Hospitalization (present on admission):    Principal Problem:    MSSA bacteremia  Active Problems:    Class 1 obesity with serious comorbidity and body mass index (BMI) of 31.0 to 31.9 in adult    Gastroparesis due to DM (HCC)    Anemia    Pulmonary hypertension (HCC)    Type 2 diabetes mellitus with hyperglycemia, with long-term current use of insulin (HCC)    Chronic pancreatitis ([address]    Hypertension  Resolved Problems:    * No resolved hospital problems. [facility] Course:  Patient is a 77-year-old female with history of chronic pancreatitis, DM2, HTN, anxiety, gastroparesis status post recent port exchange on  admitted on  from [facility] for MSSA bacteremia and ID consult. Patient had a recent oral surgery for dental extraction on 2022 with Penrose drain placement. Patient was on Augmentin and Amoxil since the procedure. Patient was found to have positive blood culture with MSSA on 2022, repeat blood cultures from  negative. . Patient was given IV vancomycin and Unasyn on  and started on Ancef on 2022. [place] drain since removed, status post CT neck without any evidence of abscess. Echo was unremarkable for any evidence of infective endocarditis, showed moderate pulmonary hypertension. Repeat blood cultures  negative x 48 hours and PICC line placed for OPAT. EOT for Ancef is 1/10/23 per ID with no ID follow-up needed.  Per ID patient will need labs listed below q Monday with result faxed to ID    Patient/labs stable for discharge and patient discharged home with foloow-up orders Disposition: Home  Diet: ADULT DIET; Easy to Chew  Code Status: Full Code    Follow Ups:   Contact information for follow-up providers     Ton Wright MD Follow up in 2 week(s). Specialties: Radiology, Diagnostic Radiology  Contact information:  [address] [place] [address]             Kecia Bennett MD Follow up in 1 week(s). Specialty: Internal Medicine  Contact information:  [address] [address]  [phone]                   Contact information for after-discharge care     Discharge Dialysis/Infusion     [facility] . Service: Infusion and IV Therapy  Contact information:  [address]  [address] [address]  [phone]                           Time spent in patient discharge and coordination 41 minutes. Follow up labs/diagnostics (ultimately defer to outpatient provider):  Every Monday: CBC with diff, Creatinine and LFT's   Please fax results to [phone]. Plan was discussed with patient/spouse. All questions answered. Patient was stable at time of discharge. Instructions given to call a physician or return if any concerns. Current Discharge Medication List        START taking these medications    Details   oxyCODONE (ROXICODONE) 5 MG immediate release tablet Take 1 tablet by mouth every 4 hours as needed for Pain for up to 3 days.  Max Daily Amount: 30 mg  Qty: 18 tablet, Refills: 0    Comments: Reduce doses taken as pain becomes manageable  Associated Diagnoses: Pain, dental      lactobacillus acidophilus (FLORANEX) Take 4 tablets by mouth 3 times daily for 10 days  Qty: 120 tablet, Refills: 0      ferrous sulfate (IRON 325) 325 (65 Fe) MG tablet Take 1 tablet by mouth 2 times daily (with meals)  Qty: 30 tablet, Refills: 3           CONTINUE these medications which have NOT CHANGED    Details   carvedilol (COREG) 6.25 MG tablet Take 6.25 mg by mouth 2 times daily (with meals)      ondansetron (ZOFRAN) 8 MG tablet Take 1 tablet by mouth 3 times daily as needed for Nausea or Vomiting  Qty: 12 tablet, Refills: 1      promethazine (PHENERGAN) 25 MG tablet Take 1 tablet by mouth every 8 hours as needed for Nausea  Qty: 30 tablet, Refills: 2      pantoprazole (PROTONIX) 40 MG tablet Take 1 tablet by mouth in the morning and 1 tablet before bedtime. Qty: 30 tablet, Refills: 3      lisinopril (PRINIVIL;ZESTRIL) 10 MG tablet Take 1 tablet by mouth in the morning. Qty: 30 tablet, Refills: 3      blood glucose test strips (EXACTECH TEST) strip TEST BLOOD SUGAR FOUR TIMES DAILY      acetaminophen (TYLENOL) 500 MG tablet Take by mouth every 6 hours as needed      cyanocobalamin 1000 MCG/ML injection INJECT 1 VIAL INTRAMUSCULARLY FOR 4 WEEKS THEN MONTHLY      diclofenac sodium (VOLTAREN) 1 % GEL APPLY 1 GRAM TO AFFECTED AREA 4 TIMES A DAY AS NEEDED      diphenhydrAMINE (BENADRYL) 25 MG capsule Take 25 mg by mouth every 6 hours as needed      glucagon 1 MG injection Inject 1 mg into the muscle as needed      Hyoscyamine Sulfate SL 0.125 MG SUBL Place 0.125 mg under the tongue every 6 hours as needed      insulin aspart (NOVOLOG) 100 UNIT/ML injection pen 15 units plus sliding scale for blood sugar >151      Insulin Degludec 100 UNIT/ML SOPN Inject 58 Units into the skin at bedtime      LORazepam (ATIVAN) 1 MG tablet Take 1 mg by mouth in the morning, at noon, and at bedtime. naloxone 4 MG/0.1ML LIQD nasal spray 4 mg once as needed      ondansetron (ZOFRAN-ODT) 8 MG TBDP disintegrating tablet Place 8 mg under the tongue 3 times daily      polyethylene glycol (GLYCOLAX) 17 GM/SCOOP powder Take 17 g by mouth as needed      pregabalin (LYRICA) 100 MG capsule Take 100 mg by mouth 2 times daily. zolpidem (AMBIEN) 10 MG tablet Take 10 mg by mouth.            STOP taking these medications       tiZANidine (ZANAFLEX) 4 MG tablet Comments:   Reason for Stopping:         citalopram (CELEXA) 40 MG tablet Comments:   Reason for Stopping:         cloNIDine (CATAPRES) 0.3 MG/24HR PTWK Comments:   Reason for Stopping:         gabapentin (NEURONTIN) 250 MG/5ML solution Comments:   Reason for Stopping:         influenza quadrivalent split vaccine (FLUZONE;FLUARIX;FLULAVAL;AFLURIA) 0.5 ML injection Comments:   Reason for Stopping:         insulin NPH (HUMULIN N;NOVOLIN N) 100 UNIT/ML injection pen Comments:   Reason for Stopping:         lidocaine viscous hcl (XYLOCAINE) 2 % SOLN solution Comments:   Reason for Stopping:         sodium chloride 0.9 % infusion Comments:   Reason for Stopping:         sucralfate (CARAFATE) 1 GM/10ML suspension Comments:   Reason for Stopping:               Procedures done this admission:  * No surgery found *    Consults this admission:  IP CONSULT TO INFECTIOUS DISEASES  IP CONSULT TO INTERVENTIONAL RADIOLOGY  IP CONSULT TO CASE MANAGEMENT  IP Edificjessica LERNER/ Benny Walters [facility]  IP CONSULT TO CASE MANAGEMENT  IP CONSULT TO INTERVENTIONAL RADIOLOGY    Echocardiogram results:  No results found for this or any previous visit. Diagnostic Imaging/Tests:   IR REMOVE TUNNELED CVAD W SQ PORT/PUMP INSERT    Result Date: 15/18/2077  Uncomplicated chest port removal. Plan: The patient was returned her Hospital room in stable condition. Routine wound care. Labs: Results:       BMP, Mg, Phos Recent Labs     12/29/22  0758      K 3.7      CO2 22   ANIONGAP 7   BUN 6   CREATININE 0.60   LABGLOM >60   CALCIUM 9.0   GLUCOSE 180*      CBC Recent Labs     12/29/22  0758   WBC 3.4*   RBC 4.05   HGB 9.0*   HCT 31.3*   MCV 77.3*   MCH 22.2*   MCHC 28.8*   RDW 17.2*   PLT 52*   MPV 11.1   NRBC 0.00      LFT No results for input(s): BILITOT, BILIDIR, ALKPHOS, AST, ALT, PROT, LABALBU, GLOB in the last 72 hours.    Cardiac  Lab Results   Component Value Date/Time    TROPHS 5.5 12/13/2021 10:43 AM      Coags No results found for: PROTIME, INR, APTT   A1c Lab Results   Component Value Date/Time LABA1C 7.2 12/25/2022 06:28 AM    LABA1C 7.9 07/20/2022 05:18 AM    LABA1C 9.2 06/23/2022 07:38 AM     12/25/2022 06:28 AM     07/20/2022 05:18 AM     06/23/2022 07:38 AM      Lipids Lab Results   Component Value Date/Time    CHOL 203 06/23/2022 07:38 AM    LDLCALC 118.4 06/23/2022 07:38 AM    LABVLDL 45.6 06/23/2022 07:38 AM    HDL 39 06/23/2022 07:38 AM    CHOLHDLRATIO 5.2 06/23/2022 07:38 AM    TRIG 228 06/23/2022 07:38 AM      Thyroid  Lab Results   Component Value Date/Time    TSHELE 0.57 07/20/2022 05:18 AM    GLS9QUY 1.870 06/23/2022 07:38 AM        Most Recent UA Lab Results   Component Value Date/Time    COLORU YELLOW/STRAW 10/12/2022 08:59 AM    APPEARANCE CLEAR 10/12/2022 08:59 AM    SPECGRAV 1.009 10/12/2022 08:59 AM    LABPH 6.5 10/12/2022 08:59 AM    PROTEINU Negative 10/12/2022 08:59 AM    GLUCOSEU Negative 10/27/2022 11:46 AM    GLUCOSEU 500 10/12/2022 08:59 AM    KETUA Negative 10/12/2022 08:59 AM    BILIRUBINUR Negative 10/12/2022 08:59 AM    BILIRUBINUR Negative 08/27/2021 12:31 PM    BLOODU Negative 10/27/2022 11:46 AM    BLOODU Negative 10/12/2022 08:59 AM    UROBILINOGEN 0.2 10/12/2022 08:59 AM    NITRU Negative 10/12/2022 08:59 AM    LEUKOCYTESUR Negative 10/12/2022 08:59 AM    WBCUA 0-3 08/27/2021 12:31 PM    RBCUA 0-3 08/27/2021 12:31 PM    BACTERIA TRACE 08/27/2021 12:31 PM    MUCUS 0 08/27/2021 12:31 PM        Recent Labs     12/28/22  1030 12/28/22  1028 12/27/22  1005 12/25/22  0910   CULTURE NO GROWTH 2 DAYS NO GROWTH 2 DAYS NO GROWTH 2 DAYS NO GROWTH 5 DAYS  NO GROWTH 5 DAYS       All Labs from Last 24 Hrs:  Recent Results (from the past 24 hour(s))   POCT Glucose    Collection Time: 12/29/22  4:06 PM   Result Value Ref Range    POC Glucose 175 (H) 65 - 100 mg/dL    Performed by: Mack Kim    POCT Glucose    Collection Time: 12/29/22  7:40 PM   Result Value Ref Range    POC Glucose 218 (H) 65 - 100 mg/dL    Performed by: Gregory    POCT Glucose Collection Time: 12/30/22  7:21 AM   Result Value Ref Range    POC Glucose 206 (H) 65 - 100 mg/dL    Performed by: Annabel    POCT Glucose    Collection Time: 12/30/22 11:24 AM   Result Value Ref Range    POC Glucose 211 (H) 65 - 100 mg/dL    Performed by: Annabel        Allergies   Allergen Reactions    Adhesive Tape Itching, Other (See Comments) and Rash     blisters  tegaderm  Paper tape too      Metronidazole Diarrhea and Nausea And Vomiting    Atorvastatin Myalgia    Iodine Other (See Comments)     Sweaty and clammy    Statins Myalgia    Barium Iodide Nausea And Vomiting     Diaphoresis      Barium Sulfate Palpitations    Insulin Lispro Nausea And Vomiting    Insulins Nausea And Vomiting     Humalog only    Metformin Nausea And Vomiting     Stomach pain  Stomach pain  Stomach pain  Stomach pain       Immunization History   Administered Date(s) Administered    COVID-19, PFIZER PURPLE top, DILUTE for use, (age 15 y+), 30mcg/0.3mL 03/29/2021, 04/21/2021, 12/31/2021    Influenza, FLUARIX, FLULAVAL, FLUZONE (age 10 mo+) AND AFLURIA, (age 1 y+), PF, 0.5mL 11/12/2020    Influenza, Trivalent PF 10/27/2013       Recent Vital Data:  Patient Vitals for the past 24 hrs:   Temp Pulse Resp BP SpO2   12/30/22 1530 -- 86 16 -- --   12/30/22 1459 98.5 °F (36.9 °C) 96 18 (!) 146/70 100 %   12/30/22 1220 -- -- 17 -- --   12/30/22 1124 99 °F (37.2 °C) 92 18 124/71 97 %   12/30/22 0712 98.8 °F (37.1 °C) 93 18 (!) 143/78 97 %   12/30/22 0319 98.1 °F (36.7 °C) 87 18 127/65 92 %   12/29/22 2305 98.2 °F (36.8 °C) 85 18 119/69 96 %   12/29/22 1942 98.1 °F (36.7 °C) 81 18 (!) 144/79 100 %   12/29/22 1628 -- 76 -- -- --   12/29/22 1626 -- -- 18 -- --       Oxygen Therapy  SpO2: 100 %  Pulse Oximeter Device Mode: Continuous  Pulse Oximeter Device Location: Left, Hand  O2 Device: None (Room air)    Estimated body mass index is 29.49 kg/m² as calculated from the following:    Height as of this encounter: 5' 2\" (1.575 m).     Weight as of this encounter: 161 lb 4 oz (73.1 kg). Intake/Output Summary (Last 24 hours) at 12/30/2022 1548  Last data filed at 12/30/2022 0900  Gross per 24 hour   Intake 838 ml   Output 1200 ml   Net -362 ml         Physical Exam:    General:    Well nourished. No overt distress, edentulous   Head:  Normocephalic, atraumatic  Eyes:  Sclerae appear normal.  Pupils equally round. HENT:  Nares appear normal, no drainage. Moist mucous membranes  Neck:  No restricted ROM. Trachea midline  CV:   RRR. No m/r/g. No JVD  Lungs:   CTAB. No wheezing, rhonchi, or rales. Respirations even, unlabored  Abdomen:   Soft, nontender, nondistended. Extremities: Warm and dry. No cyanosis or clubbing. No edema. Skin:     No rashes. Normal coloration  Neuro:  CN II-XII grossly intact. Psych:  Normal mood and affect. Signed:  MARISOL Root CNP    Part of this note may have been written by using a voice dictation software. The note has been proof read but may still contain some grammatical/other typographical errors.

## 2023-01-01 LAB
BACTERIA SPEC CULT: NORMAL
BACTERIA SPEC CULT: NORMAL
SERVICE CMNT-IMP: NORMAL
SERVICE CMNT-IMP: NORMAL

## 2023-01-18 NOTE — PROGRESS NOTES
1L IVF infused, phenergan given IV, pt tolerated well, discharged home ambulatory
Patient states had port checked in IR due to tenderness and intermittent venous returns. States port OK. Site has slight bruising and dark area in center of port and center indents into skin.
none

## 2023-09-15 ENCOUNTER — TELEPHONE (OUTPATIENT)
Dept: INTERVENTIONAL RADIOLOGY/VASCULAR | Age: 53
End: 2023-09-15

## 2023-09-15 ENCOUNTER — TRANSCRIBE ORDERS (OUTPATIENT)
Dept: INTERVENTIONAL RADIOLOGY/VASCULAR | Age: 53
End: 2023-09-15

## 2023-09-15 DIAGNOSIS — R13.14 DYSPHAGIA, PHARYNGOESOPHAGEAL PHASE: Primary | ICD-10-CM

## 2023-09-15 NOTE — TELEPHONE ENCOUNTER
[] Phone call to: Patient    [] Number used to reach this person: [phone]    [] Voicemail reached: Detailed Voicemail     [] Appointment date:  9/20/23    [] Arrival time:  0930    [] Location given: yes    [] AM Medicine with a small sip of water: Yes    [] Patient is NPO: Yes    [] Need for : Yes    [] Anesthetic Moderate Sedation    [] Blood thinners held: No    [] Education on Hold requirements prior to procedure: na     [] Allergies: OTHER:      [] Reviewed    [] Latex Allergy: No    [] Lidocaine Allergy: No    [] CPAP at night:     [] Any recent infections:     [] Diabetes: Yes    [] Additional information:  Left detailed vm. CVC line removal and port placement. Pt in need of updated labs. Request sent to ordering MD office. Left vm to inform pt she will need labs drawn before procedure. Check BGL on arrival. Awaiting a call back.       [] Time taken to answer all questions    [] Call back phone number of [phone] given

## 2023-09-20 ENCOUNTER — HOSPITAL ENCOUNTER (OUTPATIENT)
Dept: INTERVENTIONAL RADIOLOGY/VASCULAR | Age: 53
Discharge: HOME | End: 2023-09-23
Attending: INTERNAL MEDICINE
Payer: MEDICARE

## 2023-09-20 VITALS
HEART RATE: 91 BPM | DIASTOLIC BLOOD PRESSURE: 86 MMHG | TEMPERATURE: 97.9 F | SYSTOLIC BLOOD PRESSURE: 180 MMHG | OXYGEN SATURATION: 91 % | RESPIRATION RATE: 16 BRPM

## 2023-09-20 DIAGNOSIS — R13.14 DYSPHAGIA, PHARYNGOESOPHAGEAL PHASE: ICD-10-CM

## 2023-09-20 LAB
GLUCOSE BLD STRIP.AUTO-MCNC: 175 MG/DL (ref 65–100)
SERVICE CMNT-IMP: ABNORMAL

## 2023-09-20 PROCEDURE — 99153 MOD SED SAME PHYS/QHP EA: CPT

## 2023-09-20 PROCEDURE — 82962 GLUCOSE BLOOD TEST: CPT

## 2023-09-20 PROCEDURE — 6360000002 HC RX W HCPCS: Performed by: RADIOLOGY

## 2023-09-20 RX ORDER — DIPHENHYDRAMINE HYDROCHLORIDE 50 MG/ML
INJECTION INTRAMUSCULAR; INTRAVENOUS PRN
Status: COMPLETED | OUTPATIENT
Start: 2023-09-20 | End: 2023-09-20

## 2023-09-20 RX ORDER — MIDAZOLAM HYDROCHLORIDE 2 MG/2ML
INJECTION, SOLUTION INTRAMUSCULAR; INTRAVENOUS PRN
Status: COMPLETED | OUTPATIENT
Start: 2023-09-20 | End: 2023-09-20

## 2023-09-20 RX ORDER — FENTANYL CITRATE 50 UG/ML
INJECTION, SOLUTION INTRAMUSCULAR; INTRAVENOUS PRN
Status: COMPLETED | OUTPATIENT
Start: 2023-09-20 | End: 2023-09-20

## 2023-09-20 RX ADMIN — FENTANYL CITRATE 50 MCG: 50 INJECTION, SOLUTION INTRAMUSCULAR; INTRAVENOUS at 12:39

## 2023-09-20 RX ADMIN — FENTANYL CITRATE 50 MCG: 50 INJECTION, SOLUTION INTRAMUSCULAR; INTRAVENOUS at 12:34

## 2023-09-20 RX ADMIN — MIDAZOLAM HYDROCHLORIDE 1 MG: 1 INJECTION, SOLUTION INTRAMUSCULAR; INTRAVENOUS at 12:34

## 2023-09-20 RX ADMIN — FENTANYL CITRATE 50 MCG: 50 INJECTION, SOLUTION INTRAMUSCULAR; INTRAVENOUS at 12:44

## 2023-09-20 RX ADMIN — MIDAZOLAM HYDROCHLORIDE 1 MG: 1 INJECTION, SOLUTION INTRAMUSCULAR; INTRAVENOUS at 12:39

## 2023-09-20 RX ADMIN — Medication 2000 MG: at 12:35

## 2023-09-20 RX ADMIN — MIDAZOLAM HYDROCHLORIDE 1 MG: 1 INJECTION, SOLUTION INTRAMUSCULAR; INTRAVENOUS at 12:44

## 2023-09-20 RX ADMIN — DIPHENHYDRAMINE HYDROCHLORIDE 50 MG: 50 INJECTION, SOLUTION INTRAMUSCULAR; INTRAVENOUS at 12:39

## 2023-09-20 ASSESSMENT — PAIN SCALES - GENERAL
PAINLEVEL_OUTOF10: 0

## 2023-09-20 NOTE — H&P
GFRAA >60 08/12/2022 04:07 AM    GFRAA >60 08/11/2022 04:40 AM    MG 2.1 09/01/2022 07:20 AM    MG 2.2 08/18/2022 07:28 AM    PHOS 3.3 07/27/2022 05:18 AM    PHOS 2.7 07/26/2022 04:36 AM    GLOB 5.1 10/27/2022 08:55 AM    GLOB 4.4 10/12/2022 08:59 AM    ALT 29 10/27/2022 08:55 AM    ALT 30 10/12/2022 08:59 AM     Lab Results   Component Value Date/Time    WBC 3.4 12/29/2022 07:58 AM    WBC 4.8 12/25/2022 06:28 AM    HGB 9.0 12/29/2022 07:58 AM    HGB 9.3 12/25/2022 06:28 AM    HCT 31.3 12/29/2022 07:58 AM    HCT 31.2 12/25/2022 06:28 AM    PLT 52 12/29/2022 07:58 AM     12/25/2022 06:28 AM     No results found for: \"APTT\", \"INR\"    Assessment:     Gastroparesis, chronic dehydration, nausea and poor PIV access        Plan:     Planned Procedure:  port placement    Risks, benefits, and alternatives reviewed with patient and she agrees to proceed with the procedure.       Signed By: Emiliano White PA-C     September 20, 2023

## 2023-09-20 NOTE — OR NURSING
Recovery period without difficulty. Pt alert and oriented and denies pain. Dressing is clean, dry, and intact. Reviewed discharge instructions with patient and spouse, both verbalized understanding. [facility] Vital signs and Karishma score completed. Port access at the time of discharge. Patient to continue IVF infusions at home.

## 2023-09-20 NOTE — BRIEF OP NOTE
Department of Interventional Radiology  [phone]        Interventional Radiology Brief Procedure Note    Patient: Gus Jones MRN: [id number]  SSN: [id number]    YOB: 1970  Age: 45 y.o.   Sex: female      Date of Procedure: 9/20/2023    Pre-Procedure Diagnosis: gastroparesis    Post-Procedure Diagnosis: SAME    Procedure(s): Venous Chest Port Placement, removal of tunneled CVC    Brief Description of Procedure: as above    Performed By: Buck Johnson PA-C     Assistants: None    Anesthesia:Moderate Sedation per Bahman Marina MD    Estimated Blood Loss: Less than 10ml    Specimens:  None    Implants:  Subcutaneous Port    Findings: catheter tip in right atrium     Complications: None    Recommendations: ok to use port     Follow Up: prn    Signed By: Buck Johnson PA-C     September 20, 2023

## 2023-09-20 NOTE — PRE SEDATION
tablet Take by mouth every 6 hours as needed    Historical Provider, MD   cyanocobalamin 1000 MCG/ML injection INJECT 1 VIAL INTRAMUSCULARLY FOR 4 WEEKS THEN MONTHLY 11/25/17   Historical Provider, MD   diclofenac sodium (VOLTAREN) 1 % GEL APPLY 1 GRAM TO AFFECTED AREA 4 TIMES A DAY AS NEEDED 10/2/18   Historical Provider, MD   diphenhydrAMINE (BENADRYL) 25 MG capsule Take 25 mg by mouth every 6 hours as needed    Historical Provider, MD   glucagon 1 MG injection Inject 1 mg into the muscle as needed 5/8/17   Historical Provider, MD   Hyoscyamine Sulfate SL 0.125 MG SUBL Place 0.125 mg under the tongue every 6 hours as needed    Historical Provider, MD   insulin aspart (NOVOLOG) 100 UNIT/ML injection pen 15 units plus sliding scale for blood sugar >151 10/7/14   Historical Provider, MD   Insulin Degludec 100 UNIT/ML SOPN Inject 58 Units into the skin at bedtime 10/27/21   Historical Provider, MD   LORazepam (ATIVAN) 1 MG tablet Take 1 mg by mouth in the morning, at noon, and at bedtime. 10/7/14   Historical Provider, MD   naloxone 4 MG/0.1ML LIQD nasal spray 4 mg once as needed 2/16/22   Historical Provider, MD   ondansetron (ZOFRAN-ODT) 8 MG TBDP disintegrating tablet Place 8 mg under the tongue 3 times daily 4/19/16   Historical Provider, MD   polyethylene glycol (GLYCOLAX) 17 GM/SCOOP powder Take 17 g by mouth as needed    Historical Provider, MD   pregabalin (LYRICA) 100 MG capsule Take 100 mg by mouth 2 times daily. Historical Provider, MD   zolpidem (AMBIEN) 10 MG tablet Take 10 mg by mouth. 2/11/22   Historical Provider, MD   gabapentin (NEURONTIN) 250 MG/5ML solution 300 mg by Enteral route 3 times daily.   Patient not taking: No sig reported 10/7/14 7/28/22  Historical Provider, MD   insulin NPH (HUMULIN N;NOVOLIN N) 100 UNIT/ML injection pen Please inject 12 units into the skin with breakfast and 4 units with bedtime. (pen)  Patient not taking: No sig reported 10/7/14 7/28/22  Historical Provider, MD

## 2024-12-06 NOTE — ANESTHESIA POSTPROCEDURE EVALUATION
Procedure(s):  ESOPHAGOGASTRODUODENOSCOPY (EGD)  ESOPHAGEAL DILATION. total IV anesthesia    Anesthesia Post Evaluation        Patient location during evaluation: bedside  Patient participation: complete - patient participated  Level of consciousness: responsive to verbal stimuli  Pain management: satisfactory to patient  Airway patency: patent  Anesthetic complications: no  Cardiovascular status: hemodynamically stable  Respiratory status: spontaneous ventilation  Hydration status: stable    Final Post Anesthesia Temperature Assessment:  Normothermia (36.0-37.5 degrees C)      INITIAL Post-op Vital signs:   Vitals Value Taken Time   BP     Temp 36.2 °C (97.1 °F) 07/28/21 0912   Pulse 62 07/28/21 0914   Resp 15 07/28/21 0912   SpO2 96 % 07/28/21 0914   Vitals shown include unvalidated device data.
Refer to the Assessment tab to view/cancel completed assessment.

## 2025-02-07 NOTE — PROGRESS NOTES
Blood sugar 79 in prep Quality 226: Preventive Care And Screening: Tobacco Use: Screening And Cessation Intervention: Patient screened for tobacco use and is an ex/non-smoker Detail Level: Detailed Quality 130: Documentation Of Current Medications In The Medical Record: Current Medications Documented Quality 47: Advance Care Plan: Advance Care Planning discussed and documented; advance care plan or surrogate decision maker documented in the medical record.

## 2025-05-30 NOTE — PROCEDURES
NAME: JOHN ROJAS   DOS: 2025  : 1972  PCP: Austyn Bar DO    Chief Complaint:    Chief Complaint   Patient presents with    Cerebral Aneurysm       History of Present Illness:  52 y.o. female   I saw this 52-year-old female neurosurgical consultation she presents with a history of complex cranial issues she has had headaches since birth she was diagnosed with a teratoma and underwent a craniotomy at 26 years of age since that time she has had headaches takes a lot of Tylenol during workup symptoms have been stable without evidence of subarachnoid hemorrhage ictus or family history etc. she does vape she is on Keppra    The reason for the CTA being performed was a blacking out episode she reported changes in visual obscuration followed by workup extensively for cardiac abnormality    She denied any postictal symptoms other than urination on herself she is here for evaluation for a CTA that was performed during workup discloses the presence of a left-sided approximately 4 mm elongated mm aneurysm of the internal carotid in the head    PMHX  Allergies:  Allergies   Allergen Reactions    Toradol [Ketorolac Tromethamine] Other (See Comments)     Syncope       Medications    Current Outpatient Medications:     divalproex (Depakote ER) 250 MG 24 hr tablet, Take 1 tablet at bedtime for 2 weeks, then may increase to 2 tablets at bedtime, Disp: 180 tablet, Rfl: 3    DULoxetine (CYMBALTA) 30 MG capsule, Take 1 capsule by mouth Daily., Disp: 90 capsule, Rfl: 0    levETIRAcetam (KEPPRA) 500 MG tablet, Take 1 tablet by mouth 2 (Two) Times a Day., Disp: 180 tablet, Rfl: 3    magnesium oxide (MAG-OX) 400 MG tablet, Take 1 tablet by mouth Every Other Day., Disp: , Rfl:     pantoprazole (Protonix) 40 MG EC tablet, Take 1 tablet by mouth Daily., Disp: 90 tablet, Rfl: 0    rosuvastatin (CRESTOR) 10 MG tablet, Take 1 tablet by mouth Daily., Disp: 90 tablet, Rfl: 3    ubrogepant (Ubrelvy) 100 MG tablet, Take 1  Esophagogastroduodenoscopy    DATE of PROCEDURE: 10/14/2020    INDICATION: dysphagia    POSTPROCEDURE DIAGNOSIS: esophageal stricture; gastritis    MEDICATIONS ADMINISTERED: MAC anesthesia (see anesthesia report)    INSTRUMENT:    PROCEDURE:  After obtaining informed consent, the patient was placed in the left lateral position and sedated. The endoscope was advanced under direct vision without difficulty. The esophagus, stomach (including retroflexed views) and duodenum were evaluated. The patient was taken to the recovery area in stable condition.     FINDINGS:  ESOPHAGUS: normal appearing; Tyler Six #48 led to moderate 1 cm distal esophageal tear; random biopsies taken of distal esophagus  STOMACH: gastrojejunostomy anastomosis, diffuse gastritis  DUODENUM: normal    Estimated blood loss: 0-minimal   Specimens obtained during procedure: yes    PLAN: EGD dilation one month tablet by mouth Daily As Needed at onset of headache. May repeat one dose in 2 hours if needed. Max: 2 tabs/24 hours, Disp: 16 tablet, Rfl: 11  Past Medical History:  Past Medical History:   Diagnosis Date    Aneurysm     Arthritis     Difficulty walking     Difficulty with balance due to vision    Fibromyalgia, primary     Headache, tension-type     Hyperlipidemia     Migraine     Seizures 08/2024    Syncope 08/2024    Vision loss      Past Surgical History:  Past Surgical History:   Procedure Laterality Date    BRAIN SURGERY      BREAST CYST ASPIRATION      CRANIOTOMY  1999    Tumor from brain stem removed    EYE SURGERY      HYSTERECTOMY      OOPHORECTOMY       Social Hx:  Social History     Tobacco Use    Smoking status: Former     Current packs/day: 1.00     Average packs/day: 1 pack/day for 30.4 years (30.4 ttl pk-yrs)     Types: Cigarettes     Start date: 1995     Passive exposure: Past    Smokeless tobacco: Never   Vaping Use    Vaping status: Every Day    Substances: Nicotine, Flavoring    Devices: Disposable   Substance Use Topics    Alcohol use: Not Currently    Drug use: Never     Family Hx:  Family History   Problem Relation Age of Onset    Breast cancer Mother     Diabetes Mother     No Known Problems Brother     No Known Problems Brother     Heart failure Maternal Grandfather     Ovarian cancer Neg Hx      Review of Systems:        Review of Systems   Constitutional:  Negative for activity change, appetite change, chills, diaphoresis, fatigue, fever and unexpected weight change.   HENT:  Negative for congestion, dental problem, drooling, ear discharge, ear pain, facial swelling, hearing loss, mouth sores, nosebleeds, postnasal drip, rhinorrhea, sinus pressure, sinus pain, sneezing, sore throat, tinnitus, trouble swallowing and voice change.    Eyes:  Negative for photophobia, pain, discharge, redness, itching and visual disturbance.   Respiratory:  Negative for apnea, cough, choking, chest tightness,  shortness of breath, wheezing and stridor.    Cardiovascular:  Negative for chest pain, palpitations and leg swelling.   Gastrointestinal:  Negative for abdominal distention, abdominal pain, anal bleeding, blood in stool, constipation, diarrhea, nausea, rectal pain and vomiting.   Endocrine: Negative for cold intolerance, heat intolerance, polydipsia, polyphagia and polyuria.   Genitourinary:  Negative for decreased urine volume, difficulty urinating, dyspareunia, dysuria, enuresis, flank pain, frequency, genital sores, hematuria, menstrual problem, pelvic pain, urgency, vaginal bleeding, vaginal discharge and vaginal pain.   Musculoskeletal:  Negative for arthralgias, back pain, gait problem, joint swelling, myalgias, neck pain and neck stiffness.   Skin:  Negative for color change, pallor, rash and wound.   Allergic/Immunologic: Negative for environmental allergies, food allergies and immunocompromised state.   Neurological:  Negative for dizziness, tremors, seizures, syncope, facial asymmetry, speech difficulty, weakness, light-headedness, numbness and headaches.   Hematological:  Negative for adenopathy. Does not bruise/bleed easily.   Psychiatric/Behavioral:  Negative for agitation, behavioral problems, confusion, decreased concentration, dysphoric mood, hallucinations, self-injury, sleep disturbance and suicidal ideas. The patient is not nervous/anxious and is not hyperactive.       I have reviewed this note template and all pertinent parts of the review of systems social, family history, surgical history and medication list    Physical Examination:  Vitals:    05/30/25 0859   Temp: 97 °F (36.1 °C)      General Appearance:   She is quite lean with a low BMI  Neurological examination:  Neurological Exam   Vital signs were reviewed and documented in the chart  Patient appeared in good neurologic function with normal comprehension fluent speech  Mood and affect are normal  Sense of smell deferred    Pupils  symmetric equally reactive funduscopic exam not visualized   Visual fields intact to confrontation  Extraocular movements intact  Face motor function is symmetric  Facial sensations normal  Hearing intact to finger rub hearing intact to finger rub  Tongue is midline  Palate symmetric  Swallowing normal  Shoulder shrug normal    Muscle bulk and tone normal  5 out of 5 strength no motor drift  Gait is normal  No clonus long tract signs or myelopathy  She has no long track signs or Shanae's        Review of Imaging/DATA:  Personally reviewed and interpreted a CTA of the head demonstrates the presence of the left sided supraclinoid aneurysm of the superior hypophyseal suspected approximately 3 to 5 mm in length at the irregularly-shaped, posterior fossa craniotomy changes are seen there is no evidence of hydrocephalus small ventricles are noted  Diagnoses/Plan:    Ms. Clifford is a 52 y.o. female   1.  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  History of chronic headaches    3.  Vaping use    4.  Presyncopal episode possible seizure currently on Keppra that prompted CTA    5.  Anxiety regarding medical procedures    6.  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  I talked about the treatment options observation she is quite terrified of intracranial rupture I feel it is reasonable to proceed with diagnostic catheter angiogram via radial approach for further delineation and establishment of treatment options of her aneurysm    Plan will be for  aneurysm evaluation with diagnostic catheter angiogram

## 2025-08-05 ENCOUNTER — APPOINTMENT (OUTPATIENT)
Dept: GENERAL RADIOLOGY | Age: 55
End: 2025-08-05
Payer: MEDICARE

## 2025-08-05 ENCOUNTER — APPOINTMENT (OUTPATIENT)
Dept: CT IMAGING | Age: 55
End: 2025-08-05
Payer: MEDICARE

## 2025-08-05 ENCOUNTER — HOSPITAL ENCOUNTER (EMERGENCY)
Age: 55
Discharge: HOME | End: 2025-08-05
Attending: EMERGENCY MEDICINE
Payer: MEDICARE

## 2025-08-05 VITALS
OXYGEN SATURATION: 97 % | BODY MASS INDEX: 37.17 KG/M2 | DIASTOLIC BLOOD PRESSURE: 88 MMHG | HEART RATE: 87 BPM | HEIGHT: 62 IN | RESPIRATION RATE: 18 BRPM | WEIGHT: 202 LBS | TEMPERATURE: 98.7 F | SYSTOLIC BLOOD PRESSURE: 168 MMHG

## 2025-08-05 DIAGNOSIS — R11.2 NAUSEA AND VOMITING IN ADULT: ICD-10-CM

## 2025-08-05 DIAGNOSIS — K42.9 PERIUMBILICAL HERNIA: ICD-10-CM

## 2025-08-05 DIAGNOSIS — R10.10 UPPER ABDOMINAL PAIN: Primary | ICD-10-CM

## 2025-08-05 DIAGNOSIS — E86.0 DEHYDRATION: ICD-10-CM

## 2025-08-05 LAB
ALBUMIN SERPL-MCNC: 4.7 G/DL (ref 3.5–5)
ALBUMIN/GLOB SERPL: 1.1 (ref 1–1.9)
ALP SERPL-CCNC: 96 U/L (ref 35–104)
ALT SERPL-CCNC: 26 U/L (ref 8–45)
ANION GAP SERPL CALC-SCNC: 16 MMOL/L (ref 7–16)
APPEARANCE UR: CLEAR
AST SERPL-CCNC: 22 U/L (ref 15–37)
BASOPHILS # BLD: 0.03 K/UL (ref 0–0.2)
BASOPHILS NFR BLD: 0.6 % (ref 0–2)
BILIRUB SERPL-MCNC: 0.5 MG/DL (ref 0–1.2)
BILIRUB UR QL: NEGATIVE
BUN SERPL-MCNC: 21 MG/DL (ref 6–23)
CALCIUM SERPL-MCNC: 10.5 MG/DL (ref 8.8–10.2)
CHLORIDE SERPL-SCNC: 104 MMOL/L (ref 98–107)
CO2 SERPL-SCNC: 20 MMOL/L (ref 20–29)
COLOR UR: ABNORMAL
CREAT SERPL-MCNC: 1.4 MG/DL (ref 0.6–1.1)
DIFFERENTIAL METHOD BLD: NORMAL
EOSINOPHIL # BLD: 0.1 K/UL (ref 0–0.8)
EOSINOPHIL NFR BLD: 1.9 % (ref 0.5–7.8)
ERYTHROCYTE [DISTWIDTH] IN BLOOD BY AUTOMATED COUNT: 13.3 % (ref 11.9–14.6)
GLOBULIN SER CALC-MCNC: 4.2 G/DL (ref 2.3–3.5)
GLUCOSE SERPL-MCNC: 319 MG/DL (ref 70–99)
GLUCOSE UR STRIP.AUTO-MCNC: >1000 MG/DL
HCT VFR BLD AUTO: 40.4 % (ref 35.8–46.3)
HGB BLD-MCNC: 13 G/DL (ref 11.7–15.4)
HGB UR QL STRIP: NEGATIVE
IMM GRANULOCYTES # BLD AUTO: 0.01 K/UL (ref 0–0.5)
IMM GRANULOCYTES NFR BLD AUTO: 0.2 % (ref 0–5)
KETONES UR QL STRIP.AUTO: ABNORMAL MG/DL
LEUKOCYTE ESTERASE UR QL STRIP.AUTO: NEGATIVE
LIPASE SERPL-CCNC: 51 U/L (ref 13–60)
LYMPHOCYTES # BLD: 1.59 K/UL (ref 0.5–4.6)
LYMPHOCYTES NFR BLD: 29.7 % (ref 13–44)
MCH RBC QN AUTO: 27.4 PG (ref 26.1–32.9)
MCHC RBC AUTO-ENTMCNC: 32.2 G/DL (ref 31.4–35)
MCV RBC AUTO: 85.2 FL (ref 82–102)
MONOCYTES # BLD: 0.45 K/UL (ref 0.1–1.3)
MONOCYTES NFR BLD: 8.4 % (ref 4–12)
NEUTS SEG # BLD: 3.18 K/UL (ref 1.7–8.2)
NEUTS SEG NFR BLD: 59.2 % (ref 43–78)
NITRITE UR QL STRIP.AUTO: NEGATIVE
NRBC # BLD: 0 K/UL (ref 0–0.2)
PH UR STRIP: 6 (ref 5–9)
PLATELET # BLD AUTO: 165 K/UL (ref 150–450)
PMV BLD AUTO: 12 FL (ref 9.4–12.3)
POTASSIUM SERPL-SCNC: 4.6 MMOL/L (ref 3.5–5.1)
PROT SERPL-MCNC: 8.9 G/DL (ref 6.3–8.2)
PROT UR STRIP-MCNC: NEGATIVE MG/DL
RBC # BLD AUTO: 4.74 M/UL (ref 4.05–5.2)
SODIUM SERPL-SCNC: 139 MMOL/L (ref 136–145)
SP GR UR REFRACTOMETRY: 1.02 (ref 1–1.02)
TROPONIN T SERPL HS-MCNC: 12.3 NG/L (ref 0–14)
UROBILINOGEN UR QL STRIP.AUTO: 0.2 EU/DL (ref 0.2–1)
WBC # BLD AUTO: 5.4 K/UL (ref 4.3–11.1)

## 2025-08-05 PROCEDURE — 83690 ASSAY OF LIPASE: CPT

## 2025-08-05 PROCEDURE — 71046 X-RAY EXAM CHEST 2 VIEWS: CPT

## 2025-08-05 PROCEDURE — 2580000003 HC RX 258: Performed by: EMERGENCY MEDICINE

## 2025-08-05 PROCEDURE — 6360000004 HC RX CONTRAST MEDICATION: Performed by: EMERGENCY MEDICINE

## 2025-08-05 PROCEDURE — 96361 HYDRATE IV INFUSION ADD-ON: CPT

## 2025-08-05 PROCEDURE — 81003 URINALYSIS AUTO W/O SCOPE: CPT

## 2025-08-05 PROCEDURE — 2500000003 HC RX 250 WO HCPCS: Performed by: EMERGENCY MEDICINE

## 2025-08-05 PROCEDURE — 96375 TX/PRO/DX INJ NEW DRUG ADDON: CPT

## 2025-08-05 PROCEDURE — 84484 ASSAY OF TROPONIN QUANT: CPT

## 2025-08-05 PROCEDURE — 96374 THER/PROPH/DIAG INJ IV PUSH: CPT

## 2025-08-05 PROCEDURE — 96376 TX/PRO/DX INJ SAME DRUG ADON: CPT

## 2025-08-05 PROCEDURE — 85025 COMPLETE CBC W/AUTO DIFF WBC: CPT

## 2025-08-05 PROCEDURE — 80053 COMPREHEN METABOLIC PANEL: CPT

## 2025-08-05 PROCEDURE — 74176 CT ABD & PELVIS W/O CONTRAST: CPT

## 2025-08-05 PROCEDURE — 6360000002 HC RX W HCPCS: Performed by: EMERGENCY MEDICINE

## 2025-08-05 PROCEDURE — 99285 EMERGENCY DEPT VISIT HI MDM: CPT

## 2025-08-05 RX ORDER — ONDANSETRON 2 MG/ML
4 INJECTION INTRAMUSCULAR; INTRAVENOUS
Status: COMPLETED | OUTPATIENT
Start: 2025-08-05 | End: 2025-08-05

## 2025-08-05 RX ORDER — FAMOTIDINE 10 MG/ML
20 INJECTION, SOLUTION INTRAVENOUS
Status: COMPLETED | OUTPATIENT
Start: 2025-08-05 | End: 2025-08-05

## 2025-08-05 RX ORDER — ONDANSETRON 2 MG/ML
8 INJECTION INTRAMUSCULAR; INTRAVENOUS ONCE
Status: COMPLETED | OUTPATIENT
Start: 2025-08-05 | End: 2025-08-05

## 2025-08-05 RX ORDER — MORPHINE SULFATE 10 MG/ML
6 INJECTION, SOLUTION INTRAMUSCULAR; INTRAVENOUS ONCE
Status: COMPLETED | OUTPATIENT
Start: 2025-08-05 | End: 2025-08-05

## 2025-08-05 RX ORDER — 0.9 % SODIUM CHLORIDE 0.9 %
1000 INTRAVENOUS SOLUTION INTRAVENOUS
Status: COMPLETED | OUTPATIENT
Start: 2025-08-05 | End: 2025-08-05

## 2025-08-05 RX ORDER — DIATRIZOATE MEGLUMINE AND DIATRIZOATE SODIUM 660; 100 MG/ML; MG/ML
15 SOLUTION ORAL; RECTAL
Status: DISCONTINUED | OUTPATIENT
Start: 2025-08-05 | End: 2025-08-06 | Stop reason: HOSPADM

## 2025-08-05 RX ORDER — KETOROLAC TROMETHAMINE 15 MG/ML
15 INJECTION, SOLUTION INTRAMUSCULAR; INTRAVENOUS ONCE
Status: COMPLETED | OUTPATIENT
Start: 2025-08-05 | End: 2025-08-05

## 2025-08-05 RX ORDER — HEPARIN SODIUM,PORCINE/PF 10 UNIT/ML
3 SYRINGE (ML) INTRAVENOUS PRN
Status: DISCONTINUED | OUTPATIENT
Start: 2025-08-05 | End: 2025-08-06 | Stop reason: HOSPADM

## 2025-08-05 RX ORDER — MORPHINE SULFATE 4 MG/ML
3 INJECTION, SOLUTION INTRAMUSCULAR; INTRAVENOUS ONCE
Status: COMPLETED | OUTPATIENT
Start: 2025-08-05 | End: 2025-08-05

## 2025-08-05 RX ORDER — DROPERIDOL 2.5 MG/ML
0.62 INJECTION, SOLUTION INTRAMUSCULAR; INTRAVENOUS ONCE
Status: COMPLETED | OUTPATIENT
Start: 2025-08-05 | End: 2025-08-05

## 2025-08-05 RX ORDER — PROMETHAZINE HYDROCHLORIDE 25 MG/1
25 SUPPOSITORY RECTAL EVERY 8 HOURS PRN
Qty: 12 SUPPOSITORY | Refills: 0 | Status: SHIPPED | OUTPATIENT
Start: 2025-08-05 | End: 2025-08-09

## 2025-08-05 RX ADMIN — SODIUM CHLORIDE 1000 ML: 0.9 INJECTION, SOLUTION INTRAVENOUS at 19:34

## 2025-08-05 RX ADMIN — PANTOPRAZOLE SODIUM 40 MG: 40 INJECTION, POWDER, FOR SOLUTION INTRAVENOUS at 16:57

## 2025-08-05 RX ADMIN — FAMOTIDINE 20 MG: 10 INJECTION, SOLUTION INTRAVENOUS at 16:57

## 2025-08-05 RX ADMIN — MORPHINE SULFATE 3 MG: 4 INJECTION INTRAVENOUS at 19:57

## 2025-08-05 RX ADMIN — HEPARIN, PORCINE (PF) 10 UNIT/ML INTRAVENOUS SYRINGE 30 UNITS: at 23:09

## 2025-08-05 RX ADMIN — DROPERIDOL 0.62 MG: 2.5 INJECTION, SOLUTION INTRAMUSCULAR; INTRAVENOUS at 21:33

## 2025-08-05 RX ADMIN — ONDANSETRON 8 MG: 2 INJECTION, SOLUTION INTRAMUSCULAR; INTRAVENOUS at 16:57

## 2025-08-05 RX ADMIN — DIATRIZOATE MEGLUMINE AND DIATRIZOATE SODIUM 15 ML: 660; 100 LIQUID ORAL; RECTAL at 18:14

## 2025-08-05 RX ADMIN — SODIUM CHLORIDE 1000 ML: 0.9 INJECTION, SOLUTION INTRAVENOUS at 16:58

## 2025-08-05 RX ADMIN — MORPHINE SULFATE 6 MG: 10 INJECTION, SOLUTION INTRAMUSCULAR; INTRAVENOUS at 18:21

## 2025-08-05 RX ADMIN — KETOROLAC TROMETHAMINE 15 MG: 15 INJECTION, SOLUTION INTRAMUSCULAR; INTRAVENOUS at 16:58

## 2025-08-05 RX ADMIN — ONDANSETRON 4 MG: 2 INJECTION, SOLUTION INTRAMUSCULAR; INTRAVENOUS at 21:22

## 2025-08-05 ASSESSMENT — PAIN SCALES - GENERAL
PAINLEVEL_OUTOF10: 8
PAINLEVEL_OUTOF10: 10
PAINLEVEL_OUTOF10: 4
PAINLEVEL_OUTOF10: 10

## 2025-08-05 ASSESSMENT — LIFESTYLE VARIABLES
HOW MANY STANDARD DRINKS CONTAINING ALCOHOL DO YOU HAVE ON A TYPICAL DAY: PATIENT DOES NOT DRINK
HOW OFTEN DO YOU HAVE A DRINK CONTAINING ALCOHOL: NEVER

## 2025-08-05 ASSESSMENT — PAIN DESCRIPTION - LOCATION
LOCATION: ABDOMEN

## 2025-08-05 ASSESSMENT — PAIN DESCRIPTION - ORIENTATION
ORIENTATION: RIGHT
ORIENTATION: RIGHT

## 2025-08-05 ASSESSMENT — PAIN DESCRIPTION - DESCRIPTORS
DESCRIPTORS: SHOOTING
DESCRIPTORS: GNAWING

## 2025-08-05 ASSESSMENT — PAIN - FUNCTIONAL ASSESSMENT: PAIN_FUNCTIONAL_ASSESSMENT: 0-10

## (undated) DEVICE — YANKAUER,BULB TIP,W/O VENT,RIGID,STERILE: Brand: MEDLINE

## (undated) DEVICE — CONNECTOR TBNG OD5-7MM O2 END DISP

## (undated) DEVICE — KENDALL RADIOLUCENT FOAM MONITORING ELECTRODE RECTANGULAR SHAPE: Brand: KENDALL

## (undated) DEVICE — ESOPHAGEAL BALLOON DILATATION CATHETER: Brand: CRE FIXED WIRE

## (undated) DEVICE — CANNULA NSL ORAL AD FOR CAPNOFLEX CO2 O2 AIRLFE

## (undated) DEVICE — SYRINGE MED 10ML LUERLOCK TIP W/O SFTY DISP

## (undated) DEVICE — REM POLYHESIVE ADULT PATIENT RETURN ELECTRODE: Brand: VALLEYLAB

## (undated) DEVICE — BLOCK BITE AD 60FR W/ VELC STRP ADDRESSES MOST PT AND

## (undated) DEVICE — Device: Brand: AIR/WATER VALVE

## (undated) DEVICE — Device

## (undated) DEVICE — SYR 5ML 1/5 GRAD LL NSAF LF --

## (undated) DEVICE — AIRLIFE™ OXYGEN TUBING 7 FEET (2.1 M) CRUSH RESISTANT OXYGEN TUBING, VINYL TIPPED: Brand: AIRLIFE™

## (undated) DEVICE — RETRIEVER ENDOSCP L230CM DIA2.5MM NET W3XL5.5CM MIN WRK CHN

## (undated) DEVICE — SYRINGE MED 3ML CLR PLAS STD N CTRL LUERLOCK TIP DISP

## (undated) DEVICE — 1200 GUARD II KIT W/5MM TUBE W/O VAC TUBE: Brand: GUARDIAN

## (undated) DEVICE — ESOPHAGEAL/PYLORIC/COLONIC/BILIARY WIREGUIDED BALLOON DILATATION CATHETER: Brand: CRE™ PRO

## (undated) DEVICE — FORCEPS BX L240CM JAW DIA2.8MM L CAP W/ NDL MIC MESH TOOTH

## (undated) DEVICE — SINGLE USE POLYPECTOMY SNARE

## (undated) DEVICE — CONTAINER PREFIL FRMLN 40ML --

## (undated) DEVICE — MEDI-VAC YANKAUER SUCTION HANDLE W/BULBOUS TIP: Brand: CARDINAL HEALTH

## (undated) DEVICE — VALVE SUCTION AIR H2O SET ORCA POD + DISP

## (undated) DEVICE — CONTAINER FORMALIN PREFILLED 10% NBF 60ML

## (undated) DEVICE — ENTRIFLEX 8FR X 36 INCH WITH STYLET AND DUAL PORT ADAPTER: Brand: KANGAROO

## (undated) DEVICE — SYR 50ML SLIP TIP NSAF LF STRL --

## (undated) DEVICE — NDL PRT INJ NSAF BLNT 18GX1.5 --

## (undated) DEVICE — SNARE POLYP SM W13MMXL240CM SHTH DIA2.4MM OVL FLX DISP

## (undated) DEVICE — BLLN KT O RING ENDOSCP US --

## (undated) DEVICE — SYR 3ML LL TIP 1/10ML GRAD --

## (undated) DEVICE — NEEDLE SYR 18GA L1.5IN RED PLAS HUB S STL BLNT FILL W/O

## (undated) DEVICE — SINGLE PORT MANIFOLD: Brand: NEPTUNE 2

## (undated) DEVICE — SYRINGE, LUER SLIP, STERILE, 60ML: Brand: MEDLINE

## (undated) DEVICE — GAUZE,SPONGE,4"X4",12PLY,WOVEN,NS,LF: Brand: MEDLINE

## (undated) DEVICE — Device: Brand: BALLOON3

## (undated) DEVICE — ESOPHAGEAL/COLONIC/BILIARY WIREGUIDED BALLOON DILATATION CATHETER: Brand: CRE™ PRO

## (undated) DEVICE — THE TORRENT IRRIGATION TUBING IS INTENDED TO PROVIDE IRRIGATION VIA IRRIGATION FLUIDS, SUCH AS STERILE WATER, DURING GASTROINTESTINAL ENDOSCOPIC PROCEDURES WHEN USED IN CONJUNCTION WITH AN IRRIGATION PUMP OR ELECTROSURGICAL UNIT.: Brand: TORRENT

## (undated) DEVICE — KIT PEG DIA20FR STD PUSH DISP ENDOVIVE

## (undated) DEVICE — Device: Brand: SUCTION VALVE

## (undated) DEVICE — MOUTHPIECE ENDOSCP L CTRL OPN AND SIDE PORTS DISP

## (undated) DEVICE — GLOVE ORTHO 8   MSG9480

## (undated) DEVICE — LUBE JELLY FOIL PACK 1.4 OZ: Brand: MEDLINE INDUSTRIES, INC.

## (undated) DEVICE — CANNULA NSL AD CO2 SAMP FOR DASH 3000 4000 MON LO FLO

## (undated) DEVICE — AIR/WATER CLEANING ADAPTER FOR OLYMPUS® GI ENDOSCOPE: Brand: BULLDOG®

## (undated) DEVICE — BLOCK BITE 60FR W/ DENT RIM SCRIP ONLY

## (undated) DEVICE — GARMENT,MEDLINE,DVT,INT,CALF,MED, GEN2: Brand: MEDLINE

## (undated) DEVICE — SYR 10ML LUER LOK 1/5ML GRAD --

## (undated) DEVICE — MOUTHPIECE ENDOSCP 20X27MM --

## (undated) DEVICE — TUBING, SUCTION, 3/16" X 6', STRAIGHT: Brand: MEDLINE

## (undated) DEVICE — BW-412T DISP COMBO CLEANING BRUSH: Brand: SINGLE USE COMBINATION CLEANING BRUSH

## (undated) DEVICE — SYR BULB 60ML IRRIGATION -- CONVERT TO ITEM 116413